# Patient Record
Sex: FEMALE | Race: ASIAN | NOT HISPANIC OR LATINO | URBAN - METROPOLITAN AREA
[De-identification: names, ages, dates, MRNs, and addresses within clinical notes are randomized per-mention and may not be internally consistent; named-entity substitution may affect disease eponyms.]

---

## 2017-12-22 ENCOUNTER — INPATIENT (INPATIENT)
Facility: HOSPITAL | Age: 72
LOS: 4 days | Discharge: HOME | End: 2017-12-27
Attending: INTERNAL MEDICINE | Admitting: INTERNAL MEDICINE
Payer: MEDICARE

## 2017-12-22 VITALS
HEART RATE: 52 BPM | RESPIRATION RATE: 18 BRPM | DIASTOLIC BLOOD PRESSURE: 78 MMHG | TEMPERATURE: 98 F | OXYGEN SATURATION: 98 % | SYSTOLIC BLOOD PRESSURE: 140 MMHG

## 2017-12-22 DIAGNOSIS — E87.79 OTHER FLUID OVERLOAD: ICD-10-CM

## 2017-12-22 DIAGNOSIS — I71.00 DISSECTION OF UNSPECIFIED SITE OF AORTA: ICD-10-CM

## 2017-12-22 DIAGNOSIS — N18.9 CHRONIC KIDNEY DISEASE, UNSPECIFIED: ICD-10-CM

## 2017-12-22 DIAGNOSIS — J81.1 CHRONIC PULMONARY EDEMA: ICD-10-CM

## 2017-12-22 DIAGNOSIS — Z90.710 ACQUIRED ABSENCE OF BOTH CERVIX AND UTERUS: Chronic | ICD-10-CM

## 2017-12-22 DIAGNOSIS — I10 ESSENTIAL (PRIMARY) HYPERTENSION: ICD-10-CM

## 2017-12-22 LAB
ALBUMIN SERPL ELPH-MCNC: 3.7 G/DL — SIGNIFICANT CHANGE UP (ref 3.3–5)
ALP SERPL-CCNC: 181 U/L — HIGH (ref 40–120)
ALT FLD-CCNC: 31 U/L — SIGNIFICANT CHANGE UP (ref 4–33)
APTT BLD: 32.4 SEC — SIGNIFICANT CHANGE UP (ref 27.5–37.4)
AST SERPL-CCNC: 58 U/L — HIGH (ref 4–32)
BASE EXCESS BLDV CALC-SCNC: -1.9 MMOL/L — SIGNIFICANT CHANGE UP
BASOPHILS # BLD AUTO: 0.02 K/UL — SIGNIFICANT CHANGE UP (ref 0–0.2)
BASOPHILS NFR BLD AUTO: 0.4 % — SIGNIFICANT CHANGE UP (ref 0–2)
BILIRUB SERPL-MCNC: 0.5 MG/DL — SIGNIFICANT CHANGE UP (ref 0.2–1.2)
BLOOD GAS VENOUS - CREATININE: 5.44 MG/DL — HIGH (ref 0.5–1.3)
BUN SERPL-MCNC: 61 MG/DL — HIGH (ref 7–23)
CALCIUM SERPL-MCNC: 8.3 MG/DL — LOW (ref 8.4–10.5)
CHLORIDE BLDV-SCNC: 103 MMOL/L — SIGNIFICANT CHANGE UP (ref 96–108)
CHLORIDE SERPL-SCNC: 96 MMOL/L — LOW (ref 98–107)
CK MB BLD-MCNC: 3.01 NG/ML — SIGNIFICANT CHANGE UP (ref 1–4.7)
CK MB BLD-MCNC: SIGNIFICANT CHANGE UP (ref 0–2.5)
CK SERPL-CCNC: 109 U/L — SIGNIFICANT CHANGE UP (ref 25–170)
CK SERPL-CCNC: 129 U/L — SIGNIFICANT CHANGE UP (ref 25–170)
CO2 SERPL-SCNC: 19 MMOL/L — LOW (ref 22–31)
CREAT SERPL-MCNC: 5.18 MG/DL — HIGH (ref 0.5–1.3)
EOSINOPHIL # BLD AUTO: 0.3 K/UL — SIGNIFICANT CHANGE UP (ref 0–0.5)
EOSINOPHIL NFR BLD AUTO: 5.5 % — SIGNIFICANT CHANGE UP (ref 0–6)
GAS PNL BLDV: 129 MMOL/L — LOW (ref 136–146)
GLUCOSE BLDV-MCNC: 120 — HIGH (ref 70–99)
GLUCOSE SERPL-MCNC: 181 MG/DL — HIGH (ref 70–99)
HCO3 BLDV-SCNC: 23 MMOL/L — SIGNIFICANT CHANGE UP (ref 20–27)
HCT VFR BLD CALC: 28.3 % — LOW (ref 34.5–45)
HCT VFR BLDV CALC: 30.1 % — LOW (ref 34.5–45)
HGB BLD-MCNC: 9.8 G/DL — LOW (ref 11.5–15.5)
HGB BLDV-MCNC: 9.7 G/DL — LOW (ref 11.5–15.5)
IMM GRANULOCYTES # BLD AUTO: 0.03 # — SIGNIFICANT CHANGE UP
IMM GRANULOCYTES NFR BLD AUTO: 0.6 % — SIGNIFICANT CHANGE UP (ref 0–1.5)
INR BLD: 0.97 — SIGNIFICANT CHANGE UP (ref 0.88–1.17)
LACTATE BLDV-MCNC: 1.3 MMOL/L — SIGNIFICANT CHANGE UP (ref 0.5–2)
LYMPHOCYTES # BLD AUTO: 1.17 K/UL — SIGNIFICANT CHANGE UP (ref 1–3.3)
LYMPHOCYTES # BLD AUTO: 21.6 % — SIGNIFICANT CHANGE UP (ref 13–44)
MCHC RBC-ENTMCNC: 31.1 PG — SIGNIFICANT CHANGE UP (ref 27–34)
MCHC RBC-ENTMCNC: 34.6 % — SIGNIFICANT CHANGE UP (ref 32–36)
MCV RBC AUTO: 89.8 FL — SIGNIFICANT CHANGE UP (ref 80–100)
MONOCYTES # BLD AUTO: 0.65 K/UL — SIGNIFICANT CHANGE UP (ref 0–0.9)
MONOCYTES NFR BLD AUTO: 12 % — SIGNIFICANT CHANGE UP (ref 2–14)
NEUTROPHILS # BLD AUTO: 3.24 K/UL — SIGNIFICANT CHANGE UP (ref 1.8–7.4)
NEUTROPHILS NFR BLD AUTO: 59.9 % — SIGNIFICANT CHANGE UP (ref 43–77)
NRBC # FLD: 0 — SIGNIFICANT CHANGE UP
NT-PROBNP SERPL-SCNC: 7762 PG/ML — SIGNIFICANT CHANGE UP
NT-PROBNP SERPL-SCNC: 8100 PG/ML — SIGNIFICANT CHANGE UP
PCO2 BLDV: 34 MMHG — LOW (ref 41–51)
PH BLDV: 7.42 PH — SIGNIFICANT CHANGE UP (ref 7.32–7.43)
PLATELET # BLD AUTO: 180 K/UL — SIGNIFICANT CHANGE UP (ref 150–400)
PMV BLD: 10.8 FL — SIGNIFICANT CHANGE UP (ref 7–13)
PO2 BLDV: 46 MMHG — HIGH (ref 35–40)
POTASSIUM BLDV-SCNC: 3.9 MMOL/L — SIGNIFICANT CHANGE UP (ref 3.4–4.5)
POTASSIUM SERPL-MCNC: 4.3 MMOL/L — SIGNIFICANT CHANGE UP (ref 3.5–5.3)
POTASSIUM SERPL-SCNC: 4.3 MMOL/L — SIGNIFICANT CHANGE UP (ref 3.5–5.3)
PROT SERPL-MCNC: 7.5 G/DL — SIGNIFICANT CHANGE UP (ref 6–8.3)
PROTHROM AB SERPL-ACNC: 11.2 SEC — SIGNIFICANT CHANGE UP (ref 9.8–13.1)
RBC # BLD: 3.15 M/UL — LOW (ref 3.8–5.2)
RBC # FLD: 15.2 % — HIGH (ref 10.3–14.5)
SAO2 % BLDV: 81.3 % — SIGNIFICANT CHANGE UP (ref 60–85)
SODIUM SERPL-SCNC: 134 MMOL/L — LOW (ref 135–145)
TROPONIN T SERPL-MCNC: < 0.06 NG/ML — SIGNIFICANT CHANGE UP (ref 0–0.06)
TROPONIN T SERPL-MCNC: < 0.06 NG/ML — SIGNIFICANT CHANGE UP (ref 0–0.06)
WBC # BLD: 5.41 K/UL — SIGNIFICANT CHANGE UP (ref 3.8–10.5)
WBC # FLD AUTO: 5.41 K/UL — SIGNIFICANT CHANGE UP (ref 3.8–10.5)

## 2017-12-22 PROCEDURE — 71020: CPT | Mod: 26

## 2017-12-22 PROCEDURE — 76775 US EXAM ABDO BACK WALL LIM: CPT | Mod: 26

## 2017-12-22 RX ORDER — SODIUM CHLORIDE 9 MG/ML
1000 INJECTION, SOLUTION INTRAVENOUS
Qty: 0 | Refills: 0 | Status: DISCONTINUED | OUTPATIENT
Start: 2017-12-22 | End: 2017-12-27

## 2017-12-22 RX ORDER — DEXTROSE 50 % IN WATER 50 %
25 SYRINGE (ML) INTRAVENOUS ONCE
Qty: 0 | Refills: 0 | Status: DISCONTINUED | OUTPATIENT
Start: 2017-12-22 | End: 2017-12-27

## 2017-12-22 RX ORDER — AMLODIPINE BESYLATE 2.5 MG/1
10 TABLET ORAL DAILY
Qty: 0 | Refills: 0 | Status: DISCONTINUED | OUTPATIENT
Start: 2017-12-22 | End: 2017-12-27

## 2017-12-22 RX ORDER — FUROSEMIDE 40 MG
80 TABLET ORAL
Qty: 0 | Refills: 0 | Status: DISCONTINUED | OUTPATIENT
Start: 2017-12-22 | End: 2017-12-23

## 2017-12-22 RX ORDER — ASPIRIN/CALCIUM CARB/MAGNESIUM 324 MG
81 TABLET ORAL DAILY
Qty: 0 | Refills: 0 | Status: DISCONTINUED | OUTPATIENT
Start: 2017-12-22 | End: 2017-12-27

## 2017-12-22 RX ORDER — FUROSEMIDE 40 MG
40 TABLET ORAL ONCE
Qty: 0 | Refills: 0 | Status: COMPLETED | OUTPATIENT
Start: 2017-12-22 | End: 2017-12-22

## 2017-12-22 RX ORDER — GLUCAGON INJECTION, SOLUTION 0.5 MG/.1ML
1 INJECTION, SOLUTION SUBCUTANEOUS ONCE
Qty: 0 | Refills: 0 | Status: DISCONTINUED | OUTPATIENT
Start: 2017-12-22 | End: 2017-12-27

## 2017-12-22 RX ORDER — SODIUM BICARBONATE 1 MEQ/ML
325 SYRINGE (ML) INTRAVENOUS
Qty: 0 | Refills: 0 | Status: DISCONTINUED | OUTPATIENT
Start: 2017-12-22 | End: 2017-12-27

## 2017-12-22 RX ORDER — INSULIN LISPRO 100/ML
VIAL (ML) SUBCUTANEOUS
Qty: 0 | Refills: 0 | Status: DISCONTINUED | OUTPATIENT
Start: 2017-12-22 | End: 2017-12-27

## 2017-12-22 RX ORDER — DEXTROSE 50 % IN WATER 50 %
1 SYRINGE (ML) INTRAVENOUS ONCE
Qty: 0 | Refills: 0 | Status: DISCONTINUED | OUTPATIENT
Start: 2017-12-22 | End: 2017-12-27

## 2017-12-22 RX ORDER — METOPROLOL TARTRATE 50 MG
100 TABLET ORAL DAILY
Qty: 0 | Refills: 0 | Status: DISCONTINUED | OUTPATIENT
Start: 2017-12-22 | End: 2017-12-24

## 2017-12-22 RX ORDER — DEXTROSE 50 % IN WATER 50 %
12.5 SYRINGE (ML) INTRAVENOUS ONCE
Qty: 0 | Refills: 0 | Status: DISCONTINUED | OUTPATIENT
Start: 2017-12-22 | End: 2017-12-27

## 2017-12-22 RX ORDER — INSULIN LISPRO 100/ML
VIAL (ML) SUBCUTANEOUS AT BEDTIME
Qty: 0 | Refills: 0 | Status: DISCONTINUED | OUTPATIENT
Start: 2017-12-22 | End: 2017-12-27

## 2017-12-22 RX ADMIN — Medication 40 MILLIGRAM(S): at 17:49

## 2017-12-22 NOTE — H&P ADULT - PROBLEM SELECTOR PLAN 1
Atthis point unclear if edema medicated by heart/valvular disease vs worsening CKD, for now will Start Pt on lasix 80 IV BID with zaroxolyn, will resume home meds Atthis point unclear if edema medicated by heart/valvular disease vs worsening CKD, for now will Start Pt on lasix 80 IV BID with zaroxolyn, will resume home meds  Will ECHO in am Will Call renal Dr Andrade in am

## 2017-12-22 NOTE — CONSULT NOTE ADULT - ATTENDING COMMENTS
Pt. was seen and examined. Agree with above. Plan d/w the team.  Pt with known chronic Type B dissection since 2008 and CKD now with worsening renal fx. Cr up to 5.   Images are reviewed. Pt. had a known aortic dissection extending from L subclavian down to the femoral since 2008. There is no progression of the dissection at this time. This is a complex dissection with both true and false lumen being filled. Since it's a chronic dissection. The supply of visceral arteries (ie what lumen they came off) should not change over time. Irrespective of that both renal arteries appear to be filling on duplex. I do not think that chronic dissection is the etiology for this pt's renal failure since it;s not an acute process but rather slow decline in renal fx.   Protect nondominant arm   vein mapping for AVF planning.   to best evaluate the aorta pt would need a contrast study that could be done if pt. starts HD.

## 2017-12-22 NOTE — ED ADULT NURSE NOTE - OBJECTIVE STATEMENT
Pt aox4, and ambualtory. c/o SOB on exertion x2weeks, IV 20g in right ac. Hx of Aortic Aneurysm. VSS, NAD. Awaiting transport to floor.

## 2017-12-22 NOTE — H&P ADULT - ATTENDING COMMENTS
Pt seen and examined  Patient is a 72 year old woman with history of HTN, DM, ESRD, Type B Aortic Dissection, Endometrial CA, S/P hysterectomy with chemo 5y ago admitted with progressive dyspnea on exertion associated with increased lower extremity edema and orthopnea.  Patient was evaluated at urgent care and given lasix 20mg po daily which did not cause improvement.  Patient denies any chest pain, palpitations, cough, syncope, nausea, abdominal pain, fever, chills,  or rash.   Had recent echo and stress at dr emery's office    maria isabel negative  cxr effusions, chf    creat 5.2  5.7 recently at cardio office     vitals stable  nad aao x 3 nc/at neck supple   cv s1s2 rrr + sm apex  lungs dec bsbases  abd soft, nt  ext b/l edema 2+    A/P    72 year old woman with history of HTN, DM, ESRD, Type B Aortic Dissection, Endometrial CA, S/P hysterectomy with chemo 5y ago admitted with progressive dyspnea on exertion associated with increased lower extremity edema and orthopnea.      1. Acute on chronic CHF  + volume overloaded  mitral valve disease on exam  clinically improved somewhat with iv lasix   cont iv bid, can dec dose mehnaz to 40mg bid  add zarox 5mg daily to augment diruesis  will obtain stress and echo results for lv fxn and valve fxn from dr kerr's office     2. ESRD  renal eval appreciated/noted  procrit  monitor renal fxn  outpt planning for new HD    3. Hypertension  acceptable bp reading s  cont current meds     dvt ppx

## 2017-12-22 NOTE — H&P ADULT - HISTORY OF PRESENT ILLNESS
Pt 71yo Female with Hx of HTN (baseline SBP in 140) DM (baseline FS in 160-170) Type B Aortic Dissection (4cm  4y ago) CKD (last SCr according to Pt 4.5 2m ago on HCO3 pill at home) And hx of Endometrial CA S/P hysterectomy with chemo 5y ago, now presenting for evaluation of SOB. Pt reports two weeks of progressive exertional dyspnea of 1/4 of block of normal pace walking with associated dry cough. Pt also reports lower extremity swelling and orthopnea. With above complains Pt referred to urgent care center three days ago and was told she has "water in lungs" she was Rx lasix 20 PO and instructed if symptoms not improve come to hospital. Since starting Lasix he dyspnea did not improved, and i past two days she developed resting symptoms with SOB dry positional cough and worsening orthopnea (now she sleeps on couch sitting) Pt reports no chest pain no back pain, no dizziness, no palpitations, no fever or chills. Pt reports no abdominal pain, or distention, but reports decrease in urinary output.  Pt reports hx of HTN that was managed with Lopressor Norvasc, Pt also was on Valsartan and HCTZ and Clonidine, but it was toped by PMD.  Pt reports having ECHO and STRESS two weeks ago (but result are not available yeat

## 2017-12-22 NOTE — H&P ADULT - RS GEN PE MLT RESP DETAILS PC
breath sounds equal/good air movement/airway patent/respirations non-labored airway patent/respirations non-labored/breath sounds equal/good air movement/rales

## 2017-12-22 NOTE — ED ADULT TRIAGE NOTE - CHIEF COMPLAINT QUOTE
Co right sided chest pain and sob since last week. Went to urgent care on Wednesday and told she has fluid in her lungs. Prescribed Lasix. Pt's voice is very hoarse and co itchy throat. Denies fevers, chills.

## 2017-12-22 NOTE — ED PROVIDER NOTE - NOTES
Will evaluate while in patient given pt's hx of dissection now showing that renal arteries supplied by false lumen. Agree would need medical management of CHF prior to any interventions but will follow while inpatient.

## 2017-12-22 NOTE — ED PROVIDER NOTE - OBJECTIVE STATEMENT
72F PMH Type B aortic dissection, HTN, DM, 72F PMH Type B aortic dissection, HTN, DM, HLD, remote hx endometrial CA 10 years ago now in remission p/w 2 weeks exertional SOB and chest heaviness. States saw her cardiologist 1 month ago with normal echo/stress. No NV or diaphoresis. Went to urgent care last week for symptoms and told she had b/l effusions on CXR and given rx for Lasix 20mg daily. Told if didn't improve with Lasix after a few days to go to ED. Pt states not feeling any improvement so came to ED. +2 pillow orthopnea, b/l LE edema. No recent travel, cough, or fever.

## 2017-12-22 NOTE — H&P ADULT - PMH
Adult Onset Diabetes Mellitus,    Cancer of Endometrium  s/p Hysterectomy , s/p Chemo.  CKD (chronic kidney disease)    Dissection of Aorta  Type B  History of Hysterectomy with O    HLD (hyperlipidemia)    Hypertension

## 2017-12-22 NOTE — ED PROVIDER NOTE - CARE PLAN
Principal Discharge DX:	Other hypervolemia  Secondary Diagnosis:	Renal failure, unspecified chronicity

## 2017-12-22 NOTE — CONSULT NOTE ADULT - SUBJECTIVE AND OBJECTIVE BOX
VASCULAR SURGERY CONSULT    Team pager: [phone]  Consulting attending:  Patient seen and examined: 12-23-17 @ 13:21    HPI:  Patient is a 72y Female    HPI:  Pt 71yo Female with Hx of HTN (baseline SBP in 140) DM (baseline FS in 160-170) Type B Aortic Dissection (4cm  4y ago) CKD (last SCr according to Pt 4.5 2m ago on HCO3 pill at home) And hx of Endometrial CA S/P hysterectomy with chemo 5y ago, now presenting for evaluation of SOB. Pt reports two weeks of progressive exertional dyspnea of 1/4 of block of normal pace walking with associated dry cough. Pt also reports lower extremity swelling and orthopnea. With above complains Pt referred to urgent care center three days ago and was told she has "water in lungs" she was Rx lasix 20 PO and instructed if symptoms not improve come to hospital. Since starting Lasix he dyspnea did not improved, and i past two days she developed resting symptoms with SOB dry positional cough and worsening orthopnea (now she sleeps on couch sitting) Pt reports no chest pain no back pain, no dizziness, no palpitations, no fever or chills. Pt reports no abdominal pain, or distention, but reports decrease in urinary output.  Pt reports hx of HTN that was managed with Lopressor Norvasc, Pt also was on Valsartan and HCTZ and Clonidine, but it was toped by PMD.  Pt reports having ECHO and STRESS two weeks ago (but result are not available andre (22 Dec 2017 20:48)      PAST MEDICAL HISTORY:  CKD (chronic kidney disease)  Hypertension  History of Hysterectomy with O  Dissection of Aorta  Cancer of Endometrium  Adult Onset Diabetes Mellitus,      PAST SURGICAL HISTORY:  H/O total hysterectomy      ALLERGIES:  No Known Allergies      MEDICATIONS  (STANDING):  amLODIPine   Tablet 10 milliGRAM(s) Oral daily  aspirin enteric coated 81 milliGRAM(s) Oral daily  dextrose 5%. 1000 milliLiter(s) (50 mL/Hr) IV Continuous <Continuous>  dextrose 50% Injectable 12.5 Gram(s) IV Push once  dextrose 50% Injectable 25 Gram(s) IV Push once  dextrose 50% Injectable 25 Gram(s) IV Push once  furosemide   Injectable 80 milliGRAM(s) IV Push two times a day  insulin lispro (HumaLOG) corrective regimen sliding scale   SubCutaneous three times a day before meals  insulin lispro (HumaLOG) corrective regimen sliding scale   SubCutaneous at bedtime  metolazone 5 milliGRAM(s) Oral once  metoprolol succinate  milliGRAM(s) Oral daily  sodium bicarbonate 325 milliGRAM(s) Oral two times a day    MEDICATIONS  (PRN):  dextrose Gel 1 Dose(s) Oral once PRN Blood Glucose LESS THAN 70 milliGRAM(s)/deciliter  glucagon  Injectable 1 milliGRAM(s) IntraMuscular once PRN Glucose LESS THAN 70 milligrams/deciliter      VITALS & I/Os:  Vital Signs Last 24 Hrs  T(C): 36.9 (23 Dec 2017 06:13), Max: 36.9 (22 Dec 2017 18:08)  T(F): 98.4 (23 Dec 2017 06:13), Max: 98.4 (22 Dec 2017 18:08)  HR: 62 (23 Dec 2017 06:13) (49 - 62)  BP: 157/90 (23 Dec 2017 06:13) (140/78 - 157/90)  BP(mean): --  RR: 16 (23 Dec 2017 06:13) (16 - 19)  SpO2: 95% (23 Dec 2017 06:13) (95% - 98%)  CAPILLARY BLOOD GLUCOSE      POCT Blood Glucose.: 161 mg/dL (23 Dec 2017 12:55)  POCT Blood Glucose.: 128 mg/dL (23 Dec 2017 09:14)  POCT Blood Glucose.: 130 mg/dL (22 Dec 2017 21:17)  POCT Blood Glucose.: 175 mg/dL (22 Dec 2017 13:58)      I&O's Summary    22 Dec 2017 07:01  -  23 Dec 2017 07:00  --------------------------------------------------------  IN: 200 mL / OUT: 450 mL / NET: -250 mL    23 Dec 2017 07:01  -  23 Dec 2017 13:21  --------------------------------------------------------  IN: 0 mL / OUT: 150 mL / NET: -150 mL          GEN: NAD, alert and oriented x 3  HEENT: WNL  CHEST: Symmetrical chest rise, breath sounds CTAB  HEART: RRR, non-muffled heart sounds  ABD: Soft, non-tender, non-distended  EXT/VASC:                          RUE -                                                   radial  -  palpable [  ]       doppler signal [  ]                                       ulnar  -  palpable [  ]       doppler signal [  ]                                       brachial  -  palpable [  ]       doppler signal [  ]                       LUE  -                                                 radial  -  palpable [  ]       doppler signal [  ]                                       ulnar  -  palpable [  ]       doppler signal [  ]                                       brachial  -  palpable [  ]       doppler signal [  ]                       RLE -                                                   femoral  -  palpable [  ]       doppler signal [  ]                                       popliteal  -  palpable [  ]       doppler signal [  ]                                       DP -  palpable [  ]       doppler signal [  ]                                       PT -  palpable [  ]       doppler signal [  ]                       LLE -                                                    femoral  -  palpable [  ]       doppler signal [  ]                                       popliteal  -  palpable [  ]       doppler signal [  ]                                       DP -  palpable [  ]       doppler signal [  ]                                       PT -  palpable [  ]       doppler signal [  ]    LABS:                        8.6    4.90  )-----------( 161      ( 23 Dec 2017 06:40 )             25.4     12-23    133<L>  |  95<L>  |  63<H>  ----------------------------<  113<H>  3.9   |  22  |  5.13<H>    Ca    8.2<L>      23 Dec 2017 06:40  Phos  5.5     12-23  Mg     2.0     12-23    TPro  6.8  /  Alb  3.4  /  TBili  0.6  /  DBili  x   /  AST  42<H>  /  ALT  26  /  AlkPhos  158<H>  12-23    Lactate: Lactate, Blood: 0.9 mmol/L (12-23 @ 06:40)   12-22 @ 17:50  1.3    PT/INR - ( 22 Dec 2017 15:14 )   PT: 11.2 SEC;   INR: 0.97          PTT - ( 22 Dec 2017 15:14 )  PTT:32.4 SEC    CARDIAC MARKERS ( 23 Dec 2017 06:40 )  x     / < 0.06 ng/mL / 105 u/L / x     / x      CARDIAC MARKERS ( 22 Dec 2017 22:00 )  x     / < 0.06 ng/mL / 109 u/L / x     / x      CARDIAC MARKERS ( 22 Dec 2017 15:14 )  x     / < 0.06 ng/mL / 129 u/L / 3.01 ng/mL / x                IMAGING:      ASSESSMENT & PLAN  72y Female      Discussed with VASCULAR SURGERY CONSULT    Team pager: [phone]  Consulting attending: Mikey      HPI:  Patient is a 72y Female - h/o of chronic Type B aortic dissection, HTN, CKD stage 5 - admitted to medical service with heart failure exacerbation/fluid overload. Patient reporting no abdominal pain, no chest pain on presentation to the [facility] ED. Her renal function has been steadily decreasing over the past year, and to further elucidate potential anatomic causes of worsening kidney disease, the ED obtained an abdominal vascular duplex study. This study revealed a change from the patient's last CTA of the abdomen in 2009: whereas before, the celiac artery and SMA branched from the false lumen and the renal and inferior mesenteric arteries branched from the true lumen, this new duplex demonstrates that the renal arteries now branch from the false lumen as well, with preserved forward flow. The diameter of infra-renal aortic dilation is unchanged from prior - 4 cm. As this progression of her Type B aortic dissection could be resulting in atrophic kidney disease, ED requested vascular surgical evaluation.  Patient reports no extremity pain/weakness/paresthias, no abdominal pain (post-prandial or otherwise), no lightheadedness/visual changes, and no chest pain. In discussions with her nephrologist (Dr. Sterling), he informed her that she will soon require HD, and therefore a placement of a tunneled dialysis catheter, followed by creation of an AVF vs. AVG. She was referred to a vascular surgeon (outside of this health system), to this end.         PAST MEDICAL HISTORY:  CKD (chronic kidney disease)  Hypertension  History of Hysterectomy with O  Dissection of Aorta  Cancer of Endometrium  Adult Onset Diabetes Mellitus,      PAST SURGICAL HISTORY:  H/O total hysterectomy      ALLERGIES:  No Known Allergies      MEDICATIONS  (STANDING):  amLODIPine   Tablet 10 milliGRAM(s) Oral daily  aspirin enteric coated 81 milliGRAM(s) Oral daily  dextrose 5%. 1000 milliLiter(s) (50 mL/Hr) IV Continuous <Continuous>  dextrose 50% Injectable 12.5 Gram(s) IV Push once  dextrose 50% Injectable 25 Gram(s) IV Push once  dextrose 50% Injectable 25 Gram(s) IV Push once  furosemide   Injectable 80 milliGRAM(s) IV Push two times a day  insulin lispro (HumaLOG) corrective regimen sliding scale   SubCutaneous three times a day before meals  insulin lispro (HumaLOG) corrective regimen sliding scale   SubCutaneous at bedtime  metolazone 5 milliGRAM(s) Oral once  metoprolol succinate  milliGRAM(s) Oral daily  sodium bicarbonate 325 milliGRAM(s) Oral two times a day    MEDICATIONS  (PRN):  dextrose Gel 1 Dose(s) Oral once PRN Blood Glucose LESS THAN 70 milliGRAM(s)/deciliter  glucagon  Injectable 1 milliGRAM(s) IntraMuscular once PRN Glucose LESS THAN 70 milligrams/deciliter      VITALS:  T(C): 36.9  HR: 62  BP: 157/90  RR: 16  SpO2: 95%)        GEN: NAD, alert and oriented x 3  HEENT: WNL  CHEST: Symmetrical chest rise, breath sounds CTAB  HEART: RRR, non-muffled heart sounds  ABD: Soft, non-tender, non-distended  EXT/VASC:                          RUE - /85. No edema/erythema or deformity. Warm, cap refill < 2 sec. Motor and sensory function intact.                                                  radial  -  palpable [ x ]       doppler signal [  ]                                       ulnar  -  palpable [  ]       doppler signal [  ]                                       brachial  -  palpable [ x ]       doppler signal [  ]                       LUE  -  /82. Edematous, relative to right, non-pitting. Superficial venous distension. Warm, cap refill < 2 sec. Motor and sensory function intact.                                                  radial  -  palpable [ x ]       doppler signal [  ]                                       ulnar  -  palpable [  ]       doppler signal [  ]                                       brachial  -  palpable [ x ]       doppler signal [  ]                       RLE - Bp 154/78. No edema/erythema or deformity. Warm, cap refill < 2 sec. Motor and sensory function intact. No peripheral tissue loss.                                                femoral  -  palpable [ x ]       doppler signal [  ]                                       popliteal  -  palpable [ x ]       doppler signal [  ]                                       DP -  palpable [ x ]       doppler signal [  ]                                       PT -  palpable [ x ]       doppler signal [  ]                       LLE - /79. No edema/erythema or deformity. Warm, cap refill < 2 sec. Motor and sensory function intact. No peripheral tissue loss.                                                  femoral  -  palpable [ x ]       doppler signal [  ]                                       popliteal  -  palpable [ x ]       doppler signal [  ]                                       DP -  palpable [ x ]       doppler signal [  ]                                       PT -  palpable [ x ]       doppler signal [  ]    LABS:                        8.6    4.90  )-----------( 161      ( 23 Dec 2017 06:40 )             25.4     12-23    133<L>  |  95<L>  |  63<H>  ----------------------------<  113<H>  3.9   |  22  |  5.13<H>    Ca    8.2<L>      23 Dec 2017 06:40  Phos  5.5     12-23  Mg     2.0     12-23    TPro  6.8  /  Alb  3.4  /  TBili  0.6  /  DBili  x   /  AST  42<H>  /  ALT  26  /  AlkPhos  158<H>  12-23    Lactate: Lactate, Blood: 0.9 mmol/L (12-23 @ 06:40)   12-22 @ 17:50  1.3    PT/INR - ( 22 Dec 2017 15:14 )   PT: 11.2 SEC;   INR: 0.97          PTT - ( 22 Dec 2017 15:14 )  PTT:32.4 SEC    CARDIAC MARKERS ( 23 Dec 2017 06:40 )  x     / < 0.06 ng/mL / 105 u/L / x     / x      CARDIAC MARKERS ( 22 Dec 2017 22:00 )  x     / < 0.06 ng/mL / 109 u/L / x     / x      CARDIAC MARKERS ( 22 Dec 2017 15:14 )  x     / < 0.06 ng/mL / 129 u/L / 3.01 ng/mL / x              IMAGING:  < from: US Abdominal Aorta (12.22.17 @ 16:22) >  FINDINGS:    Aortic dissection is again identified in the abdominal aorta. The celiac   axis and superior mesenteric artery appear to arise from the false lumen.   Both renal arteries appear to arise from the false lumen.  The following measurements were obtained:    Proximal Aorta: 3.0 cm.  Mid Aorta: 2.9 cm.  Distal Aorta: 4.0 cm.    Iliac vessels and inferior mesenteric artery are obscured by bowel gas.    IMPRESSION:     Aortic dissection is again identified with both renal arteries appearing   to arise from the false lumen. On prior CT abdomen and pelvis from June 8, 2009 the renal arteries appear to arise from the true lumen.    < end of copied text >      ASSESSMENT & PLAN  72y Female with chronic Type B aortic dissection with new finding of b/l renal arterial supply branching from the false lumen, admitted with heart failure exacerbation. No extremity or immediate visceral endangerment based up examination, imaging and laboratory data. Recommend:  - Obtaining records from 2013 echocardiogram vs. repeated TTE  - Nephrology recommendations/plans for hemodialysis  - Time-of-flight MRI of the chest/abdomen/pelvis to re-image thoracoabdominal aortic aneurysm/dissection.  - Kidney US examination  - BP control, ASA  - Q4 pulse examinations      Discussed with vascular fellow (Dmitri)      Valentin Baxter  R3, General Surgery VASCULAR SURGERY CONSULT    Team pager: [phone]  Consulting attending: Mikey      HPI:  Patient is a 72y Female - h/o of chronic Type B aortic dissection, HTN, CKD stage 5 - admitted to medical service with heart failure exacerbation/fluid overload. Patient reporting no abdominal pain, no chest pain on presentation to the [facility] ED. Her renal function has been steadily decreasing over the past year, and to further elucidate potential anatomic causes of worsening kidney disease, the ED obtained an abdominal vascular duplex study. This study revealed a change from the patient's last CTA of the abdomen in 2009: whereas before, the celiac artery and SMA branched from the false lumen and the renal and inferior mesenteric arteries branched from the true lumen, this new duplex demonstrates that the renal arteries now branch from the false lumen as well, with preserved forward flow. The diameter of infra-renal aortic dilation is unchanged from prior - 4 cm. As this progression of her Type B aortic dissection could be resulting in atrophic kidney disease, ED requested vascular surgical evaluation.  Patient reports no extremity pain/weakness/paresthias, no abdominal pain (post-prandial or otherwise), no lightheadedness/visual changes, and no chest pain. In discussions with her nephrologist (Dr. Sterling), he informed her that she will soon require HD, and therefore a placement of a tunneled dialysis catheter, followed by creation of an AVF vs. AVG. She was referred to a vascular surgeon (outside of this health system), to this end.         PAST MEDICAL HISTORY:  CKD (chronic kidney disease)  Hypertension  History of Hysterectomy with O  Dissection of Aorta  Cancer of Endometrium  Adult Onset Diabetes Mellitus,      PAST SURGICAL HISTORY:  H/O total hysterectomy      ALLERGIES:  No Known Allergies      MEDICATIONS  (STANDING):  amLODIPine   Tablet 10 milliGRAM(s) Oral daily  aspirin enteric coated 81 milliGRAM(s) Oral daily  dextrose 5%. 1000 milliLiter(s) (50 mL/Hr) IV Continuous <Continuous>  dextrose 50% Injectable 12.5 Gram(s) IV Push once  dextrose 50% Injectable 25 Gram(s) IV Push once  dextrose 50% Injectable 25 Gram(s) IV Push once  furosemide   Injectable 80 milliGRAM(s) IV Push two times a day  insulin lispro (HumaLOG) corrective regimen sliding scale   SubCutaneous three times a day before meals  insulin lispro (HumaLOG) corrective regimen sliding scale   SubCutaneous at bedtime  metolazone 5 milliGRAM(s) Oral once  metoprolol succinate  milliGRAM(s) Oral daily  sodium bicarbonate 325 milliGRAM(s) Oral two times a day    MEDICATIONS  (PRN):  dextrose Gel 1 Dose(s) Oral once PRN Blood Glucose LESS THAN 70 milliGRAM(s)/deciliter  glucagon  Injectable 1 milliGRAM(s) IntraMuscular once PRN Glucose LESS THAN 70 milligrams/deciliter    REVIEW OF SYSTEMS:  CONSTITUTIONAL: No weakness, fevers or chills  EYES/ENT: No visual changes;  No vertigo or throat pain   NECK: No pain or stiffness  RESPIRATORY: shortness of breath  CARDIOVASCULAR: No chest pain or palpitations at present  GASTROINTESTINAL: No abdominal or epigastric pain. No nausea, vomiting, or hematemesis; No diarrhea or constipation. No melena or hematochezia.  GENITOURINARY: No dysuria, frequency, foamy urine, urinary urgency, incontinence or hematuria  NEUROLOGICAL: No numbness or weakness  SKIN: No itching, burning, rashes, or lesions   All other review of systems is negative unless indicated above.        VITALS:  T(C): 36.9  HR: 62  BP: 157/90  RR: 16  SpO2: 95%)        GEN: NAD, alert and oriented x 3  HEENT: WNL  CHEST: Symmetrical chest rise, breath sounds CTAB  HEART: RRR, non-muffled heart sounds  ABD: Soft, non-tender, non-distended  EXT/VASC:                          RUE - /85. No edema/erythema or deformity. Warm, cap refill < 2 sec. Motor and sensory function intact.                                                  radial  -  palpable [ x ]       doppler signal [  ]                                       ulnar  -  palpable [  ]       doppler signal [  ]                                       brachial  -  palpable [ x ]       doppler signal [  ]                       LUE  -  /82. Edematous, relative to right, non-pitting. Superficial venous distension. Warm, cap refill < 2 sec. Motor and sensory function intact.                                                  radial  -  palpable [ x ]       doppler signal [  ]                                       ulnar  -  palpable [  ]       doppler signal [  ]                                       brachial  -  palpable [ x ]       doppler signal [  ]                       RLE - Bp 154/78. No edema/erythema or deformity. Warm, cap refill < 2 sec. Motor and sensory function intact. No peripheral tissue loss.                                                femoral  -  palpable [ x ]       doppler signal [  ]                                       popliteal  -  palpable [ x ]       doppler signal [  ]                                       DP -  palpable [ x ]       doppler signal [  ]                                       PT -  palpable [ x ]       doppler signal [  ]                       LLE - /79. No edema/erythema or deformity. Warm, cap refill < 2 sec. Motor and sensory function intact. No peripheral tissue loss.                                                  femoral  -  palpable [ x ]       doppler signal [  ]                                       popliteal  -  palpable [ x ]       doppler signal [  ]                                       DP -  palpable [ x ]       doppler signal [  ]                                       PT -  palpable [ x ]       doppler signal [  ]    Neuro: AOx3  Psych: calm  Skin: no rash      LABS:                        8.6    4.90  )-----------( 161      ( 23 Dec 2017 06:40 )             25.4     12-23    133<L>  |  95<L>  |  63<H>  ----------------------------<  113<H>  3.9   |  22  |  5.13<H>    Ca    8.2<L>      23 Dec 2017 06:40  Phos  5.5     12-23  Mg     2.0     12-23    TPro  6.8  /  Alb  3.4  /  TBili  0.6  /  DBili  x   /  AST  42<H>  /  ALT  26  /  AlkPhos  158<H>  12-23    Lactate: Lactate, Blood: 0.9 mmol/L (12-23 @ 06:40)   12-22 @ 17:50  1.3    PT/INR - ( 22 Dec 2017 15:14 )   PT: 11.2 SEC;   INR: 0.97          PTT - ( 22 Dec 2017 15:14 )  PTT:32.4 SEC    CARDIAC MARKERS ( 23 Dec 2017 06:40 )  x     / < 0.06 ng/mL / 105 u/L / x     / x      CARDIAC MARKERS ( 22 Dec 2017 22:00 )  x     / < 0.06 ng/mL / 109 u/L / x     / x      CARDIAC MARKERS ( 22 Dec 2017 15:14 )  x     / < 0.06 ng/mL / 129 u/L / 3.01 ng/mL / x              IMAGING:  < from: US Abdominal Aorta (12.22.17 @ 16:22) >  FINDINGS:    Aortic dissection is again identified in the abdominal aorta. The celiac   axis and superior mesenteric artery appear to arise from the false lumen.   Both renal arteries appear to arise from the false lumen.  The following measurements were obtained:    Proximal Aorta: 3.0 cm.  Mid Aorta: 2.9 cm.  Distal Aorta: 4.0 cm.    Iliac vessels and inferior mesenteric artery are obscured by bowel gas.    IMPRESSION:     Aortic dissection is again identified with both renal arteries appearing   to arise from the false lumen. On prior CT abdomen and pelvis from June 8, 2009 the renal arteries appear to arise from the true lumen.    < end of copied text >      ASSESSMENT & PLAN  72y Female with chronic Type B aortic dissection with new finding of b/l renal arterial supply branching from the false lumen, admitted with heart failure exacerbation. No extremity or immediate visceral endangerment based up examination, imaging and laboratory data. Recommend:  - Obtaining records from 2013 echocardiogram vs. repeated TTE  - Nephrology recommendations/plans for hemodialysis  - Time-of-flight MRI of the chest/abdomen/pelvis to re-image thoracoabdominal aortic aneurysm/dissection.  - Kidney US examination  - BP control, ASA  - Q4 pulse examinations      Discussed with vascular fellow (Dmitri)      Valentin Baxter  R3, General Surgery

## 2017-12-22 NOTE — ED PROVIDER NOTE - ATTENDING CONTRIBUTION TO CARE
I was physically present for the E/M service provided. I agree with above history, physical, and plan which I have reviewed and edited where appropriate. I was physically present for the key portions of the service provided.    72 yr old female with hx of Type B aortic dissection, HTN, DM, HLD, remote hx endometrial CA 10 years ago now in remission p/w 2 weeks exertional SOB and chest heaviness not improving.  Cardiologist at [facility] performed echo/stress x1 week ago which she states was normal. admits to chronic bilateral leg swelling, using 2 pillow to sleep, prefers to be sitting upright and on a beta blocker.  pt went to urgent care and had cxr done which showed fluid in lungs and was given lasix 20mg po and told if no improvement in 2-3 days to go to ed for eval.  no fever, no chills, no visual changes, no headache, no numbness or tingling, no cough, no cp, no palpitations, no syncope, no abd pain, no n/v/d, no dysuria.    *GEN:   comfortable, in no apparent distress, AOx3, sitting upright  *EYES:   PERRL, extra-occular movements intact  *HEENT:   airway patent, moist mucosal membranes, uvula midline  *CV:   abdirahman rate and rhythm, normal S1/S2, no murmur  *RESP:   left basilar crackles noted, non-labored, speaking in full sentences  *ABD:   soft, non tender, no guarding  *:   no cva tenderness  *MSK:   no musculoskeletal tenderness, 5/5 strength, moving all extremity  *SKIN:   dry, intact, no rash  *NEURO:   AOx3, no focal weakness or loss of sensation,  GCS 15    pt with sx suggestive of acute on chronic CHF will r/o worsening or unstable aortic dissection vs acs vs anemia.  labs, cxr, AAA sono, lasix, consult vascular and admit

## 2017-12-22 NOTE — H&P ADULT - PROBLEM SELECTOR PLAN 3
on ABD SONO noted with Aortic dissection is again identified with both renal arteries appearing   to arise from the false lumen. On prior CT abdomen and pelvis from June 8, 2009 the renal arteries appear to arise from the true lumen.    Awaiting vascular surgery recommendations

## 2017-12-22 NOTE — H&P ADULT - PROBLEM SELECTOR PLAN 2
noted with SCr of 5.2 according to Pt last SCr was 4.5 at this   at this point unclear if medicated by worsening dissection vs CHF will call renal in am and awaiting vascular surgery input  So far Pt make urine will continue with lasix for now as well as BICARB pills, but dose of Bicarb pill need to be clarified

## 2017-12-22 NOTE — ED PROVIDER NOTE - MEDICAL DECISION MAKING DETAILS
72F with exertional chest pain and RANDHAWA with EKG changes compared to prior (from 2009). Plan for cbc, cmp, ekg, cxr, cardiac enzymes, abd US aorta given renal insufficiency cannot give IV contrast for evaluation of dissection unless very high suspicion and current history/exam does not fit dissection (no back or abd pain, symmetric and equal pulses), symptoms with lying flat or exertion.

## 2017-12-22 NOTE — H&P ADULT - NEGATIVE GENERAL SYMPTOMS
no fever/no sweating/no chills/no anorexia/no weight loss/no weight gain no chills/no weight gain/no fever/no sweating/no anorexia

## 2017-12-22 NOTE — ED PROVIDER NOTE - PROGRESS NOTE DETAILS
Cardiac enzymes normal, planned for admission. Pt's cardiologist Dr. Viktor Baez affiliated, page placed through answering service. Second call placed to answering service. Spoke with cardiologist; had abnormal stress (small area of anterior ischemia) but was concerned about worsening renal failure. States does not typically admit at [facility], would like pt admitted to Dr. Juan F Quiñonez. Made aware of US findings, agrees findings may explain renal function but would not change cardiac management at this time. Accepted for admission to Dr. Quiñonez. Tele PA textpage sent.

## 2017-12-22 NOTE — H&P ADULT - ASSESSMENT
Pt 71yo female with Hx as above admitted to TELE for fluid overload, Pt also with Hx of Type B aortic dissection as mentioned before had ABD sono done while in ER and noted with Aortic dissection is again identified with both renal arteries appearing to arise from the false lumen. On prior CT abdomen and pelvis from June 8, 2009 the renal arteries appear to arise from the true lumen. Pt also noted with SCr of 5.2 BUN 68 HCO3 19 AG of 19 likely renal. K 4.3 WHile in ER Pt recived Lasix 40 IV x 4, so far made two trips to bathroom, but output not recorded   on bedside ECHO noted preserved LVF with notable AR and MR IVC 2.1cm BL pleural effusions with BL lateral B lines

## 2017-12-22 NOTE — ED PROVIDER NOTE - PMH
Adult Onset Diabetes Mellitus,    Cancer of Endometrium  s/p Hysterectomy , s/p Chemo.  Dissection of Aorta  Type B  History of Hysterectomy with O    HLD (hyperlipidemia)    Hypertension

## 2017-12-23 LAB
ALBUMIN SERPL ELPH-MCNC: 3.4 G/DL — SIGNIFICANT CHANGE UP (ref 3.3–5)
ALP SERPL-CCNC: 158 U/L — HIGH (ref 40–120)
ALT FLD-CCNC: 26 U/L — SIGNIFICANT CHANGE UP (ref 4–33)
AST SERPL-CCNC: 42 U/L — HIGH (ref 4–32)
BASOPHILS # BLD AUTO: 0.03 K/UL — SIGNIFICANT CHANGE UP (ref 0–0.2)
BASOPHILS NFR BLD AUTO: 0.6 % — SIGNIFICANT CHANGE UP (ref 0–2)
BILIRUB SERPL-MCNC: 0.6 MG/DL — SIGNIFICANT CHANGE UP (ref 0.2–1.2)
BLD GP AB SCN SERPL QL: NEGATIVE — SIGNIFICANT CHANGE UP
BUN SERPL-MCNC: 63 MG/DL — HIGH (ref 7–23)
CALCIUM SERPL-MCNC: 8.2 MG/DL — LOW (ref 8.4–10.5)
CHLORIDE SERPL-SCNC: 95 MMOL/L — LOW (ref 98–107)
CK SERPL-CCNC: 105 U/L — SIGNIFICANT CHANGE UP (ref 25–170)
CO2 SERPL-SCNC: 22 MMOL/L — SIGNIFICANT CHANGE UP (ref 22–31)
CREAT SERPL-MCNC: 5.13 MG/DL — HIGH (ref 0.5–1.3)
EOSINOPHIL # BLD AUTO: 0.37 K/UL — SIGNIFICANT CHANGE UP (ref 0–0.5)
EOSINOPHIL NFR BLD AUTO: 7.6 % — HIGH (ref 0–6)
GLUCOSE SERPL-MCNC: 113 MG/DL — HIGH (ref 70–99)
HBA1C BLD-MCNC: 5.9 % — HIGH (ref 4–5.6)
HCT VFR BLD CALC: 25.4 % — LOW (ref 34.5–45)
HGB BLD-MCNC: 8.6 G/DL — LOW (ref 11.5–15.5)
IMM GRANULOCYTES # BLD AUTO: 0.01 # — SIGNIFICANT CHANGE UP
IMM GRANULOCYTES NFR BLD AUTO: 0.2 % — SIGNIFICANT CHANGE UP (ref 0–1.5)
LACTATE SERPL-SCNC: 0.9 MMOL/L — SIGNIFICANT CHANGE UP (ref 0.5–2)
LYMPHOCYTES # BLD AUTO: 1.31 K/UL — SIGNIFICANT CHANGE UP (ref 1–3.3)
LYMPHOCYTES # BLD AUTO: 26.7 % — SIGNIFICANT CHANGE UP (ref 13–44)
MAGNESIUM SERPL-MCNC: 2 MG/DL — SIGNIFICANT CHANGE UP (ref 1.6–2.6)
MCHC RBC-ENTMCNC: 30 PG — SIGNIFICANT CHANGE UP (ref 27–34)
MCHC RBC-ENTMCNC: 33.9 % — SIGNIFICANT CHANGE UP (ref 32–36)
MCV RBC AUTO: 88.5 FL — SIGNIFICANT CHANGE UP (ref 80–100)
MONOCYTES # BLD AUTO: 0.62 K/UL — SIGNIFICANT CHANGE UP (ref 0–0.9)
MONOCYTES NFR BLD AUTO: 12.7 % — SIGNIFICANT CHANGE UP (ref 2–14)
NEUTROPHILS # BLD AUTO: 2.56 K/UL — SIGNIFICANT CHANGE UP (ref 1.8–7.4)
NEUTROPHILS NFR BLD AUTO: 52.2 % — SIGNIFICANT CHANGE UP (ref 43–77)
NRBC # FLD: 0 — SIGNIFICANT CHANGE UP
PHOSPHATE SERPL-MCNC: 5.5 MG/DL — HIGH (ref 2.5–4.5)
PLATELET # BLD AUTO: 161 K/UL — SIGNIFICANT CHANGE UP (ref 150–400)
PMV BLD: 10.8 FL — SIGNIFICANT CHANGE UP (ref 7–13)
POTASSIUM SERPL-MCNC: 3.9 MMOL/L — SIGNIFICANT CHANGE UP (ref 3.5–5.3)
POTASSIUM SERPL-SCNC: 3.9 MMOL/L — SIGNIFICANT CHANGE UP (ref 3.5–5.3)
PROT SERPL-MCNC: 6.8 G/DL — SIGNIFICANT CHANGE UP (ref 6–8.3)
RBC # BLD: 2.87 M/UL — LOW (ref 3.8–5.2)
RBC # FLD: 14.9 % — HIGH (ref 10.3–14.5)
RH IG SCN BLD-IMP: POSITIVE — SIGNIFICANT CHANGE UP
SODIUM SERPL-SCNC: 133 MMOL/L — LOW (ref 135–145)
TROPONIN T SERPL-MCNC: < 0.06 NG/ML — SIGNIFICANT CHANGE UP (ref 0–0.06)
WBC # BLD: 4.9 K/UL — SIGNIFICANT CHANGE UP (ref 3.8–10.5)
WBC # FLD AUTO: 4.9 K/UL — SIGNIFICANT CHANGE UP (ref 3.8–10.5)

## 2017-12-23 PROCEDURE — 99223 1ST HOSP IP/OBS HIGH 75: CPT

## 2017-12-23 RX ORDER — FUROSEMIDE 40 MG
40 TABLET ORAL
Qty: 0 | Refills: 0 | Status: DISCONTINUED | OUTPATIENT
Start: 2017-12-23 | End: 2017-12-25

## 2017-12-23 RX ORDER — ERYTHROPOIETIN 10000 [IU]/ML
40000 INJECTION, SOLUTION INTRAVENOUS; SUBCUTANEOUS
Qty: 0 | Refills: 0 | Status: DISCONTINUED | OUTPATIENT
Start: 2017-12-23 | End: 2017-12-27

## 2017-12-23 RX ADMIN — Medication 100 MILLIGRAM(S): at 06:18

## 2017-12-23 RX ADMIN — Medication 80 MILLIGRAM(S): at 06:18

## 2017-12-23 RX ADMIN — Medication 325 MILLIGRAM(S): at 17:44

## 2017-12-23 RX ADMIN — Medication 1: at 13:21

## 2017-12-23 RX ADMIN — AMLODIPINE BESYLATE 10 MILLIGRAM(S): 2.5 TABLET ORAL at 06:18

## 2017-12-23 RX ADMIN — Medication 40 MILLIGRAM(S): at 17:43

## 2017-12-23 RX ADMIN — Medication 325 MILLIGRAM(S): at 06:18

## 2017-12-23 RX ADMIN — Medication 81 MILLIGRAM(S): at 17:44

## 2017-12-23 RX ADMIN — ERYTHROPOIETIN 40000 UNIT(S): 10000 INJECTION, SOLUTION INTRAVENOUS; SUBCUTANEOUS at 18:29

## 2017-12-23 NOTE — CONSULT NOTE ADULT - ASSESSMENT
72 year old female with stage 5 CKD now with symtpoms of CHF and ?mild uremia.  Also worsening anemia -this also likely contributing to her symptoms  I dont think she needs dialysis at this time but likely soon.    suggest:  -could likely lower lasix 40mg bid   - check fe sat and stool OB- then can start procrit for anemia- suggest start procrit 40,000 subcut weekly- can dose today  - need to get results of recent echo and ETT from dr Baez  avoid nsaids  weigh daily  will follow

## 2017-12-23 NOTE — CONSULT NOTE ADULT - SUBJECTIVE AND OBJECTIVE BOX
72 year old woman with CKD baseline creat about 5.0 admitted with progressive RANDHAWA over past couple weeks. Also has mild anorexia  Reports seeing her cardiolgist dr Viktor Baez recently who had done stress test and echo- results not know to me.  I had seen her about a month ago and asked her to schedule av fistula but she did not pursue this.  She has HTN, DM and unknown to me a chronic aortic aneurysm.  Distant history of endometrial ca 10 plus years ago    12-23    133<L>  |  95<L>  |  63<H>  ----------------------------<  113<H>  3.9   |  22  |  5.13<H>    Ca    8.2<L>      23 Dec 2017 06:40  Phos  5.5     12-23  Mg     2.0     12-23    TPro  6.8  /  Alb  3.4  /  TBili  0.6  /  DBili  x   /  AST  42<H>  /  ALT  26  /  AlkPhos  158<H>  12-23                      8.6    4.90  )-----------( 161      ( 23 Dec 2017 06:40 )             25.4   Vital Signs Last 24 Hrs  T(C): 36.9 (23 Dec 2017 06:13), Max: 36.9 (22 Dec 2017 18:08)  T(F): 98.4 (23 Dec 2017 06:13), Max: 98.4 (22 Dec 2017 18:08)  HR: 62 (23 Dec 2017 06:13) (49 - 62)  BP: 157/90 (23 Dec 2017 06:13) (140/78 - 157/90)  BP(mean): --  RR: 16 (23 Dec 2017 06:13) (16 - 19)  SpO2: 95% (23 Dec 2017 06:13) (95% - 98%)  MEDICATIONS  (STANDING):  amLODIPine   Tablet 10 milliGRAM(s) Oral daily  aspirin enteric coated 81 milliGRAM(s) Oral daily  dextrose 5%. 1000 milliLiter(s) (50 mL/Hr) IV Continuous <Continuous>  dextrose 50% Injectable 12.5 Gram(s) IV Push once  dextrose 50% Injectable 25 Gram(s) IV Push once  dextrose 50% Injectable 25 Gram(s) IV Push once  furosemide   Injectable 80 milliGRAM(s) IV Push two times a day  insulin lispro (HumaLOG) corrective regimen sliding scale   SubCutaneous three times a day before meals  insulin lispro (HumaLOG) corrective regimen sliding scale   SubCutaneous at bedtime  metoprolol succinate  milliGRAM(s) Oral daily  sodium bicarbonate 325 milliGRAM(s) Oral two times a day    exam: comfortable  lungs - decreased bs both lung bases  CV- s1 s2 harsh systolic murmur  abdomen-soft  Extrem- trace ankle edema

## 2017-12-24 LAB
BUN SERPL-MCNC: 65 MG/DL — HIGH (ref 7–23)
CALCIUM SERPL-MCNC: 8.5 MG/DL — SIGNIFICANT CHANGE UP (ref 8.4–10.5)
CHLORIDE SERPL-SCNC: 93 MMOL/L — LOW (ref 98–107)
CO2 SERPL-SCNC: 22 MMOL/L — SIGNIFICANT CHANGE UP (ref 22–31)
CREAT SERPL-MCNC: 5.06 MG/DL — HIGH (ref 0.5–1.3)
FERRITIN SERPL-MCNC: 179.3 NG/ML — HIGH (ref 15–150)
GLUCOSE SERPL-MCNC: 129 MG/DL — HIGH (ref 70–99)
HCT VFR BLD CALC: 28 % — LOW (ref 34.5–45)
HGB BLD-MCNC: 9.4 G/DL — LOW (ref 11.5–15.5)
IRON SATN MFR SERPL: 245 UG/DL — SIGNIFICANT CHANGE UP (ref 140–530)
IRON SATN MFR SERPL: 49 UG/DL — SIGNIFICANT CHANGE UP (ref 30–160)
MCHC RBC-ENTMCNC: 29.8 PG — SIGNIFICANT CHANGE UP (ref 27–34)
MCHC RBC-ENTMCNC: 33.6 % — SIGNIFICANT CHANGE UP (ref 32–36)
MCV RBC AUTO: 88.9 FL — SIGNIFICANT CHANGE UP (ref 80–100)
NRBC # FLD: 0 — SIGNIFICANT CHANGE UP
PLATELET # BLD AUTO: 180 K/UL — SIGNIFICANT CHANGE UP (ref 150–400)
PMV BLD: 10.7 FL — SIGNIFICANT CHANGE UP (ref 7–13)
POTASSIUM SERPL-MCNC: 3.9 MMOL/L — SIGNIFICANT CHANGE UP (ref 3.5–5.3)
POTASSIUM SERPL-SCNC: 3.9 MMOL/L — SIGNIFICANT CHANGE UP (ref 3.5–5.3)
RBC # BLD: 3.15 M/UL — LOW (ref 3.8–5.2)
RBC # FLD: 15 % — HIGH (ref 10.3–14.5)
SODIUM SERPL-SCNC: 133 MMOL/L — LOW (ref 135–145)
UIBC SERPL-MCNC: 196 UG/DL — SIGNIFICANT CHANGE UP (ref 110–370)
WBC # BLD: 4.82 K/UL — SIGNIFICANT CHANGE UP (ref 3.8–10.5)
WBC # FLD AUTO: 4.82 K/UL — SIGNIFICANT CHANGE UP (ref 3.8–10.5)

## 2017-12-24 PROCEDURE — 93970 EXTREMITY STUDY: CPT | Mod: 26

## 2017-12-24 RX ORDER — METOPROLOL TARTRATE 50 MG
100 TABLET ORAL DAILY
Qty: 0 | Refills: 0 | Status: DISCONTINUED | OUTPATIENT
Start: 2017-12-24 | End: 2017-12-27

## 2017-12-24 RX ADMIN — AMLODIPINE BESYLATE 10 MILLIGRAM(S): 2.5 TABLET ORAL at 05:38

## 2017-12-24 RX ADMIN — Medication 325 MILLIGRAM(S): at 05:38

## 2017-12-24 RX ADMIN — Medication 325 MILLIGRAM(S): at 18:29

## 2017-12-24 RX ADMIN — Medication 40 MILLIGRAM(S): at 06:22

## 2017-12-24 RX ADMIN — Medication 81 MILLIGRAM(S): at 18:29

## 2017-12-24 RX ADMIN — Medication 40 MILLIGRAM(S): at 18:29

## 2017-12-24 RX ADMIN — Medication 1: at 13:11

## 2017-12-24 NOTE — DIETITIAN INITIAL EVALUATION ADULT. - DIET TYPE
consistent carbohydrate (evening snack)/regular/renal replacement pts:no protein restr,no conc K & phos, low sodium/low sodium

## 2017-12-24 NOTE — PROGRESS NOTE ADULT - ATTENDING COMMENTS
A/P    72 year old woman with history of HTN, DM, ESRD, Type B Aortic Dissection, Endometrial CA, S/P hysterectomy with chemo 5y ago admitted with progressive dyspnea on exertion associated with increased lower extremity edema and orthopnea.      1. Acute on chronic CHF  + volume overloaded  clinically improving with diuresis  mitral valve disease on exam  cont iv bid, with zarox 5mg daily   will obtain stress and echo results for lv fxn and valve fxn from dr kerr's office     2. ESRD  renal eval appreciated/noted  procrit  renal fxn stable   outpt planning for new HD    3. Hypertension  acceptable bp reading s  cont current meds     4. med f/u  dvt ppx A/P    72 year old woman with history of HTN, DM, ESRD, Type B Aortic Dissection, Endometrial CA, S/P hysterectomy with chemo 5y ago admitted with progressive dyspnea on exertion associated with increased lower extremity edema and orthopnea.      1. Acute on chronic CHF  + volume overloaded  clinically improving with diuresis  mitral valve disease on exam  cont iv bid, with zarox 5mg daily   will obtain stress and echo results for lv fxn and valve fxn from dr kerr's office     2. ESRD  renal eval appreciated/noted  procrit  renal fxn stable   outpt planning for new HD    3. Hypertension  acceptable bp reading s  cont current meds     4. med f/u  dvt ppx    d/c planning in next 24-48 hours

## 2017-12-24 NOTE — DIETITIAN INITIAL EVALUATION ADULT. - OTHER INFO
Nutrition Consult X Registered Dietitian. Pt 73 yo female appears alert, oriented. Per Pt her appetite not well for ~1.5-2 months. Food preferences discussed with Pt. No report of chewing/swallowing difficulties @ present. No report of nausea/vomiting/diarrhea @ present either. Per Pt her UBW: ~148#; her current body weight: ~143# (~1 week ago); she lost weight: ~5# in ~2 month. Pt agreed to try PO supplement: Nepro when was offered by RDN. Case discussed with Tele PA. Better food choices discussed with Pt. RDN remains available, Pt made aware.

## 2017-12-24 NOTE — DIETITIAN INITIAL EVALUATION ADULT. - PROBLEM SELECTOR PLAN 1
Atthis point unclear if edema medicated by heart/valvular disease vs worsening CKD, for now will Start Pt on lasix 80 IV BID with zaroxolyn, will resume home meds  Will ECHO in am Will Call renal Dr Andrade in am

## 2017-12-24 NOTE — DIETITIAN INITIAL EVALUATION ADULT. - PERTINENT LABORATORY DATA
(12/24) H/H 9.4/28.0 L;       (12/23) HbA1c 5.9 % H, Na 133 L, Cl 95 L, BUN 63 H, creat 5.13 H, phosphorus 5.5 H, Glu 113 H, Albumin 3.4

## 2017-12-24 NOTE — DIETITIAN INITIAL EVALUATION ADULT. - NS AS NUTRI INTERV MEALS SNACK
Other (specify)/Diets modified for specific foods and ingredients/1. Suggest: PO diet rx: Regular, Consistent Carbohydrate (no snacks), Renal Replacement (no prot restr, no conc K, no conc phos, low sodium); PO supplement: Nepro 1 can daily (provides additional ~425 Kcal, ~19 gm Protein);            2. Encourage & assist Pt with meals; Monitor PO diet tolerance;         3. Recommend: Nephro-nathanael 1 cap daily;            4. Monitor labs, weights, hydration status;

## 2017-12-24 NOTE — PROGRESS NOTE ADULT - ASSESSMENT
advanced renal disease with CHF related to volume overload and possibly related to cardiac dis ie valvular issues  seems improved  received procrit for anemia  suspect can be discharged either today or tomorrow-   would consider sending home on lasix 40mg daily.  Not sure she needs daily metolozone.  Will follow in office and will continue to given erthyropeitin agents.  She will need to schedule av acesss with vasc surgery.    She does not yet need dialysis but likely within next couple months.

## 2017-12-24 NOTE — CONSULT NOTE ADULT - SUBJECTIVE AND OBJECTIVE BOX
complaints    patient states that she has progressive sob and swelling legs- [facility] she was given las[facility],but it did not get better-so she came to ER. currently she feels better in terms of her sob.  dry cough present.   denies dizziness or palpitations.  she states that she takes januvia 25 as outpatient.     No Known Allergies    MEDICATIONS  (STANDING):  amLODIPine   Tablet 10 milliGRAM(s) Oral daily  aspirin enteric coated 81 milliGRAM(s) Oral daily  dextrose 5%. 1000 milliLiter(s) (50 mL/Hr) IV Continuous <Continuous>  dextrose 50% Injectable 12.5 Gram(s) IV Push once  dextrose 50% Injectable 25 Gram(s) IV Push once  dextrose 50% Injectable 25 Gram(s) IV Push once  epoetin marla Injectable 77049 Unit(s) SubCutaneous every 7 days  furosemide   Injectable 40 milliGRAM(s) IV Push two times a day  insulin lispro (HumaLOG) corrective regimen sliding scale   SubCutaneous three times a day before meals  insulin lispro (HumaLOG) corrective regimen sliding scale   SubCutaneous at bedtime  metolazone 5 milliGRAM(s) Oral daily  metoprolol succinate  milliGRAM(s) Oral daily  sodium bicarbonate 325 milliGRAM(s) Oral two times a day    MEDICATIONS  (PRN):  dextrose Gel 1 Dose(s) Oral once PRN Blood Glucose LESS THAN 70 milliGRAM(s)/deciliter  glucagon  Injectable 1 milliGRAM(s) IntraMuscular once PRN Glucose LESS THAN 70 milligrams/deciliter      PAST MEDICAL & SURGICAL HISTORY:  CKD (chronic kidney disease)  Hypertension  History of Hysterectomy with O  Dissection of Aorta: Type B  Cancer of Endometrium: s/p Hysterectomy , s/p Chemo.  Adult Onset Diabetes Mellitus,  H/O total hysterectomy: in 2007 for endometrial CA      social history  denies smoking     Vital Signs Last 24 Hrs  T(C): 36.6 (24 Dec 2017 05:23), Max: 36.6 (23 Dec 2017 22:22)  T(F): 97.8 (24 Dec 2017 05:23), Max: 97.8 (23 Dec 2017 22:22)  HR: 54 (24 Dec 2017 05:23) (52 - 54)  BP: 145/76 (24 Dec 2017 05:23) (131/70 - 151/73)  BP(mean): --  RR: 18 (24 Dec 2017 05:23) (18 - 18)  SpO2: 96% (24 Dec 2017 05:23) (96% - 99%)    CAPILLARY BLOOD GLUCOSE      POCT Blood Glucose.: 120 mg/dL (24 Dec 2017 09:44)  POCT Blood Glucose.: 117 mg/dL (23 Dec 2017 21:36)  POCT Blood Glucose.: 109 mg/dL (23 Dec 2017 17:44)  POCT Blood Glucose.: 161 mg/dL (23 Dec 2017 12:55)      physical exam  awake and alert   neck - no jvd  cvs- s1s2 present. no s3s4   rs- bilateral air entry present. decreased in the posterior bases bilaterally.   no wheeze  pa- no g/r/d/t. bs present   ext- 1+edema bilateral                              8.6    4.90  )-----------( 161      ( 23 Dec 2017 06:40 )             25.4     12-23    133<L>  |  95<L>  |  63<H>  ----------------------------<  113<H>  3.9   |  22  |  5.13<H>    Ca    8.2<L>      23 Dec 2017 06:40  Phos  5.5     12-23  Mg     2.0     12-23    TPro  6.8  /  Alb  3.4  /  TBili  0.6  /  DBili  x   /  AST  42<H>  /  ALT  26  /  AlkPhos  158<H>  12-23    PT/INR - ( 22 Dec 2017 15:14 )   PT: 11.2 SEC;   INR: 0.97          PTT - ( 22 Dec 2017 15:14 )  PTT:32.4 SEC  abdomen aortic USG- results reviewed    EKG-reviewed    Radiology  chest xray- reviewed report    assessment and plan  patient with HTN/ckd/dm/aortic dissection admitted with fluid overload stable.   she ruled out for MI  echo.   continue lasix IV with metolozone    DM- fs reasonable. continue low dose sliding scale insulin  her hba1c is 5.9    HTN- continue current meds    anemia- work up as per renal attending    transaminitis- hepatitis panel. can be worked up as outpatient.    continue current care.  appreciate all consults

## 2017-12-25 LAB
BUN SERPL-MCNC: 70 MG/DL — HIGH (ref 7–23)
CALCIUM SERPL-MCNC: 8.2 MG/DL — LOW (ref 8.4–10.5)
CHLORIDE SERPL-SCNC: 93 MMOL/L — LOW (ref 98–107)
CO2 SERPL-SCNC: 23 MMOL/L — SIGNIFICANT CHANGE UP (ref 22–31)
CREAT SERPL-MCNC: 5.31 MG/DL — HIGH (ref 0.5–1.3)
GLUCOSE SERPL-MCNC: 94 MG/DL — SIGNIFICANT CHANGE UP (ref 70–99)
HCT VFR BLD CALC: 25.4 % — LOW (ref 34.5–45)
HGB BLD-MCNC: 8.8 G/DL — LOW (ref 11.5–15.5)
MCHC RBC-ENTMCNC: 30.4 PG — SIGNIFICANT CHANGE UP (ref 27–34)
MCHC RBC-ENTMCNC: 34.6 % — SIGNIFICANT CHANGE UP (ref 32–36)
MCV RBC AUTO: 87.9 FL — SIGNIFICANT CHANGE UP (ref 80–100)
NRBC # FLD: 0 — SIGNIFICANT CHANGE UP
PLATELET # BLD AUTO: 160 K/UL — SIGNIFICANT CHANGE UP (ref 150–400)
PMV BLD: 10.6 FL — SIGNIFICANT CHANGE UP (ref 7–13)
POTASSIUM SERPL-MCNC: 3.6 MMOL/L — SIGNIFICANT CHANGE UP (ref 3.5–5.3)
POTASSIUM SERPL-SCNC: 3.6 MMOL/L — SIGNIFICANT CHANGE UP (ref 3.5–5.3)
RBC # BLD: 2.89 M/UL — LOW (ref 3.8–5.2)
RBC # FLD: 14.7 % — HIGH (ref 10.3–14.5)
SODIUM SERPL-SCNC: 134 MMOL/L — LOW (ref 135–145)
WBC # BLD: 5.6 K/UL — SIGNIFICANT CHANGE UP (ref 3.8–10.5)
WBC # FLD AUTO: 5.6 K/UL — SIGNIFICANT CHANGE UP (ref 3.8–10.5)

## 2017-12-25 RX ORDER — FUROSEMIDE 40 MG
40 TABLET ORAL THREE TIMES A DAY
Qty: 0 | Refills: 0 | Status: DISCONTINUED | OUTPATIENT
Start: 2017-12-25 | End: 2017-12-26

## 2017-12-25 RX ADMIN — Medication 325 MILLIGRAM(S): at 05:25

## 2017-12-25 RX ADMIN — Medication 40 MILLIGRAM(S): at 13:23

## 2017-12-25 RX ADMIN — Medication 325 MILLIGRAM(S): at 18:06

## 2017-12-25 RX ADMIN — Medication 40 MILLIGRAM(S): at 22:00

## 2017-12-25 RX ADMIN — Medication 1: at 12:52

## 2017-12-25 RX ADMIN — Medication 40 MILLIGRAM(S): at 06:00

## 2017-12-25 RX ADMIN — Medication 100 MILLIGRAM(S): at 05:25

## 2017-12-25 RX ADMIN — Medication 81 MILLIGRAM(S): at 18:07

## 2017-12-25 RX ADMIN — AMLODIPINE BESYLATE 10 MILLIGRAM(S): 2.5 TABLET ORAL at 05:25

## 2017-12-25 NOTE — PROGRESS NOTE ADULT - ATTENDING COMMENTS
A/P    72 year old woman with history of HTN, DM, ESRD, Type B Aortic Dissection, Endometrial CA, S/P hysterectomy with chemo 5y ago admitted with progressive dyspnea on exertion associated with increased lower extremity edema and orthopnea.    1. Acute on chronic CHF  clinically improved with diuresis   mitral valve disease on exam  cont lasix, change to 40mg po bid  d/c zarox   will obtain stress and echo results for lv fxn and valve fxn from dr kerr's office     2. ESRD  renal f/u  procrit  renal fxn stable   outpt planning for new HD    3. Hypertension  acceptable bp reading s  cont current meds     4. med f/u  dvt ppx    5. vasc eval noted  ? consider AVF as outpt  pt seems reluctant   will defer to renal for mri/mra imaging with contrast  pt likely medically stable for d/c home mehnaz if no further workup/procedures planned

## 2017-12-25 NOTE — PROGRESS NOTE ADULT - ASSESSMENT
72yF w/ Type B aortic dissection and ESRD requiring HD    - Vein mapping noted, will plan for AVF this admission  - Please document medical and cardiac clearance for OR  - Obtain MRA of chest, abdomen, and pelvis to assess aortic dissection, will require renal optimization for contrast  - Continue LUE precautions  - Please page [phone] w/ any questions    KATEY Henson PGY-2 72yF w/ Type B aortic dissection and ESRD requiring HD    - Vein mapping noted  - Continue LUE precautions  - No objection to d/c and outpt f/u for AVF placement  - Please reconsult PRN  - Please page [phone] w/ any questions    KATEY Henson PGY-2

## 2017-12-25 NOTE — PROGRESS NOTE ADULT - ASSESSMENT
CHF improved but still with RANDHAWA- does not appear that fluid overloaded so wonder if valvular disease contributing to her symptoms  LUE swelling ? reason.  DVT eval neg on admission  potassium starting to decrease on current diuretic regimen    suggest :  can prob change to po lasix and ? need for metolozone  would start KCL 10meq daily while on current diuretic regimen  no immediate dialysis needs.  will continue to follow

## 2017-12-26 LAB
BUN SERPL-MCNC: 72 MG/DL — HIGH (ref 7–23)
CALCIUM SERPL-MCNC: 8.3 MG/DL — LOW (ref 8.4–10.5)
CHLORIDE SERPL-SCNC: 89 MMOL/L — LOW (ref 98–107)
CO2 SERPL-SCNC: 25 MMOL/L — SIGNIFICANT CHANGE UP (ref 22–31)
CREAT SERPL-MCNC: 5.13 MG/DL — HIGH (ref 0.5–1.3)
GLUCOSE SERPL-MCNC: 108 MG/DL — HIGH (ref 70–99)
HCT VFR BLD CALC: 24.9 % — LOW (ref 34.5–45)
HGB BLD-MCNC: 9 G/DL — LOW (ref 11.5–15.5)
MAGNESIUM SERPL-MCNC: 1.9 MG/DL — SIGNIFICANT CHANGE UP (ref 1.6–2.6)
MCHC RBC-ENTMCNC: 31.6 PG — SIGNIFICANT CHANGE UP (ref 27–34)
MCHC RBC-ENTMCNC: 36.1 % — HIGH (ref 32–36)
MCV RBC AUTO: 87.4 FL — SIGNIFICANT CHANGE UP (ref 80–100)
NRBC # FLD: 0 — SIGNIFICANT CHANGE UP
PHOSPHATE SERPL-MCNC: 5.6 MG/DL — HIGH (ref 2.5–4.5)
PLATELET # BLD AUTO: 146 K/UL — LOW (ref 150–400)
PMV BLD: 10.7 FL — SIGNIFICANT CHANGE UP (ref 7–13)
POTASSIUM SERPL-MCNC: 3.4 MMOL/L — LOW (ref 3.5–5.3)
POTASSIUM SERPL-SCNC: 3.4 MMOL/L — LOW (ref 3.5–5.3)
RBC # BLD: 2.85 M/UL — LOW (ref 3.8–5.2)
RBC # FLD: 14.7 % — HIGH (ref 10.3–14.5)
SODIUM SERPL-SCNC: 132 MMOL/L — LOW (ref 135–145)
WBC # BLD: 5.14 K/UL — SIGNIFICANT CHANGE UP (ref 3.8–10.5)
WBC # FLD AUTO: 5.14 K/UL — SIGNIFICANT CHANGE UP (ref 3.8–10.5)

## 2017-12-26 RX ORDER — PANTOPRAZOLE SODIUM 20 MG/1
40 TABLET, DELAYED RELEASE ORAL
Qty: 0 | Refills: 0 | Status: DISCONTINUED | OUTPATIENT
Start: 2017-12-26 | End: 2017-12-27

## 2017-12-26 RX ORDER — CALCIUM CARBONATE 500(1250)
1 TABLET ORAL THREE TIMES A DAY
Qty: 0 | Refills: 0 | Status: DISCONTINUED | OUTPATIENT
Start: 2017-12-26 | End: 2017-12-27

## 2017-12-26 RX ORDER — POTASSIUM CHLORIDE 20 MEQ
40 PACKET (EA) ORAL ONCE
Qty: 0 | Refills: 0 | Status: DISCONTINUED | OUTPATIENT
Start: 2017-12-26 | End: 2017-12-26

## 2017-12-26 RX ORDER — FUROSEMIDE 40 MG
40 TABLET ORAL EVERY 12 HOURS
Qty: 0 | Refills: 0 | Status: DISCONTINUED | OUTPATIENT
Start: 2017-12-26 | End: 2017-12-27

## 2017-12-26 RX ORDER — POTASSIUM CHLORIDE 20 MEQ
10 PACKET (EA) ORAL DAILY
Qty: 0 | Refills: 0 | Status: DISCONTINUED | OUTPATIENT
Start: 2017-12-26 | End: 2017-12-27

## 2017-12-26 RX ORDER — POTASSIUM CHLORIDE 20 MEQ
20 PACKET (EA) ORAL ONCE
Qty: 0 | Refills: 0 | Status: COMPLETED | OUTPATIENT
Start: 2017-12-26 | End: 2017-12-26

## 2017-12-26 RX ADMIN — Medication 40 MILLIGRAM(S): at 05:17

## 2017-12-26 RX ADMIN — Medication 325 MILLIGRAM(S): at 05:18

## 2017-12-26 RX ADMIN — Medication 40 MILLIGRAM(S): at 17:56

## 2017-12-26 RX ADMIN — Medication 10 MILLIEQUIVALENT(S): at 12:08

## 2017-12-26 RX ADMIN — Medication 81 MILLIGRAM(S): at 12:08

## 2017-12-26 RX ADMIN — PANTOPRAZOLE SODIUM 40 MILLIGRAM(S): 20 TABLET, DELAYED RELEASE ORAL at 05:17

## 2017-12-26 RX ADMIN — Medication 1: at 13:07

## 2017-12-26 RX ADMIN — AMLODIPINE BESYLATE 10 MILLIGRAM(S): 2.5 TABLET ORAL at 05:17

## 2017-12-26 RX ADMIN — Medication 20 MILLIEQUIVALENT(S): at 13:07

## 2017-12-26 RX ADMIN — Medication 325 MILLIGRAM(S): at 17:56

## 2017-12-26 NOTE — PROGRESS NOTE ADULT - ASSESSMENT
72 year old woman with history of HTN, DM, ESRD, Type B Aortic Dissection, Endometrial CA, S/P hysterectomy with chemo 5y ago admitted with progressive dyspnea on exertion associated with increased lower extremity edema and orthopnea.    1. Acute on chronic CHF  clinically improved with diuresis   mitral valve disease on exam  cont lasix 40mg po bid       2. ESRD  renal f/u  procrit  renal fxn stable   outpt planning for new HD    3. Hypertension  acceptable bp reading s  cont current meds     4. med f/u  dvt ppx    5. vasc eval noted  ? consider AVF as outpt  pt seems reluctant   will defer to renal for mri/mra imaging with contrast  pt likely medically stable for d/c home mehnaz if no further workup/procedures planned

## 2017-12-27 ENCOUNTER — INPATIENT (INPATIENT)
Facility: HOSPITAL | Age: 72
LOS: 15 days | Discharge: NURSING, REHAB OR LONG-TERM CARE | End: 2018-01-12
Attending: HOSPITALIST | Admitting: HOSPITALIST
Payer: MEDICARE

## 2017-12-27 ENCOUNTER — TRANSCRIPTION ENCOUNTER (OUTPATIENT)
Age: 72
End: 2017-12-27

## 2017-12-27 VITALS
OXYGEN SATURATION: 99 % | SYSTOLIC BLOOD PRESSURE: 138 MMHG | HEART RATE: 57 BPM | DIASTOLIC BLOOD PRESSURE: 92 MMHG | RESPIRATION RATE: 18 BRPM

## 2017-12-27 VITALS
DIASTOLIC BLOOD PRESSURE: 72 MMHG | SYSTOLIC BLOOD PRESSURE: 133 MMHG | OXYGEN SATURATION: 99 % | TEMPERATURE: 98 F | RESPIRATION RATE: 16 BRPM | HEART RATE: 68 BPM

## 2017-12-27 DIAGNOSIS — Z90.710 ACQUIRED ABSENCE OF BOTH CERVIX AND UTERUS: Chronic | ICD-10-CM

## 2017-12-27 LAB
ALBUMIN SERPL ELPH-MCNC: 4.1 G/DL — SIGNIFICANT CHANGE UP (ref 3.3–5)
ALP SERPL-CCNC: 164 U/L — HIGH (ref 40–120)
ALT FLD-CCNC: 27 U/L — SIGNIFICANT CHANGE UP (ref 4–33)
APAP SERPL-MCNC: < 15 UG/ML — LOW (ref 15–25)
APTT BLD: 33.3 SEC — SIGNIFICANT CHANGE UP (ref 27.5–37.4)
AST SERPL-CCNC: 43 U/L — HIGH (ref 4–32)
BARBITURATES MEASUREMENT: NEGATIVE — SIGNIFICANT CHANGE UP
BASE EXCESS BLDV CALC-SCNC: 1.3 MMOL/L — SIGNIFICANT CHANGE UP
BASOPHILS # BLD AUTO: 0.01 K/UL — SIGNIFICANT CHANGE UP (ref 0–0.2)
BASOPHILS NFR BLD AUTO: 0.2 % — SIGNIFICANT CHANGE UP (ref 0–2)
BENZODIAZ SERPL-MCNC: NEGATIVE — SIGNIFICANT CHANGE UP
BILIRUB SERPL-MCNC: 0.8 MG/DL — SIGNIFICANT CHANGE UP (ref 0.2–1.2)
BLOOD GAS VENOUS - CREATININE: 5.74 MG/DL — HIGH (ref 0.5–1.3)
BUN SERPL-MCNC: 73 MG/DL — HIGH (ref 7–23)
BUN SERPL-MCNC: 73 MG/DL — HIGH (ref 7–23)
CALCIUM SERPL-MCNC: 8.5 MG/DL — SIGNIFICANT CHANGE UP (ref 8.4–10.5)
CALCIUM SERPL-MCNC: 8.9 MG/DL — SIGNIFICANT CHANGE UP (ref 8.4–10.5)
CHLORIDE BLDV-SCNC: 88 MMOL/L — LOW (ref 96–108)
CHLORIDE SERPL-SCNC: 84 MMOL/L — LOW (ref 98–107)
CHLORIDE SERPL-SCNC: 89 MMOL/L — LOW (ref 98–107)
CK MB BLD-MCNC: 3.41 NG/ML — SIGNIFICANT CHANGE UP (ref 1–4.7)
CK SERPL-CCNC: 136 U/L — SIGNIFICANT CHANGE UP (ref 25–170)
CO2 SERPL-SCNC: 23 MMOL/L — SIGNIFICANT CHANGE UP (ref 22–31)
CO2 SERPL-SCNC: 25 MMOL/L — SIGNIFICANT CHANGE UP (ref 22–31)
CREAT SERPL-MCNC: 5.49 MG/DL — HIGH (ref 0.5–1.3)
CREAT SERPL-MCNC: 5.66 MG/DL — HIGH (ref 0.5–1.3)
EOSINOPHIL # BLD AUTO: 0.07 K/UL — SIGNIFICANT CHANGE UP (ref 0–0.5)
EOSINOPHIL NFR BLD AUTO: 1.2 % — SIGNIFICANT CHANGE UP (ref 0–6)
ETHANOL BLD-MCNC: < 10 MG/DL — SIGNIFICANT CHANGE UP
GAS PNL BLDV: 125 MMOL/L — LOW (ref 136–146)
GLUCOSE BLDV-MCNC: 229 — HIGH (ref 70–99)
GLUCOSE SERPL-MCNC: 112 MG/DL — HIGH (ref 70–99)
GLUCOSE SERPL-MCNC: 228 MG/DL — HIGH (ref 70–99)
HCO3 BLDV-SCNC: 25 MMOL/L — SIGNIFICANT CHANGE UP (ref 20–27)
HCT VFR BLD CALC: 25.9 % — LOW (ref 34.5–45)
HCT VFR BLD CALC: 30.1 % — LOW (ref 34.5–45)
HCT VFR BLDV CALC: 33.5 % — LOW (ref 34.5–45)
HGB BLD-MCNC: 10.9 G/DL — LOW (ref 11.5–15.5)
HGB BLD-MCNC: 8.9 G/DL — LOW (ref 11.5–15.5)
HGB BLDV-MCNC: 10.9 G/DL — LOW (ref 11.5–15.5)
IMM GRANULOCYTES # BLD AUTO: 0.02 # — SIGNIFICANT CHANGE UP
IMM GRANULOCYTES NFR BLD AUTO: 0.3 % — SIGNIFICANT CHANGE UP (ref 0–1.5)
INR BLD: 1.02 — SIGNIFICANT CHANGE UP (ref 0.88–1.17)
LACTATE BLDV-MCNC: 2.5 MMOL/L — HIGH (ref 0.5–2)
LYMPHOCYTES # BLD AUTO: 0.93 K/UL — LOW (ref 1–3.3)
LYMPHOCYTES # BLD AUTO: 16.2 % — SIGNIFICANT CHANGE UP (ref 13–44)
MAGNESIUM SERPL-MCNC: 1.9 MG/DL — SIGNIFICANT CHANGE UP (ref 1.6–2.6)
MCHC RBC-ENTMCNC: 29.9 PG — SIGNIFICANT CHANGE UP (ref 27–34)
MCHC RBC-ENTMCNC: 31.5 PG — SIGNIFICANT CHANGE UP (ref 27–34)
MCHC RBC-ENTMCNC: 34.4 % — SIGNIFICANT CHANGE UP (ref 32–36)
MCHC RBC-ENTMCNC: 36.2 % — HIGH (ref 32–36)
MCV RBC AUTO: 86.9 FL — SIGNIFICANT CHANGE UP (ref 80–100)
MCV RBC AUTO: 87 FL — SIGNIFICANT CHANGE UP (ref 80–100)
MONOCYTES # BLD AUTO: 0.25 K/UL — SIGNIFICANT CHANGE UP (ref 0–0.9)
MONOCYTES NFR BLD AUTO: 4.4 % — SIGNIFICANT CHANGE UP (ref 2–14)
NEUTROPHILS # BLD AUTO: 4.46 K/UL — SIGNIFICANT CHANGE UP (ref 1.8–7.4)
NEUTROPHILS NFR BLD AUTO: 77.7 % — HIGH (ref 43–77)
NRBC # FLD: 0 — SIGNIFICANT CHANGE UP
NRBC # FLD: 0 — SIGNIFICANT CHANGE UP
PCO2 BLDV: 43 MMHG — SIGNIFICANT CHANGE UP (ref 41–51)
PH BLDV: 7.39 PH — SIGNIFICANT CHANGE UP (ref 7.32–7.43)
PHOSPHATE SERPL-MCNC: 5.7 MG/DL — HIGH (ref 2.5–4.5)
PLATELET # BLD AUTO: 165 K/UL — SIGNIFICANT CHANGE UP (ref 150–400)
PLATELET # BLD AUTO: 213 K/UL — SIGNIFICANT CHANGE UP (ref 150–400)
PMV BLD: 10.5 FL — SIGNIFICANT CHANGE UP (ref 7–13)
PMV BLD: 10.7 FL — SIGNIFICANT CHANGE UP (ref 7–13)
PO2 BLDV: 117 MMHG — HIGH (ref 35–40)
POTASSIUM BLDV-SCNC: 4.2 MMOL/L — SIGNIFICANT CHANGE UP (ref 3.4–4.5)
POTASSIUM SERPL-MCNC: 3.5 MMOL/L — SIGNIFICANT CHANGE UP (ref 3.5–5.3)
POTASSIUM SERPL-MCNC: 4.3 MMOL/L — SIGNIFICANT CHANGE UP (ref 3.5–5.3)
POTASSIUM SERPL-SCNC: 3.5 MMOL/L — SIGNIFICANT CHANGE UP (ref 3.5–5.3)
POTASSIUM SERPL-SCNC: 4.3 MMOL/L — SIGNIFICANT CHANGE UP (ref 3.5–5.3)
PROT SERPL-MCNC: 8.4 G/DL — HIGH (ref 6–8.3)
PROTHROM AB SERPL-ACNC: 11.7 SEC — SIGNIFICANT CHANGE UP (ref 9.8–13.1)
RBC # BLD: 2.98 M/UL — LOW (ref 3.8–5.2)
RBC # BLD: 3.46 M/UL — LOW (ref 3.8–5.2)
RBC # FLD: 14.9 % — HIGH (ref 10.3–14.5)
RBC # FLD: 15.1 % — HIGH (ref 10.3–14.5)
SALICYLATES SERPL-MCNC: < 5 MG/DL — LOW (ref 15–30)
SAO2 % BLDV: 98.9 % — HIGH (ref 60–85)
SODIUM SERPL-SCNC: 129 MMOL/L — LOW (ref 135–145)
SODIUM SERPL-SCNC: 132 MMOL/L — LOW (ref 135–145)
TROPONIN T SERPL-MCNC: < 0.06 NG/ML — SIGNIFICANT CHANGE UP (ref 0–0.06)
TSH SERPL-MCNC: 1.51 UIU/ML — SIGNIFICANT CHANGE UP (ref 0.27–4.2)
WBC # BLD: 4.42 K/UL — SIGNIFICANT CHANGE UP (ref 3.8–10.5)
WBC # BLD: 5.74 K/UL — SIGNIFICANT CHANGE UP (ref 3.8–10.5)
WBC # FLD AUTO: 4.42 K/UL — SIGNIFICANT CHANGE UP (ref 3.8–10.5)
WBC # FLD AUTO: 5.74 K/UL — SIGNIFICANT CHANGE UP (ref 3.8–10.5)

## 2017-12-27 PROCEDURE — 71010: CPT | Mod: 26

## 2017-12-27 PROCEDURE — 71275 CT ANGIOGRAPHY CHEST: CPT | Mod: 26

## 2017-12-27 PROCEDURE — 74177 CT ABD & PELVIS W/CONTRAST: CPT | Mod: 26

## 2017-12-27 PROCEDURE — 70450 CT HEAD/BRAIN W/O DYE: CPT | Mod: 26

## 2017-12-27 RX ORDER — FUROSEMIDE 40 MG
1 TABLET ORAL
Qty: 60 | Refills: 0 | OUTPATIENT
Start: 2017-12-27 | End: 2018-01-25

## 2017-12-27 RX ORDER — ASPIRIN/CALCIUM CARB/MAGNESIUM 324 MG
1 TABLET ORAL
Qty: 0 | Refills: 0 | COMMUNITY
Start: 2017-12-27

## 2017-12-27 RX ORDER — FUROSEMIDE 40 MG
1 TABLET ORAL
Qty: 0 | Refills: 0 | COMMUNITY

## 2017-12-27 RX ORDER — FENTANYL CITRATE 50 UG/ML
0.5 INJECTION INTRAVENOUS
Qty: 2500 | Refills: 0 | Status: DISCONTINUED | OUTPATIENT
Start: 2017-12-27 | End: 2017-12-30

## 2017-12-27 RX ORDER — PANTOPRAZOLE SODIUM 20 MG/1
1 TABLET, DELAYED RELEASE ORAL
Qty: 30 | Refills: 0 | OUTPATIENT
Start: 2017-12-27 | End: 2018-01-25

## 2017-12-27 RX ORDER — SODIUM BICARBONATE 1 MEQ/ML
1 SYRINGE (ML) INTRAVENOUS
Qty: 0 | Refills: 0 | COMMUNITY
Start: 2017-12-27

## 2017-12-27 RX ORDER — SODIUM BICARBONATE 1 MEQ/ML
0 SYRINGE (ML) INTRAVENOUS
Qty: 0 | Refills: 0 | COMMUNITY

## 2017-12-27 RX ORDER — ERYTHROPOIETIN 10000 [IU]/ML
0 INJECTION, SOLUTION INTRAVENOUS; SUBCUTANEOUS
Qty: 0 | Refills: 0 | COMMUNITY
Start: 2017-12-27

## 2017-12-27 RX ORDER — POTASSIUM CHLORIDE 20 MEQ
1 PACKET (EA) ORAL
Qty: 30 | Refills: 0 | OUTPATIENT
Start: 2017-12-27 | End: 2018-01-25

## 2017-12-27 RX ADMIN — Medication 81 MILLIGRAM(S): at 14:43

## 2017-12-27 RX ADMIN — Medication 325 MILLIGRAM(S): at 05:31

## 2017-12-27 RX ADMIN — Medication 100 MILLIGRAM(S): at 14:42

## 2017-12-27 RX ADMIN — Medication 325 MILLIGRAM(S): at 18:46

## 2017-12-27 RX ADMIN — Medication 40 MILLIGRAM(S): at 18:46

## 2017-12-27 RX ADMIN — Medication 40 MILLIGRAM(S): at 05:31

## 2017-12-27 RX ADMIN — PANTOPRAZOLE SODIUM 40 MILLIGRAM(S): 20 TABLET, DELAYED RELEASE ORAL at 05:31

## 2017-12-27 RX ADMIN — AMLODIPINE BESYLATE 10 MILLIGRAM(S): 2.5 TABLET ORAL at 05:31

## 2017-12-27 RX ADMIN — Medication 10 MILLIEQUIVALENT(S): at 14:42

## 2017-12-27 NOTE — DISCHARGE NOTE ADULT - CARE PLAN
Principal Discharge DX:	CHF (congestive heart failure)  Instructions for follow-up, activity and diet:	Follow up with your primary cardiologist for echocardiogram. Please call to make an appointment. Continue Lasix 40 mg twice per day  Secondary Diagnosis:	End stage renal disease  Instructions for follow-up, activity and diet:	Follow up with your nephrologist for further management and with Dr. Jensen for AV fistula. Please call to make an appointment.  Avoid medications that may be harmful to your kidneys such as NSAID pain relievers (Advil, Aleve, Motrin, etc.) Use Tylenol for pain if needed. Avoid contrast if you require any medical testing.  Secondary Diagnosis:	Hypertension  Instructions for follow-up, activity and diet:	Continue metoprolol, amlodipine. Follow up with your primary care physician and cardiologist for further monitoring in 1-2 weeks. Please call to arrange appointment.  Secondary Diagnosis:	Dissection of Aorta  Instructions for follow-up, activity and diet:	Please follow up with your cardiologist for further monitoring.  Secondary Diagnosis:	Diabetes  Instructions for follow-up, activity and diet:	Continue Januvia. Follow up with your primary care physician for further monitoring in 1-2 weeks. Please call to arrange appointment. Principal Discharge DX:	CHF (congestive heart failure)  Goal:	Prevent complications.  Instructions for follow-up, activity and diet:	Follow up with your primary cardiologist for echocardiogram. Please call to make an appointment. Continue Lasix 40 mg twice per day  Secondary Diagnosis:	End stage renal disease  Instructions for follow-up, activity and diet:	Follow up with your nephrologist for further management and with Dr. Jensen for AV fistula. Please call to make an appointment.  Avoid medications that may be harmful to your kidneys such as NSAID pain relievers (Advil, Aleve, Motrin, etc.) Use Tylenol for pain if needed. Avoid contrast if you require any medical testing.  Secondary Diagnosis:	Hypertension  Instructions for follow-up, activity and diet:	Continue metoprolol, amlodipine. Follow up with your primary care physician and cardiologist for further monitoring in 1-2 weeks. Please call to arrange appointment.  Secondary Diagnosis:	Dissection of Aorta  Instructions for follow-up, activity and diet:	Please follow up with your cardiologist for further monitoring.  Secondary Diagnosis:	Diabetes  Instructions for follow-up, activity and diet:	Continue Januvia. Follow up with your primary care physician for further monitoring in 1-2 weeks. Please call to arrange appointment.

## 2017-12-27 NOTE — DISCHARGE NOTE ADULT - HOSPITAL COURSE
HPI:  Pt 73yo Female with Hx of HTN (baseline SBP in 140) DM (baseline FS in 160-170) Type B Aortic Dissection (4cm  4y ago) CKD (last SCr according to Pt 4.5 2m ago on HCO3 pill at home) And hx of Endometrial CA S/P hysterectomy with chemo 5y ago, now presenting for evaluation of SOB. Pt reports two weeks of progressive exertional dyspnea of 1/4 of block of normal pace walking with associated dry cough. Pt also reports lower extremity swelling and orthopnea. With above complains Pt referred to urgent care center three days ago and was told she has "water in lungs" she was Rx lasix 20 PO and instructed if symptoms not improve come to hospital. Since starting Lasix he dyspnea did not improved, and i past two days she developed resting symptoms with SOB dry positional cough and worsening orthopnea (now she sleeps on couch sitting) Pt reports no chest pain no back pain, no dizziness, no palpitations, no fever or chills. Pt reports no abdominal pain, or distention, but reports decrease in urinary output.  Pt reports hx of HTN that was managed with Lopressor Norvasc, Pt also was on Valsartan and HCTZ and Clonidine, but it was toped by PMD.  Pt reports having ECHO and STRESS two weeks ago (but result are not available yet    EKG: Sinus abdirahman @ 54, RBBB  CE x 2 neg           BNP: 8,100-->7,762  12/22 CXR - small bilateral pleural effusions   12/22 Abd Aortic US - aortic dissection is again identified with both renal arteries appearing to arise from the false lumen. On prior CT abd and pelvis from June 8, 2009, the renal arteries appear to arise from the true lumen.  12/24 UE dopplers: No evidence of thrombosis or significant venous wall  thickening noted in the right and left cephalic and basilic veins. Vessel diameters as noted above.    12/23- Renal- lasix 40mg BID, Iron w TIBC, Stool O&P, Procrit, Avoid NSAID, Daily Weight  12/23-  Obtaining records from 2013 echocardiogram vs. repeated TTE - Nephrology recommendations/plans for hemodialysis- Time-of-flight MRI of the chest/abdomen/pelvis to re-image thoracoabdominal aortic aneurysm/dissection.- Kidney US examination- BP control, ASA - Q4 pulse examinations  12/24 Renal - suspect can be discharged either today or tomorrow-   would consider sending home on lasix 40mg daily.  Not sure she needs daily metolozone. Will follow in office and will continue to given erthyropeitin agents.  She will need to schedule av acesss with vasc surgery. She does not yet need dialysis but likely within next couple months.   12/24  Med - patient with HTN/ckd/dm/aortic dissection admitted with fluid overload stable.   she ruled out for MI, echo, continue lasix IV with metolozone  12/24 cardio - Acute on chronic CHF + volume overloaded, clinically improving with diuresis, mitral valve disease on exam, cont iv bid, with zarox 5mg daily, will obtain stress and echo results for lv fxn and valve fxn from dr kerr's office, dc planning   12/25 cardio:  clinically improved with diuresis  mitral valve disease on exam cont lasix, change to 40mg po bid d/c zarox  will obtain stress and echo results for lv fxn and valve fxn from dr kerr's office,  outpt planning for new HD, vasc eval noted ? consider AVF as outpt pt seems reluctant  will defer to renal for mri/mra imaging with contrast, pt likely medically stable for d/c home mehnaz if no further workup/procedures planned.  12/25 [facility];- Vein mapping noted, will plan for AVF this admission - Please document medical and cardiac clearance for OR - Obtain MRA of chest, abdomen, and pelvis to assess aortic dissection, will require renal optimization for contrast - Continue LUE precautions  12/25 Renal; CHF improved but still with RANDHAWA- does not appear that fluid overloaded so wonder if valvular disease contributing to her symptoms LUE swelling ? reason.  DVT eval neg on admission potassium starting to decrease on current diuretic regimen. suggest : can prob change to po lasix and ? need for metolozone would start KCL 10meq daily while on current diuretic regimen no immediate dialysis needs. will continue to follow HPI:  Pt 71yo Female with Hx of HTN (baseline SBP in 140) DM (baseline FS in 160-170) Type B Aortic Dissection (4cm  4y ago) CKD (last SCr according to Pt 4.5 2m ago on HCO3 pill at home) And hx of Endometrial CA S/P hysterectomy with chemo 5y ago, now presenting for evaluation of SOB. Pt reports two weeks of progressive exertional dyspnea of 1/4 of block of normal pace walking with associated dry cough. Pt also reports lower extremity swelling and orthopnea. With above complains Pt referred to urgent care center three days ago and was told she has "water in lungs" she was Rx lasix 20 PO and instructed if symptoms not improve come to hospital. Since starting Lasix he dyspnea did not improved, and i past two days she developed resting symptoms with SOB dry positional cough and worsening orthopnea (now she sleeps on couch sitting) Pt reports no chest pain no back pain, no dizziness, no palpitations, no fever or chills. Pt reports no abdominal pain, or distention, but reports decrease in urinary output.  Pt reports hx of HTN that was managed with Lopressor Norvasc, Pt also was on Valsartan and HCTZ and Clonidine, but it was toped by PMD.  Pt reports having ECHO and STRESS two weeks ago (but result are not available yet    EKG: Sinus abdirahman @ 54, RBBB  CE x 2 neg           BNP: 8,100-->7,762  12/22 CXR - small bilateral pleural effusions   12/22 Abd Aortic US - aortic dissection is again identified with both renal arteries appearing to arise from the false lumen. On prior CT abd and pelvis from June 8, 2009, the renal arteries appear to arise from the true lumen.  12/24 UE dopplers: No evidence of thrombosis or significant venous wall  thickening noted in the right and left cephalic and basilic veins. Vessel diameters as noted above.    12/23- Renal- lasix 40mg BID, Iron w TIBC, Stool O&P, Procrit, Avoid NSAID, Daily Weight  12/23-  Obtaining records from 2013 echocardiogram vs. repeated TTE - Nephrology recommendations/plans for hemodialysis- Time-of-flight MRI of the chest/abdomen/pelvis to re-image thoracoabdominal aortic aneurysm/dissection.- Kidney US examination- BP control, ASA - Q4 pulse examinations  12/24 Renal - suspect can be discharged either today or tomorrow-   would consider sending home on lasix 40mg daily.  Not sure she needs daily metolozone. Will follow in office and will continue to given erthyropeitin agents.  She will need to schedule av acesss with vasc surgery. She does not yet need dialysis but likely within next couple months.   12/24  Med - patient with HTN/ckd/dm/aortic dissection admitted with fluid overload stable.   she ruled out for MI, echo, continue lasix IV with metolozone  12/24 cardio - Acute on chronic CHF + volume overloaded, clinically improving with diuresis, mitral valve disease on exam, cont iv bid, with zarox 5mg daily, will obtain stress and echo results for lv fxn and valve fxn from dr kerr's office, dc planning   12/25 cardio:  clinically improved with diuresis  mitral valve disease on exam cont lasix, change to 40mg po bid d/c zarox  will obtain stress and echo results for lv fxn and valve fxn from dr kerr's office,  outpt planning for new HD, vasc eval noted ? consider AVF as outpt pt seems reluctant  will defer to renal for mri/mra imaging with contrast, pt likely medically stable for d/c home mehnaz if no further workup/procedures planned.  12/25 [facility];- Vein mapping noted, will plan for AVF this admission - Please document medical and cardiac clearance for OR - Obtain MRA of chest, abdomen, and pelvis to assess aortic dissection, will require renal optimization for contrast - Continue LUE precautions  12/25 Renal; CHF improved but still with RANDHAWA- does not appear that fluid overloaded so wonder if valvular disease contributing to her symptoms LUE swelling ? reason.  DVT eval neg on admission potassium starting to decrease on current diuretic regimen. suggest : can prob change to po lasix and ? need for metolozone would start KCL 10meq daily while on current diuretic regimen no immediate dialysis needs. will continue to follow       Acute on chronic CHF  clinically improved with diuresis   mitral valve disease on exam  cont lasix 40mg po bid  echo can be done as outpt        2. ESRD  renal f/u  procrit  renal fxn stable   vascular f.u noted, outpt work up for AVF   outpt planning for new HD    3. Hypertension  Bp acceptable   cont current meds HPI:  Pt 71yo Female with Hx of HTN (baseline SBP in 140) DM (baseline FS in 160-170) Type B Aortic Dissection (4cm  4y ago) CKD (last SCr according to Pt 4.5 2m ago on HCO3 pill at home) And hx of Endometrial CA S/P hysterectomy with chemo 5y ago, now presenting for evaluation of SOB. Pt reports two weeks of progressive exertional dyspnea of 1/4 of block of normal pace walking with associated dry cough. Pt also reports lower extremity swelling and orthopnea. With above complains Pt referred to urgent care center three days ago and was told she has "water in lungs" she was Rx lasix 20 PO and instructed if symptoms not improve come to hospital. Since starting Lasix he dyspnea did not improved, and i past two days she developed resting symptoms with SOB dry positional cough and worsening orthopnea (now she sleeps on couch sitting) Pt reports no chest pain no back pain, no dizziness, no palpitations, no fever or chills. Pt reports no abdominal pain, or distention, but reports decrease in urinary output.  Pt reports hx of HTN that was managed with Lopressor Norvasc, Pt also was on Valsartan and HCTZ and Clonidine, but it was toped by PMD.  Pt reports having ECHO and STRESS two weeks ago (but result are not available yet    EKG: Sinus abdirahman @ 54, RBBB  CE x 2 neg           BNP: 8,100-->7,762  12/22 CXR - small bilateral pleural effusions   12/22 Abd Aortic US - aortic dissection is again identified with both renal arteries appearing to arise from the false lumen. On prior CT abd and pelvis from June 8, 2009, the renal arteries appear to arise from the true lumen.  12/24 UE dopplers: No evidence of thrombosis or significant venous wall  thickening noted in the right and left cephalic and basilic veins. Vessel diameters as noted above.    Acute on chronic CHF  Cardiology following: Clinically improved with diuresis on PO lasix. Mitral valve disease on exam. Will cont lasix 40mg po bid. Echo can be done as outpt     ESRD  Renal Following: Will follow in office and will continue to given erthyropeitin agents.  She will need to schedule av access with vasc surgery. She does not yet need dialysis but likely within next couple months.   Vascular surgery consulted for AVF planning, pt seems reluctant to undergo surgery during this admission. Plan for outpt follow up with vascular for AVF.    Chronic aortic dissection - stable during this admission, will need further imaging prior to placement of AVF.    Discussed case with ARI Esparza pt cleared for d/c home with outpt follow up.

## 2017-12-27 NOTE — CONSULT NOTE ADULT - SUBJECTIVE AND OBJECTIVE BOX
Neurology Consult    Name  VERO ALVAREZ    HPI:  72F with PMH of CHF, DM2, HTN, CKD Stage 4, Hx of Type B aortic dissection  presents as code stroke for AMS. Patient was discharged from [facility] earlier today, treated for CHF exacerbation. As per family at bedside, patient was walking to the door at home and suddently complained of abdominal pain and said she couldn't see the door and went to her bed, took off her clothes and became unresponsive so they brought her to the ED. Here patient remains unresponsive. Sodium level in ED is 125. Very low suspicion of stroke.     NIHSS- 22, MRS-0          MEDICATIONS  (STANDING):    MEDICATIONS  (PRN):      Allergies    No Known Allergies    Intolerances        Objective:   Vital Signs Last 24 Hrs  T(C): 36.7 (27 Dec 2017 18:46), Max: 36.7 (27 Dec 2017 18:46)  T(F): 98 (27 Dec 2017 18:46), Max: 98 (27 Dec 2017 18:46)  HR: 57 (27 Dec 2017 22:41) (57 - 68)  BP: 138/92 (27 Dec 2017 22:41) (131/65 - 147/82)  BP(mean): --  RR: 18 (27 Dec 2017 22:41) (16 - 18)  SpO2: 99% (27 Dec 2017 22:41) (98% - 99%)    General Exam:   General appearance: No acute distress                   Neurological Exam:  Mental Status: not awake or alert, unresponsive to verbal or painful or tactile stimuli, does not follow any commands    Cranial Nerves: pupils are dilated to 4mm b/l and sluggishly reactive b/l,  facial symmetry grossly intact, patient is non-verbal    Motor: does not move extremities in purposeful way but moves them spontaneously   Sensation: grimaces to painful stimuli but does not withdraw    Coordination: deferred      Labs:    12-27    132<L>  |  89<L>  |  73<H>  ----------------------------<  112<H>  3.5   |  25  |  5.49<H>    Ca    8.5      27 Dec 2017 06:30  Phos  5.7     12-27  Mg     1.9     12-27    blood gas sodium on readmission- 125        CBC Full  -  ( 27 Dec 2017 22:46 )  WBC Count : 5.74 K/uL  Hemoglobin : 10.9 g/dL  Hematocrit : 30.1 %  Platelet Count - Automated : 213 K/uL  Mean Cell Volume : 87.0 fL  Mean Cell Hemoglobin : 31.5 pg  Mean Cell Hemoglobin Concentration : 36.2 %  Auto Neutrophil # : 4.46 K/uL  Auto Lymphocyte # : 0.93 K/uL  Auto Monocyte # : 0.25 K/uL  Auto Eosinophil # : 0.07 K/uL  Auto Basophil # : 0.01 K/uL  Auto Neutrophil % : 77.7 %  Auto Lymphocyte % : 16.2 %  Auto Monocyte % : 4.4 %  Auto Eosinophil % : 1.2 %  Auto Basophil % : 0.2 %      Radiology Neurology Consult    Name  VERO ALVAREZ    HPI:  72F with PMH of CHF, DM2, HTN, CKD Stage 4, Hx of Type B aortic dissection  presents as code stroke for AMS. Patient was discharged from [facility] earlier today, treated for CHF exacerbation. As per family at bedside, patient was walking to the door at home and suddently complained of abdominal pain and said she couldn't see the door and went to her bed, took off her clothes and became unresponsive so they brought her to the ED. Here patient remains unresponsive. Sodium level in ED is 125. Very low suspicion of stroke.     NIHSS- 22, MRS-0          MEDICATIONS  (STANDING):    MEDICATIONS  (PRN):      Allergies    No Known Allergies    Intolerances        Objective:   Vital Signs Last 24 Hrs  T(C): 36.7 (27 Dec 2017 18:46), Max: 36.7 (27 Dec 2017 18:46)  T(F): 98 (27 Dec 2017 18:46), Max: 98 (27 Dec 2017 18:46)  HR: 57 (27 Dec 2017 22:41) (57 - 68)  BP: 138/92 (27 Dec 2017 22:41) (131/65 - 147/82)  BP(mean): --  RR: 18 (27 Dec 2017 22:41) (16 - 18)  SpO2: 99% (27 Dec 2017 22:41) (98% - 99%)    General Exam:   General appearance: No acute distress                   Neurological Exam:  Mental Status: not awake or alert, unresponsive to verbal or painful or tactile stimuli, does not follow any commands    Cranial Nerves: pupils are dilated to 4mm b/l and sluggishly reactive b/l,  facial symmetry grossly intact, patient is non-verbal    Motor: does not move extremities in purposeful way but moves them spontaneously   Sensation: grimaces to painful stimuli but does not withdraw    Coordination: deferred      Labs:    12-27    132<L>  |  89<L>  |  73<H>  ----------------------------<  112<H>  3.5   |  25  |  5.49<H>    Ca    8.5      27 Dec 2017 06:30  Phos  5.7     12-27  Mg     1.9     12-27    blood gas sodium on readmission- 125        CBC Full  -  ( 27 Dec 2017 22:46 )  WBC Count : 5.74 K/uL  Hemoglobin : 10.9 g/dL  Hematocrit : 30.1 %  Platelet Count - Automated : 213 K/uL  Mean Cell Volume : 87.0 fL  Mean Cell Hemoglobin : 31.5 pg  Mean Cell Hemoglobin Concentration : 36.2 %  Auto Neutrophil # : 4.46 K/uL  Auto Lymphocyte # : 0.93 K/uL  Auto Monocyte # : 0.25 K/uL  Auto Eosinophil # : 0.07 K/uL  Auto Basophil # : 0.01 K/uL  Auto Neutrophil % : 77.7 %  Auto Lymphocyte % : 16.2 %  Auto Monocyte % : 4.4 %  Auto Eosinophil % : 1.2 %  Auto Basophil % : 0.2 %      Radiology    CTH is negative for any mass effect, hemorrhage or acute infarcts Neurology Consult    Name  VERO ALVAREZ    HPI:  72F with PMH of CHF, DM2, HTN, CKD Stage 4, Hx of Type B aortic dissection  presents as code stroke for AMS. Patient was discharged from [facility] earlier today, treated for CHF exacerbation. As per family at bedside, patient was walking to the door at home and suddently complained of abdominal pain, generalized weakness and said she couldn't see the door.   She became confused and perseverative then went to her bed, took off her clothes and became unresponsive so they brought her to the ED. Here patient remains unresponsive. Sodium level in ED is 125 with a rectal temperature of 91. Very low suspicion of stroke.     NIHSS- 22, MRS-0      MEDICATIONS  (STANDING):    MEDICATIONS  (PRN):      Allergies    No Known Allergies    Intolerances        Objective:   Vital Signs Last 24 Hrs  T(C): 36.7 (27 Dec 2017 18:46), Max: 36.7 (27 Dec 2017 18:46)  T(F): 98 (27 Dec 2017 18:46), Max: 98 (27 Dec 2017 18:46)  HR: 57 (27 Dec 2017 22:41) (57 - 68)  BP: 138/92 (27 Dec 2017 22:41) (131/65 - 147/82)  BP(mean): --  RR: 18 (27 Dec 2017 22:41) (16 - 18)  SpO2: 99% (27 Dec 2017 22:41) (98% - 99%)    General Exam:   General appearance: No acute distress                   Neurological Exam:  Mental Status: not awake or alert, unresponsive to verbal or painful or tactile stimuli, does not follow any commands    Cranial Nerves: pupils are dilated to 4mm b/l and sluggishly reactive b/l,  facial symmetry grossly intact, patient is non-verbal    Motor: does not move extremities in purposeful way but moves them spontaneously   Sensation: grimaces to painful stimuli but does not withdraw    Coordination: deferred      Labs:    12-27    132<L>  |  89<L>  |  73<H>  ----------------------------<  112<H>  3.5   |  25  |  5.49<H>    Ca    8.5      27 Dec 2017 06:30  Phos  5.7     12-27  Mg     1.9     12-27    blood gas sodium on readmission- 125        CBC Full  -  ( 27 Dec 2017 22:46 )  WBC Count : 5.74 K/uL  Hemoglobin : 10.9 g/dL  Hematocrit : 30.1 %  Platelet Count - Automated : 213 K/uL  Mean Cell Volume : 87.0 fL  Mean Cell Hemoglobin : 31.5 pg  Mean Cell Hemoglobin Concentration : 36.2 %  Auto Neutrophil # : 4.46 K/uL  Auto Lymphocyte # : 0.93 K/uL  Auto Monocyte # : 0.25 K/uL  Auto Eosinophil # : 0.07 K/uL  Auto Basophil # : 0.01 K/uL  Auto Neutrophil % : 77.7 %  Auto Lymphocyte % : 16.2 %  Auto Monocyte % : 4.4 %  Auto Eosinophil % : 1.2 %  Auto Basophil % : 0.2 %      Radiology    CTH is negative for any mass effect, hemorrhage or acute infarcts

## 2017-12-27 NOTE — PROGRESS NOTE ADULT - ASSESSMENT
72 year old woman with history of HTN, DM, ESRD, Type B Aortic Dissection, Endometrial CA, S/P hysterectomy with chemo 5y ago admitted with progressive dyspnea on exertion associated with increased lower extremity edema and orthopnea.    1. Acute on chronic CHF  clinically improved with diuresis   mitral valve disease on exam  cont lasix 40mg po bid  echo can be done as outpt        2. ESRD  renal f/u  procrit  renal fxn stable   vascular f.u noted, outpt work up for AVF   outpt planning for new HD    3. Hypertension  Bp acceptable   cont current meds     dc planning today

## 2017-12-27 NOTE — ED PROVIDER NOTE - ATTENDING CONTRIBUTION TO CARE
agree with resident note  72F with hx of CHF, DM2, HTN, CKD Stage 4, Hx of Type B aortic dissection p/w AMS.  Was discharged earlier today for CHF exacerbation.  Arrived home and then became lethargic and fell to floor.  Arrives here unresponsive.      PE: dilated pupils; reactive; no withdrawal to noxious stimuli; at times grabbing at oxygen mask; CTAB/L; s1 s2 no m/r/g abd soft/NT/ND ext: no edema    Course: CT head no acute process; pt has CKD consented  for CTA of chest abd pelvis; on return to room stupurous not protecting airway; intubated on first attempt with no complication; +ETCO2; MICU consulted; at this pont need to r/o brainstem CVA so will get CTA of head and neck; have signed out to overnight team and informed family of results

## 2017-12-27 NOTE — DISCHARGE NOTE ADULT - PLAN OF CARE
Follow up with your primary cardiologist for echocardiogram. Please call to make an appointment. Continue Lasix 40 mg twice per day Follow up with your nephrologist for further management and with Dr. Jensen for AV fistula. Please call to make an appointment.  Avoid medications that may be harmful to your kidneys such as NSAID pain relievers (Advil, Aleve, Motrin, etc.) Use Tylenol for pain if needed. Avoid contrast if you require any medical testing. Continue metoprolol, amlodipine. Follow up with your primary care physician and cardiologist for further monitoring in 1-2 weeks. Please call to arrange appointment. Please follow up with your cardiologist for further monitoring. Continue Januvia. Follow up with your primary care physician for further monitoring in 1-2 weeks. Please call to arrange appointment. Prevent complications.

## 2017-12-27 NOTE — DISCHARGE NOTE ADULT - PROVIDER TOKENS
TOKEN:'[id number]',FREE:[LAST:[Primary Cardiologist],PHONE:[(   )    -],FAX:[(   )    -]],TOKEN:'[id number]'

## 2017-12-27 NOTE — DISCHARGE NOTE ADULT - MEDICATION SUMMARY - MEDICATIONS TO STOP TAKING
I will STOP taking the medications listed below when I get home from the hospital:    pravastatin 40 mg oral tablet  -- 1 tab(s) by mouth once a day

## 2017-12-27 NOTE — DISCHARGE NOTE ADULT - CARE PROVIDERS DIRECT ADDRESSES
[email or web address]_Unknown,[email or web address]

## 2017-12-27 NOTE — CONSULT NOTE ADULT - ASSESSMENT
72F with PMH of CHF, DM2, HTN, CKD Stage 4, Hx of Type B aortic dissection  presents as code stroke for AMS. Patient was discharged from [facility] earlier today, treated for CHF exacerbation. She is unresponsive. Moves extremities spontaneously but not purposefully. Has a venous blood sodium of 125. Very low suspicion of stroke.     Plan:  Correct electrolyte abnormalities as per primary team  r/o infections or metabolic derangements  MRI brain w/o con, MRA H/N when stable  ASA 81 unless contraindicated 72F with PMH of CHF, DM2, HTN, CKD Stage 4, Hx of Type B aortic dissection  presents as code stroke for AMS. Patient was discharged from [facility] earlier today, treated for CHF exacerbation. She is unresponsive. Moves extremities spontaneously but not purposefully. Has a venous blood sodium of 125. CTH negative. Very low suspicion of stroke.     Plan:  Correct electrolyte abnormalities as per primary team  r/o infections or metabolic derangements  MRI brain w/o con, MRA H/N when stable  ASA 81 unless contraindicated

## 2017-12-27 NOTE — ED CLERICAL - NS ED CARE COORDINATION INFORMATION
This patient is enrolled in the Heart Failure STARS readmission reduction initiative and has active care navigation.   - This patient can be followed up by the care navigation team within 24 hours.  To arrange close follow-up or to obtain additional clinical information , please call the contact number above  - For house cards patients, please call cardiology ([phone]) for ALL PATIENTS with a cardiac issue PRIOR to disposition if you are considering admission or observation.    - Consider CDU for management of CHF exacerbations per guidelines.

## 2017-12-27 NOTE — DISCHARGE NOTE ADULT - CARE PROVIDER_API CALL
Diamond Jensen), Surgery  [address] [facility]  [address]  [place] [address]  Phone: [phone]  Fax: [phone]    Primary Cardiologist,   Phone: (   )    -  Fax: (   )    -    Juan F Sterling), Internal Medicine; Nephrology  [address] [place] [id number]  Phone: [phone]  Fax: [phone]

## 2017-12-27 NOTE — PROGRESS NOTE ADULT - SUBJECTIVE AND OBJECTIVE BOX
CARDIOLOGY FOLLOW UP - Dr. Quiñonez    CC  no chest pain or sob       PHYSICAL EXAM:  T(C): 36.3 (12-27-17 @ 05:17), Max: 36.7 (12-26-17 @ 17:55)  HR: 60 (12-27-17 @ 05:17) (57 - 61)  BP: 147/82 (12-27-17 @ 05:17) (134/73 - 147/82)  RR: 18 (12-27-17 @ 05:17) (17 - 18)  SpO2: 98% (12-27-17 @ 05:17) (97% - 98%)  Wt(kg): --  I&O's Summary    26 Dec 2017 07:01  -  27 Dec 2017 07:00  --------------------------------------------------------  IN: 0 mL / OUT: 600 mL / NET: -600 mL        Appearance: Normal	  Cardiovascular: Normal S1 S2,RRR, + sys murmur   Respiratory: Lungs clear to auscultation	  Gastrointestinal:  Soft, Non-tender, + BS	  Extremities: Normal range of motion, No clubbing, cyanosis or edema        MEDICATIONS  (STANDING):  amLODIPine   Tablet 10 milliGRAM(s) Oral daily  aspirin enteric coated 81 milliGRAM(s) Oral daily  dextrose 5%. 1000 milliLiter(s) (50 mL/Hr) IV Continuous <Continuous>  dextrose 50% Injectable 12.5 Gram(s) IV Push once  dextrose 50% Injectable 25 Gram(s) IV Push once  dextrose 50% Injectable 25 Gram(s) IV Push once  epoetin marla Injectable 91288 Unit(s) SubCutaneous every 7 days  furosemide    Tablet 40 milliGRAM(s) Oral every 12 hours  insulin lispro (HumaLOG) corrective regimen sliding scale   SubCutaneous three times a day before meals  insulin lispro (HumaLOG) corrective regimen sliding scale   SubCutaneous at bedtime  metoprolol succinate  milliGRAM(s) Oral daily  pantoprazole    Tablet 40 milliGRAM(s) Oral before breakfast  potassium chloride    Tablet ER 10 milliEquivalent(s) Oral daily  sodium bicarbonate 325 milliGRAM(s) Oral two times a day      TELEMETRY: NSR 	    ECG:  	  RADIOLOGY:   DIAGNOSTIC TESTING:  [ ] Echocardiogram:  [ ]  Catheterization:  [ ] Stress Test:    OTHER: 	    LABS:	 	                                8.9    4.42  )-----------( 165      ( 27 Dec 2017 06:30 )             25.9     12-27    132<L>  |  89<L>  |  73<H>  ----------------------------<  112<H>  3.5   |  25  |  5.49<H>    Ca    8.5      27 Dec 2017 06:30  Phos  5.7     12-27  Mg     1.9     12-27
CARDIOLOGY FOLLOW UP NOTE - DR. STAFFORD    Subjective:  no cp, sob      PHYSICAL EXAM:  T(C): 36.6 (12-24-17 @ 05:23), Max: 36.6 (12-23-17 @ 22:22)  HR: 54 (12-24-17 @ 05:23) (52 - 54)  BP: 145/76 (12-24-17 @ 05:23) (131/70 - 151/73)  RR: 18 (12-24-17 @ 05:23) (18 - 18)  SpO2: 96% (12-24-17 @ 05:23) (96% - 99%)  Wt(kg): --  I&O's Summary    23 Dec 2017 07:01  -  24 Dec 2017 07:00  --------------------------------------------------------  IN: 240 mL / OUT: 1620 mL / NET: -1380 mL    24 Dec 2017 07:01  -  24 Dec 2017 12:37  --------------------------------------------------------  IN: 0 mL / OUT: 200 mL / NET: -200 mL        Appearance: Normal	  Cardiovascular: Normal S1 S2,RRR,blowing sm at apex  Respiratory: dec bs at bases  Gastrointestinal:  Soft, Non-tender, + BS	  Extremities: Normal range of motion, b/l edema    MEDICATIONS  (STANDING):  amLODIPine   Tablet 10 milliGRAM(s) Oral daily  aspirin enteric coated 81 milliGRAM(s) Oral daily  dextrose 5%. 1000 milliLiter(s) (50 mL/Hr) IV Continuous <Continuous>  dextrose 50% Injectable 12.5 Gram(s) IV Push once  dextrose 50% Injectable 25 Gram(s) IV Push once  dextrose 50% Injectable 25 Gram(s) IV Push once  epoetin marla Injectable 69175 Unit(s) SubCutaneous every 7 days  furosemide   Injectable 40 milliGRAM(s) IV Push two times a day  insulin lispro (HumaLOG) corrective regimen sliding scale   SubCutaneous three times a day before meals  insulin lispro (HumaLOG) corrective regimen sliding scale   SubCutaneous at bedtime  metolazone 5 milliGRAM(s) Oral daily  metoprolol succinate  milliGRAM(s) Oral daily  sodium bicarbonate 325 milliGRAM(s) Oral two times a day      TELEMETRY: 	    ECG:  	  RADIOLOGY:   DIAGNOSTIC TESTING:  [ ] Echocardiogram:  [ ] Catheterization:  [ ] Stress Test:    OTHER: 	    LABS:	 	    CARDIAC MARKERS:                                9.4    4.82  )-----------( 180      ( 24 Dec 2017 09:53 )             28.0     12-24    133<L>  |  93<L>  |  65<H>  ----------------------------<  129<H>  3.9   |  22  |  5.06<H>    Ca    8.5      24 Dec 2017 09:53  Phos  5.5     12-23  Mg     2.0     12-23    TPro  6.8  /  Alb  3.4  /  TBili  0.6  /  DBili  x   /  AST  42<H>  /  ALT  26  /  AlkPhos  158<H>  12-23    proBNP:   PT/INR - ( 22 Dec 2017 15:14 )   PT: 11.2 SEC;   INR: 0.97          PTT - ( 22 Dec 2017 15:14 )  PTT:32.4 SEC  Lipid Profile:   HgA1c:
CC: no cp/sob    TELEMETRY:     PHYSICAL EXAM:    T(C): 36.7 (12-26-17 @ 05:09), Max: 36.8 (12-25-17 @ 21:49)  HR: 57 (12-26-17 @ 09:20) (55 - 58)  BP: 137/68 (12-26-17 @ 09:20) (134/80 - 137/68)  RR: 18 (12-26-17 @ 05:09) (17 - 18)  SpO2: 96% (12-26-17 @ 05:09) (96% - 96%)  Wt(kg): --  I&O's Summary    25 Dec 2017 07:01  -  26 Dec 2017 07:00  --------------------------------------------------------  IN: 0 mL / OUT: 1350 mL / NET: -1350 mL        Appearance: Normal	  Cardiovascular: Normal S1 S2,RRR, + systolic murmurs  Respiratory: Lungs clear to auscultation	  Gastrointestinal:  Soft, Non-tender, + BS	  Extremities: Normal range of motion, No clubbing, cyanosis or edema  Vascular: Peripheral pulses palpable 2+ bilaterally     LABS:	 	                          9.0    5.14  )-----------( 146      ( 26 Dec 2017 06:55 )             24.9     12-26    132<L>  |  89<L>  |  72<H>  ----------------------------<  108<H>  3.4<L>   |  25  |  5.13<H>    Ca    8.3<L>      26 Dec 2017 06:55  Phos  5.6     12-26  Mg     1.9     12-26            CARDIAC MARKERS:      MEDICATIONS  (STANDING):  amLODIPine   Tablet 10 milliGRAM(s) Oral daily  aspirin enteric coated 81 milliGRAM(s) Oral daily  dextrose 5%. 1000 milliLiter(s) (50 mL/Hr) IV Continuous <Continuous>  dextrose 50% Injectable 12.5 Gram(s) IV Push once  dextrose 50% Injectable 25 Gram(s) IV Push once  dextrose 50% Injectable 25 Gram(s) IV Push once  epoetin marla Injectable 50109 Unit(s) SubCutaneous every 7 days  furosemide    Tablet 40 milliGRAM(s) Oral every 12 hours  insulin lispro (HumaLOG) corrective regimen sliding scale   SubCutaneous three times a day before meals  insulin lispro (HumaLOG) corrective regimen sliding scale   SubCutaneous at bedtime  metoprolol succinate  milliGRAM(s) Oral daily  pantoprazole    Tablet 40 milliGRAM(s) Oral before breakfast  potassium chloride    Tablet ER 10 milliEquivalent(s) Oral daily  sodium bicarbonate 325 milliGRAM(s) Oral two times a day
SURGERY PROGRESS NOTE:    ===================================  Vascular surgery (C Team) Pager [phone]  ===================================    Subjective:  Pt w/o new complaint this AM. Continue to have LUE swelling.      Objective:    PE:  Gen: NAD  Resp: airway patent, respirations unlabored, no increased WoB  CVS: RRR  Abd: soft, ND, NT, no rebound or guarding  Ext: LUE edema, palp radial pulses b/l  Neuro: AAOx3, no focal deficits    Vital Signs Last 24 Hrs  T(C): 36.8 (25 Dec 2017 05:24), Max: 36.8 (24 Dec 2017 13:03)  T(F): 98.3 (25 Dec 2017 05:24), Max: 98.3 (24 Dec 2017 13:03)  HR: 54 (25 Dec 2017 06:15) (52 - 72)  BP: 128/67 (25 Dec 2017 06:15) (128/67 - 145/71)  BP(mean): --  RR: 16 (25 Dec 2017 06:15) (16 - 18)  SpO2: 98% (25 Dec 2017 06:15) (98% - 100%)    I&O's Detail    24 Dec 2017 07:01  -  25 Dec 2017 07:00  --------------------------------------------------------  IN:    Oral Fluid: 380 mL  Total IN: 380 mL    OUT:    Voided: 1220 mL  Total OUT: 1220 mL    Total NET: -840 mL          Daily     Daily Weight in k.2 (25 Dec 2017 04:02)    MEDICATIONS  (STANDING):  amLODIPine   Tablet 10 milliGRAM(s) Oral daily  aspirin enteric coated 81 milliGRAM(s) Oral daily  dextrose 5%. 1000 milliLiter(s) (50 mL/Hr) IV Continuous <Continuous>  dextrose 50% Injectable 12.5 Gram(s) IV Push once  dextrose 50% Injectable 25 Gram(s) IV Push once  dextrose 50% Injectable 25 Gram(s) IV Push once  epoetin marla Injectable 61368 Unit(s) SubCutaneous every 7 days  furosemide   Injectable 40 milliGRAM(s) IV Push two times a day  insulin lispro (HumaLOG) corrective regimen sliding scale   SubCutaneous three times a day before meals  insulin lispro (HumaLOG) corrective regimen sliding scale   SubCutaneous at bedtime  metolazone 5 milliGRAM(s) Oral daily  metoprolol succinate  milliGRAM(s) Oral daily  sodium bicarbonate 325 milliGRAM(s) Oral two times a day    MEDICATIONS  (PRN):  dextrose Gel 1 Dose(s) Oral once PRN Blood Glucose LESS THAN 70 milliGRAM(s)/deciliter  glucagon  Injectable 1 milliGRAM(s) IntraMuscular once PRN Glucose LESS THAN 70 milligrams/deciliter      LABS:                        8.8    5.60  )-----------( 160      ( 25 Dec 2017 05:10 )             25.4     12-25    134<L>  |  93<L>  |  70<H>  ----------------------------<  94  3.6   |  23  |  5.31<H>    Ca    8.2<L>      25 Dec 2017 05:10            RADIOLOGY & ADDITIONAL STUDIES:
complaints-  patient is awake and alert   states that her sob has improved.  denies chest pain or dizziness      Vital Signs Last 24 Hrs  T(C): 36.7 (26 Dec 2017 05:09), Max: 36.8 (25 Dec 2017 21:49)  T(F): 98.1 (26 Dec 2017 05:09), Max: 98.3 (25 Dec 2017 21:49)  HR: 55 (26 Dec 2017 05:09) (55 - 58)  BP: 134/80 (26 Dec 2017 05:09) (134/80 - 139/79)  BP(mean): --  RR: 18 (26 Dec 2017 05:09) (16 - 18)  SpO2: 96% (26 Dec 2017 05:09) (96% - 99%)      CAPILLARY BLOOD GLUCOSE      POCT Blood Glucose.: 147 mg/dL (25 Dec 2017 20:54)  POCT Blood Glucose.: 117 mg/dL (25 Dec 2017 18:01)      Physical exam  awake and alert  .neck -n0 no  jvd  cvs- s1s2 present. no s3s4  rs-bilateral air entry present. no creps  pa- no g./r/d/t. bs present ext- 1+edema improved    Labs                        9.0    5.14  )-----------( 146      ( 26 Dec 2017 06:55 )             24.9     12-26    132<L>  |  89<L>  |  72<H>  ----------------------------<  108<H>  3.4<L>   |  25  |  5.13<H>    Ca    8.3<L>      26 Dec 2017 06:55  Phos  5.6     12-26  Mg     1.9     12-26    Assessment and plan  patient with HTN/ckd with chf exacerbation clinically improved and stable.  on lasix 40 bid  HTN- continue toprol xl 100 mg and norvasc-current dose   anemia- on epogen  DM- will stop januvia -if patient discharged   continue low dose sliding scale insulin  continue current care   appreciate all consults
feels better, less RANDHAWA  now on Metolozone 5mg    12-23    133<L>  |  95<L>  |  63<H>  ----------------------------<  113<H>  3.9   |  22  |  5.13<H>    Ca    8.2<L>      23 Dec 2017 06:40  Phos  5.5     12-23  Mg     2.0     12-23    TPro  6.8  /  Alb  3.4  /  TBili  0.6  /  DBili  x   /  AST  42<H>  /  ALT  26  /  AlkPhos  158<H>  12-23                          8.6    4.90  )-----------( 161      ( 23 Dec 2017 06:40 )             25.4   ICU Vital Signs Last 24 Hrs  T(C): 36.6 (24 Dec 2017 05:23), Max: 36.6 (23 Dec 2017 22:22)  T(F): 97.8 (24 Dec 2017 05:23), Max: 97.8 (23 Dec 2017 22:22)  HR: 54 (24 Dec 2017 05:23) (52 - 54)  BP: 145/76 (24 Dec 2017 05:23) (131/70 - 151/73)  BP(mean): --  ABP: --  ABP(mean): --  RR: 18 (24 Dec 2017 05:23) (18 - 18)  SpO2: 96% (24 Dec 2017 05:23) (96% - 99%)    Exam:  comfortable  lungs-slight decreased bs left base  CV- systolic murmur the same  Extrem- no edema
has mild RANDHAWA improved since admission  has persistent LUE swelling but no pain  appetite ok                          8.8    5.60  )-----------( 160      ( 25 Dec 2017 05:10 )             25.4   12-25    134<L>  |  93<L>  |  70<H>  ----------------------------<  94  3.6   |  23  |  5.31<H>    Ca    8.2<L>      25 Dec 2017 05:10    ICU Vital Signs Last 24 Hrs  T(C): 36.8 (25 Dec 2017 05:24), Max: 36.8 (24 Dec 2017 13:03)  T(F): 98.3 (25 Dec 2017 05:24), Max: 98.3 (24 Dec 2017 13:03)  HR: 54 (25 Dec 2017 06:15) (52 - 72)  BP: 128/67 (25 Dec 2017 06:15) (128/67 - 145/71)  BP(mean): --  ABP: --  ABP(mean): --  RR: 16 (25 Dec 2017 06:15) (16 - 18)  SpO2: 98% (25 Dec 2017 06:15) (98% - 100%)   exam:  comfortable  lungs -mostly clear with decreased bs lung bases  CV- harsh systolic murmur without change  Extrem-diffuse swelling LUE  minimal edema LEs
CARDIOLOGY FOLLOW UP NOTE - DR. STAFFORD    Subjective:    less sob, no cp     PHYSICAL EXAM:  T(C): 36.8 (12-25-17 @ 05:24), Max: 36.8 (12-24-17 @ 13:03)  HR: 54 (12-25-17 @ 06:15) (52 - 72)  BP: 128/67 (12-25-17 @ 06:15) (128/67 - 145/71)  RR: 16 (12-25-17 @ 06:15) (16 - 18)  SpO2: 98% (12-25-17 @ 06:15) (98% - 100%)  Wt(kg): --  I&O's Summary    24 Dec 2017 07:01  -  25 Dec 2017 07:00  --------------------------------------------------------  IN: 380 mL / OUT: 1220 mL / NET: -840 mL        Appearance: Normal	  Cardiovascular: Normal S1 S2,RRR, No JVD, No murmurs  Respiratory: Lungs clear to auscultation	dec bs bases  Gastrointestinal:  Soft, Non-tender, + BS	  Extremities: Normal range of motion, less edema   lue + edema    MEDICATIONS  (STANDING):  amLODIPine   Tablet 10 milliGRAM(s) Oral daily  aspirin enteric coated 81 milliGRAM(s) Oral daily  dextrose 5%. 1000 milliLiter(s) (50 mL/Hr) IV Continuous <Continuous>  dextrose 50% Injectable 12.5 Gram(s) IV Push once  dextrose 50% Injectable 25 Gram(s) IV Push once  dextrose 50% Injectable 25 Gram(s) IV Push once  epoetin marla Injectable 47334 Unit(s) SubCutaneous every 7 days  furosemide    Tablet 40 milliGRAM(s) Oral three times a day  insulin lispro (HumaLOG) corrective regimen sliding scale   SubCutaneous three times a day before meals  insulin lispro (HumaLOG) corrective regimen sliding scale   SubCutaneous at bedtime  metoprolol succinate  milliGRAM(s) Oral daily  sodium bicarbonate 325 milliGRAM(s) Oral two times a day      TELEMETRY: 	    ECG:  	  RADIOLOGY:   DIAGNOSTIC TESTING:  [ ] Echocardiogram:  [ ] Catheterization:  [ ] Stress Test:    OTHER: 	    LABS:	 	    CARDIAC MARKERS:                                8.8    5.60  )-----------( 160      ( 25 Dec 2017 05:10 )             25.4     12-25    134<L>  |  93<L>  |  70<H>  ----------------------------<  94  3.6   |  23  |  5.31<H>    Ca    8.2<L>      25 Dec 2017 05:10      proBNP:     Lipid Profile:   HgA1c:

## 2017-12-27 NOTE — ED PROVIDER NOTE - CRITICAL CARE PROVIDED
direct patient care (not related to procedure)/interpretation of diagnostic studies/additional history taking/documentation/consult w/ pt's family directly relating to pts condition/consultation with other physicians

## 2017-12-27 NOTE — PROGRESS NOTE ADULT - PROVIDER SPECIALTY LIST ADULT
Cardiology
Internal Medicine
Nephrology
Nephrology
Vascular Surgery
Cardiology

## 2017-12-27 NOTE — PROGRESS NOTE ADULT - ATTENDING COMMENTS
Patient seen and examined, agree with the above assessment and plan by ARI Esparza.  CV stable   DC home today to follow up w Dr Vail

## 2017-12-27 NOTE — DISCHARGE NOTE ADULT - PATIENT PORTAL LINK FT
“You can access the Follow[employer] Patient Portal, offered by [facility], by registering with the following website: [email or web address]”

## 2017-12-27 NOTE — STROKE CODE NOTE - RELATIVE EXCLUSION OTHER CRITERIA
patient recently discharged with CHF. had abdominal pain and collapsed. very low suspicion of stroke patient discharged with CHF earlier today. had abdominal pain and collapsed. sodium level 125 in EDvery low suspicion of stroke

## 2017-12-27 NOTE — ED PROVIDER NOTE - OBJECTIVE STATEMENT
72F with hx of CHF, DM2, HTN, CKD Stage 4, Hx of Type B aortic dissection p/w AMS. Patient was dc'd from [facility] today, treated for CHF exacerbation. Today, patient was walking to the door at home and suddenly said she couldn't see, patient then fell to the ground and was unresponsive. Here patient remains unresponsive. Family at bedside provide hx.

## 2017-12-27 NOTE — ED PROVIDER NOTE - MEDICAL DECISION MAKING DETAILS
72F with AMS. Code Stroke called. Stat Head CT, chest, abd/pelvis. check cbc, cmp, vbg, cardiac enzymes, serum and urine tox, ua, urine culture.

## 2017-12-27 NOTE — DISCHARGE NOTE ADULT - MEDICATION SUMMARY - MEDICATIONS TO TAKE
I will START or STAY ON the medications listed below when I get home from the hospital:    aspirin 81 mg oral delayed release tablet  -- 1 tab(s) by mouth once a day  -- Indication: For Heart health    sodium bicarbonate 325 mg oral tablet  -- 1 tab(s) by mouth 2 times a day  -- Indication: For Renal disease    Januvia 25 mg oral tablet  -- 1 tab(s) by mouth once a day  -- Indication: For Diabetes    Toprol- mg oral tablet, extended release  -- 1 tab(s) by mouth once a day  -- Indication: For High blood pressure    amLODIPine 10 mg oral tablet  -- 1 tab(s) by mouth once a day  -- Indication: For High blood pressure    furosemide 40 mg oral tablet  -- 1 tab(s) by mouth every 12 hours  -- Indication: For CHF    potassium chloride 10 mEq oral tablet, extended release  -- 1 tab(s) by mouth once a day  -- Indication: For supplement     pantoprazole 40 mg oral delayed release tablet  -- 1 tab(s) by mouth once a day (before a meal)  -- Indication: For acid reflux I will START or STAY ON the medications listed below when I get home from the hospital:    aspirin 81 mg oral delayed release tablet  -- 1 tab(s) by mouth once a day  -- Indication: For Heart health    sodium bicarbonate 325 mg oral tablet  -- 1 tab(s) by mouth 2 times a day  -- Indication: For Renal disease    Januvia 25 mg oral tablet  -- 1 tab(s) by mouth once a day  -- Indication: For Diabetes    Toprol- mg oral tablet, extended release  -- 1 tab(s) by mouth once a day  -- Indication: For High blood pressure    amLODIPine 10 mg oral tablet  -- 1 tab(s) by mouth once a day  -- Indication: For High blood pressure    furosemide 40 mg oral tablet  -- 1 tab(s) by mouth every 12 hours  -- Indication: For CHF    epoetin marla  -- SubQ, every 7 days. Follow up with your nephrologist for administration of this medication.   -- Indication: For Anemia    potassium chloride 10 mEq oral tablet, extended release  -- 1 tab(s) by mouth once a day  -- Indication: For supplement     pantoprazole 40 mg oral delayed release tablet  -- 1 tab(s) by mouth once a day (before a meal)  -- Indication: For acid reflux

## 2017-12-27 NOTE — ED ADULT NURSE NOTE - OBJECTIVE STATEMENT
pt arrives as notification to Tr B. pt d/marcial from hospital earlier due to CHF. pt gets home and get nausea and faints. pt becomes unresponsive but responsive to noxious stimuli. pt arrives with non re=breather sat 100%. pt with left arm extremity restriction for future fistula placement. pt IV accessed 20 gauge RAC labs sent. COde stroke called on pt , pt taken straight to CT scan will make primary RN madeline aware of status.

## 2017-12-28 DIAGNOSIS — R41.89 OTHER SYMPTOMS AND SIGNS INVOLVING COGNITIVE FUNCTIONS AND AWARENESS: ICD-10-CM

## 2017-12-28 LAB
ALBUMIN SERPL ELPH-MCNC: 3.5 G/DL — SIGNIFICANT CHANGE UP (ref 3.3–5)
ALP SERPL-CCNC: 137 U/L — HIGH (ref 40–120)
ALT FLD-CCNC: 22 U/L — SIGNIFICANT CHANGE UP (ref 4–33)
AMPHET UR-MCNC: NEGATIVE — SIGNIFICANT CHANGE UP
APPEARANCE UR: CLEAR — SIGNIFICANT CHANGE UP
AST SERPL-CCNC: 32 U/L — SIGNIFICANT CHANGE UP (ref 4–32)
BACTERIA # UR AUTO: SIGNIFICANT CHANGE UP
BARBITURATES UR SCN-MCNC: NEGATIVE — SIGNIFICANT CHANGE UP
BASE EXCESS BLDA CALC-SCNC: 1 MMOL/L — SIGNIFICANT CHANGE UP
BASOPHILS # BLD AUTO: 0 K/UL — SIGNIFICANT CHANGE UP (ref 0–0.2)
BASOPHILS NFR BLD AUTO: 0 % — SIGNIFICANT CHANGE UP (ref 0–2)
BENZODIAZ UR-MCNC: NEGATIVE — SIGNIFICANT CHANGE UP
BILIRUB SERPL-MCNC: 0.7 MG/DL — SIGNIFICANT CHANGE UP (ref 0.2–1.2)
BILIRUB UR-MCNC: NEGATIVE — SIGNIFICANT CHANGE UP
BLOOD UR QL VISUAL: NEGATIVE — SIGNIFICANT CHANGE UP
BUN SERPL-MCNC: 74 MG/DL — HIGH (ref 7–23)
BUN SERPL-MCNC: 77 MG/DL — HIGH (ref 7–23)
CALCIUM SERPL-MCNC: 8.1 MG/DL — LOW (ref 8.4–10.5)
CALCIUM SERPL-MCNC: 8.5 MG/DL — SIGNIFICANT CHANGE UP (ref 8.4–10.5)
CANNABINOIDS UR-MCNC: NEGATIVE — SIGNIFICANT CHANGE UP
CHLORIDE BLDA-SCNC: 87 MMOL/L — LOW (ref 96–108)
CHLORIDE SERPL-SCNC: 83 MMOL/L — LOW (ref 98–107)
CHLORIDE SERPL-SCNC: 84 MMOL/L — LOW (ref 98–107)
CO2 SERPL-SCNC: 20 MMOL/L — LOW (ref 22–31)
CO2 SERPL-SCNC: 22 MMOL/L — SIGNIFICANT CHANGE UP (ref 22–31)
COCAINE METAB.OTHER UR-MCNC: NEGATIVE — SIGNIFICANT CHANGE UP
COLOR SPEC: YELLOW — SIGNIFICANT CHANGE UP
CORTIS SERPL-MCNC: 21.4 UG/DL — HIGH (ref 2.7–18.4)
CREAT ?TM UR-MCNC: 54.19 MG/DL — SIGNIFICANT CHANGE UP
CREAT SERPL-MCNC: 5.65 MG/DL — HIGH (ref 0.5–1.3)
CREAT SERPL-MCNC: 5.88 MG/DL — HIGH (ref 0.5–1.3)
EOSINOPHIL # BLD AUTO: 0 K/UL — SIGNIFICANT CHANGE UP (ref 0–0.5)
EOSINOPHIL NFR BLD AUTO: 0 % — SIGNIFICANT CHANGE UP (ref 0–6)
GLUCOSE BLDA-MCNC: 208 MG/DL — HIGH (ref 70–99)
GLUCOSE BLDC GLUCOMTR-MCNC: 201 MG/DL — HIGH (ref 70–99)
GLUCOSE BLDC GLUCOMTR-MCNC: 95 MG/DL — SIGNIFICANT CHANGE UP (ref 70–99)
GLUCOSE SERPL-MCNC: 184 MG/DL — HIGH (ref 70–99)
GLUCOSE SERPL-MCNC: 209 MG/DL — HIGH (ref 70–99)
GLUCOSE UR-MCNC: SIGNIFICANT CHANGE UP
GRAM STN SPT: SIGNIFICANT CHANGE UP
HCO3 BLDA-SCNC: 26 MMOL/L — SIGNIFICANT CHANGE UP (ref 22–26)
HCT VFR BLD CALC: 26.1 % — LOW (ref 34.5–45)
HCT VFR BLDA CALC: 29 % — LOW (ref 34.5–46.5)
HGB BLD-MCNC: 9.4 G/DL — LOW (ref 11.5–15.5)
HGB BLDA-MCNC: 9.4 G/DL — LOW (ref 11.5–15.5)
IMM GRANULOCYTES # BLD AUTO: 0.01 # — SIGNIFICANT CHANGE UP
IMM GRANULOCYTES NFR BLD AUTO: 0.2 % — SIGNIFICANT CHANGE UP (ref 0–1.5)
KETONES UR-MCNC: NEGATIVE — SIGNIFICANT CHANGE UP
LACTATE BLDA-SCNC: 2 MMOL/L — SIGNIFICANT CHANGE UP (ref 0.5–2)
LEUKOCYTE ESTERASE UR-ACNC: NEGATIVE — SIGNIFICANT CHANGE UP
LYMPHOCYTES # BLD AUTO: 0.58 K/UL — LOW (ref 1–3.3)
LYMPHOCYTES # BLD AUTO: 10.8 % — LOW (ref 13–44)
MAGNESIUM SERPL-MCNC: 1.8 MG/DL — SIGNIFICANT CHANGE UP (ref 1.6–2.6)
MAGNESIUM SERPL-MCNC: 1.9 MG/DL — SIGNIFICANT CHANGE UP (ref 1.6–2.6)
MCHC RBC-ENTMCNC: 31 PG — SIGNIFICANT CHANGE UP (ref 27–34)
MCHC RBC-ENTMCNC: 36 % — SIGNIFICANT CHANGE UP (ref 32–36)
MCV RBC AUTO: 86.1 FL — SIGNIFICANT CHANGE UP (ref 80–100)
METHADONE UR-MCNC: NEGATIVE — SIGNIFICANT CHANGE UP
MONOCYTES # BLD AUTO: 0.27 K/UL — SIGNIFICANT CHANGE UP (ref 0–0.9)
MONOCYTES NFR BLD AUTO: 5 % — SIGNIFICANT CHANGE UP (ref 2–14)
MUCOUS THREADS # UR AUTO: SIGNIFICANT CHANGE UP
NEUTROPHILS # BLD AUTO: 4.51 K/UL — SIGNIFICANT CHANGE UP (ref 1.8–7.4)
NEUTROPHILS NFR BLD AUTO: 84 % — HIGH (ref 43–77)
NITRITE UR-MCNC: NEGATIVE — SIGNIFICANT CHANGE UP
NRBC # FLD: 0 — SIGNIFICANT CHANGE UP
NT-PROBNP SERPL-SCNC: 4713 PG/ML — SIGNIFICANT CHANGE UP
OPIATES UR-MCNC: NEGATIVE — SIGNIFICANT CHANGE UP
OSMOLALITY UR: 316 MOSMO/KG — SIGNIFICANT CHANGE UP (ref 50–1200)
OXYCODONE UR-MCNC: NEGATIVE — SIGNIFICANT CHANGE UP
PCO2 BLDA: 33 MMHG — SIGNIFICANT CHANGE UP (ref 32–48)
PCP UR-MCNC: NEGATIVE — SIGNIFICANT CHANGE UP
PH BLDA: 7.47 PH — HIGH (ref 7.35–7.45)
PH UR: 6 — SIGNIFICANT CHANGE UP (ref 4.6–8)
PHOSPHATE SERPL-MCNC: 4.1 MG/DL — SIGNIFICANT CHANGE UP (ref 2.5–4.5)
PHOSPHATE SERPL-MCNC: 4.9 MG/DL — HIGH (ref 2.5–4.5)
PLATELET # BLD AUTO: 157 K/UL — SIGNIFICANT CHANGE UP (ref 150–400)
PMV BLD: 10.2 FL — SIGNIFICANT CHANGE UP (ref 7–13)
PO2 BLDA: 156 MMHG — HIGH (ref 83–108)
POTASSIUM BLDA-SCNC: 3.7 MMOL/L — SIGNIFICANT CHANGE UP (ref 3.4–4.5)
POTASSIUM SERPL-MCNC: 3.9 MMOL/L — SIGNIFICANT CHANGE UP (ref 3.5–5.3)
POTASSIUM SERPL-MCNC: 3.9 MMOL/L — SIGNIFICANT CHANGE UP (ref 3.5–5.3)
POTASSIUM SERPL-SCNC: 3.9 MMOL/L — SIGNIFICANT CHANGE UP (ref 3.5–5.3)
POTASSIUM SERPL-SCNC: 3.9 MMOL/L — SIGNIFICANT CHANGE UP (ref 3.5–5.3)
PROT SERPL-MCNC: 7 G/DL — SIGNIFICANT CHANGE UP (ref 6–8.3)
PROT UR-MCNC: 300 MG/DL — HIGH
RBC # BLD: 3.03 M/UL — LOW (ref 3.8–5.2)
RBC # FLD: 14.8 % — HIGH (ref 10.3–14.5)
RBC CASTS # UR COMP ASSIST: HIGH (ref 0–?)
SAO2 % BLDA: 99.6 % — HIGH (ref 95–99)
SODIUM BLDA-SCNC: 123 MMOL/L — LOW (ref 136–146)
SODIUM SERPL-SCNC: 124 MMOL/L — LOW (ref 135–145)
SODIUM SERPL-SCNC: 125 MMOL/L — LOW (ref 135–145)
SODIUM UR-SCNC: 52 MMOL/L — SIGNIFICANT CHANGE UP
SP GR SPEC: 1.01 — SIGNIFICANT CHANGE UP (ref 1–1.04)
SPECIMEN SOURCE: SIGNIFICANT CHANGE UP
SQUAMOUS # UR AUTO: SIGNIFICANT CHANGE UP
TSH SERPL-MCNC: 0.92 UIU/ML — SIGNIFICANT CHANGE UP (ref 0.27–4.2)
UROBILINOGEN FLD QL: NORMAL MG/DL — SIGNIFICANT CHANGE UP
UUN UR-MCNC: 236.2 MG/DL — SIGNIFICANT CHANGE UP
WBC # BLD: 5.37 K/UL — SIGNIFICANT CHANGE UP (ref 3.8–10.5)
WBC # FLD AUTO: 5.37 K/UL — SIGNIFICANT CHANGE UP (ref 3.8–10.5)
WBC UR QL: HIGH (ref 0–?)

## 2017-12-28 PROCEDURE — 70496 CT ANGIOGRAPHY HEAD: CPT | Mod: 26

## 2017-12-28 PROCEDURE — 70551 MRI BRAIN STEM W/O DYE: CPT | Mod: 26

## 2017-12-28 PROCEDURE — 71010: CPT | Mod: 26

## 2017-12-28 PROCEDURE — 70498 CT ANGIOGRAPHY NECK: CPT | Mod: 26,52

## 2017-12-28 RX ORDER — SODIUM CHLORIDE 9 MG/ML
1000 INJECTION, SOLUTION INTRAVENOUS
Qty: 0 | Refills: 0 | Status: DISCONTINUED | OUTPATIENT
Start: 2017-12-28 | End: 2017-12-29

## 2017-12-28 RX ORDER — INSULIN LISPRO 100/ML
VIAL (ML) SUBCUTANEOUS
Qty: 0 | Refills: 0 | Status: DISCONTINUED | OUTPATIENT
Start: 2017-12-28 | End: 2017-12-29

## 2017-12-28 RX ORDER — PIPERACILLIN AND TAZOBACTAM 4; .5 G/20ML; G/20ML
3.38 INJECTION, POWDER, LYOPHILIZED, FOR SOLUTION INTRAVENOUS EVERY 12 HOURS
Qty: 0 | Refills: 0 | Status: DISCONTINUED | OUTPATIENT
Start: 2017-12-28 | End: 2017-12-30

## 2017-12-28 RX ORDER — SODIUM BICARBONATE 1 MEQ/ML
325 SYRINGE (ML) INTRAVENOUS
Qty: 0 | Refills: 0 | Status: DISCONTINUED | OUTPATIENT
Start: 2017-12-28 | End: 2018-01-02

## 2017-12-28 RX ORDER — DEXTROSE 50 % IN WATER 50 %
1 SYRINGE (ML) INTRAVENOUS ONCE
Qty: 0 | Refills: 0 | Status: DISCONTINUED | OUTPATIENT
Start: 2017-12-28 | End: 2017-12-29

## 2017-12-28 RX ORDER — GLUCAGON INJECTION, SOLUTION 0.5 MG/.1ML
1 INJECTION, SOLUTION SUBCUTANEOUS ONCE
Qty: 0 | Refills: 0 | Status: DISCONTINUED | OUTPATIENT
Start: 2017-12-28 | End: 2017-12-29

## 2017-12-28 RX ORDER — AZITHROMYCIN 500 MG/1
500 TABLET, FILM COATED ORAL EVERY 24 HOURS
Qty: 0 | Refills: 0 | Status: DISCONTINUED | OUTPATIENT
Start: 2017-12-29 | End: 2017-12-29

## 2017-12-28 RX ORDER — HEPARIN SODIUM 5000 [USP'U]/ML
5000 INJECTION INTRAVENOUS; SUBCUTANEOUS EVERY 12 HOURS
Qty: 0 | Refills: 0 | Status: DISCONTINUED | OUTPATIENT
Start: 2017-12-28 | End: 2017-12-29

## 2017-12-28 RX ORDER — DEXTROSE 50 % IN WATER 50 %
25 SYRINGE (ML) INTRAVENOUS ONCE
Qty: 0 | Refills: 0 | Status: DISCONTINUED | OUTPATIENT
Start: 2017-12-28 | End: 2017-12-29

## 2017-12-28 RX ORDER — ASPIRIN/CALCIUM CARB/MAGNESIUM 324 MG
81 TABLET ORAL DAILY
Qty: 0 | Refills: 0 | Status: DISCONTINUED | OUTPATIENT
Start: 2017-12-28 | End: 2017-12-30

## 2017-12-28 RX ORDER — SUCCINYLCHOLINE CHLORIDE 100 MG/5ML
100 SYRINGE (ML) INTRAVENOUS ONCE
Qty: 0 | Refills: 0 | Status: COMPLETED | OUTPATIENT
Start: 2017-12-28 | End: 2017-12-28

## 2017-12-28 RX ORDER — AZITHROMYCIN 500 MG/1
TABLET, FILM COATED ORAL
Qty: 0 | Refills: 0 | Status: DISCONTINUED | OUTPATIENT
Start: 2017-12-28 | End: 2017-12-29

## 2017-12-28 RX ORDER — AZITHROMYCIN 500 MG/1
500 TABLET, FILM COATED ORAL ONCE
Qty: 0 | Refills: 0 | Status: COMPLETED | OUTPATIENT
Start: 2017-12-28 | End: 2017-12-28

## 2017-12-28 RX ORDER — POTASSIUM CHLORIDE 20 MEQ
10 PACKET (EA) ORAL DAILY
Qty: 0 | Refills: 0 | Status: DISCONTINUED | OUTPATIENT
Start: 2017-12-28 | End: 2017-12-28

## 2017-12-28 RX ORDER — INSULIN LISPRO 100/ML
VIAL (ML) SUBCUTANEOUS AT BEDTIME
Qty: 0 | Refills: 0 | Status: DISCONTINUED | OUTPATIENT
Start: 2017-12-28 | End: 2017-12-29

## 2017-12-28 RX ORDER — DEXTROSE 50 % IN WATER 50 %
12.5 SYRINGE (ML) INTRAVENOUS ONCE
Qty: 0 | Refills: 0 | Status: DISCONTINUED | OUTPATIENT
Start: 2017-12-28 | End: 2017-12-29

## 2017-12-28 RX ORDER — VANCOMYCIN HCL 1 G
1000 VIAL (EA) INTRAVENOUS ONCE
Qty: 0 | Refills: 0 | Status: COMPLETED | OUTPATIENT
Start: 2017-12-28 | End: 2017-12-28

## 2017-12-28 RX ORDER — KETAMINE HYDROCHLORIDE 100 MG/ML
100 INJECTION INTRAMUSCULAR; INTRAVENOUS ONCE
Qty: 0 | Refills: 0 | Status: DISCONTINUED | OUTPATIENT
Start: 2017-12-28 | End: 2017-12-28

## 2017-12-28 RX ADMIN — AZITHROMYCIN 250 MILLIGRAM(S): 500 TABLET, FILM COATED ORAL at 10:49

## 2017-12-28 RX ADMIN — Medication 100 MILLIGRAM(S): at 00:29

## 2017-12-28 RX ADMIN — KETAMINE HYDROCHLORIDE 100 MILLIGRAM(S): 100 INJECTION INTRAMUSCULAR; INTRAVENOUS at 00:29

## 2017-12-28 RX ADMIN — HEPARIN SODIUM 5000 UNIT(S): 5000 INJECTION INTRAVENOUS; SUBCUTANEOUS at 18:11

## 2017-12-28 RX ADMIN — Medication 250 MILLIGRAM(S): at 10:49

## 2017-12-28 RX ADMIN — Medication 325 MILLIGRAM(S): at 18:11

## 2017-12-28 RX ADMIN — FENTANYL CITRATE 3 MICROGRAM(S)/KG/HR: 50 INJECTION INTRAVENOUS at 00:00

## 2017-12-28 RX ADMIN — PIPERACILLIN AND TAZOBACTAM 25 GRAM(S): 4; .5 INJECTION, POWDER, LYOPHILIZED, FOR SOLUTION INTRAVENOUS at 18:10

## 2017-12-28 NOTE — H&P ADULT - ATTENDING COMMENTS
acute change in mental status   r/o CVA vs seizure vs encephiltits  acute respiratory failure requiring intubation  neuro eval

## 2017-12-28 NOTE — H&P ADULT - HISTORY OF PRESENT ILLNESS
72F PMHx of CHF, DM2, HTN, CKD Stage 4, Hx of Type B aortic dissection p/w AMS. Patient was dc'd from [facility] today, treated for CHF exacerbation. Today, patient was walking to the door at home and suddenly said she couldn't see, patient then fell to the ground and was unresponsive. Here patient remains unresponsive. Family at bedside provide hx.    Pt was driven home from [facility] by , wheelchair to get to car from [facility].  noticed difficulty with pt ambulation once arrived home.  (HB) helped her walk to front door,  held her hand during ambulation as pt's looked weaker than her baseline. Reports pt had good ambulation in hospital without assistance. Pt reportedly opened door, walked inside house, then pt began states she was unable to see, and began repeating "where's the door" 10x, despite attempts at redirection by Hb. Pt then went to sleep for 15 min, remained very lethargic, hb held her as she was weak and pt proceded to lay on the floor. When HB tried to get her up, pt said "wait a minute" 20x. HD facetime'd pt's a fam friend who is RN, who saw pt being lethargic and proceded to drive pt to ED. Pt did not appear to be in any pain at the time, denied any falls, bleeding, tremors at home, new medications. 72F PMHx of CHF, DM2, HTN, CKD Stage 4, Hx of Type B aortic dissection p/w AMS, patient was dc'd from [facility] today, treated for CHF exacerbation presented with acute onset lethargy, loss of vision, weakness. EMS called, pt had worsening lethargy in ED, was intubated there, accepted to MICU. Per , pt was driven home from [facility] by him, wheelchair to get to car from [facility].  noticed difficulty with pt ambulation once arrived home.  (HB) helped her walk to front door,  held her hand during ambulation as pt looked weaker than her baseline. Reports pt had good ambulation in hospital without assistance. Pt reportedly opened door, walked inside house, then pt began stating that she was unable to see, and began repeating "where's the door" 10x, despite attempts at redirection by Hb. Pt then went to sleep for 15 min, remained very lethargic, hb held her as she was weak and pt proceeded to lay on the floor. When HB tried to get her up, pt said "wait a minute" 20x. HB facetime'd pt's a fam friend who is RN, who saw pt being lethargic and HB proceeded to drive pt to ED.     Pt did not appear to be in any pain at the time, denied any falls, bleeding, tremors at home, new medications.     In ED  T(C): 36.7 (27 Dec 2017 18:46), Max: 36.7 (27 Dec 2017 18:46)  T(F): 98 (27 Dec 2017 18:46), Max: 98 (27 Dec 2017 18:46)  HR: 51 (28 Dec 2017 03:01) (48 - 68)  BP: 129/66 (28 Dec 2017 03:01) (128/55 - 147/82)  BP(mean): 80 (28 Dec 2017 01:55) (80 - 80)  RR: 14 (28 Dec 2017 03:01) (14 - 22)  SpO2: 100% (28 Dec 2017 03:01) (98% - 100%)  ED course  - pt with decreasing mental status in ED, was intubated for airway protection.   - hgb 10.9, WBC 5.7, platelets 213, coags wnl, Na 129, Cl 84, CE's wnl  - ABG 7.39, 43, 117, 25, 98.9  - UA w/ protein, no evid of infection. uTox (-)   - CT head w/o acute pathology  - CT chest/abdom: Chronic Type B aortic dissection, not significantly changed. Infrarenal abdominal aortic aneurysm measures   5 cm, compared to 4.2 cm on CT of June 8, 2009. 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 No central pulmonary embolism. (+) Cardiomegaly. Bilateral subsegmental atelectasis.

## 2017-12-28 NOTE — PATIENT PROFILE ADULT. - NS PRO AD NO ADVANCE DIRECTIVE
pt transferred to  on 1/6/17. no living will in chart as previously documented by ICU. awaiting family documentation/Yes

## 2017-12-28 NOTE — H&P ADULT - NSHPLABSRESULTS_GEN_ALL_CORE
10.9   5.74  )-----------( 213      ( 27 Dec 2017 22:46 )             30.1           129<L>  |  84<L>  |  73<H>  ----------------------------<  228<H>  4.3   |  23  |  5.66<H>    Ca    8.9      27 Dec 2017 22:46  Phos  5.7       Mg     1.9         TPro  8.4<H>  /  Alb  4.1  /  TBili  0.8  /  DBili  x   /  AST  43<H>  /  ALT  27  /  AlkPhos  164<H>        PT/INR - ( 27 Dec 2017 23:00 )   PT: 11.7 SEC;   INR: 1.02          PTT - ( 27 Dec 2017 23:00 )  PTT:33.3 SEC  CARDIAC MARKERS ( 27 Dec 2017 22:46 )  x     / < 0.06 ng/mL / 136 u/L / 3.41 ng/mL / x              22:46 - VBG - pH: 7.39  | pCO2: 43    | pO2: 117   | Lactate: 2.5        Urinalysis Basic - ( 28 Dec 2017 00:20 )    Color: YELLOW / Appearance: CLEAR / S.012 / pH: 6.0  Gluc: TRACE / Ketone: NEGATIVE  / Bili: NEGATIVE / Urobili: NORMAL mg/dL   Blood: NEGATIVE / Protein: 300 mg/dL / Nitrite: NEGATIVE   Leuk Esterase: NEGATIVE / RBC: 5-10 / WBC 5-10   Sq Epi: OCC / Non Sq Epi: x / Bacteria: FEW    EKG: Sinus tach  CXR:     Imaging:     *** Wells Score  *** GIANNA score: determines risk for ACS with unstable angina or NSTEMI  *** MELD-Na  *** Discriminant Function Score 10.9   5.74  )-----------( 213      ( 27 Dec 2017 22:46 )             30.1           129<L>  |  84<L>  |  73<H>  ----------------------------<  228<H>  4.3   |  23  |  5.66<H>    Ca    8.9      27 Dec 2017 22:46  Phos  5.7       Mg     1.9         TPro  8.4<H>  /  Alb  4.1  /  TBili  0.8  /  DBili  x   /  AST  43<H>  /  ALT  27  /  AlkPhos  164<H>        PT/INR - ( 27 Dec 2017 23:00 )   PT: 11.7 SEC;   INR: 1.02          PTT - ( 27 Dec 2017 23:00 )  PTT:33.3 SEC  CARDIAC MARKERS ( 27 Dec 2017 22:46 )  x     / < 0.06 ng/mL / 136 u/L / 3.41 ng/mL / x              22:46 - VBG - pH: 7.39  | pCO2: 43    | pO2: 117   | Lactate: 2.5        Urinalysis Basic - ( 28 Dec 2017 00:20 )    Color: YELLOW / Appearance: CLEAR / S.012 / pH: 6.0  Gluc: TRACE / Ketone: NEGATIVE  / Bili: NEGATIVE / Urobili: NORMAL mg/dL   Blood: NEGATIVE / Protein: 300 mg/dL / Nitrite: NEGATIVE   Leuk Esterase: NEGATIVE / RBC: 5-10 / WBC 5-10   Sq Epi: OCC / Non Sq Epi: x / Bacteria: FEW    - EKG: Sinus tach, no pathologic ST segment elevations or depressions appreciated   - CT head w/o acute pathology  - CT chest/abdom: Chronic Type B aortic dissection, not significantly changed. Infrarenal abdominal aortic aneurysm measures   5 cm, compared to 4.2 cm on CT of 2009. 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 No central pulmonary embolism. (+) Cardiomegaly. Bilateral subsegmental atelectasis.

## 2017-12-28 NOTE — H&P ADULT - NSHPPHYSICALEXAM_GEN_ALL_CORE
PHYSICAL EXAM:  General: NAD, well-developed  Eyes: pupil size wnl and reactive.   Neck: Supple, No JVD  Chest/Lung: Clear to auscultation bilaterally; No wheezes  Heart: Regular rate and rhythm; No murmurs, rubs, or gallops. Capillary refill WNL  Abdom: Soft, Nontender, Nondistended; Bowel sounds present  Extremities: No lower extremity edema.   Psych: AAOx0  Neurology: (+) w/d's to pain all extems.   Skin: No rashes or lesions

## 2017-12-28 NOTE — H&P ADULT - ASSESSMENT
72F PMHx of CHF, DM2, HTN, CKD Stage 4, Hx of Type B aortic dissection p/w AMS, patient was dc'd from [facility] today, treated for CHF exacerbation presented with acute onset lethargy, loss of vision, weakness, pt with decreasing mental status in ED, was intubated for airway protection, Na at 129, CT h/c/a/p w/o evid head pathology, stable aortic dissection, slightly enlarged infrarenal AA at 5cm, no evid PE, (+) cardiomeg, (+) BL subseg atelectasis.     # Neuro    # Pulm    # CVS    # GI    # Renal    # ID    # Heme    # Endo    # DVT ppx 72F PMHx of CHF, DM2, HTN, CKD Stage 4, Hx of Type B aortic dissection p/w AMS, patient was dc'd from [facility] today, treated for CHF exacerbation presented with acute onset lethargy, loss of vision, weakness, pt with decreasing mental status in ED, was intubated for airway protection, Na at 129, CT h/c/a/p w/o evid head pathology, stable aortic dissection, slightly enlarged infrarenal AA at 5cm, no evid PE, (+) cardiomeg, (+) BL subseg atelectasis.     # Neuro  [] encephalopathy of unknown etiology, ddx includes expanding cerebral aneurysm, non convulsive seizure, atypical stroke presentation  - CT head w/o evid of acute pathology  - neuro saw pt in ED, rec'd MRI,  - f/u CTA head and neck  - plan for MRI if CTA h&n non contributory.   - follow up EEG, though on fentanyl which may obfuscate results.     # Pulm:  - CTA w/o of PE,  BL subsegmental atelectasis, likely 2/2 deconditioning during hospital stay  - first ABG performed after urgent intubation, ABG 7.39, 43, 117, 25, 98.9  - CT chest/abdom: Chronic Type B aortic dissection, not significantly changed. Infrarenal abdominal aortic aneurysm measures   5 cm, compared to 4.2 cm on CT of June 8, 2009. 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 No central pulmonary embolism. (+) Cardiomegaly. Bilateral subsegmental atelectasis.   - remains intubated, plan for sedation vacation after further work up    # CVS  [] CHF unknown EF, was managed for 5 days in [facility] without checking  - check POCUS cardiac today, consider formal TTE pending results  - hold lasix and metoprolol as BP wnl at this time.   - consult cardio team who managing during previous admission.   [] Aortic dissection on CT appears stable, keep BP controlled  [] infrarenal AA, expanding since 2009, currently at 5cm  - unlikely to playing role in current process    # GI  - no active issues  - plan to place NGT today     # Renal  [] CKD stage 4: no elyte abnormalities on presentation, no indication for urgent HD  - consult neph team in AM, was followed by Juan F Sterling during last admission  - pt was to undergo out AVF creation.     # ID  - no current evidence supporting infectious etiology  - no WBC, fever, UA (-)     # Heme  [] anemia 2/2 ESRD, hgb at 9.4 from 10.9.   - no current evid of bleeding.   - trend hgb    # Endo  [] DM2: holding PO DM2 meds, cont w/ SSI for now.    # DVT ppx   - 72F PMHx of CHF, DM2, HTN, CKD Stage 4, Hx of Type B aortic dissection p/w AMS, patient was dc'd from [facility] today, treated for CHF exacerbation presented with acute onset lethargy, loss of vision, weakness, pt with decreasing mental status in ED, was intubated for airway protection, Na at 129, CT h/c/a/p w/o evid head pathology, stable aortic dissection, slightly enlarged infrarenal AA at 5cm, no evid PE, (+) cardiomeg, (+) BL subseg atelectasis.     # Neuro  [] encephalopathy of unknown etiology, ddx includes expanding cerebral aneurysm, non convulsive seizure, atypical stroke presentation, would also consider encephalitis and meningitis.   - CT head w/o evid of acute pathology  - neuro saw pt in ED, rec'd MRI,  - f/u CTA head and neck  - plan for MRI if CTA h&n non contributory.   - follow up EEG, though on fentanyl which may obfuscate results.     # Pulm:  - CTA w/o of PE,  BL subsegmental atelectasis, likely 2/2 deconditioning during hospital stay  - first ABG performed after urgent intubation, ABG 7.39, 43, 117, 25, 98.9  - CT chest/abdom: Chronic Type B aortic dissection, not significantly changed. Infrarenal abdominal aortic aneurysm measures   5 cm, compared to 4.2 cm on CT of June 8, 2009. 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 No central pulmonary embolism. (+) Cardiomegaly. Bilateral subsegmental atelectasis.   - remains intubated, plan for sedation vacation after further work up    # CVS  [] CHF unknown EF, was managed for 5 days in [facility] without checking  - check POCUS cardiac today, consider formal TTE pending results  - hold lasix and metoprolol as BP wnl at this time.   - consult cardio team who managing during previous admission.   [] Aortic dissection on CT appears stable, keep BP controlled  [] infrarenal AA, expanding since 2009, currently at 5cm  - unlikely to playing role in current process    # GI  - no active issues  - plan to place NGT today     # Renal  [] CKD stage 4: no elyte abnormalities on presentation, no indication for urgent HD  - consult neph team in AM, was followed by Juan F Sterling during last admission  - pt was to undergo out AVF creation.     # ID  - found hypothermic in MICU to 91F, follow up blood cx.   - no WBC, UA (-)   - cannot rule out infection at this time, though appear unlikely.    # Heme  [] anemia 2/2 ESRD, hgb at 9.4 from 10.9.   - no current evid of bleeding.   - trend hgb    # Endo  [] DM2: holding PO DM2 meds, cont w/ SSI for now.    # DVT ppx   - start HSQ q12 72F PMHx of CHF, DM2, HTN, CKD Stage 4, Hx of Type B aortic dissection p/w AMS, patient was dc'd from [facility] today, treated for CHF exacerbation presented with acute onset lethargy, loss of vision, weakness, pt with decreasing mental status in ED, was intubated for airway protection, Na at 129, CT h/c/a/p w/o evid head pathology, stable aortic dissection, slightly enlarged infrarenal AA at 5cm, no evid PE, (+) cardiomeg, (+) BL subseg atelectasis.     # Neuro  [] encephalopathy of unknown etiology, ddx includes expanding cerebral aneurysm, non convulsive seizure, atypical stroke presentation, would also consider encephalitis and meningitis.   - CT head w/o evid of acute pathology  - neuro saw pt in ED, rec'd MRI,  - f/u CTA head and neck  - plan for MRI if CTA h&n non contributory.   - follow up EEG, though on fentanyl which may obfuscate results.     # Pulm:  - CTA w/o of PE,  BL subsegmental atelectasis, likely 2/2 deconditioning during hospital stay  - first ABG performed after urgent intubation, ABG 7.39, 43, 117, 25, 98.9  - CT chest/abdom: Chronic Type B aortic dissection, not significantly changed. Infrarenal abdominal aortic aneurysm measures   5 cm, compared to 4.2 cm on CT of June 8, 2009. 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 No central pulmonary embolism. (+) Cardiomegaly. Bilateral subsegmental atelectasis.   - remains intubated, plan for sedation vacation after further work up    # CVS  [] CHF unknown EF, was managed for 5 days in [facility] without checking  - check POCUS cardiac today, consider formal TTE pending results  - hold lasix and metoprolol as BP wnl at this time.   - consult cardio team who managing during previous admission.   [] Aortic dissection on CT appears stable, keep BP controlled  [] infrarenal AA, expanding since 2009, currently at 5cm  - unlikely to playing role in current process    # GI  - no active issues  - plan to place NGT today     # Renal  [] CKD stage 4: no elyte abnormalities on presentation, no indication for urgent HD  - consult neph team in AM, was followed by Juan F Sterling during last admission  - pt was to undergo outpt AVF creation.   [] hypoNatremia  - was ongoing since previous admission and pt has been maintained on Na Bicarb.   - as per discussion with neuro, some concern for SIADH resulting from intracranial pathology.     # ID  - found hypothermic in MICU to 91F, follow up blood cx.   - no WBC, UA (-)   - cannot rule out infection at this time, though appear unlikely.    # Heme  [] anemia 2/2 ESRD, hgb at 9.4 from 10.9.   - no current evid of bleeding.   - trend hgb    # Endo  [] DM2: holding PO DM2 meds, cont w/ SSI for now.    # DVT ppx   - start HSQ q12

## 2017-12-29 LAB
ALBUMIN SERPL ELPH-MCNC: 3.1 G/DL — LOW (ref 3.3–5)
ALP SERPL-CCNC: 126 U/L — HIGH (ref 40–120)
ALT FLD-CCNC: 19 U/L — SIGNIFICANT CHANGE UP (ref 4–33)
AST SERPL-CCNC: 28 U/L — SIGNIFICANT CHANGE UP (ref 4–32)
BACTERIA UR CULT: SIGNIFICANT CHANGE UP
BASOPHILS # BLD AUTO: 0.01 K/UL — SIGNIFICANT CHANGE UP (ref 0–0.2)
BASOPHILS NFR BLD AUTO: 0.1 % — SIGNIFICANT CHANGE UP (ref 0–2)
BILIRUB SERPL-MCNC: 0.8 MG/DL — SIGNIFICANT CHANGE UP (ref 0.2–1.2)
BUN SERPL-MCNC: 83 MG/DL — HIGH (ref 7–23)
CALCIUM SERPL-MCNC: 8.7 MG/DL — SIGNIFICANT CHANGE UP (ref 8.4–10.5)
CHLORIDE SERPL-SCNC: 86 MMOL/L — LOW (ref 98–107)
CO2 SERPL-SCNC: 22 MMOL/L — SIGNIFICANT CHANGE UP (ref 22–31)
CREAT SERPL-MCNC: 6.98 MG/DL — HIGH (ref 0.5–1.3)
EOSINOPHIL # BLD AUTO: 0.09 K/UL — SIGNIFICANT CHANGE UP (ref 0–0.5)
EOSINOPHIL NFR BLD AUTO: 1.2 % — SIGNIFICANT CHANGE UP (ref 0–6)
GLUCOSE BLDC GLUCOMTR-MCNC: 153 MG/DL — HIGH (ref 70–99)
GLUCOSE BLDC GLUCOMTR-MCNC: 187 MG/DL — HIGH (ref 70–99)
GLUCOSE BLDC GLUCOMTR-MCNC: 197 MG/DL — HIGH (ref 70–99)
GLUCOSE SERPL-MCNC: 140 MG/DL — HIGH (ref 70–99)
HCT VFR BLD CALC: 26.5 % — LOW (ref 34.5–45)
HGB BLD-MCNC: 9.3 G/DL — LOW (ref 11.5–15.5)
IMM GRANULOCYTES # BLD AUTO: 0.03 # — SIGNIFICANT CHANGE UP
IMM GRANULOCYTES NFR BLD AUTO: 0.4 % — SIGNIFICANT CHANGE UP (ref 0–1.5)
L PNEUMO AG UR QL: NEGATIVE — SIGNIFICANT CHANGE UP
LYMPHOCYTES # BLD AUTO: 1.19 K/UL — SIGNIFICANT CHANGE UP (ref 1–3.3)
LYMPHOCYTES # BLD AUTO: 15.3 % — SIGNIFICANT CHANGE UP (ref 13–44)
MAGNESIUM SERPL-MCNC: 1.9 MG/DL — SIGNIFICANT CHANGE UP (ref 1.6–2.6)
MCHC RBC-ENTMCNC: 29.9 PG — SIGNIFICANT CHANGE UP (ref 27–34)
MCHC RBC-ENTMCNC: 35.1 % — SIGNIFICANT CHANGE UP (ref 32–36)
MCV RBC AUTO: 85.2 FL — SIGNIFICANT CHANGE UP (ref 80–100)
MONOCYTES # BLD AUTO: 0.87 K/UL — SIGNIFICANT CHANGE UP (ref 0–0.9)
MONOCYTES NFR BLD AUTO: 11.2 % — SIGNIFICANT CHANGE UP (ref 2–14)
NEUTROPHILS # BLD AUTO: 5.58 K/UL — SIGNIFICANT CHANGE UP (ref 1.8–7.4)
NEUTROPHILS NFR BLD AUTO: 71.8 % — SIGNIFICANT CHANGE UP (ref 43–77)
NRBC # FLD: 0 — SIGNIFICANT CHANGE UP
OSMOLALITY SERPL: 295 MOSMO/KG — SIGNIFICANT CHANGE UP (ref 275–295)
PHOSPHATE SERPL-MCNC: 5.5 MG/DL — HIGH (ref 2.5–4.5)
PLATELET # BLD AUTO: 160 K/UL — SIGNIFICANT CHANGE UP (ref 150–400)
PMV BLD: 9.9 FL — SIGNIFICANT CHANGE UP (ref 7–13)
POTASSIUM SERPL-MCNC: 4.1 MMOL/L — SIGNIFICANT CHANGE UP (ref 3.5–5.3)
POTASSIUM SERPL-SCNC: 4.1 MMOL/L — SIGNIFICANT CHANGE UP (ref 3.5–5.3)
PROT SERPL-MCNC: 6.5 G/DL — SIGNIFICANT CHANGE UP (ref 6–8.3)
RBC # BLD: 3.11 M/UL — LOW (ref 3.8–5.2)
RBC # FLD: 15.4 % — HIGH (ref 10.3–14.5)
SODIUM SERPL-SCNC: 131 MMOL/L — LOW (ref 135–145)
SPECIMEN SOURCE: SIGNIFICANT CHANGE UP
VANCOMYCIN FLD-MCNC: 21.9 UG/ML — SIGNIFICANT CHANGE UP
WBC # BLD: 7.77 K/UL — SIGNIFICANT CHANGE UP (ref 3.8–10.5)
WBC # FLD AUTO: 7.77 K/UL — SIGNIFICANT CHANGE UP (ref 3.8–10.5)

## 2017-12-29 PROCEDURE — 99291 CRITICAL CARE FIRST HOUR: CPT | Mod: 25

## 2017-12-29 PROCEDURE — 95819 EEG AWAKE AND ASLEEP: CPT | Mod: 26

## 2017-12-29 RX ORDER — PROPOFOL 10 MG/ML
5 INJECTION, EMULSION INTRAVENOUS
Qty: 1000 | Refills: 0 | Status: DISCONTINUED | OUTPATIENT
Start: 2017-12-29 | End: 2017-12-30

## 2017-12-29 RX ORDER — VALPROIC ACID (AS SODIUM SALT) 250 MG/5ML
1250 SOLUTION, ORAL ORAL ONCE
Qty: 0 | Refills: 0 | Status: COMPLETED | OUTPATIENT
Start: 2017-12-29 | End: 2017-12-29

## 2017-12-29 RX ORDER — DEXTROSE 50 % IN WATER 50 %
25 SYRINGE (ML) INTRAVENOUS ONCE
Qty: 0 | Refills: 0 | Status: DISCONTINUED | OUTPATIENT
Start: 2017-12-29 | End: 2018-01-12

## 2017-12-29 RX ORDER — GLUCAGON INJECTION, SOLUTION 0.5 MG/.1ML
1 INJECTION, SOLUTION SUBCUTANEOUS ONCE
Qty: 0 | Refills: 0 | Status: DISCONTINUED | OUTPATIENT
Start: 2017-12-29 | End: 2018-01-12

## 2017-12-29 RX ORDER — DEXTROSE 50 % IN WATER 50 %
1 SYRINGE (ML) INTRAVENOUS ONCE
Qty: 0 | Refills: 0 | Status: DISCONTINUED | OUTPATIENT
Start: 2017-12-29 | End: 2018-01-12

## 2017-12-29 RX ORDER — INSULIN LISPRO 100/ML
VIAL (ML) SUBCUTANEOUS EVERY 6 HOURS
Qty: 0 | Refills: 0 | Status: DISCONTINUED | OUTPATIENT
Start: 2017-12-29 | End: 2018-01-03

## 2017-12-29 RX ORDER — VALPROIC ACID (AS SODIUM SALT) 250 MG/5ML
500 SOLUTION, ORAL ORAL EVERY 12 HOURS
Qty: 0 | Refills: 0 | Status: DISCONTINUED | OUTPATIENT
Start: 2017-12-29 | End: 2017-12-30

## 2017-12-29 RX ORDER — FENTANYL CITRATE 50 UG/ML
50 INJECTION INTRAVENOUS ONCE
Qty: 0 | Refills: 0 | Status: DISCONTINUED | OUTPATIENT
Start: 2017-12-29 | End: 2017-12-29

## 2017-12-29 RX ORDER — DEXTROSE 50 % IN WATER 50 %
12.5 SYRINGE (ML) INTRAVENOUS ONCE
Qty: 0 | Refills: 0 | Status: DISCONTINUED | OUTPATIENT
Start: 2017-12-29 | End: 2018-01-12

## 2017-12-29 RX ORDER — SODIUM CHLORIDE 9 MG/ML
1000 INJECTION, SOLUTION INTRAVENOUS
Qty: 0 | Refills: 0 | Status: DISCONTINUED | OUTPATIENT
Start: 2017-12-29 | End: 2018-01-12

## 2017-12-29 RX ADMIN — HEPARIN SODIUM 5000 UNIT(S): 5000 INJECTION INTRAVENOUS; SUBCUTANEOUS at 06:01

## 2017-12-29 RX ADMIN — Medication 81 MILLIGRAM(S): at 13:12

## 2017-12-29 RX ADMIN — Medication 1: at 23:43

## 2017-12-29 RX ADMIN — Medication 27.5 MILLIGRAM(S): at 19:01

## 2017-12-29 RX ADMIN — FENTANYL CITRATE 50 MICROGRAM(S): 50 INJECTION INTRAVENOUS at 03:51

## 2017-12-29 RX ADMIN — Medication 325 MILLIGRAM(S): at 06:01

## 2017-12-29 RX ADMIN — Medication 325 MILLIGRAM(S): at 17:50

## 2017-12-29 RX ADMIN — PROPOFOL 1.85 MICROGRAM(S)/KG/MIN: 10 INJECTION, EMULSION INTRAVENOUS at 19:00

## 2017-12-29 RX ADMIN — PIPERACILLIN AND TAZOBACTAM 25 GRAM(S): 4; .5 INJECTION, POWDER, LYOPHILIZED, FOR SOLUTION INTRAVENOUS at 17:50

## 2017-12-29 RX ADMIN — PIPERACILLIN AND TAZOBACTAM 25 GRAM(S): 4; .5 INJECTION, POWDER, LYOPHILIZED, FOR SOLUTION INTRAVENOUS at 06:00

## 2017-12-29 RX ADMIN — PROPOFOL 1.85 MICROGRAM(S)/KG/MIN: 10 INJECTION, EMULSION INTRAVENOUS at 23:44

## 2017-12-29 RX ADMIN — Medication 1: at 13:13

## 2017-12-29 RX ADMIN — PROPOFOL 1.85 MICROGRAM(S)/KG/MIN: 10 INJECTION, EMULSION INTRAVENOUS at 19:50

## 2017-12-29 RX ADMIN — Medication 1: at 19:00

## 2017-12-29 RX ADMIN — HEPARIN SODIUM 5000 UNIT(S): 5000 INJECTION INTRAVENOUS; SUBCUTANEOUS at 17:50

## 2017-12-29 RX ADMIN — Medication 31.25 MILLIGRAM(S): at 15:54

## 2017-12-29 NOTE — CHART NOTE - NSCHARTNOTEFT_GEN_A_CORE
Patient noted to have subclinical status epilepticus on video EEG.    Plan:  Propofol drip  Load with depakote 20 mg/kg  Start depakote 500 BID  Continue video EEG monitoring    Discussed with primary team and neurology attending. Patient noted to have subclinical status epilepticus on video EEG.    Plan:  Propofol drip  Load with depakote 20 mg/kg  Start depakote 500 BID  LP  Continue video EEG monitoring    Discussed with primary team and neurology attending.

## 2017-12-29 NOTE — EEG REPORT - NS EEG TEXT BOX
[facility]   COMPREHENSIVE EPILEPSY CENTER   REPORT OF ROUTINE VIDEO EEG     [facility]: [address], [place] [address], Ph#: [phone]  [facility]: [address], [place] [address], Ph#: [phone]  Office: [address] [facility], [facility] [address], [place] [address] Ph#: [phone]    Patient Name: VERO ALVAREZ  Age and : 72y (1945)  MRN #: [id number]  Location: [place] [address]    Study Date: 17    _____________________________________________________________  TECHNICAL INFORMATION    EEG Placement and Labeling of Electrodes:  The EEG was performed utilizing 20 channels referential EEG connections (coronal over temporal over parasagittal montage) using all standard 10-20 electrode placements with EKG.  Recording was at a sampling rate of 256 samples per second per channel.  Time synchronized digital video recording was done simultaneously with EEG recording.  A low light infrared camera was used for low light recording.  Gary and seizure detection algorithms were utilized.    _____________________________________________________________  HISTORY:  Patient is a 72y old  Female who presents with a chief complaint of Altered mental status. (28 Dec 2017 04:04)    PERTINENT MEDICATION:  None    _____________________________________________________________  STUDY INTERPRETATION    Background:  The background was continuous, spontaneously variable, non-reactive, and predominantly consisted of theta and polymorphic delta activities. No posteriorly dominant rhythm were seen.      Sleep:  No stage II sleep transients were recorded.    Non-epileptiform Abnormalities:  None    Interictal and Ictal Epileptiform Abnormalities:   Near continuous fluctuating 1.5-2.5 Hz frontal predominant generalized periodic discharge with associated rhythmicity (GPD+R), consistent with ictal end of interictal-ictal continuum.    Artifacts:  Intermittent myogenic and movement artifacts were noted.    ECG:  The heart rate on single channel ECG was predominantly between 60-80 BPM.    _____________________________________________________________  EEG CLASSIFICATION/SUMMARY:  Abnormal EEG in unresponsive patient due to  - Near continuous fluctuating 1.5-2.5 Hz frontal predominant generalized periodic discharge with associated rhythmicity (GPD+R), consistent with ictal end of interictal-ictal continuum.  - Moderate to marked generalized slowing    _____________________________________________________________  CLINICAL IMPRESSION:  Abnormal EEG study in an unresponsive patient due to    1. Near continuous fluctuating 1.5-2.5 Hz frontal predominant generalized periodic discharge, consistent with ictal end of interictal-ictal continuum. No clinical correlation. In the right clinical context, this suggests non-convulsive status epilepticus.    2. Moderate to marked diffuse or multifocal cerebral dysfunction.    Neurology service notified of the findings.        Katherine Alexander MD  Attending Physician, [facility] Epilepsy [place]

## 2017-12-29 NOTE — PROGRESS NOTE ADULT - ATTENDING COMMENTS
Etiology of encephalopathy remains unclear. MRI reviewed with neuroradiologist. There is diffuse small vessel disease but no acute stroke, hemorrhage or PRES.  Awaiting LP and EEG monitoring.  Now afebrile without evidence of SIADH. Could there have been an undocumented hypoxic or hypotensive event?

## 2017-12-29 NOTE — PROGRESS NOTE ADULT - ATTENDING COMMENTS
72  year old woman intubated for airway protection presented with hypothermia and encephalopathy within hours of having been discharged from the hospital where she had been treated for chf exacerbation, etiology of her symptoms is unclear    off sedation does not have any purposeful movements  CT and MRI have not been revealing  EEG and LP to be done today  on broad spectrum antibiotics  hypothermia has now resolved    critical care teim 35 minutes

## 2017-12-29 NOTE — PROGRESS NOTE ADULT - ASSESSMENT
72F PMHx of CHF, DM2, HTN, CKD Stage 4, Hx of Type B aortic dissection p/w AMS, patient was dc'd from [facility] today after being treated for CHF exacerbation; presented with acute onset lethargy, loss of vision, weakness, with AMS in ED, s/p intubated for airway protection, still without improvement in mental status, likely 2/2 non convulsive status.    # Neuro  Encephalopathy - still unresponsive, no response to noxious stimuli   - initial concerns for basilar stroke - CTH negative and MRi performed o/n shows no acute pathology  - VEEG initiated today - called by neuro, pt in non convulsive status - will load with depakote and start 500 BID and propofol  - LP to eval for etiology of new onset seizures      # Pulm:  - CTA w/o of PE,  BL subsegmental atelectasis  - on minimal vent settings, tolerating well, saturating well     # CVS  [] CHF unknown EF  - check official TTE   - monitor UOP  [] Aortic dissection on CT appears stable, keep BP controlled  [] infrarenal AA, expanding since 2009, currently at 5cm  - unlikely to playing role in current process    # GI  - c/w tube feeds    # Renal  [] CKD stage 4  - will contrast load recieved for CTA; Cr now worse with decreasing UOP  - no urgent indication for HD at this time  - will consider renal consult, may need HD at some time during this admission  []Hyponatremia   - improving without intervention   c/t monitor     # ID  - hypothermic to 91 on admission, temp now normalized   - on zosyn and vanco by level; vanc trough elevated, recheck with AM labs, redose as needed  - no clear source - will consider d/c abx if remains afebrile with normal WBC     # Heme  [] anemia 2/2 ESRD  - no current evid of bleeding.   - trend hgb    # Endo  [] DM2: FS acceptable for now, c/w SSI    # DVT ppx   - HSQ

## 2017-12-29 NOTE — PROGRESS NOTE ADULT - SUBJECTIVE AND OBJECTIVE BOX
Subjective:Interval History - No events overnight  Patient currently still intubated. Off sedation.    Objective:   Vital Signs Last 24 Hrs  T(C): 37.1 (29 Dec 2017 08:00), Max: 37.6 (28 Dec 2017 16:00)  T(F): 98.8 (29 Dec 2017 08:00), Max: 99.6 (28 Dec 2017 16:00)  HR: 58 (29 Dec 2017 09:00) (50 - 69)  BP: 125/55 (29 Dec 2017 09:00) (111/55 - 150/68)  BP(mean): 72 (29 Dec 2017 09:00) (67 - 92)  RR: 18 (29 Dec 2017 09:00) (14 - 18)  SpO2: 100% (29 Dec 2017 09:00) (100% - 100%)    General Exam:   General appearance: No acute distress                   Neurological Exam:  Mental Status: not awake or alert, unresponsive to verbal or painful or tactile stimuli, does not follow any commands    Cranial Nerves: PERRL, facial symmetry grossly intact, patient is non-verbal. Corneal reflex decreased on left. Occulocephalic reflex present.    Motor: no spontaneous movement in any extremity  Sensation: no response to painful stimuli    Plantar: Up on right, down on left    Other:    12-29    131<L>  |  86<L>  |  83<H>  ----------------------------<  140<H>  4.1   |  22  |  6.98<H>    Ca    8.7      29 Dec 2017 03:10  Phos  5.5     12-29  Mg     1.9     12-29    TPro  6.5  /  Alb  3.1<L>  /  TBili  0.8  /  DBili  x   /  AST  28  /  ALT  19  /  AlkPhos  126<H>  12-29    LIVER FUNCTIONS - ( 29 Dec 2017 03:10 )  Alb: 3.1 g/dL / Pro: 6.5 g/dL / ALK PHOS: 126 u/L / ALT: 19 u/L / AST: 28 u/L / GGT: x                      9.3    7.77  )-----------( 160      ( 29 Dec 2017 03:10 )             26.5     MEDICATIONS  (STANDING):  aspirin enteric coated 81 milliGRAM(s) Oral daily  dextrose 5%. 1000 milliLiter(s) (50 mL/Hr) IV Continuous <Continuous>  dextrose 50% Injectable 12.5 Gram(s) IV Push once  dextrose 50% Injectable 25 Gram(s) IV Push once  dextrose 50% Injectable 25 Gram(s) IV Push once  fentaNYL   Infusion 0.5 MICROgram(s)/kG/Hr (3 mL/Hr) IV Continuous <Continuous>  heparin  Injectable 5000 Unit(s) SubCutaneous every 12 hours  insulin lispro (HumaLOG) corrective regimen sliding scale   SubCutaneous three times a day before meals  insulin lispro (HumaLOG) corrective regimen sliding scale   SubCutaneous at bedtime  piperacillin/tazobactam IVPB. 3.375 Gram(s) IV Intermittent every 12 hours  sodium bicarbonate 325 milliGRAM(s) Oral two times a day    MEDICATIONS  (PRN):  dextrose Gel 1 Dose(s) Oral once PRN Blood Glucose LESS THAN 70 milliGRAM(s)/deciliter  glucagon  Injectable 1 milliGRAM(s) IntraMuscular once PRN Glucose LESS THAN 70 milligrams/deciliter

## 2017-12-29 NOTE — PROGRESS NOTE ADULT - SUBJECTIVE AND OBJECTIVE BOX
CHIEF COMPLAINT: Patient is a 72y old  Female who presents with a chief complaint of Altered mental status, patient became obtunded (28 Dec 2017 04:04)    Interval Events: MRI performed overnight, no acute pathology noted.     REVIEW OF SYSTEMS:  [X] Unable to assess ROS because pt with poor mentation    OBJECTIVE:  ICU Vital Signs Last 24 Hrs  T(C): 37.1 (29 Dec 2017 08:00), Max: 37.6 (28 Dec 2017 16:00)  T(F): 98.8 (29 Dec 2017 08:00), Max: 99.6 (28 Dec 2017 16:00)  HR: 67 (29 Dec 2017 11:29) (50 - 69)  BP: 124/62 (29 Dec 2017 11:00) (111/55 - 150/68)  BP(mean): 77 (29 Dec 2017 11:00) (67 - 92)  ABP: --  ABP(mean): --  RR: 16 (29 Dec 2017 11:00) (14 - 18)  SpO2: 100% (29 Dec 2017 11:29) (100% - 100%)    Mode: AC/ CMV (Assist Control/ Continuous Mandatory Ventilation), RR (machine): 14, TV (machine): 450, FiO2: 40, PEEP: 5, MAP: 9, PIP: 24     @ 07:01  -   @ 07:00  --------------------------------------------------------  IN: 1010 mL / OUT: 540 mL / NET: 470 mL      CAPILLARY BLOOD GLUCOSE  POCT Blood Glucose.: 146 mg/dL (29 Dec 2017 05:54)      PHYSICAL EXAM:  General: NAD, well developed   HEENT: RENZO, sclera clear  Neck: supple, no JVD  Respiratory: CTA b/l, no wheezing  Cardiovascular: S1 S2 RRR  Abdomen: soft, NT ND +BD  Extremities: 1+ b/l LE pitting edema  Skin: no rash, dry, warm   Neurological: no mental status, not response to noxious stimuli  Psychiatry: unable to assess    LINES: [facility]    HOSPITAL MEDICATIONS:  Standing Meds:  aspirin enteric coated 81 milliGRAM(s) Oral daily  dextrose 5%. 1000 milliLiter(s) IV Continuous <Continuous>  dextrose 50% Injectable 12.5 Gram(s) IV Push once  dextrose 50% Injectable 25 Gram(s) IV Push once  dextrose 50% Injectable 25 Gram(s) IV Push once  fentaNYL   Infusion 0.5 MICROgram(s)/kG/Hr IV Continuous <Continuous>  heparin  Injectable 5000 Unit(s) SubCutaneous every 12 hours  insulin lispro (HumaLOG) corrective regimen sliding scale   SubCutaneous three times a day before meals  insulin lispro (HumaLOG) corrective regimen sliding scale   SubCutaneous at bedtime  piperacillin/tazobactam IVPB. 3.375 Gram(s) IV Intermittent every 12 hours  sodium bicarbonate 325 milliGRAM(s) Oral two times a day      PRN Meds:  dextrose Gel 1 Dose(s) Oral once PRN  glucagon  Injectable 1 milliGRAM(s) IntraMuscular once PRN      LABS:                        9.3    7.77  )-----------( 160      ( 29 Dec 2017 03:10 )             26.5     Hgb Trend: 9.3<--, 9.4<--, 10.9<--, 8.9<--, 9.0<--  12-    131<L>  |  86<L>  |  83<H>  ----------------------------<  140<H>  4.1   |  22  |  6.98<H>    Ca    8.7      29 Dec 2017 03:10  Phos  5.5       Mg     1.9         TPro  6.5  /  Alb  3.1<L>  /  TBili  0.8  /  DBili  x   /  AST  28  /  ALT  19  /  AlkPhos  126<H>  -    Creatinine Trend: 6.98<--, 5.88<--, 5.65<--, 5.66<--, 5.49<--, 5.13<--  PT/INR - ( 27 Dec 2017 23:00 )   PT: 11.7 SEC;   INR: 1.02          PTT - ( 27 Dec 2017 23:00 )  PTT:33.3 SEC  Urinalysis Basic - ( 28 Dec 2017 00:20 )    Color: YELLOW / Appearance: CLEAR / S.012 / pH: 6.0  Gluc: TRACE / Ketone: NEGATIVE  / Bili: NEGATIVE / Urobili: NORMAL mg/dL   Blood: NEGATIVE / Protein: 300 mg/dL / Nitrite: NEGATIVE   Leuk Esterase: NEGATIVE / RBC: 5-10 / WBC 5-10   Sq Epi: OCC / Non Sq Epi: x / Bacteria: FEW      Arterial Blood Gas:   @ 05:00  7.47/33/156/26/99.6/1.0  ABG lactate: 2.0    Venous Blood Gas:   @ 22:46  7.39/43/117/25/98.9  VBG Lactate: 2.5      MICROBIOLOGY:     RADIOLOGY:  [ ] Reviewed and interpreted by me    EKG: CHIEF COMPLAINT: Patient is a 72y old  Female who presents with a chief complaint of Altered mental status, patient became obtunded (28 Dec 2017 04:04)    Interval Events: MRI performed overnight, no acute pathology noted.     REVIEW OF SYSTEMS:  [X] Unable to assess ROS because pt with poor mentation    OBJECTIVE:  ICU Vital Signs Last 24 Hrs  T(C): 37.1 (29 Dec 2017 08:00), Max: 37.6 (28 Dec 2017 16:00)  T(F): 98.8 (29 Dec 2017 08:00), Max: 99.6 (28 Dec 2017 16:00)  HR: 67 (29 Dec 2017 11:29) (50 - 69)  BP: 124/62 (29 Dec 2017 11:00) (111/55 - 150/68)  BP(mean): 77 (29 Dec 2017 11:00) (67 - 92)  ABP: --  ABP(mean): --  RR: 16 (29 Dec 2017 11:00) (14 - 18)  SpO2: 100% (29 Dec 2017 11:29) (100% - 100%)    Mode: AC/ CMV (Assist Control/ Continuous Mandatory Ventilation), RR (machine): 14, TV (machine): 450, FiO2: 40, PEEP: 5, MAP: 9, PIP: 24     @ 07:01  -   @ 07:00  --------------------------------------------------------  IN: 1010 mL / OUT: 540 mL / NET: 470 mL      CAPILLARY BLOOD GLUCOSE  POCT Blood Glucose.: 146 mg/dL (29 Dec 2017 05:54)      PHYSICAL EXAM:  General: NAD, well developed   HEENT: RENZO, sclera clear  Neck: supple, no JVD  Respiratory: CTA b/l, no wheezing  Cardiovascular: S1 S2 RRR  Abdomen: soft, NT ND +BD  Extremities: 1+ b/l LE pitting edema  Skin: no rash, dry, warm   Neurological: no mental status, not response to noxious stimuli  Psychiatry: unable to assess    [facility] [place]    HOSPITAL MEDICATIONS:  Standing Meds:  aspirin enteric coated 81 milliGRAM(s) Oral daily  dextrose 5%. 1000 milliLiter(s) IV Continuous <Continuous>  dextrose 50% Injectable 12.5 Gram(s) IV Push once  dextrose 50% Injectable 25 Gram(s) IV Push once  dextrose 50% Injectable 25 Gram(s) IV Push once  fentaNYL   Infusion 0.5 MICROgram(s)/kG/Hr IV Continuous <Continuous>  heparin  Injectable 5000 Unit(s) SubCutaneous every 12 hours  insulin lispro (HumaLOG) corrective regimen sliding scale   SubCutaneous three times a day before meals  insulin lispro (HumaLOG) corrective regimen sliding scale   SubCutaneous at bedtime  piperacillin/tazobactam IVPB. 3.375 Gram(s) IV Intermittent every 12 hours  sodium bicarbonate 325 milliGRAM(s) Oral two times a day      PRN Meds:  dextrose Gel 1 Dose(s) Oral once PRN  glucagon  Injectable 1 milliGRAM(s) IntraMuscular once PRN      LABS:                        9.3    7.77  )-----------( 160      ( 29 Dec 2017 03:10 )             26.5     Hgb Trend: 9.3<--, 9.4<--, 10.9<--, 8.9<--, 9.0<--  12-    131<L>  |  86<L>  |  83<H>  ----------------------------<  140<H>  4.1   |  22  |  6.98<H>    Ca    8.7      29 Dec 2017 03:10  Phos  5.5       Mg     1.9         TPro  6.5  /  Alb  3.1<L>  /  TBili  0.8  /  DBili  x   /  AST  28  /  ALT  19  /  AlkPhos  126<H>  -    Creatinine Trend: 6.98<--, 5.88<--, 5.65<--, 5.66<--, 5.49<--, 5.13<--  PT/INR - ( 27 Dec 2017 23:00 )   PT: 11.7 SEC;   INR: 1.02          PTT - ( 27 Dec 2017 23:00 )  PTT:33.3 SEC  Urinalysis Basic - ( 28 Dec 2017 00:20 )    Color: YELLOW / Appearance: CLEAR / S.012 / pH: 6.0  Gluc: TRACE / Ketone: NEGATIVE  / Bili: NEGATIVE / Urobili: NORMAL mg/dL   Blood: NEGATIVE / Protein: 300 mg/dL / Nitrite: NEGATIVE   Leuk Esterase: NEGATIVE / RBC: 5-10 / WBC 5-10   Sq Epi: OCC / Non Sq Epi: x / Bacteria: FEW      Arterial Blood Gas:   @ 05:00  7.47/33/156/26/99.6/1.0  ABG lactate: 2.0    Venous Blood Gas:   @ 22:46  7.39/43/117/25/98.9  VBG Lactate: 2.5      MICROBIOLOGY:     RADIOLOGY:  [X] Reviewed and interpreted by me    EKG:

## 2017-12-29 NOTE — PROGRESS NOTE ADULT - ASSESSMENT
72F with PMH of CHF, DM2, HTN, CKD Stage 4, Hx of Type B aortic dissection  presents as code stroke for AMS. Patient was discharged from [facility] for CHF exacerbation. Initial examination, patient is unresponsive. Moves extremities spontaneously but not purposefully. Has a venous blood sodium of 125. CTH negative.  Patient is now normothermic and hyponatremia improving. Today on neuro exam, patient not awake or alert with no spontaneous movement and no response to painful stimuli. Decreased corneal reflex on left. Occulocephalic reflex present.   MRI brain showed no acute infarct or hemorrhage.    Recommend:  Video EEG  LP 72F with PMH of CHF, DM2, HTN, CKD Stage 4, Hx of Type B aortic dissection  presents as code stroke for AMS. Patient was discharged from [facility] for CHF exacerbation. Initial examination, patient is unresponsive.  Has a venous blood sodium of 125. CTH negative.  Patient is now normothermic and hyponatremia improving. Today on neuro exam, patient not awake or alert with no spontaneous movement and no response to painful stimuli. Decreased corneal reflex on left. Occulocephalic reflex present.   MRI brain showed no acute infarct or hemorrhage.    Recommend:  Video EEG  LP

## 2017-12-30 LAB
ALBUMIN SERPL ELPH-MCNC: 2.9 G/DL — LOW (ref 3.3–5)
ALP SERPL-CCNC: 124 U/L — HIGH (ref 40–120)
ALT FLD-CCNC: 30 U/L — SIGNIFICANT CHANGE UP (ref 4–33)
AMMONIA BLD-MCNC: 59 UMOL/L — HIGH (ref 11–55)
AST SERPL-CCNC: 55 U/L — HIGH (ref 4–32)
BILIRUB SERPL-MCNC: 0.6 MG/DL — SIGNIFICANT CHANGE UP (ref 0.2–1.2)
BUN SERPL-MCNC: 93 MG/DL — HIGH (ref 7–23)
CALCIUM SERPL-MCNC: 8.2 MG/DL — LOW (ref 8.4–10.5)
CHLORIDE SERPL-SCNC: 85 MMOL/L — LOW (ref 98–107)
CLARITY CSF: CLEAR — SIGNIFICANT CHANGE UP
CO2 SERPL-SCNC: 24 MMOL/L — SIGNIFICANT CHANGE UP (ref 22–31)
COLOR CSF: COLORLESS — SIGNIFICANT CHANGE UP
CREAT SERPL-MCNC: 8.27 MG/DL — HIGH (ref 0.5–1.3)
CULTURE - ACID FAST SMEAR CONCENTRATED: SIGNIFICANT CHANGE UP
GLUCOSE BLDC GLUCOMTR-MCNC: 102 MG/DL — HIGH (ref 70–99)
GLUCOSE BLDC GLUCOMTR-MCNC: 144 MG/DL — HIGH (ref 70–99)
GLUCOSE BLDC GLUCOMTR-MCNC: 144 MG/DL — HIGH (ref 70–99)
GLUCOSE CSF-MCNC: 86 MG/DL — HIGH (ref 40–70)
GLUCOSE SERPL-MCNC: 101 MG/DL — HIGH (ref 70–99)
GRAM STN CSF: SIGNIFICANT CHANGE UP
HCT VFR BLD CALC: 24.9 % — LOW (ref 34.5–45)
HGB BLD-MCNC: 8.9 G/DL — LOW (ref 11.5–15.5)
LYMPHOCYTES # CSF: 29 % — SIGNIFICANT CHANGE UP
MAGNESIUM SERPL-MCNC: 2.1 MG/DL — SIGNIFICANT CHANGE UP (ref 1.6–2.6)
MCHC RBC-ENTMCNC: 31.2 PG — SIGNIFICANT CHANGE UP (ref 27–34)
MCHC RBC-ENTMCNC: 35.7 % — SIGNIFICANT CHANGE UP (ref 32–36)
MCV RBC AUTO: 87.4 FL — SIGNIFICANT CHANGE UP (ref 80–100)
MONOCYTES # CSF: 71 % — SIGNIFICANT CHANGE UP
NRBC # FLD: 0 — SIGNIFICANT CHANGE UP
NRBC NFR CSF: 2 CELL/UL — SIGNIFICANT CHANGE UP (ref 0–5)
O+P SPEC CONC: SIGNIFICANT CHANGE UP
PHOSPHATE SERPL-MCNC: 7.1 MG/DL — HIGH (ref 2.5–4.5)
PLATELET # BLD AUTO: 128 K/UL — LOW (ref 150–400)
PMV BLD: 10.9 FL — SIGNIFICANT CHANGE UP (ref 7–13)
POTASSIUM SERPL-MCNC: 3.7 MMOL/L — SIGNIFICANT CHANGE UP (ref 3.5–5.3)
POTASSIUM SERPL-SCNC: 3.7 MMOL/L — SIGNIFICANT CHANGE UP (ref 3.5–5.3)
PROT CSF-MCNC: 25.1 MG/DL — SIGNIFICANT CHANGE UP (ref 15–45)
PROT SERPL-MCNC: 6.1 G/DL — SIGNIFICANT CHANGE UP (ref 6–8.3)
RBC # BLD: 2.85 M/UL — LOW (ref 3.8–5.2)
RBC # CSF: 277 CELL/UL — HIGH (ref 0–0)
RBC # FLD: 15.3 % — HIGH (ref 10.3–14.5)
SODIUM SERPL-SCNC: 131 MMOL/L — LOW (ref 135–145)
SPECIMEN SOURCE: SIGNIFICANT CHANGE UP
TOTAL CELLS COUNTED, SPINAL FLUID: 7 CELLS — SIGNIFICANT CHANGE UP
TRI STN SPEC: SIGNIFICANT CHANGE UP
WBC # BLD: 7.43 K/UL — SIGNIFICANT CHANGE UP (ref 3.8–10.5)
WBC # FLD AUTO: 7.43 K/UL — SIGNIFICANT CHANGE UP (ref 3.8–10.5)
XANTHOCHROMIA: SIGNIFICANT CHANGE UP

## 2017-12-30 PROCEDURE — 99291 CRITICAL CARE FIRST HOUR: CPT | Mod: 25

## 2017-12-30 PROCEDURE — 62270 DX LMBR SPI PNXR: CPT

## 2017-12-30 PROCEDURE — 95951: CPT | Mod: 26

## 2017-12-30 RX ORDER — HEPARIN SODIUM 5000 [USP'U]/ML
5000 INJECTION INTRAVENOUS; SUBCUTANEOUS EVERY 12 HOURS
Qty: 0 | Refills: 0 | Status: DISCONTINUED | OUTPATIENT
Start: 2017-12-30 | End: 2018-01-03

## 2017-12-30 RX ORDER — ASPIRIN/CALCIUM CARB/MAGNESIUM 324 MG
81 TABLET ORAL DAILY
Qty: 0 | Refills: 0 | Status: DISCONTINUED | OUTPATIENT
Start: 2017-12-30 | End: 2017-12-30

## 2017-12-30 RX ORDER — VALPROIC ACID (AS SODIUM SALT) 250 MG/5ML
500 SOLUTION, ORAL ORAL EVERY 8 HOURS
Qty: 0 | Refills: 0 | Status: DISCONTINUED | OUTPATIENT
Start: 2017-12-31 | End: 2018-01-01

## 2017-12-30 RX ORDER — FENTANYL CITRATE 50 UG/ML
50 INJECTION INTRAVENOUS ONCE
Qty: 0 | Refills: 0 | Status: DISCONTINUED | OUTPATIENT
Start: 2017-12-30 | End: 2017-12-30

## 2017-12-30 RX ORDER — ASPIRIN/CALCIUM CARB/MAGNESIUM 324 MG
81 TABLET ORAL DAILY
Qty: 0 | Refills: 0 | Status: DISCONTINUED | OUTPATIENT
Start: 2017-12-30 | End: 2018-01-03

## 2017-12-30 RX ORDER — CHLORHEXIDINE GLUCONATE 213 G/1000ML
1 SOLUTION TOPICAL DAILY
Qty: 0 | Refills: 0 | Status: DISCONTINUED | OUTPATIENT
Start: 2017-12-30 | End: 2018-01-10

## 2017-12-30 RX ORDER — FUROSEMIDE 40 MG
60 TABLET ORAL ONCE
Qty: 0 | Refills: 0 | Status: COMPLETED | OUTPATIENT
Start: 2017-12-30 | End: 2017-12-30

## 2017-12-30 RX ADMIN — FENTANYL CITRATE 50 MICROGRAM(S): 50 INJECTION INTRAVENOUS at 00:26

## 2017-12-30 RX ADMIN — HEPARIN SODIUM 5000 UNIT(S): 5000 INJECTION INTRAVENOUS; SUBCUTANEOUS at 18:22

## 2017-12-30 RX ADMIN — Medication 325 MILLIGRAM(S): at 08:26

## 2017-12-30 RX ADMIN — PIPERACILLIN AND TAZOBACTAM 25 GRAM(S): 4; .5 INJECTION, POWDER, LYOPHILIZED, FOR SOLUTION INTRAVENOUS at 06:55

## 2017-12-30 RX ADMIN — Medication 27.5 MILLIGRAM(S): at 18:00

## 2017-12-30 RX ADMIN — Medication 81 MILLIGRAM(S): at 11:59

## 2017-12-30 RX ADMIN — Medication 60 MILLIGRAM(S): at 12:09

## 2017-12-30 RX ADMIN — HEPARIN SODIUM 5000 UNIT(S): 5000 INJECTION INTRAVENOUS; SUBCUTANEOUS at 06:55

## 2017-12-30 RX ADMIN — Medication 325 MILLIGRAM(S): at 18:01

## 2017-12-30 RX ADMIN — Medication 27.5 MILLIGRAM(S): at 06:55

## 2017-12-30 NOTE — PROGRESS NOTE ADULT - ATTENDING COMMENTS
Patient intubated for airway protection in set of acute metabolic encephalopathy, found to be in status epilepticus. On Depakote, now off Propofol. Awaiting EEG read. LP negative. Will attempt to wean as tolerated. Patient intubated for airway protection in set of acute metabolic encephalopathy, found to be in status epilepticus. Unclear etiology - imaging negative. LP negative. Loaded with Depakote - check levels daily, and now off Propofol. Awaiting EEG read. Will attempt to wean as tolerated. Continue diuresis. Contact renal for eventual plan for dialysis. Will discontinue antibiotics as patient is no longer hypothermic, cultures negative and no sign of infection.

## 2017-12-30 NOTE — PROGRESS NOTE ADULT - SUBJECTIVE AND OBJECTIVE BOX
S: the patient is visited at the bedside she is intubated, no jerking or rhythmic movements observed. Not on propofol at the time of the visit.    O:  ICU Vital Signs Last 24 Hrs  T(C): 36.7 (30 Dec 2017 12:00), Max: 37.3 (29 Dec 2017 16:00)  T(F): 98 (30 Dec 2017 12:00), Max: 99.1 (29 Dec 2017 16:00)  HR: 57 (30 Dec 2017 12:00) (54 - 92)  BP: 131/67 (30 Dec 2017 12:00) (91/53 - 143/68)  BP(mean): 81 (30 Dec 2017 12:00) (63 - 90)  ABP: --  ABP(mean): --  RR: 14 (30 Dec 2017 12:00) (14 - 21)  SpO2: 100% (30 Dec 2017 12:00) (99% - 100%)    CLINICAL IMPRESSION:  Abnormal EEG study in an unresponsive patient due to    1. Non-convulsive status epilepticus (NCSE) with near continuous fluctuating 1.5-2.5 Hz frontal predominant generalized periodic discharge was aborted with VPA load after 15:16. Overnight, with addition of propofol infusion, frequent 8-10m stretches of remnant of the similar electrographic pattern as previously noted NCSE, but much less organized.    2. Moderate diffuse or multifocal cerebral dysfunction.      Recording after 15:16 is much improved due to resolution of NCSE and emergence of background and sleep transients.

## 2017-12-30 NOTE — PROGRESS NOTE ADULT - ASSESSMENT
72F PMHx of CHF, DM2, HTN, CKD Stage 4, Hx of Type B aortic dissection p/w AMS, patient was dc'd from [facility] today after being treated for CHF exacerbation; presented with acute onset lethargy, loss of vision, weakness, with AMS in ED, s/p intubated for airway protection, still without improvement in mental status, likely 2/2 non convulsive status.    # Neuro  Encephalopathy - still unresponsive, no response to noxious stimuli   - initial concerns for basilar stroke - CTH negative and MRI shows no acute pathology  - VEEG shows non convulsive status - s/p depakote on 12/29; now on standing depakote 500 BID  - LP performed (12/29), shows minimal nucleated cells, further cultures pending     # Pulmonary - intubated for airway protection  - CTA w/o of PE,  BL subsegmental atelectasis  - on minimal vent settings, tolerating well, saturating well     # CV  [] CHF, unknown EF  - check official TTE   - monitor UOP  [] Aortic dissection on CT appears stable, BP well controlled  [] infrarenal AA, expanding since 2009, currently at 5cm  - unlikely to playing role in current process    # GI  - c/w tube feeds    # Renal  [] CKD stage 4  - with contrast load received for CTA, Cr now worsening significantly with decreasing UOP  - no urgent indication for HD at this time  - renal consult, may need HD very soon   []Hyponatremia   - improving without intervention   - c/t monitor     # ID  - hypothermic to 91 on admission, temp now normalized   - on zosyn and vanco by level  - no clear source - will consider d/c abx if remains afebrile with normal WBC     # Heme  [] anemia 2/2 ESRD  - no current evid of bleeding.   - trend hgb    # Endo  [] DM2: FS acceptable for now, c/w SSI    # DVT ppx   - HSQ

## 2017-12-30 NOTE — PROGRESS NOTE ADULT - ATTENDING COMMENTS
Agree with above.  Pt is wakeful appearing but remains in an unresponsive state. Roving conjugate eye movement with intact corneals and reactive pupils. Little spontaneous movement and no posturing to noxious stimuli. Agree with maximize VPA level and will F/U with today's VEEG reading.   Improving non-convulsive status epilepticus.

## 2017-12-30 NOTE — PROGRESS NOTE ADULT - SUBJECTIVE AND OBJECTIVE BOX
CHIEF COMPLAINT:Patient is a 72y old  Female who presents with a chief complaint of Altered mental status, patient became obtunded (28 Dec 2017 04:04)    Interval Events: EEG noted with non convulsive seizure activity yesterday, Depakote loaded and started on propofol; propofol weaned at 6.20AM. Minimal UOP.     REVIEW OF SYSTEMS:  [X] Unable to assess ROS because intubated, sedated     OBJECTIVE:  ICU Vital Signs Last 24 Hrs  T(C): 37 (30 Dec 2017 08:00), Max: 37.3 (29 Dec 2017 16:00)  T(F): 98.6 (30 Dec 2017 08:00), Max: 99.1 (29 Dec 2017 16:00)  HR: 59 (30 Dec 2017 08:00) (54 - 92)  BP: 122/64 (30 Dec 2017 08:00) (91/53 - 143/68)  BP(mean): 78 (30 Dec 2017 08:00) (63 - 85)  ABP: --  ABP(mean): --  RR: 14 (30 Dec 2017 08:00) (14 - 21)  SpO2: 100% (30 Dec 2017 08:00) (99% - 100%)    Mode: AC/ CMV (Assist Control/ Continuous Mandatory Ventilation), RR (machine): 14, TV (machine): 450, FiO2: 40, PEEP: 5, MAP: 10, PIP: 25    12-29 @ 07:01  -  12-30 @ 07:00  --------------------------------------------------------  IN: 957.5 mL / OUT: 135 mL / NET: 822.5 mL    12-30 @ 07:01  -  12-30 @ 08:33  --------------------------------------------------------  IN: 0 mL / OUT: 100 mL / NET: -100 mL      CAPILLARY BLOOD GLUCOSE  POCT Blood Glucose.: 102 mg/dL (30 Dec 2017 06:26)      PHYSICAL EXAM:  General: NAD, well developed   HEENT: RENZO, sclera clear  Neck: supple, no JVD  Respiratory: CTA b/l, no wheezing  Cardiovascular: S1 S2 RRR, +systolic murmur   Abdomen: soft, NT ND +BD  Extremities: 1+ b/l LE pitting edema  Skin: no rash, dry, warm   Neurological: no mental status, not response to noxious stimuli  Psychiatry: unable to assess    LINES: [facility]    HOSPITAL MEDICATIONS:  Standing Meds:  aspirin enteric coated 81 milliGRAM(s) Oral daily  dextrose 5%. 1000 milliLiter(s) IV Continuous <Continuous>  dextrose 50% Injectable 12.5 Gram(s) IV Push once  dextrose 50% Injectable 25 Gram(s) IV Push once  dextrose 50% Injectable 25 Gram(s) IV Push once  heparin  Injectable 5000 Unit(s) SubCutaneous every 12 hours  insulin lispro (HumaLOG) corrective regimen sliding scale   SubCutaneous every 6 hours  piperacillin/tazobactam IVPB. 3.375 Gram(s) IV Intermittent every 12 hours  propofol Infusion 5 MICROgram(s)/kG/Min IV Continuous <Continuous>  sodium bicarbonate 325 milliGRAM(s) Oral two times a day  valproate sodium IVPB 500 milliGRAM(s) IV Intermittent every 12 hours      PRN Meds:  dextrose Gel 1 Dose(s) Oral once PRN  glucagon  Injectable 1 milliGRAM(s) IntraMuscular once PRN      LABS:                        8.9    7.43  )-----------( 128      ( 30 Dec 2017 03:25 )             24.9     Hgb Trend: 8.9<--, 9.3<--, 9.4<--, 10.9<--, 8.9<--  12-30    131<L>  |  85<L>  |  93<H>  ----------------------------<  101<H>  3.7   |  24  |  8.27<H>    Ca    8.2<L>      30 Dec 2017 03:25  Phos  7.1     12-30  Mg     2.1     12-30    TPro  6.1  /  Alb  2.9<L>  /  TBili  0.6  /  DBili  x   /  AST  55<H>  /  ALT  30  /  AlkPhos  124<H>  12-30    Creatinine Trend: 8.27<--, 6.98<--, 5.88<--, 5.65<--, 5.66<--, 5.49<--    MICROBIOLOGY:     RADIOLOGY:  [X] Reviewed and interpreted by me

## 2017-12-30 NOTE — EEG REPORT - NS EEG TEXT BOX
[facility]   COMPREHENSIVE EPILEPSY CENTER   REPORT OF ROUTINE VIDEO EEG     [facility]: [address], [place] [address], Ph#: [phone]  [facility]: [address], [place] [address], Ph#: [phone]  Office: 1 [facility], [facility] [address], [place] [address] Ph#: [phone]    Patient Name: VERO ALVAREZ  Age and : 72y (1945)  MRN #: [id number]  Location: [place] [address]    Study Date: -17    _____________________________________________________________  TECHNICAL INFORMATION    EEG Placement and Labeling of Electrodes:  The EEG was performed utilizing 20 channels referential EEG connections (coronal over temporal over parasagittal montage) using all standard 10-20 electrode placements with EKG.  Recording was at a sampling rate of 256 samples per second per channel.  Time synchronized digital video recording was done simultaneously with EEG recording.  A low light infrared camera was used for low light recording.  Gary and seizure detection algorithms were utilized.    _____________________________________________________________  HISTORY:  Patient is a 72y old  Female who presents with a chief complaint of Altered mental status. (28 Dec 2017 04:04)    PERTINENT MEDICATION:  propofol Infusion 5 MICROgram(s)/kG/Min (1.851 mL/Hr) IV Continuous <Continuous>  valproate sodium IVPB 500 milliGRAM(s) IV Intermittent every 12 hours    _____________________________________________________________  STUDY INTERPRETATION     Background:  The background was continuous, spontaneously variable, and predominantly consisted of theta and polymorphic delta activities. No posteriorly dominant rhythm were seen.      Sleep:  Normal and symmetric vertex waves and sleep spindles were recorded.      Non-epileptiform Abnormalities:  None    Interictal and Ictal Epileptiform Abnormalities:   - Near continuous fluctuating 1.5-2.5 Hz frontal predominant generalized periodic discharge with associated rhythmicity (GPD+R), consistent with non-convulsive status epilepticus. VPA was loaded at 15:07, and near continuous GPD+R started to break down around 15:16. Propofol infusion was also started at 18:00. Overnight, frequent average 8-10m stretches of remnant of the non-convulsive status epilepticus pattern with poorly organized frontal predominant GPD+R.    Artifacts:  Intermittent myogenic and movement artifacts were noted.    ECG:  The heart rate on single channel ECG was predominantly between 60-80 BPM.    _____________________________________________________________  EEG CLASSIFICATION/SUMMARY:  Abnormal EEG in unresponsive patient due to  - Near continuous fluctuating 1.5-2.5 Hz frontal predominant generalized periodic discharge with associated rhythmicity (GPD+R), consistent with non-convulsive status epilepticus. VPA was loaded at 15:07, and near continuous GPD+R started to break down around 15:16. Propofol infusion was also started at 18:00. Overnight, frequent average 8-10m stretches of remnant of the non-convulsive status epilepticus pattern with poorly organized frontal predominant GPD+R.    - Moderate generalized slowing    _____________________________________________________________  CLINICAL IMPRESSION:  Abnormal EEG study in an unresponsive patient due to    1. Non-convulsive status epilepticus (NCSE) with near continuous fluctuating 1.5-2.5 Hz frontal predominant generalized periodic discharge was aborted with VPA load after 15:16. Overnight, with addition of propofol infusion, frequent 8-10m stretches of remnant of the similar electrographic pattern as previously noted NCSE, but much less organized.    2. Moderate diffuse or multifocal cerebral dysfunction.      Recording after 15:16 is much improved due to resolution of NCSE and emergence of background and sleep transients.        Katherine Alexander MD  Attending Physician, [facility] Epilepsy [place] [facility]   COMPREHENSIVE EPILEPSY CENTER   REPORT OF CONTINUOUS VIDEO EEG     [facility]: [address], [place] [address], Ph#: [phone]  [facility]: [address], [place] [address], Ph#: [phone]  Office: 1 [facility], [facility] [address], [place] [address] Ph#: [phone]    Patient Name: VERO ALVAREZ  Age and : 72y (1945)  MRN #: [id number]  Location: [place] [address]    Study Date: -17    _____________________________________________________________  TECHNICAL INFORMATION    EEG Placement and Labeling of Electrodes:  The EEG was performed utilizing 20 channels referential EEG connections (coronal over temporal over parasagittal montage) using all standard 10-20 electrode placements with EKG.  Recording was at a sampling rate of 256 samples per second per channel.  Time synchronized digital video recording was done simultaneously with EEG recording.  A low light infrared camera was used for low light recording.  Gary and seizure detection algorithms were utilized.    _____________________________________________________________  HISTORY:  Patient is a 72y old  Female who presents with a chief complaint of Altered mental status. (28 Dec 2017 04:04)    PERTINENT MEDICATION:  propofol Infusion 5 MICROgram(s)/kG/Min (1.851 mL/Hr) IV Continuous <Continuous>  valproate sodium IVPB 500 milliGRAM(s) IV Intermittent every 12 hours    _____________________________________________________________  STUDY INTERPRETATION     Background:  The background was continuous, spontaneously variable, and predominantly consisted of theta and polymorphic delta activities. No posteriorly dominant rhythm were seen.      Sleep:  Normal and symmetric vertex waves and sleep spindles were recorded.      Non-epileptiform Abnormalities:  None    Interictal and Ictal Epileptiform Abnormalities:   - Near continuous fluctuating 1.5-2.5 Hz frontal predominant generalized periodic discharge with associated rhythmicity (GPD+R), consistent with non-convulsive status epilepticus. VPA was loaded at 15:07, and near continuous GPD+R started to break down around 15:16. Propofol infusion was also started at 18:00. Overnight, frequent average 8-10m stretches of remnant of the non-convulsive status epilepticus pattern with poorly organized frontal predominant GPD+R.    Artifacts:  Intermittent myogenic and movement artifacts were noted.    ECG:  The heart rate on single channel ECG was predominantly between 60-80 BPM.    _____________________________________________________________  EEG CLASSIFICATION/SUMMARY:  Abnormal EEG in unresponsive patient due to  - Near continuous fluctuating 1.5-2.5 Hz frontal predominant generalized periodic discharge with associated rhythmicity (GPD+R), consistent with non-convulsive status epilepticus. VPA was loaded at 15:07, and near continuous GPD+R started to break down around 15:16. Propofol infusion was also started at 18:00. Overnight, frequent average 8-10m stretches of remnant of the non-convulsive status epilepticus pattern with poorly organized frontal predominant GPD+R.  - Moderate generalized slowing    _____________________________________________________________  CLINICAL IMPRESSION:  Abnormal EEG study in an unresponsive patient due to    1. Non-convulsive status epilepticus (NCSE) with near continuous fluctuating 1.5-2.5 Hz frontal predominant generalized periodic discharge was aborted with VPA load after 15:16. Overnight, with addition of propofol infusion, frequent 8-10m stretches of remnant of the similar electrographic pattern as previously noted NCSE, but much less organized.    2. Moderate diffuse or multifocal cerebral dysfunction.      Recording after 15:16 is much improved due to resolution of NCSE and emergence of background and sleep transients.        Katherine Alexander MD  Attending Physician, [facility] Epilepsy [place] [facility]   COMPREHENSIVE EPILEPSY CENTER   REPORT OF CONTINUOUS VIDEO EEG     [facility]: [address], [place] [address], Ph#: [phone]  [facility]: [address], [place] [address], Ph#: [phone]  Office: 1 [facility], [facility] [address], [place] [address] Ph#: [phone]    Patient Name: VERO ALVAREZ  Age and : 72y (1945)  MRN #: [id number]  Location: [place] [address]    Study Date: -17    _____________________________________________________________  TECHNICAL INFORMATION    EEG Placement and Labeling of Electrodes:  The EEG was performed utilizing 20 channels referential EEG connections (coronal over temporal over parasagittal montage) using all standard 10-20 electrode placements with EKG.  Recording was at a sampling rate of 256 samples per second per channel.  Time synchronized digital video recording was done simultaneously with EEG recording.  A low light infrared camera was used for low light recording.  Gary and seizure detection algorithms were utilized.    _____________________________________________________________  HISTORY:  Patient is a 72y old  Female who presents with a chief complaint of Altered mental status. (28 Dec 2017 04:04)    PERTINENT MEDICATION:  propofol Infusion 5 MICROgram(s)/kG/Min (1.851 mL/Hr) IV Continuous <Continuous>  valproate sodium IVPB 500 milliGRAM(s) IV Intermittent every 12 hours    _____________________________________________________________  STUDY INTERPRETATION     Background:  The background was continuous, spontaneously variable, and predominantly consisted of theta and polymorphic delta activities. No posteriorly dominant rhythm were seen.      Sleep:  Normal and symmetric vertex waves and sleep spindles were recorded.      Non-epileptiform Abnormalities:  None    Interictal and Ictal Epileptiform Abnormalities:   - Near continuous fluctuating 1.5-2.5 Hz frontal predominant generalized periodic discharge with associated rhythmicity (GPD+R), consistent with non-convulsive status epilepticus. VPA was loaded at 15:07, and near continuous GPD+R started to break down around 15:16. Propofol infusion was also started at 18:00. Overnight, frequent average 8-10m stretches of remnant of the non-convulsive status epilepticus pattern with poorly organized frontal predominant GPD+R.    Artifacts:  Intermittent myogenic and movement artifacts were noted.    ECG:  The heart rate on single channel ECG was predominantly between 60-80 BPM.    _____________________________________________________________  EEG CLASSIFICATION/SUMMARY:  Abnormal EEG in unresponsive patient due to  - Near continuous fluctuating 1.5-2.5 Hz frontal predominant generalized periodic discharge with associated rhythmicity (GPD+R), consistent with non-convulsive status epilepticus. VPA was loaded at 15:07, and near continuous GPD+R started to break down around 15:16. Propofol infusion was also started at 18:00. Overnight, frequent average 8-10m stretches of remnant of the non-convulsive status epilepticus pattern with poorly organized frontal predominant GPD+R.  - Moderate generalized slowing    _____________________________________________________________  CLINICAL IMPRESSION:  Abnormal EEG study in an unresponsive patient due to    1. Non-convulsive status epilepticus (NCSE) with near continuous fluctuating 1.5-2.5 Hz frontal predominant generalized periodic discharge was aborted with VPA load after 15:16. Overnight, with addition of propofol infusion, frequent 8-10m stretches of remnant of the similar electrographic pattern as previously noted NCSE, but much less organized.    2. Moderate diffuse or multifocal cerebral dysfunction.      Recording after 15:16 is much improved due to resolution of NCSE and emergence of background and sleep transients.        Katherine Alexander MD  Attending Physician, [facility] Epilepsy [place]

## 2017-12-30 NOTE — PROGRESS NOTE ADULT - ASSESSMENT
72F with PMH of CHF, DM2, HTN, CKD Stage 4, Hx of Type B aortic dissection  presents as code stroke for AMS. Patient was discharged from [facility] for CHF exacerbation. Initial examination, patient is unresponsive.  Has a venous blood sodium of 125. CTH negative. Previously the patient was found to be in convulsive status, was started on propofol and Depakote 500 BID, today's EEG shows significant improvement, however there are remnant of similar pattern.  Plan d/w the neurology and epilepsy attendings.  [] Get Depakote level daily, try to maintain at high therapeutic level  [] Depakote titrate up to 500 q8h 72F with PMH of CHF, DM2, HTN, CKD Stage 4, Hx of Type B aortic dissection  presents as code stroke for AMS. Patient was discharged from [facility] for CHF exacerbation. Initial examination, patient is unresponsive.  Has a venous blood sodium of 125. CTH negative. Previously the patient was found to be in convulsive status, was started on propofol and Depakote 500 BID, today's EEG shows significant improvement, however there are remnant of similar pattern.  Plan d/w the neurology and epilepsy attendings.  [] Get Depakote level daily, try to maintain at high therapeutic level, around 100  [] Depakote titrate up to 500 q8h

## 2017-12-31 LAB
ALBUMIN SERPL ELPH-MCNC: 2.8 G/DL — LOW (ref 3.3–5)
ALP SERPL-CCNC: 116 U/L — SIGNIFICANT CHANGE UP (ref 40–120)
ALT FLD-CCNC: 27 U/L — SIGNIFICANT CHANGE UP (ref 4–33)
AST SERPL-CCNC: 43 U/L — HIGH (ref 4–32)
BACTERIA SPT RESP CULT: SIGNIFICANT CHANGE UP
BILIRUB SERPL-MCNC: 0.5 MG/DL — SIGNIFICANT CHANGE UP (ref 0.2–1.2)
BUN SERPL-MCNC: 106 MG/DL — HIGH (ref 7–23)
CALCIUM SERPL-MCNC: 7.8 MG/DL — LOW (ref 8.4–10.5)
CHLORIDE SERPL-SCNC: 85 MMOL/L — LOW (ref 98–107)
CO2 SERPL-SCNC: 24 MMOL/L — SIGNIFICANT CHANGE UP (ref 22–31)
CREAT SERPL-MCNC: 9.38 MG/DL — HIGH (ref 0.5–1.3)
CRYPTOC AG CSF-ACNC: NEGATIVE — SIGNIFICANT CHANGE UP
GLUCOSE BLDC GLUCOMTR-MCNC: 131 MG/DL — HIGH (ref 70–99)
GLUCOSE BLDC GLUCOMTR-MCNC: 141 MG/DL — HIGH (ref 70–99)
GLUCOSE BLDC GLUCOMTR-MCNC: 163 MG/DL — HIGH (ref 70–99)
GLUCOSE BLDC GLUCOMTR-MCNC: 177 MG/DL — HIGH (ref 70–99)
GLUCOSE SERPL-MCNC: 142 MG/DL — HIGH (ref 70–99)
HBV SURFACE AG SER-ACNC: NEGATIVE — SIGNIFICANT CHANGE UP
HCT VFR BLD CALC: 25 % — LOW (ref 34.5–45)
HGB BLD-MCNC: 8.6 G/DL — LOW (ref 11.5–15.5)
MAGNESIUM SERPL-MCNC: 2.4 MG/DL — SIGNIFICANT CHANGE UP (ref 1.6–2.6)
MCHC RBC-ENTMCNC: 29.5 PG — SIGNIFICANT CHANGE UP (ref 27–34)
MCHC RBC-ENTMCNC: 34.4 % — SIGNIFICANT CHANGE UP (ref 32–36)
MCV RBC AUTO: 85.6 FL — SIGNIFICANT CHANGE UP (ref 80–100)
NRBC # FLD: 0 — SIGNIFICANT CHANGE UP
PHOSPHATE SERPL-MCNC: 8.8 MG/DL — HIGH (ref 2.5–4.5)
PLATELET # BLD AUTO: 121 K/UL — LOW (ref 150–400)
PMV BLD: 10.5 FL — SIGNIFICANT CHANGE UP (ref 7–13)
POTASSIUM SERPL-MCNC: 3.9 MMOL/L — SIGNIFICANT CHANGE UP (ref 3.5–5.3)
POTASSIUM SERPL-SCNC: 3.9 MMOL/L — SIGNIFICANT CHANGE UP (ref 3.5–5.3)
PROT SERPL-MCNC: 5.8 G/DL — LOW (ref 6–8.3)
RBC # BLD: 2.92 M/UL — LOW (ref 3.8–5.2)
RBC # FLD: 14.8 % — HIGH (ref 10.3–14.5)
SODIUM SERPL-SCNC: 133 MMOL/L — LOW (ref 135–145)
VALPROATE SERPL-MCNC: 65.1 UG/ML — SIGNIFICANT CHANGE UP (ref 50–100)
WBC # BLD: 4.77 K/UL — SIGNIFICANT CHANGE UP (ref 3.8–10.5)
WBC # FLD AUTO: 4.77 K/UL — SIGNIFICANT CHANGE UP (ref 3.8–10.5)

## 2017-12-31 PROCEDURE — 95951: CPT | Mod: 26

## 2017-12-31 PROCEDURE — 99291 CRITICAL CARE FIRST HOUR: CPT | Mod: 25

## 2017-12-31 PROCEDURE — 76942 ECHO GUIDE FOR BIOPSY: CPT | Mod: 26,59

## 2017-12-31 PROCEDURE — 36556 INSERT NON-TUNNEL CV CATH: CPT

## 2017-12-31 PROCEDURE — 71010: CPT | Mod: 26

## 2017-12-31 RX ORDER — PROPOFOL 10 MG/ML
50 INJECTION, EMULSION INTRAVENOUS ONCE
Qty: 0 | Refills: 0 | Status: COMPLETED | OUTPATIENT
Start: 2017-12-31 | End: 2017-12-31

## 2017-12-31 RX ORDER — DESMOPRESSIN ACETATE 0.1 MG/1
18 TABLET ORAL ONCE
Qty: 0 | Refills: 0 | Status: COMPLETED | OUTPATIENT
Start: 2017-12-31 | End: 2017-12-31

## 2017-12-31 RX ADMIN — Medication 325 MILLIGRAM(S): at 06:07

## 2017-12-31 RX ADMIN — Medication 1: at 06:08

## 2017-12-31 RX ADMIN — Medication 1: at 00:05

## 2017-12-31 RX ADMIN — PROPOFOL 50 MILLIGRAM(S): 10 INJECTION, EMULSION INTRAVENOUS at 13:10

## 2017-12-31 RX ADMIN — Medication 27.5 MILLIGRAM(S): at 06:09

## 2017-12-31 RX ADMIN — Medication 27.5 MILLIGRAM(S): at 15:34

## 2017-12-31 RX ADMIN — HEPARIN SODIUM 5000 UNIT(S): 5000 INJECTION INTRAVENOUS; SUBCUTANEOUS at 06:08

## 2017-12-31 RX ADMIN — Medication 1: at 23:37

## 2017-12-31 RX ADMIN — HEPARIN SODIUM 5000 UNIT(S): 5000 INJECTION INTRAVENOUS; SUBCUTANEOUS at 18:00

## 2017-12-31 RX ADMIN — Medication 27.5 MILLIGRAM(S): at 21:49

## 2017-12-31 RX ADMIN — PROPOFOL 50 MILLIGRAM(S): 10 INJECTION, EMULSION INTRAVENOUS at 13:11

## 2017-12-31 RX ADMIN — DESMOPRESSIN ACETATE 163.5 MICROGRAM(S): 0.1 TABLET ORAL at 13:05

## 2017-12-31 RX ADMIN — CHLORHEXIDINE GLUCONATE 1 APPLICATION(S): 213 SOLUTION TOPICAL at 15:36

## 2017-12-31 RX ADMIN — Medication 325 MILLIGRAM(S): at 18:00

## 2017-12-31 NOTE — DIETITIAN INITIAL EVALUATION ADULT. - NS AS NUTRI INTERV COLLABORAT
1)If/when medically appropriate, resume enteral feedings of Nepro @ suggested goal of 35mL/hr x 24hrs + 1 packet Prosource 1x daily  [provides 1512Kcals &  total of 83g protein, daily]     vs    advancing to PO diet, if extubated & mental status improves.            2)If considering advancing to PO diet, obtain swallow evaluation to help determine most appropriate consistency of foods/liquids             3)Obtain daily & pre/post HD weights                    4)RDN remains available.  Glenis Ramos RDN, CD/N  pager [phone]

## 2017-12-31 NOTE — PROGRESS NOTE ADULT - ASSESSMENT
72F PMHx of CHF, DM2, HTN, CKD Stage 4, Hx of Type B aortic dissection p/w AMS, patient was dc'd from [facility] today after being treated for CHF exacerbation; presented with acute onset lethargy, loss of vision, weakness, with AMS in ED, s/p intubated for airway protection, still without improvement in mental status, likely 2/2 non convulsive status.    # Neuro  Encephalopathy 2/2 seizures, now much improved, awake/alert and following commands   - initial concerns for basilar stroke - CTH negative and MRI shows no acute pathology  - VEEG shows non convulsive status - s/p depakote load on 12/29; now on standing depakote 500 TID; level this AM 65; will f/u neuro regarding target level and increasing doses  - also concern for possible uremic encephalopathy with rising BUN to >100 today   - LP performed (12/29), shows minimal nucleated cells, further cultures pending     # Pulmonary - intubated for airway protection  - CTA w/o of PE,  BL subsegmental atelectasis  - unable to CPAP this morning as pt became apneic; concern for possible uremic encephalopathy decreasing pts resp drive     # CV  [] CHF, unknown EF  - check official TTE   [] Aortic dissection on CT appears stable, BP well controlled  [] infrarenal AA, expanding since 2009, currently at 5cm  - unlikely to playing role in current process    # GI  - c/w tube feeds for now     # Renal  [] CKD stage 4, now with ESRD  - with contrast load received for CTA, Cr now worsening significantly with decreasing UOP; Cr 9.4 today and   - will place shiley with ddAVP and plan for HD today  - renal following   []Hyponatremia   - improving without intervention   - c/t monitor     # ID  - hypothermic to 91 on admission, temp now normalized   - no clear source - abx d/c on 12/30/17    # Heme  [] anemia 2/2 ESRD  - no current evid of bleeding.   - trend hgb    # Endo  [] DM2: FS acceptable for now, c/w SSI    # DVT ppx   - HSQ

## 2017-12-31 NOTE — DIETITIAN INITIAL EVALUATION ADULT. - OTHER INFO
Per nursing, TF on hold as Pt. pending possible extubation.  Also will be having shiley placement for new ESRD on HD.  Planned for HD today.  Per recent prior admission chart review, usual body weight noted (see above), w weight loss x 2 months.  Current documentation indicative of further [significant] weight decline.  Skin intact.  +Loose BMs.  No noted food allergies.

## 2017-12-31 NOTE — PROGRESS NOTE ADULT - ATTENDING COMMENTS
Patient intubated for airway protection in set of acute metabolic encephalopathy, found to be in status epilepticus. EEG read today showed marked improvement with resolution of generalized periodic discharge and rhythmicity. Mental status improved but not at baseline. May be a component of uremic encephalopathy. Plan for dialysis today and will reattempt to wean post dialysis.

## 2017-12-31 NOTE — PROGRESS NOTE ADULT - SUBJECTIVE AND OBJECTIVE BOX
CHIEF COMPLAINT: Patient is a 72y old  Female who presents with a chief complaint of Altered mental status, patient became obtunded (28 Dec 2017 04:04)    Interval Events: Increased depakote to TID dosing yesterday; decreased seizure activity per EEG report.     REVIEW OF SYSTEMS:  [X] Unable to assess ROS because pt sedated, intubated      OBJECTIVE:  ICU Vital Signs Last 24 Hrs  T(C): 37 (31 Dec 2017 08:00), Max: 37 (31 Dec 2017 08:00)  T(F): 98.6 (31 Dec 2017 08:00), Max: 98.6 (31 Dec 2017 08:00)  HR: 59 (31 Dec 2017 10:00) (54 - 73)  BP: 127/60 (31 Dec 2017 10:00) (122/61 - 148/78)  BP(mean): 77 (31 Dec 2017 10:00) (76 - 95)  ABP: --  ABP(mean): --  RR: 14 (31 Dec 2017 10:00) (14 - 15)  SpO2: 100% (31 Dec 2017 10:00) (100% - 100%)    Mode: AC/ CMV (Assist Control/ Continuous Mandatory Ventilation), RR (machine): 14, TV (machine): 450, FiO2: 40, PEEP: 5, MAP: 10, PIP: 26    12-30 @ 07:01 - 12-31 @ 07:00  --------------------------------------------------------  IN: 1050 mL / OUT: 340 mL / NET: 710 mL    12-31 @ 07:01  -  12-31 @ 10:53  --------------------------------------------------------  IN: 0 mL / OUT: 150 mL / NET: -150 mL      CAPILLARY BLOOD GLUCOSE  POCT Blood Glucose.: 177 mg/dL (31 Dec 2017 05:27)      PHYSICAL EXAM:  General: NAD, well developed   HEENT: RENZO, sclera clear  Neck: supple, no JVD  Respiratory: CTA b/l, no wheezing  Cardiovascular: S1 S2 RRR, +systolic murmur   Abdomen: soft, NT ND +BD  Extremities: trace b/l pitting edema   Skin: no rash, dry, warm   Neurological: alert and awake, following simple commands   Psychiatry: unable to assess    LINES: pIV    HOSPITAL MEDICATIONS:  Standing Meds:  aspirin  chewable 81 milliGRAM(s) Oral daily  chlorhexidine 4% Liquid 1 Application(s) Topical daily  desmopressin IVPB 18 MICROGram(s) IV Intermittent once  dextrose 5%. 1000 milliLiter(s) IV Continuous <Continuous>  dextrose 50% Injectable 12.5 Gram(s) IV Push once  dextrose 50% Injectable 25 Gram(s) IV Push once  dextrose 50% Injectable 25 Gram(s) IV Push once  heparin  Injectable 5000 Unit(s) SubCutaneous every 12 hours  insulin lispro (HumaLOG) corrective regimen sliding scale   SubCutaneous every 6 hours  sodium bicarbonate 325 milliGRAM(s) Oral two times a day  valproate sodium IVPB 500 milliGRAM(s) IV Intermittent every 8 hours      PRN Meds:  dextrose Gel 1 Dose(s) Oral once PRN  glucagon  Injectable 1 milliGRAM(s) IntraMuscular once PRN      LABS:                        8.6    4.77  )-----------( 121      ( 31 Dec 2017 02:50 )             25.0     Hgb Trend: 8.6<--, 8.9<--, 9.3<--, 9.4<--, 10.9<--  12-31    133<L>  |  85<L>  |  106<H>  ----------------------------<  142<H>  3.9   |  24  |  9.38<H>    Ca    7.8<L>      31 Dec 2017 02:50  Phos  8.8     12-31  Mg     2.4     12-31    TPro  5.8<L>  /  Alb  2.8<L>  /  TBili  0.5  /  DBili  x   /  AST  43<H>  /  ALT  27  /  AlkPhos  116  12-31    Creatinine Trend: 9.38<--, 8.27<--, 6.98<--, 5.88<--, 5.65<--, 5.66<--    MICROBIOLOGY:     RADIOLOGY:  [X] Reviewed and interpreted by me

## 2017-12-31 NOTE — CONSULT NOTE ADULT - ASSESSMENT
Imp  MIKAELA on advanced CKD with poor urine output and eGFR well below 10ml/min  hyperphosphatemia  AMS  d/w dtr and  - renal issues reviewed - rec initiation of HD - RBA reviewed and they agree  consent obtained  ICU team to place jessica  will begin HD F4 x2h 3k   begin ca acetate 667 mg tid for high phos

## 2017-12-31 NOTE — PROGRESS NOTE ADULT - ATTENDING COMMENTS
Most recent VEEG show no epileptiform activity.  Clear improvement in level of consciousness and no longer in EPC.   Uremia is now an issue and will be receiving HD today.  Continue  TID and follow levels.

## 2017-12-31 NOTE — EEG REPORT - NS EEG TEXT BOX
[facility]   COMPREHENSIVE EPILEPSY CENTER   REPORT OF CONTINUOUS VIDEO EEG     [facility]: [address], [place] [address], Ph#: [phone]  [facility]: [address], [place] [address], Ph#: [phone]  Office: 1 [facility], [facility] [address], [place] [address] Ph#: [phone]    Patient Name: VERO ALVAREZ  Age and : 72y (1945)  MRN #: [id number]  Location: [place] [address]    Study Date: -17    _____________________________________________________________  TECHNICAL INFORMATION    EEG Placement and Labeling of Electrodes:  The EEG was performed utilizing 20 channels referential EEG connections (coronal over temporal over parasagittal montage) using all standard 10-20 electrode placements with EKG.  Recording was at a sampling rate of 256 samples per second per channel.  Time synchronized digital video recording was done simultaneously with EEG recording.  A low light infrared camera was used for low light recording.  Gary and seizure detection algorithms were utilized.    _____________________________________________________________  HISTORY:  Patient is a 72y old  Female who presents with a chief complaint of Altered mental status. (28 Dec 2017 04:04)    PERTINENT MEDICATION:  valproate sodium IVPB 500 milliGRAM(s) IV Intermittent every 8 hours    _____________________________________________________________  STUDY INTERPRETATION     Background:  The background was continuous, spontaneously variable, and predominantly consisted of theta and polymorphic delta activities. No posteriorly dominant rhythm were seen.      Sleep:  Probable K-complexes were recorded.      Non-epileptiform Abnormalities:  None    Interictal Epileptiform Abnormalities:   - Frequent intermediate to long runs of fluctuating 1.5-2.5 Hz frontal predominant generalized periodic discharge with associated rhythmicity (GPD+R). Overnight, this pattern steadily decreased in prevalence, duration, and organization, until completely resolved and replaced by occasional bi-frontal sharp wave discharges.    Ictal Epileptiform Abnormalities:   None    Artifacts:  Intermittent myogenic and movement artifacts were noted.    ECG:  The heart rate on single channel ECG was predominantly between 60-80 BPM.    _____________________________________________________________  EEG CLASSIFICATION/SUMMARY:  Abnormal EEG in unresponsive patient due to    - Frequent intermediate to long runs of fluctuating 1.5-2.5 Hz frontal predominant generalized periodic discharge with associated rhythmicity (GPD+R). Overnight, this pattern steadily decreased in prevalence, duration, and organization, until completely resolved and replaced by occasional bi-frontal sharp wave discharges.    - Moderate generalized slowing    _____________________________________________________________  CLINICAL IMPRESSION:  Abnormal EEG study in an unresponsive patient due to    1. Potential epileptogenic foci in bi-frontal regions.  2. Moderate diffuse or multifocal cerebral dysfunction.  3. No seizures seen.    Marked improvement overnight due to complete resolution of frequent to abundant frontal GPD+R.      Katherine Alexander MD  Attending Physician, [facility] Epilepsy [place]

## 2017-12-31 NOTE — PROCEDURE NOTE - NSPROCDETAILS_GEN_ALL_CORE
lumen(s) aspirated and flushed/ultrasound guidance/sterile technique, catheter placed/sterile dressing applied/guidewire recovered
location identified, draped/prepped, sterile technique used, needle inserted/introduced

## 2017-12-31 NOTE — CHART NOTE - NSCHARTNOTEFT_GEN_A_CORE
NUTRITION SERVICES     Upon Nutritional Assessment by the Registered Dietitian your patient was determined to meet criteria/ has evidence of the following diagnosis/diagnoses:  [ ] Mild Protein Calorie Malnutrition   [ X ] Moderate Protein Calorie Malnutrition   [ ] Severe Protein Calorie Malnutrition   [ ] Unspecified Protein Calorie Malnutrition   [ ] Underweight / BMI <19  [ ] Morbid Obesity / BMI >40    Findings as based on:  •  Comprehensive nutritional assessment and consultation    Please refer to Initial Dietitian Evaluation via documents section of [employer] EMR for further recommendations.    Glenis Ramos RDN, CDN   pager: [phone]

## 2017-12-31 NOTE — CONSULT NOTE ADULT - SUBJECTIVE AND OBJECTIVE BOX
Patient is a 72y Female  being evaluated for   MIKAELA on CKD 5                  HPI:  72F PMHx of CHF, DM2, HTN, CKD Stage 5,  Type B aortic dissection redently dc from [facility] and readmitted the same day with AMS - w/u suggesting noncolvusive seizures . Had CTA on admit and has had worsening renal funntion and oliguria (creat 5-->9)          PAST MEDICAL & SURGICAL HISTORY:  CKD (chronic kidney disease)  Hypertension  History of Hysterectomy with O  Dissection of Aorta: Type B  Cancer of Endometrium: s/p Hysterectomy , s/p Chemo.  Adult Onset Diabetes Mellitus,  H/O total hysterectomy: in 2007 for endometrial CA      Allergies    No Known Allergies    Intolerances        Social History:    FAMILY HISTORY:  No pertinent family history in first degree relatives      PHYSICAL EXAM:  T(F): 98.4 (12-31-17 @ 04:00), Max: 98.4 (12-31-17 @ 04:00)  HR: 59 (12-31-17 @ 07:00)  BP: 137/64 (12-31-17 @ 07:00)  BP(mean): 83 (12-31-17 @ 07:00)  RR: 14 (12-31-17 @ 07:00)  SpO2: 100% (12-31-17 @ 07:00)  Wt(kg): --    Constitutional: intubated in MICU  Head: NC AT  Mouth/Throat : intubated   Eyes: PERRL, no discharge, no icterus  Neck: No JVD, bruit, adenopathy   Respiratory: cta bilat, no wheezes, rales, nl effort  Cardiovascular: regular rate, S1 and S2 no rub or gallop  Abd: : +BS, soft, NT , no rebound or guarding  Musculoskeletal: tr edema no tenderness  Neurological: lethargic, opens eyes briefly      I and O's:    12-30 @ 07:01  -  12-31 @ 07:00  --------------------------------------------------------  IN: 1050 mL / OUT: 340 mL / NET: 710 mL              REVIEW OF SYSTEMS  unable       MEDICATIONS  (STANDING):  aspirin  chewable 81 milliGRAM(s) Oral daily  chlorhexidine 4% Liquid 1 Application(s) Topical daily  dextrose 5%. 1000 milliLiter(s) (50 mL/Hr) IV Continuous <Continuous>  dextrose 50% Injectable 12.5 Gram(s) IV Push once  dextrose 50% Injectable 25 Gram(s) IV Push once  dextrose 50% Injectable 25 Gram(s) IV Push once  heparin  Injectable 5000 Unit(s) SubCutaneous every 12 hours  insulin lispro (HumaLOG) corrective regimen sliding scale   SubCutaneous every 6 hours  sodium bicarbonate 325 milliGRAM(s) Oral two times a day  valproate sodium IVPB 500 milliGRAM(s) IV Intermittent every 8 hours      LABS:    12-30 @ 07:01  -  12-31 @ 07:00  --------------------------------------------------------  IN: 1050 mL / OUT: 340 mL / NET: 710 mL        Sodium, Random Urine: 52 mmol/L (12-28 @ 14:15)    Creatinine, Random Urine: 54.19 mg/dL (12-28 @ 14:15)          CBC Full  -  ( 31 Dec 2017 02:50 )  WBC Count : 4.77 K/uL  Hemoglobin : 8.6 g/dL  Hematocrit : 25.0 %  Platelet Count - Automated : 121 K/uL  Mean Cell Volume : 85.6 fL  Mean Cell Hemoglobin : 29.5 pg  Mean Cell Hemoglobin Concentration : 34.4 %  Auto Neutrophil # : x  Auto Lymphocyte # : x  Auto Monocyte # : x  Auto Eosinophil # : x  Auto Basophil # : x  Auto Neutrophil % : x  Auto Lymphocyte % : x  Auto Monocyte % : x  Auto Eosinophil % : x  Auto Basophil % : x    12-31    133<L>  |  85<L>  |  106<H>  ----------------------------<  142<H>  3.9   |  24  |  9.38<H>    Ca    7.8<L>      31 Dec 2017 02:50  Phos  8.8     12-31  Mg     2.4     12-31    TPro  5.8<L>  /  Alb  2.8<L>  /  TBili  0.5  /  DBili  x   /  AST  43<H>  /  ALT  27  /  AlkPhos  116  12-31      Urine Studies:      Urine chemistry:   Urine Na: Sodium, Random Urine: 52 mmol/L (12-28 @ 14:15)    Urine Creatinine: Creatinine, Random Urine: 54.19 mg/dL (12-28 @ 14:15)    Urine Protein/Cr ratio:  Urine K:   Urine Osm: Osmolality, Random Urine: 316 mosmo/kg (12-28 @ 14:15)    24 Hr urine studies:

## 2018-01-01 LAB
BUN SERPL-MCNC: 77 MG/DL — HIGH (ref 7–23)
CALCIUM SERPL-MCNC: 8.2 MG/DL — LOW (ref 8.4–10.5)
CHLORIDE SERPL-SCNC: 92 MMOL/L — LOW (ref 98–107)
CO2 SERPL-SCNC: 24 MMOL/L — SIGNIFICANT CHANGE UP (ref 22–31)
CREAT SERPL-MCNC: 7.56 MG/DL — HIGH (ref 0.5–1.3)
GLUCOSE BLDC GLUCOMTR-MCNC: 129 MG/DL — HIGH (ref 70–99)
GLUCOSE BLDC GLUCOMTR-MCNC: 135 MG/DL — HIGH (ref 70–99)
GLUCOSE BLDC GLUCOMTR-MCNC: 136 MG/DL — HIGH (ref 70–99)
GLUCOSE SERPL-MCNC: 100 MG/DL — HIGH (ref 70–99)
HBV CORE AB SER-ACNC: REACTIVE — SIGNIFICANT CHANGE UP
HBV SURFACE AB SER-ACNC: REACTIVE — SIGNIFICANT CHANGE UP
HCT VFR BLD CALC: 24.5 % — LOW (ref 34.5–45)
HCV AB S/CO SERPL IA: 0.17 S/CO — SIGNIFICANT CHANGE UP
HCV AB SERPL-IMP: SIGNIFICANT CHANGE UP
HGB BLD-MCNC: 8.3 G/DL — LOW (ref 11.5–15.5)
MAGNESIUM SERPL-MCNC: 2.4 MG/DL — SIGNIFICANT CHANGE UP (ref 1.6–2.6)
MCHC RBC-ENTMCNC: 29.3 PG — SIGNIFICANT CHANGE UP (ref 27–34)
MCHC RBC-ENTMCNC: 33.9 % — SIGNIFICANT CHANGE UP (ref 32–36)
MCV RBC AUTO: 86.6 FL — SIGNIFICANT CHANGE UP (ref 80–100)
NRBC # FLD: 0 — SIGNIFICANT CHANGE UP
PHOSPHATE SERPL-MCNC: 8.2 MG/DL — HIGH (ref 2.5–4.5)
PLATELET # BLD AUTO: 123 K/UL — LOW (ref 150–400)
PMV BLD: SIGNIFICANT CHANGE UP FL (ref 7–13)
POTASSIUM SERPL-MCNC: 4 MMOL/L — SIGNIFICANT CHANGE UP (ref 3.5–5.3)
POTASSIUM SERPL-SCNC: 4 MMOL/L — SIGNIFICANT CHANGE UP (ref 3.5–5.3)
PREALB SERPL-MCNC: 13 MG/DL — LOW (ref 20–40)
RBC # BLD: 2.83 M/UL — LOW (ref 3.8–5.2)
RBC # FLD: SIGNIFICANT CHANGE UP % (ref 10.3–14.5)
SODIUM SERPL-SCNC: 138 MMOL/L — SIGNIFICANT CHANGE UP (ref 135–145)
VALPROATE SERPL-MCNC: 67.9 UG/ML — SIGNIFICANT CHANGE UP (ref 50–100)
WBC # BLD: 3.99 K/UL — SIGNIFICANT CHANGE UP (ref 3.8–10.5)
WBC # FLD AUTO: 3.99 K/UL — SIGNIFICANT CHANGE UP (ref 3.8–10.5)

## 2018-01-01 PROCEDURE — 99233 SBSQ HOSP IP/OBS HIGH 50: CPT | Mod: GC

## 2018-01-01 PROCEDURE — 99291 CRITICAL CARE FIRST HOUR: CPT

## 2018-01-01 PROCEDURE — 95951: CPT | Mod: 26

## 2018-01-01 RX ORDER — FENTANYL CITRATE 50 UG/ML
50 INJECTION INTRAVENOUS ONCE
Qty: 0 | Refills: 0 | Status: DISCONTINUED | OUTPATIENT
Start: 2018-01-01 | End: 2018-01-01

## 2018-01-01 RX ORDER — PROPOFOL 10 MG/ML
5 INJECTION, EMULSION INTRAVENOUS
Qty: 1000 | Refills: 0 | Status: DISCONTINUED | OUTPATIENT
Start: 2018-01-01 | End: 2018-01-02

## 2018-01-01 RX ORDER — VALPROIC ACID (AS SODIUM SALT) 250 MG/5ML
750 SOLUTION, ORAL ORAL EVERY 8 HOURS
Qty: 0 | Refills: 0 | Status: DISCONTINUED | OUTPATIENT
Start: 2018-01-01 | End: 2018-01-02

## 2018-01-01 RX ORDER — VALPROIC ACID (AS SODIUM SALT) 250 MG/5ML
500 SOLUTION, ORAL ORAL ONCE
Qty: 0 | Refills: 0 | Status: COMPLETED | OUTPATIENT
Start: 2018-01-01 | End: 2018-01-01

## 2018-01-01 RX ORDER — PROPOFOL 10 MG/ML
5 INJECTION, EMULSION INTRAVENOUS
Qty: 500 | Refills: 0 | Status: DISCONTINUED | OUTPATIENT
Start: 2018-01-01 | End: 2018-01-01

## 2018-01-01 RX ORDER — CALCIUM ACETATE 667 MG
667 TABLET ORAL
Qty: 0 | Refills: 0 | Status: DISCONTINUED | OUTPATIENT
Start: 2018-01-01 | End: 2018-01-12

## 2018-01-01 RX ADMIN — CHLORHEXIDINE GLUCONATE 1 APPLICATION(S): 213 SOLUTION TOPICAL at 11:25

## 2018-01-01 RX ADMIN — Medication 81 MILLIGRAM(S): at 11:25

## 2018-01-01 RX ADMIN — HEPARIN SODIUM 5000 UNIT(S): 5000 INJECTION INTRAVENOUS; SUBCUTANEOUS at 20:58

## 2018-01-01 RX ADMIN — PROPOFOL 1.85 MICROGRAM(S)/KG/MIN: 10 INJECTION, EMULSION INTRAVENOUS at 08:26

## 2018-01-01 RX ADMIN — Medication 28.75 MILLIGRAM(S): at 21:57

## 2018-01-01 RX ADMIN — Medication 2 MILLIGRAM(S): at 06:15

## 2018-01-01 RX ADMIN — Medication 28.75 MILLIGRAM(S): at 14:14

## 2018-01-01 RX ADMIN — HEPARIN SODIUM 5000 UNIT(S): 5000 INJECTION INTRAVENOUS; SUBCUTANEOUS at 06:01

## 2018-01-01 RX ADMIN — Medication 27.5 MILLIGRAM(S): at 11:26

## 2018-01-01 RX ADMIN — Medication 667 MILLIGRAM(S): at 17:59

## 2018-01-01 RX ADMIN — FENTANYL CITRATE 50 MICROGRAM(S): 50 INJECTION INTRAVENOUS at 02:00

## 2018-01-01 RX ADMIN — Medication 325 MILLIGRAM(S): at 17:59

## 2018-01-01 RX ADMIN — Medication 2 MILLIGRAM(S): at 07:05

## 2018-01-01 RX ADMIN — Medication 27.5 MILLIGRAM(S): at 05:57

## 2018-01-01 RX ADMIN — Medication 325 MILLIGRAM(S): at 05:57

## 2018-01-01 NOTE — PROGRESS NOTE ADULT - ASSESSMENT
72F PMHx of CHF, DM2, HTN, CKD Stage 4, Hx of Type B aortic dissection p/w AMS, patient was dc'd from [facility] today after being treated for CHF exacerbation; presented with acute onset lethargy, loss of vision, weakness, with AMS in ED, s/p intubated for airway protection, still without improvement in mental status 2/2 to status epilepticus wout known secondary cause    # Neuro  Encephalopathy 2/2 seizures, seized X 2 this am, pt back on propofol   - initial concerns for basilar stroke - CTH negative and MRI shows no acute pathology  -LP performed 12/29non conclusive for any pathology so far; yeast and VDRL cx pending  - VEEG shows non convulsive status - s/p depakote load on 12/29; now on standing depakote 500 TID; level this AM 67; will f/u neuro regarding target level and increasing doses  - also concern for possible uremic encephalopathy with rising BUN to >100 today       # Pulmonary - intubated for airway protection  -RR (machine): 14, TV (machine): 450, FiO2: 40, PEEP: 5,  - CTA w/o of PE,  BL subsegmental atelectasis  - unable to CPAP this am given multiple seizures    # CV  -CHF, unknown EF  - check official TTE   -Aortic dissection on CT appears stable, BP well controlled  -infrarenal AA, expanding since 2009, 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   - trend hgb    # Endo  -FS within goal of 120-180  -FS acceptable for now, c/w SSI    #Skin  -R IJ  -2 20G peripheral on R arm     # DVT ppx   - HSQ      Leon Acosta, [id number]

## 2018-01-01 NOTE — PROGRESS NOTE ADULT - ATTENDING COMMENTS
Patient intubated for airway protection in set of acute metabolic encephalopathy, found to be in status epilepticus. Was overall improving in terms of neurologic status but had 2 episodes of seizure like activity, given Ativan. Events are not noted on EEG report. Monitor off sedation and assess mental status. Unclear etiology of seizures - structural abnormalities ruled out on MRI and LP negative for infectious process. SAT/SBT as tolerated. First HD session yesterday and tolerated without issues.

## 2018-01-01 NOTE — PROGRESS NOTE ADULT - SUBJECTIVE AND OBJECTIVE BOX
Patient is a 72y Female  being evaluated for MIKAELA / CKD 5                 tolerated HD yesterday   on vent/ not responsive        PHYSICAL EXAM:  Vitals:  T(F): 98.6 (01-01-18 @ 04:00), Max: 98.7 (12-31-17 @ 12:00)  HR: 69 (01-01-18 @ 06:15)  BP: 136/62 (01-01-18 @ 06:00)  BP(mean): 80 (01-01-18 @ 06:00)  RR: 14 (01-01-18 @ 06:15)  SpO2: 100% (01-01-18 @ 06:15)  Wt(kg): --  Constitutional: on vent   HEENT:  orally intubated   Neck: No JVD, bruit, adenopathy or thyromegaly  Eyes: PERRL, no discharge, no icterus  Respiratory: coarse BS bilaterally   Cardiovascular: regular rate, S1 and S2 no rub or gallop  Abd: : BS+, soft, NT , no rebound or guarding, no bruits  Extremities: no edema or cyanosis, palpable pulses  Neurological: unresponsive on vent   I and O's:    12-30 @ 07:01  -  12-31 @ 07:00  --------------------------------------------------------  IN: 1050 mL / OUT: 340 mL / NET: 710 mL    12-31 @ 07:01  -  01-01 @ 06:42  --------------------------------------------------------  IN: 835 mL / OUT: 1935 mL / NET: -1100 mL            REVIEW OF SYSTEMS:  unable to obtain/ on vent/ sedated     Allergies    No Known Allergies    Intolerances        MEDICATIONS  (STANDING):  aspirin  chewable 81 milliGRAM(s) Oral daily  chlorhexidine 4% Liquid 1 Application(s) Topical daily  dextrose 5%. 1000 milliLiter(s) (50 mL/Hr) IV Continuous <Continuous>  dextrose 50% Injectable 12.5 Gram(s) IV Push once  dextrose 50% Injectable 25 Gram(s) IV Push once  dextrose 50% Injectable 25 Gram(s) IV Push once  heparin  Injectable 5000 Unit(s) SubCutaneous every 12 hours  insulin lispro (HumaLOG) corrective regimen sliding scale   SubCutaneous every 6 hours  sodium bicarbonate 325 milliGRAM(s) Oral two times a day  valproate sodium IVPB 500 milliGRAM(s) IV Intermittent every 8 hours      LABS:  CBC Full  -  ( 01 Jan 2018 02:35 )  WBC Count : 3.99 K/uL  Hemoglobin : 8.3 g/dL  Hematocrit : 24.5 %  Platelet Count - Automated : 123 K/uL  Mean Cell Volume : 86.6 fL  Mean Cell Hemoglobin : 29.3 pg  Mean Cell Hemoglobin Concentration : 33.9 %  Auto Neutrophil # : x  Auto Lymphocyte # : x  Auto Monocyte # : x  Auto Eosinophil # : x  Auto Basophil # : x  Auto Neutrophil % : x  Auto Lymphocyte % : x  Auto Monocyte % : x  Auto Eosinophil % : x  Auto Basophil % : x    01-01    138  |  92<L>  |  77<H>  ----------------------------<  100<H>  4.0   |  24  |  7.56<H>    Ca    8.2<L>      01 Jan 2018 02:35  Phos  8.2     01-01  Mg     2.4     01-01    TPro  5.8<L>  /  Alb  2.8<L>  /  TBili  0.5  /  DBili  x   /  AST  43<H>  /  ALT  27  /  AlkPhos  116  12-31      Urine Studies:      Urine chemistry:   Urine Na: Sodium, Random Urine: 52 mmol/L (12-28 @ 14:15)    Urine Creatinine: Creatinine, Random Urine: 54.19 mg/dL (12-28 @ 14:15)    Urine Protein/Cr ratio:  Urine K:   Urine Osm: Osmolality, Random Urine: 316 mosmo/kg (12-28 @ 14:15)    24 Hr urine studies:       Imp:  72y Female

## 2018-01-01 NOTE — PROGRESS NOTE ADULT - ASSESSMENT
72F PMHx of CHF, DM2, HTN, CKD Stage 4, Hx of Type B aortic dissection p/w AMS who presented with acute onset lethargy, loss of vision, weakness, with AMS in ED, who was found to have subclinical seizures. She is intubated and on propofol for seizures. Recommend a partial loading dose of depakote 500 mg IV x 1 then increase depakote to 750 mg TID. Please check a level tomorrow. If patient continues to seize, consider a loading dose of 2 g of IV Keppra and starting maintenance 1000 mg BID. Continue VEEG monitoring. Will await report. No clear etiology of seizures, MRI negative, LP normal.     Neurology will continue to follow closely, please don't hesitate to contact the service.

## 2018-01-01 NOTE — PROGRESS NOTE ADULT - ASSESSMENT
MIKAELA on advanced CKD with poor urine output and eGFR well below 10ml/min  hyperphosphatemia  AMS/ seizure   - s/p dialysis yesterday- urine output improved overnight  - no indication for urgent HD today/ plan next HD tomorrow  - continue phosphate binder   - management as per MICU

## 2018-01-01 NOTE — PROGRESS NOTE ADULT - SUBJECTIVE AND OBJECTIVE BOX
INTERVAL HPI/OVERNIGHT EVENTS: Pt had 2 seizure events this morning. Pt was flailing legs, then second episode she was flailing legs and arms.     SUBJECTIVE: Patient seen and examined at bedside. Pt on verbal, non responsive to voice, cannot communicate.     ROS: unable to obtain, pt non verbal, non responsive to voice.   OBJECTIVE:    VITAL SIGNS:  ICU Vital Signs Last 24 Hrs  T(C): 37 (01 Jan 2018 04:00), Max: 37.1 (31 Dec 2017 12:00)  T(F): 98.6 (01 Jan 2018 04:00), Max: 98.7 (31 Dec 2017 12:00)  HR: 69 (01 Jan 2018 06:15) (58 - 77)  BP: 136/62 (01 Jan 2018 06:00) (126/59 - 146/71)  BP(mean): 80 (01 Jan 2018 06:00) (74 - 91)  ABP: --  ABP(mean): --  RR: 14 (01 Jan 2018 06:15) (14 - 15)  SpO2: 100% (01 Jan 2018 06:15) (100% - 100%)    Mode: AC/ CMV (Assist Control/ Continuous Mandatory Ventilation), RR (machine): 14, TV (machine): 450, FiO2: 40, PEEP: 5, MAP: 10, PIP: 26    12-31 @ 07:01  -  01-01 @ 07:00  --------------------------------------------------------  IN: 835 mL / OUT: 1935 mL / NET: -1100 mL      CAPILLARY BLOOD GLUCOSE      POCT Blood Glucose.: 136 mg/dL (01 Jan 2018 05:05)      PHYSICAL EXAM:    General: NAD  HEENT: clear conjunctiva, pupils constricted and fixed   Neck: supple  Respiratory: CTA b/l, no wheezing, crackles  Cardiovascular: grade 2 systolic murmur in aortic area  Abdomen: soft, NT/ND; +BS x4  Extremities: WWP, 2+ peripheral pulses b/l; no LE edema  Skin: R IJ shiley clean, dry and intact  Neurological: non responsive to voice. Pt responded to pain, open eyes.     MEDICATIONS:  MEDICATIONS  (STANDING):  aspirin  chewable 81 milliGRAM(s) Oral daily  chlorhexidine 4% Liquid 1 Application(s) Topical daily  dextrose 5%. 1000 milliLiter(s) (50 mL/Hr) IV Continuous <Continuous>  dextrose 50% Injectable 12.5 Gram(s) IV Push once  dextrose 50% Injectable 25 Gram(s) IV Push once  dextrose 50% Injectable 25 Gram(s) IV Push once  heparin  Injectable 5000 Unit(s) SubCutaneous every 12 hours  insulin lispro (HumaLOG) corrective regimen sliding scale   SubCutaneous every 6 hours  LORazepam   Injectable 2 milliGRAM(s) IntraMuscular once  sodium bicarbonate 325 milliGRAM(s) Oral two times a day  valproate sodium IVPB 500 milliGRAM(s) IV Intermittent every 8 hours    MEDICATIONS  (PRN):  dextrose Gel 1 Dose(s) Oral once PRN Blood Glucose LESS THAN 70 milliGRAM(s)/deciliter  glucagon  Injectable 1 milliGRAM(s) IntraMuscular once PRN Glucose LESS THAN 70 milligrams/deciliter      ALLERGIES:  Allergies    No Known Allergies    Intolerances        LABS:                        8.3    3.99  )-----------( 123      ( 01 Jan 2018 02:35 )             24.5     01-01    138  |  92<L>  |  77<H>  ----------------------------<  100<H>  4.0   |  24  |  7.56<H>    Ca    8.2<L>      01 Jan 2018 02:35  Phos  8.2     01-01  Mg     2.4     01-01    TPro  5.8<L>  /  Alb  2.8<L>  /  TBili  0.5  /  DBili  x   /  AST  43<H>  /  ALT  27  /  AlkPhos  116  12-31          RADIOLOGY & ADDITIONAL TESTS: Reviewed. INTERVAL HPI/OVERNIGHT EVENTS: Pt had 2 seizure events this morning. Pt was flailing legs, then second episode she was flailing legs and arms.     SUBJECTIVE: Patient seen and examined at bedside. Pt on verbal, non responsive to voice, cannot communicate.     ROS: unable to obtain, pt non verbal, non responsive to voice.   OBJECTIVE:    VITAL SIGNS:  ICU Vital Signs Last 24 Hrs  T(C): 37 (01 Jan 2018 04:00), Max: 37.1 (31 Dec 2017 12:00)  T(F): 98.6 (01 Jan 2018 04:00), Max: 98.7 (31 Dec 2017 12:00)  HR: 69 (01 Jan 2018 06:15) (58 - 77)  BP: 136/62 (01 Jan 2018 06:00) (126/59 - 146/71)  BP(mean): 80 (01 Jan 2018 06:00) (74 - 91)  ABP: --  ABP(mean): --  RR: 14 (01 Jan 2018 06:15) (14 - 15)  SpO2: 100% (01 Jan 2018 06:15) (100% - 100%)    Mode: AC/ CMV (Assist Control/ Continuous Mandatory Ventilation), RR (machine): 14, TV (machine): 450, FiO2: 40, PEEP: 5, MAP: 10, PIP: 26    12-31 @ 07:01  -  01-01 @ 07:00  --------------------------------------------------------  IN: 835 mL / OUT: 1935 mL / NET: -1100 mL      CAPILLARY BLOOD GLUCOSE      POCT Blood Glucose.: 136 mg/dL (01 Jan 2018 05:05)      PHYSICAL EXAM:    General: NAD  HEENT: clear conjunctiva, pupils constricted and fixed   Neck: supple  Respiratory: CTA b/l, no wheezing, crackles  Cardiovascular: grade 2 systolic murmur in aortic area  Abdomen: soft, NT/ND; +BS x4  Extremities: WWP, 2+ peripheral pulses b/l; no LE edema  Skin: R IJ shiley clean, dry and intact  Neurological: non responsive to voice. Pt responded to pain, open eyes.     MEDICATIONS:  MEDICATIONS  (STANDING):  aspirin  chewable 81 milliGRAM(s) Oral daily  chlorhexidine 4% Liquid 1 Application(s) Topical daily  dextrose 5%. 1000 milliLiter(s) (50 mL/Hr) IV Continuous <Continuous>  dextrose 50% Injectable 12.5 Gram(s) IV Push once  dextrose 50% Injectable 25 Gram(s) IV Push once  dextrose 50% Injectable 25 Gram(s) IV Push once  heparin  Injectable 5000 Unit(s) SubCutaneous every 12 hours  insulin lispro (HumaLOG) corrective regimen sliding scale   SubCutaneous every 6 hours  LORazepam   Injectable 2 milliGRAM(s) IntraMuscular once  sodium bicarbonate 325 milliGRAM(s) Oral two times a day  valproate sodium IVPB 500 milliGRAM(s) IV Intermittent every 8 hours    MEDICATIONS  (PRN):  dextrose Gel 1 Dose(s) Oral once PRN Blood Glucose LESS THAN 70 milliGRAM(s)/deciliter  glucagon  Injectable 1 milliGRAM(s) IntraMuscular once PRN Glucose LESS THAN 70 milligrams/deciliter      ALLERGIES:  Allergies    No Known Allergies    Intolerances        LABS:                        8.3    3.99  )-----------( 123      ( 01 Jan 2018 02:35 )             24.5     01-01    138  |  92<L>  |  77<H>  ----------------------------<  100<H>  4.0   |  24  |  7.56<H>    Ca    8.2<L>      01 Jan 2018 02:35  Phos  8.2     01-01  Mg     2.4     01-01    TPro  5.8<L>  /  Alb  2.8<L>  /  TBili  0.5  /  DBili  x   /  AST  43<H>  /  ALT  27  /  AlkPhos  116  12-31    Valproic Acid: 67.9        RADIOLOGY & ADDITIONAL TESTS: Reviewed.

## 2018-01-01 NOTE — PROGRESS NOTE ADULT - SUBJECTIVE AND OBJECTIVE BOX
Neurology Follow-up Note    Subjective: Ms. Harris had two events around 6 am and 6:30 am with flailing of her limbs, concerning for seizure activity. She was given 4 mg IV ativan and restarted on propofol.     Review of Systems: 10-point ROS unable to be obtained 2/2 intubation    Objective:   T(C): 36.9 (01-01-18 @ 08:00), Max: 37.1 (12-31-17 @ 12:00)  HR: 55 (01-01-18 @ 10:00) (54 - 77)  BP: 146/66 (01-01-18 @ 10:00) (126/59 - 146/71)  RR: 14 (01-01-18 @ 10:00) (14 - 15)  SpO2: 100% (01-01-18 @ 10:00) (100% - 100%)    Neurologic exam: Intubated and sedated. Pupils small and not reactive to light. Moves right arm, leg and left leg to painful stimulation. Grimaces to painful stimulation on right arm. Reflexes brisk throughout with triple flexion in the right leg. Toes mute bilaterally.     General: No apparent distress  HENT: Normocephalic, atraumatic. Normal outer ears, normal oropharynx. Neck supple.  Eyes: No conjunctival injection, no scleral icterus.  Cardiovascular: No lower extremity edema  Respiratory: Intubated  Skin: Normal color    Medications:  aspirin  chewable 81 milliGRAM(s) Oral daily  calcium acetate 667 milliGRAM(s) Oral three times a day with meals  chlorhexidine 4% Liquid 1 Application(s) Topical daily  dextrose 5%. 1000 milliLiter(s) IV Continuous <Continuous>  dextrose 50% Injectable 12.5 Gram(s) IV Push once  dextrose 50% Injectable 25 Gram(s) IV Push once  dextrose 50% Injectable 25 Gram(s) IV Push once  dextrose Gel 1 Dose(s) Oral once PRN  glucagon  Injectable 1 milliGRAM(s) IntraMuscular once PRN  heparin  Injectable 5000 Unit(s) SubCutaneous every 12 hours  insulin lispro (HumaLOG) corrective regimen sliding scale   SubCutaneous every 6 hours  propofol Infusion 5 MICROgram(s)/kG/Min IV Continuous <Continuous>  sodium bicarbonate 325 milliGRAM(s) Oral two times a day  valproate sodium IVPB 750 milliGRAM(s) IV Intermittent every 8 hours  valproate sodium IVPB 500 milliGRAM(s) IV Intermittent once    Depakote level 1/1/18: 67.9    CSF studies on 12/30/17:  WBC 2, protein 25.1, glucose 86    Head CT scan:  < from: CT Head No Cont (12.27.17 @ 23:15) >  IMPRESSION:     No CT evidence of acute intracranial hemorrhage or mass effect.    < end of copied text >    MRI:  < from: MR Head No Cont (12.28.17 @ 23:20) >  IMPRESSION:    No evidence for acute infarct or acute hemorrhage. Moderate chronic white   matter changes are present.    < end of copied text >    EEG:  CLINICAL IMPRESSION:  Abnormal EEG study in an unresponsive patient due to    1. Potential epileptogenic foci in bi-frontal regions.  2. Moderate diffuse or multifocal cerebral dysfunction.  3. No seizures seen.

## 2018-01-01 NOTE — EEG REPORT - NS EEG TEXT BOX
[facility]   COMPREHENSIVE EPILEPSY CENTER   REPORT OF CONTINUOUS VIDEO EEG     NS: [address], [place] [address], Ph#: [phone]  [facility]: [address], [place] [address], Ph#: [phone]  Office: [address] [place] [address], [facility] [address], [place] [address] Ph#: [phone]    Patient Name: VERO ALVAREZ  Age and : 72y (1945)  MRN #: [id number]  Location: [place] [address]    Study Date: 17 - 2018    _____________________________________________________________  HISTORY:  Patient is a 72y old  Female who presents with a chief complaint of Altered mental status. (28 Dec 2017 04:04)    PERTINENT MEDICATION:  propofol Infusion 5 MICROgram(s)/kG/Min (1.851 mL/Hr) IV Continuous <Continuous>  valproate sodium IVPB 750 milliGRAM(s) IV Intermittent every 8 hours  valproate sodium IVPB 500 milliGRAM(s) IV Intermittent once    _____________________________________________________________  STUDY INTERPRETATION     Background:  The background was continuous, spontaneously variable, and predominantly consisted of theta, alpha, and polymorphic delta activities. There was a symmetric and well-formed 6 Hz posteriorly dominant rhythm at 40 uV.      Sleep:  Normal and symmetric sleep spindles and K-complexes were recorded.      Non-epileptiform Abnormalities:  None    Interictal Epileptiform Abnormalities:   None    Ictal Epileptiform Abnormalities:   None    Artifacts:  Intermittent myogenic and movement artifacts were noted.    ECG:  The heart rate on single channel ECG was predominantly between 60-80 BPM.    _____________________________________________________________  EEG CLASSIFICATION/SUMMARY:  Abnormal EEG in awake and asleep states due to mild to moderate generalized slowing.    _____________________________________________________________  CLINICAL IMPRESSION:    Abnormal EEG study in an unresponsive patient due to  1. Mild to moderate diffuse or multifocal cerebral dysfunction.  2. No electrographic pattern or seizures seen.    Further improvement compared to last 24-hr recording due emergence of posterior dominant rhythm and robust sleep transients.       Katherine Alexander MD  Attending Physician, [facility] Epilepsy [facility]   COMPREHENSIVE EPILEPSY CENTER   REPORT OF CONTINUOUS VIDEO EEG     [facility]: [address], [place] [address], Ph#: [phone]  [facility]: [address], [place] [address], Ph#: [phone]  Office: [address] [facility], [facility] [address], [place] [address] Ph#: [phone]    Patient Name: VERO ALVAREZ  Age and : 72y (1945)  MRN #: [id number]  Location: [place] [address]    Study Date: 17 - 2018    _____________________________________________________________  HISTORY:  Patient is a 72y old  Female who presents with a chief complaint of Altered mental status. (28 Dec 2017 04:04)    PERTINENT MEDICATION:  propofol Infusion 5 MICROgram(s)/kG/Min (1.851 mL/Hr) IV Continuous <Continuous>  valproate sodium IVPB 750 milliGRAM(s) IV Intermittent every 8 hours  valproate sodium IVPB 500 milliGRAM(s) IV Intermittent once    _____________________________________________________________  STUDY INTERPRETATION     Background:  The background was continuous, spontaneously variable, and predominantly consisted of theta, alpha, and polymorphic delta activities. There was a symmetric and well-formed 6 Hz posteriorly dominant rhythm at 40 uV.      Sleep:  Normal and symmetric sleep spindles and K-complexes were recorded.      Non-epileptiform Abnormalities:  None    Interictal Epileptiform Abnormalities:   None    Ictal Epileptiform Abnormalities:   Two events captured at 06:07 and 06:12 where pt was coughing and having asynchronous body movements. No abnormal EEG correlate but EMG artifacts.    Artifacts:  Intermittent myogenic and movement artifacts were noted.    ECG:  The heart rate on single channel ECG was predominantly between 60-80 BPM.    _____________________________________________________________  EEG CLASSIFICATION/SUMMARY:    Abnormal EEG in awake and asleep states due to   1. Two events captured at 06:07 and 06:12 with patient coughing and having asynchronous body movements. No abnormal EEG correlate but EMG artifacts.  2. mild to moderate generalized slowing.    _____________________________________________________________  CLINICAL IMPRESSION:    Abnormal EEG study in awake and asleep patient.    1. Two events captured at 06:07 and 06:12 with patient coughing and having asynchronous body movements. No abnormal EEG correlate.  2. Mild to moderate diffuse or multifocal cerebral dysfunction.  3. No electrographic pattern or seizures seen.    Further improvement compared to last 24-hr recording due emergence of posterior dominant rhythm and robust sleep transients.       Katherine Alexander MD  Attending Physician, [facility] Epilepsy [place]

## 2018-01-02 DIAGNOSIS — N18.5 CHRONIC KIDNEY DISEASE, STAGE 5: ICD-10-CM

## 2018-01-02 LAB
ALBUMIN SERPL ELPH-MCNC: 2.9 G/DL — LOW (ref 3.3–5)
ALP SERPL-CCNC: 109 U/L — SIGNIFICANT CHANGE UP (ref 40–120)
ALT FLD-CCNC: 21 U/L — SIGNIFICANT CHANGE UP (ref 4–33)
ANISOCYTOSIS BLD QL: SLIGHT — SIGNIFICANT CHANGE UP
AST SERPL-CCNC: 37 U/L — HIGH (ref 4–32)
BASOPHILS # BLD AUTO: 0.02 K/UL — SIGNIFICANT CHANGE UP (ref 0–0.2)
BASOPHILS NFR BLD AUTO: 0.6 % — SIGNIFICANT CHANGE UP (ref 0–2)
BILIRUB SERPL-MCNC: 0.5 MG/DL — SIGNIFICANT CHANGE UP (ref 0.2–1.2)
BUN SERPL-MCNC: 85 MG/DL — HIGH (ref 7–23)
CALCIUM SERPL-MCNC: 8.1 MG/DL — LOW (ref 8.4–10.5)
CHLORIDE SERPL-SCNC: 93 MMOL/L — LOW (ref 98–107)
CO2 SERPL-SCNC: 23 MMOL/L — SIGNIFICANT CHANGE UP (ref 22–31)
CREAT SERPL-MCNC: 7.92 MG/DL — HIGH (ref 0.5–1.3)
CSF PCR RESULT: SIGNIFICANT CHANGE UP
EOSINOPHIL # BLD AUTO: 0.16 K/UL — SIGNIFICANT CHANGE UP (ref 0–0.5)
EOSINOPHIL NFR BLD AUTO: 4.9 % — SIGNIFICANT CHANGE UP (ref 0–6)
GLUCOSE BLDC GLUCOMTR-MCNC: 158 MG/DL — HIGH (ref 70–99)
GLUCOSE BLDC GLUCOMTR-MCNC: 159 MG/DL — HIGH (ref 70–99)
GLUCOSE BLDC GLUCOMTR-MCNC: 161 MG/DL — HIGH (ref 70–99)
GLUCOSE BLDC GLUCOMTR-MCNC: 211 MG/DL — HIGH (ref 70–99)
GLUCOSE BLDC GLUCOMTR-MCNC: 230 MG/DL — HIGH (ref 70–99)
GLUCOSE SERPL-MCNC: 148 MG/DL — HIGH (ref 70–99)
HCT VFR BLD CALC: 31.4 % — LOW (ref 34.5–45)
HGB BLD-MCNC: 11 G/DL — LOW (ref 11.5–15.5)
HYPOCHROMIA BLD QL: SLIGHT — SIGNIFICANT CHANGE UP
IMM GRANULOCYTES # BLD AUTO: 0.01 # — SIGNIFICANT CHANGE UP
IMM GRANULOCYTES NFR BLD AUTO: 0.3 % — SIGNIFICANT CHANGE UP (ref 0–1.5)
LYMPHOCYTES # BLD AUTO: 0.33 K/UL — LOW (ref 1–3.3)
LYMPHOCYTES # BLD AUTO: 10.2 % — LOW (ref 13–44)
MAGNESIUM SERPL-MCNC: 2.5 MG/DL — SIGNIFICANT CHANGE UP (ref 1.6–2.6)
MANUAL SMEAR VERIFICATION: SIGNIFICANT CHANGE UP
MCHC RBC-ENTMCNC: 30.5 PG — SIGNIFICANT CHANGE UP (ref 27–34)
MCHC RBC-ENTMCNC: 35 % — SIGNIFICANT CHANGE UP (ref 32–36)
MCV RBC AUTO: 87 FL — SIGNIFICANT CHANGE UP (ref 80–100)
MONOCYTES # BLD AUTO: 0.5 K/UL — SIGNIFICANT CHANGE UP (ref 0–0.9)
MONOCYTES NFR BLD AUTO: 15.4 % — HIGH (ref 2–14)
NEUTROPHILS # BLD AUTO: 2.22 K/UL — SIGNIFICANT CHANGE UP (ref 1.8–7.4)
NEUTROPHILS NFR BLD AUTO: 68.6 % — SIGNIFICANT CHANGE UP (ref 43–77)
NRBC # FLD: 0 — SIGNIFICANT CHANGE UP
PHOSPHATE SERPL-MCNC: 8.5 MG/DL — HIGH (ref 2.5–4.5)
PLATELET # BLD AUTO: 87 K/UL — LOW (ref 150–400)
PLATELET COUNT - ESTIMATE: SIGNIFICANT CHANGE UP
PMV BLD: 10.3 FL — SIGNIFICANT CHANGE UP (ref 7–13)
POIKILOCYTOSIS BLD QL AUTO: SLIGHT — SIGNIFICANT CHANGE UP
POTASSIUM SERPL-MCNC: 3.7 MMOL/L — SIGNIFICANT CHANGE UP (ref 3.5–5.3)
POTASSIUM SERPL-SCNC: 3.7 MMOL/L — SIGNIFICANT CHANGE UP (ref 3.5–5.3)
PROT SERPL-MCNC: 6.4 G/DL — SIGNIFICANT CHANGE UP (ref 6–8.3)
RBC # BLD: 3.61 M/UL — LOW (ref 3.8–5.2)
RBC # FLD: 14.7 % — HIGH (ref 10.3–14.5)
SCHISTOCYTES BLD QL AUTO: SLIGHT — SIGNIFICANT CHANGE UP
SODIUM SERPL-SCNC: 138 MMOL/L — SIGNIFICANT CHANGE UP (ref 135–145)
SPECIMEN SOURCE: SIGNIFICANT CHANGE UP
VALPROATE SERPL-MCNC: 131.6 UG/ML — HIGH (ref 50–100)
VDRL CSF-TITR: NEGATIVE — SIGNIFICANT CHANGE UP
WBC # BLD: 3.24 K/UL — LOW (ref 3.8–10.5)
WBC # FLD AUTO: 3.24 K/UL — LOW (ref 3.8–10.5)

## 2018-01-02 PROCEDURE — 99291 CRITICAL CARE FIRST HOUR: CPT | Mod: 25

## 2018-01-02 PROCEDURE — 99233 SBSQ HOSP IP/OBS HIGH 50: CPT | Mod: GC

## 2018-01-02 PROCEDURE — 76937 US GUIDE VASCULAR ACCESS: CPT | Mod: 26

## 2018-01-02 PROCEDURE — 76604 US EXAM CHEST: CPT | Mod: 26,GC

## 2018-01-02 PROCEDURE — 95951: CPT | Mod: 26

## 2018-01-02 PROCEDURE — 93308 TTE F-UP OR LMTD: CPT | Mod: 26,GC

## 2018-01-02 RX ORDER — INFLUENZA VIRUS VACCINE 15; 15; 15; 15 UG/.5ML; UG/.5ML; UG/.5ML; UG/.5ML
0.5 SUSPENSION INTRAMUSCULAR ONCE
Qty: 0 | Refills: 0 | Status: DISCONTINUED | OUTPATIENT
Start: 2018-01-02 | End: 2018-01-12

## 2018-01-02 RX ORDER — VALPROIC ACID (AS SODIUM SALT) 250 MG/5ML
750 SOLUTION, ORAL ORAL EVERY 12 HOURS
Qty: 0 | Refills: 0 | Status: DISCONTINUED | OUTPATIENT
Start: 2018-01-02 | End: 2018-01-04

## 2018-01-02 RX ADMIN — PROPOFOL 1.85 MICROGRAM(S)/KG/MIN: 10 INJECTION, EMULSION INTRAVENOUS at 07:39

## 2018-01-02 RX ADMIN — Medication 667 MILLIGRAM(S): at 07:39

## 2018-01-02 RX ADMIN — Medication 667 MILLIGRAM(S): at 11:32

## 2018-01-02 RX ADMIN — Medication 1: at 00:07

## 2018-01-02 RX ADMIN — Medication 28.75 MILLIGRAM(S): at 20:29

## 2018-01-02 RX ADMIN — Medication 1: at 17:47

## 2018-01-02 RX ADMIN — Medication 81 MILLIGRAM(S): at 11:30

## 2018-01-02 RX ADMIN — HEPARIN SODIUM 5000 UNIT(S): 5000 INJECTION INTRAVENOUS; SUBCUTANEOUS at 07:39

## 2018-01-02 RX ADMIN — HEPARIN SODIUM 5000 UNIT(S): 5000 INJECTION INTRAVENOUS; SUBCUTANEOUS at 20:30

## 2018-01-02 RX ADMIN — Medication 325 MILLIGRAM(S): at 06:13

## 2018-01-02 RX ADMIN — Medication 2: at 06:13

## 2018-01-02 RX ADMIN — Medication 1: at 23:24

## 2018-01-02 RX ADMIN — Medication 2: at 11:29

## 2018-01-02 RX ADMIN — CHLORHEXIDINE GLUCONATE 1 APPLICATION(S): 213 SOLUTION TOPICAL at 11:29

## 2018-01-02 NOTE — CHART NOTE - NSCHARTNOTEFT_GEN_A_CORE
Critically ill pt requiring PIV access for medical management and/or pressor support. Under US guidance identified Rt UE vein, proximal to AC and successfully placed 20g x 1.88 inch angiocath into vessel. .Placement confirmed s/p with ultrasound and catheter determined to be in patent lumen of vein. Pt tolerated well w/o complication.

## 2018-01-02 NOTE — PROGRESS NOTE ADULT - ATTENDING COMMENTS
Agree with above. Seen and examined with residents. Critically ill on vent with new onset seizures. Improved seizure disorder on kepra. For HD today, weaning trials. Critically ill requiring frequent bedside visits.

## 2018-01-02 NOTE — PROGRESS NOTE ADULT - SUBJECTIVE AND OBJECTIVE BOX
[facility] ([place])- PROGRESS NOTE    Patient is a 72y Female with hx of advanced ckd, chf, adm hours after discharge with weakness, changed vision, ams, seizures; s/p IV contrast studies developed oliguric MIKAELA; had first dialysis 2 days ago; mildly sedated and intubated in MICU; NCS documented on EEG    Allergies:  No Known Allergies    Hospital Medications:   MEDICATIONS  (STANDING):  aspirin  chewable 81 milliGRAM(s) Oral daily  calcium acetate 667 milliGRAM(s) Oral three times a day with meals  chlorhexidine 4% Liquid 1 Application(s) Topical daily  dextrose 5%. 1000 milliLiter(s) (50 mL/Hr) IV Continuous <Continuous>  dextrose 50% Injectable 12.5 Gram(s) IV Push once  dextrose 50% Injectable 25 Gram(s) IV Push once  dextrose 50% Injectable 25 Gram(s) IV Push once  heparin  Injectable 5000 Unit(s) SubCutaneous every 12 hours  insulin lispro (HumaLOG) corrective regimen sliding scale   SubCutaneous every 6 hours  propofol Infusion 5 MICROgram(s)/kG/Min (1.851 mL/Hr) IV Continuous <Continuous>  sodium bicarbonate 325 milliGRAM(s) Oral two times a day  valproate sodium IVPB 750 milliGRAM(s) IV Intermittent every 8 hours    REVIEW OF SYSTEMS:  pt intubated; per dtr is more awake today following simple commands such as squeezing hand; nad per dtr at bedside    VITALS:  T(F): 97.7 (18 @ 04:00), Max: 98.8 (18 @ 20:00)  HR: 66 (18 @ 06:25)  BP: 136/56 (18 @ 06:00)  RR: 14 (18 @ 06:00)  SpO2: 100% (18 @ 06:25)  Wt(kg): --     @ :  -   @ 07:00  --------------------------------------------------------  IN: 835 mL / OUT: 1935 mL / NET: -1100 mL     @ 07:01  -   @ 07:00  --------------------------------------------------------  IN: 488.8 mL / OUT: 1320 mL / NET: -831.2 mL        PHYSICAL EXAM:  Constitutional: NAD; intubated lying in bed  HEENT: anicteric sclera  Respiratory: CTA without wheezes listening anteriorly  Cardiovascular: S1, S2, RRR; murmer  Gastrointestinal: BS+, soft, NT/ND  Extremities:  No peripheral edema  Neurological: Awake; squeezes hand on command; moving all extremities  Skin: scattered echymosis  Vascular Access: [facility]    LABS:      138  |  93<L>  |  85<H>  ----------------------------<  148<H>  3.7   |  23  |  7.92<H>    Ca    8.1<L>      2018 01:25  Phos  8.5       Mg     2.5         TPro  6.4  /  Alb  2.9<L>  /  TBili  0.5  /  DBili      /  AST  37<H>  /  ALT  21  /  AlkPhos  109      Creatinine Trend: 7.92 <--, 7.56 <--, 9.38 <--, 8.27 <--, 6.98 <--, 5.88 <--, 5.65 <--, 5.66 <--, 5.49 <--                        11.0   3.24  )-----------( 87       ( 2018 01:25 )             31.4     Urine Studies:  Urinalysis Basic - ( 28 Dec 2017 00:20 )    Color: YELLOW / Appearance: CLEAR / S.012 / pH: 6.0  Gluc: TRACE / Ketone: NEGATIVE  / Bili: NEGATIVE / Urobili: NORMAL mg/dL   Blood: NEGATIVE / Protein: 300 mg/dL / Nitrite: NEGATIVE   Leuk Esterase: NEGATIVE / RBC: 5-10 / WBC 5-10   Sq Epi: OCC / Non Sq Epi:  / Bacteria: FEW      Sodium, Random Urine: 52 mmol/L ( @ 14:15)  Osmolality, Random Urine: 316 mosmo/kg ( @ 14:15)  Creatinine, Random Urine: 54.19 mg/dL ( @ 14:15)    RADIOLOGY & ADDITIONAL STUDIES:

## 2018-01-02 NOTE — PROGRESS NOTE ADULT - SUBJECTIVE AND OBJECTIVE BOX
: Jasper Adams     INDICATION: Respiratory failure     PROCEDURE:  [x ] LIMITED ECHO  [x ] LIMITED CHEST  [ ] LIMITED RETROPERITONEAL  [ ] LIMITED ABDOMINAL  [ ] LIMITED DVT  [ ] NEEDLE GUIDANCE VASCULAR  [ ] NEEDLE GUIDANCE THORACENTESIS  [ ] NEEDLE GUIDANCE PARACENTESIS  [ ] NEEDLE GUIDANCE PERICARDIOCENTESIS  [ ] OTHER    FINDINGS: A line predominant anteriorly. B/L small consolidations posteriorly in bases with R small effusions  Normal LV/ RV Fxn, normal LV & LV/RV size : Jasper Adams     INDICATION: Respiratory failure     PROCEDURE:  [x ] LIMITED ECHO  [x ] LIMITED CHEST  [ ] LIMITED RETROPERITONEAL  [ ] LIMITED ABDOMINAL  [ ] LIMITED DVT  [ ] NEEDLE GUIDANCE VASCULAR  [ ] NEEDLE GUIDANCE THORACENTESIS  [ ] NEEDLE GUIDANCE PARACENTESIS  [ ] NEEDLE GUIDANCE PERICARDIOCENTESIS  [ ] OTHER    FINDINGS: A line predominant anteriorly. B/L small consolidations posteriorly in bases with R small effusions  Normal LV/ RV Fxn, normal LV & LV/RV size    At bedside for procedure. Agree with above.

## 2018-01-02 NOTE — PROGRESS NOTE ADULT - SUBJECTIVE AND OBJECTIVE BOX
Subjective:Interval History - No events overnight    Objective:   Vital Signs Last 24 Hrs  T(C): 36.9 (02 Jan 2018 08:00), Max: 37.1 (01 Jan 2018 20:00)  T(F): 98.5 (02 Jan 2018 08:00), Max: 98.8 (01 Jan 2018 20:00)  HR: 68 (02 Jan 2018 10:26) (53 - 73)  BP: 140/68 (02 Jan 2018 10:00) (122/59 - 150/78)  BP(mean): 86 (02 Jan 2018 10:00) (71 - 97)  RR: 16 (02 Jan 2018 10:00) (13 - 18)  SpO2: 100% (02 Jan 2018 10:26) (100% - 100%)    General Exam:   General appearance: No acute distress                   Neurological Exam:  Mental Status: intubated, no verbal ouput, follows simple commands    Cranial Nerves: CN I - not tested.  PERRL, EOMI, VFF, no facial droop    Motor:   Tone: normal.                  Strength: moves all extremities throughout                    Sensation: intact to pain throughout    Deep Tendon Reflexes: 1+ bilateral biceps, triceps, brachioradialis, knee and ankle  Toes flexor bilaterally        Other:    01-02    138  |  93<L>  |  85<H>  ----------------------------<  148<H>  3.7   |  23  |  7.92<H>    Ca    8.1<L>      02 Jan 2018 01:25  Phos  8.5     01-02  Mg     2.5     01-02    TPro  6.4  /  Alb  2.9<L>  /  TBili  0.5  /  DBili  x   /  AST  37<H>  /  ALT  21  /  AlkPhos  109  01-02    LIVER FUNCTIONS - ( 02 Jan 2018 01:25 )  Alb: 2.9 g/dL / Pro: 6.4 g/dL / ALK PHOS: 109 u/L / ALT: 21 u/L / AST: 37 u/L / GGT: x                                 11.0   3.24  )-----------( 87       ( 02 Jan 2018 01:25 )             31.4           MEDICATIONS  (STANDING):  aspirin  chewable 81 milliGRAM(s) Oral daily  calcium acetate 667 milliGRAM(s) Oral three times a day with meals  chlorhexidine 4% Liquid 1 Application(s) Topical daily  dextrose 5%. 1000 milliLiter(s) (50 mL/Hr) IV Continuous <Continuous>  dextrose 50% Injectable 12.5 Gram(s) IV Push once  dextrose 50% Injectable 25 Gram(s) IV Push once  dextrose 50% Injectable 25 Gram(s) IV Push once  heparin  Injectable 5000 Unit(s) SubCutaneous every 12 hours  insulin lispro (HumaLOG) corrective regimen sliding scale   SubCutaneous every 6 hours  propofol Infusion 5 MICROgram(s)/kG/Min (1.851 mL/Hr) IV Continuous <Continuous>  sodium bicarbonate 325 milliGRAM(s) Oral two times a day  valproate sodium IVPB 750 milliGRAM(s) IV Intermittent every 8 hours    MEDICATIONS  (PRN):  dextrose Gel 1 Dose(s) Oral once PRN Blood Glucose LESS THAN 70 milliGRAM(s)/deciliter  glucagon  Injectable 1 milliGRAM(s) IntraMuscular once PRN Glucose LESS THAN 70 milligrams/deciliter

## 2018-01-02 NOTE — PROGRESS NOTE ADULT - SUBJECTIVE AND OBJECTIVE BOX
INTERVAL HPI/OVERNIGHT EVENTS: Pt with no ON events.     SUBJECTIVE: Patient seen and examined at bedside.     CONSTITUTIONAL: No weakness, fevers or chills  EYES/ENT: No visual changes;  No vertigo or throat pain   NECK: No pain or stiffness  RESPIRATORY: No cough, wheezing, hemoptysis; No shortness of breath  CARDIOVASCULAR: No chest pain or palpitations  GASTROINTESTINAL: No abdominal or epigastric pain. No nausea, vomiting, or hematemesis; No diarrhea or constipation. No melena or hematochezia.  GENITOURINARY: No dysuria, frequency or hematuria  NEUROLOGICAL: No numbness or weakness  SKIN: No itching, rashes    OBJECTIVE:    VITAL SIGNS:  ICU Vital Signs Last 24 Hrs  T(C): 36.5 (02 Jan 2018 04:00), Max: 37.1 (01 Jan 2018 20:00)  T(F): 97.7 (02 Jan 2018 04:00), Max: 98.8 (01 Jan 2018 20:00)  HR: 66 (02 Jan 2018 06:25) (53 - 66)  BP: 136/56 (02 Jan 2018 06:00) (122/59 - 150/78)  BP(mean): 75 (02 Jan 2018 06:00) (71 - 96)  ABP: --  ABP(mean): --  RR: 14 (02 Jan 2018 06:00) (13 - 16)  SpO2: 100% (02 Jan 2018 06:25) (100% - 100%)    Mode: AC/ CMV (Assist Control/ Continuous Mandatory Ventilation), RR (machine): 14, TV (machine): 450, FiO2: 40, PEEP: 5, MAP: 9, PIP: 22    01-01 @ 07:01  -  01-02 @ 07:00  --------------------------------------------------------  IN: 488.8 mL / OUT: 1320 mL / NET: -831.2 mL      CAPILLARY BLOOD GLUCOSE      POCT Blood Glucose.: 230 mg/dL (02 Jan 2018 05:59)      PHYSICAL EXAM:    General: NAD  HEENT: NC/AT; PERRL, clear conjunctiva  Neck: supple  Respiratory: CTA b/l  Cardiovascular: +S1/S2; RRR  Abdomen: soft, NT/ND; +BS x4  Extremities: WWP, 2+ peripheral pulses b/l; no LE edema  Skin: normal color and turgor; no rash  Neurological:    MEDICATIONS:  MEDICATIONS  (STANDING):  aspirin  chewable 81 milliGRAM(s) Oral daily  calcium acetate 667 milliGRAM(s) Oral three times a day with meals  chlorhexidine 4% Liquid 1 Application(s) Topical daily  dextrose 5%. 1000 milliLiter(s) (50 mL/Hr) IV Continuous <Continuous>  dextrose 50% Injectable 12.5 Gram(s) IV Push once  dextrose 50% Injectable 25 Gram(s) IV Push once  dextrose 50% Injectable 25 Gram(s) IV Push once  heparin  Injectable 5000 Unit(s) SubCutaneous every 12 hours  insulin lispro (HumaLOG) corrective regimen sliding scale   SubCutaneous every 6 hours  propofol Infusion 5 MICROgram(s)/kG/Min (1.851 mL/Hr) IV Continuous <Continuous>  sodium bicarbonate 325 milliGRAM(s) Oral two times a day  valproate sodium IVPB 750 milliGRAM(s) IV Intermittent every 8 hours    MEDICATIONS  (PRN):  dextrose Gel 1 Dose(s) Oral once PRN Blood Glucose LESS THAN 70 milliGRAM(s)/deciliter  glucagon  Injectable 1 milliGRAM(s) IntraMuscular once PRN Glucose LESS THAN 70 milligrams/deciliter      ALLERGIES:  Allergies    No Known Allergies    Intolerances        LABS:                        11.0   3.24  )-----------( 87       ( 02 Jan 2018 01:25 )             31.4     01-02    138  |  93<L>  |  85<H>  ----------------------------<  148<H>  3.7   |  23  |  7.92<H>    Ca    8.1<L>      02 Jan 2018 01:25  Phos  8.5     01-02  Mg     2.5     01-02    TPro  6.4  /  Alb  2.9<L>  /  TBili  0.5  /  DBili  x   /  AST  37<H>  /  ALT  21  /  AlkPhos  109  01-02          RADIOLOGY & ADDITIONAL TESTS: Reviewed. INTERVAL HPI/OVERNIGHT EVENTS: Pt with no ON events.     SUBJECTIVE: Patient seen and examined at bedside. Partially sedated    CONSTITUTIONAL: Could not obtain 2/2 to partial sedation   OBJECTIVE:    VITAL SIGNS:  ICU Vital Signs Last 24 Hrs  T(C): 36.5 (02 Jan 2018 04:00), Max: 37.1 (01 Jan 2018 20:00)  T(F): 97.7 (02 Jan 2018 04:00), Max: 98.8 (01 Jan 2018 20:00)  HR: 66 (02 Jan 2018 06:25) (53 - 66)  BP: 136/56 (02 Jan 2018 06:00) (122/59 - 150/78)  BP(mean): 75 (02 Jan 2018 06:00) (71 - 96)  ABP: --  ABP(mean): --  RR: 14 (02 Jan 2018 06:00) (13 - 16)  SpO2: 100% (02 Jan 2018 06:25) (100% - 100%)    Mode: AC/ CMV (Assist Control/ Continuous Mandatory Ventilation), RR (machine): 14, TV (machine): 450, FiO2: 40, PEEP: 5, MAP: 9, PIP: 22    01-01 @ 07:01  -  01-02 @ 07:00  --------------------------------------------------------  IN: 488.8 mL / OUT: 1320 mL / NET: -831.2 mL      CAPILLARY BLOOD GLUCOSE      POCT Blood Glucose.: 230 mg/dL (02 Jan 2018 05:59)      PHYSICAL EXAM:    General: NAD  HEENT: EOMI, clear conjunctiva  \Respiratory: CTA b/l, no w/c  Cardiovascular: grade 2 systolic murmur in aortic area  Abdomen: soft, NT/ND; +BS x4  Extremities; no LE edema  Skin: normal color and turgor; no rash  Neurological: arousable, follows simple commands    MEDICATIONS:  MEDICATIONS  (STANDING):  aspirin  chewable 81 milliGRAM(s) Oral daily  calcium acetate 667 milliGRAM(s) Oral three times a day with meals  chlorhexidine 4% Liquid 1 Application(s) Topical daily  dextrose 5%. 1000 milliLiter(s) (50 mL/Hr) IV Continuous <Continuous>  dextrose 50% Injectable 12.5 Gram(s) IV Push once  dextrose 50% Injectable 25 Gram(s) IV Push once  dextrose 50% Injectable 25 Gram(s) IV Push once  heparin  Injectable 5000 Unit(s) SubCutaneous every 12 hours  insulin lispro (HumaLOG) corrective regimen sliding scale   SubCutaneous every 6 hours  propofol Infusion 5 MICROgram(s)/kG/Min (1.851 mL/Hr) IV Continuous <Continuous>  sodium bicarbonate 325 milliGRAM(s) Oral two times a day  valproate sodium IVPB 750 milliGRAM(s) IV Intermittent every 8 hours    MEDICATIONS  (PRN):  dextrose Gel 1 Dose(s) Oral once PRN Blood Glucose LESS THAN 70 milliGRAM(s)/deciliter  glucagon  Injectable 1 milliGRAM(s) IntraMuscular once PRN Glucose LESS THAN 70 milligrams/deciliter      ALLERGIES:  Allergies    No Known Allergies    Intolerances        LABS:                        11.0   3.24  )-----------( 87       ( 02 Jan 2018 01:25 )             31.4     01-02    138  |  93<L>  |  85<H>  ----------------------------<  148<H>  3.7   |  23  |  7.92<H>    Ca    8.1<L>      02 Jan 2018 01:25  Phos  8.5     01-02  Mg     2.5     01-02    TPro  6.4  /  Alb  2.9<L>  /  TBili  0.5  /  DBili  x   /  AST  37<H>  /  ALT  21  /  AlkPhos  109  01-02          RADIOLOGY & ADDITIONAL TESTS: Reviewed.

## 2018-01-02 NOTE — PROGRESS NOTE ADULT - ASSESSMENT
difficult to claim that uremia was the underlying cause of her readmission symptoms but certainly uremia could be a metabolic cofactor in AMS/Seizures so as disc with dtr and hs believe dialysis should continue at this time; good sign of renal improvement from MIKAELA given large inc in UO

## 2018-01-02 NOTE — PROGRESS NOTE ADULT - ATTENDING COMMENTS
Ms. Harris did not have any seizure like events overnight. In fact, the two witnessed events yesterday were not shown to be seizures but were rather abnormal movements associated with coughing. She did receive the depakote load and the increase in the medication to 750 mg TID, which has caused her level today to be 131.6. On examination, she is awake and able to follow commands. She moves all four extremities to command but does not resist.     Recommendations:  - Please reduce depakote to 750 mg BID PO if possible  - Recheck depakote level tomorrow  - D/c VEEG monitoring

## 2018-01-02 NOTE — EEG REPORT - NS EEG TEXT BOX
[facility]   COMPREHENSIVE EPILEPSY CENTER   REPORT OF CONTINUOUS VIDEO EEG     NS: [address], [place] [address], Ph#: [phone]  [facility]: [address], [place] [address], Ph#: [phone]  Office: [address] [place] [address], [place] [address], [place] [address] Ph#: [phone]    Patient Name: VERO ALVAREZ  Age and : 72y (1945)  MRN #: [id number]  Location: [place] [address]    Study Date: 18 - 2018    _____________________________________________________________  HISTORY:  Patient is a 72y old  Female who presents with a chief complaint of Altered mental status.     PERTINENT MEDICATION:  propofol Infusion 5 MICROgram(s)/kG/Min (1.851 mL/Hr) IV Continuous <Continuous>  valproate sodium IVPB 750 milliGRAM(s) IV Intermittent every 8 hours  valproate sodium IVPB 500 milliGRAM(s) IV Intermittent once  _____________________________________________________________  STUDY INTERPRETATION     Background:  The background was continuous, spontaneously variable, and predominantly consisted of theta, alpha, and polymorphic delta activities, slighlty more prominent over the right paracentral region. There was a symmetric and well-formed 7.5-8 Hz posteriorly dominant rhythm at 40 uV.      Sleep:  Normal and symmetric sleep spindles and K-complexes were recorded.      Interictal Epileptiform Abnormalities:   None    Ictal Epileptiform Abnormalities:   No seizures.    Artifacts:  Intermittent myogenic and movement artifacts were noted.    ECG:  The heart rate on single channel ECG was predominantly between 60-80 BPM.    _____________________________________________________________  EEG CLASSIFICATION/SUMMARY:    Abnormal EEG in awake and asleep states due to   moderate generalized slowing, slightly more prominent over the right paracentral region.    _____________________________________________________________  CLINICAL IMPRESSION:    Abnormal EEG study in awake and asleep patient.  Moderate diffuse or multifocal cerebral dysfunction.  No seizures seen.    Wilman Mckee MD  Attending Physician, [facility] Epilepsy [place] [facility]   COMPREHENSIVE EPILEPSY CENTER   REPORT OF CONTINUOUS VIDEO EEG     NS: [address], [place] [address], Ph#: [phone]  [facility]: [address], [place] [address], Ph#: [phone]  Office: [address] [place] [address], [place] [address], [place] [address] Ph#: [phone]    Patient Name: VERO ALVAREZ  Age and : 72y (1945)  MRN #: [id number]  Location: [place] [address]    Study Date: 18 - 2018    _____________________________________________________________  HISTORY:  Patient is a 72y old  Female who presents with a chief complaint of Altered mental status.     PERTINENT MEDICATION:  propofol Infusion 5 MICROgram(s)/kG/Min (1.851 mL/Hr) IV Continuous <Continuous>  valproate sodium IVPB 750 milliGRAM(s) IV Intermittent every 8 hours  valproate sodium IVPB 500 milliGRAM(s) IV Intermittent once  _____________________________________________________________  STUDY INTERPRETATION     Background:  The background was continuous, spontaneously variable, and predominantly consisted of theta, alpha, and polymorphic delta activities, slighlty more prominent over the right paracentral region. There was a symmetric and well-formed 7.5-8 Hz posteriorly dominant rhythm at 40 uV.      Sleep:  Normal and symmetric sleep spindles and K-complexes were recorded.      Interictal Epileptiform Abnormalities:   None    Ictal Epileptiform Abnormalities:   No seizures.    Artifacts:  Intermittent myogenic and movement artifacts were noted.    ECG:  The heart rate on single channel ECG was predominantly between 60-80 BPM.    _____________________________________________________________  EEG CLASSIFICATION/SUMMARY:    Abnormal EEG in awake and asleep states due to   moderate generalized slowing, slightly more prominent over the right paracentral region.    _____________________________________________________________  CLINICAL IMPRESSION:    Abnormal EEG study in awake and asleep patient.  Moderate diffuse or multifocal cerebral dysfunction, slightly more prominent over the right	.  No seizures seen.    Wilman Mckee MD  Attending Physician, [facility] Epilepsy [place]

## 2018-01-02 NOTE — PROGRESS NOTE ADULT - ASSESSMENT
71 yo female with AMS for status epi;lepticus. No seizures for 48hrs. Super therapeutic on depakote    -decrease depakote to 750mg bid  -repeat level in AM  -ok to stop propofol

## 2018-01-02 NOTE — PROGRESS NOTE ADULT - ASSESSMENT
72F PMHx of CHF, DM2, HTN, CKD Stage 4, Hx of Type B aortic dissection p/w AMS, patient was dc'd from [facility] today after being treated for CHF exacerbation; presented with acute onset lethargy, loss of vision, weakness, with AMS in ED, s/p intubated for airway protection, still without improvement in mental status 2/2 to status epilepticus wout known secondary cause    # Neuro  Encephalopathy 2/2 seizures, seized X 2 this am, pt back on propofol   - initial concerns for basilar stroke - CTH negative and MRI shows no acute pathology  -LP performed 12/29non conclusive for any pathology so far; yeast and VDRL cx pending  - VEEG shows non convulsive status - s/p depakote load on 12/29; now on standing depakote 500 TID; level this AM 67; will f/u neuro regarding target level and increasing doses  - also concern for possible uremic encephalopathy with rising BUN to >100 today       # Pulmonary - intubated for airway protection  -RR (machine): 14, TV (machine): 450, FiO2: 40, PEEP: 5,  - CTA w/o of PE,  BL subsegmental atelectasis      # CV  -CHF, unknown EF  - check official TTE   -Aortic dissection on CT appears stable, BP well controlled  -infrarenal AA, expanding since 2009, 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   - trend hgb    # Endo  -FS within goal of 120-180  -FS acceptable for now, c/w SSI    #Skin  -R IJ  -2 20G peripheral on R arm     # DVT ppx   - HSQ      Leon Acosta, PGY3 [id number] 72F PMHx of CHF, DM2, HTN, CKD Stage 4, Hx of Type B aortic dissection p/w AMS, patient was dc'd from [facility] today after being treated for CHF exacerbation; presented with acute onset lethargy, loss of vision, weakness, with AMS in ED, s/p intubated for airway protection, still without improvement in mental status 2/2 to status epilepticus wout known secondary cause    # Neuro  Encephalopathy 2/2 seizures, per EEG report, no seizure activity yesterday, will touch base with neuro   - initial concerns for basilar stroke - CTH negative and MRI shows no acute pathology  -LP performed 12/29non conclusive for any pathology so far; yeast and VDRL cx pending  - pt currently on depakote 750 mg TID  -on prop gtt at rate of 5  -pt also possible had a component of uremia, being dialyzed       # Pulmonary - intubated for airway protection  -RR (machine): 14, TV (machine): 450, FiO2: 40, PEEP: 5,  - CTA w/o of PE,  BL subsegmental atelectasis  -pt might be candidate to extubate later today      # CV  -CHF, unknown EF  - check official TTE   -Aortic dissection on CT appears stable, BP well controlled  -infrarenal AA, expanding since 2009, currently at 5cm  - unlikely to playing role in current process    # GI  - neproth tube feeds for now   -no current issues    # Renal  -CKD stage 4, now with ESRD  -from contrast, pt transitioned from CK4 to ESRD  -1 L of fluids removed Sunday in first HD session  -on sodium bicarb, phoslo  -hyponatremia improved with no intervention  -will touch base w nephrology if further dialysis is warranted     # ID  - hypothermic to 91 on admission, temp now normalized   - no clear source - abx d/c on 12/30/17    # Heme  -anemia likely 2/2 to ESRD  - no current evid of bleeding.   - trend hgb    # Endo  -FS mostly within goal of 120-180  -FS acceptable for now, c/w SSI    #Skin  -R IJ  -2 20G peripheral on R arm     # DVT ppx   - HSQ      Leon Acosta, PGY3 [id number]

## 2018-01-03 LAB
ALBUMIN SERPL ELPH-MCNC: 2.9 G/DL — LOW (ref 3.3–5)
ALP SERPL-CCNC: 99 U/L — SIGNIFICANT CHANGE UP (ref 40–120)
ALT FLD-CCNC: 22 U/L — SIGNIFICANT CHANGE UP (ref 4–33)
AST SERPL-CCNC: 54 U/L — HIGH (ref 4–32)
BASOPHILS # BLD AUTO: 0 K/UL — SIGNIFICANT CHANGE UP (ref 0–0.2)
BASOPHILS # BLD AUTO: 0.01 K/UL — SIGNIFICANT CHANGE UP (ref 0–0.2)
BASOPHILS NFR BLD AUTO: 0 % — SIGNIFICANT CHANGE UP (ref 0–2)
BASOPHILS NFR BLD AUTO: 0.1 % — SIGNIFICANT CHANGE UP (ref 0–2)
BILIRUB SERPL-MCNC: 0.5 MG/DL — SIGNIFICANT CHANGE UP (ref 0.2–1.2)
BLD GP AB SCN SERPL QL: NEGATIVE — SIGNIFICANT CHANGE UP
BUN SERPL-MCNC: 54 MG/DL — HIGH (ref 7–23)
BUN SERPL-MCNC: 54 MG/DL — HIGH (ref 7–23)
CALCIUM SERPL-MCNC: 8.2 MG/DL — LOW (ref 8.4–10.5)
CALCIUM SERPL-MCNC: 8.2 MG/DL — LOW (ref 8.4–10.5)
CHLORIDE SERPL-SCNC: 97 MMOL/L — LOW (ref 98–107)
CHLORIDE SERPL-SCNC: 97 MMOL/L — LOW (ref 98–107)
CO2 SERPL-SCNC: 26 MMOL/L — SIGNIFICANT CHANGE UP (ref 22–31)
CO2 SERPL-SCNC: 26 MMOL/L — SIGNIFICANT CHANGE UP (ref 22–31)
CREAT SERPL-MCNC: 4.95 MG/DL — HIGH (ref 0.5–1.3)
CREAT SERPL-MCNC: 4.95 MG/DL — HIGH (ref 0.5–1.3)
EOSINOPHIL # BLD AUTO: 0 K/UL — SIGNIFICANT CHANGE UP (ref 0–0.5)
EOSINOPHIL # BLD AUTO: 0 K/UL — SIGNIFICANT CHANGE UP (ref 0–0.5)
EOSINOPHIL NFR BLD AUTO: 0 % — SIGNIFICANT CHANGE UP (ref 0–6)
EOSINOPHIL NFR BLD AUTO: 0 % — SIGNIFICANT CHANGE UP (ref 0–6)
GLUCOSE BLDC GLUCOMTR-MCNC: 178 MG/DL — HIGH (ref 70–99)
GLUCOSE BLDC GLUCOMTR-MCNC: 183 MG/DL — HIGH (ref 70–99)
GLUCOSE BLDC GLUCOMTR-MCNC: 242 MG/DL — HIGH (ref 70–99)
GLUCOSE SERPL-MCNC: 128 MG/DL — HIGH (ref 70–99)
GLUCOSE SERPL-MCNC: 128 MG/DL — HIGH (ref 70–99)
HCT VFR BLD CALC: 17.7 % — CRITICAL LOW (ref 34.5–45)
HCT VFR BLD CALC: 19.8 % — CRITICAL LOW (ref 34.5–45)
HCT VFR BLD CALC: 20.2 % — CRITICAL LOW (ref 34.5–45)
HCT VFR BLD CALC: 22.5 % — LOW (ref 34.5–45)
HGB BLD-MCNC: 5.7 G/DL — CRITICAL LOW (ref 11.5–15.5)
HGB BLD-MCNC: 6.5 G/DL — CRITICAL LOW (ref 11.5–15.5)
HGB BLD-MCNC: 6.9 G/DL — CRITICAL LOW (ref 11.5–15.5)
HGB BLD-MCNC: 7.8 G/DL — LOW (ref 11.5–15.5)
IMM GRANULOCYTES # BLD AUTO: 0.05 # — SIGNIFICANT CHANGE UP
IMM GRANULOCYTES # BLD AUTO: 0.06 # — SIGNIFICANT CHANGE UP
IMM GRANULOCYTES NFR BLD AUTO: 0.4 % — SIGNIFICANT CHANGE UP (ref 0–1.5)
IMM GRANULOCYTES NFR BLD AUTO: 0.4 % — SIGNIFICANT CHANGE UP (ref 0–1.5)
LDH SERPL L TO P-CCNC: 297 U/L — HIGH (ref 135–225)
LYMPHOCYTES # BLD AUTO: 0.23 K/UL — LOW (ref 1–3.3)
LYMPHOCYTES # BLD AUTO: 0.36 K/UL — LOW (ref 1–3.3)
LYMPHOCYTES # BLD AUTO: 1.7 % — LOW (ref 13–44)
LYMPHOCYTES # BLD AUTO: 3.1 % — LOW (ref 13–44)
MAGNESIUM SERPL-MCNC: 2.3 MG/DL — SIGNIFICANT CHANGE UP (ref 1.6–2.6)
MCHC RBC-ENTMCNC: 29.2 PG — SIGNIFICANT CHANGE UP (ref 27–34)
MCHC RBC-ENTMCNC: 29.3 PG — SIGNIFICANT CHANGE UP (ref 27–34)
MCHC RBC-ENTMCNC: 30.1 PG — SIGNIFICANT CHANGE UP (ref 27–34)
MCHC RBC-ENTMCNC: 30.8 PG — SIGNIFICANT CHANGE UP (ref 27–34)
MCHC RBC-ENTMCNC: 32.2 % — SIGNIFICANT CHANGE UP (ref 32–36)
MCHC RBC-ENTMCNC: 32.8 % — SIGNIFICANT CHANGE UP (ref 32–36)
MCHC RBC-ENTMCNC: 34.2 % — SIGNIFICANT CHANGE UP (ref 32–36)
MCHC RBC-ENTMCNC: 34.7 % — SIGNIFICANT CHANGE UP (ref 32–36)
MCV RBC AUTO: 86.9 FL — SIGNIFICANT CHANGE UP (ref 80–100)
MCV RBC AUTO: 89.2 FL — SIGNIFICANT CHANGE UP (ref 80–100)
MCV RBC AUTO: 90.2 FL — SIGNIFICANT CHANGE UP (ref 80–100)
MCV RBC AUTO: 90.8 FL — SIGNIFICANT CHANGE UP (ref 80–100)
MISCELLANEOUS - CHEM: SIGNIFICANT CHANGE UP
MONOCYTES # BLD AUTO: 0.95 K/UL — HIGH (ref 0–0.9)
MONOCYTES # BLD AUTO: 0.96 K/UL — HIGH (ref 0–0.9)
MONOCYTES NFR BLD AUTO: 7 % — SIGNIFICANT CHANGE UP (ref 2–14)
MONOCYTES NFR BLD AUTO: 8.3 % — SIGNIFICANT CHANGE UP (ref 2–14)
NEUTROPHILS # BLD AUTO: 10.19 K/UL — HIGH (ref 1.8–7.4)
NEUTROPHILS # BLD AUTO: 12.26 K/UL — HIGH (ref 1.8–7.4)
NEUTROPHILS NFR BLD AUTO: 88.2 % — HIGH (ref 43–77)
NEUTROPHILS NFR BLD AUTO: 90.8 % — HIGH (ref 43–77)
NRBC # FLD: 0 — SIGNIFICANT CHANGE UP
PHOSPHATE SERPL-MCNC: 6.9 MG/DL — HIGH (ref 2.5–4.5)
PLATELET # BLD AUTO: 111 K/UL — LOW (ref 150–400)
PLATELET # BLD AUTO: 111 K/UL — LOW (ref 150–400)
PLATELET # BLD AUTO: 113 K/UL — LOW (ref 150–400)
PLATELET # BLD AUTO: 144 K/UL — LOW (ref 150–400)
PMV BLD: 10.8 FL — SIGNIFICANT CHANGE UP (ref 7–13)
PMV BLD: 11 FL — SIGNIFICANT CHANGE UP (ref 7–13)
PMV BLD: 9.8 FL — SIGNIFICANT CHANGE UP (ref 7–13)
PMV BLD: 9.9 FL — SIGNIFICANT CHANGE UP (ref 7–13)
POTASSIUM SERPL-MCNC: 3.6 MMOL/L — SIGNIFICANT CHANGE UP (ref 3.5–5.3)
POTASSIUM SERPL-MCNC: 3.6 MMOL/L — SIGNIFICANT CHANGE UP (ref 3.5–5.3)
POTASSIUM SERPL-SCNC: 3.6 MMOL/L — SIGNIFICANT CHANGE UP (ref 3.5–5.3)
POTASSIUM SERPL-SCNC: 3.6 MMOL/L — SIGNIFICANT CHANGE UP (ref 3.5–5.3)
PROT SERPL-MCNC: 6.3 G/DL — SIGNIFICANT CHANGE UP (ref 6–8.3)
RBC # BLD: 1.95 M/UL — LOW (ref 3.8–5.2)
RBC # BLD: 2.22 M/UL — LOW (ref 3.8–5.2)
RBC # BLD: 2.24 M/UL — LOW (ref 3.8–5.2)
RBC # BLD: 2.59 M/UL — LOW (ref 3.8–5.2)
RBC # FLD: 14.3 % — SIGNIFICANT CHANGE UP (ref 10.3–14.5)
RBC # FLD: 14.8 % — HIGH (ref 10.3–14.5)
RBC # FLD: 14.8 % — HIGH (ref 10.3–14.5)
RBC # FLD: 14.9 % — HIGH (ref 10.3–14.5)
RETICS #: 0 10X6/UL — LOW (ref 0.02–0.07)
RETICS/RBC NFR: 2.2 % — SIGNIFICANT CHANGE UP (ref 0.5–2.5)
RH IG SCN BLD-IMP: POSITIVE — SIGNIFICANT CHANGE UP
SODIUM SERPL-SCNC: 140 MMOL/L — SIGNIFICANT CHANGE UP (ref 135–145)
SODIUM SERPL-SCNC: 140 MMOL/L — SIGNIFICANT CHANGE UP (ref 135–145)
VALPROATE SERPL-MCNC: 64.5 UG/ML — SIGNIFICANT CHANGE UP (ref 50–100)
WBC # BLD: 11.56 K/UL — HIGH (ref 3.8–10.5)
WBC # BLD: 11.76 K/UL — HIGH (ref 3.8–10.5)
WBC # BLD: 12 K/UL — HIGH (ref 3.8–10.5)
WBC # BLD: 13.51 K/UL — HIGH (ref 3.8–10.5)
WBC # FLD AUTO: 11.56 K/UL — HIGH (ref 3.8–10.5)
WBC # FLD AUTO: 11.76 K/UL — HIGH (ref 3.8–10.5)
WBC # FLD AUTO: 12 K/UL — HIGH (ref 3.8–10.5)
WBC # FLD AUTO: 13.51 K/UL — HIGH (ref 3.8–10.5)

## 2018-01-03 PROCEDURE — 36556 INSERT NON-TUNNEL CV CATH: CPT | Mod: GC

## 2018-01-03 PROCEDURE — 74174 CTA ABD&PLVS W/CONTRAST: CPT | Mod: 26

## 2018-01-03 PROCEDURE — 74176 CT ABD & PELVIS W/O CONTRAST: CPT | Mod: 26,59

## 2018-01-03 PROCEDURE — 71275 CT ANGIOGRAPHY CHEST: CPT | Mod: 26

## 2018-01-03 PROCEDURE — 99232 SBSQ HOSP IP/OBS MODERATE 35: CPT | Mod: GC

## 2018-01-03 PROCEDURE — 99291 CRITICAL CARE FIRST HOUR: CPT | Mod: 25

## 2018-01-03 RX ORDER — ERYTHROPOIETIN 10000 [IU]/ML
10000 INJECTION, SOLUTION INTRAVENOUS; SUBCUTANEOUS ONCE
Qty: 0 | Refills: 0 | Status: COMPLETED | OUTPATIENT
Start: 2018-01-04 | End: 2018-01-04

## 2018-01-03 RX ORDER — DESMOPRESSIN ACETATE 0.1 MG/1
24 TABLET ORAL ONCE
Qty: 0 | Refills: 0 | Status: DISCONTINUED | OUTPATIENT
Start: 2018-01-03 | End: 2018-01-03

## 2018-01-03 RX ORDER — SIMETHICONE 80 MG/1
80 TABLET, CHEWABLE ORAL ONCE
Qty: 0 | Refills: 0 | Status: COMPLETED | OUTPATIENT
Start: 2018-01-03 | End: 2018-01-03

## 2018-01-03 RX ORDER — INSULIN LISPRO 100/ML
VIAL (ML) SUBCUTANEOUS
Qty: 0 | Refills: 0 | Status: DISCONTINUED | OUTPATIENT
Start: 2018-01-03 | End: 2018-01-12

## 2018-01-03 RX ORDER — ACETAMINOPHEN 500 MG
650 TABLET ORAL ONCE
Qty: 0 | Refills: 0 | Status: COMPLETED | OUTPATIENT
Start: 2018-01-03 | End: 2018-01-03

## 2018-01-03 RX ORDER — INSULIN LISPRO 100/ML
VIAL (ML) SUBCUTANEOUS AT BEDTIME
Qty: 0 | Refills: 0 | Status: DISCONTINUED | OUTPATIENT
Start: 2018-01-03 | End: 2018-01-12

## 2018-01-03 RX ORDER — DESMOPRESSIN ACETATE 0.1 MG/1
18.3 TABLET ORAL ONCE
Qty: 0 | Refills: 0 | Status: COMPLETED | OUTPATIENT
Start: 2018-01-03 | End: 2018-01-03

## 2018-01-03 RX ADMIN — Medication 2: at 11:39

## 2018-01-03 RX ADMIN — Medication 28.75 MILLIGRAM(S): at 17:34

## 2018-01-03 RX ADMIN — Medication 1: at 05:17

## 2018-01-03 RX ADMIN — Medication 1: at 17:34

## 2018-01-03 RX ADMIN — DESMOPRESSIN ACETATE 218.32 MICROGRAM(S): 0.1 TABLET ORAL at 07:37

## 2018-01-03 RX ADMIN — CHLORHEXIDINE GLUCONATE 1 APPLICATION(S): 213 SOLUTION TOPICAL at 11:40

## 2018-01-03 RX ADMIN — Medication 650 MILLIGRAM(S): at 01:15

## 2018-01-03 RX ADMIN — SIMETHICONE 80 MILLIGRAM(S): 80 TABLET, CHEWABLE ORAL at 01:15

## 2018-01-03 RX ADMIN — Medication 650 MILLIGRAM(S): at 02:00

## 2018-01-03 RX ADMIN — Medication 28.75 MILLIGRAM(S): at 07:56

## 2018-01-03 NOTE — PROGRESS NOTE ADULT - SUBJECTIVE AND OBJECTIVE BOX
[facility] ([place])- PROGRESS NOTE    Patient is a 72y Female with MIKAELA/ckd; adm with unexplained NCSD on valproate; s/p HD yest; drop in Hg,transfused, found to have evidence of retroperitoneal hem on ct; given DDAVP; extubated since yest; seen in MICU with family present    Allergies:  No Known Allergies    Hospital Medications:   MEDICATIONS  (STANDING):  calcium acetate 667 milliGRAM(s) Oral three times a day with meals  chlorhexidine 4% Liquid 1 Application(s) Topical daily  dextrose 5%. 1000 milliLiter(s) (50 mL/Hr) IV Continuous <Continuous>  dextrose 50% Injectable 12.5 Gram(s) IV Push once  dextrose 50% Injectable 25 Gram(s) IV Push once  dextrose 50% Injectable 25 Gram(s) IV Push once  influenza   Vaccine 0.5 milliLiter(s) IntraMuscular once  insulin lispro (HumaLOG) corrective regimen sliding scale   SubCutaneous every 6 hours  valproate sodium IVPB 750 milliGRAM(s) IV Intermittent every 12 hours    REVIEW OF SYSTEMS:  CONSTITUTIONAL: denies localized weakness or tiredness; would like coffee  RESPIRATORY:  No shortness of breath  CARDIOVASCULAR: No chest pain or palpitations.  GASTROINTESTINAL: mild lower abd pain; no n/v  NEUROLOGICAL: No numbness or weakness  SKIN: No itching       VITALS:  T(F): 96.5 (18 @ 07:39), Max: 98.6 (18 @ 12:00)  HR: 76 (18 @ 07:39)  BP: 141/71 (18 @ 07:39)  RR: 28 (18 @ 07:39)  SpO2: 97% (18 @ 07:39)  Wt(kg): --     07:  -   @ 07:00  --------------------------------------------------------  IN: 488.8 mL / OUT: 1320 mL / NET: -831.2 mL     07:01  -   @ 07:00  --------------------------------------------------------  IN: 110 mL / OUT: 805 mL / NET: -695 mL        PHYSICAL EXAM:  Constitutional: NAD; pleasant  Respiratory: CTAB, no wheezes, rales or rhonchi anteriorly  Cardiovascular: S1, S2, RRR  Gastrointestinal: BS+, soft, mild tenderness midlower abd  Extremities:  No peripheral edema  Neurological: A/O x 3, no focal deficits; clear speech  Psychiatric: Normal mood, normal affect  : mistyr.   Vascular Access: rt ij cath    LABS:      140  |  97<L>  |  54<H>  ----------------------------<  128<H>  3.6   |  26  |  4.95<H>    Ca    8.2<L>      2018 03:30  Phos  6.9       Mg     2.3         TPro  6.3  /  Alb  2.9<L>  /  TBili  0.5  /  DBili      /  AST  54<H>  /  ALT  22  /  AlkPhos  99      Creatinine Trend: 4.95 <--, 7.92 <--, 7.56 <--, 9.38 <--, 8.27 <--, 6.98 <--, 5.88 <--, 5.65 <--, 5.66 <--                        6.5    11.56 )-----------( 113      ( 2018 04:50 )             19.8     Urine Studies:  Urinalysis Basic - ( 28 Dec 2017 00:20 )    Color: YELLOW / Appearance: CLEAR / S.012 / pH: 6.0  Gluc: TRACE / Ketone: NEGATIVE  / Bili: NEGATIVE / Urobili: NORMAL mg/dL   Blood: NEGATIVE / Protein: 300 mg/dL / Nitrite: NEGATIVE   Leuk Esterase: NEGATIVE / RBC: 5-10 / WBC 5-10   Sq Epi: OCC / Non Sq Epi:  / Bacteria: FEW      Sodium, Random Urine: 52 mmol/L ( @ 14:15)  Osmolality, Random Urine: 316 mosmo/kg ( @ 14:15)  Creatinine, Random Urine: 54.19 mg/dL (- @ 14:15)    RADIOLOGY & ADDITIONAL STUDIES:

## 2018-01-03 NOTE — PROGRESS NOTE ADULT - ASSESSMENT
extubated, alert, apparent spont retroperitoneal hem, seizures appear controlled; variable but moderate urine output; electrolytes/bun/cr controlled

## 2018-01-03 NOTE — PROGRESS NOTE ADULT - ASSESSMENT
73 y/o woman with p/w AMS evaluated by neurology for status epilepticus. She remains seizure-free and is now extubated, mentating well. Tolerating depakote well.     - C/w depakote 750 mg BID  - No need for further levels unless patient has recurrent seizure-like activity 73 y/o woman with p/w AMS evaluated by neurology for status epilepticus. She remains seizure-free x 72 hours and is now extubated, mentating well. Tolerating depakote well.     - C/w depakote 750 mg BID  - No need for further levels unless patient has recurrent seizure-like activity

## 2018-01-03 NOTE — CONSULT NOTE ADULT - ASSESSMENT
72yF w/ spontaneous RP hematoma    - Recommend IR consult for angio and possible embolization  - c/w PRBC resuscitation and serial CBC  - Would check coags  - Likely bleed from uremic platelet dysfunction, DDAVP already administered  - No acute surgical intervention at this time  - Pt discussed w/ Dr. Nazario  - Please page [phone] w/ any questions    KATEY Henson PGY-2

## 2018-01-03 NOTE — PROGRESS NOTE ADULT - SUBJECTIVE AND OBJECTIVE BOX
CHIEF COMPLAINT: Patient is a 72y old  Female who presents with a chief complaint of Altered mental status, patient became obtunded (28 Dec 2017 04:04)    Interval Events: O/n, noted with drop on Hb to 6.5, CT non con performed showed large L RP bleed; s/p ddAVP this morning, currently receiving one unit prbc. Seen by surgery, no acute intervention at this time; remains HD stable     REVIEW OF SYSTEMS:  Constitutional: [ ] negative [ ] fevers [ ] chills [ ] weight loss [ ] weight gain  HEENT: [ ] negative [ ] dry eyes [ ] eye irritation [ ] postnasal drip [ ] nasal congestion  CV: [ ] negative  [ ] chest pain [ ] orthopnea [ ] palpitations [ ] murmur  Resp: [ ] negative [ ] cough [ ] shortness of breath [ ] dyspnea [ ] wheezing [ ] sputum [ ] hemoptysis  GI: [ ] negative [ ] nausea [ ] vomiting [ ] diarrhea [ ] constipation [ ] abd pain [ ] dysphagia   : [ ] negative [ ] dysuria [ ] nocturia [ ] hematuria [ ] increased urinary frequency  Musculoskeletal: [ ] negative [ ] back pain [ ] myalgias [ ] arthralgias [ ] fracture  Skin: [ ] negative [ ] rash [ ] itch  Neurological: [ ] negative [ ] headache [ ] dizziness [ ] syncope [ ] weakness [ ] numbness  Psychiatric: [ ] negative [ ] anxiety [ ] depression  Endocrine: [ ] negative [ ] diabetes [ ] thyroid problem  Hematologic/Lymphatic: [ ] negative [ ] anemia [ ] bleeding problem  Allergic/Immunologic: [ ] negative [ ] itchy eyes [ ] nasal discharge [ ] hives [ ] angioedema  [ ] All other systems negative  [ ] Unable to assess ROS because ________    OBJECTIVE:  ICU Vital Signs Last 24 Hrs  T(C): 35.8 (03 Jan 2018 08:01), Max: 37 (02 Jan 2018 12:00)  T(F): 96.5 (03 Jan 2018 08:01), Max: 98.6 (02 Jan 2018 12:00)  HR: 80 (03 Jan 2018 08:29) (68 - 95)  BP: 135/72 (03 Jan 2018 08:01) (107/63 - 154/80)  BP(mean): 85 (03 Jan 2018 08:01) (73 - 99)  ABP: --  ABP(mean): --  RR: 25 (03 Jan 2018 08:29) (14 - 33)  SpO2: 96% (03 Jan 2018 08:29) (95% - 100%)    Mode: standby,PT EXTUBATED to 40% f/t    01-02 @ 07:01 - 01-03 @ 07:00  --------------------------------------------------------  IN: 110 mL / OUT: 805 mL / NET: -695 mL    01-03 @ 07:01 - 01-03 @ 08:56  --------------------------------------------------------  IN: 350 mL / OUT: 5 mL / NET: 345 mL      CAPILLARY BLOOD GLUCOSE      POCT Blood Glucose.: 183 mg/dL (03 Jan 2018 05:13)      PHYSICAL EXAM:  General:   HEENT:   Lymph Nodes:  Neck:   Respiratory:   Cardiovascular:   Abdomen:   Extremities:   Skin:   Neurological:  Psychiatry:    LINES:    HOSPITAL MEDICATIONS:  Standing Meds:  calcium acetate 667 milliGRAM(s) Oral three times a day with meals  chlorhexidine 4% Liquid 1 Application(s) Topical daily  dextrose 5%. 1000 milliLiter(s) IV Continuous <Continuous>  dextrose 50% Injectable 12.5 Gram(s) IV Push once  dextrose 50% Injectable 25 Gram(s) IV Push once  dextrose 50% Injectable 25 Gram(s) IV Push once  influenza   Vaccine 0.5 milliLiter(s) IntraMuscular once  insulin lispro (HumaLOG) corrective regimen sliding scale   SubCutaneous every 6 hours  valproate sodium IVPB 750 milliGRAM(s) IV Intermittent every 12 hours      PRN Meds:  dextrose Gel 1 Dose(s) Oral once PRN  glucagon  Injectable 1 milliGRAM(s) IntraMuscular once PRN      LABS:                        6.5    11.56 )-----------( 113      ( 03 Jan 2018 04:50 )             19.8     Hgb Trend: 6.5<--, 6.9<--, 11.0<--, 8.3<--, 8.6<--  01-03    140  |  97<L>  |  54<H>  ----------------------------<  128<H>  3.6   |  26  |  4.95<H>    Ca    8.2<L>      03 Jan 2018 03:30  Phos  6.9     01-03  Mg     2.3     01-03    TPro  6.3  /  Alb  2.9<L>  /  TBili  0.5  /  DBili  x   /  AST  54<H>  /  ALT  22  /  AlkPhos  99  01-03    Creatinine Trend: 4.95<--, 7.92<--, 7.56<--, 9.38<--, 8.27<--, 6.98<--            MICROBIOLOGY:     RADIOLOGY:  [ ] Reviewed and interpreted by me    EKG: CHIEF COMPLAINT: Patient is a 72y old  Female who presents with a chief complaint of Altered mental status, patient became obtunded (28 Dec 2017 04:04)    Interval Events: O/n, noted with drop on Hb to 6.5, CT non con performed showed large L RP bleed; s/p ddAVP this morning, currently receiving one unit prbc. Seen by surgery, no acute intervention at this time; remains HD stable     REVIEW OF SYSTEMS:  Constitutional: [ ] negative [ ] fevers [ ] chills [ ] weight loss [ ] weight gain +fatigue  HEENT: [ ] negative [ ] dry eyes [ ] eye irritation [ ] postnasal drip [ ] nasal congestion  CV: [X] negative  [ ] chest pain [ ] orthopnea [ ] palpitations [ ] murmur  Resp: [X] negative [ ] cough [ ] shortness of breath [ ] dyspnea [ ] wheezing [ ] sputum [ ] hemoptysis  GI: [ ] negative [ ] nausea [ ] vomiting [ ] diarrhea [ ] constipation [X] abd pain [ ] dysphagia   : [X] negative [ ] dysuria [ ] nocturia [ ] hematuria [ ] increased urinary frequency  Musculoskeletal: [X] negative [ ] back pain [ ] myalgias [ ] arthralgias [ ] fracture  Skin: [ ] negative [ ] rash [ ] itch  Neurological: [X] negative [ ] headache [ ] dizziness [ ] syncope [ ] weakness [ ] numbness  Psychiatric: [X] negative [ ] anxiety [ ] depression  Endocrine: [ ] negative [ ] diabetes [ ] thyroid problem  Hematologic/Lymphatic: [ ] negative [ ] anemia [ ] bleeding problem  Allergic/Immunologic: [ ] negative [ ] itchy eyes [ ] nasal discharge [ ] hives [ ] angioedema  [X] All other systems negative  [ ] Unable to assess ROS because ________    OBJECTIVE:  ICU Vital Signs Last 24 Hrs  T(C): 35.8 (03 Jan 2018 08:01), Max: 37 (02 Jan 2018 12:00)  T(F): 96.5 (03 Jan 2018 08:01), Max: 98.6 (02 Jan 2018 12:00)  HR: 80 (03 Jan 2018 08:29) (68 - 95)  BP: 135/72 (03 Jan 2018 08:01) (107/63 - 154/80)  BP(mean): 85 (03 Jan 2018 08:01) (73 - 99)  ABP: --  ABP(mean): --  RR: 25 (03 Jan 2018 08:29) (14 - 33)  SpO2: 96% (03 Jan 2018 08:29) (95% - 100%)    Mode: standby,PT EXTUBATED to 40% f/t    01-02 @ 07:01 - 01-03 @ 07:00  --------------------------------------------------------  IN: 110 mL / OUT: 805 mL / NET: -695 mL    01-03 @ 07:01 - 01-03 @ 08:56  --------------------------------------------------------  IN: 350 mL / OUT: 5 mL / NET: 345 mL      CAPILLARY BLOOD GLUCOSE  POCT Blood Glucose.: 183 mg/dL (03 Jan 2018 05:13)      PHYSICAL EXAM:  General: NAD, awake/alert  HEENT: EOMI, clear conjunctiva  Respiratory: CTA b/l, no w/c  Cardiovascular: S1 S2, RRR,+2 systolic murmur in aortic area  Abdomen: soft, +tenderness in left upper/lower quadrants   Extremities; trace LE edema   Skin: normal color and turgor; no rash  Neurological: arousable, follows simple commands    LINES: [facility]     HOSPITAL MEDICATIONS:  Standing Meds:  calcium acetate 667 milliGRAM(s) Oral three times a day with meals  chlorhexidine 4% Liquid 1 Application(s) Topical daily  dextrose 5%. 1000 milliLiter(s) IV Continuous <Continuous>  dextrose 50% Injectable 12.5 Gram(s) IV Push once  dextrose 50% Injectable 25 Gram(s) IV Push once  dextrose 50% Injectable 25 Gram(s) IV Push once  influenza   Vaccine 0.5 milliLiter(s) IntraMuscular once  insulin lispro (HumaLOG) corrective regimen sliding scale   SubCutaneous every 6 hours  valproate sodium IVPB 750 milliGRAM(s) IV Intermittent every 12 hours      PRN Meds:  dextrose Gel 1 Dose(s) Oral once PRN  glucagon  Injectable 1 milliGRAM(s) IntraMuscular once PRN      LABS:                        6.5    11.56 )-----------( 113      ( 03 Jan 2018 04:50 )             19.8     Hgb Trend: 6.5<--, 6.9<--, 11.0<--, 8.3<--, 8.6<--  01-03    140  |  97<L>  |  54<H>  ----------------------------<  128<H>  3.6   |  26  |  4.95<H>    Ca    8.2<L>      03 Jan 2018 03:30  Phos  6.9     01-03  Mg     2.3     01-03    TPro  6.3  /  Alb  2.9<L>  /  TBili  0.5  /  DBili  x   /  AST  54<H>  /  ALT  22  /  AlkPhos  99  01-03    Creatinine Trend: 4.95<--, 7.92<--, 7.56<--, 9.38<--, 8.27<--, 6.98<--      MICROBIOLOGY:     RADIOLOGY:  [X] Reviewed and interpreted by me

## 2018-01-03 NOTE — PROGRESS NOTE ADULT - SUBJECTIVE AND OBJECTIVE BOX
Neurology Progress Note    Interval History: pt extubated yesterday. No seizures overnight. She is currently being managed for a retroperitoneal bleed. She denies any nausea or dizziness.     Vital Signs Last 24 Hrs  T(C): 35.8 (03 Jan 2018 08:01), Max: 37 (02 Jan 2018 12:00)  T(F): 96.5 (03 Jan 2018 08:01), Max: 98.6 (02 Jan 2018 12:00)  HR: 88 (03 Jan 2018 09:00) (68 - 95)  BP: 125/64 (03 Jan 2018 09:00) (107/63 - 154/80)  BP(mean): 78 (03 Jan 2018 09:00) (73 - 99)  RR: 27 (03 Jan 2018 09:00) (16 - 33)  SpO2: 97% (03 Jan 2018 09:00) (95% - 100%)    General Exam:   General appearance: No acute distress                   Neurological Exam:  Mental Status: AAOx3, speech fluent    Cranial Nerves: EOMI, no facial droop, no dysarthria    Motor:   Tone: normal.                  Strength: moves all extremities symmetrically       Other:                          6.5    11.56 )-----------( 113      ( 03 Jan 2018 04:50 )             19.8   01-03    140  |  97<L>  |  54<H>  ----------------------------<  128<H>  3.6   |  26  |  4.95<H>    Ca    8.2<L>      03 Jan 2018 03:30  Phos  6.9     01-03  Mg     2.3     01-03    TPro  6.3  /  Alb  2.9<L>  /  TBili  0.5  /  DBili  x   /  AST  54<H>  /  ALT  22  /  AlkPhos  99  01-03    MEDICATIONS  (STANDING):  calcium acetate 667 milliGRAM(s) Oral three times a day with meals  chlorhexidine 4% Liquid 1 Application(s) Topical daily  dextrose 5%. 1000 milliLiter(s) (50 mL/Hr) IV Continuous <Continuous>  dextrose 50% Injectable 12.5 Gram(s) IV Push once  dextrose 50% Injectable 25 Gram(s) IV Push once  dextrose 50% Injectable 25 Gram(s) IV Push once  influenza   Vaccine 0.5 milliLiter(s) IntraMuscular once  insulin lispro (HumaLOG) corrective regimen sliding scale   SubCutaneous every 6 hours  valproate sodium IVPB 750 milliGRAM(s) IV Intermittent every 12 hours    MEDICATIONS  (PRN):  dextrose Gel 1 Dose(s) Oral once PRN Blood Glucose LESS THAN 70 milliGRAM(s)/deciliter  glucagon  Injectable 1 milliGRAM(s) IntraMuscular once PRN Glucose LESS THAN 70 milligrams/deciliter Neurology Progress Note    Interval History: pt extubated yesterday. No seizures overnight. She is currently being managed for a retroperitoneal bleed. She denies any nausea or dizziness.     Vital Signs Last 24 Hrs  T(C): 35.8 (03 Jan 2018 08:01), Max: 37 (02 Jan 2018 12:00)  T(F): 96.5 (03 Jan 2018 08:01), Max: 98.6 (02 Jan 2018 12:00)  HR: 88 (03 Jan 2018 09:00) (68 - 95)  BP: 125/64 (03 Jan 2018 09:00) (107/63 - 154/80)  BP(mean): 78 (03 Jan 2018 09:00) (73 - 99)  RR: 27 (03 Jan 2018 09:00) (16 - 33)  SpO2: 97% (03 Jan 2018 09:00) (95% - 100%)    General Exam:   General appearance: No acute distress                   Neurological Exam:  Mental Status: AAOx3, speech fluent    Cranial Nerves: EOMI, no facial droop, no dysarthria    Motor:   Tone: normal.                  Strength: moves all extremities symmetrically       Other:                          6.5    11.56 )-----------( 113      ( 03 Jan 2018 04:50 )             19.8   01-03    140  |  97<L>  |  54<H>  ----------------------------<  128<H>  3.6   |  26  |  4.95<H>    Ca    8.2<L>      03 Jan 2018 03:30  Phos  6.9     01-03  Mg     2.3     01-03    TPro  6.3  /  Alb  2.9<L>  /  TBili  0.5  /  DBili  x   /  AST  54<H>  /  ALT  22  /  AlkPhos  99  01-03    EEG CLASSIFICATION/SUMMARY:    Abnormal EEG in awake and asleep states due to   moderate generalized slowing, slightly more prominent over the right paracentral region.    _____________________________________________________________  CLINICAL IMPRESSION:    Abnormal EEG study in awake and asleep patient.  Moderate diffuse or multifocal cerebral dysfunction, slightly more prominent over the right	.  No seizures seen.    MEDICATIONS  (STANDING):  calcium acetate 667 milliGRAM(s) Oral three times a day with meals  chlorhexidine 4% Liquid 1 Application(s) Topical daily  dextrose 5%. 1000 milliLiter(s) (50 mL/Hr) IV Continuous <Continuous>  dextrose 50% Injectable 12.5 Gram(s) IV Push once  dextrose 50% Injectable 25 Gram(s) IV Push once  dextrose 50% Injectable 25 Gram(s) IV Push once  influenza   Vaccine 0.5 milliLiter(s) IntraMuscular once  insulin lispro (HumaLOG) corrective regimen sliding scale   SubCutaneous every 6 hours  valproate sodium IVPB 750 milliGRAM(s) IV Intermittent every 12 hours    MEDICATIONS  (PRN):  dextrose Gel 1 Dose(s) Oral once PRN Blood Glucose LESS THAN 70 milliGRAM(s)/deciliter  glucagon  Injectable 1 milliGRAM(s) IntraMuscular once PRN Glucose LESS THAN 70 milligrams/deciliter

## 2018-01-03 NOTE — CHART NOTE - NSCHARTNOTEFT_GEN_A_CORE
: Homero Young & Jasper Evans    INDICATION: Respiratory failure    PROCEDURE:  [ x] LIMITED ECHO  [ x] LIMITED CHEST  [ x] LIMITED RETROPERITONEAL  [ ] LIMITED ABDOMINAL  [ ] LIMITED DVT  [ ] NEEDLE GUIDANCE VASCULAR  [ ] NEEDLE GUIDANCE THORACENTESIS  [ ] NEEDLE GUIDANCE PARACENTESIS  [ ] NEEDLE GUIDANCE PERICARDIOCENTESIS  [ ] OTHER    FINDINGS: A lines predominantly anteriorly with lung sliding. No consolidations or effusions noted posteriorly.  LV Fxn mildly reduced  Moderate L RP bleed noted displacing L kidney

## 2018-01-03 NOTE — PROGRESS NOTE ADULT - ATTENDING COMMENTS
Agree with above note. Neurology will follow peripherally. Please call if patient has further seizure activity.

## 2018-01-03 NOTE — PROGRESS NOTE ADULT - ATTENDING COMMENTS
Agree with above. Seen and examined with resident. Acute spontaneous retroperitoneal bleed. Surgery and IR to follow. Follow up serial H/H. Mental status improved. Follow up Valproic acid levels.

## 2018-01-03 NOTE — CONSULT NOTE ADULT - SUBJECTIVE AND OBJECTIVE BOX
Full consult note to follow.    Recommend IR evaluation for diagnostic and therapeutic angiogram General Surgery Consult  Consulting surgical team: [facility] Team, (Acute Care Surgery) Pager [phone]  Consulting attending: Dr. Nazario    HPI:  72F PMHx of CHF, DM2, HTN, CKD Stage 4, Hx of Type B aortic dissection p/w AMS, patient was dc'd from [facility] today, treated for CHF exacerbation presented with acute onset lethargy, loss of vision, weakness. EMS called, pt had worsening lethargy in ED, was intubated there, accepted to MICU. Per , pt was driven home from \[facility]\"" by him, wheelchair to get to car from \[facility]\"".  noticed difficulty with pt ambulation once arrived home.  (HB) helped her walk to front door,  held her hand during ambulation as pt looked weaker than her baseline. Reports pt had good ambulation in hospital without assistance. Pt reportedly opened door, walked inside house, then pt began stating that she was unable to see, and began repeating "where's the door" 10x, despite attempts at redirection by Hb. Pt then went to sleep for 15 min, remained very lethargic, hb held her as she was weak and pt proceeded to lay on the floor. When HB tried to get her up, pt said "wait a minute" 20x. HB facetime'd pt's a fam friend who is RN, who saw pt being lethargic and HB proceeded to drive pt to ED.     Pt did not appear to be in any pain at the time, denied any falls, bleeding, tremors at home, new medications.     In ED  T(C): 36.7 (27 Dec 2017 18:46), Max: 36.7 (27 Dec 2017 18:46)  T(F): 98 (27 Dec 2017 18:46), Max: 98 (27 Dec 2017 18:46)  HR: 51 (28 Dec 2017 03:01) (48 - 68)  BP: 129/66 (28 Dec 2017 03:01) (128/55 - 147/82)  BP(mean): 80 (28 Dec 2017 01:55) (80 - 80)  RR: 14 (28 Dec 2017 03:01) (14 - 22)  SpO2: 100% (28 Dec 2017 03:01) (98% - 100%)  ED course  - pt with decreasing mental status in ED, was intubated for airway protection.   - hgb 10.9, WBC 5.7, platelets 213, coags wnl, Na 129, Cl 84, CE's wnl  - ABG 7.39, 43, 117, 25, 98.9  - UA w/ protein, no evid of infection. uTox (-)   - CT head w/o acute pathology  - CT chest/abdom: Chronic Type B aortic dissection, not significantly changed. Infrarenal abdominal aortic aneurysm measures   5 cm, compared to 4.2 cm on CT of June 8, 2009. 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 No central pulmonary embolism. (+) Cardiomegaly. Bilateral subsegmental atelectasis. (28 Dec 2017 03:33)    Pt was c/o acute onset left sided abdominal pain that began approximately 1AM on 1/3. She was noted to have an acute hematocrit drop on CBC from 25 to 20 (the CBC from 1AM on 1/2 was likely lab error given her H/H trend this admission). She denies any trauma recently. Her only invasive procedures were placement of an R IJ Shiley and an LP on 12/30. Her seizure occurred in bed. She denies any prior h/o spontaneous bleeds or coagulopathy.      PAST MEDICAL HISTORY:  CKD (chronic kidney disease)  Hypertension  Type B Dissection of Aorta  Cancer of Endometrium  Adult Onset Diabetes Mellitus,      PAST SURGICAL HISTORY:  s/p CLINT-BSO w/ omentectomy for stage 4 endometrial CA in 2007      MEDICATIONS:  calcium acetate 667 milliGRAM(s) Oral three times a day with meals  chlorhexidine 4% Liquid 1 Application(s) Topical daily  dextrose 5%. 1000 milliLiter(s) IV Continuous <Continuous>  dextrose 50% Injectable 12.5 Gram(s) IV Push once  dextrose 50% Injectable 25 Gram(s) IV Push once  dextrose 50% Injectable 25 Gram(s) IV Push once  dextrose Gel 1 Dose(s) Oral once PRN  glucagon  Injectable 1 milliGRAM(s) IntraMuscular once PRN  influenza   Vaccine 0.5 milliLiter(s) IntraMuscular once  insulin lispro (HumaLOG) corrective regimen sliding scale   SubCutaneous every 6 hours  valproate sodium IVPB 750 milliGRAM(s) IV Intermittent every 12 hours      ALLERGIES:  No Known Allergies      VITALS & I/Os:  Vital Signs Last 24 Hrs  T(C): 35.8 (03 Jan 2018 08:01), Max: 37 (02 Jan 2018 12:00)  T(F): 96.5 (03 Jan 2018 08:01), Max: 98.6 (02 Jan 2018 12:00)  HR: 88 (03 Jan 2018 09:00) (68 - 95)  BP: 125/64 (03 Jan 2018 09:00) (107/63 - 154/80)  BP(mean): 78 (03 Jan 2018 09:00) (73 - 99)  RR: 27 (03 Jan 2018 09:00) (16 - 33)  SpO2: 97% (03 Jan 2018 09:00) (95% - 100%)  Mode: standby,PT EXTUBATED to 40% f/t    I&O's Summary    02 Jan 2018 07:01  -  03 Jan 2018 07:00  --------------------------------------------------------  IN: 110 mL / OUT: 805 mL / NET: -695 mL    03 Jan 2018 07:01  -  03 Jan 2018 09:30  --------------------------------------------------------  IN: 400 mL / OUT: 10 mL / NET: 390 mL        PHYSICAL EXAM:  Gen: WD, WN, NAD  HEENT: PERRLA, EOMI, Oropharynx clear  Neck: Supple, no JVD/Bruit, No thyromegaly  Lungs: CTA B/L  Heart: RRR, S1 S2, No m/r/g  Abd: Soft, ND, TTP L hemiabdomen, No HSM, No rebound or guarding, No Grey-Vasquez sign  Ext: WWP, No clubbing, cyanosis, or edema  Neuro: AAOx3, CN II-XII grossly intact, No focal deficits    LABS:                        6.5    11.56 )-----------( 113      ( 03 Jan 2018 04:50 )             19.8     01-03    140  |  97<L>  |  54<H>  ----------------------------<  128<H>  3.6   |  26  |  4.95<H>    Ca    8.2<L>      03 Jan 2018 03:30  Phos  6.9     01-03  Mg     2.3     01-03    TPro  6.3  /  Alb  2.9<L>  /  TBili  0.5  /  DBili  x   /  AST  54<H>  /  ALT  22  /  AlkPhos  99  01-03    Lactate:          IMAGING:  < from: CT Abdomen and Pelvis No Cont (01.03.18 @ 06:20) >  COMMENTS:    LOWER LUNGS AND PLEURA: Partially visualized small to moderate left   pleural effusion with significant compressive atelectasis in the left   lower lobe. Small right pleural effusion with associated right basilar   atelectasis. Coronary vascular calcification. Trace pericardial effusion.    LIVER:within normal limits.  BILE DUCTS: normal caliber.  GALLBLADDER: Hyperdense material within the gallbladder compatible with   vicarious excretion of recent contrast respiration.           PANCREAS: within normal limits.  SPLEEN: within normal limits.  ADRENALS: within normal limits.    KIDNEYS, URETERS AND BLADDER: Mild bilateral renal atrophy.   Hypoattenuation of the renal parenchyma compatible with residual contrast   and suggestive of acute tubular necrosis secondary to contrast   nephropathy. Correlate with creatinine levels. Subcentimeter exophytic   left lower pole hypodense lesion too small to characterize. No intrarenal   calculi. The ureters are unremarkable. The bladder is collapsed with a   Oconnell catheter.    PELVIS: no pelvic masses.No pelvic lymphadenopathy.    BOWEL: The unopacified bowel is of normal course and caliber without   evidence of obstruction or bowel wall thickening.     PERITONEUM: Hemoperitoneum in the right perihepatic space and left   hemiabdomen. No free air. No drainable fluid collections.    RETROPERITONEUM: Large left retroperitoneal hematoma involving the left   psoas muscle left retroperitoneal space and left extraperitoneal space.   This does not appear to be involving the abdominal aorta.    VESSELS: atherosclerotic changes. Noncontrast findings compatible with   known aortic dissection. Stable aneurysmal dilatation of the distal   abdominal aorta measuring up to 4.7 x 4.1 cm aneurysmal dilatation of the   left common iliac artery measuring up to 2.5 cm.  ABDOMINAL WALL: Mild anasarca in the anterior upper left thigh. Mild   anasarca the left lateral abdominal wall.  BONES: Degenerative changes.     IMPRESSION:     Interval development of a large left retroperitoneal hematoma with   additional hemoperitoneum seen particularly in the right perihepatic   space. Given recent history of aortic dissection, CT angiography is   recommended to exclude vascular rupture as the cause for retroperitoneal   bleed.    < end of copied text >

## 2018-01-03 NOTE — PROGRESS NOTE ADULT - ASSESSMENT
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      # Neuro  Encephalopathy 2/2 seizures, possible component of uremia   - resolving, MS much improved today, following commands and talking   - LP performed 12/29 are unrevealing as to source of seizures; HSV PCR negative   - c/w depakote 750 mg TID  - c/w HD for uremia     # Pulmonary - extubated on 1/2/17  - saturating well on RA, no resp distress; c/t monitor     # CV  []CHF, unknown EF  - check official TTE   []Aortic dissection on CT appears stable, BP well controlled  - concern for aneurysm possible leading to current RP bleed, will consider CT angio after pt resuscitated     # GI  - c/w PO diet   - abdominal pain 2/2 RP bleed, tylenol as needed, avoid NSAIDs     # Renal  [] ESRD - CKD4, now with ESRD 2/2 contrast load on admission   - c/w HD as needed  - c/w phoslo   [] Hyponatremia - improved with no intervention      # ID  - hypothermic to 91 on admission, temp now normalized   - no clear source - abx d/c on 12/30/17    # Heme  [] Acute blood loss anemia, with Hb drop from 11->6.5, CT with L RP bleed  - s/p ddAVP for uremic platelets dysfunction, plan for 2u PRBC  - check CBC between units  - will consider CTA given hx of infrarenal aneurysms     # Endo  -FS acceptable for now, c/w SSI    #Skin  -R IJ shiley   -2 20G peripheral on R arm     # DVT ppx   - SCDs given RP bleed

## 2018-01-04 LAB
ALBUMIN SERPL ELPH-MCNC: 2.8 G/DL — LOW (ref 3.3–5)
ALP SERPL-CCNC: 86 U/L — SIGNIFICANT CHANGE UP (ref 40–120)
ALT FLD-CCNC: 22 U/L — SIGNIFICANT CHANGE UP (ref 4–33)
APTT BLD: 27.8 SEC — SIGNIFICANT CHANGE UP (ref 27.5–37.4)
AST SERPL-CCNC: 64 U/L — HIGH (ref 4–32)
BACTERIA CSF CULT: SIGNIFICANT CHANGE UP
BASOPHILS # BLD AUTO: 0.01 K/UL — SIGNIFICANT CHANGE UP (ref 0–0.2)
BASOPHILS NFR BLD AUTO: 0.1 % — SIGNIFICANT CHANGE UP (ref 0–2)
BILIRUB SERPL-MCNC: 0.6 MG/DL — SIGNIFICANT CHANGE UP (ref 0.2–1.2)
BUN SERPL-MCNC: 75 MG/DL — HIGH (ref 7–23)
CALCIUM SERPL-MCNC: 7.9 MG/DL — LOW (ref 8.4–10.5)
CHLORIDE SERPL-SCNC: 92 MMOL/L — LOW (ref 98–107)
CO2 SERPL-SCNC: 25 MMOL/L — SIGNIFICANT CHANGE UP (ref 22–31)
CREAT SERPL-MCNC: 5.84 MG/DL — HIGH (ref 0.5–1.3)
EOSINOPHIL # BLD AUTO: 0 K/UL — SIGNIFICANT CHANGE UP (ref 0–0.5)
EOSINOPHIL NFR BLD AUTO: 0 % — SIGNIFICANT CHANGE UP (ref 0–6)
GLUCOSE BLDC GLUCOMTR-MCNC: 100 MG/DL — HIGH (ref 70–99)
GLUCOSE BLDC GLUCOMTR-MCNC: 130 MG/DL — HIGH (ref 70–99)
GLUCOSE BLDC GLUCOMTR-MCNC: 137 MG/DL — HIGH (ref 70–99)
GLUCOSE BLDC GLUCOMTR-MCNC: 203 MG/DL — HIGH (ref 70–99)
GLUCOSE SERPL-MCNC: 191 MG/DL — HIGH (ref 70–99)
HCT VFR BLD CALC: 19 % — CRITICAL LOW (ref 34.5–45)
HCT VFR BLD CALC: 19.9 % — CRITICAL LOW (ref 34.5–45)
HCT VFR BLD CALC: 23.2 % — LOW (ref 34.5–45)
HCT VFR BLD CALC: 25 % — LOW (ref 34.5–45)
HGB BLD-MCNC: 6.9 G/DL — CRITICAL LOW (ref 11.5–15.5)
HGB BLD-MCNC: 7.2 G/DL — LOW (ref 11.5–15.5)
HGB BLD-MCNC: 8.1 G/DL — LOW (ref 11.5–15.5)
HGB BLD-MCNC: 8.8 G/DL — LOW (ref 11.5–15.5)
IMM GRANULOCYTES # BLD AUTO: 0.08 # — SIGNIFICANT CHANGE UP
IMM GRANULOCYTES NFR BLD AUTO: 0.6 % — SIGNIFICANT CHANGE UP (ref 0–1.5)
INR BLD: 1.08 — SIGNIFICANT CHANGE UP (ref 0.88–1.17)
LYMPHOCYTES # BLD AUTO: 0.55 K/UL — LOW (ref 1–3.3)
LYMPHOCYTES # BLD AUTO: 4.4 % — LOW (ref 13–44)
MAGNESIUM SERPL-MCNC: 2.4 MG/DL — SIGNIFICANT CHANGE UP (ref 1.6–2.6)
MCHC RBC-ENTMCNC: 29.9 PG — SIGNIFICANT CHANGE UP (ref 27–34)
MCHC RBC-ENTMCNC: 29.9 PG — SIGNIFICANT CHANGE UP (ref 27–34)
MCHC RBC-ENTMCNC: 31.2 PG — SIGNIFICANT CHANGE UP (ref 27–34)
MCHC RBC-ENTMCNC: 31.5 PG — SIGNIFICANT CHANGE UP (ref 27–34)
MCHC RBC-ENTMCNC: 34.9 % — SIGNIFICANT CHANGE UP (ref 32–36)
MCHC RBC-ENTMCNC: 35.2 % — SIGNIFICANT CHANGE UP (ref 32–36)
MCHC RBC-ENTMCNC: 36.2 % — HIGH (ref 32–36)
MCHC RBC-ENTMCNC: 36.3 % — HIGH (ref 32–36)
MCV RBC AUTO: 85 FL — SIGNIFICANT CHANGE UP (ref 80–100)
MCV RBC AUTO: 85.6 FL — SIGNIFICANT CHANGE UP (ref 80–100)
MCV RBC AUTO: 86.1 FL — SIGNIFICANT CHANGE UP (ref 80–100)
MCV RBC AUTO: 86.8 FL — SIGNIFICANT CHANGE UP (ref 80–100)
MONOCYTES # BLD AUTO: 1.76 K/UL — HIGH (ref 0–0.9)
MONOCYTES NFR BLD AUTO: 14 % — SIGNIFICANT CHANGE UP (ref 2–14)
NEUTROPHILS # BLD AUTO: 10.16 K/UL — HIGH (ref 1.8–7.4)
NEUTROPHILS NFR BLD AUTO: 80.9 % — HIGH (ref 43–77)
NRBC # FLD: 0 — SIGNIFICANT CHANGE UP
PHOSPHATE SERPL-MCNC: 9.8 MG/DL — HIGH (ref 2.5–4.5)
PLATELET # BLD AUTO: 116 K/UL — LOW (ref 150–400)
PLATELET # BLD AUTO: 130 K/UL — LOW (ref 150–400)
PLATELET # BLD AUTO: 131 K/UL — LOW (ref 150–400)
PLATELET # BLD AUTO: 137 K/UL — LOW (ref 150–400)
PMV BLD: 10.2 FL — SIGNIFICANT CHANGE UP (ref 7–13)
PMV BLD: 10.2 FL — SIGNIFICANT CHANGE UP (ref 7–13)
PMV BLD: 10.3 FL — SIGNIFICANT CHANGE UP (ref 7–13)
PMV BLD: 10.9 FL — SIGNIFICANT CHANGE UP (ref 7–13)
POTASSIUM SERPL-MCNC: 4.2 MMOL/L — SIGNIFICANT CHANGE UP (ref 3.5–5.3)
POTASSIUM SERPL-SCNC: 4.2 MMOL/L — SIGNIFICANT CHANGE UP (ref 3.5–5.3)
PROT SERPL-MCNC: 5.6 G/DL — LOW (ref 6–8.3)
PROTHROM AB SERPL-ACNC: 12 SEC — SIGNIFICANT CHANGE UP (ref 9.8–13.1)
RBC # BLD: 2.19 M/UL — LOW (ref 3.8–5.2)
RBC # BLD: 2.31 M/UL — LOW (ref 3.8–5.2)
RBC # BLD: 2.71 M/UL — LOW (ref 3.8–5.2)
RBC # BLD: 2.94 M/UL — LOW (ref 3.8–5.2)
RBC # FLD: 14.6 % — HIGH (ref 10.3–14.5)
RBC # FLD: 14.7 % — HIGH (ref 10.3–14.5)
RBC # FLD: 14.8 % — HIGH (ref 10.3–14.5)
RBC # FLD: 14.9 % — HIGH (ref 10.3–14.5)
SODIUM SERPL-SCNC: 136 MMOL/L — SIGNIFICANT CHANGE UP (ref 135–145)
WBC # BLD: 12.56 K/UL — HIGH (ref 3.8–10.5)
WBC # BLD: 13.37 K/UL — HIGH (ref 3.8–10.5)
WBC # BLD: 14.66 K/UL — HIGH (ref 3.8–10.5)
WBC # BLD: 14.89 K/UL — HIGH (ref 3.8–10.5)
WBC # FLD AUTO: 12.56 K/UL — HIGH (ref 3.8–10.5)
WBC # FLD AUTO: 13.37 K/UL — HIGH (ref 3.8–10.5)
WBC # FLD AUTO: 14.66 K/UL — HIGH (ref 3.8–10.5)
WBC # FLD AUTO: 14.89 K/UL — HIGH (ref 3.8–10.5)

## 2018-01-04 PROCEDURE — 99291 CRITICAL CARE FIRST HOUR: CPT

## 2018-01-04 RX ORDER — ACETAMINOPHEN 500 MG
650 TABLET ORAL ONCE
Qty: 0 | Refills: 0 | Status: COMPLETED | OUTPATIENT
Start: 2018-01-04 | End: 2018-01-04

## 2018-01-04 RX ORDER — DIVALPROEX SODIUM 500 MG/1
750 TABLET, DELAYED RELEASE ORAL
Qty: 0 | Refills: 0 | Status: DISCONTINUED | OUTPATIENT
Start: 2018-01-04 | End: 2018-01-12

## 2018-01-04 RX ORDER — SIMETHICONE 80 MG/1
80 TABLET, CHEWABLE ORAL
Qty: 0 | Refills: 0 | Status: DISCONTINUED | OUTPATIENT
Start: 2018-01-04 | End: 2018-01-12

## 2018-01-04 RX ORDER — OXYCODONE AND ACETAMINOPHEN 5; 325 MG/1; MG/1
1 TABLET ORAL EVERY 6 HOURS
Qty: 0 | Refills: 0 | Status: DISCONTINUED | OUTPATIENT
Start: 2018-01-04 | End: 2018-01-06

## 2018-01-04 RX ADMIN — ERYTHROPOIETIN 10000 UNIT(S): 10000 INJECTION, SOLUTION INTRAVENOUS; SUBCUTANEOUS at 08:09

## 2018-01-04 RX ADMIN — CHLORHEXIDINE GLUCONATE 1 APPLICATION(S): 213 SOLUTION TOPICAL at 11:50

## 2018-01-04 RX ADMIN — OXYCODONE AND ACETAMINOPHEN 1 TABLET(S): 5; 325 TABLET ORAL at 08:29

## 2018-01-04 RX ADMIN — SIMETHICONE 80 MILLIGRAM(S): 80 TABLET, CHEWABLE ORAL at 06:25

## 2018-01-04 RX ADMIN — Medication 650 MILLIGRAM(S): at 02:09

## 2018-01-04 RX ADMIN — OXYCODONE AND ACETAMINOPHEN 1 TABLET(S): 5; 325 TABLET ORAL at 09:15

## 2018-01-04 RX ADMIN — Medication 667 MILLIGRAM(S): at 11:54

## 2018-01-04 RX ADMIN — Medication 650 MILLIGRAM(S): at 02:45

## 2018-01-04 RX ADMIN — Medication 667 MILLIGRAM(S): at 17:53

## 2018-01-04 RX ADMIN — Medication 28.75 MILLIGRAM(S): at 06:26

## 2018-01-04 RX ADMIN — DIVALPROEX SODIUM 750 MILLIGRAM(S): 500 TABLET, DELAYED RELEASE ORAL at 18:53

## 2018-01-04 RX ADMIN — Medication 667 MILLIGRAM(S): at 09:37

## 2018-01-04 RX ADMIN — Medication 2: at 17:51

## 2018-01-04 NOTE — CHART NOTE - NSCHARTNOTEFT_GEN_A_CORE
: Homero Young & Jasper Evans    INDICATION: Respiratory failure    PROCEDURE:  [x ] LIMITED ECHO  [x ] LIMITED CHEST  [x ] LIMITED RETROPERITONEAL  [ ] LIMITED ABDOMINAL  [ ] LIMITED DVT  [ ] NEEDLE GUIDANCE VASCULAR  [ ] NEEDLE GUIDANCE THORACENTESIS  [ ] NEEDLE GUIDANCE PARACENTESIS  [ ] NEEDLE GUIDANCE PERICARDIOCENTESIS  [ ] OTHER    FINDINGS: A lines anteriorly with lung sliding, bibasilar consolidations with small effusions posteriorly  Normal LV fxn  L RP bleed

## 2018-01-04 NOTE — PROGRESS NOTE ADULT - ASSESSMENT
72F PMHx of CHF, DM2, HTN, CKD Stage 4, Hx of Type B aortic dissection p/w AMS, patient was dc'd from [facility] today after being treated for CHF exacerbation; presented with acute onset lethargy, loss of vision, weakness, with AMS in ED, s/p intubated for airway protection, found to be in non convulsive status epilepticus, now resolved on depakote, unclear etiology; now with L RP bleed.      # Neuro  Encephalopathy 2/2 seizures, possible component of uremia   - pt back to baseline. Following commands  -MRI/CT head showed no mass lesions, hemorrhage or acute changes  - LP performed 12/29 are unrevealing as to source of seizures; HSV PCR negative   - c/w depakote 750 mg TID  -no seizures >72 hours   - c/w HD for uremia     # Pulmonary - extubated on 1/2/17  - saturating well on RA, no resp distress; c/t monitor     # CV  []CHF, unknown EF  - check official TTE   []Aortic dissection on CT appears stable, BP well controlled  - CT angio mentiones unchanged dissection, however there is not comment on infrarenal AAA. Will touch base with radiology to assess AA.     # GI  - c/w PO diet   - abdominal pain 2/2 RP bleed. Pt still having abdominal pain, w no control with tylenol. Will change to percocet.     # Renal  [] ESRD - CKD4, now with ESRD 2/2 contrast load on admission   - HD today   - c/w phoslo   [] Hyponatremia - improved with no intervention      # ID  - hypothermic to 91 on admission, temp now normalized   - no clear source - abx d/c on 12/30/17    # Heme  [] Acute blood loss anemia, with Hb drop from 11->6.5, CT with L RP bleed  - s/p ddAVP for uremic platelets dysfunction, s/p 3 units of PRBCs; today pt Hbg trended down again from 7.8 to 6.9; 1 unit currently going in    # Endo  -FS mostly within goal 140-180  -FS acceptable for now, c/w SSI    #Skin  -R IJ shiley   -1 triple lumen on RLE    # DVT ppx   - SCDs given RP bleed       Leon Acosta, PGY3  [id number]

## 2018-01-04 NOTE — PROGRESS NOTE ADULT - ASSESSMENT
alert, pleasant, asking appropriate ?s as to illness; Mentation appears back to nl; retroperitoneal bleed requiring mult transfusions; mild edema; tolerating HD today thus far

## 2018-01-04 NOTE — PROGRESS NOTE ADULT - SUBJECTIVE AND OBJECTIVE BOX
INTERVAL HPI/OVERNIGHT EVENTS: Pt 7.8 to 6.9. Receiving 1 unit of PRBCs. SUBJECTIVE: Patient seen and examined at bedside. Pt complaining of L abdominal pain were R bleed is. Constant 9/10 pain. No fevers, chills, CP, h/a. No seixures.         CONSTITUTIONAL: No weakness, fevers or chills  EYES/ENT: No visual changes;  No vertigo or throat pain   NECK: No pain or stiffness  RESPIRATORY: No cough, wheezing, hemoptysis; No shortness of breath  CARDIOVASCULAR: No chest pain or palpitations  GASTROINTESTINAL: per HPI. No n/v/d.   GENITOURINARY: No dysuria, frequency or hematuria  NEUROLOGICAL: No numbness or weakness. No seizures.   SKIN: No itching, rashes    OBJECTIVE:    VITAL SIGNS:  ICU Vital Signs Last 24 Hrs  T(C): 36.2 (04 Jan 2018 00:00), Max: 36.2 (04 Jan 2018 00:00)  T(F): 97.1 (04 Jan 2018 00:00), Max: 97.1 (04 Jan 2018 00:00)  HR: 93 (04 Jan 2018 06:00) (76 - 98)  BP: 178/116 (04 Jan 2018 06:00) (108/95 - 178/116)  BP(mean): 128 (04 Jan 2018 06:00) (77 - 128)  ABP: --  ABP(mean): --  RR: 25 (04 Jan 2018 06:00) (20 - 33)  SpO2: 95% (04 Jan 2018 06:00) (94% - 100%)        01-03 @ 07:01  -  01-04 @ 07:00  --------------------------------------------------------  IN: 1320 mL / OUT: 90 mL / NET: 1230 mL      CAPILLARY BLOOD GLUCOSE      POCT Blood Glucose.: 218 mg/dL (03 Jan 2018 22:57)      PHYSICAL EXAM:    General: NAD  HEENT: NC/AT; PERRL, clear conjunctiva  Neck: supple  Respiratory: CTA b/l  Cardiovascular: +S1/S2; RRR  Abdomen: soft, NT/ND; +BS x4  Extremities: WWP, 2+ peripheral pulses b/l; no LE edema  Skin: normal color and turgor; no rash  Neurological:    MEDICATIONS:  MEDICATIONS  (STANDING):  calcium acetate 667 milliGRAM(s) Oral three times a day with meals  chlorhexidine 4% Liquid 1 Application(s) Topical daily  dextrose 5%. 1000 milliLiter(s) (50 mL/Hr) IV Continuous <Continuous>  dextrose 50% Injectable 12.5 Gram(s) IV Push once  dextrose 50% Injectable 25 Gram(s) IV Push once  dextrose 50% Injectable 25 Gram(s) IV Push once  epoetin marla Injectable 84462 Unit(s) IV Push once  influenza   Vaccine 0.5 milliLiter(s) IntraMuscular once  insulin lispro (HumaLOG) corrective regimen sliding scale   SubCutaneous three times a day before meals  insulin lispro (HumaLOG) corrective regimen sliding scale   SubCutaneous at bedtime  valproate sodium IVPB 750 milliGRAM(s) IV Intermittent every 12 hours    MEDICATIONS  (PRN):  dextrose Gel 1 Dose(s) Oral once PRN Blood Glucose LESS THAN 70 milliGRAM(s)/deciliter  glucagon  Injectable 1 milliGRAM(s) IntraMuscular once PRN Glucose LESS THAN 70 milligrams/deciliter  simethicone 80 milliGRAM(s) Chew four times a day PRN Gas      ALLERGIES:  Allergies    No Known Allergies    Intolerances        LABS:                        6.9    12.56 )-----------( 131      ( 04 Jan 2018 03:00 )             19.0     01-04    136  |  92<L>  |  75<H>  ----------------------------<  191<H>  4.2   |  25  |  5.84<H>    Ca    7.9<L>      04 Jan 2018 03:00  Phos  9.8     01-04  Mg     2.4     01-04    TPro  5.6<L>  /  Alb  2.8<L>  /  TBili  0.6  /  DBili  x   /  AST  64<H>  /  ALT  22  /  AlkPhos  86  01-04    PT/INR - ( 04 Jan 2018 03:00 )   PT: 12.0 SEC;   INR: 1.08          PTT - ( 04 Jan 2018 03:00 )  PTT:27.8 SEC      RADIOLOGY & ADDITIONAL TESTS: Reviewed.    1/3/17  CT A/P angio     Complex type B aortic dissection as described above, without significant   change from December 27, 2017.    Interval development of Left psoas muscle and anterior wall hematomas   measuring 4.9 x 5.9 and 6.5 x 4.6 cm respectively. No active   extravasation noted.    Air within the soft tissues of the right proximal upper extremity. X-ray   dated December 27, 2017 demonstrates a lap pad in this location. The lap   pad has subsequently been removed. Correlate with recent intervention.    Evidence of renal insufficiency

## 2018-01-04 NOTE — PROGRESS NOTE ADULT - ASSESSMENT
Assessment/Plan: 72y Female with a spontaneous, large RP hematoma.    - No acute signs of bleeding  - Trend H/H and transfuse as needed  - Continue to hold anticoagulation  - No acute surgical intervention  - Please page if there are changes in vitals or exam    Pager [phone]

## 2018-01-04 NOTE — PHYSICAL THERAPY INITIAL EVALUATION ADULT - ADDITIONAL COMMENTS
Pt reports that she lives in a private house with  with ~2STE. Prior to hospital admission pt was completely independent and used no assistive device with ambulation. (Information needs to be verified by family member). Pt denies any recent falls.    Pt left comfortable in chair, NAD, all lines intact, all precautions maintained, with call bell in reach, and RN aware of PT evaluation. Pt reports that she lives in a private house with  with ~2STE; bedroom and bathroom on first floor. Prior to hospital admission pt was completely independent and used no assistive device with ambulation. (Information needs to be verified by family member). Pt denies any recent falls.    Pt left comfortable in chair, NAD, all lines intact, all precautions maintained, with call bell in reach, and RN aware of PT evaluation.

## 2018-01-04 NOTE — PHYSICAL THERAPY INITIAL EVALUATION ADULT - GENERAL OBSERVATIONS, REHAB EVAL
Pt encountered in semisupine position, no distress, AxOx3/4, with +IV, +tele, and family member sleeping on couch

## 2018-01-04 NOTE — PHYSICAL THERAPY INITIAL EVALUATION ADULT - PERTINENT HX OF CURRENT PROBLEM, REHAB EVAL
72F PMHx of CHF, DM2, HTN, CKD Stage 4, Hx of Type B aortic dissection p/w AMS, patient was dc'd from [facility] today, treated for CHF exacerbation presented with acute onset lethargy, loss of vision, weakness, pt with decreasing mental status in ED, was intubated for airway protection, Na at 129, CT h/c/a/p w/o evid head pathology, stable aortic dissection, slightly enlarged infrarenal AA at 5cm, no evidence  PE, (+) cardiomeg, (+) Bilateral subsegmental atelectasis.

## 2018-01-04 NOTE — PHYSICAL THERAPY INITIAL EVALUATION ADULT - PATIENT PROFILE REVIEW, REHAB EVAL
ACTIVITY: OOB to chair; spoke with RN Jenni Sosa prior to PT evaluation--> Pt OK for OOB activity/yes

## 2018-01-04 NOTE — PROGRESS NOTE ADULT - SUBJECTIVE AND OBJECTIVE BOX
[facility] - PROGRESS NOTE    Patient is a 72y Female with advanced CKD previously not on dialysis adm with NCSE tx/controlled with valproate; hx of type B aortic dissection and new retroperitoneal bleed on 4th unit PRBs since yest am; c/o lower abd pain; oligoanuric since IV contast study yest, currently on HD in MICU    Allergies:  No Known Allergies    Hospital Medications:   MEDICATIONS  (STANDING):  calcium acetate 667 milliGRAM(s) Oral three times a day with meals  chlorhexidine 4% Liquid 1 Application(s) Topical daily  dextrose 5%. 1000 milliLiter(s) (50 mL/Hr) IV Continuous <Continuous>  dextrose 50% Injectable 12.5 Gram(s) IV Push once  dextrose 50% Injectable 25 Gram(s) IV Push once  dextrose 50% Injectable 25 Gram(s) IV Push once  epoetin marla Injectable 96073 Unit(s) IV Push once  influenza   Vaccine 0.5 milliLiter(s) IntraMuscular once  insulin lispro (HumaLOG) corrective regimen sliding scale   SubCutaneous three times a day before meals  insulin lispro (HumaLOG) corrective regimen sliding scale   SubCutaneous at bedtime  valproate sodium IVPB 750 milliGRAM(s) IV Intermittent every 12 hours    REVIEW OF SYSTEMS:  CONSTITUTIONAL: mild gen'd weakness/puffyness  RESPIRATORY: OCC cough, MILD shortness of breath  CARDIOVASCULAR: No chest pain or palpitations.  GASTROINTESTINAL: ongoing lower abd pain; no n/v  NEUROLOGICAL: weakness/decreased "feeling" in legs  SKIN: No itching       VITALS:  T(F): 97.6 (01-04-18 @ 07:30), Max: 97.6 (01-04-18 @ 07:30)  HR: 82 (01-04-18 @ 07:30)  BP: 156/83 (01-04-18 @ 07:30)  RR: 28 (01-04-18 @ 07:30)  SpO2: 95% (01-04-18 @ 07:30)  Wt(kg): --    01-02 @ 07:01  -  01-03 @ 07:00  --------------------------------------------------------  IN: 110 mL / OUT: 805 mL / NET: -695 mL    01-03 @ 07:01  -  01-04 @ 07:00  --------------------------------------------------------  IN: 1320 mL / OUT: 90 mL / NET: 1230 mL        PHYSICAL EXAM:  Constitutional: NAD  Respiratory: CTAB, no wheezes ant  Cardiovascular: S1, S2, RRR  Gastrointestinal: BS+, soft, mod lower abd tenderness  Extremities: mild bilat thigh dependent edema  Neurological: A/O x 3, no focal deficits  Psychiatric: Normal mood, normal affect  :  fede.   Vascular Access:  rt IJ catheter    LABS:  01-04    136  |  92<L>  |  75<H>  ----------------------------<  191<H>  4.2   |  25  |  5.84<H>    Ca    7.9<L>      04 Jan 2018 03:00  Phos  9.8     01-04  Mg     2.4     01-04    TPro  5.6<L>  /  Alb  2.8<L>  /  TBili  0.6  /  DBili      /  AST  64<H>  /  ALT  22  /  AlkPhos  86  01-04    Creatinine Trend: 5.84 <--, 4.95 <--, 7.92 <--, 7.56 <--, 9.38 <--, 8.27 <--, 6.98 <--, 5.88 <--                        6.9    12.56 )-----------( 131      ( 04 Jan 2018 03:00 )             19.0     Urine Studies:    Sodium, Random Urine: 52 mmol/L (12-28 @ 14:15)  Osmolality, Random Urine: 316 mosmo/kg (12-28 @ 14:15)  Creatinine, Random Urine: 54.19 mg/dL (12-28 @ 14:15)    RADIOLOGY & ADDITIONAL STUDIES:

## 2018-01-04 NOTE — PROGRESS NOTE ADULT - SUBJECTIVE AND OBJECTIVE BOX
GENERAL SURGERY  Pager: [phone]      INTERVAL EVENTS/SUBJECTIVE:   Was seen this AM and was complaining of left sided abdominal pain.  Underwent HD today and received 1U pRBCs.  ______________________________________________  OBJECTIVE:   T(C): 36.4 (01-04-18 @ 16:00), Max: 36.5 (01-04-18 @ 12:00)  HR: 95 (01-04-18 @ 17:10) (82 - 101)  BP: 152/90 (01-04-18 @ 17:10) (104/66 - 178/116)  RR: 24 (01-04-18 @ 17:10) (20 - 33)  SpO2: 94% (01-04-18 @ 17:10) (93% - 100%)  Wt(kg): --  CAPILLARY BLOOD GLUCOSE      POCT Blood Glucose.: 137 mg/dL (04 Jan 2018 11:47)  POCT Blood Glucose.: 130 mg/dL (04 Jan 2018 08:32)  POCT Blood Glucose.: 218 mg/dL (03 Jan 2018 22:57)  POCT Blood Glucose.: 178 mg/dL (03 Jan 2018 17:31)    I&O's Detail    03 Jan 2018 07:01  -  04 Jan 2018 07:00  --------------------------------------------------------  IN:    IV PiggyBack: 100 mL    Packed Red Blood Cells: 900 mL    Platelets - Single Donor: 320 mL  Total IN: 1320 mL    OUT:    Indwelling Catheter - Urethral: 90 mL  Total OUT: 90 mL    Total NET: 1230 mL      04 Jan 2018 07:01  -  04 Jan 2018 17:17  --------------------------------------------------------  IN:    Oral Fluid: 340 mL    Other: 400 mL  Total IN: 740 mL    OUT:    Other: 1900 mL  Total OUT: 1900 mL    Total NET: -1160 mL          Physical exam:  Gen: NAD  Abdomen: Left sided tenderness, soft, nondistended  ______________________________________________  LABS:  CBC Full  -  ( 04 Jan 2018 15:00 )  WBC Count : 14.66 K/uL  Hemoglobin : 8.1 g/dL  Hematocrit : 23.2 %  Platelet Count - Automated : 137 K/uL  Mean Cell Volume : 85.6 fL  Mean Cell Hemoglobin : 29.9 pg  Mean Cell Hemoglobin Concentration : 34.9 %  Auto Neutrophil # : x  Auto Lymphocyte # : x  Auto Monocyte # : x  Auto Eosinophil # : x  Auto Basophil # : x  Auto Neutrophil % : x  Auto Lymphocyte % : x  Auto Monocyte % : x  Auto Eosinophil % : x  Auto Basophil % : x    01-04    136  |  92<L>  |  75<H>  ----------------------------<  191<H>  4.2   |  25  |  5.84<H>    Ca    7.9<L>      04 Jan 2018 03:00  Phos  9.8     01-04  Mg     2.4     01-04    TPro  5.6<L>  /  Alb  2.8<L>  /  TBili  0.6  /  DBili  x   /  AST  64<H>  /  ALT  22  /  AlkPhos  86  01-04    LIVER FUNCTIONS - ( 04 Jan 2018 03:00 )  Alb: 2.8 g/dL / Pro: 5.6 g/dL / ALK PHOS: 86 u/L / ALT: 22 u/L / AST: 64 u/L / GGT: x           PT/INR - ( 04 Jan 2018 03:00 )   PT: 12.0 SEC;   INR: 1.08          PTT - ( 04 Jan 2018 03:00 )  PTT:27.8 SEC

## 2018-01-05 LAB
ALBUMIN SERPL ELPH-MCNC: 2.9 G/DL — LOW (ref 3.3–5)
ALP SERPL-CCNC: 89 U/L — SIGNIFICANT CHANGE UP (ref 40–120)
ALT FLD-CCNC: 23 U/L — SIGNIFICANT CHANGE UP (ref 4–33)
AST SERPL-CCNC: 63 U/L — HIGH (ref 4–32)
BILIRUB SERPL-MCNC: 0.9 MG/DL — SIGNIFICANT CHANGE UP (ref 0.2–1.2)
BUN SERPL-MCNC: 55 MG/DL — HIGH (ref 7–23)
CALCIUM SERPL-MCNC: 8 MG/DL — LOW (ref 8.4–10.5)
CHLORIDE SERPL-SCNC: 98 MMOL/L — SIGNIFICANT CHANGE UP (ref 98–107)
CO2 SERPL-SCNC: 26 MMOL/L — SIGNIFICANT CHANGE UP (ref 22–31)
CREAT SERPL-MCNC: 4.28 MG/DL — HIGH (ref 0.5–1.3)
GLUCOSE BLDC GLUCOMTR-MCNC: 114 MG/DL — HIGH (ref 70–99)
GLUCOSE BLDC GLUCOMTR-MCNC: 115 MG/DL — HIGH (ref 70–99)
GLUCOSE BLDC GLUCOMTR-MCNC: 118 MG/DL — HIGH (ref 70–99)
GLUCOSE BLDC GLUCOMTR-MCNC: 134 MG/DL — HIGH (ref 70–99)
GLUCOSE SERPL-MCNC: 124 MG/DL — HIGH (ref 70–99)
HCT VFR BLD CALC: 22 % — LOW (ref 34.5–45)
HCT VFR BLD CALC: 22.5 % — LOW (ref 34.5–45)
HGB BLD-MCNC: 7.8 G/DL — LOW (ref 11.5–15.5)
HGB BLD-MCNC: 8 G/DL — LOW (ref 11.5–15.5)
MAGNESIUM SERPL-MCNC: 2.4 MG/DL — SIGNIFICANT CHANGE UP (ref 1.6–2.6)
MCHC RBC-ENTMCNC: 30.8 PG — SIGNIFICANT CHANGE UP (ref 27–34)
MCHC RBC-ENTMCNC: 30.9 PG — SIGNIFICANT CHANGE UP (ref 27–34)
MCHC RBC-ENTMCNC: 35.5 % — SIGNIFICANT CHANGE UP (ref 32–36)
MCHC RBC-ENTMCNC: 35.6 % — SIGNIFICANT CHANGE UP (ref 32–36)
MCV RBC AUTO: 86.9 FL — SIGNIFICANT CHANGE UP (ref 80–100)
MCV RBC AUTO: 87 FL — SIGNIFICANT CHANGE UP (ref 80–100)
NRBC # FLD: 0 — SIGNIFICANT CHANGE UP
NRBC # FLD: 0.02 — SIGNIFICANT CHANGE UP
PHOSPHATE SERPL-MCNC: 6.3 MG/DL — HIGH (ref 2.5–4.5)
PLATELET # BLD AUTO: 100 K/UL — LOW (ref 150–400)
PLATELET # BLD AUTO: 109 K/UL — LOW (ref 150–400)
PMV BLD: 10.2 FL — SIGNIFICANT CHANGE UP (ref 7–13)
PMV BLD: 10.5 FL — SIGNIFICANT CHANGE UP (ref 7–13)
POTASSIUM SERPL-MCNC: 4.6 MMOL/L — SIGNIFICANT CHANGE UP (ref 3.5–5.3)
POTASSIUM SERPL-SCNC: 4.6 MMOL/L — SIGNIFICANT CHANGE UP (ref 3.5–5.3)
PROT SERPL-MCNC: 5.8 G/DL — LOW (ref 6–8.3)
RBC # BLD: 2.53 M/UL — LOW (ref 3.8–5.2)
RBC # BLD: 2.59 M/UL — LOW (ref 3.8–5.2)
RBC # FLD: 14.6 % — HIGH (ref 10.3–14.5)
RBC # FLD: 14.8 % — HIGH (ref 10.3–14.5)
SODIUM SERPL-SCNC: 140 MMOL/L — SIGNIFICANT CHANGE UP (ref 135–145)
WBC # BLD: 10.06 K/UL — SIGNIFICANT CHANGE UP (ref 3.8–10.5)
WBC # BLD: 11.02 K/UL — HIGH (ref 3.8–10.5)
WBC # FLD AUTO: 10.06 K/UL — SIGNIFICANT CHANGE UP (ref 3.8–10.5)
WBC # FLD AUTO: 11.02 K/UL — HIGH (ref 3.8–10.5)

## 2018-01-05 PROCEDURE — 99291 CRITICAL CARE FIRST HOUR: CPT

## 2018-01-05 RX ORDER — ERYTHROPOIETIN 10000 [IU]/ML
10000 INJECTION, SOLUTION INTRAVENOUS; SUBCUTANEOUS ONCE
Qty: 0 | Refills: 0 | Status: COMPLETED | OUTPATIENT
Start: 2018-01-06 | End: 2018-01-06

## 2018-01-05 RX ORDER — METOPROLOL TARTRATE 50 MG
50 TABLET ORAL
Qty: 0 | Refills: 0 | Status: DISCONTINUED | OUTPATIENT
Start: 2018-01-05 | End: 2018-01-12

## 2018-01-05 RX ORDER — AMLODIPINE BESYLATE 2.5 MG/1
10 TABLET ORAL DAILY
Qty: 0 | Refills: 0 | Status: DISCONTINUED | OUTPATIENT
Start: 2018-01-05 | End: 2018-01-05

## 2018-01-05 RX ADMIN — DIVALPROEX SODIUM 750 MILLIGRAM(S): 500 TABLET, DELAYED RELEASE ORAL at 17:48

## 2018-01-05 RX ADMIN — Medication 667 MILLIGRAM(S): at 17:47

## 2018-01-05 RX ADMIN — Medication 667 MILLIGRAM(S): at 14:28

## 2018-01-05 RX ADMIN — DIVALPROEX SODIUM 750 MILLIGRAM(S): 500 TABLET, DELAYED RELEASE ORAL at 06:41

## 2018-01-05 RX ADMIN — AMLODIPINE BESYLATE 10 MILLIGRAM(S): 2.5 TABLET ORAL at 12:20

## 2018-01-05 RX ADMIN — Medication 50 MILLIGRAM(S): at 17:48

## 2018-01-05 RX ADMIN — CHLORHEXIDINE GLUCONATE 1 APPLICATION(S): 213 SOLUTION TOPICAL at 17:51

## 2018-01-05 NOTE — PROGRESS NOTE ADULT - ASSESSMENT
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 Following commands  -MRI/CT head showed no mass lesions, hemorrhage or acute changes  - LP performed 12/29 are unrevealing as to source of seizures; HSV PCR negative   - c/w depakote 750 mg TID  -no seizures >72 hours   - c/w HD for uremia     # Pulmonary - extubated on 1/2/17  - saturating well on RA, no resp distress; c/t monitor     # CV  []CHF, unknown EF  - check official TTE   []Aortic dissection on CT appears stable, BP well controlled  - CT angio mentiones unchanged dissection. Touch base with radiology regarding 5 cm infrarenal aortic aneurysm, they commented is unchanged since imaging on 12/28. Will touch base with IR, as pt actively bleeding, and intervention needs to be done.     # GI  - c/w PO diet   - abdominal pain 2/2 RP bleed. Pain control with percocet.     # Renal  [] ESRD - CKD4, now with ESRD 2/2 contrast load on admission   - yesterday, 1.5 L removed  - c/w phoslo   [] Hyponatremia - improved with no intervention      # ID  - hypothermic to 91 on admission, temp now normalized   - no clear source - abx d/c on 12/30/17    # Heme  [] Acute blood loss anemia, pt still bleeding. Hbg trended ON from 8.1 to 7.1 and only responded to 7.8 with 1 unit of PRBC  - total of 5 units of PRBC, 2 units of plts  -will call IR in am for intervention to control bleeding     # Endo  -FS mostly within goal <180  -FS acceptable for now, c/w SSI    #Skin  -R IJ shiley   -1 triple lumen on RLE, 1 20 G on RUE     # DVT ppx   - SCDs given RP bleed       Leon Acosta, PGY3  [id number] 72F PMHx of CHF, DM2, HTN, CKD Stage 4, Hx of Type B aortic dissection p/w AMS, patient was dc'd from [facility] today after being treated for CHF exacerbation; presented with acute onset lethargy, loss of vision, weakness, with AMS in ED, s/p intubated for airway protection, 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 Following commands  -MRI/CT head showed no mass lesions, hemorrhage or acute changes  - LP performed 12/29 are unrevealing as to source of seizures; HSV PCR negative   - c/w depakote 750 mg TID  -no seizures >72 hours   - c/w HD for uremia     # Pulmonary - extubated on 1/2/17  - saturating well on RA, no resp distress; c/t monitor     # CV  []CHF, unknown EF  - check official TTE   []Aortic dissection on CT appears stable, BP well controlled  - CT angio mentiones unchanged dissection. Touch base with radiology regarding 5 cm infrarenal aortic aneurysm, they commented is unchanged since imaging on 12/28. Will touch base with IR, as pt actively bleeding, and intervention needs to be done.   -will hold off HTN meds as of now given active bleed     # GI  - c/w PO diet   - abdominal pain 2/2 RP bleed. Pain control with percocet.     # Renal  [] ESRD - CKD4, now with ESRD 2/2 contrast load on admission   - yesterday, 1.5 L removed  - c/w phoslo   [] Hyponatremia - improved with no intervention      # ID  - hypothermic to 91 on admission, temp now normalized   - no clear source - abx d/c on 12/30/17    # Heme  [] Acute blood loss anemia, pt still bleeding. Hbg trended ON from 8.1 to 7.1 and only responded to 7.8 with 1 unit of PRBC  - total of 5 units of PRBC, 2 units of plts  -will call IR in am for intervention to control bleeding     # Endo  -FS mostly within goal <180  -FS acceptable for now, c/w SSI    #Skin  -R IJ shiley   -1 triple lumen on RLE, 1 20 G on RUE     # DVT ppx   - SCDs given RP bleed       Leon Acosta, PGY3  [id number] 72F PMHx of CHF, DM2, HTN, CKD Stage 4, Hx of Type B aortic dissection p/w AMS, patient was dc'd from [facility] today after being treated for CHF exacerbation; presented with acute onset lethargy, loss of vision, weakness, with AMS in ED, s/p intubated for airway protection, 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 Following commands  -MRI/CT head showed no mass lesions, hemorrhage or acute changes  - LP performed 12/29 are unrevealing as to source of seizures; HSV PCR negative   - c/w depakote 750 mg TID  -no seizures >72 hours   - c/w HD for uremia     # Pulmonary - extubated on 1/2/17  - saturating well on RA, no resp distress; c/t monitor     # CV  []CHF, unknown EF  - check official TTE   []Aortic dissection on CT appears stable, BP well controlled  - CT angio mentiones unchanged dissection. Touch base with radiology regarding 5 cm infrarenal aortic aneurysm, they commented is unchanged since imaging on 12/28.   -given pt not bleeding rapidly, and oozing, no IR intervention as of now.   -will control bp with amlodipine, given pt HD stable but hypertensive. Can worsen bleed.     # GI  - c/w PO diet   - abdominal pain 2/2 RP bleed. Pain control with percocet.     # Renal  [] ESRD - CKD4, now with ESRD 2/2 contrast load on admission   - yesterday, 1.5 L removed  - c/w phoslo   [] Hyponatremia - improved with no intervention      # ID  - hypothermic to 91 on admission, temp now normalized   - no clear source - abx d/c on 12/30/17    # Heme  [] Acute blood loss anemia, pt still bleeding. Hbg trended ON from 8.1 to 7.1 and only responded to 7.8 with 1 unit of PRBC  - total of 5 units of PRBC, 2 units of plts  -pt oozing slowly, no IR intervention as of now    # Endo  -FS mostly within goal <180  -FS acceptable for now, c/w SSI    #Skin  -R IJ shiley   -1 triple lumen on RLE, 1 20 G on RUE     # DVT ppx   - SCDs given RP bleed       Leon Acosta, PGY3  [id number]

## 2018-01-05 NOTE — PROGRESS NOTE ADULT - SUBJECTIVE AND OBJECTIVE BOX
comfortable- denies dyspnea and chest pain  dialyzed yesterday    MEDICATIONS  (STANDING):  calcium acetate 667 milliGRAM(s) Oral three times a day with meals  chlorhexidine 4% Liquid 1 Application(s) Topical daily  dextrose 5%. 1000 milliLiter(s) (50 mL/Hr) IV Continuous <Continuous>  dextrose 50% Injectable 12.5 Gram(s) IV Push once  dextrose 50% Injectable 25 Gram(s) IV Push once  dextrose 50% Injectable 25 Gram(s) IV Push once  diVALproex  milliGRAM(s) Oral two times a day  influenza   Vaccine 0.5 milliLiter(s) IntraMuscular once  insulin lispro (HumaLOG) corrective regimen sliding scale   SubCutaneous three times a day before meals  insulin lispro (HumaLOG) corrective regimen sliding scale   SubCutaneous at bedtime    01-04    136  |  92<L>  |  75<H>  ----------------------------<  191<H>  4.2   |  25  |  5.84<H>    Ca    7.9<L>      04 Jan 2018 03:00  Phos  9.8     01-04  Mg     2.4     01-04    TPro  5.6<L>  /  Alb  2.8<L>  /  TBili  0.6  /  DBili  x   /  AST  64<H>  /  ALT  22  /  AlkPhos  86  01-04                        7.2    14.89 )-----------( 116      ( 04 Jan 2018 21:00 )             19.9   ICU Vital Signs Last 24 Hrs  T(C): 37.1 (05 Jan 2018 04:00), Max: 37.2 (04 Jan 2018 20:00)  T(F): 98.7 (05 Jan 2018 04:00), Max: 98.9 (04 Jan 2018 20:00)  HR: 81 (05 Jan 2018 04:00) (81 - 101)  BP: 133/71 (05 Jan 2018 04:00) (104/66 - 159/93)  BP(mean): 85 (05 Jan 2018 04:00) (75 - 129)  ABP: --  ABP(mean): --  RR: 31 (05 Jan 2018 04:00) (22 - 32)  SpO2: 95% (05 Jan 2018 04:00) (91% - 99%)    exam: appears alert and in no distress  lungs - decreased bs bilat, no  rales  CV- s1 s2 harsh systolic murmur  abd-soft  extrem-no edema

## 2018-01-05 NOTE — PROGRESS NOTE ADULT - ATTENDING COMMENTS
Agree with above. Seen and examined with residents. Close watch of H/H. Otherwise mental status stable with no seizures.

## 2018-01-05 NOTE — PROGRESS NOTE ADULT - SUBJECTIVE AND OBJECTIVE BOX
INTERVAL HPI/OVERNIGHT EVENTS: Hbg trended ON from 8.1 to 7.1. Received 1 unit of PRBC. Hbg went up to 7.8.     SUBJECTIVE: Patient seen and examined at bedside. Denies any dizziness, lightheadedness, SOB, CP. Pt endorses L abdominal pain, which is better with percocet.     CONSTITUTIONAL: No weakness, fevers or chills  EYES/ENT: No visual changes;  No vertigo or throat pain   NECK: No pain or stiffness  RESPIRATORY: No cough, wheezing, hemoptysis; No shortness of breath  CARDIOVASCULAR: No chest pain or palpitations  GASTROINTESTINAL: abdominal pain. No n/v/d/   GENITOURINARY: No dysuria, frequency or hematuria  NEUROLOGICAL: No numbness or weakness  SKIN: No itching, rashes    OBJECTIVE:    VITAL SIGNS:  ICU Vital Signs Last 24 Hrs  T(C): 37.1 (05 Jan 2018 04:00), Max: 37.2 (04 Jan 2018 20:00)  T(F): 98.7 (05 Jan 2018 04:00), Max: 98.9 (04 Jan 2018 20:00)  HR: 87 (05 Jan 2018 07:00) (81 - 101)  BP: 153/81 (05 Jan 2018 07:00) (104/66 - 160/78)  BP(mean): 99 (05 Jan 2018 07:00) (75 - 129)  ABP: --  ABP(mean): --  RR: 28 (05 Jan 2018 07:00) (22 - 32)  SpO2: 98% (05 Jan 2018 07:00) (91% - 99%)        01-04 @ 07:01  -  01-05 @ 07:00  --------------------------------------------------------  IN: 1040 mL / OUT: 1900 mL / NET: -860 mL      CAPILLARY BLOOD GLUCOSE      POCT Blood Glucose.: 114 mg/dL (05 Jan 2018 06:38)      PHYSICAL EXAM:    General: NAD  HEENT: PERRLA, EOMI, moist oropharynx  Neck: supple  Respiratory: CTA b/l, no w,c  Cardiovascular: +S1/S2; RRR  Abdomen: soft, NT/ND; +BS x4  Extremities: no LE edema  Skin: normal color and turgor; no rash  Neurological: A & O X 3, non focal     MEDICATIONS:  MEDICATIONS  (STANDING):  calcium acetate 667 milliGRAM(s) Oral three times a day with meals  chlorhexidine 4% Liquid 1 Application(s) Topical daily  dextrose 5%. 1000 milliLiter(s) (50 mL/Hr) IV Continuous <Continuous>  dextrose 50% Injectable 12.5 Gram(s) IV Push once  dextrose 50% Injectable 25 Gram(s) IV Push once  dextrose 50% Injectable 25 Gram(s) IV Push once  diVALproex  milliGRAM(s) Oral two times a day  influenza   Vaccine 0.5 milliLiter(s) IntraMuscular once  insulin lispro (HumaLOG) corrective regimen sliding scale   SubCutaneous three times a day before meals  insulin lispro (HumaLOG) corrective regimen sliding scale   SubCutaneous at bedtime    MEDICATIONS  (PRN):  dextrose Gel 1 Dose(s) Oral once PRN Blood Glucose LESS THAN 70 milliGRAM(s)/deciliter  glucagon  Injectable 1 milliGRAM(s) IntraMuscular once PRN Glucose LESS THAN 70 milligrams/deciliter  oxyCODONE    5 mG/acetaminophen 325 mG 1 Tablet(s) Oral every 6 hours PRN moderate-severe pain  simethicone 80 milliGRAM(s) Chew four times a day PRN Gas      ALLERGIES:  Allergies    No Known Allergies    Intolerances        LABS:                        7.8    11.02 )-----------( 100      ( 05 Jan 2018 06:13 )             22.0     01-05    140  |  98  |  55<H>  ----------------------------<  124<H>  4.6   |  26  |  4.28<H>    Ca    8.0<L>      05 Jan 2018 06:13  Phos  6.3     01-05  Mg     2.4     01-05    TPro  5.8<L>  /  Alb  2.9<L>  /  TBili  0.9  /  DBili  x   /  AST  63<H>  /  ALT  23  /  AlkPhos  89  01-05    PT/INR - ( 04 Jan 2018 03:00 )   PT: 12.0 SEC;   INR: 1.08          PTT - ( 04 Jan 2018 03:00 )  PTT:27.8 SEC      RADIOLOGY & ADDITIONAL TESTS: Reviewed.

## 2018-01-05 NOTE — PROGRESS NOTE ADULT - ASSESSMENT
pt with ESRD now on HD and will likely need to continue 3 time per week HD.  Will need shiley changed to permcath before discharge.  Next HD for saturday and will continue to dose epogen with each HD  Will need social service to start planning for outpatient HD- pt can go to [facility] but also option of [facility] satellite dialysis in [place] which is closer to her home- explained to patient that if she does not go to [facility] dialysis she will need new nephrologist to manage her HD as I dont go to other dialysis units.   Still off bp meds and suspect will need to restart some of her meds soon.    WIll continue to follow

## 2018-01-06 LAB
BUN SERPL-MCNC: 67 MG/DL — HIGH (ref 7–23)
CALCIUM SERPL-MCNC: 7.8 MG/DL — LOW (ref 8.4–10.5)
CHLORIDE SERPL-SCNC: 94 MMOL/L — LOW (ref 98–107)
CO2 SERPL-SCNC: 25 MMOL/L — SIGNIFICANT CHANGE UP (ref 22–31)
CREAT SERPL-MCNC: 4.87 MG/DL — HIGH (ref 0.5–1.3)
GLUCOSE BLDC GLUCOMTR-MCNC: 122 MG/DL — HIGH (ref 70–99)
GLUCOSE BLDC GLUCOMTR-MCNC: 158 MG/DL — HIGH (ref 70–99)
GLUCOSE BLDC GLUCOMTR-MCNC: 87 MG/DL — SIGNIFICANT CHANGE UP (ref 70–99)
GLUCOSE BLDC GLUCOMTR-MCNC: 89 MG/DL — SIGNIFICANT CHANGE UP (ref 70–99)
GLUCOSE BLDC GLUCOMTR-MCNC: 94 MG/DL — SIGNIFICANT CHANGE UP (ref 70–99)
GLUCOSE SERPL-MCNC: 86 MG/DL — SIGNIFICANT CHANGE UP (ref 70–99)
HCT VFR BLD CALC: 21.5 % — LOW (ref 34.5–45)
HCT VFR BLD CALC: 22.6 % — LOW (ref 34.5–45)
HCT VFR BLD CALC: 25.4 % — LOW (ref 34.5–45)
HGB BLD-MCNC: 7.4 G/DL — LOW (ref 11.5–15.5)
HGB BLD-MCNC: 7.6 G/DL — LOW (ref 11.5–15.5)
HGB BLD-MCNC: 8.8 G/DL — LOW (ref 11.5–15.5)
MAGNESIUM SERPL-MCNC: 2.4 MG/DL — SIGNIFICANT CHANGE UP (ref 1.6–2.6)
MCHC RBC-ENTMCNC: 29.5 PG — SIGNIFICANT CHANGE UP (ref 27–34)
MCHC RBC-ENTMCNC: 30.1 PG — SIGNIFICANT CHANGE UP (ref 27–34)
MCHC RBC-ENTMCNC: 30.6 PG — SIGNIFICANT CHANGE UP (ref 27–34)
MCHC RBC-ENTMCNC: 33.6 % — SIGNIFICANT CHANGE UP (ref 32–36)
MCHC RBC-ENTMCNC: 34.4 % — SIGNIFICANT CHANGE UP (ref 32–36)
MCHC RBC-ENTMCNC: 34.6 % — SIGNIFICANT CHANGE UP (ref 32–36)
MCV RBC AUTO: 87.4 FL — SIGNIFICANT CHANGE UP (ref 80–100)
MCV RBC AUTO: 87.6 FL — SIGNIFICANT CHANGE UP (ref 80–100)
MCV RBC AUTO: 88.2 FL — SIGNIFICANT CHANGE UP (ref 80–100)
NRBC # FLD: 0.03 — SIGNIFICANT CHANGE UP
NRBC # FLD: 0.03 — SIGNIFICANT CHANGE UP
NRBC # FLD: 0.04 — SIGNIFICANT CHANGE UP
PHOSPHATE SERPL-MCNC: 5.6 MG/DL — HIGH (ref 2.5–4.5)
PLATELET # BLD AUTO: 101 K/UL — LOW (ref 150–400)
PLATELET # BLD AUTO: 104 K/UL — LOW (ref 150–400)
PLATELET # BLD AUTO: 108 K/UL — LOW (ref 150–400)
PMV BLD: 9.4 FL — SIGNIFICANT CHANGE UP (ref 7–13)
PMV BLD: 9.8 FL — SIGNIFICANT CHANGE UP (ref 7–13)
PMV BLD: 9.9 FL — SIGNIFICANT CHANGE UP (ref 7–13)
POTASSIUM SERPL-MCNC: 4.1 MMOL/L — SIGNIFICANT CHANGE UP (ref 3.5–5.3)
POTASSIUM SERPL-SCNC: 4.1 MMOL/L — SIGNIFICANT CHANGE UP (ref 3.5–5.3)
RBC # BLD: 2.46 M/UL — LOW (ref 3.8–5.2)
RBC # BLD: 2.58 M/UL — LOW (ref 3.8–5.2)
RBC # BLD: 2.88 M/UL — LOW (ref 3.8–5.2)
RBC # FLD: 14.6 % — HIGH (ref 10.3–14.5)
RBC # FLD: 14.8 % — HIGH (ref 10.3–14.5)
RBC # FLD: 14.9 % — HIGH (ref 10.3–14.5)
SODIUM SERPL-SCNC: 135 MMOL/L — SIGNIFICANT CHANGE UP (ref 135–145)
WBC # BLD: 8.19 K/UL — SIGNIFICANT CHANGE UP (ref 3.8–10.5)
WBC # BLD: 8.74 K/UL — SIGNIFICANT CHANGE UP (ref 3.8–10.5)
WBC # BLD: 9.42 K/UL — SIGNIFICANT CHANGE UP (ref 3.8–10.5)
WBC # FLD AUTO: 8.19 K/UL — SIGNIFICANT CHANGE UP (ref 3.8–10.5)
WBC # FLD AUTO: 8.74 K/UL — SIGNIFICANT CHANGE UP (ref 3.8–10.5)
WBC # FLD AUTO: 9.42 K/UL — SIGNIFICANT CHANGE UP (ref 3.8–10.5)

## 2018-01-06 PROCEDURE — 99291 CRITICAL CARE FIRST HOUR: CPT

## 2018-01-06 RX ORDER — THIAMINE MONONITRATE (VIT B1) 100 MG
100 TABLET ORAL DAILY
Qty: 0 | Refills: 0 | Status: DISCONTINUED | OUTPATIENT
Start: 2018-01-06 | End: 2018-01-06

## 2018-01-06 RX ORDER — THIAMINE MONONITRATE (VIT B1) 100 MG
200 TABLET ORAL THREE TIMES A DAY
Qty: 0 | Refills: 0 | Status: DISCONTINUED | OUTPATIENT
Start: 2018-01-06 | End: 2018-01-06

## 2018-01-06 RX ADMIN — DIVALPROEX SODIUM 750 MILLIGRAM(S): 500 TABLET, DELAYED RELEASE ORAL at 17:25

## 2018-01-06 RX ADMIN — Medication 667 MILLIGRAM(S): at 08:43

## 2018-01-06 RX ADMIN — Medication 667 MILLIGRAM(S): at 11:28

## 2018-01-06 RX ADMIN — DIVALPROEX SODIUM 750 MILLIGRAM(S): 500 TABLET, DELAYED RELEASE ORAL at 06:19

## 2018-01-06 RX ADMIN — ERYTHROPOIETIN 10000 UNIT(S): 10000 INJECTION, SOLUTION INTRAVENOUS; SUBCUTANEOUS at 13:59

## 2018-01-06 RX ADMIN — Medication 50 MILLIGRAM(S): at 06:19

## 2018-01-06 RX ADMIN — Medication 667 MILLIGRAM(S): at 17:25

## 2018-01-06 RX ADMIN — Medication 50 MILLIGRAM(S): at 17:25

## 2018-01-06 RX ADMIN — CHLORHEXIDINE GLUCONATE 1 APPLICATION(S): 213 SOLUTION TOPICAL at 11:30

## 2018-01-06 NOTE — CHART NOTE - NSCHARTNOTEFT_GEN_A_CORE
MICU Transfer Note    Transfer from: MICU  Transfer to:  ( x ) Medicine    (  ) Telemetry    (  ) RCU    (  ) Palliative    (  ) Stroke Unit    (  ) _______________  Accepting Physican:      MICU COURSE:  72F PMHx of CHF, DM2, HTN, CKD Stage 4, Hx of Type B aortic dissection p/w AMS - acute onset lethargy, loss of vision, weakness. Intubated in the ER for airway protection. - CT head w/o acute pathology, CT chest/abdom: Chronic Type B aortic dissection, not significantly changed. Infrarenal abdominal aortic aneurysm measuring 5 cm, compared to 4.2 cm on CT of June 8, 2009. MRI Brain showed diffuse small vessel disease but no acute stroke, hemorrhage or PRES. VEEG showed the patient was in non-convulsive status epilepticus treated with valproic acid and propofol. Pt was treated with broad spectrum antibiotics in case of CNS infection, LP was normal and antibiotics were discontinued.  Valproic acid was titrated up to effect with resolution of seizure activity on EEG. Following resolution of seizures pt was successfully extubated.    Baseline CKD stage 4 progressed to end stage with multiple contrast scans and intermittent HD was initiated via a R IJ Shiley.    Course was complicated by retroperitoneal bleeding, pt developed L sided abdominal pain and CT showed Left psoas muscle and anterior wall hematomas - there was no change in her type B aortic dissection or aortic aneurysm. This was managed conservatively with CBC monitoring and blood transfusions, 5 units of blood and 2 units of platelets in total.      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    # Neuro  Encephalopathy 2/2 seizures, possible component of uremia   - pt back to baseline. Following commands  -MRI/CT head showed no mass lesions, hemorrhage or acute changes  - LP performed 12/29 are unrevealing as to source of seizures; HSV PCR negative   - c/w depakote 750 mg TID  -no seizures >72 hours   - c/w HD for uremia; HD today     # Pulmonary - extubated on 1/2/17  - saturating well on RA, no resp distress; c/t monitor     # CV  []CHF, unknown EF  - check official TTE   []Aortic dissection on CT appears stable, BP well controlled  - CT angio mentions unchanged dissection. Touch base with radiology regarding 5 cm infrarenal aortic aneurysm, they commented is unchanged since imaging on 12/28.   -given pt not bleeding rapidly, and oozing, no IR intervention as of now.   -will control bp with amlodipine, given pt HD stable but hypertensive. Can worsen bleed.     # GI  - c/w PO diet   - currently has no abdominal pain     # Renal  [] ESRD - CKD4, now with ESRD 2/2 contrast load on admission   - HD today   - c/w phoslo   [] Hyponatremia - improved with no intervention      # ID  - hypothermic to 91 on admission, temp now normalized   - no clear source - abx d/c on 12/30/17    # Heme  [] Acute blood loss anemia, pt still bleeding. Hbg trended down from 8.1 to 7.4, but back to 7.6, bleeding slowed down   - total of 5 units of PRBC, 2 units of plts  -pt oozing slowly, no IR intervention as of now    # Endo  -FS mostly within goal <180  -FS acceptable for now, c/w SSI    #Skin  -R IJ shiley   -1 triple lumen on RLE, 1 20 G on RUE   -will take out triple lumen     # DVT ppx   - SCDs given RP bleed        For Follow-Up:          Vital Signs Last 24 Hrs  T(C): 36.6 (06 Jan 2018 12:45), Max: 37.1 (05 Jan 2018 20:00)  T(F): 97.8 (06 Jan 2018 12:45), Max: 98.8 (05 Jan 2018 20:00)  HR: 61 (06 Jan 2018 14:00) (55 - 96)  BP: 158/75 (06 Jan 2018 14:00) (85/48 - 160/68)  BP(mean): 94 (06 Jan 2018 14:00) (57 - 104)  RR: 20 (06 Jan 2018 14:00) (20 - 32)  SpO2: 95% (06 Jan 2018 08:00) (74% - 98%)  I&O's Summary    05 Jan 2018 07:01  -  06 Jan 2018 07:00  --------------------------------------------------------  IN: 360 mL / OUT: 0 mL / NET: 360 mL    06 Jan 2018 07:01  -  06 Jan 2018 15:29  --------------------------------------------------------  IN: 0 mL / OUT: 220 mL / NET: -220 mL          MEDICATIONS  (STANDING):  calcium acetate 667 milliGRAM(s) Oral three times a day with meals  chlorhexidine 4% Liquid 1 Application(s) Topical daily  dextrose 5%. 1000 milliLiter(s) (50 mL/Hr) IV Continuous <Continuous>  dextrose 50% Injectable 12.5 Gram(s) IV Push once  dextrose 50% Injectable 25 Gram(s) IV Push once  dextrose 50% Injectable 25 Gram(s) IV Push once  diVALproex  milliGRAM(s) Oral two times a day  influenza   Vaccine 0.5 milliLiter(s) IntraMuscular once  insulin lispro (HumaLOG) corrective regimen sliding scale   SubCutaneous three times a day before meals  insulin lispro (HumaLOG) corrective regimen sliding scale   SubCutaneous at bedtime  metoprolol     tartrate 50 milliGRAM(s) Oral two times a day    MEDICATIONS  (PRN):  dextrose Gel 1 Dose(s) Oral once PRN Blood Glucose LESS THAN 70 milliGRAM(s)/deciliter  glucagon  Injectable 1 milliGRAM(s) IntraMuscular once PRN Glucose LESS THAN 70 milligrams/deciliter  simethicone 80 milliGRAM(s) Chew four times a day PRN Gas        LABS                                            7.6                   Neurophils% (auto):   x      (01-06 @ 05:30):    8.74 )-----------(108          Lymphocytes% (auto):  x                                             22.6                   Eosinphils% (auto):   x        Manual%: Neutrophils x    ; Lymphocytes x    ; Eosinophils x    ; Bands%: x    ; Blasts x                                    135    |  94     |  67                  Calcium: 7.8   / iCa: x      (01-06 @ 05:30)    ----------------------------<  86        Magnesium: 2.4                              4.1     |  25     |  4.87             Phosphorous: 5.6 MICU Transfer Note    Transfer from: MICU  Transfer to:  ( x ) Medicine    (  ) Telemetry    (  ) RCU    (  ) Palliative    (  ) Stroke Unit    (  ) _______________  Accepting Physican:      MICU COURSE:  72F PMHx of CHF, DM2, HTN, CKD Stage 4, Hx of Type B aortic dissection p/w AMS - acute onset lethargy, loss of vision, weakness. Intubated in the ER for airway protection. - CT head w/o acute pathology, CT chest/abdom: Chronic Type B aortic dissection, not significantly changed. Infrarenal abdominal aortic aneurysm measuring 5 cm, compared to 4.2 cm on CT of June 8, 2009. MRI Brain showed diffuse small vessel disease but no acute stroke, hemorrhage or PRES. VEEG showed the patient was in non-convulsive status epilepticus treated with valproic acid and propofol. Pt was treated with broad spectrum antibiotics in case of CNS infection, LP was normal and antibiotics were discontinued.  Valproic acid was titrated up to effect with resolution of seizure activity on EEG. Following resolution of seizures pt was successfully extubated.    Baseline CKD stage 4 progressed to end stage with multiple contrast scans and intermittent HD was initiated via a R IJ Shiley.    Course was complicated by retroperitoneal bleeding, pt developed L sided abdominal pain and CT showed Left psoas muscle and anterior wall hematomas - there was no change in her type B aortic dissection or aortic aneurysm. This was managed conservatively with CBC monitoring and blood transfusions, 5 units of blood and 2 units of platelets in total.      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    # Neuro  Encephalopathy 2/2 seizures, possible component of uremia   - pt back to baseline. Following commands  -MRI/CT head showed no mass lesions, hemorrhage or acute changes  - LP performed 12/29 are unrevealing as to source of seizures; HSV PCR negative   - c/w depakote 750 mg TID  -no seizures >72 hours   - c/w HD for uremia; HD today     # Pulmonary - extubated on 1/2/17  - saturating well on RA, no resp distress; c/t monitor     # CV  []CHF, unknown EF  - check official TTE   []Aortic dissection on CT appears stable, BP well controlled  - CT angio mentions unchanged dissection. Touch base with radiology regarding 5 cm infrarenal aortic aneurysm, they commented is unchanged since imaging on 12/28.   -given pt not bleeding rapidly, and oozing, no IR intervention as of now.   -will control bp with amlodipine, given pt HD stable but hypertensive. Can worsen bleed.     # GI  - c/w PO diet   - currently has no abdominal pain     # Renal  [] ESRD - CKD4, now with ESRD 2/2 contrast load on admission   - HD today   - c/w phoslo   [] Hyponatremia - improved with no intervention      # ID  - hypothermic to 91 on admission, temp now normalized   - no clear source - abx d/c on 12/30/17    # Heme  [] Acute blood loss anemia, pt still bleeding. Hbg trended down from 8.1 to 7.4, but back to 7.6, bleeding slowed down   - total of 5 units of PRBC, 2 units of plts  -pt oozing slowly, no IR intervention as of now    # Endo  -FS mostly within goal <180  -FS acceptable for now, c/w SSI    #Skin  -R IJ shiley   -1 triple lumen on RLE, 1 20 G on RUE   -will take out triple lumen     # DVT ppx   - SCDs given RP bleed        For Follow-Up:          Vital Signs Last 24 Hrs  T(C): 36.6 (06 Jan 2018 12:45), Max: 37.1 (05 Jan 2018 20:00)  T(F): 97.8 (06 Jan 2018 12:45), Max: 98.8 (05 Jan 2018 20:00)  HR: 61 (06 Jan 2018 14:00) (55 - 96)  BP: 158/75 (06 Jan 2018 14:00) (85/48 - 160/68)  BP(mean): 94 (06 Jan 2018 14:00) (57 - 104)  RR: 20 (06 Jan 2018 14:00) (20 - 32)  SpO2: 95% (06 Jan 2018 08:00) (74% - 98%)  I&O's Summary    05 Jan 2018 07:01  -  06 Jan 2018 07:00  --------------------------------------------------------  IN: 360 mL / OUT: 0 mL / NET: 360 mL    06 Jan 2018 07:01  -  06 Jan 2018 15:29  --------------------------------------------------------  IN: 0 mL / OUT: 220 mL / NET: -220 mL          MEDICATIONS  (STANDING):  calcium acetate 667 milliGRAM(s) Oral three times a day with meals  chlorhexidine 4% Liquid 1 Application(s) Topical daily  dextrose 5%. 1000 milliLiter(s) (50 mL/Hr) IV Continuous <Continuous>  dextrose 50% Injectable 12.5 Gram(s) IV Push once  dextrose 50% Injectable 25 Gram(s) IV Push once  dextrose 50% Injectable 25 Gram(s) IV Push once  diVALproex  milliGRAM(s) Oral two times a day  influenza   Vaccine 0.5 milliLiter(s) IntraMuscular once  insulin lispro (HumaLOG) corrective regimen sliding scale   SubCutaneous three times a day before meals  insulin lispro (HumaLOG) corrective regimen sliding scale   SubCutaneous at bedtime  metoprolol     tartrate 50 milliGRAM(s) Oral two times a day    MEDICATIONS  (PRN):  dextrose Gel 1 Dose(s) Oral once PRN Blood Glucose LESS THAN 70 milliGRAM(s)/deciliter  glucagon  Injectable 1 milliGRAM(s) IntraMuscular once PRN Glucose LESS THAN 70 milligrams/deciliter  simethicone 80 milliGRAM(s) Chew four times a day PRN Gas        LABS                                            7.6                   Neurophils% (auto):   x      (01-06 @ 05:30):    8.74 )-----------(108          Lymphocytes% (auto):  x                                             22.6                   Eosinphils% (auto):   x        Manual%: Neutrophils x    ; Lymphocytes x    ; Eosinophils x    ; Bands%: x    ; Blasts x                                    135    |  94     |  67                  Calcium: 7.8   / iCa: x      (01-06 @ 05:30)    ----------------------------<  86        Magnesium: 2.4                              4.1     |  25     |  4.87             Phosphorous: 5.6          Zachary Barbour [facility] [place]  [facility] Medicine Intern  page [phone] spectra [id number]

## 2018-01-06 NOTE — PROGRESS NOTE ADULT - ATTENDING COMMENTS
Agree with above  Monitor RP bleed.  Renal failure managed per nephrology Agree with above  Monitor RP bleed.  Renal failure managed per nephrology  30 minutes if critical care time in managing hematology, nephrology issues

## 2018-01-06 NOTE — PROGRESS NOTE ADULT - ASSESSMENT
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 Following commands  -MRI/CT head showed no mass lesions, hemorrhage or acute changes  - LP performed 12/29 are unrevealing as to source of seizures; HSV PCR negative   - c/w depakote 750 mg TID  -no seizures >72 hours   - c/w HD for uremia; HD today     # Pulmonary - extubated on 1/2/17  - saturating well on RA, no resp distress; c/t monitor     # CV  []CHF, unknown EF  - check official TTE   []Aortic dissection on CT appears stable, BP well controlled  - CT angio mentiones unchanged dissection. Touch base with radiology regarding 5 cm infrarenal aortic aneurysm, they commented is unchanged since imaging on 12/28.   -given pt not bleeding rapidly, and oozing, no IR intervention as of now.   -will control bp with amlodipine, given pt HD stable but hypertensive. Can worsen bleed.     # GI  - c/w PO diet   - currently has no abdominal pain     # Renal  [] ESRD - CKD4, now with ESRD 2/2 contrast load on admission   - HD today   - c/w phoslo   [] Hyponatremia - improved with no intervention      # ID  - hypothermic to 91 on admission, temp now normalized   - no clear source - abx d/c on 12/30/17    # Heme  [] Acute blood loss anemia, pt still bleeding. Hbg trended down from 8.1 to 7.4, but back to 7.6, bleeding slowed down   - total of 5 units of PRBC, 2 units of plts  -pt oozing slowly, no IR intervention as of now    # Endo  -FS mostly within goal <180  -FS acceptable for now, c/w SSI    #Skin  -R IJ shiley   -1 triple lumen on RLE, 1 20 G on RUE   -will take out triple lumen     # DVT ppx   - SCDs given RP bleed       Leon Acosta, PGY3  [id number] 72F PMHx of CHF, DM2, HTN, CKD Stage 4, Hx of Type B aortic dissection p/w AMS, patient was dc'd from [facility] today after being treated for CHF exacerbation; presented with acute onset lethargy, loss of vision, weakness, with AMS in ED, s/p intubated for airway protection, 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 Following commands  -MRI/CT head showed no mass lesions, hemorrhage or acute changes  - LP performed 12/29 are unrevealing as to source of seizures; HSV PCR negative   - c/w depakote 750 mg TID  -no seizures >72 hours   - c/w HD for uremia; HD today     # Pulmonary - extubated on 1/2/17  - saturating well on RA, no resp distress; c/t monitor     # CV  []CHF, unknown EF  - check official TTE   []Aortic dissection on CT appears stable, BP well controlled  - CT angio mentions unchanged dissection. Touch base with radiology regarding 5 cm infrarenal aortic aneurysm, they commented is unchanged since imaging on 12/28.   -given pt not bleeding rapidly, and oozing, no IR intervention as of now.   -will control bp with amlodipine, given pt HD stable but hypertensive. Can worsen bleed.     # GI  - c/w PO diet   - currently has no abdominal pain     # Renal  [] ESRD - CKD4, now with ESRD 2/2 contrast load on admission   - HD today   - c/w phoslo   [] Hyponatremia - improved with no intervention      # ID  - hypothermic to 91 on admission, temp now normalized   - no clear source - abx d/c on 12/30/17    # Heme  [] Acute blood loss anemia, pt still bleeding. Hbg trended down from 8.1 to 7.4, but back to 7.6, bleeding slowed down   - total of 5 units of PRBC, 2 units of plts  -pt oozing slowly, no IR intervention as of now    # Endo  -FS mostly within goal <180  -FS acceptable for now, c/w SSI    #Skin  -R IJ shiley   -1 triple lumen on RLE, 1 20 G on RUE   -will take out triple lumen     # DVT ppx   - SCDs given RP bleed       Leon Acosta, PGY3  [id number]

## 2018-01-06 NOTE — PROGRESS NOTE ADULT - SUBJECTIVE AND OBJECTIVE BOX
INTERVAL HPI/OVERNIGHT EVENTS: Pt Hbg went down initially to 7.4 from 8, but then went up to 7.6. No units given.     SUBJECTIVE: Patient seen and examined at bedside. Pt comfortable, had no complaitns. No abdominal pain, fevers, chills, SOB, light headedness.     CONSTITUTIONAL: No weakness, fevers or chills  EYES/ENT: No visual changes;  No vertigo or throat pain   NECK: No pain or stiffness  RESPIRATORY: No cough, wheezing, hemoptysis; No shortness of breath  CARDIOVASCULAR: No chest pain or palpitations  GASTROINTESTINAL: No abdominal or epigastric pain, no n/v/d.   GENITOURINARY: No dysuria, frequency or hematuria  NEUROLOGICAL: No numbness or weakness  SKIN: No itching, rashes    OBJECTIVE:    VITAL SIGNS:  ICU Vital Signs Last 24 Hrs  T(C): 37.1 (06 Jan 2018 04:00), Max: 37.1 (05 Jan 2018 08:00)  T(F): 98.8 (06 Jan 2018 04:00), Max: 98.8 (05 Jan 2018 20:00)  HR: 63 (06 Jan 2018 06:00) (57 - 96)  BP: 141/66 (06 Jan 2018 06:00) (85/48 - 148/77)  BP(mean): 86 (06 Jan 2018 06:00) (57 - 104)  ABP: --  ABP(mean): --  RR: 23 (06 Jan 2018 06:00) (20 - 32)  SpO2: 97% (06 Jan 2018 05:00) (74% - 98%)        01-05 @ 07:01  -  01-06 @ 07:00  --------------------------------------------------------  IN: 360 mL / OUT: 0 mL / NET: 360 mL      CAPILLARY BLOOD GLUCOSE      POCT Blood Glucose.: 87 mg/dL (06 Jan 2018 05:35)      PHYSICAL EXAM:    General: NAD  HEENT: EOMI, PERRLA, clear conjunctiva  Neck: supple  Respiratory: CTA b/l no w,c  Cardiovascular: +S1/S2; RRR  Abdomen: soft, NT/ND; +BS x4  Extremities: 2+ peripheral pulses b/l; no LE edema  Skin: normal color and turgor; no rash  Neurological: no focal     MEDICATIONS:  MEDICATIONS  (STANDING):  calcium acetate 667 milliGRAM(s) Oral three times a day with meals  chlorhexidine 4% Liquid 1 Application(s) Topical daily  dextrose 5%. 1000 milliLiter(s) (50 mL/Hr) IV Continuous <Continuous>  dextrose 50% Injectable 12.5 Gram(s) IV Push once  dextrose 50% Injectable 25 Gram(s) IV Push once  dextrose 50% Injectable 25 Gram(s) IV Push once  diVALproex  milliGRAM(s) Oral two times a day  epoetin marla Injectable 91943 Unit(s) IV Push once  influenza   Vaccine 0.5 milliLiter(s) IntraMuscular once  insulin lispro (HumaLOG) corrective regimen sliding scale   SubCutaneous three times a day before meals  insulin lispro (HumaLOG) corrective regimen sliding scale   SubCutaneous at bedtime  metoprolol     tartrate 50 milliGRAM(s) Oral two times a day    MEDICATIONS  (PRN):  dextrose Gel 1 Dose(s) Oral once PRN Blood Glucose LESS THAN 70 milliGRAM(s)/deciliter  glucagon  Injectable 1 milliGRAM(s) IntraMuscular once PRN Glucose LESS THAN 70 milligrams/deciliter  oxyCODONE    5 mG/acetaminophen 325 mG 1 Tablet(s) Oral every 6 hours PRN moderate-severe pain  simethicone 80 milliGRAM(s) Chew four times a day PRN Gas      ALLERGIES:  Allergies    No Known Allergies    Intolerances        LABS:                        7.6    8.74  )-----------( 108      ( 06 Jan 2018 05:30 )             22.6     01-06    135  |  94<L>  |  67<H>  ----------------------------<  86  4.1   |  25  |  4.87<H>    Ca    7.8<L>      06 Jan 2018 05:30  Phos  5.6     01-06  Mg     2.4     01-06    TPro  5.8<L>  /  Alb  2.9<L>  /  TBili  0.9  /  DBili  x   /  AST  63<H>  /  ALT  23  /  AlkPhos  89  01-05          RADIOLOGY & ADDITIONAL TESTS: Reviewed. INTERVAL HPI/OVERNIGHT EVENTS: Pt Hbg went down initially to 7.4 from 8, but then went up to 7.6. No units given.     SUBJECTIVE: Patient seen and examined at bedside. Pt comfortable, had no complaitns. No abdominal pain, fevers, chills, SOB, light headedness.     CONSTITUTIONAL: No weakness, fevers or chills  EYES/ENT: No visual changes;  No vertigo or throat pain   NECK: No pain or stiffness  RESPIRATORY: No cough, wheezing, hemoptysis; No shortness of breath  CARDIOVASCULAR: No chest pain or palpitations  GASTROINTESTINAL: No abdominal or epigastric pain, no n/v/d.   GENITOURINARY: No dysuria, frequency or hematuria  NEUROLOGICAL: No numbness or weakness  SKIN: No itching, rashes    OBJECTIVE:    VITAL SIGNS:  ICU Vital Signs Last 24 Hrs  T(C): 37.1 (06 Jan 2018 04:00), Max: 37.1 (05 Jan 2018 08:00)  T(F): 98.8 (06 Jan 2018 04:00), Max: 98.8 (05 Jan 2018 20:00)  HR: 63 (06 Jan 2018 06:00) (57 - 96)  BP: 141/66 (06 Jan 2018 06:00) (85/48 - 148/77)  BP(mean): 86 (06 Jan 2018 06:00) (57 - 104)  ABP: --  ABP(mean): --  RR: 23 (06 Jan 2018 06:00) (20 - 32)  SpO2: 97% (06 Jan 2018 05:00) (74% - 98%)        01-05 @ 07:01  -  01-06 @ 07:00  --------------------------------------------------------  IN: 360 mL / OUT: 0 mL / NET: 360 mL      CAPILLARY BLOOD GLUCOSE      POCT Blood Glucose.: 87 mg/dL (06 Jan 2018 05:35)      PHYSICAL EXAM:    General: NAD, obese  HEENT: EOMI, PERRLA, clear conjunctiva  Neck: supple  Respiratory: CTA b/l no w,c  Cardiovascular: +S1/S2; RRR  Abdomen: soft, NT/ND; +BS x4  Extremities: 2+ peripheral pulses b/l; no LE edema  Skin: normal color and turgor; no rash  Neurological: no focal     MEDICATIONS:  MEDICATIONS  (STANDING):  calcium acetate 667 milliGRAM(s) Oral three times a day with meals  chlorhexidine 4% Liquid 1 Application(s) Topical daily  dextrose 5%. 1000 milliLiter(s) (50 mL/Hr) IV Continuous <Continuous>  dextrose 50% Injectable 12.5 Gram(s) IV Push once  dextrose 50% Injectable 25 Gram(s) IV Push once  dextrose 50% Injectable 25 Gram(s) IV Push once  diVALproex  milliGRAM(s) Oral two times a day  epoetin marla Injectable 76139 Unit(s) IV Push once  influenza   Vaccine 0.5 milliLiter(s) IntraMuscular once  insulin lispro (HumaLOG) corrective regimen sliding scale   SubCutaneous three times a day before meals  insulin lispro (HumaLOG) corrective regimen sliding scale   SubCutaneous at bedtime  metoprolol     tartrate 50 milliGRAM(s) Oral two times a day    MEDICATIONS  (PRN):  dextrose Gel 1 Dose(s) Oral once PRN Blood Glucose LESS THAN 70 milliGRAM(s)/deciliter  glucagon  Injectable 1 milliGRAM(s) IntraMuscular once PRN Glucose LESS THAN 70 milligrams/deciliter  oxyCODONE    5 mG/acetaminophen 325 mG 1 Tablet(s) Oral every 6 hours PRN moderate-severe pain  simethicone 80 milliGRAM(s) Chew four times a day PRN Gas      ALLERGIES:  Allergies    No Known Allergies    Intolerances        LABS:                        7.6    8.74  )-----------( 108      ( 06 Jan 2018 05:30 )             22.6     01-06    135  |  94<L>  |  67<H>  ----------------------------<  86  4.1   |  25  |  4.87<H>    Ca    7.8<L>      06 Jan 2018 05:30  Phos  5.6     01-06  Mg     2.4     01-06    TPro  5.8<L>  /  Alb  2.9<L>  /  TBili  0.9  /  DBili  x   /  AST  63<H>  /  ALT  23  /  AlkPhos  89  01-05          RADIOLOGY & ADDITIONAL TESTS: Reviewed.

## 2018-01-07 DIAGNOSIS — I71.01 DISSECTION OF THORACIC AORTA: ICD-10-CM

## 2018-01-07 DIAGNOSIS — E11.9 TYPE 2 DIABETES MELLITUS WITHOUT COMPLICATIONS: ICD-10-CM

## 2018-01-07 DIAGNOSIS — N18.6 END STAGE RENAL DISEASE: ICD-10-CM

## 2018-01-07 DIAGNOSIS — R58 HEMORRHAGE, NOT ELSEWHERE CLASSIFIED: ICD-10-CM

## 2018-01-07 DIAGNOSIS — G93.41 METABOLIC ENCEPHALOPATHY: ICD-10-CM

## 2018-01-07 DIAGNOSIS — D69.6 THROMBOCYTOPENIA, UNSPECIFIED: ICD-10-CM

## 2018-01-07 DIAGNOSIS — I10 ESSENTIAL (PRIMARY) HYPERTENSION: ICD-10-CM

## 2018-01-07 DIAGNOSIS — I71.9 AORTIC ANEURYSM OF UNSPECIFIED SITE, WITHOUT RUPTURE: ICD-10-CM

## 2018-01-07 DIAGNOSIS — G40.901 EPILEPSY, UNSPECIFIED, NOT INTRACTABLE, WITH STATUS EPILEPTICUS: ICD-10-CM

## 2018-01-07 DIAGNOSIS — Z29.9 ENCOUNTER FOR PROPHYLACTIC MEASURES, UNSPECIFIED: ICD-10-CM

## 2018-01-07 LAB
BLD GP AB SCN SERPL QL: NEGATIVE — SIGNIFICANT CHANGE UP
BUN SERPL-MCNC: 36 MG/DL — HIGH (ref 7–23)
CALCIUM SERPL-MCNC: 7.8 MG/DL — LOW (ref 8.4–10.5)
CHLORIDE SERPL-SCNC: 99 MMOL/L — SIGNIFICANT CHANGE UP (ref 98–107)
CO2 SERPL-SCNC: 26 MMOL/L — SIGNIFICANT CHANGE UP (ref 22–31)
CREAT SERPL-MCNC: 3.17 MG/DL — HIGH (ref 0.5–1.3)
GLUCOSE BLDC GLUCOMTR-MCNC: 103 MG/DL — HIGH (ref 70–99)
GLUCOSE BLDC GLUCOMTR-MCNC: 103 MG/DL — HIGH (ref 70–99)
GLUCOSE BLDC GLUCOMTR-MCNC: 105 MG/DL — HIGH (ref 70–99)
GLUCOSE BLDC GLUCOMTR-MCNC: 122 MG/DL — HIGH (ref 70–99)
GLUCOSE SERPL-MCNC: 94 MG/DL — SIGNIFICANT CHANGE UP (ref 70–99)
HCT VFR BLD CALC: 25.9 % — LOW (ref 34.5–45)
HCT VFR BLD CALC: 27.8 % — LOW (ref 34.5–45)
HGB BLD-MCNC: 8.4 G/DL — LOW (ref 11.5–15.5)
HGB BLD-MCNC: 9.3 G/DL — LOW (ref 11.5–15.5)
MCHC RBC-ENTMCNC: 29.3 PG — SIGNIFICANT CHANGE UP (ref 27–34)
MCHC RBC-ENTMCNC: 30.2 PG — SIGNIFICANT CHANGE UP (ref 27–34)
MCHC RBC-ENTMCNC: 32.4 % — SIGNIFICANT CHANGE UP (ref 32–36)
MCHC RBC-ENTMCNC: 33.5 % — SIGNIFICANT CHANGE UP (ref 32–36)
MCV RBC AUTO: 90.2 FL — SIGNIFICANT CHANGE UP (ref 80–100)
MCV RBC AUTO: 90.3 FL — SIGNIFICANT CHANGE UP (ref 80–100)
NRBC # FLD: 0.03 — SIGNIFICANT CHANGE UP
NRBC # FLD: 0.04 — SIGNIFICANT CHANGE UP
PLATELET # BLD AUTO: 113 K/UL — LOW (ref 150–400)
PLATELET # BLD AUTO: 127 K/UL — LOW (ref 150–400)
PMV BLD: 10.2 FL — SIGNIFICANT CHANGE UP (ref 7–13)
PMV BLD: 9.9 FL — SIGNIFICANT CHANGE UP (ref 7–13)
POTASSIUM SERPL-MCNC: 3.6 MMOL/L — SIGNIFICANT CHANGE UP (ref 3.5–5.3)
POTASSIUM SERPL-SCNC: 3.6 MMOL/L — SIGNIFICANT CHANGE UP (ref 3.5–5.3)
RBC # BLD: 2.87 M/UL — LOW (ref 3.8–5.2)
RBC # BLD: 3.08 M/UL — LOW (ref 3.8–5.2)
RBC # FLD: 14.2 % — SIGNIFICANT CHANGE UP (ref 10.3–14.5)
RBC # FLD: 14.5 % — SIGNIFICANT CHANGE UP (ref 10.3–14.5)
RH IG SCN BLD-IMP: POSITIVE — SIGNIFICANT CHANGE UP
SODIUM SERPL-SCNC: 138 MMOL/L — SIGNIFICANT CHANGE UP (ref 135–145)
WBC # BLD: 8.22 K/UL — SIGNIFICANT CHANGE UP (ref 3.8–10.5)
WBC # BLD: 8.72 K/UL — SIGNIFICANT CHANGE UP (ref 3.8–10.5)
WBC # FLD AUTO: 8.22 K/UL — SIGNIFICANT CHANGE UP (ref 3.8–10.5)
WBC # FLD AUTO: 8.72 K/UL — SIGNIFICANT CHANGE UP (ref 3.8–10.5)

## 2018-01-07 PROCEDURE — 99233 SBSQ HOSP IP/OBS HIGH 50: CPT

## 2018-01-07 PROCEDURE — 93306 TTE W/DOPPLER COMPLETE: CPT | Mod: 26

## 2018-01-07 RX ORDER — AMLODIPINE BESYLATE 2.5 MG/1
10 TABLET ORAL DAILY
Qty: 0 | Refills: 0 | Status: DISCONTINUED | OUTPATIENT
Start: 2018-01-07 | End: 2018-01-12

## 2018-01-07 RX ADMIN — Medication 50 MILLIGRAM(S): at 05:44

## 2018-01-07 RX ADMIN — DIVALPROEX SODIUM 750 MILLIGRAM(S): 500 TABLET, DELAYED RELEASE ORAL at 05:44

## 2018-01-07 RX ADMIN — Medication 667 MILLIGRAM(S): at 09:10

## 2018-01-07 RX ADMIN — Medication 50 MILLIGRAM(S): at 17:34

## 2018-01-07 RX ADMIN — Medication 667 MILLIGRAM(S): at 12:33

## 2018-01-07 RX ADMIN — CHLORHEXIDINE GLUCONATE 1 APPLICATION(S): 213 SOLUTION TOPICAL at 12:34

## 2018-01-07 RX ADMIN — DIVALPROEX SODIUM 750 MILLIGRAM(S): 500 TABLET, DELAYED RELEASE ORAL at 17:34

## 2018-01-07 RX ADMIN — Medication 667 MILLIGRAM(S): at 17:34

## 2018-01-07 NOTE — PROGRESS NOTE ADULT - PROBLEM SELECTOR PLAN 3
-renal following  -dialysis tomorrow  -possible AVF placement tomorrow -Pt will need perm cath and eventual AVF  -possible perm cath tomorrow, will f/u w/ IR  -HD per renal

## 2018-01-07 NOTE — PROGRESS NOTE ADULT - PROBLEM SELECTOR PLAN 5
-type b aortic dissection (chronic)   -thrombus located in proximal lumen  -stable, will continue to monitor -type b aortic dissection (chronic)   -partial thrombus formation within the proximal portion of the false lumen.  -stable, will continue to monitor  -BP control

## 2018-01-07 NOTE — PROGRESS NOTE ADULT - PROBLEM SELECTOR PLAN 7
-hold home antihypertensives in the setting of GI bleed this admission BPs elevated and not optimal today and higher concern in setting of known aortic dissection and aneurysm   -c/w metoprolol 50mg BID  -would restart start patients home med amlodipine 10mg at this time  -HD per renal will help as well

## 2018-01-07 NOTE — PROGRESS NOTE ADULT - PROBLEM SELECTOR PLAN 6
-infrarenal, 4.7 cm x 4.1 cm diameter  -renal arteries arise from true lumen  -stable, will continue to monitor -infrarenal, 4.7 cm x 4.1 cm diameter. Per chart review noted to be unchanged since 12/28 per radiology  - will continue to monitor closely   -BP control

## 2018-01-07 NOTE — PROGRESS NOTE ADULT - PROBLEM SELECTOR PLAN 4
-s/p 2 units of plts on 1/3/17 in setting of RP bleed. Now improved  -stable, will continue to monitor -s/p 2 units of plts on 1/3/17 in setting of RP bleed. Levels were wnl on admission and prior. Meds likely playing a role as this is also a known SE of depacon. Levels are improving.  -stable, will continue to monitor

## 2018-01-07 NOTE — PROGRESS NOTE ADULT - ASSESSMENT
71 yo F h/o CHF, DM2, HTN, CKD Stage 4, Type B aortic dissection presented on 12/28/17 with AMS, lethargy, loss of vision, and weakness. Pt initially intubated s/p MICU course c/b subclinical, non-convulsive status epilepticus (currently on valproic acid 750 BID, no seizure activity since 1/2/08). MICU course further c/b by RP bleed with Hb drop from 11 to 5.9 (s/p 5 units of pRBCs + 2 units of plts on 1/3-1/4). CTA C/A/P shows no acute change in her type B aortic dissection or aortic aneurysm, however demonstrates left psoas and ant wall hematomas. Hb now stable in the 8s and pt hemodyn stable. 71 yo F h/o CHF, DM2, HTN, CKD Stage 4, Type B aortic dissection presented on 12/28/17 with AMS, lethargy, loss of vision, and weakness. Pt initially intubated s/p MICU course c/b metabolic encephalopathy and  subclinical, non-convulsive status epilepticus (currently on valproic acid 750 BID, no seizure activity since 1/2/08).  Metabolic encephalopathy now much improved. MICU course further c/b by RP bleed with Hb drop from 11 to 5.9 (s/p 5 units of pRBCs + 2 units of plts on 1/3-1/4). Pt seen by surgery who do not recommend any acute intervention. CTA C/A/P shows no acute change in her type B aortic dissection or aortic aneurysm, however demonstrates left psoas and ant wall hematomas. Hb now stable in the 8s and pt hemodyn stable. 71 yo F h/o ?CHF (EF in 2008 TTE 73%), DM2, HTN, CKD Stage 4, Type B aortic dissection presented on 12/28/17 with acute metabolic encephalopathy was admitted to the MICU as pt required intubation for airway protection and was found to have  subclinical, non-convulsive status epilepticus, being controlled w/ antiepileptic, w/ MICU course further c/b by RP bleed with Hb drop from 11 to 5.9 (s/p 5 units of pRBCs + 2 units of plts on 1/3-1/4) and progression of her CKD likely in setting of contrast now requiring HD. Patient now transferred to medicine floors for further management.

## 2018-01-07 NOTE — CHART NOTE - NSCHARTNOTEFT_GEN_A_CORE
73 yo F h/o CHF, DM2, HTN, CKD Stage 4, Type B aortic dissection presented on 12/28/17 with AMS - lethargy, loss of vision, and weakness. Pt was intubated in the ER for airway protection and admitted to the MICU. CT head showed no acute pathology and CT chest/abdomen showed chronic Type B aortic dissection, not significantly changed, and an infrarenal abdominal aortic aneurysm measuring 5 cm, compared to 4.2 cm on CT of June 8, 2009. MRI Brain showed diffuse small vessel disease but no acute pathology. Video EEG showed the patient was in subclinical, non-convulsive status epilepticus, which was treated with depakote and propofol per Neurology recs while pt was intubated. Etiology for seizures remain unclear. Pt was treated with broad-spectrum antibiotics with Vancomycin, Zosyn, and Ceftriaxone, which were discontinued following an LP on 12/30/18 with negative CSF studies. Pt is currently on PO depakote 750 mg bid and has been seizure-free since 1/1/18 with last EEG on 1/2/18 showing no seizure activity.     Pt was extubated on 1/2/18 and has had no respiratory issues since then. However, hospital course has been complicated by MIKAELA on advanced CKD 2/2 multiple imaging with contrast. Pt is now on intermittent HD via a R IJ Shiley, with plan for Permacath placement by IR on Monday and eventual AVF. Per Nephrology, pt will likely need chronic HD and is being arranged for outpatient HD.    MICU course was further complicated by an RP bleed. Pt started complaining of L sided abdominal pain overnight on 1/2-1/3/18 and CT A/P on 1/3/18 showed development of a large left retroperitoneal hematoma with additional hemoperitoneum seen particularly in the right perihepatic space. A follow-up CTA C/A/P was performed on 1/3/18 that showed left psoas muscle and anterior wall hematomas measuring 4.9 x 5.9 and 6.5 x 4.6 cm, respectively. There was no change in her type B aortic dissection or aortic aneurysm. Surgery was consulted for the RP bleed, and according to surgery, no acute surgical intervention indicated at this time. The RP bleed is currently being managed conservatively with CBC monitoring and blood transfusions. Pt Hgb dropped from 11 on 1/2/18 to 5.9 on 1/3/18 and was transfused 5 units of pRBCs and 2 units of platelets in total from 1/3 - 1/4/18. Pt has since remained HD stable.    Follow-up plan:  [] Monitor CBC q12hrs and transfuse for goal Hgb > 7  [] F/u Surgery recs regarding RP bleed if still actively following  [] Repeat CT imaging prior to discharge to assess if there is interval improvement in size of RP bleed  [] Continue to hold ASA and DVT ppx  [] Plan for Permacath on Monday with IR  [] F/u Nephrology recs  [] C/w depakote as current dose; per Neurology, no need to check levels unless pt has recurrent seizures  [] Pt passed S&S on 1/2/18; continue with soft diet and advance as tolerated  [] PT recommends rehab    [place] FRY  [id number], pager [phone]

## 2018-01-07 NOTE — PROGRESS NOTE ADULT - PROBLEM SELECTOR PLAN 2
-no VEEG evidence of seizure like activity since 1/2. Etiology Unclear  -c/w depakote 750mg BID, no need for further depakote levels unless repeat seizures. Neuro following, will call them if repeat seizures. Pt was being monitored in the MICCU w/ VEEG. No further evidence of seizure like activity since 1/1. Etiology Unclear  -c/w depakote 750mg BID, no need for further depakote levels unless repeat seizures per neuro. Neuro following, will call them if repeat seizures.

## 2018-01-07 NOTE — PROGRESS NOTE ADULT - SUBJECTIVE AND OBJECTIVE BOX
Patient is a 72y old  Female who presents with a chief complaint of Altered mental status, patient became obtunded (28 Dec 2017 04:04)      SUBJECTIVE / OVERNIGHT EVENTS:    Patient seen and examined at bedside. No acute events overnight.    Per chart note from Dr. Mckee on 1/7/18: "71 yo F h/o CHF, DM2, HTN, CKD Stage 4, Type B aortic dissection presented on 12/28/17 with AMS - lethargy, loss of vision, and weakness. Pt was intubated in the ER for airway protection and admitted to the MICU. CT head showed no acute pathology and CT chest/abdomen showed chronic Type B aortic dissection, not significantly changed, and an infrarenal abdominal aortic aneurysm measuring 5 cm, compared to 4.2 cm on CT of June 8, 2009. MRI Brain showed diffuse small vessel disease but no acute pathology. Video EEG showed the patient was in subclinical, non-convulsive status epilepticus, which was treated with depakote and propofol per Neurology recs while pt was intubated. Etiology for seizures remain unclear. Pt was treated with broad-spectrum antibiotics with Vancomycin, Zosyn, and Ceftriaxone, which were discontinued following an LP on 12/30/18 with negative CSF studies. Pt is currently on PO depakote 750 mg bid and has been seizure-free since 1/1/18 with last EEG on 1/2/18 showing no seizure activity.     Pt was extubated on 1/2/18 and has had no respiratory issues since then. However, hospital course has been complicated by MIKAELA on advanced CKD 2/2 multiple imaging with contrast. Pt is now on intermittent HD via a R IJ [facility], with plan for Permacath placement by IR on Monday and eventual AVF. Per Nephrology, pt will likely need chronic HD and is being arranged for outpatient HD.    MICU course was further complicated by an RP bleed. Pt started complaining of L sided abdominal pain overnight on 1/2-1/3/18 and CT A/P on 1/3/18 showed development of a large left retroperitoneal hematoma with additional hemoperitoneum seen particularly in the right perihepatic space. A follow-up CTA C/A/P was performed on 1/3/18 that showed left psoas muscle and anterior wall hematomas measuring 4.9 x 5.9 and 6.5 x 4.6 cm, respectively. There was no change in her type B aortic dissection or aortic aneurysm. Surgery was consulted for the RP bleed, and according to surgery, no acute surgical intervention indicated at this time. The RP bleed is currently being managed conservatively with CBC monitoring and blood transfusions. Pt Hgb dropped from 11 on 1/2/18 to 5.9 on 1/3/18 and was transfused 5 units of pRBCs and 2 units of platelets in total from 1/3 - 1/4/18. Pt has since remained HD stable."    Review of systems: No chest pain, no shortness of breath, no abdominal pain, no nausea, no vomiting, no diarrhea, no fevers, and no chills overnight.     MEDICATIONS  (STANDING):  calcium acetate 667 milliGRAM(s) Oral three times a day with meals  chlorhexidine 4% Liquid 1 Application(s) Topical daily  dextrose 5%. 1000 milliLiter(s) (50 mL/Hr) IV Continuous <Continuous>  dextrose 50% Injectable 12.5 Gram(s) IV Push once  dextrose 50% Injectable 25 Gram(s) IV Push once  dextrose 50% Injectable 25 Gram(s) IV Push once  diVALproex  milliGRAM(s) Oral two times a day  influenza   Vaccine 0.5 milliLiter(s) IntraMuscular once  insulin lispro (HumaLOG) corrective regimen sliding scale   SubCutaneous three times a day before meals  insulin lispro (HumaLOG) corrective regimen sliding scale   SubCutaneous at bedtime  metoprolol     tartrate 50 milliGRAM(s) Oral two times a day    MEDICATIONS  (PRN):  dextrose Gel 1 Dose(s) Oral once PRN Blood Glucose LESS THAN 70 milliGRAM(s)/deciliter  glucagon  Injectable 1 milliGRAM(s) IntraMuscular once PRN Glucose LESS THAN 70 milligrams/deciliter  simethicone 80 milliGRAM(s) Chew four times a day PRN Gas      T(F): 98.2 (01-07 @ 05:06), Max: 99 (01-06 @ 15:55)  HR: 64 (01-07 @ 05:06) (55 - 68)  BP: 162/75 (01-07 @ 05:06) (134/66 - 167/82)  RR: 18 (01-07 @ 05:06) (18 - 27)  SpO2: 96% (01-07 @ 05:06) (96% - 97%)  CAPILLARY BLOOD GLUCOSE      POCT Blood Glucose.: 103 mg/dL (07 Jan 2018 08:29)  POCT Blood Glucose.: 158 mg/dL (06 Jan 2018 22:44)  POCT Blood Glucose.: 94 mg/dL (06 Jan 2018 17:11)  POCT Blood Glucose.: 122 mg/dL (06 Jan 2018 11:26)    I&O's Summary    06 Jan 2018 07:01  -  07 Jan 2018 07:00  --------------------------------------------------------  IN: 600 mL / OUT: 2070 mL / NET: -1470 mL        PHYSICAL EXAM:  GEN: Age appropriate female, resting comfortably in bed, no acute distress, non toxic appearing, speaking in complete sentences.   HEENT: Conjunctiva and sclera normal  PULM: Lungs CTAB, no wheezes, rales, rhonchi  CV: RRR, S1S2, no MRG  Abdominal: Soft, nontender to palpation, non-distended, normoactive bowel sounds  Extremities: No edema or cyanosis  NEURO: AAOx3  Skin: No rashes, lesions      LABS:  Labs personally reviewed.                        8.4    8.22  )-----------( 113      ( 07 Jan 2018 06:15 )             25.9     Hgb Trend: 8.4<--, 8.8<--, 7.6<--, 7.4<--, 8.0<--  01-07    138  |  99  |  36<H>  ----------------------------<  94  3.6   |  26  |  3.17<H>    Ca    7.8<L>      07 Jan 2018 06:15  Phos  5.6     01-06  Mg     2.4     01-06      Creatinine Trend: 3.17<--, 4.87<--, 4.28<--, 5.84<--, 4.95<--, 7.92<--          [facility]  PGY-1 Pager: [phone]  [phone]  Night Float: Patient is a 72y old  Female who presents with a chief complaint of Altered mental status, patient became obtunded (28 Dec 2017 04:04)      SUBJECTIVE / OVERNIGHT EVENTS:    Patient seen and examined at bedside. No acute events overnight.    Per chart note from Dr. Mckee on 1/7/18: "71 yo F h/o CHF, DM2, HTN, CKD Stage 4, Type B aortic dissection presented on 12/28/17 with AMS - lethargy, loss of vision, and weakness. Pt was intubated in the ER for airway protection and admitted to the MICU. CT head showed no acute pathology and CT chest/abdomen showed chronic Type B aortic dissection, not significantly changed, and an infrarenal abdominal aortic aneurysm measuring 5 cm, compared to 4.2 cm on CT of June 8, 2009. MRI Brain showed diffuse small vessel disease but no acute pathology. Video EEG showed the patient was in subclinical, non-convulsive status epilepticus, which was treated with depakote and propofol per Neurology recs while pt was intubated. Etiology for seizures remain unclear. Pt was treated with broad-spectrum antibiotics with Vancomycin, Zosyn, and Ceftriaxone, which were discontinued following an LP on 12/30/18 with negative CSF studies. Pt is currently on PO depakote 750 mg bid and has been seizure-free since 1/1/18 with last EEG on 1/2/18 showing no seizure activity.     Pt was extubated on 1/2/18 and has had no respiratory issues since then. However, hospital course has been complicated by MIKAELA on advanced CKD 2/2 multiple imaging with contrast. Pt is now on intermittent HD via a R IJ [facility], with plan for Permacath placement by IR on Monday and eventual AVF. Per Nephrology, pt will likely need chronic HD and is being arranged for outpatient HD.    MICU course was further complicated by an RP bleed. Pt started complaining of L sided abdominal pain overnight on 1/2-1/3/18 and CT A/P on 1/3/18 showed development of a large left retroperitoneal hematoma with additional hemoperitoneum seen particularly in the right perihepatic space. A follow-up CTA C/A/P was performed on 1/3/18 that showed left psoas muscle and anterior wall hematomas measuring 4.9 x 5.9 and 6.5 x 4.6 cm, respectively. There was no change in her type B aortic dissection or aortic aneurysm. Surgery was consulted for the RP bleed, and according to surgery, no acute surgical intervention indicated at this time. The RP bleed is currently being managed conservatively with CBC monitoring and blood transfusions. Pt Hgb dropped from 11 on 1/2/18 to 5.9 on 1/3/18 and was transfused 5 units of pRBCs and 2 units of platelets in total from 1/3 - 1/4/18. Pt has since remained HD stable."    Review of systems: No chest pain, no shortness of breath, no abdominal pain, no nausea, no vomiting, no diarrhea, no fevers, and no chills overnight.     MEDICATIONS  (STANDING):  calcium acetate 667 milliGRAM(s) Oral three times a day with meals  chlorhexidine 4% Liquid 1 Application(s) Topical daily  dextrose 5%. 1000 milliLiter(s) (50 mL/Hr) IV Continuous <Continuous>  dextrose 50% Injectable 12.5 Gram(s) IV Push once  dextrose 50% Injectable 25 Gram(s) IV Push once  dextrose 50% Injectable 25 Gram(s) IV Push once  diVALproex  milliGRAM(s) Oral two times a day  influenza   Vaccine 0.5 milliLiter(s) IntraMuscular once  insulin lispro (HumaLOG) corrective regimen sliding scale   SubCutaneous three times a day before meals  insulin lispro (HumaLOG) corrective regimen sliding scale   SubCutaneous at bedtime  metoprolol     tartrate 50 milliGRAM(s) Oral two times a day    MEDICATIONS  (PRN):  dextrose Gel 1 Dose(s) Oral once PRN Blood Glucose LESS THAN 70 milliGRAM(s)/deciliter  glucagon  Injectable 1 milliGRAM(s) IntraMuscular once PRN Glucose LESS THAN 70 milligrams/deciliter  simethicone 80 milliGRAM(s) Chew four times a day PRN Gas      T(F): 98.2 (01-07 @ 05:06), Max: 99 (01-06 @ 15:55)  HR: 64 (01-07 @ 05:06) (55 - 68)  BP: 162/75 (01-07 @ 05:06) (134/66 - 167/82)  RR: 18 (01-07 @ 05:06) (18 - 27)  SpO2: 96% (01-07 @ 05:06) (96% - 97%)  CAPILLARY BLOOD GLUCOSE      POCT Blood Glucose.: 103 mg/dL (07 Jan 2018 08:29)  POCT Blood Glucose.: 158 mg/dL (06 Jan 2018 22:44)  POCT Blood Glucose.: 94 mg/dL (06 Jan 2018 17:11)  POCT Blood Glucose.: 122 mg/dL (06 Jan 2018 11:26)    I&O's Summary    06 Jan 2018 07:01  -  07 Jan 2018 07:00  --------------------------------------------------------  IN: 600 mL / OUT: 2070 mL / NET: -1470 mL        PHYSICAL EXAM:  GEN: Age appropriate female, resting comfortably in bed, no acute distress, non toxic appearing, speaking in complete sentences.   HEENT: Conjunctiva and sclera normal  PULM: Lungs CTAB, no wheezes, rales, rhonchi  CV: RRR, S1S2, no MRG  Abdominal: Soft, nontender to palpation, non-distended, normoactive bowel sounds  Extremities: No edema or cyanosis  VAsc: + R internal jugular cath  NEURO: AAOx3  Skin: No rashes, lesions      LABS:  Labs personally reviewed.                        8.4    8.22  )-----------( 113      ( 07 Jan 2018 06:15 )             25.9     Hgb Trend: 8.4<--, 8.8<--, 7.6<--, 7.4<--, 8.0<--  01-07    138  |  99  |  36<H>  ----------------------------<  94  3.6   |  26  |  3.17<H>    Ca    7.8<L>      07 Jan 2018 06:15  Phos  5.6     01-06  Mg     2.4     01-06      Creatinine Trend: 3.17<--, 4.87<--, 4.28<--, 5.84<--, 4.95<--, 7.92<--          [facility]  PGY-1 Pager: Perla-[phone]  [facility]-[phone]  Night Float:

## 2018-01-07 NOTE — PROGRESS NOTE ADULT - PROBLEM SELECTOR PLAN 1
-c/o of abd pain in MICU, CT abd w contrast showed RP bleeding a/w left psoas muscle + ant abd wall hematoma. a/w drop in Hb s/p 5 units pRBCs and 2 units plts last transfusion on 1/4  -seen by surgery, no acute intervention recommended  -will continue to monitor with low threshold for repeat imaging and call to surgery  -maintain active type and screen, transfuse if Hb below 7, plts below 50 if bleeding  -hold AC CT abd w contrast showed RP bleeding associated w/ left psoas muscle + ant abd wall hematoma w/ consequential acute blood loss anemia s/p 5 units pRBCs and 2 units plts last transfusion. H/H has been stable and continues to improve  -Pt was seen by surgery, no acute intervention recommended   -will continue to monitor with low threshold for repeat imaging and surgery reevaluation   -maintain active type and screen, transfuse if Hb below 7, plts below 50 if bleeding  -monitor H and H closely. If remains stable in am will decrease frequency of CBC checks to daily

## 2018-01-08 LAB
BASOPHILS # BLD AUTO: 0.02 K/UL — SIGNIFICANT CHANGE UP (ref 0–0.2)
BASOPHILS NFR BLD AUTO: 0.3 % — SIGNIFICANT CHANGE UP (ref 0–2)
BASOPHILS NFR SPEC: 0 % — SIGNIFICANT CHANGE UP (ref 0–2)
BUN SERPL-MCNC: 51 MG/DL — HIGH (ref 7–23)
BUN SERPL-MCNC: 60 MG/DL — HIGH (ref 7–23)
CALCIUM SERPL-MCNC: 7.9 MG/DL — LOW (ref 8.4–10.5)
CALCIUM SERPL-MCNC: 8 MG/DL — LOW (ref 8.4–10.5)
CHLORIDE SERPL-SCNC: 97 MMOL/L — LOW (ref 98–107)
CHLORIDE SERPL-SCNC: 97 MMOL/L — LOW (ref 98–107)
CO2 SERPL-SCNC: 24 MMOL/L — SIGNIFICANT CHANGE UP (ref 22–31)
CO2 SERPL-SCNC: 27 MMOL/L — SIGNIFICANT CHANGE UP (ref 22–31)
CREAT SERPL-MCNC: 3.86 MG/DL — HIGH (ref 0.5–1.3)
CREAT SERPL-MCNC: 4.17 MG/DL — HIGH (ref 0.5–1.3)
EOSINOPHIL # BLD AUTO: 0.37 K/UL — SIGNIFICANT CHANGE UP (ref 0–0.5)
EOSINOPHIL NFR BLD AUTO: 4.7 % — SIGNIFICANT CHANGE UP (ref 0–6)
EOSINOPHIL NFR FLD: 2.9 % — SIGNIFICANT CHANGE UP (ref 0–6)
GIANT PLATELETS BLD QL SMEAR: PRESENT — SIGNIFICANT CHANGE UP
GLUCOSE BLDC GLUCOMTR-MCNC: 104 MG/DL — HIGH (ref 70–99)
GLUCOSE BLDC GLUCOMTR-MCNC: 138 MG/DL — HIGH (ref 70–99)
GLUCOSE SERPL-MCNC: 178 MG/DL — HIGH (ref 70–99)
GLUCOSE SERPL-MCNC: 93 MG/DL — SIGNIFICANT CHANGE UP (ref 70–99)
HCT VFR BLD CALC: 25.9 % — LOW (ref 34.5–45)
HCT VFR BLD CALC: 26.5 % — LOW (ref 34.5–45)
HGB BLD-MCNC: 8.6 G/DL — LOW (ref 11.5–15.5)
HGB BLD-MCNC: 9.1 G/DL — LOW (ref 11.5–15.5)
IMM GRANULOCYTES # BLD AUTO: 0.18 # — SIGNIFICANT CHANGE UP
IMM GRANULOCYTES NFR BLD AUTO: 2.3 % — HIGH (ref 0–1.5)
LYMPHOCYTES # BLD AUTO: 0.72 K/UL — LOW (ref 1–3.3)
LYMPHOCYTES # BLD AUTO: 9.2 % — LOW (ref 13–44)
LYMPHOCYTES NFR SPEC AUTO: 1.9 % — LOW (ref 13–44)
MAGNESIUM SERPL-MCNC: 2 MG/DL — SIGNIFICANT CHANGE UP (ref 1.6–2.6)
MAGNESIUM SERPL-MCNC: 2.1 MG/DL — SIGNIFICANT CHANGE UP (ref 1.6–2.6)
MCHC RBC-ENTMCNC: 30.1 PG — SIGNIFICANT CHANGE UP (ref 27–34)
MCHC RBC-ENTMCNC: 31.2 PG — SIGNIFICANT CHANGE UP (ref 27–34)
MCHC RBC-ENTMCNC: 33.2 % — SIGNIFICANT CHANGE UP (ref 32–36)
MCHC RBC-ENTMCNC: 34.3 % — SIGNIFICANT CHANGE UP (ref 32–36)
MCV RBC AUTO: 90.6 FL — SIGNIFICANT CHANGE UP (ref 80–100)
MCV RBC AUTO: 90.8 FL — SIGNIFICANT CHANGE UP (ref 80–100)
MONOCYTES # BLD AUTO: 1.31 K/UL — HIGH (ref 0–0.9)
MONOCYTES NFR BLD AUTO: 16.7 % — HIGH (ref 2–14)
MONOCYTES NFR BLD: 9.5 % — HIGH (ref 2–9)
MYELOCYTES NFR BLD: 0.9 % — HIGH (ref 0–0)
NEUTROPHIL AB SER-ACNC: 80 % — HIGH (ref 43–77)
NEUTROPHILS # BLD AUTO: 5.25 K/UL — SIGNIFICANT CHANGE UP (ref 1.8–7.4)
NEUTROPHILS NFR BLD AUTO: 66.8 % — SIGNIFICANT CHANGE UP (ref 43–77)
NEUTS BAND # BLD: 1.9 % — SIGNIFICANT CHANGE UP (ref 0–6)
NRBC # FLD: 0 — SIGNIFICANT CHANGE UP
NRBC # FLD: 0.02 — SIGNIFICANT CHANGE UP
PHOSPHATE SERPL-MCNC: 3.1 MG/DL — SIGNIFICANT CHANGE UP (ref 2.5–4.5)
PHOSPHATE SERPL-MCNC: 3.3 MG/DL — SIGNIFICANT CHANGE UP (ref 2.5–4.5)
PLATELET # BLD AUTO: 117 K/UL — LOW (ref 150–400)
PLATELET # BLD AUTO: 128 K/UL — LOW (ref 150–400)
PLATELET COUNT - ESTIMATE: SIGNIFICANT CHANGE UP
PMV BLD: 10.1 FL — SIGNIFICANT CHANGE UP (ref 7–13)
PMV BLD: 9.7 FL — SIGNIFICANT CHANGE UP (ref 7–13)
POTASSIUM SERPL-MCNC: 3.8 MMOL/L — SIGNIFICANT CHANGE UP (ref 3.5–5.3)
POTASSIUM SERPL-MCNC: 4.1 MMOL/L — SIGNIFICANT CHANGE UP (ref 3.5–5.3)
POTASSIUM SERPL-SCNC: 3.8 MMOL/L — SIGNIFICANT CHANGE UP (ref 3.5–5.3)
POTASSIUM SERPL-SCNC: 4.1 MMOL/L — SIGNIFICANT CHANGE UP (ref 3.5–5.3)
RBC # BLD: 2.86 M/UL — LOW (ref 3.8–5.2)
RBC # BLD: 2.92 M/UL — LOW (ref 3.8–5.2)
RBC # FLD: 14.2 % — SIGNIFICANT CHANGE UP (ref 10.3–14.5)
RBC # FLD: 14.8 % — HIGH (ref 10.3–14.5)
REVIEW TO FOLLOW: YES — SIGNIFICANT CHANGE UP
SCHISTOCYTES BLD QL AUTO: SLIGHT — SIGNIFICANT CHANGE UP
SODIUM SERPL-SCNC: 136 MMOL/L — SIGNIFICANT CHANGE UP (ref 135–145)
SODIUM SERPL-SCNC: 139 MMOL/L — SIGNIFICANT CHANGE UP (ref 135–145)
VARIANT LYMPHS # BLD: 2.9 % — SIGNIFICANT CHANGE UP
WBC # BLD: 7.85 K/UL — SIGNIFICANT CHANGE UP (ref 3.8–10.5)
WBC # BLD: 9.98 K/UL — SIGNIFICANT CHANGE UP (ref 3.8–10.5)
WBC # FLD AUTO: 7.85 K/UL — SIGNIFICANT CHANGE UP (ref 3.8–10.5)
WBC # FLD AUTO: 9.98 K/UL — SIGNIFICANT CHANGE UP (ref 3.8–10.5)

## 2018-01-08 PROCEDURE — 99222 1ST HOSP IP/OBS MODERATE 55: CPT

## 2018-01-08 PROCEDURE — 99233 SBSQ HOSP IP/OBS HIGH 50: CPT | Mod: GC

## 2018-01-08 RX ORDER — ACETAMINOPHEN 500 MG
650 TABLET ORAL EVERY 6 HOURS
Qty: 0 | Refills: 0 | Status: DISCONTINUED | OUTPATIENT
Start: 2018-01-08 | End: 2018-01-12

## 2018-01-08 RX ADMIN — Medication 667 MILLIGRAM(S): at 08:55

## 2018-01-08 RX ADMIN — Medication 667 MILLIGRAM(S): at 12:30

## 2018-01-08 RX ADMIN — DIVALPROEX SODIUM 750 MILLIGRAM(S): 500 TABLET, DELAYED RELEASE ORAL at 17:29

## 2018-01-08 RX ADMIN — Medication 50 MILLIGRAM(S): at 06:04

## 2018-01-08 RX ADMIN — Medication 667 MILLIGRAM(S): at 17:29

## 2018-01-08 RX ADMIN — DIVALPROEX SODIUM 750 MILLIGRAM(S): 500 TABLET, DELAYED RELEASE ORAL at 06:04

## 2018-01-08 RX ADMIN — Medication 50 MILLIGRAM(S): at 17:29

## 2018-01-08 RX ADMIN — AMLODIPINE BESYLATE 10 MILLIGRAM(S): 2.5 TABLET ORAL at 06:06

## 2018-01-08 RX ADMIN — CHLORHEXIDINE GLUCONATE 1 APPLICATION(S): 213 SOLUTION TOPICAL at 14:35

## 2018-01-08 NOTE — PROVIDER CONTACT NOTE (OTHER) - SITUATION
elevated BP
large bruise noted to right upper arm
pt complains of left abdominal 10/10 pain; refuses pain medication

## 2018-01-08 NOTE — PROGRESS NOTE ADULT - ASSESSMENT
73 yo F h/o ?CHF (EF in 2008 TTE 73%), DM2, HTN, CKD Stage 4, Type B aortic dissection presented on 12/28/17 with acute metabolic encephalopathy was admitted to the MICU as pt required intubation for airway protection and was found to have  subclinical, non-convulsive status epilepticus, being controlled w/ antiepileptic, w/ MICU course further c/b by RP bleed with Hb drop from 11 to 5.9 (s/p 5 units of pRBCs + 2 units of plts on 1/3-1/4) and progression of her CKD likely in setting of contrast now requiring HD. Patient now transferred to medicine floors for further management. 71 yo F h/o ?CHF (EF in 2008 TTE 73%), DM2, HTN, CKD Stage 4, Type B aortic dissection presented on 12/28/17 with acute metabolic encephalopathy was admitted to the MICU as pt required intubation for airway protection and was found to have  subclinical, non-convulsive status epilepticus, being controlled w/ antiepileptic, w/ MICU course further c/b by RP bleed with Hb drop from 11 to 5.9 (s/p 5 units of pRBCs + 2 units of plts on 1/3-1/4) and progression of her CKD likely in setting of KWESI now requiring HD. Patient now transferred to medicine floors for further management.

## 2018-01-08 NOTE — CONSULT NOTE ADULT - SUBJECTIVE AND OBJECTIVE BOX
VASCULAR SURGERY CONSULT  Attending; Dr. Jensen  Contact: [phone]    HPI: This is a selina right-handed 72F known to Dr. Jensen from her previous hospitalization in 12/2017 at which time we were consulted re: chronic Type B aortic dissection (followed by Dr. Baez of cardiology since 2008) and for possible permanent HD access placement. At that time, recommendations were for continued outpatient surveillance and BP control for dissection, and outpatient follow up with Dr. Man of [facility] for eventual AV fistula creation.    She was readmitted on 12/29/17 with encephalopathy believed 2/2 subclinical nonconvulsive status epilepticus requiring intubation for airway protection and MICU stay. Her hospital course was further complicated by RP bleed and initiation of HD via [facility]. She had a repeat CTA done on 1/3/18 that again demonstrates her chronic Type B dissection and comments on partial thrombosis at the proximal aspect of the false lumen. We are consulted for further recommendation. Since her discharge, it appears precautions for her left upper extremity have not been observed in preparation for eventual AVF.     ROS: negative except as noted above.    PMH  ESRD  Hypertension  Dissection of Aorta, Type B  Cancer of Endometrium  Adult Onset Diabetes Mellitus  CHF    PSH  H/O total hysterectomy    Allergies  No Known Allergies    Physical Exam  T(C): 36.9 (01-08-18 @ 06:01), Max: 37.2 (01-07-18 @ 15:24)  HR: 57 (01-08-18 @ 06:01) (56 - 64)  BP: 155/88 (01-08-18 @ 06:01) (149/77 - 190/90)  RR: 18 (01-08-18 @ 06:01) (17 - 18)  SpO2: 100% (01-08-18 @ 06:01) (97% - 100%)  Tmax: T(C): , Max: 37.2 (01-07-18 @ 15:24)    Gen: NAD, sitting comfortably in chair, RIJ Shiley in place  HEENT: normocephalic, atraumatic,  CV: S1, S2, RRR  Pulm: no increased WOB  Abd: Soft, ND, NT  Ext: warm, no edema, palp dp and radial pulses bilaterally    Labs:                        8.6    7.85  )-----------( 117      ( 08 Jan 2018 06:30 )             25.9     01-08    136  |  97<L>  |  51<H>  ----------------------------<  93  4.1   |  24  |  3.86<H>    Ca    8.0<L>      08 Jan 2018 06:30  Phos  3.3     01-08  Mg     2.0     01-08    Imaging    < from: CT Angio Abdomen and Pelvis w/ IV Cont (01.03.18 @ 16:50) >  FINDINGS:    Tubes/Lines: Right femoral vein catheter identified. Additional right   internal jugular line terminates in the SVC.    Mediastinum and Heart: There is no pericardial effusion. No   lymphadenopathy. Thyroid gland is unremarkable.    Vascular: On the precontrast images, there is no intramural hematoma,   although a displaced intima is seen from known dissection. Following IV   contrast injection, the previously known type B dissection is once again   visualized, getting just after the takeoff of the left subclavian artery.   There is partial thrombus formation within the proximal portion of the   false lumen. Once again seen is a large false lumen and small true lumen   along the inferior curvature of the aortic arch. Fenestrations once again   noted. Maximal aortic dimension within the chest including both lumens is   4.3 cm within the descending aorta, unchanged since previous exam.    Inferiorly the dissection courses into the upper abdomen where the celiac   axis and SMA arise from the false lumen, the renal arteries arise from   the true lumen, and the RAJI arises from the true lumen.    Within the abdomen, the dissection courses inferiorly to the level of the   bilateral common iliac arteries. The left common iliac artery is   comprised of both the true and false lumens with the dissection ending at   the level of the bifurcation into the left internal and external   branches. Small focus of thrombus noted within the proximal left common   iliac artery, unchanged since previous examination. The true lumen feeds  the right common iliac artery. The bilateral internal and external iliac   arteries are patent.     Lungs, Pleura, and Airways: Moderate left pleural effusion and trace   right effusion with associated minimal bibasilar atelectasis.    Visualized Abdomen: Small amount of perihepatic and perisplenic ascites   noted. The liver demonstrates multiple hypodensities that are too small   to characterize. The pancreas and adrenal glands are unremarkable. The   kidneys demonstrate cortical phase of enhancement on the noncontrast   study suggestive of renal insufficiency. No stones or hydronephrosis    There is no retroperitoneal lymphadenopathy.    No evidence of bowel obstruction. Oconnell catheter noted within the urinary   bladder.    Left psoas muscle and anterior wall hematomas are noted measuring 4.9 x   5.9 and 6.5 x 4.6 cm respectively. Additional ascites noted in the pelvis.    Bones and soft tissues: Degenerative changes noted throughout the spine.   Air within the soft tissues of the right proximal upper extremity. X-ray   dated December 27, 2017 demonstrates a lap pad in this location. The lap   pad has subsequently been removed. Correlate with recent intervention.    IMPRESSION:    Complex type B aortic dissection as described above, without significant   change from December 27, 2017.    Interval development of Left psoas muscle and anterior wall hematomas   measuring 4.9 x 5.9 and 6.5 x 4.6 cm respectively. No active   extravasation noted.    Air within the soft tissues of the right proximal upper extremity. X-ray   dated December 27, 2017 demonstrates a lap pad in this location. The lap   pad has subsequently been removed. Correlate with recent intervention.    Evidence of renal insufficiency    < end of copied text >    < from: [facility] Duplex Ext Veins Upper Comp, Bilat. (12.24.17 @ 08:47) >  Right Findings: Right Cephalic Vein        Diameter (cm)  Proximal upper arm                            0.28  Mid upper arm                                     0.27  Distal upper arm                                  0.30  Antecubital                             0.38  Proximal forearm                                 0.18  Mid forearm                                         0.23  Distal forearm                                     0.20  Right Basilic Vein  [place]                           0.37  Mid upper arm                                     0.47  Distal upper arm                                  0.42  Antecubital                                           0.45  Proximal forearm  0.22  Mid forearm                                          0.27  Distal forearm                                      0.22  Right brachial artery: Normal velocities with triphasic  waveforms.  Right radial artery: Normal velocities with triphasic  waveforms.  Right ulnar artery: Normal velocities with triphasic  waveforms.  Left Findings: Left Cephalic Vein      Diameter (cm)  Proximal upper arm                            0.51  Mid upper arm                                     0.48  Distal upperarm                                  0.46  Antecubital  0.44  Proximal forearm                               0.30  Mid forearm                                        0.27  Distal forearm                                      0.20  Left Basilic Vein  [place]                                           0.47  Mid upper arm                                     0.45  Distal upper arm                                  0.62  Antecubital                                           0.30  Proximal forearm                          0.23  Mid forearm                                          0.26  Distal forearm                                      0.25  Left brachial artery: Normal velocities with triphasic  waveforms.  Left radial artery: Normal velocities with triphasic  waveforms.  Left ulnar artery: Normal velocities with triphasic  waveforms.  ------------------------------------------------------------------------  Summary/Impressions:  No evidence of thrombosis or significant venous wall  thickening noted in the right and left cephalic and  basilic veins. Vessel diameters as noted above.    < end of copied text > VASCULAR SURGERY CONSULT  Attending; Dr. Jensen  Contact: [phone]    HPI: This is a selina right-handed 72F known to Dr. Jensen from her previous hospitalization in 12/2017 at which time we were consulted re: chronic Type B aortic dissection (followed by Dr. Baez of cardiology since 2008) and for possible permanent HD access placement. At that time, recommendations were for continued outpatient surveillance and BP control for dissection, and outpatient follow up with Dr. Man of [facility] for eventual AV fistula creation.    She was readmitted on 12/29/17 with encephalopathy believed 2/2 subclinical nonconvulsive status epilepticus requiring intubation for airway protection and MICU stay. Her hospital course was further complicated by RP bleed and initiation of HD via [facility]. She had a repeat CTA done on 1/3/18 that again demonstrates her chronic Type B dissection and comments on partial thrombosis at the proximal aspect of the false lumen. We are consulted for further recommendation. Since her discharge, it appears precautions for her left upper extremity have not been observed in preparation for eventual AVF.     ROS: negative except as noted above.    PMH  ESRD  Hypertension  Dissection of Aorta, Type B  Cancer of Endometrium  Adult Onset Diabetes Mellitus  CHF      PSH  H/O total hysterectomy    Allergies  No Known Allergies    REVIEW OF SYSTEMS:  CONSTITUTIONAL: No weakness, fevers or chills  EYES/ENT: No visual changes;  No vertigo or throat pain   NECK: No pain or stiffness  RESPIRATORY: no shortness of breath  CARDIOVASCULAR: No chest pain or palpitations at present  GASTROINTESTINAL: No abdominal or epigastric pain. No nausea, vomiting, or hematemesis; No diarrhea or constipation. No melena or hematochezia.  GENITOURINARY: No dysuria, frequency, foamy urine, urinary urgency, incontinence or hematuria  NEUROLOGICAL: No numbness or weakness  SKIN: No itching, burning, rashes, or lesions   All other review of systems is negative unless indicated above.      Physical Exam  T(C): 36.9 (01-08-18 @ 06:01), Max: 37.2 (01-07-18 @ 15:24)  HR: 57 (01-08-18 @ 06:01) (56 - 64)  BP: 155/88 (01-08-18 @ 06:01) (149/77 - 190/90)  RR: 18 (01-08-18 @ 06:01) (17 - 18)  SpO2: 100% (01-08-18 @ 06:01) (97% - 100%)  Tmax: T(C): , Max: 37.2 (01-07-18 @ 15:24)    Gen: NAD, sitting comfortably in chair, RIJ Shiley in place  HEENT: normocephalic, atraumatic,  CV: S1, S2, RRR  Pulm: no increased WOB  Abd: Soft, ND, NT  Ext: warm, no edema, palp dp and radial pulses bilaterally    Labs:                        8.6    7.85  )-----------( 117      ( 08 Jan 2018 06:30 )             25.9     01-08    136  |  97<L>  |  51<H>  ----------------------------<  93  4.1   |  24  |  3.86<H>    Ca    8.0<L>      08 Jan 2018 06:30  Phos  3.3     01-08  Mg     2.0     01-08    Imaging    < from: CT Angio Abdomen and Pelvis w/ IV Cont (01.03.18 @ 16:50) >  FINDINGS:    Tubes/Lines: Right femoral vein catheter identified. Additional right   internal jugular line terminates in the SVC.    Mediastinum and Heart: There is no pericardial effusion. No   lymphadenopathy. Thyroid gland is unremarkable.    Vascular: On the precontrast images, there is no intramural hematoma,   although a displaced intima is seen from known dissection. Following IV   contrast injection, the previously known type B dissection is once again   visualized, getting just after the takeoff of the left subclavian artery.   There is partial thrombus formation within the proximal portion of the   false lumen. Once again seen is a large false lumen and small true lumen   along the inferior curvature of the aortic arch. Fenestrations once again   noted. Maximal aortic dimension within the chest including both lumens is   4.3 cm within the descending aorta, unchanged since previous exam.    Inferiorly the dissection courses into the upper abdomen where the celiac   axis and SMA arise from the false lumen, the renal arteries arise from   the true lumen, and the RAJI arises from the true lumen.    Within the abdomen, the dissection courses inferiorly to the level of the   bilateral common iliac arteries. The left common iliac artery is   comprised of both the true and false lumens with the dissection ending at   the level of the bifurcation into the left internal and external   branches. Small focus of thrombus noted within the proximal left common   iliac artery, unchanged since previous examination. The true lumen feeds  the right common iliac artery. The bilateral internal and external iliac   arteries are patent.     Lungs, Pleura, and Airways: Moderate left pleural effusion and trace   right effusion with associated minimal bibasilar atelectasis.    Visualized Abdomen: Small amount of perihepatic and perisplenic ascites   noted. The liver demonstrates multiple hypodensities that are too small   to characterize. The pancreas and adrenal glands are unremarkable. The   kidneys demonstrate cortical phase of enhancement on the noncontrast   study suggestive of renal insufficiency. No stones or hydronephrosis    There is no retroperitoneal lymphadenopathy.    No evidence of bowel obstruction. Oconnell catheter noted within the urinary   bladder.    Left psoas muscle and anterior wall hematomas are noted measuring 4.9 x   5.9 and 6.5 x 4.6 cm respectively. Additional ascites noted in the pelvis.    Bones and soft tissues: Degenerative changes noted throughout the spine.   Air within the soft tissues of the right proximal upper extremity. X-ray   dated December 27, 2017 demonstrates a lap pad in this location. The lap   pad has subsequently been removed. Correlate with recent intervention.    IMPRESSION:    Complex type B aortic dissection as described above, without significant   change from December 27, 2017.    Interval development of Left psoas muscle and anterior wall hematomas   measuring 4.9 x 5.9 and 6.5 x 4.6 cm respectively. No active   extravasation noted.    Air within the soft tissues of the right proximal upper extremity. X-ray   dated December 27, 2017 demonstrates a lap pad in this location. The lap   pad has subsequently been removed. Correlate with recent intervention.    Evidence of renal insufficiency    < end of copied text >    < from: VA Duplex Ext Veins Upper Comp, Bilat. (12.24.17 @ 08:47) >  Right Findings: Right Cephalic Vein        Diameter (cm)  Proximal upper arm                            0.28  Mid upper arm                                     0.27  Distal upper arm                                  0.30  Antecubital                             0.38  Proximal forearm                                 0.18  Mid forearm                                         0.23  Distal forearm                                     0.20  Right Basilic Vein  [place]                           0.37  Mid upper arm                                     0.47  Distal upper arm                                  0.42  Antecubital                                           0.45  Proximal forearm  0.22  Mid forearm                                          0.27  Distal forearm                                      0.22  Right brachial artery: Normal velocities with triphasic  waveforms.  Right radial artery: Normal velocities with triphasic  waveforms.  Right ulnar artery: Normal velocities with triphasic  waveforms.  Left Findings: Left Cephalic Vein      Diameter (cm)  Proximal upper arm                            0.51  Mid upper arm                                     0.48  Distal upperarm                                  0.46  Antecubital  0.44  Proximal forearm                               0.30  Mid forearm                                        0.27  Distal forearm                                      0.20  Left Basilic Vein  [place]                                           0.47  Mid upper arm                                     0.45  Distal upper arm                                  0.62  Antecubital                                           0.30  Proximal forearm                          0.23  Mid forearm                                          0.26  Distal forearm                                      0.25  Left brachial artery: Normal velocities with triphasic  waveforms.  Left radial artery: Normal velocities with triphasic  waveforms.  Left ulnar artery: Normal velocities with triphasic  waveforms.  ------------------------------------------------------------------------  Summary/Impressions:  No evidence of thrombosis or significant venous wall  thickening noted in the right and left cephalic and  basilic veins. Vessel diameters as noted above.    < end of copied text >

## 2018-01-08 NOTE — PROVIDER CONTACT NOTE (OTHER) - REASON
large bruise noted to right upper arm
pt complains of left abdominal 10/10 pain; refuses pain medication
BP elevated 190/90

## 2018-01-08 NOTE — PROGRESS NOTE ADULT - PROBLEM SELECTOR PLAN 1
CT abd w contrast showed RP bleeding associated w/ left psoas muscle + ant abd wall hematoma w/ consequential acute blood loss anemia s/p 5 units pRBCs and 2 units plts last transfusion. H/H has been stable and continues to improve  -Pt was seen by surgery, no acute intervention recommended   -will continue to monitor with low threshold for repeat imaging and surgery reevaluation   -maintain active type and screen, transfuse if Hb below 7, plts below 50 if bleeding  -monitor H and H closely. If remains stable in am will decrease frequency of CBC checks to daily

## 2018-01-08 NOTE — CHART NOTE - NSCHARTNOTEFT_GEN_A_CORE
Called by nurse to R arm bruise noticed on patient. Patient accompanied by family member, who stated it was there before but not as big. Patient stated she bruises easily. R arm bruise appreciated from elbow to axila. Non tender, soft, no palpable clots. Marked out the edges with a marker to monitor fo size. Hgb 9.3, Patient not on sub-q heparin. Bruise likely from MICU stay, will CTM for now.

## 2018-01-08 NOTE — PROGRESS NOTE ADULT - PROBLEM SELECTOR PLAN 7
BPs elevated and not optimal today and higher concern in setting of known aortic dissection and aneurysm   -c/w metoprolol 50mg BID  -would restart start patients home med amlodipine 10mg at this time  -HD per renal will help as well

## 2018-01-08 NOTE — PROGRESS NOTE ADULT - PROBLEM SELECTOR PLAN 6
-infrarenal, 4.7 cm x 4.1 cm diameter. Per chart review noted to be unchanged since 12/28 per radiology  - will continue to monitor closely   -BP control

## 2018-01-08 NOTE — PROGRESS NOTE ADULT - PROBLEM SELECTOR PLAN 4
-s/p 2 units of plts on 1/3/17 in setting of RP bleed. Levels were wnl on admission and prior. Meds likely playing a role as this is also a known SE of depacon. Levels are improving.  -stable, will continue to monitor

## 2018-01-08 NOTE — PROGRESS NOTE ADULT - ATTENDING COMMENTS
Patient seen and examined. No current complaints. Exam does not reveal any ecchymosis or palpable hematomas. Hgb/Hct appears stable now despite significant acute blood loss anemia in MICU from RP bleed. Plan to discuss AVF placement with vascular surgery prior to discharge along with exchange of Shiley for permacath by IR. Once performed, patient can be discharged safely to [facility] with outpatient HD. C/w current AED regiment.     Further details of plan per resident note above

## 2018-01-08 NOTE — PROVIDER CONTACT NOTE (OTHER) - ASSESSMENT
patient is stable. HR 64. sitting in chair with family at bedside.
10/10 stabbing abdominal pain
no acute distress.  large bruise noted to right upper arm

## 2018-01-08 NOTE — PROVIDER CONTACT NOTE (OTHER) - RECOMMENDATIONS
Give Metropolol as schedule for 6pm meds.  and recheck. Notify Team 3
MD to come assess, CT scan
outline ecchymosis

## 2018-01-08 NOTE — CONSULT NOTE ADULT - ASSESSMENT
72F w/chronic Type B aortic dissection and ESRD on HD requiring permanent dialysis access.    -no acute vascular surgery intervention  -re: dissection, recommend continued BP control with goal SBP<140; the dissection appears stable from previous CTA on 12/27 (stable proximal thrombosis of false lumen on imaging, although it was not specifically commented on at that time); there is no role for anticoagulation in this setting, as thrombosis of false lumen is generally expected to prevent increase in size of dissection; additionally, in this case, the dissection and thrombosis are stable, with continued perfusion of renal and visceral vasculature  -please protect the L arm in preparation for planned AV fistula creation; vein mapping already documented in system, pink band placed on left wrist  -patient is to decide if she would prefer to have Dr. Jensen create fistula, or if she will follow up with Dr. Man as an outpatient as previously planned  -recommend IR consult for exchange of Shiley to permacath  -please call with any questions  -d/w Dr. Mikey Collins, R3  [phone]

## 2018-01-08 NOTE — PROGRESS NOTE ADULT - PROBLEM SELECTOR PLAN 2
Pt was being monitored in the MICCU w/ VEEG. No further evidence of seizure like activity since 1/1. Etiology Unclear  -c/w depakote 750mg BID, no need for further depakote levels unless repeat seizures per neuro. Neuro following, will call them if repeat seizures. Pt was being monitored in the MICCU w/ VEEG for AMS 2/2 to seizure. No further evidence of seizure like activity since 1/1. Etiology Unclear  -c/w depakote 750mg BID, no need for further depakote levels unless repeat seizures per neuro. Neuro following, will call them if repeat seizures.

## 2018-01-08 NOTE — PROVIDER CONTACT NOTE (OTHER) - ACTION/TREATMENT ORDERED:
Dr. Ingrid Saunders aware. as per MD given schedule Antihypertensive and recheck BP later.
MD moore came to assess and isidro ecchymosis. continue to monitor
Md came to assess patient. pain medication offered; however refuses pain medication due to fear of side effects.  pt comfortable with heat packs at this time. MD notified. vitals to retaken at 12am

## 2018-01-08 NOTE — PROGRESS NOTE ADULT - PROBLEM SELECTOR PLAN 3
-Pt will need perm cath  (IR called) and eventual AVF (vasc surg consulted)  -will need permacath (IR called)   -HD per renal -Pt will need perm cath  (IR called) and eventual AVF (vasc surg consulted)  -will need permacath (IR called)   -HD per renal TTS

## 2018-01-08 NOTE — CONSULT NOTE ADULT - ATTENDING COMMENTS
Pt. was seen and examined. Agree with above. Plan d/w the team.  Pt. denies any symptoms now. Admitted with seizure and retroperitoneal hematoma  No signs of bleeding now. Hg is stable  Imaging reviewed: chronic type B dissection. No acute changes. Aneurysmal changes in infrarenal aorta is stable. AAA is 5cm. No indication for repair at this time. Will follow with repeat imaging in 6m.  thrombus in false lumen is a common finding in dissections and is not an indication for anticoagulation.   Pt. is now on HD via temporary cathter. Will need AVF for long term HD.   Risks and benefits of the procedure were discussed with pt. and she agrees to proceed. All questions were answered.  Will d/w her nephrologist and he agrees as well will plan for sx on this admission, once pt. is medically optimized.   please protect the L arm
The family reports a several hour decline in alertness upon arrival home from the hospital yesterday.  She is now intubated and not on sedation. She is unresponsive with no posturing on noxious stimuli. PERRL, decreased abduction OS on dolls eyes, depressed left corneal reflex with a right plantar extensor response.  There are some spontaneous movements, left > right without tonal changes. CTA head appears normal but exam is highly suspicious for posterior circulation compromise. Awaiting official CTA report and would proceed with LP and MRI head. Etiology for hyponatremia and hypothermia unclear. EEG would be helpful as well. May want to treat with ASA and acyclovir until W/U is complete.

## 2018-01-08 NOTE — PROVIDER CONTACT NOTE (OTHER) - BACKGROUND
patient is new dialysis [id number] sitting in chair.
dx acute encephalopathy with weakness
dx acute encephalopathy with weakness  ICU transfer

## 2018-01-08 NOTE — CHART NOTE - NSCHARTNOTEFT_GEN_A_CORE
Background:         Patient is a 72 yoF with MHx of Type B aortic dissection who initially presented on 12/28/17 with acute metabolic encephalopathy and was admitted to the MICU as pt required intubation for airway protection.  She was found to have subclinical, non-convulsive status epilepticus, being controlled w/ antiepileptic, and developed a serious complication of a left retroperitoneal bleed invovling the abdominal wall and left psoas muscle, with a significant drop in hemoglobin from 11 to 5.9.  She required 5 U PRBCs and 2U of platelets.  This occurred between 1/3 to 1/4.  At the time, no surgical intervention was done; however, vascular surgery was involved.    Subjective:       Examiner was called to bedside around 2000.  Patient was complaining of 10/10 throbbing LLQ/LUQ pain which began around 1800 when she was ambulating to the bathroom, and has not remitted.  Pain is dull, throbbing in nature with occasional sharp twinges.  The pain was not aggravated by dinner, which she did completely eat.  There was no vomiting or diarrhea associated with it.  Patient has been having regular bowel movements which are soft and non-bloody.  She is not having dizziness or lightheadedness.  Patient states that this pain is similar to the pain she had when she had the retroperitoneal bleed.    Objective:     General:  In obvious pain but NAD     Abdominal Exam:  Significant pain to minor palpation of the LLQ/LUQ.  No rebound tenderness.  Guarding present.  Bowel sounds normoactive.       Vascular:  Pulses palpable bilateral lower extremities     Labs:        CBC @ 2000 displays hemoglobin 9.1 which is stable from 8.6.  Platelets are 128 which is stable from 117.     Vitals:  /87 and HR 58    A:       Patient is a 72yoF with recent Hx of retropertioneal bleed which required 5U of PRBCs, who now complains of similar abdominal pain.  The concern for this patient is rebleed of her retroperitoneal bleed.  At this point, her hemodynamics are stable.  Significantly, she does not have hypotension which could signal the onset of a bleed.  It is unlikely that this patient would develop tachycardia, which would be expected in a significant bleed, as she is on metoprolol for heart failure.  Notably, a CBC two hours after the onset of the pain is stable.  There is low concern for an extension of her retroperitoneal pain as there are no hemodynamic nor recent laboratory markers to suggest a bleed. The pain is probably from irritation of her abdominal wall and psoas muscle as the blood products in the known hematoma degrade.  However, due the patient's prior bleed severity, the patient does require close monitoring.    Plan:     Recheck vitals at midnight; vitals were last checked at 2200     Have morning labs drawn at 0430 instead of 0600     Patient offered Tylenol for pain; refused as she feels most pain medications make her legs numb     Patient strongly advised that if at any point she feels dizzy, has heart palpitations, or an increase in pain, she needs to contact the nurse pronto     If any change in clinical status will do urgent CT abdomen.  Unfortunately, patient cannot receive contrast due to ESRD.      Plan discussed with MAR.

## 2018-01-08 NOTE — PROGRESS NOTE ADULT - SUBJECTIVE AND OBJECTIVE BOX
Patient is a 72y old  Female who presents with a chief complaint of Altered mental status, patient became obtunded (28 Dec 2017 04:04)      SUBJECTIVE / OVERNIGHT EVENTS:    Patient seen and examined at bedside. No acute events overnight.    Review of systems: No chest pain, no shortness of breath, no abdominal pain, no nausea, no vomiting, no diarrhea, no fevers, and no chills overnight.     MEDICATIONS  (STANDING):  amLODIPine   Tablet 10 milliGRAM(s) Oral daily  calcium acetate 667 milliGRAM(s) Oral three times a day with meals  chlorhexidine 4% Liquid 1 Application(s) Topical daily  dextrose 5%. 1000 milliLiter(s) (50 mL/Hr) IV Continuous <Continuous>  dextrose 50% Injectable 12.5 Gram(s) IV Push once  dextrose 50% Injectable 25 Gram(s) IV Push once  dextrose 50% Injectable 25 Gram(s) IV Push once  diVALproex  milliGRAM(s) Oral two times a day  influenza   Vaccine 0.5 milliLiter(s) IntraMuscular once  insulin lispro (HumaLOG) corrective regimen sliding scale   SubCutaneous three times a day before meals  insulin lispro (HumaLOG) corrective regimen sliding scale   SubCutaneous at bedtime  metoprolol     tartrate 50 milliGRAM(s) Oral two times a day    MEDICATIONS  (PRN):  dextrose Gel 1 Dose(s) Oral once PRN Blood Glucose LESS THAN 70 milliGRAM(s)/deciliter  glucagon  Injectable 1 milliGRAM(s) IntraMuscular once PRN Glucose LESS THAN 70 milligrams/deciliter  simethicone 80 milliGRAM(s) Chew four times a day PRN Gas      T(F): 98.4 (01-08 @ 06:01), Max: 98.9 (01-07 @ 15:24)  HR: 57 (01-08 @ 06:01) (56 - 64)  BP: 155/88 (01-08 @ 06:01) (149/77 - 190/90)  RR: 18 (01-08 @ 06:01) (17 - 18)  SpO2: 100% (01-08 @ 06:01) (97% - 100%)  CAPILLARY BLOOD GLUCOSE      POCT Blood Glucose.: 138 mg/dL (08 Jan 2018 12:06)  POCT Blood Glucose.: 104 mg/dL (08 Jan 2018 08:29)  POCT Blood Glucose.: 105 mg/dL (07 Jan 2018 21:40)  POCT Blood Glucose.: 103 mg/dL (07 Jan 2018 17:08)    I&O's Summary    07 Jan 2018 07:01  -  08 Jan 2018 07:00  --------------------------------------------------------  IN: 0 mL / OUT: 450 mL / NET: -450 mL        PHYSICAL EXAM:  GEN: Age appropriate, resting comfortably in bed, no acute distress, non toxic appearing, speaking in complete sentences.   HEENT: Conjunctiva and sclera normal  PULM: Lungs CTAB, no wheezes, rales, rhonchi  CV: RRR, S1S2, no MRG  Abdominal: Soft, nontender to palpation, non-distended, normoactive bowel sounds  Extremities: No edema or cyanosis  NEURO: AAOx3  Skin: No rashes, lesions      LABS:  Labs personally reviewed.                        8.6    7.85  )-----------( 117      ( 08 Jan 2018 06:30 )             25.9     Hgb Trend: 8.6<--, 9.3<--, 8.4<--, 8.8<--, 7.6<--  01-08    136  |  97<L>  |  51<H>  ----------------------------<  93  4.1   |  24  |  3.86<H>    Ca    8.0<L>      08 Jan 2018 06:30  Phos  3.3     01-08  Mg     2.0     01-08      Creatinine Trend: 3.86<--, 3.17<--, 4.87<--, 4.28<--, 5.84<--, 4.95<--        RADIOLOGY & ADDITIONAL TESTS:  Imaging Personally Reviewed.    Consultants:    Vega Mcgee  PGY-1 Pager: [phone]  [phone]  Night Float:

## 2018-01-09 LAB
ANISOCYTOSIS BLD QL: SLIGHT — SIGNIFICANT CHANGE UP
APTT BLD: 28.2 SEC — SIGNIFICANT CHANGE UP (ref 27.5–37.4)
APTT BLD: 29.6 SEC — SIGNIFICANT CHANGE UP (ref 27.5–37.4)
BASO STIPL BLD QL SMEAR: PRESENT — SIGNIFICANT CHANGE UP
BASOPHILS # BLD AUTO: 0.01 K/UL — SIGNIFICANT CHANGE UP (ref 0–0.2)
BASOPHILS # BLD AUTO: 0.02 K/UL — SIGNIFICANT CHANGE UP (ref 0–0.2)
BASOPHILS NFR BLD AUTO: 0.1 % — SIGNIFICANT CHANGE UP (ref 0–2)
BASOPHILS NFR BLD AUTO: 0.2 % — SIGNIFICANT CHANGE UP (ref 0–2)
BASOPHILS NFR SPEC: 0.9 % — SIGNIFICANT CHANGE UP (ref 0–2)
BLD GP AB SCN SERPL QL: NEGATIVE — SIGNIFICANT CHANGE UP
BUN SERPL-MCNC: 28 MG/DL — HIGH (ref 7–23)
CALCIUM SERPL-MCNC: 8 MG/DL — LOW (ref 8.4–10.5)
CHLORIDE SERPL-SCNC: 100 MMOL/L — SIGNIFICANT CHANGE UP (ref 98–107)
CO2 SERPL-SCNC: 27 MMOL/L — SIGNIFICANT CHANGE UP (ref 22–31)
CREAT SERPL-MCNC: 2.31 MG/DL — HIGH (ref 0.5–1.3)
EOSINOPHIL # BLD AUTO: 0.07 K/UL — SIGNIFICANT CHANGE UP (ref 0–0.5)
EOSINOPHIL # BLD AUTO: 0.09 K/UL — SIGNIFICANT CHANGE UP (ref 0–0.5)
EOSINOPHIL NFR BLD AUTO: 0.6 % — SIGNIFICANT CHANGE UP (ref 0–6)
EOSINOPHIL NFR BLD AUTO: 0.7 % — SIGNIFICANT CHANGE UP (ref 0–6)
EOSINOPHIL NFR FLD: 0 % — SIGNIFICANT CHANGE UP (ref 0–6)
GIANT PLATELETS BLD QL SMEAR: PRESENT — SIGNIFICANT CHANGE UP
GLUCOSE SERPL-MCNC: 115 MG/DL — HIGH (ref 70–99)
HCT VFR BLD CALC: 25.1 % — LOW (ref 34.5–45)
HCT VFR BLD CALC: 26.2 % — LOW (ref 34.5–45)
HGB BLD-MCNC: 8.5 G/DL — LOW (ref 11.5–15.5)
HGB BLD-MCNC: 8.8 G/DL — LOW (ref 11.5–15.5)
IMM GRANULOCYTES # BLD AUTO: 0.18 # — SIGNIFICANT CHANGE UP
IMM GRANULOCYTES # BLD AUTO: 0.34 # — SIGNIFICANT CHANGE UP
IMM GRANULOCYTES NFR BLD AUTO: 1.6 % — HIGH (ref 0–1.5)
IMM GRANULOCYTES NFR BLD AUTO: 2.8 % — HIGH (ref 0–1.5)
INR BLD: 1.01 — SIGNIFICANT CHANGE UP (ref 0.88–1.17)
INR BLD: 1.08 — SIGNIFICANT CHANGE UP (ref 0.88–1.17)
LYMPHOCYTES # BLD AUTO: 0.59 K/UL — LOW (ref 1–3.3)
LYMPHOCYTES # BLD AUTO: 0.74 K/UL — LOW (ref 1–3.3)
LYMPHOCYTES # BLD AUTO: 5.2 % — LOW (ref 13–44)
LYMPHOCYTES # BLD AUTO: 6 % — LOW (ref 13–44)
LYMPHOCYTES NFR SPEC AUTO: 0.9 % — LOW (ref 13–44)
MACROCYTES BLD QL: SLIGHT — SIGNIFICANT CHANGE UP
MAGNESIUM SERPL-MCNC: 1.9 MG/DL — SIGNIFICANT CHANGE UP (ref 1.6–2.6)
MCHC RBC-ENTMCNC: 30.2 PG — SIGNIFICANT CHANGE UP (ref 27–34)
MCHC RBC-ENTMCNC: 30.3 PG — SIGNIFICANT CHANGE UP (ref 27–34)
MCHC RBC-ENTMCNC: 33.6 % — SIGNIFICANT CHANGE UP (ref 32–36)
MCHC RBC-ENTMCNC: 33.9 % — SIGNIFICANT CHANGE UP (ref 32–36)
MCV RBC AUTO: 89.3 FL — SIGNIFICANT CHANGE UP (ref 80–100)
MCV RBC AUTO: 90.3 FL — SIGNIFICANT CHANGE UP (ref 80–100)
MONOCYTES # BLD AUTO: 2.25 K/UL — HIGH (ref 0–0.9)
MONOCYTES # BLD AUTO: 2.83 K/UL — HIGH (ref 0–0.9)
MONOCYTES NFR BLD AUTO: 19.8 % — HIGH (ref 2–14)
MONOCYTES NFR BLD AUTO: 22.9 % — HIGH (ref 2–14)
MONOCYTES NFR BLD: 11.8 % — HIGH (ref 2–9)
NEUTROPHIL AB SER-ACNC: 80.9 % — HIGH (ref 43–77)
NEUTROPHILS # BLD AUTO: 8.29 K/UL — HIGH (ref 1.8–7.4)
NEUTROPHILS # BLD AUTO: 8.33 K/UL — HIGH (ref 1.8–7.4)
NEUTROPHILS NFR BLD AUTO: 67.4 % — SIGNIFICANT CHANGE UP (ref 43–77)
NEUTROPHILS NFR BLD AUTO: 72.7 % — SIGNIFICANT CHANGE UP (ref 43–77)
NEUTS BAND # BLD: 1.8 % — SIGNIFICANT CHANGE UP (ref 0–6)
NRBC # FLD: 0 — SIGNIFICANT CHANGE UP
NRBC # FLD: 0.03 — SIGNIFICANT CHANGE UP
PHOSPHATE SERPL-MCNC: 2.2 MG/DL — LOW (ref 2.5–4.5)
PLATELET # BLD AUTO: 118 K/UL — LOW (ref 150–400)
PLATELET # BLD AUTO: 125 K/UL — LOW (ref 150–400)
PLATELET COUNT - ESTIMATE: SIGNIFICANT CHANGE UP
PMV BLD: 10 FL — SIGNIFICANT CHANGE UP (ref 7–13)
PMV BLD: 9.9 FL — SIGNIFICANT CHANGE UP (ref 7–13)
POIKILOCYTOSIS BLD QL AUTO: SLIGHT — SIGNIFICANT CHANGE UP
POTASSIUM SERPL-MCNC: 3.7 MMOL/L — SIGNIFICANT CHANGE UP (ref 3.5–5.3)
POTASSIUM SERPL-SCNC: 3.7 MMOL/L — SIGNIFICANT CHANGE UP (ref 3.5–5.3)
PROTHROM AB SERPL-ACNC: 11.6 SEC — SIGNIFICANT CHANGE UP (ref 9.8–13.1)
PROTHROM AB SERPL-ACNC: 12 SEC — SIGNIFICANT CHANGE UP (ref 9.8–13.1)
RBC # BLD: 2.81 M/UL — LOW (ref 3.8–5.2)
RBC # BLD: 2.9 M/UL — LOW (ref 3.8–5.2)
RBC # FLD: 14.5 % — SIGNIFICANT CHANGE UP (ref 10.3–14.5)
RBC # FLD: 15.1 % — HIGH (ref 10.3–14.5)
RH IG SCN BLD-IMP: POSITIVE — SIGNIFICANT CHANGE UP
SODIUM SERPL-SCNC: 140 MMOL/L — SIGNIFICANT CHANGE UP (ref 135–145)
VARIANT LYMPHS # BLD: 3.7 % — SIGNIFICANT CHANGE UP
WBC # BLD: 11.39 K/UL — HIGH (ref 3.8–10.5)
WBC # BLD: 12.35 K/UL — HIGH (ref 3.8–10.5)
WBC # FLD AUTO: 11.39 K/UL — HIGH (ref 3.8–10.5)
WBC # FLD AUTO: 12.35 K/UL — HIGH (ref 3.8–10.5)

## 2018-01-09 PROCEDURE — 99233 SBSQ HOSP IP/OBS HIGH 50: CPT | Mod: GC

## 2018-01-09 PROCEDURE — 77001 FLUOROGUIDE FOR VEIN DEVICE: CPT | Mod: 26,GC

## 2018-01-09 PROCEDURE — 36558 INSERT TUNNELED CV CATH: CPT

## 2018-01-09 PROCEDURE — 76937 US GUIDE VASCULAR ACCESS: CPT | Mod: 26

## 2018-01-09 RX ORDER — IRON SUCROSE 20 MG/ML
100 INJECTION, SOLUTION INTRAVENOUS ONCE
Qty: 0 | Refills: 0 | Status: COMPLETED | OUTPATIENT
Start: 2018-01-09 | End: 2018-01-09

## 2018-01-09 RX ORDER — ERYTHROPOIETIN 10000 [IU]/ML
10000 INJECTION, SOLUTION INTRAVENOUS; SUBCUTANEOUS ONCE
Qty: 0 | Refills: 0 | Status: COMPLETED | OUTPATIENT
Start: 2018-01-09 | End: 2018-01-09

## 2018-01-09 RX ORDER — ACETAMINOPHEN 500 MG
1000 TABLET ORAL ONCE
Qty: 0 | Refills: 0 | Status: DISCONTINUED | OUTPATIENT
Start: 2018-01-09 | End: 2018-01-12

## 2018-01-09 RX ADMIN — IRON SUCROSE 210 MILLIGRAM(S): 20 INJECTION, SOLUTION INTRAVENOUS at 08:43

## 2018-01-09 RX ADMIN — ERYTHROPOIETIN 10000 UNIT(S): 10000 INJECTION, SOLUTION INTRAVENOUS; SUBCUTANEOUS at 07:59

## 2018-01-09 RX ADMIN — Medication 50 MILLIGRAM(S): at 17:54

## 2018-01-09 RX ADMIN — CHLORHEXIDINE GLUCONATE 1 APPLICATION(S): 213 SOLUTION TOPICAL at 11:36

## 2018-01-09 RX ADMIN — Medication 1: at 17:54

## 2018-01-09 RX ADMIN — DIVALPROEX SODIUM 750 MILLIGRAM(S): 500 TABLET, DELAYED RELEASE ORAL at 05:39

## 2018-01-09 RX ADMIN — Medication 667 MILLIGRAM(S): at 17:54

## 2018-01-09 RX ADMIN — DIVALPROEX SODIUM 750 MILLIGRAM(S): 500 TABLET, DELAYED RELEASE ORAL at 17:54

## 2018-01-09 NOTE — PROGRESS NOTE ADULT - ATTENDING COMMENTS
Patient seen and examined. Currently, no complaints although LUQ/LLQ pain reported overnight. Exam is unchanged. Hgb/Hct stable from previous this morning. Planning for IR permacath today with outpatient vascular follow up for AVF placement. Dispo planning for [facility] with outpatient HD underway.     Further details of plan per resident note above

## 2018-01-09 NOTE — PROGRESS NOTE ADULT - SUBJECTIVE AND OBJECTIVE BOX
SURGERY PROGRESS NOTE:    ===================================  Vascular surgery (C Team) Pager [phone]  ===================================    Subjective:  Pt c/o L sided abdominal pain last night.       Objective:    PE:  Gen: NAD  Resp: airway patent, respirations unlabored, no increased WoB  CVS: RRR  Ext: Palp distal pulses  Neuro: AAOx3, no focal deficits    Vital Signs Last 24 Hrs  T(C): 36.7 (09 Jan 2018 05:25), Max: 36.9 (09 Jan 2018 00:00)  T(F): 98.1 (09 Jan 2018 05:25), Max: 98.4 (09 Jan 2018 00:00)  HR: 57 (09 Jan 2018 05:25) (57 - 64)  BP: 158/75 (09 Jan 2018 05:25) (137/84 - 159/87)  BP(mean): --  RR: 18 (09 Jan 2018 05:25) (16 - 18)  SpO2: 100% (09 Jan 2018 05:25) (97% - 100%)    I&O's Detail    07 Jan 2018 07:01  -  08 Jan 2018 07:00  --------------------------------------------------------  IN:  Total IN: 0 mL    OUT:    Voided: 450 mL  Total OUT: 450 mL    Total NET: -450 mL      08 Jan 2018 07:01  -  09 Jan 2018 06:34  --------------------------------------------------------  IN:  Total IN: 0 mL    OUT:    Voided: 700 mL  Total OUT: 700 mL    Total NET: -700 mL          Daily     Daily     MEDICATIONS  (STANDING):  amLODIPine   Tablet 10 milliGRAM(s) Oral daily  calcium acetate 667 milliGRAM(s) Oral three times a day with meals  chlorhexidine 4% Liquid 1 Application(s) Topical daily  dextrose 5%. 1000 milliLiter(s) (50 mL/Hr) IV Continuous <Continuous>  dextrose 50% Injectable 12.5 Gram(s) IV Push once  dextrose 50% Injectable 25 Gram(s) IV Push once  dextrose 50% Injectable 25 Gram(s) IV Push once  diVALproex  milliGRAM(s) Oral two times a day  epoetin marla Injectable 23488 Unit(s) IV Push once  influenza   Vaccine 0.5 milliLiter(s) IntraMuscular once  insulin lispro (HumaLOG) corrective regimen sliding scale   SubCutaneous three times a day before meals  insulin lispro (HumaLOG) corrective regimen sliding scale   SubCutaneous at bedtime  iron sucrose IVPB 100 milliGRAM(s) IV Intermittent once  metoprolol     tartrate 50 milliGRAM(s) Oral two times a day    MEDICATIONS  (PRN):  acetaminophen   Tablet. 650 milliGRAM(s) Oral every 6 hours PRN Moderate Pain (4 - 6)  dextrose Gel 1 Dose(s) Oral once PRN Blood Glucose LESS THAN 70 milliGRAM(s)/deciliter  glucagon  Injectable 1 milliGRAM(s) IntraMuscular once PRN Glucose LESS THAN 70 milligrams/deciliter  simethicone 80 milliGRAM(s) Chew four times a day PRN Gas      LABS:                        8.5    11.39 )-----------( 118      ( 09 Jan 2018 04:30 )             25.1     01-08    139  |  97<L>  |  60<H>  ----------------------------<  178<H>  3.8   |  27  |  4.17<H>    Ca    7.9<L>      08 Jan 2018 20:08  Phos  3.1     01-08  Mg     2.1     01-08      PT/INR - ( 09 Jan 2018 04:30 )   PT: 11.6 SEC;   INR: 1.01          PTT - ( 09 Jan 2018 04:30 )  PTT:28.2 SEC      RADIOLOGY & ADDITIONAL STUDIES:

## 2018-01-09 NOTE — PROGRESS NOTE ADULT - ASSESSMENT
73 yo F h/o ?CHF (EF in 2008 TTE 73%), DM2, HTN, CKD Stage 4, Type B aortic dissection presented on 12/28/17 with acute metabolic encephalopathy was admitted to the MICU as pt required intubation for airway protection and was found to have  subclinical, non-convulsive status epilepticus, being controlled w/ antiepileptic, w/ MICU course further c/b by RP bleed with Hb drop from 11 to 5.9 (s/p 5 units of pRBCs + 2 units of plts on 1/3-1/4) and progression of her CKD likely in setting of KWESI now requiring HD. Patient now transferred to medicine floors for further management. Pt on HD TTS. pt to get permacath placed on 1/9/18.

## 2018-01-09 NOTE — PROGRESS NOTE ADULT - PROBLEM SELECTOR PLAN 5
-type b aortic dissection (chronic)   -f/u vasc surg recs re partial thrombus formation within false lumen.  -stable, will continue to monitor. BP control goal 120-140 systolic per vasc

## 2018-01-09 NOTE — PROGRESS NOTE ADULT - ASSESSMENT
72yF w/ ESRD and chronic Type B aortic dissection    - Vein mapping done  - Pt still deciding on when and w/ which surgeon (Dr. Jensen vs. Dr. Man) to have fistula placement  - Permacath w/ IR t/d  - No acute surgical intervention at this time  - Please page [phone] w/ any questions    KATEY Henson PGY-2

## 2018-01-09 NOTE — PROGRESS NOTE ADULT - SUBJECTIVE AND OBJECTIVE BOX
still has intermittent left sided abd pain  still with occasional dyspnea  appetite fair to poor    MEDICATIONS  (STANDING):  amLODIPine   Tablet 10 milliGRAM(s) Oral daily  calcium acetate 667 milliGRAM(s) Oral three times a day with meals  chlorhexidine 4% Liquid 1 Application(s) Topical daily  dextrose 5%. 1000 milliLiter(s) (50 mL/Hr) IV Continuous <Continuous>  dextrose 50% Injectable 12.5 Gram(s) IV Push once  dextrose 50% Injectable 25 Gram(s) IV Push once  dextrose 50% Injectable 25 Gram(s) IV Push once  diVALproex  milliGRAM(s) Oral two times a day  influenza   Vaccine 0.5 milliLiter(s) IntraMuscular once  insulin lispro (HumaLOG) corrective regimen sliding scale   SubCutaneous three times a day before meals  insulin lispro (HumaLOG) corrective regimen sliding scale   SubCutaneous at bedtime  metoprolol     tartrate 50 milliGRAM(s) Oral two times a day                          8.5    11.39 )-----------( 118      ( 09 Jan 2018 04:30 )             25.1   01-08    139  |  97<L>  |  60<H>  ----------------------------<  178<H>  3.8   |  27  |  4.17<H>    Ca    7.9<L>      08 Jan 2018 20:08  Phos  3.1     01-08  Mg     2.1     01-08    ICU Vital Signs Last 24 Hrs  T(C): 36.7 (09 Jan 2018 05:25), Max: 36.9 (08 Jan 2018 06:01)  T(F): 98.1 (09 Jan 2018 05:25), Max: 98.4 (08 Jan 2018 06:01)  HR: 57 (09 Jan 2018 05:25) (57 - 64)  BP: 158/75 (09 Jan 2018 05:25) (137/84 - 159/87)  BP(mean): --  ABP: --  ABP(mean): --  RR: 18 (09 Jan 2018 05:25) (16 - 18)  SpO2: 100% (09 Jan 2018 05:25) (97% - 100%)    exam: appears fairly comfortable  lungs-clear, CV s1 s2 systolic murmur  abd-minimal left sided abd tenderness  Extrem- no edema

## 2018-01-09 NOTE — PROGRESS NOTE ADULT - PROBLEM SELECTOR PLAN 3
-Pt will need perm cath  (IR called) and eventual AVF (vasc surg consulted)  -will need permacath (IR called)   -HD per renal TTS

## 2018-01-09 NOTE — PROGRESS NOTE ADULT - SUBJECTIVE AND OBJECTIVE BOX
Patient is a 72y old  Female who presents with a chief complaint of Altered mental status, patient became obtunded (28 Dec 2017 04:04)      SUBJECTIVE / OVERNIGHT EVENTS:    Patient seen and examined at bedside in dialysis suite. Pt seen by night team for LUE and LLE pain. Per chart note:  "Examiner was called to bedside around 2000.  Patient was complaining of 10/10 throbbing LLQ/LUQ pain which began around 1800 when she was ambulating to the bathroom, and has not remitted.  Pain is dull, throbbing in nature with occasional sharp twinges.  The pain was not aggravated by dinner, which she did completely eat.  There was no vomiting or diarrhea associated with it.  Patient has been having regular bowel movements which are soft and non-bloody.  She is not having dizziness or lightheadedness.  Patient states that this pain is similar to the pain she had when she had the retroperitoneal bleed." Night intern and MAR aware of situation, pain relieved by hot packs.     During interview this AM, pt stated pain had resolved after starting dialysis. Discussed with pt that she would be going for permacath today. Pt believed she would be going on Thursday however I reiterated that she would be going today for the permacath.      Review of systems: No chest pain, no shortness of breath, no abdominal pain, no nausea, no vomiting, no diarrhea, no fevers, and no chills overnight.     MEDICATIONS  (STANDING):  amLODIPine   Tablet 10 milliGRAM(s) Oral daily  calcium acetate 667 milliGRAM(s) Oral three times a day with meals  chlorhexidine 4% Liquid 1 Application(s) Topical daily  dextrose 5%. 1000 milliLiter(s) (50 mL/Hr) IV Continuous <Continuous>  dextrose 50% Injectable 12.5 Gram(s) IV Push once  dextrose 50% Injectable 25 Gram(s) IV Push once  dextrose 50% Injectable 25 Gram(s) IV Push once  diVALproex  milliGRAM(s) Oral two times a day  influenza   Vaccine 0.5 milliLiter(s) IntraMuscular once  insulin lispro (HumaLOG) corrective regimen sliding scale   SubCutaneous three times a day before meals  insulin lispro (HumaLOG) corrective regimen sliding scale   SubCutaneous at bedtime  metoprolol     tartrate 50 milliGRAM(s) Oral two times a day    MEDICATIONS  (PRN):  acetaminophen   Tablet. 650 milliGRAM(s) Oral every 6 hours PRN Moderate Pain (4 - 6)  dextrose Gel 1 Dose(s) Oral once PRN Blood Glucose LESS THAN 70 milliGRAM(s)/deciliter  glucagon  Injectable 1 milliGRAM(s) IntraMuscular once PRN Glucose LESS THAN 70 milligrams/deciliter  simethicone 80 milliGRAM(s) Chew four times a day PRN Gas      T(F): 98 (01-09 @ 06:55), Max: 98.4 (01-09 @ 00:00)  HR: 60 (01-09 @ 06:55) (57 - 64)  BP: 175/92 (01-09 @ 06:55) (137/84 - 175/92)  RR: 18 (01-09 @ 06:55) (16 - 18)  SpO2: 100% (01-09 @ 05:25) (97% - 100%)  CAPILLARY BLOOD GLUCOSE      POCT Blood Glucose.: 117 mg/dL (09 Jan 2018 08:50)  POCT Blood Glucose.: 159 mg/dL (09 Jan 2018 05:38)  POCT Blood Glucose.: 179 mg/dL (08 Jan 2018 21:47)  POCT Blood Glucose.: 137 mg/dL (08 Jan 2018 17:09)  POCT Blood Glucose.: 138 mg/dL (08 Jan 2018 12:06)    I&O's Summary    08 Jan 2018 07:01  -  09 Jan 2018 07:00  --------------------------------------------------------  IN: 0 mL / OUT: 700 mL / NET: -700 mL        PHYSICAL EXAM:  GEN: Age appropriate female, resting comfortably in bed, no acute distress, non toxic appearing, speaking in complete sentences.   HEENT: Conjunctiva and sclera normal  PULM: Lungs CTAB, no wheezes, rales, rhonchi  CV: RRR, S1S2, no MRG  Abdominal: Soft, nontender to palpation, non-distended, normoactive bowel sounds  Extremities: No edema or cyanosis  VAsc: + R internal jugular cath hooked up to dialysis machine  NEURO: AAOx3  Skin: multiple ecchymosis       LABS:  Labs personally reviewed.                        8.5 11.39 )-----------( 118      ( 09 Jan 2018 04:30 )             25.1     Hgb Trend: 8.5<--, 9.1<--, 8.6<--, 9.3<--, 8.4<--  01-08    139  |  97<L>  |  60<H>  ----------------------------<  178<H>  3.8   |  27  |  4.17<H>    Ca    7.9<L>      08 Jan 2018 20:08  Phos  3.1     01-08  Mg     2.1     01-08      Creatinine Trend: 4.17<--, 3.86<--, 3.17<--, 4.87<--, 4.28<--, 5.84<--  PT/INR - ( 09 Jan 2018 04:30 )   PT: 11.6 SEC;   INR: 1.01          PTT - ( 09 Jan 2018 04:30 )  PTT:28.2 SEC          Consultants: case discussed with IR team. pt to go for permacat today.     Vega Mcgee  PGY-1 Pager: [phone]  [phone]  Night Float:

## 2018-01-09 NOTE — PROGRESS NOTE ADULT - PROBLEM SELECTOR PLAN 1
CT abd w contrast showed RP bleeding associated w/ left psoas muscle + ant abd wall hematoma w/ consequential acute blood loss anemia s/p 5 units pRBCs and 2 units plts last transfusion. H/H has been stable and continues to improve  -will continue to monitor with low threshold for repeat imaging and surgery reevaluation   -maintain active type and screen, transfuse if Hb below 7, plts below 50 if bleeding  -cbc daily

## 2018-01-09 NOTE — PROGRESS NOTE ADULT - ASSESSMENT
pt still having some problems with breathing and abd pain   has been toerating HD fairly well.  Does not appear volume overloaded  will dialyze this am via [facility]iley and if stable will go later today for permcath placement with IR  still does not appear ready for discharge.  Will need to follow cbc, biochem profile.  Will continue to give epogen IV with HD and will give dose of venofer today as well  pt in process of deciding where she will go for outpatient HD.  My hope is that she will use [facility] so that I can continue to follow her as outpatient

## 2018-01-09 NOTE — CHART NOTE - NSCHARTNOTEFT_GEN_A_CORE
Patient Age: 72y    Patient Gender: Female    Procedure (including site / side if known): permacath    Diagnosis / Indication: ESRD requiring dialysis    Interventional Radiology Attending Physician: Dr. Richmond    Ordering Attending Physician: Dr. Sterling    Pertinent Medical History:    PAST MEDICAL & SURGICAL HISTORY:  CKD (chronic kidney disease)  Hypertension  History of Hysterectomy with O  Dissection of Aorta: Type B  Cancer of Endometrium: s/p Hysterectomy , s/p Chemo.  Adult Onset Diabetes Mellitus,  H/O total hysterectomy: in 2007 for endometrial CA        Pertinent Labs:                            8.5    11.39 )-----------( 118      ( 09 Jan 2018 04:30 )             25.1       01-08    139  |  97<L>  |  60<H>  ----------------------------<  178<H>  3.8   |  27  |  4.17<H>    Ca    7.9<L>      08 Jan 2018 20:08  Phos  3.1     01-08  Mg     2.1     01-08        PT/INR - ( 09 Jan 2018 04:30 )   PT: 11.6 SEC;   INR: 1.01          PTT - ( 09 Jan 2018 04:30 )  PTT:28.2 SEC    Patient and Family aware:   [ X ]Y   [  ]N

## 2018-01-09 NOTE — PROGRESS NOTE ADULT - PROBLEM SELECTOR PLAN 2
Pt was being monitored in the MICCU w/ VEEG for AMS 2/2 to seizure. No further evidence of seizure like activity since 1/1. Etiology Unclear  -c/w depakote 750mg BID, no need for further depakote levels unless repeat seizures per neuro. Neuro following, will call them if repeat seizures.

## 2018-01-10 LAB
BASOPHILS # BLD AUTO: 0.08 K/UL — SIGNIFICANT CHANGE UP (ref 0–0.2)
BASOPHILS NFR BLD AUTO: 0.8 % — SIGNIFICANT CHANGE UP (ref 0–2)
BUN SERPL-MCNC: 42 MG/DL — HIGH (ref 7–23)
CALCIUM SERPL-MCNC: 8.1 MG/DL — LOW (ref 8.4–10.5)
CHLORIDE SERPL-SCNC: 97 MMOL/L — LOW (ref 98–107)
CO2 SERPL-SCNC: 27 MMOL/L — SIGNIFICANT CHANGE UP (ref 22–31)
CREAT SERPL-MCNC: 3.1 MG/DL — HIGH (ref 0.5–1.3)
EOSINOPHIL # BLD AUTO: 0.25 K/UL — SIGNIFICANT CHANGE UP (ref 0–0.5)
EOSINOPHIL NFR BLD AUTO: 2.5 % — SIGNIFICANT CHANGE UP (ref 0–6)
GLUCOSE SERPL-MCNC: 92 MG/DL — SIGNIFICANT CHANGE UP (ref 70–99)
HCT VFR BLD CALC: 29.7 % — LOW (ref 34.5–45)
HGB BLD-MCNC: 9.6 G/DL — LOW (ref 11.5–15.5)
IMM GRANULOCYTES # BLD AUTO: 0.51 # — SIGNIFICANT CHANGE UP
IMM GRANULOCYTES NFR BLD AUTO: 5.2 % — HIGH (ref 0–1.5)
LYMPHOCYTES # BLD AUTO: 0.89 K/UL — LOW (ref 1–3.3)
LYMPHOCYTES # BLD AUTO: 9 % — LOW (ref 13–44)
MAGNESIUM SERPL-MCNC: 2 MG/DL — SIGNIFICANT CHANGE UP (ref 1.6–2.6)
MCHC RBC-ENTMCNC: 30.1 PG — SIGNIFICANT CHANGE UP (ref 27–34)
MCHC RBC-ENTMCNC: 32.3 % — SIGNIFICANT CHANGE UP (ref 32–36)
MCV RBC AUTO: 93.1 FL — SIGNIFICANT CHANGE UP (ref 80–100)
MONOCYTES # BLD AUTO: 2.5 K/UL — HIGH (ref 0–0.9)
MONOCYTES NFR BLD AUTO: 25.4 % — HIGH (ref 2–14)
NEUTROPHILS # BLD AUTO: 5.62 K/UL — SIGNIFICANT CHANGE UP (ref 1.8–7.4)
NEUTROPHILS NFR BLD AUTO: 57.1 % — SIGNIFICANT CHANGE UP (ref 43–77)
NRBC # FLD: 0 — SIGNIFICANT CHANGE UP
PHOSPHATE SERPL-MCNC: 3.2 MG/DL — SIGNIFICANT CHANGE UP (ref 2.5–4.5)
PLATELET # BLD AUTO: 141 K/UL — LOW (ref 150–400)
PMV BLD: 10.5 FL — SIGNIFICANT CHANGE UP (ref 7–13)
POTASSIUM SERPL-MCNC: 3.7 MMOL/L — SIGNIFICANT CHANGE UP (ref 3.5–5.3)
POTASSIUM SERPL-SCNC: 3.7 MMOL/L — SIGNIFICANT CHANGE UP (ref 3.5–5.3)
RBC # BLD: 3.19 M/UL — LOW (ref 3.8–5.2)
RBC # FLD: 16 % — HIGH (ref 10.3–14.5)
SODIUM SERPL-SCNC: 138 MMOL/L — SIGNIFICANT CHANGE UP (ref 135–145)
WBC # BLD: 9.85 K/UL — SIGNIFICANT CHANGE UP (ref 3.8–10.5)
WBC # FLD AUTO: 9.85 K/UL — SIGNIFICANT CHANGE UP (ref 3.8–10.5)

## 2018-01-10 PROCEDURE — 99233 SBSQ HOSP IP/OBS HIGH 50: CPT | Mod: GC

## 2018-01-10 RX ADMIN — DIVALPROEX SODIUM 750 MILLIGRAM(S): 500 TABLET, DELAYED RELEASE ORAL at 18:16

## 2018-01-10 RX ADMIN — Medication 50 MILLIGRAM(S): at 18:16

## 2018-01-10 RX ADMIN — Medication 50 MILLIGRAM(S): at 05:53

## 2018-01-10 RX ADMIN — Medication 667 MILLIGRAM(S): at 08:35

## 2018-01-10 RX ADMIN — Medication 667 MILLIGRAM(S): at 18:16

## 2018-01-10 RX ADMIN — DIVALPROEX SODIUM 750 MILLIGRAM(S): 500 TABLET, DELAYED RELEASE ORAL at 05:52

## 2018-01-10 RX ADMIN — AMLODIPINE BESYLATE 10 MILLIGRAM(S): 2.5 TABLET ORAL at 05:52

## 2018-01-10 RX ADMIN — Medication 667 MILLIGRAM(S): at 13:05

## 2018-01-10 NOTE — PROGRESS NOTE ADULT - SUBJECTIVE AND OBJECTIVE BOX
SURGERY PROGRESS NOTE:    ===================================  Vascular surgery (C Team) Pager [phone]  ===================================    Subjective:  Pt w/o new c/o this AM. Has not decided on a plan for AVF yet. Leaning towards inpatient setting. Wants to talk with .      Objective:    PE:  Gen: NAD  Resp: airway patent, respirations unlabored, no increased WoB  CVS: RRR  Ext: LUE precautions in place, palp L radial pulse  Neuro: AAOx3, no focal deficits    Vital Signs Last 24 Hrs  T(C): 36.9 (10 Humberto 2018 04:56), Max: 36.9 (10 Humberto 2018 04:56)  T(F): 98.4 (10 Humberto 2018 04:56), Max: 98.4 (10 Humberto 2018 04:56)  HR: 74 (10 Humberto 2018 04:56) (61 - 74)  BP: 138/73 (10 Humberto 2018 04:56) (129/59 - 160/90)  BP(mean): --  RR: 18 (10 Humberto 2018 04:56) (18 - 18)  SpO2: 98% (10 Humberto 2018 04:56) (98% - 100%)    I&O's Detail    2018 07:01  -  10 Humberto 2018 07:00  --------------------------------------------------------  IN:    Other: 700 mL  Total IN: 700 mL    OUT:    Other: 1700 mL  Total OUT: 1700 mL    Total NET: -1000 mL          Daily     Daily Weight in k (2018 10:30)    MEDICATIONS  (STANDING):  amLODIPine   Tablet 10 milliGRAM(s) Oral daily  calcium acetate 667 milliGRAM(s) Oral three times a day with meals  chlorhexidine 4% Liquid 1 Application(s) Topical daily  dextrose 5%. 1000 milliLiter(s) (50 mL/Hr) IV Continuous <Continuous>  dextrose 50% Injectable 12.5 Gram(s) IV Push once  dextrose 50% Injectable 25 Gram(s) IV Push once  dextrose 50% Injectable 25 Gram(s) IV Push once  diVALproex  milliGRAM(s) Oral two times a day  influenza   Vaccine 0.5 milliLiter(s) IntraMuscular once  insulin lispro (HumaLOG) corrective regimen sliding scale   SubCutaneous three times a day before meals  insulin lispro (HumaLOG) corrective regimen sliding scale   SubCutaneous at bedtime  metoprolol     tartrate 50 milliGRAM(s) Oral two times a day    MEDICATIONS  (PRN):  acetaminophen   Tablet. 650 milliGRAM(s) Oral every 6 hours PRN Moderate Pain (4 - 6)  acetaminophen  IVPB. 1000 milliGRAM(s) IV Intermittent once PRN Moderate Pain (4 - 6)  dextrose Gel 1 Dose(s) Oral once PRN Blood Glucose LESS THAN 70 milliGRAM(s)/deciliter  glucagon  Injectable 1 milliGRAM(s) IntraMuscular once PRN Glucose LESS THAN 70 milligrams/deciliter  simethicone 80 milliGRAM(s) Chew four times a day PRN Gas      LABS:                        9.6    9.85  )-----------( 141      ( 10 Humberto 2018 04:50 )             29.7     01-10    138  |  97<L>  |  42<H>  ----------------------------<  92  3.7   |  27  |  3.10<H>    Ca    8.1<L>      10 Humberto 2018 04:50  Phos  3.2     01-10  Mg     2.0     01-10      PT/INR - ( 2018 12:05 )   PT: 12.0 SEC;   INR: 1.08          PTT - ( 2018 12:05 )  PTT:29.6 SEC      RADIOLOGY & ADDITIONAL STUDIES:

## 2018-01-10 NOTE — PROGRESS NOTE ADULT - ATTENDING COMMENTS
Patient seen and examined. No current complaints. Permacath placed yesterday by IR. Patient medically stable for discharge to [facility] with HD. Further plan as outlined above by excellent resident, Dr. Smith.     Further details of plan per resident note above Patient seen and examined. No current complaints. Permacath placed yesterday by IR. Patient medically stable for discharge to [facility] with HD. Further plan as outlined above by excellent resident, Dr. Smith.     Further details of plan per resident note above    DISCHARGE TIME- 36 MINUTES SPENT PREPARING DISCHARGE, INCLUDING PAPERWORK, MEDICATION RECONCILIATION, AND COUNSELLING OF PATIENT.

## 2018-01-10 NOTE — PROGRESS NOTE ADULT - ASSESSMENT
72yF w/ ESRD and chronic Type B aortic dissection    - Pt on schedule for AVF creation w/ Dr. Jensen tomorrow.  - Will discuss plan w/ Pt again this afternoon and offer fistula after she speaks with her   - Please page [phone] w/ any questions    KATEY Henson PGY-2

## 2018-01-10 NOTE — PROGRESS NOTE ADULT - SUBJECTIVE AND OBJECTIVE BOX
Patient is a 72y old  Female who presents with a chief complaint of Altered mental status, patient became obtunded (28 Dec 2017 04:04)      SUBJECTIVE / OVERNIGHT EVENTS:    Patient seen and examined at bedside. No acute events overnight.    Review of systems: No chest pain, no shortness of breath, no abdominal pain, no nausea, no vomiting, no diarrhea, no fevers, and no chills overnight.     MEDICATIONS  (STANDING):  amLODIPine   Tablet 10 milliGRAM(s) Oral daily  calcium acetate 667 milliGRAM(s) Oral three times a day with meals  chlorhexidine 4% Liquid 1 Application(s) Topical daily  dextrose 5%. 1000 milliLiter(s) (50 mL/Hr) IV Continuous <Continuous>  dextrose 50% Injectable 12.5 Gram(s) IV Push once  dextrose 50% Injectable 25 Gram(s) IV Push once  dextrose 50% Injectable 25 Gram(s) IV Push once  diVALproex  milliGRAM(s) Oral two times a day  influenza   Vaccine 0.5 milliLiter(s) IntraMuscular once  insulin lispro (HumaLOG) corrective regimen sliding scale   SubCutaneous three times a day before meals  insulin lispro (HumaLOG) corrective regimen sliding scale   SubCutaneous at bedtime  metoprolol     tartrate 50 milliGRAM(s) Oral two times a day    MEDICATIONS  (PRN):  acetaminophen   Tablet. 650 milliGRAM(s) Oral every 6 hours PRN Moderate Pain (4 - 6)  acetaminophen  IVPB. 1000 milliGRAM(s) IV Intermittent once PRN Moderate Pain (4 - 6)  dextrose Gel 1 Dose(s) Oral once PRN Blood Glucose LESS THAN 70 milliGRAM(s)/deciliter  glucagon  Injectable 1 milliGRAM(s) IntraMuscular once PRN Glucose LESS THAN 70 milligrams/deciliter  simethicone 80 milliGRAM(s) Chew four times a day PRN Gas      T(F): 98.4 (01-10 @ 04:56), Max: 98.4 (01-10 @ 04:56)  HR: 74 (01-10 @ 04:56) (61 - 74)  BP: 138/73 (01-10 @ 04:56) (129/59 - 160/90)  RR: 18 (01-10 @ 04:56) (18 - 18)  SpO2: 98% (01-10 @ 04:56) (98% - 100%)  CAPILLARY BLOOD GLUCOSE      POCT Blood Glucose.: 124 mg/dL (09 Jan 2018 21:18)  POCT Blood Glucose.: 157 mg/dL (09 Jan 2018 17:48)  POCT Blood Glucose.: 119 mg/dL (09 Jan 2018 12:12)  POCT Blood Glucose.: 117 mg/dL (09 Jan 2018 08:50)    I&O's Summary    09 Jan 2018 07:01  -  10 Humberto 2018 07:00  --------------------------------------------------------  IN: 700 mL / OUT: 1700 mL / NET: -1000 mL        PHYSICAL EXAM:  GEN: Age appropriate, resting comfortably in bed, no acute distress, non toxic appearing, speaking in complete sentences.   HEENT: Conjunctiva and sclera normal  Neck: right sided permacath in place. no swelling, erythema, or hematoma palpated  PULM: Lungs CTAB, no wheezes, rales, rhonchi  CV: RRR, S1S2, no MRG  Abdominal: Soft, nontender to palpation, non-distended, normoactive bowel sounds  Extremities: No edema or cyanosis  NEURO: AAOx3  Skin: abdominal ecchymosis      LABS:  Labs personally reviewed.                        9.6    9.85  )-----------( 141      ( 10 Humberto 2018 04:50 )             29.7     Hgb Trend: 9.6<--, 8.8<--, 8.5<--, 9.1<--, 8.6<--  01-10    138  |  97<L>  |  42<H>  ----------------------------<  92  3.7   |  27  |  3.10<H>    Ca    8.1<L>      10 Humberto 2018 04:50  Phos  3.2     01-10  Mg     2.0     01-10      Creatinine Trend: 3.10<--, 2.31<--, 4.17<--, 3.86<--, 3.17<--, 4.87<--  PT/INR - ( 09 Jan 2018 12:05 )   PT: 12.0 SEC;   INR: 1.08          PTT - ( 09 Jan 2018 12:05 )  PTT:29.6 SEC      RADIOLOGY & ADDITIONAL TESTS:  Imaging Personally Reviewed.    Consultants:    Vega Mcgee  PGY-1 Pager: [phone]  [phone]  Night Float:

## 2018-01-10 NOTE — CHART NOTE - NSCHARTNOTEFT_GEN_A_CORE
Preop Dx: ESRD  Surgeon: Dr. Jensen  Procedure: Creation of left arteriovenous fistula, possible right, possible graft     Vital Signs Last 24 Hrs  T(C): 36.9 (10 Humberto 2018 04:56), Max: 36.9 (10 Humberto 2018 04:56)  T(F): 98.4 (10 Humberto 2018 04:56), Max: 98.4 (10 Humberto 2018 04:56)  HR: 74 (10 Humberto 2018 04:56) (61 - 74)  BP: 138/73 (10 Humberto 2018 04:56) (129/59 - 147/71)  BP(mean): --  RR: 18 (10 Humberto 2018 04:56) (18 - 18)  SpO2: 98% (10 Humberto 2018 04:56) (98% - 100%)                        9.6    9.85  )-----------( 141      ( 10 Humberto 2018 04:50 )             29.7     01-10    138  |  97<L>  |  42<H>  ----------------------------<  92  3.7   |  27  |  3.10<H>    Ca    8.1<L>      10 Humberto 2018 04:50  Phos  3.2     01-10  Mg     2.0     01-10      PT/INR - ( 09 Jan 2018 12:05 )   PT: 12.0 SEC;   INR: 1.08          PTT - ( 09 Jan 2018 12:05 )  PTT:29.6 SEC  Daily     Daily     EKG: < from: 12 Lead ECG (12.28.17 @ 01:31) >    Ventricular Rate 50 BPM    Atrial Rate 50 BPM    P-R Interval 204 ms    QRS Duration 156 ms    Q-T Interval 662 ms    QTC Calculation(Bezet) 603 ms    P Axis 10 degrees    R Axis 88 degrees    T Axis 73 degrees    Diagnosis Line Sinus bradycardia  Non-specific intra-ventricular conduction block    < end of copied text >    CXR: < from: Xray Chest 1 View AP- PORTABLE-Urgent (12.31.17 @ 14:48) >    IMPRESSION:  Interval right IJ Shiley catheter insertion with tip in SVC region. No   pneumothorax post placement.    Endotracheal tube and enteric tube are unchanged.    Persistent left basilar and retrocardiac opacification with obscured   hemidiaphragm and CP angle probably reflecting combination of pleural   effusion and underlying airspace consolidation including possible   pneumonia in the proper clinical context.    Hazy indistinct right CP angle could be dueto overlying soft tissues   versus a small right pleural effusion. Clear remaining visualized lungs.    Stable cardiac and mediastinal silhouettes.    Generalized osteopenia again noted.    < end of copied text >      Type and Screen: Active T+S from 1/9    Plan:  - OR 1/11/2018 for LUE AVF creation with Dr. Jensen  - Do not send patient to dialysis in AM. Will dialyze after surgery.  - NPO after midnight except meds  - IVF while NPO  - Consent signed and in front of chart  - Please page [phone] w/ any questions    KATEY Henson PGY-2  - Please document medical clearance for OR

## 2018-01-11 ENCOUNTER — TRANSCRIPTION ENCOUNTER (OUTPATIENT)
Age: 73
End: 2018-01-11

## 2018-01-11 DIAGNOSIS — D50.9 IRON DEFICIENCY ANEMIA, UNSPECIFIED: ICD-10-CM

## 2018-01-11 LAB
APTT BLD: 28.9 SEC — SIGNIFICANT CHANGE UP (ref 27.5–37.4)
BUN SERPL-MCNC: 57 MG/DL — HIGH (ref 7–23)
BUN SERPL-MCNC: 57 MG/DL — HIGH (ref 7–23)
CALCIUM SERPL-MCNC: 7.7 MG/DL — LOW (ref 8.4–10.5)
CALCIUM SERPL-MCNC: 7.7 MG/DL — LOW (ref 8.4–10.5)
CHLORIDE SERPL-SCNC: 97 MMOL/L — LOW (ref 98–107)
CHLORIDE SERPL-SCNC: 97 MMOL/L — LOW (ref 98–107)
CO2 SERPL-SCNC: 23 MMOL/L — SIGNIFICANT CHANGE UP (ref 22–31)
CO2 SERPL-SCNC: 23 MMOL/L — SIGNIFICANT CHANGE UP (ref 22–31)
CREAT SERPL-MCNC: 3.93 MG/DL — HIGH (ref 0.5–1.3)
CREAT SERPL-MCNC: 3.93 MG/DL — HIGH (ref 0.5–1.3)
GLUCOSE SERPL-MCNC: 103 MG/DL — HIGH (ref 70–99)
GLUCOSE SERPL-MCNC: 103 MG/DL — HIGH (ref 70–99)
HCT VFR BLD CALC: 25.7 % — LOW (ref 34.5–45)
HGB BLD-MCNC: 8.3 G/DL — LOW (ref 11.5–15.5)
INR BLD: 1.04 — SIGNIFICANT CHANGE UP (ref 0.88–1.17)
MAGNESIUM SERPL-MCNC: 2 MG/DL — SIGNIFICANT CHANGE UP (ref 1.6–2.6)
MCHC RBC-ENTMCNC: 29.6 PG — SIGNIFICANT CHANGE UP (ref 27–34)
MCHC RBC-ENTMCNC: 32.3 % — SIGNIFICANT CHANGE UP (ref 32–36)
MCV RBC AUTO: 91.8 FL — SIGNIFICANT CHANGE UP (ref 80–100)
NRBC # FLD: 0 — SIGNIFICANT CHANGE UP
PHOSPHATE SERPL-MCNC: 3.2 MG/DL — SIGNIFICANT CHANGE UP (ref 2.5–4.5)
PLATELET # BLD AUTO: 159 K/UL — SIGNIFICANT CHANGE UP (ref 150–400)
PMV BLD: 9.9 FL — SIGNIFICANT CHANGE UP (ref 7–13)
POTASSIUM SERPL-MCNC: 4 MMOL/L — SIGNIFICANT CHANGE UP (ref 3.5–5.3)
POTASSIUM SERPL-MCNC: 4 MMOL/L — SIGNIFICANT CHANGE UP (ref 3.5–5.3)
POTASSIUM SERPL-SCNC: 4 MMOL/L — SIGNIFICANT CHANGE UP (ref 3.5–5.3)
POTASSIUM SERPL-SCNC: 4 MMOL/L — SIGNIFICANT CHANGE UP (ref 3.5–5.3)
PROTHROM AB SERPL-ACNC: 12 SEC — SIGNIFICANT CHANGE UP (ref 9.8–13.1)
RBC # BLD: 2.8 M/UL — LOW (ref 3.8–5.2)
RBC # FLD: 16.2 % — HIGH (ref 10.3–14.5)
SODIUM SERPL-SCNC: 136 MMOL/L — SIGNIFICANT CHANGE UP (ref 135–145)
SODIUM SERPL-SCNC: 136 MMOL/L — SIGNIFICANT CHANGE UP (ref 135–145)
WBC # BLD: 10.64 K/UL — HIGH (ref 3.8–10.5)
WBC # FLD AUTO: 10.64 K/UL — HIGH (ref 3.8–10.5)

## 2018-01-11 PROCEDURE — 99233 SBSQ HOSP IP/OBS HIGH 50: CPT | Mod: GC

## 2018-01-11 PROCEDURE — 36821 AV FUSION DIRECT ANY SITE: CPT | Mod: LT

## 2018-01-11 RX ORDER — METOCLOPRAMIDE HCL 10 MG
10 TABLET ORAL ONCE
Qty: 0 | Refills: 0 | Status: DISCONTINUED | OUTPATIENT
Start: 2018-01-11 | End: 2018-01-11

## 2018-01-11 RX ORDER — ALBUTEROL 90 UG/1
2.5 AEROSOL, METERED ORAL ONCE
Qty: 0 | Refills: 0 | Status: DISCONTINUED | OUTPATIENT
Start: 2018-01-11 | End: 2018-01-11

## 2018-01-11 RX ORDER — IPRATROPIUM/ALBUTEROL SULFATE 18-103MCG
3 AEROSOL WITH ADAPTER (GRAM) INHALATION ONCE
Qty: 0 | Refills: 0 | Status: COMPLETED | OUTPATIENT
Start: 2018-01-11 | End: 2018-01-11

## 2018-01-11 RX ORDER — HYDROCORTISONE 20 MG
100 TABLET ORAL ONCE
Qty: 0 | Refills: 0 | Status: COMPLETED | OUTPATIENT
Start: 2018-01-11 | End: 2018-01-11

## 2018-01-11 RX ORDER — HYDROMORPHONE HYDROCHLORIDE 2 MG/ML
0.25 INJECTION INTRAMUSCULAR; INTRAVENOUS; SUBCUTANEOUS
Qty: 0 | Refills: 0 | Status: DISCONTINUED | OUTPATIENT
Start: 2018-01-11 | End: 2018-01-11

## 2018-01-11 RX ORDER — ONDANSETRON 8 MG/1
4 TABLET, FILM COATED ORAL ONCE
Qty: 0 | Refills: 0 | Status: DISCONTINUED | OUTPATIENT
Start: 2018-01-11 | End: 2018-01-11

## 2018-01-11 RX ORDER — IRON SUCROSE 20 MG/ML
100 INJECTION, SOLUTION INTRAVENOUS ONCE
Qty: 0 | Refills: 0 | Status: COMPLETED | OUTPATIENT
Start: 2018-01-11 | End: 2018-01-11

## 2018-01-11 RX ORDER — ERYTHROPOIETIN 10000 [IU]/ML
10000 INJECTION, SOLUTION INTRAVENOUS; SUBCUTANEOUS ONCE
Qty: 0 | Refills: 0 | Status: COMPLETED | OUTPATIENT
Start: 2018-01-11 | End: 2018-01-11

## 2018-01-11 RX ADMIN — DIVALPROEX SODIUM 750 MILLIGRAM(S): 500 TABLET, DELAYED RELEASE ORAL at 19:31

## 2018-01-11 RX ADMIN — ERYTHROPOIETIN 10000 UNIT(S): 10000 INJECTION, SOLUTION INTRAVENOUS; SUBCUTANEOUS at 21:07

## 2018-01-11 RX ADMIN — IRON SUCROSE 210 MILLIGRAM(S): 20 INJECTION, SOLUTION INTRAVENOUS at 21:08

## 2018-01-11 RX ADMIN — AMLODIPINE BESYLATE 10 MILLIGRAM(S): 2.5 TABLET ORAL at 06:31

## 2018-01-11 RX ADMIN — Medication 100 MILLIGRAM(S): at 10:55

## 2018-01-11 RX ADMIN — DIVALPROEX SODIUM 750 MILLIGRAM(S): 500 TABLET, DELAYED RELEASE ORAL at 06:31

## 2018-01-11 RX ADMIN — Medication 3 MILLILITER(S): at 10:39

## 2018-01-11 RX ADMIN — Medication 50 MILLIGRAM(S): at 06:32

## 2018-01-11 NOTE — PROGRESS NOTE ADULT - SUBJECTIVE AND OBJECTIVE BOX
Patient is a 72y old  Female who presents with a chief complaint of Altered mental status, patient became obtunded (28 Dec 2017 04:04)      SUBJECTIVE / OVERNIGHT EVENTS:    Patient seen and examined at bedside. No acute events overnight.    Review of systems: No chest pain, no shortness of breath, no abdominal pain, no nausea, no vomiting, no diarrhea, no fevers, and no chills overnight.     MEDICATIONS  (STANDING):  amLODIPine   Tablet 10 milliGRAM(s) Oral daily  calcium acetate 667 milliGRAM(s) Oral three times a day with meals  dextrose 5%. 1000 milliLiter(s) (50 mL/Hr) IV Continuous <Continuous>  dextrose 50% Injectable 12.5 Gram(s) IV Push once  dextrose 50% Injectable 25 Gram(s) IV Push once  dextrose 50% Injectable 25 Gram(s) IV Push once  diVALproex  milliGRAM(s) Oral two times a day  epoetin marla Injectable 68150 Unit(s) IV Push once  influenza   Vaccine 0.5 milliLiter(s) IntraMuscular once  insulin lispro (HumaLOG) corrective regimen sliding scale   SubCutaneous three times a day before meals  insulin lispro (HumaLOG) corrective regimen sliding scale   SubCutaneous at bedtime  iron sucrose IVPB 100 milliGRAM(s) IV Intermittent once  metoprolol     tartrate 50 milliGRAM(s) Oral two times a day    MEDICATIONS  (PRN):  acetaminophen   Tablet. 650 milliGRAM(s) Oral every 6 hours PRN Moderate Pain (4 - 6)  acetaminophen  IVPB. 1000 milliGRAM(s) IV Intermittent once PRN Moderate Pain (4 - 6)  dextrose Gel 1 Dose(s) Oral once PRN Blood Glucose LESS THAN 70 milliGRAM(s)/deciliter  glucagon  Injectable 1 milliGRAM(s) IntraMuscular once PRN Glucose LESS THAN 70 milligrams/deciliter  simethicone 80 milliGRAM(s) Chew four times a day PRN Gas      T(F): 98.4 (01-11 @ 05:56), Max: 98.4 (01-11 @ 05:56)  HR: 62 (01-11 @ 05:56) (58 - 65)  BP: 163/88 (01-11 @ 05:56) (135/81 - 163/88)  RR: 18 (01-11 @ 05:56) (18 - 20)  SpO2: 98% (01-11 @ 05:56) (98% - 100%)  CAPILLARY BLOOD GLUCOSE      POCT Blood Glucose.: 110 mg/dL (11 Jan 2018 08:21)  POCT Blood Glucose.: 127 mg/dL (10 Humberto 2018 21:54)  POCT Blood Glucose.: 123 mg/dL (10 Humberto 2018 17:17)  POCT Blood Glucose.: 135 mg/dL (10 Humberto 2018 12:02)    I&O's Summary      PHYSICAL EXAM:  GEN: Age appropriate female, resting comfortably in bed, no acute distress, non toxic appearing, speaking in complete sentences.   HEENT: Conjunctiva and sclera normal.  NECK: right sided permacath  PULM: Lungs CTAB, no wheezes, rales, rhonchi  CV: RRR, S1S2, no MRG  Abdominal: Soft, nontender to palpation, non-distended, normoactive bowel sounds  Extremities: No edema or cyanosis  NEURO: AAOx3  Skin: multiple ecchymosis    LABS:  Labs personally reviewed.                        8.3    10.64 )-----------( 159      ( 11 Jan 2018 06:30 )             25.7     Hgb Trend: 8.3<--, 9.6<--, 8.8<--, 8.5<--, 9.1<--  01-11    136  |  97<L>  |  57<H>  ----------------------------<  103<H>  4.0   |  23  |  3.93<H>    Ca    7.7<L>      11 Jan 2018 06:30  Phos  3.2     01-11  Mg     2.0     01-11      Creatinine Trend: 3.93<--, 3.10<--, 2.31<--, 4.17<--, 3.86<--, 3.17<--  PT/INR - ( 09 Jan 2018 12:05 )   PT: 12.0 SEC;   INR: 1.08          PTT - ( 09 Jan 2018 12:05 )  PTT:29.6 SEC      RADIOLOGY & ADDITIONAL TESTS:  Imaging Personally Reviewed.    Consultants:    Vega Mcgee  PGY-1 Pager: [phone]  [phone]  Night Float: Patient is a 72y old  Female who presents with a chief complaint of Altered mental status, patient became obtunded (28 Dec 2017 04:04)      SUBJECTIVE / OVERNIGHT EVENTS:    Patient seen and examined at bedside. No acute events overnight. Pt continues to complain of hoarseness.     Review of systems: No chest pain, no shortness of breath, no abdominal pain, no nausea, no vomiting, no diarrhea, no fevers, and no chills overnight.     MEDICATIONS  (STANDING):  amLODIPine   Tablet 10 milliGRAM(s) Oral daily  calcium acetate 667 milliGRAM(s) Oral three times a day with meals  dextrose 5%. 1000 milliLiter(s) (50 mL/Hr) IV Continuous <Continuous>  dextrose 50% Injectable 12.5 Gram(s) IV Push once  dextrose 50% Injectable 25 Gram(s) IV Push once  dextrose 50% Injectable 25 Gram(s) IV Push once  diVALproex  milliGRAM(s) Oral two times a day  epoetin marla Injectable 58391 Unit(s) IV Push once  influenza   Vaccine 0.5 milliLiter(s) IntraMuscular once  insulin lispro (HumaLOG) corrective regimen sliding scale   SubCutaneous three times a day before meals  insulin lispro (HumaLOG) corrective regimen sliding scale   SubCutaneous at bedtime  iron sucrose IVPB 100 milliGRAM(s) IV Intermittent once  metoprolol     tartrate 50 milliGRAM(s) Oral two times a day    MEDICATIONS  (PRN):  acetaminophen   Tablet. 650 milliGRAM(s) Oral every 6 hours PRN Moderate Pain (4 - 6)  acetaminophen  IVPB. 1000 milliGRAM(s) IV Intermittent once PRN Moderate Pain (4 - 6)  dextrose Gel 1 Dose(s) Oral once PRN Blood Glucose LESS THAN 70 milliGRAM(s)/deciliter  glucagon  Injectable 1 milliGRAM(s) IntraMuscular once PRN Glucose LESS THAN 70 milligrams/deciliter  simethicone 80 milliGRAM(s) Chew four times a day PRN Gas      T(F): 98.4 (01-11 @ 05:56), Max: 98.4 (01-11 @ 05:56)  HR: 62 (01-11 @ 05:56) (58 - 65)  BP: 163/88 (01-11 @ 05:56) (135/81 - 163/88)  RR: 18 (01-11 @ 05:56) (18 - 20)  SpO2: 98% (01-11 @ 05:56) (98% - 100%)  CAPILLARY BLOOD GLUCOSE      POCT Blood Glucose.: 110 mg/dL (11 Jan 2018 08:21)  POCT Blood Glucose.: 127 mg/dL (10 Humberto 2018 21:54)  POCT Blood Glucose.: 123 mg/dL (10 Humberto 2018 17:17)  POCT Blood Glucose.: 135 mg/dL (10 Humberto 2018 12:02)    I&O's Summary      PHYSICAL EXAM:  GEN: Age appropriate female, resting comfortably in bed, no acute distress, non toxic appearing, speaking in complete sentences.   HEENT: Conjunctiva and sclera normal.  NECK: right sided permacath  PULM: Lungs CTAB, no wheezes, rales, rhonchi  CV: RRR, S1S2, no MRG  Abdominal: Soft, nontender to palpation, non-distended, normoactive bowel sounds  Extremities: No edema or cyanosis  NEURO: AAOx3  Skin: multiple ecchymosis    LABS:  Labs personally reviewed.                        8.3    10.64 )-----------( 159      ( 11 Jan 2018 06:30 )             25.7     Hgb Trend: 8.3<--, 9.6<--, 8.8<--, 8.5<--, 9.1<--  01-11    136  |  97<L>  |  57<H>  ----------------------------<  103<H>  4.0   |  23  |  3.93<H>    Ca    7.7<L>      11 Jan 2018 06:30  Phos  3.2     01-11  Mg     2.0     01-11      Creatinine Trend: 3.93<--, 3.10<--, 2.31<--, 4.17<--, 3.86<--, 3.17<--  PT/INR - ( 09 Jan 2018 12:05 )   PT: 12.0 SEC;   INR: 1.08          PTT - ( 09 Jan 2018 12:05 )  PTT:29.6 SEC      RADIOLOGY & ADDITIONAL TESTS:  Imaging Personally Reviewed.    Consultants:    Vega Mcgee  PGY-1 Pager: [phone]  [phone]  Night Float: Patient is a 72y old  Female who presents with a chief complaint of Altered mental status, patient became obtunded (28 Dec 2017 04:04)    SUBJECTIVE / OVERNIGHT EVENTS:    Patient seen and examined at bedside. No acute events overnight. Pt continues to complain of hoarseness.     Review of systems: No chest pain, no shortness of breath, no abdominal pain, no nausea, no vomiting, no diarrhea, no fevers, and no chills overnight.     MEDICATIONS  (STANDING):  amLODIPine   Tablet 10 milliGRAM(s) Oral daily  calcium acetate 667 milliGRAM(s) Oral three times a day with meals  dextrose 5%. 1000 milliLiter(s) (50 mL/Hr) IV Continuous <Continuous>  dextrose 50% Injectable 12.5 Gram(s) IV Push once  dextrose 50% Injectable 25 Gram(s) IV Push once  dextrose 50% Injectable 25 Gram(s) IV Push once  diVALproex  milliGRAM(s) Oral two times a day  epoetin marla Injectable 59666 Unit(s) IV Push once  influenza   Vaccine 0.5 milliLiter(s) IntraMuscular once  insulin lispro (HumaLOG) corrective regimen sliding scale   SubCutaneous three times a day before meals  insulin lispro (HumaLOG) corrective regimen sliding scale   SubCutaneous at bedtime  iron sucrose IVPB 100 milliGRAM(s) IV Intermittent once  metoprolol     tartrate 50 milliGRAM(s) Oral two times a day    MEDICATIONS  (PRN):  acetaminophen   Tablet. 650 milliGRAM(s) Oral every 6 hours PRN Moderate Pain (4 - 6)  acetaminophen  IVPB. 1000 milliGRAM(s) IV Intermittent once PRN Moderate Pain (4 - 6)  dextrose Gel 1 Dose(s) Oral once PRN Blood Glucose LESS THAN 70 milliGRAM(s)/deciliter  glucagon  Injectable 1 milliGRAM(s) IntraMuscular once PRN Glucose LESS THAN 70 milligrams/deciliter  simethicone 80 milliGRAM(s) Chew four times a day PRN Gas      T(F): 98.4 (01-11 @ 05:56), Max: 98.4 (01-11 @ 05:56)  HR: 62 (01-11 @ 05:56) (58 - 65)  BP: 163/88 (01-11 @ 05:56) (135/81 - 163/88)  RR: 18 (01-11 @ 05:56) (18 - 20)  SpO2: 98% (01-11 @ 05:56) (98% - 100%)  CAPILLARY BLOOD GLUCOSE      POCT Blood Glucose.: 110 mg/dL (11 Jan 2018 08:21)  POCT Blood Glucose.: 127 mg/dL (10 Humberto 2018 21:54)  POCT Blood Glucose.: 123 mg/dL (10 Humberto 2018 17:17)  POCT Blood Glucose.: 135 mg/dL (10 Humberto 2018 12:02)    I&O's Summary      PHYSICAL EXAM:  GEN: Age appropriate female, resting comfortably in bed, no acute distress, non toxic appearing, speaking in complete sentences.   HEENT: Conjunctiva and sclera normal.  NECK: right sided permacath  PULM: Lungs CTAB, no wheezes, rales, rhonchi  CV: RRR, S1S2, no MRG  Abdominal: Soft, nontender to palpation, non-distended, normoactive bowel sounds  Extremities: No edema or cyanosis  NEURO: AAOx3  Skin: multiple ecchymosis    LABS:  Labs personally reviewed.                        8.3    10.64 )-----------( 159      ( 11 Jan 2018 06:30 )             25.7     Hgb Trend: 8.3<--, 9.6<--, 8.8<--, 8.5<--, 9.1<--  01-11    136  |  97<L>  |  57<H>  ----------------------------<  103<H>  4.0   |  23  |  3.93<H>    Ca    7.7<L>      11 Jan 2018 06:30  Phos  3.2     01-11  Mg     2.0     01-11      Creatinine Trend: 3.93<--, 3.10<--, 2.31<--, 4.17<--, 3.86<--, 3.17<--  PT/INR - ( 09 Jan 2018 12:05 )   PT: 12.0 SEC;   INR: 1.08          PTT - ( 09 Jan 2018 12:05 )  PTT:29.6 SEC      RADIOLOGY & ADDITIONAL TESTS:  Imaging Personally Reviewed.    Consultants:    Vega Mcgee  PGY-1 Pager: [phone]  [phone]  Night Float:

## 2018-01-11 NOTE — PROGRESS NOTE ADULT - PROBLEM SELECTOR PLAN 7
BPs elevated and not optimal today and higher concern in setting of known aortic dissection and aneurysm   -c/w metoprolol 50mg BID  -would restart start patients home med amlodipine 10mg at this time  -HD per renal will help as well -infrarenal, 4.7 cm x 4.1 cm diameter. Per chart review noted to be unchanged since 12/28 per radiology  - will continue to monitor closely   -BP control

## 2018-01-11 NOTE — DISCHARGE NOTE ADULT - CARE PROVIDERS DIRECT ADDRESSES
,[email or web address]

## 2018-01-11 NOTE — PROGRESS NOTE ADULT - SUBJECTIVE AND OBJECTIVE BOX
feels improved overall.  Breathing better.    Had permcath and HD on tuesday  Going for left arm avfistula today    MEDICATIONS  (STANDING):  amLODIPine   Tablet 10 milliGRAM(s) Oral daily  calcium acetate 667 milliGRAM(s) Oral three times a day with meals  dextrose 5%. 1000 milliLiter(s) (50 mL/Hr) IV Continuous <Continuous>  dextrose 50% Injectable 12.5 Gram(s) IV Push once  dextrose 50% Injectable 25 Gram(s) IV Push once  dextrose 50% Injectable 25 Gram(s) IV Push once  diVALproex  milliGRAM(s) Oral two times a day  influenza   Vaccine 0.5 milliLiter(s) IntraMuscular once  insulin lispro (HumaLOG) corrective regimen sliding scale   SubCutaneous three times a day before meals  insulin lispro (HumaLOG) corrective regimen sliding scale   SubCutaneous at bedtime  metoprolol     tartrate 50 milliGRAM(s) Oral two times a day    01-10    138  |  97<L>  |  42<H>  ----------------------------<  92  3.7   |  27  |  3.10<H>    Ca    8.1<L>      10 Humberto 2018 04:50  Phos  3.2     01-10  Mg     2.0     01-10                          9.6    9.85  )-----------( 141      ( 10 Humberto 2018 04:50 )             29.7   ICU Vital Signs Last 24 Hrs  T(C): 36.8 (10 Humberto 2018 21:50), Max: 36.8 (10 Humberto 2018 21:50)  T(F): 98.3 (10 Humberto 2018 21:50), Max: 98.3 (10 Humberto 2018 21:50)  HR: 58 (10 Humberto 2018 21:50) (58 - 65)  BP: 147/80 (10 Humberto 2018 21:50) (135/81 - 150/78)  BP(mean): --  ABP: --  ABP(mean): --  RR: 19 (10 Humberto 2018 21:50) (18 - 20)  SpO2: 100% (10 Humberto 2018 21:50) (100% - 100%)    Exam-comfortable  lungs - decreased bs left base  Cv- s1 s2 systolic murmur  Extrem- mild LE edema

## 2018-01-11 NOTE — PROGRESS NOTE ADULT - ASSESSMENT
overall clinically stable.  For fistual surg early today and will be diazlyed later today after surg  will be going to rehab and then transferring to [facility] dialysis after rehab  no contraindication for planned surgery from renal viewpoint

## 2018-01-11 NOTE — BRIEF OPERATIVE NOTE - COMMENTS
supraclavicular nerve block by anesthesia prior to start of case    palpable thrill and 2+ radial pulse at end of case

## 2018-01-11 NOTE — DISCHARGE NOTE ADULT - HOSPITAL COURSE
71 yo F h/o ?CHF (EF in 2008 TTE 73%), DM2, HTN, CKD Stage 4, Type B aortic dissection presented on 12/28/17 with acute metabolic encephalopathy. The pt was admitted to the MICU as pt required intubation for airway protection and was found to have  subclinical, non-convulsive status epilepticus. Pt seen by neuro team. Pts was well controlled w/ antiepileptics during MICU stay however MICU course further c/b pt complaining of abdominal pain and found to have an RP bleed a/w Hb drop from 11 to 5.9 (s/p 5 units of pRBCs + 2 units of plts on 1/3-1/4). The patient was seen by surgery team which did not recommend any acute intervention. The MICU course was further complicated by MIKAELA on CKD likely 2/2 to contrast exposure 2/2 multiple contrast studies. The patient required new dialysis in MICU via shiley cath. Renal team followed the patient and determined she was ESRD requiring dialysis. The patient improved clinically and was transferred to medicine floors for further management. the patient continued on HD TTS. The pt had permacath placed on 1/9/18 with no complications. The patients hemoglobin remained stable on floors with no further evidence of bleeding. Pt was seen by vascular team with regards to her thoracic aortic dissection w thrombus who recommended against any intervention or AC. The pt had AVF placed on 1/11/18 by Dr. Jensen without incident. The pt was dc to [facility] with HD services. HD via permacath pending AVF maturation 73 yo F h/o ?CHF (EF in 2008 TTE 73%), DM2, HTN, CKD Stage 4, Type B aortic dissection presented on 12/28/17 with acute metabolic encephalopathy. The pt was admitted to the MICU as pt required intubation for airway protection and was found to have  subclinical, non-convulsive status epilepticus. Pt seen by neuro team. Pts was well controlled w/ antiepileptics during MICU stay however MICU course further c/b pt complaining of abdominal pain and found to have an RP bleed a/w Hb drop from 11 to 5.9 (s/p 5 units of pRBCs + 2 units of plts on 1/3-1/4). The patient was seen by surgery team which did not recommend any acute intervention. The MICU course was further complicated by MIKAELA on CKD likely 2/2 to contrast exposure 2/2 multiple contrast studies. The patient required new dialysis in MICU via shiley cath. Renal team followed the patient and determined she was ESRD requiring dialysis. The patient improved clinically and was transferred to medicine floors for further management. the patient continued on HD TTS. The pt had permacath placed on 1/9/18 with no complications. The patients hemoglobin remained stable on floors with no further evidence of bleeding. Pt was seen by vascular team with regards to her thoracic aortic dissection w thrombus who recommended against any intervention or AC. The pt had AVF placed on 1/11/18 by Dr. Jensen without incident. The pt was dc to [facility] with HD services. HD via permacath pending AVF maturation. 71 yo F h/o ?CHF (EF in 2008 TTE 73%), DM2, HTN, CKD Stage 4, Type B aortic dissection presented on 12/28/17 with acute metabolic encephalopathy. The pt was admitted to the MICU as pt required intubation for airway protection and was found to have  subclinical, non-convulsive status epilepticus. Pt seen by neuro team. Pts was well controlled w/ antiepileptics during MICU stay however MICU course further c/b pt complaining of abdominal pain and found to have an RP bleed a/w Hb drop from 11 to 5.9 (s/p 5 units of pRBCs + 2 units of plts on 1/3-1/4). The patient was seen by surgery team which did not recommend any acute intervention. The MICU course was further complicated by MIKAELA on CKD likely 2/2 to contrast exposure 2/2 multiple contrast studies. The patient required new dialysis in MICU via shiley cath. Renal team followed the patient and determined she was ESRD requiring dialysis. The patient improved clinically and was transferred to medicine floors for further management. the patient continued on HD TTS. The pt had permacath placed on 1/9/18 with no complications. The patients hemoglobin remained stable on floors with no further evidence of bleeding. Pt was seen by vascular team with regards to her thoracic aortic dissection w thrombus who recommended against any intervention or AC. The pt had AVF placed on 1/11/18 by Dr. Jensen without incident. The pt was dc to [facility] with HD services. HD via permacath pending AVF maturation. Pt to be discharged to [place] for [facility] and HD. 73 yo F h/o ?CHF (EF in 2008 TTE 73%), DM2, HTN, CKD Stage 4, Type B aortic dissection presented on 12/28/17 with acute metabolic encephalopathy 2/2 to non-convulsive status epilepticus. The pt was admitted to the MICU as pt required intubation for airway protection and was found to have subclinical, non-convulsive status epilepticus. Pt seen by neuro team. Pts was well controlled w/ antiepileptics during MICU stay however MICU course further c/b pt complaining of abdominal pain and found to have an RP bleed a/w Hb drop from 11 to 5.9 (s/p 5 units of pRBCs + 2 units of plts on 1/3-1/4). The patient was seen by surgery team which did not recommend any acute intervention. The MICU course was further complicated by MIKAELA on CKD likely 2/2 to contrast exposure (KWESI) 2/2 multiple contrast studies. The patient required new dialysis in MICU via shiley cath. Renal team followed the patient and determined she was ESRD requiring dialysis. The patient improved clinically and was transferred to medicine floors for further management. the patient continued on HD TTS. The pt had permacath placed on 1/9/18 with no complications. The patients hemoglobin remained stable on floors with no further evidence of bleeding. Pt was seen by vascular team with regards to her thoracic aortic dissection w thrombus who recommended against any intervention or AC. The pt had AVF placed on 1/11/18 by Dr. Jensen without incident. The pt was dc to [facility] with HD services. HD via permacath pending AVF maturation. Pt to be discharged to [place] for [facility] and HD.     DISCHARGE TIME- 36 MINUTES SPENT PREPARING DISCHARGE, INCLUDING PAPERWORK, MEDICATION RECONCILIATION, AND COUNSELLING OF PATIENT.

## 2018-01-11 NOTE — DISCHARGE NOTE ADULT - PLAN OF CARE
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 You were seen by the house neurology team who recommended Depakote. Please take all of your medications as prescribed for the duration of their course. Please follow up with the neurology clinic at [facility] (address below) within one week of discharge. to follow up with Dr. Jensen and Dr. Meneses within one week of discharge You were found to have an retroperitoneal bleed this admission. You had blood transfused, however no surgery was performed. Please follow up with Dr. Jensen and Dr. Meneses after discharge about this conditon. to follow up with Dr. Meneses within one week of discharge You have ESRD requiring dialysis. You were seen by Dr. Meneses while in the hospital. You had a permacath placed which will allow you to receive dialysis until your fistula is ready. Please follow up with Dr. Meneses within one week of discharge.

## 2018-01-11 NOTE — DISCHARGE NOTE ADULT - MEDICATION SUMMARY - MEDICATIONS TO TAKE
less than/equal to 3 secs I will START or STAY ON the medications listed below when I get home from the hospital:    acetaminophen 325 mg oral tablet  -- 2 tab(s) by mouth every 6 hours, As needed, Moderate Pain (4 - 6)  -- Indication: For Pain    acetaminophen 10 mg/mL intravenous solution  -- 100 milliliter(s) intravenous once, As needed, Moderate Pain (4 - 6)  -- Indication: For Pain if cant take oral acetaminophen    divalproex sodium 250 mg oral delayed release tablet  -- 3 tab(s) by mouth 2 times a day  -- Indication: For Seizures    metoprolol tartrate 50 mg oral tablet  -- 1 tab(s) by mouth 2 times a day  -- Indication: For Hypertension    amLODIPine 10 mg oral tablet  -- 1 tab(s) by mouth once a day  -- Indication: For Hypertension    epoetin marla  -- SubQ, every 7 days. Follow up with your nephrologist for administration of this medication.   -- Indication: For ESRD (end stage renal disease) on dialysis    iron sucrose 20 mg/mL intravenous solution  -- 5 milliliter(s) intravenous once  -- Indication: For ESRD (end stage renal disease) on dialysis    simethicone 80 mg oral tablet, chewable  -- 1 tab(s) by mouth 4 times a day, As needed, Gas  -- Indication: For Constipation    calcium acetate 667 mg oral tablet  --  by mouth   -- Indication: For ESRD (end stage renal disease) on dialysis

## 2018-01-11 NOTE — PROGRESS NOTE ADULT - PROBLEM SELECTOR PLAN 2
-Pt was being monitored in the MICU w/ VEEG for AMS 2/2 to seizure. No further evidence of seizure like activity since 1/1. Etiology Unclear  -c/w depakote 750mg BID, no need for further depakote levels unless repeat seizures per neuro. Neuro following, will call them if repeat seizures.

## 2018-01-11 NOTE — DISCHARGE NOTE ADULT - PROVIDER TOKENS
TOKEN:'[id number]',TOKEN:'[id number]' TOKEN:'[id number]',TOKEN:'[id number]',FREE:[LAST:[Neurology Clinic],PHONE:[phone]],FAX:[(   )    -],ADDRESS:[[address] [place] [address]]]

## 2018-01-11 NOTE — PROGRESS NOTE ADULT - ASSESSMENT
73 yo F h/o ?CHF (EF in 2008 TTE 73%), DM2, HTN, CKD Stage 4, Type B aortic dissection presented on 12/28/17 with acute metabolic encephalopathy was admitted to the MICU as pt required intubation for airway protection and was found to have  subclinical, non-convulsive status epilepticus, being controlled w/ antiepileptic, w/ MICU course further c/b by RP bleed with Hb drop from 11 to 5.9 (s/p 5 units of pRBCs + 2 units of plts on 1/3-1/4) and progression of her CKD likely in setting of KWESI now requiring HD. Patient now transferred to medicine floors for further management. Pt on HD TTS. pt had permacath placed on 1/9/18. No complications. Pt had AVF placed on 1/11/18. Will be dc to [facility] with HD services. HD via permacath pending AVF maturation.

## 2018-01-11 NOTE — DISCHARGE NOTE ADULT - CARE PROVIDER_API CALL
Juan F Sterling), Internal Medicine; Nephrology  [address] [place] [address]  Phone: [phone]  Fax: [phone]    Diamond Jensen), Surgery  [address] [place] [address] [address], [place] [address]  Phone: [phone]  Fax: [phone] Juan F Sterling), Internal Medicine; Nephrology  [address] [place] [address]  Phone: [phone]  Fax: [phone]    Diamond Jensen), Surgery  [address] [place] [address] [address]  [place] [address]  Phone: [phone]  Fax: [phone]    Neurology Clinic,   [address] [place] [address]  Phone: [phone]  Fax: (       -

## 2018-01-11 NOTE — PROGRESS NOTE ADULT - PROBLEM SELECTOR PLAN 5
-type b aortic dissection (chronic)   -vasc surg recs no intervention or AC re partial thrombus formation within false lumen.  -stable, will continue to monitor. BP control goal 120-140 systolic per vasc -iron sucrose IVPB on 1/11 during HD per nephrologist -total serum iron 49  -iron sucrose IVPB on 1/11 during HD per nephrologist

## 2018-01-11 NOTE — PROGRESS NOTE ADULT - ATTENDING COMMENTS
Patient seen and examined. Currently, patient experiencing some hoarseness of voice and mild difficulty breathing. I suspect this is secondary to extubation as her SpO2 is normal and her lungs are clear. Plan for hydrocortisone and duonebs x1 prior to OR today for AVF placement. Patient can subsequently be discharged once cleared by vascular surgery. SW aware of dispo plan.     Further details of plan per resident note above    DISCHARGE TIME- 36 MINUTES SPENT PREPARING DISCHARGE, INCLUDING PAPERWORK, MEDICATION RECONCILIATION, AND COUNSELLING OF PATIENT.

## 2018-01-11 NOTE — CHART NOTE - NSCHARTNOTEFT_GEN_A_CORE
S: Patient underwent Arteriovenous anastomosis of upper extremity and tolerated procedure without issue and was sent to PACU and then floor.  Patient denies chest pain, shortness of breath, nausea, vomiting, lightheadedness, or dizziness.  Pain is well controlled.  C/o of numbness on L hand.    O:  T(C): 36.6 (01-11-18 @ 18:55), Max: 37 (01-11-18 @ 18:10)  HR: 63 (01-11-18 @ 18:55) (58 - 67)  BP: 129/70 (01-11-18 @ 18:55) (98/65 - 142/83)  RR: 18 (01-11-18 @ 18:55) (14 - 21)  SpO2: 97% (01-11-18 @ 18:55) (96% - 100%)  Wt(kg): --                        8.3    10.64 )-----------( 159      ( 11 Jan 2018 06:30 )             25.7        01-11    136  |  97<L>  |  57<H>  ----------------------------<  103<H>  4.0   |  23  |  3.93<H>    Ca    7.7<L>      11 Jan 2018 06:30  Phos  3.2     01-11  Mg     2.0     01-11      Gen: NAD  Abd: Soft, TTP on LLQ, nondistended.  No palpable masses.   Ext: numbness and decreased sensation on LUE, UE strength 5/5 bilaterally, radial pulses palpable bilaterally, ecchymoses on arms bilaterally.    A: 72y Female s/p Arteriovenous anastomosis of upper extremity    P:  - Pain control  - DVT PPX  - Out of bed and encourage early ambulation

## 2018-01-11 NOTE — PROGRESS NOTE ADULT - PROBLEM SELECTOR PLAN 9
-DVT with SCDs  -Dispo: [facility] with HD services A1c noted to be 5.9 12/23/17  -c/w ISS  -monitor FS

## 2018-01-11 NOTE — PROGRESS NOTE ADULT - ASSESSMENT
72yF w/ ESRD and chronic Type B aortic dissection    - OR t/d for LUE AVF creation  - NPO until after procedure  - Preop labs  - Please page [phone] w/ any questions    KATEY Henson PGY-2

## 2018-01-11 NOTE — PROGRESS NOTE ADULT - PROBLEM SELECTOR PLAN 1
CT abd w contrast showed RP bleeding associated w/ left psoas muscle + ant abd wall hematoma w/ consequential acute blood loss anemia s/p 5 units pRBCs and 2 units plts last transfusion. H/H has been stable and continues to improve  -will continue to monitor with low threshold for repeat imaging and surgery reevaluation.  -maintain active t+s, transfuse if Hb below 7, plts below 50 if bleeding, cbc daily

## 2018-01-11 NOTE — PROGRESS NOTE ADULT - SUBJECTIVE AND OBJECTIVE BOX
SURGERY PROGRESS NOTE:    ===================================  Vascular surgery (C Team) Pager [phone]  ===================================    Subjective:  Pt w/o new c/o this AM.      Objective:    PE:  Gen: NAD  Resp: airway patent, respirations unlabored, no increased WoB  CVS: RRR  Ext: LUE precautions in place, palp radial pulse  Neuro: AAOx3, no focal deficits    Vital Signs Last 24 Hrs  T(C): 36.9 (11 Jan 2018 05:56), Max: 36.9 (11 Jan 2018 05:56)  T(F): 98.4 (11 Jan 2018 05:56), Max: 98.4 (11 Jan 2018 05:56)  HR: 62 (11 Jan 2018 05:56) (58 - 65)  BP: 163/88 (11 Jan 2018 05:56) (135/81 - 163/88)  BP(mean): --  RR: 18 (11 Jan 2018 05:56) (18 - 20)  SpO2: 98% (11 Jan 2018 05:56) (98% - 100%)    I&O's Detail    09 Jan 2018 07:01  -  10 Humberto 2018 07:00  --------------------------------------------------------  IN:    Other: 700 mL  Total IN: 700 mL    OUT:    Other: 1700 mL  Total OUT: 1700 mL    Total NET: -1000 mL          Daily     Daily     MEDICATIONS  (STANDING):  amLODIPine   Tablet 10 milliGRAM(s) Oral daily  calcium acetate 667 milliGRAM(s) Oral three times a day with meals  dextrose 5%. 1000 milliLiter(s) (50 mL/Hr) IV Continuous <Continuous>  dextrose 50% Injectable 12.5 Gram(s) IV Push once  dextrose 50% Injectable 25 Gram(s) IV Push once  dextrose 50% Injectable 25 Gram(s) IV Push once  diVALproex  milliGRAM(s) Oral two times a day  epoetin marla Injectable 28728 Unit(s) IV Push once  influenza   Vaccine 0.5 milliLiter(s) IntraMuscular once  insulin lispro (HumaLOG) corrective regimen sliding scale   SubCutaneous three times a day before meals  insulin lispro (HumaLOG) corrective regimen sliding scale   SubCutaneous at bedtime  iron sucrose IVPB 100 milliGRAM(s) IV Intermittent once  metoprolol     tartrate 50 milliGRAM(s) Oral two times a day    MEDICATIONS  (PRN):  acetaminophen   Tablet. 650 milliGRAM(s) Oral every 6 hours PRN Moderate Pain (4 - 6)  acetaminophen  IVPB. 1000 milliGRAM(s) IV Intermittent once PRN Moderate Pain (4 - 6)  dextrose Gel 1 Dose(s) Oral once PRN Blood Glucose LESS THAN 70 milliGRAM(s)/deciliter  glucagon  Injectable 1 milliGRAM(s) IntraMuscular once PRN Glucose LESS THAN 70 milligrams/deciliter  simethicone 80 milliGRAM(s) Chew four times a day PRN Gas      LABS:                        9.6    9.85  )-----------( 141      ( 10 Humberto 2018 04:50 )             29.7     01-10    138  |  97<L>  |  42<H>  ----------------------------<  92  3.7   |  27  |  3.10<H>    Ca    8.1<L>      10 Humberto 2018 04:50  Phos  3.2     01-10  Mg     2.0     01-10      PT/INR - ( 09 Jan 2018 12:05 )   PT: 12.0 SEC;   INR: 1.08          PTT - ( 09 Jan 2018 12:05 )  PTT:29.6 SEC      RADIOLOGY & ADDITIONAL STUDIES:

## 2018-01-11 NOTE — PROGRESS NOTE ADULT - PROBLEM SELECTOR PLAN 8
A1c noted to be 5.9 12/23/17  -c/w ISS  -monitor FS BPs elevated and not optimal today and higher concern in setting of known aortic dissection and aneurysm   -c/w metoprolol 50mg BID and amlodipine 10mg daily  -HD per renal will help as well

## 2018-01-11 NOTE — PROGRESS NOTE ADULT - PROBLEM SELECTOR PLAN 6
-infrarenal, 4.7 cm x 4.1 cm diameter. Per chart review noted to be unchanged since 12/28 per radiology  - will continue to monitor closely   -BP control -type b aortic dissection (chronic)   -vasc surg recs no intervention or AC re partial thrombus formation within false lumen.  -stable, will continue to monitor. BP control goal 120-140 systolic per vasc

## 2018-01-11 NOTE — DISCHARGE NOTE ADULT - MEDICATION SUMMARY - MEDICATIONS TO STOP TAKING
I will STOP taking the medications listed below when I get home from the hospital:    Januvia 25 mg oral tablet  -- 1 tab(s) by mouth once a day    potassium chloride 10 mEq oral tablet, extended release  -- 1 tab(s) by mouth once a day    sodium bicarbonate 325 mg oral tablet  -- 1 tab(s) by mouth 2 times a day    aspirin 81 mg oral delayed release tablet  -- 1 tab(s) by mouth once a day    furosemide 40 mg oral tablet  -- 1 tab(s) by mouth every 12 hours

## 2018-01-11 NOTE — DISCHARGE NOTE ADULT - SECONDARY DIAGNOSIS.
Retroperitoneal bleed ESRD (end stage renal disease) on dialysis Dissection of aorta, thoracic Aortic aneurysm

## 2018-01-11 NOTE — BRIEF OPERATIVE NOTE - PROCEDURE
<<-----Click on this checkbox to enter Procedure Arteriovenous anastomosis of upper extremity  01/11/2018  Left arm  Active  [id number]

## 2018-01-12 VITALS
HEART RATE: 79 BPM | OXYGEN SATURATION: 100 % | DIASTOLIC BLOOD PRESSURE: 99 MMHG | HEIGHT: 62 IN | TEMPERATURE: 98 F | RESPIRATION RATE: 19 BRPM | SYSTOLIC BLOOD PRESSURE: 153 MMHG

## 2018-01-12 LAB
ANISOCYTOSIS BLD QL: SLIGHT — SIGNIFICANT CHANGE UP
BASO STIPL BLD QL SMEAR: PRESENT — SIGNIFICANT CHANGE UP
BASOPHILS # BLD AUTO: 0.02 K/UL — SIGNIFICANT CHANGE UP (ref 0–0.2)
BASOPHILS NFR BLD AUTO: 0.2 % — SIGNIFICANT CHANGE UP (ref 0–2)
BASOPHILS NFR SPEC: 0 % — SIGNIFICANT CHANGE UP (ref 0–2)
BUN SERPL-MCNC: 31 MG/DL — HIGH (ref 7–23)
CALCIUM SERPL-MCNC: 7.6 MG/DL — LOW (ref 8.4–10.5)
CHLORIDE SERPL-SCNC: 100 MMOL/L — SIGNIFICANT CHANGE UP (ref 98–107)
CO2 SERPL-SCNC: 27 MMOL/L — SIGNIFICANT CHANGE UP (ref 22–31)
CREAT SERPL-MCNC: 2.22 MG/DL — HIGH (ref 0.5–1.3)
EOSINOPHIL # BLD AUTO: 0.06 K/UL — SIGNIFICANT CHANGE UP (ref 0–0.5)
EOSINOPHIL NFR BLD AUTO: 0.6 % — SIGNIFICANT CHANGE UP (ref 0–6)
EOSINOPHIL NFR FLD: 0 % — SIGNIFICANT CHANGE UP (ref 0–6)
GIANT PLATELETS BLD QL SMEAR: PRESENT — SIGNIFICANT CHANGE UP
GLUCOSE SERPL-MCNC: 102 MG/DL — HIGH (ref 70–99)
HCT VFR BLD CALC: 26 % — LOW (ref 34.5–45)
HGB BLD-MCNC: 8.3 G/DL — LOW (ref 11.5–15.5)
IMM GRANULOCYTES # BLD AUTO: 0.31 # — SIGNIFICANT CHANGE UP
IMM GRANULOCYTES NFR BLD AUTO: 3.2 % — HIGH (ref 0–1.5)
LYMPHOCYTES # BLD AUTO: 0.73 K/UL — LOW (ref 1–3.3)
LYMPHOCYTES # BLD AUTO: 7.5 % — LOW (ref 13–44)
LYMPHOCYTES NFR SPEC AUTO: 5.3 % — LOW (ref 13–44)
MACROCYTES BLD QL: SLIGHT — SIGNIFICANT CHANGE UP
MAGNESIUM SERPL-MCNC: 1.9 MG/DL — SIGNIFICANT CHANGE UP (ref 1.6–2.6)
MCHC RBC-ENTMCNC: 30.1 PG — SIGNIFICANT CHANGE UP (ref 27–34)
MCHC RBC-ENTMCNC: 31.9 % — LOW (ref 32–36)
MCV RBC AUTO: 94.2 FL — SIGNIFICANT CHANGE UP (ref 80–100)
MONOCYTES # BLD AUTO: 1.97 K/UL — HIGH (ref 0–0.9)
MONOCYTES NFR BLD AUTO: 20.3 % — HIGH (ref 2–14)
MONOCYTES NFR BLD: 13.1 % — HIGH (ref 2–9)
MYELOCYTES NFR BLD: 0.9 % — HIGH (ref 0–0)
NEUTROPHIL AB SER-ACNC: 80.7 % — HIGH (ref 43–77)
NEUTROPHILS # BLD AUTO: 6.61 K/UL — SIGNIFICANT CHANGE UP (ref 1.8–7.4)
NEUTROPHILS NFR BLD AUTO: 68.2 % — SIGNIFICANT CHANGE UP (ref 43–77)
NRBC # FLD: 0 — SIGNIFICANT CHANGE UP
OVALOCYTES BLD QL SMEAR: SLIGHT — SIGNIFICANT CHANGE UP
PHOSPHATE SERPL-MCNC: 3.8 MG/DL — SIGNIFICANT CHANGE UP (ref 2.5–4.5)
PLATELET # BLD AUTO: 161 K/UL — SIGNIFICANT CHANGE UP (ref 150–400)
PLATELET COUNT - ESTIMATE: NORMAL — SIGNIFICANT CHANGE UP
PMV BLD: 9.7 FL — SIGNIFICANT CHANGE UP (ref 7–13)
POLYCHROMASIA BLD QL SMEAR: SLIGHT — SIGNIFICANT CHANGE UP
POTASSIUM SERPL-MCNC: 4.5 MMOL/L — SIGNIFICANT CHANGE UP (ref 3.5–5.3)
POTASSIUM SERPL-SCNC: 4.5 MMOL/L — SIGNIFICANT CHANGE UP (ref 3.5–5.3)
RBC # BLD: 2.76 M/UL — LOW (ref 3.8–5.2)
RBC # FLD: 16.8 % — HIGH (ref 10.3–14.5)
REVIEW TO FOLLOW: YES — SIGNIFICANT CHANGE UP
SCHISTOCYTES BLD QL AUTO: SLIGHT — SIGNIFICANT CHANGE UP
SODIUM SERPL-SCNC: 138 MMOL/L — SIGNIFICANT CHANGE UP (ref 135–145)
WBC # BLD: 9.7 K/UL — SIGNIFICANT CHANGE UP (ref 3.8–10.5)
WBC # FLD AUTO: 9.7 K/UL — SIGNIFICANT CHANGE UP (ref 3.8–10.5)

## 2018-01-12 PROCEDURE — 93010 ELECTROCARDIOGRAM REPORT: CPT

## 2018-01-12 PROCEDURE — 99239 HOSP IP/OBS DSCHRG MGMT >30: CPT

## 2018-01-12 RX ORDER — AMLODIPINE BESYLATE 2.5 MG/1
1 TABLET ORAL
Qty: 0 | Refills: 0 | COMMUNITY
Start: 2018-01-12

## 2018-01-12 RX ORDER — SITAGLIPTIN 50 MG/1
1 TABLET, FILM COATED ORAL
Qty: 0 | Refills: 0 | COMMUNITY

## 2018-01-12 RX ORDER — INSULIN LISPRO 100/ML
0 VIAL (ML) SUBCUTANEOUS
Qty: 0 | Refills: 0 | COMMUNITY
Start: 2018-01-12

## 2018-01-12 RX ORDER — DIVALPROEX SODIUM 500 MG/1
3 TABLET, DELAYED RELEASE ORAL
Qty: 0 | Refills: 0 | COMMUNITY
Start: 2018-01-12

## 2018-01-12 RX ORDER — ERYTHROPOIETIN 10000 [IU]/ML
10000 INJECTION, SOLUTION INTRAVENOUS; SUBCUTANEOUS ONCE
Qty: 0 | Refills: 0 | Status: DISCONTINUED | OUTPATIENT
Start: 2018-01-13 | End: 2018-01-12

## 2018-01-12 RX ORDER — SIMETHICONE 80 MG/1
1 TABLET, CHEWABLE ORAL
Qty: 0 | Refills: 0 | COMMUNITY
Start: 2018-01-12

## 2018-01-12 RX ORDER — METOPROLOL TARTRATE 50 MG
1 TABLET ORAL
Qty: 0 | Refills: 0 | COMMUNITY
Start: 2018-01-12

## 2018-01-12 RX ORDER — IRON SUCROSE 20 MG/ML
5 INJECTION, SOLUTION INTRAVENOUS
Qty: 0 | Refills: 0 | COMMUNITY
Start: 2018-01-12

## 2018-01-12 RX ORDER — ACETAMINOPHEN 500 MG
100 TABLET ORAL
Qty: 0 | Refills: 0 | COMMUNITY
Start: 2018-01-12

## 2018-01-12 RX ORDER — AMLODIPINE BESYLATE 2.5 MG/1
1 TABLET ORAL
Qty: 0 | Refills: 0 | COMMUNITY

## 2018-01-12 RX ORDER — IRON SUCROSE 20 MG/ML
100 INJECTION, SOLUTION INTRAVENOUS ONCE
Qty: 0 | Refills: 0 | Status: DISCONTINUED | OUTPATIENT
Start: 2018-01-13 | End: 2018-01-12

## 2018-01-12 RX ORDER — ACETAMINOPHEN 500 MG
2 TABLET ORAL
Qty: 0 | Refills: 0 | COMMUNITY
Start: 2018-01-12

## 2018-01-12 RX ORDER — METOPROLOL TARTRATE 50 MG
1 TABLET ORAL
Qty: 0 | Refills: 0 | COMMUNITY

## 2018-01-12 RX ORDER — CALCIUM ACETATE 667 MG
0 TABLET ORAL
Qty: 0 | Refills: 0 | COMMUNITY
Start: 2018-01-12

## 2018-01-12 RX ADMIN — DIVALPROEX SODIUM 750 MILLIGRAM(S): 500 TABLET, DELAYED RELEASE ORAL at 17:26

## 2018-01-12 RX ADMIN — Medication 667 MILLIGRAM(S): at 08:59

## 2018-01-12 RX ADMIN — AMLODIPINE BESYLATE 10 MILLIGRAM(S): 2.5 TABLET ORAL at 06:06

## 2018-01-12 RX ADMIN — DIVALPROEX SODIUM 750 MILLIGRAM(S): 500 TABLET, DELAYED RELEASE ORAL at 06:05

## 2018-01-12 RX ADMIN — Medication 667 MILLIGRAM(S): at 17:26

## 2018-01-12 RX ADMIN — Medication 667 MILLIGRAM(S): at 12:01

## 2018-01-12 RX ADMIN — Medication 50 MILLIGRAM(S): at 06:05

## 2018-01-12 NOTE — PROGRESS NOTE ADULT - ATTENDING COMMENTS
Medically stable for discharge to [place] with outpatient HD.     Further details of plan per resident note above    DISCHARGE TIME- 36 MINUTES SPENT PREPARING DISCHARGE, INCLUDING PAPERWORK, MEDICATION RECONCILIATION, AND COUNSELLING OF PATIENT.

## 2018-01-12 NOTE — PROGRESS NOTE ADULT - ASSESSMENT
72yF w/ ESRD and chronic Type B aortic dissection s/p LUE brachiocephalic AVF creation    - C/w HD via Permacath  - Pt my f/u w/ Dr. Jensen 1-2 weeks after d/c from hospital  - Will sign off, please reconsult PRN  - Please page [phone] w/ any questions    KATEY Henson PGY-2

## 2018-01-12 NOTE — PROGRESS NOTE ADULT - SUBJECTIVE AND OBJECTIVE BOX
SURGERY PROGRESS NOTE:    ===================================  Vascular surgery (C Team) Pager [phone]  ===================================    Subjective:  Pt doing well this AM. Still some residual numbness from block. No other concerns.      Objective:    PE:  Gen: NAD  Resp: airway patent, respirations unlabored, no increased WoB  CVS: RRR  Ext: LUE brachiocephalic AVF w/ palp thrill. incision c/d/i  Neuro: AAOx3, no focal deficits    Vital Signs Last 24 Hrs  T(C): 36.3 (2018 05:37), Max: 37 (2018 18:10)  T(F): 97.4 (2018 05:37), Max: 98.6 (2018 18:10)  HR: 61 (2018 05:37) (58 - 67)  BP: 140/73 (2018 05:37) (98/65 - 153/83)  BP(mean): --  RR: 18 (2018 05:37) (14 - 21)  SpO2: 100% (2018 05:37) (96% - 100%)    I&O's Detail    2018 07:01  -  2018 06:45  --------------------------------------------------------  IN:    Other: 500 mL  Total IN: 500 mL    OUT:    Other: 1200 mL    Voided: 100 mL  Total OUT: 1300 mL    Total NET: -800 mL          Daily     Daily Weight in k.1 (2018 05:37)    MEDICATIONS  (STANDING):  amLODIPine   Tablet 10 milliGRAM(s) Oral daily  calcium acetate 667 milliGRAM(s) Oral three times a day with meals  dextrose 5%. 1000 milliLiter(s) (50 mL/Hr) IV Continuous <Continuous>  dextrose 50% Injectable 12.5 Gram(s) IV Push once  dextrose 50% Injectable 25 Gram(s) IV Push once  dextrose 50% Injectable 25 Gram(s) IV Push once  diVALproex  milliGRAM(s) Oral two times a day  influenza   Vaccine 0.5 milliLiter(s) IntraMuscular once  insulin lispro (HumaLOG) corrective regimen sliding scale   SubCutaneous three times a day before meals  insulin lispro (HumaLOG) corrective regimen sliding scale   SubCutaneous at bedtime  metoprolol     tartrate 50 milliGRAM(s) Oral two times a day    MEDICATIONS  (PRN):  acetaminophen   Tablet. 650 milliGRAM(s) Oral every 6 hours PRN Moderate Pain (4 - 6)  acetaminophen  IVPB. 1000 milliGRAM(s) IV Intermittent once PRN Moderate Pain (4 - 6)  dextrose Gel 1 Dose(s) Oral once PRN Blood Glucose LESS THAN 70 milliGRAM(s)/deciliter  glucagon  Injectable 1 milliGRAM(s) IntraMuscular once PRN Glucose LESS THAN 70 milligrams/deciliter  simethicone 80 milliGRAM(s) Chew four times a day PRN Gas      LABS:                        8.3    9.70  )-----------( 161      ( 2018 05:30 )             26.0     01-11    136  |  97<L>  |  57<H>  ----------------------------<  103<H>  4.0   |  23  |  3.93<H>    Ca    7.7<L>      2018 06:30  Phos  3.2     01-11  Mg     2.0     01-11      PT/INR - ( 2018 10:10 )   PT: 12.0 SEC;   INR: 1.04          PTT - ( 2018 10:10 )  PTT:28.9 SEC      RADIOLOGY & ADDITIONAL STUDIES:

## 2018-01-12 NOTE — PROGRESS NOTE ADULT - PROBLEM SELECTOR PLAN 8
BPs elevated and not optimal today and higher concern in setting of known aortic dissection and aneurysm   -c/w metoprolol 50mg BID and amlodipine 10mg daily  -HD per renal will help as well

## 2018-01-12 NOTE — PROGRESS NOTE ADULT - SUBJECTIVE AND OBJECTIVE BOX
had left arm fistula  placed yesterday and had HD last night which was uneventful.  feels ok now, some numbness of left hand  still with mild left sided abd pain but not worse  overall looks ok  MEDICATIONS  (STANDING):  amLODIPine   Tablet 10 milliGRAM(s) Oral daily  calcium acetate 667 milliGRAM(s) Oral three times a day with meals  dextrose 5%. 1000 milliLiter(s) (50 mL/Hr) IV Continuous <Continuous>  dextrose 50% Injectable 12.5 Gram(s) IV Push once  dextrose 50% Injectable 25 Gram(s) IV Push once  dextrose 50% Injectable 25 Gram(s) IV Push once  diVALproex  milliGRAM(s) Oral two times a day  influenza   Vaccine 0.5 milliLiter(s) IntraMuscular once  insulin lispro (HumaLOG) corrective regimen sliding scale   SubCutaneous three times a day before meals  insulin lispro (HumaLOG) corrective regimen sliding scale   SubCutaneous at bedtime  metoprolol     tartrate 50 milliGRAM(s) Oral two times a day     01-11    136  |  97<L>  |  57<H>  ----------------------------<  103<H>  4.0   |  23  |  3.93<H>    Ca    7.7<L>      11 Jan 2018 06:30  Phos  3.2     01-11  Mg     2.0     01-11                          8.3    10.64 )-----------( 159      ( 11 Jan 2018 06:30 )             25.7   ICU Vital Signs Last 24 Hrs  T(C): 36.3 (12 Jan 2018 05:37), Max: 37 (11 Jan 2018 18:10)  T(F): 97.4 (12 Jan 2018 05:37), Max: 98.6 (11 Jan 2018 18:10)  HR: 61 (12 Jan 2018 05:37) (58 - 67)  BP: 140/73 (12 Jan 2018 05:37) (98/65 - 153/83)  BP(mean): --  ABP: --  ABP(mean): --  RR: 18 (12 Jan 2018 05:37) (14 - 21)  SpO2: 100% (12 Jan 2018 05:37) (96% - 100%)     exam: comfortable  lungs mainly clear, slightly reduced bs left base  cv- s1 s2  abd-mild left sided abdomen tenderness  extrem-left upper arm has bruit

## 2018-01-12 NOTE — PROGRESS NOTE ADULT - NSHPATTENDINGPLANDISCUSS_GEN_ALL_CORE
HS3
House officer and ICU team
ICU team, ICU nurse
MICU team
House officer and ICU team
Family, MICU team
Family, house officers and ICU team
Patient's daughter, nurse, ICU team
daughter, MICU Team
Resident Karla
HS3
[facility]
HS[facility]

## 2018-01-12 NOTE — PROGRESS NOTE ADULT - ASSESSMENT
overall doing ok however hemoglobin dropped to 8.3 and reportedly did not have alot of surgical blood losses.  Would make sure to recheck hgb level this am before deciding about discharge  If not discharged, then her next dialysis is for saturday 1/13.  Will continue epogen and venofer with HD.

## 2018-01-12 NOTE — PROGRESS NOTE ADULT - ASSESSMENT
73 yo F h/o ?CHF (EF in 2008 TTE 73%), DM2, HTN, CKD Stage 4, Type B aortic dissection presented on 12/28/17 with acute metabolic encephalopathy was admitted to the MICU as pt required intubation for airway protection and was found to have  subclinical, non-convulsive status epilepticus, being controlled w/ antiepileptic, w/ MICU course further c/b by RP bleed with Hb drop from 11 to 5.9 (s/p 5 units of pRBCs + 2 units of plts on 1/3-1/4) and progression of her CKD likely in setting of KWESI now requiring HD. Patient now transferred to medicine floors for further management. Pt on HD TTS. pt had permacath placed on 1/9/18. No complications. Pt had AVF placed on 1/11/18. Will be dc to [facility] with HD services. HD via permacath pending AVF maturation.

## 2018-01-12 NOTE — PROGRESS NOTE ADULT - PROBLEM SELECTOR PLAN 4
-s/p 2 units of plts on 1/3/17 in setting of RP bleed. Levels were wnl on admission and prior. Meds likely playing a role as this is also a known SE of depacon. Levels are improving.  -stable, will continue to monitor -s/p 2 units of plts on 1/3/17 in setting of RP bleed. Levels were wnl on admission and prior. Meds likely playing a role as this is also a known SE of depacon. Levels are improving. stable, will continue to monitor

## 2018-01-12 NOTE — PROGRESS NOTE ADULT - PROVIDER SPECIALTY LIST ADULT
Anesthesia
Internal Medicine
MICU
Nephrology
Neurology
Surgery
Vascular Surgery
MICU
Neurology
Vascular Surgery
Nephrology
Internal Medicine

## 2018-01-12 NOTE — PROGRESS NOTE ADULT - PROBLEM SELECTOR PLAN 2
-Pt was being monitored in the MICU w/ VEEG for AMS 2/2 to seizure. No further evidence of seizure like activity since 1/1. Etiology Unclear  -c/w depakote 750mg BID, no need for further depakote levels unless repeat seizures per neuro. Neuro following, will call them if repeat seizures. -Pt was being monitored in the MICU w/ VEEG for AMS 2/2 to seizure. No further evidence of seizure like activity since 1/1. Etiology Unclear  -c/w depakote 750mg BID, . Neuro following, will call them if repeat seizures.

## 2018-01-12 NOTE — PROGRESS NOTE ADULT - PROBLEM SELECTOR PLAN 6
-type b aortic dissection (chronic)   -vasc surg recs no intervention or AC re partial thrombus formation within false lumen.  -stable, will continue to monitor. BP control goal 120-140 systolic per vasc

## 2018-01-12 NOTE — PROGRESS NOTE ADULT - SUBJECTIVE AND OBJECTIVE BOX
Patient is a 72y old  Female who presents with a chief complaint of loss of vision, weakness (11 Jan 2018 14:42)      SUBJECTIVE / OVERNIGHT EVENTS:    Patient seen and examined at bedside. No acute events overnight.    Review of systems: No chest pain, no shortness of breath, no abdominal pain, no nausea, no vomiting, no diarrhea, no fevers, and no chills overnight.     MEDICATIONS  (STANDING):  amLODIPine   Tablet 10 milliGRAM(s) Oral daily  calcium acetate 667 milliGRAM(s) Oral three times a day with meals  dextrose 5%. 1000 milliLiter(s) (50 mL/Hr) IV Continuous <Continuous>  dextrose 50% Injectable 12.5 Gram(s) IV Push once  dextrose 50% Injectable 25 Gram(s) IV Push once  dextrose 50% Injectable 25 Gram(s) IV Push once  diVALproex  milliGRAM(s) Oral two times a day  influenza   Vaccine 0.5 milliLiter(s) IntraMuscular once  insulin lispro (HumaLOG) corrective regimen sliding scale   SubCutaneous three times a day before meals  insulin lispro (HumaLOG) corrective regimen sliding scale   SubCutaneous at bedtime  metoprolol     tartrate 50 milliGRAM(s) Oral two times a day    MEDICATIONS  (PRN):  acetaminophen   Tablet. 650 milliGRAM(s) Oral every 6 hours PRN Moderate Pain (4 - 6)  acetaminophen  IVPB. 1000 milliGRAM(s) IV Intermittent once PRN Moderate Pain (4 - 6)  dextrose Gel 1 Dose(s) Oral once PRN Blood Glucose LESS THAN 70 milliGRAM(s)/deciliter  glucagon  Injectable 1 milliGRAM(s) IntraMuscular once PRN Glucose LESS THAN 70 milligrams/deciliter  simethicone 80 milliGRAM(s) Chew four times a day PRN Gas      T(F): 97.4 (01-12 @ 05:37), Max: 98.6 (01-11 @ 18:10)  HR: 61 (01-12 @ 05:37) (58 - 67)  BP: 140/73 (01-12 @ 05:37) (98/65 - 153/83)  RR: 18 (01-12 @ 05:37) (14 - 21)  SpO2: 100% (01-12 @ 05:37) (96% - 100%)  CAPILLARY BLOOD GLUCOSE      POCT Blood Glucose.: 104 mg/dL (12 Jan 2018 08:19)  POCT Blood Glucose.: 111 mg/dL (11 Jan 2018 23:56)  POCT Blood Glucose.: 157 mg/dL (11 Jan 2018 21:13)  POCT Blood Glucose.: 134 mg/dL (11 Jan 2018 19:52)  POCT Blood Glucose.: 144 mg/dL (11 Jan 2018 18:00)    I&O's Summary    11 Jan 2018 07:01  -  12 Jan 2018 07:00  --------------------------------------------------------  IN: 500 mL / OUT: 1300 mL / NET: -800 mL        PHYSICAL EXAM:  GEN: Age appropriate, resting comfortably in bed, no acute distress, non toxic appearing, speaking in complete sentences.   HEENT: Conjunctiva and sclera normal  PULM: Lungs CTAB, no wheezes, rales, rhonchi  CV: RRR, S1S2, no MRG  Abdominal: Soft, nontender to palpation, non-distended, normoactive bowel sounds  Extremities: No edema or cyanosis  NEURO: AAOx3  Skin: No rashes, lesions. AVF left arm +thrill. Left arm in sling. left flank ecchymosis stable.       LABS:  Labs personally reviewed.                        8.3    9.70  )-----------( 161      ( 12 Jan 2018 05:30 )             26.0     Hgb Trend: 8.3<--, 8.3<--, 9.6<--, 8.8<--, 8.5<--  01-12    138  |  100  |  31<H>  ----------------------------<  102<H>  4.5   |  27  |  2.22<H>    Ca    7.6<L>      12 Jan 2018 05:30  Phos  3.8     01-12  Mg     1.9     01-12      Creatinine Trend: 2.22<--, 3.93<--, 3.10<--, 2.31<--, 4.17<--, 3.86<--  PT/INR - ( 11 Jan 2018 10:10 )   PT: 12.0 SEC;   INR: 1.04          PTT - ( 11 Jan 2018 10:10 )  PTT:28.9 SEC        Vega Mcgee  PGY-1 Pager: [phone]  [phone]  Night Float:

## 2018-01-30 LAB — FUNGUS SPEC QL CULT: SIGNIFICANT CHANGE UP

## 2018-02-09 LAB — ACID FAST STN FLD: SIGNIFICANT CHANGE UP

## 2018-02-14 ENCOUNTER — APPOINTMENT (OUTPATIENT)
Dept: VASCULAR SURGERY | Facility: CLINIC | Age: 73
End: 2018-02-14

## 2018-02-21 ENCOUNTER — APPOINTMENT (OUTPATIENT)
Dept: VASCULAR SURGERY | Facility: CLINIC | Age: 73
End: 2018-02-21
Payer: COMMERCIAL

## 2018-02-21 VITALS
TEMPERATURE: 97.8 F | BODY MASS INDEX: 22.08 KG/M2 | DIASTOLIC BLOOD PRESSURE: 76 MMHG | SYSTOLIC BLOOD PRESSURE: 131 MMHG | WEIGHT: 120 LBS | HEART RATE: 61 BPM | HEIGHT: 62 IN

## 2018-02-21 PROCEDURE — 93990 DOPPLER FLOW TESTING: CPT

## 2018-02-21 PROCEDURE — 99024 POSTOP FOLLOW-UP VISIT: CPT

## 2018-03-14 ENCOUNTER — APPOINTMENT (OUTPATIENT)
Dept: VASCULAR SURGERY | Facility: CLINIC | Age: 73
End: 2018-03-14
Payer: MEDICARE

## 2018-03-14 VITALS
SYSTOLIC BLOOD PRESSURE: 135 MMHG | BODY MASS INDEX: 22.08 KG/M2 | HEART RATE: 63 BPM | TEMPERATURE: 98 F | DIASTOLIC BLOOD PRESSURE: 76 MMHG | WEIGHT: 120 LBS | HEIGHT: 62 IN

## 2018-03-14 PROCEDURE — 93971 EXTREMITY STUDY: CPT

## 2018-03-14 PROCEDURE — 99024 POSTOP FOLLOW-UP VISIT: CPT

## 2018-03-20 ENCOUNTER — APPOINTMENT (OUTPATIENT)
Dept: VASCULAR SURGERY | Facility: HOSPITAL | Age: 73
End: 2018-03-20

## 2018-03-20 ENCOUNTER — OUTPATIENT (OUTPATIENT)
Dept: OUTPATIENT SERVICES | Facility: HOSPITAL | Age: 73
LOS: 1 days | Discharge: HOME | End: 2018-03-20
Payer: MEDICARE

## 2018-03-20 DIAGNOSIS — N19 UNSPECIFIED KIDNEY FAILURE: ICD-10-CM

## 2018-03-20 DIAGNOSIS — Z90.710 ACQUIRED ABSENCE OF BOTH CERVIX AND UTERUS: Chronic | ICD-10-CM

## 2018-03-20 PROCEDURE — 76937 US GUIDE VASCULAR ACCESS: CPT | Mod: 26

## 2018-03-20 PROCEDURE — 36901 INTRO CATH DIALYSIS CIRCUIT: CPT | Mod: 78,LT

## 2018-03-20 PROCEDURE — 99152 MOD SED SAME PHYS/QHP 5/>YRS: CPT

## 2018-03-20 RX ORDER — SODIUM CHLORIDE 9 MG/ML
3 INJECTION INTRAMUSCULAR; INTRAVENOUS; SUBCUTANEOUS EVERY 8 HOURS
Qty: 0 | Refills: 0 | Status: DISCONTINUED | OUTPATIENT
Start: 2018-03-20 | End: 2018-04-04

## 2018-03-20 RX ORDER — SITAGLIPTIN 50 MG/1
1 TABLET, FILM COATED ORAL
Qty: 0 | Refills: 0 | COMMUNITY

## 2018-03-20 NOTE — H&P CARDIOLOGY - HISTORY OF PRESENT ILLNESS
72 year old female with HTN, hyperlipidemia, DM-II, ESRD on HD (M,W,F) - last session 3/19/18 who complains of left arm swelling who presents for AVFistulogram.     As per Dr Norwood allscripts note 3/14/18  status post L brachiocephalic AVF and she is here for a post-op visit.   Surgery Date: 1/11/18 Pt. c/o persistent L arm swelling  on exam incision is well healed  good thrill in AVF  no signs of infection   Duplex: diameter 8mm   A/P: will plan for fistulogram to evaluate central venous stenosis as a course of persistent swelling  risks and benefits of this procedure were discussed and pt. agrees to proceed

## 2018-03-22 ENCOUNTER — RX RENEWAL (OUTPATIENT)
Age: 73
End: 2018-03-22

## 2018-04-30 ENCOUNTER — OTHER (OUTPATIENT)
Age: 73
End: 2018-04-30

## 2018-05-09 DIAGNOSIS — E83.39 OTHER DISORDERS OF PHOSPHORUS METABOLISM: ICD-10-CM

## 2018-06-01 ENCOUNTER — RX RENEWAL (OUTPATIENT)
Age: 73
End: 2018-06-01

## 2018-07-06 ENCOUNTER — RX RENEWAL (OUTPATIENT)
Age: 73
End: 2018-07-06

## 2018-07-30 ENCOUNTER — RX RENEWAL (OUTPATIENT)
Age: 73
End: 2018-07-30

## 2018-08-01 ENCOUNTER — OTHER (OUTPATIENT)
Age: 73
End: 2018-08-01

## 2018-08-01 RX ORDER — METOPROLOL SUCCINATE 25 MG/1
25 TABLET, EXTENDED RELEASE ORAL DAILY
Qty: 30 | Refills: 0 | Status: DISCONTINUED | COMMUNITY
Start: 2018-04-30 | End: 2018-08-01

## 2018-09-17 ENCOUNTER — RX RENEWAL (OUTPATIENT)
Age: 73
End: 2018-09-17

## 2018-11-23 ENCOUNTER — RX RENEWAL (OUTPATIENT)
Age: 73
End: 2018-11-23

## 2019-01-02 ENCOUNTER — RX RENEWAL (OUTPATIENT)
Age: 74
End: 2019-01-02

## 2019-01-04 ENCOUNTER — RX RENEWAL (OUTPATIENT)
Age: 74
End: 2019-01-04

## 2019-01-23 ENCOUNTER — CHART COPY (OUTPATIENT)
Age: 74
End: 2019-01-23

## 2019-01-23 DIAGNOSIS — I10 ESSENTIAL (PRIMARY) HYPERTENSION: ICD-10-CM

## 2019-07-19 ENCOUNTER — RX RENEWAL (OUTPATIENT)
Age: 74
End: 2019-07-19

## 2019-10-28 ENCOUNTER — RX RENEWAL (OUTPATIENT)
Age: 74
End: 2019-10-28

## 2019-11-06 ENCOUNTER — RX RENEWAL (OUTPATIENT)
Age: 74
End: 2019-11-06

## 2019-11-19 ENCOUNTER — RX RENEWAL (OUTPATIENT)
Age: 74
End: 2019-11-19

## 2020-03-02 PROBLEM — N18.6 END STAGE RENAL DISEASE: Chronic | Status: ACTIVE | Noted: 2018-03-20

## 2020-04-02 ENCOUNTER — APPOINTMENT (OUTPATIENT)
Dept: NEPHROLOGY | Facility: CLINIC | Age: 75
End: 2020-04-02

## 2020-04-02 ENCOUNTER — APPOINTMENT (OUTPATIENT)
Dept: TRANSPLANT | Facility: CLINIC | Age: 75
End: 2020-04-02

## 2020-07-21 ENCOUNTER — APPOINTMENT (OUTPATIENT)
Dept: NEPHROLOGY | Facility: CLINIC | Age: 75
End: 2020-07-21
Payer: COMMERCIAL

## 2020-07-21 ENCOUNTER — APPOINTMENT (OUTPATIENT)
Dept: TRANSPLANT | Facility: CLINIC | Age: 75
End: 2020-07-21

## 2020-07-21 VITALS
HEIGHT: 62 IN | HEART RATE: 85 BPM | TEMPERATURE: 97 F | WEIGHT: 140 LBS | DIASTOLIC BLOOD PRESSURE: 76 MMHG | OXYGEN SATURATION: 100 % | SYSTOLIC BLOOD PRESSURE: 150 MMHG | BODY MASS INDEX: 25.76 KG/M2

## 2020-07-21 VITALS
SYSTOLIC BLOOD PRESSURE: 177 MMHG | HEIGHT: 60 IN | DIASTOLIC BLOOD PRESSURE: 71 MMHG | WEIGHT: 140 LBS | TEMPERATURE: 98 F | OXYGEN SATURATION: 99 % | BODY MASS INDEX: 27.48 KG/M2 | HEART RATE: 62 BPM

## 2020-07-21 VITALS
HEART RATE: 85 BPM | RESPIRATION RATE: 14 BRPM | DIASTOLIC BLOOD PRESSURE: 76 MMHG | WEIGHT: 140 LBS | SYSTOLIC BLOOD PRESSURE: 150 MMHG | BODY MASS INDEX: 25.61 KG/M2

## 2020-07-21 DIAGNOSIS — Z01.818 ENCOUNTER FOR OTHER PREPROCEDURAL EXAMINATION: ICD-10-CM

## 2020-07-21 DIAGNOSIS — E11.9 TYPE 2 DIABETES MELLITUS W/OUT COMPLICATIONS: ICD-10-CM

## 2020-07-21 DIAGNOSIS — I10 ESSENTIAL (PRIMARY) HYPERTENSION: ICD-10-CM

## 2020-07-21 PROCEDURE — 99205 OFFICE O/P NEW HI 60 MIN: CPT

## 2020-07-21 NOTE — ASSESSMENT
[FreeTextEntry1] : .Ms. ALVAREZ 75 year She is evaluated for kidney transplantation.\par Pre transplant/ESRD: Patient will benefit from renal allotransplantation she is an acceptable risk candidate, diabetes, remote h/o endometrial cancer.\par Medical risks: Cardiovascular, cancer screening.\par Diabetes Mellitus: Discussed implications. Continue follow up with primary physicians\par Hypertension: Discussed implications. Continue follow up with primary physicians.\par Cardiac risk:  will get further evaluation; echo, stress test; Reviewed cardiovascular risk reduction strategies\par Cancer screening: PAP/Mammo.  Colon ehsan screening. Has known h/o neoplastic disease\par ID: Serology for acute and chronic viral infections. Screening for latent TB. \par Imaging: Renal/abdominal /chest /Iliac imaging \par Consults: Nutrition, social work, cardiology, Transplant surgery.\par Reviewed factors affecting survival and morbidity while on wait list and reviewed yanet-operative and long-term risk factors affecting outcome in kidney transplantation.\par Details of transplant surgery, immunosuppression and its complications and benefits of live donor transplantation as well as variability in wait times across regions and multiple listing were discussed. KDPI >85% and PHS high risk criteria donors were discussed. Discussed factors affecting morbidity and mortality while on hemodialysis.\par Patient has potential live donor ( being explored) at present. \par Will proceed with completing/ updating work up and listing for transplant/ live donor transplant once work up is reviewed and found to be ok.\par

## 2020-07-21 NOTE — HISTORY OF PRESENT ILLNESS
[FreeTextEntry1] : 75 years old lady, born in [facility], living in  for 46 years\par Patient has known CKD (2016), ESRD (Dec 2017) on follow up with  is here for pre kidney transplant evaluation. She was previously followed by Dr. Sterling.\par He has known DM (age 52) Not on insulin ; HTN (age 37). No H/o Hyperlipidemia/ Gout\par H/o endometrial carcinoma in  had total hysterectomy followed by chemo for 8 weeks (Dr Hyatt- surgeon at [facility]; Dr. Michelle Lombardo- oncologist). Was told to be cancer free.Had follow up until 2017.\par No known h/o kidney stone \par No hematuria. 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 Christie ever she was told to be negative at dialysis unit on monthly tests.\par Reports no major allergies. Does not take any blood thinners. No known h/o tuberculosis or hepatitis.\par Most recent hospitalization/for:  she was in coma for 3 days related to kidney failure, improved after dialysis.\par Past surgeries:\par Left arm AVF (Dr. Jensen; Dr. Rae)\par Hysterectomy ( Dr. Hyatt)\par No history of kidney/ bladder surgery.\par Occassional  smoker , last smoked in 30's.\par Fam: Parents are . Father - at age 68 CVA. Mother- at age 92 , following knee surgery had paralysis and was on dialysis. Siblings- eight siblings, 6 have asthma.\par Children: 2 - Son 47, daughter 43 years works in [employer]- both have HTN\par Has family history of kidney disease- mother was on dialysis for 2 years at age 92.  has been on dialysis since 2018.\par Independent for ADL\par Lives with her , Ravinder, 77 years old. He is a retired .  is on dialysis as well;  had CABG. They are  for 48 years.\par Able to walk ten blocks, can climb stairs with some difficulty.\par ROS: Has h/o shortness of breath on exertion. No h/o Sleep apnea. No h/o Thyroid disease. H/o sciatic area on left. No acute pain now.\par Functional/employment status:Worked as a banker, retired in 2013. \par Dialysis history: At [facility]\par Kidney Biopsy: None\par Potential Live donors: Being explored.\par \par Prior Studies:\par Cardiology:Dr. Baez, not seen in more than 3 years\par Cancer Screen:Has not had PAP, Mammo. Had cologuard 3 months ago.\par Consultants:José Luis Elizondo\par Primary MD:Dr. Hanson\par \par Prior Transplants/Evaluations:None\par \par \par

## 2020-07-21 NOTE — PHYSICAL EXAM
[Sclera] : the sclera and conjunctiva were normal [Extraocular Movements] : extraocular movements were intact [Outer Ear] : the ears and nose were normal in appearance [Hearing Threshold Finger Rub Not [place]] : hearing was normal [Auscultation Breath Sounds / Voice Sounds] : lungs were clear to auscultation bilaterally [Heart Sounds Gallop] : no gallops [Heart Sounds Pericardial Friction Rub] : no pericardial rub [FreeTextEntry1] : systolic murmur+ [Full Pulse] : the pedal pulses are present [Bowel Sounds] : normal bowel sounds [Abdomen Tenderness] : non-tender [Abdomen Soft] : soft [Abdomen Mass (___ Cm)] : no abdominal mass palpated [Cervical Lymph Nodes Enlarged Anterior Bilaterally] : anterior cervical [Cervical Lymph Nodes Enlarged Posterior Bilaterally] : posterior cervical [Supraclavicular Lymph Nodes Enlarged Bilaterally] : supraclavicular [Involuntary Movements] : no involuntary movements were seen [___ (cm) Fistula] : [unfilled] (cm) fistula [] : no rash [No Focal Deficits] : no focal deficits [Oriented To Time, Place, And Person] : oriented to person, place, and time [Impaired Insight] : insight and judgment were intact [Affect] : the affect was normal

## 2021-04-21 NOTE — PROCEDURE NOTE - AMOUNT OF FLUID OBTAINED
Additional Notes: Patient consent was obtained to proceed with the visit and recommended plan of care after discussion of all risks and benefits, including the risks of COVID-19 exposure.
Detail Level: Simple
12

## 2021-05-10 DIAGNOSIS — J30.9 ALLERGIC RHINITIS, UNSPECIFIED: ICD-10-CM

## 2021-05-10 RX ORDER — FLUTICASONE PROPIONATE 50 UG/1
50 SPRAY, METERED NASAL
Qty: 1 | Refills: 0 | Status: ACTIVE | COMMUNITY
Start: 2021-05-10 | End: 1900-01-01

## 2021-05-13 NOTE — CONSULT NOTE ADULT - I WAS PHYSICALLY PRESENT FOR THE KEY PORTIONS OF THE EVALUATION AND MANAGEMENT (E/M) SERVICE PROVIDED.  I AGREE WITH THE ABOVE HISTORY, PHYSICAL, AND PLAN WHICH I HAVE REVIEWED AND EDITED WHERE APPROPRIATE
Patient did not return for further treatment and no additional progress was noted.  Please refer to the progress note and goal flowsheet completed on   for discharge information.  
Statement Selected
Statement Selected

## 2021-08-27 NOTE — ED ADULT NURSE NOTE - NS ED NURSE PATIENT LEFT UNIT TIME
Surgery Scheduled    DOS: 9/10/2021  Provider:    Location: [place]  PAC: 8/31/2021 Video Visit  COVID test: To be done locally   Post Op: 9/24 with Gloria Astorga RN  Patient Called: Called patient to schedule surgery with , patient turned call into a conference call so that patients mother could help with scheduling surgery procedure and additional appointments.     Nuvasive: #[id number]    Extra Imaging: MRI Enrique WO & W 9/8 and CT is scheduled for morning of surgery.    Additional Notes: Please fax covid test orders to [facility]. Patient will have covid test on 9/7/2021.     Patient's mother would like to request that referral to be placed for them to stay at [facility]. Please call patients mother with questions.       03:15

## 2021-10-06 PROBLEM — I10 ESSENTIAL HYPERTENSION: Status: ACTIVE | Noted: 2020-07-21

## 2022-03-02 RX ORDER — ENALAPRIL MALEATE 5 MG/1
5 TABLET ORAL DAILY
Qty: 30 | Refills: 3 | Status: DISCONTINUED | COMMUNITY
Start: 2019-01-23 | End: 2022-03-02

## 2022-03-02 RX ORDER — AMLODIPINE BESYLATE 10 MG/1
10 TABLET ORAL DAILY
Qty: 90 | Refills: 3 | Status: DISCONTINUED | COMMUNITY
Start: 2019-10-23 | End: 2022-03-02

## 2022-03-02 RX ORDER — CLONIDINE HYDROCHLORIDE 0.1 MG/1
0.1 TABLET ORAL DAILY
Qty: 90 | Refills: 3 | Status: DISCONTINUED | COMMUNITY
Start: 2019-11-06 | End: 2022-03-02

## 2022-06-10 DIAGNOSIS — Z00.00 ENCOUNTER FOR GENERAL ADULT MEDICAL EXAMINATION W/OUT ABNORMAL FINDINGS: ICD-10-CM

## 2022-08-02 ENCOUNTER — RX RENEWAL (OUTPATIENT)
Age: 77
End: 2022-08-02

## 2022-11-07 RX ORDER — ENALAPRIL MALEATE 5 MG/1
5 TABLET ORAL DAILY
Refills: 0 | Status: DISCONTINUED | COMMUNITY
Start: 2022-03-02 | End: 2022-11-07

## 2023-01-13 RX ORDER — HYDRALAZINE HYDROCHLORIDE 50 MG/1
50 TABLET ORAL DAILY
Qty: 180 | Refills: 2 | Status: ACTIVE | COMMUNITY
Start: 2021-06-21 | End: 1900-01-01

## 2023-01-13 RX ORDER — METOPROLOL SUCCINATE 25 MG/1
25 TABLET, EXTENDED RELEASE ORAL DAILY
Qty: 60 | Refills: 0 | Status: ACTIVE | COMMUNITY
Start: 2018-08-01 | End: 1900-01-01

## 2023-05-18 ENCOUNTER — INPATIENT (INPATIENT)
Facility: HOSPITAL | Age: 78
LOS: 7 days | Discharge: NURSING, REHAB OR LONG-TERM CARE | End: 2023-05-26
Attending: HOSPITALIST | Admitting: HOSPITALIST
Payer: MEDICARE

## 2023-05-18 VITALS
HEART RATE: 88 BPM | SYSTOLIC BLOOD PRESSURE: 148 MMHG | RESPIRATION RATE: 18 BRPM | TEMPERATURE: 98 F | DIASTOLIC BLOOD PRESSURE: 58 MMHG | OXYGEN SATURATION: 100 %

## 2023-05-18 DIAGNOSIS — Z01.818 ENCOUNTER FOR OTHER PREPROCEDURAL EXAMINATION: ICD-10-CM

## 2023-05-18 DIAGNOSIS — S72.009A FRACTURE OF UNSPECIFIED PART OF NECK OF UNSPECIFIED FEMUR, INITIAL ENCOUNTER FOR CLOSED FRACTURE: ICD-10-CM

## 2023-05-18 DIAGNOSIS — S42.302A UNSPECIFIED FRACTURE OF SHAFT OF HUMERUS, LEFT ARM, INITIAL ENCOUNTER FOR CLOSED FRACTURE: ICD-10-CM

## 2023-05-18 DIAGNOSIS — S72.142A DISPLACED INTERTROCHANTERIC FRACTURE OF LEFT FEMUR, INITIAL ENCOUNTER FOR CLOSED FRACTURE: ICD-10-CM

## 2023-05-18 DIAGNOSIS — I10 ESSENTIAL (PRIMARY) HYPERTENSION: ICD-10-CM

## 2023-05-18 DIAGNOSIS — Z86.79 PERSONAL HISTORY OF OTHER DISEASES OF THE CIRCULATORY SYSTEM: ICD-10-CM

## 2023-05-18 DIAGNOSIS — D69.6 THROMBOCYTOPENIA, UNSPECIFIED: ICD-10-CM

## 2023-05-18 DIAGNOSIS — Z29.9 ENCOUNTER FOR PROPHYLACTIC MEASURES, UNSPECIFIED: ICD-10-CM

## 2023-05-18 DIAGNOSIS — E11.9 TYPE 2 DIABETES MELLITUS WITHOUT COMPLICATIONS: ICD-10-CM

## 2023-05-18 DIAGNOSIS — Z90.710 ACQUIRED ABSENCE OF BOTH CERVIX AND UTERUS: Chronic | ICD-10-CM

## 2023-05-18 DIAGNOSIS — N18.9 CHRONIC KIDNEY DISEASE, UNSPECIFIED: ICD-10-CM

## 2023-05-18 DIAGNOSIS — N18.6 END STAGE RENAL DISEASE: ICD-10-CM

## 2023-05-18 LAB
ALBUMIN SERPL ELPH-MCNC: 3.6 G/DL — SIGNIFICANT CHANGE UP (ref 3.3–5)
ALP SERPL-CCNC: 118 U/L — SIGNIFICANT CHANGE UP (ref 40–120)
ALT FLD-CCNC: 25 U/L — SIGNIFICANT CHANGE UP (ref 4–33)
ANION GAP SERPL CALC-SCNC: 14 MMOL/L — SIGNIFICANT CHANGE UP (ref 7–14)
ANISOCYTOSIS BLD QL: SIGNIFICANT CHANGE UP
APTT BLD: 33.4 SEC — SIGNIFICANT CHANGE UP (ref 27–36.3)
AST SERPL-CCNC: 51 U/L — HIGH (ref 4–32)
BASOPHILS # BLD AUTO: 0 K/UL — SIGNIFICANT CHANGE UP (ref 0–0.2)
BASOPHILS NFR BLD AUTO: 0 % — SIGNIFICANT CHANGE UP (ref 0–2)
BILIRUB SERPL-MCNC: 1.2 MG/DL — SIGNIFICANT CHANGE UP (ref 0.2–1.2)
BLD GP AB SCN SERPL QL: NEGATIVE — SIGNIFICANT CHANGE UP
BUN SERPL-MCNC: 29 MG/DL — HIGH (ref 7–23)
CALCIUM SERPL-MCNC: 9.4 MG/DL — SIGNIFICANT CHANGE UP (ref 8.4–10.5)
CHLORIDE SERPL-SCNC: 94 MMOL/L — LOW (ref 98–107)
CK SERPL-CCNC: 159 U/L — SIGNIFICANT CHANGE UP (ref 25–170)
CO2 SERPL-SCNC: 27 MMOL/L — SIGNIFICANT CHANGE UP (ref 22–31)
CREAT SERPL-MCNC: 5.11 MG/DL — HIGH (ref 0.5–1.3)
EGFR: 8 ML/MIN/1.73M2 — LOW
EOSINOPHIL # BLD AUTO: 0.04 K/UL — SIGNIFICANT CHANGE UP (ref 0–0.5)
EOSINOPHIL NFR BLD AUTO: 0.8 % — SIGNIFICANT CHANGE UP (ref 0–6)
GIANT PLATELETS BLD QL SMEAR: PRESENT — SIGNIFICANT CHANGE UP
GLUCOSE SERPL-MCNC: 223 MG/DL — HIGH (ref 70–99)
HCT VFR BLD CALC: 29.2 % — LOW (ref 34.5–45)
HGB BLD-MCNC: 9.4 G/DL — LOW (ref 11.5–15.5)
HYPOCHROMIA BLD QL: SLIGHT — SIGNIFICANT CHANGE UP
IANC: 3.98 K/UL — SIGNIFICANT CHANGE UP (ref 1.8–7.4)
INR BLD: 1.14 RATIO — SIGNIFICANT CHANGE UP (ref 0.88–1.16)
LYMPHOCYTES # BLD AUTO: 0.26 K/UL — LOW (ref 1–3.3)
LYMPHOCYTES # BLD AUTO: 5.2 % — LOW (ref 13–44)
MACROCYTES BLD QL: SIGNIFICANT CHANGE UP
MCHC RBC-ENTMCNC: 31.2 PG — SIGNIFICANT CHANGE UP (ref 27–34)
MCHC RBC-ENTMCNC: 32.2 GM/DL — SIGNIFICANT CHANGE UP (ref 32–36)
MCV RBC AUTO: 97 FL — SIGNIFICANT CHANGE UP (ref 80–100)
MONOCYTES # BLD AUTO: 0.26 K/UL — SIGNIFICANT CHANGE UP (ref 0–0.9)
MONOCYTES NFR BLD AUTO: 5.2 % — SIGNIFICANT CHANGE UP (ref 2–14)
NEUTROPHILS # BLD AUTO: 4.48 K/UL — SIGNIFICANT CHANGE UP (ref 1.8–7.4)
NEUTROPHILS NFR BLD AUTO: 88.8 % — HIGH (ref 43–77)
OVALOCYTES BLD QL SMEAR: SLIGHT — SIGNIFICANT CHANGE UP
PLAT MORPH BLD: NORMAL — SIGNIFICANT CHANGE UP
PLATELET # BLD AUTO: 74 K/UL — LOW (ref 150–400)
PLATELET COUNT - ESTIMATE: ABNORMAL
POLYCHROMASIA BLD QL SMEAR: SLIGHT — SIGNIFICANT CHANGE UP
POTASSIUM SERPL-MCNC: 4.7 MMOL/L — SIGNIFICANT CHANGE UP (ref 3.5–5.3)
POTASSIUM SERPL-SCNC: 4.7 MMOL/L — SIGNIFICANT CHANGE UP (ref 3.5–5.3)
PROT SERPL-MCNC: 6.3 G/DL — SIGNIFICANT CHANGE UP (ref 6–8.3)
PROTHROM AB SERPL-ACNC: 13.2 SEC — SIGNIFICANT CHANGE UP (ref 10.5–13.4)
RBC # BLD: 3.01 M/UL — LOW (ref 3.8–5.2)
RBC # FLD: 14.2 % — SIGNIFICANT CHANGE UP (ref 10.3–14.5)
RBC BLD AUTO: ABNORMAL
RH IG SCN BLD-IMP: POSITIVE — SIGNIFICANT CHANGE UP
SODIUM SERPL-SCNC: 135 MMOL/L — SIGNIFICANT CHANGE UP (ref 135–145)
WBC # BLD: 5.05 K/UL — SIGNIFICANT CHANGE UP (ref 3.8–10.5)
WBC # FLD AUTO: 5.05 K/UL — SIGNIFICANT CHANGE UP (ref 3.8–10.5)

## 2023-05-18 PROCEDURE — 99285 EMERGENCY DEPT VISIT HI MDM: CPT | Mod: FS

## 2023-05-18 PROCEDURE — 93010 ELECTROCARDIOGRAM REPORT: CPT

## 2023-05-18 PROCEDURE — 99222 1ST HOSP IP/OBS MODERATE 55: CPT | Mod: GC

## 2023-05-18 PROCEDURE — 71045 X-RAY EXAM CHEST 1 VIEW: CPT | Mod: 26

## 2023-05-18 PROCEDURE — 99223 1ST HOSP IP/OBS HIGH 75: CPT

## 2023-05-18 PROCEDURE — 73060 X-RAY EXAM OF HUMERUS: CPT | Mod: 26,LT

## 2023-05-18 PROCEDURE — 73552 X-RAY EXAM OF FEMUR 2/>: CPT | Mod: 26,LT

## 2023-05-18 PROCEDURE — 73501 X-RAY EXAM HIP UNI 1 VIEW: CPT | Mod: 26,LT

## 2023-05-18 PROCEDURE — 73030 X-RAY EXAM OF SHOULDER: CPT | Mod: 26,LT

## 2023-05-18 RX ORDER — POVIDONE-IODINE 5 %
1 AEROSOL (ML) TOPICAL ONCE
Refills: 0 | Status: COMPLETED | OUTPATIENT
Start: 2023-05-18 | End: 2023-05-19

## 2023-05-18 RX ORDER — OXYCODONE HYDROCHLORIDE 5 MG/1
5 TABLET ORAL EVERY 4 HOURS
Refills: 0 | Status: DISCONTINUED | OUTPATIENT
Start: 2023-05-18 | End: 2023-05-20

## 2023-05-18 RX ORDER — SODIUM BICARBONATE 1 MEQ/ML
0 SYRINGE (ML) INTRAVENOUS
Qty: 0 | Refills: 0 | DISCHARGE

## 2023-05-18 RX ORDER — SITAGLIPTIN 50 MG/1
1 TABLET, FILM COATED ORAL
Qty: 0 | Refills: 0 | DISCHARGE

## 2023-05-18 RX ORDER — MORPHINE SULFATE 50 MG/1
2 CAPSULE, EXTENDED RELEASE ORAL ONCE
Refills: 0 | Status: DISCONTINUED | OUTPATIENT
Start: 2023-05-18 | End: 2023-05-18

## 2023-05-18 RX ORDER — METOPROLOL TARTRATE 50 MG
1 TABLET ORAL
Qty: 0 | Refills: 0 | DISCHARGE

## 2023-05-18 RX ORDER — CALCIUM ACETATE 667 MG
667 TABLET ORAL
Refills: 0 | Status: DISCONTINUED | OUTPATIENT
Start: 2023-05-18 | End: 2023-05-26

## 2023-05-18 RX ORDER — DOCUSATE SODIUM 100 MG
2 CAPSULE ORAL
Qty: 0 | Refills: 0 | DISCHARGE

## 2023-05-18 RX ORDER — METOPROLOL TARTRATE 50 MG
12.5 TABLET ORAL DAILY
Refills: 0 | Status: DISCONTINUED | OUTPATIENT
Start: 2023-05-18 | End: 2023-05-26

## 2023-05-18 RX ORDER — ACETAMINOPHEN 500 MG
1000 TABLET ORAL ONCE
Refills: 0 | Status: COMPLETED | OUTPATIENT
Start: 2023-05-18 | End: 2023-05-18

## 2023-05-18 RX ORDER — MORPHINE SULFATE 50 MG/1
4 CAPSULE, EXTENDED RELEASE ORAL ONCE
Refills: 0 | Status: DISCONTINUED | OUTPATIENT
Start: 2023-05-18 | End: 2023-05-18

## 2023-05-18 RX ORDER — ACETAMINOPHEN 500 MG
650 TABLET ORAL EVERY 6 HOURS
Refills: 0 | Status: DISCONTINUED | OUTPATIENT
Start: 2023-05-18 | End: 2023-05-20

## 2023-05-18 RX ORDER — CHLORHEXIDINE GLUCONATE 213 G/1000ML
1 SOLUTION TOPICAL ONCE
Refills: 0 | Status: COMPLETED | OUTPATIENT
Start: 2023-05-18 | End: 2023-05-19

## 2023-05-18 RX ORDER — HYDRALAZINE HCL 50 MG
50 TABLET ORAL
Refills: 0 | Status: DISCONTINUED | OUTPATIENT
Start: 2023-05-18 | End: 2023-05-26

## 2023-05-18 RX ORDER — SODIUM CHLORIDE 9 MG/ML
500 INJECTION INTRAMUSCULAR; INTRAVENOUS; SUBCUTANEOUS ONCE
Refills: 0 | Status: COMPLETED | OUTPATIENT
Start: 2023-05-18 | End: 2023-05-18

## 2023-05-18 RX ADMIN — SODIUM CHLORIDE 500 MILLILITER(S): 9 INJECTION INTRAMUSCULAR; INTRAVENOUS; SUBCUTANEOUS at 17:23

## 2023-05-18 RX ADMIN — MORPHINE SULFATE 4 MILLIGRAM(S): 50 CAPSULE, EXTENDED RELEASE ORAL at 19:07

## 2023-05-18 RX ADMIN — MORPHINE SULFATE 4 MILLIGRAM(S): 50 CAPSULE, EXTENDED RELEASE ORAL at 20:04

## 2023-05-18 RX ADMIN — Medication 400 MILLIGRAM(S): at 17:23

## 2023-05-18 RX ADMIN — Medication 50 MILLIGRAM(S): at 23:55

## 2023-05-18 RX ADMIN — Medication 1000 MILLIGRAM(S): at 20:05

## 2023-05-18 NOTE — H&P ADULT - PROBLEM SELECTOR PLAN 9
Pt reports hx of chronic type B aortic dissection over 10 years. Pt asymptomatic.   - c/w BP and HR control

## 2023-05-18 NOTE — ED PROVIDER NOTE - ATTENDING CONTRIBUTION TO CARE
77F h/o ESRD on HD (MW), HTN, endometrial Ca s/p CLINT (2007) presenting s/p fall. Pt states she underwent HD and typically gets cramps which she experience in her RLE today which caused her to fall onto L side. Denies head trauma or LOC. Was unable to get up but crawled to phone and called daughter who helped her up. Downtime ~30mins. Now reporting pain to L hip and L shoulder with limited ROM and unable to bear weight. Denies neck/back pain. No headache, dizziness, sob, difficulty breathing, cp. Denies other prodromal symptoms prior to fall.   Gen: nontoxic appearing, nad  CV: rrr, no appreciable murmur  Pulm: CTA b/l  Abd: soft, ntnd  MSK: limited ROM of L shoulder and L hip due to pain without tenderness to touch or appreciable swelling; LLE foreshortened and externally rotated  Skin: sensation grossly intact in all extremities, pulses 2+  MDM: 77F h/o ESRD on HD (MW), HTN, endometrial Ca s/p CLINT (2007) presenting s/p fall. Given cramping after HD, pt reports dry weight normally 39kg though was 33kg yesterday, possibly in setting of dehydration - will give gentle fluid with 500cc bolus and reassess. Check electrolytes. Also concern for acute fracutres of L shoulder/humerus and L hip/pelvis - will get XRays to assess. Analgesia with IV tylenol

## 2023-05-18 NOTE — ED PROVIDER NOTE - CLINICAL SUMMARY MEDICAL DECISION MAKING FREE TEXT BOX
76 y/o F with pmhx of ESRD on HD (MoWeFri), HTN, endometrial ca in remission s/p total hysterectomy in 2007 presents to the ED s/p fall at 1000 this AM c/o L shoulder and L hip pain.  Vitals signs stable in ED. Pt in mild acute distress 2/2 to pain with movement of L shoulder and L hip. Limited ROM of L shoulder and L hip 2/2 pain.   Concern for acute fx or dislocation vs muscle strain  Will obtain labs including CBC, CMP, CK to check for electrolyte abnormalities as cause for cramping and given length of time pt was on the floor. XR L shoulder and L humerus and L hip and L femur to r/o acute fx or dislocation. Give pain control and fluids for hydration.   Will f/u results and reassess.

## 2023-05-18 NOTE — ED ADULT NURSE REASSESSMENT NOTE - NS ED NURSE REASSESS COMMENT FT1
break RN. A&Ox4. c/o left arm pain. NAD. pt denies SOB, chest pain, dizziness, weakness, urinary symptoms, HA, n/v/d, fevers, chills. respirations are even and un labored. medications given as ordered. safety precautions maintained.

## 2023-05-18 NOTE — H&P ADULT - NSICDXPASTMEDICALHX_GEN_ALL_CORE_FT
PAST MEDICAL HISTORY:  Adult Onset Diabetes Mellitus,     Cancer of Endometrium s/p Hysterectomy , s/p Chemo.    CKD (chronic kidney disease)     Dissection of Aorta Type B    ESRD (end stage renal disease) on dialysis M,W,F   [facility] [address]    History of Hysterectomy with O     Hypertension

## 2023-05-18 NOTE — CONSULT NOTE PEDS - ASSESSMENT
ASSESSMENT & PLAN  77yFemale w/ L IT fx.  -NWB LLE, bedrest  -OR on 5/19 with Dr. Pratt  -f/u preop: CBC, BMP, coags, T&S x2, CXR, EKG  - 2U on hold for OR  -NPO past midnight, IVF  -hold chemical DVT ppx for OR; SCDs OK  -please document medical clearance ASAP for OR  -pain control  -ice/cold compress    For all questions related to patient care, please reach out to the on-call team via the pager.     Elle Ulloa, PGY 1  Orthopaedic Surgery  [facility] [phone]  [facility] [phone]  [facility] [phone]   ASSESSMENT & PLAN  77yFemale w/ L IT fx.  -NWB LLE, bedrest  -OR on 5/19 with Dr. Pratt  -f/u preop: CBC, BMP, coags, T&S x2, CXR, EKG  - 2U on hold for OR  - would recommend HD after OR tomorrow if able  -NPO past midnight, IVF  -hold chemical DVT ppx for OR; SCDs OK  -please document medical clearance ASAP for OR  - Please document nephro clearance ASAP for OR  -pain control  -ice/cold compress    For all questions related to patient care, please reach out to the on-call team via the pager.     Elle Ulloa, PGY 1  Orthopaedic Surgery  [facility] [phone]  [facility] [phone]  [facility] [phone]   ASSESSMENT & PLAN  77yFemale w/ L basicervical femoral neck fracture, L proximal humerus fracture  -NWB LLE, bedrest  -OR on 5/19 with Dr. Billingsley  -f/u preop: CBC, BMP, coags, T&S x2, CXR, EKG  -2U on hold for OR  -would recommend HD after OR tomorrow if able  -NPO past midnight, IVF  -hold chemical DVT ppx for OR; SCDs OK  -please document medical clearance ASAP for OR  -please document nephro clearance ASAP for OR  -pain control  -ice/cold compress    For all questions related to patient care, please reach out to the on-call team via the pager.     Elle Ulloa, PGY 1  Orthopaedic Surgery  [facility] [phone]  [facility] [phone]  [facility] [phone]

## 2023-05-18 NOTE — H&P ADULT - PROBLEM SELECTOR PLAN 4
Last HD 5/17. Pt appears euvolemic, electrolytes stable. No urgent need for HD.  - f/u AM labs  - f/u renal in AM for HD tomorrow

## 2023-05-18 NOTE — H&P ADULT - PROBLEM SELECTOR PLAN 1
Pt mechanical s/p fall, found to have left IT fx. Plan for surgical intervention with ortho 5/19.   - f/u ortho rec  - pain control with oxycodone prn

## 2023-05-18 NOTE — H&P ADULT - PROBLEM SELECTOR PLAN 2
EKG showing NSR at 62 bpm w/ known RBBB without acute ischemic changes, prolonged QTc 536.   - pt with no active cardiac conditions, including unstable coronary syndrome, decompensated CHF, significant arrhythmias.   - RCRI score of 1, which equates to a 6.0% 30-day risk of death, MI, or cardiac arrest  - Pt has good functional capacity with METs >=4 (Pt able to walk >1 block and 1 flight of stairs without any CP or SOB).  - Pt with systolic murmur on exam, will get TTE prior to OR.   - f/u repeat EKG for prolonged QTc EKG showing NSR at 62 bpm w/ known RBBB without acute ischemic changes, prolonged QTc 536.   - pt with no active cardiac conditions, including unstable coronary syndrome, decompensated CHF, significant arrhythmias.   - RCRI score of 1, which equates to a 6.0% 30-day risk of death, MI, or cardiac arrest  - Pt has good functional capacity with METs >=4 (Pt able to walk >1 block and 1 flight of stairs without any CP or SOB).  - Pt with systolic murmur on exam however chronic per pt since childhood. Pt reports recent echo 1.5 year ago with her cardiologist, Dr. Grimes which she states did not show anything concerning. TTE from 2018 w/ mild to mod mitral regurg, nml aortic valve.   - f/u repeat EKG for prolonged QTc EKG showing NSR at 62 bpm w/ known RBBB without acute ischemic changes, prolonged QTc 536.   - pt with no active cardiac conditions, including unstable coronary syndrome, decompensated CHF, significant arrhythmias.   - RCRI score of 1, which equates to a 6.0% 30-day risk of death, MI, or cardiac arrest  - Pt has good functional capacity with METs >=4 (Pt able to walk >1 block and 1 flight of stairs without any CP or SOB).  - Pt with systolic murmur on exam however chronic per pt since childhood. Pt reports recent echo 1.5 year ago with her cardiologist, Dr. Grimes which she states did not show anything concerning. TTE from 2018 w/ mild to mod mitral regurg, nml aortic valve.   - No need for further cardiac testing however f/u repeat EKG for prolonged QTc EKG showing NSR at 62 bpm w/ known RBBB without acute ischemic changes, prolonged QTc 536.   - pt with no active cardiac conditions, including unstable coronary syndrome, decompensated CHF, significant arrhythmias, or significant valvular disease.  - RCRI score of 1, which equates to a 6.0% 30-day risk of death, MI, or cardiac arrest  - Pt has good functional capacity with METs >=4 (Pt able to walk >1 block and 1 flight of stairs without any CP or SOB).  - Pt with systolic murmur on exam however chronic per pt since childhood. Pt reports recent echo 1.5 year ago with her cardiologist, Dr. Grimes which she states did not show anything concerning. TTE from 2018 w/ mild to mod mitral regurg, nml aortic valve. Pt asymptomatic   - No need for further cardiac testing however f/u repeat EKG for prolonged QTc

## 2023-05-18 NOTE — ED ADULT TRIAGE NOTE - CHIEF COMPLAINT QUOTE
patient stated she fell reaching for door landed on left side, patient unable to move left arm and left hip. Pt remained on the floor. Patient denies chest pain sob n/v, denies LOC, hitting head Patient has Fistula on left side. PMH HTN dialysis MWF

## 2023-05-18 NOTE — H&P ADULT - ASSESSMENT
Pt is a 76 y/o F with pmhx of ESRD on HD (MoWeFri), HTN, endometrial ca in remission s/p total hysterectomy in 2007 presents to the ED s/p fall at 1000 this AM c/o L shoulder and L hip pain.    Pt is a 76 y/o F with pmhx of ESRD on HD (MoWeFri), HTN, endometrial ca in remission s/p total hysterectomy in 2007 presents to the ED s/p fall at 1000 this AM c/o L shoulder and L hip pain. Pt found to have left humeral and IT fx. Pt admitted for surgical intervention of left IT fx.

## 2023-05-18 NOTE — ED PROVIDER NOTE - PHYSICAL EXAMINATION
General: Well appearing in no acute distress, alert and cooperative  Head: Normocephalic, atraumatic  Eyes: PERRLA, no conjunctival injection, no scleral icterus, EOMI  ENMT: Atraumatic external nose and ears, moist mucous membranes, oropharynx clear with no loose dentition.   Neck: Soft and supple, full ROM without pain, no midline tenderness, step-offs or deformities.  Cardiac: Regular rate and regular rhythm, peripheral pulses 2+ and symmetric in all extremities, no LE edema. LUE fistula in place with palpable thrill.   Resp: Unlabored respiratory effort, lungs CTAB, speaking in full sentences, no wheezes  Abd: Soft, non-tender, non-distended, no guarding or rebound  MSK: Spine midline and non-tender with no step-offs or deformities. L shoulder with tenderness to palpation on dorsal aspect, limited passive and active ROM 2/2 pain, no obvious swelling or deformities. Tenderness to palpation over L anterior upper thigh/hip, limited passive and active ROM, no obvious deformities.   Skin: Warm and dry, no rashes/abrasions/lacerations  Neuro: AO x 3, limited extremity movement 2/2 pain. No focal neuro deficits.

## 2023-05-18 NOTE — H&P ADULT - NSHPLABSRESULTS_GEN_ALL_CORE
.  LABS:                         9.4    5.05  )-----------( 74       ( 18 May 2023 16:30 )             29.2     05-18    135  |  94<L>  |  29<H>  ----------------------------<  223<H>  4.7   |  27  |  5.11<H>    Ca    9.4      18 May 2023 16:30    TPro  6.3  /  Alb  3.6  /  TBili  1.2  /  DBili  x   /  AST  51<H>  /  ALT  25  /  AlkPhos  118  05-18    PT/INR - ( 18 May 2023 16:30 )   PT: 13.2 sec;   INR: 1.14 ratio         PTT - ( 18 May 2023 16:30 )  PTT:33.4 sec    CARDIAC MARKERS ( 18 May 2023 16:30 )  x     / x     / 159 U/L / x     / x                RADIOLOGY, EKG & ADDITIONAL TESTS: Reviewed.

## 2023-05-18 NOTE — ED ADULT NURSE NOTE - NSFALLRISKINTERV_ED_ALL_ED

## 2023-05-18 NOTE — ED PROVIDER NOTE - PROGRESS NOTE DETAILS
AIDA Shore: XR L shoulder and L humerus showing: Acute transverse comminuted fracture through the left humerus surgical neck, with anterior and medial displacement of the distal fracture   fragment/humerus diaphysis.   XR L hip and L femur showing: Acute intertrochanteric fracture of the left femur with mild anterior   displacement of the distal fracture fragment.  Called ortho who will eval patient in the ED. AIDA Shore: Pt seen by ortho in the ED. Consent obtained with plan for surgery tomorrow. Recommending admission to medicine given pt's hx complex medical hx of ESRD and aortic dissection. Paged hospitalist, awaiting callback.

## 2023-05-18 NOTE — ED PROVIDER NOTE - NS ED ATTENDING STATEMENT MOD
Attending with I have seen and examined this patient and fully participated in the care of this patient as the teaching attending.  The service was shared with the NANO.  I reviewed and verified the documentation and independently performed the documented:

## 2023-05-18 NOTE — H&P ADULT - HISTORY OF PRESENT ILLNESS
Pt is a 76 y/o F with pmhx of ESRD on HD (MoWeFri), HTN, endometrial ca in remission s/p total hysterectomy in 2007 presents to the ED s/p fall at 1000 this AM c/o L shoulder and L hip pain.     In ED, vitals T 98.1, HR 88, /58, RR 18, O2 sat 100% RA  Labs significant for: Hb 9.4, plt 74, BUN/Cr 29/5.11, glucose 223  EKG personally reviewed  CXR: IMPRESSION:    Clear lungs.    Acute comminuted transverse fracture of the left humerus surgical neck is   better evaluated on shoulder x-ray performed same day.    Imaging:   XR hip/femur/pelvis: IMPRESSION:    Acute intertrochanteric fracture of the left femur with mild anterior   displacement of the distal fracture fragment.    XR shoulder: IMPRESSION:  Acute transverse comminuted fracture through the left humerus surgical   neck, with anterior and medial displacement of the distal fracture   fragment/humerus diaphysis.    ED management: tylenol, IV morphine 4 mg x1, 500 cc NS bolus x1 Pt is a 78 y/o F with pmhx of ESRD on HD (MoWeFri), HTN, DM (diet controlled), chronic type B aortic dissection, heart murmur, endometrial ca in remission s/p total hysterectomy in 2007 presents to the ED s/p fall at 1000 this AM c/o L shoulder and L hip pain. Pt states she was sitting in a chair when she heard someone knock on her door. She states when she stood up she developed a cramp in her left left that went all the up to her left groin causing her to lose balance and fall on her left side. Shes states she was unable to get up and was on the ground for 40 minutes before help came. She denies any symptoms of chest pain, palpitations, light headedness, dizziness, SOB, RANDHAWA, abd pain, n/v/d. She states she was last dialyzed 5/17.     In ED, vitals T 98.1, HR 88, /58, RR 18, O2 sat 100% RA  Labs significant for: Hb 9.4, plt 74, BUN/Cr 29/5.11, glucose 223  EKG personally reviewed: NSR w/ RBBB, seen on prior EKG from 2018. No acute ischemic changes. QTc 536  CXR: IMPRESSION:    Clear lungs.    Acute comminuted transverse fracture of the left humerus surgical neck is   better evaluated on shoulder x-ray performed same day.    Imaging:   XR hip/femur/pelvis: IMPRESSION:    Acute intertrochanteric fracture of the left femur with mild anterior   displacement of the distal fracture fragment.    XR shoulder: IMPRESSION:  Acute transverse comminuted fracture through the left humerus surgical   neck, with anterior and medial displacement of the distal fracture   fragment/humerus diaphysis.    ED management: tylenol, IV morphine 4 mg x1, 500 cc NS bolus x1

## 2023-05-18 NOTE — ED PROVIDER NOTE - NSICDXPASTMEDICALHX_GEN_ALL_CORE_FT
PAST MEDICAL HISTORY:  Adult Onset Diabetes Mellitus,     Cancer of Endometrium s/p Hysterectomy , s/p Chemo.    CKD (chronic kidney disease)     Dissection of Aorta Type B    ESRD (end stage renal disease) on dialysis M,W,F   [facility] [address]    History of Hysterectomy with O     Hypertension

## 2023-05-18 NOTE — H&P ADULT - TIME BILLING
I have spent a total of greater than 80 minutes time spent to prepare to see the patient, obtaining and reviewing history, physical examination, explaining the diagnosis, prognosis and treatment plan with the patient/family/caregiver. I also have spent the time ordering studies and testing, interpreting results, medicine reconciliation, subspecialty consultation and documentation as above.

## 2023-05-18 NOTE — CONSULT NOTE PEDS - SUBJECTIVE AND OBJECTIVE BOX
HPI  77yFemale w/ PMH of HTN, CKD (MWF), aortic dissection, hx of endometrial ca (s/p chemo), DM  c/o L hip and L shoulder pain s/p mechanical fall from bed. Unable to bear weight in the LLE since the fall and having difficulty moving LUE. Denies headstrike or LOC. Denies numbness/tingling in the LLE. Denies any other trauma/injuries at this time. At baseline, community ambulator w/o assistive devices.    ROS  Negative unless otherwise specified in HPI.    PAST MEDICAL & SURGICAL Hx  PAST MEDICAL & SURGICAL HISTORY:  Adult Onset Diabetes Mellitus,      Cancer of Endometrium  s/p Hysterectomy , s/p Chemo.      Dissection of Aorta  Type B      History of Hysterectomy with O      Hypertension      CKD (chronic kidney disease)      ESRD (end stage renal disease) on dialysis  M,W,F   [facility]      H/O total hysterectomy  in 2007 for endometrial CA    MEDICATIONS  Home Medications:  Colace 100 mg oral capsule: 2 cap(s) orally once a day (20 Mar 2018 11:28)  Januvia 25 mg oral tablet: 1 tab(s) orally once a day (20 Mar 2018 14:31)  Lopressor 100 mg oral tablet: 1 tab(s) orally once a day (20 Mar 2018 11:28)  sodium bicarbonate: orally once a day (20 Mar 2018 11:28)    ALLERGIES  No Known Allergies      FAMILY Hx  FAMILY HISTORY:  No pertinent family history in first degree relatives    SOCIAL Hx  Social History:      VITALS  Vital Signs Last 24 Hrs  T(C): 36.3 (18 May 2023 15:33), Max: 36.7 (18 May 2023 14:23)  T(F): 97.3 (18 May 2023 15:33), Max: 98.1 (18 May 2023 14:23)  HR: 64 (18 May 2023 18:21) (64 - 88)  BP: 122/54 (18 May 2023 18:21) (122/54 - 148/58)  BP(mean): --  RR: 16 (18 May 2023 18:21) (16 - 18)  SpO2: 100% (18 May 2023 18:21) (100% - 100%)    Parameters below as of 18 May 2023 15:33  Patient On (Oxygen Delivery Method): room air    PHYSICAL EXAM  Gen: Lying in bed, NAD  Resp: No increased WOB  LLE:  Skin intact, shortened and externally rotated, -edema and -ecchymosis over L hip  +TTP over L hip, no TTP along remainder of extremity; compartments soft  Limited ROM at hip 2/2 pain  +Log roll test  +Pain with axial loading  Motor: TA/EHL/GS/FHL intact  Sensory: DP/SP/Tib/Varsha/Saph SILT  +DP pulse, WWP    LUE:  Skin intact, +edema and -ecchymosis over shoulder  +TTP over shoulder, no TTP along remainder of extremity; compartments soft  Limited ROM at shoulder 2/2 pain  Motor: Ax/Musc/Med/Rad/AIN/PIN/U intact  Sensory: Ax/Musc/Med/Rad/U SILT  +Rad pulse, WWP      LABS                        9.4    5.05  )-----------( 74       ( 18 May 2023 16:30 )             29.2     05-18    135  |  94<L>  |  29<H>  ----------------------------<  223<H>  4.7   |  27  |  5.11<H>    Ca    9.4      18 May 2023 16:30    TPro  6.3  /  Alb  3.6  /  TBili  1.2  /  DBili  x   /  AST  51<H>  /  ALT  25  /  AlkPhos  118  05-18    PT/INR - ( 18 May 2023 16:30 )   PT: 13.2 sec;   INR: 1.14 ratio         PTT - ( 18 May 2023 16:30 )  PTT:33.4 sec    IMAGING  XRs: L IT fx (personal read)     HPI  77yFemale w/ PMH of HTN, CKD (MWF), aortic dissection, hx of endometrial ca (s/p chemo), DM  c/o L hip and L shoulder pain s/p mechanical fall from bed. Unable to bear weight in the LLE since the fall and having difficulty moving LUE. Denies headstrike or LOC. Denies numbness/tingling in the LLE. Denies any other trauma/injuries at this time. At baseline, community ambulator w/o assistive devices.    ROS  Negative unless otherwise specified in HPI.    PAST MEDICAL & SURGICAL Hx  PAST MEDICAL & SURGICAL HISTORY:  Adult Onset Diabetes Mellitus,      Cancer of Endometrium  s/p Hysterectomy , s/p Chemo.      Dissection of Aorta  Type B      History of Hysterectomy with O      Hypertension      CKD (chronic kidney disease)      ESRD (end stage renal disease) on dialysis  M,W,F   [facility]      H/O total hysterectomy  in 2007 for endometrial CA    MEDICATIONS  Home Medications:  Colace 100 mg oral capsule: 2 cap(s) orally once a day (20 Mar 2018 11:28)  Januvia 25 mg oral tablet: 1 tab(s) orally once a day (20 Mar 2018 14:31)  Lopressor 100 mg oral tablet: 1 tab(s) orally once a day (20 Mar 2018 11:28)  sodium bicarbonate: orally once a day (20 Mar 2018 11:28)    ALLERGIES  No Known Allergies      FAMILY Hx  FAMILY HISTORY:  No pertinent family history in first degree relatives    SOCIAL Hx  Social History:      VITALS  Vital Signs Last 24 Hrs  T(C): 36.3 (18 May 2023 15:33), Max: 36.7 (18 May 2023 14:23)  T(F): 97.3 (18 May 2023 15:33), Max: 98.1 (18 May 2023 14:23)  HR: 64 (18 May 2023 18:21) (64 - 88)  BP: 122/54 (18 May 2023 18:21) (122/54 - 148/58)  BP(mean): --  RR: 16 (18 May 2023 18:21) (16 - 18)  SpO2: 100% (18 May 2023 18:21) (100% - 100%)    Parameters below as of 18 May 2023 15:33  Patient On (Oxygen Delivery Method): room air    PHYSICAL EXAM  Gen: Lying in bed, NAD  Resp: No increased WOB  LLE:  Skin intact, shortened and externally rotated, -edema and -ecchymosis over L hip  +TTP over L hip, no TTP along remainder of extremity; compartments soft  Limited ROM at hip 2/2 pain  +Log roll test  +Pain with axial loading  Motor: TA/EHL/GS/FHL intact  Sensory: DP/SP/Tib/Varsha/Saph SILT  +DP pulse, WWP    LUE:  Skin intact, +edema and -ecchymosis over shoulder  +TTP over shoulder, no TTP along remainder of extremity; compartments soft  Limited ROM at shoulder 2/2 pain  Motor: Ax/Musc/Med/Rad/AIN/PIN/U intact  Sensory: Ax/Musc/Med/Rad/U SILT  +Rad pulse, WWP      LABS                        9.4    5.05  )-----------( 74       ( 18 May 2023 16:30 )             29.2     05-18    135  |  94<L>  |  29<H>  ----------------------------<  223<H>  4.7   |  27  |  5.11<H>    Ca    9.4      18 May 2023 16:30    TPro  6.3  /  Alb  3.6  /  TBili  1.2  /  DBili  x   /  AST  51<H>  /  ALT  25  /  AlkPhos  118  05-18    PT/INR - ( 18 May 2023 16:30 )   PT: 13.2 sec;   INR: 1.14 ratio         PTT - ( 18 May 2023 16:30 )  PTT:33.4 sec    IMAGING  XR pelvis, L hip, L femur, L hip traction view: L basicervical femoral neck fracture  XR L shoulder, L humerus: L proximal humerus fracture w/o glenohumeral dislocation, inferior subluxation of humeral head likely related to hemarthrosis

## 2023-05-18 NOTE — H&P ADULT - NSHPPHYSICALEXAM_GEN_ALL_CORE
VITAL SIGNS:  T(C): 36.3 (05-18-23 @ 15:33), Max: 36.7 (05-18-23 @ 14:23)  T(F): 97.3 (05-18-23 @ 15:33), Max: 98.1 (05-18-23 @ 14:23)  HR: 64 (05-18-23 @ 18:21) (64 - 88)  BP: 122/54 (05-18-23 @ 18:21) (122/54 - 148/58)  BP(mean): --  RR: 16 (05-18-23 @ 18:21) (16 - 18)  SpO2: 100% (05-18-23 @ 18:21) (100% - 100%)  Wt(kg): --    PHYSICAL EXAM:    Constitutional: resting comfortably in bed; NAD  Head: NC/AT  Eyes: PERRL, EOMI, anicteric sclera  ENT: no nasal discharge; uvula midline, no oropharyngeal erythema or exudates; MMM  Neck: supple  Respiratory: CTA B/L; no W/R/R, no retractions  Cardiac: +S1/S2; RRR; no M/R/G  Gastrointestinal: abdomen soft, NT/ND; no rebound or guarding; +BSx4  Back: spine midline, no bony tenderness  Extremities: WWP, no clubbing or cyanosis; no peripheral edema  Musculoskeletal: NROM x4; no joint swelling, tenderness or erythema  Vascular: distal pulses intact  Dermatologic: skin warm, dry and intact; no rashes  Lymphatic: no submandibular or cervical LAD  Neurologic: AAOx3; moves all 4 extremities  Psychiatric: affect and characteristics of appearance, verbalizations, behaviors are appropriate VITAL SIGNS:  T(C): 36.3 (05-18-23 @ 15:33), Max: 36.7 (05-18-23 @ 14:23)  T(F): 97.3 (05-18-23 @ 15:33), Max: 98.1 (05-18-23 @ 14:23)  HR: 64 (05-18-23 @ 18:21) (64 - 88)  BP: 122/54 (05-18-23 @ 18:21) (122/54 - 148/58)  BP(mean): --  RR: 16 (05-18-23 @ 18:21) (16 - 18)  SpO2: 100% (05-18-23 @ 18:21) (100% - 100%)  Wt(kg): --    PHYSICAL EXAM:    Constitutional: resting comfortably in bed; NAD  Head: NC/AT  Eyes: PERRL, EOMI, anicteric sclera  ENT: no nasal discharge; uvula midline, no oropharyngeal erythema or exudates; MMM  Neck: supple  Respiratory: CTA B/L; no W/R/R, no retractions  Cardiac: +S1/S2; RRR; 2/6 systolic murmur  Gastrointestinal: abdomen soft, NT/ND; no rebound or guarding; +BSx4  Back: spine midline, no bony tenderness  Extremities: WWP, no clubbing or cyanosis; no peripheral edema;  Musculoskeletal:  LLE externally rotated, limited ROM 2/2 pain. LUE in sling, limited ROM 2/2 pain.  Vascular: distal pulses intact  Dermatologic: skin warm, dry and intact; no rashes  Lymphatic: no submandibular or cervical LAD  Neurologic: AAOx3; moves all 4 extremities  Psychiatric: affect and characteristics of appearance, verbalizations, behaviors are appropriate

## 2023-05-18 NOTE — ED PROVIDER NOTE - OBJECTIVE STATEMENT
78 y/o F with pmhx of ESRD on HD (MoWeFri), HTN, endometrial ca in remission s/p total hysterectomy in 2007 presents to the ED s/p fall at 1000 this AM c/o L shoulder and L hip pain. Pt states she stood up from bed to answer the door when she felt a cramping pain in her L leg and fell onto her L side. Pt reports she was unable to stand and was on the floor for approximately 30-40 minutes before her family arrived and helped her get onto the couch. Pt states she is unable to ambulate and bear weight on L leg 2/2 pain. Denies head trauma or LOC, pain on the right side, extremity numbness/tingling, back pain, neck pain. Denies fever, chills, chest pain, shortness of breath, abdominal pain, N/V/D, dysuria, urinary frequency/urgency, lightheadedness, dizziness, or headaches currently or before the fall. Pt last got dialyzed yesterday with no complications. Reports that occasionally when too much fluid is taken out after HD, she experiences similar cramps to the one she had before fall. Pt notes she lives alone and normally has no issues with ambulation. Denies anticoagulant use.

## 2023-05-19 ENCOUNTER — TRANSCRIPTION ENCOUNTER (OUTPATIENT)
Age: 78
End: 2023-05-19

## 2023-05-19 DIAGNOSIS — N18.6 END STAGE RENAL DISEASE: ICD-10-CM

## 2023-05-19 LAB
ANION GAP SERPL CALC-SCNC: 10 MMOL/L — SIGNIFICANT CHANGE UP (ref 7–14)
ANION GAP SERPL CALC-SCNC: 11 MMOL/L — SIGNIFICANT CHANGE UP (ref 7–14)
APTT BLD: 34.8 SEC — SIGNIFICANT CHANGE UP (ref 27–36.3)
BUN SERPL-MCNC: 15 MG/DL — SIGNIFICANT CHANGE UP (ref 7–23)
BUN SERPL-MCNC: 34 MG/DL — HIGH (ref 7–23)
CALCIUM SERPL-MCNC: 8.2 MG/DL — LOW (ref 8.4–10.5)
CALCIUM SERPL-MCNC: 8.6 MG/DL — SIGNIFICANT CHANGE UP (ref 8.4–10.5)
CHLORIDE SERPL-SCNC: 100 MMOL/L — SIGNIFICANT CHANGE UP (ref 98–107)
CHLORIDE SERPL-SCNC: 95 MMOL/L — LOW (ref 98–107)
CO2 SERPL-SCNC: 27 MMOL/L — SIGNIFICANT CHANGE UP (ref 22–31)
CO2 SERPL-SCNC: 28 MMOL/L — SIGNIFICANT CHANGE UP (ref 22–31)
CREAT SERPL-MCNC: 2.8 MG/DL — HIGH (ref 0.5–1.3)
CREAT SERPL-MCNC: 5.52 MG/DL — HIGH (ref 0.5–1.3)
DIALYSIS INSTRUMENT RESULT - HEPATITIS B SURFACE ANTIGEN: NEGATIVE — SIGNIFICANT CHANGE UP
EGFR: 17 ML/MIN/1.73M2 — LOW
EGFR: 8 ML/MIN/1.73M2 — LOW
GLUCOSE BLDC GLUCOMTR-MCNC: 106 MG/DL — HIGH (ref 70–99)
GLUCOSE BLDC GLUCOMTR-MCNC: 125 MG/DL — HIGH (ref 70–99)
GLUCOSE BLDC GLUCOMTR-MCNC: 143 MG/DL — HIGH (ref 70–99)
GLUCOSE BLDC GLUCOMTR-MCNC: 153 MG/DL — HIGH (ref 70–99)
GLUCOSE SERPL-MCNC: 120 MG/DL — HIGH (ref 70–99)
GLUCOSE SERPL-MCNC: 184 MG/DL — HIGH (ref 70–99)
HCT VFR BLD CALC: 24 % — LOW (ref 34.5–45)
HCT VFR BLD CALC: 24.1 % — LOW (ref 34.5–45)
HGB BLD-MCNC: 7.9 G/DL — LOW (ref 11.5–15.5)
HGB BLD-MCNC: 8.2 G/DL — LOW (ref 11.5–15.5)
INR BLD: 1.25 RATIO — HIGH (ref 0.88–1.16)
MCHC RBC-ENTMCNC: 32.5 PG — SIGNIFICANT CHANGE UP (ref 27–34)
MCHC RBC-ENTMCNC: 32.8 GM/DL — SIGNIFICANT CHANGE UP (ref 32–36)
MCHC RBC-ENTMCNC: 32.8 PG — SIGNIFICANT CHANGE UP (ref 27–34)
MCHC RBC-ENTMCNC: 34.2 GM/DL — SIGNIFICANT CHANGE UP (ref 32–36)
MCV RBC AUTO: 96 FL — SIGNIFICANT CHANGE UP (ref 80–100)
MCV RBC AUTO: 99.2 FL — SIGNIFICANT CHANGE UP (ref 80–100)
NRBC # BLD: 0 /100 WBCS — SIGNIFICANT CHANGE UP (ref 0–0)
NRBC # BLD: 0 /100 WBCS — SIGNIFICANT CHANGE UP (ref 0–0)
NRBC # FLD: 0 K/UL — SIGNIFICANT CHANGE UP (ref 0–0)
NRBC # FLD: 0 K/UL — SIGNIFICANT CHANGE UP (ref 0–0)
PLATELET # BLD AUTO: 59 K/UL — LOW (ref 150–400)
PLATELET # BLD AUTO: 91 K/UL — LOW (ref 150–400)
POTASSIUM BLDV-SCNC: 5.9 MMOL/L — HIGH (ref 3.5–5.1)
POTASSIUM SERPL-MCNC: 4.1 MMOL/L — SIGNIFICANT CHANGE UP (ref 3.5–5.3)
POTASSIUM SERPL-MCNC: 5.1 MMOL/L — SIGNIFICANT CHANGE UP (ref 3.5–5.3)
POTASSIUM SERPL-SCNC: 4.1 MMOL/L — SIGNIFICANT CHANGE UP (ref 3.5–5.3)
POTASSIUM SERPL-SCNC: 5.1 MMOL/L — SIGNIFICANT CHANGE UP (ref 3.5–5.3)
PROTHROM AB SERPL-ACNC: 14.5 SEC — HIGH (ref 10.5–13.4)
RBC # BLD: 2.43 M/UL — LOW (ref 3.8–5.2)
RBC # BLD: 2.5 M/UL — LOW (ref 3.8–5.2)
RBC # FLD: 14.2 % — SIGNIFICANT CHANGE UP (ref 10.3–14.5)
RBC # FLD: 15.1 % — HIGH (ref 10.3–14.5)
SODIUM SERPL-SCNC: 132 MMOL/L — LOW (ref 135–145)
SODIUM SERPL-SCNC: 139 MMOL/L — SIGNIFICANT CHANGE UP (ref 135–145)
WBC # BLD: 5.07 K/UL — SIGNIFICANT CHANGE UP (ref 3.8–10.5)
WBC # BLD: 5.8 K/UL — SIGNIFICANT CHANGE UP (ref 3.8–10.5)
WBC # FLD AUTO: 5.07 K/UL — SIGNIFICANT CHANGE UP (ref 3.8–10.5)
WBC # FLD AUTO: 5.8 K/UL — SIGNIFICANT CHANGE UP (ref 3.8–10.5)

## 2023-05-19 PROCEDURE — 99232 SBSQ HOSP IP/OBS MODERATE 35: CPT

## 2023-05-19 PROCEDURE — 99222 1ST HOSP IP/OBS MODERATE 55: CPT | Mod: GC

## 2023-05-19 PROCEDURE — 93306 TTE W/DOPPLER COMPLETE: CPT | Mod: 26

## 2023-05-19 RX ORDER — LIDOCAINE AND PRILOCAINE CREAM 25; 25 MG/G; MG/G
1 CREAM TOPICAL DAILY
Refills: 0 | Status: DISCONTINUED | OUTPATIENT
Start: 2023-05-19 | End: 2023-05-26

## 2023-05-19 RX ORDER — CHLORHEXIDINE GLUCONATE 213 G/1000ML
1 SOLUTION TOPICAL DAILY
Refills: 0 | Status: DISCONTINUED | OUTPATIENT
Start: 2023-05-19 | End: 2023-05-26

## 2023-05-19 RX ADMIN — OXYCODONE HYDROCHLORIDE 5 MILLIGRAM(S): 5 TABLET ORAL at 22:00

## 2023-05-19 RX ADMIN — CHLORHEXIDINE GLUCONATE 1 APPLICATION(S): 213 SOLUTION TOPICAL at 09:31

## 2023-05-19 RX ADMIN — Medication 1 APPLICATION(S): at 09:31

## 2023-05-19 RX ADMIN — CHLORHEXIDINE GLUCONATE 1 APPLICATION(S): 213 SOLUTION TOPICAL at 23:34

## 2023-05-19 RX ADMIN — OXYCODONE HYDROCHLORIDE 5 MILLIGRAM(S): 5 TABLET ORAL at 22:30

## 2023-05-19 NOTE — PROGRESS NOTE ADULT - SUBJECTIVE AND OBJECTIVE BOX
patient reporting that in order for her to walk to [place], which is [address], she needs to rest 3 times to catch her breath. rests for 1-2 minutes each time. needs to rest because is is SOB, not due to pain or other limiting factor. this has been ongoing for several months  patient with 1+ pitting edema up to midshin in stretcher. explained to orthopedic team that at the very least patient needs echo before proceeding for urgent surgery, has known pathology of MR and aortic dissection with cardiac risk factors of ESRD, htn, DM2 and last saw a cardiologist a year ago  they agreed, waiting for echo before proceeding with case

## 2023-05-19 NOTE — PROGRESS NOTE ADULT - SUBJECTIVE AND OBJECTIVE BOX
[facility] Division of Hospital Medicine  Flaca Irvin MD  Pager [phone]    Patient is a 77y old  Female who presents with a chief complaint of IT fx       SUBJECTIVE / OVERNIGHT EVENTS: pt seen in ECHO; plan for surgical repair today      MEDICATIONS  (STANDING):  calcium acetate 667 milliGRAM(s) Oral four times a day with meals  hydrALAZINE 50 milliGRAM(s) Oral <User Schedule>  metoprolol succinate ER 12.5 milliGRAM(s) Oral daily    MEDICATIONS  (PRN):  acetaminophen     Tablet .. 650 milliGRAM(s) Oral every 6 hours PRN Temp greater or equal to 38C (100.4F), Mild Pain (1 - 3)  oxyCODONE    IR 5 milliGRAM(s) Oral every 4 hours PRN Severe Pain (7 - 10)      CAPILLARY BLOOD GLUCOSE  POCT Blood Glucose.: 153 mg/dL (19 May 2023 09:17)          PHYSICAL EXAM:  Vital Signs Last 24 Hrs  T(F): 98.1 (19 May 2023 09:58), Max: 98.6 (19 May 2023 01:53)  HR: 65 (19 May 2023 09:58) (63 - 88)  BP: 128/53 (19 May 2023 09:58) (102/50 - 148/58)  RR: 16 (19 May 2023 09:58) (16 - 18)  SpO2: 100% (19 May 2023 09:58) (98% - 100%)    Parameters below as of 19 May 2023 09:58  Patient On (Oxygen Delivery Method): room air        CONSTITUTIONAL: NAD, appears comfortable  EYES: PERRLA; conjunctiva and sclera clear  ENMT: Moist oral mucosa; normal dentition; post pharynx clear  RESPIRATORY: Normal respiratory effort; lungs are clear to auscultation bilaterally  CARDIOVASCULAR: Regular rate and rhythm; No lower extremity edema;   ABDOMEN: Nontender to palpation, normoactive bowel sounds  MUSCULOSKELETAL:  no clubbing or cyanosis of digits; no joint swelling or tenderness to palpation  PSYCH: A+O to person, place, and time; affect appropriate  NEUROLOGY: CN 2-12 are intact and symmetric; no gross sensory deficits   SKIN: No rashes; no palpable lesions    LABS:                        7.9    5.80  )-----------( 59       ( 19 May 2023 00:30 )             24.1     05-19    132<L>  |  95<L>  |  34<H>  ----------------------------<  184<H>  5.1   |  27  |  5.52<H>    Ca    8.6      19 May 2023 00:30  Phos  3.4     05-19  Mg     2.30     05-19    TPro  6.3  /  Alb  3.6  /  TBili  1.2  /  DBili  x   /  AST  51<H>  /  ALT  25  /  AlkPhos  118  05-18    PT/INR - ( 19 May 2023 00:30 )   PT: 14.5 sec;   INR: 1.25 ratio         PTT - ( 19 May 2023 00:30 )  PTT:34.8 sec  CARDIAC MARKERS ( 18 May 2023 16:30 )  x     / x     / 159 U/L / x     / x

## 2023-05-19 NOTE — PRE-OP CHECKLIST - 2.
ESRD/Left AV fistula/hemodialysis M,W,F ESRD/Left AV fistula/hemodialysis M,W,F/LEFT ARM PRECAUTIONS

## 2023-05-19 NOTE — PATIENT PROFILE ADULT - FALL HARM RISK - HARM RISK INTERVENTIONS

## 2023-05-19 NOTE — PROGRESS NOTE ADULT - NSPROGADDITIONALINFOA_GEN_ALL_CORE
- Long discussion with patient regarding nature of condition specifically left hip fracture, potential course / sequelae, options reviewed and patient requesting surgical intervention open reduction internal fixation / trochanteric fixation nail.  Risks, benefits, indications, alternatives reviewed in detail, risks including but not specifically limited to bleeding, infection, neurovascular / tendon injury, residual pain weakness stiffness swelling instability, hardware loosening / failure, fracture propagation, malunion, nonunion, wound drainage sequelae, thrombosis, cardiopulmonary sequelae, mortality, amongst others.  Patient also aware potential necessity ancillary surgery, as well as risks associated with blood / platelet transfusions, amongst others.  All questions answered, patient understands and wishes to proceed.  Consent SOC / awaiting cardiology assessment (per anesthesia) for scheduling.

## 2023-05-19 NOTE — PROGRESS NOTE ADULT - ASSESSMENT
77yFemale w/ L basicervical femoral neck fracture, L proximal humerus fracture    - recommending 1U preop if able to do so for optimization (H/H 7.9/24.1)  -NWB LLE, bedrest  -OR Today-5/19 with Dr. Billingsley  -f/u preop: CBC, BMP, coags, T&S x2, CXR, EKG  -2U on hold for OR  -would recommend HD after OR today if able  -NPO past midnight  -hold chemical DVT ppx for OR; SCDs OK  -please document nephro clearance ASAP for OR  -pain control  -ice/cold compress    For all questions related to patient care, please reach out to the on-call team via the pager.     Elle Ulloa, PGY 1  Orthopaedic Surgery  [facility] [phone]  [facility] [phone]  [facility] [phone]

## 2023-05-19 NOTE — PROGRESS NOTE ADULT - ASSESSMENT
Pt is a 78 y/o F with pmhx of ESRD on HD (MoWeFri), HTN, endometrial ca in remission s/p total hysterectomy in 2007 presents to the ED s/p fall at 1000 this AM c/o L shoulder and L hip pain. Pt found to have left humeral and IT fx. Pt admitted for surgical intervention of left IT fx.

## 2023-05-19 NOTE — PRE-OP CHECKLIST - 5.
pending echo pending echo / received patient from [address] [place] [address] accompanied by transporters/ See PACU flow sheets for ASU pre-op v/s pending echo / received patient from [address] [place] [address] accompanied by transporters/ See PACU flow sheets for ASU pre-op v/s/K+ sent as per Dr. Livingston

## 2023-05-19 NOTE — PROGRESS NOTE ADULT - SUBJECTIVE AND OBJECTIVE BOX
Overnight events: None    SUBJECTIVE: Pt seen and examined at bedside. Patient is doing well, no acute complaints this AM. Pain is controlled with medication      OBJECTIVE:  Vital Signs Last 24 Hrs  T(C): 36.7 (18 May 2023 23:31), Max: 36.7 (18 May 2023 14:23)  T(F): 98.1 (18 May 2023 23:31), Max: 98.1 (18 May 2023 14:23)  HR: 63 (18 May 2023 23:31) (63 - 88)  BP: 131/60 (18 May 2023 23:31) (122/54 - 148/58)  BP(mean): --  RR: 18 (18 May 2023 23:31) (16 - 18)  SpO2: 98% (18 May 2023 23:31) (98% - 100%)    Parameters below as of 18 May 2023 23:31  Patient On (Oxygen Delivery Method): room air      Physical Examination:  GEN: NAD, resting quietly  PULM: symmetric chest rise bilaterally, no increased WOB  ABD: nondistended  EXTR:   LLE:  Skin intact, shortened and externally rotated, -edema and -ecchymosis over L hip  +TTP over L hip, no TTP along remainder of extremity; compartments soft  Limited ROM at hip 2/2 pain  Motor: TA/EHL/GS/FHL intact  Sensory: DP/SP/Tib/Varsha/Saph SILT  +DP pulse, WWP    LUE:  Skin intact, +edema and -ecchymosis over shoulder  +TTP over shoulder, no TTP along remainder of extremity; compartments soft  Limited ROM at shoulder 2/2 pain  Motor: Ax/Musc/Med/Rad/AIN/PIN/U intact  Sensory: Ax/Musc/Med/Rad/U SILT  +Rad pulse, WWP  Fistula present with +thrill      LABS:                        7.9    5.80  )-----------( 59       ( 19 May 2023 00:30 )             24.1       05-19    132<L>  |  95<L>  |  34<H>  ----------------------------<  184<H>  5.1   |  27  |  5.52<H>    Ca    8.6      19 May 2023 00:30  Phos  3.4     05-19  Mg     2.30     05-19    TPro  6.3  /  Alb  3.6  /  TBili  1.2  /  DBili  x   /  AST  51<H>  /  ALT  25  /  AlkPhos  118  05-18         Overnight events: None    SUBJECTIVE: Pt seen and examined at bedside. Patient is doing well, no acute complaints this AM. Pain is controlled with medication      OBJECTIVE:  Vital Signs Last 24 Hrs  T(C): 36.7 (18 May 2023 23:31), Max: 36.7 (18 May 2023 14:23)  T(F): 98.1 (18 May 2023 23:31), Max: 98.1 (18 May 2023 14:23)  HR: 63 (18 May 2023 23:31) (63 - 88)  BP: 131/60 (18 May 2023 23:31) (122/54 - 148/58)  BP(mean): --  RR: 18 (18 May 2023 23:31) (16 - 18)  SpO2: 98% (18 May 2023 23:31) (98% - 100%)    Parameters below as of 18 May 2023 23:31  Patient On (Oxygen Delivery Method): room air      Physical Examination:  GEN: NAD, resting quietly  PULM: symmetric chest rise bilaterally, no increased WOB  ABD: nondistended  EXTR:   LLE:  Skin intact, shortened and externally rotated, -edema and -ecchymosis over L hip  +TTP over L hip, no TTP along remainder of extremity; compartments soft  Limited ROM at hip 2/2 pain  Motor: TA/EHL/GS/FHL intact  Sensory: DP/SP/Tib/Varsha/Saph SILT  +DP pulse, WWP    LUE:  Skin intact, +edema and -ecchymosis over shoulder  +TTP over shoulder, no TTP along remainder of extremity; compartments soft  Limited ROM at shoulder 2/2 pain  Motor: Ax/Musc/Med/Rad/AIN/PIN/U intact  Sensory: Ax/Musc/Med/Rad/U SILT  +Rad pulse, WWP  Fistula present with +thrill      LABS:                        7.9    5.80  )-----------( 59       ( 19 May 2023 00:30 )             24.1       05-19    132<L>  |  95<L>  |  34<H>  ----------------------------<  184<H>  5.1   |  27  |  5.52<H>    Ca    8.6      19 May 2023 00:30  Phos  3.4     05-19  Mg     2.30     05-19    TPro  6.3  /  Alb  3.6  /  TBili  1.2  /  DBili  x   /  AST  51<H>  /  ALT  25  /  AlkPhos  118  05-18    ACC: [id number] EXAM: XR FEMUR 2 VIEWS LT ORDERED BY: PAOLA HURTADO  ACC: [id number] EXAM: XR HIP WITH PELV 1V LT ORDERED BY: PAOLA HURTADO  PROCEDURE DATE: 05/18/2023  INTERPRETATION: CLINICAL INFORMATION: Left hip pain status post fall onto left side    EXAM: AP views of the pelvis with AP and crosstable lateral views of the left hip (5 images total), 2 views of the left femur (6 images total)    COMPARISON: CT abdomen pelvis 1/3/2018    IMPRESSION:    Acute, impacted, mildly displaced basicervical/intertrochanteric fracture of the left femoral neck. Left femoral head appears seated within the acetabulum.    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    --- End of Report ---  MORGAN EDEN MD; Resident Radiologist  This document has been electronically signed.  ZITA HELTON M.D., ATTENDING RADIOLOGIST  This document has been electronically signed. May 18 2023 9:27PM

## 2023-05-19 NOTE — PROGRESS NOTE ADULT - PROBLEM SELECTOR PLAN 1
Pt mechanical s/p fall, found to have left IT fx. Plan for surgical intervention with ortho 5/19.   plan for OR today  - pain control with oxycodone prn

## 2023-05-19 NOTE — CONSULT NOTE ADULT - PROBLEM SELECTOR RECOMMENDATION 9
Pt. with ESRD on HD TIW (MWF) admitted to [facility] with fall. Pt awaiting surgical intervention for fracture. Last outpatient HD was on Wednesday, 5/17/23 via LUE AVF. Pt. clinically stable at the time of evaluation. Labs reviewed. HD consent obtained from pt, placed in chart. Will arrange for HD today after OR today (5/19/23). Hemoglobin below target range (7.9). Pt receiving blood transfusion during evaluation. . Monitor BP and labs.

## 2023-05-19 NOTE — CONSULT NOTE ADULT - ATTENDING COMMENTS
ESRD s/p fall   going to OR . No CHF on exam  or electrolyte abnormalities.   will arrange for Hd today   Further detailed plan of management and recommendation as outlined above.

## 2023-05-19 NOTE — CONSULT NOTE ADULT - SUBJECTIVE AND OBJECTIVE BOX
Cardiovascular Disease Initial Evaluation  Date of service: 05-19-23 @ 12:03    CHIEF COMPLAINT: Fall     HISTORY OF PRESENT ILLNESS:  76 y/o F with pmhx of ESRD on HD (MWF), HTN, DM (diet controlled), chronic type B aortic dissection, heart murmur, endometrial ca in remission s/p total hysterectomy in 2007 presents to the ED s/p fall. Pt states she was sitting in a chair when she heard someone knock on her door. She states when she stood up she developed a cramp in her left left that went all the up to her left groin causing her to lose balance and fall on her left side. Shes states she was unable to get up and was on the ground for 40 minutes before help came. She denies any symptoms of chest pain, palpitations, light headedness, dizziness, SOB, RANDHAWA, abd pain, n/v/d. Pt suffered a L femoral neck fracture and L proximal humerus fracture. Pt to undergo surgical intervention. Cardiology consulted for pre-op risk stratification. Ms. Harris denies chest pain or SOB.       Allergies    No Known Allergies    Intolerances    	    MEDICATIONS:  hydrALAZINE 50 milliGRAM(s) Oral <User Schedule>  metoprolol succinate ER 12.5 milliGRAM(s) Oral daily        acetaminophen     Tablet .. 650 milliGRAM(s) Oral every 6 hours PRN  oxyCODONE    IR 5 milliGRAM(s) Oral every 4 hours PRN        calcium acetate 667 milliGRAM(s) Oral four times a day with meals      PAST MEDICAL & SURGICAL HISTORY:  Adult Onset Diabetes Mellitus,      Cancer of Endometrium  s/p Hysterectomy , s/p Chemo.      Dissection of Aorta  Type B      History of Hysterectomy with O      Hypertension      CKD (chronic kidney disease)      ESRD (end stage renal disease) on dialysis  M,W,F   [facility]      H/O total hysterectomy  in 2007 for endometrial CA          FAMILY HISTORY:  No pertinent family history in first degree relatives        SOCIAL HISTORY:    The patient is a nonsmoker       REVIEW OF SYSTEMS:  See HPI, otherwise complete 14 point review of systems negative    [x ] All others negative	  [ ] Unable to obtain    PHYSICAL EXAM:  T(C): 36.7 (05-19-23 @ 09:58), Max: 37 (05-19-23 @ 01:53)  HR: 65 (05-19-23 @ 09:58) (63 - 88)  BP: 128/53 (05-19-23 @ 09:58) (102/50 - 148/58)  RR: 16 (05-19-23 @ 09:58) (16 - 18)  SpO2: 100% (05-19-23 @ 09:58) (98% - 100%)  Wt(kg): --  I&O's Summary      Appearance: No Acute Distress; resting comfortably  HEENT:  Normal oral mucosa, PERRL, EOMI	  Cardiovascular: Normal S1 S2, No JVD, No murmurs/rubs/gallops  Respiratory: Normal respiratory effort; Lungs clear to auscultation bilaterally  Gastrointestinal:  Soft, Non-tender, + BS	  Skin: No rashes, No ecchymoses, No cyanosis	  Neurologic: Non-focal; no weakness  Extremities: No clubbing, cyanosis or edema, Sling in place on L arm  Vascular: Peripheral pulses palpable 2+ bilaterally  Psychiatry: A & O x 3, Mood & affect appropriate    Laboratory Data:	 	    CBC Full  -  ( 19 May 2023 00:30 )  WBC Count : 5.80 K/uL  Hemoglobin : 7.9 g/dL  Hematocrit : 24.1 %  Platelet Count - Automated : 59 K/uL  Mean Cell Volume : 99.2 fL  Mean Cell Hemoglobin : 32.5 pg  Mean Cell Hemoglobin Concentration : 32.8 gm/dL  Auto Neutrophil # : x  Auto Lymphocyte # : x  Auto Monocyte # : x  Auto Eosinophil # : x  Auto Basophil # : x  Auto Neutrophil % : x  Auto Lymphocyte % : x  Auto Monocyte % : x  Auto Eosinophil % : x  Auto Basophil % : x    05-19    132<L>  |  95<L>  |  34<H>  ----------------------------<  184<H>  5.1   |  27  |  5.52<H>  05-18    135  |  94<L>  |  29<H>  ----------------------------<  223<H>  4.7   |  27  |  5.11<H>    Ca    8.6      19 May 2023 00:30  Ca    9.4      18 May 2023 16:30  Phos  3.4     05-19  Mg     2.30     05-19    TPro  6.3  /  Alb  3.6  /  TBili  1.2  /  DBili  x   /  AST  51<H>  /  ALT  25  /  AlkPhos  118  05-18      proBNP:   Lipid Profile:   HgA1c:   TSH:       CARDIAC MARKERS:            Interpretation of Telemetry: N/a    ECG:  Sinus rhythm with RBBB  RADIOLOGY:  OTHER: 	    PREVIOUS DIAGNOSTIC TESTING:    [x ] Echocardiogram: 2018  1. Normal mitral valve. Mild-moderate mitral regurgitation.  2. Normal trileaflet aortic valve.  3. Normal left ventricular systolic function. No segmental  wall motion abnormalities.  4. Normal right ventricular size and function.  5. Left pleural effusion.  [ ] Catheterization:  [ ] Stress Test:  	    Assessment:  76 y/o F with pmhx of ESRD on HD (MWF), HTN, DM (diet controlled), chronic type B aortic dissection, heart murmur, endometrial ca in remission s/p total hysterectomy in 2007 presents to the ED s/p fall.    Plan of Care:      #Pre-op risk stratification  - Pt to undergo orthopedic surgery s/p fall   - Ms. Harris displays no signs of pre-op coronary ischemia or volume overload  - EKG shows sinus rhythm with RBBB. Unchanged from study in 2018  - TTE in 2018 shows normal LV systolic function with mild to moderate MR  - Repeat Echo pending, pt currently in Echo suite awaiting study  - From a cardiac standpoint, pt is optimized to proceed with surgery barring no hemodynamically significant valvular disease on pending echocardiogram    #Type B aortic dissection  - Chronic (Diagnosed >10 years ago)  - Pt hemodynamically stable  - Continue to monitor    #HTN  - BP Acceptable on current regimen    #ESRD  - HD as per renal      72 minutes spent on total encounter; more than 50% of the visit was spent counseling and/or coordinating care by the attending physician.   	  Adrian Burris DO [facility]  Cardiovascular Diseases  [phone]    
[facility] DIVISION OF KIDNEY DISEASES AND HYPERTENSION -- [phone]  -- INITIAL CONSULT NOTE  --------------------------------------------------------------------------------  HPI: 77ear-old female with PMH of ESRD on HD TIW (MWF), admitted to [facility] with a fall. Pt was found to have left femoral neck and left humerus fracture. Pt awaiting surgical intervention. Nephrology consulted for ESRD/HD management.     Pt. seen and examined today, family member at bedside. Pt. receives her OP maintenance HD TIW at [facility]. Pt.'s outpatient nephrologist is Dr. Simpson. Last OP HD was done Wednesday, 5/17/23 via LUE AVF.    Pt complaining of left hip and left shoulder pain. Pt receiving blood transfusion during evaluation. reports feeling better. No fever, CP, SOB, HA, or dizziness during evaluation.     PAST HISTORY  --------------------------------------------------------------------------------  PAST MEDICAL & SURGICAL HISTORY:  Adult Onset Diabetes Mellitus,  Cancer of Endometrium  s/p Hysterectomy , s/p Chemo.  Dissection of Aorta  Type B  History of Hysterectomy with O  Hypertensio  ESRD (end stage renal disease) on dialysis  M,W,F   [facility]  H/O total hysterectomy  in 2007 for endometrial CA    FAMILY HISTORY:  No pertinent family history in first degree relatives    PAST SOCIAL HISTORY:    ALLERGIES & MEDICATIONS  --------------------------------------------------------------------------------  Allergies    No Known Allergies    Intolerances    Standing Inpatient Medications  calcium acetate 667 milliGRAM(s) Oral four times a day with meals  hydrALAZINE 50 milliGRAM(s) Oral <User Schedule>  metoprolol succinate ER 12.5 milliGRAM(s) Oral daily    PRN Inpatient Medications  acetaminophen     Tablet .. 650 milliGRAM(s) Oral every 6 hours PRN  oxyCODONE    IR 5 milliGRAM(s) Oral every 4 hours PRN    REVIEW OF SYSTEMS  --------------------------------------------------------------------------------  Gen: No fevers/chills  Head/Eyes/Ears: No HA  Respiratory: No dyspnea, cough  CV: No chest pain  GI: No abdominal pain, diarrhea  : see HPI  MSK: Trace edema, see HPI  Skin: No rashes  Heme: Anemia    VITALS/PHYSICAL EXAM  --------------------------------------------------------------------------------  T(C): 36.7 (05-19-23 @ 09:58), Max: 37 (05-19-23 @ 01:53)  HR: 65 (05-19-23 @ 09:58) (63 - 88)  BP: 128/53 (05-19-23 @ 09:58) (102/50 - 148/58)  RR: 16 (05-19-23 @ 09:58) (16 - 18)  SpO2: 100% (05-19-23 @ 09:58) (98% - 100%)  Wt(kg): --  Height (cm): 154.9 (05-19-23 @ 01:53)  Weight (kg): 58 (05-19-23 @ 01:53)  BMI (kg/m2): 24.2 (05-19-23 @ 01:53)  BSA (m2): 1.56 (05-19-23 @ 01:53)    Physical Exam:  	Gen: elderly female, resting  	HEENT: Anicteric  	Pulm: CTA B/L  	CV: S1S2+  	Abd: Soft, +BS    	Ext: trace LE edema B/L  	Neuro: Awake  	Skin: Warm and dry  	Dialysis access: LUE AVF, thrill felt and bruit heard    LABS/STUDIES  --------------------------------------------------------------------------------              7.9    5.80  >-----------<  59       [05-19-23 @ 00:30]              24.1     132  |  95  |  34  ----------------------------<  184      [05-19-23 @ 00:30]  5.1   |  27  |  5.52        Ca     8.6     [05-19-23 @ 00:30]      Mg     2.30     [05-19-23 @ 00:30]      Phos  3.4     [05-19-23 @ 00:30]    Creatinine Trend:  SCr 5.52 [05-19 @ 00:30]  SCr 5.11 [05-18 @ 16:30]

## 2023-05-20 LAB
ANION GAP SERPL CALC-SCNC: 13 MMOL/L — SIGNIFICANT CHANGE UP (ref 7–14)
BUN SERPL-MCNC: 20 MG/DL — SIGNIFICANT CHANGE UP (ref 7–23)
CALCIUM SERPL-MCNC: 8.3 MG/DL — LOW (ref 8.4–10.5)
CHLORIDE SERPL-SCNC: 100 MMOL/L — SIGNIFICANT CHANGE UP (ref 98–107)
CO2 SERPL-SCNC: 23 MMOL/L — SIGNIFICANT CHANGE UP (ref 22–31)
CREAT SERPL-MCNC: 3.22 MG/DL — HIGH (ref 0.5–1.3)
EGFR: 14 ML/MIN/1.73M2 — LOW
GLUCOSE BLDC GLUCOMTR-MCNC: 125 MG/DL — HIGH (ref 70–99)
GLUCOSE BLDC GLUCOMTR-MCNC: 148 MG/DL — HIGH (ref 70–99)
GLUCOSE BLDC GLUCOMTR-MCNC: 217 MG/DL — HIGH (ref 70–99)
GLUCOSE SERPL-MCNC: 157 MG/DL — HIGH (ref 70–99)
HBV CORE AB SER-ACNC: REACTIVE
HBV SURFACE AB SER-ACNC: 44.7 MIU/ML — SIGNIFICANT CHANGE UP
HCT VFR BLD CALC: 24.5 % — LOW (ref 34.5–45)
HGB BLD-MCNC: 8.2 G/DL — LOW (ref 11.5–15.5)
MAGNESIUM SERPL-MCNC: 2 MG/DL — SIGNIFICANT CHANGE UP (ref 1.6–2.6)
MCHC RBC-ENTMCNC: 31.1 PG — SIGNIFICANT CHANGE UP (ref 27–34)
MCHC RBC-ENTMCNC: 33.5 GM/DL — SIGNIFICANT CHANGE UP (ref 32–36)
MCV RBC AUTO: 92.8 FL — SIGNIFICANT CHANGE UP (ref 80–100)
MRSA PCR RESULT.: SIGNIFICANT CHANGE UP
NRBC # BLD: 0 /100 WBCS — SIGNIFICANT CHANGE UP (ref 0–0)
NRBC # FLD: 0 K/UL — SIGNIFICANT CHANGE UP (ref 0–0)
PHOSPHATE SERPL-MCNC: 4.3 MG/DL — SIGNIFICANT CHANGE UP (ref 2.5–4.5)
PLATELET # BLD AUTO: 86 K/UL — LOW (ref 150–400)
POTASSIUM SERPL-MCNC: 4.6 MMOL/L — SIGNIFICANT CHANGE UP (ref 3.5–5.3)
POTASSIUM SERPL-SCNC: 4.6 MMOL/L — SIGNIFICANT CHANGE UP (ref 3.5–5.3)
RBC # BLD: 2.64 M/UL — LOW (ref 3.8–5.2)
RBC # FLD: 17.1 % — HIGH (ref 10.3–14.5)
S AUREUS DNA NOSE QL NAA+PROBE: DETECTED
SODIUM SERPL-SCNC: 136 MMOL/L — SIGNIFICANT CHANGE UP (ref 135–145)
WBC # BLD: 5.73 K/UL — SIGNIFICANT CHANGE UP (ref 3.8–10.5)
WBC # FLD AUTO: 5.73 K/UL — SIGNIFICANT CHANGE UP (ref 3.8–10.5)

## 2023-05-20 PROCEDURE — 99233 SBSQ HOSP IP/OBS HIGH 50: CPT

## 2023-05-20 PROCEDURE — 93010 ELECTROCARDIOGRAM REPORT: CPT

## 2023-05-20 PROCEDURE — 27245 TREAT THIGH FRACTURE: CPT | Mod: LT

## 2023-05-20 DEVICE — STRYKER TROCHANTERIC NAIL 10MM X 170MM 125 DEGREE: Type: IMPLANTABLE DEVICE | Site: LEFT | Status: FUNCTIONAL

## 2023-05-20 DEVICE — K-WIRE STRYKER 3.2M X 450MM: Type: IMPLANTABLE DEVICE | Site: LEFT | Status: FUNCTIONAL

## 2023-05-20 DEVICE — SCREW LOKG 5X35MM STRL: Type: IMPLANTABLE DEVICE | Site: LEFT | Status: FUNCTIONAL

## 2023-05-20 DEVICE — SCREW 3 LAG GAMMA  10.5 X 85 MM: Type: IMPLANTABLE DEVICE | Site: LEFT | Status: FUNCTIONAL

## 2023-05-20 RX ORDER — CEFAZOLIN SODIUM 1 G
2000 VIAL (EA) INJECTION EVERY 8 HOURS
Refills: 0 | Status: DISCONTINUED | OUTPATIENT
Start: 2023-05-20 | End: 2023-05-20

## 2023-05-20 RX ORDER — SODIUM CHLORIDE 9 MG/ML
250 INJECTION INTRAMUSCULAR; INTRAVENOUS; SUBCUTANEOUS ONCE
Refills: 0 | Status: COMPLETED | OUTPATIENT
Start: 2023-05-20 | End: 2023-05-20

## 2023-05-20 RX ORDER — ALBUMIN HUMAN 25 %
250 VIAL (ML) INTRAVENOUS ONCE
Refills: 0 | Status: COMPLETED | OUTPATIENT
Start: 2023-05-20 | End: 2023-05-20

## 2023-05-20 RX ORDER — ENOXAPARIN SODIUM 100 MG/ML
40 INJECTION SUBCUTANEOUS EVERY 24 HOURS
Refills: 0 | Status: DISCONTINUED | OUTPATIENT
Start: 2023-05-20 | End: 2023-05-20

## 2023-05-20 RX ORDER — SODIUM CHLORIDE 9 MG/ML
1000 INJECTION INTRAMUSCULAR; INTRAVENOUS; SUBCUTANEOUS
Refills: 0 | Status: DISCONTINUED | OUTPATIENT
Start: 2023-05-20 | End: 2023-05-20

## 2023-05-20 RX ORDER — FENTANYL CITRATE 50 UG/ML
25 INJECTION INTRAVENOUS
Refills: 0 | Status: DISCONTINUED | OUTPATIENT
Start: 2023-05-20 | End: 2023-05-20

## 2023-05-20 RX ORDER — FENTANYL CITRATE 50 UG/ML
50 INJECTION INTRAVENOUS
Refills: 0 | Status: DISCONTINUED | OUTPATIENT
Start: 2023-05-20 | End: 2023-05-20

## 2023-05-20 RX ORDER — HEPARIN SODIUM 5000 [USP'U]/ML
5000 INJECTION INTRAVENOUS; SUBCUTANEOUS EVERY 12 HOURS
Refills: 0 | Status: DISCONTINUED | OUTPATIENT
Start: 2023-05-20 | End: 2023-05-21

## 2023-05-20 RX ORDER — OXYCODONE HYDROCHLORIDE 5 MG/1
5 TABLET ORAL EVERY 4 HOURS
Refills: 0 | Status: DISCONTINUED | OUTPATIENT
Start: 2023-05-20 | End: 2023-05-25

## 2023-05-20 RX ORDER — OXYCODONE HYDROCHLORIDE 5 MG/1
10 TABLET ORAL EVERY 4 HOURS
Refills: 0 | Status: DISCONTINUED | OUTPATIENT
Start: 2023-05-20 | End: 2023-05-25

## 2023-05-20 RX ORDER — OXYCODONE HYDROCHLORIDE 5 MG/1
5 TABLET ORAL ONCE
Refills: 0 | Status: DISCONTINUED | OUTPATIENT
Start: 2023-05-20 | End: 2023-05-20

## 2023-05-20 RX ORDER — MUPIROCIN 20 MG/G
1 OINTMENT TOPICAL
Refills: 0 | Status: COMPLETED | OUTPATIENT
Start: 2023-05-20 | End: 2023-05-25

## 2023-05-20 RX ORDER — ACETAMINOPHEN 500 MG
975 TABLET ORAL EVERY 8 HOURS
Refills: 0 | Status: DISCONTINUED | OUTPATIENT
Start: 2023-05-20 | End: 2023-05-26

## 2023-05-20 RX ORDER — SODIUM CHLORIDE 9 MG/ML
250 INJECTION, SOLUTION INTRAVENOUS ONCE
Refills: 0 | Status: DISCONTINUED | OUTPATIENT
Start: 2023-05-20 | End: 2023-05-20

## 2023-05-20 RX ADMIN — Medication 125 MILLILITER(S): at 05:10

## 2023-05-20 RX ADMIN — SODIUM CHLORIDE 500 MILLILITER(S): 9 INJECTION INTRAMUSCULAR; INTRAVENOUS; SUBCUTANEOUS at 04:45

## 2023-05-20 RX ADMIN — Medication 667 MILLIGRAM(S): at 08:38

## 2023-05-20 RX ADMIN — Medication 975 MILLIGRAM(S): at 21:40

## 2023-05-20 RX ADMIN — Medication 667 MILLIGRAM(S): at 17:34

## 2023-05-20 RX ADMIN — Medication 975 MILLIGRAM(S): at 13:13

## 2023-05-20 RX ADMIN — MUPIROCIN 1 APPLICATION(S): 20 OINTMENT TOPICAL at 17:33

## 2023-05-20 RX ADMIN — OXYCODONE HYDROCHLORIDE 5 MILLIGRAM(S): 5 TABLET ORAL at 08:37

## 2023-05-20 RX ADMIN — CHLORHEXIDINE GLUCONATE 1 APPLICATION(S): 213 SOLUTION TOPICAL at 13:14

## 2023-05-20 RX ADMIN — OXYCODONE HYDROCHLORIDE 5 MILLIGRAM(S): 5 TABLET ORAL at 13:18

## 2023-05-20 RX ADMIN — HEPARIN SODIUM 5000 UNIT(S): 5000 INJECTION INTRAVENOUS; SUBCUTANEOUS at 17:33

## 2023-05-20 RX ADMIN — OXYCODONE HYDROCHLORIDE 5 MILLIGRAM(S): 5 TABLET ORAL at 09:37

## 2023-05-20 RX ADMIN — Medication 100 MILLIGRAM(S): at 13:13

## 2023-05-20 RX ADMIN — Medication 667 MILLIGRAM(S): at 13:14

## 2023-05-20 RX ADMIN — Medication 975 MILLIGRAM(S): at 08:37

## 2023-05-20 RX ADMIN — OXYCODONE HYDROCHLORIDE 5 MILLIGRAM(S): 5 TABLET ORAL at 14:18

## 2023-05-20 RX ADMIN — Medication 667 MILLIGRAM(S): at 21:40

## 2023-05-20 RX ADMIN — Medication 1 TABLET(S): at 13:18

## 2023-05-20 NOTE — PROGRESS NOTE ADULT - SUBJECTIVE AND OBJECTIVE BOX
Overnight events: None    SUBJECTIVE: Pt seen and examined at bedside. Patient is post-op from a left femoral IMN, recovering well. No acute complaints this AM. Pain is controlled with medication. Patient has not yet attempted ambulation.      OBJECTIVE:  Vital Signs Last 24 Hrs  T(C): 36.3 (20 May 2023 05:00), Max: 37.3 (19 May 2023 14:46)  T(F): 97.4 (20 May 2023 05:00), Max: 99.1 (19 May 2023 14:46)  HR: 66 (20 May 2023 06:00) (62 - 74)  BP: 116/53 (20 May 2023 06:00) (82/39 - 147/56)  BP(mean): 68 (20 May 2023 06:00) (49 - 76)  RR: 20 (20 May 2023 06:00) (13 - 23)  SpO2: 100% (20 May 2023 06:00) (97% - 100%)    Parameters below as of 20 May 2023 05:30  Patient On (Oxygen Delivery Method): nasal cannula          05-19-23 @ 07:01  -  05-20-23 @ 06:42  --------------------------------------------------------  IN: 900 mL / OUT: 1200 mL / NET: -300 mL        Physical Examination:  GEN: NAD, resting quietly  PULM: symmetric chest rise bilaterally, no increased WOB  ABD: nondistended  EXTR:   LLE:   Dressing has mild SS streaking, intact  no palpable hematomas or collections  +TA/GS/EHL, +NVI, SILT  +DP/PT    LUE:   Skin intact, +edema  +TTP over shoulder, no TTP along remainder of extremity; compartments soft  Limited ROM at shoulder 2/2 pain  Motor: AIN/PIN/U intact  Sensory: Med/Rad/U SILT  +Rad pulse, WWP      LABS:                        8.2    5.73  )-----------( 86       ( 20 May 2023 03:20 )             24.5       05-20    136  |  100  |  20  ----------------------------<  157<H>  4.6   |  23  |  3.22<H>    Ca    8.3<L>      20 May 2023 03:20  Phos  4.3     05-20  Mg     2.00     05-20    TPro  6.3  /  Alb  3.6  /  TBili  1.2  /  DBili  x   /  AST  51<H>  /  ALT  25  /  AlkPhos  118  05-18

## 2023-05-20 NOTE — CHART NOTE - NSCHARTNOTEFT_GEN_A_CORE
Patient with left basicervical/ intertrochanteric femur fracture that was to undergo CMN on 05/19.  This was delayed due to K+ = 4.9.  Patient was dialyzed and now has been cleared for surgery.  Plan to proceed with left CMN as previously scheduled.
Spoke to patient's Cardiologist Dr. Hdez who states given that TTE shows no significant vascular disease, patient is optimized from a cardiac standpoint to proceed with OR. Please notify Orthopedics with any further questions.
Patient's hgb noted 7.9. Ortho recommended to keep Hgb >8 for OR.  Patient seen at bedside and denies any SOB.   Lungs: CTA  General: no distress.   Discussed with Dr. Rawls and ordered 1/2 unit PRBC to keep hgb >8  Will monitor

## 2023-05-20 NOTE — PROGRESS NOTE ADULT - PROBLEM SELECTOR PLAN 1
Pt mechanical s/p fall, found to have left IT fx. Plan for surgical intervention with ortho 5/19.   s/p IMN  - pain control with oxycodone prn  doing well  PT john

## 2023-05-20 NOTE — PHYSICAL THERAPY INITIAL EVALUATION ADULT - ADDITIONAL COMMENTS
Pt reports she lives in a ranch style home alone, ambulates with a straight cane, 3 steps to negotiate and completes adls independently.

## 2023-05-20 NOTE — PROGRESS NOTE ADULT - ASSESSMENT
Assessment: 77yFemale w/ L basicervical femoral neck fracture, L proximal humerus fracture, few hours post-op from L femoral IMN and recovering well.    Plan:  - Pain Control  - DVT ppx: lovenox  -ice/cold compress  - elevation of LUE  - WBAT LLE, NWB in sling LUE  - PT/OT  - 2 post op ancef doses  - All other care per primary team    For all questions related to patient care, please reach out to the on-call team via the pager.     Elle Ulloa, PGY 1  Orthopaedic Surgery  [facility] [phone]  [facility] [phone]  [facility] [phone]   Assessment: 77yFemale w/ L basicervical femoral neck fracture, L proximal humerus fracture, few hours post-op from L femoral IMN and recovering well.    Plan:  - Low threshold for additional units of PRBCs- any signs of tachycardia or hypotension, please keep Hgb >8  - Pain Control  - DVT ppx: lovenox  -ice/cold compress  - elevation of LUE  - WBAT LLE, NWB in sling LUE  - PT/OT  - 2 post op ancef doses  - All other care per primary team    For all questions related to patient care, please reach out to the on-call team via the pager.     Elle Ulloa, PGY 1  Orthopaedic Surgery  [facility] [phone]  [facility] [phone]  [facility] [phone]   Assessment: 77yFemale w/ L basicervical femoral neck fracture, L proximal humerus fracture, few hours post-op from L femoral IMN and recovering well.    Plan:  - Low threshold for additional units of PRBCs- any signs of tachycardia or hypotension, please keep Hgb >8  - Pain Control  - DVT ppx: as per primary team  -ice/cold compress  - elevation of LUE  - WBAT LLE, NWB in sling LUE  - PT/OT  - 2 post op ancef doses  - All other care per primary team    For all questions related to patient care, please reach out to the on-call team via the pager.     Elle Ulloa, PGY 1  Orthopaedic Surgery  [facility] [phone]  [facility] [phone]  [facility] [phone]

## 2023-05-20 NOTE — PHYSICAL THERAPY INITIAL EVALUATION ADULT - PERTINENT HX OF CURRENT PROBLEM, REHAB EVAL
Pt is a 76 y/o F with pmhx of ESRD on HD (MoWeFri), HTN, DM (diet controlled), chronic type B aortic dissection, heart murmur, endometrial ca in remission s/p total hysterectomy in 2007 presents to the ED s/p fall at 1000 this AM c/o L shoulder and L hip pain.

## 2023-05-20 NOTE — PROGRESS NOTE ADULT - SUBJECTIVE AND OBJECTIVE BOX
Cardiovascular Disease Progress Note  Date of service: 05-20-23 @ 12:21    Overnight events: No acute events overnight.  Pt denies chest pain or SOB. Pain is well controlled.   Otherwise review of systems negative    Objective Findings:  T(C): 36.8 (05-20-23 @ 11:58), Max: 37.3 (05-19-23 @ 14:46)  HR: 66 (05-20-23 @ 11:58) (62 - 74)  BP: 101/60 (05-20-23 @ 11:58) (82/39 - 147/56)  RR: 18 (05-20-23 @ 11:58) (13 - 23)  SpO2: 100% (05-20-23 @ 11:58) (97% - 100%)  Wt(kg): --  Daily     Daily       Physical Exam:  Gen: NAD; Patient resting comfortably  HEENT: EOMI, Normocephalic/ atraumatic  CV: RRR, normal S1 + S2, no m/r/g  Lungs:  Normal respiratory effort; clear to auscultation bilaterally  Abd: soft, non-tender; bowel sounds present  Ext: No edema; warm and well perfused, LUE sling in place.     Telemetry: N/a    Laboratory Data:                        8.2    5.73  )-----------( 86       ( 20 May 2023 03:20 )             24.5     05-20    136  |  100  |  20  ----------------------------<  157<H>  4.6   |  23  |  3.22<H>    Ca    8.3<L>      20 May 2023 03:20  Phos  4.3     05-20  Mg     2.00     05-20    TPro  6.3  /  Alb  3.6  /  TBili  1.2  /  DBili  x   /  AST  51<H>  /  ALT  25  /  AlkPhos  118  05-18    PT/INR - ( 19 May 2023 00:30 )   PT: 14.5 sec;   INR: 1.25 ratio         PTT - ( 19 May 2023 00:30 )  PTT:34.8 sec  CARDIAC MARKERS ( 18 May 2023 16:30 )  x     / x     / 159 U/L / x     / x              Inpatient Medications:  MEDICATIONS  (STANDING):  acetaminophen     Tablet .. 975 milliGRAM(s) Oral every 8 hours  calcium acetate 667 milliGRAM(s) Oral four times a day with meals  ceFAZolin   IVPB 2000 milliGRAM(s) IV Intermittent every 8 hours  chlorhexidine 2% Cloths 1 Application(s) Topical daily  heparin   Injectable 5000 Unit(s) SubCutaneous every 12 hours  hydrALAZINE 50 milliGRAM(s) Oral <User Schedule>  lidocaine/prilocaine Cream 1 Application(s) Topical daily  metoprolol succinate ER 12.5 milliGRAM(s) Oral daily  multivitamin 1 Tablet(s) Oral daily  mupirocin 2% Ointment 1 Application(s) Topical two times a day      Assessment:  78 y/o F with pmhx of ESRD on HD (MWF), HTN, DM (diet controlled), chronic type B aortic dissection, heart murmur, endometrial ca in remission s/p total hysterectomy in 2007 presents to the ED s/p fall.    Plan of Care:      #Post op cardiac evaluation.   - Pt s/p L femoral IMN 5/19/2023. Tolerated procedure well.   - Ms. Harris displays no signs of post-op coronary ischemia or volume overload  - EKG shows sinus rhythm with RBBB. Unchanged from study in 2018  - TTE  5/19/2023 shows normal LV systolic function with mild AR, mild pulmonary HTN.   - Continue current management.     #Type B aortic dissection  - Chronic (Diagnosed >10 years ago)  - Pt hemodynamically stable  - Continue to monitor    #HTN  - BP Acceptable on current regimen    #ESRD  - HD as per renal    #ACP (advance care planning)-  Advanced care planning was discussed with the patient.  Risks, benefits and alternatives of medical treatment and procedures were discussed in detail and all questions were answered. 30 additional minutes spent addressing advance care plans.          Over 25 minutes spent on total encounter; more than 50% of the visit was spent counseling and/or coordinating care by the attending physician.      Adrian Burris,  [facility]  Cardiovascular Disease  [phone]

## 2023-05-20 NOTE — PHYSICAL THERAPY INITIAL EVALUATION ADULT - NSPTDISCHREC_GEN_A_CORE
Inpatient rehabilitative services to address functional limitations associated with Intramedullary maeve of L femur

## 2023-05-20 NOTE — PROGRESS NOTE ADULT - SUBJECTIVE AND OBJECTIVE BOX
[facility] Division of Hospital Medicine  Flaca Irvin MD  Pager [phone]    Patient is a 77y old  Female who presents with a chief complaint of IT fx      SUBJECTIVE / OVERNIGHT EVENTS: s/p IMN for L IT fx; doing well    MEDICATIONS  (STANDING):  acetaminophen     Tablet .. 975 milliGRAM(s) Oral every 8 hours  calcium acetate 667 milliGRAM(s) Oral four times a day with meals  ceFAZolin   IVPB 2000 milliGRAM(s) IV Intermittent every 8 hours  chlorhexidine 2% Cloths 1 Application(s) Topical daily  heparin   Injectable 5000 Unit(s) SubCutaneous every 12 hours  hydrALAZINE 50 milliGRAM(s) Oral <User Schedule>  lidocaine/prilocaine Cream 1 Application(s) Topical daily  metoprolol succinate ER 12.5 milliGRAM(s) Oral daily  multivitamin 1 Tablet(s) Oral daily  mupirocin 2% Ointment 1 Application(s) Topical two times a day    MEDICATIONS  (PRN):  aluminum hydroxide/magnesium hydroxide/simethicone Suspension 30 milliLiter(s) Oral four times a day PRN Indigestion  oxyCODONE    IR 5 milliGRAM(s) Oral every 4 hours PRN Moderate Pain (4 - 6)  oxyCODONE    IR 10 milliGRAM(s) Oral every 4 hours PRN Severe Pain (7 - 10)      CAPILLARY BLOOD GLUCOSE  POCT Blood Glucose.: 148 mg/dL (20 May 2023 03:02)  POCT Blood Glucose.: 125 mg/dL (20 May 2023 00:36)  POCT Blood Glucose.: 125 mg/dL (19 May 2023 22:03)  POCT Blood Glucose.: 106 mg/dL (19 May 2023 19:40)  POCT Blood Glucose.: 143 mg/dL (19 May 2023 14:20)    PHYSICAL EXAM:  Vital Signs Last 24 Hrs  T(F): 98.2 (20 May 2023 11:58), Max: 99.1 (19 May 2023 14:46)  HR: 66 (20 May 2023 11:58) (62 - 74)  BP: 101/60 (20 May 2023 11:58) (82/39 - 147/56)  RR: 18 (20 May 2023 11:58) (13 - 23)  SpO2: 100% (20 May 2023 11:58) (97% - 100%)    Parameters below as of 20 May 2023 11:58  Patient On (Oxygen Delivery Method): room air        CONSTITUTIONAL: NAD, appears comfortable  EYES: PERRLA; conjunctiva and sclera clear  ENMT: Moist oral mucosa; normal dentition  RESPIRATORY: Normal respiratory effort; lungs are clear to auscultation bilaterally  CARDIOVASCULAR: Regular rate and rhythm; No lower extremity edema;  ABDOMEN: Nontender to palpation, normoactive bowel sounds  MUSCULOSKELETAL: no clubbing or cyanosis of digits; no joint swelling or tenderness to palpation  PSYCH: A+O to person, place, and time; affect appropriate  NEUROLOGY: CN 2-12 are intact and symmetric; no gross sensory deficits   SKIN: L lat thigh dressings intact with bloody strike thru, thigh soft    LABS:                        8.2    5.73  )-----------( 86       ( 20 May 2023 03:20 )             24.5     05-20    136  |  100  |  20  ----------------------------<  157<H>  4.6   |  23  |  3.22<H>    Ca    8.3<L>      20 May 2023 03:20  Phos  4.3     05-20  Mg     2.00     05-20

## 2023-05-21 LAB
ANION GAP SERPL CALC-SCNC: 14 MMOL/L — SIGNIFICANT CHANGE UP (ref 7–14)
BLD GP AB SCN SERPL QL: NEGATIVE — SIGNIFICANT CHANGE UP
BUN SERPL-MCNC: 49 MG/DL — HIGH (ref 7–23)
CALCIUM SERPL-MCNC: 8 MG/DL — LOW (ref 8.4–10.5)
CHLORIDE SERPL-SCNC: 96 MMOL/L — LOW (ref 98–107)
CO2 SERPL-SCNC: 25 MMOL/L — SIGNIFICANT CHANGE UP (ref 22–31)
CREAT SERPL-MCNC: 5.08 MG/DL — HIGH (ref 0.5–1.3)
EGFR: 8 ML/MIN/1.73M2 — LOW
GLUCOSE BLDC GLUCOMTR-MCNC: 171 MG/DL — HIGH (ref 70–99)
GLUCOSE BLDC GLUCOMTR-MCNC: 171 MG/DL — HIGH (ref 70–99)
GLUCOSE BLDC GLUCOMTR-MCNC: 174 MG/DL — HIGH (ref 70–99)
GLUCOSE BLDC GLUCOMTR-MCNC: 279 MG/DL — HIGH (ref 70–99)
GLUCOSE SERPL-MCNC: 147 MG/DL — HIGH (ref 70–99)
HCT VFR BLD CALC: 18.8 % — CRITICAL LOW (ref 34.5–45)
HCT VFR BLD CALC: 19.7 % — CRITICAL LOW (ref 34.5–45)
HCT VFR BLD CALC: 21.8 % — LOW (ref 34.5–45)
HGB BLD-MCNC: 6.3 G/DL — CRITICAL LOW (ref 11.5–15.5)
HGB BLD-MCNC: 6.4 G/DL — CRITICAL LOW (ref 11.5–15.5)
HGB BLD-MCNC: 7.3 G/DL — LOW (ref 11.5–15.5)
MAGNESIUM SERPL-MCNC: 2.2 MG/DL — SIGNIFICANT CHANGE UP (ref 1.6–2.6)
MCHC RBC-ENTMCNC: 31.2 PG — SIGNIFICANT CHANGE UP (ref 27–34)
MCHC RBC-ENTMCNC: 31.3 PG — SIGNIFICANT CHANGE UP (ref 27–34)
MCHC RBC-ENTMCNC: 31.6 PG — SIGNIFICANT CHANGE UP (ref 27–34)
MCHC RBC-ENTMCNC: 32.5 GM/DL — SIGNIFICANT CHANGE UP (ref 32–36)
MCHC RBC-ENTMCNC: 33.5 GM/DL — SIGNIFICANT CHANGE UP (ref 32–36)
MCHC RBC-ENTMCNC: 33.5 GM/DL — SIGNIFICANT CHANGE UP (ref 32–36)
MCV RBC AUTO: 93.5 FL — SIGNIFICANT CHANGE UP (ref 80–100)
MCV RBC AUTO: 94.4 FL — SIGNIFICANT CHANGE UP (ref 80–100)
MCV RBC AUTO: 96.1 FL — SIGNIFICANT CHANGE UP (ref 80–100)
NRBC # BLD: 0 /100 WBCS — SIGNIFICANT CHANGE UP (ref 0–0)
NRBC # FLD: 0 K/UL — SIGNIFICANT CHANGE UP (ref 0–0)
PHOSPHATE SERPL-MCNC: 4.9 MG/DL — HIGH (ref 2.5–4.5)
PLATELET # BLD AUTO: 96 K/UL — LOW (ref 150–400)
PLATELET # BLD AUTO: 96 K/UL — LOW (ref 150–400)
PLATELET # BLD AUTO: 99 K/UL — LOW (ref 150–400)
POTASSIUM SERPL-MCNC: 5.2 MMOL/L — SIGNIFICANT CHANGE UP (ref 3.5–5.3)
POTASSIUM SERPL-SCNC: 5.2 MMOL/L — SIGNIFICANT CHANGE UP (ref 3.5–5.3)
RBC # BLD: 2.01 M/UL — LOW (ref 3.8–5.2)
RBC # BLD: 2.05 M/UL — LOW (ref 3.8–5.2)
RBC # BLD: 2.31 M/UL — LOW (ref 3.8–5.2)
RBC # FLD: 16.8 % — HIGH (ref 10.3–14.5)
RBC # FLD: 17.4 % — HIGH (ref 10.3–14.5)
RBC # FLD: 17.5 % — HIGH (ref 10.3–14.5)
RH IG SCN BLD-IMP: POSITIVE — SIGNIFICANT CHANGE UP
SODIUM SERPL-SCNC: 135 MMOL/L — SIGNIFICANT CHANGE UP (ref 135–145)
WBC # BLD: 6.22 K/UL — SIGNIFICANT CHANGE UP (ref 3.8–10.5)
WBC # BLD: 6.3 K/UL — SIGNIFICANT CHANGE UP (ref 3.8–10.5)
WBC # BLD: 6.46 K/UL — SIGNIFICANT CHANGE UP (ref 3.8–10.5)
WBC # FLD AUTO: 6.22 K/UL — SIGNIFICANT CHANGE UP (ref 3.8–10.5)
WBC # FLD AUTO: 6.3 K/UL — SIGNIFICANT CHANGE UP (ref 3.8–10.5)
WBC # FLD AUTO: 6.46 K/UL — SIGNIFICANT CHANGE UP (ref 3.8–10.5)

## 2023-05-21 PROCEDURE — 99232 SBSQ HOSP IP/OBS MODERATE 35: CPT

## 2023-05-21 RX ORDER — CYCLOBENZAPRINE HYDROCHLORIDE 10 MG/1
5 TABLET, FILM COATED ORAL ONCE
Refills: 0 | Status: COMPLETED | OUTPATIENT
Start: 2023-05-21 | End: 2023-05-21

## 2023-05-21 RX ORDER — LIDOCAINE 4 G/100G
1 CREAM TOPICAL DAILY
Refills: 0 | Status: DISCONTINUED | OUTPATIENT
Start: 2023-05-21 | End: 2023-05-26

## 2023-05-21 RX ADMIN — Medication 667 MILLIGRAM(S): at 22:45

## 2023-05-21 RX ADMIN — Medication 1 TABLET(S): at 11:40

## 2023-05-21 RX ADMIN — Medication 667 MILLIGRAM(S): at 11:40

## 2023-05-21 RX ADMIN — LIDOCAINE 1 PATCH: 4 CREAM TOPICAL at 17:11

## 2023-05-21 RX ADMIN — Medication 975 MILLIGRAM(S): at 06:07

## 2023-05-21 RX ADMIN — CHLORHEXIDINE GLUCONATE 1 APPLICATION(S): 213 SOLUTION TOPICAL at 11:41

## 2023-05-21 RX ADMIN — MUPIROCIN 1 APPLICATION(S): 20 OINTMENT TOPICAL at 17:08

## 2023-05-21 RX ADMIN — Medication 667 MILLIGRAM(S): at 17:08

## 2023-05-21 RX ADMIN — Medication 975 MILLIGRAM(S): at 13:14

## 2023-05-21 RX ADMIN — Medication 975 MILLIGRAM(S): at 22:45

## 2023-05-21 RX ADMIN — LIDOCAINE 1 PATCH: 4 CREAM TOPICAL at 12:30

## 2023-05-21 RX ADMIN — Medication 667 MILLIGRAM(S): at 08:40

## 2023-05-21 RX ADMIN — MUPIROCIN 1 APPLICATION(S): 20 OINTMENT TOPICAL at 06:09

## 2023-05-21 NOTE — PROGRESS NOTE ADULT - SUBJECTIVE AND OBJECTIVE BOX
Cardiovascular Disease Progress Note  Date of service: 05-21-23 @ 10:59    Overnight events: Pt is in no distress. Sitting in chair at bedside. Pt with drop in Hgb this morning. Denies hematuria, hematochezia.   Otherwise review of systems negative    Objective Findings:  T(C): 36.4 (05-21-23 @ 09:47), Max: 36.9 (05-20-23 @ 22:05)  HR: 73 (05-21-23 @ 09:47) (66 - 76)  BP: 105/50 (05-21-23 @ 09:47) (95/45 - 105/50)  RR: 18 (05-21-23 @ 09:47) (17 - 18)  SpO2: 99% (05-21-23 @ 09:47) (96% - 100%)  Wt(kg): --  Daily     Daily       Physical Exam:  Gen: NAD; Patient resting comfortably  HEENT: EOMI, Normocephalic/ atraumatic  CV: RRR, normal S1 + S2, no m/r/g  Lungs:  Normal respiratory effort; clear to auscultation bilaterally  Abd: soft, non-tender; bowel sounds present  Ext: No edema; warm and well perfused    Telemetry: N/a    Laboratory Data:                        6.4    6.46  )-----------( 96       ( 21 May 2023 07:23 )             19.7     05-21    135  |  96<L>  |  49<H>  ----------------------------<  147<H>  5.2   |  25  |  5.08<H>    Ca    8.0<L>      21 May 2023 04:40  Phos  4.9     05-21  Mg     2.20     05-21                Inpatient Medications:  MEDICATIONS  (STANDING):  acetaminophen     Tablet .. 975 milliGRAM(s) Oral every 8 hours  calcium acetate 667 milliGRAM(s) Oral four times a day with meals  chlorhexidine 2% Cloths 1 Application(s) Topical daily  hydrALAZINE 50 milliGRAM(s) Oral <User Schedule>  lidocaine/prilocaine Cream 1 Application(s) Topical daily  metoprolol succinate ER 12.5 milliGRAM(s) Oral daily  multivitamin 1 Tablet(s) Oral daily  mupirocin 2% Ointment 1 Application(s) Topical two times a day      Assessment:  76 y/o F with pmhx of ESRD on HD (MWF), HTN, DM (diet controlled), chronic type B aortic dissection, heart murmur, endometrial ca in remission s/p total hysterectomy in 2007 presents to the ED s/p fall.    Plan of Care:      #Post op cardiac evaluation.   - Pt s/p L femoral IMN 5/19/2023. Tolerated procedure well.   - Ms. Harris displays no signs of post-op coronary ischemia or volume overload  - EKG shows sinus rhythm with RBBB. Unchanged from study in 2018  - TTE  5/19/2023 shows normal LV systolic function with mild AR, mild pulmonary HTN.   - Continue current management.     #Acute anemia  - 2/2 surgery  - Transfuse to maintain Hgb >7  - Management as per primary team.     #Type B aortic dissection  - Chronic (Diagnosed >10 years ago)  - Pt hemodynamically stable  - Continue to monitor    #HTN  - BP Acceptable on current regimen    #ESRD  - HD as per renal        Over 25 minutes spent on total encounter; more than 50% of the visit was spent counseling and/or coordinating care by the attending physician.      Adrian Burris DO [facility]  Cardiovascular Disease  [phone]

## 2023-05-21 NOTE — PROGRESS NOTE ADULT - ASSESSMENT
Assessment: 77yFemale w/ L basicervical femoral neck fracture, L proximal humerus fracture, s/p L femoral IMN on 5/20 and recovering well.    Plan:  - Low threshold for additional units of PRBCs- any signs of tachycardia or hypotension, please keep Hgb >8  - Pain Control  - DVT ppx: as per primary team  - ice/cold compress  - elevation of LUE  - WBAT LLE, NWB in sling LUE  - PT/OT  - All other care per primary team    For all questions related to patient care, please reach out to the on-call team via the pager.     Elle Ulloa, PGY 1  Orthopaedic Surgery  [facility] [id number]  [facility] [id number]  [facility] [phone]

## 2023-05-21 NOTE — OCCUPATIONAL THERAPY INITIAL EVALUATION ADULT - PERTINENT HX OF CURRENT PROBLEM, REHAB EVAL
Pt is a 78 y/o Female admitted s/p fall with resulting L basicervical femoral neck fracture and L proximal humerus fracture. Pt is now s/p L femoral IMN on 5/20

## 2023-05-21 NOTE — PROGRESS NOTE ADULT - SUBJECTIVE AND OBJECTIVE BOX
[facility] Division of Hospital Medicine  Flaca Irvin MD  Pager [phone]    Patient is a 77y old  Female who presents with a chief complaint of IT fx       SUBJECTIVE / OVERNIGHT EVENTS: sitting in chair; no complaints; d/w pt hg drop and need for blood transfusion, pt agreeable      MEDICATIONS  (STANDING):  acetaminophen     Tablet .. 975 milliGRAM(s) Oral every 8 hours  calcium acetate 667 milliGRAM(s) Oral four times a day with meals  chlorhexidine 2% Cloths 1 Application(s) Topical daily  hydrALAZINE 50 milliGRAM(s) Oral <User Schedule>  lidocaine   4% Patch 1 Patch Transdermal daily  lidocaine/prilocaine Cream 1 Application(s) Topical daily  metoprolol succinate ER 12.5 milliGRAM(s) Oral daily  multivitamin 1 Tablet(s) Oral daily  mupirocin 2% Ointment 1 Application(s) Topical two times a day    MEDICATIONS  (PRN):  aluminum hydroxide/magnesium hydroxide/simethicone Suspension 30 milliLiter(s) Oral four times a day PRN Indigestion  oxyCODONE    IR 10 milliGRAM(s) Oral every 4 hours PRN Severe Pain (7 - 10)  oxyCODONE    IR 5 milliGRAM(s) Oral every 4 hours PRN Moderate Pain (4 - 6)      CAPILLARY BLOOD GLUCOSE  POCT Blood Glucose.: 279 mg/dL (21 May 2023 12:12)  POCT Blood Glucose.: 171 mg/dL (21 May 2023 08:07)  POCT Blood Glucose.: 217 mg/dL (20 May 2023 22:30)      PHYSICAL EXAM:  Vital Signs Last 24 Hrs  T(F): 97.5 (21 May 2023 09:47), Max: 98.5 (20 May 2023 22:05)  HR: 73 (21 May 2023 09:47) (67 - 76)  BP: 105/50 (21 May 2023 09:47) (95/45 - 105/50)  RR: 18 (21 May 2023 09:47) (17 - 18)  SpO2: 99% (21 May 2023 09:47) (96% - 99%)    Parameters below as of 21 May 2023 09:47  Patient On (Oxygen Delivery Method): room air        CONSTITUTIONAL: NAD, appears comfortable  EYES: PERRLA; conjunctiva and sclera clear  ENMT: Moist oral mucosa; normal dentition  RESPIRATORY: Normal respiratory effort; grossly b/l AE  CARDIOVASCULAR: Regular rate and rhythm; No lower extremity edema  ABDOMEN: Nontender to palpation, normoactive bowel sounds  MUSCULOSKELETAL: no clubbing or cyanosis of digits; no joint swelling or tenderness to palpation  PSYCH: A+O to person, place, and time; affect appropriate  NEUROLOGY: CN 2-12 are intact and symmetric; no gross sensory deficits   SKIN: L lat thigh 2 dressings in place; upper dressing c/d/i; lower dressing with blood strike thru, thigh soft    LABS:                        6.4    6.46  )-----------( 96       ( 21 May 2023 07:23 )             19.7     05-21    135  |  96<L>  |  49<H>  ----------------------------<  147<H>  5.2   |  25  |  5.08<H>    Ca    8.0<L>      21 May 2023 04:40  Phos  4.9     05-21  Mg     2.20     05-21

## 2023-05-21 NOTE — OCCUPATIONAL THERAPY INITIAL EVALUATION ADULT - NS ASR FOLLOW COMMAND OT EVAL
Detail Level: Detailed
Detail Level: Zone
Detail Level: Simple
100% of the time/able to follow multistep instructions

## 2023-05-21 NOTE — PROGRESS NOTE ADULT - SUBJECTIVE AND OBJECTIVE BOX
POD # 2 s/p fem IMN    Overnight events: None    SUBJECTIVE: Pt seen and examined at bedside. Patient is doing well, no acute complaints this AM. Pain is controlled with medication. Ambulating with PT.      OBJECTIVE:  Vital Signs Last 24 Hrs  T(C): 36.9 (20 May 2023 22:05), Max: 36.9 (20 May 2023 22:05)  T(F): 98.5 (20 May 2023 22:05), Max: 98.5 (20 May 2023 22:05)  HR: 70 (20 May 2023 23:20) (66 - 76)  BP: 102/47 (20 May 2023 23:20) (95/45 - 124/52)  BP(mean): 68 (20 May 2023 06:00) (68 - 68)  RR: 18 (20 May 2023 11:58) (18 - 20)  SpO2: 96% (20 May 2023 22:05) (96% - 100%)    Parameters below as of 20 May 2023 22:05  Patient On (Oxygen Delivery Method): room air          05-19-23 @ 07:01  -  05-20-23 @ 07:00  --------------------------------------------------------  IN: 1000 mL / OUT: 1200 mL / NET: -200 mL        Physical Examination:  GEN: NAD, resting quietly  PULM: symmetric chest rise bilaterally, no increased WOB  ABD: nondistended  EXTR:   GEN: NAD, resting quietly  PULM: symmetric chest rise bilaterally, no increased WOB  ABD: nondistended  EXTR:   LLE:   Dressing has mild SS streaking, intact  no palpable hematomas or collections  +TA/GS/EHL, +NVI, SILT  +DP/PT    LUE:   Skin intact, +edema  +TTP over shoulder, no TTP along remainder of extremity; compartments soft  Limited ROM at shoulder 2/2 pain  Motor: AIN/PIN/U intact  Sensory: Med/Rad/U SILT  +Rad pulse, WWP      LABS:                        8.2    5.73  )-----------( 86       ( 20 May 2023 03:20 )             24.5       05-20    136  |  100  |  20  ----------------------------<  157<H>  4.6   |  23  |  3.22<H>    Ca    8.3<L>      20 May 2023 03:20  Phos  4.3     05-20  Mg     2.00     05-20

## 2023-05-22 DIAGNOSIS — D62 ACUTE POSTHEMORRHAGIC ANEMIA: ICD-10-CM

## 2023-05-22 LAB
ANION GAP SERPL CALC-SCNC: 12 MMOL/L — SIGNIFICANT CHANGE UP (ref 7–14)
BUN SERPL-MCNC: 70 MG/DL — HIGH (ref 7–23)
CALCIUM SERPL-MCNC: 7.8 MG/DL — LOW (ref 8.4–10.5)
CHLORIDE SERPL-SCNC: 95 MMOL/L — LOW (ref 98–107)
CO2 SERPL-SCNC: 23 MMOL/L — SIGNIFICANT CHANGE UP (ref 22–31)
CREAT SERPL-MCNC: 6.48 MG/DL — HIGH (ref 0.5–1.3)
EGFR: 6 ML/MIN/1.73M2 — LOW
GLUCOSE BLDC GLUCOMTR-MCNC: 112 MG/DL — HIGH (ref 70–99)
GLUCOSE BLDC GLUCOMTR-MCNC: 141 MG/DL — HIGH (ref 70–99)
GLUCOSE SERPL-MCNC: 134 MG/DL — HIGH (ref 70–99)
HCT VFR BLD CALC: 21 % — CRITICAL LOW (ref 34.5–45)
HCT VFR BLD CALC: 21.3 % — LOW (ref 34.5–45)
HCT VFR BLD CALC: 27.7 % — LOW (ref 34.5–45)
HGB BLD-MCNC: 6.9 G/DL — CRITICAL LOW (ref 11.5–15.5)
HGB BLD-MCNC: 7.2 G/DL — LOW (ref 11.5–15.5)
HGB BLD-MCNC: 9.2 G/DL — LOW (ref 11.5–15.5)
MAGNESIUM SERPL-MCNC: 2.2 MG/DL — SIGNIFICANT CHANGE UP (ref 1.6–2.6)
MCHC RBC-ENTMCNC: 30.7 PG — SIGNIFICANT CHANGE UP (ref 27–34)
MCHC RBC-ENTMCNC: 30.7 PG — SIGNIFICANT CHANGE UP (ref 27–34)
MCHC RBC-ENTMCNC: 30.8 PG — SIGNIFICANT CHANGE UP (ref 27–34)
MCHC RBC-ENTMCNC: 32.9 GM/DL — SIGNIFICANT CHANGE UP (ref 32–36)
MCHC RBC-ENTMCNC: 33.2 GM/DL — SIGNIFICANT CHANGE UP (ref 32–36)
MCHC RBC-ENTMCNC: 33.8 GM/DL — SIGNIFICANT CHANGE UP (ref 32–36)
MCV RBC AUTO: 91 FL — SIGNIFICANT CHANGE UP (ref 80–100)
MCV RBC AUTO: 92.3 FL — SIGNIFICANT CHANGE UP (ref 80–100)
MCV RBC AUTO: 93.3 FL — SIGNIFICANT CHANGE UP (ref 80–100)
NRBC # BLD: 0 /100 WBCS — SIGNIFICANT CHANGE UP (ref 0–0)
NRBC # BLD: 0 /100 WBCS — SIGNIFICANT CHANGE UP (ref 0–0)
NRBC # FLD: 0 K/UL — SIGNIFICANT CHANGE UP (ref 0–0)
NRBC # FLD: 0 K/UL — SIGNIFICANT CHANGE UP (ref 0–0)
PHOSPHATE SERPL-MCNC: 4.6 MG/DL — HIGH (ref 2.5–4.5)
PLATELET # BLD AUTO: 94 K/UL — LOW (ref 150–400)
PLATELET # BLD AUTO: 96 K/UL — LOW (ref 150–400)
PLATELET # BLD AUTO: 98 K/UL — LOW (ref 150–400)
POTASSIUM SERPL-MCNC: 5.5 MMOL/L — HIGH (ref 3.5–5.3)
POTASSIUM SERPL-SCNC: 5.5 MMOL/L — HIGH (ref 3.5–5.3)
RBC # BLD: 2.25 M/UL — LOW (ref 3.8–5.2)
RBC # BLD: 2.34 M/UL — LOW (ref 3.8–5.2)
RBC # BLD: 3 M/UL — LOW (ref 3.8–5.2)
RBC # FLD: 16.4 % — HIGH (ref 10.3–14.5)
RBC # FLD: 16.7 % — HIGH (ref 10.3–14.5)
RBC # FLD: 16.9 % — HIGH (ref 10.3–14.5)
SODIUM SERPL-SCNC: 130 MMOL/L — LOW (ref 135–145)
WBC # BLD: 4.32 K/UL — SIGNIFICANT CHANGE UP (ref 3.8–10.5)
WBC # BLD: 4.76 K/UL — SIGNIFICANT CHANGE UP (ref 3.8–10.5)
WBC # BLD: 5.55 K/UL — SIGNIFICANT CHANGE UP (ref 3.8–10.5)
WBC # FLD AUTO: 4.32 K/UL — SIGNIFICANT CHANGE UP (ref 3.8–10.5)
WBC # FLD AUTO: 4.76 K/UL — SIGNIFICANT CHANGE UP (ref 3.8–10.5)
WBC # FLD AUTO: 5.55 K/UL — SIGNIFICANT CHANGE UP (ref 3.8–10.5)

## 2023-05-22 PROCEDURE — 90935 HEMODIALYSIS ONE EVALUATION: CPT | Mod: GC

## 2023-05-22 PROCEDURE — 99233 SBSQ HOSP IP/OBS HIGH 50: CPT

## 2023-05-22 PROCEDURE — 93010 ELECTROCARDIOGRAM REPORT: CPT

## 2023-05-22 RX ORDER — ERYTHROPOIETIN 10000 [IU]/ML
4000 INJECTION, SOLUTION INTRAVENOUS; SUBCUTANEOUS
Refills: 0 | Status: DISCONTINUED | OUTPATIENT
Start: 2023-05-22 | End: 2023-05-26

## 2023-05-22 RX ADMIN — MUPIROCIN 1 APPLICATION(S): 20 OINTMENT TOPICAL at 06:15

## 2023-05-22 RX ADMIN — LIDOCAINE 1 PATCH: 4 CREAM TOPICAL at 12:29

## 2023-05-22 RX ADMIN — Medication 1 TABLET(S): at 12:29

## 2023-05-22 RX ADMIN — Medication 975 MILLIGRAM(S): at 21:17

## 2023-05-22 RX ADMIN — LIDOCAINE 1 PATCH: 4 CREAM TOPICAL at 00:50

## 2023-05-22 RX ADMIN — CHLORHEXIDINE GLUCONATE 1 APPLICATION(S): 213 SOLUTION TOPICAL at 13:34

## 2023-05-22 RX ADMIN — Medication 975 MILLIGRAM(S): at 06:14

## 2023-05-22 RX ADMIN — CYCLOBENZAPRINE HYDROCHLORIDE 5 MILLIGRAM(S): 10 TABLET, FILM COATED ORAL at 06:14

## 2023-05-22 RX ADMIN — OXYCODONE HYDROCHLORIDE 10 MILLIGRAM(S): 5 TABLET ORAL at 12:15

## 2023-05-22 RX ADMIN — Medication 50 MILLIGRAM(S): at 17:24

## 2023-05-22 RX ADMIN — MUPIROCIN 1 APPLICATION(S): 20 OINTMENT TOPICAL at 17:24

## 2023-05-22 RX ADMIN — OXYCODONE HYDROCHLORIDE 10 MILLIGRAM(S): 5 TABLET ORAL at 11:35

## 2023-05-22 RX ADMIN — Medication 667 MILLIGRAM(S): at 21:17

## 2023-05-22 RX ADMIN — Medication 667 MILLIGRAM(S): at 17:24

## 2023-05-22 RX ADMIN — Medication 667 MILLIGRAM(S): at 12:30

## 2023-05-22 RX ADMIN — Medication 975 MILLIGRAM(S): at 21:47

## 2023-05-22 RX ADMIN — LIDOCAINE 1 PATCH: 4 CREAM TOPICAL at 18:52

## 2023-05-22 NOTE — PROGRESS NOTE ADULT - SUBJECTIVE AND OBJECTIVE BOX
Cardiovascular Disease Progress Note  Date of Service: 23 @ 09:51    Overnight events: No acute events overnight.    Patient is undergoing HD in no distress.       Objective Findings:  T(C): 37.1 (23 @ 07:10), Max: 37.2 (23 @ 01:20)  HR: 70 (23 @ 07:10) (70 - 75)  BP: 117/60 (23 @ 07:10) (104/51 - 119/51)  RR: 18 (23 @ 07:10) (18 - 18)  SpO2: 99% (23 @ 07:10) (97% - 99%)  Wt(kg): --  Daily     Daily Weight in k.6 (22 May 2023 07:10)      Physical Exam:  Gen: NAD; Patient resting comfortably  HEENT: EOMI, Normocephalic/ atraumatic  CV: RRR, normal S1 + S2, no m/r/g  Lungs:  Normal respiratory effort;   Abd: soft, non-tender; bowel sounds present  Ext: No edema; warm and well perfused    Telemetry: n/a    Laboratory Data:                        7.2    4.76  )-----------( 94       ( 22 May 2023 07:10 )             21.3     05-22    130<L>  |  95<L>  |  70<H>  ----------------------------<  134<H>  5.5<H>   |  23  |  6.48<H>    Ca    7.8<L>      22 May 2023 07:10  Phos  4.6       Mg     2.20                     Inpatient Medications:  MEDICATIONS  (STANDING):  acetaminophen     Tablet .. 975 milliGRAM(s) Oral every 8 hours  calcium acetate 667 milliGRAM(s) Oral four times a day with meals  chlorhexidine 2% Cloths 1 Application(s) Topical daily  hydrALAZINE 50 milliGRAM(s) Oral <User Schedule>  lidocaine   4% Patch 1 Patch Transdermal daily  lidocaine/prilocaine Cream 1 Application(s) Topical daily  metoprolol succinate ER 12.5 milliGRAM(s) Oral daily  multivitamin 1 Tablet(s) Oral daily  mupirocin 2% Ointment 1 Application(s) Topical two times a day      Assessment: 76 y/o F with pmhx of ESRD on HD (MWF), HTN, DM (diet controlled), chronic type B aortic dissection, heart murmur, endometrial ca in remission s/p total hysterectomy in  presents to the ED s/p fall.    Plan of Care:      #Post op cardiac evaluation.   - Pt s/p L femoral IMN 2023. Tolerated procedure well.   - Ms. Harris displays no signs of post-op coronary ischemia or volume overload  Fluid removal this morning via HD.  - Continue current cardiac management.     #Post operative anemia  - Transfuse to maintain Hgb >7  - Management as per primary team.     #Type B aortic dissection  - Chronic (Diagnosed >10 years ago)  - Pt hemodynamically stable  - Continue to monitor    #HTN  - BP Acceptable on current regimen    #ESRD  - HD this morning as per renal.        Over 25 minutes spent on total encounter; more than 50% of the visit was spent counseling and/or coordinating care by the attending physician.      Gabo Burris MD [facility]  Cardiovascular Disease  [phone]

## 2023-05-22 NOTE — PROGRESS NOTE ADULT - ASSESSMENT
Pt is a 78 y/o F with pmhx of ESRD on HD (M/W/F), HTN, endometrial ca in remission s/p total hysterectomy in 2007 presents to the ED s/p fall with c/o L shoulder and L hip pain. Found to have L basicervical femoral neck fracture, L proximal humerus fracture, s/p L femoral IMN on 5/20 with course c/b postoperative acute blood loss anemia s/p multiple PRBC transfusions.

## 2023-05-22 NOTE — PROGRESS NOTE ADULT - SUBJECTIVE AND OBJECTIVE BOX
[facility] DIVISION OF KIDNEY DISEASES AND HYPERTENSION   FOLLOW UP NOTE    --------------------------------------------------------------------------------  Chief Complaint: ESRD on HD    24 hour events/subjective: Pt. was seen and examined today during HD. Pt complaining of mild surgical site pain. Denies fever, chills, SOB, CP.    PAST HISTORY  --------------------------------------------------------------------------------  No significant changes to PMH, PSH, FHx, SHx, unless otherwise noted    ALLERGIES & MEDICATIONS  --------------------------------------------------------------------------------  Allergies    No Known Allergies    Intolerances      Standing Inpatient Medications  acetaminophen     Tablet .. 975 milliGRAM(s) Oral every 8 hours  calcium acetate 667 milliGRAM(s) Oral four times a day with meals  chlorhexidine 2% Cloths 1 Application(s) Topical daily  hydrALAZINE 50 milliGRAM(s) Oral <User Schedule>  lidocaine   4% Patch 1 Patch Transdermal daily  lidocaine/prilocaine Cream 1 Application(s) Topical daily  metoprolol succinate ER 12.5 milliGRAM(s) Oral daily  multivitamin 1 Tablet(s) Oral daily  mupirocin 2% Ointment 1 Application(s) Topical two times a day    PRN Inpatient Medications  aluminum hydroxide/magnesium hydroxide/simethicone Suspension 30 milliLiter(s) Oral four times a day PRN  oxyCODONE    IR 5 milliGRAM(s) Oral every 4 hours PRN  oxyCODONE    IR 10 milliGRAM(s) Oral every 4 hours PRN      REVIEW OF SYSTEMS  --------------------------------------------------------------------------------  Gen: No fevers/chills weak+  Head/Eyes/Ears: No HA   Respiratory: No dyspnea, cough  CV: No chest pain  GI: No abdominal pain, diarrhea  : on HD  MSK: No edema, L hip and L shoulder pain  Skin: No rashes  Heme: Anemia    VITALS/PHYSICAL EXAM  --------------------------------------------------------------------------------  T(C): 37.1 (05-22-23 @ 07:10), Max: 37.2 (05-22-23 @ 01:20)  HR: 70 (05-22-23 @ 07:10) (70 - 75)  BP: 117/60 (05-22-23 @ 07:10) (104/51 - 119/51)  RR: 18 (05-22-23 @ 07:10) (18 - 18)  SpO2: 99% (05-22-23 @ 07:10) (97% - 99%)  Wt(kg): --      Physical Exam:  	Gen: elderly female, resting  	HEENT: Anicteric  	Pulm: CTA B/L  	CV: S1S2+  	Abd: Soft, +BS    	Ext: No LE edema B/L, dressing over left hip seen  	Neuro: Awake  	Skin: Warm and dry  	Dialysis access: LUE AVF, connected to HD machine    LABS/STUDIES  --------------------------------------------------------------------------------              7.2    4.76  >-----------<  94       [05-22-23 @ 07:10]              21.3     130  |  95  |  70  ----------------------------<  134      [05-22-23 @ 07:10]  5.5   |  23  |  6.48        Ca     7.8     [05-22-23 @ 07:10]      Mg     2.20     [05-22-23 @ 07:10]      Phos  4.6     [05-22-23 @ 07:10]    Creatinine Trend:  SCr 6.48 [05-22 @ 07:10]  SCr 5.08 [05-21 @ 04:40]  SCr 3.22 [05-20 @ 03:20]  SCr 2.80 [05-19 @ 22:08]  SCr 5.52 [05-19 @ 00:30]    HBsAb 44.7      [05-19-23 @ 17:32]  HBcAb Reactive      [05-19-23 @ 17:32]

## 2023-05-22 NOTE — PROGRESS NOTE ADULT - SUBJECTIVE AND OBJECTIVE BOX
[facility] Division of Hospital Medicine  Kelsey Edmondson DO  Pager (DERRELL, 0O-5P): [phone]      Patient is a 77y old  Female who presents with a chief complaint of IT fx (22 May 2023 10:12)      SUBJECTIVE / OVERNIGHT EVENTS: Received 1 U PRBC overnight and another 1 U PRBC during HD this AM. Denies chest pain, SOB, dizziness. Last BM was prior to hospitalization. Reports passing gas.    MEDICATIONS  (STANDING):  acetaminophen     Tablet .. 975 milliGRAM(s) Oral every 8 hours  calcium acetate 667 milliGRAM(s) Oral four times a day with meals  chlorhexidine 2% Cloths 1 Application(s) Topical daily  epoetin marla-epbx (RETACRIT) Injectable 4000 Unit(s) IV Push <User Schedule>  hydrALAZINE 50 milliGRAM(s) Oral <User Schedule>  lidocaine   4% Patch 1 Patch Transdermal daily  lidocaine/prilocaine Cream 1 Application(s) Topical daily  metoprolol succinate ER 12.5 milliGRAM(s) Oral daily  multivitamin 1 Tablet(s) Oral daily  mupirocin 2% Ointment 1 Application(s) Topical two times a day    MEDICATIONS  (PRN):  aluminum hydroxide/magnesium hydroxide/simethicone Suspension 30 milliLiter(s) Oral four times a day PRN Indigestion  oxyCODONE    IR 10 milliGRAM(s) Oral every 4 hours PRN Severe Pain (7 - 10)  oxyCODONE    IR 5 milliGRAM(s) Oral every 4 hours PRN Moderate Pain (4 - 6)      CAPILLARY BLOOD GLUCOSE      POCT Blood Glucose.: 112 mg/dL (22 May 2023 10:01)  POCT Blood Glucose.: 141 mg/dL (22 May 2023 06:21)  POCT Blood Glucose.: 171 mg/dL (21 May 2023 22:10)  POCT Blood Glucose.: 174 mg/dL (21 May 2023 17:21)  POCT Blood Glucose.: 279 mg/dL (21 May 2023 12:12)    I&O's Summary    22 May 2023 07:01  -  22 May 2023 11:57  --------------------------------------------------------  IN: 700 mL / OUT: 1400 mL / NET: -700 mL        PHYSICAL EXAM:  Vital Signs Last 24 Hrs  T(C): 36.5 (22 May 2023 10:16), Max: 37.2 (22 May 2023 01:20)  T(F): 97.7 (22 May 2023 10:16), Max: 99 (22 May 2023 01:20)  HR: 66 (22 May 2023 10:16) (66 - 75)  BP: 132/66 (22 May 2023 10:16) (104/51 - 132/66)  BP(mean): --  RR: 18 (22 May 2023 10:16) (18 - 18)  SpO2: 99% (22 May 2023 10:16) (97% - 99%)    Parameters below as of 22 May 2023 10:16  Patient On (Oxygen Delivery Method): room air        CONSTITUTIONAL: NAD, on room air  HEENT: sclera clear, MMM, eyes tracking  RESPIRATORY: Normal respiratory effort; lungs are clear to auscultation bilaterally  CARDIOVASCULAR: S1/S2, RRR, + systolic murmur  ABDOMEN: soft, Nontender, nondistended, normoactive bowel sounds, no rebound/guarding  PSYCH: calm, cooperative  MSK: left thigh swelling with TTP, dressing at left hip with dried blood, LUE swelling with + AVF with thrill and bruit  NEUROLOGY: awake, alert, no gross deficits  SKIN: No rashes; no palpable lesions    LABS:                        7.2    4.76  )-----------( 94       ( 22 May 2023 07:10 )             21.3     05-22    130<L>  |  95<L>  |  70<H>  ----------------------------<  134<H>  5.5<H>   |  23  |  6.48<H>    Ca    7.8<L>      22 May 2023 07:10  Phos  4.6     05-22  Mg     2.20     05-22                  RADIOLOGY & ADDITIONAL TESTS:  Results Reviewed:   Imaging Personally Reviewed:  Electrocardiogram Personally Reviewed:    COORDINATION OF CARE:  Care Discussed with Consultants/Other Providers [Y/N]:  Prior or Outpatient Records Reviewed [Y/N]:

## 2023-05-22 NOTE — PROVIDER CONTACT NOTE (OTHER) - BACKGROUND
Pt admitted for fracture to left shoulder and left hip site
Pt admitted for fall and fracture to left humerus and left femur

## 2023-05-22 NOTE — PROGRESS NOTE ADULT - PROBLEM SELECTOR PLAN 1
Pt with ESRD on HD TIW MWF admitted with fall. Pt seen during HD treatment today. Tolerating HD treatment. Will arrange for maintenance HD on Wednesday. Hemoglobin below target range. Consider blood transfusion. serum phosphorus in target range. Monitor labs and BP. Pt with ESRD on HD TIW MWF admitted with fall. Pt seen during HD treatment today. Tolerating HD treatment. Will arrange for maintenance HD on Wednesday. Hemoglobin below target range. serum phosphorus in target range. Monitor labs and BP.

## 2023-05-22 NOTE — PROGRESS NOTE ADULT - PROBLEM SELECTOR PLAN 1
Baseline H/H 9s: multifactorial in setting of postop and renal failure  HSQ stopped, on venodyne boots  s/p 3 1/2 U PRBC and 1 U plts  Likely with dysfunctional platelets in setting of renal failure  Transfuse for Hgb <7  Repeat CBC in PM

## 2023-05-22 NOTE — PROGRESS NOTE ADULT - SUBJECTIVE AND OBJECTIVE BOX
Pt seen/examined. Doing well. Pain controlled. No acute overnight complaints or events. s/p 1U 5/21    T(C): 36.8 (05-22-23 @ 01:50), Max: 37.2 (05-22-23 @ 01:20)  HR: 71 (05-22-23 @ 01:50) (67 - 75)  BP: 115/50 (05-22-23 @ 01:50) (102/49 - 119/51)  RR: 18 (05-22-23 @ 01:50) (17 - 18)  SpO2: 97% (05-22-23 @ 01:50) (97% - 99%)  Wt(kg): --  - Gen: NAD    Physical Examination:  GEN: NAD, resting quietly  PULM: symmetric chest rise bilaterally, no increased WOB  ABD: nondistended  EXTR:   GEN: NAD, resting quietly  PULM: symmetric chest rise bilaterally, no increased WOB  ABD: nondistended  EXTR:   LLE:   Dressing has mild SS, intact  no palpable hematomas or collections  +TA/GS/EHL, +NVI, SILT  +DP/PT    LUE:   Skin intac  +TTP over shoulder, no TTP along remainder of extremity; compartments soft, in sling  Limited ROM at shoulder 2/2 pain  Motor: AIN/PIN/U intact  Sensory: Med/Rad/U SILT  +Rad pulse, WWP      Assessment: 77yFemale w/ L basicervical femoral neck fracture, L proximal humerus fracture, s/p L femoral IMN on 5/20 and recovering well.    Plan:  - Low threshold for additional units of PRBCs- any signs of tachycardia or hypotension, please keep Hgb >8  - Pain Control  - DVT ppx: as per primary team  - ice/cold compress  - elevation of LUE  - WBAT LLE, NWB in sling LUE  - PT/OT

## 2023-05-23 LAB
A1C WITH ESTIMATED AVERAGE GLUCOSE RESULT: 5 % — SIGNIFICANT CHANGE UP (ref 4–5.6)
ANION GAP SERPL CALC-SCNC: 11 MMOL/L — SIGNIFICANT CHANGE UP (ref 7–14)
BUN SERPL-MCNC: 48 MG/DL — HIGH (ref 7–23)
CALCIUM SERPL-MCNC: 7.9 MG/DL — LOW (ref 8.4–10.5)
CHLORIDE SERPL-SCNC: 99 MMOL/L — SIGNIFICANT CHANGE UP (ref 98–107)
CO2 SERPL-SCNC: 25 MMOL/L — SIGNIFICANT CHANGE UP (ref 22–31)
CREAT SERPL-MCNC: 4.63 MG/DL — HIGH (ref 0.5–1.3)
EGFR: 9 ML/MIN/1.73M2 — LOW
ESTIMATED AVERAGE GLUCOSE: 97 — SIGNIFICANT CHANGE UP
GLUCOSE SERPL-MCNC: 134 MG/DL — HIGH (ref 70–99)
HCT VFR BLD CALC: 26.2 % — LOW (ref 34.5–45)
HGB BLD-MCNC: 8.6 G/DL — LOW (ref 11.5–15.5)
MAGNESIUM SERPL-MCNC: 2.1 MG/DL — SIGNIFICANT CHANGE UP (ref 1.6–2.6)
MCHC RBC-ENTMCNC: 30.3 PG — SIGNIFICANT CHANGE UP (ref 27–34)
MCHC RBC-ENTMCNC: 32.8 GM/DL — SIGNIFICANT CHANGE UP (ref 32–36)
MCV RBC AUTO: 92.3 FL — SIGNIFICANT CHANGE UP (ref 80–100)
NRBC # BLD: 0 /100 WBCS — SIGNIFICANT CHANGE UP (ref 0–0)
NRBC # FLD: 0 K/UL — SIGNIFICANT CHANGE UP (ref 0–0)
PHOSPHATE SERPL-MCNC: 3.7 MG/DL — SIGNIFICANT CHANGE UP (ref 2.5–4.5)
PLATELET # BLD AUTO: 110 K/UL — LOW (ref 150–400)
POTASSIUM SERPL-MCNC: 4.8 MMOL/L — SIGNIFICANT CHANGE UP (ref 3.5–5.3)
POTASSIUM SERPL-SCNC: 4.8 MMOL/L — SIGNIFICANT CHANGE UP (ref 3.5–5.3)
RBC # BLD: 2.84 M/UL — LOW (ref 3.8–5.2)
RBC # FLD: 16.1 % — HIGH (ref 10.3–14.5)
SARS-COV-2 RNA SPEC QL NAA+PROBE: SIGNIFICANT CHANGE UP
SODIUM SERPL-SCNC: 135 MMOL/L — SIGNIFICANT CHANGE UP (ref 135–145)
WBC # BLD: 4.44 K/UL — SIGNIFICANT CHANGE UP (ref 3.8–10.5)
WBC # FLD AUTO: 4.44 K/UL — SIGNIFICANT CHANGE UP (ref 3.8–10.5)

## 2023-05-23 PROCEDURE — 99232 SBSQ HOSP IP/OBS MODERATE 35: CPT

## 2023-05-23 PROCEDURE — 99232 SBSQ HOSP IP/OBS MODERATE 35: CPT | Mod: GC

## 2023-05-23 RX ORDER — POLYETHYLENE GLYCOL 3350 17 G/17G
17 POWDER, FOR SOLUTION ORAL DAILY
Refills: 0 | Status: DISCONTINUED | OUTPATIENT
Start: 2023-05-23 | End: 2023-05-26

## 2023-05-23 RX ORDER — HEPARIN SODIUM 5000 [USP'U]/ML
5000 INJECTION INTRAVENOUS; SUBCUTANEOUS EVERY 8 HOURS
Refills: 0 | Status: DISCONTINUED | OUTPATIENT
Start: 2023-05-23 | End: 2023-05-26

## 2023-05-23 RX ORDER — SENNA PLUS 8.6 MG/1
2 TABLET ORAL AT BEDTIME
Refills: 0 | Status: DISCONTINUED | OUTPATIENT
Start: 2023-05-23 | End: 2023-05-26

## 2023-05-23 RX ADMIN — LIDOCAINE 1 PATCH: 4 CREAM TOPICAL at 19:00

## 2023-05-23 RX ADMIN — Medication 667 MILLIGRAM(S): at 17:31

## 2023-05-23 RX ADMIN — Medication 975 MILLIGRAM(S): at 15:41

## 2023-05-23 RX ADMIN — Medication 12.5 MILLIGRAM(S): at 05:08

## 2023-05-23 RX ADMIN — MUPIROCIN 1 APPLICATION(S): 20 OINTMENT TOPICAL at 17:31

## 2023-05-23 RX ADMIN — POLYETHYLENE GLYCOL 3350 17 GRAM(S): 17 POWDER, FOR SOLUTION ORAL at 13:43

## 2023-05-23 RX ADMIN — HEPARIN SODIUM 5000 UNIT(S): 5000 INJECTION INTRAVENOUS; SUBCUTANEOUS at 14:21

## 2023-05-23 RX ADMIN — LIDOCAINE 1 PATCH: 4 CREAM TOPICAL at 12:35

## 2023-05-23 RX ADMIN — CHLORHEXIDINE GLUCONATE 1 APPLICATION(S): 213 SOLUTION TOPICAL at 12:35

## 2023-05-23 RX ADMIN — Medication 975 MILLIGRAM(S): at 14:21

## 2023-05-23 RX ADMIN — SENNA PLUS 2 TABLET(S): 8.6 TABLET ORAL at 21:10

## 2023-05-23 RX ADMIN — Medication 975 MILLIGRAM(S): at 05:08

## 2023-05-23 RX ADMIN — LIDOCAINE 1 PATCH: 4 CREAM TOPICAL at 00:29

## 2023-05-23 RX ADMIN — Medication 975 MILLIGRAM(S): at 21:10

## 2023-05-23 RX ADMIN — Medication 667 MILLIGRAM(S): at 21:10

## 2023-05-23 RX ADMIN — OXYCODONE HYDROCHLORIDE 5 MILLIGRAM(S): 5 TABLET ORAL at 16:45

## 2023-05-23 RX ADMIN — Medication 1 TABLET(S): at 12:29

## 2023-05-23 RX ADMIN — MUPIROCIN 1 APPLICATION(S): 20 OINTMENT TOPICAL at 05:10

## 2023-05-23 RX ADMIN — Medication 50 MILLIGRAM(S): at 17:31

## 2023-05-23 RX ADMIN — Medication 667 MILLIGRAM(S): at 12:29

## 2023-05-23 NOTE — PROGRESS NOTE ADULT - SUBJECTIVE AND OBJECTIVE BOX
[facility] Division of Hospital Medicine  Kelsey Edmondson DO  Pager (DERRELL, 6I-5P): [phone]      Patient is a 77y old  Female who presents with a chief complaint of IT fx (23 May 2023 09:30)      SUBJECTIVE / OVERNIGHT EVENTS: No events overnight. No chest pain, SOB. Left hip pain better controlled with oxycodone. Still reports discomfort of left arm. Had BM last night    MEDICATIONS  (STANDING):  acetaminophen     Tablet .. 975 milliGRAM(s) Oral every 8 hours  calcium acetate 667 milliGRAM(s) Oral four times a day with meals  chlorhexidine 2% Cloths 1 Application(s) Topical daily  epoetin marla-epbx (RETACRIT) Injectable 4000 Unit(s) IV Push <User Schedule>  heparin   Injectable 5000 Unit(s) SubCutaneous every 8 hours  hydrALAZINE 50 milliGRAM(s) Oral <User Schedule>  lidocaine   4% Patch 1 Patch Transdermal daily  lidocaine/prilocaine Cream 1 Application(s) Topical daily  metoprolol succinate ER 12.5 milliGRAM(s) Oral daily  multivitamin 1 Tablet(s) Oral daily  mupirocin 2% Ointment 1 Application(s) Topical two times a day    MEDICATIONS  (PRN):  aluminum hydroxide/magnesium hydroxide/simethicone Suspension 30 milliLiter(s) Oral four times a day PRN Indigestion  oxyCODONE    IR 10 milliGRAM(s) Oral every 4 hours PRN Severe Pain (7 - 10)  oxyCODONE    IR 5 milliGRAM(s) Oral every 4 hours PRN Moderate Pain (4 - 6)      CAPILLARY BLOOD GLUCOSE      POCT Blood Glucose.: 139 mg/dL (23 May 2023 08:31)  POCT Blood Glucose.: 199 mg/dL (22 May 2023 22:24)  POCT Blood Glucose.: 142 mg/dL (22 May 2023 18:24)  POCT Blood Glucose.: 178 mg/dL (22 May 2023 12:04)    I&O's Summary    22 May 2023 07:01  -  23 May 2023 07:00  --------------------------------------------------------  IN: 700 mL / OUT: 1400 mL / NET: -700 mL        PHYSICAL EXAM:  Vital Signs Last 24 Hrs  T(C): 36.7 (23 May 2023 05:08), Max: 37.4 (22 May 2023 21:23)  T(F): 98.1 (23 May 2023 05:08), Max: 99.3 (22 May 2023 21:23)  HR: 64 (23 May 2023 05:08) (64 - 79)  BP: 131/61 (23 May 2023 05:08) (122/52 - 131/61)  BP(mean): --  RR: 18 (23 May 2023 05:08) (18 - 18)  SpO2: 100% (23 May 2023 05:08) (100% - 100%)    Parameters below as of 23 May 2023 05:08  Patient On (Oxygen Delivery Method): room air      CONSTITUTIONAL: NAD, on room air  HEENT: sclera clear, MMM, eyes tracking  RESPIRATORY: Normal respiratory effort; lungs are clear to auscultation bilaterally  CARDIOVASCULAR: S1/S2, RRR, + systolic murmur  ABDOMEN: soft, Nontender, nondistended, normoactive bowel sounds, no rebound/guarding  PSYCH: calm, cooperative  MSK: left thigh swelling, dressing at left hip with dried blood, LUE swelling with + AVF with thrill and bruit, left arm in sling  NEUROLOGY: awake, alert, no gross deficits  SKIN: No rashes; no palpable lesions    LABS:                        8.6    4.44  )-----------( 110      ( 23 May 2023 07:00 )             26.2     05-23    135  |  99  |  48<H>  ----------------------------<  134<H>  4.8   |  25  |  4.63<H>    Ca    7.9<L>      23 May 2023 07:00  Phos  3.7     05-23  Mg     2.10     05-23                  RADIOLOGY & ADDITIONAL TESTS:  Results Reviewed:   Imaging Personally Reviewed:  Electrocardiogram Personally Reviewed:    COORDINATION OF CARE:  Care Discussed with Consultants/Other Providers [Y/N]:  Prior or Outpatient Records Reviewed [Y/N]:

## 2023-05-23 NOTE — PROGRESS NOTE ADULT - ASSESSMENT
Assessment: 77yFemale w/ L basicervical femoral neck fracture, L proximal humerus fracture, s/p L femoral IMN on 5/20 and recovering well.    Plan:  - Low threshold for additional units of PRBCs- any signs of tachycardia or hypotension, please keep Hgb >8  - Pain Control  - DVT ppx: as per primary team  - ice/cold compress  - elevation of LUE  - WBAT LLE, NWB in sling LUE  - PT/OT    For all questions related to patient care, please reach out to the on-call team via the pager.     Elle Ulloa, PGY 1  Orthopaedic Surgery  [facility] [phone]  [facility] [phone]  [facility] [phone]

## 2023-05-23 NOTE — PROGRESS NOTE ADULT - PROBLEM SELECTOR PLAN 1
Baseline H/H 9s: multifactorial in setting of postop and renal failure s/p 3 1/2 U PRBC and 1 U plts  H/H and platelets now improved   Likely with dysfunctional platelets in setting of renal failure  Transfuse for Hgb <7  Resume HSQ for DVT ppx

## 2023-05-23 NOTE — PROGRESS NOTE ADULT - SUBJECTIVE AND OBJECTIVE BOX
[facility] DIVISION OF KIDNEY DISEASES AND HYPERTENSION -- HEMODIALYSIS NOTE   Nehrology Office [phone]  available on [employer] teams--> Bhanu Torres   --------------------------------------------------------------------------------  Chief Complaint: ESRD/Ongoing hemodialysis requirement  c/o left shoulder pain . R hip is better   24 hour events/subjective:      ALLERGIES & MEDICATIONS  --------------------------------------------------------------------------------  Allergies    No Known Allergies    Intolerances      Standing Inpatient Medications  acetaminophen     Tablet .. 975 milliGRAM(s) Oral every 8 hours  calcium acetate 667 milliGRAM(s) Oral four times a day with meals  chlorhexidine 2% Cloths 1 Application(s) Topical daily  epoetin marla-epbx (RETACRIT) Injectable 4000 Unit(s) IV Push <User Schedule>  heparin   Injectable 5000 Unit(s) SubCutaneous every 8 hours  hydrALAZINE 50 milliGRAM(s) Oral <User Schedule>  lidocaine   4% Patch 1 Patch Transdermal daily  lidocaine/prilocaine Cream 1 Application(s) Topical daily  metoprolol succinate ER 12.5 milliGRAM(s) Oral daily  multivitamin 1 Tablet(s) Oral daily  mupirocin 2% Ointment 1 Application(s) Topical two times a day  senna 2 Tablet(s) Oral at bedtime    PRN Inpatient Medications  aluminum hydroxide/magnesium hydroxide/simethicone Suspension 30 milliLiter(s) Oral four times a day PRN  oxyCODONE    IR 10 milliGRAM(s) Oral every 4 hours PRN  oxyCODONE    IR 5 milliGRAM(s) Oral every 4 hours PRN  polyethylene glycol 3350 17 Gram(s) Oral daily PRN      REVIEW OF SYSTEMS  --------------------------------------------------------------------------------  Gen: No  fevers/chills  Skin: No rashes  Respiratory: no SOB  CV: No chest pain,   GI: No abdominal pain  :   oliguria   MSK: + joint pain/  -swelling;   All other systems were reviewed and are negative, except as noted.    VITALS/PHYSICAL EXAM  --------------------------------------------------------------------------------  T(C): 36.4 (05-23-23 @ 12:20), Max: 37.4 (05-22-23 @ 21:23)  HR: 62 (05-23-23 @ 12:20) (62 - 79)  BP: 137/67 (05-23-23 @ 12:20) (122/52 - 137/67)  RR: 17 (05-23-23 @ 12:20) (17 - 18)  SpO2: 99% (05-23-23 @ 12:20) (99% - 100%)  Wt(kg): --        05-22-23 @ 07:01  -  05-23-23 @ 07:00  --------------------------------------------------------  IN: 700 mL / OUT: 1400 mL / NET: -700 mL      Physical Exam:  	Gen NAD  	HEENT: no JVD  	Pulm: CTABL  	CV: S1S2,  	Abd: Soft,   	Ext:  - edema B/L LE   	Neuro: Awake and alert  	Skin: Warm and dry          Vascular:  L BC AVF + bruit. L shoulder sling     LABS/STUDIES  --------------------------------------------------------------------------------              8.6    4.44  >-----------<  110      [05-23-23 @ 07:00]              26.2     135  |  99  |  48  ----------------------------<  134      [05-23-23 @ 07:00]  4.8   |  25  |  4.63        Ca     7.9     [05-23-23 @ 07:00]      Mg     2.10     [05-23-23 @ 07:00]      Phos  3.7     [05-23-23 @ 07:00]              HBsAb 44.7      [05-19-23 @ 17:32]  HBcAb Reactive      [05-19-23 @ 17:32]

## 2023-05-23 NOTE — PROGRESS NOTE ADULT - PROBLEM SELECTOR PLAN 1
Pt with ESRD on HD TIW MWF admitted with fall.    Will arrange for maintenance HD on Wednesday.   Hemoglobin below target range. s/p blood. started RICA 5/22/2023  continue on Phos binder   hypocalcemia . increase Ca bath   serum phosphorus in target range.   HTn acceptable   Monitor labs and BP.

## 2023-05-23 NOTE — PROGRESS NOTE ADULT - SUBJECTIVE AND OBJECTIVE BOX
POD # 3 s/p L hip IMN     Overnight events: None    SUBJECTIVE: Pt seen and examined at bedside. Patient is doing well, no acute complaints this AM. Pain is controlled with medication. Pt states she is ambulating a little with physical therapy, pt encouraged to walk with PT.      OBJECTIVE:  Vital Signs Last 24 Hrs  T(C): 36.7 (23 May 2023 05:08), Max: 37.4 (22 May 2023 21:23)  T(F): 98.1 (23 May 2023 05:08), Max: 99.3 (22 May 2023 21:23)  HR: 64 (23 May 2023 05:08) (64 - 79)  BP: 131/61 (23 May 2023 05:08) (117/60 - 132/66)  BP(mean): --  RR: 18 (23 May 2023 05:08) (18 - 18)  SpO2: 100% (23 May 2023 05:08) (99% - 100%)    Parameters below as of 23 May 2023 05:08  Patient On (Oxygen Delivery Method): room air          05-22-23 @ 07:01  -  05-23-23 @ 06:32  --------------------------------------------------------  IN: 700 mL / OUT: 1400 mL / NET: -700 mL        Physical Examination:  GEN: NAD, resting quietly  PULM: symmetric chest rise bilaterally, no increased WOB  ABD: nondistended  EXTR:   LUE:   Skin intac  +TTP over shoulder, no TTP along remainder of extremity; compartments soft, in sling  Limited ROM at shoulder 2/2 pain  Motor: AIN/PIN/U intact  Sensory: Med/Rad/U SILT  +Rad pulse, WWP    LLE:   Dressing has mild SS strikethrough, intact  no palpable hematomas or collections  +TA/GS/EHL, +NVI, SILT  +DP/PT      LABS:                        9.2    4.32  )-----------( 98       ( 22 May 2023 17:49 )             27.7       05-22    130<L>  |  95<L>  |  70<H>  ----------------------------<  134<H>  5.5<H>   |  23  |  6.48<H>    Ca    7.8<L>      22 May 2023 07:10  Phos  4.6     05-22  Mg     2.20     05-22

## 2023-05-23 NOTE — PROGRESS NOTE ADULT - SUBJECTIVE AND OBJECTIVE BOX
Cardiovascular Disease Progress Note  Date of Service: 23 @ 09:30    Overnight events: No acute events overnight.    Patient denies chest pain or SOB.   Otherwise review of systems negative    Objective Findings:  T(C): 36.7 (23 @ 05:08), Max: 37.4 (23 @ 21:23)  HR: 64 (23 @ 05:08) (64 - 79)  BP: 131/61 (23 @ 05:08) (122/52 - 132/66)  RR: 18 (23 @ 05:08) (18 - 18)  SpO2: 100% (23 @ 05:08) (99% - 100%)  Wt(kg): --  Daily     Daily Weight in k.6 (22 May 2023 10:16)      Physical Exam:  Gen: NAD; Patient resting comfortably  HEENT: EOMI, Normocephalic/ atraumatic  CV: RRR, normal S1 + S2, no m/r/g  Lungs:  Normal respiratory effort; clear to auscultation bilaterally  Abd: soft, non-tender; bowel sounds present  Ext: No edema; warm and well perfused    Telemetry: n/a    Laboratory Data:                        8.6    4.44  )-----------( 110      ( 23 May 2023 07:00 )             26.2         135  |  99  |  48<H>  ----------------------------<  134<H>  4.8   |  25  |  4.63<H>    Ca    7.9<L>      23 May 2023 07:00  Phos  3.7       Mg     2.10                     Inpatient Medications:  MEDICATIONS  (STANDING):  acetaminophen     Tablet .. 975 milliGRAM(s) Oral every 8 hours  calcium acetate 667 milliGRAM(s) Oral four times a day with meals  chlorhexidine 2% Cloths 1 Application(s) Topical daily  epoetin marla-epbx (RETACRIT) Injectable 4000 Unit(s) IV Push <User Schedule>  hydrALAZINE 50 milliGRAM(s) Oral <User Schedule>  lidocaine   4% Patch 1 Patch Transdermal daily  lidocaine/prilocaine Cream 1 Application(s) Topical daily  metoprolol succinate ER 12.5 milliGRAM(s) Oral daily  multivitamin 1 Tablet(s) Oral daily  mupirocin 2% Ointment 1 Application(s) Topical two times a day      Assessment: 78 y/o F with pmhx of ESRD on HD (MWF), HTN, DM (diet controlled), chronic type B aortic dissection, heart murmur, endometrial ca in remission s/p total hysterectomy in  presents to the ED s/p fall.    Plan of Care:      #Post op cardiac evaluation.   - Pt s/p L femoral IMN 2023.    - Ms. Harris displays no signs of post-op coronary ischemia or volume overload  Volume optimization with HD.   - Continue current cardiac management.       #Type B aortic dissection  - Chronic (Diagnosed >10 years ago)  - Pt hemodynamically stable  - Continue to monitor    #HTN  - BP acceptable on current regimen    #ESRD  - HD as per renal.    #ACP (advance care planning)-  Advanced care planning was discussed with the patient.    Cardiac findings were discussed in detail and all questions were answered.      Update given to outpatient cardiologist, Dr. Cheo Grimes.       Over 25 minutes spent on total encounter; more than 50% of the visit was spent counseling and/or coordinating care by the attending physician.      Gabo Burris MD [facility]  Cardiovascular Disease  [phone]

## 2023-05-23 NOTE — PROGRESS NOTE ADULT - ASSESSMENT
Pt is a 76 y/o F with pmhx of ESRD on HD (M/W/F), HTN, endometrial ca in remission s/p total hysterectomy in 2007 presents to the ED s/p fall with c/o L shoulder and L hip pain. Found to have L basicervical femoral neck fracture, L proximal humerus fracture, s/p L femoral IMN on 5/20 with course c/b postoperative acute blood loss anemia s/p multiple PRBC transfusions.

## 2023-05-24 LAB
ANION GAP SERPL CALC-SCNC: 14 MMOL/L — SIGNIFICANT CHANGE UP (ref 7–14)
BLD GP AB SCN SERPL QL: NEGATIVE — SIGNIFICANT CHANGE UP
BUN SERPL-MCNC: 71 MG/DL — HIGH (ref 7–23)
CALCIUM SERPL-MCNC: 8.2 MG/DL — LOW (ref 8.4–10.5)
CHLORIDE SERPL-SCNC: 96 MMOL/L — LOW (ref 98–107)
CO2 SERPL-SCNC: 23 MMOL/L — SIGNIFICANT CHANGE UP (ref 22–31)
CREAT SERPL-MCNC: 6.19 MG/DL — HIGH (ref 0.5–1.3)
EGFR: 7 ML/MIN/1.73M2 — LOW
GLUCOSE SERPL-MCNC: 134 MG/DL — HIGH (ref 70–99)
HAV IGM SER-ACNC: SIGNIFICANT CHANGE UP
HBV CORE AB SER-ACNC: REACTIVE
HBV CORE IGM SER-ACNC: SIGNIFICANT CHANGE UP
HBV SURFACE AB SER-ACNC: 44 MIU/ML — SIGNIFICANT CHANGE UP
HBV SURFACE AG SER-ACNC: SIGNIFICANT CHANGE UP
HCT VFR BLD CALC: 25.8 % — LOW (ref 34.5–45)
HCV AB S/CO SERPL IA: 0.25 S/CO — SIGNIFICANT CHANGE UP (ref 0–0.99)
HCV AB SERPL-IMP: SIGNIFICANT CHANGE UP
HGB BLD-MCNC: 8.7 G/DL — LOW (ref 11.5–15.5)
MAGNESIUM SERPL-MCNC: 2.2 MG/DL — SIGNIFICANT CHANGE UP (ref 1.6–2.6)
MCHC RBC-ENTMCNC: 31.6 PG — SIGNIFICANT CHANGE UP (ref 27–34)
MCHC RBC-ENTMCNC: 33.7 GM/DL — SIGNIFICANT CHANGE UP (ref 32–36)
MCV RBC AUTO: 93.8 FL — SIGNIFICANT CHANGE UP (ref 80–100)
NRBC # BLD: 0 /100 WBCS — SIGNIFICANT CHANGE UP (ref 0–0)
NRBC # FLD: 0 K/UL — SIGNIFICANT CHANGE UP (ref 0–0)
PHOSPHATE SERPL-MCNC: 4.4 MG/DL — SIGNIFICANT CHANGE UP (ref 2.5–4.5)
PLATELET # BLD AUTO: 123 K/UL — LOW (ref 150–400)
POTASSIUM SERPL-MCNC: 4.9 MMOL/L — SIGNIFICANT CHANGE UP (ref 3.5–5.3)
POTASSIUM SERPL-SCNC: 4.9 MMOL/L — SIGNIFICANT CHANGE UP (ref 3.5–5.3)
RBC # BLD: 2.75 M/UL — LOW (ref 3.8–5.2)
RBC # FLD: 15.8 % — HIGH (ref 10.3–14.5)
RH IG SCN BLD-IMP: POSITIVE — SIGNIFICANT CHANGE UP
SODIUM SERPL-SCNC: 133 MMOL/L — LOW (ref 135–145)
WBC # BLD: 4.83 K/UL — SIGNIFICANT CHANGE UP (ref 3.8–10.5)
WBC # FLD AUTO: 4.83 K/UL — SIGNIFICANT CHANGE UP (ref 3.8–10.5)

## 2023-05-24 PROCEDURE — 90935 HEMODIALYSIS ONE EVALUATION: CPT | Mod: GC

## 2023-05-24 PROCEDURE — 99232 SBSQ HOSP IP/OBS MODERATE 35: CPT

## 2023-05-24 RX ADMIN — Medication 667 MILLIGRAM(S): at 13:00

## 2023-05-24 RX ADMIN — HEPARIN SODIUM 5000 UNIT(S): 5000 INJECTION INTRAVENOUS; SUBCUTANEOUS at 21:24

## 2023-05-24 RX ADMIN — CHLORHEXIDINE GLUCONATE 1 APPLICATION(S): 213 SOLUTION TOPICAL at 12:58

## 2023-05-24 RX ADMIN — Medication 975 MILLIGRAM(S): at 21:24

## 2023-05-24 RX ADMIN — Medication 975 MILLIGRAM(S): at 22:24

## 2023-05-24 RX ADMIN — Medication 12.5 MILLIGRAM(S): at 05:11

## 2023-05-24 RX ADMIN — LIDOCAINE 1 PATCH: 4 CREAM TOPICAL at 20:20

## 2023-05-24 RX ADMIN — Medication 667 MILLIGRAM(S): at 21:24

## 2023-05-24 RX ADMIN — Medication 667 MILLIGRAM(S): at 18:10

## 2023-05-24 RX ADMIN — Medication 975 MILLIGRAM(S): at 05:12

## 2023-05-24 RX ADMIN — Medication 1 TABLET(S): at 12:58

## 2023-05-24 RX ADMIN — MUPIROCIN 1 APPLICATION(S): 20 OINTMENT TOPICAL at 05:12

## 2023-05-24 RX ADMIN — LIDOCAINE 1 PATCH: 4 CREAM TOPICAL at 12:59

## 2023-05-24 RX ADMIN — Medication 50 MILLIGRAM(S): at 18:10

## 2023-05-24 RX ADMIN — Medication 975 MILLIGRAM(S): at 15:49

## 2023-05-24 RX ADMIN — MUPIROCIN 1 APPLICATION(S): 20 OINTMENT TOPICAL at 18:11

## 2023-05-24 RX ADMIN — SENNA PLUS 2 TABLET(S): 8.6 TABLET ORAL at 21:25

## 2023-05-24 RX ADMIN — ERYTHROPOIETIN 4000 UNIT(S): 10000 INJECTION, SOLUTION INTRAVENOUS; SUBCUTANEOUS at 07:38

## 2023-05-24 RX ADMIN — HEPARIN SODIUM 5000 UNIT(S): 5000 INJECTION INTRAVENOUS; SUBCUTANEOUS at 15:49

## 2023-05-24 NOTE — PROGRESS NOTE ADULT - SUBJECTIVE AND OBJECTIVE BOX
[facility] Division of Hospital Medicine  Kelsey Edmondson DO  Pager (DERRELL, 8A-5P): [phone]      Patient is a 77y old  Female who presents with a chief complaint of IT fx (24 May 2023 09:06)      SUBJECTIVE / OVERNIGHT EVENTS: No acute events overnight. No new complaints, denies bleeding. Reports left arm pain    MEDICATIONS  (STANDING):  acetaminophen     Tablet .. 975 milliGRAM(s) Oral every 8 hours  calcium acetate 667 milliGRAM(s) Oral four times a day with meals  chlorhexidine 2% Cloths 1 Application(s) Topical daily  epoetin marla-epbx (RETACRIT) Injectable 4000 Unit(s) IV Push <User Schedule>  heparin   Injectable 5000 Unit(s) SubCutaneous every 8 hours  hydrALAZINE 50 milliGRAM(s) Oral <User Schedule>  lidocaine   4% Patch 1 Patch Transdermal daily  lidocaine/prilocaine Cream 1 Application(s) Topical daily  metoprolol succinate ER 12.5 milliGRAM(s) Oral daily  multivitamin 1 Tablet(s) Oral daily  mupirocin 2% Ointment 1 Application(s) Topical two times a day  senna 2 Tablet(s) Oral at bedtime    MEDICATIONS  (PRN):  aluminum hydroxide/magnesium hydroxide/simethicone Suspension 30 milliLiter(s) Oral four times a day PRN Indigestion  oxyCODONE    IR 10 milliGRAM(s) Oral every 4 hours PRN Severe Pain (7 - 10)  oxyCODONE    IR 5 milliGRAM(s) Oral every 4 hours PRN Moderate Pain (4 - 6)  polyethylene glycol 3350 17 Gram(s) Oral daily PRN Constipation      CAPILLARY BLOOD GLUCOSE      POCT Blood Glucose.: 135 mg/dL (23 May 2023 17:02)  POCT Blood Glucose.: 216 mg/dL (23 May 2023 11:59)    I&O's Summary    24 May 2023 07:01  -  24 May 2023 11:29  --------------------------------------------------------  IN: 400 mL / OUT: 1400 mL / NET: -1000 mL        PHYSICAL EXAM:  Vital Signs Last 24 Hrs  T(C): 36.5 (24 May 2023 10:15), Max: 37.1 (23 May 2023 21:10)  T(F): 97.7 (24 May 2023 10:15), Max: 98.8 (23 May 2023 21:10)  HR: 76 (24 May 2023 10:15) (62 - 76)  BP: 167/84 (24 May 2023 10:15) (127/57 - 167/84)  BP(mean): --  RR: 17 (24 May 2023 10:15) (17 - 18)  SpO2: 100% (24 May 2023 05:25) (99% - 100%)    Parameters below as of 24 May 2023 10:15  Patient On (Oxygen Delivery Method): room air        CONSTITUTIONAL: NAD, on room air, receiving HD  HEENT: sclera clear, MMM, eyes tracking  RESPIRATORY: Normal respiratory effort; lungs are clear to auscultation bilaterally  CARDIOVASCULAR: S1/S2, RRR, + systolic murmur  ABDOMEN: soft, Nontender, nondistended, normoactive bowel sounds, no rebound/guarding  PSYCH: calm, cooperative  MSK: left thigh swelling, dressing at left hip with dried blood, LUE swelling with + AVF with thrill and bruit receiving HD, left arm in sling  NEUROLOGY: awake, alert, no gross deficits  SKIN: No rashes; no palpable lesions    LABS:                        8.7    4.83  )-----------( 123      ( 24 May 2023 06:55 )             25.8     05-24    133<L>  |  96<L>  |  71<H>  ----------------------------<  134<H>  4.9   |  23  |  6.19<H>    Ca    8.2<L>      24 May 2023 06:55  Phos  4.4     05-24  Mg     2.20     05-24                  RADIOLOGY & ADDITIONAL TESTS:  Results Reviewed:   Imaging Personally Reviewed:  Electrocardiogram Personally Reviewed:    COORDINATION OF CARE:  Care Discussed with Consultants/Other Providers [Y/N]:  Prior or Outpatient Records Reviewed [Y/N]:

## 2023-05-24 NOTE — PROGRESS NOTE ADULT - SUBJECTIVE AND OBJECTIVE BOX
Cardiovascular Disease Progress Note  Date of Service: 23 @ 09:06    Overnight events: No acute events overnight.    Patient is undergoing HD in no distress.   Otherwise review of systems negative    Objective Findings:  T(C): 36.6 (23 @ 06:50), Max: 37.1 (23 @ 21:10)  HR: 65 (23 @ 06:50) (62 - 70)  BP: 140/66 (23 @ 06:50) (127/57 - 140/66)  RR: 17 (23 @ 06:50) (17 - 18)  SpO2: 100% (23 @ 05:25) (99% - 100%)  Wt(kg): --  Daily     Daily Weight in k (24 May 2023 06:50)      Physical Exam:  Gen: NAD; Patient resting comfortably  HEENT: EOMI, Normocephalic/ atraumatic  CV: RRR, normal S1 + S2, no m/r/g  Lungs:  Normal respiratory effort; clear to auscultation bilaterally  Abd: soft, non-tender; bowel sounds present  Ext: No edema; warm and well perfused    Telemetry: n/a    Laboratory Data:                        8.7    4.83  )-----------( 123      ( 24 May 2023 06:55 )             25.8     05-    133<L>  |  96<L>  |  71<H>  ----------------------------<  134<H>  4.9   |  23  |  6.19<H>    Ca    8.2<L>      24 May 2023 06:55  Phos  4.4       Mg     2.20                     Inpatient Medications:  MEDICATIONS  (STANDING):  acetaminophen     Tablet .. 975 milliGRAM(s) Oral every 8 hours  calcium acetate 667 milliGRAM(s) Oral four times a day with meals  chlorhexidine 2% Cloths 1 Application(s) Topical daily  epoetin marla-epbx (RETACRIT) Injectable 4000 Unit(s) IV Push <User Schedule>  heparin   Injectable 5000 Unit(s) SubCutaneous every 8 hours  hydrALAZINE 50 milliGRAM(s) Oral <User Schedule>  lidocaine   4% Patch 1 Patch Transdermal daily  lidocaine/prilocaine Cream 1 Application(s) Topical daily  metoprolol succinate ER 12.5 milliGRAM(s) Oral daily  multivitamin 1 Tablet(s) Oral daily  mupirocin 2% Ointment 1 Application(s) Topical two times a day  senna 2 Tablet(s) Oral at bedtime      Assessment: 77 year old woman with ESRD on HD (MWF), HTN, DM (diet controlled), chronic type B aortic dissection, heart murmur, endometrial ca in remission s/p total hysterectomy in  presents to the ED s/p fall.    Plan of Care:      #Post op cardiac evaluation-  - Pt s/p L femoral IMN 2023.    - Ms. Harris displays no signs of post-op coronary ischemia or volume overload  Volume optimization with HD.   - Continue current cardiac management.       #Type B aortic dissection  - Chronic (Diagnosed >10 years ago)  - Pt hemodynamically stable  - Continue to monitor    #HTN  - BP acceptable on current regimen    #ESRD  - HD as per renal.         Update given to outpatient cardiologist, Dr. Cheo Grimes.         Over 25 minutes spent on total encounter; more than 50% of the visit was spent counseling and/or coordinating care by the attending physician.      Gabo Burris MD [facility]  Cardiovascular Disease  [phone]

## 2023-05-24 NOTE — PROGRESS NOTE ADULT - SUBJECTIVE AND OBJECTIVE BOX
[facility] DIVISION OF KIDNEY DISEASES AND HYPERTENSION -- HEMODIALYSIS NOTE   Nehrology Office [phone]  available on [employer] teams--> Bhanu Torres   --------------------------------------------------------------------------------  Chief Complaint: ESRD/Ongoing hemodialysis requirement  c/o shoulder pain   pt was evaluated during HD session this am. BP and Blood flow during dialysis are acceptable   24 hour events/subjective:      ALLERGIES & MEDICATIONS  --------------------------------------------------------------------------------  Allergies    No Known Allergies    Intolerances      Standing Inpatient Medications  acetaminophen     Tablet .. 975 milliGRAM(s) Oral every 8 hours  calcium acetate 667 milliGRAM(s) Oral four times a day with meals  chlorhexidine 2% Cloths 1 Application(s) Topical daily  epoetin marla-epbx (RETACRIT) Injectable 4000 Unit(s) IV Push <User Schedule>  heparin   Injectable 5000 Unit(s) SubCutaneous every 8 hours  hydrALAZINE 50 milliGRAM(s) Oral <User Schedule>  lidocaine   4% Patch 1 Patch Transdermal daily  lidocaine/prilocaine Cream 1 Application(s) Topical daily  metoprolol succinate ER 12.5 milliGRAM(s) Oral daily  multivitamin 1 Tablet(s) Oral daily  mupirocin 2% Ointment 1 Application(s) Topical two times a day  senna 2 Tablet(s) Oral at bedtime    PRN Inpatient Medications  aluminum hydroxide/magnesium hydroxide/simethicone Suspension 30 milliLiter(s) Oral four times a day PRN  oxyCODONE    IR 10 milliGRAM(s) Oral every 4 hours PRN  oxyCODONE    IR 5 milliGRAM(s) Oral every 4 hours PRN  polyethylene glycol 3350 17 Gram(s) Oral daily PRN        VITALS/PHYSICAL EXAM  --------------------------------------------------------------------------------  T(C): 36.5 (05-24-23 @ 10:15), Max: 37.1 (05-23-23 @ 21:10)  HR: 76 (05-24-23 @ 10:15) (65 - 76)  BP: 167/84 (05-24-23 @ 10:15) (127/57 - 167/84)  RR: 17 (05-24-23 @ 10:15) (17 - 18)  SpO2: 100% (05-24-23 @ 05:25) (100% - 100%)  Wt(kg): --        05-24-23 @ 07:01  -  05-24-23 @ 12:52  --------------------------------------------------------  IN: 400 mL / OUT: 1400 mL / NET: -1000 mL      Physical Exam:  	Gen NAD  	HEENT: no JVD  	Pulm: CTABL  	CV: S1S2,  	Abd: Soft,   	Ext:   - edema B/L LE   	Neuro: Awake and alert  	Skin: Warm and dry          Vascular:  BC  AVF cannulated     LABS/STUDIES  --------------------------------------------------------------------------------              8.7    4.83  >-----------<  123      [05-24-23 @ 06:55]              25.8     133  |  96  |  71  ----------------------------<  134      [05-24-23 @ 06:55]  4.9   |  23  |  6.19        Ca     8.2     [05-24-23 @ 06:55]      Mg     2.20     [05-24-23 @ 06:55]      Phos  4.4     [05-24-23 @ 06:55]              HBsAb 44.7      [05-19-23 @ 17:32]  HBcAb Reactive      [05-19-23 @ 17:32]

## 2023-05-24 NOTE — PROGRESS NOTE ADULT - ASSESSMENT
77yFemale w/ L basicervical femoral neck fracture, L proximal humerus fracture, s/p L femoral IMN on 5/20 and recovering well.    Plan:  - Low threshold for additional units of PRBCs - please keep Hgb >8  - Pain Control PRN  - DVT ppx: as per primary team  - ice/cold compress  - elevation of LUE  - LUE sling   - WBAT LLE  - PT/OT  - Dispo: acute rehab

## 2023-05-24 NOTE — PROGRESS NOTE ADULT - PROBLEM SELECTOR PLAN 1
Pt with ESRD on HD TIW MWF admitted with fall.   pt was evaluated during HD session this am. BP and Blood flow during dialysis are acceptable .  next HD on Friday   Hemoglobin below target range. s/p blood. started RICA 5/22/2023  continue on Phos binder   hypocalcemia . increase Ca bath   serum phosphorus in target range.   HTn acceptable   Monitor labs and BP.  pain management

## 2023-05-24 NOTE — PROGRESS NOTE ADULT - SUBJECTIVE AND OBJECTIVE BOX
ORTHOPAEDIC PROGRESS NOTE    SUBJECTIVE:  Pt seen and examined at bedside this am.  Doing well.  No acute events overnight.  Pt states LLE pain is well controlled but still endorsing moderate pain in LUE mostly when ambulating    OBJECTIVE:  Vital Signs Last 24 Hrs  T(C): 36.5 (24 May 2023 10:15), Max: 37.1 (23 May 2023 21:10)  T(F): 97.7 (24 May 2023 10:15), Max: 98.8 (23 May 2023 21:10)  HR: 76 (24 May 2023 10:15) (65 - 76)  BP: 167/84 (24 May 2023 10:15) (127/57 - 167/84)  BP(mean): --  RR: 17 (24 May 2023 10:15) (17 - 18)  SpO2: 100% (24 May 2023 05:25) (100% - 100%)    Parameters below as of 24 May 2023 10:15  Patient On (Oxygen Delivery Method): room air        Physical Exam:  General: NAD; resting comfrotably in bed  Resp: non labored  LUE:   Skin intact  Mild swelling of LUE   +TTP over shoulder, no TTP along remainder of extremity; compartments soft, in sling  Limited ROM at shoulder 2/2 pain  Motor: AIN/PIN/U intact  Sensory: Med/Rad/U SILT  +Rad pulse, WWP    LLE:   Dressing has mild SS strikethrough  Nopalpable hematomas or collections  +TA/GS/EHL, +NVI, SILT  +DP/PT    LABS                        8.7    4.83  )-----------( 123      ( 24 May 2023 06:55 )             25.8       05-24    133<L>  |  96<L>  |  71<H>  ----------------------------<  134<H>  4.9   |  23  |  6.19<H>    Ca    8.2<L>      24 May 2023 06:55  Phos  4.4     05-24  Mg     2.20     05-24            I&O's Summary    24 May 2023 07:01  -  24 May 2023 12:30  --------------------------------------------------------  IN: 400 mL / OUT: 1400 mL / NET: -1000 mL        acetaminophen     Tablet .. 975 milliGRAM(s) Oral every 8 hours  aluminum hydroxide/magnesium hydroxide/simethicone Suspension 30 milliLiter(s) Oral four times a day PRN  calcium acetate 667 milliGRAM(s) Oral four times a day with meals  chlorhexidine 2% Cloths 1 Application(s) Topical daily  epoetin marla-epbx (RETACRIT) Injectable 4000 Unit(s) IV Push <User Schedule>  heparin   Injectable 5000 Unit(s) SubCutaneous every 8 hours  hydrALAZINE 50 milliGRAM(s) Oral <User Schedule>  lidocaine   4% Patch 1 Patch Transdermal daily  lidocaine/prilocaine Cream 1 Application(s) Topical daily  metoprolol succinate ER 12.5 milliGRAM(s) Oral daily  multivitamin 1 Tablet(s) Oral daily  mupirocin 2% Ointment 1 Application(s) Topical two times a day  oxyCODONE    IR 10 milliGRAM(s) Oral every 4 hours PRN  oxyCODONE    IR 5 milliGRAM(s) Oral every 4 hours PRN  polyethylene glycol 3350 17 Gram(s) Oral daily PRN  senna 2 Tablet(s) Oral at bedtime

## 2023-05-24 NOTE — PROGRESS NOTE ADULT - PROBLEM SELECTOR PLAN 1
Baseline H/H 9s: multifactorial in setting of postop and renal failure s/p 3 1/2 U PRBC and 1 U plts  H/H and platelets now improved/stable  Likely with dysfunctional platelets in setting of renal failure  Transfuse for Hgb <7  c/w HSQ for DVT ppx

## 2023-05-25 ENCOUNTER — TRANSCRIPTION ENCOUNTER (OUTPATIENT)
Age: 78
End: 2023-05-25

## 2023-05-25 LAB
ANION GAP SERPL CALC-SCNC: 12 MMOL/L — SIGNIFICANT CHANGE UP (ref 7–14)
BUN SERPL-MCNC: 41 MG/DL — HIGH (ref 7–23)
CALCIUM SERPL-MCNC: 8.6 MG/DL — SIGNIFICANT CHANGE UP (ref 8.4–10.5)
CHLORIDE SERPL-SCNC: 98 MMOL/L — SIGNIFICANT CHANGE UP (ref 98–107)
CO2 SERPL-SCNC: 24 MMOL/L — SIGNIFICANT CHANGE UP (ref 22–31)
CREAT SERPL-MCNC: 4.34 MG/DL — HIGH (ref 0.5–1.3)
EGFR: 10 ML/MIN/1.73M2 — LOW
GLUCOSE SERPL-MCNC: 146 MG/DL — HIGH (ref 70–99)
HCT VFR BLD CALC: 25.6 % — LOW (ref 34.5–45)
HGB BLD-MCNC: 8.6 G/DL — LOW (ref 11.5–15.5)
MAGNESIUM SERPL-MCNC: 2.2 MG/DL — SIGNIFICANT CHANGE UP (ref 1.6–2.6)
MCHC RBC-ENTMCNC: 31.3 PG — SIGNIFICANT CHANGE UP (ref 27–34)
MCHC RBC-ENTMCNC: 33.6 GM/DL — SIGNIFICANT CHANGE UP (ref 32–36)
MCV RBC AUTO: 93.1 FL — SIGNIFICANT CHANGE UP (ref 80–100)
NRBC # BLD: 0 /100 WBCS — SIGNIFICANT CHANGE UP (ref 0–0)
NRBC # FLD: 0 K/UL — SIGNIFICANT CHANGE UP (ref 0–0)
PHOSPHATE SERPL-MCNC: 3.1 MG/DL — SIGNIFICANT CHANGE UP (ref 2.5–4.5)
PLATELET # BLD AUTO: 133 K/UL — LOW (ref 150–400)
POTASSIUM SERPL-MCNC: 4.1 MMOL/L — SIGNIFICANT CHANGE UP (ref 3.5–5.3)
POTASSIUM SERPL-SCNC: 4.1 MMOL/L — SIGNIFICANT CHANGE UP (ref 3.5–5.3)
RBC # BLD: 2.75 M/UL — LOW (ref 3.8–5.2)
RBC # FLD: 15.7 % — HIGH (ref 10.3–14.5)
SODIUM SERPL-SCNC: 134 MMOL/L — LOW (ref 135–145)
WBC # BLD: 4.37 K/UL — SIGNIFICANT CHANGE UP (ref 3.8–10.5)
WBC # FLD AUTO: 4.37 K/UL — SIGNIFICANT CHANGE UP (ref 3.8–10.5)

## 2023-05-25 PROCEDURE — 99232 SBSQ HOSP IP/OBS MODERATE 35: CPT | Mod: GC

## 2023-05-25 PROCEDURE — 99232 SBSQ HOSP IP/OBS MODERATE 35: CPT

## 2023-05-25 RX ORDER — OXYCODONE HYDROCHLORIDE 5 MG/1
10 TABLET ORAL EVERY 4 HOURS
Refills: 0 | Status: DISCONTINUED | OUTPATIENT
Start: 2023-05-25 | End: 2023-05-26

## 2023-05-25 RX ORDER — OXYCODONE HYDROCHLORIDE 5 MG/1
5 TABLET ORAL EVERY 4 HOURS
Refills: 0 | Status: DISCONTINUED | OUTPATIENT
Start: 2023-05-25 | End: 2023-05-26

## 2023-05-25 RX ADMIN — Medication 667 MILLIGRAM(S): at 18:09

## 2023-05-25 RX ADMIN — Medication 50 MILLIGRAM(S): at 18:10

## 2023-05-25 RX ADMIN — HEPARIN SODIUM 5000 UNIT(S): 5000 INJECTION INTRAVENOUS; SUBCUTANEOUS at 21:28

## 2023-05-25 RX ADMIN — Medication 667 MILLIGRAM(S): at 09:35

## 2023-05-25 RX ADMIN — LIDOCAINE 1 PATCH: 4 CREAM TOPICAL at 00:00

## 2023-05-25 RX ADMIN — CHLORHEXIDINE GLUCONATE 1 APPLICATION(S): 213 SOLUTION TOPICAL at 12:02

## 2023-05-25 RX ADMIN — POLYETHYLENE GLYCOL 3350 17 GRAM(S): 17 POWDER, FOR SOLUTION ORAL at 21:28

## 2023-05-25 RX ADMIN — LIDOCAINE 1 PATCH: 4 CREAM TOPICAL at 12:06

## 2023-05-25 RX ADMIN — HEPARIN SODIUM 5000 UNIT(S): 5000 INJECTION INTRAVENOUS; SUBCUTANEOUS at 12:02

## 2023-05-25 RX ADMIN — Medication 667 MILLIGRAM(S): at 12:02

## 2023-05-25 RX ADMIN — Medication 667 MILLIGRAM(S): at 21:28

## 2023-05-25 RX ADMIN — HEPARIN SODIUM 5000 UNIT(S): 5000 INJECTION INTRAVENOUS; SUBCUTANEOUS at 05:09

## 2023-05-25 RX ADMIN — Medication 12.5 MILLIGRAM(S): at 05:08

## 2023-05-25 RX ADMIN — Medication 975 MILLIGRAM(S): at 06:18

## 2023-05-25 RX ADMIN — Medication 1 TABLET(S): at 12:02

## 2023-05-25 RX ADMIN — SENNA PLUS 2 TABLET(S): 8.6 TABLET ORAL at 21:28

## 2023-05-25 RX ADMIN — LIDOCAINE 1 PATCH: 4 CREAM TOPICAL at 20:36

## 2023-05-25 RX ADMIN — Medication 975 MILLIGRAM(S): at 12:02

## 2023-05-25 RX ADMIN — MUPIROCIN 1 APPLICATION(S): 20 OINTMENT TOPICAL at 05:10

## 2023-05-25 RX ADMIN — Medication 975 MILLIGRAM(S): at 05:08

## 2023-05-25 NOTE — DISCHARGE NOTE NURSING/CASE MANAGEMENT/SOCIAL WORK - NSDCPEFALRISK_GEN_ALL_CORE
For information on Fall & Injury Prevention, visit: [email or web address] OR  [email or web address] OR  https://www.cdc.gov/steadi/patient.html

## 2023-05-25 NOTE — DIETITIAN INITIAL EVALUATION ADULT - NS FNS DIET ORDER
Diet, Regular (05-20-23 @ 02:49)  Diet, DASH/TLC:   Sodium & Cholesterol Restricted  For patients receiving Renal Replacement - No Protein Restr, No Conc K, No Conc Phos, Low Sodium (RENAL) (05-18-23 @ 22:28)

## 2023-05-25 NOTE — PROGRESS NOTE ADULT - SUBJECTIVE AND OBJECTIVE BOX
Pt seen/examined. Doing well. Pain controlled. No acute overnight complaints or events. Had dialysis session yesterday    T(C): 36.6 (05-24-23 @ 21:24), Max: 37.1 (05-24-23 @ 05:25)  HR: 78 (05-24-23 @ 21:24) (65 - 78)  BP: 129/65 (05-24-23 @ 21:24) (129/65 - 167/84)  RR: 18 (05-24-23 @ 21:24) (17 - 18)  SpO2: 100% (05-24-23 @ 21:24) (99% - 100%)  Wt(kg): --  - Gen: NAD    Physical Exam:  General: NAD; resting comfrotably in bed  Resp: non labored  LUE:   Skin intact  Mild swelling of LUE   +TTP over shoulder, no TTP along remainder of extremity; compartments soft, in sling  Limited ROM at shoulder 2/2 pain  Motor: AIN/PIN/U intact  Sensory: Med/Rad/U SILT  +Rad pulse, WWP    LLE:   Dressing clean/dry/intact  no palpable hematomas or collections  +TA/GS/EHL, +NVI, SILT  +DP/PT    Assessment: 77yFemale w/ L basicervical femoral neck fracture, L proximal humerus fracture, s/p L femoral IMN on 5/20 and recovering well.    Plan:  - Low threshold for additional units of PRBCs- any signs of tachycardia or hypotension, please keep Hgb >8  - Pain Control  - DVT ppx: as per primary team  - ice/cold compress  - elevation of LUE  - WBAT LLE, NWB in sling LUE  - PT/OT

## 2023-05-25 NOTE — PROGRESS NOTE ADULT - SUBJECTIVE AND OBJECTIVE BOX
Cardiovascular Disease Progress Note  Date of Service: 23 @ 08:52    Overnight events: No acute events overnight.  Patient denies chest pain or SOB.     Otherwise review of systems negative    Objective Findings:  T(C): 36.9 (23 @ 05:08), Max: 37 (23 @ 15:21)  HR: 84 (23 @ 05:08) (65 - 84)  BP: 133/84 (23 @ 05:08) (129/65 - 167/84)  RR: 18 (23 @ 05:08) (17 - 18)  SpO2: 100% (23 @ 05:08) (99% - 100%)  Wt(kg): --  Daily     Daily Weight in k (24 May 2023 10:15)      Physical Exam:  Gen: NAD; Patient resting comfortably  HEENT: EOMI, Normocephalic/ atraumatic  CV: RRR, normal S1 + S2, no m/r/g  Lungs:  Normal respiratory effort; clear to auscultation bilaterally  Abd: soft, non-tender; bowel sounds present  Ext: No edema; warm and well perfused    Telemetry: n/a    Laboratory Data:                        8.6    4.37  )-----------( 133      ( 25 May 2023 05:59 )             25.    134<L>  |  98  |  41<H>  ----------------------------<  146<H>  4.1   |  24  |  4.34<H>    Ca    8.6      25 May 2023 05:59  Phos  3.1       Mg     2.20                     Inpatient Medications:  MEDICATIONS  (STANDING):  acetaminophen     Tablet .. 975 milliGRAM(s) Oral every 8 hours  calcium acetate 667 milliGRAM(s) Oral four times a day with meals  chlorhexidine 2% Cloths 1 Application(s) Topical daily  epoetin marla-epbx (RETACRIT) Injectable 4000 Unit(s) IV Push <User Schedule>  heparin   Injectable 5000 Unit(s) SubCutaneous every 8 hours  hydrALAZINE 50 milliGRAM(s) Oral <User Schedule>  lidocaine   4% Patch 1 Patch Transdermal daily  lidocaine/prilocaine Cream 1 Application(s) Topical daily  metoprolol succinate ER 12.5 milliGRAM(s) Oral daily  multivitamin 1 Tablet(s) Oral daily  senna 2 Tablet(s) Oral at bedtime      Assessment: 77 year old woman with ESRD on HD (MWF), HTN, DM (diet controlled), chronic type B aortic dissection, heart murmur, endometrial ca in remission s/p total hysterectomy in  presents to the ED s/p fall.    Plan of Care:      #Post op cardiac evaluation-  - Pt s/p L femoral IMN 2023.    - Ms. Harris displays no signs of post-op coronary ischemia or volume overload  Volume optimization with HD.   - Continue current cardiac management.       #Type B aortic dissection  - Chronic (Diagnosed >10 years ago)  - Pt hemodynamically stable  - Continue to monitor    #HTN  - BP acceptable on current regimen    #ESRD  - HD as per renal.    Update given to outpatient cardiologist, Dr. Cheo Grimes.         Over 25 minutes spent on total encounter; more than 50% of the visit was spent counseling and/or coordinating care by the attending physician.      Gabo Burris MD [facility]  Cardiovascular Disease  [phone]

## 2023-05-25 NOTE — PROGRESS NOTE ADULT - SUBJECTIVE AND OBJECTIVE BOX
[facility] DIVISION OF KIDNEY DISEASES AND HYPERTENSION -- HEMODIALYSIS NOTE   Nehrology Office [phone]  available on [employer] teams--> Bhanu Torres   --------------------------------------------------------------------------------  Chief Complaint: ESRD/Ongoing hemodialysis requirement  pain is better controlled   24 hour events/subjective:      ALLERGIES & MEDICATIONS  --------------------------------------------------------------------------------  Allergies    No Known Allergies    Intolerances      Standing Inpatient Medications  acetaminophen     Tablet .. 975 milliGRAM(s) Oral every 8 hours  calcium acetate 667 milliGRAM(s) Oral four times a day with meals  chlorhexidine 2% Cloths 1 Application(s) Topical daily  epoetin marla-epbx (RETACRIT) Injectable 4000 Unit(s) IV Push <User Schedule>  heparin   Injectable 5000 Unit(s) SubCutaneous every 8 hours  hydrALAZINE 50 milliGRAM(s) Oral <User Schedule>  lidocaine   4% Patch 1 Patch Transdermal daily  lidocaine/prilocaine Cream 1 Application(s) Topical daily  metoprolol succinate ER 12.5 milliGRAM(s) Oral daily  multivitamin 1 Tablet(s) Oral daily  senna 2 Tablet(s) Oral at bedtime    PRN Inpatient Medications  aluminum hydroxide/magnesium hydroxide/simethicone Suspension 30 milliLiter(s) Oral four times a day PRN  oxyCODONE    IR 10 milliGRAM(s) Oral every 4 hours PRN  oxyCODONE    IR 5 milliGRAM(s) Oral every 4 hours PRN  polyethylene glycol 3350 17 Gram(s) Oral daily PRN        VITALS/PHYSICAL EXAM  --------------------------------------------------------------------------------  T(C): 37.1 (05-25-23 @ 11:55), Max: 37.1 (05-25-23 @ 11:55)  HR: 67 (05-25-23 @ 11:55) (67 - 84)  BP: 143/57 (05-25-23 @ 11:55) (129/65 - 143/57)  RR: 18 (05-25-23 @ 11:55) (18 - 18)  SpO2: 100% (05-25-23 @ 11:55) (100% - 100%)  Wt(kg): --        05-24-23 @ 07:01  -  05-25-23 @ 07:00  --------------------------------------------------------  IN: 400 mL / OUT: 1400 mL / NET: -1000 mL      Physical Exam:  	Gen NAD  	HEENT: no JVD  	Pulm: CTABL  	CV: S1S2,  	Abd: Soft,   	Ext:   - edema B/L LE   	Neuro: Awake and alert  	Skin: Warm and dry          Vascular:  L BC AVF + bruit    LABS/STUDIES  --------------------------------------------------------------------------------              8.6    4.37  >-----------<  133      [05-25-23 @ 05:59]              25.6     134  |  98  |  41  ----------------------------<  146      [05-25-23 @ 05:59]  4.1   |  24  |  4.34        Ca     8.6     [05-25-23 @ 05:59]      Mg     2.20     [05-25-23 @ 05:59]      Phos  3.1     [05-25-23 @ 05:59]              HBsAb 44.0      [05-24-23 @ 07:00]  HBsAg Nonreact      [05-24-23 @ 07:00]  HBcAb Reactive      [05-24-23 @ 07:00]  HCV 0.25, Nonreact      [05-24-23 @ 07:00]

## 2023-05-25 NOTE — PROGRESS NOTE ADULT - SUBJECTIVE AND OBJECTIVE BOX
[facility] Division of Hospital Medicine  Kelsey Edmondson DO  Pager (DONF, 8A-5P): [phone]      Patient is a 77y old  Female who presents with a chief complaint of IT fx (25 May 2023 08:52)      SUBJECTIVE / OVERNIGHT EVENTS: No acute events overnight. No new complaints.    MEDICATIONS  (STANDING):  acetaminophen     Tablet .. 975 milliGRAM(s) Oral every 8 hours  calcium acetate 667 milliGRAM(s) Oral four times a day with meals  chlorhexidine 2% Cloths 1 Application(s) Topical daily  epoetin marla-epbx (RETACRIT) Injectable 4000 Unit(s) IV Push <User Schedule>  heparin   Injectable 5000 Unit(s) SubCutaneous every 8 hours  hydrALAZINE 50 milliGRAM(s) Oral <User Schedule>  lidocaine   4% Patch 1 Patch Transdermal daily  lidocaine/prilocaine Cream 1 Application(s) Topical daily  metoprolol succinate ER 12.5 milliGRAM(s) Oral daily  multivitamin 1 Tablet(s) Oral daily  senna 2 Tablet(s) Oral at bedtime    MEDICATIONS  (PRN):  aluminum hydroxide/magnesium hydroxide/simethicone Suspension 30 milliLiter(s) Oral four times a day PRN Indigestion  oxyCODONE    IR 10 milliGRAM(s) Oral every 4 hours PRN Severe Pain (7 - 10)  oxyCODONE    IR 5 milliGRAM(s) Oral every 4 hours PRN Moderate Pain (4 - 6)  polyethylene glycol 3350 17 Gram(s) Oral daily PRN Constipation      CAPILLARY BLOOD GLUCOSE        I&O's Summary    24 May 2023 07:01  -  25 May 2023 07:00  --------------------------------------------------------  IN: 400 mL / OUT: 1400 mL / NET: -1000 mL        PHYSICAL EXAM:  Vital Signs Last 24 Hrs  T(C): 36.9 (25 May 2023 05:08), Max: 37 (24 May 2023 15:21)  T(F): 98.4 (25 May 2023 05:08), Max: 98.6 (24 May 2023 15:21)  HR: 84 (25 May 2023 05:08) (65 - 84)  BP: 133/84 (25 May 2023 05:08) (129/65 - 140/58)  BP(mean): --  RR: 18 (25 May 2023 05:08) (18 - 18)  SpO2: 100% (25 May 2023 05:08) (99% - 100%)    Parameters below as of 25 May 2023 05:08  Patient On (Oxygen Delivery Method): room air      CONSTITUTIONAL: NAD, on room air  HEENT: sclera clear, MMM, eyes tracking  RESPIRATORY: Normal respiratory effort; lungs are clear to auscultation bilaterally  CARDIOVASCULAR: S1/S2, RRR, + systolic murmur  ABDOMEN: soft, Nontender, nondistended, normoactive bowel sounds, no rebound/guarding  PSYCH: calm, cooperative  MSK: left thigh swelling with bruising, dressing at left hip with dried blood, LUE swelling with + AVF with thrill and bruit, left arm in sling  NEUROLOGY: awake, alert, no gross deficits  SKIN: No rashes; no palpable lesions    LABS:                        8.6    4.37  )-----------( 133      ( 25 May 2023 05:59 )             25.6     05-25    134<L>  |  98  |  41<H>  ----------------------------<  146<H>  4.1   |  24  |  4.34<H>    Ca    8.6      25 May 2023 05:59  Phos  3.1     05-25  Mg     2.20     05-25                  RADIOLOGY & ADDITIONAL TESTS:  Results Reviewed:   Imaging Personally Reviewed:  Electrocardiogram Personally Reviewed:    COORDINATION OF CARE:  Care Discussed with Consultants/Other Providers [Y/N]:  Prior or Outpatient Records Reviewed [Y/N]:

## 2023-05-25 NOTE — DISCHARGE NOTE NURSING/CASE MANAGEMENT/SOCIAL WORK - PATIENT PORTAL LINK FT
You can access the FollowMyHealth Patient Portal offered by [facility] by registering at the following website: [email or web address]. By joining [facility]’s FollowMyHealth portal, you will also be able to view your health information using other applications (apps) compatible with our system.

## 2023-05-25 NOTE — DIETITIAN INITIAL EVALUATION ADULT - NS FNS WEIGHT CHANGE REASON
Pt reported ~3lbs weight loss over the past 3 wks. As per HIE, pt's previous weight 4/28-130lbs. Most current weight 5/19/2023-127.14lbs./unintentional

## 2023-05-25 NOTE — DIETITIAN INITIAL EVALUATION ADULT - NSICDXPASTMEDICALHX_GEN_ALL_CORE_FT
PAST MEDICAL HISTORY:  Adult Onset Diabetes Mellitus,     Cancer of Endometrium s/p Hysterectomy , s/p Chemo.    CKD (chronic kidney disease)     Dissection of Aorta Type B    ESRD (end stage renal disease) on dialysis M,W,F   [facility] [address]    History of Hysterectomy with O     Hypertension

## 2023-05-25 NOTE — DIETITIAN INITIAL EVALUATION ADULT - OTHER INFO
PEr chart reviewm Pt is a 76 y/o F with pmhx of ESRD on HD (M/W/F), HTN, endometrial ca in remission s/p total hysterectomy in 2007 presents to the ED s/p fall with c/o L shoulder and L hip pain. Found to have L basicervical femoral neck fracture, L proximal humerus fracture, s/p L femoral IMN on 5/20 with course c/b postoperative acute blood loss anemia s/p multiple PRBC transfusions.    Patient seen at bedside. Report her appetite has decreased in hospital due to physically inactive. Pt reported <50% meal completion at breakfast this morning. As per RN flow sheet varies from 0-50%. Denies chewing and swallowing difficulties. Denies any GI distress (nausea/vomiting/diarrhea/constipation.) Last BM 5/24 as per RN flow sheet. Bowel regimen in use. Pt noted on bowel regimen. Pt dx with ESRD on HD, currently on Renal Replacement diet with No Conc K/phos, Low sodium. Calcium acetate ordered.     Discussed the importance of following low sodium, low phos/K diet while on HD. Reviewed low phos and potassium content of foods with patient, nutrition handout provided to the patient. RD to remain available for further nutritional interventions as indicated.

## 2023-05-25 NOTE — DISCHARGE NOTE NURSING/CASE MANAGEMENT/SOCIAL WORK - NSDCCRNAME_GEN_ALL_CORE_FT
[facility] [phone] [place] [address]   Transportation via [facility]

## 2023-05-25 NOTE — DIETITIAN INITIAL EVALUATION ADULT - ORAL INTAKE PTA/DIET HISTORY
Patient reports has a good appetite PTA, able to consume 2-3 meals daily. Pt denies any diet followed specifically, however states she "watches her salt intake". Pt endorses ~3lbs weight loss over the past 3 weeks.

## 2023-05-25 NOTE — PROGRESS NOTE ADULT - PROBLEM SELECTOR PLAN 1
Baseline H/H 9s: multifactorial in setting of postop and renal failure s/p 3 1/2 U PRBC and 1 U plts  H/H and platelets now improved/stable  Transfuse for Hgb <7  c/w HSQ for DVT ppx

## 2023-05-25 NOTE — PROGRESS NOTE ADULT - PROBLEM SELECTOR PLAN 1
Pt with ESRD on HD TIW MWF admitted with fall.     next HD on Friday   Hemoglobin below target range. s/p blood. started RICA 5/22/2023  continue on Phos binder   hypocalcemia . increase Ca bath   serum phosphorus in target range.   HTn acceptable   Monitor labs and BP.  pain management

## 2023-05-25 NOTE — DIETITIAN INITIAL EVALUATION ADULT - NS FNS REASON FOR WEIGHT CHANG
Pt noted with 2+ edema to left arm and leg, and 3+ edema to left hand as per RN flow sheet, which is masking her weight loss./altered GI function (specify)

## 2023-05-25 NOTE — DIETITIAN INITIAL EVALUATION ADULT - PERTINENT MEDS FT
MEDICATIONS  (STANDING):  acetaminophen     Tablet .. 975 milliGRAM(s) Oral every 8 hours  calcium acetate 667 milliGRAM(s) Oral four times a day with meals  chlorhexidine 2% Cloths 1 Application(s) Topical daily  epoetin marla-epbx (RETACRIT) Injectable 4000 Unit(s) IV Push <User Schedule>  heparin   Injectable 5000 Unit(s) SubCutaneous every 8 hours  hydrALAZINE 50 milliGRAM(s) Oral <User Schedule>  lidocaine   4% Patch 1 Patch Transdermal daily  lidocaine/prilocaine Cream 1 Application(s) Topical daily  metoprolol succinate ER 12.5 milliGRAM(s) Oral daily  multivitamin 1 Tablet(s) Oral daily  senna 2 Tablet(s) Oral at bedtime    MEDICATIONS  (PRN):  aluminum hydroxide/magnesium hydroxide/simethicone Suspension 30 milliLiter(s) Oral four times a day PRN Indigestion  oxyCODONE    IR 10 milliGRAM(s) Oral every 4 hours PRN Severe Pain (7 - 10)  oxyCODONE    IR 5 milliGRAM(s) Oral every 4 hours PRN Moderate Pain (4 - 6)  polyethylene glycol 3350 17 Gram(s) Oral daily PRN Constipation

## 2023-05-25 NOTE — DIETITIAN INITIAL EVALUATION ADULT - PERTINENT LABORATORY DATA
05-25    134<L>  |  98  |  41<H>  ----------------------------<  146<H>  4.1   |  24  |  4.34<H>    Ca    8.6      25 May 2023 05:59  Phos  3.1     05-25  Mg     2.20     05-25    A1C with Estimated Average Glucose Result: 5.0 % (05-23-23 @ 07:00)

## 2023-05-25 NOTE — DIETITIAN INITIAL EVALUATION ADULT - PROBLEM SELECTOR PLAN 2
EKG showing NSR at 62 bpm w/ known RBBB without acute ischemic changes, prolonged QTc 536.   - pt with no active cardiac conditions, including unstable coronary syndrome, decompensated CHF, significant arrhythmias, or significant valvular disease.  - RCRI score of 1, which equates to a 6.0% 30-day risk of death, MI, or cardiac arrest  - Pt has good functional capacity with METs >=4 (Pt able to walk >1 block and 1 flight of stairs without any CP or SOB).  - Pt with systolic murmur on exam however chronic per pt since childhood. Pt reports recent echo 1.5 year ago with her cardiologist, Dr. Grimes which she states did not show anything concerning. TTE from 2018 w/ mild to mod mitral regurg, nml aortic valve. Pt asymptomatic   - No need for further cardiac testing however f/u repeat EKG for prolonged QTc

## 2023-05-25 NOTE — DIETITIAN INITIAL EVALUATION ADULT - PROBLEM SELECTOR PLAN 8
Return visit for this 28 year old female with multiple sclerosis.    Type of Multiple Sclerosis:  Relapsing-remitting  Date of Diagnosis:  April 2022  Dates of Exacerbations:  April 2022  Additional neurological diagnoses:  Common migraine headaches  Brain MRI Dates and Results:  MRI April 2022: Relatively large left-sided capsular/corona radiata demyelinating lesion with contrast enhancement accounting for right body sensory and motor symptoms.  MRI brain 11/25/2022 showed a marked decrease in size of the the left capsular/corona radiata Other white matter lesions stable.  Normal cervical spine MRI 11/25/2022.  Current Immunotherapy/Date of Initiation:  Mayzent/June 2022.  Previous Immunotherapy/Reason for Discontinuation:  None.    Interval History:  28-year-old woman with relapsing-remitting multiple sclerosis, diagnosis in April of 2022, taking Mayzent as disease modifying therapy.        She is seen today as a work-in because of 2 major concerns, 1 being her memory, the other being pain in her right thigh.  The last couple of months she feels that her memory is not as it is good as it used to be.  She thinks she is more forgetful.  She can be driving and forget where she is going.  Continues to work at her job full-time, not having any problems at work.  As far as the leg is concerned she had some dysesthetic discomfort in the right lower extremity following her initial MS attack a year ago but that seemed to settle down.  The discomfort is mainly in the anterior thigh, sometimes in the hip. No back pain.  Denies any numbness in her right lower extremity.  I put her on gabapentin increasing the dose to 300 mg t.i.d. but she does not think it has made much of a difference.  She has used some cyclobenzaprine but that is to make her tired.  She has some residual numbness in the palm of her right hand from her initial MS flare up but otherwise no numbness.  Her vision is fine.         She is using amitriptyline for  migraine prevention and she feels that 10 mg q.h.s. has helped her headaches quite a bit.  They tend to be right periorbital in location.  If she gets a breakthrough migraine which is usually only 2 or 3 times a month she can take the Relpax and that works well within an hour or so to resolve the headache.       She says she is sleeping well.  Emotionally she feels she is doing fine, denies feeling depressed.    Past Medical History:   Diagnosis Date   • Abdominal pain, RUQ (right upper quadrant) 8/14/2017   • Abnormal Pap smear of cervix    • Allergic rhinoconjunctivitis 10/27/2016    10/27/2016 SPT + tree   • Analgesic overuse headache 3/22/2019   • Anxiety    • Autonomic dysfunction 12/23/2015   • Bilateral bunions    • Body aches 1/30/2019   • Chlamydia infection 8/6/2015   • Chronic headaches    • Dizziness    • Fibromyalgia    • GERD (gastroesophageal reflux disease) 10/5/2017    EGD 10/05/2017. Irregular Z line. Small sliding hiatal hernia. Normal mucosa in the esophagus, stomach and duodenum. Esophagus dilated 18 mm.   • Hyperalgesia 1/30/2019   • Irritable bowel syndrome 9/3/2015    11/20/2015: Improved    • Pelvic pain in female 7/24/2015 7/2015--Chlamydia--treated 7/2015--normal u/s. 8/2015--diagnostic LSC by Iraj that was negative Physical therapy 5/2016--helped some 8/2016 and 8/2017--negative CT scans GI evaluation with scope 2017--GERD and small hiatal hernia. Pelvic u/s ordered 8/2018--negative Urology referral placed 7/2018--negative evaluation and negative cytso Repeat physical therapy--   • Underweight 11/20/2015    ...  11/20/2015: Recommend Falls breakfast essentials or Ensure nightly    • Vasovagal syncope 11/20/2015    ...  11/20/2015: Twelve-lead EKG normal. Laboratory workup ordered. Referral to Dr. Aliyah Pan.         Current Outpatient Medications   Medication Sig Dispense Refill   • Baclofen 5 MG tablet Take 2 tablets by mouth 2 times daily as needed (muscle spasm). 90  tablet 1   • amitriptyline (ELAVIL) 10 MG tablet Take 2 tablets by mouth nightly. 60 tablet 5   • eletriptan (RELPAX) 40 MG tablet Admin Instructions: Take one tablet by mouth at onset of a migraine with ibuprofen or Excedrin. Repeat dose in 2 hours if needed without ibuprofen or Excedrin. Max 2 doses/day, 2 days/week 9 tablet 3   • armodafinil 50 MG tablet 0.5 tab to 1 tab daily. 30 tablet 5   • cyclobenzaprine (FLEXERIL) 10 MG tablet Take 1 tablet by mouth 2 times daily as needed (muscle tightness). 30 tablet 1   • Cyanocobalamin (B-12) 2000 MCG Tab Take 2,000 mcg by mouth daily.     • Siponimod Fumarate (Mayzent) 2 MG Tab Take 2 mg by mouth daily.     • aspirin-acetaminophen-caffeine (EXCEDRIN MIGRAINE) 250-250-65 MG per tablet Take 1 tablet by mouth every 8 hours as needed for Pain (headache).     • ibuprofen (MOTRIN) 400 MG tablet Take 1 tablet by mouth every 6 hours as needed for Pain.     • ondansetron (ZOFRAN) 4 MG tablet Take 4 mg by mouth every 8 hours as needed for Nausea.      • hydrOXYzine (ATARAX) 25 MG tablet Take 25 mg by mouth as needed for Anxiety.      • medroxyPROGESTERone Acetate (DEPO-PROVERA IM) Inject into the muscle every 3 months.     • Ergocalciferol (VITAMIN D2) 2000 units Tab Take 50 mcg by mouth daily.        No current facility-administered medications for this visit.       Examination:    Visit Vitals  /71 (BP Location: LUE - Left upper extremity, Patient Position: Sitting, Cuff Size: Regular)   Pulse 85   Ht 5' 1\" (1.549 m)   Wt 48.1 kg (106 lb)   LMP  (LMP Unknown) Comment: per pt couple of years ago   BMI 20.03 kg/m²       Alert and oriented x3.  She is in good spirits today.  He gait and station normal including tandem walk.  EVELYN.  EOMs are full without nystagmus.  Speech is clear.  Normal and symmetrical facial strength no drift or tremor of the outstretched hands.  Excellent finger dexterity.  Normal strength in her upper lower extremities.  Deep tendon reflexes are  Stable.   - c/w metoprolol ER 12.5 mg qd and hydralazine 50 mg QD. Will hold losartan preop.

## 2023-05-26 ENCOUNTER — TRANSCRIPTION ENCOUNTER (OUTPATIENT)
Age: 78
End: 2023-05-26

## 2023-05-26 VITALS
HEART RATE: 64 BPM | RESPIRATION RATE: 18 BRPM | TEMPERATURE: 99 F | SYSTOLIC BLOOD PRESSURE: 132 MMHG | OXYGEN SATURATION: 100 % | DIASTOLIC BLOOD PRESSURE: 57 MMHG

## 2023-05-26 LAB
ANION GAP SERPL CALC-SCNC: 13 MMOL/L — SIGNIFICANT CHANGE UP (ref 7–14)
BUN SERPL-MCNC: 61 MG/DL — HIGH (ref 7–23)
CALCIUM SERPL-MCNC: 8.2 MG/DL — LOW (ref 8.4–10.5)
CHLORIDE SERPL-SCNC: 96 MMOL/L — LOW (ref 98–107)
CO2 SERPL-SCNC: 23 MMOL/L — SIGNIFICANT CHANGE UP (ref 22–31)
CREAT SERPL-MCNC: 5.69 MG/DL — HIGH (ref 0.5–1.3)
EGFR: 7 ML/MIN/1.73M2 — LOW
GLUCOSE SERPL-MCNC: 149 MG/DL — HIGH (ref 70–99)
HCT VFR BLD CALC: 24.7 % — LOW (ref 34.5–45)
HGB BLD-MCNC: 8.2 G/DL — LOW (ref 11.5–15.5)
MAGNESIUM SERPL-MCNC: 2.3 MG/DL — SIGNIFICANT CHANGE UP (ref 1.6–2.6)
MCHC RBC-ENTMCNC: 30.5 PG — SIGNIFICANT CHANGE UP (ref 27–34)
MCHC RBC-ENTMCNC: 33.2 GM/DL — SIGNIFICANT CHANGE UP (ref 32–36)
MCV RBC AUTO: 91.8 FL — SIGNIFICANT CHANGE UP (ref 80–100)
NRBC # BLD: 0 /100 WBCS — SIGNIFICANT CHANGE UP (ref 0–0)
NRBC # FLD: 0 K/UL — SIGNIFICANT CHANGE UP (ref 0–0)
PHOSPHATE SERPL-MCNC: 3.6 MG/DL — SIGNIFICANT CHANGE UP (ref 2.5–4.5)
PLATELET # BLD AUTO: 151 K/UL — SIGNIFICANT CHANGE UP (ref 150–400)
POTASSIUM SERPL-MCNC: 4.6 MMOL/L — SIGNIFICANT CHANGE UP (ref 3.5–5.3)
POTASSIUM SERPL-SCNC: 4.6 MMOL/L — SIGNIFICANT CHANGE UP (ref 3.5–5.3)
RBC # BLD: 2.69 M/UL — LOW (ref 3.8–5.2)
RBC # FLD: 15.7 % — HIGH (ref 10.3–14.5)
SARS-COV-2 RNA SPEC QL NAA+PROBE: SIGNIFICANT CHANGE UP
SODIUM SERPL-SCNC: 132 MMOL/L — LOW (ref 135–145)
WBC # BLD: 4.62 K/UL — SIGNIFICANT CHANGE UP (ref 3.8–10.5)
WBC # FLD AUTO: 4.62 K/UL — SIGNIFICANT CHANGE UP (ref 3.8–10.5)

## 2023-05-26 PROCEDURE — 99239 HOSP IP/OBS DSCHRG MGMT >30: CPT

## 2023-05-26 PROCEDURE — 90935 HEMODIALYSIS ONE EVALUATION: CPT | Mod: GC

## 2023-05-26 RX ORDER — OXYCODONE HYDROCHLORIDE 5 MG/1
1 TABLET ORAL
Qty: 0 | Refills: 0 | DISCHARGE
Start: 2023-05-26

## 2023-05-26 RX ORDER — POLYETHYLENE GLYCOL 3350 17 G/17G
17 POWDER, FOR SOLUTION ORAL
Qty: 0 | Refills: 0 | DISCHARGE
Start: 2023-05-26

## 2023-05-26 RX ORDER — LOSARTAN POTASSIUM 100 MG/1
1 TABLET, FILM COATED ORAL
Refills: 0 | DISCHARGE

## 2023-05-26 RX ORDER — SENNA PLUS 8.6 MG/1
2 TABLET ORAL
Qty: 0 | Refills: 0 | DISCHARGE
Start: 2023-05-26

## 2023-05-26 RX ORDER — ACETAMINOPHEN 500 MG
3 TABLET ORAL
Qty: 0 | Refills: 0 | DISCHARGE
Start: 2023-05-26

## 2023-05-26 RX ORDER — LIDOCAINE 4 G/100G
1 CREAM TOPICAL
Qty: 0 | Refills: 0 | DISCHARGE
Start: 2023-05-26

## 2023-05-26 RX ORDER — POLYETHYLENE GLYCOL 3350 17 G/17G
17 POWDER, FOR SOLUTION ORAL
Refills: 0 | Status: DISCONTINUED | OUTPATIENT
Start: 2023-05-26 | End: 2023-05-26

## 2023-05-26 RX ORDER — ASPIRIN/CALCIUM CARB/MAGNESIUM 324 MG
1 TABLET ORAL
Qty: 60 | Refills: 0
Start: 2023-05-26 | End: 2023-06-24

## 2023-05-26 RX ADMIN — LIDOCAINE 1 PATCH: 4 CREAM TOPICAL at 00:00

## 2023-05-26 RX ADMIN — Medication 667 MILLIGRAM(S): at 12:11

## 2023-05-26 RX ADMIN — Medication 975 MILLIGRAM(S): at 12:11

## 2023-05-26 RX ADMIN — ERYTHROPOIETIN 4000 UNIT(S): 10000 INJECTION, SOLUTION INTRAVENOUS; SUBCUTANEOUS at 07:34

## 2023-05-26 RX ADMIN — LIDOCAINE 1 PATCH: 4 CREAM TOPICAL at 12:12

## 2023-05-26 RX ADMIN — CHLORHEXIDINE GLUCONATE 1 APPLICATION(S): 213 SOLUTION TOPICAL at 12:12

## 2023-05-26 RX ADMIN — HEPARIN SODIUM 5000 UNIT(S): 5000 INJECTION INTRAVENOUS; SUBCUTANEOUS at 12:11

## 2023-05-26 RX ADMIN — HEPARIN SODIUM 5000 UNIT(S): 5000 INJECTION INTRAVENOUS; SUBCUTANEOUS at 04:52

## 2023-05-26 RX ADMIN — Medication 12.5 MILLIGRAM(S): at 04:52

## 2023-05-26 RX ADMIN — Medication 1 TABLET(S): at 12:11

## 2023-05-26 RX ADMIN — Medication 10 MILLIGRAM(S): at 04:52

## 2023-05-26 NOTE — PROGRESS NOTE ADULT - PROBLEM SELECTOR PROBLEM 4
ESRD on dialysis
Anemia secondary to renal failure
Anemia secondary to renal failure
ESRD on dialysis

## 2023-05-26 NOTE — PROGRESS NOTE ADULT - PROVIDER SPECIALTY LIST ADULT
Cardiology
Cardiology
Orthopedics
Anesthesia
Cardiology
Nephrology
Nephrology
Orthopedics
Cardiology
Hospitalist
Orthopedics
Nephrology
Orthopedics
Nephrology
Nephrology
Hospitalist

## 2023-05-26 NOTE — DISCHARGE NOTE PROVIDER - NSDCCPCAREPLAN_GEN_ALL_CORE_FT
PRINCIPAL DISCHARGE DIAGNOSIS  Diagnosis: Fracture of proximal end of femur  Assessment and Plan of Treatment: L hip fx S/P ORIF on 5/20   - hgb 6.4 5/21, sp 1 unit prbc, sp 1 unit prbc ovn 5/21-5/22, sp 1 unit prbc 5/22 during HD  ***YOu will need to take   mg 2 x day for  x 30d with Protonix **  - WBAT LLE   continue pain medications as ordered: TYlenol for moderat and Ocy for sever pain  -Bowel regimen      SECONDARY DISCHARGE DIAGNOSES  Diagnosis: DM (diabetes mellitus)  Assessment and Plan of Treatment: Consistent carb diet    Diagnosis: ESRD on dialysis  Assessment and Plan of Treatment: continue HD as per routine schedule, last session was 5/26/2023     PRINCIPAL DISCHARGE DIAGNOSIS  Diagnosis: Fracture of proximal end of femur  Assessment and Plan of Treatment: L hip fx S/P ORIF on 5/20 and required PRBC and platelets transfusions and now CBC stable  Take  mg 2 x day for  x 30d to prevent blood clots with Protonix   continue pain medications as ordered: TYlenol for moderate and Oxycodone for severe pain along with bowel regimen for constipation        SECONDARY DISCHARGE DIAGNOSES  Diagnosis: ESRD on dialysis  Assessment and Plan of Treatment: continue HD as per routine schedule, last session was 5/26/2023.    Diagnosis: DM (diabetes mellitus)  Assessment and Plan of Treatment: Consistent carb diet, continue home meds    Diagnosis: Left humeral fracture  Assessment and Plan of Treatment: Orthopedic surgery did not recommend surgery for this fracture but recommended that you maintain your arm in sling and followup with them in their office.    Diagnosis: HTN (hypertension)  Assessment and Plan of Treatment: your BP was controlled off the losartan so we discontinued this medication. Please continue your metoprolol and hydralazine

## 2023-05-26 NOTE — PROGRESS NOTE ADULT - PROBLEM SELECTOR PLAN 8
Pt reports hx of chronic type B aortic dissection over 10 years. Pt asymptomatic.   - c/w BP and HR control
Stable.   - c/w metoprolol ER 12.5 mg qd and hydralazine 50 mg QD. Will hold losartan preop.
Pt reports hx of chronic type B aortic dissection over 10 years. Pt asymptomatic.   - c/w BP and HR control

## 2023-05-26 NOTE — PROGRESS NOTE ADULT - PROBLEM SELECTOR PROBLEM 1
Intertrochanteric fracture of left hip
ESRD on hemodialysis
Postoperative anemia due to acute blood loss
Intertrochanteric fracture of left hip
Postoperative anemia due to acute blood loss
Intertrochanteric fracture of left hip
Postoperative anemia due to acute blood loss

## 2023-05-26 NOTE — PROGRESS NOTE ADULT - SUBJECTIVE AND OBJECTIVE BOX
Cardiovascular Disease Progress Note  Date of Service: 23 @ 09:31    Overnight events: No acute events overnight.    Patient denies chest pain or SOB.   Otherwise review of systems negative    Objective Findings:  T(C): 37.1 (23 @ 06:15), Max: 37.2 (23 @ 21:28)  HR: 66 (23 @ 06:15) (66 - 74)  BP: 114/54 (23 @ 06:15) (114/54 - 143/57)  RR: 18 (23 @ 06:15) (18 - 18)  SpO2: 100% (23 @ 04:52) (100% - 100%)  Wt(kg): --  Daily     Daily Weight in k.8 (26 May 2023 06:15)      Physical Exam:  Gen: NAD; Patient resting comfortably  HEENT: EOMI, Normocephalic/ atraumatic  CV: RRR, normal S1 + S2, no m/r/g  Lungs:  Normal respiratory effort; clear to auscultation bilaterally  Abd: soft, non-tender; bowel sounds present  Ext: No edema; warm and well perfused    Telemetry: n/a    Laboratory Data:                        8.2    4.62  )-----------( 151      ( 26 May 2023 06:30 )             24.7         132<L>  |  96<L>  |  61<H>  ----------------------------<  149<H>  4.6   |  23  |  5.69<H>    Ca    8.2<L>      26 May 2023 06:30  Phos  3.6       Mg     2.30                     Inpatient Medications:  MEDICATIONS  (STANDING):  acetaminophen     Tablet .. 975 milliGRAM(s) Oral every 8 hours  calcium acetate 667 milliGRAM(s) Oral four times a day with meals  chlorhexidine 2% Cloths 1 Application(s) Topical daily  epoetin marla-epbx (RETACRIT) Injectable 4000 Unit(s) IV Push <User Schedule>  heparin   Injectable 5000 Unit(s) SubCutaneous every 8 hours  hydrALAZINE 50 milliGRAM(s) Oral <User Schedule>  lidocaine   4% Patch 1 Patch Transdermal daily  lidocaine/prilocaine Cream 1 Application(s) Topical daily  metoprolol succinate ER 12.5 milliGRAM(s) Oral daily  Nephro-nathanael 1 Tablet(s) Oral daily  polyethylene glycol 3350 17 Gram(s) Oral two times a day  senna 2 Tablet(s) Oral at bedtime      Assessment: 77 year old woman with ESRD on HD (MWF), HTN, DM (diet controlled), chronic type B aortic dissection, heart murmur, endometrial ca in remission s/p total hysterectomy in  presents to the ED s/p fall.    Plan of Care:      #Post op cardiac evaluation-  - Pt s/p L femoral IMN 2023.    - Ms. Harris displays no signs of post-op coronary ischemia or volume overload  Volume optimization with HD.   - Continue current cardiac management.       #Type B aortic dissection  - Chronic (Diagnosed >10 years ago)  - Pt hemodynamically stable  - Continue to monitor    #HTN  - BP acceptable on current regimen    #ESRD  - HD as per renal.    #ACP (advance care planning)-  Advanced care planning was discussed with the patient.    Cardiac findings were discussed in detail and all questions were answered.    Update given to outpatient cardiologist, Dr. Cheo Grimes.           Over 25 minutes spent on total encounter; more than 50% of the visit was spent counseling and/or coordinating care by the attending physician.      Gabo Burris MD [facility]  Cardiovascular Disease  [phone]

## 2023-05-26 NOTE — PROGRESS NOTE ADULT - PROBLEM SELECTOR PROBLEM 3
ESRD on dialysis
Left humeral fracture
ESRD on dialysis
Left humeral fracture

## 2023-05-26 NOTE — DISCHARGE NOTE PROVIDER - CARE PROVIDER_API CALL
Elmer Billingsley  Orthopaedic Surgery  [address], [address]  [place] [address]  Phone: [phone]  Fax: [phone]  Follow Up Time:

## 2023-05-26 NOTE — DISCHARGE NOTE PROVIDER - NSDCMRMEDTOKEN_GEN_ALL_CORE_FT
acetaminophen 325 mg oral tablet: 3 tab(s) orally every 8 hours  aspirin 325 mg oral tablet: 1 tab(s) orally 2 times a day  hydrALAZINE 50 mg oral tablet: 1 orally once a day  lidocaine 4% topical film: Apply topically to affected area once a day  metoprolol succinate 25 mg oral tablet, extended release: 0.5 orally once a day  multivitamin: 1 tab(s) orally once a day  oxyCODONE 5 mg oral tablet: 1 tab(s) orally every 4 hours As needed Moderate Pain (4 - 6)  PhosLo 667 mg oral capsule: 1 orally 3 times a day  polyethylene glycol 3350 oral powder for reconstitution: 17 gram(s) orally 2 times a day  senna leaf extract oral tablet: 2 tab(s) orally once a day (at bedtime)

## 2023-05-26 NOTE — PROGRESS NOTE ADULT - SUBJECTIVE AND OBJECTIVE BOX
POD #6 s/p L hip IMN    Overnight events: None    SUBJECTIVE: Pt seen and examined at bedside. Patient is doing well, pt is complaining of constipation this AM. Pain is controlled with medication. Patient states she has been ambulating with PT, however, scared to fall again and feel hesitant with ambulation. Encouraged patient to continue to work with PT.       OBJECTIVE:  Vital Signs Last 24 Hrs  T(C): 37.1 (26 May 2023 06:15), Max: 37.2 (25 May 2023 21:28)  T(F): 98.7 (26 May 2023 06:15), Max: 99 (25 May 2023 21:28)  HR: 66 (26 May 2023 06:15) (66 - 74)  BP: 114/54 (26 May 2023 06:15) (114/54 - 143/57)  BP(mean): --  RR: 18 (26 May 2023 06:15) (18 - 18)  SpO2: 100% (26 May 2023 04:52) (100% - 100%)    Parameters below as of 26 May 2023 06:15  Patient On (Oxygen Delivery Method): room air          05-24-23 @ 07:01  -  05-25-23 @ 07:00  --------------------------------------------------------  IN: 400 mL / OUT: 1400 mL / NET: -1000 mL        Physical Examination:  GEN: NAD, resting quietly  PULM: symmetric chest rise bilaterally, no increased WOB  ABD: nondistended  EXTR:   LUE:  Skin intact  Mild swelling of LUE   +TTP over shoulder, no TTP along remainder of extremity; compartments soft, in sling  Limited ROM at shoulder 2/2 pain  Motor: AIN/PIN/U intact  Sensory: Med/Rad/U SILT  +Rad pulse, WWP    LLE:   Dressing clean/dry/intact  no palpable hematomas or collections  +TA/GS/EHL, +NVI, SILT  +DP/PT      LABS:                        8.6    4.37  )-----------( 133      ( 25 May 2023 05:59 )             25.6       05-25    134<L>  |  98  |  41<H>  ----------------------------<  146<H>  4.1   |  24  |  4.34<H>    Ca    8.6      25 May 2023 05:59  Phos  3.1     05-25  Mg     2.20     05-25

## 2023-05-26 NOTE — PROGRESS NOTE ADULT - SUBJECTIVE AND OBJECTIVE BOX
[facility] Division of Hospital Medicine  Kelsey Edmondson DO  Pager (DERRELL, 8A-5P): [phone]      Patient is a 77y old  Female who presents with a chief complaint of IT fx (26 May 2023 09:30)      SUBJECTIVE / OVERNIGHT EVENTS: No acute events overnight. Still has not had an adequate BM. Pain is controlled.    MEDICATIONS  (STANDING):  acetaminophen     Tablet .. 975 milliGRAM(s) Oral every 8 hours  calcium acetate 667 milliGRAM(s) Oral four times a day with meals  chlorhexidine 2% Cloths 1 Application(s) Topical daily  epoetin marla-epbx (RETACRIT) Injectable 4000 Unit(s) IV Push <User Schedule>  heparin   Injectable 5000 Unit(s) SubCutaneous every 8 hours  hydrALAZINE 50 milliGRAM(s) Oral <User Schedule>  lidocaine   4% Patch 1 Patch Transdermal daily  lidocaine/prilocaine Cream 1 Application(s) Topical daily  metoprolol succinate ER 12.5 milliGRAM(s) Oral daily  Nephro-nathanael 1 Tablet(s) Oral daily  polyethylene glycol 3350 17 Gram(s) Oral two times a day  senna 2 Tablet(s) Oral at bedtime    MEDICATIONS  (PRN):  aluminum hydroxide/magnesium hydroxide/simethicone Suspension 30 milliLiter(s) Oral four times a day PRN Indigestion  oxyCODONE    IR 10 milliGRAM(s) Oral every 4 hours PRN Severe Pain (7 - 10)  oxyCODONE    IR 5 milliGRAM(s) Oral every 4 hours PRN Moderate Pain (4 - 6)      CAPILLARY BLOOD GLUCOSE        I&O's Summary    26 May 2023 07:01  -  26 May 2023 11:40  --------------------------------------------------------  IN: 400 mL / OUT: 1900 mL / NET: -1500 mL        PHYSICAL EXAM:  Vital Signs Last 24 Hrs  T(C): 36.9 (26 May 2023 09:32), Max: 37.2 (25 May 2023 21:28)  T(F): 98.5 (26 May 2023 09:32), Max: 99 (25 May 2023 21:28)  HR: 67 (26 May 2023 09:32) (66 - 74)  BP: 116/63 (26 May 2023 09:32) (114/54 - 143/57)  BP(mean): --  RR: 16 (26 May 2023 09:32) (16 - 18)  SpO2: 100% (26 May 2023 04:52) (100% - 100%)    Parameters below as of 26 May 2023 09:32  Patient On (Oxygen Delivery Method): room air      CONSTITUTIONAL: NAD, on room air  HEENT: sclera clear, MMM, eyes tracking  RESPIRATORY: Normal respiratory effort; lungs are clear to auscultation bilaterally  CARDIOVASCULAR: S1/S2, RRR, + systolic murmur  ABDOMEN: soft, Nontender, nondistended, normoactive bowel sounds, no rebound/guarding  PSYCH: calm, cooperative  MSK: left thigh swelling with bruising, dressing at left hip with dried blood, LUE swelling with + AVF with thrill and bruit, left arm in sling  NEUROLOGY: awake, alert, no gross deficits  SKIN: bruising along left hip  LABS:                        8.2    4.62  )-----------( 151      ( 26 May 2023 06:30 )             24.7     05-26    132<L>  |  96<L>  |  61<H>  ----------------------------<  149<H>  4.6   |  23  |  5.69<H>    Ca    8.2<L>      26 May 2023 06:30  Phos  3.6     05-26  Mg     2.30     05-26                  RADIOLOGY & ADDITIONAL TESTS:  Results Reviewed:   Imaging Personally Reviewed:  Electrocardiogram Personally Reviewed:    COORDINATION OF CARE:  Care Discussed with Consultants/Other Providers [Y/N]:  Prior or Outpatient Records Reviewed [Y/N]:

## 2023-05-26 NOTE — PROGRESS NOTE ADULT - PROBLEM SELECTOR PROBLEM 7
HTN (hypertension)
Thrombocytopenia

## 2023-05-26 NOTE — PROGRESS NOTE ADULT - SUBJECTIVE AND OBJECTIVE BOX
[facility] DIVISION OF KIDNEY DISEASES AND HYPERTENSION   FOLLOW UP NOTE    --------------------------------------------------------------------------------  Chief Complaint: ESRD on HD    24 hour events/subjective: Pt. was seen and examined today during HD. Denies fever, chills, SOB, CP.    PAST HISTORY  --------------------------------------------------------------------------------  No significant changes to PMH, PSH, FHx, SHx, unless otherwise noted    ALLERGIES & MEDICATIONS  --------------------------------------------------------------------------------  Allergies    No Known Allergies    Intolerances      Standing Inpatient Medications  acetaminophen     Tablet .. 975 milliGRAM(s) Oral every 8 hours  calcium acetate 667 milliGRAM(s) Oral four times a day with meals  chlorhexidine 2% Cloths 1 Application(s) Topical daily  epoetin marla-epbx (RETACRIT) Injectable 4000 Unit(s) IV Push <User Schedule>  heparin   Injectable 5000 Unit(s) SubCutaneous every 8 hours  hydrALAZINE 50 milliGRAM(s) Oral <User Schedule>  lidocaine   4% Patch 1 Patch Transdermal daily  lidocaine/prilocaine Cream 1 Application(s) Topical daily  metoprolol succinate ER 12.5 milliGRAM(s) Oral daily  Nephro-nathanael 1 Tablet(s) Oral daily  polyethylene glycol 3350 17 Gram(s) Oral two times a day  senna 2 Tablet(s) Oral at bedtime    PRN Inpatient Medications  aluminum hydroxide/magnesium hydroxide/simethicone Suspension 30 milliLiter(s) Oral four times a day PRN  oxyCODONE    IR 10 milliGRAM(s) Oral every 4 hours PRN  oxyCODONE    IR 5 milliGRAM(s) Oral every 4 hours PRN      REVIEW OF SYSTEMS  --------------------------------------------------------------------------------  Gen: No fevers/chills weak+  Head/Eyes/Ears: No HA   Respiratory: No dyspnea, cough  CV: No chest pain  GI: No abdominal pain, diarrhea  : on HD  MSK: No edema, L hip and L shoulder pain  Skin: No rashes  Heme: Anemia    VITALS/PHYSICAL EXAM  --------------------------------------------------------------------------------  T(C): 37.1 (05-26-23 @ 12:04), Max: 37.2 (05-25-23 @ 21:28)  HR: 64 (05-26-23 @ 12:04) (64 - 74)  BP: 132/57 (05-26-23 @ 12:04) (114/54 - 142/73)  RR: 18 (05-26-23 @ 12:04) (16 - 18)  SpO2: 100% (05-26-23 @ 12:04) (100% - 100%)  Wt(kg): --      05-26-23 @ 07:01  -  05-26-23 @ 13:00  --------------------------------------------------------  IN: 400 mL / OUT: 1900 mL / NET: -1500 mL      Physical Exam:  	Gen: elderly female, resting  	HEENT: Anicteric  	Pulm: CTA B/L  	CV: S1S2+  	Abd: Soft, +BS    	Ext: No LE edema B/L, dressing over left hip seen  	Neuro: Awake  	Skin: Warm and dry  	Dialysis access: LUE AVF, connected to HD machine    LABS/STUDIES  --------------------------------------------------------------------------------              8.2    4.62  >-----------<  151      [05-26-23 @ 06:30]              24.7     132  |  96  |  61  ----------------------------<  149      [05-26-23 @ 06:30]  4.6   |  23  |  5.69        Ca     8.2     [05-26-23 @ 06:30]      Mg     2.30     [05-26-23 @ 06:30]      Phos  3.6     [05-26-23 @ 06:30]    Creatinine Trend:  SCr 5.69 [05-26 @ 06:30]  SCr 4.34 [05-25 @ 05:59]  SCr 6.19 [05-24 @ 06:55]  SCr 4.63 [05-23 @ 07:00]  SCr 6.48 [05-22 @ 07:10]    HBsAb 44.0      [05-24-23 @ 07:00]  HBsAg Nonreact      [05-24-23 @ 07:00]  HBcAb Reactive      [05-24-23 @ 07:00]  HCV 0.25, Nonreact      [05-24-23 @ 07:00]

## 2023-05-26 NOTE — PROGRESS NOTE ADULT - ASSESSMENT
Assessment: 77yFemale w/ L basicervical femoral neck fracture, L proximal humerus fracture, s/p L femoral IMN on 5/20 and recovering well on the floor    Plan:  - Please adjust pt's bowel regimen due to constipation  - Low threshold for additional units of PRBCs- any signs of tachycardia or hypotension, please keep Hgb >8  - Pain Control  - DVT ppx: as per primary team  - ice/cold compress  - elevation of LUE  - WBAT LLE, NWB in sling LUE  - PT/OT    For all questions related to patient care, please reach out to the on-call team via the pager.     Elle Ulloa, PGY 1  Orthopaedic Surgery  [facility] [id number]  [facility] [id number]  [facility] [id number]

## 2023-05-26 NOTE — PROGRESS NOTE ADULT - PROBLEM SELECTOR PROBLEM 8
H/O aortic dissection
H/O aortic dissection
HTN (hypertension)
H/O aortic dissection

## 2023-05-26 NOTE — PROGRESS NOTE ADULT - PROBLEM SELECTOR PLAN 7
Stable.   - c/w metoprolol ER 12.5 mg qd and hydralazine 50 mg QD.   BP acceptable off losartan
Stable.   - c/w metoprolol ER 12.5 mg qd and hydralazine 50 mg QD.   continue to hold losartan given low-nml BP
Stable.   - c/w metoprolol ER 12.5 mg qd and hydralazine 50 mg QD. Will hold losartan preop.
Stable.   - c/w metoprolol ER 12.5 mg qd and hydralazine 50 mg QD.   BP acceptable off losartan
Plt 74  - continue to trend for now
Stable.   - c/w metoprolol ER 12.5 mg qd and hydralazine 50 mg QD.   BP acceptable off losartan
Stable.   - c/w metoprolol ER 12.5 mg qd and hydralazine 50 mg QD. Will hold losartan preop.
Stable.   - c/w metoprolol ER 12.5 mg qd and hydralazine 50 mg QD.   continue to hold losartan given low-nml BP

## 2023-05-26 NOTE — PROGRESS NOTE ADULT - PROBLEM SELECTOR PLAN 5
Pt not on any medications, diet controlled.  A1C: 5.0
Hb 9.4, near baseline. No signs or symptoms of bleeding.  - monitor Hb
Pt not on any medications, diet controlled.  - continue to monitor glucose
Pt not on any medications, diet controlled.  A1C: 5.0
Pt not on any medications, diet controlled.  - continue to monitor glucose
Pt not on any medications, diet controlled.  - continue to monitor glucose  Check A1C

## 2023-05-26 NOTE — PROGRESS NOTE ADULT - PROBLEM SELECTOR PLAN 1
Pt with ESRD on HD TIW MWF admitted with fall. Pt seen during HD treatment today. Tolerating HD treatment. Will arrange for maintenance HD on Wednesday. Hemoglobin below target range. on RICA with HD. serum phosphorus in target range. Monitor labs and BP.

## 2023-05-26 NOTE — PROGRESS NOTE ADULT - REASON FOR ADMISSION
IT fx

## 2023-05-26 NOTE — PROGRESS NOTE ADULT - PROBLEM SELECTOR PROBLEM 5
Anemia secondary to renal failure
DM (diabetes mellitus)

## 2023-05-26 NOTE — PROGRESS NOTE ADULT - PROBLEM SELECTOR PROBLEM 6
Thrombocytopenia
Thrombocytopenia
DM (diabetes mellitus)
Thrombocytopenia

## 2023-05-26 NOTE — PROGRESS NOTE ADULT - ATTENDING COMMENTS
ESRD on HD    Tolerating it well. Cont HD TIW and prn as tolerated.
ESRD s/p fall   s/p OR   pt was evaluated during HD session this am. BP and Blood flow during dialysis are acceptable  anemia getting blood. start RICA  continue on Phos binder   Further detailed plan of management and recommendation as outlined above.

## 2023-05-26 NOTE — PROGRESS NOTE ADULT - PROBLEM SELECTOR PLAN 2
L basicervical femoral neck fracture s/p L femoral IMN on 5/20  Pain control with oxycodone prn, bowel regimen: increased miralax to BID, c/w senna, suppository PRN  PT eval: MARYANN  cold compress  Monitor leg for bleeding/worsening edema
EKG showing NSR at 62 bpm w/ known RBBB without acute ischemic changes, prolonged QTc 536.   - pt with no active cardiac conditions, including unstable coronary syndrome, decompensated CHF, significant arrhythmias, or significant valvular disease.      - Pt with systolic murmur on exam however chronic per pt since childhood. Pt reports recent echo 1.5 year ago with her cardiologist, Dr. Grimes which she states did not show anything concerning. TTE from 2018 w/ mild to mod mitral regurg, nml aortic valve. Pt asymptomatic     Anesthesia req ECHO as pt reports need to rest while walking due to SOB, no acute findings  pt medically optimized for planned procedure
L basicervical femoral neck fracture s/p L femoral IMN on 5/20  Pain control with oxycodone prn, bowel regimen  PT eval: MARYANN  cold compress  Monitor leg for bleeding/worsening edema
L basicervical femoral neck fracture s/p L femoral IMN on 5/20  Pain control with oxycodone prn, bowel regimen  PT eval: MARYANN  cold compress  Monitor leg for bleeding/worsening edema
L basicervical femoral neck fracture s/p L femoral IMN on 5/20  Pain control with oxycodone prn  PT eval: MARYANN  cold compress  Monitor leg for bleeding/worsening edema
per ortho, no acute surgical intervention  - maintain in sling   - f/u ortho recs  - PT/OT
per ortho, no acute surgical intervention  - maintain in sling   - f/u ortho recs  - PT/OT
L basicervical femoral neck fracture s/p L femoral IMN on 5/20  Pain control with oxycodone prn, bowel regimen  PT eval: MARYANN  cold compress  Monitor leg for bleeding/worsening edema

## 2023-05-26 NOTE — PROGRESS NOTE ADULT - PROBLEM SELECTOR PROBLEM 2
Intertrochanteric fracture of left hip
Left humeral fracture
Left humeral fracture
Preop examination

## 2023-05-26 NOTE — PROGRESS NOTE ADULT - PROBLEM SELECTOR PLAN 3
L proximal humerus fracture  per ortho, no acute surgical intervention  - maintain in sling and followup with ortho outpatient  - PT/OT: rehab
per ortho, no acute surgical intervention  - maintain in sling   - f/u ortho recs  - PT/OT
Last HD 5/17. Pt appears euvolemic, electrolytes stable. No urgent need for HD.  - f/u AM labs  HD done pre-OR due to hyperkalemia, then late OR  MWF HD routine
L proximal humerus fracture  per ortho, no acute surgical intervention  - maintain in sling   - PT/OT: rehab
L proximal humerus fracture  per ortho, no acute surgical intervention  - maintain in sling and followup with ortho outpatient  - PT/OT: rehab
Last HD 5/17. Pt appears euvolemic, electrolytes stable. No urgent need for HD.  - f/u AM labs  HD done pre-OR due to hyperkalemia, then late OR  MWF HD routine
L proximal humerus fracture  per ortho, no acute surgical intervention  - maintain in sling   - PT/OT: rehab
L proximal humerus fracture  per ortho, no acute surgical intervention  - maintain in sling   - PT/OT: rehab

## 2023-05-26 NOTE — PROGRESS NOTE ADULT - PROBLEM SELECTOR PLAN 4
C/w HD with Procrit per nephrology recs, HD schedule M/W/F  Hepatitis panel reviewed: noted past/resolved infection with Hep B
C/w HD with Procrit per nephrology recs, HD schedule M/W/F
Last HD 5/17. Pt appears euvolemic, electrolytes stable. No urgent need for HD.  - f/u AM labs  HD post OR today  MWF HD routine
C/w HD with Procrit per nephrology recs, HD schedule M/W/F  Hepatitis panel reviewed: noted past/resolved infection with Hep B
Hb 9.4, near baseline. No signs or symptoms of bleeding.  - monitor Hb
Hb 9.4, near baseline. No signs or symptoms of bleeding.  - monitor Hb    drop in hg, post op anemia; plan to transfuse PRBC and stop SQ heparin for now, +VD boots
C/w HD with Procrit per nephrology recs, HD schedule M/W/F
C/w HD with Procrit per nephrology recs, HD schedule M/W/F

## 2023-05-26 NOTE — DISCHARGE NOTE PROVIDER - DETAILS OF MALNUTRITION DIAGNOSIS/DIAGNOSES
This patient has been assessed with a concern for Malnutrition and was treated during this hospitalization for the following Nutrition diagnosis/diagnoses:     -  05/25/2023: Moderate protein-calorie malnutrition

## 2023-05-26 NOTE — PROGRESS NOTE ADULT - NUTRITIONAL ASSESSMENT
This patient has been assessed with a concern for Malnutrition and has been determined to have a diagnosis/diagnoses of Moderate protein-calorie malnutrition.    This patient is being managed with:   Diet Renal Restrictions-  For patients receiving Renal Replacement - No Protein Restr No Conc K No Conc Phos Low Sodium  Supplement Feeding Modality:  Oral  Nepro Cans or Servings Per Day:  1       Frequency:  Daily  Entered: May 25 2023  5:20PM

## 2023-05-26 NOTE — PROGRESS NOTE ADULT - PROBLEM SELECTOR PROBLEM 9
H/O aortic dissection
Prophylactic measure

## 2023-05-26 NOTE — DISCHARGE NOTE PROVIDER - HOSPITAL COURSE
78 y/o F with pmhx of ESRD on HD (MoWeFri), HTN, endometrial ca in remission s/p total hysterectomy in 2007 presents to the ED s/p fall at 1000 this AM c/o L shoulder and L hip pain. Pt found to have left humeral and IT fx.   Intertrochanteric fracture of left hip  - Pt mechanical s/p fall, found to have left IT fx  - S/P ORIF Lt hip 5/20   - Pain control with oxycodone prn  - S/P 1 U PLT and multiple PRBC   - Transfuse hgb<7   - PT - rehab     Preop examination.   - EKG showing NSR at 62 bpm w/ known RBBB without acute ischemic changes, prolonged QTc 536. Rpt EKG 5/22 with QTc 483.   - Pt with no active cardiac conditions, including unstable coronary syndrome, decompensated CHF, significant arrhythmias.   - RCRI score of 1, which equates to a 6.0% 30-day risk of death, MI, or cardiac arrest  - Pt has good functional capacity with METs >=4 (Pt able to walk >1 block and 1 flight of stairs without any CP or SOB).  - TTE 5/19/23: EF 55-60%, no sig changes compared to 1/7/2018     Left humeral fracture  - Per ortho, no acute surgical intervention  - Maintain in sling     ESRD on dialysis  - HD per renal     DM   - Pt not on any medications, diet controlled.    Thrombocytopenia  - S/p 1U PLT 5/19 preop    HTN   - C/w metoprolol ER 12.5 mg qd and hydralazine 50 mg QD. Will hold losartan preop.    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 Pt asymptomatic 76 y/o F with pmhx of ESRD on HD (M/W/F), HTN, endometrial ca in remission s/p total hysterectomy in 2007 presents to the ED s/p fall with c/o L shoulder and L hip pain. Found to have L basicervical femoral neck fracture, L proximal humerus fracture, s/p L femoral IMN on 5/20 with course c/b postoperative acute blood loss anemia s/p multiple PRBC transfusions. No surgical intervention required for left humeral fracture per ortho.    Patient stable for DC to [facility]. 76 y/o F with pmhx of ESRD on HD (M/W/F), HTN, endometrial ca in remission s/p total hysterectomy in 2007 presents to the ED s/p fall with c/o L shoulder and L hip pain. Found to have L basicervical femoral neck fracture, L proximal humerus fracture, s/p L femoral IMN on 5/20 with course c/b postoperative acute blood loss anemia s/p multiple PRBC transfusions and now with stable Hemoglobin. No surgical intervention required for left humeral fracture per ortho.    Patient stable for DC to [facility].

## 2023-05-26 NOTE — PROGRESS NOTE ADULT - PROBLEM SELECTOR PLAN 9
DVT prophylaxis: SCDs  Diet: regular renal diet  PT recs: MARYANN CALDWELL planning to MARYANN. Patient prefers [facility]
DVT prophylaxis: SCDs  Diet: regular renal diet  PT recs: MARYANN  DC planning once H/H stable  Discussed plan with patient and sisters at bedside
DVT prophylaxis: SCDs  Diet: regular renal diet  PT recs: MARYANN  DC planning to MARYANN. Patient prefers [facility]  Stable for DC.
DVT prophylaxis: SCDs  Diet: regular renal diet  PT recs: MARYANN  DC planning to MARYANN. Patient prefers [facility]  Stable for DC.  Discussed with patient and sisters at bedside
DVT prophylaxis: SCDs  Diet: regular renal diet
DVT prophylaxis: SCDs  Diet: regular renal diet
Pt reports hx of chronic type B aortic dissection over 10 years. Pt asymptomatic.   - c/w BP and HR control
DVT prophylaxis: SCDs  Diet: regular renal diet  PT recs: MARYANN  DC planning once H/H stable  Discussed plan with patient and sisters at bedside

## 2023-06-07 ENCOUNTER — APPOINTMENT (OUTPATIENT)
Dept: ORTHOPEDIC SURGERY | Facility: CLINIC | Age: 78
End: 2023-06-07
Payer: MEDICARE

## 2023-06-07 ENCOUNTER — APPOINTMENT (OUTPATIENT)
Dept: ORTHOPEDIC SURGERY | Facility: CLINIC | Age: 78
End: 2023-06-07

## 2023-06-07 VITALS
WEIGHT: 130 LBS | HEART RATE: 85 BPM | OXYGEN SATURATION: 99 % | HEIGHT: 62 IN | SYSTOLIC BLOOD PRESSURE: 148 MMHG | DIASTOLIC BLOOD PRESSURE: 80 MMHG | BODY MASS INDEX: 23.92 KG/M2

## 2023-06-07 DIAGNOSIS — S42.202A UNSPECIFIED FRACTURE OF UPPER END OF LEFT HUMERUS, INITIAL ENCOUNTER FOR CLOSED FRACTURE: ICD-10-CM

## 2023-06-07 PROCEDURE — 99024 POSTOP FOLLOW-UP VISIT: CPT

## 2023-06-07 PROCEDURE — 73030 X-RAY EXAM OF SHOULDER: CPT | Mod: 26,LT

## 2023-06-07 PROCEDURE — 73502 X-RAY EXAM HIP UNI 2-3 VIEWS: CPT | Mod: 26,LT

## 2023-06-07 NOTE — HISTORY OF PRESENT ILLNESS
[de-identified] : DOS: 05/20/2023\par Procedure: Left Hip IM Nail, Non-op Left Proximal Humerus Fracture [de-identified] : Patient is a 77-year-old female who presents to the office today for her first postop visit after a left hip IM nail for an intertrochanteric fracture.  The patient also has a proximal humerus fracture that is being treated nonoperatively in a sling.  Overall the patient is doing well and her pain is well controlled.  She notes that she has been working with physical therapy and doing very well.  She presents today in a wheelchair with her nursing aide from [facility].  She denies any fevers, chills, shortness of breath, chest pain, or calf pain.  No complaints of neurological deficits/paresthesias of the left upper or left lower extremities. [de-identified] : Left Upper Extremity Exam\par \par Skin: Intact with residual ecchymosis noted to the proximal humerus and axilla.  There are no signs of active infection.  There are no gross deformities.\par ROM: Range of motion not fully examined due to the nature of her proximal humerus fracture.  Patient is able to demonstrate Codman's and pendulum exercises with no difficulty.\par Exam: Left upper extremity in a sling.  Sling is appropriately fitting.\par Neuro: AIN/PIN/median/radial/ulnar/axillary nerves intact to motor and sensory.  No sensory deficits are noted on examination\par Vascular: 2+ distal pulses, capillary refill is brisk\par \par Left Lower Extremity Examination\par \par Skin: Intact, with surgical incisions along the lateral and superolateral aspects of the hip healing well.  No signs of infection.  No erythema.  No drainage.  Staples were DC'd and Steri-Strips were placed.  Ecchymosis is noted on the distal third of the femur, particularly along the lateral aspect.\par ROM: The patient is able to flex to 90 degrees and extend to 180 degrees.\par Neuro: Sensation is grossly intact to the entire left lower extremity with no deficits\par Vascular: 2+ distal pulses, capillary refill is brisk, calves are soft and nontender with a negative Homans' sign [de-identified] : 3 x-ray views of the left shoulder were obtained.\par -Proximal humerus fracture with impaction\par -No dislocation is noted after axillary view attempted\par \par 3 x-ray views of the pelvis and left hip were obtained.\par \par -Intertrochanteric/basicervical fracture well aligned with orthopedic hardware in alignment [de-identified] : Patient is postop day 18 status post left hip IM nail and nonoperative management of a left proximal humerus fracture [de-identified] : Overall the patient is doing well.  She should continue to advance her ambulation as tolerated.  She should remain weightbearing as tolerated to the left lower extremity.  She should remain nonweightbearing of the left upper extremity in a sling.  She may take her arm out of the sling to work on Codman's and pendulum exercises.  The patient may utilize Tylenol for pain.  She should follow-up with Dr. Vallejo in 2 weeks at [address] [place] [address] [address] in [place].

## 2023-06-21 ENCOUNTER — APPOINTMENT (OUTPATIENT)
Dept: ORTHOPEDIC SURGERY | Facility: CLINIC | Age: 78
End: 2023-06-21

## 2023-06-26 ENCOUNTER — APPOINTMENT (OUTPATIENT)
Dept: ORTHOPEDIC SURGERY | Facility: CLINIC | Age: 78
End: 2023-06-26

## 2023-07-05 ENCOUNTER — APPOINTMENT (OUTPATIENT)
Dept: ORTHOPEDIC SURGERY | Facility: CLINIC | Age: 78
End: 2023-07-05

## 2023-07-10 NOTE — ED ADULT TRIAGE NOTE - BP NONINVASIVE SYSTOLIC (MM HG)
Pt was seen for a brief meeting. Pt reported feeling very exhausted, and was very emotional, at the brink of tears. 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 Pt admitted having difficulty processing that, but said she was so tired that she didn't feel like she could talk about it now. Agreed to meet tomorrow hopefully in morning before she was feeling exhausted later in the day. Will follow up. 140

## 2023-07-17 ENCOUNTER — INPATIENT (INPATIENT)
Facility: HOSPITAL | Age: 78
LOS: 29 days | Discharge: NURSING, REHAB OR LONG-TERM CARE | End: 2023-08-16
Attending: ORTHOPAEDIC SURGERY | Admitting: STUDENT IN AN ORGANIZED HEALTH CARE EDUCATION/TRAINING PROGRAM
Payer: MEDICARE

## 2023-07-17 ENCOUNTER — NON-APPOINTMENT (OUTPATIENT)
Age: 78
End: 2023-07-17

## 2023-07-17 VITALS
HEIGHT: 61 IN | RESPIRATION RATE: 18 BRPM | DIASTOLIC BLOOD PRESSURE: 76 MMHG | TEMPERATURE: 98 F | SYSTOLIC BLOOD PRESSURE: 162 MMHG | OXYGEN SATURATION: 97 % | HEART RATE: 72 BPM

## 2023-07-17 DIAGNOSIS — Z90.710 ACQUIRED ABSENCE OF BOTH CERVIX AND UTERUS: Chronic | ICD-10-CM

## 2023-07-17 LAB
ALBUMIN SERPL ELPH-MCNC: 3.2 G/DL — LOW (ref 3.3–5)
ALP SERPL-CCNC: 192 U/L — HIGH (ref 40–120)
ALT FLD-CCNC: 22 U/L — SIGNIFICANT CHANGE UP (ref 4–33)
ANION GAP SERPL CALC-SCNC: 11 MMOL/L — SIGNIFICANT CHANGE UP (ref 7–14)
APTT BLD: 31.1 SEC — SIGNIFICANT CHANGE UP (ref 27–36.3)
AST SERPL-CCNC: 36 U/L — HIGH (ref 4–32)
BASOPHILS # BLD AUTO: 0.03 K/UL — SIGNIFICANT CHANGE UP (ref 0–0.2)
BASOPHILS NFR BLD AUTO: 0.4 % — SIGNIFICANT CHANGE UP (ref 0–2)
BILIRUB SERPL-MCNC: 0.8 MG/DL — SIGNIFICANT CHANGE UP (ref 0.2–1.2)
BLD GP AB SCN SERPL QL: NEGATIVE — SIGNIFICANT CHANGE UP
BUN SERPL-MCNC: 15 MG/DL — SIGNIFICANT CHANGE UP (ref 7–23)
CALCIUM SERPL-MCNC: 9.1 MG/DL — SIGNIFICANT CHANGE UP (ref 8.4–10.5)
CHLORIDE SERPL-SCNC: 99 MMOL/L — SIGNIFICANT CHANGE UP (ref 98–107)
CO2 SERPL-SCNC: 28 MMOL/L — SIGNIFICANT CHANGE UP (ref 22–31)
CREAT SERPL-MCNC: 3.41 MG/DL — HIGH (ref 0.5–1.3)
EGFR: 13 ML/MIN/1.73M2 — LOW
EOSINOPHIL # BLD AUTO: 0.5 K/UL — SIGNIFICANT CHANGE UP (ref 0–0.5)
EOSINOPHIL NFR BLD AUTO: 7.1 % — HIGH (ref 0–6)
GLUCOSE SERPL-MCNC: 131 MG/DL — HIGH (ref 70–99)
HCT VFR BLD CALC: 33.4 % — LOW (ref 34.5–45)
HGB BLD-MCNC: 10.7 G/DL — LOW (ref 11.5–15.5)
IANC: 4.34 K/UL — SIGNIFICANT CHANGE UP (ref 1.8–7.4)
IMM GRANULOCYTES NFR BLD AUTO: 0.3 % — SIGNIFICANT CHANGE UP (ref 0–0.9)
INR BLD: 1.13 RATIO — SIGNIFICANT CHANGE UP (ref 0.88–1.16)
LYMPHOCYTES # BLD AUTO: 1.46 K/UL — SIGNIFICANT CHANGE UP (ref 1–3.3)
LYMPHOCYTES # BLD AUTO: 20.8 % — SIGNIFICANT CHANGE UP (ref 13–44)
MCHC RBC-ENTMCNC: 31.4 PG — SIGNIFICANT CHANGE UP (ref 27–34)
MCHC RBC-ENTMCNC: 32 GM/DL — SIGNIFICANT CHANGE UP (ref 32–36)
MCV RBC AUTO: 97.9 FL — SIGNIFICANT CHANGE UP (ref 80–100)
MONOCYTES # BLD AUTO: 0.67 K/UL — SIGNIFICANT CHANGE UP (ref 0–0.9)
MONOCYTES NFR BLD AUTO: 9.5 % — SIGNIFICANT CHANGE UP (ref 2–14)
NEUTROPHILS # BLD AUTO: 4.34 K/UL — SIGNIFICANT CHANGE UP (ref 1.8–7.4)
NEUTROPHILS NFR BLD AUTO: 61.9 % — SIGNIFICANT CHANGE UP (ref 43–77)
NRBC # BLD: 0 /100 WBCS — SIGNIFICANT CHANGE UP (ref 0–0)
NRBC # FLD: 0 K/UL — SIGNIFICANT CHANGE UP (ref 0–0)
PLATELET # BLD AUTO: 159 K/UL — SIGNIFICANT CHANGE UP (ref 150–400)
POTASSIUM SERPL-MCNC: 4.4 MMOL/L — SIGNIFICANT CHANGE UP (ref 3.5–5.3)
POTASSIUM SERPL-SCNC: 4.4 MMOL/L — SIGNIFICANT CHANGE UP (ref 3.5–5.3)
PROT SERPL-MCNC: 6.3 G/DL — SIGNIFICANT CHANGE UP (ref 6–8.3)
PROTHROM AB SERPL-ACNC: 13.1 SEC — SIGNIFICANT CHANGE UP (ref 10.5–13.4)
RBC # BLD: 3.41 M/UL — LOW (ref 3.8–5.2)
RBC # FLD: 14.8 % — HIGH (ref 10.3–14.5)
RH IG SCN BLD-IMP: POSITIVE — SIGNIFICANT CHANGE UP
SODIUM SERPL-SCNC: 138 MMOL/L — SIGNIFICANT CHANGE UP (ref 135–145)
WBC # BLD: 7.02 K/UL — SIGNIFICANT CHANGE UP (ref 3.8–10.5)
WBC # FLD AUTO: 7.02 K/UL — SIGNIFICANT CHANGE UP (ref 3.8–10.5)

## 2023-07-17 PROCEDURE — 73562 X-RAY EXAM OF KNEE 3: CPT | Mod: 26,LT

## 2023-07-17 PROCEDURE — 99285 EMERGENCY DEPT VISIT HI MDM: CPT | Mod: FS

## 2023-07-17 PROCEDURE — 73502 X-RAY EXAM HIP UNI 2-3 VIEWS: CPT | Mod: 26,LT

## 2023-07-17 PROCEDURE — 73552 X-RAY EXAM OF FEMUR 2/>: CPT | Mod: 26,LT

## 2023-07-17 PROCEDURE — 93971 EXTREMITY STUDY: CPT | Mod: 26,LT

## 2023-07-17 PROCEDURE — 73700 CT LOWER EXTREMITY W/O DYE: CPT | Mod: 26,LT,MA

## 2023-07-17 NOTE — ED PROVIDER NOTE - NSICDXPASTMEDICALHX_GEN_ALL_CORE_FT
PAST MEDICAL HISTORY:  Adult Onset Diabetes Mellitus,     Cancer of Endometrium s/p Hysterectomy , s/p Chemo.    CKD (chronic kidney disease)     Dissection of Aorta Type B    ESRD (end stage renal disease) on dialysis M,W,F   [facility] Dialysis [facility] [address]    History of Hysterectomy with O     Hypertension

## 2023-07-17 NOTE — ED ADULT NURSE NOTE - CAS EDN DISCHARGE ASSESSMENT
Alert and oriented to person, place and time/Patient baseline mental status/Symptoms improved
Statement Selected

## 2023-07-17 NOTE — ED PROVIDER NOTE - OBJECTIVE STATEMENT
Patient is a 77-year-old female past medical history of end-stage renal disease on HD (Monday/Wednesday/Friday), diabetes, chronic type B aortic dissection, status post left humerus fracture managed nonoperatively and left hip fracture S/P intramedullary nailing presenting to ED for orthopedic admission for revision hip ORIF secondary to displacement/broken screw resulting in chronic hip pain and difficulty ambulating.  patient denies recent fall or injuries. but states the she has been experiencing persist hip pain and difficulty ambulating. she is also reports swelling of the left leg.

## 2023-07-17 NOTE — ED PROVIDER NOTE - CLINICAL SUMMARY MEDICAL DECISION MAKING FREE TEXT BOX
patient is a 77-year-old female past medical history of end-stage renal disease on HD (Monday/Wednesday/Friday), diabetes, chronic type B aortic dissection, status post left humerus fracture managed nonoperatively and left hip fracture S/P intramedullary nailing presenting to ED for orthopedic admission for revision hip ORIF secondary to displacement/broken screw resulting in chronic hip pain and difficulty ambulating. on presentation, presenting well appearing, vital stable. plan for surgical labs, pelvis/hip xray, doppler of left leg

## 2023-07-17 NOTE — ED ADULT NURSE NOTE - OBJECTIVE STATEMENT
Pt arrives to the ED aaox4, ambulatory at baseline, breathing even and unlabored in bed. Pt states two months ago she suffered a fall and broke her left femur. Pt states they placed screws in her leg and her surgery was successful, since the surgery the pt has been able to ambulate like baseline. Pt states a few days ago she began to be unable to walk and started to experience pain in her leg. Pt states she went to PCP for x-rays and he told her that the screws in her leg became loose and misplaced. Pt came to ED for further eval as per PCP recommendation. Pt states she is on dialysis MW and received dialysis today. Left arm precautions bracelet placed on left wrist. #20G IV placed in right AC, labs drawn and sent. Pt denies chest pain, SOB, dizziness, headache, blurry vision, chills. Pt came from [facility] and received pain medications for her leg over there. Bed in lowest position, pt on way with transport to [facility].

## 2023-07-17 NOTE — ED ADULT NURSE NOTE - CHIEF COMPLAINT QUOTE
c/o left sided hip pain arrives from [facility], after completing dialysis today states sent in by orthopedic doctor for left hi[p hardware revision. left forearm AVF.

## 2023-07-17 NOTE — ED ADULT TRIAGE NOTE - CHIEF COMPLAINT QUOTE
c/o left sided hip pain arrives from [facility], after completing dialysis today states sent in by orthopedic doctor for left hi[p hardware revision. left forearm AVF.

## 2023-07-17 NOTE — ED PROVIDER NOTE - NS ED ATTENDING STATEMENT MOD
This was a shared visit with the NANO. I reviewed and verified the documentation and independently performed the documented:

## 2023-07-17 NOTE — ED PROVIDER NOTE - ATTENDING APP SHARED VISIT CONTRIBUTION OF CARE
Attending note:   After face to face evaluation of this patient, I concur with above noted hx, pe, and care plan for this patient.  Evans: 77-year-old female 2-month status post left hip fracture.  Patient presents to ED from rehab facility.  Patient has been having few weeks of worsening left hip pain but acutely worse over the last few days.  Patient states she has been having difficulty ambulating and also now notes swelling in the leg.  On exam patient's vitals are stable.  There is significant increase in size of left calf compared to right and there is some pitting edema.  Patient is able to range knee ankle and distally and has good pulses and sensation distally.  There is no significant erythema and patient was able to flex hip to 45 degrees.  Patient was told to come to ED for revision of left hip surgery because due to displacement of a screw.  Orthopedics is aware of patient and is now at bedside.  DVT study also ordered of the left leg and we will also check basic labs.  Repeat x-rays were ordered by orthopedics as requested by orthopedics and also CT.  Patient has currently refused pain medication.

## 2023-07-17 NOTE — ED ADULT NURSE NOTE - NSICDXPASTMEDICALHX_GEN_ALL_CORE_FT
PAST MEDICAL HISTORY:  Adult Onset Diabetes Mellitus,     Cancer of Endometrium s/p Hysterectomy , s/p Chemo.    CKD (chronic kidney disease)     Dissection of Aorta Type B    ESRD (end stage renal disease) on dialysis M,W,F   [facility] Dialysis [facility] [address]    History of Hysterectomy with O     Hypertension

## 2023-07-18 ENCOUNTER — TRANSCRIPTION ENCOUNTER (OUTPATIENT)
Age: 78
End: 2023-07-18

## 2023-07-18 DIAGNOSIS — T81.9XXA UNSPECIFIED COMPLICATION OF PROCEDURE, INITIAL ENCOUNTER: ICD-10-CM

## 2023-07-18 DIAGNOSIS — R19.7 DIARRHEA, UNSPECIFIED: ICD-10-CM

## 2023-07-18 DIAGNOSIS — Z86.79 PERSONAL HISTORY OF OTHER DISEASES OF THE CIRCULATORY SYSTEM: ICD-10-CM

## 2023-07-18 DIAGNOSIS — S72.002A FRACTURE OF UNSPECIFIED PART OF NECK OF LEFT FEMUR, INITIAL ENCOUNTER FOR CLOSED FRACTURE: ICD-10-CM

## 2023-07-18 DIAGNOSIS — Z01.818 ENCOUNTER FOR OTHER PREPROCEDURAL EXAMINATION: ICD-10-CM

## 2023-07-18 DIAGNOSIS — E11.9 TYPE 2 DIABETES MELLITUS WITHOUT COMPLICATIONS: ICD-10-CM

## 2023-07-18 DIAGNOSIS — I10 ESSENTIAL (PRIMARY) HYPERTENSION: ICD-10-CM

## 2023-07-18 DIAGNOSIS — N18.6 END STAGE RENAL DISEASE: ICD-10-CM

## 2023-07-18 LAB
APPEARANCE UR: ABNORMAL
BACTERIA # UR AUTO: ABNORMAL /HPF
BILIRUB UR-MCNC: ABNORMAL
CAST: 2 /LPF — SIGNIFICANT CHANGE UP (ref 0–4)
COLOR SPEC: SIGNIFICANT CHANGE UP
CRP SERPL-MCNC: 32.7 MG/L — HIGH
DIALYSIS INSTRUMENT RESULT - HEPATITIS B SURFACE ANTIGEN: NEGATIVE — SIGNIFICANT CHANGE UP
DIFF PNL FLD: ABNORMAL
ERYTHROCYTE [SEDIMENTATION RATE] IN BLOOD: 33 MM/HR — HIGH (ref 4–25)
GLUCOSE UR QL: NEGATIVE MG/DL — SIGNIFICANT CHANGE UP
KETONES UR-MCNC: ABNORMAL MG/DL
LEUKOCYTE ESTERASE UR-ACNC: ABNORMAL
NITRITE UR-MCNC: NEGATIVE — SIGNIFICANT CHANGE UP
PH UR: 6 — SIGNIFICANT CHANGE UP (ref 5–8)
PROT UR-MCNC: 100 MG/DL
RBC CASTS # UR COMP ASSIST: 8 /HPF — HIGH (ref 0–4)
REVIEW: SIGNIFICANT CHANGE UP
SP GR SPEC: 1.02 — SIGNIFICANT CHANGE UP (ref 1–1.03)
SQUAMOUS # UR AUTO: 19 /HPF — HIGH (ref 0–5)
UROBILINOGEN FLD QL: 1 MG/DL — SIGNIFICANT CHANGE UP (ref 0.2–1)
WBC UR QL: 159 /HPF — HIGH (ref 0–5)

## 2023-07-18 PROCEDURE — 99223 1ST HOSP IP/OBS HIGH 75: CPT

## 2023-07-18 PROCEDURE — 71045 X-RAY EXAM CHEST 1 VIEW: CPT | Mod: 26

## 2023-07-18 PROCEDURE — 93010 ELECTROCARDIOGRAM REPORT: CPT

## 2023-07-18 RX ORDER — LANOLIN ALCOHOL/MO/W.PET/CERES
3 CREAM (GRAM) TOPICAL AT BEDTIME
Refills: 0 | Status: DISCONTINUED | OUTPATIENT
Start: 2023-07-18 | End: 2023-08-16

## 2023-07-18 RX ORDER — OXYCODONE HYDROCHLORIDE 5 MG/1
2.5 TABLET ORAL EVERY 4 HOURS
Refills: 0 | Status: DISCONTINUED | OUTPATIENT
Start: 2023-07-18 | End: 2023-07-19

## 2023-07-18 RX ORDER — HEPARIN SODIUM 5000 [USP'U]/ML
500 INJECTION INTRAVENOUS; SUBCUTANEOUS
Refills: 0 | Status: DISCONTINUED | OUTPATIENT
Start: 2023-07-18 | End: 2023-07-18

## 2023-07-18 RX ORDER — POLYETHYLENE GLYCOL 3350 17 G/17G
17 POWDER, FOR SOLUTION ORAL DAILY
Refills: 0 | Status: DISCONTINUED | OUTPATIENT
Start: 2023-07-18 | End: 2023-07-19

## 2023-07-18 RX ORDER — SODIUM CHLORIDE 9 MG/ML
1000 INJECTION, SOLUTION INTRAVENOUS
Refills: 0 | Status: DISCONTINUED | OUTPATIENT
Start: 2023-07-18 | End: 2023-07-19

## 2023-07-18 RX ORDER — SENNA PLUS 8.6 MG/1
2 TABLET ORAL AT BEDTIME
Refills: 0 | Status: DISCONTINUED | OUTPATIENT
Start: 2023-07-18 | End: 2023-07-19

## 2023-07-18 RX ORDER — OXYCODONE HYDROCHLORIDE 5 MG/1
5 TABLET ORAL EVERY 4 HOURS
Refills: 0 | Status: DISCONTINUED | OUTPATIENT
Start: 2023-07-18 | End: 2023-07-19

## 2023-07-18 RX ORDER — PANTOPRAZOLE SODIUM 20 MG/1
40 TABLET, DELAYED RELEASE ORAL
Refills: 0 | Status: DISCONTINUED | OUTPATIENT
Start: 2023-07-18 | End: 2023-08-16

## 2023-07-18 RX ORDER — ACETAMINOPHEN 500 MG
975 TABLET ORAL EVERY 8 HOURS
Refills: 0 | Status: DISCONTINUED | OUTPATIENT
Start: 2023-07-18 | End: 2023-07-20

## 2023-07-18 RX ORDER — SODIUM CHLORIDE 9 MG/ML
100 INJECTION INTRAMUSCULAR; INTRAVENOUS; SUBCUTANEOUS
Refills: 0 | Status: DISCONTINUED | OUTPATIENT
Start: 2023-07-18 | End: 2023-07-19

## 2023-07-18 RX ADMIN — Medication 975 MILLIGRAM(S): at 07:05

## 2023-07-18 RX ADMIN — Medication 975 MILLIGRAM(S): at 08:05

## 2023-07-18 RX ADMIN — Medication 975 MILLIGRAM(S): at 22:15

## 2023-07-18 RX ADMIN — Medication 975 MILLIGRAM(S): at 23:15

## 2023-07-18 NOTE — H&P ADULT - NSHPPHYSICALEXAM_GEN_ALL_CORE
General: NAD layingi n bed  Resp: non labored   LLE:  - no swelling or ecchymoses over L hip  - +TTP over L hip  - L hip feels hard to touch relative to softer contralateral side  - Motor: IP/gastroc/Sol/TA/EHL/FHL intact  - SILT throughout LLE  - DP +

## 2023-07-18 NOTE — CONSULT NOTE ADULT - PROBLEM SELECTOR RECOMMENDATION 9
Pt. with ESRD on HD TIW (MWF) via LUE AVF. Previously went to [facility] and now goes to [facility] for HD. Pt last HD treatment was on 7/17 and tolerated it well. Occasionally has some bleeding from LUE AVF site. Dry weight is 54.5kg and treatments usually last 210 min. Pt. is given EPO on dialysis days. Pt. has been on HD for 5 years and her ESRD was due to uncontrolled DM2. Pt. clinically stable. Labs reviewed. Plan for maintenance HD post-op on 7/19/23. HD consent obtained and placed in the chart. Monitor labs.     If you have any questions, please feel free to contact me.  John Torres  Nephrology Fellow  [phone] / [employer] Teams (Preferred)  (After 4pm or on weekends, please call the on-call Fellow)
-/p L IMN for IT fx in May 2023 c/b 2 weeks of worsening atraumatic  post op L hip pain   -Xray of left femur to have interval migration of femoral neck screw into acetabular region.  -Tentatively planned for Left hip revision.  -management as per ortho  -pain control with Tylenol/ Oxy IR prn   -encouraged incentive spirometry

## 2023-07-18 NOTE — CONSULT NOTE ADULT - SUBJECTIVE AND OBJECTIVE BOX
[facility] DIVISION OF KIDNEY DISEASES AND HYPERTENSION -- [phone]  -- INITIAL CONSULT NOTE  --------------------------------------------------------------------------------  HPI: 78F w/ PMH of ESRD on HD TIW (MWF) at [facility] (previously at [facility]), HTN, DM2, endometrial cancer (underwent hysterectomy and chemotherapy, type B aortic dissection, and L hip ORIF, admitted for concerns of loosened hardware. Nephrology consulted for management of ESRD/HD.     Pt last HD treatment was on 7/17 and tolerated it well. Occasionally has some bleeding from LUE AVF site. Dry weight is 54.5kg and treatments usually last 210 min. Pt. is given EPO on dialysis days. Pt. has been on HD for 5 years and her ESRD was due to uncontrolled DM2.    Pt. was seen and evaluated at the bedside. She feels well. Endorses diarrhea and pain in the left hip. Denies headaches, fevers/chills, chest pain, SOB, abd pain, or lower extremity swelling.    PAST HISTORY  --------------------------------------------------------------------------------  PAST MEDICAL & SURGICAL HISTORY:  Adult Onset Diabetes Mellitus,  Cancer of Endometrium  s/p Hysterectomy , s/p Chemo.  Dissection of Aorta  Type B  History of Hysterectomy with O  Hypertension  CKD (chronic kidney disease)  ESRD (end stage renal disease) on dialysis  M,W,F   [facility]  H/O total hysterectomy  in 2007 for endometrial CA    FAMILY HISTORY:  No pertinent family history in first degree relatives    PAST SOCIAL HISTORY:    ALLERGIES & MEDICATIONS  --------------------------------------------------------------------------------  Allergies  No Known Allergies    Intolerances    Standing Inpatient Medications  acetaminophen     Tablet .. 975 milliGRAM(s) Oral every 8 hours  senna 2 Tablet(s) Oral at bedtime    PRN Inpatient Medications  melatonin 3 milliGRAM(s) Oral at bedtime PRN  oxyCODONE    IR 5 milliGRAM(s) Oral every 4 hours PRN  oxyCODONE    IR 2.5 milliGRAM(s) Oral every 4 hours PRN    REVIEW OF SYSTEMS  --------------------------------------------------------------------------------  Gen: No fevers/chills  Skin: No rashes  Head/Eyes/Ears: No HA  Respiratory: No dyspnea, cough  CV: No chest pain  GI: No abdominal pain, +diarrhea  MSK: No edema    All other systems were reviewed and are negative, except as noted.    VITALS/PHYSICAL EXAM  --------------------------------------------------------------------------------  T(C): 36.6 (07-18-23 @ 14:03), Max: 36.9 (07-18-23 @ 04:13)  HR: 66 (07-18-23 @ 14:03) (64 - 76)  BP: 143/62 (07-18-23 @ 14:03) (134/56 - 165/88)  RR: 18 (07-18-23 @ 14:03) (16 - 18)  SpO2: 99% (07-18-23 @ 14:03) (97% - 100%)  Wt(kg): --  Height (cm): 154.9 (07-18-23 @ 04:13)  Weight (kg): 56.9 (07-18-23 @ 04:13)  BMI (kg/m2): 23.7 (07-18-23 @ 04:13)  BSA (m2): 1.55 (07-18-23 @ 04:13)    Physical Exam:  Gen: NAD  HEENT: MMM  Pulm: minimal crackles in bases b/l  CV: S1S2  Abd: Soft, +BS   Ext: trace LE edema B/L  Neuro: Awake  Skin: Warm and dry  Vascular access: LUE AVF, aneurysmal, skin intact, thrill felt    LABS/STUDIES  --------------------------------------------------------------------------------              10.7   7.02  >-----------<  159      [07-17-23 @ 21:04]              33.4     138  |  99  |  15  ----------------------------<  131      [07-17-23 @ 21:04]  4.4   |  28  |  3.41        Ca     9.1     [07-17-23 @ 21:04]    TPro  6.3  /  Alb  3.2  /  TBili  0.8  /  DBili  x   /  AST  36  /  ALT  22  /  AlkPhos  192  [07-17-23 @ 21:04]    Creatinine Trend:  SCr 3.41 [07-17 @ 21:04]    HBsAb 44.0      [05-24-23 @ 07:00]  HBsAg Nonreact      [05-24-23 @ 07:00]  HBcAb Reactive      [05-24-23 @ 07:00]  HCV 0.25, Nonreact      [05-24-23 @ 07:00] [facility] DIVISION OF KIDNEY DISEASES AND HYPERTENSION -- [phone]  -- INITIAL CONSULT NOTE  --------------------------------------------------------------------------------  HPI: 78F w/ PMH of ESRD on HD TIW (MWF) at [facility] (previously at [facility]), HTN, DM2, endometrial cancer (underwent hysterectomy and chemotherapy, type B aortic dissection, and L hip ORIF, admitted for concerns of loosened hardware. Nephrology consulted for management of ESRD/HD.     Pt last HD treatment was on 7/17 and tolerated it well. Occasionally has some bleeding from LUE AVF site. Dry weight is 54.5kg and treatments usually last 210 min. Pt. is given EPO on dialysis days. Pt. has been on HD for 5 years and her ESRD was due to uncontrolled DM2.    Pt. was seen and evaluated at the bedside. She feels well. Endorses diarrhea and pain in the left hip. Denies headaches, fevers/chills, chest pain, SOB, abd pain, or lower extremity swelling.    PAST HISTORY  --------------------------------------------------------------------------------  PAST MEDICAL & SURGICAL HISTORY:  Adult Onset Diabetes Mellitus,  Cancer of Endometrium  s/p Hysterectomy , s/p Chemo.  Dissection of Aorta  Type B  History of Hysterectomy with O  Hypertension  CKD (chronic kidney disease)  ESRD (end stage renal disease) on dialysis  M,W,F   [facility]  H/O total hysterectomy  in 2007 for endometrial CA    FAMILY HISTORY:  No pertinent family history in first degree relatives    PAST SOCIAL HISTORY: No smoking, drugs or alcohol use    ALLERGIES & MEDICATIONS  --------------------------------------------------------------------------------  Allergies  No Known Allergies    Intolerances    Standing Inpatient Medications  acetaminophen     Tablet .. 975 milliGRAM(s) Oral every 8 hours  senna 2 Tablet(s) Oral at bedtime    PRN Inpatient Medications  melatonin 3 milliGRAM(s) Oral at bedtime PRN  oxyCODONE    IR 5 milliGRAM(s) Oral every 4 hours PRN  oxyCODONE    IR 2.5 milliGRAM(s) Oral every 4 hours PRN    REVIEW OF SYSTEMS  --------------------------------------------------------------------------------  Gen: No fevers/chills  Skin: No rashes  Head/Eyes/Ears: No HA  Respiratory: No dyspnea, cough  CV: No chest pain  GI: No abdominal pain, +diarrhea  MSK: No edema    All other systems were reviewed and are negative, except as noted.    VITALS/PHYSICAL EXAM  --------------------------------------------------------------------------------  T(C): 36.6 (07-18-23 @ 14:03), Max: 36.9 (07-18-23 @ 04:13)  HR: 66 (07-18-23 @ 14:03) (64 - 76)  BP: 143/62 (07-18-23 @ 14:03) (134/56 - 165/88)  RR: 18 (07-18-23 @ 14:03) (16 - 18)  SpO2: 99% (07-18-23 @ 14:03) (97% - 100%)  Wt(kg): --  Height (cm): 154.9 (07-18-23 @ 04:13)  Weight (kg): 56.9 (07-18-23 @ 04:13)  BMI (kg/m2): 23.7 (07-18-23 @ 04:13)  BSA (m2): 1.55 (07-18-23 @ 04:13)    Physical Exam:  Gen: NAD  HEENT: MMM  Pulm: minimal crackles in bases b/l  CV: S1S2  Abd: Soft, +BS   Ext: trace LE edema B/L  Neuro: Awake  Skin: Warm and dry  Vascular access: LUE AVF, aneurysmal, skin intact, thrill felt    LABS/STUDIES  --------------------------------------------------------------------------------              10.7   7.02  >-----------<  159      [07-17-23 @ 21:04]              33.4     138  |  99  |  15  ----------------------------<  131      [07-17-23 @ 21:04]  4.4   |  28  |  3.41        Ca     9.1     [07-17-23 @ 21:04]    TPro  6.3  /  Alb  3.2  /  TBili  0.8  /  DBili  x   /  AST  36  /  ALT  22  /  AlkPhos  192  [07-17-23 @ 21:04]    Creatinine Trend:  SCr 3.41 [07-17 @ 21:04]    HBsAb 44.0      [05-24-23 @ 07:00]  HBsAg Nonreact      [05-24-23 @ 07:00]  HBcAb Reactive      [05-24-23 @ 07:00]  HCV 0.25, Nonreact      [05-24-23 @ 07:00]

## 2023-07-18 NOTE — H&P ADULT - ASSESSMENT
Pt is a 78 y/o female s/p L hip IMN May 2023 now presenting for evaluation of concern for hardware migration     PLAN:  - NWB LLE  - Will FU CT scan L hip  - Ortho to discuss possible operative planning  - Diet: reg  - Pain control PRN   - Will need PT/OT eval  - Will need med clearance

## 2023-07-18 NOTE — PROGRESS NOTE ADULT - SUBJECTIVE AND OBJECTIVE BOX
Please review Orthopedic Surgery Progress Note     S: Patient seen and examined today.  Pain is well controlled. Denies f/c, chest pain, shortness of breath, dizziness. Reports las dialysis was Monday.    MEDICATIONS  (STANDING):  acetaminophen     Tablet .. 975 milliGRAM(s) Oral every 8 hours  senna 2 Tablet(s) Oral at bedtime    MEDICATIONS  (PRN):  melatonin 3 milliGRAM(s) Oral at bedtime PRN Insomnia  oxyCODONE    IR 2.5 milliGRAM(s) Oral every 4 hours PRN Moderate Pain (4 - 6)  oxyCODONE    IR 5 milliGRAM(s) Oral every 4 hours PRN Severe Pain (7 - 10)      Vital Signs Last 24 Hrs  T(C): 36.9 (18 Jul 2023 04:13), Max: 36.9 (18 Jul 2023 04:13)  T(F): 98.5 (18 Jul 2023 04:13), Max: 98.5 (18 Jul 2023 04:13)  HR: 64 (18 Jul 2023 04:13) (64 - 76)  BP: 165/67 (18 Jul 2023 04:13) (154/79 - 165/88)  BP(mean): --  RR: 18 (18 Jul 2023 04:13) (16 - 18)  SpO2: 100% (18 Jul 2023 04:13) (97% - 100%)    Parameters below as of 18 Jul 2023 02:30  Patient On (Oxygen Delivery Method): room air          Physical Exam:  Gen: NAD  L Lower Extremity:  Previous incision scar without concerns  TTP around greater troch  SILT S/S/DP/SP/T  +EHL/FHL/TA/GSC  Compartments soft/non-tender  Toes warm, Cap refill brisk/warm and perfused, +DP/PT pulse         LABS:                        10.7   7.02  )-----------( 159      ( 17 Jul 2023 21:04 )             33.4     07-17    138  |  99  |  15  ----------------------------<  131<H>  4.4   |  28  |  3.41<H>    Ca    9.1      17 Jul 2023 21:04    TPro  6.3  /  Alb  3.2<L>  /  TBili  0.8  /  DBili  x   /  AST  36<H>  /  ALT  22  /  AlkPhos  192<H>  07-17

## 2023-07-18 NOTE — H&P ADULT - NSHPLABSRESULTS_GEN_ALL_CORE
LABS  CBC 07-17-23 @ 21:04                        10.7   7.02  )-----------( 159                   33.4       Hgb trend: 10.7 <--   WBC trend: 7.02 <--         CMP 07-17-23 @ 21:04    138  |  99  |  15  ----------------------------<  131<H>  4.4   |  28  |  3.41<H>    Ca    9.1      07-17-23 @ 21:04    TPro  6.3  /  Alb  3.2<L>  /  TBili  0.8  /  DBili  x   /  AST  36<H>  /  ALT  22  /  AlkPhos  192<H>  07-17      Serum Cr trend: 3.41 <--       Urinalysis Basic - ( 17 Jul 2023 21:04 )    Color: x / Appearance: x / SG: x / pH: x  Gluc: 131 mg/dL / Ketone: x  / Bili: x / Urobili: x   Blood: x / Protein: x / Nitrite: x   Leuk Esterase: x / RBC: x / WBC x   Sq Epi: x / Non Sq Epi: x / Bacteria: x      PT/INR - ( 17 Jul 2023 21:04 )   PT: 13.1 sec;   INR: 1.13 ratio         PTT - ( 17 Jul 2023 21:04 ):31.1 sec        < from: CT Hip No Cont, Left (07.17.23 @ 22:17) >      IMPRESSION:  1.   Postoperative changes status post left IMHS. Fracture line is still  visible.  2.   The lag screw is projecting through the superomedial cortical   surface of  the femoral head into the adjacent acetabulum.        ******PRELIMINARY REPORT******      ******PRELIMINARY REPORT******         ARIEL KITCHEN M.D.;[facility] RADIOLOGIST  This document is a PRELIMINARY interpretation and is pending final     < end of copied text >

## 2023-07-18 NOTE — H&P ADULT - ATTENDING COMMENTS
Called from rehab after 6 week postop image demonstrated lag screw cutout. Plan was for patient to follow-up in my office on 7/19 but had progressive pain over the past 2 weeks. 2 week postop with appropriate lag screw placement. Discussed with physician at rehab to transfer to [facility].  Planned for CT scan and likely conversion to FESTUS.  Patient not transferred until overnight- plan to speak with patient in am regarding next steps in her care.

## 2023-07-18 NOTE — H&P ADULT - NSICDXPASTMEDICALHX_GEN_ALL_CORE_FT
PAST MEDICAL HISTORY:  Adult Onset Diabetes Mellitus,     Cancer of Endometrium s/p Hysterectomy , s/p Chemo.    CKD (chronic kidney disease)     Dissection of Aorta Type B    ESRD (end stage renal disease) on dialysis M,W,F   [facility] D[facility] [address]    History of Hysterectomy with O     Hypertension

## 2023-07-18 NOTE — H&P ADULT - HISTORY OF PRESENT ILLNESS
Pt is a 76 y/o F with pmhx of ESRD on HD (MoWeFri), HTN, DM (diet controlled), chronic type B aortic dissection, heart murmur, endometrial ca in remission s/p total hysterectomy in 2007 s/p L IMN for IT fx in May 2023 presents to the ED w 2 weeks of atraumtic  L hip pain.  Pt has been at [facility] since discharge and states about two weeks ago she started feeling pain in L hip.  She has not been ambulating much since discharge stating only ambulating when working with PT and OT.  Over last two weeks she has had increasing L hip pain and was seen by orthopedist at rehab 7/17 where XRs done and was told she should be sent to ED for evaluation of hardware. Pt otherwise denies any numbness/tingling in the LLE

## 2023-07-18 NOTE — PROGRESS NOTE ADULT - ASSESSMENT
A/P: 77F w L IMN loosening of hardware    - OR planning  - Diet: NPO at midnight  - Pain control  - DVT ppx: SCDs  - PT/OT  - OOB  - Diet: Reg  - Monitor I&Os        For all questions related to patient care, please reach out via the on-call pager.*     Valentine Eddy, PGY2  Orthopedic Surgery  [facility]: [phone]  [facility]: [id number]  [facility]: [phone]

## 2023-07-18 NOTE — CONSULT NOTE ADULT - SUBJECTIVE AND OBJECTIVE BOX
Cardiovascular Disease Initial Evaluation  Date of Service: 07-18-23 @ 10:37    CHIEF COMPLAINT: Hip pain    HISTORY OF PRESENT ILLNESS:    This is a 77 year old woman with ESRD on HD, HTN, and chronic type B aortic dissection who presented to [facility] on 7/7/2023 with 2 weeks of atraumtic  L hip pain.  Pt has been at [facility] since discharge in May 2023 and states about two weeks ago she started feeling pain in L hip.  She has not been ambulating much since discharge stating only ambulating when working with PT and OT.  Over last two weeks she has had increasing L hip pain and was seen by orthopedist at rehab 7/17 where XRs done and was told she should be sent to ED for evaluation of hardware. Pt otherwise denies any numbness/tingling in the LLE.  No chest pain or SOB.     Allergies  No Known Allergies      MEDICATIONS:        acetaminophen     Tablet .. 975 milliGRAM(s) Oral every 8 hours  melatonin 3 milliGRAM(s) Oral at bedtime PRN  oxyCODONE    IR 2.5 milliGRAM(s) Oral every 4 hours PRN  oxyCODONE    IR 5 milliGRAM(s) Oral every 4 hours PRN    senna 2 Tablet(s) Oral at bedtime          PAST MEDICAL & SURGICAL HISTORY:  Adult Onset Diabetes Mellitus,      Cancer of Endometrium  s/p Hysterectomy , s/p Chemo.      Dissection of Aorta  Type B      History of Hysterectomy with O      Hypertension      CKD (chronic kidney disease)      ESRD (end stage renal disease) on dialysis  M,W,F   [facility]      H/O total hysterectomy  in 2007 for endometrial CA          FAMILY HISTORY:  HTN        SOCIAL HISTORY:    The patient is a nonsmoker       REVIEW OF SYSTEMS:  See HPI, otherwise complete 14 point review of systems negative      PHYSICAL EXAM:  T(C): 36.7 (07-18-23 @ 09:15), Max: 36.9 (07-18-23 @ 04:13)  HR: 66 (07-18-23 @ 09:15) (64 - 76)  BP: 134/56 (07-18-23 @ 09:15) (134/56 - 165/88)  RR: 16 (07-18-23 @ 09:15) (16 - 18)  SpO2: 97% (07-18-23 @ 09:15) (97% - 100%)  Wt(kg): --  I&O's Summary      Appearance: No Acute Distress; resting comfortably  HEENT:  Normal oral mucosa, PERRL, EOMI	  Cardiovascular: Normal S1 S2, No JVD, No murmurs/rubs/gallops  Respiratory: Normal respiratory effort; Lungs clear to auscultation bilaterally  Gastrointestinal:  Soft, Non-tender, + BS	  Skin: No rashes, No ecchymoses, No cyanosis	  Neurologic: Non-focal; no weakness  Extremities: No clubbing, cyanosis or edema  Vascular: Peripheral pulses palpable 2+ bilaterally  Psychiatry: A & O x 3, Mood & affect appropriate    Laboratory Data:	 	    CBC Full  -  ( 17 Jul 2023 21:04 )  WBC Count : 7.02 K/uL  Hemoglobin : 10.7 g/dL  Hematocrit : 33.4 %  Platelet Count - Automated : 159 K/uL  Mean Cell Volume : 97.9 fL  Mean Cell Hemoglobin : 31.4 pg  Mean Cell Hemoglobin Concentration : 32.0 gm/dL  Auto Neutrophil # : 4.34 K/uL  Auto Lymphocyte # : 1.46 K/uL  Auto Monocyte # : 0.67 K/uL  Auto Eosinophil # : 0.50 K/uL  Auto Basophil # : 0.03 K/uL  Auto Neutrophil % : 61.9 %  Auto Lymphocyte % : 20.8 %  Auto Monocyte % : 9.5 %  Auto Eosinophil % : 7.1 %  Auto Basophil % : 0.4 %    07-17    138  |  99  |  15  ----------------------------<  131<H>  4.4   |  28  |  3.41<H>    Ca    9.1      17 Jul 2023 21:04    TPro  6.3  /  Alb  3.2<L>  /  TBili  0.8  /  DBili  x   /  AST  36<H>  /  ALT  22  /  AlkPhos  192<H>  07-17      Interpretation of Telemetry: n/a	    ECG:  	Sinus; iRBBB	      Assessment: 77 year old woman with ESRD on HD (MWF), HTN, DM (diet controlled), chronic type B aortic dissection, heart murmur, endometrial ca in remission s/p total hysterectomy in 2007 presents with L hip pain     Plan of Care:      #Pre-operative risk evaluation-  - Ms. Harris displays no signs of  coronary ischemia.  Her admission EKG is sinus with a pre-existing RBBB.   Volume optimization with HD.   No cardiac objection to orthopedic intervention.       #Type B aortic dissection  - Chronic (Diagnosed >10 years ago)  - Pt hemodynamically stable  Optimal BP control.     #HTN  - BP acceptable on current regimen    #ESRD  - HD as per renal.      72 minutes spent on total encounter; more than 50% of the visit was spent counseling and/or coordinating care by the attending physician.   	  Gabo Burris MD [facility]  Cardiovascular Diseases  [phone]

## 2023-07-18 NOTE — CONSULT NOTE ADULT - PROBLEM SELECTOR RECOMMENDATION 4
stable  no signs of volume overload   c/w HD M, W, F via LUE fistula  recommend decrease phoslo to 667mg qdaily due to diarrhea

## 2023-07-18 NOTE — CONSULT NOTE ADULT - PROBLEM SELECTOR RECOMMENDATION 2
Cardiovascular Risk Stratification - RCRI =  6.6%  1. History of ischemic heart disease: no  2. History of congestive heart failure: no  3. History of cerebrovascular disease (stroke or transient ischemic attack): no  4. History of diabetes requiring preoperative insulin use: yes  5. Chronic kidney disease (creatinine > 2 mg/dL): yes  6. Undergoing suprainguinal vascular, intraperitoneal, or intrathoracic surgery: no  (0 predictors = 0.4%, 1 predictor = 0.9%, 2 predictors = 6.6%, =3 predictors = >11%)    Functional Status:  >4 METS (Description of maximal physical acitivity)/Unable to Assess.   Patient currently does not show any clinical evidence of decompensated heart failure, cardiac arrythmias, or active ischemia.     Overall this patient is as 6.6 % risk (for cardiac death, nonfatal myocardial infarction, and nonfatal cardiac arrest perioperatively for this procedure (thrombectomy and IVC filter). According to ACC/AHA 2014 Perioperative guidelines, no further cardiac testing is recommended. May proceed with planned procedure after shared-decision making between the patient or surrogate decision maker and procedure performing physician with regarding the risk, benefits, and alternatives to the procedure.     Recommend patient get HD prior OR.

## 2023-07-18 NOTE — CONSULT NOTE ADULT - ASSESSMENT
77 y.o. Female w/ Hx HTN, DM (diet controlled), chronic type B aortic dissection, ESRD on HD ( M, W, F ) via LUE fistula, endometrial ca in remission s/p total hysterectomy in 2007 s/p L IMN for IT fx in May 2023 p/w 2 weeks of worsening atraumatic  L hip pain since admission to [place] rehab found on Xray of left femur to have interval migration of femoral neck screw into acetabular region so now readmitted for Left hip revision.

## 2023-07-18 NOTE — CONSULT NOTE ADULT - SUBJECTIVE AND OBJECTIVE BOX
Patient is a 78y old  Female who presents with a chief complaint of s/p L hip orif concern for loosened hardwar (18 Jul 2023 06:54)      HPI:  77 y.o. Female w/ Hx HTN, DM (diet controlled), chronic type B aortic dissection, ESRD on HD ( M, W, F ), endometrial ca in remission s/p total hysterectomy in 2007 s/p L IMN for IT fx in May 2023 p/w 2 weeks of worsening atraumatic  L hip pain since admission to [facility]. She has not been ambulating much since discharge stating only ambulating when working with PT and OT.  She was seen by ortho at rehab on 7/17 and Xray of left femur showed interval migration of femoral neck screw into acetabular region so now readmitted for Left hip revision. Pt otherwise denies cp, SOB, abdominal pain, N/V/D.           Pain Symptoms if applicable:             	                         none	   mild         moderate         severe  Pain:	                            0	    1-3	     4-6	         7-10  Location:	  Modifying factors:	  Associated symptoms:	    Function: [ ] Independent  [ ] Assistance  [ ] Total care  [ ] Non-ambulatory    Allergies    No Known Allergies    Intolerances        HOME MEDICATIONS: [ x] Reviewed  aspirin 325 mg oral tablet: 1 tab(s) orally 2 times a day  multivitamin: 1 tab(s) orally once a day  polyethylene glycol 3350 oral powder for reconstitution: 17 gram(s) orally 2 times a day  senna leaf extract oral tablet: 2 tab(s) orally once a day (at bedtime)  lidocaine 4% topical film: Apply topically to affected area once a day  acetaminophen 325 mg oral tablet: 3 tab(s) orally every 8 hours  oxyCODONE 5 mg oral tablet: 1 tab(s) orally every 4 hours As needed Moderate Pain (4 - 6)  hydrALAZINE 50 mg oral tablet: 1 orally once a day  metoprolol succinate 25 mg oral tablet, extended release: 0.5 orally once a day  PhosLo 667 mg oral capsule: 1 orally 3 times a day      MEDICATIONS  (STANDING):  acetaminophen     Tablet .. 975 milliGRAM(s) Oral every 8 hours  senna 2 Tablet(s) Oral at bedtime    MEDICATIONS  (PRN):  melatonin 3 milliGRAM(s) Oral at bedtime PRN Insomnia  oxyCODONE    IR 5 milliGRAM(s) Oral every 4 hours PRN Severe Pain (7 - 10)  oxyCODONE    IR 2.5 milliGRAM(s) Oral every 4 hours PRN Moderate Pain (4 - 6)      PAST MEDICAL & SURGICAL HISTORY:  Adult Onset Diabetes Mellitus,      Cancer of Endometrium  s/p Hysterectomy , s/p Chemo.      Dissection of Aorta  Type B      History of Hysterectomy with O      Hypertension      CKD (chronic kidney disease)      ESRD (end stage renal disease) on dialysis  M,W,F   [facility]      H/O total hysterectomy  in 2007 for endometrial CA      [x ] Reviewed     SOCIAL HISTORY:  Residence: [ ] [facility]  [ ] SNF  [ ] Community  [ ] Substance abuse:   [ ] Tobacco:   [ ] Alcohol use:     FAMILY HISTORY:  No pertinent family history in first degree relatives    [ ] No pertinent family history in first degree relatives     REVIEW OF SYSTEMS:    CONSTITUTIONAL: No fever, weight loss, or fatigue  EYES: No eye pain, visual disturbances, or discharge  ENMT:  No difficulty hearing, tinnitus, vertigo; No sinus or throat pain  NECK: No pain or stiffness  BREASTS: No pain, masses, or nipple discharge  RESPIRATORY: No cough, wheezing, chills or hemoptysis; No shortness of breath  CARDIOVASCULAR: No chest pain, palpitations, dizziness, or leg swelling  GASTROINTESTINAL: No abdominal or epigastric pain. No nausea, vomiting, or hematemesis; No diarrhea or constipation. No melena or hematochezia.  GENITOURINARY: No dysuria, frequency, hematuria, or incontinence  NEUROLOGICAL: No headaches, memory loss, loss of strength, numbness, or tremors  SKIN: No itching, burning, rashes, or lesions   LYMPH NODES: No enlarged glands  ENDOCRINE: No heat or cold intolerance; No hair loss  MUSCULOSKELETAL: left hip pain  PSYCHIATRIC: No depression, anxiety, mood swings, or difficulty sleeping  HEME/LYMPH: No easy bruising, or bleeding gums  ALLERGY AND IMMUNOLOGIC: No hives or eczema    [  x] All other ROS negative  [  ] Unable to obtain due to poor mental status    Vital Signs Last 24 Hrs  T(C): 36.7 (18 Jul 2023 09:15), Max: 36.9 (18 Jul 2023 04:13)  T(F): 98.1 (18 Jul 2023 09:15), Max: 98.5 (18 Jul 2023 04:13)  HR: 66 (18 Jul 2023 09:15) (64 - 76)  BP: 134/56 (18 Jul 2023 09:15) (134/56 - 165/88)  BP(mean): --  RR: 16 (18 Jul 2023 09:15) (16 - 18)  SpO2: 97% (18 Jul 2023 09:15) (97% - 100%)    Parameters below as of 18 Jul 2023 09:15  Patient On (Oxygen Delivery Method): room air        PHYSICAL EXAM:    GENERAL: NAD, well-groomed, well-developed  HEAD:  Atraumatic, Normocephalic  EYES: EOMI, PERRLA, conjunctiva and sclera clear  ENMT: Moist mucous membranes  NECK: Supple, No JVD  RESPIRATORY: Clear to auscultation bilaterally; No rales, rhonchi, wheezing, or rubs  CARDIOVASCULAR: Regular rate and rhythm; No murmurs, rubs, or gallops  GASTROINTESTINAL: Soft, Nontender, Nondistended; Bowel sounds present  GENITOURINARY: Not examined  EXTREMITIES:  2+ Peripheral Pulses, No clubbing, cyanosis, or edema  NERVOUS SYSTEM:  Alert & Oriented X3; Moving all 4 extremities; No gross sensory deficits  HEME/LYMPH: No lymphadenopathy noted  SKIN: No rashes or lesions; Incisions C/D/I    LABS:                        10.7   7.02  )-----------( 159      ( 17 Jul 2023 21:04 )             33.4     07-17    138  |  99  |  15  ----------------------------<  131<H>  4.4   |  28  |  3.41<H>    Ca    9.1      17 Jul 2023 21:04    TPro  6.3  /  Alb  3.2<L>  /  TBili  0.8  /  DBili  x   /  AST  36<H>  /  ALT  22  /  AlkPhos  192<H>  07-17    PT/INR - ( 17 Jul 2023 21:04 )   PT: 13.1 sec;   INR: 1.13 ratio         PTT - ( 17 Jul 2023 21:04 )  PTT:31.1 sec  Urinalysis Basic - ( 17 Jul 2023 21:04 )    Color: x / Appearance: x / SG: x / pH: x  Gluc: 131 mg/dL / Ketone: x  / Bili: x / Urobili: x   Blood: x / Protein: x / Nitrite: x   Leuk Esterase: x / RBC: x / WBC x   Sq Epi: x / Non Sq Epi: x / Bacteria: x      CAPILLARY BLOOD GLUCOSE          RADIOLOGY & ADDITIONAL STUDIES:    EKG: SR @ 68 bpm poor baseline.   Personally Reviewed:  [x ] YES     < from: Transthoracic Echocardiogram (05.19.23 @ 12:06) >  CONCLUSIONS:  1. Calcified trileaflet aortic valve with normal opening.  Mild aortic regurgitation.  2. Visually, the left atrium appears dilated.  Mild  concentric left ventricular hypertrophy.Endocardium not  well visualized; grossly normal left ventricular systolic  function.Visually, LVEF is 55-60%.  Insufficient data to determine diastolic function.  Increased E/e'  is consistent with elevated left  ventricular filling pressure.  3. Normal right atrium.Normal right ventricular size and  function.  4. Normal tricuspid valve. Mild tricuspid  regurgitation.Estimated pulmonary artery systolic pressure  equals 48 mm Hg, assuming right atrial pressure equals 15  mm Hg, consistent with mild pulmonary hypertension.  5. No pericardial effusion seen.  *** Compared with echocardiogram of 1/7/2018, no  significant changes noted.    < end of copied text >      Imaging: < from: Xray Chest 1 View- PORTABLE-Urgent (Xray Chest 1 View- PORTABLE-Urgent .) (07.18.23 @ 07:48) >  IMPRESSION:    Small left pleural effusion.    < end of copied text >    < from: Xray Knee 3 Views, Left (07.17.23 @ 23:07) >  IMPRESSION: Status post left intramedullary nailing of the left femur   with interval migration of the femoral neck screw into the acetabular   region, new since fluoroscopic placement evaluation 5/20/2023.    Overall findings are grossly unchanged from earlier same day's study.    < end of copied text >    Personally Reviewed:  [x ] YES               Consultant(s) notes reviewed:    Care Discussed with Consultant(s)/Other Providers: Ortho     Patient is a 78y old  Female who presents with a chief complaint of s/p L hip orif concern for loosened hardwar (18 Jul 2023 06:54)      HPI:  77 y.o. Female w/ Hx HTN, DM (diet controlled), chronic type B aortic dissection, ESRD on HD ( M, W, F ) via LUE fistula, endometrial ca in remission s/p total hysterectomy in 2007 s/p L IMN for IT fx in May 2023 p/w 2 weeks of worsening atraumatic  L hip pain since admission to [facility]. She has not been ambulating much since discharge stating only ambulating when working with PT and OT.  She was seen by ortho at rehab on 7/17 and Xray of left femur showed interval migration of femoral neck screw into acetabular region so now readmitted for Left hip revision. Pt otherwise denies cp, SOB, abdominal pain, N/V. Patient does c/o watery diarrhea 3x/day and suspects its the phoslo since she says she only suppose to take it once a day but was prescribed 3x/day at rehab.           Pain Symptoms if applicable:             	                         none	   mild         moderate         severe  Pain:	                            0	    1-3	     4-6	         7-10  Location:	  Modifying factors:	  Associated symptoms:	    Function: [ ] Independent  [ ] Assistance  [ ] Total care  [ ] Non-ambulatory    Allergies    No Known Allergies    Intolerances        HOME MEDICATIONS: [ x] Reviewed  aspirin 325 mg oral tablet: 1 tab(s) orally 2 times a day  multivitamin: 1 tab(s) orally once a day  polyethylene glycol 3350 oral powder for reconstitution: 17 gram(s) orally 2 times a day  senna leaf extract oral tablet: 2 tab(s) orally once a day (at bedtime)  lidocaine 4% topical film: Apply topically to affected area once a day  acetaminophen 325 mg oral tablet: 3 tab(s) orally every 8 hours  oxyCODONE 5 mg oral tablet: 1 tab(s) orally every 4 hours As needed Moderate Pain (4 - 6)  hydrALAZINE 50 mg oral tablet: 1 orally once a day  metoprolol succinate 25 mg oral tablet, extended release: 0.5 orally once a day  PhosLo 667 mg oral capsule: 1 orally 3 times a day      MEDICATIONS  (STANDING):  acetaminophen     Tablet .. 975 milliGRAM(s) Oral every 8 hours  senna 2 Tablet(s) Oral at bedtime    MEDICATIONS  (PRN):  melatonin 3 milliGRAM(s) Oral at bedtime PRN Insomnia  oxyCODONE    IR 5 milliGRAM(s) Oral every 4 hours PRN Severe Pain (7 - 10)  oxyCODONE    IR 2.5 milliGRAM(s) Oral every 4 hours PRN Moderate Pain (4 - 6)      PAST MEDICAL & SURGICAL HISTORY:  Adult Onset Diabetes Mellitus,      Cancer of Endometrium  s/p Hysterectomy , s/p Chemo.      Dissection of Aorta  Type B      History of Hysterectomy with O      Hypertension      CKD (chronic kidney disease)      ESRD (end stage renal disease) on dialysis  M,W,F   [facility]      H/O total hysterectomy  in 2007 for endometrial CA      [x ] Reviewed     SOCIAL HISTORY:  Residence: [ ] [facility]  [ ] SNF  [ x] Community  [x ] Substance abuse: none  [x ] Tobacco: none  [x ] Alcohol use: none    FAMILY HISTORY:  No pertinent family history in first degree relatives        REVIEW OF SYSTEMS:    CONSTITUTIONAL: No fever, weight loss, or fatigue  EYES: No eye pain, visual disturbances, or discharge  ENMT:  No difficulty hearing, tinnitus, vertigo; No sinus or throat pain  NECK: No pain or stiffness  BREASTS: No pain, masses, or nipple discharge  RESPIRATORY: No cough, wheezing, chills or hemoptysis; No shortness of breath  CARDIOVASCULAR: No chest pain, palpitations, dizziness, or leg swelling  GASTROINTESTINAL: + watery diarrhea x few weeks. No abdominal or epigastric pain. No nausea, vomiting, or hematemesis;No melena or hematochezia.  GENITOURINARY: No dysuria, frequency, hematuria, or incontinence  NEUROLOGICAL: No headaches, memory loss, loss of strength, numbness, or tremors  SKIN: No itching, burning, rashes, or lesions   LYMPH NODES: No enlarged glands  ENDOCRINE: No heat or cold intolerance; No hair loss  MUSCULOSKELETAL: left hip pain  PSYCHIATRIC: No depression, anxiety, mood swings, or difficulty sleeping  HEME/LYMPH: No easy bruising, or bleeding gums  ALLERGY AND IMMUNOLOGIC: No hives or eczema    [  x] All other ROS negative  [  ] Unable to obtain due to poor mental status    Vital Signs Last 24 Hrs  T(C): 36.7 (18 Jul 2023 09:15), Max: 36.9 (18 Jul 2023 04:13)  T(F): 98.1 (18 Jul 2023 09:15), Max: 98.5 (18 Jul 2023 04:13)  HR: 66 (18 Jul 2023 09:15) (64 - 76)  BP: 134/56 (18 Jul 2023 09:15) (134/56 - 165/88)  BP(mean): --  RR: 16 (18 Jul 2023 09:15) (16 - 18)  SpO2: 97% (18 Jul 2023 09:15) (97% - 100%)    Parameters below as of 18 Jul 2023 09:15  Patient On (Oxygen Delivery Method): room air        PHYSICAL EXAM:    GENERAL: NAD, well-groomed, well-developed  HEAD:  Atraumatic, Normocephalic  EYES: EOMI, PERRLA, conjunctiva and sclera clear  ENMT: Moist mucous membranes  NECK: Supple, No JVD  RESPIRATORY: Clear to auscultation bilaterally; No rales, rhonchi, wheezing, or rubs  CARDIOVASCULAR: Regular rate and rhythm; No murmurs, rubs, or gallops  GASTROINTESTINAL: Soft, Nontender, Nondistended; Bowel sounds present  GENITOURINARY: Not examined  EXTREMITIES: LUE fistula. 2+ Peripheral Pulses, No clubbing, cyanosis, or edema  NERVOUS SYSTEM:  Alert & Oriented X3; Moving all 4 extremities; No gross sensory deficits  HEME/LYMPH: No lymphadenopathy noted  SKIN: No rashes or lesions; Incisions C/D/I    LABS:                        10.7   7.02  )-----------( 159      ( 17 Jul 2023 21:04 )             33.4     07-17    138  |  99  |  15  ----------------------------<  131<H>  4.4   |  28  |  3.41<H>    Ca    9.1      17 Jul 2023 21:04    TPro  6.3  /  Alb  3.2<L>  /  TBili  0.8  /  DBili  x   /  AST  36<H>  /  ALT  22  /  AlkPhos  192<H>  07-17    PT/INR - ( 17 Jul 2023 21:04 )   PT: 13.1 sec;   INR: 1.13 ratio         PTT - ( 17 Jul 2023 21:04 )  PTT:31.1 sec  Urinalysis Basic - ( 17 Jul 2023 21:04 )    Color: x / Appearance: x / SG: x / pH: x  Gluc: 131 mg/dL / Ketone: x  / Bili: x / Urobili: x   Blood: x / Protein: x / Nitrite: x   Leuk Esterase: x / RBC: x / WBC x   Sq Epi: x / Non Sq Epi: x / Bacteria: x      CAPILLARY BLOOD GLUCOSE          RADIOLOGY & ADDITIONAL STUDIES:    EKG: SR @ 68 bpm poor baseline.   Personally Reviewed:  [x ] YES     < from: Transthoracic Echocardiogram (05.19.23 @ 12:06) >  CONCLUSIONS:  1. Calcified trileaflet aortic valve with normal opening.  Mild aortic regurgitation.  2. Visually, the left atrium appears dilated.  Mild  concentric left ventricular hypertrophy.Endocardium not  well visualized; grossly normal left ventricular systolic  function.Visually, LVEF is 55-60%.  Insufficient data to determine diastolic function.  Increased E/e'  is consistent with elevated left  ventricular filling pressure.  3. Normal right atrium.Normal right ventricular size and  function.  4. Normal tricuspid valve. Mild tricuspid  regurgitation.Estimated pulmonary artery systolic pressure  equals 48 mm Hg, assuming right atrial pressure equals 15  mm Hg, consistent with mild pulmonary hypertension.  5. No pericardial effusion seen.  *** Compared with echocardiogram of 1/7/2018, no  significant changes noted.    < end of copied text >      Imaging: < from: Xray Chest 1 View- PORTABLE-Urgent (Xray Chest 1 View- PORTABLE-Urgent .) (07.18.23 @ 07:48) >  IMPRESSION:    Small left pleural effusion.    < end of copied text >    < from: Xray Knee 3 Views, Left (07.17.23 @ 23:07) >  IMPRESSION: Status post left intramedullary nailing of the left femur   with interval migration of the femoral neck screw into the acetabular   region, new since fluoroscopic placement evaluation 5/20/2023.    Overall findings are grossly unchanged from earlier same day's study.    < end of copied text >    Personally Reviewed:  [x ] YES               Consultant(s) notes reviewed:    Care Discussed with Consultant(s)/Other Providers: Ortho     Patient is a 78y old  Female who presents with a chief complaint of s/p L hip orif concern for loosened hardwar (18 Jul 2023 06:54)      HPI:  77 y.o. Female w/ Hx HTN, DM (diet controlled), chronic type B aortic dissection, ESRD on HD ( M, W, F ) via LUE fistula, endometrial ca in remission s/p total hysterectomy in 2007 s/p L IMN for IT fx in May 2023 p/w 2 weeks of worsening atraumatic  L hip pain since admission to [facility]. She has not been ambulating much since discharge stating only ambulating when working with PT and OT.  She was seen by ortho at rehab on 7/17 and Xray of left femur showed interval migration of femoral neck screw into acetabular region so now readmitted for Left hip revision. Pt otherwise denies cp, SOB, abdominal pain, N/V. Patient does c/o watery diarrhea 3x/day and suspects its the phoslo since she says she only suppose to take it once a day but was prescribed 3x/day at rehab.           Pain Symptoms if applicable:             	                         none	   mild         moderate         severe  Pain:	                            0	    1-3	     4-6	         7-10  Location:	  Modifying factors:	  Associated symptoms:	    Function: [ ] Independent  [ ] Assistance  [ ] Total care  [ ] Non-ambulatory    Allergies    No Known Allergies    Intolerances        HOME MEDICATIONS: [ x] Reviewed  aspirin 325 mg oral tablet: 1 tab(s) orally 2 times a day  multivitamin: 1 tab(s) orally once a day  polyethylene glycol 3350 oral powder for reconstitution: 17 gram(s) orally 2 times a day  senna leaf extract oral tablet: 2 tab(s) orally once a day (at bedtime)  lidocaine 4% topical film: Apply topically to affected area once a day  acetaminophen 325 mg oral tablet: 3 tab(s) orally every 8 hours  oxyCODONE 5 mg oral tablet: 1 tab(s) orally every 4 hours As needed Moderate Pain (4 - 6)  hydrALAZINE 50 mg oral tablet: 1 orally once a day  metoprolol succinate 25 mg oral tablet, extended release: 0.5 orally once a day  PhosLo 667 mg oral capsule: 1 orally 3 times a day      MEDICATIONS  (STANDING):  acetaminophen     Tablet .. 975 milliGRAM(s) Oral every 8 hours  senna 2 Tablet(s) Oral at bedtime    MEDICATIONS  (PRN):  melatonin 3 milliGRAM(s) Oral at bedtime PRN Insomnia  oxyCODONE    IR 5 milliGRAM(s) Oral every 4 hours PRN Severe Pain (7 - 10)  oxyCODONE    IR 2.5 milliGRAM(s) Oral every 4 hours PRN Moderate Pain (4 - 6)      PAST MEDICAL & SURGICAL HISTORY:  Adult Onset Diabetes Mellitus,      Cancer of Endometrium  s/p Hysterectomy , s/p Chemo.      Dissection of Aorta  Type B      History of Hysterectomy with O      Hypertension      CKD (chronic kidney disease)      ESRD (end stage renal disease) on dialysis  M,W,F   [facility]      H/O total hysterectomy  in 2007 for endometrial CA      [x ] Reviewed     SOCIAL HISTORY:  Residence: [ ] [facility]  [ ] SNF  [ x] Community  [x ] Substance abuse: none  [x ] Tobacco: none  [x ] Alcohol use: none    FAMILY HISTORY:  No pertinent family history in first degree relatives        REVIEW OF SYSTEMS:    CONSTITUTIONAL: No fever, weight loss, or fatigue  EYES: No eye pain, visual disturbances, or discharge  ENMT:  No difficulty hearing, tinnitus, vertigo; No sinus or throat pain  NECK: No pain or stiffness  BREASTS: No pain, masses, or nipple discharge  RESPIRATORY: No cough, wheezing, chills or hemoptysis; No shortness of breath  CARDIOVASCULAR: No chest pain, palpitations, dizziness, or leg swelling  GASTROINTESTINAL: + watery diarrhea x few weeks. No abdominal or epigastric pain. No nausea, vomiting, or hematemesis;No melena or hematochezia.  GENITOURINARY: No dysuria, frequency, hematuria, or incontinence  NEUROLOGICAL: No headaches, memory loss, loss of strength, numbness, or tremors  SKIN: No itching, burning, rashes, or lesions   LYMPH NODES: No enlarged glands  ENDOCRINE: No heat or cold intolerance; No hair loss  MUSCULOSKELETAL: left hip pain  PSYCHIATRIC: No depression, anxiety, mood swings, or difficulty sleeping  HEME/LYMPH: No easy bruising, or bleeding gums  ALLERGY AND IMMUNOLOGIC: No hives or eczema    [  x] All other ROS negative  [  ] Unable to obtain due to poor mental status    Vital Signs Last 24 Hrs  T(C): 36.7 (18 Jul 2023 09:15), Max: 36.9 (18 Jul 2023 04:13)  T(F): 98.1 (18 Jul 2023 09:15), Max: 98.5 (18 Jul 2023 04:13)  HR: 66 (18 Jul 2023 09:15) (64 - 76)  BP: 134/56 (18 Jul 2023 09:15) (134/56 - 165/88)  BP(mean): --  RR: 16 (18 Jul 2023 09:15) (16 - 18)  SpO2: 97% (18 Jul 2023 09:15) (97% - 100%)    Parameters below as of 18 Jul 2023 09:15  Patient On (Oxygen Delivery Method): room air        PHYSICAL EXAM:    GENERAL: NAD, well-groomed, well-developed  HEAD:  Atraumatic, Normocephalic  EYES: EOMI, PERRLA, conjunctiva and sclera clear  ENMT: Moist mucous membranes  NECK: Supple, No JVD  RESPIRATORY: Clear to auscultation bilaterally; No rales, rhonchi, wheezing, or rubs  CARDIOVASCULAR: Regular rate and rhythm; No murmurs, rubs, or gallops  GASTROINTESTINAL: Soft, Nontender, Nondistended; Bowel sounds present  GENITOURINARY: Not examined  EXTREMITIES: LUE fistula. 2+ Peripheral Pulses, No clubbing, cyanosis, or edema  NERVOUS SYSTEM:  Alert & Oriented X3; Moving all 4 extremities; No gross sensory deficits  HEME/LYMPH: No lymphadenopathy noted  SKIN: No rashes or lesions; Incisions C/D/I    LABS:                        10.7   7.02  )-----------( 159      ( 17 Jul 2023 21:04 )             33.4     07-17    138  |  99  |  15  ----------------------------<  131<H>  4.4   |  28  |  3.41<H>    Ca    9.1      17 Jul 2023 21:04    TPro  6.3  /  Alb  3.2<L>  /  TBili  0.8  /  DBili  x   /  AST  36<H>  /  ALT  22  /  AlkPhos  192<H>  07-17    PT/INR - ( 17 Jul 2023 21:04 )   PT: 13.1 sec;   INR: 1.13 ratio         PTT - ( 17 Jul 2023 21:04 )  PTT:31.1 sec  Urinalysis Basic - ( 17 Jul 2023 21:04 )    Color: x / Appearance: x / SG: x / pH: x  Gluc: 131 mg/dL / Ketone: x  / Bili: x / Urobili: x   Blood: x / Protein: x / Nitrite: x   Leuk Esterase: x / RBC: x / WBC x   Sq Epi: x / Non Sq Epi: x / Bacteria: x      CAPILLARY BLOOD GLUCOSE          RADIOLOGY & ADDITIONAL STUDIES:    EKG: SR @ 68 bpm poor baseline. old RBBB  Personally Reviewed:  [x ] YES     < from: Transthoracic Echocardiogram (05.19.23 @ 12:06) >  CONCLUSIONS:  1. Calcified trileaflet aortic valve with normal opening.  Mild aortic regurgitation.  2. Visually, the left atrium appears dilated.  Mild  concentric left ventricular hypertrophy.Endocardium not  well visualized; grossly normal left ventricular systolic  function.Visually, LVEF is 55-60%.  Insufficient data to determine diastolic function.  Increased E/e'  is consistent with elevated left  ventricular filling pressure.  3. Normal right atrium.Normal right ventricular size and  function.  4. Normal tricuspid valve. Mild tricuspid  regurgitation.Estimated pulmonary artery systolic pressure  equals 48 mm Hg, assuming right atrial pressure equals 15  mm Hg, consistent with mild pulmonary hypertension.  5. No pericardial effusion seen.  *** Compared with echocardiogram of 1/7/2018, no  significant changes noted.    < end of copied text >      Imaging: < from: Xray Chest 1 View- PORTABLE-Urgent (Xray Chest 1 View- PORTABLE-Urgent .) (07.18.23 @ 07:48) >  IMPRESSION:    Small left pleural effusion.    < end of copied text >    < from: Xray Knee 3 Views, Left (07.17.23 @ 23:07) >  IMPRESSION: Status post left intramedullary nailing of the left femur   with interval migration of the femoral neck screw into the acetabular   region, new since fluoroscopic placement evaluation 5/20/2023.    Overall findings are grossly unchanged from earlier same day's study.    < end of copied text >    Personally Reviewed:  [x ] YES               Consultant(s) notes reviewed:    Care Discussed with Consultant(s)/Other Providers: Ortho

## 2023-07-18 NOTE — PATIENT PROFILE ADULT - FALL HARM RISK - HARM RISK INTERVENTIONS

## 2023-07-18 NOTE — PATIENT PROFILE ADULT - NSPROPTRIGHTREPPHONE_GEN_A_NUR
[phone] ([facility]), [phone] ([place]) [phone]

## 2023-07-18 NOTE — CONSULT NOTE ADULT - PROBLEM SELECTOR RECOMMENDATION 3
reports watery diarrhea shortly after every meals  d/c stool softeners  decrease phoslo from tid to qdaily  check stool for C diff, GI PCR.

## 2023-07-19 ENCOUNTER — RESULT REVIEW (OUTPATIENT)
Age: 78
End: 2023-07-19

## 2023-07-19 ENCOUNTER — APPOINTMENT (OUTPATIENT)
Dept: ORTHOPEDIC SURGERY | Facility: CLINIC | Age: 78
End: 2023-07-19

## 2023-07-19 ENCOUNTER — TRANSCRIPTION ENCOUNTER (OUTPATIENT)
Age: 78
End: 2023-07-19

## 2023-07-19 LAB
ALBUMIN SERPL ELPH-MCNC: 2.2 G/DL — LOW (ref 3.3–5)
ALP SERPL-CCNC: 141 U/L — HIGH (ref 40–120)
ALT FLD-CCNC: 18 U/L — SIGNIFICANT CHANGE UP (ref 4–33)
ANION GAP SERPL CALC-SCNC: 11 MMOL/L — SIGNIFICANT CHANGE UP (ref 7–14)
ANION GAP SERPL CALC-SCNC: 16 MMOL/L — HIGH (ref 7–14)
ANION GAP SERPL CALC-SCNC: 19 MMOL/L — HIGH (ref 7–14)
APTT BLD: 31.3 SEC — SIGNIFICANT CHANGE UP (ref 27–36.3)
APTT BLD: 34.3 SEC — SIGNIFICANT CHANGE UP (ref 27–36.3)
AST SERPL-CCNC: 37 U/L — HIGH (ref 4–32)
BILIRUB SERPL-MCNC: 0.8 MG/DL — SIGNIFICANT CHANGE UP (ref 0.2–1.2)
BLD GP AB SCN SERPL QL: NEGATIVE — SIGNIFICANT CHANGE UP
BUN SERPL-MCNC: 32 MG/DL — HIGH (ref 7–23)
BUN SERPL-MCNC: 35 MG/DL — HIGH (ref 7–23)
BUN SERPL-MCNC: 39 MG/DL — HIGH (ref 7–23)
CALCIUM SERPL-MCNC: 7.6 MG/DL — LOW (ref 8.4–10.5)
CALCIUM SERPL-MCNC: 7.9 MG/DL — LOW (ref 8.4–10.5)
CALCIUM SERPL-MCNC: 8.6 MG/DL — SIGNIFICANT CHANGE UP (ref 8.4–10.5)
CHLORIDE SERPL-SCNC: 100 MMOL/L — SIGNIFICANT CHANGE UP (ref 98–107)
CHLORIDE SERPL-SCNC: 102 MMOL/L — SIGNIFICANT CHANGE UP (ref 98–107)
CHLORIDE SERPL-SCNC: 99 MMOL/L — SIGNIFICANT CHANGE UP (ref 98–107)
CO2 SERPL-SCNC: 17 MMOL/L — LOW (ref 22–31)
CO2 SERPL-SCNC: 19 MMOL/L — LOW (ref 22–31)
CO2 SERPL-SCNC: 25 MMOL/L — SIGNIFICANT CHANGE UP (ref 22–31)
CREAT SERPL-MCNC: 5.2 MG/DL — HIGH (ref 0.5–1.3)
CREAT SERPL-MCNC: 5.54 MG/DL — HIGH (ref 0.5–1.3)
CREAT SERPL-MCNC: 5.72 MG/DL — HIGH (ref 0.5–1.3)
EGFR: 7 ML/MIN/1.73M2 — LOW
EGFR: 7 ML/MIN/1.73M2 — LOW
EGFR: 8 ML/MIN/1.73M2 — LOW
GAS PNL BLDA: SIGNIFICANT CHANGE UP
GI PCR PANEL: SIGNIFICANT CHANGE UP
GLUCOSE BLDC GLUCOMTR-MCNC: 130 MG/DL — HIGH (ref 70–99)
GLUCOSE SERPL-MCNC: 104 MG/DL — HIGH (ref 70–99)
GLUCOSE SERPL-MCNC: 162 MG/DL — HIGH (ref 70–99)
GLUCOSE SERPL-MCNC: 197 MG/DL — HIGH (ref 70–99)
GRAM STN FLD: SIGNIFICANT CHANGE UP
HCT VFR BLD CALC: 26.1 % — LOW (ref 34.5–45)
HCT VFR BLD CALC: 28.6 % — LOW (ref 34.5–45)
HCT VFR BLD CALC: 30.6 % — LOW (ref 34.5–45)
HGB BLD-MCNC: 8.5 G/DL — LOW (ref 11.5–15.5)
HGB BLD-MCNC: 9.3 G/DL — LOW (ref 11.5–15.5)
HGB BLD-MCNC: 9.8 G/DL — LOW (ref 11.5–15.5)
INR BLD: 1.19 RATIO — HIGH (ref 0.88–1.16)
INR BLD: 1.43 RATIO — HIGH (ref 0.88–1.16)
LACTATE SERPL-SCNC: 6.9 MMOL/L — CRITICAL HIGH (ref 0.5–2)
MAGNESIUM SERPL-MCNC: 2 MG/DL — SIGNIFICANT CHANGE UP (ref 1.6–2.6)
MCHC RBC-ENTMCNC: 29.8 PG — SIGNIFICANT CHANGE UP (ref 27–34)
MCHC RBC-ENTMCNC: 31.7 PG — SIGNIFICANT CHANGE UP (ref 27–34)
MCHC RBC-ENTMCNC: 31.7 PG — SIGNIFICANT CHANGE UP (ref 27–34)
MCHC RBC-ENTMCNC: 32 GM/DL — SIGNIFICANT CHANGE UP (ref 32–36)
MCHC RBC-ENTMCNC: 32.5 GM/DL — SIGNIFICANT CHANGE UP (ref 32–36)
MCHC RBC-ENTMCNC: 32.6 GM/DL — SIGNIFICANT CHANGE UP (ref 32–36)
MCV RBC AUTO: 91.7 FL — SIGNIFICANT CHANGE UP (ref 80–100)
MCV RBC AUTO: 97.4 FL — SIGNIFICANT CHANGE UP (ref 80–100)
MCV RBC AUTO: 99 FL — SIGNIFICANT CHANGE UP (ref 80–100)
MRSA PCR RESULT.: SIGNIFICANT CHANGE UP
NRBC # BLD: 0 /100 WBCS — SIGNIFICANT CHANGE UP (ref 0–0)
NRBC # FLD: 0 K/UL — SIGNIFICANT CHANGE UP (ref 0–0)
PHOSPHATE SERPL-MCNC: 6.6 MG/DL — HIGH (ref 2.5–4.5)
PLATELET # BLD AUTO: 134 K/UL — LOW (ref 150–400)
PLATELET # BLD AUTO: 143 K/UL — LOW (ref 150–400)
PLATELET # BLD AUTO: 175 K/UL — SIGNIFICANT CHANGE UP (ref 150–400)
POTASSIUM SERPL-MCNC: 4 MMOL/L — SIGNIFICANT CHANGE UP (ref 3.5–5.3)
POTASSIUM SERPL-MCNC: 4.4 MMOL/L — SIGNIFICANT CHANGE UP (ref 3.5–5.3)
POTASSIUM SERPL-MCNC: 5.5 MMOL/L — HIGH (ref 3.5–5.3)
POTASSIUM SERPL-SCNC: 4 MMOL/L — SIGNIFICANT CHANGE UP (ref 3.5–5.3)
POTASSIUM SERPL-SCNC: 4.4 MMOL/L — SIGNIFICANT CHANGE UP (ref 3.5–5.3)
POTASSIUM SERPL-SCNC: 5.5 MMOL/L — HIGH (ref 3.5–5.3)
PROT SERPL-MCNC: 5.1 G/DL — LOW (ref 6–8.3)
PROTHROM AB SERPL-ACNC: 13.8 SEC — HIGH (ref 10.5–13.4)
PROTHROM AB SERPL-ACNC: 16.6 SEC — HIGH (ref 10.5–13.4)
RBC # BLD: 2.68 M/UL — LOW (ref 3.8–5.2)
RBC # BLD: 3.09 M/UL — LOW (ref 3.8–5.2)
RBC # BLD: 3.12 M/UL — LOW (ref 3.8–5.2)
RBC # FLD: 14.4 % — SIGNIFICANT CHANGE UP (ref 10.3–14.5)
RBC # FLD: 14.6 % — HIGH (ref 10.3–14.5)
RBC # FLD: 17.5 % — HIGH (ref 10.3–14.5)
RH IG SCN BLD-IMP: POSITIVE — SIGNIFICANT CHANGE UP
S AUREUS DNA NOSE QL NAA+PROBE: SIGNIFICANT CHANGE UP
SODIUM SERPL-SCNC: 135 MMOL/L — SIGNIFICANT CHANGE UP (ref 135–145)
SODIUM SERPL-SCNC: 136 MMOL/L — SIGNIFICANT CHANGE UP (ref 135–145)
SODIUM SERPL-SCNC: 137 MMOL/L — SIGNIFICANT CHANGE UP (ref 135–145)
SPECIMEN SOURCE: SIGNIFICANT CHANGE UP
WBC # BLD: 5.05 K/UL — SIGNIFICANT CHANGE UP (ref 3.8–10.5)
WBC # BLD: 7.47 K/UL — SIGNIFICANT CHANGE UP (ref 3.8–10.5)
WBC # BLD: 7.78 K/UL — SIGNIFICANT CHANGE UP (ref 3.8–10.5)
WBC # FLD AUTO: 5.05 K/UL — SIGNIFICANT CHANGE UP (ref 3.8–10.5)
WBC # FLD AUTO: 7.47 K/UL — SIGNIFICANT CHANGE UP (ref 3.8–10.5)
WBC # FLD AUTO: 7.78 K/UL — SIGNIFICANT CHANGE UP (ref 3.8–10.5)

## 2023-07-19 PROCEDURE — 99223 1ST HOSP IP/OBS HIGH 75: CPT | Mod: 25

## 2023-07-19 PROCEDURE — 88304 TISSUE EXAM BY PATHOLOGIST: CPT | Mod: 26

## 2023-07-19 PROCEDURE — 71045 X-RAY EXAM CHEST 1 VIEW: CPT | Mod: 26

## 2023-07-19 PROCEDURE — 72170 X-RAY EXAM OF PELVIS: CPT | Mod: 26

## 2023-07-19 PROCEDURE — 27132 TOTAL HIP ARTHROPLASTY: CPT | Mod: 78,LT

## 2023-07-19 PROCEDURE — 99222 1ST HOSP IP/OBS MODERATE 55: CPT | Mod: FS,GC

## 2023-07-19 PROCEDURE — 36556 INSERT NON-TUNNEL CV CATH: CPT | Mod: GC

## 2023-07-19 PROCEDURE — 27495 REINFORCE THIGH: CPT | Mod: 78,LT

## 2023-07-19 PROCEDURE — 73552 X-RAY EXAM OF FEMUR 2/>: CPT | Mod: 26,LT

## 2023-07-19 PROCEDURE — 88311 DECALCIFY TISSUE: CPT | Mod: 26

## 2023-07-19 DEVICE — K-WIRE STRYKER SPI .03MM X 285MM
Type: IMPLANTABLE DEVICE | Status: NON-FUNCTIONAL
Removed: 2023-07-19

## 2023-07-19 DEVICE — COMPNT UNIVERSAL HEAD BIPOLAR 44X28MM
Type: IMPLANTABLE DEVICE | Status: NON-FUNCTIONAL
Removed: 2023-07-19

## 2023-07-19 DEVICE — IMPLANTABLE DEVICE
Type: IMPLANTABLE DEVICE | Status: NON-FUNCTIONAL
Removed: 2023-07-19

## 2023-07-19 DEVICE — HEAD FEMORAL SHORT NECK 28MM
Type: IMPLANTABLE DEVICE | Status: NON-FUNCTIONAL
Removed: 2023-07-19

## 2023-07-19 DEVICE — HIP STEM MODULAR 21MM PLUS 0MM CONE
Type: IMPLANTABLE DEVICE | Status: NON-FUNCTIONAL
Removed: 2023-07-19

## 2023-07-19 DEVICE — CABLE/SLV SET BEAD MED CABLE 2MM
Type: IMPLANTABLE DEVICE | Status: NON-FUNCTIONAL
Removed: 2023-07-19

## 2023-07-19 RX ORDER — OXYCODONE HYDROCHLORIDE 5 MG/1
2.5 TABLET ORAL
Refills: 0 | Status: DISCONTINUED | OUTPATIENT
Start: 2023-07-19 | End: 2023-07-20

## 2023-07-19 RX ORDER — ACETAMINOPHEN 500 MG
1000 TABLET ORAL EVERY 6 HOURS
Refills: 0 | Status: COMPLETED | OUTPATIENT
Start: 2023-07-19 | End: 2023-07-20

## 2023-07-19 RX ORDER — PHENYLEPHRINE HYDROCHLORIDE 10 MG/ML
0.5 INJECTION INTRAVENOUS
Qty: 40 | Refills: 0 | Status: DISCONTINUED | OUTPATIENT
Start: 2023-07-19 | End: 2023-07-19

## 2023-07-19 RX ORDER — OXYCODONE HYDROCHLORIDE 5 MG/1
5 TABLET ORAL ONCE
Refills: 0 | Status: DISCONTINUED | OUTPATIENT
Start: 2023-07-19 | End: 2023-07-19

## 2023-07-19 RX ORDER — ALBUMIN HUMAN 25 %
250 VIAL (ML) INTRAVENOUS ONCE
Refills: 0 | Status: COMPLETED | OUTPATIENT
Start: 2023-07-19 | End: 2023-07-19

## 2023-07-19 RX ORDER — FENTANYL CITRATE 50 UG/ML
50 INJECTION INTRAVENOUS
Refills: 0 | Status: DISCONTINUED | OUTPATIENT
Start: 2023-07-19 | End: 2023-07-19

## 2023-07-19 RX ORDER — SODIUM CHLORIDE 9 MG/ML
1000 INJECTION, SOLUTION INTRAVENOUS
Refills: 0 | Status: DISCONTINUED | OUTPATIENT
Start: 2023-07-19 | End: 2023-07-19

## 2023-07-19 RX ORDER — SODIUM CHLORIDE 9 MG/ML
1000 INJECTION, SOLUTION INTRAVENOUS ONCE
Refills: 0 | Status: COMPLETED | OUTPATIENT
Start: 2023-07-19 | End: 2023-07-19

## 2023-07-19 RX ORDER — PIPERACILLIN AND TAZOBACTAM 4; .5 G/20ML; G/20ML
3.38 INJECTION, POWDER, LYOPHILIZED, FOR SOLUTION INTRAVENOUS ONCE
Refills: 0 | Status: COMPLETED | OUTPATIENT
Start: 2023-07-20 | End: 2023-07-20

## 2023-07-19 RX ORDER — ALBUMIN HUMAN 25 %
50 VIAL (ML) INTRAVENOUS ONCE
Refills: 0 | Status: COMPLETED | OUTPATIENT
Start: 2023-07-19 | End: 2023-07-19

## 2023-07-19 RX ORDER — CHLORHEXIDINE GLUCONATE 213 G/1000ML
1 SOLUTION TOPICAL
Refills: 0 | Status: COMPLETED | OUTPATIENT
Start: 2023-07-19 | End: 2023-07-22

## 2023-07-19 RX ORDER — ACETAMINOPHEN 500 MG
650 TABLET ORAL EVERY 6 HOURS
Refills: 0 | Status: DISCONTINUED | OUTPATIENT
Start: 2023-07-19 | End: 2023-07-19

## 2023-07-19 RX ORDER — OXYCODONE HYDROCHLORIDE 5 MG/1
5 TABLET ORAL
Refills: 0 | Status: DISCONTINUED | OUTPATIENT
Start: 2023-07-19 | End: 2023-07-20

## 2023-07-19 RX ORDER — HYDROMORPHONE HYDROCHLORIDE 2 MG/ML
0.5 INJECTION INTRAMUSCULAR; INTRAVENOUS; SUBCUTANEOUS
Refills: 0 | Status: DISCONTINUED | OUTPATIENT
Start: 2023-07-19 | End: 2023-07-19

## 2023-07-19 RX ORDER — NOREPINEPHRINE BITARTRATE/D5W 8 MG/250ML
0.3 PLASTIC BAG, INJECTION (ML) INTRAVENOUS
Qty: 16 | Refills: 0 | Status: DISCONTINUED | OUTPATIENT
Start: 2023-07-19 | End: 2023-07-21

## 2023-07-19 RX ORDER — CEFAZOLIN SODIUM 1 G
2000 VIAL (EA) INJECTION EVERY 8 HOURS
Refills: 0 | Status: DISCONTINUED | OUTPATIENT
Start: 2023-07-19 | End: 2023-07-20

## 2023-07-19 RX ORDER — FOLIC ACID 0.8 MG
1 TABLET ORAL DAILY
Refills: 0 | Status: DISCONTINUED | OUTPATIENT
Start: 2023-07-19 | End: 2023-08-16

## 2023-07-19 RX ORDER — SODIUM CHLORIDE 9 MG/ML
1000 INJECTION, SOLUTION INTRAVENOUS
Refills: 0 | Status: DISCONTINUED | OUTPATIENT
Start: 2023-07-19 | End: 2023-07-21

## 2023-07-19 RX ORDER — POVIDONE-IODINE 5 %
1 AEROSOL (ML) TOPICAL ONCE
Refills: 0 | Status: COMPLETED | OUTPATIENT
Start: 2023-07-19 | End: 2023-07-19

## 2023-07-19 RX ORDER — ASCORBIC ACID 60 MG
500 TABLET,CHEWABLE ORAL
Refills: 0 | Status: DISCONTINUED | OUTPATIENT
Start: 2023-07-19 | End: 2023-08-16

## 2023-07-19 RX ORDER — ONDANSETRON 8 MG/1
4 TABLET, FILM COATED ORAL ONCE
Refills: 0 | Status: DISCONTINUED | OUTPATIENT
Start: 2023-07-19 | End: 2023-07-19

## 2023-07-19 RX ORDER — ONDANSETRON 8 MG/1
4 TABLET, FILM COATED ORAL EVERY 6 HOURS
Refills: 0 | Status: DISCONTINUED | OUTPATIENT
Start: 2023-07-19 | End: 2023-08-16

## 2023-07-19 RX ORDER — NOREPINEPHRINE BITARTRATE/D5W 8 MG/250ML
0.05 PLASTIC BAG, INJECTION (ML) INTRAVENOUS
Qty: 8 | Refills: 0 | Status: DISCONTINUED | OUTPATIENT
Start: 2023-07-19 | End: 2023-07-19

## 2023-07-19 RX ORDER — PIPERACILLIN AND TAZOBACTAM 4; .5 G/20ML; G/20ML
3.38 INJECTION, POWDER, LYOPHILIZED, FOR SOLUTION INTRAVENOUS ONCE
Refills: 0 | Status: COMPLETED | OUTPATIENT
Start: 2023-07-19 | End: 2023-07-19

## 2023-07-19 RX ORDER — PIPERACILLIN AND TAZOBACTAM 4; .5 G/20ML; G/20ML
3.38 INJECTION, POWDER, LYOPHILIZED, FOR SOLUTION INTRAVENOUS EVERY 12 HOURS
Refills: 0 | Status: DISCONTINUED | OUTPATIENT
Start: 2023-07-20 | End: 2023-07-23

## 2023-07-19 RX ADMIN — Medication 1 TABLET(S): at 21:57

## 2023-07-19 RX ADMIN — Medication 5.33 MICROGRAM(S)/KG/MIN: at 15:54

## 2023-07-19 RX ADMIN — PHENYLEPHRINE HYDROCHLORIDE 10.7 MICROGRAM(S)/KG/MIN: 10 INJECTION INTRAVENOUS at 15:11

## 2023-07-19 RX ADMIN — Medication 50 MILLILITER(S): at 15:54

## 2023-07-19 RX ADMIN — Medication 1 APPLICATION(S): at 08:10

## 2023-07-19 RX ADMIN — OXYCODONE HYDROCHLORIDE 5 MILLIGRAM(S): 5 TABLET ORAL at 23:27

## 2023-07-19 RX ADMIN — SODIUM CHLORIDE 125 MILLILITER(S): 9 INJECTION, SOLUTION INTRAVENOUS at 23:37

## 2023-07-19 RX ADMIN — SODIUM CHLORIDE 500 MILLILITER(S): 9 INJECTION, SOLUTION INTRAVENOUS at 23:37

## 2023-07-19 RX ADMIN — OXYCODONE HYDROCHLORIDE 5 MILLIGRAM(S): 5 TABLET ORAL at 21:57

## 2023-07-19 RX ADMIN — Medication 250 MILLILITER(S): at 17:30

## 2023-07-19 RX ADMIN — SODIUM CHLORIDE 75 MILLILITER(S): 9 INJECTION, SOLUTION INTRAVENOUS at 01:26

## 2023-07-19 RX ADMIN — Medication 100 MILLIGRAM(S): at 21:57

## 2023-07-19 RX ADMIN — Medication 400 MILLIGRAM(S): at 23:36

## 2023-07-19 RX ADMIN — CHLORHEXIDINE GLUCONATE 1 APPLICATION(S): 213 SOLUTION TOPICAL at 05:27

## 2023-07-19 RX ADMIN — PANTOPRAZOLE SODIUM 40 MILLIGRAM(S): 20 TABLET, DELAYED RELEASE ORAL at 06:04

## 2023-07-19 NOTE — CONSULT NOTE ADULT - SUBJECTIVE AND OBJECTIVE BOX
HISTORY OF PRESENT ILLNESS:  VERO ALVAREZ is a 78y Female with pmhx of ESRD on HD (MoWeFri), HTN, DM (diet controlled), chronic type B aortic dissection, heart murmur, endometrial ca in remission s/p total hysterectomy in 2007 s/p L IMN for IT fx in May 2023 presented to the ED w 2 weeks of atraumtic  L hip pain.  Pt has been at [facility] since discharge and states about two weeks ago she started feeling pain in L hip. Over the last two weeks she has had increasing L hip pain and was seen by orthopedist at rehab 7/17 where XRs done and was told she should be sent to ED for evaluation of hardware. Now s/p  left hip removal of gamma nail and   placement of restoration modular peter arthroplasty. Patient hypotensive in PACU requiring pressors requirements and volume resuscitation. SICU consulted for Pressor support and hemodynamic management.   --------------------------------------------------------------------------------------  PAST MEDICAL HISTORY:   Adult Onset Diabetes Mellitus,    Cancer of Endometrium    Dissection of Aorta    History of Hysterectomy with O    Hypertension    CKD (chronic kidney disease)    ESRD (end stage renal disease) on dialysis        PAST SURGICAL HISTORY:   H/O total hysterectomy    FAMILY HISTORY:   No pertinent family history in first degree relatives    HOME MEDICATIONS:    ALLERGIES:   No Known Allergies      --------------------------------------------------------------------------------------  PHYSICAL EXAM:    VITAL SIGNS:  ICU Vital Signs Last 24 Hrs  T(C): 36.7 (19 Jul 2023 08:40), Max: 37.3 (18 Jul 2023 21:18)  T(F): 98 (19 Jul 2023 08:40), Max: 99.1 (18 Jul 2023 21:18)  HR: 67 (19 Jul 2023 08:40) (67 - 81)  BP: 168/73 (19 Jul 2023 08:40) (151/67 - 169/83)  BP(mean): --  ABP: --  ABP(mean): --  RR: 16 (19 Jul 2023 08:40) (16 - 18)  SpO2: 97% (19 Jul 2023 08:40) (95% - 98%)    O2 Parameters below as of 19 Jul 2023 08:40  Patient On (Oxygen Delivery Method): room air    I/Os:  I&O's Summary    18 Jul 2023 07:01  -  19 Jul 2023 07:00  --------------------------------------------------------  IN: 240 mL / OUT: 100 mL / NET: 140 mL        NEURO:   Exam: A&Ox3  - Normal neurological exam     RESPIRATORY  Exam: Unlabored breathing  - NC 2L saturating >92%      CARDIOVASCULAR  Exam: Normal S1, S2  Cardiac Rhythm: Regular    GI/NUTRITION  Exam: soft, non tender, non distended. Non peritoneal  Diet: Regular.     HIP:  - LEFT hip with Prevena VAC in place, holding good suction, no hematoma or erythema around prevena.     GENITOURINARY/RENAL  Exam: Normal.   [ ] Oconnell catheter, indication: urine output monitoring in critically ill patient    Weight:  Weight (kg): 56.9 (07-19 @ 07:25)    HEMATOLOGIC  [ ] VTE Prophylaxis:    Transfusion: [ ] PRBC	[ ] Platelets	[ ] FFP	[ ] Cryoprecipitate      INFECTIOUS DISEASES:  Recent Cultures:  RECENT CULTURES:        ENDOCRINE  Glucose:  CAPILLARY BLOOD GLUCOSE      POCT Blood Glucose.: 130 mg/dL (19 Jul 2023 14:58)        PATIENT CARE ACCESS DEVICES:  [X ] Peripheral IV  [ ] Central Venous Line	[ ] R	[ ] L	[ ] IJ	[ ] Fem	[ ] SC	Placed:   [ ] Arterial Line		[ ] R	[ ] L	[ ] Fem	[ ] Rad	[ ] Ax	Placed:   [ ] PICC:					[ ] Mediport  [ ] Urinary Catheter, Date Placed:   [x] Necessity of urinary, arterial, and venous catheters discussed    --------------------------------------------------------------------------------------  MEDICATIONS:   Neurologic Medications  acetaminophen     Tablet .. 975 milliGRAM(s) Oral every 8 hours  acetaminophen     Tablet .. 650 milliGRAM(s) Oral every 6 hours PRN Temp greater or equal to 38C (100.4F), Mild Pain (1 - 3)  fentaNYL    Injectable 50 MICROGram(s) IV Push every 5 minutes PRN Moderate Pain (4 - 6)  HYDROmorphone  Injectable 0.5 milliGRAM(s) IV Push every 10 minutes PRN Severe Pain (7 - 10)  melatonin 3 milliGRAM(s) Oral at bedtime PRN Insomnia  ondansetron Injectable 4 milliGRAM(s) IV Push once PRN Nausea and/or Vomiting  ondansetron Injectable 4 milliGRAM(s) IV Push every 6 hours PRN Nausea and/or Vomiting  oxyCODONE    IR 5 milliGRAM(s) Oral every 4 hours PRN Severe Pain (7 - 10)  oxyCODONE    IR 5 milliGRAM(s) Oral once PRN Mild Pain (1 - 3)  oxyCODONE    IR 2.5 milliGRAM(s) Oral every 3 hours PRN Moderate Pain (4 - 6)  oxyCODONE    IR 5 milliGRAM(s) Oral every 3 hours PRN Severe Pain (7 - 10)  oxyCODONE    IR 2.5 milliGRAM(s) Oral every 4 hours PRN Moderate Pain (4 - 6)    Respiratory Medications    Cardiovascular Medications  norepinephrine Infusion 0.05 MICROgram(s)/kG/Min IV Continuous <Continuous>    Gastrointestinal Medications  albumin human  5% IVPB 250 milliLiter(s) IV Intermittent once  aluminum hydroxide/magnesium hydroxide/simethicone Suspension 30 milliLiter(s) Oral four times a day PRN Indigestion  ascorbic acid 500 milliGRAM(s) Oral two times a day  calcium carbonate 1250 mG  + Vitamin D (OsCal 500 + D) 1 Tablet(s) Oral three times a day  folic acid 1 milliGRAM(s) Oral daily  lactated ringers. 1000 milliLiter(s) IV Continuous <Continuous>  lactated ringers. 1000 milliLiter(s) IV Continuous <Continuous>  multivitamin 1 Tablet(s) Oral daily  pantoprazole    Tablet 40 milliGRAM(s) Oral before breakfast  sodium chloride 0.9% Bolus. 100 milliLiter(s) IV Bolus every 5 minutes PRN SBP LESS THAN or EQUAL to 90 mmHg    Genitourinary Medications    Hematologic/Oncologic Medications    Antimicrobial/Immunologic Medications  ceFAZolin   IVPB 2000 milliGRAM(s) IV Intermittent every 8 hours    Endocrine/Metabolic Medications    Topical/Other Medications  chlorhexidine 2% Cloths 1 Application(s) Topical <User Schedule>      --------------------------------------------------------------------------------------  LABS:                         8.5    7.47  )-----------( 175      ( 19 Jul 2023 15:00 )             26.1   07-19    137  |  102  |  35<H>  ----------------------------<  162<H>  4.4   |  19<L>  |  5.54<H>    Ca    7.6<L>      19 Jul 2023 15:00    TPro  5.1<L>  /  Alb  2.2<L>  /  TBili  0.8  /  DBili  x   /  AST  37<H>  /  ALT  18  /  AlkPhos  141<H>  07-19  ABG - ( 19 Jul 2023 15:27 )  pH, Arterial: 7.35  pH, Blood: x     /  pCO2: 40    /  pO2: 99    / HCO3: 22    / Base Excess: -3.3  /  SaO2: 99.7            Urinalysis Basic - ( 19 Jul 2023 15:00 )    Color: x / Appearance: x / SG: x / pH: x  Gluc: 162 mg/dL / Ketone: x  / Bili: x / Urobili: x   Blood: x / Protein: x / Nitrite: x   Leuk Esterase: x / RBC: x / WBC x   Sq Epi: x / Non Sq Epi: x / Bacteria: x    PT/INR - ( 19 Jul 2023 01:30 )   PT: 13.8 sec;   INR: 1.19 ratio         PTT - ( 19 Jul 2023 01:30 )  PTT:31.3 sec  --------------------------------------------------------------------------------------    IMAGING :

## 2023-07-19 NOTE — PROGRESS NOTE ADULT - ASSESSMENT
A/P: Patient is a 78y y/o Female s/p L hip removal of hardware and left hip hemiarthroplasty, POD #0   - SICU care for pressor support   - Nephro following for HD   - Pain control  - Antibiotics - Ancef postop  - DVT ppx  - Xaerlto in AM  - Incentive spirometry  - Venodynes  - F/U AM Labs  - PT/OT/WBAT  - Notify Orthopedics with any questions

## 2023-07-19 NOTE — PROGRESS NOTE ADULT - SUBJECTIVE AND OBJECTIVE BOX
Cardiovascular Disease Progress Note  Date of Service: 07-19-23 @ 07:56    Overnight events: No acute events overnight.  Patient is laying flat and comfortably in no distress.          Objective Findings:  T(C): 36.9 (07-19-23 @ 05:13), Max: 37.3 (07-18-23 @ 21:18)  HR: 81 (07-19-23 @ 05:13) (66 - 81)  BP: 169/83 (07-19-23 @ 05:13) (134/56 - 169/83)  RR: 18 (07-19-23 @ 05:13) (16 - 18)  SpO2: 98% (07-19-23 @ 05:13) (96% - 99%)  Wt(kg): --  Daily Height in cm: 154.9 (19 Jul 2023 07:25)    Daily       Physical Exam:  Gen: NAD; Patient resting comfortably  HEENT: EOMI, Normocephalic/ atraumatic  CV: RRR, normal S1 + S2   Lungs:  Normal respiratory effort; clear to auscultation bilaterally  Abd: soft, non-tender; bowel sounds present  Ext: No edema; warm and well perfused    Telemetry: n/a    Laboratory Data:                        9.8    5.05  )-----------( 143      ( 19 Jul 2023 01:30 )             30.6     07-19    136  |  100  |  32<H>  ----------------------------<  104<H>  4.0   |  25  |  5.20<H>    Ca    8.6      19 Jul 2023 01:30    TPro  6.3  /  Alb  3.2<L>  /  TBili  0.8  /  DBili  x   /  AST  36<H>  /  ALT  22  /  AlkPhos  192<H>  07-17    PT/INR - ( 19 Jul 2023 01:30 )   PT: 13.8 sec;   INR: 1.19 ratio         PTT - ( 19 Jul 2023 01:30 )  PTT:31.3 sec          Inpatient Medications:  MEDICATIONS  (STANDING):  acetaminophen     Tablet .. 975 milliGRAM(s) Oral every 8 hours  chlorhexidine 2% Cloths 1 Application(s) Topical <User Schedule>  lactated ringers. 1000 milliLiter(s) (75 mL/Hr) IV Continuous <Continuous>  pantoprazole    Tablet 40 milliGRAM(s) Oral before breakfast  polyethylene glycol 3350 17 Gram(s) Oral daily  povidone iodine 5% Nasal Swab 1 Application(s) Both Nostrils once  senna 2 Tablet(s) Oral at bedtime      Assessment:  77 year old woman with ESRD on HD (MWF), HTN, DM (diet controlled), chronic type B aortic dissection, heart murmur, endometrial ca in remission s/p total hysterectomy in 2007 presents with L hip pain     Plan of Care:      #Pre-operative risk evaluation-  - Ms. Harris displays no signs of  coronary ischemia.  Her admission EKG is sinus with a pre-existing RBBB.   Recent echo with no acute findings.   Volume optimization with HD.   No cardiac objection to orthopedic intervention.       #Type B aortic dissection  - Chronic (Diagnosed >10 years ago)  - Pt hemodynamically stable  Optimal BP control.     #HTN  - BP acceptable on current regimen    #ESRD  - HD as per renal.    #ACP (advance care planning)-  Advanced care planning was discussed with the patient.   Cardiac findings were discussed in detail and all questions were answered.       Over 25 minutes spent on total encounter; more than 50% of the visit was spent counseling and/or coordinating care by the attending physician.      Gabo Burris MD [facility]  Cardiovascular Disease  [phone]

## 2023-07-19 NOTE — CONSULT NOTE ADULT - ASSESSMENT
Pt is a 78 y/o female pmhx of ESRD on HD (MoWeFri), HTN, DM (diet controlled), chronic type B aortic dissection, heart murmur, endometrial ca in remission s/p total hysterectomy in 2007 s/p L IMN for IT fx in May 2023,  now presenting for evaluation of concern for hardware migration. Now s/p  left hip removal of gamma nail and placement of restoration modular peter arthroplasty, EBL 200cc adn got 1L of LR. . Patient hypotensive in PACU requiring pressors requirements and volume resuscitation. In PACU patient got 1 PRBC, 50cc of 25% albumin.  SICU consulted for pressor support and hemodynamic management.     PLAN:    NEURO:   - A&Ox3  - Pain control: Tylenol ATC    RESPIRATORY  - Saturation >92%  - Wean NC as tolerated     CARDIOVASCULAR  - MAP goal >65  - Wean Norepinephrine.   - 250 cc of albumin 5%    - Resuscitation with PRBC    GI/NUTRITION  - Diet: Reg    HIP:  - Right hip with Prevena VAC in place, holding good suction, no hematoma or erythema around prevena.     GENITOURINARY/RENAL  Exam: Normal.   [ ] Oconnell catheter, indication: urine output monitoring in critically ill patient    Weight:  Weight (kg): 56.9 (07-19 @ 07:25)    HEMATOLOGIC  [ ] VTE Prophylaxis:    Transfusion: [ ] PRBC	[ ] Platelets	[ ] FFP	[ ] Cryoprecipitate      INFECTIOUS DISEASES:  Recent Cultures:  RECENT CULTURES:        ENDOCRINE  Glucose:  CAPILLARY BLOOD GLUCOSE

## 2023-07-19 NOTE — PROGRESS NOTE ADULT - ASSESSMENT
A/P: 77F w L IMN loosening of hardware    - OR planning  - Diet: NPO for OR  - Pain control  - DVT ppx: SCDs  - PT/OT  - OOB  - Diet: Reg  - Monitor I&Os  - OR today with Dr. Patel for SUZANNA, FESTUS

## 2023-07-19 NOTE — PROGRESS NOTE ADULT - PROBLEM SELECTOR PLAN 1
Pt with ESRD on HD TIW MWF admitted with fall. Pt s/p Lt hemiarthroplasty today 7/19/2023, however surgery complicated by intraoperative hypotension requiring vasopressor support and 2U PRBC transfusion. Patient transferred to SICU for hemodynamic stabilization. Pt. examined at bedside, does not appear volume overloaded and saturating 98-99% on RA. Pt. was scheduled for maintenance HD today, however in the setting of hemodynamic instability will hold HD treatment today. No urgent indication for HD and will reassess tomorrow. Post operative labs reviewed. Hemoglobin below target range, on RICA with HD and received 2U PRBC transfusion today. Potassium 4.4 and in target range. Monitor labs and BP. Dose medications for ESRD.    Final recommendations pending attending signature.    If you have any questions, please feel free to contact me  Kyle Ngo  Nephrology Fellow  [employer]/Page [phone]  (After 5pm or on weekends please page the on-call fellow)

## 2023-07-19 NOTE — PROCEDURE NOTE - I WAS PRESENT AND SUPERVISED THE ENTIRE PROCEDURE.
Subjective   Sandra Gonzalez is a 76 y.o. female.     Chief Complaint   Patient presents with   • Hand Pain     Right hand pain. Patient states that if she hits her hand she gets a electric shock feeling that goes up her arm. Patient states that there is also a knot between her index and middle finger that she notice 2 weeks ago.          Current Outpatient Medications:   •  amLODIPine (NORVASC) 5 MG tablet, Take 1 tablet by mouth Daily., Disp: 90 tablet, Rfl: 3  •  Biotin 10 MG tablet, Take 1 tablet by mouth Daily., Disp: , Rfl:   •  cetirizine (zyrTEC) 10 MG tablet, Take 10 mg by mouth Daily., Disp: , Rfl:   •  escitalopram (Lexapro) 5 MG tablet, Take 1 tablet by mouth Every Night., Disp: 90 tablet, Rfl: 1  •  hydrOXYzine (ATARAX) 25 MG tablet, Take 1 tablet by mouth Daily As Needed for Itching., Disp: 40 tablet, Rfl: 0    Past Medical History:   Diagnosis Date   • Achilles tendinitis    • Allergic    • Bladder Cancer     2016/ 2019 Bladder Tumor Sx----------Dr. Saldana    • DDD, lumbar    • DEXA     2013 = (0.8/ -0.1)/ 2020= (-1.2/ -1.7)   • HL (hearing loss)     Wear hearing aids   • Hyperlipidemia    • Hypertension    • Lower back pain    • MAMMO     NEG = 2019/ 2020/ 2021   • Mitral valve prolapse    • Parathyroid adenoma     s/p Sx   • Pulmonary nodule    • Renal Cell Cancer    • Rheumatic fever    • Scoliosis    • Vitamin D deficiency        Past Surgical History:   Procedure Laterality Date   • ADENOIDECTOMY     • APPENDECTOMY     • BLADDER TUMOR EXCISION  05/2019    x 2   • CHOLECYSTECTOMY     • COLONOSCOPY      2018= NEG, rech 2028?         GSI   • NEPHRECTOMY  09/2009    left nephrectomy   • PARATHYROID GLAND SURGERY  12/2020   • TONSILLECTOMY     • TOTAL ABDOMINAL HYSTERECTOMY WITH SALPINGO OOPHORECTOMY         Family History   Problem Relation Age of Onset   • Cancer Mother         She had gallbladder cancer, liver   • Cancer Father 77        Brain tumor - cancer   • No Known Problems Sister    • No Known  Problems Brother        Social History     Socioeconomic History   • Marital status:    Tobacco Use   • Smoking status: Never Smoker   • Smokeless tobacco: Never Used   Vaping Use   • Vaping Use: Never used   Substance and Sexual Activity   • Alcohol use: No   • Drug use: No   • Sexual activity: Yes     Partners: Male     Birth control/protection: None     Comment: I am 76 years old not worried about getting pregnant.       75 y/o C female here w/ Rt hand pain      Patient states that if she hits her hand she gets a electric shock feeling that goes up her arm.  Patient states that there is also a knot between her index and middle finger that she noticed 2 weeks ago.  Denies pain from the knot       The following portions of the patient's history were reviewed and updated as appropriate: allergies, current medications, past family history, past medical history, past social history, past surgical history and problem list.    Review of Systems   Constitutional: Negative for activity change, appetite change, unexpected weight gain and unexpected weight loss.   Musculoskeletal: Positive for arthralgias.   Skin: Positive for skin lesions.       Vitals:    07/05/22 1133   BP: 130/76   Pulse: 83   Resp: 14   Temp: 97.7 °F (36.5 °C)   SpO2: 100%       Objective   Physical Exam  Vitals and nursing note reviewed.   Constitutional:       General: She is not in acute distress.     Appearance: She is normal weight. She is not ill-appearing or toxic-appearing.   HENT:      Head: Normocephalic and atraumatic.   Pulmonary:      Effort: Pulmonary effort is normal.   Musculoskeletal:        Hands:    Skin:     Findings: No bruising, erythema or rash.   Neurological:      Mental Status: She is alert and oriented to person, place, and time.      Cranial Nerves: No cranial nerve deficit.   Psychiatric:         Mood and Affect: Mood normal.         Behavior: Behavior normal.         Thought Content: Thought content normal.          Judgment: Judgment normal.           Assessment & Plan   Diagnoses and all orders for this visit:    1. Pain of right hand (Primary)    disc'd poss etiologies of palpable mass  Pt to monitor area and if worsens, will call hand Dr for appt           Statement Selected

## 2023-07-19 NOTE — PROGRESS NOTE ADULT - SUBJECTIVE AND OBJECTIVE BOX
Orthopedics Post-Op Check:  Patient was seen and examined at bedside. Denies CP/SOB/Dizziness, N/V/D, HA. Pain is controlled.     Vital Signs Last 24 Hrs  T(C): 36 (19 Jul 2023 17:33), Max: 37.3 (18 Jul 2023 21:18)  T(F): 96.8 (19 Jul 2023 17:33), Max: 99.1 (18 Jul 2023 21:18)  HR: 83 (19 Jul 2023 19:00) (67 - 84)  BP: 118/59 (19 Jul 2023 19:00) (74/46 - 208/75)  BP(mean): 72 (19 Jul 2023 19:00) (50 - 104)  RR: 26 (19 Jul 2023 19:00) (16 - 30)  SpO2: 99% (19 Jul 2023 19:00) (95% - 100%)    Parameters below as of 19 Jul 2023 15:00  Patient On (Oxygen Delivery Method): room air    Labs:                        8.5    7.47  )-----------( 175      ( 19 Jul 2023 15:00 )             26.1     07-19    137  |  102  |  35<H>  ----------------------------<  162<H>  4.4   |  19<L>  |  5.54<H>    Ca    7.6<L>      19 Jul 2023 15:00    TPro  5.1<L>  /  Alb  2.2<L>  /  TBili  0.8  /  DBili  x   /  AST  37<H>  /  ALT  18  /  AlkPhos  141<H>  07-19    Physical Exam:  Gen: NAD  L LE:   Dressing C/D/I. Prevena vac in place  Motor intact + EHL/FHL/TA/GS. Sensation is grossly intact.   Compartments are soft, extremities are warm, DP 2+

## 2023-07-19 NOTE — CHART NOTE - NSCHARTNOTEFT_GEN_A_CORE
Pt is  s/p Lt hemiarthroplasty and noted to be hypotensive on arrival to PACU, on Neosynephrine gtt from OR. Arterial line  BPs 60/40s- Bulmaro increased to 0.15mcg/kg/min with minimal response. Received Levophed 16mcg bolus IVP x 2 with improvement- pressors changed to Levophed 8/250 gtt. Additional PIV access obtained by RN's at bedside. Labs drawn:    ABG - ( 19 Jul 2023 15:27 )  pH, Arterial: 7.35  pH, Blood: x     /  pCO2: 40    /  pO2: 99    / HCO3: 22    / Base Excess: -3.3  /  SaO2: 99.7                          8.5    7.47  )-----------( 175      ( 19 Jul 2023 15:00 )             26.1   07-19    137  |  102  |  35<H>  ----------------------------<  162<H>  4.4   |  19<L>  |  5.54<H>    Ca    7.6<L>      19 Jul 2023 15:00    TPro  5.1<L>  /  Alb  2.2<L>  /  TBili  0.8  /  DBili  x   /  AST  37<H>  /  ALT  18  /  AlkPhos  141<H>  07-19    78 F ESRD on HD (M/W/Fri) , chronic Type B AAA 3.4cm, endometrial CA presenting  from [facility] for evaluation of Lt hip hardware, now s/p Lt hip hemiarthroplasty POD # 0. Hypotensive with acute blood loss anemia ,  requiring pressor support intraoperatively and in  PACU.  - Pt receiving 1 u prbc, albumin 250cc 5% x 1 and albumin 25% 50cc x 1  -c/w Levophed gtt with attempt to wean as tolerated, maintain Rt radial [place]   -ortho, anesthesia, and SICU at bedside.  -Dispo pt to SICU for furthered evaluation and management, Pt is  s/p Lt hemiarthroplasty and noted to be hypotensive on arrival to PACU, on Neosynephrine gtt from OR. Arterial line  BPs 60/40s- Bulmaro increased to 0.15mcg/kg/min with minimal response. Received Levophed 16mcg bolus IVP x 2 with improvement- pressors changed to Levophed 8/250 gtt. Additional PIV access obtained by RN's at bedside. Labs drawn:    ABG - ( 19 Jul 2023 15:27 )  pH, Arterial: 7.35  pH, Blood: x     /  pCO2: 40    /  pO2: 99    / HCO3: 22    / Base Excess: -3.3  /  SaO2: 99.7                          8.5    7.47  )-----------( 175      ( 19 Jul 2023 15:00 )             26.1   07-19    137  |  102  |  35<H>  ----------------------------<  162<H>  4.4   |  19<L>  |  5.54<H>    Ca    7.6<L>      19 Jul 2023 15:00        TPro  5.1<L>  /  Alb  2.2<L>  /  TBili  0.8  /  DBili  x   /  AST  37<H>  /  ALT  18  /  AlkPhos  141<H>  07-19    78 F ESRD on HD (M/W/Fri) , chronic Type B AAA 3.4cm, endometrial CA presenting  from [facility] for evaluation of Lt hip hardware, now s/p Lt hip hemiarthroplasty POD # 0. Hypotensive with acute blood loss anemia ,  requiring pressor support intraoperatively and in  PACU.  - Pt receiving 1 u prbc, albumin 250cc 5% x 1 and albumin 25% 50cc x 1  -c/w Levophed gtt with attempt to wean as tolerated, maintain Rt radial [place]   -ortho, anesthesia, and SICU at bedside.  -Dispo pt to SICU for furthered evaluation and management, Pt is  s/p Lt hemiarthroplasty and noted to be hypotensive on arrival to PACU, on Neosynephrine gtt from OR. Arterial line  BPs 60/40s- Bulmaro increased to 0.15mcg/kg/min with minimal response. Received Levophed 16mcg bolus IVP x 2 with improvement- pressors changed to Levophed 8/250 gtt. Additional PIV access obtained by RN's at bedside.     On exam pt is awake and mentating appropriately.  LLE with dressing intact- no strikethrough, no hematoma noted at surgival site on lateral thigh. Pt able to move LE on command and spontaneously. Sensation intact distally.     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 Hypotensive with acute blood loss anemia ,  requiring pressor support intraoperatively and in  PACU.  - Pt receiving 1 u prbc, albumin 250cc 5% x 1 and albumin 25% 50cc x 1  -c/w Levophed gtt with attempt to wean as tolerated, maintain Rt radial [place]   -ortho, anesthesia, and SICU at bedside.  -Dispo pt to SICU for furthered evaluation and management,

## 2023-07-19 NOTE — PROGRESS NOTE ADULT - SUBJECTIVE AND OBJECTIVE BOX
[facility] Division of Kidney Diseases & Hypertension  FOLLOW UP NOTE  [phone]--------------------------------------------------------------------------------    HPI: 78F w/ PMH of ESRD on HD TIW (MWF) at [facility] (previously at [facility]), HTN, DM2, endometrial cancer (underwent hysterectomy and chemotherapy, type B aortic dissection, and L hip ORIF, admitted for concerns of loosened hardware. Nephrology consulted for management of ESRD/HD.     Pt last HD treatment was on 7/17 and tolerated it well. Occasionally has some bleeding from LUE AVF site. Dry weight is 54.5kg and treatments usually last 210 min. Pt. is given EPO on dialysis days. Pt. has been on HD for 5 years and her ESRD was due to uncontrolled DM2.    24 hour events/subjective: Pt. was seen and evaluated at the bedside. She report doing ok, with minimal left hip pain. Otherwise no complaints. She s/p Lt hemiarthroplasty 7/19/2023, however surgery complicated by intraoperative hypotension requiring vasopressor support and 2U PRBC transfusion. Patient transferred to SICU for hemodynamic stabilization. Denies headaches, fevers/chills, chest pain, SOB, abd pain, or lower extremity swelling.      PAST HISTORY  --------------------------------------------------------------------------------  No significant changes to PMH, PSH, FHx, SHx, unless otherwise noted    ALLERGIES & MEDICATIONS  --------------------------------------------------------------------------------  Allergies  No Known Allergies  Intolerances    Standing Inpatient Medications  acetaminophen     Tablet .. 975 milliGRAM(s) Oral every 8 hours  acetaminophen   IVPB .. 1000 milliGRAM(s) IV Intermittent every 6 hours  ascorbic acid 500 milliGRAM(s) Oral two times a day  calcium carbonate 1250 mG  + Vitamin D (OsCal 500 + D) 1 Tablet(s) Oral three times a day  ceFAZolin   IVPB 2000 milliGRAM(s) IV Intermittent every 8 hours  chlorhexidine 2% Cloths 1 Application(s) Topical <User Schedule>  folic acid 1 milliGRAM(s) Oral daily  lactated ringers. 1000 milliLiter(s) IV Continuous <Continuous>  multivitamin 1 Tablet(s) Oral daily  norepinephrine Infusion 0.3 MICROgram(s)/kG/Min IV Continuous <Continuous>  pantoprazole    Tablet 40 milliGRAM(s) Oral before breakfast    PRN Inpatient Medications  aluminum hydroxide/magnesium hydroxide/simethicone Suspension 30 milliLiter(s) Oral four times a day PRN  melatonin 3 milliGRAM(s) Oral at bedtime PRN  ondansetron Injectable 4 milliGRAM(s) IV Push every 6 hours PRN  oxyCODONE    IR 5 milliGRAM(s) Oral every 3 hours PRN  oxyCODONE    IR 2.5 milliGRAM(s) Oral every 3 hours PRN      REVIEW OF SYSTEMS  --------------------------------------------------------------------------------  Gen: No fevers/chills  Head/Eyes/Ears: No HA  Respiratory: No dyspnea, cough  CV: No chest pain  GI: No abdominal pain    All other systems were reviewed and are negative, except as noted.    VITALS/PHYSICAL EXAM  --------------------------------------------------------------------------------  T(C): 36 (07-19-23 @ 17:33), Max: 37.3 (07-18-23 @ 21:18)  HR: 83 (07-19-23 @ 19:00) (67 - 84)  BP: 118/59 (07-19-23 @ 19:00) (74/46 - 208/75)  RR: 26 (07-19-23 @ 19:00) (16 - 30)  SpO2: 99% (07-19-23 @ 19:00) (95% - 100%)  Wt(kg): --  Height (cm): 154.9 (07-19-23 @ 07:25)  Weight (kg): 56.9 (07-19-23 @ 07:25)  BMI (kg/m2): 23.7 (07-19-23 @ 07:25)  BSA (m2): 1.55 (07-19-23 @ 07:25)    07-18-23 @ 07:01  -  07-19-23 @ 07:00  --------------------------------------------------------  IN: 240 mL / OUT: 100 mL / NET: 140 mL    07-19-23 @ 07:01  -  07-19-23 @ 19:21  --------------------------------------------------------  IN: 600 mL / OUT: 0 mL / NET: 600 mL    Physical Exam:  Gen: NAD  HEENT: MMM, +RIJ  Pulm: minimal bibasilar crackles   CV: S1S2  Abd: Soft, +BS   Ext: trace LE edema B/L  Neuro: Awake  Skin: Warm and dry  Vascular access: LUE AVF, aneurysmal, skin intact, thrill felt    LABS/STUDIES  --------------------------------------------------------------------------------              8.5    7.47  >-----------<  175      [07-19-23 @ 15:00]              26.1     137  |  102  |  35  ----------------------------<  162      [07-19-23 @ 15:00]  4.4   |  19  |  5.54        Ca     7.6     [07-19-23 @ 15:00]    TPro  5.1  /  Alb  2.2  /  TBili  0.8  /  DBili  x   /  AST  37  /  ALT  18  /  AlkPhos  141  [07-19-23 @ 15:00]    PT/INR: PT 13.8 , INR 1.19       [07-19-23 @ 01:30]  PTT: 31.3       [07-19-23 @ 01:30]      Creatinine Trend:  SCr 5.54 [07-19 @ 15:00]  SCr 5.20 [07-19 @ 01:30]  SCr 3.41 [07-17 @ 21:04]

## 2023-07-19 NOTE — PROVIDER CONTACT NOTE (OTHER) - ACTION/TREATMENT ORDERED:
AIDA Mortensen aware & at bedside. MD vAilez made aware. safety maintained. AIDA Mortensen aware & at bedside. MD Avilez made aware. Emergency titration of phenylephrine. safety maintained.

## 2023-07-19 NOTE — PROGRESS NOTE ADULT - SUBJECTIVE AND OBJECTIVE BOX
Orthopaedic Surgery Progress Note    Subjective:   Patient seen and examined. No acute events overnight. States pain is controlled. Denies fever/chills/chest pain/shortness of breath/numbness/tingling.    Objective:  T(C): 36.9 (07-19-23 @ 05:13), Max: 37.3 (07-18-23 @ 21:18)  HR: 81 (07-19-23 @ 05:13) (66 - 81)  BP: 169/83 (07-19-23 @ 05:13) (134/56 - 169/83)  RR: 18 (07-19-23 @ 05:13) (16 - 18)  SpO2: 98% (07-19-23 @ 05:13) (96% - 99%)  Wt(kg): --    07-18 @ 07:01  -  07-19 @ 06:42  --------------------------------------------------------  IN: 240 mL / OUT: 100 mL / NET: 140 mL        Physical Exam:  Gen: NAD  L Lower Extremity:  Previous incision scar without concerns  TTP around greater troch  SILT S/S/DP/SP/T  +EHL/FHL/TA/GSC  Compartments soft/non-tender  Toes warm, Cap refill brisk/warm and perfused, +DP/PT pulse                           9.8    5.05  )-----------( 143      ( 19 Jul 2023 01:30 )             30.6     07-19    136  |  100  |  32<H>  ----------------------------<  104<H>  4.0   |  25  |  5.20<H>    Ca    8.6      19 Jul 2023 01:30    TPro  6.3  /  Alb  3.2<L>  /  TBili  0.8  /  DBili  x   /  AST  36<H>  /  ALT  22  /  AlkPhos  192<H>  07-17    PT/INR - ( 19 Jul 2023 01:30 )   PT: 13.8 sec;   INR: 1.19 ratio         PTT - ( 19 Jul 2023 01:30 )  PTT:31.3 sec  Urinalysis Basic - ( 19 Jul 2023 01:30 )    Color: x / Appearance: x / SG: x / pH: x  Gluc: 104 mg/dL / Ketone: x  / Bili: x / Urobili: x   Blood: x / Protein: x / Nitrite: x   Leuk Esterase: x / RBC: x / WBC x   Sq Epi: x / Non Sq Epi: x / Bacteria: x

## 2023-07-20 DIAGNOSIS — D64.9 ANEMIA, UNSPECIFIED: ICD-10-CM

## 2023-07-20 LAB
ANION GAP SERPL CALC-SCNC: 13 MMOL/L — SIGNIFICANT CHANGE UP (ref 7–14)
ANION GAP SERPL CALC-SCNC: 14 MMOL/L — SIGNIFICANT CHANGE UP (ref 7–14)
ANION GAP SERPL CALC-SCNC: 16 MMOL/L — HIGH (ref 7–14)
ANION GAP SERPL CALC-SCNC: 17 MMOL/L — HIGH (ref 7–14)
APTT BLD: 36.7 SEC — HIGH (ref 27–36.3)
BLOOD GAS ARTERIAL - LYTES,HGB,ICA,LACT RESULT: SIGNIFICANT CHANGE UP
BLOOD GAS ARTERIAL COMPREHENSIVE RESULT: SIGNIFICANT CHANGE UP
BUN SERPL-MCNC: 41 MG/DL — HIGH (ref 7–23)
BUN SERPL-MCNC: 43 MG/DL — HIGH (ref 7–23)
BUN SERPL-MCNC: 45 MG/DL — HIGH (ref 7–23)
BUN SERPL-MCNC: 45 MG/DL — HIGH (ref 7–23)
CALCIUM SERPL-MCNC: 7.5 MG/DL — LOW (ref 8.4–10.5)
CALCIUM SERPL-MCNC: 7.5 MG/DL — LOW (ref 8.4–10.5)
CALCIUM SERPL-MCNC: 7.9 MG/DL — LOW (ref 8.4–10.5)
CALCIUM SERPL-MCNC: 7.9 MG/DL — LOW (ref 8.4–10.5)
CHLORIDE SERPL-SCNC: 100 MMOL/L — SIGNIFICANT CHANGE UP (ref 98–107)
CHLORIDE SERPL-SCNC: 98 MMOL/L — SIGNIFICANT CHANGE UP (ref 98–107)
CHLORIDE SERPL-SCNC: 98 MMOL/L — SIGNIFICANT CHANGE UP (ref 98–107)
CHLORIDE SERPL-SCNC: 99 MMOL/L — SIGNIFICANT CHANGE UP (ref 98–107)
CO2 SERPL-SCNC: 16 MMOL/L — LOW (ref 22–31)
CO2 SERPL-SCNC: 20 MMOL/L — LOW (ref 22–31)
CO2 SERPL-SCNC: 20 MMOL/L — LOW (ref 22–31)
CO2 SERPL-SCNC: 21 MMOL/L — LOW (ref 22–31)
CREAT SERPL-MCNC: 5.53 MG/DL — HIGH (ref 0.5–1.3)
CREAT SERPL-MCNC: 5.73 MG/DL — HIGH (ref 0.5–1.3)
CREAT SERPL-MCNC: 5.9 MG/DL — HIGH (ref 0.5–1.3)
CREAT SERPL-MCNC: 5.96 MG/DL — HIGH (ref 0.5–1.3)
EGFR: 7 ML/MIN/1.73M2 — LOW
EGFR: 8 ML/MIN/1.73M2 — LOW
GLUCOSE BLDC GLUCOMTR-MCNC: 171 MG/DL — HIGH (ref 70–99)
GLUCOSE SERPL-MCNC: 164 MG/DL — HIGH (ref 70–99)
GLUCOSE SERPL-MCNC: 167 MG/DL — HIGH (ref 70–99)
GLUCOSE SERPL-MCNC: 180 MG/DL — HIGH (ref 70–99)
GLUCOSE SERPL-MCNC: 213 MG/DL — HIGH (ref 70–99)
HCT VFR BLD CALC: 23.7 % — LOW (ref 34.5–45)
HCT VFR BLD CALC: 24.1 % — LOW (ref 34.5–45)
HCT VFR BLD CALC: 25.4 % — LOW (ref 34.5–45)
HCT VFR BLD CALC: 25.7 % — LOW (ref 34.5–45)
HGB BLD-MCNC: 8.1 G/DL — LOW (ref 11.5–15.5)
HGB BLD-MCNC: 8.3 G/DL — LOW (ref 11.5–15.5)
HGB BLD-MCNC: 8.3 G/DL — LOW (ref 11.5–15.5)
HGB BLD-MCNC: 8.8 G/DL — LOW (ref 11.5–15.5)
INR BLD: 1.51 RATIO — HIGH (ref 0.88–1.16)
LACTATE SERPL-SCNC: 2.3 MMOL/L — HIGH (ref 0.5–2)
LACTATE SERPL-SCNC: 2.6 MMOL/L — HIGH (ref 0.5–2)
LACTATE SERPL-SCNC: 4.3 MMOL/L — CRITICAL HIGH (ref 0.5–2)
MAGNESIUM SERPL-MCNC: 1.9 MG/DL — SIGNIFICANT CHANGE UP (ref 1.6–2.6)
MCHC RBC-ENTMCNC: 30 PG — SIGNIFICANT CHANGE UP (ref 27–34)
MCHC RBC-ENTMCNC: 30.2 PG — SIGNIFICANT CHANGE UP (ref 27–34)
MCHC RBC-ENTMCNC: 30.2 PG — SIGNIFICANT CHANGE UP (ref 27–34)
MCHC RBC-ENTMCNC: 30.4 PG — SIGNIFICANT CHANGE UP (ref 27–34)
MCHC RBC-ENTMCNC: 32.7 GM/DL — SIGNIFICANT CHANGE UP (ref 32–36)
MCHC RBC-ENTMCNC: 34.2 GM/DL — SIGNIFICANT CHANGE UP (ref 32–36)
MCHC RBC-ENTMCNC: 34.2 GM/DL — SIGNIFICANT CHANGE UP (ref 32–36)
MCHC RBC-ENTMCNC: 34.4 GM/DL — SIGNIFICANT CHANGE UP (ref 32–36)
MCV RBC AUTO: 87 FL — SIGNIFICANT CHANGE UP (ref 80–100)
MCV RBC AUTO: 88.3 FL — SIGNIFICANT CHANGE UP (ref 80–100)
MCV RBC AUTO: 88.4 FL — SIGNIFICANT CHANGE UP (ref 80–100)
MCV RBC AUTO: 93 FL — SIGNIFICANT CHANGE UP (ref 80–100)
NRBC # BLD: 0 /100 WBCS — SIGNIFICANT CHANGE UP (ref 0–0)
NRBC # FLD: 0 K/UL — SIGNIFICANT CHANGE UP (ref 0–0)
PHOSPHATE SERPL-MCNC: 6.9 MG/DL — HIGH (ref 2.5–4.5)
PLATELET # BLD AUTO: 112 K/UL — LOW (ref 150–400)
PLATELET # BLD AUTO: 115 K/UL — LOW (ref 150–400)
PLATELET # BLD AUTO: 116 K/UL — LOW (ref 150–400)
PLATELET # BLD AUTO: 126 K/UL — LOW (ref 150–400)
POTASSIUM SERPL-MCNC: 5.3 MMOL/L — SIGNIFICANT CHANGE UP (ref 3.5–5.3)
POTASSIUM SERPL-MCNC: 5.4 MMOL/L — HIGH (ref 3.5–5.3)
POTASSIUM SERPL-MCNC: 5.5 MMOL/L — HIGH (ref 3.5–5.3)
POTASSIUM SERPL-MCNC: 5.7 MMOL/L — HIGH (ref 3.5–5.3)
POTASSIUM SERPL-SCNC: 5.3 MMOL/L — SIGNIFICANT CHANGE UP (ref 3.5–5.3)
POTASSIUM SERPL-SCNC: 5.4 MMOL/L — HIGH (ref 3.5–5.3)
POTASSIUM SERPL-SCNC: 5.5 MMOL/L — HIGH (ref 3.5–5.3)
POTASSIUM SERPL-SCNC: 5.7 MMOL/L — HIGH (ref 3.5–5.3)
PROTHROM AB SERPL-ACNC: 17.6 SEC — HIGH (ref 10.5–13.4)
RBC # BLD: 2.68 M/UL — LOW (ref 3.8–5.2)
RBC # BLD: 2.73 M/UL — LOW (ref 3.8–5.2)
RBC # BLD: 2.77 M/UL — LOW (ref 3.8–5.2)
RBC # BLD: 2.91 M/UL — LOW (ref 3.8–5.2)
RBC # FLD: 17.9 % — HIGH (ref 10.3–14.5)
RBC # FLD: 18.4 % — HIGH (ref 10.3–14.5)
RBC # FLD: 18.5 % — HIGH (ref 10.3–14.5)
RBC # FLD: 18.8 % — HIGH (ref 10.3–14.5)
SODIUM SERPL-SCNC: 132 MMOL/L — LOW (ref 135–145)
SODIUM SERPL-SCNC: 133 MMOL/L — LOW (ref 135–145)
SODIUM SERPL-SCNC: 133 MMOL/L — LOW (ref 135–145)
SODIUM SERPL-SCNC: 134 MMOL/L — LOW (ref 135–145)
WBC # BLD: 10.57 K/UL — HIGH (ref 3.8–10.5)
WBC # BLD: 7.93 K/UL — SIGNIFICANT CHANGE UP (ref 3.8–10.5)
WBC # BLD: 8.19 K/UL — SIGNIFICANT CHANGE UP (ref 3.8–10.5)
WBC # BLD: 9.41 K/UL — SIGNIFICANT CHANGE UP (ref 3.8–10.5)
WBC # FLD AUTO: 10.57 K/UL — HIGH (ref 3.8–10.5)
WBC # FLD AUTO: 7.93 K/UL — SIGNIFICANT CHANGE UP (ref 3.8–10.5)
WBC # FLD AUTO: 8.19 K/UL — SIGNIFICANT CHANGE UP (ref 3.8–10.5)
WBC # FLD AUTO: 9.41 K/UL — SIGNIFICANT CHANGE UP (ref 3.8–10.5)

## 2023-07-20 PROCEDURE — 74174 CTA ABD&PLVS W/CONTRAST: CPT | Mod: 26

## 2023-07-20 PROCEDURE — 99291 CRITICAL CARE FIRST HOUR: CPT

## 2023-07-20 PROCEDURE — 99233 SBSQ HOSP IP/OBS HIGH 50: CPT | Mod: FS,GC

## 2023-07-20 RX ORDER — HYDROMORPHONE HYDROCHLORIDE 2 MG/ML
1 INJECTION INTRAMUSCULAR; INTRAVENOUS; SUBCUTANEOUS EVERY 4 HOURS
Refills: 0 | Status: DISCONTINUED | OUTPATIENT
Start: 2023-07-20 | End: 2023-07-22

## 2023-07-20 RX ORDER — HYDROMORPHONE HYDROCHLORIDE 2 MG/ML
0.5 INJECTION INTRAMUSCULAR; INTRAVENOUS; SUBCUTANEOUS EVERY 4 HOURS
Refills: 0 | Status: DISCONTINUED | OUTPATIENT
Start: 2023-07-20 | End: 2023-07-22

## 2023-07-20 RX ORDER — INSULIN HUMAN 100 [IU]/ML
5 INJECTION, SOLUTION SUBCUTANEOUS ONCE
Refills: 0 | Status: COMPLETED | OUTPATIENT
Start: 2023-07-20 | End: 2023-07-20

## 2023-07-20 RX ORDER — DEXTROSE 50 % IN WATER 50 %
50 SYRINGE (ML) INTRAVENOUS ONCE
Refills: 0 | Status: COMPLETED | OUTPATIENT
Start: 2023-07-20 | End: 2023-07-20

## 2023-07-20 RX ADMIN — Medication 400 MILLIGRAM(S): at 11:46

## 2023-07-20 RX ADMIN — PIPERACILLIN AND TAZOBACTAM 200 GRAM(S): 4; .5 INJECTION, POWDER, LYOPHILIZED, FOR SOLUTION INTRAVENOUS at 00:15

## 2023-07-20 RX ADMIN — PIPERACILLIN AND TAZOBACTAM 25 GRAM(S): 4; .5 INJECTION, POWDER, LYOPHILIZED, FOR SOLUTION INTRAVENOUS at 17:13

## 2023-07-20 RX ADMIN — INSULIN HUMAN 5 UNIT(S): 100 INJECTION, SOLUTION SUBCUTANEOUS at 11:57

## 2023-07-20 RX ADMIN — Medication 400 MILLIGRAM(S): at 05:16

## 2023-07-20 RX ADMIN — Medication 1 MILLIGRAM(S): at 11:47

## 2023-07-20 RX ADMIN — PANTOPRAZOLE SODIUM 40 MILLIGRAM(S): 20 TABLET, DELAYED RELEASE ORAL at 06:42

## 2023-07-20 RX ADMIN — Medication 1 TABLET(S): at 16:04

## 2023-07-20 RX ADMIN — Medication 500 MILLIGRAM(S): at 17:14

## 2023-07-20 RX ADMIN — Medication 500 MILLIGRAM(S): at 05:16

## 2023-07-20 RX ADMIN — Medication 1 TABLET(S): at 22:09

## 2023-07-20 RX ADMIN — Medication 1 TABLET(S): at 11:46

## 2023-07-20 RX ADMIN — Medication 400 MILLIGRAM(S): at 17:13

## 2023-07-20 RX ADMIN — Medication 1 TABLET(S): at 05:16

## 2023-07-20 RX ADMIN — PIPERACILLIN AND TAZOBACTAM 25 GRAM(S): 4; .5 INJECTION, POWDER, LYOPHILIZED, FOR SOLUTION INTRAVENOUS at 06:01

## 2023-07-20 RX ADMIN — Medication 50 MILLILITER(S): at 11:47

## 2023-07-20 RX ADMIN — CHLORHEXIDINE GLUCONATE 1 APPLICATION(S): 213 SOLUTION TOPICAL at 05:17

## 2023-07-20 NOTE — PROGRESS NOTE ADULT - SUBJECTIVE AND OBJECTIVE BOX
[facility] Division of Kidney Diseases & Hypertension  FOLLOW UP NOTE  [phone]--------------------------------------------------------------------------------  HPI: 78F w/ PMH of ESRD on HD TIW (MWF) at [facility] (previously at [facility]), HTN, DM2, endometrial cancer (underwent hysterectomy and chemotherapy, type B aortic dissection, and L hip ORIF, admitted for concerns of loosened hardware. Nephrology consulted for management of ESRD/HD.     24 hour events/subjective: Pt. seen and evaluated at bedside in the SICU this AM. Overall feels well and has no complaints. Pain is well managed. She denies fevers/chills, headaches, chest pain, SOB, abd pain, or leg swelling. She underwent L hemiarthroplasty on 7/19/2023 complicated by intraoperative hypotension requiring vasopressor support and 2U PRBC transfusion. Patient was transferred to SICU for hemodynamic stabilization. Given additional 1U PRBC this AM.    PAST HISTORY  --------------------------------------------------------------------------------  No significant changes to PMH, PSH, FHx, SHx, unless otherwise noted    ALLERGIES & MEDICATIONS  --------------------------------------------------------------------------------  Allergies  No Known Allergies    Intolerances    Standing Inpatient Medications  acetaminophen   IVPB .. 1000 milliGRAM(s) IV Intermittent every 6 hours  ascorbic acid 500 milliGRAM(s) Oral two times a day  calcium carbonate 1250 mG  + Vitamin D (OsCal 500 + D) 1 Tablet(s) Oral three times a day  chlorhexidine 2% Cloths 1 Application(s) Topical <User Schedule>  folic acid 1 milliGRAM(s) Oral daily  lactated ringers. 1000 milliLiter(s) IV Continuous <Continuous>  multivitamin 1 Tablet(s) Oral daily  norepinephrine Infusion 0.3 MICROgram(s)/kG/Min IV Continuous <Continuous>  pantoprazole    Tablet 40 milliGRAM(s) Oral before breakfast  piperacillin/tazobactam IVPB.. 3.375 Gram(s) IV Intermittent every 12 hours    PRN Inpatient Medications  aluminum hydroxide/magnesium hydroxide/simethicone Suspension 30 milliLiter(s) Oral four times a day PRN  HYDROmorphone  Injectable 1 milliGRAM(s) IV Push every 4 hours PRN  HYDROmorphone  Injectable 0.5 milliGRAM(s) IV Push every 4 hours PRN  melatonin 3 milliGRAM(s) Oral at bedtime PRN  ondansetron Injectable 4 milliGRAM(s) IV Push every 6 hours PRN    REVIEW OF SYSTEMS  --------------------------------------------------------------------------------  Gen: No fevers/chills  Head/Eyes/Ears: No HA  Respiratory: No dyspnea, cough  CV: No chest pain  GI: No abdominal pain  MSK: no leg pain or swelling    All other systems were reviewed and are negative, except as noted.    VITALS/PHYSICAL EXAM  --------------------------------------------------------------------------------  T(C): 35.8 (07-20-23 @ 04:00), Max: 36.7 (07-19-23 @ 08:40)  HR: 65 (07-20-23 @ 07:52) (62 - 84)  BP: 117/51 (07-20-23 @ 04:00) (74/46 - 208/75)  RR: 20 (07-20-23 @ 07:52) (15 - 30)  SpO2: 98% (07-20-23 @ 07:52) (96% - 100%)  Wt(kg): --  Height (cm): 154.9 (07-19-23 @ 07:25)  Weight (kg): 56.9 (07-19-23 @ 07:25)  BMI (kg/m2): 23.7 (07-19-23 @ 07:25)  BSA (m2): 1.55 (07-19-23 @ 07:25)    07-19-23 @ 07:01  -  07-20-23 @ 07:00  --------------------------------------------------------  IN: 3058.3 mL / OUT: 0 mL / NET: 3058.3 mL    Physical Exam:  Gen: NAD  HEENT: MMM, +RIJ  Pulm: minimal bibasilar crackles   CV: S1S2  Abd: Soft, +BS   Ext: trace LE edema B/L, dressing on LLE  Neuro: Awake  Skin: Warm and dry  Vascular access: LUE AVF, aneurysmal, skin intact, thrill felt    LABS/STUDIES  --------------------------------------------------------------------------------              8.3    7.93  >-----------<  126      [07-20-23 @ 02:40]              25.4     133  |  100  |  41  ----------------------------<  213      [07-20-23 @ 02:40]  5.4   |  16  |  5.53        Ca     7.9     [07-20-23 @ 02:40]      Mg     1.90     [07-20-23 @ 02:40]      Phos  6.9     [07-20-23 @ 02:40]    TPro  5.1  /  Alb  2.2  /  TBili  0.8  /  DBili  x   /  AST  37  /  ALT  18  /  AlkPhos  141  [07-19-23 @ 15:00]    Creatinine Trend:  SCr 5.53 [07-20 @ 02:40]  SCr 5.72 [07-19 @ 21:20]  SCr 5.54 [07-19 @ 15:00]  SCr 5.20 [07-19 @ 01:30]  SCr 3.41 [07-17 @ 21:04]

## 2023-07-20 NOTE — PROGRESS NOTE ADULT - SUBJECTIVE AND OBJECTIVE BOX
Orthopedic Surgery Progress Note     S: Patient seen and examined today. Early this AM pt complaining of epigastric pain. She reports that after being NPO and re-introducing food and PO medications her stomach started to hurt. Received 1U pRBCs yesterday. Pressor requirements increasing. CT scan performed which showed blood in operative bed. Pain is well controlled. Denies f/c, chest pain, shortness of breath, dizziness.    MEDICATIONS  (STANDING):  acetaminophen   IVPB .. 1000 milliGRAM(s) IV Intermittent every 6 hours  ascorbic acid 500 milliGRAM(s) Oral two times a day  calcium carbonate 1250 mG  + Vitamin D (OsCal 500 + D) 1 Tablet(s) Oral three times a day  chlorhexidine 2% Cloths 1 Application(s) Topical <User Schedule>  folic acid 1 milliGRAM(s) Oral daily  lactated ringers. 1000 milliLiter(s) (125 mL/Hr) IV Continuous <Continuous>  multivitamin 1 Tablet(s) Oral daily  norepinephrine Infusion 0.3 MICROgram(s)/kG/Min (16 mL/Hr) IV Continuous <Continuous>  pantoprazole    Tablet 40 milliGRAM(s) Oral before breakfast  piperacillin/tazobactam IVPB.. 3.375 Gram(s) IV Intermittent every 12 hours    MEDICATIONS  (PRN):  aluminum hydroxide/magnesium hydroxide/simethicone Suspension 30 milliLiter(s) Oral four times a day PRN Indigestion  melatonin 3 milliGRAM(s) Oral at bedtime PRN Insomnia  ondansetron Injectable 4 milliGRAM(s) IV Push every 6 hours PRN Nausea and/or Vomiting      Vital Signs Last 24 Hrs  T(C): 35.8 (20 Jul 2023 04:00), Max: 36.9 (19 Jul 2023 07:25)  T(F): 96.5 (20 Jul 2023 04:00), Max: 98.5 (19 Jul 2023 07:25)  HR: 63 (20 Jul 2023 06:00) (62 - 84)  BP: 117/51 (20 Jul 2023 04:00) (74/46 - 208/75)  BP(mean): 74 (20 Jul 2023 04:00) (50 - 104)  RR: 21 (20 Jul 2023 06:00) (15 - 30)  SpO2: 97% (20 Jul 2023 06:00) (95% - 100%)    Parameters below as of 20 Jul 2023 04:00  Patient On (Oxygen Delivery Method): room air        07-18-23 @ 07:01  -  07-19-23 @ 07:00  --------------------------------------------------------  IN: 240 mL / OUT: 100 mL / NET: 140 mL    07-19-23 @ 07:01  -  07-20-23 @ 06:43  --------------------------------------------------------  IN: 2705.3 mL / OUT: 0 mL / NET: 2705.3 mL        Physical Exam:  Gen: NAD  L Lower Extremity:  Prevena vac in place with good suction  No skin changes, no palpable fluid collection  SILT S/S/DP/SP/T  +EHL/FHL/TA/GSC  Compartments soft/non-tender  Toes warm, Cap refill brisk/warm and perfused, +DP/PT pulse         LABS:                        8.3    7.93  )-----------( 126      ( 20 Jul 2023 02:40 )             25.4     07-20    133<L>  |  100  |  41<H>  ----------------------------<  213<H>  5.4<H>   |  16<L>  |  5.53<H>    Ca    7.9<L>      20 Jul 2023 02:40  Phos  6.9     07-20  Mg     1.90     07-20    TPro  5.1<L>  /  Alb  2.2<L>  /  TBili  0.8  /  DBili  x   /  AST  37<H>  /  ALT  18  /  AlkPhos  141<H>  07-19

## 2023-07-20 NOTE — CONSULT NOTE ADULT - SUBJECTIVE AND OBJECTIVE BOX
Vascular & Interventional Radiology    HPI:   77 year old woman with ESRD on HD (MWF), HTN, DM (diet controlled), chronic type B aortic dissection, heart murmur, endometrial ca in remission s/p total hysterectomy in 2007, s/p L IMN for IT fx in May 2023, now presenting for evaluation of concern for hardware migration. Pt had hardware fixed by orthopedics w/ overnight drop in Hgb and left thigh hematoma concerning for active bleed. Of note, pt now clinically stable. IR consulted for potential embolization    Allergies: No Known Allergies    Medications (Abx/Cardiac/Anticoagulation/Blood Products)  norepinephrine Infusion: 5.33 mL/Hr IV Continuous (07-19 @ 15:50)  phenylephrine    Infusion: 10.7 mL/Hr IV Continuous (07-19 @ 15:02)    Data:    T(C): 35.3  HR: 75  BP: 144/55  RR: 20  SpO2: 94%    -WBC 8.19 / HgB 8.8 / Hct 25.7 / Plt 115  -Na 132 / Cl 99 / BUN 43 / Glucose 180  -K 5.7 / CO2 20 / Cr 5.73  -ALT -- / Alk Phos -- / T.Bili --  -INR1.51    Imaging:   CT CAP 7/20: Postsurgical changes about the left hip related to arthroplasty. Associated ill-defined and partially obscured hematoma without definite evidence of active hemorrhage. A hyperdense focus could reflect a component of the hematoma, though evaluation is limited without precontrast imaging and due to streak artifact.  * No definite findings of mesenteric ischemia.    Assessment:   78y Female s/p left hip hardware fixation w/ acute drop in Hgb requiring pRBC and left thigh hematoma concerning for active bleed. IR consulted for embolization. CT CAP shows no definite evidence of active hemorrhage.    RECOMMENDATIONS:    - d/w w/ IR attendings Dr. Pena and Dr. Richmond  - after thorough review of image, no definite evidence of active bleed. Additionally given tortuosity of r. femoral artery and chronic aortic dissection, technically difficult study for possible angiogram/embolization  - informed AIDA Regalado   - IR signing off  - please reconsult as needed    Davina Ingram  PGY-3, Interventional Radiology    -Available on [employer] TEAMS for all non-urgent questions  -Emergent issues: [facility]-p.[phone]; [facility]-p.[phone] ([phone])  -Non-emergent consults: Please place a [place] order "Consult-Interventional Radiology" with an appropriate callback number  -Scheduling questions: [facility]: [phone]; [facility]: [phone]  -Clinic/Outpatient booking: [facility]: [phone]; [facility]: [phone]

## 2023-07-20 NOTE — PROGRESS NOTE ADULT - SUBJECTIVE AND OBJECTIVE BOX
Cardiovascular Disease Progress Note  Date of Service: 23 @ 10:20    Overnight events: No acute events overnight.    Patient denies chest pain or SOB.   Otherwise review of systems negative    Objective Findings:  T(C): 35.6 (23 @ 08:00), Max: 36.5 (23 @ 17:00)  HR: 58 (23 @ 08:48) (56 - 84)  BP: 117/51 (23 @ 04:00) (74/46 - 208/75)  RR: 21 (23 @ 08:48) (15 - 30)  SpO2: 98% (23 @ 08:48) (96% - 100%)  Wt(kg): --  Daily     Daily Weight in k.6 (2023 04:00)      Physical Exam:  Gen: NAD; Patient resting comfortably  HEENT: EOMI, Normocephalic/ atraumatic  CV: RRR, normal S1 + S2, no m/r/g  Lungs:  Normal respiratory effort; clear to auscultation bilaterally  Abd: soft, non-tender; bowel sounds present  Ext: No edema; warm and well perfused    Telemetry: Sinus; no ectopy    Laboratory Data:                        8.3    7.93  )-----------( 126      ( 2023 02:40 )             25.4     07-20    133<L>  |  100  |  41<H>  ----------------------------<  213<H>  5.4<H>   |  16<L>  |  5.53<H>    Ca    7.9<L>      2023 02:40  Phos  6.9     07-20  Mg     1.90     07-20    TPro  5.1<L>  /  Alb  2.2<L>  /  TBili  0.8  /  DBili  x   /  AST  37<H>  /  ALT  18  /  AlkPhos  141<H>  07-    PT/INR - ( 2023 02:40 )   PT: 17.6 sec;   INR: 1.51 ratio         PTT - ( 2023 02:40 )  PTT:36.7 sec          Inpatient Medications:  MEDICATIONS  (STANDING):  acetaminophen   IVPB .. 1000 milliGRAM(s) IV Intermittent every 6 hours  ascorbic acid 500 milliGRAM(s) Oral two times a day  calcium carbonate 1250 mG  + Vitamin D (OsCal 500 + D) 1 Tablet(s) Oral three times a day  chlorhexidine 2% Cloths 1 Application(s) Topical <User Schedule>  folic acid 1 milliGRAM(s) Oral daily  lactated ringers. 1000 milliLiter(s) (125 mL/Hr) IV Continuous <Continuous>  multivitamin 1 Tablet(s) Oral daily  norepinephrine Infusion 0.3 MICROgram(s)/kG/Min (16 mL/Hr) IV Continuous <Continuous>  pantoprazole    Tablet 40 milliGRAM(s) Oral before breakfast  piperacillin/tazobactam IVPB.. 3.375 Gram(s) IV Intermittent every 12 hours      Assessment: 77 year old woman with ESRD on HD (MWF), HTN, DM (diet controlled), chronic type B aortic dissection, heart murmur, endometrial ca in remission s/p total hysterectomy in  presents with L hip pain     Plan of Care:      #Post-operative evaluation-  - Ms. Harris tolerated orthopedic intervention well.  Post operative course complicated by distributive shock requiring low doses of Levophed.  She displays no signs of  coronary ischemia or decompensated CHF.   Her admission EKG is sinus with a pre-existing RBBB.   Recent echo with no acute findings.   Continue current cardiac management.       #Type B aortic dissection  - Chronic (Diagnosed >10 years ago)  - F/u CT abdomen.        #ESRD  - HD as per renal.      Rest of care as per SICU.     Over 45 minutes spent on total encounter; more than 50% of the visit was spent counseling and/or coordinating care by the attending physician.      Gabo Burris MD [facility]  Cardiovascular Disease  [phone]

## 2023-07-20 NOTE — PROGRESS NOTE ADULT - ASSESSMENT
A/P:  77F POD#1 s/p L hip SUZANNA, hemiarthroplasty.    - Reviewed CTA with attending, blood in operative field normal post operative change  - Spica super ACE bandage  - Prevena  - Pain control  - WBAT RLE  - DVT ppx: SCDs  - PT/OT  - OOB  - Diet: Reg  - Monitor I&Os      For all questions related to patient care, please reach out via the on-call pager.*     Valentine Eddy, PGY2  Orthopedic Surgery  [facility]: [phone]  [facility]: [id number]  [facility]: [id number]

## 2023-07-20 NOTE — PROGRESS NOTE ADULT - PROBLEM SELECTOR PLAN 2
Pt. with anemia in the setting of ESRD and blood loss from surgical procedure. Pt. received 1U PRBC this AM. Hgb 8.3 from early AM (7/20/23), pending repeat. Will give RICA with HD treatments. Monitor hemoglobin levels.     Final recommendations pending attending signature.  If you have any questions, please feel free to contact me.  John Torres  Nephrology Fellow  [id number] / [phone] / [employer] Teams (Preferred)  (After 4pm or on weekends, please call the on-call Fellow)

## 2023-07-20 NOTE — CHART NOTE - NSCHARTNOTEFT_GEN_A_CORE
Patient endorsing abdominal pain, primarily located in the epigastric region. Patient describes the pain as sharp and ripping in nature. On examination, the abdomen is soft, non distended. Pt is TTP in epigastrium.     At this time, patient remains on Levo at 0.1. She is currently maintaining MAP goal >65. Remaining VSS. Recent labs are notable for a Lactate of 6.9. Patient received 1L LR and IVF were increased to 125.     CTA of the Abdomen/ Pelvis ordered to rule out mesenteric ischemia. Discussed with attending Dr. Major. Orthopedic team made aware.

## 2023-07-20 NOTE — PROGRESS NOTE ADULT - SUBJECTIVE AND OBJECTIVE BOX
POST ANESTHESIA EVALUATION    78y Female POSTOP DAY 1 S/P left femur removal of hardware     Vital Signs Last 24 Hrs  T(C): 35.6 (20 Jul 2023 08:00), Max: 36.5 (19 Jul 2023 17:00)  T(F): 96 (20 Jul 2023 08:00), Max: 97.7 (19 Jul 2023 17:00)  HR: 64 (20 Jul 2023 10:33) (56 - 84)  BP: 117/51 (20 Jul 2023 04:00) (74/46 - 208/75)  BP(mean): 74 (20 Jul 2023 04:00) (50 - 104)  RR: 21 (20 Jul 2023 10:33) (15 - 30)  SpO2: 97% (20 Jul 2023 10:33) (95% - 100%)    Parameters below as of 20 Jul 2023 08:00  Patient On (Oxygen Delivery Method): room air      I&O's Summary    19 Jul 2023 07:01  -  20 Jul 2023 07:00  --------------------------------------------------------  IN: 3055 mL / OUT: 0 mL / NET: 3055 mL    20 Jul 2023 07:01  -  20 Jul 2023 11:48  --------------------------------------------------------  IN: 385.6 mL / OUT: 0 mL / NET: 385.6 mL        AIRWAY PATENCY:  natural airway    MENTAL STATUS: awake    HYDRATION STATUS : on vasopressors    NAUSEA/ VOMITTING:  [ ] NONE  [ ] CONTROLLED [ ] OTHER     PAIN: [x ] CONTROLLED WITH CURRENT REGIMEN  [ ] OTHER    [x ] NO APPARENT ANESTHESIA COMPLICATIONS      Comments:

## 2023-07-20 NOTE — PROGRESS NOTE ADULT - ASSESSMENT
Pt is a 76 y/o female pmhx of ESRD on HD (MoWeFri), HTN, DM (diet controlled), chronic type B aortic dissection, heart murmur, endometrial ca in remission s/p total hysterectomy in 2007 s/p L IMN for IT fx in May 2023,  now presenting for evaluation of concern for hardware migration. Now s/p  left hip removal of gamma nail and placement of restoration modular peter arthroplasty, EBL 200cc adn got 1L of LR. . Patient hypotensive in PACU requiring pressors requirements and volume resuscitation. In PACU patient got 1 PRBC, 50cc of 25% albumin.  SICU consulted for pressor support and hemodynamic management.     PLAN:  NEURO:   - A&Ox3  - Pain control: Tylenol ATC    RESPIRATORY  - Saturation >92%  - Wean NC as tolerated     CARDIOVASCULAR  - MAP goal >65  - Wean Norepinephrine as tolerated   - 2U pRBC given on 7/19  - Lactate 6.9, increasing   - Given 1 L bolus LR     GI/NUTRITION  - Diet: Regular  - Monitor for abdominal pain/ diarrhea     GENITOURINARY/RENAL  - IVF @ 125  - HD M/W/F (Did not receive HD on 7/19 d/t BP)   - Monitor strict I/Os   - Monitor and replete electrolytes     HEMATOLOGIC  - Holding DVT ppx   - Okay to resume home Xarelto today per Ortho   - H/H stable s/p 2U pRBC       INFECTIOUS DISEASES:  - Abx: Zosyn  - Monitor for fever   - Trend WBC  - Blood cultures sent     ENDOCRINE  - Hx of T2DM   - ISS     Disposition: SICU      Pt is a 76 y/o female pmhx of ESRD on HD (MoWeFri), HTN, DM (diet controlled), chronic type B aortic dissection, heart murmur, endometrial ca in remission s/p total hysterectomy in 2007 s/p L IMN for IT fx in May 2023,  now presenting for evaluation of concern for hardware migration. Now s/p  left hip removal of gamma nail and placement of restoration modular peter arthroplasty, EBL 200cc adn got 1L of LR. . Patient hypotensive in PACU requiring pressors requirements and volume resuscitation. In PACU patient got 1 PRBC, 50cc of 25% albumin.  SICU consulted for pressor support and hemodynamic management.     PLAN:  NEURO:   - A&Ox3  - Pain control: Tylenol ATC    RESPIRATORY  - Saturation >92%  - Wean NC as tolerated     CARDIOVASCULAR  - MAP goal >65  - Wean Norepinephrine as tolerated   - 2U pRBC on 7/19 + 1U pRBC 7/20 AM  - Lactate 4.3, downtrending  - IVF LR @ 125    GI/NUTRITION  - Diet: NPO for potential IR  - Monitor for abdominal pain/ diarrhea     GENITOURINARY/RENAL  - IVF @ 125  - HD M/W/F -- no HD today (active bleeding)  - Monitor strict I/Os   - Monitor and replete electrolytes   - Hyperkalemia 5.7, trend K  - Q6h CBC, BMP, lactate    HEMATOLOGIC  - Holding DVT ppx   - Monitor H/H  - S/p 1u pRBC 7/20 AM  - Q6h CBC, BMP, lactate    INFECTIOUS DISEASES:  - Abx: Zosyn  - Monitor for fever   - Trend WBC  - Blood cultures sent     ENDOCRINE  - Hx of T2DM   - ISS     MISC  - Place PIV x2    Disposition: SICU

## 2023-07-20 NOTE — PROGRESS NOTE ADULT - PROBLEM SELECTOR PLAN 1
Pt with ESRD on HD TIW MWF admitted with fall. Pt s/p Lt hemiarthroplasty today 7/19/2023, however surgery complicated by intraoperative hypotension requiring vasopressor support. Patient transferred to SICU for hemodynamic stabilization. CT Angio abd/pelv reviewed. HD treatment held on 7/19/23 due to above. Plan for maintenance HD today (7/20/23) as serum potassium elevated to 5.4, pt. received total of 3U PRBC in last 24 hours, and is currently on LR IVF. Recommend discontinuing IVF or lower rate. Monitor labs.

## 2023-07-20 NOTE — PROGRESS NOTE ADULT - SUBJECTIVE AND OBJECTIVE BOX
SICU Daily Progress Note  =====================================================  Interval/Overnight Events:  -Patient hypotensive on pressors   -Lactate increased to 6.9   -Increased IVF to 125  -S/P 1L LR bolus   -Started Zosyn  -Blood cultures sent     Allergies: No Known Allergies      MEDICATIONS:   --------------------------------------------------------------------------------------  Neurologic Medications  acetaminophen     Tablet .. 975 milliGRAM(s) Oral every 8 hours  acetaminophen   IVPB .. 1000 milliGRAM(s) IV Intermittent every 6 hours  melatonin 3 milliGRAM(s) Oral at bedtime PRN Insomnia  ondansetron Injectable 4 milliGRAM(s) IV Push every 6 hours PRN Nausea and/or Vomiting  oxyCODONE    IR 5 milliGRAM(s) Oral every 3 hours PRN Severe Pain (7 - 10)  oxyCODONE    IR 2.5 milliGRAM(s) Oral every 3 hours PRN Moderate Pain (4 - 6)    Respiratory Medications    Cardiovascular Medications  norepinephrine Infusion 0.3 MICROgram(s)/kG/Min IV Continuous <Continuous>    Gastrointestinal Medications  aluminum hydroxide/magnesium hydroxide/simethicone Suspension 30 milliLiter(s) Oral four times a day PRN Indigestion  ascorbic acid 500 milliGRAM(s) Oral two times a day  calcium carbonate 1250 mG  + Vitamin D (OsCal 500 + D) 1 Tablet(s) Oral three times a day  folic acid 1 milliGRAM(s) Oral daily  lactated ringers. 1000 milliLiter(s) IV Continuous <Continuous>  multivitamin 1 Tablet(s) Oral daily  pantoprazole    Tablet 40 milliGRAM(s) Oral before breakfast    Genitourinary Medications    Hematologic/Oncologic Medications    Antimicrobial/Immunologic Medications  ceFAZolin   IVPB 2000 milliGRAM(s) IV Intermittent every 8 hours  piperacillin/tazobactam IVPB. 3.375 Gram(s) IV Intermittent once    Endocrine/Metabolic Medications    Topical/Other Medications  chlorhexidine 2% Cloths 1 Application(s) Topical <User Schedule>    --------------------------------------------------------------------------------------    VITAL SIGNS, INS/OUTS (last 24 hours):  --------------------------------------------------------------------------------------  Vital Signs Last 24 Hrs  T(C): 36.1 (19 Jul 2023 20:00), Max: 36.9 (19 Jul 2023 05:13)  T(F): 96.9 (19 Jul 2023 20:00), Max: 98.5 (19 Jul 2023 07:25)  HR: 75 (19 Jul 2023 23:00) (62 - 84)  BP: 133/53 (19 Jul 2023 23:00) (74/46 - 208/75)  BP(mean): 70 (19 Jul 2023 23:00) (50 - 104)  RR: 27 (19 Jul 2023 23:00) (15 - 30)  SpO2: 99% (19 Jul 2023 23:00) (95% - 100%)    Parameters below as of 19 Jul 2023 20:00  Patient On (Oxygen Delivery Method): room air  --------------------------------------------------------------------------------------    EXAM  NEUROLOGY  Exam: Normal, NAD, alert, oriented x3, no focal deficits.    HEENT  Exam: Normocephalic, atraumatic, EOMI.     RESPIRATORY  Exam: Lungs clear to auscultation, Normal expansion/effort.     CARDIOVASCULAR  Exam: S1, S2.  Regular rate and rhythm.     GI/NUTRITION  Exam: Abdomen soft, Non-tender, Non-distended.     EXTREMITIES   Exam: Extremities warm, pink, well-perfused. LEFT hip with Prevena VAC in place, holding good suction, no hematoma or erythema around prevena.     MUSCULOSKELETAL  Exam: All extremities moving spontaneously without limitations.    SKIN  Exam: Good skin turgor, no skin breakdown.       Tubes/Lines/Drains  [x] Peripheral IV  [x] Central Venous Line     	[] R	[] L	[] IJ	[] Fem	[] SC	  [x] Arterial Line		[] R	[] L	[] Fem	[] Rad	[] Ax	  [] PICC		[] Midline		[] Mediport  [] Urinary Catheter		  [x] Necessity of urinary, arterial, and venous catheters discussed    LABS  --------------------------------------------------------------------------------------                        9.3    7.78  )-----------( 134      ( 19 Jul 2023 21:20 )             28.6     07-19    135  |  99  |  39<H>  ----------------------------<  197<H>  5.5<H>   |  17<L>  |  5.72<H>    Ca    7.9<L>      19 Jul 2023 21:20  Phos  6.6     07-19  Mg     2.00     07-19    TPro  5.1<L>  /  Alb  2.2<L>  /  TBili  0.8  /  DBili  x   /  AST  37<H>  /  ALT  18  /  AlkPhos  141<H>  07-19    PT/INR - ( 19 Jul 2023 21:20 )   PT: 16.6 sec;   INR: 1.43 ratio         PTT - ( 19 Jul 2023 21:20 )  PTT:34.3 sec  CAPILLARY BLOOD GLUCOSE      POCT Blood Glucose.: 130 mg/dL (19 Jul 2023 14:58)  --------------------------------------------------------------------------------------

## 2023-07-21 LAB
ANION GAP SERPL CALC-SCNC: 11 MMOL/L — SIGNIFICANT CHANGE UP (ref 7–14)
ANION GAP SERPL CALC-SCNC: 14 MMOL/L — SIGNIFICANT CHANGE UP (ref 7–14)
ANION GAP SERPL CALC-SCNC: 15 MMOL/L — HIGH (ref 7–14)
BUN SERPL-MCNC: 19 MG/DL — SIGNIFICANT CHANGE UP (ref 7–23)
BUN SERPL-MCNC: 46 MG/DL — HIGH (ref 7–23)
BUN SERPL-MCNC: 48 MG/DL — HIGH (ref 7–23)
CALCIUM SERPL-MCNC: 7.5 MG/DL — LOW (ref 8.4–10.5)
CALCIUM SERPL-MCNC: 7.9 MG/DL — LOW (ref 8.4–10.5)
CALCIUM SERPL-MCNC: 7.9 MG/DL — LOW (ref 8.4–10.5)
CHLORIDE SERPL-SCNC: 100 MMOL/L — SIGNIFICANT CHANGE UP (ref 98–107)
CHLORIDE SERPL-SCNC: 98 MMOL/L — SIGNIFICANT CHANGE UP (ref 98–107)
CHLORIDE SERPL-SCNC: 98 MMOL/L — SIGNIFICANT CHANGE UP (ref 98–107)
CO2 SERPL-SCNC: 18 MMOL/L — LOW (ref 22–31)
CO2 SERPL-SCNC: 20 MMOL/L — LOW (ref 22–31)
CO2 SERPL-SCNC: 26 MMOL/L — SIGNIFICANT CHANGE UP (ref 22–31)
CREAT SERPL-MCNC: 2.84 MG/DL — HIGH (ref 0.5–1.3)
CREAT SERPL-MCNC: 6.15 MG/DL — HIGH (ref 0.5–1.3)
CREAT SERPL-MCNC: 6.23 MG/DL — HIGH (ref 0.5–1.3)
EGFR: 17 ML/MIN/1.73M2 — LOW
EGFR: 6 ML/MIN/1.73M2 — LOW
EGFR: 7 ML/MIN/1.73M2 — LOW
GLUCOSE BLDC GLUCOMTR-MCNC: 158 MG/DL — HIGH (ref 70–99)
GLUCOSE BLDC GLUCOMTR-MCNC: 183 MG/DL — HIGH (ref 70–99)
GLUCOSE BLDC GLUCOMTR-MCNC: 96 MG/DL — SIGNIFICANT CHANGE UP (ref 70–99)
GLUCOSE SERPL-MCNC: 125 MG/DL — HIGH (ref 70–99)
GLUCOSE SERPL-MCNC: 127 MG/DL — HIGH (ref 70–99)
GLUCOSE SERPL-MCNC: 96 MG/DL — SIGNIFICANT CHANGE UP (ref 70–99)
HCT VFR BLD CALC: 21.1 % — LOW (ref 34.5–45)
HCT VFR BLD CALC: 22.1 % — LOW (ref 34.5–45)
HCT VFR BLD CALC: 22.3 % — LOW (ref 34.5–45)
HGB BLD-MCNC: 7.2 G/DL — LOW (ref 11.5–15.5)
HGB BLD-MCNC: 7.4 G/DL — LOW (ref 11.5–15.5)
HGB BLD-MCNC: 7.5 G/DL — LOW (ref 11.5–15.5)
LACTATE SERPL-SCNC: 1.3 MMOL/L — SIGNIFICANT CHANGE UP (ref 0.5–2)
LACTATE SERPL-SCNC: 1.6 MMOL/L — SIGNIFICANT CHANGE UP (ref 0.5–2)
LACTATE SERPL-SCNC: 1.6 MMOL/L — SIGNIFICANT CHANGE UP (ref 0.5–2)
MAGNESIUM SERPL-MCNC: 1.8 MG/DL — SIGNIFICANT CHANGE UP (ref 1.6–2.6)
MAGNESIUM SERPL-MCNC: 1.9 MG/DL — SIGNIFICANT CHANGE UP (ref 1.6–2.6)
MCHC RBC-ENTMCNC: 29.8 PG — SIGNIFICANT CHANGE UP (ref 27–34)
MCHC RBC-ENTMCNC: 30.1 PG — SIGNIFICANT CHANGE UP (ref 27–34)
MCHC RBC-ENTMCNC: 30.3 PG — SIGNIFICANT CHANGE UP (ref 27–34)
MCHC RBC-ENTMCNC: 33.5 GM/DL — SIGNIFICANT CHANGE UP (ref 32–36)
MCHC RBC-ENTMCNC: 33.6 GM/DL — SIGNIFICANT CHANGE UP (ref 32–36)
MCHC RBC-ENTMCNC: 34.1 GM/DL — SIGNIFICANT CHANGE UP (ref 32–36)
MCV RBC AUTO: 88.7 FL — SIGNIFICANT CHANGE UP (ref 80–100)
MCV RBC AUTO: 89.1 FL — SIGNIFICANT CHANGE UP (ref 80–100)
MCV RBC AUTO: 89.6 FL — SIGNIFICANT CHANGE UP (ref 80–100)
NRBC # BLD: 0 /100 WBCS — SIGNIFICANT CHANGE UP (ref 0–0)
NRBC # FLD: 0 K/UL — SIGNIFICANT CHANGE UP (ref 0–0)
PHOSPHATE SERPL-MCNC: 3 MG/DL — SIGNIFICANT CHANGE UP (ref 2.5–4.5)
PHOSPHATE SERPL-MCNC: 5.9 MG/DL — HIGH (ref 2.5–4.5)
PLATELET # BLD AUTO: 107 K/UL — LOW (ref 150–400)
PLATELET # BLD AUTO: 108 K/UL — LOW (ref 150–400)
PLATELET # BLD AUTO: 83 K/UL — LOW (ref 150–400)
POTASSIUM SERPL-MCNC: 3.9 MMOL/L — SIGNIFICANT CHANGE UP (ref 3.5–5.3)
POTASSIUM SERPL-MCNC: 5.1 MMOL/L — SIGNIFICANT CHANGE UP (ref 3.5–5.3)
POTASSIUM SERPL-MCNC: 5.3 MMOL/L — SIGNIFICANT CHANGE UP (ref 3.5–5.3)
POTASSIUM SERPL-SCNC: 3.9 MMOL/L — SIGNIFICANT CHANGE UP (ref 3.5–5.3)
POTASSIUM SERPL-SCNC: 5.1 MMOL/L — SIGNIFICANT CHANGE UP (ref 3.5–5.3)
POTASSIUM SERPL-SCNC: 5.3 MMOL/L — SIGNIFICANT CHANGE UP (ref 3.5–5.3)
RBC # BLD: 2.38 M/UL — LOW (ref 3.8–5.2)
RBC # BLD: 2.48 M/UL — LOW (ref 3.8–5.2)
RBC # BLD: 2.49 M/UL — LOW (ref 3.8–5.2)
RBC # FLD: 17.6 % — HIGH (ref 10.3–14.5)
RBC # FLD: 18.2 % — HIGH (ref 10.3–14.5)
RBC # FLD: 18.2 % — HIGH (ref 10.3–14.5)
SODIUM SERPL-SCNC: 131 MMOL/L — LOW (ref 135–145)
SODIUM SERPL-SCNC: 132 MMOL/L — LOW (ref 135–145)
SODIUM SERPL-SCNC: 137 MMOL/L — SIGNIFICANT CHANGE UP (ref 135–145)
WBC # BLD: 7.69 K/UL — SIGNIFICANT CHANGE UP (ref 3.8–10.5)
WBC # BLD: 8.22 K/UL — SIGNIFICANT CHANGE UP (ref 3.8–10.5)
WBC # BLD: 9.51 K/UL — SIGNIFICANT CHANGE UP (ref 3.8–10.5)
WBC # FLD AUTO: 7.69 K/UL — SIGNIFICANT CHANGE UP (ref 3.8–10.5)
WBC # FLD AUTO: 8.22 K/UL — SIGNIFICANT CHANGE UP (ref 3.8–10.5)
WBC # FLD AUTO: 9.51 K/UL — SIGNIFICANT CHANGE UP (ref 3.8–10.5)

## 2023-07-21 PROCEDURE — 99233 SBSQ HOSP IP/OBS HIGH 50: CPT

## 2023-07-21 PROCEDURE — 99233 SBSQ HOSP IP/OBS HIGH 50: CPT | Mod: FS,GC

## 2023-07-21 RX ORDER — DEXTROSE 50 % IN WATER 50 %
25 SYRINGE (ML) INTRAVENOUS ONCE
Refills: 0 | Status: DISCONTINUED | OUTPATIENT
Start: 2023-07-21 | End: 2023-07-24

## 2023-07-21 RX ORDER — INSULIN LISPRO 100/ML
VIAL (ML) SUBCUTANEOUS
Refills: 0 | Status: DISCONTINUED | OUTPATIENT
Start: 2023-07-21 | End: 2023-08-03

## 2023-07-21 RX ORDER — DEXTROSE 50 % IN WATER 50 %
12.5 SYRINGE (ML) INTRAVENOUS ONCE
Refills: 0 | Status: DISCONTINUED | OUTPATIENT
Start: 2023-07-21 | End: 2023-07-24

## 2023-07-21 RX ORDER — SODIUM CHLORIDE 9 MG/ML
1000 INJECTION, SOLUTION INTRAVENOUS
Refills: 0 | Status: DISCONTINUED | OUTPATIENT
Start: 2023-07-21 | End: 2023-07-24

## 2023-07-21 RX ORDER — DEXTROSE 50 % IN WATER 50 %
15 SYRINGE (ML) INTRAVENOUS ONCE
Refills: 0 | Status: DISCONTINUED | OUTPATIENT
Start: 2023-07-21 | End: 2023-07-24

## 2023-07-21 RX ORDER — GLUCAGON INJECTION, SOLUTION 0.5 MG/.1ML
1 INJECTION, SOLUTION SUBCUTANEOUS ONCE
Refills: 0 | Status: DISCONTINUED | OUTPATIENT
Start: 2023-07-21 | End: 2023-07-24

## 2023-07-21 RX ORDER — ACETAMINOPHEN 500 MG
1000 TABLET ORAL EVERY 6 HOURS
Refills: 0 | Status: COMPLETED | OUTPATIENT
Start: 2023-07-21 | End: 2023-07-22

## 2023-07-21 RX ORDER — HEPARIN SODIUM 5000 [USP'U]/ML
5000 INJECTION INTRAVENOUS; SUBCUTANEOUS EVERY 8 HOURS
Refills: 0 | Status: DISCONTINUED | OUTPATIENT
Start: 2023-07-21 | End: 2023-07-28

## 2023-07-21 RX ADMIN — Medication 1 TABLET(S): at 11:10

## 2023-07-21 RX ADMIN — PANTOPRAZOLE SODIUM 40 MILLIGRAM(S): 20 TABLET, DELAYED RELEASE ORAL at 06:32

## 2023-07-21 RX ADMIN — Medication 500 MILLIGRAM(S): at 18:22

## 2023-07-21 RX ADMIN — PIPERACILLIN AND TAZOBACTAM 25 GRAM(S): 4; .5 INJECTION, POWDER, LYOPHILIZED, FOR SOLUTION INTRAVENOUS at 18:30

## 2023-07-21 RX ADMIN — Medication 1 TABLET(S): at 05:41

## 2023-07-21 RX ADMIN — PIPERACILLIN AND TAZOBACTAM 25 GRAM(S): 4; .5 INJECTION, POWDER, LYOPHILIZED, FOR SOLUTION INTRAVENOUS at 05:40

## 2023-07-21 RX ADMIN — Medication 1 TABLET(S): at 15:29

## 2023-07-21 RX ADMIN — HEPARIN SODIUM 5000 UNIT(S): 5000 INJECTION INTRAVENOUS; SUBCUTANEOUS at 21:59

## 2023-07-21 RX ADMIN — Medication 1 MILLIGRAM(S): at 11:10

## 2023-07-21 RX ADMIN — Medication 400 MILLIGRAM(S): at 13:00

## 2023-07-21 RX ADMIN — Medication 400 MILLIGRAM(S): at 18:22

## 2023-07-21 RX ADMIN — HEPARIN SODIUM 5000 UNIT(S): 5000 INJECTION INTRAVENOUS; SUBCUTANEOUS at 14:53

## 2023-07-21 RX ADMIN — CHLORHEXIDINE GLUCONATE 1 APPLICATION(S): 213 SOLUTION TOPICAL at 05:40

## 2023-07-21 RX ADMIN — Medication 1 TABLET(S): at 21:59

## 2023-07-21 RX ADMIN — Medication 400 MILLIGRAM(S): at 06:52

## 2023-07-21 RX ADMIN — Medication 1: at 21:59

## 2023-07-21 RX ADMIN — Medication 500 MILLIGRAM(S): at 05:41

## 2023-07-21 NOTE — PHYSICAL THERAPY INITIAL EVALUATION ADULT - PERTINENT HX OF CURRENT PROBLEM, REHAB EVAL
Pt is a 76 y/o F with PMH of ESRD on HD (MWFri), HTN, DM (diet controlled), chronic type B aortic dissection, heart murmur, endometrial ca in remission s/p total hysterectomy in 2007 s/p Left IMN for IT fx in May 2023 presents to the ED w 2 weeks of atraumatic  Left hip pain.  Pt has been at [facility] since discharge and states about two weeks ago she started feeling pain in Left hip.  She has not been ambulating much since discharge stating only ambulating when working with PT and OT.  Over last two weeks she has had increasing Left hip pain and was seen by orthopedist at rehab 7/17 where X-Rays done and was told she should be sent to ED for evaluation of hardware.

## 2023-07-21 NOTE — PHYSICAL THERAPY INITIAL EVALUATION ADULT - PATIENT PROFILE REVIEW, REHAB EVAL
ACTIVITY: OOB to Chair; spoke with RN Emelia prior to PT evaluation--> Pt OK for PT consult/ROM activity; HOLD functional mobility 2/2 bleed; Vitals taken prior; /56mmHg, Heart rate 62bpm, SpO2 97% on room air/yes

## 2023-07-21 NOTE — OCCUPATIONAL THERAPY INITIAL EVALUATION ADULT - GENERAL OBSERVATIONS, REHAB EVAL
Patient received semisupine in bed in NAD; agreeable to participate in OT evaluation. BP: 145/56mmHg, Heart rate 62bpm,

## 2023-07-21 NOTE — PROGRESS NOTE ADULT - ASSESSMENT
Pt is a 78 y/o female pmhx of ESRD on HD (MoWeFri), HTN, DM (diet controlled), chronic type B aortic dissection, heart murmur, endometrial ca in remission s/p total hysterectomy in 2007 s/p L IMN for IT fx in May 2023,  now presenting for evaluation of concern for hardware migration. Now s/p  left hip removal of gamma nail and placement of restoration modular peter arthroplasty, EBL 200cc adn got 1L of LR. . Patient hypotensive in PACU requiring pressors requirements and volume resuscitation. In PACU patient got 1 PRBC, 50cc of 25% albumin.  SICU consulted for pressor support and hemodynamic management. On POD0, patient with rising lactate and abdominal pain. CTA completed which showed hematoma at the operative site, with possible extravasation.     PLAN:  NEURO:   - A&Ox3  - Pain control: Tylenol ATC and oxycodone PRn    RESPIRATORY  - Saturation >92%  - Wean NC as tolerated     CARDIOVASCULAR  - MAP goal >65  - Wean Norepinephrine as tolerated   - Lactate downtrending   - IVF LR @ 125    GI/NUTRITION  - Diet: NPO   - Monitor for abdominal pain/ diarrhea   - Protonix  - Folic acid and multivitamin    GENITOURINARY/RENAL  - IVF @ 125  - HD M/W/F -- (last HD on Friday 7/14)   - Monitor strict I/Os   - Monitor and replete electrolytes   - Hyperkalemia   - Q6h CBC, BMP, lactate    HEMATOLOGIC  - Holding DVT ppx   - Monitor H/H    INFECTIOUS DISEASES:  - Abx: Zosyn  - Monitor for fever   - Trend WBC  - Blood cultures sent     ENDOCRINE  - Hx of T2DM   - ISS     Disposition: SICU  Pt is a 76 y/o female pmhx of ESRD on HD (MoWeFri), HTN, DM (diet controlled), chronic type B aortic dissection, heart murmur, endometrial ca in remission s/p total hysterectomy in 2007 s/p L IMN for IT fx in May 2023,  now presenting for evaluation of concern for hardware migration. Now s/p  left hip removal of gamma nail and placement of restoration modular peter arthroplasty, EBL 200cc adn got 1L of LR. . Patient hypotensive in PACU requiring pressors requirements and volume resuscitation. In PACU patient got 1 PRBC, 50cc of 25% albumin.  SICU consulted for pressor support and hemodynamic management. On POD0, patient with rising lactate and abdominal pain. CTA completed which showed hematoma at the operative site, with possible extravasation.     PLAN:  NEURO:   - A&Ox3  - Pain control: Tylenol ATC and oxycodone PRn    RESPIRATORY  - Saturation >92%  - Wean NC as tolerated     CARDIOVASCULAR  - MAP goal >65  - Wean Norepinephrine as tolerated -- currently off  - Lactate downtrending 1.6  - IVF: LR @ 50    GI/NUTRITION  - Diet: Regular diet (consistent carbs)  - Monitor for abdominal pain/ diarrhea   - Protonix  - Folic acid and multivitamin    GENITOURINARY/RENAL  - IVF: LR @ 50  - HD M/W/F -- planned for toa 7/21  - Monitor strict I/Os   - Monitor and replete electrolytes   - Hyperkalemia (5.5)  - Q6h CBC, BMP, lactate    HEMATOLOGIC  - DVT ppx: SC heparin q8h  - Monitor H/H  - CBC after HD 7/21    INFECTIOUS DISEASES:  - Abx: Zosyn  - Monitor for fever   - Trend WBC  - Blood cultures sent   - Intraop bone swab cx NGTD    ENDOCRINE  - Hx of T2DM   - ISS     Disposition: SICU (potentially list for floor if post-HD CBC stable)

## 2023-07-21 NOTE — PROGRESS NOTE ADULT - SUBJECTIVE AND OBJECTIVE BOX
Cardiovascular Disease Progress Note  Date of Service: 07-21-23 @ 07:45    Overnight events: No acute events overnight.    Patient is in no distress.   Otherwise review of systems negative    Objective Findings:  T(C): 35.4 (07-21-23 @ 04:00), Max: 35.9 (07-20-23 @ 20:00)  HR: 65 (07-21-23 @ 07:00) (56 - 75)  BP: 126/54 (07-21-23 @ 07:00) (108/50 - 155/59)  RR: 19 (07-21-23 @ 07:00) (18 - 27)  SpO2: 98% (07-21-23 @ 07:00) (92% - 99%)  Wt(kg): --  Daily     Daily       Physical Exam:  Gen: NAD; Patient resting comfortably  HEENT: EOMI, Normocephalic/ atraumatic  CV: RRR, normal S1 + S2, no m/r/g  Lungs:  Normal respiratory effort; clear to auscultation bilaterally  Abd: soft, non-tender; bowel sounds present  Ext: No edema; warm and well perfused    Telemetry: Sinus    Laboratory Data:                        7.4    9.51  )-----------( 107      ( 21 Jul 2023 02:00 )             22.1     07-20    133<L>  |  98  |  45<H>  ----------------------------<  164<H>  5.5<H>   |  21<L>  |  5.96<H>    Ca    7.5<L>      20 Jul 2023 19:40  Phos  5.9     07-21  Mg     1.90     07-21    TPro  5.1<L>  /  Alb  2.2<L>  /  TBili  0.8  /  DBili  x   /  AST  37<H>  /  ALT  18  /  AlkPhos  141<H>  07-19    PT/INR - ( 20 Jul 2023 02:40 )   PT: 17.6 sec;   INR: 1.51 ratio         PTT - ( 20 Jul 2023 02:40 )  PTT:36.7 sec          Inpatient Medications:  MEDICATIONS  (STANDING):  acetaminophen   IVPB .. 1000 milliGRAM(s) IV Intermittent every 6 hours  ascorbic acid 500 milliGRAM(s) Oral two times a day  calcium carbonate 1250 mG  + Vitamin D (OsCal 500 + D) 1 Tablet(s) Oral three times a day  chlorhexidine 2% Cloths 1 Application(s) Topical <User Schedule>  folic acid 1 milliGRAM(s) Oral daily  lactated ringers. 1000 milliLiter(s) (50 mL/Hr) IV Continuous <Continuous>  multivitamin 1 Tablet(s) Oral daily  norepinephrine Infusion 0.3 MICROgram(s)/kG/Min (16 mL/Hr) IV Continuous <Continuous>  pantoprazole    Tablet 40 milliGRAM(s) Oral before breakfast  piperacillin/tazobactam IVPB.. 3.375 Gram(s) IV Intermittent every 12 hours      Assessment: 77 year old woman with ESRD on HD (MWF), HTN, DM (diet controlled), chronic type B aortic dissection, heart murmur, endometrial ca in remission s/p total hysterectomy in 2007 presents with L hip pain     Plan of Care:      #Post-operative evaluation-  - Ms. Harris tolerated orthopedic intervention well.  Post operative course complicated by distributive/hemorrhagic shock requiring low doses of Levophed.  She displays no signs of  coronary ischemia or decompensated CHF.   Her admission EKG is sinus with a pre-existing RBBB.   Recent echo with no acute findings.   Continue current cardiac management.       #Type B aortic dissection  - Chronic (Diagnosed >10 years ago)  - F/u CT abdomen.        #ESRD  - HD as per renal.      Rest of care as per SICU.         Over 35 minutes spent on total encounter; more than 50% of the visit was spent counseling and/or coordinating care by the attending physician.      Gabo Burris MD [facility]  Cardiovascular Disease  [phone]

## 2023-07-21 NOTE — PROGRESS NOTE ADULT - SUBJECTIVE AND OBJECTIVE BOX
[facility] Division of Kidney Diseases & Hypertension  FOLLOW UP NOTE  [phone]--------------------------------------------------------------------------------    HPI: 78F w/ PMH of ESRD on HD TIW (MWF) at [facility] (previously at [facility]), HTN, DM2, endometrial cancer (underwent hysterectomy and chemotherapy, type B aortic dissection, and L hip ORIF, admitted for concerns of loosened hardware. Nephrology consulted for management of ESRD/HD.     24 hour events/subjective: Pt. seen and evaluated at bedside in the SICU this AM. Overall feels well just complaining of mild headache and swelling in her arms. Pain is well managed. She denies fevers/chills, headaches, chest pain, SOB, abd pain, or leg swelling. Plan for maintenance HD today. Postponed yesterday due to anemia.    PAST HISTORY  --------------------------------------------------------------------------------  No significant changes to PMH, PSH, FHx, SHx, unless otherwise noted    ALLERGIES & MEDICATIONS  --------------------------------------------------------------------------------  Allergies  No Known Allergies    Intolerances    Standing Inpatient Medications  acetaminophen   IVPB .. 1000 milliGRAM(s) IV Intermittent every 6 hours  ascorbic acid 500 milliGRAM(s) Oral two times a day  calcium carbonate 1250 mG  + Vitamin D (OsCal 500 + D) 1 Tablet(s) Oral three times a day  chlorhexidine 2% Cloths 1 Application(s) Topical <User Schedule>  folic acid 1 milliGRAM(s) Oral daily  lactated ringers. 1000 milliLiter(s) IV Continuous <Continuous>  multivitamin 1 Tablet(s) Oral daily  norepinephrine Infusion 0.3 MICROgram(s)/kG/Min IV Continuous <Continuous>  pantoprazole    Tablet 40 milliGRAM(s) Oral before breakfast  piperacillin/tazobactam IVPB.. 3.375 Gram(s) IV Intermittent every 12 hours    PRN Inpatient Medications  aluminum hydroxide/magnesium hydroxide/simethicone Suspension 30 milliLiter(s) Oral four times a day PRN  HYDROmorphone  Injectable 0.5 milliGRAM(s) IV Push every 4 hours PRN  HYDROmorphone  Injectable 1 milliGRAM(s) IV Push every 4 hours PRN  melatonin 3 milliGRAM(s) Oral at bedtime PRN  ondansetron Injectable 4 milliGRAM(s) IV Push every 6 hours PRN    REVIEW OF SYSTEMS  --------------------------------------------------------------------------------  Gen: No fevers/chills  Head/Eyes/Ears: mild headache  Respiratory: No dyspnea, cough  CV: No chest pain  GI: No abdominal pain  MSK: no leg pain or swelling, swelling in arms    All other systems were reviewed and are negative, except as noted.    VITALS/PHYSICAL EXAM  --------------------------------------------------------------------------------  T(C): 35.4 (07-21-23 @ 04:00), Max: 35.9 (07-20-23 @ 20:00)  HR: 65 (07-21-23 @ 07:00) (56 - 75)  BP: 126/54 (07-21-23 @ 07:00) (108/50 - 155/59)  RR: 19 (07-21-23 @ 07:00) (18 - 27)  SpO2: 98% (07-21-23 @ 07:00) (92% - 98%)  Wt(kg): --    07-20-23 @ 07:01  -  07-21-23 @ 07:00  --------------------------------------------------------  IN: 2195.6 mL / OUT: 50 mL / NET: 2145.6 mL    Physical Exam:  Gen: NAD  HEENT: MMM, +RIJ  Pulm: minimal bibasilar crackles   CV: S1S2  Abd: Soft, +BS   Ext: trace LE edema B/L, dressing on LLE  Neuro: Awake  Skin: Warm and dry  Vascular access: LUE AVF, aneurysmal, skin intact, thrill felt    LABS/STUDIES  --------------------------------------------------------------------------------              7.4    9.51  >-----------<  107      [07-21-23 @ 02:00]              22.1     131  |  98  |  46  ----------------------------<  127      [07-21-23 @ 02:00]  5.1   |  18  |  6.15        Ca     7.5     [07-21-23 @ 02:00]      Mg     1.90     [07-21-23 @ 02:00]      Phos  5.9     [07-21-23 @ 02:00]    TPro  5.1  /  Alb  2.2  /  TBili  0.8  /  DBili  x   /  AST  37  /  ALT  18  /  AlkPhos  141  [07-19-23 @ 15:00]    Creatinine Trend:  SCr 6.15 [07-21 @ 02:00]  SCr 5.96 [07-20 @ 19:40]  SCr 5.90 [07-20 @ 15:15]  SCr 5.73 [07-20 @ 10:10]  SCr 5.53 [07-20 @ 02:40]

## 2023-07-21 NOTE — PROGRESS NOTE ADULT - SUBJECTIVE AND OBJECTIVE BOX
Orthopedic Surgery Progress Note     S: Patient seen and examined today. Unable to go to dialysis yesterday 2/2 potential bleeding. Pain is well controlled. Has not worked w therapy. Denies f/c, chest pain, shortness of breath, dizziness. Says she feels cold but better under exam light.    MEDICATIONS  (STANDING):  acetaminophen   IVPB .. 1000 milliGRAM(s) IV Intermittent every 6 hours  ascorbic acid 500 milliGRAM(s) Oral two times a day  calcium carbonate 1250 mG  + Vitamin D (OsCal 500 + D) 1 Tablet(s) Oral three times a day  chlorhexidine 2% Cloths 1 Application(s) Topical <User Schedule>  folic acid 1 milliGRAM(s) Oral daily  lactated ringers. 1000 milliLiter(s) (50 mL/Hr) IV Continuous <Continuous>  multivitamin 1 Tablet(s) Oral daily  norepinephrine Infusion 0.3 MICROgram(s)/kG/Min (16 mL/Hr) IV Continuous <Continuous>  pantoprazole    Tablet 40 milliGRAM(s) Oral before breakfast  piperacillin/tazobactam IVPB.. 3.375 Gram(s) IV Intermittent every 12 hours    MEDICATIONS  (PRN):  aluminum hydroxide/magnesium hydroxide/simethicone Suspension 30 milliLiter(s) Oral four times a day PRN Indigestion  HYDROmorphone  Injectable 0.5 milliGRAM(s) IV Push every 4 hours PRN Moderate Pain (4 - 6)  HYDROmorphone  Injectable 1 milliGRAM(s) IV Push every 4 hours PRN Severe Pain (7 - 10)  melatonin 3 milliGRAM(s) Oral at bedtime PRN Insomnia  ondansetron Injectable 4 milliGRAM(s) IV Push every 6 hours PRN Nausea and/or Vomiting      Vital Signs Last 24 Hrs  T(C): 35.6 (21 Jul 2023 00:00), Max: 35.9 (20 Jul 2023 20:00)  T(F): 96 (21 Jul 2023 00:00), Max: 96.6 (20 Jul 2023 20:00)  HR: 66 (21 Jul 2023 06:00) (56 - 75)  BP: 121/55 (21 Jul 2023 06:00) (108/50 - 155/59)  BP(mean): 70 (21 Jul 2023 06:00) (63 - 95)  RR: 20 (21 Jul 2023 06:00) (18 - 27)  SpO2: 97% (21 Jul 2023 06:00) (92% - 99%)    Parameters below as of 21 Jul 2023 06:00  Patient On (Oxygen Delivery Method): room air        07-19-23 @ 07:01  -  07-20-23 @ 07:00  --------------------------------------------------------  IN: 3055 mL / OUT: 0 mL / NET: 3055 mL    07-20-23 @ 07:01  -  07-21-23 @ 06:36  --------------------------------------------------------  IN: 2195.6 mL / OUT: 50 mL / NET: 2145.6 mL        Physical Exam:  Gen: NAD  L Lower Extremity:  Spica ACE bandage  Prevena vac w good suction  SILT S/S/DP/SP/T  +EHL/FHL/TA/GSC  Compartments soft/non-tender  Toes warm, Cap refill brisk/warm and perfused, +DP/PT pulse         LABS:                        7.4    9.51  )-----------( 107      ( 21 Jul 2023 02:00 )             22.1     07-20    133<L>  |  98  |  45<H>  ----------------------------<  164<H>  5.5<H>   |  21<L>  |  5.96<H>    Ca    7.5<L>      20 Jul 2023 19:40  Phos  5.9     07-21  Mg     1.90     07-21    TPro  5.1<L>  /  Alb  2.2<L>  /  TBili  0.8  /  DBili  x   /  AST  37<H>  /  ALT  18  /  AlkPhos  141<H>  07-19

## 2023-07-21 NOTE — DIETITIAN INITIAL EVALUATION ADULT - PERTINENT LABORATORY DATA
07-21    132<L>  |  98  |  48<H>  ----------------------------<  125<H>  5.3   |  20<L>  |  6.23<H>    Ca    7.9<L>      21 Jul 2023 08:35  Phos  5.9     07-21  Mg     1.90     07-21    TPro  5.1<L>  /  Alb  2.2<L>  /  TBili  0.8  /  DBili  x   /  AST  37<H>  /  ALT  18  /  AlkPhos  141<H>  07-19  POCT Blood Glucose.: 171 mg/dL (07-20-23 @ 11:55)  A1C with Estimated Average Glucose Result: 5.0 % (05-23-23 @ 07:00)

## 2023-07-21 NOTE — PHYSICAL THERAPY INITIAL EVALUATION ADULT - ACTIVE RANGE OF MOTION EXAMINATION, REHAB EVAL
left shoulder flexion ~20 degrees left elbow 0-80 degrees (2/2 fistula), left wrist/hand WFL/Right UE Active ROM was WFL (within functional limits)/bilateral  lower extremity Active ROM was WFL (within functional limits)

## 2023-07-21 NOTE — PHYSICAL THERAPY INITIAL EVALUATION ADULT - ASR WT BEARING STATUS EVAL
as per Ortho note and PT consult/Left LE as per Ortho note and PT consult/Left UE/Right UE/Left LE/Right LE

## 2023-07-21 NOTE — PHYSICAL THERAPY INITIAL EVALUATION ADULT - PASSIVE RANGE OF MOTION EXAMINATION, REHAB EVAL
left shoulder flexion ~50 degrees left elbow 0-80 degrees (2/2 fistula), left wrist/hand WFL/Right UE Passive ROM was WFL (within functional limits)/bilateral lower extremity Passive ROM was WFL (within functional limits)

## 2023-07-21 NOTE — OCCUPATIONAL THERAPY INITIAL EVALUATION ADULT - ADDITIONAL COMMENTS
Patient has been admitted to [facility] since May 2023 s/p left hip fracture. Patient reports she was able to ambulate short distances using a cane and needed assistance with ADLs.

## 2023-07-21 NOTE — PROGRESS NOTE ADULT - PROBLEM SELECTOR PLAN 2
Pt. with anemia in the setting of ESRD and blood loss from surgical procedure. Hgb 7.4 from early AM (7/21/23). Will give RICA with HD treatments. Monitor hemoglobin levels.     Final recommendations pending attending signature.  If you have any questions, please feel free to contact me.  John Torres  Nephrology Fellow  [id number] / [phone] / [employer] Teams (Preferred)  (After 4pm or on weekends, please call the on-call Fellow)

## 2023-07-21 NOTE — OCCUPATIONAL THERAPY INITIAL EVALUATION ADULT - RANGE OF MOTION EXAMINATION, UPPER EXTREMITY
Left UE active assisted ROM of shoulder grossly 0-30 degrees, left elbow 0-40 degrees, wrist/hand grossly WFL/Right UE Active ROM was WFL (within functional limits)

## 2023-07-21 NOTE — PROGRESS NOTE ADULT - SUBJECTIVE AND OBJECTIVE BOX
SICU Daily Progress Note  =====================================================  Interval/Overnight Events:  -Midline placed     Allergies: No Known Allergies      MEDICATIONS:   --------------------------------------------------------------------------------------  Neurologic Medications  HYDROmorphone  Injectable 0.5 milliGRAM(s) IV Push every 4 hours PRN Moderate Pain (4 - 6)  HYDROmorphone  Injectable 1 milliGRAM(s) IV Push every 4 hours PRN Severe Pain (7 - 10)  melatonin 3 milliGRAM(s) Oral at bedtime PRN Insomnia  ondansetron Injectable 4 milliGRAM(s) IV Push every 6 hours PRN Nausea and/or Vomiting    Respiratory Medications    Cardiovascular Medications  norepinephrine Infusion 0.3 MICROgram(s)/kG/Min IV Continuous <Continuous>    Gastrointestinal Medications  aluminum hydroxide/magnesium hydroxide/simethicone Suspension 30 milliLiter(s) Oral four times a day PRN Indigestion  ascorbic acid 500 milliGRAM(s) Oral two times a day  calcium carbonate 1250 mG  + Vitamin D (OsCal 500 + D) 1 Tablet(s) Oral three times a day  folic acid 1 milliGRAM(s) Oral daily  lactated ringers. 1000 milliLiter(s) IV Continuous <Continuous>  multivitamin 1 Tablet(s) Oral daily  pantoprazole    Tablet 40 milliGRAM(s) Oral before breakfast    Genitourinary Medications    Hematologic/Oncologic Medications    Antimicrobial/Immunologic Medications  piperacillin/tazobactam IVPB.. 3.375 Gram(s) IV Intermittent every 12 hours    Endocrine/Metabolic Medications    Topical/Other Medications  chlorhexidine 2% Cloths 1 Application(s) Topical <User Schedule>    --------------------------------------------------------------------------------------    VITAL SIGNS, INS/OUTS (last 24 hours):  --------------------------------------------------------------------------------------  Vital Signs Last 24 Hrs  T(C): 35.9 (20 Jul 2023 20:00), Max: 35.9 (20 Jul 2023 20:00)  T(F): 96.6 (20 Jul 2023 20:00), Max: 96.6 (20 Jul 2023 20:00)  HR: 66 (20 Jul 2023 23:30) (56 - 84)  BP: 118/53 (20 Jul 2023 23:30) (114/51 - 155/59)  BP(mean): 67 (20 Jul 2023 23:30) (65 - 95)  RR: 22 (20 Jul 2023 23:30) (18 - 27)  SpO2: 95% (20 Jul 2023 23:30) (92% - 99%)    Parameters below as of 20 Jul 2023 20:00  Patient On (Oxygen Delivery Method): room air  --------------------------------------------------------------------------------------    EXAM  NEUROLOGY  Exam: Normal, NAD, alert, oriented x3, no focal deficits.    HEENT  Exam: Normocephalic, atraumatic, EOMI.     RESPIRATORY  Exam: Lungs clear to auscultation, Normal expansion/effort.     CARDIOVASCULAR  Exam: S1, S2.  Regular rate and rhythm.     GI/NUTRITION  Exam: Abdomen soft, Non-tender, Non-distended.     VASCULAR  Exam: Extremities warm, pink, well-perfused.    MUSCULOSKELETAL  Exam: LEFT hip with Prevena VAC in place, holding good suction. ACE wrap around left hip.     SKIN  Exam: Good skin turgor, no skin breakdown.       Tubes/Lines/Drains  [x] Peripheral IV  [X] Central Venous Line     	[] R	[] L	[] IJ	[] Fem	[] SC	  [X] Arterial Line		[] R	[] L	[] Fem	[] Rad	[] Ax	  [] PICC		[X] Midline		[] Mediport  [] Urinary Catheter		  [x] Necessity of urinary, arterial, and venous catheters discussed    LABS  --------------------------------------------------------------------------------------                        8.1    10.57 )-----------( 112      ( 20 Jul 2023 19:40 )             23.7     07-20    133<L>  |  98  |  45<H>  ----------------------------<  164<H>  5.5<H>   |  21<L>  |  5.96<H>    Ca    7.5<L>      20 Jul 2023 19:40  Phos  6.9     07-20  Mg     1.90     07-20    TPro  5.1<L>  /  Alb  2.2<L>  /  TBili  0.8  /  DBili  x   /  AST  37<H>  /  ALT  18  /  AlkPhos  141<H>  07-19    PT/INR - ( 20 Jul 2023 02:40 )   PT: 17.6 sec;   INR: 1.51 ratio         PTT - ( 20 Jul 2023 02:40 )  PTT:36.7 sec  CAPILLARY BLOOD GLUCOSE      POCT Blood Glucose.: 171 mg/dL (20 Jul 2023 11:55)  --------------------------------------------------------------------------------------         SICU Daily Progress Note  =====================================================  Interval/Overnight Events:  -Midline placed  -H/H 7.4/22.1, decreasing  -Lactate normalized  -Off pressors, decreased IVF to 50     Allergies: No Known Allergies      MEDICATIONS:   --------------------------------------------------------------------------------------  Neurologic Medications  HYDROmorphone  Injectable 0.5 milliGRAM(s) IV Push every 4 hours PRN Moderate Pain (4 - 6)  HYDROmorphone  Injectable 1 milliGRAM(s) IV Push every 4 hours PRN Severe Pain (7 - 10)  melatonin 3 milliGRAM(s) Oral at bedtime PRN Insomnia  ondansetron Injectable 4 milliGRAM(s) IV Push every 6 hours PRN Nausea and/or Vomiting    Respiratory Medications    Cardiovascular Medications  norepinephrine Infusion 0.3 MICROgram(s)/kG/Min IV Continuous <Continuous>    Gastrointestinal Medications  aluminum hydroxide/magnesium hydroxide/simethicone Suspension 30 milliLiter(s) Oral four times a day PRN Indigestion  ascorbic acid 500 milliGRAM(s) Oral two times a day  calcium carbonate 1250 mG  + Vitamin D (OsCal 500 + D) 1 Tablet(s) Oral three times a day  folic acid 1 milliGRAM(s) Oral daily  lactated ringers. 1000 milliLiter(s) IV Continuous <Continuous>  multivitamin 1 Tablet(s) Oral daily  pantoprazole    Tablet 40 milliGRAM(s) Oral before breakfast    Genitourinary Medications    Hematologic/Oncologic Medications    Antimicrobial/Immunologic Medications  piperacillin/tazobactam IVPB.. 3.375 Gram(s) IV Intermittent every 12 hours    Endocrine/Metabolic Medications    Topical/Other Medications  chlorhexidine 2% Cloths 1 Application(s) Topical <User Schedule>    --------------------------------------------------------------------------------------    VITAL SIGNS, INS/OUTS (last 24 hours):  --------------------------------------------------------------------------------------  Vital Signs Last 24 Hrs  T(C): 35.9 (20 Jul 2023 20:00), Max: 35.9 (20 Jul 2023 20:00)  T(F): 96.6 (20 Jul 2023 20:00), Max: 96.6 (20 Jul 2023 20:00)  HR: 66 (20 Jul 2023 23:30) (56 - 84)  BP: 118/53 (20 Jul 2023 23:30) (114/51 - 155/59)  BP(mean): 67 (20 Jul 2023 23:30) (65 - 95)  RR: 22 (20 Jul 2023 23:30) (18 - 27)  SpO2: 95% (20 Jul 2023 23:30) (92% - 99%)    Parameters below as of 20 Jul 2023 20:00  Patient On (Oxygen Delivery Method): room air  --------------------------------------------------------------------------------------    EXAM  NEUROLOGY  Exam: Normal, NAD, alert, oriented x3, no focal deficits.    HEENT  Exam: Normocephalic, atraumatic, EOMI.     RESPIRATORY  Exam: Lungs clear to auscultation, Normal expansion/effort.     CARDIOVASCULAR  Exam: S1, S2.  Regular rate and rhythm.     GI/NUTRITION  Exam: Abdomen soft, Non-tender, Non-distended.     VASCULAR  Exam: Extremities warm, pink, well-perfused.    MUSCULOSKELETAL  Exam: LEFT hip with Prevena VAC in place, holding good suction. ACE wrap around left hip.     SKIN  Exam: Good skin turgor, no skin breakdown.       Tubes/Lines/Drains  [x] Peripheral IV  [X] Central Venous Line     	[] R	[] L	[] IJ	[] Fem	[] SC	  [X] Arterial Line		[] R	[] L	[] Fem	[] Rad	[] Ax	  [] PICC		[X] Midline		[] Mediport  [] Urinary Catheter		  [x] Necessity of urinary, arterial, and venous catheters discussed    LABS  --------------------------------------------------------------------------------------                        8.1    10.57 )-----------( 112      ( 20 Jul 2023 19:40 )             23.7     07-20    133<L>  |  98  |  45<H>  ----------------------------<  164<H>  5.5<H>   |  21<L>  |  5.96<H>    Ca    7.5<L>      20 Jul 2023 19:40  Phos  6.9     07-20  Mg     1.90     07-20    TPro  5.1<L>  /  Alb  2.2<L>  /  TBili  0.8  /  DBili  x   /  AST  37<H>  /  ALT  18  /  AlkPhos  141<H>  07-19    PT/INR - ( 20 Jul 2023 02:40 )   PT: 17.6 sec;   INR: 1.51 ratio         PTT - ( 20 Jul 2023 02:40 )  PTT:36.7 sec  CAPILLARY BLOOD GLUCOSE      POCT Blood Glucose.: 171 mg/dL (20 Jul 2023 11:55)  --------------------------------------------------------------------------------------

## 2023-07-21 NOTE — DIETITIAN INITIAL EVALUATION ADULT - PERTINENT MEDS FT
MEDICATIONS  (STANDING):  acetaminophen   IVPB .. 1000 milliGRAM(s) IV Intermittent every 6 hours  ascorbic acid 500 milliGRAM(s) Oral two times a day  calcium carbonate 1250 mG  + Vitamin D (OsCal 500 + D) 1 Tablet(s) Oral three times a day  chlorhexidine 2% Cloths 1 Application(s) Topical <User Schedule>  folic acid 1 milliGRAM(s) Oral daily  heparin   Injectable 5000 Unit(s) SubCutaneous every 8 hours  lactated ringers. 1000 milliLiter(s) (50 mL/Hr) IV Continuous <Continuous>  multivitamin 1 Tablet(s) Oral daily  norepinephrine Infusion 0.3 MICROgram(s)/kG/Min (16 mL/Hr) IV Continuous <Continuous>  pantoprazole    Tablet 40 milliGRAM(s) Oral before breakfast  piperacillin/tazobactam IVPB.. 3.375 Gram(s) IV Intermittent every 12 hours    MEDICATIONS  (PRN):  aluminum hydroxide/magnesium hydroxide/simethicone Suspension 30 milliLiter(s) Oral four times a day PRN Indigestion  HYDROmorphone  Injectable 0.5 milliGRAM(s) IV Push every 4 hours PRN Moderate Pain (4 - 6)  HYDROmorphone  Injectable 1 milliGRAM(s) IV Push every 4 hours PRN Severe Pain (7 - 10)  melatonin 3 milliGRAM(s) Oral at bedtime PRN Insomnia  ondansetron Injectable 4 milliGRAM(s) IV Push every 6 hours PRN Nausea and/or Vomiting

## 2023-07-21 NOTE — OCCUPATIONAL THERAPY INITIAL EVALUATION ADULT - PERTINENT HX OF CURRENT PROBLEM, REHAB EVAL
77 year old female with history of ESRD on HD, HTN, DM, chronic type B aortic dissection, heart murmur, endometrial ca in remission s/p total hysterectomy in 2007 s/p Left hip IMN for IT fx in May 2023 presents to the ED with 2 weeks of atraumatic left hip pain. Pt has been at [facility] since discharge and states about two weeks ago she started feeling pain in left hip. Patient now s/p removal of hardware, left hip hemiarthroplasty on 7/19/23.

## 2023-07-21 NOTE — PHYSICAL THERAPY INITIAL EVALUATION ADULT - STANDING BALANCE: DYNAMIC, REHAB EVAL
Post-Care Instructions: I reviewed with the patient in detail post-care instructions. Patient is to keep the treatment areas dry overnight, and then apply bacitracin twice daily until healed. Patient may apply hydrogen peroxide soaks to remove any crusting. Detail Level: Detailed Render Number Of Lesions Treated: no Consent was obtained and risks were reviewed including but not limited to scarring, infection, bleeding, scabbing, incomplete removal, and allergy to anesthesia. Extraction Method: 11 blade and comedo extractor Acne Type: Comedonal Lesions Prep Text (Optional): Prior to removal the treatment areas were prepped in the usual fashion. To be assessed

## 2023-07-21 NOTE — PHYSICAL THERAPY INITIAL EVALUATION ADULT - ADDITIONAL COMMENTS
Pt reports that she lives alone in a private house with ~2-3 steps to enter; (+)handrail; bedroom/bathroom is on the first floor. Pt came from [facility] where she states that she was ambulating ~10 steps using a cane. Prior to hospital admission and rehab pt was completely independent and used a single axis cane with community ambulation. Pt's last fall was in May.    Pt left comfortable in bed, NAD, all lines intact, all precautions maintained, with call bell in reach, and RN aware of PT evaluation.

## 2023-07-21 NOTE — DIETITIAN INITIAL EVALUATION ADULT - OTHER INFO
79 y/o female with hx ESRD/HD, HTN, endomedrial ca and DM with L IMN fir IT fx 5/23 now s/p L hip gamma nail removal and placement of restoration modular peter arthroplasty. Pt NPO receiving IVF. She reported that she had been eating well PTA with stable wt. She denies food allergies, nausea/vomiting/diarrhea/constipation, or issues with chewing/swallowing. Education on renal restrictions had been given during her 5/23 admission - reinforced with pt who was appreciative. Pt now advanced to Consistent Carbohydrate diet. Recommend adding No Conc Phos with Nepro oral supplement 1x daily (425 kcals, 19.1g protein) as pt with elevated Phos lab value. Pt also with hypocalcemia - being repleted with calcium carbonate + Vit D. RDN services to remain available as needed.

## 2023-07-21 NOTE — DIETITIAN INITIAL EVALUATION ADULT - NSPROEDALEARNPREF_GEN_A_NUR
verbal instruction
Pupils equal, round and reactive to light, Extra-ocular movement intact, eyes are clear b/l

## 2023-07-21 NOTE — PROGRESS NOTE ADULT - PROBLEM SELECTOR PLAN 1
Pt with ESRD on HD TIW MWF admitted with fall. Pt s/p Lt hemiarthroplasty today 7/19/2023, however surgery complicated by intraoperative hypotension requiring vasopressor support. Patient transferred to SICU for hemodynamic stabilization. CT Angio abd/pelv reviewed. HD treatment held on 7/19/23 due to above. Held on 7/20 due to blood loss anemia. Plan for maintenance HD today (7/21/23). Monitor labs.

## 2023-07-21 NOTE — PHYSICAL THERAPY INITIAL EVALUATION ADULT - GENERAL OBSERVATIONS, REHAB EVAL
Pt encountered in semisupine position, no distress, AxOx4, with +IV, +tele, +pulse oximeter, Left thigh wrapped in ace bandage dry/intact, and abduction pillow. Pt agreeable to participate in PT evaluation.

## 2023-07-21 NOTE — PROGRESS NOTE ADULT - ASSESSMENT
A/P:  77F POD#2 s/p L hip SUZANNA, hemiarthroplasty.    - Spica super ACE bandage  - Prevena  - Pain control  - WBAT RLE  - DVT ppx: SCDs, ok for chemical dvt ppx from ortho standpoint  - PT/OT  - OOB  - Diet: Reg  - Monitor I&Os    For all questions related to patient care, please reach out via the on-call pager. Do NOT rely on Teams to contact orthopedic residents.*     Valentine Eddy, PGY-2  Orthopedic Surgery  [facility]: [id number]  [facility]: [id number]  [facility] [place] [phone]

## 2023-07-21 NOTE — OCCUPATIONAL THERAPY INITIAL EVALUATION ADULT - NSOTDISCHREC_GEN_A_CORE
TBD pending formal assessment of functional abilities when appropriate. However, anticipating return to inpatient rehab. OT to continue to follow.

## 2023-07-21 NOTE — DIETITIAN INITIAL EVALUATION ADULT - NSICDXPASTMEDICALHX_GEN_ALL_CORE_FT
PAST MEDICAL HISTORY:  Adult Onset Diabetes Mellitus,     Cancer of Endometrium s/p Hysterectomy , s/p Chemo.    CKD (chronic kidney disease)     Dissection of Aorta Type B    ESRD (end stage renal disease) on dialysis M,W,F   [facility] [address]    History of Hysterectomy with O     Hypertension

## 2023-07-21 NOTE — PHYSICAL THERAPY INITIAL EVALUATION ADULT - DIAGNOSIS, PT EVAL
Pt s/p Left Hip Hemiarthroplasty Pt s/p Left Hip Hemiarthroplasty; pt presents with decreased strength.

## 2023-07-22 LAB
ALBUMIN SERPL ELPH-MCNC: 2.1 G/DL — LOW (ref 3.3–5)
ALP SERPL-CCNC: 113 U/L — SIGNIFICANT CHANGE UP (ref 40–120)
ALT FLD-CCNC: <5 U/L — SIGNIFICANT CHANGE UP (ref 4–33)
ANION GAP SERPL CALC-SCNC: 13 MMOL/L — SIGNIFICANT CHANGE UP (ref 7–14)
AST SERPL-CCNC: 52 U/L — HIGH (ref 4–32)
BILIRUB SERPL-MCNC: 0.8 MG/DL — SIGNIFICANT CHANGE UP (ref 0.2–1.2)
BLD GP AB SCN SERPL QL: NEGATIVE — SIGNIFICANT CHANGE UP
BUN SERPL-MCNC: 24 MG/DL — HIGH (ref 7–23)
CALCIUM SERPL-MCNC: 7.5 MG/DL — LOW (ref 8.4–10.5)
CHLORIDE SERPL-SCNC: 100 MMOL/L — SIGNIFICANT CHANGE UP (ref 98–107)
CO2 SERPL-SCNC: 24 MMOL/L — SIGNIFICANT CHANGE UP (ref 22–31)
CREAT SERPL-MCNC: 3.79 MG/DL — HIGH (ref 0.5–1.3)
EGFR: 12 ML/MIN/1.73M2 — LOW
GLUCOSE BLDC GLUCOMTR-MCNC: 127 MG/DL — HIGH (ref 70–99)
GLUCOSE BLDC GLUCOMTR-MCNC: 159 MG/DL — HIGH (ref 70–99)
GLUCOSE BLDC GLUCOMTR-MCNC: 168 MG/DL — HIGH (ref 70–99)
GLUCOSE BLDC GLUCOMTR-MCNC: 173 MG/DL — HIGH (ref 70–99)
GLUCOSE SERPL-MCNC: 89 MG/DL — SIGNIFICANT CHANGE UP (ref 70–99)
HCT VFR BLD CALC: 21.6 % — LOW (ref 34.5–45)
HGB BLD-MCNC: 7.3 G/DL — LOW (ref 11.5–15.5)
MCHC RBC-ENTMCNC: 30.3 PG — SIGNIFICANT CHANGE UP (ref 27–34)
MCHC RBC-ENTMCNC: 33.8 GM/DL — SIGNIFICANT CHANGE UP (ref 32–36)
MCV RBC AUTO: 89.6 FL — SIGNIFICANT CHANGE UP (ref 80–100)
NRBC # BLD: 0 /100 WBCS — SIGNIFICANT CHANGE UP (ref 0–0)
NRBC # FLD: 0 K/UL — SIGNIFICANT CHANGE UP (ref 0–0)
PLATELET # BLD AUTO: 98 K/UL — LOW (ref 150–400)
POTASSIUM SERPL-MCNC: 4.2 MMOL/L — SIGNIFICANT CHANGE UP (ref 3.5–5.3)
POTASSIUM SERPL-SCNC: 4.2 MMOL/L — SIGNIFICANT CHANGE UP (ref 3.5–5.3)
PROT SERPL-MCNC: 4.9 G/DL — LOW (ref 6–8.3)
RBC # BLD: 2.41 M/UL — LOW (ref 3.8–5.2)
RBC # FLD: 17.5 % — HIGH (ref 10.3–14.5)
RH IG SCN BLD-IMP: POSITIVE — SIGNIFICANT CHANGE UP
SODIUM SERPL-SCNC: 137 MMOL/L — SIGNIFICANT CHANGE UP (ref 135–145)
WBC # BLD: 6.78 K/UL — SIGNIFICANT CHANGE UP (ref 3.8–10.5)
WBC # FLD AUTO: 6.78 K/UL — SIGNIFICANT CHANGE UP (ref 3.8–10.5)

## 2023-07-22 PROCEDURE — 99233 SBSQ HOSP IP/OBS HIGH 50: CPT | Mod: FS,GC

## 2023-07-22 PROCEDURE — 99232 SBSQ HOSP IP/OBS MODERATE 35: CPT | Mod: 24,GC

## 2023-07-22 RX ORDER — SENNA PLUS 8.6 MG/1
2 TABLET ORAL AT BEDTIME
Refills: 0 | Status: DISCONTINUED | OUTPATIENT
Start: 2023-07-22 | End: 2023-08-16

## 2023-07-22 RX ORDER — OXYCODONE HYDROCHLORIDE 5 MG/1
2.5 TABLET ORAL EVERY 4 HOURS
Refills: 0 | Status: DISCONTINUED | OUTPATIENT
Start: 2023-07-22 | End: 2023-07-22

## 2023-07-22 RX ORDER — OXYCODONE HYDROCHLORIDE 5 MG/1
5 TABLET ORAL EVERY 4 HOURS
Refills: 0 | Status: DISCONTINUED | OUTPATIENT
Start: 2023-07-22 | End: 2023-07-28

## 2023-07-22 RX ORDER — ACETAMINOPHEN 500 MG
1000 TABLET ORAL ONCE
Refills: 0 | Status: COMPLETED | OUTPATIENT
Start: 2023-07-22 | End: 2023-07-22

## 2023-07-22 RX ORDER — POLYETHYLENE GLYCOL 3350 17 G/17G
17 POWDER, FOR SOLUTION ORAL DAILY
Refills: 0 | Status: DISCONTINUED | OUTPATIENT
Start: 2023-07-22 | End: 2023-08-16

## 2023-07-22 RX ORDER — ACETAMINOPHEN 500 MG
650 TABLET ORAL EVERY 6 HOURS
Refills: 0 | Status: DISCONTINUED | OUTPATIENT
Start: 2023-07-22 | End: 2023-08-16

## 2023-07-22 RX ADMIN — Medication 500 MILLIGRAM(S): at 18:12

## 2023-07-22 RX ADMIN — Medication 1000 MILLIGRAM(S): at 01:00

## 2023-07-22 RX ADMIN — Medication 1 TABLET(S): at 22:01

## 2023-07-22 RX ADMIN — Medication 650 MILLIGRAM(S): at 11:37

## 2023-07-22 RX ADMIN — Medication 1: at 17:08

## 2023-07-22 RX ADMIN — Medication 400 MILLIGRAM(S): at 00:30

## 2023-07-22 RX ADMIN — Medication 1: at 22:01

## 2023-07-22 RX ADMIN — CHLORHEXIDINE GLUCONATE 1 APPLICATION(S): 213 SOLUTION TOPICAL at 06:38

## 2023-07-22 RX ADMIN — Medication 1 TABLET(S): at 06:39

## 2023-07-22 RX ADMIN — POLYETHYLENE GLYCOL 3350 17 GRAM(S): 17 POWDER, FOR SOLUTION ORAL at 18:13

## 2023-07-22 RX ADMIN — PIPERACILLIN AND TAZOBACTAM 25 GRAM(S): 4; .5 INJECTION, POWDER, LYOPHILIZED, FOR SOLUTION INTRAVENOUS at 06:39

## 2023-07-22 RX ADMIN — Medication 1 MILLIGRAM(S): at 11:37

## 2023-07-22 RX ADMIN — Medication 400 MILLIGRAM(S): at 06:30

## 2023-07-22 RX ADMIN — Medication 1 TABLET(S): at 13:37

## 2023-07-22 RX ADMIN — Medication 1: at 11:46

## 2023-07-22 RX ADMIN — Medication 3 MILLIGRAM(S): at 02:15

## 2023-07-22 RX ADMIN — Medication 1000 MILLIGRAM(S): at 07:00

## 2023-07-22 RX ADMIN — HEPARIN SODIUM 5000 UNIT(S): 5000 INJECTION INTRAVENOUS; SUBCUTANEOUS at 22:01

## 2023-07-22 RX ADMIN — Medication 650 MILLIGRAM(S): at 18:12

## 2023-07-22 RX ADMIN — Medication 500 MILLIGRAM(S): at 06:39

## 2023-07-22 RX ADMIN — Medication 650 MILLIGRAM(S): at 23:59

## 2023-07-22 RX ADMIN — PIPERACILLIN AND TAZOBACTAM 25 GRAM(S): 4; .5 INJECTION, POWDER, LYOPHILIZED, FOR SOLUTION INTRAVENOUS at 18:16

## 2023-07-22 RX ADMIN — Medication 1 TABLET(S): at 11:37

## 2023-07-22 RX ADMIN — PANTOPRAZOLE SODIUM 40 MILLIGRAM(S): 20 TABLET, DELAYED RELEASE ORAL at 06:38

## 2023-07-22 RX ADMIN — HEPARIN SODIUM 5000 UNIT(S): 5000 INJECTION INTRAVENOUS; SUBCUTANEOUS at 13:38

## 2023-07-22 NOTE — PROGRESS NOTE ADULT - SUBJECTIVE AND OBJECTIVE BOX
Orthopedic Surgery Progress Note     S: Patient seen and examined today. Doing well. Sitting in chair, using her phone.       Vital Signs Last 24 Hrs  T(C): 35.8 (22 Jul 2023 08:00), Max: 36.8 (21 Jul 2023 20:00)  T(F): 96.5 (22 Jul 2023 08:00), Max: 98.3 (21 Jul 2023 20:00)  HR: 72 (22 Jul 2023 09:50) (65 - 79)  BP: 159/71 (22 Jul 2023 08:00) (106/46 - 159/71)  BP(mean): 88 (22 Jul 2023 08:00) (59 - 88)  RR: 22 (22 Jul 2023 09:50) (14 - 25)  SpO2: 98% (22 Jul 2023 09:50) (96% - 100%)    Parameters below as of 22 Jul 2023 09:50  Patient On (Oxygen Delivery Method): room air            Physical Exam:  Gen: NAD  L Lower Extremity:  Spica ACE bandage  Prevena vac w good suction  SILT S/S/DP/SP/T  +EHL/FHL/TA/GSC  Compartments soft   +DP pulse         LABS:                                   7.3    6.78  )-----------( 98       ( 22 Jul 2023 02:00 )             21.6

## 2023-07-22 NOTE — PROGRESS NOTE ADULT - ASSESSMENT
A/P:  77F POD#2 s/p L hip SUZANNA, hemiarthroplasty.    - Spica super ACE bandage  - Prevena  - Pain control  - WBAT RLE  - DVT ppx: SCDs, ok for chemical dvt ppx from ortho standpoint  - PT/OT  - OOB  - Diet: Reg  - Monitor I&Os  - Dispo: TBD      Orthopedic Surgery  [facility]: [id number]  [facility]: [id number]  [facility]: [id number]   A/P:  77F s/p L hip SUZANNA, hemiarthroplasty on 7/19. Hemodynamically stable    - Spica super ACE bandage  - Prevena  - Pain control  - WBAT RLE  - DVT ppx: SCDs, ok for chemical dvt ppx from ortho standpoint  - PT/OT  - OOB  - Diet: Reg  - Monitor I&Os  - Dispo: TBD      Orthopedic Surgery  [facility]: [id number]  [facility]: [id number]  [facility] [place] [phone]

## 2023-07-22 NOTE — PROGRESS NOTE ADULT - REASON FOR ADMISSION
[address] [address]  Dept: [phone]  Dept Fax: [phone]        10/23/20    Patient: Dot Howard  YOB: 1942    Dear Janna Adhikari MD,    I had the pleasure of seeing one of your patients, Fam Rodriguez today in the office today. Below are the relevant portions of my assessment and plan of care. IMPRESSION:  1. Elevated PSA    2. BPH without obstruction/lower urinary tract symptoms        PLAN:  He has a rising PSA. ExoDx was elevated at 77. Ill move forward with biopsy. Return in about 3 weeks (around 11/13/2020) for prostate biopsy. .    Prescriptions Ordered:  No orders of the defined types were placed in this encounter. Orders Placed:  No orders of the defined types were placed in this encounter. Thank you for allowing me to participate in the care of this patient. I will keep you updated on this patient's follow up and I look forward to serving you and your patients again in the future.         Renetta Dubois MD s/p L hip orif concern for loosened hardwar

## 2023-07-22 NOTE — PROGRESS NOTE ADULT - ASSESSMENT
Pt is a 76 y/o female pmhx of ESRD on HD (MoWeFri), HTN, DM (diet controlled), chronic type B aortic dissection, heart murmur, endometrial ca in remission s/p total hysterectomy in 2007 s/p L IMN for IT fx in May 2023,  now presenting for evaluation of concern for hardware migration. Now s/p  left hip removal of gamma nail and placement of restoration modular peter arthroplasty, EBL 200cc adn got 1L of LR. . Patient hypotensive in PACU requiring pressors requirements and volume resuscitation. In PACU patient got 1 PRBC, 50cc of 25% albumin.  SICU consulted for pressor support and hemodynamic management. On POD0, patient with rising lactate and abdominal pain. CTA completed which showed hematoma at the operative site, with possible extravasation.     PLAN:  NEURO:   - A&Ox3  - Pain control: Tylenol ATC and oxycodone PRN     RESPIRATORY  - Saturation >92%  - Satting well on RA     CARDIOVASCULAR  - MAP goal >65  - Cleared lactate     GI/NUTRITION  - Diet: Regular diet (consistent carbs)  - Monitor for abdominal pain/ diarrhea   - Protonix  - Folic acid and multivitamin    GENITOURINARY/RENAL  - HD M/W/F   - Monitor strict I/Os   - Monitor and replete electrolytes     HEMATOLOGIC  - DVT ppx: SQH   - Monitor H/H  - H/H stable     INFECTIOUS DISEASES:  - Abx: Zosyn  - Monitor for fever   - Trend WBC  - Blood cultures sent   - Intraop bone swab cx NGTD    ENDOCRINE  - Hx of T2DM   - ISS     Disposition: Listed for floors    Pt is a 78 y/o female pmhx of ESRD on HD (MoWeFri), HTN, DM (diet controlled), chronic type B aortic dissection, heart murmur, endometrial ca in remission s/p total hysterectomy in 2007 s/p L IMN for IT fx in May 2023,  now presenting for evaluation of concern for hardware migration. Now s/p  left hip removal of gamma nail and placement of restoration modular peter arthroplasty, EBL 200cc adn got 1L of LR. . Patient hypotensive in PACU requiring pressors requirements and volume resuscitation. In PACU patient got 1 PRBC, 50cc of 25% albumin.  SICU consulted for pressor support and hemodynamic management. On POD0, patient with rising lactate and abdominal pain. CTA completed which showed hematoma at the operative site, with possible extravasation.     PLAN:  NEURO:   - A&Ox3  - Pain control: Tylenol ATC and oxycodone PRN     RESPIRATORY  - Saturation >92%  - Satting well on RA     CARDIOVASCULAR  - MAP goal >65  - Cleared lactate     GI/NUTRITION  - Diet: Regular diet (consistent carbs)  - Monitor for abdominal pain/ diarrhea   - Protonix  - Folic acid and multivitamin    GENITOURINARY/RENAL  - HD M/W/F   - Monitor strict I/Os   - Monitor and replete electrolytes     HEMATOLOGIC  - DVT ppx: SQH   - Monitor H/H  - H/H stable     INFECTIOUS DISEASES:  - Abx: Zosyn  - Monitor for fever   - Trend WBC  - Blood cultures (7/19) sent NGTD  - Intraop bone swab cx (7/19) NGTD    ENDOCRINE  - Hx of T2DM   - ISS     Disposition: Listed for floors

## 2023-07-22 NOTE — PROGRESS NOTE ADULT - SUBJECTIVE AND OBJECTIVE BOX
[facility] Division of Kidney Diseases & Hypertension  FOLLOW UP NOTE  [phone]--------------------------------------------------------------------------------    HPI: 78F w/ PMH of ESRD on HD TIW (MWF) at [facility] (previously at [facility]), HTN, DM2, endometrial cancer (underwent hysterectomy and chemotherapy, type B aortic dissection, and L hip ORIF, admitted for concerns of loosened hardware. Nephrology consulted for management of ESRD/HD.     24 hour events/subjective: Pt. seen and evaluated at bedside in the SICU this AM. Overall feels well just complaining of persistent chronic swelling in her LUE. Pain is well managed. She denies fevers/chills, headaches, chest pain, SOB, abd pain, or leg swelling. Pt. became hypotensive with HD treatment yesterday; only removed 1L.     PAST HISTORY  --------------------------------------------------------------------------------  No significant changes to PMH, PSH, FHx, SHx, unless otherwise noted    ALLERGIES & MEDICATIONS  --------------------------------------------------------------------------------  Allergies  No Known Allergies    Intolerances    Standing Inpatient Medications  acetaminophen     Tablet .. 650 milliGRAM(s) Oral every 6 hours  ascorbic acid 500 milliGRAM(s) Oral two times a day  calcium carbonate 1250 mG  + Vitamin D (OsCal 500 + D) 1 Tablet(s) Oral three times a day  dextrose 5%. 1000 milliLiter(s) IV Continuous <Continuous>  dextrose 5%. 1000 milliLiter(s) IV Continuous <Continuous>  dextrose 50% Injectable 25 Gram(s) IV Push once  dextrose 50% Injectable 12.5 Gram(s) IV Push once  dextrose 50% Injectable 25 Gram(s) IV Push once  folic acid 1 milliGRAM(s) Oral daily  glucagon  Injectable 1 milliGRAM(s) IntraMuscular once  heparin   Injectable 5000 Unit(s) SubCutaneous every 8 hours  insulin lispro (ADMELOG) corrective regimen sliding scale   SubCutaneous Before meals and at bedtime  multivitamin 1 Tablet(s) Oral daily  pantoprazole    Tablet 40 milliGRAM(s) Oral before breakfast  piperacillin/tazobactam IVPB.. 3.375 Gram(s) IV Intermittent every 12 hours  polyethylene glycol 3350 17 Gram(s) Oral daily  senna 2 Tablet(s) Oral at bedtime    PRN Inpatient Medications  aluminum hydroxide/magnesium hydroxide/simethicone Suspension 30 milliLiter(s) Oral four times a day PRN  dextrose Oral Gel 15 Gram(s) Oral once PRN  melatonin 3 milliGRAM(s) Oral at bedtime PRN  ondansetron Injectable 4 milliGRAM(s) IV Push every 6 hours PRN  oxyCODONE    IR 2.5 milliGRAM(s) Oral every 4 hours PRN  oxyCODONE    IR 5 milliGRAM(s) Oral every 4 hours PRN    REVIEW OF SYSTEMS  --------------------------------------------------------------------------------  Gen: No fevers/chills  Head/Eyes/Ears: no headache  Respiratory: No dyspnea, cough  CV: No chest pain  GI: No abdominal pain  MSK: no leg pain or swelling, swelling in arms, improved on R    All other systems were reviewed and are negative, except as noted.    VITALS/PHYSICAL EXAM  --------------------------------------------------------------------------------  T(C): 35.8 (07-22-23 @ 08:00), Max: 36.8 (07-21-23 @ 20:00)  HR: 72 (07-22-23 @ 09:50) (65 - 79)  BP: 159/71 (07-22-23 @ 08:00) (106/46 - 159/71)  RR: 22 (07-22-23 @ 09:50) (14 - 25)  SpO2: 98% (07-22-23 @ 09:50) (96% - 100%)  Wt(kg): --    07-21-23 @ 07:01  -  07-22-23 @ 07:00  --------------------------------------------------------  IN: 1490 mL / OUT: 1400 mL / NET: 90 mL    07-22-23 @ 07:01  -  07-22-23 @ 10:29  --------------------------------------------------------  IN: 100 mL / OUT: 0 mL / NET: 100 mL    Physical Exam:  Gen: NAD  HEENT: MMM  Pulm: minimal bibasilar crackles   CV: S1S2  Abd: Soft, +BS   Ext: LE edema B/L, dressing on LLE, chronic LUE edema, RUE edema improved  Neuro: Awake  Skin: Warm and dry  Vascular access: LUE AVF, aneurysmal, skin intact, thrill felt    LABS/STUDIES  --------------------------------------------------------------------------------              7.3    6.78  >-----------<  98       [07-22-23 @ 02:00]              21.6     137  |  100  |  24  ----------------------------<  89      [07-22-23 @ 02:00]  4.2   |  24  |  3.79        Ca     7.5     [07-22-23 @ 02:00]      Mg     1.80     [07-21-23 @ 15:36]      Phos  3.0     [07-21-23 @ 15:36]    TPro  4.9  /  Alb  2.1  /  TBili  0.8  /  DBili  x   /  AST  52  /  ALT  <5  /  AlkPhos  113  [07-22-23 @ 02:00]    Creatinine Trend:  SCr 3.79 [07-22 @ 02:00]  SCr 2.84 [07-21 @ 15:36]  SCr 6.23 [07-21 @ 08:35]  SCr 6.15 [07-21 @ 02:00]  SCr 5.96 [07-20 @ 19:40]

## 2023-07-22 NOTE — PROGRESS NOTE ADULT - SUBJECTIVE AND OBJECTIVE BOX
SICU Daily Progress Note  =====================================================  Interval/Overnight Events:  -No acute overnight events   -Listed for floor     Allergies: No Known Allergies      MEDICATIONS:   --------------------------------------------------------------------------------------  Neurologic Medications  acetaminophen   IVPB .. 1000 milliGRAM(s) IV Intermittent every 6 hours  HYDROmorphone  Injectable 0.5 milliGRAM(s) IV Push every 4 hours PRN Moderate Pain (4 - 6)  HYDROmorphone  Injectable 1 milliGRAM(s) IV Push every 4 hours PRN Severe Pain (7 - 10)  melatonin 3 milliGRAM(s) Oral at bedtime PRN Insomnia  ondansetron Injectable 4 milliGRAM(s) IV Push every 6 hours PRN Nausea and/or Vomiting    Respiratory Medications    Cardiovascular Medications    Gastrointestinal Medications  aluminum hydroxide/magnesium hydroxide/simethicone Suspension 30 milliLiter(s) Oral four times a day PRN Indigestion  ascorbic acid 500 milliGRAM(s) Oral two times a day  calcium carbonate 1250 mG  + Vitamin D (OsCal 500 + D) 1 Tablet(s) Oral three times a day  dextrose 5%. 1000 milliLiter(s) IV Continuous <Continuous>  dextrose 5%. 1000 milliLiter(s) IV Continuous <Continuous>  folic acid 1 milliGRAM(s) Oral daily  multivitamin 1 Tablet(s) Oral daily  pantoprazole    Tablet 40 milliGRAM(s) Oral before breakfast    Genitourinary Medications    Hematologic/Oncologic Medications  heparin   Injectable 5000 Unit(s) SubCutaneous every 8 hours    Antimicrobial/Immunologic Medications  piperacillin/tazobactam IVPB.. 3.375 Gram(s) IV Intermittent every 12 hours    Endocrine/Metabolic Medications  dextrose 50% Injectable 25 Gram(s) IV Push once  dextrose 50% Injectable 12.5 Gram(s) IV Push once  dextrose 50% Injectable 25 Gram(s) IV Push once  dextrose Oral Gel 15 Gram(s) Oral once PRN Blood Glucose LESS THAN 70 milliGRAM(s)/deciliter  glucagon  Injectable 1 milliGRAM(s) IntraMuscular once  insulin lispro (ADMELOG) corrective regimen sliding scale   SubCutaneous Before meals and at bedtime    Topical/Other Medications  chlorhexidine 2% Cloths 1 Application(s) Topical <User Schedule>    --------------------------------------------------------------------------------------    VITAL SIGNS, INS/OUTS (last 24 hours):  --------------------------------------------------------------------------------------  ((Insert SICU Vitals/Is+Os here))***  --------------------------------------------------------------------------------------    EXAM  NEUROLOGY  Exam: Normal, NAD, alert, oriented x3, no focal deficits.    HEENT  Exam: Normocephalic, atraumatic, EOMI.     RESPIRATORY  Exam: Lungs clear to auscultation, Normal expansion/effort.     CARDIOVASCULAR  Exam: S1, S2.  Regular rate and rhythm.     GI/NUTRITION  Exam: Abdomen soft, Non-tender, Non-distended.     VASCULAR  Exam: Extremities warm, pink, well-perfused.    MUSCULOSKELETAL  Exam: Left leg with prevena and ACE wrap in place. All extremities moving spontaneously without limitations.    SKIN  Exam: Good skin turgor, no skin breakdown.       Tubes/Lines/Drains   [x] Peripheral IV  [] Central Venous Line     	[] R	[] L	[] IJ	[] Fem	[] SC	  [] Arterial Line		[] R	[] L	[] Fem	[] Rad	[] Ax	  [] PICC		[X] Midline		[] Mediport  [] Urinary Catheter		  [x] Necessity of urinary, arterial, and venous catheters discussed    LABS  --------------------------------------------------------------------------------------                        7.5    7.69  )-----------( 83       ( 21 Jul 2023 15:36 )             22.3     07-21    137  |  100  |  19  ----------------------------<  96  3.9   |  26  |  2.84<H>    Ca    7.9<L>      21 Jul 2023 15:36  Phos  3.0     07-21  Mg     1.80     07-21      PT/INR - ( 20 Jul 2023 02:40 )   PT: 17.6 sec;   INR: 1.51 ratio         PTT - ( 20 Jul 2023 02:40 )  PTT:36.7 sec  CAPILLARY BLOOD GLUCOSE      POCT Blood Glucose.: 183 mg/dL (21 Jul 2023 21:52)  POCT Blood Glucose.: 158 mg/dL (21 Jul 2023 17:10)  POCT Blood Glucose.: 96 mg/dL (21 Jul 2023 15:00)  --------------------------------------------------------------------------------------

## 2023-07-22 NOTE — PROGRESS NOTE ADULT - PROBLEM SELECTOR PLAN 1
Pt with ESRD on HD TIW MWF admitted with fall. Pt s/p Lt hemiarthroplasty on 7/19/2023, however surgery complicated by intraoperative hypotension requiring vasopressor support. Patient transferred to SICU for hemodynamic stabilization. CT Angio abd/pelv reviewed. HD treatment held on 7/19/23 due to above. Held on 7/20 due to blood loss anemia. Underwent maintenance HD on 7/21 but treatment time reduced and only 1L removed due to hypotension. Will assess dialysis needs daily. Monitor labs.

## 2023-07-22 NOTE — PROGRESS NOTE ADULT - SUBJECTIVE AND OBJECTIVE BOX
Cardiovascular Disease Progress Note  Date of service: 23 @ 11:49    Overnight events: No acute events overnight.  Pt is in no distress. Denies chest pain or SOB.   Otherwise review of systems negative    Objective Findings:  T(C): 35.8 (23 @ 08:00), Max: 36.8 (23 @ 20:00)  HR: 72 (23 @ 09:50) (67 - 79)  BP: 159/71 (23 @ 08:00) (106/46 - 159/71)  RR: 22 (23 @ 09:50) (14 - 25)  SpO2: 98% (23 @ 09:50) (96% - 100%)  Wt(kg): --  Daily     Daily Weight in k.8 (2023 06:00)      Physical Exam:  Gen: NAD; Patient resting comfortably  HEENT: EOMI, Normocephalic/ atraumatic  CV: RRR, normal S1 + S2, no m/r/g  Lungs:  Normal respiratory effort; clear to auscultation bilaterally  Abd: soft, non-tender; bowel sounds present  Ext: No edema; warm and well perfused    Telemetry: Sinus     Laboratory Data:                        7.3    6.78  )-----------( 98       ( 2023 02:00 )             21.6         137  |  100  |  24<H>  ----------------------------<  89  4.2   |  24  |  3.79<H>    Ca    7.5<L>      2023 02:00  Phos  3.0       Mg     1.80         TPro  4.9<L>  /  Alb  2.1<L>  /  TBili  0.8  /  DBili  x   /  AST  52<H>  /  ALT  <5  /  AlkPhos  113                Inpatient Medications:  MEDICATIONS  (STANDING):  acetaminophen     Tablet .. 650 milliGRAM(s) Oral every 6 hours  ascorbic acid 500 milliGRAM(s) Oral two times a day  calcium carbonate 1250 mG  + Vitamin D (OsCal 500 + D) 1 Tablet(s) Oral three times a day  dextrose 5%. 1000 milliLiter(s) (50 mL/Hr) IV Continuous <Continuous>  dextrose 5%. 1000 milliLiter(s) (100 mL/Hr) IV Continuous <Continuous>  dextrose 50% Injectable 25 Gram(s) IV Push once  dextrose 50% Injectable 12.5 Gram(s) IV Push once  dextrose 50% Injectable 25 Gram(s) IV Push once  folic acid 1 milliGRAM(s) Oral daily  glucagon  Injectable 1 milliGRAM(s) IntraMuscular once  heparin   Injectable 5000 Unit(s) SubCutaneous every 8 hours  insulin lispro (ADMELOG) corrective regimen sliding scale   SubCutaneous Before meals and at bedtime  multivitamin 1 Tablet(s) Oral daily  pantoprazole    Tablet 40 milliGRAM(s) Oral before breakfast  piperacillin/tazobactam IVPB.. 3.375 Gram(s) IV Intermittent every 12 hours  polyethylene glycol 3350 17 Gram(s) Oral daily  senna 2 Tablet(s) Oral at bedtime      Assessment:  77 year old woman with ESRD on HD (MWF), HTN, DM (diet controlled), chronic type B aortic dissection, heart murmur, endometrial ca in remission s/p total hysterectomy in  presents with L hip pain     Plan of Care:      #Post-operative evaluation-  - Ms. Harris tolerated orthopedic intervention well.  Post operative course complicated by distributive/hemorrhagic shock requiring low doses of Levophed.  She displays no signs of  coronary ischemia or decompensated CHF.   Her admission EKG is sinus with a pre-existing RBBB.   Recent echo with no acute findings.   Continue current cardiac management.       #Type B aortic dissection  - Chronic (Diagnosed >10 years ago)  - CT A/P  shows partially obscured hematoma without definite   evidence of active hemorrhage. No definite findings of mesenteric ischemia.  New hypodense lesion in the spleen measuring 1.9 cm  Chronic thoracic and abdominal aortic dissection extending into the   left common iliac artery, similar to the previous exam in 2018.  - Continue current management.        #ESRD  - HD as per renal.      Rest of care as per SICU.           Over 25 minutes spent on total encounter; more than 50% of the visit was spent counseling and/or coordinating care by the attending physician.      Adrian Burris DO [facility]  Cardiovascular Disease  [phone]

## 2023-07-22 NOTE — PROGRESS NOTE ADULT - PROBLEM SELECTOR PLAN 2
HPI:    76F: H/O DM, endometrial Ca s/p TVH (9/15), CKD, HLD, pulmonary nodule, Osteoporosis, psoriasis      CT Abd/ Pelvis (3/16): 2 mm R ML and 2 mm R LL nodule  CT Chest/ Abd/ Pelvis (7/16): Couple of tiny nodular densities at the right lung base are stable, probably areas of scarring. Mild fatty liver. Status post cholecystectomy by history. Few tiny bilateral renal cysts. Status post hysterectomy by history. Small umbilical hernia containing fat with abdominal wall defect measuring 1 cm. Abdominal aorta is normal in size. Postop changes. No CT evidence for metastatic disease.   DEXA (10/17): osteoporosis      GYNE ONCOLOGY: Dr Burnett/ Dr Daniela Bianchi  GI: Dr Hood  GEN SURGERY: Dr Vasques  OPTHO: Dr Salgado  UroGyne: Dr SOHAIL Buckner  GYNE: Dr Santiago  DERM: Dr Chance     DERM: psoriasis: given clobetasol scalp treatment not helped  Itchy dry scalp: now using baby shampoo and lemon juice, OTC Rx  Will follow up DERM again: diet change/ feels anxious    ENDO: DM: fasting sugars <120: working overnight shift  HgA1c due for repeat  DM: metformin 1000 mg BID; glipizide 5 mg up to QID with meals: variable schedule/ did not want other Rx  Exercise: [working 11PM to 7AM]: need to fit in more activity around work schedule  Diet: dietary change for psoriasis: discussed more fiber and vegetables lately; resultant weight loss  OPTHO: reminded due     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   Colonoscopy (10/11): hyperplastic polyp.  Colonoscopy (8/17): negative pathology  PAP (11/18): negative   DEXA (10/17): osteoporosis   Immunizations: pneumovax 23 received/ prevnar 13 received..   Mammogram (11/18): Negative       Patient's medications, allergies, past medical, surgical, social and family histories were reviewed and updated as noted in the  Pt. with anemia in the setting of ESRD and blood loss from surgical procedure. Hgb 7.3 from early AM (7/22/23). Will give RICA with HD treatments. Monitor hemoglobin levels.     If you have any questions, please feel free to contact me.  John Torres  Nephrology Fellow  [id number] / [phone] / [employer] Teams (Preferred)  (After 4pm or on weekends, please call the on-call Fellow) chart.    ROS:  Review of Systems    Objective:  Visit Vitals  /71   Pulse 99   Wt 61.2 kg (135 lb)   BMI 26.37 kg/m²     Physical Exam   Constitutional: She is oriented to person, place, and time.   Eyes: Pupils are equal, round, and reactive to light. EOM are normal.   Neck: No JVD present. No tracheal deviation present. No thyromegaly present.   Cardiovascular: Normal rate and regular rhythm.   No murmur heard.  Pulmonary/Chest: Effort normal and breath sounds normal. No respiratory distress.   Abdominal: Soft. Bowel sounds are normal. She exhibits no distension. There is no tenderness. There is no guarding.   Neurological: She is alert and oriented to person, place, and time.   Skin:   Flaky scaly skin R ankle/ scalp   Psychiatric: She has a normal mood and affect. Her behavior is normal.   Nursing note and vitals reviewed.      Health Maintenance Summary     Topic Due On Due Status Completed On    MAMMOGRAM - BREAST CANCER SCREENING Nov 3, 2020 Not Due Nov 3, 2018    Colorectal Cancer Screening - Colonoscopy Aug 2, 1994 Overdue     Osteoporosis Screening  Completed Oct 20, 2017    Diabetes Eye Exam Aug 2, 1962 Overdue     Glycohemoglobin A1C  (Diabetes Sugar)  Mar 21, 2019 Overdue Dec 21, 2018    GFR  (Kidney Function Test)  Sep 21, 2019 Not Due Sep 21, 2018    Immunization - TDAP Pregnancy  Hidden     Diabetes Foot Exam  Aug 2, 1962 Overdue     Medicare Wellness Visit Aug 2, 2009 Overdue     IMMUNIZATION - DTaP/Tdap/Td Aug 2, 1963 Overdue     Immunization-Influenza  Completed Sep 20, 2018    Depression Screening Dec 21, 2019 Not Due Dec 21, 2018    Pneumococcal Vaccine 65+ Low/Medium Risk  Completed May 18, 2015    Immunization - Shingles Aug 2, 1994 Overdue     Immunization - MMR  Hidden     IMMUNIZATION - MENINGITIS SEROGROUP B  Hidden           Assessment/Plan:  Problem List Items Addressed This Visit        Circulatory    Benign essential hypertension       Endocrine    Diabetes mellitus type 2 in  obese (CMS/HCC)    Hyperlipidemia      Other Visit Diagnoses     Plantar fasciitis of left foot    -  Primary    Relevant Orders    SERVICE TO PODIATRY           1) DERM: psoriasis: given clobetasol scalp treatment not helped  Itchy dry scalp: now using baby shampoo and lemon juice, OTC Rx  Will follow up DERM again: diet change/ feels anxious  2) ENDO: DM: fasting sugars <120: working overnight shift; HgA1c due for repeat; DM: metformin 1000 mg BID; glipizide 5 mg up to QID with meals: variable schedule/ did not want other Rx; wanted VITD 50k IU given weekly x 3 months  3) Diet: dietary change for psoriasis: discussed more fiber and vegetables lately; resultant weight loss     HgA1c pending  Follow up 4 months         Rafael Rodriguez MD

## 2023-07-23 LAB
ANION GAP SERPL CALC-SCNC: 15 MMOL/L — HIGH (ref 7–14)
BUN SERPL-MCNC: 32 MG/DL — HIGH (ref 7–23)
CALCIUM SERPL-MCNC: 7.5 MG/DL — LOW (ref 8.4–10.5)
CHLORIDE SERPL-SCNC: 97 MMOL/L — LOW (ref 98–107)
CO2 SERPL-SCNC: 21 MMOL/L — LOW (ref 22–31)
CREAT SERPL-MCNC: 4.51 MG/DL — HIGH (ref 0.5–1.3)
EGFR: 10 ML/MIN/1.73M2 — LOW
GLUCOSE BLDC GLUCOMTR-MCNC: 124 MG/DL — HIGH (ref 70–99)
GLUCOSE BLDC GLUCOMTR-MCNC: 139 MG/DL — HIGH (ref 70–99)
GLUCOSE BLDC GLUCOMTR-MCNC: 149 MG/DL — HIGH (ref 70–99)
GLUCOSE BLDC GLUCOMTR-MCNC: 152 MG/DL — HIGH (ref 70–99)
GLUCOSE SERPL-MCNC: 111 MG/DL — HIGH (ref 70–99)
HCT VFR BLD CALC: 21 % — CRITICAL LOW (ref 34.5–45)
HCT VFR BLD CALC: 24.9 % — LOW (ref 34.5–45)
HGB BLD-MCNC: 6.8 G/DL — CRITICAL LOW (ref 11.5–15.5)
HGB BLD-MCNC: 8.2 G/DL — LOW (ref 11.5–15.5)
MAGNESIUM SERPL-MCNC: 1.9 MG/DL — SIGNIFICANT CHANGE UP (ref 1.6–2.6)
MCHC RBC-ENTMCNC: 28.6 PG — SIGNIFICANT CHANGE UP (ref 27–34)
MCHC RBC-ENTMCNC: 30.4 PG — SIGNIFICANT CHANGE UP (ref 27–34)
MCHC RBC-ENTMCNC: 32.4 GM/DL — SIGNIFICANT CHANGE UP (ref 32–36)
MCHC RBC-ENTMCNC: 32.9 GM/DL — SIGNIFICANT CHANGE UP (ref 32–36)
MCV RBC AUTO: 86.8 FL — SIGNIFICANT CHANGE UP (ref 80–100)
MCV RBC AUTO: 93.8 FL — SIGNIFICANT CHANGE UP (ref 80–100)
NRBC # BLD: 0 /100 WBCS — SIGNIFICANT CHANGE UP (ref 0–0)
NRBC # BLD: 0 /100 WBCS — SIGNIFICANT CHANGE UP (ref 0–0)
NRBC # FLD: 0 K/UL — SIGNIFICANT CHANGE UP (ref 0–0)
NRBC # FLD: 0 K/UL — SIGNIFICANT CHANGE UP (ref 0–0)
PHOSPHATE SERPL-MCNC: 4 MG/DL — SIGNIFICANT CHANGE UP (ref 2.5–4.5)
PLATELET # BLD AUTO: 93 K/UL — LOW (ref 150–400)
PLATELET # BLD AUTO: 99 K/UL — LOW (ref 150–400)
POTASSIUM SERPL-MCNC: 4.2 MMOL/L — SIGNIFICANT CHANGE UP (ref 3.5–5.3)
POTASSIUM SERPL-SCNC: 4.2 MMOL/L — SIGNIFICANT CHANGE UP (ref 3.5–5.3)
RBC # BLD: 2.24 M/UL — LOW (ref 3.8–5.2)
RBC # BLD: 2.87 M/UL — LOW (ref 3.8–5.2)
RBC # FLD: 17.2 % — HIGH (ref 10.3–14.5)
RBC # FLD: 21.2 % — HIGH (ref 10.3–14.5)
SODIUM SERPL-SCNC: 133 MMOL/L — LOW (ref 135–145)
WBC # BLD: 5.78 K/UL — SIGNIFICANT CHANGE UP (ref 3.8–10.5)
WBC # BLD: 5.96 K/UL — SIGNIFICANT CHANGE UP (ref 3.8–10.5)
WBC # FLD AUTO: 5.78 K/UL — SIGNIFICANT CHANGE UP (ref 3.8–10.5)
WBC # FLD AUTO: 5.96 K/UL — SIGNIFICANT CHANGE UP (ref 3.8–10.5)

## 2023-07-23 PROCEDURE — 99233 SBSQ HOSP IP/OBS HIGH 50: CPT | Mod: FS

## 2023-07-23 PROCEDURE — 99232 SBSQ HOSP IP/OBS MODERATE 35: CPT | Mod: 24,GC

## 2023-07-23 RX ADMIN — HEPARIN SODIUM 5000 UNIT(S): 5000 INJECTION INTRAVENOUS; SUBCUTANEOUS at 21:07

## 2023-07-23 RX ADMIN — Medication 500 MILLIGRAM(S): at 08:32

## 2023-07-23 RX ADMIN — Medication 1 MILLIGRAM(S): at 11:24

## 2023-07-23 RX ADMIN — Medication 650 MILLIGRAM(S): at 14:16

## 2023-07-23 RX ADMIN — Medication 1 TABLET(S): at 21:07

## 2023-07-23 RX ADMIN — Medication 500 MILLIGRAM(S): at 17:48

## 2023-07-23 RX ADMIN — PANTOPRAZOLE SODIUM 40 MILLIGRAM(S): 20 TABLET, DELAYED RELEASE ORAL at 08:32

## 2023-07-23 RX ADMIN — Medication 1 TABLET(S): at 14:16

## 2023-07-23 RX ADMIN — PIPERACILLIN AND TAZOBACTAM 25 GRAM(S): 4; .5 INJECTION, POWDER, LYOPHILIZED, FOR SOLUTION INTRAVENOUS at 05:47

## 2023-07-23 RX ADMIN — Medication 1 TABLET(S): at 08:33

## 2023-07-23 RX ADMIN — Medication 650 MILLIGRAM(S): at 08:32

## 2023-07-23 RX ADMIN — HEPARIN SODIUM 5000 UNIT(S): 5000 INJECTION INTRAVENOUS; SUBCUTANEOUS at 05:47

## 2023-07-23 RX ADMIN — Medication 650 MILLIGRAM(S): at 09:44

## 2023-07-23 RX ADMIN — Medication 650 MILLIGRAM(S): at 19:43

## 2023-07-23 RX ADMIN — Medication 1 TABLET(S): at 11:24

## 2023-07-23 RX ADMIN — Medication 1: at 21:08

## 2023-07-23 RX ADMIN — HEPARIN SODIUM 5000 UNIT(S): 5000 INJECTION INTRAVENOUS; SUBCUTANEOUS at 14:16

## 2023-07-23 RX ADMIN — Medication 650 MILLIGRAM(S): at 16:06

## 2023-07-23 NOTE — PROGRESS NOTE ADULT - PROBLEM SELECTOR PLAN 1
Pt with ESRD on HD TIW MWF admitted with fall. Pt s/p Lt hemiarthroplasty on 7/19/2023, however surgery complicated by intraoperative hypotension requiring vasopressor support. Patient transferred to SICU for hemodynamic stabilization. CT Angio abd/pelv reviewed. HD treatment held on 7/19/23 due to above. Held on 7/20 due to blood loss anemia. Underwent maintenance HD on 7/21 but treatment time reduced and only 1L removed due to hypotension. No RRT needs today. Plan for maintenance HD on 7/24. Monitor labs.

## 2023-07-23 NOTE — PROGRESS NOTE ADULT - SUBJECTIVE AND OBJECTIVE BOX
24 HOUR EVENTS:  - No overnight events.     SUBJECTIVE/ROS:  Patient seen at bedside this AM.     [ ] Due to altered mental status/intubation, subjective information were not able to be obtained from the patient. History was obtained, to the extent possible, from review of the chart and collateral sources of information.    OBJECTIVE:    VITALS:  Vital Signs Last 24 Hrs  T(C): 35.8 (22 Jul 2023 20:00), Max: 36.1 (22 Jul 2023 02:00)  T(F): 96.5 (22 Jul 2023 20:00), Max: 97 (22 Jul 2023 02:00)  HR: 74 (22 Jul 2023 20:00) (66 - 79)  BP: 125/49 (22 Jul 2023 20:00) (122/61 - 159/71)  BP(mean): 68 (22 Jul 2023 20:00) (68 - 99)  RR: 16 (22 Jul 2023 20:00) (14 - 25)  SpO2: 96% (22 Jul 2023 20:00) (92% - 100%)    Parameters below as of 22 Jul 2023 20:00  Patient On (Oxygen Delivery Method): room air        I&O's Summary    21 Jul 2023 07:01  -  22 Jul 2023 07:00  --------------------------------------------------------  IN: 1490 mL / OUT: 1400 mL / NET: 90 mL    22 Jul 2023 07:01  -  23 Jul 2023 00:39  --------------------------------------------------------  IN: 640 mL / OUT: 0 mL / NET: 640 mL        EXAM  NEUROLOGY  Exam: Normal, NAD, alert, oriented x3, no focal deficits.    HEENT  Exam: Normocephalic, atraumatic, EOMI.     RESPIRATORY  Exam: Lungs clear to auscultation, Normal expansion/effort.     CARDIOVASCULAR  Exam: S1, S2.  Regular rate and rhythm.     GI/NUTRITION  Exam: Abdomen soft, Non-tender, Non-distended.     VASCULAR  Exam: Extremities warm, pink, well-perfused.    MUSCULOSKELETAL  Exam: Left leg with prevena and ACE wrap in place. All extremities moving spontaneously without limitations.    SKIN  Exam: Good skin turgor, no skin breakdown.       Tubes/Lines/Drains   [x] Peripheral IV  [] Central Venous Line     	[] R	[] L	[] IJ	[] Fem	[] SC	  [] Arterial Line		[] R	[] L	[] Fem	[] Rad	[] Ax	  [] PICC		[X] Midline		[] Mediport  [] Urinary Catheter		  [x] Necessity of urinary, arterial, and venous catheters discussed    LABS      LABS:                        7.3    6.78  )-----------( 98       ( 22 Jul 2023 02:00 )             21.6   07-22    137  |  100  |  24<H>  ----------------------------<  89  4.2   |  24  |  3.79<H>    Ca    7.5<L>      22 Jul 2023 02:00  Phos  3.0     07-21  Mg     1.80     07-21    TPro  4.9<L>  /  Alb  2.1<L>  /  TBili  0.8  /  DBili  x   /  AST  52<H>  /  ALT  <5  /  AlkPhos  113  07-22    CAPILLARY BLOOD GLUCOSE      POCT Blood Glucose.: 159 mg/dL (22 Jul 2023 21:59)  POCT Blood Glucose.: 168 mg/dL (22 Jul 2023 17:00)  POCT Blood Glucose.: 173 mg/dL (22 Jul 2023 11:42)  POCT Blood Glucose.: 127 mg/dL (22 Jul 2023 07:52)      MEDICATIONS:   MEDICATIONS  (STANDING):  acetaminophen     Tablet .. 650 milliGRAM(s) Oral every 6 hours  ascorbic acid 500 milliGRAM(s) Oral two times a day  calcium carbonate 1250 mG  + Vitamin D (OsCal 500 + D) 1 Tablet(s) Oral three times a day  dextrose 5%. 1000 milliLiter(s) (50 mL/Hr) IV Continuous <Continuous>  dextrose 5%. 1000 milliLiter(s) (100 mL/Hr) IV Continuous <Continuous>  dextrose 50% Injectable 25 Gram(s) IV Push once  dextrose 50% Injectable 12.5 Gram(s) IV Push once  dextrose 50% Injectable 25 Gram(s) IV Push once  folic acid 1 milliGRAM(s) Oral daily  glucagon  Injectable 1 milliGRAM(s) IntraMuscular once  heparin   Injectable 5000 Unit(s) SubCutaneous every 8 hours  insulin lispro (ADMELOG) corrective regimen sliding scale   SubCutaneous Before meals and at bedtime  multivitamin 1 Tablet(s) Oral daily  pantoprazole    Tablet 40 milliGRAM(s) Oral before breakfast  piperacillin/tazobactam IVPB.. 3.375 Gram(s) IV Intermittent every 12 hours  polyethylene glycol 3350 17 Gram(s) Oral daily  senna 2 Tablet(s) Oral at bedtime    MEDICATIONS  (PRN):  aluminum hydroxide/magnesium hydroxide/simethicone Suspension 30 milliLiter(s) Oral four times a day PRN Indigestion  dextrose Oral Gel 15 Gram(s) Oral once PRN Blood Glucose LESS THAN 70 milliGRAM(s)/deciliter  melatonin 3 milliGRAM(s) Oral at bedtime PRN Insomnia  ondansetron Injectable 4 milliGRAM(s) IV Push every 6 hours PRN Nausea and/or Vomiting  oxyCODONE    IR 2.5 milliGRAM(s) Oral every 4 hours PRN Moderate Pain (4 - 6)  oxyCODONE    IR 5 milliGRAM(s) Oral every 4 hours PRN Severe Pain (7 - 10)      IMAGING: 24 HOUR EVENTS:  - 1 U PRBC given, H/H trending down    SUBJECTIVE/ROS:  Patient seen at bedside this AM.     [ ] Due to altered mental status/intubation, subjective information were not able to be obtained from the patient. History was obtained, to the extent possible, from review of the chart and collateral sources of information.    OBJECTIVE:    VITALS:  Vital Signs Last 24 Hrs  T(C): 35.8 (22 Jul 2023 20:00), Max: 36.1 (22 Jul 2023 02:00)  T(F): 96.5 (22 Jul 2023 20:00), Max: 97 (22 Jul 2023 02:00)  HR: 74 (22 Jul 2023 20:00) (66 - 79)  BP: 125/49 (22 Jul 2023 20:00) (122/61 - 159/71)  BP(mean): 68 (22 Jul 2023 20:00) (68 - 99)  RR: 16 (22 Jul 2023 20:00) (14 - 25)  SpO2: 96% (22 Jul 2023 20:00) (92% - 100%)    Parameters below as of 22 Jul 2023 20:00  Patient On (Oxygen Delivery Method): room air        I&O's Summary    21 Jul 2023 07:01  -  22 Jul 2023 07:00  --------------------------------------------------------  IN: 1490 mL / OUT: 1400 mL / NET: 90 mL    22 Jul 2023 07:01  -  23 Jul 2023 00:39  --------------------------------------------------------  IN: 640 mL / OUT: 0 mL / NET: 640 mL        EXAM  NEUROLOGY  Exam: Normal, NAD, alert, oriented x3, no focal deficits.    HEENT  Exam: Normocephalic, atraumatic, EOMI.     RESPIRATORY  Exam: Lungs clear to auscultation, Normal expansion/effort.     CARDIOVASCULAR  Exam: S1, S2.  Regular rate and rhythm.     GI/NUTRITION  Exam: Abdomen soft, Non-tender, Non-distended.     VASCULAR  Exam: Extremities warm, pink, well-perfused.    MUSCULOSKELETAL  Exam: Left leg with prevena and ACE wrap in place. All extremities moving spontaneously without limitations.    SKIN  Exam: Good skin turgor, no skin breakdown.       Tubes/Lines/Drains   [x] Peripheral IV  [] Central Venous Line     	[] R	[] L	[] IJ	[] Fem	[] SC	  [] Arterial Line		[] R	[] L	[] Fem	[] Rad	[] Ax	  [] PICC		[X] Midline		[] Mediport  [] Urinary Catheter		  [x] Necessity of urinary, arterial, and venous catheters discussed    LABS      LABS:                        7.3    6.78  )-----------( 98       ( 22 Jul 2023 02:00 )             21.6   07-22    137  |  100  |  24<H>  ----------------------------<  89  4.2   |  24  |  3.79<H>    Ca    7.5<L>      22 Jul 2023 02:00  Phos  3.0     07-21  Mg     1.80     07-21    TPro  4.9<L>  /  Alb  2.1<L>  /  TBili  0.8  /  DBili  x   /  AST  52<H>  /  ALT  <5  /  AlkPhos  113  07-22    CAPILLARY BLOOD GLUCOSE      POCT Blood Glucose.: 159 mg/dL (22 Jul 2023 21:59)  POCT Blood Glucose.: 168 mg/dL (22 Jul 2023 17:00)  POCT Blood Glucose.: 173 mg/dL (22 Jul 2023 11:42)  POCT Blood Glucose.: 127 mg/dL (22 Jul 2023 07:52)      MEDICATIONS:   MEDICATIONS  (STANDING):  acetaminophen     Tablet .. 650 milliGRAM(s) Oral every 6 hours  ascorbic acid 500 milliGRAM(s) Oral two times a day  calcium carbonate 1250 mG  + Vitamin D (OsCal 500 + D) 1 Tablet(s) Oral three times a day  dextrose 5%. 1000 milliLiter(s) (50 mL/Hr) IV Continuous <Continuous>  dextrose 5%. 1000 milliLiter(s) (100 mL/Hr) IV Continuous <Continuous>  dextrose 50% Injectable 25 Gram(s) IV Push once  dextrose 50% Injectable 12.5 Gram(s) IV Push once  dextrose 50% Injectable 25 Gram(s) IV Push once  folic acid 1 milliGRAM(s) Oral daily  glucagon  Injectable 1 milliGRAM(s) IntraMuscular once  heparin   Injectable 5000 Unit(s) SubCutaneous every 8 hours  insulin lispro (ADMELOG) corrective regimen sliding scale   SubCutaneous Before meals and at bedtime  multivitamin 1 Tablet(s) Oral daily  pantoprazole    Tablet 40 milliGRAM(s) Oral before breakfast  piperacillin/tazobactam IVPB.. 3.375 Gram(s) IV Intermittent every 12 hours  polyethylene glycol 3350 17 Gram(s) Oral daily  senna 2 Tablet(s) Oral at bedtime    MEDICATIONS  (PRN):  aluminum hydroxide/magnesium hydroxide/simethicone Suspension 30 milliLiter(s) Oral four times a day PRN Indigestion  dextrose Oral Gel 15 Gram(s) Oral once PRN Blood Glucose LESS THAN 70 milliGRAM(s)/deciliter  melatonin 3 milliGRAM(s) Oral at bedtime PRN Insomnia  ondansetron Injectable 4 milliGRAM(s) IV Push every 6 hours PRN Nausea and/or Vomiting  oxyCODONE    IR 2.5 milliGRAM(s) Oral every 4 hours PRN Moderate Pain (4 - 6)  oxyCODONE    IR 5 milliGRAM(s) Oral every 4 hours PRN Severe Pain (7 - 10)      IMAGING:

## 2023-07-23 NOTE — PROGRESS NOTE ADULT - SUBJECTIVE AND OBJECTIVE BOX
ORTHOPEDIC PROGRESS NOTE    POD # 4    Overnight events: None    SUBJECTIVE: Pt seen and examined at bedside. Patient is doing well, no acute complaints this AM. Pain is controlled with medication      OBJECTIVE:  Vital Signs Last 24 Hrs  T(C): 36.2 (23 Jul 2023 08:23), Max: 36.9 (23 Jul 2023 04:00)  T(F): 97.2 (23 Jul 2023 08:23), Max: 98.4 (23 Jul 2023 04:00)  HR: 80 (23 Jul 2023 08:23) (63 - 80)  BP: 158/71 (23 Jul 2023 08:23) (117/56 - 158/71)  BP(mean): 89 (23 Jul 2023 08:23) (68 - 99)  RR: 20 (23 Jul 2023 08:23) (16 - 23)  SpO2: 96% (23 Jul 2023 08:23) (92% - 100%)    Parameters below as of 23 Jul 2023 08:23  Patient On (Oxygen Delivery Method): room air          07-22-23 @ 07:01  -  07-23-23 @ 07:00  --------------------------------------------------------  IN: 1040 mL / OUT: 0 mL / NET: 1040 mL        Physical Examination:  GEN: NAD, resting quietly  PULM: symmetric chest rise bilaterally, no increased WOB  ABD: nondistended  EXTR:   LLE:  Spica ACE bandage  Prevena vac w good suction  SILT S/S/DP/SP/T  +EHL/FHL/TA/GSC  Compartments soft   +DP pulse         LABS:                        6.8    5.78  )-----------( 99       ( 23 Jul 2023 00:30 )             21.0       07-23    133<L>  |  97<L>  |  32<H>  ----------------------------<  111<H>  4.2   |  21<L>  |  4.51<H>    Ca    7.5<L>      23 Jul 2023 00:30  Phos  4.0     07-23  Mg     1.90     07-23    TPro  4.9<L>  /  Alb  2.1<L>  /  TBili  0.8  /  DBili  x   /  AST  52<H>  /  ALT  <5  /  AlkPhos  113  07-22

## 2023-07-23 NOTE — PROGRESS NOTE ADULT - ASSESSMENT
Pt is a 76 y/o female pmhx of ESRD on HD (MoWeFri), HTN, DM (diet controlled), chronic type B aortic dissection, heart murmur, endometrial ca in remission s/p total hysterectomy in 2007 s/p L IMN for IT fx in May 2023,  now presenting for evaluation of concern for hardware migration. Now s/p  left hip removal of gamma nail and placement of restoration modular peter arthroplasty, EBL 200cc adn got 1L of LR. . Patient hypotensive in PACU requiring pressors requirements and volume resuscitation. In PACU patient got 1 PRBC, 50cc of 25% albumin.  SICU consulted for pressor support and hemodynamic management. On POD0, patient with rising lactate and abdominal pain. CTA completed which showed hematoma at the operative site, with possible extravasation.     PLAN:  NEURO:   - A&Ox3  - Pain control: Tylenol ATC and oxycodone PRN     RESPIRATORY  - Saturation >92%  - Satting well on RA     CARDIOVASCULAR  - MAP goal >65  - Cleared lactate     GI/NUTRITION  - Diet: Regular diet (consistent carbs)  - Monitor for abdominal pain/ diarrhea   - Protonix  - Folic acid and multivitamin    GENITOURINARY/RENAL  - HD M/W/F   - Monitor strict I/Os   - Monitor and replete electrolytes     HEMATOLOGIC  - DVT ppx: SQH   - Monitor H/H  - H/H stable     INFECTIOUS DISEASES:  - Abx: Zosyn  - Monitor for fever   - Trend WBC  - Blood cultures (7/19) sent NGTD  - Intraop bone swab cx (7/19) NGTD    ENDOCRINE  - Hx of T2DM   - ISS     Disposition: Listed for floors  Pt is a 78 y/o female pmhx of ESRD on HD (MoWeFri), HTN, DM (diet controlled), chronic type B aortic dissection, heart murmur, endometrial ca in remission s/p total hysterectomy in 2007 s/p L IMN for IT fx in May 2023,  now presenting for evaluation of concern for hardware migration. Now s/p  left hip removal of gamma nail and placement of restoration modular peter arthroplasty, EBL 200cc adn got 1L of LR. . Patient hypotensive in PACU requiring pressors requirements and volume resuscitation. In PACU patient got 1 PRBC, 50cc of 25% albumin.  SICU consulted for pressor support and hemodynamic management. On POD0, patient with rising lactate and abdominal pain. CTA completed which showed hematoma at the operative site, with possible extravasation.     PLAN:  NEURO:   - A&Ox3  - Pain control: Tylenol ATC and oxycodone PRN     RESPIRATORY  - Saturation >92%  - Satting well on RA     CARDIOVASCULAR  - MAP goal >65  - Cleared lactate     GI/NUTRITION  - Diet: Regular diet (consistent carbs)  - Monitor for abdominal pain/ diarrhea   - Protonix  - Folic acid and multivitamin    GENITOURINARY/RENAL  - HD M/W/F   - Monitor strict I/Os   - Monitor and replete electrolytes     HEMATOLOGIC  - DVT ppx: SQH   - Monitor H/H  - H/H stable     INFECTIOUS DISEASES:  - Abx: Zosyn- Will discontinue today as cx remain with NGTD   - Monitor for fever   - Trend WBC  - Blood cultures (7/19) sent NGTD  - Intraop bone swab cx (7/19) NGTD    ENDOCRINE  - Hx of T2DM   - ISS     Disposition: Listed for floors  Pt is a 78 y/o female pmhx of ESRD on HD (MoWeFri), HTN, DM (diet controlled), chronic type B aortic dissection, heart murmur, endometrial ca in remission s/p total hysterectomy in 2007 s/p L IMN for IT fx in May 2023,  now presenting for evaluation of concern for hardware migration. Now s/p  left hip removal of gamma nail and placement of restoration modular peter arthroplasty, EBL 200cc adn got 1L of LR. . Patient hypotensive in PACU requiring pressors requirements and volume resuscitation. In PACU patient got 1 PRBC, 50cc of 25% albumin.  SICU consulted for pressor support and hemodynamic management. On POD0, patient with rising lactate and abdominal pain. CTA completed which showed hematoma at the operative site, with possible extravasation.     PLAN:  NEURO:   - A&Ox3  - Pain control: Tylenol ATC and oxycodone PRN     RESPIRATORY  - Saturation >92%  - Satting well on RA     CARDIOVASCULAR  - MAP goal >65  - Cleared lactate     GI/NUTRITION  - Diet: Regular diet (consistent carbs)  - Monitor for abdominal pain/ diarrhea   - Protonix  - Folic acid and multivitamin    GENITOURINARY/RENAL  - HD M/W/F   - Monitor strict I/Os   - Monitor and replete electrolytes     HEMATOLOGIC  - DVT ppx: SQH   - Monitor H/H  - H/H with appropriate response after 1 U PRBC    INFECTIOUS DISEASES:  - Abx: Zosyn- Will discontinue today as cx remain with NGTD   - Monitor for fever   - Trend WBC  - Blood cultures (7/19) sent NGTD  - Intraop bone swab cx (7/19) NGTD    ENDOCRINE  - Hx of T2DM   - ISS     Disposition: Listed for floors

## 2023-07-23 NOTE — PROGRESS NOTE ADULT - ASSESSMENT
A/P:  77F s/p L hip SUZANNA, hemiarthroplasty on 7/19. Hemodynamically stable    - Spica super ACE bandage  - Prevena  - Pain control  - WBAT RLE  - DVT ppx: SQH  - PT/OT  - OOB  - Diet: Reg  - Monitor I&Os  - Dispo: TBD      Orthopedic Surgery  [facility]: [id number]  [facility]: [id number]  [facility]: [id number]

## 2023-07-23 NOTE — PROGRESS NOTE ADULT - SUBJECTIVE AND OBJECTIVE BOX
Cardiovascular Disease Progress Note  Date of service: 07-23-23 @ 15:50    Overnight events: Pt is in no distress. Drop in Hgb yesterday. PRBC given. H and h improved appropriately. Pt denies chest pain or SOB.   Otherwise review of systems negative    Objective Findings:  T(C): 36.3 (07-23-23 @ 11:45), Max: 36.9 (07-23-23 @ 04:00)  HR: 62 (07-23-23 @ 11:45) (62 - 80)  BP: 151/64 (07-23-23 @ 11:45) (117/56 - 158/71)  RR: 18 (07-23-23 @ 11:45) (16 - 23)  SpO2: 100% (07-23-23 @ 11:45) (96% - 100%)  Wt(kg): --  Daily     Daily       Physical Exam:  Gen: NAD; Patient resting comfortably  HEENT: EOMI, Normocephalic/ atraumatic  CV: RRR, normal S1 + S2, no m/r/g  Lungs:  Normal respiratory effort; clear to auscultation bilaterally  Abd: soft, non-tender; bowel sounds present  Ext: No edema; warm and well perfused    Telemetry: Sinus     Laboratory Data:                        8.2    5.96  )-----------( 93       ( 23 Jul 2023 09:20 )             24.9     07-23    133<L>  |  97<L>  |  32<H>  ----------------------------<  111<H>  4.2   |  21<L>  |  4.51<H>    Ca    7.5<L>      23 Jul 2023 00:30  Phos  4.0     07-23  Mg     1.90     07-23    TPro  4.9<L>  /  Alb  2.1<L>  /  TBili  0.8  /  DBili  x   /  AST  52<H>  /  ALT  <5  /  AlkPhos  113  07-22              Inpatient Medications:  MEDICATIONS  (STANDING):  acetaminophen     Tablet .. 650 milliGRAM(s) Oral every 6 hours  ascorbic acid 500 milliGRAM(s) Oral two times a day  calcium carbonate 1250 mG  + Vitamin D (OsCal 500 + D) 1 Tablet(s) Oral three times a day  dextrose 5%. 1000 milliLiter(s) (100 mL/Hr) IV Continuous <Continuous>  dextrose 5%. 1000 milliLiter(s) (50 mL/Hr) IV Continuous <Continuous>  dextrose 50% Injectable 25 Gram(s) IV Push once  dextrose 50% Injectable 12.5 Gram(s) IV Push once  dextrose 50% Injectable 25 Gram(s) IV Push once  folic acid 1 milliGRAM(s) Oral daily  glucagon  Injectable 1 milliGRAM(s) IntraMuscular once  heparin   Injectable 5000 Unit(s) SubCutaneous every 8 hours  insulin lispro (ADMELOG) corrective regimen sliding scale   SubCutaneous Before meals and at bedtime  multivitamin 1 Tablet(s) Oral daily  pantoprazole    Tablet 40 milliGRAM(s) Oral before breakfast  polyethylene glycol 3350 17 Gram(s) Oral daily  senna 2 Tablet(s) Oral at bedtime      Assessment:  77 year old woman with ESRD on HD (MWF), HTN, DM (diet controlled), chronic type B aortic dissection, heart murmur, endometrial ca in remission s/p total hysterectomy in 2007 presents with L hip pain     Plan of Care:      #Post-operative evaluation-  - Ms. Harris tolerated orthopedic intervention well.  Post operative course complicated by distributive/hemorrhagic shock requiring low doses of Levophed.  Drop in hgb 7/22, s/p1 U PRBC with appropriate response  She displays no signs of  coronary ischemia or decompensated CHF.   Her admission EKG is sinus with a pre-existing RBBB.   Recent echo with no acute findings.   Continue current cardiac management.       #Type B aortic dissection  - Chronic (Diagnosed >10 years ago)  - CT A/P 7/20 shows partially obscured hematoma without definite   evidence of active hemorrhage. No definite findings of mesenteric ischemia.  New hypodense lesion in the spleen measuring 1.9 cm  Chronic thoracic and abdominal aortic dissection extending into the   left common iliac artery, similar to the previous exam in 2018.  - Continue current management.        #ESRD  - HD as per renal.      Rest of care as per SICU.       Plan of Care:          Over 25 minutes spent on total encounter; more than 50% of the visit was spent counseling and/or coordinating care by the attending physician.      Adrian Burris DO [facility]  Cardiovascular Disease  [phone]

## 2023-07-23 NOTE — PROGRESS NOTE ADULT - SUBJECTIVE AND OBJECTIVE BOX
Health system Division of Kidney Diseases & Hypertension  FOLLOW UP NOTE  [phone]--------------------------------------------------------------------------------    HPI: 78F w/ PMH of ESRD on HD TIW (MWF) at [facility] (previously at [facility]), HTN, DM2, endometrial cancer (underwent hysterectomy and chemotherapy, type B aortic dissection, and L hip ORIF, admitted for concerns of loosened hardware. Nephrology consulted for management of ESRD/HD.     24 hour events/subjective: Pt. seen and evaluated at bedside in the SICU this AM. Feels well. Pain is well managed. She denies fevers/chills, headaches, chest pain, SOB, abd pain, or leg swelling.     PAST HISTORY  --------------------------------------------------------------------------------  No significant changes to PMH, PSH, FHx, SHx, unless otherwise noted    ALLERGIES & MEDICATIONS  --------------------------------------------------------------------------------  Allergies  No Known Allergies    Intolerances    Standing Inpatient Medications  acetaminophen     Tablet .. 650 milliGRAM(s) Oral every 6 hours  ascorbic acid 500 milliGRAM(s) Oral two times a day  calcium carbonate 1250 mG  + Vitamin D (OsCal 500 + D) 1 Tablet(s) Oral three times a day  dextrose 5%. 1000 milliLiter(s) IV Continuous <Continuous>  dextrose 5%. 1000 milliLiter(s) IV Continuous <Continuous>  dextrose 50% Injectable 25 Gram(s) IV Push once  dextrose 50% Injectable 12.5 Gram(s) IV Push once  dextrose 50% Injectable 25 Gram(s) IV Push once  folic acid 1 milliGRAM(s) Oral daily  glucagon  Injectable 1 milliGRAM(s) IntraMuscular once  heparin   Injectable 5000 Unit(s) SubCutaneous every 8 hours  insulin lispro (ADMELOG) corrective regimen sliding scale   SubCutaneous Before meals and at bedtime  multivitamin 1 Tablet(s) Oral daily  pantoprazole    Tablet 40 milliGRAM(s) Oral before breakfast  piperacillin/tazobactam IVPB.. 3.375 Gram(s) IV Intermittent every 12 hours  polyethylene glycol 3350 17 Gram(s) Oral daily  senna 2 Tablet(s) Oral at bedtime    PRN Inpatient Medications  aluminum hydroxide/magnesium hydroxide/simethicone Suspension 30 milliLiter(s) Oral four times a day PRN  dextrose Oral Gel 15 Gram(s) Oral once PRN  melatonin 3 milliGRAM(s) Oral at bedtime PRN  ondansetron Injectable 4 milliGRAM(s) IV Push every 6 hours PRN  oxyCODONE    IR 2.5 milliGRAM(s) Oral every 4 hours PRN  oxyCODONE    IR 5 milliGRAM(s) Oral every 4 hours PRN    REVIEW OF SYSTEMS  --------------------------------------------------------------------------------  Gen: No fevers/chills  Head/Eyes/Ears: no headache  Respiratory: No dyspnea, cough  CV: No chest pain  GI: No abdominal pain  MSK: No leg pain or swelling, swelling in arms    All other systems were reviewed and are negative, except as noted.    VITALS/PHYSICAL EXAM  --------------------------------------------------------------------------------  T(C): 36.2 (07-23-23 @ 08:23), Max: 36.9 (07-23-23 @ 04:00)  HR: 80 (07-23-23 @ 08:23) (63 - 80)  BP: 158/71 (07-23-23 @ 08:23) (117/56 - 158/71)  RR: 20 (07-23-23 @ 08:23) (16 - 23)  SpO2: 96% (07-23-23 @ 08:23) (92% - 100%)  Wt(kg): --    07-22-23 @ 07:01  -  07-23-23 @ 07:00  --------------------------------------------------------  IN: 1040 mL / OUT: 0 mL / NET: 1040 mL    Physical Exam:  Gen: NAD  HEENT: MMM  Pulm: minimal bibasilar crackles   CV: S1S2  Abd: Soft, +BS   Ext: trace LE edema B/L, dressing on LLE, chronic LUE edema, RUE edema improved  Neuro: Awake  Skin: Warm and dry  Vascular access: LUE AVF, aneurysmal, skin intact, thrill felt    LABS/STUDIES  --------------------------------------------------------------------------------              6.8    5.78  >-----------<  99       [07-23-23 @ 00:30]              21.0     133  |  97  |  32  ----------------------------<  111      [07-23-23 @ 00:30]  4.2   |  21  |  4.51        Ca     7.5     [07-23-23 @ 00:30]      Mg     1.90     [07-23-23 @ 00:30]      Phos  4.0     [07-23-23 @ 00:30]    TPro  4.9  /  Alb  2.1  /  TBili  0.8  /  DBili  x   /  AST  52  /  ALT  <5  /  AlkPhos  113  [07-22-23 @ 02:00]    Creatinine Trend:  SCr 4.51 [07-23 @ 00:30]  SCr 3.79 [07-22 @ 02:00]  SCr 2.84 [07-21 @ 15:36]  SCr 6.23 [07-21 @ 08:35]  SCr 6.15 [07-21 @ 02:00]

## 2023-07-23 NOTE — PROGRESS NOTE ADULT - PROBLEM SELECTOR PLAN 2
Pt. with anemia in the setting of ESRD and blood loss from surgical procedure. Hgb 6.8 from early AM (7/23/23). Would consider transfusing more blood. Will give RICA with HD treatments. Monitor hemoglobin levels.     If you have any questions, please feel free to contact fellow.  John Torres  Nephrology Fellow  [id number] / [phone] / [employer] Teams (Preferred)  (After 4pm or on weekends, please call the on-call Fellow)

## 2023-07-23 NOTE — PROGRESS NOTE ADULT - PROBLEM SELECTOR PLAN 2
Pt. with anemia in the setting of ESRD and blood loss from surgical procedure. Hgb 6.8 from early AM (7/23/23). Would consider transfusing more blood. Will give RICA with HD treatments. Monitor hemoglobin levels.     If you have any questions, please feel free to contact fellow.  John Torres  Nephrology Fellow  [id number] / [phone] / [employer] Teams (Preferred)  (After 4pm or on weekends, please call the on-call Fellow)

## 2023-07-23 NOTE — PROGRESS NOTE ADULT - SUBJECTIVE AND OBJECTIVE BOX
[facility] DIVISION OF KIDNEY DISEASES AND HYPERTENSION -- FOLLOW UP NOTE  --------------------------------------------------------------------------------  Chief Complaint: ESRD    24 hour events/subjective:  Pt is upset about her L UE however no difference from yesterday. Breathing is okay.       PAST HISTORY  --------------------------------------------------------------------------------  No significant changes to PMH, PSH, FHx, SHx, unless otherwise noted    ALLERGIES & MEDICATIONS  --------------------------------------------------------------------------------  Allergies    No Known Allergies    Intolerances      Standing Inpatient Medications  acetaminophen     Tablet .. 650 milliGRAM(s) Oral every 6 hours  ascorbic acid 500 milliGRAM(s) Oral two times a day  calcium carbonate 1250 mG  + Vitamin D (OsCal 500 + D) 1 Tablet(s) Oral three times a day  dextrose 5%. 1000 milliLiter(s) IV Continuous <Continuous>  dextrose 5%. 1000 milliLiter(s) IV Continuous <Continuous>  dextrose 50% Injectable 25 Gram(s) IV Push once  dextrose 50% Injectable 12.5 Gram(s) IV Push once  dextrose 50% Injectable 25 Gram(s) IV Push once  folic acid 1 milliGRAM(s) Oral daily  glucagon  Injectable 1 milliGRAM(s) IntraMuscular once  heparin   Injectable 5000 Unit(s) SubCutaneous every 8 hours  insulin lispro (ADMELOG) corrective regimen sliding scale   SubCutaneous Before meals and at bedtime  multivitamin 1 Tablet(s) Oral daily  pantoprazole    Tablet 40 milliGRAM(s) Oral before breakfast  piperacillin/tazobactam IVPB.. 3.375 Gram(s) IV Intermittent every 12 hours  polyethylene glycol 3350 17 Gram(s) Oral daily  senna 2 Tablet(s) Oral at bedtime    PRN Inpatient Medications  aluminum hydroxide/magnesium hydroxide/simethicone Suspension 30 milliLiter(s) Oral four times a day PRN  dextrose Oral Gel 15 Gram(s) Oral once PRN  melatonin 3 milliGRAM(s) Oral at bedtime PRN  ondansetron Injectable 4 milliGRAM(s) IV Push every 6 hours PRN  oxyCODONE    IR 2.5 milliGRAM(s) Oral every 4 hours PRN  oxyCODONE    IR 5 milliGRAM(s) Oral every 4 hours PRN      REVIEW OF SYSTEMS  --------------------------------------------------------------------------------  Gen: No weight changes, fatigue  Skin: No rashes  Head/Eyes/Ears/Mouth: No headache; Normal hearing  Respiratory: No dyspnea, cough, wheezing  CV: No chest pain, PND, orthopnea  GI: No abdominal pain, diarrhea, constipation, nausea, vomiting  MSK: No joint pain/swelling; no back pain  Neuro: No dizziness/lightheadedness, weakness  Heme: No easy bruising or bleeding      All other systems were reviewed and are negative, except as noted.    VITALS/PHYSICAL EXAM  --------------------------------------------------------------------------------  T(C): 36.2 (07-23-23 @ 08:23), Max: 36.9 (07-23-23 @ 04:00)  HR: 80 (07-23-23 @ 08:23) (63 - 80)  BP: 158/71 (07-23-23 @ 08:23) (117/56 - 158/71)  RR: 20 (07-23-23 @ 08:23) (16 - 23)  SpO2: 96% (07-23-23 @ 08:23) (92% - 100%)  Wt(kg): --        07-22-23 @ 07:01  -  07-23-23 @ 07:00  --------------------------------------------------------  IN: 1040 mL / OUT: 0 mL / NET: 1040 mL      Physical Exam:  Gen: NAD  HEENT: MMM  Pulm: minimal bibasilar crackles   CV: S1S2  Abd: Soft, +BS   Ext: LE edema B/L, dressing on LLE, chronic LUE edema, RUE edema improved  Neuro: Awake  Skin: Warm and dry  Vascular access: LUE AVF, aneurysmal, skin intact, thrill felt      LABS/STUDIES  --------------------------------------------------------------------------------              6.8    5.78  >-----------<  99       [07-23-23 @ 00:30]              21.0     133  |  97  |  32  ----------------------------<  111      [07-23-23 @ 00:30]  4.2   |  21  |  4.51        Ca     7.5     [07-23-23 @ 00:30]      Mg     1.90     [07-23-23 @ 00:30]      Phos  4.0     [07-23-23 @ 00:30]    TPro  4.9  /  Alb  2.1  /  TBili  0.8  /  DBili  x   /  AST  52  /  ALT  <5  /  AlkPhos  113  [07-22-23 @ 02:00]          Creatinine Trend:  SCr 4.51 [07-23 @ 00:30]  SCr 3.79 [07-22 @ 02:00]  SCr 2.84 [07-21 @ 15:36]  SCr 6.23 [07-21 @ 08:35]  SCr 6.15 [07-21 @ 02:00]    Urinalysis - [07-23-23 @ 00:30]      Color  / Appearance  / SG  / pH       Gluc 111 / Ketone   / Bili  / Urobili        Blood  / Protein  / Leuk Est  / Nitrite       RBC  / WBC  / Hyaline  / Gran  / Sq Epi  / Non Sq Epi  / Bacteria

## 2023-07-23 NOTE — PROGRESS NOTE ADULT - PROBLEM SELECTOR PLAN 1
Pt with ESRD on HD TIW MWF admitted with fall. Pt s/p Lt hemiarthroplasty on 7/19/2023, however surgery complicated by intraoperative hypotension requiring vasopressor support. Patient transferred to SICU for hemodynamic stabilization. CT Angio abd/pelv reviewed. HD treatment held on 7/19/23 due to above. Held on 7/20 due to blood loss anemia. Underwent maintenance HD on 7/21 but treatment time reduced and only 1L removed due to hypotension. No RRT needs today. Will assess dialysis needs daily. Monitor labs.

## 2023-07-24 LAB
ANION GAP SERPL CALC-SCNC: 11 MMOL/L — SIGNIFICANT CHANGE UP (ref 7–14)
BUN SERPL-MCNC: 40 MG/DL — HIGH (ref 7–23)
CALCIUM SERPL-MCNC: 8 MG/DL — LOW (ref 8.4–10.5)
CHLORIDE SERPL-SCNC: 98 MMOL/L — SIGNIFICANT CHANGE UP (ref 98–107)
CO2 SERPL-SCNC: 23 MMOL/L — SIGNIFICANT CHANGE UP (ref 22–31)
CREAT SERPL-MCNC: 5.62 MG/DL — HIGH (ref 0.5–1.3)
CULTURE RESULTS: SIGNIFICANT CHANGE UP
EGFR: 7 ML/MIN/1.73M2 — LOW
GLUCOSE BLDC GLUCOMTR-MCNC: 106 MG/DL — HIGH (ref 70–99)
GLUCOSE BLDC GLUCOMTR-MCNC: 116 MG/DL — HIGH (ref 70–99)
GLUCOSE BLDC GLUCOMTR-MCNC: 120 MG/DL — HIGH (ref 70–99)
GLUCOSE BLDC GLUCOMTR-MCNC: 121 MG/DL — HIGH (ref 70–99)
GLUCOSE SERPL-MCNC: 93 MG/DL — SIGNIFICANT CHANGE UP (ref 70–99)
HCT VFR BLD CALC: 25.4 % — LOW (ref 34.5–45)
HGB BLD-MCNC: 8.2 G/DL — LOW (ref 11.5–15.5)
MAGNESIUM SERPL-MCNC: 2 MG/DL — SIGNIFICANT CHANGE UP (ref 1.6–2.6)
MCHC RBC-ENTMCNC: 28.4 PG — SIGNIFICANT CHANGE UP (ref 27–34)
MCHC RBC-ENTMCNC: 32.3 GM/DL — SIGNIFICANT CHANGE UP (ref 32–36)
MCV RBC AUTO: 87.9 FL — SIGNIFICANT CHANGE UP (ref 80–100)
NRBC # BLD: 0 /100 WBCS — SIGNIFICANT CHANGE UP (ref 0–0)
NRBC # FLD: 0 K/UL — SIGNIFICANT CHANGE UP (ref 0–0)
PHOSPHATE SERPL-MCNC: 4.5 MG/DL — SIGNIFICANT CHANGE UP (ref 2.5–4.5)
PLATELET # BLD AUTO: 107 K/UL — LOW (ref 150–400)
POTASSIUM SERPL-MCNC: 4.7 MMOL/L — SIGNIFICANT CHANGE UP (ref 3.5–5.3)
POTASSIUM SERPL-SCNC: 4.7 MMOL/L — SIGNIFICANT CHANGE UP (ref 3.5–5.3)
RBC # BLD: 2.89 M/UL — LOW (ref 3.8–5.2)
RBC # FLD: 21.7 % — HIGH (ref 10.3–14.5)
SODIUM SERPL-SCNC: 132 MMOL/L — LOW (ref 135–145)
SPECIMEN SOURCE: SIGNIFICANT CHANGE UP
WBC # BLD: 5.68 K/UL — SIGNIFICANT CHANGE UP (ref 3.8–10.5)
WBC # FLD AUTO: 5.68 K/UL — SIGNIFICANT CHANGE UP (ref 3.8–10.5)

## 2023-07-24 PROCEDURE — 99233 SBSQ HOSP IP/OBS HIGH 50: CPT | Mod: FS,GC

## 2023-07-24 PROCEDURE — 99233 SBSQ HOSP IP/OBS HIGH 50: CPT

## 2023-07-24 RX ADMIN — Medication 500 MILLIGRAM(S): at 16:16

## 2023-07-24 RX ADMIN — HEPARIN SODIUM 5000 UNIT(S): 5000 INJECTION INTRAVENOUS; SUBCUTANEOUS at 22:11

## 2023-07-24 RX ADMIN — Medication 650 MILLIGRAM(S): at 14:42

## 2023-07-24 RX ADMIN — Medication 650 MILLIGRAM(S): at 19:26

## 2023-07-24 RX ADMIN — HEPARIN SODIUM 5000 UNIT(S): 5000 INJECTION INTRAVENOUS; SUBCUTANEOUS at 05:59

## 2023-07-24 RX ADMIN — SENNA PLUS 2 TABLET(S): 8.6 TABLET ORAL at 22:11

## 2023-07-24 RX ADMIN — Medication 500 MILLIGRAM(S): at 05:59

## 2023-07-24 RX ADMIN — Medication 1 TABLET(S): at 12:08

## 2023-07-24 RX ADMIN — Medication 650 MILLIGRAM(S): at 01:01

## 2023-07-24 RX ADMIN — Medication 1 TABLET(S): at 22:11

## 2023-07-24 RX ADMIN — PANTOPRAZOLE SODIUM 40 MILLIGRAM(S): 20 TABLET, DELAYED RELEASE ORAL at 06:01

## 2023-07-24 RX ADMIN — Medication 650 MILLIGRAM(S): at 08:07

## 2023-07-24 RX ADMIN — Medication 1 TABLET(S): at 05:57

## 2023-07-24 RX ADMIN — HEPARIN SODIUM 5000 UNIT(S): 5000 INJECTION INTRAVENOUS; SUBCUTANEOUS at 14:40

## 2023-07-24 RX ADMIN — Medication 1 TABLET(S): at 14:41

## 2023-07-24 RX ADMIN — Medication 1 MILLIGRAM(S): at 12:08

## 2023-07-24 NOTE — PROGRESS NOTE ADULT - PROBLEM SELECTOR PLAN 2
Pt. with anemia in the setting of ESRD and blood loss from surgical procedure. Hgb 8.2 from early AM (7/24/23). Will give RICA with HD treatments. Monitor hemoglobin levels.     If you have any questions, please feel free to contact fellow.  John Torres  Nephrology Fellow  [id number] / [phone] / [employer] Teams (Preferred)  (After 4pm or on weekends, please call the on-call Fellow)

## 2023-07-24 NOTE — PROGRESS NOTE ADULT - SUBJECTIVE AND OBJECTIVE BOX
Cardiovascular Disease Progress Note  Date of Service: 23 @ 09:07    Overnight events: No acute events overnight.    Patient denies chest pain or SOB.   Otherwise review of systems negative    Objective Findings:  T(C): 36.4 (23 @ 08:00), Max: 36.6 (23 @ 20:00)  HR: 75 (23 @ 08:00) (62 - 75)  BP: 158/73 (23 @ 04:00) (145/58 - 159/67)  RR: 16 (23 @ 08:00) (16 - 24)  SpO2: 96% (23 @ 08:00) (96% - 100%)  Wt(kg): --  Daily     Daily Weight in k.2 (2023 04:00)      Physical Exam:  Gen: NAD; Patient resting comfortably  HEENT: EOMI, Normocephalic/ atraumatic  CV: RRR, normal S1 + S2, no m/r/g  Lungs:  Normal respiratory effort; clear to auscultation bilaterally  Abd: soft, non-tender; bowel sounds present  Ext: No edema; warm and well perfused    Telemetry: Sinus    Laboratory Data:                        8.2    5.68  )-----------( 107      ( 2023 01:20 )             25.4         132<L>  |  98  |  40<H>  ----------------------------<  93  4.7   |  23  |  5.62<H>    Ca    8.0<L>      2023 01:20  Phos  4.5       Mg     2.00     -                Inpatient Medications:  MEDICATIONS  (STANDING):  acetaminophen     Tablet .. 650 milliGRAM(s) Oral every 6 hours  ascorbic acid 500 milliGRAM(s) Oral two times a day  calcium carbonate 1250 mG  + Vitamin D (OsCal 500 + D) 1 Tablet(s) Oral three times a day  folic acid 1 milliGRAM(s) Oral daily  heparin   Injectable 5000 Unit(s) SubCutaneous every 8 hours  insulin lispro (ADMELOG) corrective regimen sliding scale   SubCutaneous Before meals and at bedtime  multivitamin 1 Tablet(s) Oral daily  pantoprazole    Tablet 40 milliGRAM(s) Oral before breakfast  polyethylene glycol 3350 17 Gram(s) Oral daily  senna 2 Tablet(s) Oral at bedtime      Assessment: 77 year old woman with ESRD on HD (MWF), HTN, DM (diet controlled), chronic type B aortic dissection, heart murmur, endometrial ca in remission s/p total hysterectomy in  presents with L hip pain     Plan of Care:      #Post-operative evaluation-  - Ms. Harris tolerated orthopedic intervention well.  Post operative course complicated by distributive/hemorrhagic shock requiring low doses of Levophed.  Drop in hgb , s/p1 U PRBC with appropriate response  She displays no signs of  coronary ischemia or decompensated CHF.   Her admission EKG is sinus with a pre-existing RBBB.   Recent echo with no acute findings.   Continue current cardiac management.       #Type B aortic dissection  - Chronic (Diagnosed >10 years ago)  - Continue current management.        #ESRD  - HD as per renal.      Rest of care as per SICU.         Over 35 minutes spent on total encounter; more than 50% of the visit was spent counseling and/or coordinating care by the attending physician.      Gabo Burris MD [facility]  Cardiovascular Disease  [phone]

## 2023-07-24 NOTE — PROGRESS NOTE ADULT - SUBJECTIVE AND OBJECTIVE BOX
ORTHOPAEDIC PROGRESS NOTE    SUBJECTIVE:  Pt seen and examined at bedside this am.  Doing well.  No acute events overnight.  Pt states pain is well controlled though feels the ACE wrap is uncomfortable.  She was OOB to chair with PT yesterday but required assistive device to hold on to     OBJECTIVE:  Vital Signs Last 24 Hrs  T(C): 36.3 (24 Jul 2023 04:00), Max: 36.6 (23 Jul 2023 20:00)  T(F): 97.4 (24 Jul 2023 04:00), Max: 97.9 (23 Jul 2023 20:00)  HR: 69 (24 Jul 2023 04:00) (62 - 80)  BP: 158/73 (24 Jul 2023 04:00) (145/58 - 159/67)  BP(mean): 90 (24 Jul 2023 04:00) (80 - 101)  RR: 22 (24 Jul 2023 04:00) (18 - 24)  SpO2: 96% (24 Jul 2023 04:00) (96% - 100%)    Parameters below as of 24 Jul 2023 04:00  Patient On (Oxygen Delivery Method): room air        Physical Exam:  General: NAD; resting comfrotably in bed  Resp: non labored  LLE:  - Prevena in place w no output   - ACE wrap firmly in place spica style  - Dressing otherwise c/d/i   - Comparments soft  - SILT S/S/DP/SP/T  Motor: EHL/FHL/TA/Gastroc/Sol intact    +DP pulse     LABS                        8.2    5.68  )-----------( 107      ( 24 Jul 2023 01:20 )             25.4       07-24    132<L>  |  98  |  40<H>  ----------------------------<  93  4.7   |  23  |  5.62<H>    Ca    8.0<L>      24 Jul 2023 01:20  Phos  4.5     07-24  Mg     2.00     07-24            I&O's Summary    22 Jul 2023 07:01  -  23 Jul 2023 07:00  --------------------------------------------------------  IN: 1040 mL / OUT: 0 mL / NET: 1040 mL        acetaminophen     Tablet .. 650 milliGRAM(s) Oral every 6 hours  aluminum hydroxide/magnesium hydroxide/simethicone Suspension 30 milliLiter(s) Oral four times a day PRN  ascorbic acid 500 milliGRAM(s) Oral two times a day  calcium carbonate 1250 mG  + Vitamin D (OsCal 500 + D) 1 Tablet(s) Oral three times a day  dextrose 5%. 1000 milliLiter(s) IV Continuous <Continuous>  dextrose 5%. 1000 milliLiter(s) IV Continuous <Continuous>  dextrose 50% Injectable 25 Gram(s) IV Push once  dextrose 50% Injectable 12.5 Gram(s) IV Push once  dextrose 50% Injectable 25 Gram(s) IV Push once  dextrose Oral Gel 15 Gram(s) Oral once PRN  folic acid 1 milliGRAM(s) Oral daily  glucagon  Injectable 1 milliGRAM(s) IntraMuscular once  heparin   Injectable 5000 Unit(s) SubCutaneous every 8 hours  insulin lispro (ADMELOG) corrective regimen sliding scale   SubCutaneous Before meals and at bedtime  melatonin 3 milliGRAM(s) Oral at bedtime PRN  multivitamin 1 Tablet(s) Oral daily  ondansetron Injectable 4 milliGRAM(s) IV Push every 6 hours PRN  oxyCODONE    IR 2.5 milliGRAM(s) Oral every 4 hours PRN  oxyCODONE    IR 5 milliGRAM(s) Oral every 4 hours PRN  pantoprazole    Tablet 40 milliGRAM(s) Oral before breakfast  polyethylene glycol 3350 17 Gram(s) Oral daily  senna 2 Tablet(s) Oral at bedtime

## 2023-07-24 NOTE — PROGRESS NOTE ADULT - ASSESSMENT
PLAN:  NEURO:   - A&Ox3  - Pain control: Tylenol ATC and oxycodone PRN     RESPIRATORY  - Saturation >92%  - Satting well on RA     CARDIOVASCULAR  - MAP goal >65  - Cleared lactate     GI/NUTRITION  - Diet: Regular diet (consistent carbs)  - Monitor for abdominal pain/ diarrhea   - Protonix  - Folic acid and multivitamin    GENITOURINARY/RENAL  - HD M/W/F   - Monitor strict I/Os   - Monitor and replete electrolytes     HEMATOLOGIC  - DVT ppx: SQH   - Monitor H/H  - H/H with appropriate response after 1 U PRBC    INFECTIOUS DISEASES:  - Abx: Zosyn- Will discontinue today as cx remain with NGTD   - Monitor for fever   - Trend WBC  - Blood cultures (7/19) sent NGTD  - Intraop bone swab cx (7/19) NGTD    ENDOCRINE  - Hx of T2DM   - ISS     Disposition: Listed for floors    Pt is a 76 y/o female pmhx of ESRD on HD (MoWeFri), HTN, DM (diet controlled), chronic type B aortic dissection, heart murmur, endometrial ca in remission s/p total hysterectomy in 2007 s/p L IMN for IT fx in May 2023,  now presenting for evaluation of concern for hardware migration. Now s/p  left hip removal of gamma nail and placement of restoration modular peter arthroplasty, EBL 200cc adn got 1L of LR. . Patient hypotensive in PACU requiring pressors requirements and volume resuscitation. In PACU patient got 1 PRBC, 50cc of 25% albumin.  SICU consulted for pressor support and hemodynamic management. On POD0, patient with rising lactate and abdominal pain. CTA completed which showed hematoma at the operative site, with possible extravasation.       PLAN:  NEURO:   - A&Ox3  - Pain control: Tylenol ATC and oxycodone PRN     RESPIRATORY  - Saturation >92%  - Satting well on RA     CARDIOVASCULAR  - MAP goal >65  - Cleared lactate     GI/NUTRITION  - Diet: Regular diet (consistent carbs)  - Monitor for abdominal pain/ diarrhea   - Protonix  - Folic acid and multivitamin    GENITOURINARY/RENAL  - HD M/W/F   - Monitor strict I/Os   - Monitor and replete electrolytes     HEMATOLOGIC  - DVT ppx: SQH   - Monitor H/H  - H/H with appropriate response after 1 U PRBC    INFECTIOUS DISEASES:  - Abx: Zosyn- Will discontinue today as cx remain with NGTD   - Monitor for fever   - Trend WBC  - Blood cultures (7/19) sent NGTD  - Intraop bone swab cx (7/19) NGTD    ENDOCRINE  - Hx of T2DM   - ISS     Disposition: Listed for floors    Pt is a 78 y/o female pmhx of ESRD on HD (MoWeFri), HTN, DM (diet controlled), chronic type B aortic dissection, heart murmur, endometrial ca in remission s/p total hysterectomy in 2007 s/p L IMN for IT fx in May 2023,  now presenting for evaluation of concern for hardware migration. Now s/p  left hip removal of gamma nail and placement of restoration modular peter arthroplasty, EBL 200cc adn got 1L of LR. . Patient hypotensive in PACU requiring pressors requirements and volume resuscitation. In PACU patient got 1 PRBC, 50cc of 25% albumin.  SICU consulted for pressor support and hemodynamic management. On POD0, patient with rising lactate and abdominal pain. CTA completed which showed hematoma at the operative site, with possible extravasation.       PLAN:  NEURO:   - A&Ox3  - Pain control: Tylenol ATC and oxycodone PRN     RESPIRATORY  - Saturation >92%  - Satting well on RA     CARDIOVASCULAR  - MAP goal >65  - Cleared lactate     GI/NUTRITION  - Diet: Regular diet (consistent carbs)  - Monitor for abdominal pain/ diarrhea   - Protonix  - Folic acid and multivitamin    GENITOURINARY/RENAL  - HD M/W/F   - Monitor strict I/Os   - Monitor and replete electrolytes     HEMATOLOGIC  - DVT ppx: SQH   - Monitor H/H (stable)    INFECTIOUS DISEASES:  - Abx: Zosyn (7/19- ) discontinued on 7/23 as cx NGTD  - Monitor for fever   - Trend WBC  - Blood cultures (7/19) sent NGTD  - Intraop bone swab cx (7/19) NGTD    ENDOCRINE  - Hx of T2DM   - ISS     Disposition: Listed for floors

## 2023-07-24 NOTE — CHART NOTE - NSCHARTNOTEFT_GEN_A_CORE
Patient being transferred from SICU o [address] [place], 908.   Patient stable, H/H stable, HD today in the morning  Signed out to Sheila Abdalla PGY2  SICU

## 2023-07-24 NOTE — PROGRESS NOTE ADULT - ASSESSMENT
77F s/p L hip SUZANNA, hemiarthroplasty on 7/19. Hemodynamically stable    PLAN  - Spica super ACE bandage in place   - Prevena vac in place holding suction   - Pain control  - WBAT RLE  - DVT ppx: SQH  - PT/OT as able   - Diet: Reg  - Monitor I&Os  - Pending floor bed from SICU

## 2023-07-24 NOTE — PROGRESS NOTE ADULT - SUBJECTIVE AND OBJECTIVE BOX
SICU Daily Progress Note  =====================================================  Interval/Overnight Events:       -Discontinued Zosyn       ALLERGIES:  No Known Allergies      --------------------------------------------------------------------------------------    MEDICATIONS:    Neurologic Medications  acetaminophen     Tablet .. 650 milliGRAM(s) Oral every 6 hours  melatonin 3 milliGRAM(s) Oral at bedtime PRN Insomnia  ondansetron Injectable 4 milliGRAM(s) IV Push every 6 hours PRN Nausea and/or Vomiting  oxyCODONE    IR 2.5 milliGRAM(s) Oral every 4 hours PRN Moderate Pain (4 - 6)  oxyCODONE    IR 5 milliGRAM(s) Oral every 4 hours PRN Severe Pain (7 - 10)    Respiratory Medications    Cardiovascular Medications    Gastrointestinal Medications  aluminum hydroxide/magnesium hydroxide/simethicone Suspension 30 milliLiter(s) Oral four times a day PRN Indigestion  ascorbic acid 500 milliGRAM(s) Oral two times a day  calcium carbonate 1250 mG  + Vitamin D (OsCal 500 + D) 1 Tablet(s) Oral three times a day  dextrose 5%. 1000 milliLiter(s) IV Continuous <Continuous>  dextrose 5%. 1000 milliLiter(s) IV Continuous <Continuous>  folic acid 1 milliGRAM(s) Oral daily  multivitamin 1 Tablet(s) Oral daily  pantoprazole    Tablet 40 milliGRAM(s) Oral before breakfast  polyethylene glycol 3350 17 Gram(s) Oral daily  senna 2 Tablet(s) Oral at bedtime    Genitourinary Medications    Hematologic/Oncologic Medications  heparin   Injectable 5000 Unit(s) SubCutaneous every 8 hours    Antimicrobial/Immunologic Medications    Endocrine/Metabolic Medications  dextrose 50% Injectable 25 Gram(s) IV Push once  dextrose 50% Injectable 12.5 Gram(s) IV Push once  dextrose 50% Injectable 25 Gram(s) IV Push once  dextrose Oral Gel 15 Gram(s) Oral once PRN Blood Glucose LESS THAN 70 milliGRAM(s)/deciliter  glucagon  Injectable 1 milliGRAM(s) IntraMuscular once  insulin lispro (ADMELOG) corrective regimen sliding scale   SubCutaneous Before meals and at bedtime    Topical/Other Medications    --------------------------------------------------------------------------------------    VITAL SIGNS:  ICU Vital Signs Last 24 Hrs  T(C): 36.6 (23 Jul 2023 20:00), Max: 36.9 (23 Jul 2023 04:00)  T(F): 97.9 (23 Jul 2023 20:00), Max: 98.4 (23 Jul 2023 04:00)  HR: 70 (23 Jul 2023 20:00) (62 - 80)  BP: 147/84 (23 Jul 2023 20:00) (142/68 - 159/67)  BP(mean): 101 (23 Jul 2023 20:00) (85 - 101)  ABP: --  ABP(mean): --  RR: 24 (23 Jul 2023 20:00) (18 - 24)  SpO2: 96% (23 Jul 2023 20:00) (96% - 100%)    O2 Parameters below as of 23 Jul 2023 20:00  Patient On (Oxygen Delivery Method): room air    --------------------------------------------------------------------------------------    INS AND OUTS:  I&O's Detail    22 Jul 2023 07:01  -  23 Jul 2023 07:00  --------------------------------------------------------  IN:    IV PiggyBack: 100 mL    Oral Fluid: 640 mL    PRBCs (Packed Red Blood Cells): 300 mL  Total IN: 1040 mL    OUT:    Drain (mL): 0 mL    Voided (mL): 0 mL  Total OUT: 0 mL    Total NET: 1040 mL    --------------------------------------------------------------------------------------      EXAM  NEUROLOGY  Exam: Normal, NAD, alert, oriented x3, no focal deficits.    HEENT  Exam: Normocephalic, atraumatic, EOMI.     RESPIRATORY  Exam: Lungs clear to auscultation, Normal expansion/effort.     CARDIOVASCULAR  Exam: S1, S2.  Regular rate and rhythm.     GI/NUTRITION  Exam: Abdomen soft, Non-tender, Non-distended.     VASCULAR  Exam: Extremities warm, pink, well-perfused.    MUSCULOSKELETAL  Exam: Left leg with prevena and ACE wrap in place. All extremities moving spontaneously without limitations.    SKIN  Exam: Good skin turgor, no skin breakdown.       METABOLIC / FLUIDS / ELECTROLYTES  ascorbic acid 500 milliGRAM(s) Oral two times a day  calcium carbonate 1250 mG  + Vitamin D (OsCal 500 + D) 1 Tablet(s) Oral three times a day  dextrose 5%. 1000 milliLiter(s) IV Continuous <Continuous>  dextrose 5%. 1000 milliLiter(s) IV Continuous <Continuous>  folic acid 1 milliGRAM(s) Oral daily  multivitamin 1 Tablet(s) Oral daily      HEMATOLOGIC  [x] VTE Prophylaxis: heparin   Injectable 5000 Unit(s) SubCutaneous every 8 hours    Transfusions:	[] PRBC	[] Platelets		[] FFP	[] Cryoprecipitate    INFECTIOUS DISEASE  Antimicrobials/Immunologic Medications:    Day #      of     ***    TUBES / LINES / DRAINS  ***  [x] Peripheral IV  [] Central Venous Line     	[] R	[] L	[] IJ	[] Fem	[] SC	Date Placed:   [] Arterial Line		[] R	[] L	[] Fem	[] Rad	[] Ax	Date Placed:   [] PICC		[] Midline		[] Mediport  [] Urinary Catheter		Date Placed:   [x] Necessity of urinary, arterial, and venous catheters discussed    --------------------------------------------------------------------------------------    LABS                          8.2    5.96  )-----------( 93 ( 23 Jul 2023 09:20 )             24.9   07-23    133<L>  |  97<L>  |  32<H>  ----------------------------<  111<H>  4.2   |  21<L>  |  4.51<H>    Ca    7.5<L>      23 Jul 2023 00:30  Phos  4.0     07-23  Mg     1.90     07-23    TPro  4.9<L>  /  Alb  2.1<L>  /  TBili  0.8  /  DBili  x   /  AST  52<H>  /  ALT  <5  /  AlkPhos  113  07-22    --------------------------------------------------------------------------------------    OTHER LABORATORY:     IMAGING STUDIES:   CXR:

## 2023-07-24 NOTE — PROGRESS NOTE ADULT - SUBJECTIVE AND OBJECTIVE BOX
[facility] Division of Kidney Diseases & Hypertension  FOLLOW UP NOTE  [phone]--------------------------------------------------------------------------------    HPI: 78F w/ PMH of ESRD on HD TIW (MWF) at [facility] (previously at [facility], [place]), HTN, DM2, endometrial cancer (underwent hysterectomy and chemotherapy, type B aortic dissection, and L hip ORIF, admitted for concerns of loosened hardware. Nephrology consulted for management of ESRD/HD.     24 hour events/subjective: Pt. seen and evaluated at bedside in the SICU this AM. Feels well. Pain is well managed. She denies fevers/chills, headaches, chest pain, SOB, abd pain, or leg swelling.      PAST HISTORY  --------------------------------------------------------------------------------  No significant changes to PMH, PSH, FHx, SHx, unless otherwise noted    ALLERGIES & MEDICATIONS  --------------------------------------------------------------------------------  Allergies  No Known Allergies    Intolerances    Standing Inpatient Medications  acetaminophen     Tablet .. 650 milliGRAM(s) Oral every 6 hours  ascorbic acid 500 milliGRAM(s) Oral two times a day  calcium carbonate 1250 mG  + Vitamin D (OsCal 500 + D) 1 Tablet(s) Oral three times a day  folic acid 1 milliGRAM(s) Oral daily  heparin   Injectable 5000 Unit(s) SubCutaneous every 8 hours  insulin lispro (ADMELOG) corrective regimen sliding scale   SubCutaneous Before meals and at bedtime  multivitamin 1 Tablet(s) Oral daily  pantoprazole    Tablet 40 milliGRAM(s) Oral before breakfast  polyethylene glycol 3350 17 Gram(s) Oral daily  senna 2 Tablet(s) Oral at bedtime    PRN Inpatient Medications  aluminum hydroxide/magnesium hydroxide/simethicone Suspension 30 milliLiter(s) Oral four times a day PRN  melatonin 3 milliGRAM(s) Oral at bedtime PRN  ondansetron Injectable 4 milliGRAM(s) IV Push every 6 hours PRN  oxyCODONE    IR 2.5 milliGRAM(s) Oral every 4 hours PRN  oxyCODONE    IR 5 milliGRAM(s) Oral every 4 hours PRN    REVIEW OF SYSTEMS  --------------------------------------------------------------------------------  Gen: No fevers/chills  Head/Eyes/Ears: no headache  Respiratory: No dyspnea, cough  CV: No chest pain  GI: No abdominal pain  MSK: No leg pain or swelling, swelling in arms    All other systems were reviewed and are negative, except as noted.    VITALS/PHYSICAL EXAM  --------------------------------------------------------------------------------  T(C): 36.4 (07-24-23 @ 08:00), Max: 36.6 (07-23-23 @ 20:00)  HR: 75 (07-24-23 @ 08:00) (62 - 75)  BP: 158/73 (07-24-23 @ 04:00) (145/58 - 159/67)  RR: 16 (07-24-23 @ 08:00) (16 - 24)  SpO2: 96% (07-24-23 @ 08:00) (96% - 100%)  Wt(kg): --    Physical Exam:  Gen: NAD  HEENT: MMM  Pulm: minimal bibasilar crackles   CV: S1S2  Abd: Soft, +BS   Ext: LE edema B/L, dressing on LLE, chronic LUE edema, RUE edema improved  Neuro: Awake  Skin: Warm and dry  Vascular access: LUE AVF, aneurysmal, skin intact, thrill felt    LABS/STUDIES  --------------------------------------------------------------------------------              8.2    5.68  >-----------<  107      [07-24-23 @ 01:20]              25.4     132  |  98  |  40  ----------------------------<  93      [07-24-23 @ 01:20]  4.7   |  23  |  5.62        Ca     8.0     [07-24-23 @ 01:20]      Mg     2.00     [07-24-23 @ 01:20]      Phos  4.5     [07-24-23 @ 01:20]    Creatinine Trend:  SCr 5.62 [07-24 @ 01:20]  SCr 4.51 [07-23 @ 00:30]  SCr 3.79 [07-22 @ 02:00]  SCr 2.84 [07-21 @ 15:36]  SCr 6.23 [07-21 @ 08:35]

## 2023-07-24 NOTE — PROGRESS NOTE ADULT - PROBLEM SELECTOR PLAN 1
Pt with ESRD on HD TIW MWF admitted with fall. Pt s/p Lt hemiarthroplasty on 7/19/2023, however surgery complicated by intraoperative hypotension requiring vasopressor support. Patient transferred to SICU for hemodynamic stabilization. CT Angio abd/pelv reviewed. HD treatment held on 7/19/23 due to above. Held on 7/20 due to blood loss anemia. Underwent maintenance HD on 7/21 but treatment time reduced and only 1L removed due to hypotension. Plan for maintenance HD today. Monitor labs.

## 2023-07-25 DIAGNOSIS — S70.01XA CONTUSION OF RIGHT HIP, INITIAL ENCOUNTER: ICD-10-CM

## 2023-07-25 LAB
CULTURE RESULTS: SIGNIFICANT CHANGE UP
CULTURE RESULTS: SIGNIFICANT CHANGE UP
GLUCOSE BLDC GLUCOMTR-MCNC: 120 MG/DL — HIGH (ref 70–99)
GLUCOSE BLDC GLUCOMTR-MCNC: 189 MG/DL — HIGH (ref 70–99)
GLUCOSE BLDC GLUCOMTR-MCNC: 193 MG/DL — HIGH (ref 70–99)
GLUCOSE BLDC GLUCOMTR-MCNC: 219 MG/DL — HIGH (ref 70–99)
SPECIMEN SOURCE: SIGNIFICANT CHANGE UP
SPECIMEN SOURCE: SIGNIFICANT CHANGE UP

## 2023-07-25 PROCEDURE — 99233 SBSQ HOSP IP/OBS HIGH 50: CPT

## 2023-07-25 RX ADMIN — Medication 650 MILLIGRAM(S): at 18:47

## 2023-07-25 RX ADMIN — Medication 1 TABLET(S): at 21:28

## 2023-07-25 RX ADMIN — Medication 1: at 11:39

## 2023-07-25 RX ADMIN — Medication 1 TABLET(S): at 11:39

## 2023-07-25 RX ADMIN — HEPARIN SODIUM 5000 UNIT(S): 5000 INJECTION INTRAVENOUS; SUBCUTANEOUS at 06:26

## 2023-07-25 RX ADMIN — Medication 2: at 16:47

## 2023-07-25 RX ADMIN — Medication 650 MILLIGRAM(S): at 17:47

## 2023-07-25 RX ADMIN — Medication 650 MILLIGRAM(S): at 06:27

## 2023-07-25 RX ADMIN — SENNA PLUS 2 TABLET(S): 8.6 TABLET ORAL at 21:29

## 2023-07-25 RX ADMIN — Medication 650 MILLIGRAM(S): at 12:40

## 2023-07-25 RX ADMIN — HEPARIN SODIUM 5000 UNIT(S): 5000 INJECTION INTRAVENOUS; SUBCUTANEOUS at 21:28

## 2023-07-25 RX ADMIN — Medication 1: at 22:00

## 2023-07-25 RX ADMIN — OXYCODONE HYDROCHLORIDE 5 MILLIGRAM(S): 5 TABLET ORAL at 21:40

## 2023-07-25 RX ADMIN — OXYCODONE HYDROCHLORIDE 5 MILLIGRAM(S): 5 TABLET ORAL at 16:10

## 2023-07-25 RX ADMIN — OXYCODONE HYDROCHLORIDE 5 MILLIGRAM(S): 5 TABLET ORAL at 15:10

## 2023-07-25 RX ADMIN — Medication 1 TABLET(S): at 15:09

## 2023-07-25 RX ADMIN — Medication 500 MILLIGRAM(S): at 17:47

## 2023-07-25 RX ADMIN — PANTOPRAZOLE SODIUM 40 MILLIGRAM(S): 20 TABLET, DELAYED RELEASE ORAL at 06:28

## 2023-07-25 RX ADMIN — OXYCODONE HYDROCHLORIDE 5 MILLIGRAM(S): 5 TABLET ORAL at 22:40

## 2023-07-25 RX ADMIN — Medication 650 MILLIGRAM(S): at 11:40

## 2023-07-25 RX ADMIN — Medication 1 MILLIGRAM(S): at 11:39

## 2023-07-25 RX ADMIN — Medication 1 TABLET(S): at 06:26

## 2023-07-25 RX ADMIN — Medication 500 MILLIGRAM(S): at 06:27

## 2023-07-25 RX ADMIN — HEPARIN SODIUM 5000 UNIT(S): 5000 INJECTION INTRAVENOUS; SUBCUTANEOUS at 15:08

## 2023-07-25 NOTE — PROGRESS NOTE ADULT - SUBJECTIVE AND OBJECTIVE BOX
Patient is a 78y old  Female who presents with a chief complaint of s/p L hip orif concern for loosened hardwar (25 Jul 2023 08:53)      SUBJECTIVE / OVERNIGHT EVENTS:  Patient has no new complaints. Denies cp, SOB, abdominal pain, N/V/D     MEDICATIONS  (STANDING):  acetaminophen     Tablet .. 650 milliGRAM(s) Oral every 6 hours  ascorbic acid 500 milliGRAM(s) Oral two times a day  calcium carbonate 1250 mG  + Vitamin D (OsCal 500 + D) 1 Tablet(s) Oral three times a day  folic acid 1 milliGRAM(s) Oral daily  heparin   Injectable 5000 Unit(s) SubCutaneous every 8 hours  insulin lispro (ADMELOG) corrective regimen sliding scale   SubCutaneous Before meals and at bedtime  multivitamin 1 Tablet(s) Oral daily  pantoprazole    Tablet 40 milliGRAM(s) Oral before breakfast  polyethylene glycol 3350 17 Gram(s) Oral daily  senna 2 Tablet(s) Oral at bedtime    MEDICATIONS  (PRN):  aluminum hydroxide/magnesium hydroxide/simethicone Suspension 30 milliLiter(s) Oral four times a day PRN Indigestion  melatonin 3 milliGRAM(s) Oral at bedtime PRN Insomnia  ondansetron Injectable 4 milliGRAM(s) IV Push every 6 hours PRN Nausea and/or Vomiting  oxyCODONE    IR 2.5 milliGRAM(s) Oral every 4 hours PRN Moderate Pain (4 - 6)  oxyCODONE    IR 5 milliGRAM(s) Oral every 4 hours PRN Severe Pain (7 - 10)      Vital Signs Last 24 Hrs  T(C): 36.7 (25 Jul 2023 09:52), Max: 36.8 (24 Jul 2023 19:22)  T(F): 98 (25 Jul 2023 09:52), Max: 98.3 (24 Jul 2023 19:22)  HR: 78 (25 Jul 2023 09:52) (72 - 87)  BP: 147/61 (25 Jul 2023 09:52) (133/63 - 166/74)  BP(mean): 75 (24 Jul 2023 16:00) (75 - 75)  RR: 18 (25 Jul 2023 09:52) (17 - 19)  SpO2: 100% (25 Jul 2023 09:52) (98% - 100%)    Parameters below as of 25 Jul 2023 09:52  Patient On (Oxygen Delivery Method): room air      CAPILLARY BLOOD GLUCOSE      POCT Blood Glucose.: 189 mg/dL (25 Jul 2023 11:26)  POCT Blood Glucose.: 120 mg/dL (25 Jul 2023 07:26)  POCT Blood Glucose.: 116 mg/dL (24 Jul 2023 21:43)  POCT Blood Glucose.: 121 mg/dL (24 Jul 2023 16:16)    I&O's Summary    24 Jul 2023 07:01  -  25 Jul 2023 07:00  --------------------------------------------------------  IN: 700 mL / OUT: 2400 mL / NET: -1700 mL        PHYSICAL EXAM:  GENERAL: NAD, well-developed  HEAD:  Atraumatic, Normocephalic  EYES: EOMI, PERRLA, conjunctiva and sclera clear  NECK: Supple, No JVD  CHEST/LUNG: Clear to auscultation bilaterally; No wheeze  HEART: Regular rate and rhythm; No murmurs, rubs, or gallops  ABDOMEN: Soft, Nontender, Nondistended; Bowel sounds present  EXTREMITIES:  RUE fistula. 2+ Peripheral Pulses, No clubbing, cyanosis, or edema  PSYCH: AAOx3  NEUROLOGY: non-focal  SKIN: No rashes or lesions    LABS:                        8.2    5.68  )-----------( 107      ( 24 Jul 2023 01:20 )             25.4     07-24    132<L>  |  98  |  40<H>  ----------------------------<  93  4.7   |  23  |  5.62<H>    Ca    8.0<L>      24 Jul 2023 01:20  Phos  4.5     07-24  Mg     2.00     07-24            Urinalysis Basic - ( 24 Jul 2023 01:20 )    Color: x / Appearance: x / SG: x / pH: x  Gluc: 93 mg/dL / Ketone: x  / Bili: x / Urobili: x   Blood: x / Protein: x / Nitrite: x   Leuk Esterase: x / RBC: x / WBC x   Sq Epi: x / Non Sq Epi: x / Bacteria: x        RADIOLOGY & ADDITIONAL TESTS:    Imaging Personally Reviewed:    Consultant(s) Notes Reviewed:      Care Discussed with Consultants/Other Providers:

## 2023-07-25 NOTE — PROGRESS NOTE ADULT - ASSESSMENT
77F s/p L hip SUZANNA, hemiarthroplasty on 7/19. Hemodynamically stable    PLAN  - Prevena vac in place holding suction   - Pain control  - WBAT RLE  - DVT ppx: SQH  - PT/OT as able   - Diet: Reg  - Monitor I&Os  - Dispo: PT recc MARYANN

## 2023-07-25 NOTE — PROGRESS NOTE ADULT - SUBJECTIVE AND OBJECTIVE BOX
Orthopedic Progress Note     S:  No acute events overnight, pain is well controlled.  Patient denies any chest pain, SOB, N/V, fevers/chills. Still complaining of some tenderness where the ACE was applied, though it was removed yesterday evening.     T(C): 36.5 (07-25-23 @ 06:00), Max: 36.8 (07-24-23 @ 19:22)  HR: 75 (07-25-23 @ 06:00) (72 - 87)  BP: 151/68 (07-25-23 @ 06:00) (133/63 - 166/74)  RR: 17 (07-25-23 @ 06:00) (16 - 25)  SpO2: 100% (07-25-23 @ 06:00) (96% - 100%)  Wt(kg): --I&O's Summary    24 Jul 2023 07:01  -  25 Jul 2023 07:00  --------------------------------------------------------  IN: 700 mL / OUT: 2400 mL / NET: -1700 mL        O:  Physical exam:  Gen: Alert and Oriented x3, No Acute Distress  LLE:  - Prevena in place w no output   - ACE wrap firmly in place spica style  - Dressing otherwise c/d/i   - Comparments soft  - SILT S/S/DP/SP/T  Motor: EHL/FHL/TA/Gastroc/Sol intact    +DP pulse            Labs:                        8.2    5.68  )-----------( 107      ( 24 Jul 2023 01:20 )             25.4    07-24    132<L>  |  98  |  40<H>  ----------------------------<  93  4.7   |  23  |  5.62<H>    Ca    8.0<L>      24 Jul 2023 01:20  Phos  4.5     07-24  Mg     2.00     07-24        Imaging:    A/P  Patient S/P----.  VSS. NAD.  PT/OT-WBAT  IS  DVT PPx  Pain Control  Dispo planning

## 2023-07-25 NOTE — PROGRESS NOTE ADULT - PROBLEM SELECTOR PLAN 2
CT angio A/P on 7/20 -ill-defined and partially obscured hematoma without definite evidence of active hemorrhage. A hyperdense focus could reflect a component of the hematoma, though evaluation is limited without precontrast imaging and due to streak artifact.  s/p 1 Unit PRBCs  -H/H stable  -monitoring CBC  -management as per ortho

## 2023-07-25 NOTE — PROGRESS NOTE ADULT - PROBLEM SELECTOR PLAN 1
-s/p L IMN for IT fx in May 2023 c/b 2 weeks of worsening atraumatic  post op L hip pain   -Xray of left femur to have interval migration of femoral neck screw into acetabular region.  -s/p left hip removal of gamma nail and placement of restoration modular peter arthroplasty on 7/19  -Prevena vac in place holding suction  PT rec rehab

## 2023-07-25 NOTE — PROGRESS NOTE ADULT - SUBJECTIVE AND OBJECTIVE BOX
Cardiovascular Disease Progress Note  Date of Service: 23 @ 08:53    Overnight events: No acute events overnight.    Patient denies chest pain or SOB.   Otherwise review of systems negative    Objective Findings:  T(C): 36.5 (23 @ 06:00), Max: 36.8 (23 @ 19:22)  HR: 75 (23 @ 06:00) (72 - 87)  BP: 151/68 (23 @ 06:00) (133/63 - 166/74)  RR: 17 (23 @ 06:00) (17 - 25)  SpO2: 100% (23 @ 06:00) (98% - 100%)  Wt(kg): --  Daily     Daily Weight in k.5 (2023 01:40)      Physical Exam:  Gen: NAD; Patient resting comfortably  HEENT: EOMI, Normocephalic/ atraumatic  CV: RRR, normal S1 + S2, no m/r/g  Lungs:  Normal respiratory effort; clear to auscultation bilaterally  Abd: soft, non-tender; bowel sounds present  Ext: No edema; warm and well perfused    Telemetry: n/a    Laboratory Data:                        8.2    5.68  )-----------( 107      ( 2023 01:20 )             25.4     -    132<L>  |  98  |  40<H>  ----------------------------<  93  4.7   |  23  |  5.62<H>    Ca    8.0<L>      2023 01:20  Phos  4.5     -  Mg     2.00     -                Inpatient Medications:  MEDICATIONS  (STANDING):  acetaminophen     Tablet .. 650 milliGRAM(s) Oral every 6 hours  ascorbic acid 500 milliGRAM(s) Oral two times a day  calcium carbonate 1250 mG  + Vitamin D (OsCal 500 + D) 1 Tablet(s) Oral three times a day  folic acid 1 milliGRAM(s) Oral daily  heparin   Injectable 5000 Unit(s) SubCutaneous every 8 hours  insulin lispro (ADMELOG) corrective regimen sliding scale   SubCutaneous Before meals and at bedtime  multivitamin 1 Tablet(s) Oral daily  pantoprazole    Tablet 40 milliGRAM(s) Oral before breakfast  polyethylene glycol 3350 17 Gram(s) Oral daily  senna 2 Tablet(s) Oral at bedtime      Assessment: 77 year old woman with ESRD on HD (MWF), HTN, DM (diet controlled), chronic type B aortic dissection, heart murmur, endometrial ca in remission s/p total hysterectomy in  presents with L hip pain     Plan of Care:      #Post-operative evaluation-  - Ms. Harris tolerated orthopedic intervention well.  Post operative course complicated by distributive/hemorrhagic shock, briefly requiring low doses of Levophed.  She displays no signs of  coronary ischemia or decompensated CHF.   Her admission EKG is sinus with a pre-existing RBBB.   Recent echo with no acute findings.   Continue current cardiac management.       #Type B aortic dissection  - Chronic (Diagnosed >10 years ago)  - Continue current management.        #ESRD  - HD as per renal.        Over 25 minutes spent on total encounter; more than 50% of the visit was spent counseling and/or coordinating care by the attending physician.      Gabo Burris MD [facility]  Cardiovascular Disease  [phone]

## 2023-07-25 NOTE — PROGRESS NOTE ADULT - ASSESSMENT
77 y.o. Female w/ Hx HTN, DM (diet controlled), chronic type B aortic dissection, ESRD on HD ( M, W, F ) via LUE fistula, endometrial ca in remission s/p total hysterectomy in 2007 s/p L IMN for IT fx in May 2023 p/w 2 weeks of worsening atraumatic  [facility] of left femur to have interval migration of femoral neck screw into acetabular region so readmitted for Left hip revision. Patient s/p left hip removal of gamma nail and placement of restoration modular peter arthroplasty c/b post op hypotension requiring pressors and SICU stay. Patient had hemorrhagic shock from hematoma at the operative site, with possible extravasation found on CT imaging requiring 1 Unit PRBCs.

## 2023-07-26 LAB
GLUCOSE BLDC GLUCOMTR-MCNC: 127 MG/DL — HIGH (ref 70–99)
GLUCOSE BLDC GLUCOMTR-MCNC: 141 MG/DL — HIGH (ref 70–99)
GLUCOSE BLDC GLUCOMTR-MCNC: 159 MG/DL — HIGH (ref 70–99)
GLUCOSE BLDC GLUCOMTR-MCNC: 160 MG/DL — HIGH (ref 70–99)
GLUCOSE BLDC GLUCOMTR-MCNC: 200 MG/DL — HIGH (ref 70–99)
GLUCOSE BLDC GLUCOMTR-MCNC: 202 MG/DL — HIGH (ref 70–99)

## 2023-07-26 PROCEDURE — 99232 SBSQ HOSP IP/OBS MODERATE 35: CPT

## 2023-07-26 PROCEDURE — 99232 SBSQ HOSP IP/OBS MODERATE 35: CPT | Mod: GC

## 2023-07-26 RX ADMIN — Medication 650 MILLIGRAM(S): at 19:32

## 2023-07-26 RX ADMIN — Medication 1 TABLET(S): at 12:27

## 2023-07-26 RX ADMIN — Medication 650 MILLIGRAM(S): at 01:30

## 2023-07-26 RX ADMIN — Medication 2: at 21:36

## 2023-07-26 RX ADMIN — Medication 1 TABLET(S): at 21:37

## 2023-07-26 RX ADMIN — Medication 1: at 17:00

## 2023-07-26 RX ADMIN — Medication 650 MILLIGRAM(S): at 19:06

## 2023-07-26 RX ADMIN — HEPARIN SODIUM 5000 UNIT(S): 5000 INJECTION INTRAVENOUS; SUBCUTANEOUS at 05:55

## 2023-07-26 RX ADMIN — Medication 1 MILLIGRAM(S): at 12:27

## 2023-07-26 RX ADMIN — Medication 650 MILLIGRAM(S): at 12:26

## 2023-07-26 RX ADMIN — Medication 1 TABLET(S): at 05:46

## 2023-07-26 RX ADMIN — Medication 650 MILLIGRAM(S): at 00:38

## 2023-07-26 RX ADMIN — Medication 1: at 11:45

## 2023-07-26 RX ADMIN — Medication 650 MILLIGRAM(S): at 05:47

## 2023-07-26 RX ADMIN — HEPARIN SODIUM 5000 UNIT(S): 5000 INJECTION INTRAVENOUS; SUBCUTANEOUS at 21:38

## 2023-07-26 RX ADMIN — Medication 1 TABLET(S): at 13:58

## 2023-07-26 RX ADMIN — HEPARIN SODIUM 5000 UNIT(S): 5000 INJECTION INTRAVENOUS; SUBCUTANEOUS at 13:58

## 2023-07-26 RX ADMIN — PANTOPRAZOLE SODIUM 40 MILLIGRAM(S): 20 TABLET, DELAYED RELEASE ORAL at 05:49

## 2023-07-26 RX ADMIN — Medication 500 MILLIGRAM(S): at 05:47

## 2023-07-26 RX ADMIN — Medication 500 MILLIGRAM(S): at 19:06

## 2023-07-26 RX ADMIN — Medication 650 MILLIGRAM(S): at 06:20

## 2023-07-26 RX ADMIN — Medication 650 MILLIGRAM(S): at 13:00

## 2023-07-26 RX ADMIN — Medication 3 MILLIGRAM(S): at 21:38

## 2023-07-26 NOTE — PROGRESS NOTE ADULT - PROBLEM SELECTOR PLAN 2
Pt. with anemia in the setting of ESRD and blood loss from surgical procedure. Hgb 8.2 from early AM (7/24/23), no further labs ordered. Will give RICA with HD treatments. Monitor hemoglobin levels.     If you have any questions, please feel free to contact fellow.  John Torres  Nephrology Fellow  [id number] / [phone] / [employer] Teams (Preferred)  (After 4pm or on weekends, please call the on-call Fellow)

## 2023-07-26 NOTE — PROGRESS NOTE ADULT - ASSESSMENT
77 y.o. Female w/ Hx HTN, DM (diet controlled), chronic type B aortic dissection, ESRD on HD ( M, W, F ) via LUE fistula, endometrial ca in remission s/p total hysterectomy in 2007 s/p L IMN for IT fx in May 2023 p/w 2 weeks of worsening atraumatic  [facility]ray of left femur to have interval migration of femoral neck screw into acetabular region so readmitted for Left hip revision. Patient s/p left hip removal of gamma nail and placement of restoration modular peter arthroplasty c/b post op hypotension requiring pressors and SICU stay. Patient had hemorrhagic shock from hematoma at the operative site, with possible extravasation found on CT imaging requiring 1 Unit PRBCs.

## 2023-07-26 NOTE — PROGRESS NOTE ADULT - SUBJECTIVE AND OBJECTIVE BOX
Patient is a 78y old  Female who presents with a chief complaint of s/p L hip orif concern for loosened hardwar (26 Jul 2023 12:21)      SUBJECTIVE / OVERNIGHT EVENTS:    MEDICATIONS  (STANDING):  acetaminophen     Tablet .. 650 milliGRAM(s) Oral every 6 hours  ascorbic acid 500 milliGRAM(s) Oral two times a day  calcium carbonate 1250 mG  + Vitamin D (OsCal 500 + D) 1 Tablet(s) Oral three times a day  folic acid 1 milliGRAM(s) Oral daily  heparin   Injectable 5000 Unit(s) SubCutaneous every 8 hours  insulin lispro (ADMELOG) corrective regimen sliding scale   SubCutaneous Before meals and at bedtime  multivitamin 1 Tablet(s) Oral daily  pantoprazole    Tablet 40 milliGRAM(s) Oral before breakfast  polyethylene glycol 3350 17 Gram(s) Oral daily  senna 2 Tablet(s) Oral at bedtime    MEDICATIONS  (PRN):  aluminum hydroxide/magnesium hydroxide/simethicone Suspension 30 milliLiter(s) Oral four times a day PRN Indigestion  melatonin 3 milliGRAM(s) Oral at bedtime PRN Insomnia  ondansetron Injectable 4 milliGRAM(s) IV Push every 6 hours PRN Nausea and/or Vomiting  oxyCODONE    IR 2.5 milliGRAM(s) Oral every 4 hours PRN Moderate Pain (4 - 6)  oxyCODONE    IR 5 milliGRAM(s) Oral every 4 hours PRN Severe Pain (7 - 10)      Vital Signs Last 24 Hrs  T(C): 36.6 (26 Jul 2023 10:43), Max: 37.2 (26 Jul 2023 01:57)  T(F): 97.8 (26 Jul 2023 10:43), Max: 99 (26 Jul 2023 01:57)  HR: 97 (26 Jul 2023 10:43) (77 - 97)  BP: 164/61 (26 Jul 2023 10:43) (121/53 - 164/61)  BP(mean): --  RR: 17 (26 Jul 2023 10:43) (16 - 18)  SpO2: 100% (26 Jul 2023 10:43) (97% - 100%)    Parameters below as of 26 Jul 2023 10:43  Patient On (Oxygen Delivery Method): room air      CAPILLARY BLOOD GLUCOSE      POCT Blood Glucose.: 160 mg/dL (26 Jul 2023 11:14)  POCT Blood Glucose.: 141 mg/dL (26 Jul 2023 09:31)  POCT Blood Glucose.: 127 mg/dL (26 Jul 2023 08:13)  POCT Blood Glucose.: 159 mg/dL (26 Jul 2023 05:31)  POCT Blood Glucose.: 193 mg/dL (25 Jul 2023 21:51)  POCT Blood Glucose.: 219 mg/dL (25 Jul 2023 16:21)    I&O's Summary    26 Jul 2023 07:01  -  26 Jul 2023 13:30  --------------------------------------------------------  IN: 640 mL / OUT: 1078 mL / NET: -438 mL        PHYSICAL EXAM:  GENERAL: NAD, well-developed  HEAD:  Atraumatic, Normocephalic  EYES: EOMI, PERRLA, conjunctiva and sclera clear  NECK: Supple, No JVD  CHEST/LUNG: Clear to auscultation bilaterally; No wheeze  HEART: Regular rate and rhythm; No murmurs, rubs, or gallops  ABDOMEN: Soft, Nontender, Nondistended; Bowel sounds present  EXTREMITIES:  2+ Peripheral Pulses, No clubbing, cyanosis, or edema  PSYCH: AAOx3  NEUROLOGY: non-focal  SKIN: No rashes or lesions    LABS:                    RADIOLOGY & ADDITIONAL TESTS:    Imaging Personally Reviewed:    Consultant(s) Notes Reviewed:      Care Discussed with Consultants/Other Providers:   Patient is a 78y old  Female who presents with a chief complaint of s/p L hip orif concern for loosened hardwar (26 Jul 2023 12:21)      SUBJECTIVE / OVERNIGHT EVENTS:  Patient still c/o left thigh tightness. Denies cp, SOB, abdominal pain, N/V/D     MEDICATIONS  (STANDING):  acetaminophen     Tablet .. 650 milliGRAM(s) Oral every 6 hours  ascorbic acid 500 milliGRAM(s) Oral two times a day  calcium carbonate 1250 mG  + Vitamin D (OsCal 500 + D) 1 Tablet(s) Oral three times a day  folic acid 1 milliGRAM(s) Oral daily  heparin   Injectable 5000 Unit(s) SubCutaneous every 8 hours  insulin lispro (ADMELOG) corrective regimen sliding scale   SubCutaneous Before meals and at bedtime  multivitamin 1 Tablet(s) Oral daily  pantoprazole    Tablet 40 milliGRAM(s) Oral before breakfast  polyethylene glycol 3350 17 Gram(s) Oral daily  senna 2 Tablet(s) Oral at bedtime    MEDICATIONS  (PRN):  aluminum hydroxide/magnesium hydroxide/simethicone Suspension 30 milliLiter(s) Oral four times a day PRN Indigestion  melatonin 3 milliGRAM(s) Oral at bedtime PRN Insomnia  ondansetron Injectable 4 milliGRAM(s) IV Push every 6 hours PRN Nausea and/or Vomiting  oxyCODONE    IR 2.5 milliGRAM(s) Oral every 4 hours PRN Moderate Pain (4 - 6)  oxyCODONE    IR 5 milliGRAM(s) Oral every 4 hours PRN Severe Pain (7 - 10)      Vital Signs Last 24 Hrs  T(C): 36.6 (26 Jul 2023 10:43), Max: 37.2 (26 Jul 2023 01:57)  T(F): 97.8 (26 Jul 2023 10:43), Max: 99 (26 Jul 2023 01:57)  HR: 97 (26 Jul 2023 10:43) (77 - 97)  BP: 164/61 (26 Jul 2023 10:43) (121/53 - 164/61)  BP(mean): --  RR: 17 (26 Jul 2023 10:43) (16 - 18)  SpO2: 100% (26 Jul 2023 10:43) (97% - 100%)    Parameters below as of 26 Jul 2023 10:43  Patient On (Oxygen Delivery Method): room air      CAPILLARY BLOOD GLUCOSE      POCT Blood Glucose.: 160 mg/dL (26 Jul 2023 11:14)  POCT Blood Glucose.: 141 mg/dL (26 Jul 2023 09:31)  POCT Blood Glucose.: 127 mg/dL (26 Jul 2023 08:13)  POCT Blood Glucose.: 159 mg/dL (26 Jul 2023 05:31)  POCT Blood Glucose.: 193 mg/dL (25 Jul 2023 21:51)  POCT Blood Glucose.: 219 mg/dL (25 Jul 2023 16:21)    I&O's Summary    26 Jul 2023 07:01  -  26 Jul 2023 13:30  --------------------------------------------------------  IN: 640 mL / OUT: 1078 mL / NET: -438 mL        PHYSICAL EXAM:  GENERAL: NAD, well-developed  HEAD:  Atraumatic, Normocephalic  EYES: EOMI, PERRLA, conjunctiva and sclera clear  NECK: Supple, No JVD  CHEST/LUNG: Clear to auscultation bilaterally; No wheeze  HEART: Regular rate and rhythm; No murmurs, rubs, or gallops  ABDOMEN: Soft, Nontender, Nondistended; Bowel sounds present  EXTREMITIES:  mild Left thigh swelling and tightness. + bandage. 2+ Peripheral Pulses, No clubbing, or cyanosis  PSYCH: AAOx3  NEUROLOGY: non-focal  SKIN: No rashes or lesions    LABS:                    RADIOLOGY & ADDITIONAL TESTS:    Imaging Personally Reviewed:    Consultant(s) Notes Reviewed:      Care Discussed with Consultants/Other Providers:

## 2023-07-26 NOTE — PROGRESS NOTE ADULT - SUBJECTIVE AND OBJECTIVE BOX
ORTHO PROGRESS NOTE     Pt seen and examined at bedside, denies SOB, CP, Dizziness. N/V/D /HA.  No significant overnight events. Pain well controlled. Patient ambulated  with PT , OOB-> chair    Vital Signs Last 24 Hrs  T(C): 36.9 (26 Jul 2023 05:40), Max: 37.2 (26 Jul 2023 01:57)  T(F): 98.5 (26 Jul 2023 05:40), Max: 99 (26 Jul 2023 01:57)  HR: 87 (26 Jul 2023 05:40) (77 - 87)  BP: 140/59 (26 Jul 2023 05:40) (121/53 - 157/52)  BP(mean): --  RR: 16 (26 Jul 2023 05:40) (16 - 18)  SpO2: 97% (26 Jul 2023 05:40) (97% - 100%)    Parameters below as of 26 Jul 2023 05:40  Patient On (Oxygen Delivery Method): room air        Gen: No apparent distress    left hip :  Prevena dressing C/D I    LE: motor intact EHL/FHL/TA/GS .  Sensation is grossly intact to light touch in the distal extremities.  Compartments are soft bilaterally.  Extremities are warm .  DP 2+ BLE             A/P  s/p left hip SUZANNA-> left hip hemiarthroplasty  POD #6    - Pain control/ Analgesia  - DVT ppx SQH, foot pumps  - PT/OT - wbat/oob    - Incentive Spirometer  - HD today   - notify Ortho for questions

## 2023-07-26 NOTE — PROGRESS NOTE ADULT - PROBLEM SELECTOR PLAN 1
Pt with ESRD on HD TIW MWF admitted with fall. Pt s/p Lt hemiarthroplasty on 7/19/2023, however surgery complicated by intraoperative hypotension requiring vasopressor support. Patient transferred to SICU for hemodynamic stabilization. CT Angio abd/pelv reviewed. HD treatment held on 7/19/23 due to above. Held on 7/20 due to blood loss anemia. Transferred back to medical floors. Plan for maintenance HD today. Recheck labs.

## 2023-07-26 NOTE — PROGRESS NOTE ADULT - SUBJECTIVE AND OBJECTIVE BOX
Cardiovascular Disease Progress Note  Date of Service: 07-26-23 @ 07:53    Overnight events: No acute events overnight.    Patient is undergoing HD in no distress.   Otherwise review of systems negative    Objective Findings:  T(C): 37.2 (07-26-23 @ 07:14), Max: 37.2 (07-26-23 @ 01:57)  HR: 91 (07-26-23 @ 07:14) (77 - 91)  BP: 152/71 (07-26-23 @ 07:14) (121/53 - 157/52)  RR: 18 (07-26-23 @ 07:14) (16 - 18)  SpO2: 97% (07-26-23 @ 05:40) (97% - 100%)  Wt(kg): --  Daily     Daily       Physical Exam:  Gen: NAD; Patient resting comfortably  HEENT: EOMI, Normocephalic/ atraumatic  CV: RRR, normal S1 + S2, no m/r/g  Lungs:  Normal respiratory effort; clear to auscultation bilaterally  Abd: soft, non-tender; bowel sounds present  Ext: No edema; warm and well perfused    Telemetry: n/a    Laboratory Data:    Reviewed    Inpatient Medications:  MEDICATIONS  (STANDING):  acetaminophen     Tablet .. 650 milliGRAM(s) Oral every 6 hours  ascorbic acid 500 milliGRAM(s) Oral two times a day  calcium carbonate 1250 mG  + Vitamin D (OsCal 500 + D) 1 Tablet(s) Oral three times a day  folic acid 1 milliGRAM(s) Oral daily  heparin   Injectable 5000 Unit(s) SubCutaneous every 8 hours  insulin lispro (ADMELOG) corrective regimen sliding scale   SubCutaneous Before meals and at bedtime  multivitamin 1 Tablet(s) Oral daily  pantoprazole    Tablet 40 milliGRAM(s) Oral before breakfast  polyethylene glycol 3350 17 Gram(s) Oral daily  senna 2 Tablet(s) Oral at bedtime      Assessment: 77 year old woman with ESRD on HD (MWF), HTN, DM (diet controlled), chronic type B aortic dissection, heart murmur, endometrial ca in remission s/p total hysterectomy in 2007 presents with L hip pain     Plan of Care:      #Post-operative evaluation-  - Ms. Harris tolerated orthopedic intervention well.  Post operative course complicated by distributive/hemorrhagic shock, briefly requiring low doses of Levophed.  She displays no signs of  coronary ischemia or decompensated CHF.   Her admission EKG is sinus with a pre-existing RBBB.   Recent echo with no acute findings.   Continue current cardiac management.       #Type B aortic dissection  - Chronic (Diagnosed >10 years ago)  - Continue current management.        #ESRD  - HD this morning, as per renal.      Over 25 minutes spent on total encounter; more than 50% of the visit was spent counseling and/or coordinating care by the attending physician.      Gabo Burris MD [facility]  Cardiovascular Disease  [phone]

## 2023-07-26 NOTE — PROGRESS NOTE ADULT - PROBLEM SELECTOR PLAN 3
stable  no signs of volume overload   c/w HD M, W, F via LUE fistula stable  no signs of volume overload   c/w HD M, W, F via LUE fistula  -S/P HD today.

## 2023-07-26 NOTE — PROGRESS NOTE ADULT - SUBJECTIVE AND OBJECTIVE BOX
[facility] Division of Kidney Diseases & Hypertension  FOLLOW UP NOTE  [phone]--------------------------------------------------------------------------------    HPI: 78F w/ PMH of ESRD on HD TIW (MWF) at [facility] (previously at [facility]), HTN, DM2, endometrial cancer (underwent hysterectomy and chemotherapy, type B aortic dissection, and L hip ORIF, admitted for revision of L hip hardware. Nephrology consulted for management of ESRD/HD.     24 hour events/subjective: Pt. seen and evaluated at bedside in dialysis. Feels well. Some pain overnight in L hip and difficulty sleeping. She denies fevers/chills, headaches, chest pain, SOB, abd pain, or leg swelling. Had hypotensive episode with HD today.    PAST HISTORY  --------------------------------------------------------------------------------  No significant changes to PMH, PSH, FHx, SHx, unless otherwise noted    ALLERGIES & MEDICATIONS  --------------------------------------------------------------------------------  Allergies  No Known Allergies    Intolerances    Standing Inpatient Medications  acetaminophen     Tablet .. 650 milliGRAM(s) Oral every 6 hours  ascorbic acid 500 milliGRAM(s) Oral two times a day  calcium carbonate 1250 mG  + Vitamin D (OsCal 500 + D) 1 Tablet(s) Oral three times a day  folic acid 1 milliGRAM(s) Oral daily  heparin   Injectable 5000 Unit(s) SubCutaneous every 8 hours  insulin lispro (ADMELOG) corrective regimen sliding scale   SubCutaneous Before meals and at bedtime  multivitamin 1 Tablet(s) Oral daily  pantoprazole    Tablet 40 milliGRAM(s) Oral before breakfast  polyethylene glycol 3350 17 Gram(s) Oral daily  senna 2 Tablet(s) Oral at bedtime    PRN Inpatient Medications  aluminum hydroxide/magnesium hydroxide/simethicone Suspension 30 milliLiter(s) Oral four times a day PRN  melatonin 3 milliGRAM(s) Oral at bedtime PRN  ondansetron Injectable 4 milliGRAM(s) IV Push every 6 hours PRN  oxyCODONE    IR 2.5 milliGRAM(s) Oral every 4 hours PRN  oxyCODONE    IR 5 milliGRAM(s) Oral every 4 hours PRN    REVIEW OF SYSTEMS  --------------------------------------------------------------------------------  Gen: No fevers/chills  Head/Eyes/Ears: no headache  Respiratory: No dyspnea, cough  CV: No chest pain  GI: No abdominal pain  MSK: mild L leg pain, no swelling in legs, swelling in L arm    All other systems were reviewed and are negative, except as noted.    VITALS/PHYSICAL EXAM  --------------------------------------------------------------------------------  T(C): 36.6 (07-26-23 @ 10:43), Max: 37.2 (07-26-23 @ 01:57)  HR: 97 (07-26-23 @ 10:43) (77 - 97)  BP: 164/61 (07-26-23 @ 10:43) (121/53 - 164/61)  RR: 17 (07-26-23 @ 10:43) (16 - 18)  SpO2: 100% (07-26-23 @ 10:43) (97% - 100%)  Wt(kg): --    07-26-23 @ 07:01  -  07-26-23 @ 12:22  --------------------------------------------------------  IN: 640 mL / OUT: 1078 mL / NET: -438 mL    Physical Exam:  Gen: NAD  HEENT: MMM  Pulm: minimal bibasilar crackles   CV: S1S2  Abd: Soft, +BS   Ext: LE edema B/L, dressing on LLE, chronic LUE edema, RUE edema improved  Neuro: Awake  Skin: Warm and dry  Vascular access: LUE AVF, aneurysmal, skin intact, thrill felt    LABS/STUDIES  -------------------------------------------------------------------------------  Creatinine Trend:  SCr 5.62 [07-24 @ 01:20]  SCr 4.51 [07-23 @ 00:30]  SCr 3.79 [07-22 @ 02:00]  SCr 2.84 [07-21 @ 15:36]  SCr 6.23 [07-21 @ 08:35]    Urinalysis - [07-24-23 @ 01:20]      Color  / Appearance  / SG  / pH       Gluc 93 / Ketone   / Bili  / Urobili        Blood  / Protein  / Leuk Est  / Nitrite       RBC  / WBC  / Hyaline  / Gran  / Sq Epi  / Non Sq Epi  / Bacteria

## 2023-07-27 ENCOUNTER — TRANSCRIPTION ENCOUNTER (OUTPATIENT)
Age: 78
End: 2023-07-27

## 2023-07-27 LAB
ANION GAP SERPL CALC-SCNC: 14 MMOL/L — SIGNIFICANT CHANGE UP (ref 7–14)
BLD GP AB SCN SERPL QL: NEGATIVE — SIGNIFICANT CHANGE UP
BUN SERPL-MCNC: 26 MG/DL — HIGH (ref 7–23)
CALCIUM SERPL-MCNC: 8.1 MG/DL — LOW (ref 8.4–10.5)
CHLORIDE SERPL-SCNC: 97 MMOL/L — LOW (ref 98–107)
CO2 SERPL-SCNC: 26 MMOL/L — SIGNIFICANT CHANGE UP (ref 22–31)
CREAT SERPL-MCNC: 3.79 MG/DL — HIGH (ref 0.5–1.3)
EGFR: 12 ML/MIN/1.73M2 — LOW
GLUCOSE BLDC GLUCOMTR-MCNC: 160 MG/DL — HIGH (ref 70–99)
GLUCOSE BLDC GLUCOMTR-MCNC: 192 MG/DL — HIGH (ref 70–99)
GLUCOSE BLDC GLUCOMTR-MCNC: 193 MG/DL — HIGH (ref 70–99)
GLUCOSE SERPL-MCNC: 172 MG/DL — HIGH (ref 70–99)
HCT VFR BLD CALC: 16.5 % — CRITICAL LOW (ref 34.5–45)
HCT VFR BLD CALC: 16.8 % — CRITICAL LOW (ref 34.5–45)
HCT VFR BLD CALC: 18.2 % — CRITICAL LOW (ref 34.5–45)
HCT VFR BLD CALC: 18.3 % — CRITICAL LOW (ref 34.5–45)
HGB BLD-MCNC: 5.2 G/DL — CRITICAL LOW (ref 11.5–15.5)
HGB BLD-MCNC: 5.3 G/DL — CRITICAL LOW (ref 11.5–15.5)
HGB BLD-MCNC: 5.8 G/DL — CRITICAL LOW (ref 11.5–15.5)
HGB BLD-MCNC: 5.9 G/DL — CRITICAL LOW (ref 11.5–15.5)
MCHC RBC-ENTMCNC: 28.7 PG — SIGNIFICANT CHANGE UP (ref 27–34)
MCHC RBC-ENTMCNC: 28.9 PG — SIGNIFICANT CHANGE UP (ref 27–34)
MCHC RBC-ENTMCNC: 29.1 PG — SIGNIFICANT CHANGE UP (ref 27–34)
MCHC RBC-ENTMCNC: 29.6 PG — SIGNIFICANT CHANGE UP (ref 27–34)
MCHC RBC-ENTMCNC: 31.5 GM/DL — LOW (ref 32–36)
MCHC RBC-ENTMCNC: 31.5 GM/DL — LOW (ref 32–36)
MCHC RBC-ENTMCNC: 31.9 GM/DL — LOW (ref 32–36)
MCHC RBC-ENTMCNC: 32.2 GM/DL — SIGNIFICANT CHANGE UP (ref 32–36)
MCV RBC AUTO: 90.1 FL — SIGNIFICANT CHANGE UP (ref 80–100)
MCV RBC AUTO: 90.1 FL — SIGNIFICANT CHANGE UP (ref 80–100)
MCV RBC AUTO: 91.7 FL — SIGNIFICANT CHANGE UP (ref 80–100)
MCV RBC AUTO: 93.9 FL — SIGNIFICANT CHANGE UP (ref 80–100)
NRBC # BLD: 0 /100 WBCS — SIGNIFICANT CHANGE UP (ref 0–0)
NRBC # FLD: 0 K/UL — SIGNIFICANT CHANGE UP (ref 0–0)
NRBC # FLD: 0 K/UL — SIGNIFICANT CHANGE UP (ref 0–0)
NRBC # FLD: 0.02 K/UL — HIGH (ref 0–0)
NRBC # FLD: 0.02 K/UL — HIGH (ref 0–0)
PLATELET # BLD AUTO: 149 K/UL — LOW (ref 150–400)
PLATELET # BLD AUTO: 158 K/UL — SIGNIFICANT CHANGE UP (ref 150–400)
PLATELET # BLD AUTO: 160 K/UL — SIGNIFICANT CHANGE UP (ref 150–400)
PLATELET # BLD AUTO: 163 K/UL — SIGNIFICANT CHANGE UP (ref 150–400)
POTASSIUM SERPL-MCNC: 4.2 MMOL/L — SIGNIFICANT CHANGE UP (ref 3.5–5.3)
POTASSIUM SERPL-SCNC: 4.2 MMOL/L — SIGNIFICANT CHANGE UP (ref 3.5–5.3)
RBC # BLD: 1.79 M/UL — LOW (ref 3.8–5.2)
RBC # BLD: 1.8 M/UL — LOW (ref 3.8–5.2)
RBC # BLD: 2.02 M/UL — LOW (ref 3.8–5.2)
RBC # BLD: 2.03 M/UL — LOW (ref 3.8–5.2)
RBC # FLD: 21.4 % — HIGH (ref 10.3–14.5)
RBC # FLD: 21.9 % — HIGH (ref 10.3–14.5)
RBC # FLD: 22.6 % — HIGH (ref 10.3–14.5)
RBC # FLD: 22.9 % — HIGH (ref 10.3–14.5)
RH IG SCN BLD-IMP: POSITIVE — SIGNIFICANT CHANGE UP
SODIUM SERPL-SCNC: 137 MMOL/L — SIGNIFICANT CHANGE UP (ref 135–145)
WBC # BLD: 10.49 K/UL — SIGNIFICANT CHANGE UP (ref 3.8–10.5)
WBC # BLD: 11.19 K/UL — HIGH (ref 3.8–10.5)
WBC # BLD: 11.93 K/UL — HIGH (ref 3.8–10.5)
WBC # BLD: 12.07 K/UL — HIGH (ref 3.8–10.5)
WBC # FLD AUTO: 10.49 K/UL — SIGNIFICANT CHANGE UP (ref 3.8–10.5)
WBC # FLD AUTO: 11.19 K/UL — HIGH (ref 3.8–10.5)
WBC # FLD AUTO: 11.93 K/UL — HIGH (ref 3.8–10.5)
WBC # FLD AUTO: 12.07 K/UL — HIGH (ref 3.8–10.5)

## 2023-07-27 PROCEDURE — 99222 1ST HOSP IP/OBS MODERATE 55: CPT

## 2023-07-27 RX ORDER — FUROSEMIDE 40 MG
20 TABLET ORAL ONCE
Refills: 0 | Status: COMPLETED | OUTPATIENT
Start: 2023-07-27 | End: 2023-07-27

## 2023-07-27 RX ADMIN — PANTOPRAZOLE SODIUM 40 MILLIGRAM(S): 20 TABLET, DELAYED RELEASE ORAL at 05:43

## 2023-07-27 RX ADMIN — Medication 1 TABLET(S): at 05:43

## 2023-07-27 RX ADMIN — Medication 500 MILLIGRAM(S): at 05:43

## 2023-07-27 RX ADMIN — POLYETHYLENE GLYCOL 3350 17 GRAM(S): 17 POWDER, FOR SOLUTION ORAL at 12:26

## 2023-07-27 RX ADMIN — Medication 1 TABLET(S): at 12:25

## 2023-07-27 RX ADMIN — Medication 1: at 07:24

## 2023-07-27 RX ADMIN — HEPARIN SODIUM 5000 UNIT(S): 5000 INJECTION INTRAVENOUS; SUBCUTANEOUS at 22:18

## 2023-07-27 RX ADMIN — Medication 650 MILLIGRAM(S): at 17:10

## 2023-07-27 RX ADMIN — Medication 650 MILLIGRAM(S): at 18:13

## 2023-07-27 RX ADMIN — SENNA PLUS 2 TABLET(S): 8.6 TABLET ORAL at 22:17

## 2023-07-27 RX ADMIN — Medication 650 MILLIGRAM(S): at 12:25

## 2023-07-27 RX ADMIN — Medication 1 MILLIGRAM(S): at 12:25

## 2023-07-27 RX ADMIN — HEPARIN SODIUM 5000 UNIT(S): 5000 INJECTION INTRAVENOUS; SUBCUTANEOUS at 05:43

## 2023-07-27 RX ADMIN — Medication 1 TABLET(S): at 22:16

## 2023-07-27 RX ADMIN — Medication 1 TABLET(S): at 13:12

## 2023-07-27 RX ADMIN — HEPARIN SODIUM 5000 UNIT(S): 5000 INJECTION INTRAVENOUS; SUBCUTANEOUS at 13:12

## 2023-07-27 RX ADMIN — Medication 1: at 11:38

## 2023-07-27 RX ADMIN — Medication 500 MILLIGRAM(S): at 17:10

## 2023-07-27 RX ADMIN — Medication 20 MILLIGRAM(S): at 17:00

## 2023-07-27 RX ADMIN — Medication 1: at 16:49

## 2023-07-27 RX ADMIN — Medication 650 MILLIGRAM(S): at 06:40

## 2023-07-27 RX ADMIN — Medication 650 MILLIGRAM(S): at 05:43

## 2023-07-27 NOTE — DISCHARGE NOTE NURSING/CASE MANAGEMENT/SOCIAL WORK - NSPROEXTENSIONSOFSELF_GEN_A_NUR
Patient vital signs are at baseline: Yes  Patient able to ambulate as they were prior to admission or with assist devices provided by therapies during their stay:  Yes  Patient MUST void prior to discharge:  Yes  Patient able to tolerate oral intake:  Yes  Pain has adequate pain control using Oral analgesics:  Yes     VSS. Pt denies pain. Ambulated to bathroom with immobilizer on. Unable to void. Bladder scanned for 625. Pt wanting to try one more time to go. Was able to void 600 ml. CMS intact. Tolerating regular diet. No N/V. CPAP for sleep at night. Cont ox 97-98% on RA. Will report to NOC nurse.        dentures/eyeglasses

## 2023-07-27 NOTE — CHART NOTE - NSCHARTNOTEFT_GEN_A_CORE
-- Case was discussed with Dr. Munoz. Plan for non tunneled CVC placement tomorrow, 7/28/23, pending am labs given low h&h today. Please place IR procedure order under Dr. Munoz.   -- hold Px anticoagulation  -- Write IR pre procedure note  -- AM labs and coags

## 2023-07-27 NOTE — CONSULT NOTE ADULT - ASSESSMENT
77 year old female PMH HTN, DM, chronic B type aortic dissection, ESRD on HD with LUE AVF by Dr. Jensen in 2018 now s/p left hip arthroplasty. Vascular surgery consulted for LUE AVF wound. Concerning for ulceration.      Plan:  - Plan for revision of LUE AVF next week after patient optimized with medicine, will follow up once OR time found  - Do not use LUE AVF for further dialysis, consult IR for shiley catheter for HD  - Care per primary team  - Vascular surgery to follow    discussed with senior resident on behalf of Dr. Meg NUÑEZ Team Surgery  [id number] 77 year old female PMH HTN, DM, chronic B type aortic dissection, ESRD on HD with LUE AVF by Dr. Jensen in 2018 now s/p left hip arthroplasty. Vascular surgery consulted for LUE AVF wound. Concerning for ulceration.      Plan:  - Plan for revision of LUE AVF after patient optimized with medicine, will follow up once OR time found  - Do not use LUE AVF for further dialysis, consult IR for tunneled catheter for HD  - Care per primary team  - Vascular surgery to follow    discussed with senior resident on behalf of Dr. Meg NUÑEZ Team Surgery  [id number]

## 2023-07-27 NOTE — DISCHARGE NOTE NURSING/CASE MANAGEMENT/SOCIAL WORK - NSDCPNINST_GEN_ALL_CORE
Maintain hip incision and dressing clean dry and intact. Call MD with any signs of infection ie fever, redness, drainage, or with signs of bleeding, unrelieved increased pain, or persistent nausea. Continue to drink plenty of fluids. Avoid strenuous activity and constipation which may be a side effect from taking certain medications and narcotics.  Maintain POSTERIOR Total hip precautions, safety and fall prevention measures reinforced.  Continue Diabetes management, diet and glucose monitoring, know your A1C blood level and follow up with PMD for continuity of care. Remember hand hygiene, skin inspection for prevention of infection.

## 2023-07-27 NOTE — PROGRESS NOTE ADULT - SUBJECTIVE AND OBJECTIVE BOX
Cardiovascular Disease Progress Note  Date of Service: 23 @ 07:31    Overnight events: No acute events overnight.    Patient denies chest pain or SOB.   Otherwise review of systems negative    Objective Findings:  T(C): 36.3 (23 @ 05:37), Max: 37.1 (23 @ 17:44)  HR: 87 (23 @ 05:37) (85 - 98)  BP: 154/59 (23 @ 05:37) (140/56 - 164/61)  RR: 18 (23 @ 05:37) (17 - 18)  SpO2: 100% (23 @ 05:37) (99% - 100%)  Wt(kg): --  Daily     Daily Weight in k.8 (2023 02:03)      Physical Exam:  Gen: NAD; Patient resting comfortably  HEENT: EOMI, Normocephalic/ atraumatic  CV: RRR, normal S1 + S2, no m/r/g  Lungs:  Normal respiratory effort; clear to auscultation bilaterally  Abd: soft, non-tender; bowel sounds present  Ext: No edema; warm and well perfused    Telemetry: n/a    Laboratory Data:    No recent labs    Inpatient Medications:  MEDICATIONS  (STANDING):  acetaminophen     Tablet .. 650 milliGRAM(s) Oral every 6 hours  ascorbic acid 500 milliGRAM(s) Oral two times a day  calcium carbonate 1250 mG  + Vitamin D (OsCal 500 + D) 1 Tablet(s) Oral three times a day  folic acid 1 milliGRAM(s) Oral daily  heparin   Injectable 5000 Unit(s) SubCutaneous every 8 hours  insulin lispro (ADMELOG) corrective regimen sliding scale   SubCutaneous Before meals and at bedtime  multivitamin 1 Tablet(s) Oral daily  pantoprazole    Tablet 40 milliGRAM(s) Oral before breakfast  polyethylene glycol 3350 17 Gram(s) Oral daily  senna 2 Tablet(s) Oral at bedtime      Assessment: 77 year old woman with ESRD on HD (MWF), HTN, DM (diet controlled), chronic type B aortic dissection, heart murmur, endometrial ca in remission s/p total hysterectomy in  presents with L hip pain     Plan of Care:      #Post-operative evaluation-  - Ms. Harris tolerated orthopedic intervention well.  Post operative course complicated by distributive/hemorrhagic shock, briefly requiring low doses of Levophed.  She displays no signs of  coronary ischemia or decompensated CHF.   Her admission EKG is sinus with a pre-existing RBBB.   Recent echo with no acute findings.   Continue current cardiac management.       #Type B aortic dissection  - Chronic (Diagnosed >10 years ago)  - Continue current management.        #ESRD  - HD as per renal.    #ACP (advance care planning)-  Advanced care planning was discussed with the patient.  Advance care planning forms were discussed. Risks, benefits and alternatives of medical treatment and procedures were discussed in detail and all questions were answered. 30 additional minutes spent addressing advance care plans.       Over 25 minutes spent on total encounter; more than 50% of the visit was spent counseling and/or coordinating care by the attending physician.      Gabo Burris MD [facility]  Cardiovascular Disease  [phone]

## 2023-07-27 NOTE — CONSULT NOTE ADULT - TIME BILLING
end stage renal disease on hemodialysis  aneurysmal arteriovenous fistula with ulceration
Reviewed lab data, radiology results, consultants' recommendations, documentation in [place], discussed with family, ACP, interdisciplinary staff and/or intervention were performed.

## 2023-07-27 NOTE — PROGRESS NOTE ADULT - SUBJECTIVE AND OBJECTIVE BOX
ORTHO PROGRESS NOTE     Pt seen and examined at bedside, denies SOB, CP, Dizziness. N/V/D /HA.  No significant overnight events. Pain well controlled. Patient ambulated  with PT     Vital Signs Last 24 Hrs  T(C): 36.3 (27 Jul 2023 05:37), Max: 37.2 (26 Jul 2023 07:14)  T(F): 97.4 (27 Jul 2023 05:37), Max: 99 (26 Jul 2023 07:14)  HR: 87 (27 Jul 2023 05:37) (85 - 98)  BP: 154/59 (27 Jul 2023 05:37) (140/56 - 164/61)  BP(mean): --  RR: 18 (27 Jul 2023 05:37) (17 - 18)  SpO2: 100% (27 Jul 2023 05:37) (99% - 100%)    Parameters below as of 27 Jul 2023 05:37  Patient On (Oxygen Delivery Method): room air        Gen: No apparent distress    left  LE:  Prevena dressing C/D I     BLE: motor intact EHL/FHL/TA/GS .  Sensation is grossly intact to light touch in the distal extremities.  Compartments are soft bilaterally.  Extremities are warm .  DP 2+ BLE          A/P  s/p , left hip SUZANNA-> left hip hemiarthroplasty POD #8    - Pain control/ Analgesia  - DVT ppx  SQH, foot pumps  - PT/OT - wbat/oob    - Incentive Spirometer  - Rehab planning   - notify Ortho for questions

## 2023-07-27 NOTE — CONSULT NOTE ADULT - SUBJECTIVE AND OBJECTIVE BOX
Vascular Surgery Consult Note  Attending: Meg  Service: C Team  [id number]      HPI: 77 y.o. Female w/ Hx HTN, DM (diet controlled), chronic type B aortic dissection, ESRD on HD ( M, W, F ) via LUE fistula, endometrial ca in remission s/p total hysterectomy in 2007 s/p L IMN for IT fx in May 2023 p/w 2 weeks of worsening atraumatic  L hip pain since admission to [place] [facility] found on Xray of left femur to have interval migration of femoral neck screw into acetabular region so readmitted for Left hip revision. Patient s/p left hip removal of gamma nail and placement of restoration modular peter arthroplasty c/b post op hypotension requiring pressors and SICU stay. Patient had hemorrhagic shock from hematoma at the operative site, with possible extravasation found on CT imaging requiring 1 Unit PRBCs.     Patient recovering on floor after ortho procedure, vascular surgery consulted for wound over patient LUE AVF. Patient endorses wound have been present for 3 weeks or more since she got dialysis at the same area multiple times. The wound has not healed in that time. She also endorses LUE swelling for a month since she had a shoulder injury. Patient has been able to receive dialysis without issue at with the fistula while the wound has been present.       PAST MEDICAL HISTORY:  PAST MEDICAL & SURGICAL HISTORY:  Adult Onset Diabetes Mellitus,      Cancer of Endometrium  s/p Hysterectomy , s/p Chemo.      Dissection of Aorta  Type B      History of Hysterectomy with O      Hypertension      CKD (chronic kidney disease)      ESRD (end stage renal disease) on dialysis  M,W,F   [facility] [address]      H/O total hysterectomy  in 2007 for endometrial CA          ALLERGIES:  Allergies    No Known Allergies    Intolerances        SOCIAL HISTORY:    FAMILY HISTORY:  FAMILY HISTORY:  No pertinent family history in first degree relatives        PHYSICAL EXAM:  General: NAD, resting comfortably  HEENT: NC/AT, EOMI, normal hearing, no oral lesions, no LAD, neck supple  Pulmonary: normal resp effort, CTA-B  Cardiovascular: NSR, no murmurs  Abdominal: soft, ND/NT, no organomegaly, no guarding or rebound tenderness  Extremities: WWP, normal strength, LUE with 3+ edema and AVF with palpable thrill and small 1.5cm wound/ulceration no erythema or sign of infection  Pulses: palpable radial pulse bilaterally  Neuro: A/O x 3, CNs II-XII grossly intact, normal sensation, no focal deficits      VITAL SIGNS:  Vital Signs Last 24 Hrs  T(C): 37.1 (27 Jul 2023 13:16), Max: 37.1 (26 Jul 2023 17:44)  T(F): 98.8 (27 Jul 2023 13:16), Max: 98.8 (27 Jul 2023 13:16)  HR: 91 (27 Jul 2023 13:16) (85 - 98)  BP: 117/59 (27 Jul 2023 13:16) (117/59 - 160/68)  BP(mean): --  RR: 16 (27 Jul 2023 13:16) (16 - 18)  SpO2: 95% (27 Jul 2023 13:16) (95% - 100%)    Parameters below as of 27 Jul 2023 13:16  Patient On (Oxygen Delivery Method): room air        I&O's Summary    26 Jul 2023 07:01  -  27 Jul 2023 07:00  --------------------------------------------------------  IN: 1120 mL / OUT: 1078 mL / NET: 42 mL        LABS:                        5.2    11.19 )-----------( 163      ( 27 Jul 2023 12:53 )             16.5     07-27    137  |  97<L>  |  26<H>  ----------------------------<  172<H>  4.2   |  26  |  3.79<H>    Ca    8.1<L>      27 Jul 2023 11:10        Urinalysis Basic - ( 27 Jul 2023 11:10 )    Color: x / Appearance: x / SG: x / pH: x  Gluc: 172 mg/dL / Ketone: x  / Bili: x / Urobili: x   Blood: x / Protein: x / Nitrite: x   Leuk Esterase: x / RBC: x / WBC x   Sq Epi: x / Non Sq Epi: x / Bacteria: x      CAPILLARY BLOOD GLUCOSE      POCT Blood Glucose.: 192 mg/dL (27 Jul 2023 11:21)  POCT Blood Glucose.: 160 mg/dL (27 Jul 2023 07:14)  POCT Blood Glucose.: 202 mg/dL (26 Jul 2023 21:21)  POCT Blood Glucose.: 200 mg/dL (26 Jul 2023 16:50)        CULTURES:      RADIOLOGY & ADDITIONAL STUDIES:    < from: CT Angio Abdomen and Pelvis w/ IV Cont (07.20.23 @ 04:01) >  ACC: [id number] EXAM:  CT ANGIO ABD PELV (W)AW IC   ORDERED BY: CLIFTON SEAMAN     *** ADDENDUM # 1 ***    Partially imaged left upper extremity arteriovenous fistula.    --- End of Report ---    *** END OF ADDENDUM # 1 ***      PROCEDURE DATE:  07/20/2023          INTERPRETATION:  CLINICAL INFORMATION: Elevated lactate, abdominal pain.   Chronic aortic dissection.    COMPARISON: CT chest, abdomen, and pelvis 1/3/2018.    CONTRAST/COMPLICATIONS:  IV Contrast: Omnipaque 350  90 cc administered   10 cc discarded  Oral Contrast: NONE  Complications: None reported at time of study completion    PROCEDURE:  CT of the Abdomen and Pelvis was performed.  Precontrast, Arterial and Delayed phases were performed.  Sagittal and coronal reformats were performed.    FINDINGS:  LOWER CHEST: Bibasilar atelectasis. Small bilateral pleural effusions.   Cardiomegaly. Coronary artery calcifications. Incompletely imaged chronic   aortic dissection within the descending thoracic aorta, similar to prior   and extending into the abdominal aorta, further described below.    LIVER: Right hepatic cyst at the dome.  BILE DUCTS: Normal caliber.  GALLBLADDER: Within normal limits.  SPLEEN: Hypodense lesion measuring 1.9 cm within the spleen, possibly a   cyst, though streak artifact from the patient's upper extremities limits   evaluation. The lesion is new since 1/3/2018.  PANCREAS: Hypoattenuating pancreatic head lesions measuring 1.0 cm   (series 304 image 37) and 0.5 cm (series 304 image 41), not seen on the   previous exam. No main pancreatic duct dilation.  ADRENALS: Within normal limits.  KIDNEYS/URETERS: Overlying streak artifact from the patient's upper   stomach is limits evaluation. Probable heterogeneous renal enhancement.   Bilateral renal atrophy. No hydronephrosis.    Streak artifact from left hip prosthesis limits evaluation of pelvic   structures.    BLADDER: Underdistended.  REPRODUCTIVE ORGANS: Hysterectomy.    BOWEL: Evaluation of the bowel is limited by underdistention and a   paucity of intra-abdominal fat. No overt bowel inflammatory changes. No   evidence for pneumatosis. No bowel obstruction. Appendix is not   visualized.  PERITONEUM: No ascites.  VESSELS: Atherosclerotic changes. Redemonstrated chronic dissection of   thedescending thoracic aorta extending into the abdominal aorta and into   the left common iliac artery to the level of its bifurcation into the   internal and external iliac arteries. Several fenestrations are again   noted. Findings appear similar to the previous exam of 1/3/2018. The   celiac artery is patent. There is a mild to moderate stenosis of the   common hepatic artery with poststenotic dilation to 1.1 cm, similar to   prior. Superior mesenteric artery is patent. 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 The inferior mesenteric artery is   diminutive and patent at least proximally. The portal veins are   diminutive, similar to the prior exam of 2018. The superior mesenteric   vein is patent. Large mesenteric collateral vessels in the left abdomen   and pelvis, likely a dilated IMV, which drains into the left common iliac   vein. No portal or mesenteric venous gas.  RETROPERITONEUM/LYMPH NODES: No lymphadenopathy.  ABDOMINAL WALL: Generalized subcutaneous edema. Post surgical changes   about the left hip related to hip arthroplasty including soft tissue gas   and edema. Ill-defined hematoma, which is also partially obscured by   streak artifact from the orthopedic hardware. Focus of hyperdensity along   the lateral hip within the anterolateral thigh musculature, which does   not definitely change in morphology or density between the arterial and   venous phases, which could be related to hematoma (series 301 image 102).  BONES: Left hip arthroplasty.Degenerative changes. Unchanged compression   fracture deformity at T12. Unchanged grade 1 anterolisthesis of L4 over   L5.    IMPRESSION:  *  Postsurgical changes about the left hip related to arthroplasty.   Associated ill-defined and partially obscured hematoma without definite   evidence of active hemorrhage. A hyperdense focus could reflect a   component of the hematoma, though evaluation is limited without   precontrast imaging and due to streak artifact.  *  No definite findings of mesenteric ischemia.  *  New hypodense lesion in the spleen measuring 1.9 cm, possibly a cyst,   though incompletely evaluated by this exam. Hypoattenuating pancreatic   lesions measuring up to 1.0 cm. These findings can be further evaluated   with contrast enhanced abdominal MRI/MRCP.  *  Chronic thoracic and abdominal aortic dissection extending into the   left common iliac artery, similar to the previous exam in 2018.  *  Heterogeneous renal hypoenhancement bilaterally. Bilateral renal   atrophy.    --- End of Report ---    ***Please see the addendum at the top of this report. It may contain   additional important information or changes.****    < end of copied text >

## 2023-07-27 NOTE — CONSULT NOTE ADULT - ATTENDING COMMENTS
77 year old woman with end stage renal disease on hemodialysis   s/p left brachiocephalic arteriovenous fistula   avf now aneurysmal with two discrete areas of ulceration which have healed  avf has palpable thrill and I have no concerns for imminent bleeding or rupture, fistula does not appear tense and ulcers are not bleeding  recommend interventional radiology consult for tunneled catheter placement  will need eventual avf revision, timing to be determined    covering for Dr. Jensen
I agree with the detailed interval history, physical, and plan, which I have reviewed and edited where appropriate'; also agree with notes/assessment with my team on service.  I have personally examined the patient.  I was physically present for the key portions of the evaluation and management (E/M) service provided.  I reviewed all the pertinent data.  The patient is a critical care patient with life threatening hemodynamic and metabolic instability in SICU.  The SICU team has a constant risk benefit analyzes discussion and coordinating care with the primary team and all consultants.   The patient is in SICU with the chief complaint and diagnosis mentioned in the note.   The plan will be specified in the note.  78 y/o female s/p  left hip removal of gamma nail and placement of restoration modular peter arthroplasty. Patient hypotensive in PACU requiring vasopressors.  SICU consulted for pressor support and hemodynamic management.   NEURO:   Exam: A&Ox3  RESPIRATORY  Exam: clear  CARDIOVASCULAR  Exam:  Regular  GI/NUTRITION  Exam: soft, non tender,  PLAN:  NEURO:   -Tylenol ATC  RESPIRATORY  - Saturation >92%  CARDIOVASCULAR  - Wean Norepinephrine.   GI/NUTRITION  - Diet: Reg  HIP:  - Right hip with Prevena VAC   GENITOURINARY/RENAL  [place]   ENDOCRINE  FU Glucose    SICU
ESRD on HD    Pt will be in OR today and will have HD afterwards.  Monitor labs, Is/Os and daily weights

## 2023-07-27 NOTE — DISCHARGE NOTE NURSING/CASE MANAGEMENT/SOCIAL WORK - NSDCPECAREGIVERED_GEN_ALL_CORE
Medline and carenotes for surgical procedure SUZANNA, Post Conrad-arthroplasty, Incision care, pain management, Diabetes, Oxy IR, as well as DC Medications and side effects literature for patient reference.

## 2023-07-28 LAB
ANION GAP SERPL CALC-SCNC: 11 MMOL/L — SIGNIFICANT CHANGE UP (ref 7–14)
APTT BLD: 38.3 SEC — HIGH (ref 24.5–35.6)
BUN SERPL-MCNC: 39 MG/DL — HIGH (ref 7–23)
CALCIUM SERPL-MCNC: 7.9 MG/DL — LOW (ref 8.4–10.5)
CHLORIDE SERPL-SCNC: 97 MMOL/L — LOW (ref 98–107)
CO2 SERPL-SCNC: 25 MMOL/L — SIGNIFICANT CHANGE UP (ref 22–31)
CREAT SERPL-MCNC: 4.99 MG/DL — HIGH (ref 0.5–1.3)
EGFR: 8 ML/MIN/1.73M2 — LOW
GLUCOSE BLDC GLUCOMTR-MCNC: 116 MG/DL — HIGH (ref 70–99)
GLUCOSE BLDC GLUCOMTR-MCNC: 163 MG/DL — HIGH (ref 70–99)
GLUCOSE BLDC GLUCOMTR-MCNC: 177 MG/DL — HIGH (ref 70–99)
GLUCOSE BLDC GLUCOMTR-MCNC: 182 MG/DL — HIGH (ref 70–99)
GLUCOSE BLDC GLUCOMTR-MCNC: 188 MG/DL — HIGH (ref 70–99)
GLUCOSE SERPL-MCNC: 176 MG/DL — HIGH (ref 70–99)
HCT VFR BLD CALC: 17.8 % — CRITICAL LOW (ref 34.5–45)
HCT VFR BLD CALC: 20.1 % — CRITICAL LOW (ref 34.5–45)
HGB BLD-MCNC: 6 G/DL — CRITICAL LOW (ref 11.5–15.5)
HGB BLD-MCNC: 6.9 G/DL — CRITICAL LOW (ref 11.5–15.5)
INR BLD: 1.01 RATIO — SIGNIFICANT CHANGE UP (ref 0.85–1.18)
MCHC RBC-ENTMCNC: 30.5 PG — SIGNIFICANT CHANGE UP (ref 27–34)
MCHC RBC-ENTMCNC: 30.9 PG — SIGNIFICANT CHANGE UP (ref 27–34)
MCHC RBC-ENTMCNC: 33.7 GM/DL — SIGNIFICANT CHANGE UP (ref 32–36)
MCHC RBC-ENTMCNC: 34.3 GM/DL — SIGNIFICANT CHANGE UP (ref 32–36)
MCV RBC AUTO: 88.9 FL — SIGNIFICANT CHANGE UP (ref 80–100)
MCV RBC AUTO: 91.8 FL — SIGNIFICANT CHANGE UP (ref 80–100)
NRBC # BLD: 0 /100 WBCS — SIGNIFICANT CHANGE UP (ref 0–0)
NRBC # BLD: 0 /100 WBCS — SIGNIFICANT CHANGE UP (ref 0–0)
NRBC # FLD: 0.02 K/UL — HIGH (ref 0–0)
NRBC # FLD: 0.03 K/UL — HIGH (ref 0–0)
PLATELET # BLD AUTO: 171 K/UL — SIGNIFICANT CHANGE UP (ref 150–400)
PLATELET # BLD AUTO: 186 K/UL — SIGNIFICANT CHANGE UP (ref 150–400)
POTASSIUM SERPL-MCNC: 4.7 MMOL/L — SIGNIFICANT CHANGE UP (ref 3.5–5.3)
POTASSIUM SERPL-SCNC: 4.7 MMOL/L — SIGNIFICANT CHANGE UP (ref 3.5–5.3)
PROTHROM AB SERPL-ACNC: 11.4 SEC — SIGNIFICANT CHANGE UP (ref 9.5–13)
RBC # BLD: 1.94 M/UL — LOW (ref 3.8–5.2)
RBC # BLD: 2.26 M/UL — LOW (ref 3.8–5.2)
RBC # FLD: 20 % — HIGH (ref 10.3–14.5)
RBC # FLD: 22.5 % — HIGH (ref 10.3–14.5)
SODIUM SERPL-SCNC: 133 MMOL/L — LOW (ref 135–145)
WBC # BLD: 13.9 K/UL — HIGH (ref 3.8–10.5)
WBC # BLD: 13.97 K/UL — HIGH (ref 3.8–10.5)
WBC # FLD AUTO: 13.9 K/UL — HIGH (ref 3.8–10.5)
WBC # FLD AUTO: 13.97 K/UL — HIGH (ref 3.8–10.5)

## 2023-07-28 PROCEDURE — 99233 SBSQ HOSP IP/OBS HIGH 50: CPT | Mod: FS,GC

## 2023-07-28 PROCEDURE — 36556 INSERT NON-TUNNEL CV CATH: CPT

## 2023-07-28 PROCEDURE — 77001 FLUOROGUIDE FOR VEIN DEVICE: CPT | Mod: 26,GC

## 2023-07-28 PROCEDURE — 99233 SBSQ HOSP IP/OBS HIGH 50: CPT

## 2023-07-28 RX ORDER — HYDROMORPHONE HYDROCHLORIDE 2 MG/ML
0.5 INJECTION INTRAMUSCULAR; INTRAVENOUS; SUBCUTANEOUS ONCE
Refills: 0 | Status: DISCONTINUED | OUTPATIENT
Start: 2023-07-28 | End: 2023-08-16

## 2023-07-28 RX ORDER — FUROSEMIDE 40 MG
20 TABLET ORAL ONCE
Refills: 0 | Status: COMPLETED | OUTPATIENT
Start: 2023-07-28 | End: 2023-07-28

## 2023-07-28 RX ADMIN — POLYETHYLENE GLYCOL 3350 17 GRAM(S): 17 POWDER, FOR SOLUTION ORAL at 13:59

## 2023-07-28 RX ADMIN — OXYCODONE HYDROCHLORIDE 5 MILLIGRAM(S): 5 TABLET ORAL at 15:00

## 2023-07-28 RX ADMIN — PANTOPRAZOLE SODIUM 40 MILLIGRAM(S): 20 TABLET, DELAYED RELEASE ORAL at 06:24

## 2023-07-28 RX ADMIN — Medication 1 TABLET(S): at 06:08

## 2023-07-28 RX ADMIN — Medication 1: at 16:54

## 2023-07-28 RX ADMIN — Medication 650 MILLIGRAM(S): at 06:07

## 2023-07-28 RX ADMIN — Medication 650 MILLIGRAM(S): at 19:19

## 2023-07-28 RX ADMIN — OXYCODONE HYDROCHLORIDE 5 MILLIGRAM(S): 5 TABLET ORAL at 14:04

## 2023-07-28 RX ADMIN — Medication 650 MILLIGRAM(S): at 11:55

## 2023-07-28 RX ADMIN — Medication 500 MILLIGRAM(S): at 06:07

## 2023-07-28 RX ADMIN — Medication 650 MILLIGRAM(S): at 00:39

## 2023-07-28 RX ADMIN — Medication 650 MILLIGRAM(S): at 18:54

## 2023-07-28 RX ADMIN — Medication 1: at 11:50

## 2023-07-28 RX ADMIN — OXYCODONE HYDROCHLORIDE 5 MILLIGRAM(S): 5 TABLET ORAL at 22:14

## 2023-07-28 RX ADMIN — Medication 1: at 07:31

## 2023-07-28 RX ADMIN — Medication 650 MILLIGRAM(S): at 13:59

## 2023-07-28 RX ADMIN — Medication 1: at 01:34

## 2023-07-28 RX ADMIN — Medication 650 MILLIGRAM(S): at 06:51

## 2023-07-28 RX ADMIN — Medication 20 MILLIGRAM(S): at 13:13

## 2023-07-28 RX ADMIN — OXYCODONE HYDROCHLORIDE 5 MILLIGRAM(S): 5 TABLET ORAL at 23:20

## 2023-07-28 RX ADMIN — HEPARIN SODIUM 5000 UNIT(S): 5000 INJECTION INTRAVENOUS; SUBCUTANEOUS at 06:12

## 2023-07-28 RX ADMIN — Medication 1 TABLET(S): at 13:59

## 2023-07-28 NOTE — PROGRESS NOTE ADULT - SUBJECTIVE AND OBJECTIVE BOX
Patient is a 78y old  Female who presents with a chief complaint of s/p L hip orif concern for loosened hardwar (28 Jul 2023 08:05)      SUBJECTIVE / OVERNIGHT EVENTS:    MEDICATIONS  (STANDING):  acetaminophen     Tablet .. 650 milliGRAM(s) Oral every 6 hours  ascorbic acid 500 milliGRAM(s) Oral two times a day  calcium carbonate 1250 mG  + Vitamin D (OsCal 500 + D) 1 Tablet(s) Oral three times a day  folic acid 1 milliGRAM(s) Oral daily  furosemide   Injectable 20 milliGRAM(s) IV Push once  insulin lispro (ADMELOG) corrective regimen sliding scale   SubCutaneous Before meals and at bedtime  multivitamin 1 Tablet(s) Oral daily  pantoprazole    Tablet 40 milliGRAM(s) Oral before breakfast  polyethylene glycol 3350 17 Gram(s) Oral daily  senna 2 Tablet(s) Oral at bedtime    MEDICATIONS  (PRN):  aluminum hydroxide/magnesium hydroxide/simethicone Suspension 30 milliLiter(s) Oral four times a day PRN Indigestion  melatonin 3 milliGRAM(s) Oral at bedtime PRN Insomnia  ondansetron Injectable 4 milliGRAM(s) IV Push every 6 hours PRN Nausea and/or Vomiting  oxyCODONE    IR 2.5 milliGRAM(s) Oral every 4 hours PRN Moderate Pain (4 - 6)  oxyCODONE    IR 5 milliGRAM(s) Oral every 4 hours PRN Severe Pain (7 - 10)      Vital Signs Last 24 Hrs  T(C): 37.1 (28 Jul 2023 09:08), Max: 37.1 (27 Jul 2023 13:16)  T(F): 98.8 (28 Jul 2023 09:08), Max: 98.8 (27 Jul 2023 13:16)  HR: 81 (28 Jul 2023 09:08) (78 - 91)  BP: 114/66 (28 Jul 2023 09:08) (114/66 - 149/79)  BP(mean): --  RR: 17 (28 Jul 2023 09:08) (16 - 17)  SpO2: 97% (28 Jul 2023 09:08) (95% - 100%)    Parameters below as of 28 Jul 2023 09:08  Patient On (Oxygen Delivery Method): room air      CAPILLARY BLOOD GLUCOSE      POCT Blood Glucose.: 177 mg/dL (28 Jul 2023 07:17)  POCT Blood Glucose.: 163 mg/dL (28 Jul 2023 01:27)  POCT Blood Glucose.: 193 mg/dL (27 Jul 2023 16:41)  POCT Blood Glucose.: 192 mg/dL (27 Jul 2023 11:21)    I&O's Summary      PHYSICAL EXAM:  GENERAL: NAD, well-developed  HEAD:  Atraumatic, Normocephalic  EYES: EOMI, PERRLA, conjunctiva and sclera clear  NECK: Supple, No JVD  CHEST/LUNG: Clear to auscultation bilaterally; No wheeze  HEART: Regular rate and rhythm; No murmurs, rubs, or gallops  ABDOMEN: Soft, Nontender, Nondistended; Bowel sounds present  EXTREMITIES:  2+ Peripheral Pulses, No clubbing, cyanosis, or edema  PSYCH: AAOx3  NEUROLOGY: non-focal  SKIN: No rashes or lesions    LABS:                        6.0    13.97 )-----------( 171      ( 28 Jul 2023 08:17 )             17.8     07-27    137  |  97<L>  |  26<H>  ----------------------------<  172<H>  4.2   |  26  |  3.79<H>    Ca    8.1<L>      27 Jul 2023 11:10      PT/INR - ( 28 Jul 2023 05:49 )   PT: 11.4 sec;   INR: 1.01 ratio         PTT - ( 28 Jul 2023 05:49 )  PTT:38.3 sec      Urinalysis Basic - ( 27 Jul 2023 11:10 )    Color: x / Appearance: x / SG: x / pH: x  Gluc: 172 mg/dL / Ketone: x  / Bili: x / Urobili: x   Blood: x / Protein: x / Nitrite: x   Leuk Esterase: x / RBC: x / WBC x   Sq Epi: x / Non Sq Epi: x / Bacteria: x        RADIOLOGY & ADDITIONAL TESTS:    Imaging Personally Reviewed:    Consultant(s) Notes Reviewed:      Care Discussed with Consultants/Other Providers:   Patient is a 78y old  Female who presents with a chief complaint of s/p L hip orif concern for loosened hardwar (28 Jul 2023 08:05)      SUBJECTIVE / OVERNIGHT EVENTS:  Patient has no new complaints. Denies cp, SOB, abdominal pain, N/V/D     MEDICATIONS  (STANDING):  acetaminophen     Tablet .. 650 milliGRAM(s) Oral every 6 hours  ascorbic acid 500 milliGRAM(s) Oral two times a day  calcium carbonate 1250 mG  + Vitamin D (OsCal 500 + D) 1 Tablet(s) Oral three times a day  folic acid 1 milliGRAM(s) Oral daily  furosemide   Injectable 20 milliGRAM(s) IV Push once  insulin lispro (ADMELOG) corrective regimen sliding scale   SubCutaneous Before meals and at bedtime  multivitamin 1 Tablet(s) Oral daily  pantoprazole    Tablet 40 milliGRAM(s) Oral before breakfast  polyethylene glycol 3350 17 Gram(s) Oral daily  senna 2 Tablet(s) Oral at bedtime    MEDICATIONS  (PRN):  aluminum hydroxide/magnesium hydroxide/simethicone Suspension 30 milliLiter(s) Oral four times a day PRN Indigestion  melatonin 3 milliGRAM(s) Oral at bedtime PRN Insomnia  ondansetron Injectable 4 milliGRAM(s) IV Push every 6 hours PRN Nausea and/or Vomiting  oxyCODONE    IR 2.5 milliGRAM(s) Oral every 4 hours PRN Moderate Pain (4 - 6)  oxyCODONE    IR 5 milliGRAM(s) Oral every 4 hours PRN Severe Pain (7 - 10)      Vital Signs Last 24 Hrs  T(C): 37.1 (28 Jul 2023 09:08), Max: 37.1 (27 Jul 2023 13:16)  T(F): 98.8 (28 Jul 2023 09:08), Max: 98.8 (27 Jul 2023 13:16)  HR: 81 (28 Jul 2023 09:08) (78 - 91)  BP: 114/66 (28 Jul 2023 09:08) (114/66 - 149/79)  BP(mean): --  RR: 17 (28 Jul 2023 09:08) (16 - 17)  SpO2: 97% (28 Jul 2023 09:08) (95% - 100%)    Parameters below as of 28 Jul 2023 09:08  Patient On (Oxygen Delivery Method): room air      CAPILLARY BLOOD GLUCOSE      POCT Blood Glucose.: 177 mg/dL (28 Jul 2023 07:17)  POCT Blood Glucose.: 163 mg/dL (28 Jul 2023 01:27)  POCT Blood Glucose.: 193 mg/dL (27 Jul 2023 16:41)  POCT Blood Glucose.: 192 mg/dL (27 Jul 2023 11:21)    I&O's Summary      PHYSICAL EXAM:  GENERAL: NAD, well-developed  HEAD:  Atraumatic, Normocephalic  EYES: EOMI, PERRLA, conjunctiva and sclera clear  NECK: Supple, No JVD  CHEST/LUNG: Clear to auscultation bilaterally; No wheeze  HEART: Regular rate and rhythm; No murmurs, rubs, or gallops  ABDOMEN: Soft, Nontender, Nondistended; Bowel sounds present  EXTREMITIES:  LUE fistula w/ chronic LUE edema  2+ Peripheral Pulses, No clubbing, or cyanosis  PSYCH: AAOx3  NEUROLOGY: non-focal  SKIN: No rashes or lesions    LABS:                        6.0    13.97 )-----------( 171      ( 28 Jul 2023 08:17 )             17.8     07-27    137  |  97<L>  |  26<H>  ----------------------------<  172<H>  4.2   |  26  |  3.79<H>    Ca    8.1<L>      27 Jul 2023 11:10      PT/INR - ( 28 Jul 2023 05:49 )   PT: 11.4 sec;   INR: 1.01 ratio         PTT - ( 28 Jul 2023 05:49 )  PTT:38.3 sec      Urinalysis Basic - ( 27 Jul 2023 11:10 )    Color: x / Appearance: x / SG: x / pH: x  Gluc: 172 mg/dL / Ketone: x  / Bili: x / Urobili: x   Blood: x / Protein: x / Nitrite: x   Leuk Esterase: x / RBC: x / WBC x   Sq Epi: x / Non Sq Epi: x / Bacteria: x        RADIOLOGY & ADDITIONAL TESTS:    Imaging Personally Reviewed:    Consultant(s) Notes Reviewed:      Care Discussed with Consultants/Other Providers:

## 2023-07-28 NOTE — PROGRESS NOTE ADULT - SUBJECTIVE AND OBJECTIVE BOX
VASCULAR SURGERY DAILY PROGRESS NOTE:     SUBJECTIVE/ROS:   Patient seen and evaluated on AM rounds.       OBJECTIVE:  Vital Signs Last 24 Hrs  T(C): 36.8 (2023 13:15), Max: 37.1 (2023 09:08)  T(F): 98.2 (2023 13:15), Max: 98.8 (2023 09:08)  HR: 78 (2023 13:15) (78 - 90)  BP: 109/61 (2023 13:15) (109/61 - 149/79)  RR: 17 (2023 13:15) (16 - 17)  SpO2: 97% (2023 13:15) (97% - 100%)    Parameters below as of 2023 13:15  Patient On (Oxygen Delivery Method): room air      I&O's Detail    Daily     Daily Weight in k.9 (2023 01:53)  MEDICATIONS  (STANDING):  acetaminophen     Tablet .. 650 milliGRAM(s) Oral every 6 hours  ascorbic acid 500 milliGRAM(s) Oral two times a day  calcium carbonate 1250 mG  + Vitamin D (OsCal 500 + D) 1 Tablet(s) Oral three times a day  folic acid 1 milliGRAM(s) Oral daily  HYDROmorphone  Injectable 0.5 milliGRAM(s) IV Push once  insulin lispro (ADMELOG) corrective regimen sliding scale   SubCutaneous Before meals and at bedtime  multivitamin 1 Tablet(s) Oral daily  pantoprazole    Tablet 40 milliGRAM(s) Oral before breakfast  polyethylene glycol 3350 17 Gram(s) Oral daily  senna 2 Tablet(s) Oral at bedtime    MEDICATIONS  (PRN):  aluminum hydroxide/magnesium hydroxide/simethicone Suspension 30 milliLiter(s) Oral four times a day PRN Indigestion  melatonin 3 milliGRAM(s) Oral at bedtime PRN Insomnia  ondansetron Injectable 4 milliGRAM(s) IV Push every 6 hours PRN Nausea and/or Vomiting  oxyCODONE    IR 2.5 milliGRAM(s) Oral every 4 hours PRN Moderate Pain (4 - 6)  oxyCODONE    IR 5 milliGRAM(s) Oral every 4 hours PRN Severe Pain (7 - 10)      Labs:                        6.9    13.90 )-----------( 186      ( 2023 14:00 )             20.1     07-28    133<L>  |  97<L>  |  39<H>  ----------------------------<  176<H>  4.7   |  25  |  4.99<H>    Ca    7.9<L>      2023 14:00      PT/INR - ( 2023 05:49 )   PT: 11.4 sec;   INR: 1.01 ratio         PTT - ( 2023 05:49 )  PTT:38.3 sec  Urinalysis Basic - ( 2023 14:00 )    Color: x / Appearance: x / SG: x / pH: x  Gluc: 176 mg/dL / Ketone: x  / Bili: x / Urobili: x   Blood: x / Protein: x / Nitrite: x   Leuk Esterase: x / RBC: x / WBC x   Sq Epi: x / Non Sq Epi: x / Bacteria: x          Physical Exam:  General: well developed, well nourished, NAD  Cardiovascular: appears well perfused   Respiratory: respirations non labored  Gastrointestinal: soft, nontender, nondistended  Extremities: FROM, warm  - LUE edematous, AVF w/ palpable thrill, aneurysmal, w/ 2 areas of ulceration, no erythema or sign of infection  Pulses: palpable radial pulse bilaterally  Neurological: A+Ox3

## 2023-07-28 NOTE — PROGRESS NOTE ADULT - SUBJECTIVE AND OBJECTIVE BOX
ORTHO PROGRESS NOTE     Pt seen and examined at bedside, denies SOB, CP, Dizziness. N/V/D /HA.  No significant overnight events. Pain well controlled. Patient ambulated  with PT . 1U prbc given yesterday, Pt to [facility] today, will receive 2nd unit with subsequent HD    Vital Signs Last 24 Hrs  T(C): 36.8 (28 Jul 2023 01:53), Max: 37.1 (27 Jul 2023 13:16)  T(F): 98.2 (28 Jul 2023 01:53), Max: 98.8 (27 Jul 2023 13:16)  HR: 87 (28 Jul 2023 01:53) (78 - 91)  BP: 149/79 (28 Jul 2023 01:53) (117/55 - 149/79)  BP(mean): --  RR: 16 (28 Jul 2023 01:53) (16 - 17)  SpO2: 100% (28 Jul 2023 01:53) (95% - 100%)    Parameters below as of 28 Jul 2023 01:53  Patient On (Oxygen Delivery Method): room air        Gen: No apparent distress    left LE:  Dressing C/D I  Prevena in place   BLE: motor intact EHL/FHL/TA/GS .  Sensation is grossly intact to light touch in the distal extremities.  Compartments are soft bilaterally.  Extremities are warm .  DP 2+ BLE     Labs:  CBC Full  -  ( 27 Jul 2023 20:30 )  WBC Count : 12.07 K/uL  RBC Count : 2.03 M/uL  Hemoglobin : 5.9 g/dL  Hematocrit : 18.3 %  Platelet Count - Automated : 158 K/uL  Mean Cell Volume : 90.1 fL  Mean Cell Hemoglobin : 29.1 pg  Mean Cell Hemoglobin Concentration : 32.2 gm/dL  Auto Neutrophil # : x  Auto Lymphocyte # : x  Auto Monocyte # : x  Auto Eosinophil # : x  Auto Basophil # : x  Auto Neutrophil % : x  Auto Lymphocyte % : x  Auto Monocyte % : x  Auto Eosinophil % : x  Auto Basophil % : x        07-27    137  |  97<L>  |  26<H>  ----------------------------<  172<H>  4.2   |  26  |  3.79<H>    Ca    8.1<L>      27 Jul 2023 11:10           A/P  s/p left hip SUZANNA-> left hip hemiarthroplasty  POD #9    - Pain control/ Analgesia  - DVT ppx SQH (held for IR procedure), foot pumps  - PT/OT - wbat/oob    - Incentive Spirometer   - notify Ortho for questions

## 2023-07-28 NOTE — PROGRESS NOTE ADULT - PROBLEM SELECTOR PLAN 1
Pt with ESRD on HD TIW MWF admitted with fall. Pt s/p Lt hemiarthroplasty on 7/19/2023, however surgery complicated by intraoperative hypotension requiring vasopressor support. Patient transferred to SICU for hemodynamic stabilization. CT Angio abd/pelv reviewed. HD treatment held on 7/19/23 due to above. Held on 7/20 due to blood loss anemia. Transferred back to medical floors. Plan for maintenance HD today after placement of temporary dialysis catheter. Recheck labs.

## 2023-07-28 NOTE — PROGRESS NOTE ADULT - ASSESSMENT
77 y.o. Female w/ Hx HTN, DM (diet controlled), chronic type B aortic dissection, ESRD on HD ( M, W, F ) via LUE fistula, endometrial ca in remission s/p total hysterectomy in 2007 s/p L IMN for IT fx in May 2023 p/w 2 weeks of worsening atraumatic  [facility] left [facility] to have interval migration of femoral neck screw into acetabular region so readmitted for Left hip revision. Patient s/p left hip removal of gamma nail and placement of restoration modular peter arthroplasty c/b post op hypotension requiring pressors and SICU stay. Patient had hemorrhagic shock from hematoma at the operative site, with possible extravasation found on CT imaging requiring 1 Unit PRBCs.    77 y.o. Female w/ Hx HTN, DM (diet controlled), chronic type B aortic dissection, ESRD on HD ( M, W, F ) via LUE fistula, endometrial ca in remission s/p total hysterectomy in 2007 s/p L IMN for IT fx in May 2023 p/w 2 weeks of worsening atraumatic  [facility] to have interval migration of femoral neck screw into acetabular region so readmitted for Left hip revision. Patient s/p left hip removal of gamma nail and placement of restoration modular peter arthroplasty c/b post op hypotension requiring pressors and SICU stay. Patient had hemorrhagic shock from hematoma at the operative site, with possible extravasation found on CT imaging requiring 6 Units of PRBCs so far.

## 2023-07-28 NOTE — PROGRESS NOTE ADULT - PROBLEM SELECTOR PLAN 2
CT angio A/P on 7/20 -ill-defined and partially obscured hematoma without definite evidence of active hemorrhage. A hyperdense focus could reflect a component of the hematoma, though evaluation is limited without precontrast imaging and due to streak artifact.  -s/p 5 Unit PRBCs on 7/19, 7/20, 7/23, and 7/27  -H/H still unchanged despite 1 unit PRBCs on 7/27 so will get another Unit PRBCs on 7/28  -monitoring CBC  -management as per ortho CT angio A/P on 7/20 -ill-defined and partially obscured hematoma without definite evidence of active hemorrhage. A hyperdense focus could reflect a component of the hematoma, though evaluation is limited without precontrast imaging and due to streak artifact.  -s/p 6 Unit PRBCs on 7/19, 7/20, 7/23, 7/27, and 7/28  -H/H still unchanged despite 1 unit PRBCs on 7/27 and another Unit PRBCs on 7/28  -Will give a 7th unit PRBCs today  -if Hb still 6 or less after 7th Unit PRBCs, recommend repeating CT thigh and CT A/P to r/o RP bleed or worsened thigh hematoma  -Spoke to ortho and aware

## 2023-07-28 NOTE — PROGRESS NOTE ADULT - SUBJECTIVE AND OBJECTIVE BOX
Cardiovascular Disease Progress Note  Date of Service: 23 @ 08:05    Overnight events: Drop in Hb noted overnight.    Patient denies chest pain or SOB.   Otherwise review of systems negative    Objective Findings:  T(C): 36.8 (23 @ 01:53), Max: 37.1 (23 @ 13:16)  HR: 87 (23 @ 01:53) (78 - 91)  BP: 149/79 (23 @ 01:53) (117/55 - 149/79)  RR: 16 (23 @ 01:53) (16 - 17)  SpO2: 100% (23 @ 01:53) (95% - 100%)  Wt(kg): --  Daily     Daily Weight in k.9 (2023 01:53)      Physical Exam:  Gen: NAD; Patient resting comfortably  HEENT: EOMI, Normocephalic/ atraumatic  CV: RRR, normal S1 + S2, no m/r/g  Lungs:  Normal respiratory effort; clear to auscultation bilaterally  Abd: soft, non-tender; bowel sounds present  Ext: No edema; warm and well perfused    Telemetry: n/a    Laboratory Data:                        5.9    12.07 )-----------( 158      ( 2023 20:30 )             18.3     07-    137  |  97<L>  |  26<H>  ----------------------------<  172<H>  4.2   |  26  |  3.79<H>    Ca    8.1<L>      2023 11:10      PT/INR - ( 2023 05:49 )   PT: 11.4 sec;   INR: 1.01 ratio         PTT - ( 2023 05:49 )  PTT:38.3 sec          Inpatient Medications:  MEDICATIONS  (STANDING):  acetaminophen     Tablet .. 650 milliGRAM(s) Oral every 6 hours  ascorbic acid 500 milliGRAM(s) Oral two times a day  calcium carbonate 1250 mG  + Vitamin D (OsCal 500 + D) 1 Tablet(s) Oral three times a day  folic acid 1 milliGRAM(s) Oral daily  insulin lispro (ADMELOG) corrective regimen sliding scale   SubCutaneous Before meals and at bedtime  multivitamin 1 Tablet(s) Oral daily  pantoprazole    Tablet 40 milliGRAM(s) Oral before breakfast  polyethylene glycol 3350 17 Gram(s) Oral daily  senna 2 Tablet(s) Oral at bedtime      Assessment:  77 year old woman with ESRD on HD (MWF), HTN, DM (diet controlled), chronic type B aortic dissection, heart murmur, endometrial ca in remission s/p total hysterectomy in  presents with L hip pain     Plan of Care:      #Post-operative evaluation-  - Ms. Harris tolerated orthopedic intervention well.  Post operative course complicated by distributive/hemorrhagic shock, briefly requiring low doses of Levophed.  Now with recurrent anemia.   She displays no signs of  coronary ischemia or decompensated CHF.   Management of anemia as per the primary team.   Continue current cardiac management.       #Type B aortic dissection  - Chronic (Diagnosed >10 years ago)  - Continue current management.        #ESRD  - HD as per renal.          Over 25 minutes spent on total encounter; more than 50% of the visit was spent counseling and/or coordinating care by the attending physician.      Gabo Burris MD [facility]  Cardiovascular Disease  [phone]

## 2023-07-28 NOTE — CHART NOTE - NSCHARTNOTEFT_GEN_A_CORE
Reason for Follow-Up Assessment: ICU Transfer     7/28 Hospitalist Attending - 77 y.o. Female w/ Hx HTN, DM (diet controlled), chronic type B aortic dissection, ESRD on HD ( M, W, F ) via LUE fistula, endometrial ca in remission s/p total hysterectomy in 2007 s/p L IMN for IT fx in May 2023 p/w 2 weeks of worsening atraumatic  [facility]ray of left femur to have interval migration of femoral neck screw into acetabular region so readmitted for Left hip revision. Patient s/p left hip removal of gamma nail and placement of restoration modular peter arthroplasty c/b post op hypotension requiring pressors and SICU stay. Patient had hemorrhagic shock from hematoma at the operative site, with possible extravasation found on CT imaging requiring 6 Units of PRBCs so far.      Patient reported to RD that her stomach feels upset since last night, c/o gassiness and constipation.  Patient receiving MiraLax and Senna.  Patient was amenable to Nepro shake 1x day. No questions or concerns surrounding diet, previously educated on therapeutic restrictions.  Pt. reported usual dry weight 54.9kgs.  Question accuracy of weight 7/28 - 71.9kgs; previous weights ~54-56kgs with outliers 7/28 as above and 7/27 - 68.8kg. Patient did have variable edema and is getting HD, therefore, weight variances can likely be attributed to fluid status / edema and fluid shifts. Patient denies chewing / swallowing problems.      Source: [ X ] Patient [ ] Family [ ] RN [ X ] Chart      Diet, Regular:   Consistent Carbohydrate {Evening Snack} (CSTCHOSN)  For patients receiving Renal Replacement - No Protein Restr, No Conc K, No Conc Phos, Low Sodium (RENAL) (07-21-23 @ 10:51)    PO intake:  [ ] Poor < 50%  [ X ] Fair 50-75% [ ] Good  % [ ] Inconsistent PO intake    Enteral /Parenteral Nutrition:       [ X ] n/a    Weight Hx: 7/28 - 71.9kg  & 7/27 - 68.8kg (? discrepancies)      7/25 - 60.5kg      7/24 - 55.2kg     7/22 - 54.8kg      7/21 - 56kg      7/20 - 54.6kg      7/19 - 53.6kg          Edema: 1+ edema to lt hand and lt leg.     Pressure Injuries: No pressure ulcers/DTI noted in flowsheets.     _______________ Pertinent Medications_______________  MEDICATIONS  (STANDING):  acetaminophen     Tablet .. 650 milliGRAM(s) Oral every 6 hours  ascorbic acid 500 milliGRAM(s) Oral two times a day  calcium carbonate 1250 mG  + Vitamin D (OsCal 500 + D) 1 Tablet(s) Oral three times a day  folic acid 1 milliGRAM(s) Oral daily  insulin lispro (ADMELOG) corrective regimen sliding scale   SubCutaneous Before meals and at bedtime  multivitamin 1 Tablet(s) Oral daily  pantoprazole    Tablet 40 milliGRAM(s) Oral before breakfast  polyethylene glycol 3350 17 Gram(s) Oral daily  senna 2 Tablet(s) Oral at bedtime    MEDICATIONS  (PRN):  aluminum hydroxide/magnesium hydroxide/simethicone Suspension 30 milliLiter(s) Oral four times a day PRN Indigestion  melatonin 3 milliGRAM(s) Oral at bedtime PRN Insomnia  ondansetron Injectable 4 milliGRAM(s) IV Push every 6 hours PRN Nausea and/or Vomiting  oxyCODONE    IR 2.5 milliGRAM(s) Oral every 4 hours PRN Moderate Pain (4 - 6)  oxyCODONE    IR 5 milliGRAM(s) Oral every 4 hours PRN Severe Pain (7 - 10)      __________________ Pertinent Labs__________________   07-27 Na137 mmol/L Glu 172 mg/dL<H> K+ 4.2 mmol/L Cr  3.79 mg/dL<H> BUN 26 mg/dL<H> 07-24 Phos 4.5 mg/dL 07-22 Alb 2.1 g/dL<L>    POCT Blood Glucose.: 188 mg/dL (07-28-23 @ 11:32)  POCT Blood Glucose.: 177 mg/dL (07-28-23 @ 07:17)  POCT Blood Glucose.: 163 mg/dL (07-28-23 @ 01:27)  POCT Blood Glucose.: 193 mg/dL (07-27-23 @ 16:41)  POCT Blood Glucose.: 192 mg/dL (07-27-23 @ 11:21)  POCT Blood Glucose.: 160 mg/dL (07-27-23 @ 07:14)  POCT Blood Glucose.: 202 mg/dL (07-26-23 @ 21:21)  POCT Blood Glucose.: 200 mg/dL (07-26-23 @ 16:50)  POCT Blood Glucose.: 160 mg/dL (07-26-23 @ 11:14)  POCT Blood Glucose.: 141 mg/dL (07-26-23 @ 09:31)  POCT Blood Glucose.: 127 mg/dL (07-26-23 @ 08:13)  POCT Blood Glucose.: 159 mg/dL (07-26-23 @ 05:31)  POCT Blood Glucose.: 193 mg/dL (07-25-23 @ 21:51)  POCT Blood Glucose.: 219 mg/dL (07-25-23 @ 16:21)      Estimated Needs:   [ X] no change since previous assessment  [ ] recalculated:     Previous Nutrition Diagnosis: Increased Nutrient Needs     Nutrition Diagnosis is [X ] ongoing  [ ] resolved [ ] not applicable     Monitoring and Evaluation:     [ x ] Tolerance to diet prescription / adequacy of meal intake  [ x ] Weight trends   [ x ] Pertinent labs    Recommendations:  1) Diet / Supplement Changes: Continue diet as ordered, suggest Nepro 1x daily (425 kcals, 19.1g protein).   2) Obtain and record current weights to best monitor for acute changes in nutritional status.  3) Please consistently document % meal intake in nursing flowsheets.      Martine Perez, MS, RDN, CDN  Pager [phone]  Also available on MS Teams

## 2023-07-28 NOTE — PROGRESS NOTE ADULT - PROBLEM SELECTOR PLAN 2
Pt. with anemia in the setting of ESRD and blood loss from surgical procedure. Hgb 6 from early AM (7/28/23), no further labs ordered. Will give RICA with HD treatments. Monitor hemoglobin levels.     If you have any questions, please feel free to contact fellow.  John Torres  Nephrology Fellow  [id number] / [phone] / [employer] Teams (Preferred)  (After 4pm or on weekends, please call the on-call Fellow)

## 2023-07-28 NOTE — PROGRESS NOTE ADULT - SUBJECTIVE AND OBJECTIVE BOX
[facility] Division of Kidney Diseases & Hypertension  FOLLOW UP NOTE  [phone]--------------------------------------------------------------------------------    HPI: 78F w/ PMH of ESRD on HD TIW (MWF) at [facility] (previously at [facility]), HTN, DM2, endometrial cancer (underwent hysterectomy and chemotherapy, type B aortic dissection, and L hip ORIF, admitted for revision of L hip hardware. Nephrology consulted for management of ESRD/HD.     24 hour events/subjective: Pt. seen and evaluated at bedside. Overall feels well but LUE swelling continues to bother her. She denies fevers/chills, headaches, chest pain, SOB, abd pain, or leg swelling. 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     PAST HISTORY  --------------------------------------------------------------------------------  No significant changes to PMH, PSH, FHx, SHx, unless otherwise noted    ALLERGIES & MEDICATIONS  --------------------------------------------------------------------------------  Allergies  No Known Allergies    Intolerances    Standing Inpatient Medications  acetaminophen     Tablet .. 650 milliGRAM(s) Oral every 6 hours  ascorbic acid 500 milliGRAM(s) Oral two times a day  calcium carbonate 1250 mG  + Vitamin D (OsCal 500 + D) 1 Tablet(s) Oral three times a day  folic acid 1 milliGRAM(s) Oral daily  furosemide   Injectable 20 milliGRAM(s) IV Push once  insulin lispro (ADMELOG) corrective regimen sliding scale   SubCutaneous Before meals and at bedtime  multivitamin 1 Tablet(s) Oral daily  pantoprazole    Tablet 40 milliGRAM(s) Oral before breakfast  polyethylene glycol 3350 17 Gram(s) Oral daily  senna 2 Tablet(s) Oral at bedtime    PRN Inpatient Medications  aluminum hydroxide/magnesium hydroxide/simethicone Suspension 30 milliLiter(s) Oral four times a day PRN  melatonin 3 milliGRAM(s) Oral at bedtime PRN  ondansetron Injectable 4 milliGRAM(s) IV Push every 6 hours PRN  oxyCODONE    IR 2.5 milliGRAM(s) Oral every 4 hours PRN  oxyCODONE    IR 5 milliGRAM(s) Oral every 4 hours PRN    REVIEW OF SYSTEMS  --------------------------------------------------------------------------------  Gen: No fevers/chills  Head/Eyes/Ears: no headache  Respiratory: No dyspnea, cough  CV: No chest pain  GI: No abdominal pain  MSK: mild L leg pain and chronic swelling, no swelling in LLE, chronic swelling in L arm    All other systems were reviewed and are negative, except as noted.    VITALS/PHYSICAL EXAM  --------------------------------------------------------------------------------  T(C): 37.1 (07-28-23 @ 09:08), Max: 37.1 (07-27-23 @ 13:16)  HR: 81 (07-28-23 @ 09:08) (78 - 91)  BP: 114/66 (07-28-23 @ 09:08) (114/66 - 149/79)  RR: 17 (07-28-23 @ 09:08) (16 - 17)  SpO2: 97% (07-28-23 @ 09:08) (95% - 100%)  Wt(kg): --    Physical Exam:  Gen: NAD  HEENT: MMM  Pulm: minimal bibasilar crackles   CV: S1S2  Abd: Soft, +BS   Ext: LE edema B/L L>R, chronic LUE edema  Neuro: Awake  Skin: Warm and dry, ecchymotic RUE  Vascular access: LUE AVF, aneurysmal, skin intact, thrill felt    LABS/STUDIES  --------------------------------------------------------------------------------              6.0    13.97 >-----------<  171      [07-28-23 @ 08:17]              17.8     137  |  97  |  26  ----------------------------<  172      [07-27-23 @ 11:10]  4.2   |  26  |  3.79        Ca     8.1     [07-27-23 @ 11:10]    PT/INR: PT 11.4 , INR 1.01       [07-28-23 @ 05:49]  PTT: 38.3       [07-28-23 @ 05:49]    Creatinine Trend:  SCr 3.79 [07-27 @ 11:10]  SCr 5.62 [07-24 @ 01:20]  SCr 4.51 [07-23 @ 00:30]  SCr 3.79 [07-22 @ 02:00]  SCr 2.84 [07-21 @ 15:36]    Urinalysis - [07-27-23 @ 11:10]      Color  / Appearance  / SG  / pH       Gluc 172 / Ketone   / Bili  / Urobili        Blood  / Protein  / Leuk Est  / Nitrite       RBC  / WBC  / Hyaline  / Gran  / Sq Epi  / Non Sq Epi  / Bacteria

## 2023-07-28 NOTE — PROGRESS NOTE ADULT - PROBLEM SELECTOR PLAN 3
stable  no signs of volume overload   c/w HD M, W, F via LUE fistula  Patient fistula is not functional so IR to place shiley on 7/28  Will need a fistula revision next week

## 2023-07-28 NOTE — PROGRESS NOTE ADULT - ASSESSMENT
Patient is a 77 year old F w/ a PMHx HTN, DM, chronic B type aortic dissection, ESRD on HD with LUE AVF by Dr. Jensen in 2018 now s/p left hip arthroplasty. Vascular surgery consulted for LUE AVF wound.       Recommendations:   - Patient w/ LUE BC AVF, now w/ 2 areas of healed ulceration      - Will need eventual AVF revision, timing TBD   - Please consult IR for tunneled catheter placement   - DO NOT use LUE AVF for dialysis. Ulcerated areas covered with gauze, do not expose   - Remainder care per primary       C Team Surgery   [id number]

## 2023-07-29 LAB
ANION GAP SERPL CALC-SCNC: 14 MMOL/L — SIGNIFICANT CHANGE UP (ref 7–14)
BUN SERPL-MCNC: 21 MG/DL — SIGNIFICANT CHANGE UP (ref 7–23)
CALCIUM SERPL-MCNC: 8.1 MG/DL — LOW (ref 8.4–10.5)
CHLORIDE SERPL-SCNC: 99 MMOL/L — SIGNIFICANT CHANGE UP (ref 98–107)
CO2 SERPL-SCNC: 26 MMOL/L — SIGNIFICANT CHANGE UP (ref 22–31)
CREAT SERPL-MCNC: 3 MG/DL — HIGH (ref 0.5–1.3)
EGFR: 16 ML/MIN/1.73M2 — LOW
GLUCOSE BLDC GLUCOMTR-MCNC: 138 MG/DL — HIGH (ref 70–99)
GLUCOSE BLDC GLUCOMTR-MCNC: 154 MG/DL — HIGH (ref 70–99)
GLUCOSE BLDC GLUCOMTR-MCNC: 167 MG/DL — HIGH (ref 70–99)
GLUCOSE BLDC GLUCOMTR-MCNC: 217 MG/DL — HIGH (ref 70–99)
GLUCOSE SERPL-MCNC: 131 MG/DL — HIGH (ref 70–99)
HCT VFR BLD CALC: 25.2 % — LOW (ref 34.5–45)
HGB BLD-MCNC: 8.5 G/DL — LOW (ref 11.5–15.5)
MCHC RBC-ENTMCNC: 29.4 PG — SIGNIFICANT CHANGE UP (ref 27–34)
MCHC RBC-ENTMCNC: 33.7 GM/DL — SIGNIFICANT CHANGE UP (ref 32–36)
MCV RBC AUTO: 87.2 FL — SIGNIFICANT CHANGE UP (ref 80–100)
NRBC # BLD: 0 /100 WBCS — SIGNIFICANT CHANGE UP (ref 0–0)
NRBC # FLD: 0.04 K/UL — HIGH (ref 0–0)
PLATELET # BLD AUTO: 137 K/UL — LOW (ref 150–400)
POTASSIUM SERPL-MCNC: 4.2 MMOL/L — SIGNIFICANT CHANGE UP (ref 3.5–5.3)
POTASSIUM SERPL-SCNC: 4.2 MMOL/L — SIGNIFICANT CHANGE UP (ref 3.5–5.3)
RBC # BLD: 2.89 M/UL — LOW (ref 3.8–5.2)
RBC # FLD: 20.4 % — HIGH (ref 10.3–14.5)
SODIUM SERPL-SCNC: 139 MMOL/L — SIGNIFICANT CHANGE UP (ref 135–145)
WBC # BLD: 12.91 K/UL — HIGH (ref 3.8–10.5)
WBC # FLD AUTO: 12.91 K/UL — HIGH (ref 3.8–10.5)

## 2023-07-29 PROCEDURE — 99233 SBSQ HOSP IP/OBS HIGH 50: CPT

## 2023-07-29 RX ADMIN — Medication 3 MILLIGRAM(S): at 21:48

## 2023-07-29 RX ADMIN — Medication 1 TABLET(S): at 21:47

## 2023-07-29 RX ADMIN — Medication 1: at 17:02

## 2023-07-29 RX ADMIN — Medication 650 MILLIGRAM(S): at 06:06

## 2023-07-29 RX ADMIN — Medication 1 TABLET(S): at 13:08

## 2023-07-29 RX ADMIN — Medication 650 MILLIGRAM(S): at 06:47

## 2023-07-29 RX ADMIN — Medication 500 MILLIGRAM(S): at 06:07

## 2023-07-29 RX ADMIN — PANTOPRAZOLE SODIUM 40 MILLIGRAM(S): 20 TABLET, DELAYED RELEASE ORAL at 06:47

## 2023-07-29 RX ADMIN — Medication 1: at 22:01

## 2023-07-29 RX ADMIN — Medication 2: at 11:34

## 2023-07-29 RX ADMIN — Medication 1 TABLET(S): at 06:07

## 2023-07-29 RX ADMIN — POLYETHYLENE GLYCOL 3350 17 GRAM(S): 17 POWDER, FOR SOLUTION ORAL at 13:08

## 2023-07-29 RX ADMIN — SENNA PLUS 2 TABLET(S): 8.6 TABLET ORAL at 21:48

## 2023-07-29 RX ADMIN — Medication 650 MILLIGRAM(S): at 21:47

## 2023-07-29 RX ADMIN — Medication 650 MILLIGRAM(S): at 22:31

## 2023-07-29 RX ADMIN — Medication 1 MILLIGRAM(S): at 13:07

## 2023-07-29 NOTE — PROGRESS NOTE ADULT - SUBJECTIVE AND OBJECTIVE BOX
Dr. Vidhya Gruber  Pager [phone]    PROGRESS NOTE:     Patient is a 78y old  Female who presents with a chief complaint of s/p L hip orif concern for loosened hardwar (29 Jul 2023 09:25)      SUBJECTIVE / OVERNIGHT EVENTS: pt denies chest pain or sob   ADDITIONAL REVIEW OF SYSTEMS: afebrile  , shiley placed yesterday and dialyzed last night, on HD MWF    MEDICATIONS  (STANDING):  acetaminophen     Tablet .. 650 milliGRAM(s) Oral every 6 hours  ascorbic acid 500 milliGRAM(s) Oral two times a day  calcium carbonate 1250 mG  + Vitamin D (OsCal 500 + D) 1 Tablet(s) Oral three times a day  folic acid 1 milliGRAM(s) Oral daily  HYDROmorphone  Injectable 0.5 milliGRAM(s) IV Push once  insulin lispro (ADMELOG) corrective regimen sliding scale   SubCutaneous Before meals and at bedtime  multivitamin 1 Tablet(s) Oral daily  pantoprazole    Tablet 40 milliGRAM(s) Oral before breakfast  polyethylene glycol 3350 17 Gram(s) Oral daily  senna 2 Tablet(s) Oral at bedtime    MEDICATIONS  (PRN):  aluminum hydroxide/magnesium hydroxide/simethicone Suspension 30 milliLiter(s) Oral four times a day PRN Indigestion  melatonin 3 milliGRAM(s) Oral at bedtime PRN Insomnia  ondansetron Injectable 4 milliGRAM(s) IV Push every 6 hours PRN Nausea and/or Vomiting  oxyCODONE    IR 5 milliGRAM(s) Oral every 4 hours PRN Severe Pain (7 - 10)  oxyCODONE    IR 2.5 milliGRAM(s) Oral every 4 hours PRN Moderate Pain (4 - 6)      CAPILLARY BLOOD GLUCOSE      POCT Blood Glucose.: 217 mg/dL (29 Jul 2023 11:24)  POCT Blood Glucose.: 138 mg/dL (29 Jul 2023 07:04)  POCT Blood Glucose.: 116 mg/dL (28 Jul 2023 22:16)  POCT Blood Glucose.: 182 mg/dL (28 Jul 2023 16:36)    I&O's Summary    28 Jul 2023 07:01  -  29 Jul 2023 07:00  --------------------------------------------------------  IN: 1000 mL / OUT: 3400 mL / NET: -2400 mL        PHYSICAL EXAM:  Vital Signs Last 24 Hrs  T(C): 36.7 (29 Jul 2023 09:15), Max: 37 (28 Jul 2023 18:00)  T(F): 98.1 (29 Jul 2023 09:15), Max: 98.6 (28 Jul 2023 18:00)  HR: 74 (29 Jul 2023 09:15) (74 - 89)  BP: 139/71 (29 Jul 2023 09:15) (104/58 - 178/62)  BP(mean): 88 (28 Jul 2023 17:50) (88 - 88)  RR: 17 (29 Jul 2023 09:15) (16 - 23)  SpO2: 97% (29 Jul 2023 09:15) (95% - 100%)    Parameters below as of 29 Jul 2023 09:15  Patient On (Oxygen Delivery Method): room air      CONSTITUTIONAL: NAD, well-developed  Neck: RIJ shiley  RESPIRATORY: Normal respiratory effort; lungs are clear to auscultation bilaterally  CARDIOVASCULAR: Regular rate and rhythm, normal S1 and S2, no murmur/rub/gallop; No lower extremity edema; Peripheral pulses are 2+ bilaterally  ABDOMEN: Nontender to palpation, normoactive bowel sounds, no rebound/guarding; No hepatosplenomegaly  MUSCULOSKELETAL: LUE AVF with bruit, chronic LUE edema, no clubbing or cyanosis of digits; no joint swelling or tenderness to palpation  PSYCH: A+O to person, place, and time; affect appropriate    LABS:                        8.5    12.91 )-----------( 137      ( 29 Jul 2023 05:46 )             25.2     07-29    139  |  99  |  21  ----------------------------<  131<H>  4.2   |  26  |  3.00<H>    Ca    8.1<L>      29 Jul 2023 05:46      PT/INR - ( 28 Jul 2023 05:49 )   PT: 11.4 sec;   INR: 1.01 ratio         PTT - ( 28 Jul 2023 05:49 )  PTT:38.3 sec      Urinalysis Basic - ( 29 Jul 2023 05:46 )    Color: x / Appearance: x / SG: x / pH: x  Gluc: 131 mg/dL / Ketone: x  / Bili: x / Urobili: x   Blood: x / Protein: x / Nitrite: x   Leuk Esterase: x / RBC: x / WBC x   Sq Epi: x / Non Sq Epi: x / Bacteria: x          RADIOLOGY & ADDITIONAL TESTS:  Results Reviewed:   Imaging Personally Reviewed:  Electrocardiogram Personally Reviewed:    COORDINATION OF CARE:  Care Discussed with Consultants/Other Providers [Y/N]: vascular surgery resident Dr. Rooney, convert [facility]  Prior or Outpatient Records Reviewed [Y/N]:

## 2023-07-29 NOTE — PROGRESS NOTE ADULT - ASSESSMENT
Patient is a 77 year old F w/ a PMHx HTN, DM, chronic B type aortic dissection, ESRD on HD with LUE AVF by Dr. Jensen in 2018 now s/p left hip arthroplasty. Vascular surgery consulted for LUE AVF wound.       Recommendations:   - Patient w/ LUE BC AVF, now w/ 2 areas of healed ulceration HD cannulation sites      - Will need eventual AVF revision, timing TBD   - Please consult IR for tunneled catheter placement   - DO NOT use LUE AVF for dialysis. Ulcerated areas covered with gauze, do not expose   - Remainder care per primary       C Team Surgery   [id number]

## 2023-07-29 NOTE — PROGRESS NOTE ADULT - PROBLEM SELECTOR PLAN 3
stable  no signs of volume overload   c/w HD M, W, F via LUE fistula  Patient fistula is not functional so IR placed RIJ shiley on 7/28 and pt was dialyzed thereafter  next HD on Monday  d/w vascular surgery, pt likely will need new AVF creation vs. revision of existing LUE AVF next week, however, will not be able to use it soon so will need to convert RIJ shiley to a tunneled catheter (Permacath)  ordered IR consult for Permacath

## 2023-07-29 NOTE — PROGRESS NOTE ADULT - ASSESSMENT
77 y.o. Female w/ Hx HTN, DM (diet controlled), chronic type B aortic dissection, ESRD on HD ( M, W, F ) via LUE fistula, endometrial ca in remission s/p total hysterectomy in 2007 s/p L IMN for IT fx in May 2023 p/w 2 weeks of worsening atraumatic  [facility]ray of left femur to have interval migration of femoral neck screw into acetabular region so readmitted for Left hip revision. Patient s/p left hip removal of gamma nail and placement of restoration modular peter arthroplasty c/b post op hypotension requiring pressors and SICU stay. Patient had hemorrhagic shock from hematoma at the operative site, with possible extravasation found on CT imaging requiring 6 Units of PRBCs so far.

## 2023-07-29 NOTE — CONSULT NOTE ADULT - ASSESSMENT
Interventional Radiology    Evaluate for Procedure: Temp to Perm    HPI: 77 y.o. Female w/ Hx HTN, DM (diet controlled), chronic type B aortic dissection, ESRD on HD ( M, W, F ) via LUE fistula, endometrial ca in remission s/p total hysterectomy in 2007 s/p L IMN for IT fx in May 2023 p/w 2 weeks of worsening atraumatic  [facility]ray of left femur to have interval migration of femoral neck screw into acetabular region so readmitted for Left hip revision. Patient s/p left hip removal of gamma nail and placement of restoration modular peter arthroplasty c/b post op hypotension requiring pressors and SICU stay. Patient had hemorrhagic shock from hematoma at the operative site.     LUE fistula with wounds pending revision. S/p nontunneled catheter placement 7/28. IR requested for conversion to tunneled HD catheter.      Allergies: No Known Allergies    Medications (Abx/Cardiac/Anticoagulation/Blood Products)    furosemide   Injectable: 20 milliGRAM(s) IV Push (07-28 @ 13:13)  heparin   Injectable: 5000 Unit(s) SubCutaneous (07-28 @ 06:12)    Data:    T(C): 37.5  HR: 89  BP: 117/52  RR: 17  SpO2: 96%    -WBC 12.91 / HgB 8.5 / Hct 25.2 / Plt 137  -Na 139 / Cl 99 / BUN 21 / Glucose 131  -K 4.2 / CO2 26 / Cr 3.00  -INR 1.01 / PTT 38.3      Radiology: Relevant imaging reviewed.    Assessment/Plan: 77F ESRD p/w hip pain s/p L hip revision c/b hemorrhagic shock. LUE fistula wound pending revision s/p nontunneled HD catheter. IR consulted for tunneled catheter.     -- IR will plan to perform nontunneled to tunneled HD catheter conversion on 8/1.  -- NPO at midnight on 7/31  -- AM labs (CBC, BMP, Coags)  -- hold a.m. anticoagulation on 8/1  -- please complete IR pre-procedure note  -- please place IR procedure request order under Dr. Tamez    --  Paul Phan, PGY-3  Vascular and Interventional Radiology   Available on [employer] Teams    - Non-emergent consults: Place IR consult order in [place]  - Emergent issues (pager): [facility] [phone]; [facility] [phone]; [phone]  - Scheduling questions: [facility] [phone]; [facility] [phone]  - Clinic/outpatient booking: [facility] [phone]; [facility] [phone]

## 2023-07-29 NOTE — PROGRESS NOTE ADULT - ASSESSMENT
Pt is a 77 y/o female s/p left hip SUZANNA-> left hip hemiarthroplasty      PLAN  - Pain control/ Analgesia  - DVT ppx SQH w SCDs  - PT/OT   - WBAT and OOB to chair   - Incentive Spirometer   - notify Ortho for questions

## 2023-07-29 NOTE — CONSULT NOTE ADULT - CONSULT REASON
Temp to Perm
Wound/ulceration over LUE AVF
medical comanagement
Hemodynamic monitoring.
Left thigh hematoma s/p hip hardware adjustment, potential emobilization
Pre-operative risk evaluationar
Management of ESRD/HD

## 2023-07-29 NOTE — CONSULT NOTE ADULT - CONSULT REQUESTED DATE/TIME
20-Jul-2023 14:11
18-Jul-2023 14:32
19-Jul-2023 16:34
29-Jul-2023 20:46
18-Jul-2023
18-Jul-2023 10:36
27-Jul-2023 16:32

## 2023-07-29 NOTE — PROGRESS NOTE ADULT - PROBLEM SELECTOR PLAN 2
CT angio A/P on 7/20 -ill-defined and partially obscured hematoma without definite evidence of active hemorrhage. A hyperdense focus could reflect a component of the hematoma, though evaluation is limited without precontrast imaging and due to streak artifact.  -s/p 6 Unit PRBCs on 7/19, 7/20, 7/23, 7/27, and 7/28  -transfused 8 units so far  - Hgb 8.5 today, trend CBC, if still dropping then recommend repeating CT thigh and CT A/P to r/o RP bleed or worsened thigh hematoma  -Spoke to ortho and aware

## 2023-07-29 NOTE — PROGRESS NOTE ADULT - SUBJECTIVE AND OBJECTIVE BOX
VASCULAR SURGERY DAILY PROGRESS NOTE:     SUBJECTIVE/ROS:   Patient seen and evaluated on AM rounds.       OBJECTIVE:  Vital Signs Last 24 Hrs  T(C): 36.6 (29 Jul 2023 06:00), Max: 37.1 (28 Jul 2023 09:08)  T(F): 97.9 (29 Jul 2023 06:00), Max: 98.8 (28 Jul 2023 09:08)  HR: 79 (29 Jul 2023 06:00) (74 - 89)  BP: 145/79 (29 Jul 2023 06:00) (104/58 - 178/62)  BP(mean): 88 (28 Jul 2023 17:50) (88 - 88)  RR: 18 (29 Jul 2023 06:00) (16 - 23)  SpO2: 99% (29 Jul 2023 06:00) (95% - 100%)    Parameters below as of 29 Jul 2023 06:00  Patient On (Oxygen Delivery Method): room air      I&O's Detail    28 Jul 2023 07:01  -  29 Jul 2023 06:01  --------------------------------------------------------  IN:    Other (mL): 400 mL    PRBCs (Packed Red Blood Cells): 600 mL  Total IN: 1000 mL    OUT:    Other (mL): 3400 mL  Total OUT: 3400 mL    Total NET: -2400 mL        Daily     Daily   MEDICATIONS  (STANDING):  acetaminophen     Tablet .. 650 milliGRAM(s) Oral every 6 hours  ascorbic acid 500 milliGRAM(s) Oral two times a day  calcium carbonate 1250 mG  + Vitamin D (OsCal 500 + D) 1 Tablet(s) Oral three times a day  folic acid 1 milliGRAM(s) Oral daily  HYDROmorphone  Injectable 0.5 milliGRAM(s) IV Push once  insulin lispro (ADMELOG) corrective regimen sliding scale   SubCutaneous Before meals and at bedtime  multivitamin 1 Tablet(s) Oral daily  pantoprazole    Tablet 40 milliGRAM(s) Oral before breakfast  polyethylene glycol 3350 17 Gram(s) Oral daily  senna 2 Tablet(s) Oral at bedtime    MEDICATIONS  (PRN):  aluminum hydroxide/magnesium hydroxide/simethicone Suspension 30 milliLiter(s) Oral four times a day PRN Indigestion  melatonin 3 milliGRAM(s) Oral at bedtime PRN Insomnia  ondansetron Injectable 4 milliGRAM(s) IV Push every 6 hours PRN Nausea and/or Vomiting  oxyCODONE    IR 5 milliGRAM(s) Oral every 4 hours PRN Severe Pain (7 - 10)  oxyCODONE    IR 2.5 milliGRAM(s) Oral every 4 hours PRN Moderate Pain (4 - 6)      Labs:                        6.9    13.90 )-----------( 186      ( 28 Jul 2023 14:00 )             20.1     07-28    133<L>  |  97<L>  |  39<H>  ----------------------------<  176<H>  4.7   |  25  |  4.99<H>    Ca    7.9<L>      28 Jul 2023 14:00      PT/INR - ( 28 Jul 2023 05:49 )   PT: 11.4 sec;   INR: 1.01 ratio         PTT - ( 28 Jul 2023 05:49 )  PTT:38.3 sec  Urinalysis Basic - ( 28 Jul 2023 14:00 )    Color: x / Appearance: x / SG: x / pH: x  Gluc: 176 mg/dL / Ketone: x  / Bili: x / Urobili: x   Blood: x / Protein: x / Nitrite: x   Leuk Esterase: x / RBC: x / WBC x   Sq Epi: x / Non Sq Epi: x / Bacteria: x        Physical Exam:  General: well developed, well nourished, NAD  Cardiovascular: appears well perfused   Respiratory: respirations non labored  Gastrointestinal: soft, nontender, nondistended  Extremities: FROM, warm  - LUE edematous, AVF w/ palpable thrill, aneurysmal, w/ 2 areas of ulceration, no erythema or sign of infection  Pulses: palpable radial pulse bilaterally  Neurological: A+Ox3

## 2023-07-29 NOTE — PROGRESS NOTE ADULT - SUBJECTIVE AND OBJECTIVE BOX
Cardiovascular Disease Progress Note  Date of Service: 07-29-23 @ 09:26    Overnight events: No acute events overnight.    Patient denies chest pain or SOB.   Otherwise review of systems negative    Objective Findings:  T(C): 36.7 (07-29-23 @ 09:15), Max: 37 (07-28-23 @ 18:00)  HR: 74 (07-29-23 @ 09:15) (74 - 89)  BP: 139/71 (07-29-23 @ 09:15) (104/58 - 178/62)  RR: 17 (07-29-23 @ 09:15) (16 - 23)  SpO2: 97% (07-29-23 @ 09:15) (95% - 100%)  Wt(kg): --  Daily     Daily       Physical Exam:  Gen: NAD; Patient resting comfortably  HEENT: EOMI, Normocephalic/ atraumatic  CV: RRR, normal S1 + S2, no m/r/g  Lungs:  Normal respiratory effort; clear to auscultation bilaterally  Abd: soft, non-tender; bowel sounds present  Ext: No edema; warm and well perfused    Telemetry: n/a    Laboratory Data:                        8.5    12.91 )-----------( 137      ( 29 Jul 2023 05:46 )             25.2     07-29    139  |  99  |  21  ----------------------------<  131<H>  4.2   |  26  |  3.00<H>    Ca    8.1<L>      29 Jul 2023 05:46      PT/INR - ( 28 Jul 2023 05:49 )   PT: 11.4 sec;   INR: 1.01 ratio         PTT - ( 28 Jul 2023 05:49 )  PTT:38.3 sec          Inpatient Medications:  MEDICATIONS  (STANDING):  acetaminophen     Tablet .. 650 milliGRAM(s) Oral every 6 hours  ascorbic acid 500 milliGRAM(s) Oral two times a day  calcium carbonate 1250 mG  + Vitamin D (OsCal 500 + D) 1 Tablet(s) Oral three times a day  folic acid 1 milliGRAM(s) Oral daily  HYDROmorphone  Injectable 0.5 milliGRAM(s) IV Push once  insulin lispro (ADMELOG) corrective regimen sliding scale   SubCutaneous Before meals and at bedtime  multivitamin 1 Tablet(s) Oral daily  pantoprazole    Tablet 40 milliGRAM(s) Oral before breakfast  polyethylene glycol 3350 17 Gram(s) Oral daily  senna 2 Tablet(s) Oral at bedtime      Assessment: 77 year old woman with ESRD on HD (MWF), HTN, DM (diet controlled), chronic type B aortic dissection, heart murmur, endometrial ca in remission s/p total hysterectomy in 2007 presents with L hip pain     Plan of Care:      #Post-operative evaluation-  - Ms. Harris tolerated orthopedic intervention well.  Post operative course complicated by distributive/hemorrhagic shock, briefly requiring low doses of Levophed.  She displays no signs of  coronary ischemia or decompensated CHF.   Management of anemia as per the primary team.   Continue current cardiac management.       #Type B aortic dissection  - Chronic (Diagnosed >10 years ago)  - Continue current management.        #ESRD  - HD as per renal.        Over 25 minutes spent on total encounter; more than 50% of the visit was spent counseling and/or coordinating care by the attending physician.      Gabo Burris MD [facility]  Cardiovascular Disease  [phone]

## 2023-07-29 NOTE — CONSULT NOTE ADULT - CONSULT REQUESTED BY NAME
Orthopedic Surgery
Dr Vallejo
On behalf of Cheo Grimes.
Yoni
Jackie Ash
Primary team
Dr. Donnell Vallejo

## 2023-07-29 NOTE — PROGRESS NOTE ADULT - SUBJECTIVE AND OBJECTIVE BOX
ORTHOPAEDIC PROGRESS NOTE    SUBJECTIVE:  Pt seen and examined at bedside this am.  Doing well.  No acute events overnight.  Received HD w 2 units last night  Pt states pain is well controlled    OBJECTIVE:  Vital Signs Last 24 Hrs  T(C): 36.6 (29 Jul 2023 06:00), Max: 37.1 (28 Jul 2023 09:08)  T(F): 97.9 (29 Jul 2023 06:00), Max: 98.8 (28 Jul 2023 09:08)  HR: 79 (29 Jul 2023 06:00) (74 - 89)  BP: 145/79 (29 Jul 2023 06:00) (104/58 - 178/62)  BP(mean): 88 (28 Jul 2023 17:50) (88 - 88)  RR: 18 (29 Jul 2023 06:00) (16 - 23)  SpO2: 99% (29 Jul 2023 06:00) (95% - 100%)    Parameters below as of 29 Jul 2023 06:00  Patient On (Oxygen Delivery Method): room air        Physical Exam:  General: NAD; resting comfrotably in bed  Resp: non labored  LLE:  - dressing c/d/i   - Motor: IP/quad/TA/gastroc/sol/EHL/FHL intact (hip flexion limited by pain)   - SILT  - DP 2 +     LABS                        6.9    13.90 )-----------( 186      ( 28 Jul 2023 14:00 )             20.1       07-28    133<L>  |  97<L>  |  39<H>  ----------------------------<  176<H>  4.7   |  25  |  4.99<H>    Ca    7.9<L>      28 Jul 2023 14:00        PT/INR - ( 28 Jul 2023 05:49 )   PT: 11.4 sec;   INR: 1.01 ratio         PTT - ( 28 Jul 2023 05:49 )  PTT:38.3 sec    I&O's Summary    28 Jul 2023 07:01  -  29 Jul 2023 06:41  --------------------------------------------------------  IN: 1000 mL / OUT: 3400 mL / NET: -2400 mL        acetaminophen     Tablet .. 650 milliGRAM(s) Oral every 6 hours  aluminum hydroxide/magnesium hydroxide/simethicone Suspension 30 milliLiter(s) Oral four times a day PRN  ascorbic acid 500 milliGRAM(s) Oral two times a day  calcium carbonate 1250 mG  + Vitamin D (OsCal 500 + D) 1 Tablet(s) Oral three times a day  folic acid 1 milliGRAM(s) Oral daily  HYDROmorphone  Injectable 0.5 milliGRAM(s) IV Push once  insulin lispro (ADMELOG) corrective regimen sliding scale   SubCutaneous Before meals and at bedtime  melatonin 3 milliGRAM(s) Oral at bedtime PRN  multivitamin 1 Tablet(s) Oral daily  ondansetron Injectable 4 milliGRAM(s) IV Push every 6 hours PRN  oxyCODONE    IR 5 milliGRAM(s) Oral every 4 hours PRN  oxyCODONE    IR 2.5 milliGRAM(s) Oral every 4 hours PRN  pantoprazole    Tablet 40 milliGRAM(s) Oral before breakfast  polyethylene glycol 3350 17 Gram(s) Oral daily  senna 2 Tablet(s) Oral at bedtime

## 2023-07-30 LAB
ANION GAP SERPL CALC-SCNC: 15 MMOL/L — HIGH (ref 7–14)
BUN SERPL-MCNC: 35 MG/DL — HIGH (ref 7–23)
CALCIUM SERPL-MCNC: 8.3 MG/DL — LOW (ref 8.4–10.5)
CHLORIDE SERPL-SCNC: 99 MMOL/L — SIGNIFICANT CHANGE UP (ref 98–107)
CO2 SERPL-SCNC: 23 MMOL/L — SIGNIFICANT CHANGE UP (ref 22–31)
CREAT SERPL-MCNC: 4.24 MG/DL — HIGH (ref 0.5–1.3)
EGFR: 10 ML/MIN/1.73M2 — LOW
GLUCOSE BLDC GLUCOMTR-MCNC: 149 MG/DL — HIGH (ref 70–99)
GLUCOSE BLDC GLUCOMTR-MCNC: 160 MG/DL — HIGH (ref 70–99)
GLUCOSE BLDC GLUCOMTR-MCNC: 193 MG/DL — HIGH (ref 70–99)
GLUCOSE BLDC GLUCOMTR-MCNC: 212 MG/DL — HIGH (ref 70–99)
GLUCOSE SERPL-MCNC: 169 MG/DL — HIGH (ref 70–99)
HCT VFR BLD CALC: 25 % — LOW (ref 34.5–45)
HGB BLD-MCNC: 8.4 G/DL — LOW (ref 11.5–15.5)
MCHC RBC-ENTMCNC: 30.3 PG — SIGNIFICANT CHANGE UP (ref 27–34)
MCHC RBC-ENTMCNC: 33.6 GM/DL — SIGNIFICANT CHANGE UP (ref 32–36)
MCV RBC AUTO: 90.3 FL — SIGNIFICANT CHANGE UP (ref 80–100)
NRBC # BLD: 0 /100 WBCS — SIGNIFICANT CHANGE UP (ref 0–0)
NRBC # FLD: 0 K/UL — SIGNIFICANT CHANGE UP (ref 0–0)
PLATELET # BLD AUTO: 163 K/UL — SIGNIFICANT CHANGE UP (ref 150–400)
POTASSIUM SERPL-MCNC: 4.4 MMOL/L — SIGNIFICANT CHANGE UP (ref 3.5–5.3)
POTASSIUM SERPL-SCNC: 4.4 MMOL/L — SIGNIFICANT CHANGE UP (ref 3.5–5.3)
RBC # BLD: 2.77 M/UL — LOW (ref 3.8–5.2)
RBC # FLD: 22.4 % — HIGH (ref 10.3–14.5)
SODIUM SERPL-SCNC: 137 MMOL/L — SIGNIFICANT CHANGE UP (ref 135–145)
WBC # BLD: 10.18 K/UL — SIGNIFICANT CHANGE UP (ref 3.8–10.5)
WBC # FLD AUTO: 10.18 K/UL — SIGNIFICANT CHANGE UP (ref 3.8–10.5)

## 2023-07-30 PROCEDURE — 99233 SBSQ HOSP IP/OBS HIGH 50: CPT

## 2023-07-30 RX ADMIN — PANTOPRAZOLE SODIUM 40 MILLIGRAM(S): 20 TABLET, DELAYED RELEASE ORAL at 05:03

## 2023-07-30 RX ADMIN — Medication 650 MILLIGRAM(S): at 11:25

## 2023-07-30 RX ADMIN — Medication 2: at 12:05

## 2023-07-30 RX ADMIN — Medication 1 TABLET(S): at 05:02

## 2023-07-30 RX ADMIN — Medication 1 MILLIGRAM(S): at 12:06

## 2023-07-30 RX ADMIN — Medication 650 MILLIGRAM(S): at 10:50

## 2023-07-30 RX ADMIN — Medication 1: at 21:10

## 2023-07-30 RX ADMIN — Medication 650 MILLIGRAM(S): at 04:59

## 2023-07-30 RX ADMIN — Medication 1: at 07:26

## 2023-07-30 RX ADMIN — SENNA PLUS 2 TABLET(S): 8.6 TABLET ORAL at 21:09

## 2023-07-30 RX ADMIN — Medication 500 MILLIGRAM(S): at 05:03

## 2023-07-30 RX ADMIN — Medication 1 TABLET(S): at 14:51

## 2023-07-30 RX ADMIN — Medication 650 MILLIGRAM(S): at 05:54

## 2023-07-30 RX ADMIN — Medication 1 TABLET(S): at 12:08

## 2023-07-30 RX ADMIN — Medication 500 MILLIGRAM(S): at 17:16

## 2023-07-30 RX ADMIN — Medication 1 TABLET(S): at 21:09

## 2023-07-30 NOTE — PROGRESS NOTE ADULT - ASSESSMENT
Pt is a 77 y/o female s/p left hip SUZANNA-> left hip hemiarthroplasty      PLAN  - Pain control/ Analgesia  - DVT ppx SQH w SCDs  - PT/OT   - WBAT and OOB to chair   - Incentive Spirometer         For all questions related to patient care, please reach out to the on-call team via the pager.     Elle Ulloa, PGY 2  Orthopaedic Surgery  [facility] [id number]  [facility] [id number]  [facility] [phone]

## 2023-07-30 NOTE — PROGRESS NOTE ADULT - PROBLEM SELECTOR PLAN 2
CT angio A/P on 7/20 -ill-defined and partially obscured hematoma without definite evidence of active hemorrhage. A hyperdense focus could reflect a component of the hematoma, though evaluation is limited without precontrast imaging and due to streak artifact.  -s/p 6 Unit PRBCs on 7/19, 7/20, 7/23, 7/27, and 7/28  -transfused 8 units so far  - Hgb stable 8.4, trend CBC, if still dropping then recommend repeating CT thigh and CT A/P to r/o RP bleed or worsened thigh hematoma  -Spoke to ortho and aware

## 2023-07-30 NOTE — PROGRESS NOTE ADULT - SUBJECTIVE AND OBJECTIVE BOX
Cardiovascular Disease Progress Note  Date of Service: 07-30-23 @ 09:29    Overnight events: No acute events overnight.    Patient denies chest pain or SOB.   Otherwise review of systems negative    Objective Findings:  T(C): 36.6 (07-30-23 @ 09:18), Max: 37.5 (07-29-23 @ 18:29)  HR: 79 (07-30-23 @ 09:18) (70 - 89)  BP: 152/70 (07-30-23 @ 09:18) (117/52 - 152/70)  RR: 16 (07-30-23 @ 09:18) (16 - 17)  SpO2: 99% (07-30-23 @ 09:18) (95% - 99%)  Wt(kg): --  Daily     Daily       Physical Exam:  Gen: NAD; Patient resting comfortably  HEENT: EOMI, Normocephalic/ atraumatic  CV: RRR, normal S1 + S2, no m/r/g  Lungs:  Normal respiratory effort; clear to auscultation bilaterally  Abd: soft, non-tender; bowel sounds present  Ext: No edema; warm and well perfused    Telemetry: n/a    Laboratory Data:                        8.4    10.18 )-----------( 163      ( 30 Jul 2023 05:25 )             25.0     07-30    137  |  99  |  35<H>  ----------------------------<  169<H>  4.4   |  23  |  4.24<H>    Ca    8.3<L>      30 Jul 2023 05:25                Inpatient Medications:  MEDICATIONS  (STANDING):  acetaminophen     Tablet .. 650 milliGRAM(s) Oral every 6 hours  ascorbic acid 500 milliGRAM(s) Oral two times a day  calcium carbonate 1250 mG  + Vitamin D (OsCal 500 + D) 1 Tablet(s) Oral three times a day  folic acid 1 milliGRAM(s) Oral daily  HYDROmorphone  Injectable 0.5 milliGRAM(s) IV Push once  insulin lispro (ADMELOG) corrective regimen sliding scale   SubCutaneous Before meals and at bedtime  multivitamin 1 Tablet(s) Oral daily  pantoprazole    Tablet 40 milliGRAM(s) Oral before breakfast  polyethylene glycol 3350 17 Gram(s) Oral daily  senna 2 Tablet(s) Oral at bedtime      Assessment: 77 year old woman with ESRD on HD (MWF), HTN, DM (diet controlled), chronic type B aortic dissection, heart murmur, endometrial ca in remission s/p total hysterectomy in 2007 presents with L hip pain     Plan of Care:      #Post-operative evaluation-  - Ms. Harris tolerated orthopedic intervention well.  Post operative course complicated by distributive/hemorrhagic shock, briefly requiring low doses of Levophed.  She displays no signs of  coronary ischemia or decompensated CHF.   Management of anemia as per the primary team.   Continue current cardiac management.       #Type B aortic dissection  - Chronic (Diagnosed >10 years ago)  - Continue current management.        #ESRD  - HD as per renal.    #ACP (advance care planning)-  Advanced care planning was discussed with the patient.  Advance care planning forms were discussed. Risks, benefits and alternatives of medical treatment and procedures were discussed in detail and all questions were answered. 30 additional minutes spent addressing advance care plans.    Over 55 minutes spent on total encounter; more than 50% of the visit was spent counseling and/or coordinating care by the attending physician.      Gabo Burris MD [facility]  Cardiovascular Disease  [phone]

## 2023-07-30 NOTE — PROGRESS NOTE ADULT - SUBJECTIVE AND OBJECTIVE BOX
Dr. Vidhya Gruber  Pager [phone]    PROGRESS NOTE:     Patient is a 78y old  Female who presents with a chief complaint of s/p L hip orif concern for loosened hardwar (30 Jul 2023 09:28)      SUBJECTIVE / OVERNIGHT EVENTS: denies chest pain or sob,   ADDITIONAL REVIEW OF SYSTEMS: afebrile, c/o discomfort at [facility] site    MEDICATIONS  (STANDING):  acetaminophen     Tablet .. 650 milliGRAM(s) Oral every 6 hours  ascorbic acid 500 milliGRAM(s) Oral two times a day  calcium carbonate 1250 mG  + Vitamin D (OsCal 500 + D) 1 Tablet(s) Oral three times a day  folic acid 1 milliGRAM(s) Oral daily  HYDROmorphone  Injectable 0.5 milliGRAM(s) IV Push once  insulin lispro (ADMELOG) corrective regimen sliding scale   SubCutaneous Before meals and at bedtime  multivitamin 1 Tablet(s) Oral daily  pantoprazole    Tablet 40 milliGRAM(s) Oral before breakfast  polyethylene glycol 3350 17 Gram(s) Oral daily  senna 2 Tablet(s) Oral at bedtime    MEDICATIONS  (PRN):  aluminum hydroxide/magnesium hydroxide/simethicone Suspension 30 milliLiter(s) Oral four times a day PRN Indigestion  melatonin 3 milliGRAM(s) Oral at bedtime PRN Insomnia  ondansetron Injectable 4 milliGRAM(s) IV Push every 6 hours PRN Nausea and/or Vomiting      CAPILLARY BLOOD GLUCOSE      POCT Blood Glucose.: 212 mg/dL (30 Jul 2023 11:20)  POCT Blood Glucose.: 160 mg/dL (30 Jul 2023 07:15)  POCT Blood Glucose.: 167 mg/dL (29 Jul 2023 22:00)  POCT Blood Glucose.: 154 mg/dL (29 Jul 2023 16:48)    I&O's Summary      PHYSICAL EXAM:  Vital Signs Last 24 Hrs  T(C): 36.6 (30 Jul 2023 09:18), Max: 37.5 (29 Jul 2023 18:29)  T(F): 97.9 (30 Jul 2023 09:18), Max: 99.5 (29 Jul 2023 18:29)  HR: 79 (30 Jul 2023 09:18) (70 - 89)  BP: 152/70 (30 Jul 2023 09:18) (117/52 - 152/70)  BP(mean): --  RR: 16 (30 Jul 2023 09:18) (16 - 17)  SpO2: 99% (30 Jul 2023 09:18) (95% - 99%)    Parameters below as of 30 Jul 2023 09:18  Patient On (Oxygen Delivery Method): room air      CONSTITUTIONAL: NAD, well-developed  Neck: RIJ shiley  RESPIRATORY: Normal respiratory effort; lungs are clear to auscultation bilaterally  CARDIOVASCULAR: Regular rate and rhythm, normal S1 and S2, no murmur/rub/gallop; No lower extremity edema; Peripheral pulses are 2+ bilaterally  ABDOMEN: Nontender to palpation, normoactive bowel sounds, no rebound/guarding; No hepatosplenomegaly  MUSCULOSKELETAL: LUE AVF with bruit, chronic LUE edema, no clubbing or cyanosis of digits; no joint swelling or tenderness to palpation  PSYCH: A+O to person, place, and time; affect appropriate  LABS:                        8.4    10.18 )-----------( 163      ( 30 Jul 2023 05:25 )             25.0     07-30    137  |  99  |  35<H>  ----------------------------<  169<H>  4.4   |  23  |  4.24<H>    Ca    8.3<L>      30 Jul 2023 05:25            Urinalysis Basic - ( 30 Jul 2023 05:25 )    Color: x / Appearance: x / SG: x / pH: x  Gluc: 169 mg/dL / Ketone: x  / Bili: x / Urobili: x   Blood: x / Protein: x / Nitrite: x   Leuk Esterase: x / RBC: x / WBC x   Sq Epi: x / Non Sq Epi: x / Bacteria: x          RADIOLOGY & ADDITIONAL TESTS:  Results Reviewed:   Imaging Personally Reviewed:  Electrocardiogram Personally Reviewed:    COORDINATION OF CARE:  Care Discussed with Consultants/Other Providers [Y/N]:  Prior or Outpatient Records Reviewed [Y/N]:

## 2023-07-30 NOTE — PROGRESS NOTE ADULT - ASSESSMENT
77 y.o. Female w/ Hx HTN, DM (diet controlled), chronic type B aortic dissection, ESRD on HD ( M, W, F ) via LUE fistula, endometrial ca in remission s/p total hysterectomy in 2007 s/p L IMN for IT fx in May 2023 p/w 2 weeks of worsening atraumatic  [facility] to have interval migration of femoral neck screw into acetabular region so readmitted for Left hip revision. Patient s/p left hip removal of gamma nail and placement of restoration modular peter arthroplasty c/b post op hypotension requiring pressors and SICU stay. Patient had hemorrhagic shock from hematoma at the operative site, with possible extravasation found on CT imaging requiring 6 Units of PRBCs so far.

## 2023-07-30 NOTE — PROGRESS NOTE ADULT - PROBLEM SELECTOR PLAN 3
stable  no signs of volume overload   c/w HD M, W, F via LUE fistula  Patient fistula is not functional so IR placed RIJ shiley on 7/28 and pt was dialyzed thereafter  next HD on Monday  d/w vascular surgery, pt likely will need new AVF creation vs. revision of existing LUE AVF next week, however, will not be able to access it soon so will need to convert RIJ shiley to a tunneled catheter (Permacath), IR consulted, plan for Permacath on Tuesday 8/2  -- NPO at midnight on 7/31  -- AM labs (CBC, BMP, Coags)  -- hold a.m. anticoagulation on 8/1

## 2023-07-30 NOTE — PROGRESS NOTE ADULT - SUBJECTIVE AND OBJECTIVE BOX
ORTHOPEDIC PROGRESS NOTE    POD #L hip SUZANNA, peter    Overnight events: None    SUBJECTIVE: Pt seen and examined at bedside. Patient is doing well, no acute complaints this AM. Pain is controlled with medication      OBJECTIVE:  Vital Signs Last 24 Hrs  T(C): 36.7 (30 Jul 2023 02:32), Max: 37.5 (29 Jul 2023 18:29)  T(F): 98 (30 Jul 2023 02:32), Max: 99.5 (29 Jul 2023 18:29)  HR: 75 (30 Jul 2023 02:32) (70 - 89)  BP: 117/56 (30 Jul 2023 02:32) (117/52 - 145/79)  BP(mean): --  RR: 17 (30 Jul 2023 02:32) (16 - 18)  SpO2: 98% (30 Jul 2023 02:32) (95% - 99%)    Parameters below as of 30 Jul 2023 02:32  Patient On (Oxygen Delivery Method): room air          07-28-23 @ 07:01  -  07-29-23 @ 07:00  --------------------------------------------------------  IN: 1000 mL / OUT: 3400 mL / NET: -2400 mL        Physical Examination:  GEN: NAD, resting quietly  PULM: symmetric chest rise bilaterally, no increased WOB  ABD: nondistended  EXTR:   LLE:  - dressing c/d/i   - Motor: IP/quad/TA/gastroc/sol/EHL/FHL intact (hip flexion limited by pain)   - SILT  - DP 2 +       LABS:                        8.5    12.91 )-----------( 137      ( 29 Jul 2023 05:46 )             25.2       07-29    139  |  99  |  21  ----------------------------<  131<H>  4.2   |  26  |  3.00<H>    Ca    8.1<L>      29 Jul 2023 05:46

## 2023-07-31 DIAGNOSIS — Z01.818 ENCOUNTER FOR OTHER PREPROCEDURAL EXAMINATION: ICD-10-CM

## 2023-07-31 DIAGNOSIS — T82.590A OTHER MECHANICAL COMPLICATION OF SURGICALLY CREATED ARTERIOVENOUS FISTULA, INITIAL ENCOUNTER: ICD-10-CM

## 2023-07-31 DIAGNOSIS — D50.0 IRON DEFICIENCY ANEMIA SECONDARY TO BLOOD LOSS (CHRONIC): ICD-10-CM

## 2023-07-31 LAB
ANION GAP SERPL CALC-SCNC: 15 MMOL/L — HIGH (ref 7–14)
BUN SERPL-MCNC: 44 MG/DL — HIGH (ref 7–23)
CALCIUM SERPL-MCNC: 8.4 MG/DL — SIGNIFICANT CHANGE UP (ref 8.4–10.5)
CHLORIDE SERPL-SCNC: 99 MMOL/L — SIGNIFICANT CHANGE UP (ref 98–107)
CO2 SERPL-SCNC: 20 MMOL/L — LOW (ref 22–31)
CREAT SERPL-MCNC: 5.14 MG/DL — HIGH (ref 0.5–1.3)
EGFR: 8 ML/MIN/1.73M2 — LOW
GLUCOSE BLDC GLUCOMTR-MCNC: 117 MG/DL — HIGH (ref 70–99)
GLUCOSE BLDC GLUCOMTR-MCNC: 131 MG/DL — HIGH (ref 70–99)
GLUCOSE BLDC GLUCOMTR-MCNC: 167 MG/DL — HIGH (ref 70–99)
GLUCOSE BLDC GLUCOMTR-MCNC: 173 MG/DL — HIGH (ref 70–99)
GLUCOSE BLDC GLUCOMTR-MCNC: 178 MG/DL — HIGH (ref 70–99)
GLUCOSE BLDC GLUCOMTR-MCNC: 194 MG/DL — HIGH (ref 70–99)
GLUCOSE SERPL-MCNC: 139 MG/DL — HIGH (ref 70–99)
HCT VFR BLD CALC: 25.9 % — LOW (ref 34.5–45)
HGB BLD-MCNC: 8.4 G/DL — LOW (ref 11.5–15.5)
MCHC RBC-ENTMCNC: 29.9 PG — SIGNIFICANT CHANGE UP (ref 27–34)
MCHC RBC-ENTMCNC: 32.4 GM/DL — SIGNIFICANT CHANGE UP (ref 32–36)
MCV RBC AUTO: 92.2 FL — SIGNIFICANT CHANGE UP (ref 80–100)
NRBC # BLD: 0 /100 WBCS — SIGNIFICANT CHANGE UP (ref 0–0)
NRBC # FLD: 0 K/UL — SIGNIFICANT CHANGE UP (ref 0–0)
PLATELET # BLD AUTO: 167 K/UL — SIGNIFICANT CHANGE UP (ref 150–400)
POTASSIUM SERPL-MCNC: 5.1 MMOL/L — SIGNIFICANT CHANGE UP (ref 3.5–5.3)
POTASSIUM SERPL-SCNC: 5.1 MMOL/L — SIGNIFICANT CHANGE UP (ref 3.5–5.3)
RBC # BLD: 2.81 M/UL — LOW (ref 3.8–5.2)
RBC # FLD: 23.4 % — HIGH (ref 10.3–14.5)
SODIUM SERPL-SCNC: 134 MMOL/L — LOW (ref 135–145)
WBC # BLD: 9.15 K/UL — SIGNIFICANT CHANGE UP (ref 3.8–10.5)
WBC # FLD AUTO: 9.15 K/UL — SIGNIFICANT CHANGE UP (ref 3.8–10.5)

## 2023-07-31 PROCEDURE — 99232 SBSQ HOSP IP/OBS MODERATE 35: CPT

## 2023-07-31 PROCEDURE — 99232 SBSQ HOSP IP/OBS MODERATE 35: CPT | Mod: 57

## 2023-07-31 RX ORDER — CHLORHEXIDINE GLUCONATE 213 G/1000ML
1 SOLUTION TOPICAL DAILY
Refills: 0 | Status: DISCONTINUED | OUTPATIENT
Start: 2023-07-31 | End: 2023-08-16

## 2023-07-31 RX ADMIN — Medication 1: at 07:40

## 2023-07-31 RX ADMIN — CHLORHEXIDINE GLUCONATE 1 APPLICATION(S): 213 SOLUTION TOPICAL at 14:32

## 2023-07-31 RX ADMIN — Medication 650 MILLIGRAM(S): at 12:03

## 2023-07-31 RX ADMIN — Medication 1: at 11:39

## 2023-07-31 RX ADMIN — Medication 1: at 16:45

## 2023-07-31 RX ADMIN — Medication 650 MILLIGRAM(S): at 06:48

## 2023-07-31 RX ADMIN — PANTOPRAZOLE SODIUM 40 MILLIGRAM(S): 20 TABLET, DELAYED RELEASE ORAL at 05:55

## 2023-07-31 RX ADMIN — Medication 500 MILLIGRAM(S): at 05:54

## 2023-07-31 RX ADMIN — Medication 1 TABLET(S): at 12:03

## 2023-07-31 RX ADMIN — Medication 1 TABLET(S): at 21:33

## 2023-07-31 RX ADMIN — Medication 650 MILLIGRAM(S): at 05:55

## 2023-07-31 RX ADMIN — Medication 1 MILLIGRAM(S): at 12:03

## 2023-07-31 RX ADMIN — Medication 650 MILLIGRAM(S): at 13:45

## 2023-07-31 RX ADMIN — POLYETHYLENE GLYCOL 3350 17 GRAM(S): 17 POWDER, FOR SOLUTION ORAL at 12:03

## 2023-07-31 RX ADMIN — SENNA PLUS 2 TABLET(S): 8.6 TABLET ORAL at 21:34

## 2023-07-31 RX ADMIN — Medication 1 TABLET(S): at 05:55

## 2023-07-31 NOTE — PROGRESS NOTE ADULT - ASSESSMENT
Pt is a 79 y/o female s/p left hip SUZANNA-> left hip hemiarthroplasty      PLAN  - Pain control/ Analgesia  - DVT ppx SQH w SCDs  - PT/OT   - WBAT and OOB to chair   - Incentive Spirometer  - S/p DEREK Ramírez 7/28  - FU vascular surgery plan for possible fistula revision

## 2023-07-31 NOTE — PROGRESS NOTE ADULT - ASSESSMENT
77 year old F w/ a PMHx HTN, DM, chronic B type aortic dissection, ESRD on HD with LUE AVF by Dr. Jensen in 2018 now s/p left hip arthroplasty. Vascular surgery consulted for LUE AVF wound.       Recommendations:   - Patient w/ LUE BC AVF, now w/ 2 areas of healed ulceration HD cannulation sites      - Will need eventual AVF revision, timing TBD   - Please consult IR for permacath placement, placement scheduled for tomorrow  - DO NOT use LUE AVF for dialysis. Ulcerated areas covered with gauze, do not expose   - Remainder care per primary   - above discussed with vasc surg team and fellow      C Team Surgery   [id number] 77 year old F w/ a PMHx HTN, DM, chronic B type aortic dissection, ESRD on HD with LUE AVF by Dr. Jensen in 2018 now s/p left hip arthroplasty. Vascular surgery consulted for LUE AVF wound.       Recommendations:   - Patient w/ LUE BC AVF, now w/ 2 areas of healed ulceration HD cannulation sites   - Plan for OR for fistulogram tomorrow, please pre-op for tomorrow and HD today  - Please hold off on permacath placement, if needed we can place it   - DO NOT use LUE AVF for dialysis. Ulcerated areas covered with gauze, do not expose   - Remainder care per primary   - above discussed with vasc surg team and fellow      C Team Surgery   [id number]

## 2023-07-31 NOTE — PROGRESS NOTE ADULT - SUBJECTIVE AND OBJECTIVE BOX
Surgery Progress Note  Patient is a 78y old  Female who presents with a chief complaint of s/p L hip orif concern for loosened hardwar (31 Jul 2023 06:57)    SUBJECTIVE: Patient seen and examined at bedside with surgical team, patient without complaints. resting comfortably.    Physical Exam:  General: well developed, well nourished, NAD  Cardiovascular: appears well perfused   Respiratory: respirations non labored  Gastrointestinal: soft, nontender, nondistended  Extremities: FROM, warm  - LUE edematous, AVF w/ palpable thrill, aneurysmal, w/ 2 areas of ulceration, no erythema or sign of infection  Pulses: palpable radial pulse bilaterally  Neurological: A+Ox3     Vital Signs Last 24 Hrs  T(C): 36.7 (31 Jul 2023 06:05), Max: 36.7 (31 Jul 2023 06:05)  T(F): 98 (31 Jul 2023 06:05), Max: 98 (31 Jul 2023 06:05)  HR: 77 (31 Jul 2023 06:05) (75 - 81)  BP: 119/50 (31 Jul 2023 06:05) (119/50 - 152/70)  BP(mean): --  RR: 16 (31 Jul 2023 06:05) (16 - 18)  SpO2: 98% (31 Jul 2023 06:05) (98% - 100%)    Parameters below as of 31 Jul 2023 06:05  Patient On (Oxygen Delivery Method): room air    I&O's Detail  MEDICATIONS  (STANDING):  acetaminophen     Tablet .. 650 milliGRAM(s) Oral every 6 hours  ascorbic acid 500 milliGRAM(s) Oral two times a day  calcium carbonate 1250 mG  + Vitamin D (OsCal 500 + D) 1 Tablet(s) Oral three times a day  folic acid 1 milliGRAM(s) Oral daily  HYDROmorphone  Injectable 0.5 milliGRAM(s) IV Push once  insulin lispro (ADMELOG) corrective regimen sliding scale   SubCutaneous Before meals and at bedtime  multivitamin 1 Tablet(s) Oral daily  pantoprazole    Tablet 40 milliGRAM(s) Oral before breakfast  polyethylene glycol 3350 17 Gram(s) Oral daily  senna 2 Tablet(s) Oral at bedtime    MEDICATIONS  (PRN):  aluminum hydroxide/magnesium hydroxide/simethicone Suspension 30 milliLiter(s) Oral four times a day PRN Indigestion  melatonin 3 milliGRAM(s) Oral at bedtime PRN Insomnia  ondansetron Injectable 4 milliGRAM(s) IV Push every 6 hours PRN Nausea and/or Vomiting      LABS:                        8.4    9.15  )-----------( 167      ( 31 Jul 2023 05:35 )             25.9     07-31    134<L>  |  99  |  44<H>  ----------------------------<  139<H>  5.1   |  20<L>  |  5.14<H>    Ca    8.4      31 Jul 2023 05:35          Urinalysis Basic - ( 31 Jul 2023 05:35 )    Color: x / Appearance: x / SG: x / pH: x  Gluc: 139 mg/dL / Ketone: x  / Bili: x / Urobili: x   Blood: x / Protein: x / Nitrite: x   Leuk Esterase: x / RBC: x / WBC x   Sq Epi: x / Non Sq Epi: x / Bacteria: x

## 2023-07-31 NOTE — PROGRESS NOTE ADULT - PROBLEM SELECTOR PLAN 2
Pt. with anemia in the setting of ESRD and blood loss from surgical procedure. Hgb 8.4 today. Will give RICA with HD treatments. Monitor hemoglobin levels.     If you have any questions, please feel free to contact me  Kyle Ngo  Nephrology Fellow  [email or web address] [phone]  (After 5pm or on weekends please page the on-call fellow)

## 2023-07-31 NOTE — PROGRESS NOTE ADULT - SUBJECTIVE AND OBJECTIVE BOX
[facility] Division of Kidney Diseases & Hypertension  FOLLOW UP NOTE  [phone]--------------------------------------------------------------------------------  Chief Complaint:Unspecified complication of procedure, initial encounter    HPI: 78F w/ PMH of ESRD on HD TIW (MWF) at [facility] (previously at [facility]), HTN, DM2, endometrial cancer (underwent hysterectomy and chemotherapy, type B aortic dissection, and L hip ORIF, admitted for revision of L hip hardware. Nephrology consulted for management of ESRD/HD. 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  S/p nontunneled catheter placement 7/28, plan or fistulogram by vascular on 8/1.     24 hour events/subjective: Pt. seen and evaluated at bedside. Overall feels well but LUE swelling persists. She denies fevers/chills, headaches, chest pain, SOB, abd pain, or leg swelling.     PAST HISTORY  --------------------------------------------------------------------------------  No significant changes to PMH, PSH, FHx, SHx, unless otherwise noted    ALLERGIES & MEDICATIONS  --------------------------------------------------------------------------------  Allergies    No Known Allergies    Intolerances    Standing Inpatient Medications  acetaminophen     Tablet .. 650 milliGRAM(s) Oral every 6 hours  ascorbic acid 500 milliGRAM(s) Oral two times a day  calcium carbonate 1250 mG  + Vitamin D (OsCal 500 + D) 1 Tablet(s) Oral three times a day  folic acid 1 milliGRAM(s) Oral daily  HYDROmorphone  Injectable 0.5 milliGRAM(s) IV Push once  insulin lispro (ADMELOG) corrective regimen sliding scale   SubCutaneous Before meals and at bedtime  multivitamin 1 Tablet(s) Oral daily  pantoprazole    Tablet 40 milliGRAM(s) Oral before breakfast  polyethylene glycol 3350 17 Gram(s) Oral daily  senna 2 Tablet(s) Oral at bedtime    PRN Inpatient Medications  aluminum hydroxide/magnesium hydroxide/simethicone Suspension 30 milliLiter(s) Oral four times a day PRN  melatonin 3 milliGRAM(s) Oral at bedtime PRN  ondansetron Injectable 4 milliGRAM(s) IV Push every 6 hours PRN      REVIEW OF SYSTEMS  --------------------------------------------------------------------------------  Gen: No fevers/chills  Head/Eyes/Ears: no headache  Respiratory: No dyspnea, cough  CV: No cp  GI: No abdominal pain  MSK: LE edema     All other systems were reviewed and are negative, except as noted.    VITALS/PHYSICAL EXAM  --------------------------------------------------------------------------------  T(C): 37.1 (07-31-23 @ 09:24), Max: 37.1 (07-31-23 @ 09:24)  HR: 82 (07-31-23 @ 09:24) (75 - 82)  BP: 138/55 (07-31-23 @ 09:24) (119/50 - 139/84)  RR: 17 (07-31-23 @ 09:24) (16 - 18)  SpO2: 97% (07-31-23 @ 09:24) (97% - 100%)  Wt(kg): --    Physical Exam:  Gen: NAD  HEENT: MMM, +RIJ Shiley   Pulm: minimal bibasilar crackles   CV: S1S2  Abd: Soft, +BS   Ext: LE edema B/L L>R, chronic LUE edema  Neuro: Awake  Skin: Warm and dry, ecchymotic RUE  Vascular access: LUE AVF covered in clean and dry dressing      LABS/STUDIES  --------------------------------------------------------------------------------              8.4    9.15  >-----------<  167      [07-31-23 @ 05:35]              25.9     134  |  99  |  44  ----------------------------<  139      [07-31-23 @ 05:35]  5.1   |  20  |  5.14        Ca     8.4     [07-31-23 @ 05:35]    Creatinine Trend:  SCr 5.14 [07-31 @ 05:35]  SCr 4.24 [07-30 @ 05:25]  SCr 3.00 [07-29 @ 05:46]  SCr 4.99 [07-28 @ 14:00]  SCr 3.79 [07-27 @ 11:10]

## 2023-07-31 NOTE — PROGRESS NOTE ADULT - PROBLEM SELECTOR PLAN 1
Pt with ESRD on HD TIW MWF admitted with fall. Pt s/p Lt hemiarthroplasty on 7/19/2023, however surgery complicated by intraoperative hypotension requiring vasopressor support. Patient transferred to SICU for hemodynamic stabilization. CT Angio abd/pelv reviewed. HD treatment held on 7/19/23 due to above. Held on 7/20 due to blood loss anemia. Transferred back to medical floors. 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  S/p nontunneled catheter placement 7/28, planned for fistulogram by vascular on 8/1. Plan for maintenance HD today.  Monitor Labs

## 2023-07-31 NOTE — PROGRESS NOTE ADULT - PROBLEM SELECTOR PLAN 1
- s/p Lt hip nail removal, Lt THR on 7/19  - Pain control with tylenol PRN  - Bowel regiment - Senna/Miralax  - Surgical site management as per ortho  - PT/OT eval for safe dispo  - VTE ppx - Hep SC

## 2023-07-31 NOTE — PROGRESS NOTE ADULT - SUBJECTIVE AND OBJECTIVE BOX
[facility] Division of Hospital Medicine  Donnell Mullen MD  Pager (M-F, 8A-5P): [phone]  Other Times:  [phone]    Patient is a 78y old  Female who presents with a chief complaint of s/p L hip orif concern for loosened hardwar (31 Jul 2023 10:48)    SUBJECTIVE / OVERNIGHT EVENTS:  Still with concern that she has weak LUE with significant swelling. No new complaints otherwise. No F/C, N/V, CP, SOB, Cough, lightheadedness, dizziness, abdominal pain, diarrhea, dysuria.    MEDICATIONS  (STANDING):  acetaminophen     Tablet .. 650 milliGRAM(s) Oral every 6 hours  ascorbic acid 500 milliGRAM(s) Oral two times a day  calcium carbonate 1250 mG  + Vitamin D (OsCal 500 + D) 1 Tablet(s) Oral three times a day  folic acid 1 milliGRAM(s) Oral daily  HYDROmorphone  Injectable 0.5 milliGRAM(s) IV Push once  insulin lispro (ADMELOG) corrective regimen sliding scale   SubCutaneous Before meals and at bedtime  multivitamin 1 Tablet(s) Oral daily  pantoprazole    Tablet 40 milliGRAM(s) Oral before breakfast  polyethylene glycol 3350 17 Gram(s) Oral daily  senna 2 Tablet(s) Oral at bedtime    MEDICATIONS  (PRN):  aluminum hydroxide/magnesium hydroxide/simethicone Suspension 30 milliLiter(s) Oral four times a day PRN Indigestion  melatonin 3 milliGRAM(s) Oral at bedtime PRN Insomnia  ondansetron Injectable 4 milliGRAM(s) IV Push every 6 hours PRN Nausea and/or Vomiting      Vital Signs Last 24 Hrs  T(C): 37.1 (31 Jul 2023 09:24), Max: 37.1 (31 Jul 2023 09:24)  T(F): 98.8 (31 Jul 2023 09:24), Max: 98.8 (31 Jul 2023 09:24)  HR: 82 (31 Jul 2023 09:24) (75 - 82)  BP: 138/55 (31 Jul 2023 09:24) (119/50 - 139/84)  BP(mean): --  RR: 17 (31 Jul 2023 09:24) (16 - 18)  SpO2: 97% (31 Jul 2023 09:24) (97% - 100%)    Parameters below as of 31 Jul 2023 09:24  Patient On (Oxygen Delivery Method): room air      CAPILLARY BLOOD GLUCOSE      POCT Blood Glucose.: 178 mg/dL (31 Jul 2023 11:34)  POCT Blood Glucose.: 194 mg/dL (31 Jul 2023 07:11)  POCT Blood Glucose.: 193 mg/dL (30 Jul 2023 21:07)  POCT Blood Glucose.: 149 mg/dL (30 Jul 2023 16:55)    I&O's Summary      PHYSICAL EXAM:  CONSTITUTIONAL: NAD  EYES: PERRLA; conjunctiva and sclera clear  ENMT: Moist oral mucosa, no pharyngeal injection or exudates; normal dentition  NECK: Supple, no palpable masses; no thyromegaly  RESPIRATORY: Normal respiratory effort; lungs are clear to auscultation bilaterally  CARDIOVASCULAR: Regular rate and rhythm, normal S1 and S2, no murmur/rub/gallop; No lower extremity edema; Peripheral pulses are 2+ bilaterally; LUE 3+ edema, AVF present, R IJ shiley in place  ABDOMEN: Nontender to palpation, normoactive bowel sounds, no rebound/guarding; No hepatosplenomegaly  MUSCULOSKELETAL:  did not assess gait; no clubbing or cyanosis of digits; no joint swelling or tenderness to palpation  PSYCH: A+O to person, place, and time; affect appropriate  NEUROLOGY: CN 2-12 are intact and symmetric; no gross sensory deficits   SKIN: No rashes; no palpable lesions, surgical dressing C/D/I    LABS:                        8.4    9.15  )-----------( 167      ( 31 Jul 2023 05:35 )             25.9     07-31    134<L>  |  99  |  44<H>  ----------------------------<  139<H>  5.1   |  20<L>  |  5.14<H>    Ca    8.4      31 Jul 2023 05:35            Urinalysis Basic - ( 31 Jul 2023 05:35 )    Color: x / Appearance: x / SG: x / pH: x  Gluc: 139 mg/dL / Ketone: x  / Bili: x / Urobili: x   Blood: x / Protein: x / Nitrite: x   Leuk Esterase: x / RBC: x / WBC x   Sq Epi: x / Non Sq Epi: x / Bacteria: x        RADIOLOGY & ADDITIONAL TESTS:    Imaging Personally Reviewed:    Care Discussed with Consultants/Other Providers:    Care Discussed with Orthopedic PA about:

## 2023-07-31 NOTE — PROGRESS NOTE ADULT - PROBLEM SELECTOR PLAN 3
Rt IJ jessica placed  - VSx planning for fistulogram on 8/1  - may also place permacath  - cardiology to provide cardiac risk optimization for the procedure

## 2023-07-31 NOTE — PROGRESS NOTE ADULT - ASSESSMENT
77F HTN, DM2, Type B Aortic Dissect, ESRD - HD MWF, Endometrial CA s/p CLINT, Lt femur fx s/p IMN 5/2023 p/w hardware defect s/p Lt hip nail removal, Lt THR on 7/19 c/b hemorrhagic shock from post-op hematoma c/b AVF malfx s/p Shiley placement 7/28.

## 2023-07-31 NOTE — PROGRESS NOTE ADULT - SUBJECTIVE AND OBJECTIVE BOX
Cardiovascular Disease Progress Note  Date of Service: 23 @ 08:15    Overnight events: No acute events overnight.    Patient denies chest pain or SOB.   Otherwise review of systems negative    Objective Findings:  T(C): 36.7 (23 @ 06:05), Max: 36.7 (23 @ 06:05)  HR: 77 (23 @ 06:05) (75 - 81)  BP: 119/50 (23 @ 06:05) (119/50 - 152/70)  RR: 16 (23 @ 06:05) (16 - 18)  SpO2: 98% (23 @ 06:05) (98% - 100%)  Wt(kg): --  Daily     Daily Weight in k.6 (2023 01:56)      Physical Exam:  Gen: NAD; Patient resting comfortably  HEENT: EOMI, Normocephalic/ atraumatic  CV: RRR, normal S1 + S2, no m/r/g  Lungs:  Normal respiratory effort; clear to auscultation bilaterally  Abd: soft, non-tender; bowel sounds present  Ext: No edema; warm and well perfused    Telemetry: n/a    Laboratory Data:                        8.4    9.15  )-----------( 167      ( 2023 05:35 )             25.9         134<L>  |  99  |  44<H>  ----------------------------<  139<H>  5.1   |  20<L>  |  5.14<H>    Ca    8.4      2023 05:35                Inpatient Medications:  MEDICATIONS  (STANDING):  acetaminophen     Tablet .. 650 milliGRAM(s) Oral every 6 hours  ascorbic acid 500 milliGRAM(s) Oral two times a day  calcium carbonate 1250 mG  + Vitamin D (OsCal 500 + D) 1 Tablet(s) Oral three times a day  folic acid 1 milliGRAM(s) Oral daily  HYDROmorphone  Injectable 0.5 milliGRAM(s) IV Push once  insulin lispro (ADMELOG) corrective regimen sliding scale   SubCutaneous Before meals and at bedtime  multivitamin 1 Tablet(s) Oral daily  pantoprazole    Tablet 40 milliGRAM(s) Oral before breakfast  polyethylene glycol 3350 17 Gram(s) Oral daily  senna 2 Tablet(s) Oral at bedtime      Assessment: 77 year old woman with ESRD on HD (MWF), HTN, DM (diet controlled), chronic type B aortic dissection, heart murmur, endometrial ca in remission s/p total hysterectomy in  presents with L hip pain     Plan of Care:      #Post-operative evaluation-  - Ms. Harris tolerated orthopedic intervention well.  Post operative course was complicated by distributive/hemorrhagic shock, briefly requiring low doses of Levophed.  She displays no signs of  coronary ischemia or decompensated CHF.   Management of anemia as per the primary team.   Continue current cardiac management.       #Type B aortic dissection  - Chronic (Diagnosed >10 years ago)  - Continue current management.        #ESRD  - HD as per renal.        Over 25 minutes spent on total encounter; more than 50% of the visit was spent counseling and/or coordinating care by the attending physician.      Gabo Burris MD [facility]  Cardiovascular Disease  [phone] Cardiovascular Disease Progress Note  Date of Service: 23 @ 08:15    Overnight events: No acute events overnight.    Patient denies chest pain or SOB.   Otherwise review of systems negative    Objective Findings:  T(C): 36.7 (23 @ 06:05), Max: 36.7 (23 @ 06:05)  HR: 77 (23 @ 06:05) (75 - 81)  BP: 119/50 (23 @ 06:05) (119/50 - 152/70)  RR: 16 (23 @ 06:05) (16 - 18)  SpO2: 98% (23 @ 06:05) (98% - 100%)  Wt(kg): --  Daily     Daily Weight in k.6 (2023 01:56)      Physical Exam:  Gen: NAD; Patient resting comfortably  HEENT: EOMI, Normocephalic/ atraumatic  CV: RRR, normal S1 + S2, no m/r/g  Lungs:  Normal respiratory effort; clear to auscultation bilaterally  Abd: soft, non-tender; bowel sounds present  Ext: No edema; warm and well perfused    Telemetry: n/a    Laboratory Data:                        8.4    9.15  )-----------( 167      ( 2023 05:35 )             25.9         134<L>  |  99  |  44<H>  ----------------------------<  139<H>  5.1   |  20<L>  |  5.14<H>    Ca    8.4      2023 05:35                Inpatient Medications:  MEDICATIONS  (STANDING):  acetaminophen     Tablet .. 650 milliGRAM(s) Oral every 6 hours  ascorbic acid 500 milliGRAM(s) Oral two times a day  calcium carbonate 1250 mG  + Vitamin D (OsCal 500 + D) 1 Tablet(s) Oral three times a day  folic acid 1 milliGRAM(s) Oral daily  HYDROmorphone  Injectable 0.5 milliGRAM(s) IV Push once  insulin lispro (ADMELOG) corrective regimen sliding scale   SubCutaneous Before meals and at bedtime  multivitamin 1 Tablet(s) Oral daily  pantoprazole    Tablet 40 milliGRAM(s) Oral before breakfast  polyethylene glycol 3350 17 Gram(s) Oral daily  senna 2 Tablet(s) Oral at bedtime      Assessment: 77 year old woman with ESRD on HD (MWF), HTN, DM (diet controlled), chronic type B aortic dissection, heart murmur, endometrial ca in remission s/p total hysterectomy in  presents with L hip pain     Plan of Care:      #Post-operative evaluation-  - Ms. Harris tolerated orthopedic intervention well.  Post operative course was complicated by distributive/hemorrhagic shock, briefly requiring low doses of Levophed.  She displays no signs of  coronary ischemia or decompensated CHF.   The patient is optimized from a cardiac standpoint to proceed with AV fistula intervention.   Continue current cardiac management.       #Type B aortic dissection  - Chronic (Diagnosed >10 years ago)  - Continue current management.        #ESRD  - HD as per renal.        Over 25 minutes spent on total encounter; more than 50% of the visit was spent counseling and/or coordinating care by the attending physician.      Gabo Burris MD [facility]  Cardiovascular Disease  [phone]

## 2023-07-31 NOTE — PROGRESS NOTE ADULT - SUBJECTIVE AND OBJECTIVE BOX
Orthopaedic Surgery Progress Note    Subjective:   Patient seen and examined. No acute events overnight. States pain is controlled. Denies fever/chills/chest pain/shortness of breath/numbness/tingling.    Objective:  T(C): 36.7 (07-31-23 @ 06:05), Max: 36.7 (07-31-23 @ 06:05)  HR: 77 (07-31-23 @ 06:05) (75 - 81)  BP: 119/50 (07-31-23 @ 06:05) (119/50 - 152/70)  RR: 16 (07-31-23 @ 06:05) (16 - 18)  SpO2: 98% (07-31-23 @ 06:05) (98% - 100%)  Wt(kg): --      Physical Examination:  GEN: NAD, resting quietly  PULM: symmetric chest rise bilaterally, no increased WOB  ABD: nondistended  EXTR:   LLE:  - dressing c/d/i   - Motor: IP/quad/TA/gastroc/sol/EHL/FHL intact (hip flexion limited by pain)   - SILT  - DP 2 +                           8.4    10.18 )-----------( 163      ( 30 Jul 2023 05:25 )             25.0     07-30    137  |  99  |  35<H>  ----------------------------<  169<H>  4.4   |  23  |  4.24<H>    Ca    8.3<L>      30 Jul 2023 05:25        Urinalysis Basic - ( 30 Jul 2023 05:25 )    Color: x / Appearance: x / SG: x / pH: x  Gluc: 169 mg/dL / Ketone: x  / Bili: x / Urobili: x   Blood: x / Protein: x / Nitrite: x   Leuk Esterase: x / RBC: x / WBC x   Sq Epi: x / Non Sq Epi: x / Bacteria: x

## 2023-08-01 LAB
ANION GAP SERPL CALC-SCNC: 11 MMOL/L — SIGNIFICANT CHANGE UP (ref 7–14)
APTT BLD: 34.1 SEC — SIGNIFICANT CHANGE UP (ref 24.5–35.6)
BLD GP AB SCN SERPL QL: NEGATIVE — SIGNIFICANT CHANGE UP
BUN SERPL-MCNC: 23 MG/DL — SIGNIFICANT CHANGE UP (ref 7–23)
CALCIUM SERPL-MCNC: 8 MG/DL — LOW (ref 8.4–10.5)
CHLORIDE SERPL-SCNC: 99 MMOL/L — SIGNIFICANT CHANGE UP (ref 98–107)
CO2 SERPL-SCNC: 25 MMOL/L — SIGNIFICANT CHANGE UP (ref 22–31)
CREAT SERPL-MCNC: 3.25 MG/DL — HIGH (ref 0.5–1.3)
EGFR: 14 ML/MIN/1.73M2 — LOW
GLUCOSE BLDC GLUCOMTR-MCNC: 139 MG/DL — HIGH (ref 70–99)
GLUCOSE BLDC GLUCOMTR-MCNC: 154 MG/DL — HIGH (ref 70–99)
GLUCOSE BLDC GLUCOMTR-MCNC: 204 MG/DL — HIGH (ref 70–99)
GLUCOSE BLDC GLUCOMTR-MCNC: 223 MG/DL — HIGH (ref 70–99)
GLUCOSE SERPL-MCNC: 145 MG/DL — HIGH (ref 70–99)
HCT VFR BLD CALC: 24.3 % — LOW (ref 34.5–45)
HGB BLD-MCNC: 7.9 G/DL — LOW (ref 11.5–15.5)
INR BLD: 0.94 RATIO — SIGNIFICANT CHANGE UP (ref 0.85–1.18)
MCHC RBC-ENTMCNC: 29.7 PG — SIGNIFICANT CHANGE UP (ref 27–34)
MCHC RBC-ENTMCNC: 32.5 GM/DL — SIGNIFICANT CHANGE UP (ref 32–36)
MCV RBC AUTO: 91.4 FL — SIGNIFICANT CHANGE UP (ref 80–100)
NRBC # BLD: 0 /100 WBCS — SIGNIFICANT CHANGE UP (ref 0–0)
NRBC # FLD: 0 K/UL — SIGNIFICANT CHANGE UP (ref 0–0)
PLATELET # BLD AUTO: 158 K/UL — SIGNIFICANT CHANGE UP (ref 150–400)
POTASSIUM SERPL-MCNC: 3.8 MMOL/L — SIGNIFICANT CHANGE UP (ref 3.5–5.3)
POTASSIUM SERPL-SCNC: 3.8 MMOL/L — SIGNIFICANT CHANGE UP (ref 3.5–5.3)
PROTHROM AB SERPL-ACNC: 10.6 SEC — SIGNIFICANT CHANGE UP (ref 9.5–13)
RBC # BLD: 2.66 M/UL — LOW (ref 3.8–5.2)
RBC # FLD: 23.6 % — HIGH (ref 10.3–14.5)
RH IG SCN BLD-IMP: POSITIVE — SIGNIFICANT CHANGE UP
SODIUM SERPL-SCNC: 135 MMOL/L — SIGNIFICANT CHANGE UP (ref 135–145)
WBC # BLD: 6.92 K/UL — SIGNIFICANT CHANGE UP (ref 3.8–10.5)
WBC # FLD AUTO: 6.92 K/UL — SIGNIFICANT CHANGE UP (ref 3.8–10.5)

## 2023-08-01 PROCEDURE — 99152 MOD SED SAME PHYS/QHP 5/>YRS: CPT

## 2023-08-01 PROCEDURE — 36901 INTRO CATH DIALYSIS CIRCUIT: CPT | Mod: LT

## 2023-08-01 PROCEDURE — 36907 BALO ANGIOP CTR DIALYSIS SEG: CPT | Mod: LT

## 2023-08-01 PROCEDURE — 99232 SBSQ HOSP IP/OBS MODERATE 35: CPT

## 2023-08-01 PROCEDURE — 76937 US GUIDE VASCULAR ACCESS: CPT | Mod: 26

## 2023-08-01 RX ORDER — METOPROLOL TARTRATE 50 MG
12.5 TABLET ORAL DAILY
Refills: 0 | Status: DISCONTINUED | OUTPATIENT
Start: 2023-08-01 | End: 2023-08-16

## 2023-08-01 RX ADMIN — SENNA PLUS 2 TABLET(S): 8.6 TABLET ORAL at 21:14

## 2023-08-01 RX ADMIN — Medication 1 TABLET(S): at 21:15

## 2023-08-01 RX ADMIN — CHLORHEXIDINE GLUCONATE 1 APPLICATION(S): 213 SOLUTION TOPICAL at 18:19

## 2023-08-01 RX ADMIN — Medication 650 MILLIGRAM(S): at 16:04

## 2023-08-01 RX ADMIN — Medication 1 MILLIGRAM(S): at 12:10

## 2023-08-01 RX ADMIN — Medication 500 MILLIGRAM(S): at 17:25

## 2023-08-01 RX ADMIN — Medication 2: at 21:36

## 2023-08-01 RX ADMIN — Medication 650 MILLIGRAM(S): at 12:09

## 2023-08-01 RX ADMIN — Medication 12.5 MILLIGRAM(S): at 15:07

## 2023-08-01 RX ADMIN — Medication 2: at 17:24

## 2023-08-01 RX ADMIN — Medication 1 TABLET(S): at 12:10

## 2023-08-01 RX ADMIN — Medication 650 MILLIGRAM(S): at 18:19

## 2023-08-01 RX ADMIN — Medication 650 MILLIGRAM(S): at 01:30

## 2023-08-01 RX ADMIN — Medication 650 MILLIGRAM(S): at 18:23

## 2023-08-01 RX ADMIN — Medication 1 TABLET(S): at 15:08

## 2023-08-01 NOTE — PROGRESS NOTE ADULT - PROBLEM SELECTOR PLAN 3
Rt IJ jessica placed  - S/P VSx balloon angioplasty of AVF 8/1  - will await follow documentation for further management.

## 2023-08-01 NOTE — CHART NOTE - NSCHARTNOTEFT_GEN_A_CORE
The patient is s/p LUE AV fistulogram, has LUE ACE bandage post procedure, noted to have pale, cold hand with slightly decreased capillary refill. NO cyanosis. The patient denies numbness or pain of the hand.   The patient did have swelling/lymphedema of LUE prior to procedure.  Vascular team was notified, recommended to remove ACE dressing to check R radial pulse.     The dressing was removed, R radial pulse is palpable +2. Capillary refill  improved and patient's hand felt warm.   Dr. Jensen was made aware over the phone. The dressing was reapplied, slightly looser at the wrist area. The patient is s/p LUE AV fistulogram, has LUE ACE bandage post procedure, noted to have pale, cold hand with slightly decreased capillary refill. NO cyanosis. The patient denies numbness or pain of the hand.   The patient did have swelling/lymphedema of LUE prior to procedure.  Vascular team was notified, recommended to remove ACE dressing to check R radial pulse.     The dressing was removed, R radial pulse is palpable +2. Capillary refill  improved and patient's hand felt warm.   Dr. Jensen was made aware over the phone. The dressing was reapplied, slightly looser at the wrist area.  Vascular team is at the patient's bedside. The patient is s/p LUE AV fistulogram, has LUE ACE bandage post procedure, noted to have pale, cold hand with slightly decreased capillary refill. NO cyanosis. The patient denies numbness or pain of the hand.   The patient did have swelling/lymphedema of LUE prior to procedure.  Vascular team was notified, recommended to remove ACE dressing to check R radial pulse.     The dressing was removed, R radial pulse is palpable +2. Capillary refill  improved and patient's hand felt warm.   Dr. Jensen was made aware over the phone. The dressing was reapplied, slightly looser at the wrist area. Will keep the arm elevated.   Vascular team is at the patient's bedside.

## 2023-08-01 NOTE — BRIEF OPERATIVE NOTE - NSICDXBRIEFPREOP_GEN_ALL_CORE_FT
PRE-OP DIAGNOSIS:  ESRD on dialysis 02-Aug-2023 08:20:39  Devan Yañez  
PRE-OP DIAGNOSIS:  Painful orthopaedic hardware 19-Jul-2023 14:33:37 painful gamma nail left hip Dedrick Ordaz

## 2023-08-01 NOTE — PROGRESS NOTE ADULT - SUBJECTIVE AND OBJECTIVE BOX
Cardiovascular Disease Progress Note  Date of Service: 23 @ 07:33    Overnight events: No acute events overnight.    Patient denies chest pain or SOB.   Otherwise review of systems negative    Objective Findings:  T(C): 36.6 (23 @ 05:58), Max: 37.1 (23 @ 09:24)  HR: 82 (23 @ 05:58) (72 - 88)  BP: 148/87 (23 @ 05:58) (136/63 - 157/70)  RR: 18 (23 @ 05:58) (17 - 18)  SpO2: 100% (23 @ 05:58) (97% - 100%)  Wt(kg): --  Daily     Daily Weight in k.6 (01 Aug 2023 01:49)      Physical Exam:  Gen: NAD; Patient resting comfortably  HEENT: EOMI, Normocephalic/ atraumatic  CV: RRR, normal S1 + S2, no m/r/g  Lungs:  Normal respiratory effort; clear to auscultation bilaterally  Abd: soft, non-tender; bowel sounds present  Ext: No edema; warm and well perfused    Telemetry: n/a    Laboratory Data:                        7.9    6.92  )-----------( 158      ( 01 Aug 2023 03:23 )             24.3     08    135  |  99  |  23  ----------------------------<  145<H>  3.8   |  25  |  3.25<H>    Ca    8.0<L>      01 Aug 2023 03:23      PT/INR - ( 01 Aug 2023 03:23 )   PT: 10.6 sec;   INR: 0.94 ratio         PTT - ( 01 Aug 2023 03:23 )  PTT:34.1 sec          Inpatient Medications:  MEDICATIONS  (STANDING):  acetaminophen     Tablet .. 650 milliGRAM(s) Oral every 6 hours  ascorbic acid 500 milliGRAM(s) Oral two times a day  calcium carbonate 1250 mG  + Vitamin D (OsCal 500 + D) 1 Tablet(s) Oral three times a day  chlorhexidine 2% Cloths 1 Application(s) Topical daily  folic acid 1 milliGRAM(s) Oral daily  HYDROmorphone  Injectable 0.5 milliGRAM(s) IV Push once  insulin lispro (ADMELOG) corrective regimen sliding scale   SubCutaneous Before meals and at bedtime  multivitamin 1 Tablet(s) Oral daily  pantoprazole    Tablet 40 milliGRAM(s) Oral before breakfast  polyethylene glycol 3350 17 Gram(s) Oral daily  senna 2 Tablet(s) Oral at bedtime      Assessment: 77 year old woman with ESRD on HD (MWF), HTN, DM (diet controlled), chronic type B aortic dissection, heart murmur, endometrial ca in remission s/p total hysterectomy in  presents with L hip pain     Plan of Care:      #Post-operative evaluation-  - Ms. Harris tolerated orthopedic intervention well.  Post operative course was complicated by distributive/hemorrhagic shock, briefly requiring low doses of Levophed.  She displays no signs of  coronary ischemia or decompensated CHF.   The patient is optimized from a cardiac standpoint to proceed with AV fistula intervention.   Continue current cardiac management.       #Type B aortic dissection  - Chronic (Diagnosed >10 years ago)  - Continue current management.        #ESRD  - HD as per renal.      #ACP (advance care planning)-  Advanced care planning was discussed with the patient.    Cardiac findings were discussed in detail and all questions were answered.          Over 25 minutes spent on total encounter; more than 50% of the visit was spent counseling and/or coordinating care by the attending physician.      Gabo Burris MD [facility]  Cardiovascular Disease  [phone]

## 2023-08-01 NOTE — PROGRESS NOTE ADULT - SUBJECTIVE AND OBJECTIVE BOX
Orthopaedic Surgery Progress Note    Subjective:   Patient seen and examined. No acute events overnight. States pain is controlled. Denies fever/chills/chest pain/shortness of breath/numbness/tingling.    Objective:  T(C): 36.6 (08-01-23 @ 05:58), Max: 37.1 (07-31-23 @ 09:24)  HR: 82 (08-01-23 @ 05:58) (72 - 88)  BP: 148/87 (08-01-23 @ 05:58) (136/63 - 157/70)  RR: 18 (08-01-23 @ 05:58) (17 - 18)  SpO2: 100% (08-01-23 @ 05:58) (97% - 100%)  Wt(kg): --    07-31 @ 07:01  -  08-01 @ 06:27  --------------------------------------------------------  IN: 400 mL / OUT: 2900 mL / NET: -2500 mL      Physical Examination:  GEN: NAD, resting quietly  PULM: symmetric chest rise bilaterally, no increased WOB  ABD: nondistended  EXTR:   LLE:  - dressing c/d/i   - Motor: IP/quad/TA/gastroc/sol/EHL/FHL intact (hip flexion limited by pain)   - SILT  - DP 2 +                           7.9    6.92  )-----------( 158      ( 01 Aug 2023 03:23 )             24.3     08-01    135  |  99  |  23  ----------------------------<  145<H>  3.8   |  25  |  3.25<H>    Ca    8.0<L>      01 Aug 2023 03:23      PT/INR - ( 01 Aug 2023 03:23 )   PT: 10.6 sec;   INR: 0.94 ratio         PTT - ( 01 Aug 2023 03:23 )  PTT:34.1 sec  Urinalysis Basic - ( 01 Aug 2023 03:23 )    Color: x / Appearance: x / SG: x / pH: x  Gluc: 145 mg/dL / Ketone: x  / Bili: x / Urobili: x   Blood: x / Protein: x / Nitrite: x   Leuk Esterase: x / RBC: x / WBC x   Sq Epi: x / Non Sq Epi: x / Bacteria: x

## 2023-08-01 NOTE — PROGRESS NOTE ADULT - ASSESSMENT
Pt is a 79 y/o female s/p left hip SUZANNA-> left hip hemiarthroplasty      PLAN  - Pain control/ Analgesia  - DVT ppx SQH w SCDs  - PT/OT   - WBAT and OOB to chair   - Incentive Spirometer  - S/p DEREK Ramírez 7/28  - FU vascular surgery plan for possible fistula revision--fistulogram today

## 2023-08-01 NOTE — CONSULT NOTE ADULT - CONSULT REQUESTED BY NAME
August 1, 2023     Patient: Patricia Ahn   YOB: 1978   Date of Visit: 8/1/2023       To Whom it May Concern:    Kelli Das was seen in my clinic on 8/1/2023. Please excuse from work yesterday and today due to illness. May stay home tomorrow (8/1) if not feeling better. If you have any questions or concerns, please don't hesitate to call.          Sincerely,          CLAUDIA Hinojosa        CC: No Recipients
On behalf of Cheo Grimes
Flaca Irvin

## 2023-08-01 NOTE — CHART NOTE - NSCHARTNOTEFT_GEN_A_CORE
Post Operative Note  Patient: VERO ALVAREZ 78y (1945) Female   MRN: [id number]  Location: [address]  Visit: 07-18-23 Inpatient  Date: 08-01-23 @ 14:35    Procedure: S/P HAME BC AVF fistulogram    Interval Events: Postoperatively, vascular surgery called concern for cold L arm and no pulses in cath lab recovery suite. Patient found to have palpable L radial pulse without motor or sensory deficits. ACE bandage wrapped loosely.     Subjective: Patient seen and examined post operatively. Reports pain as controlled. Denies nausea, vomiting, fever, chills, chest pain, SOB, cough.      Objective:  Vitals: T(F): 97.9 (08-01-23 @ 05:58), Max: 98.3 (08-01-23 @ 01:49)  HR: 82 (08-01-23 @ 05:58)  BP: 148/87 (08-01-23 @ 05:58) (136/63 - 157/70)  RR: 18 (08-01-23 @ 05:58)  SpO2: 100% (08-01-23 @ 05:58)  Vent Settings:     In:   07-31-23 @ 07:01  -  08-01-23 @ 07:00  --------------------------------------------------------  IN: 400 mL      IV Fluids: ascorbic acid 500 milliGRAM(s) Oral two times a day  calcium carbonate 1250 mG  + Vitamin D (OsCal 500 + D) 1 Tablet(s) Oral three times a day  folic acid 1 milliGRAM(s) Oral daily  multivitamin 1 Tablet(s) Oral daily      Out:   07-31-23 @ 07:01  -  08-01-23 @ 07:00  --------------------------------------------------------  OUT: 2900 mL      EBL:     Voided Urine:   07-31-23 @ 07:01  -  08-01-23 @ 07:00  --------------------------------------------------------  OUT: 2900 mL      Physical Examination:  General: NAD, resting comfortably in bed  HEENT: Normocephalic atraumatic  Respiratory: Nonlabored respirations, normal CW expansion.  Cardio: S1S2, regular rate and rhythm.  Abdomen: soft, nontender, nondistended  Vascular: LUE AVF with palpable thrill, L radial palpable pulse, warm      Assessment: 77 year old F w/ a PMHx HTN, DM, chronic B type aortic dissection, ESRD on HD with LUE AVF by Dr. Jensen in 2018 presents s/p LUE AVF fistulogram on 8/1/23      Plan:  - Care per primary team  - AVF revision planning this week, needs medical and cardiac optimization     Date/Time: 08-01-23 @ 14:35

## 2023-08-01 NOTE — BRIEF OPERATIVE NOTE - NSICDXBRIEFPROCEDURE_GEN_ALL_CORE_FT
PROCEDURES:  Removal of deep metallic hardware of hip 19-Jul-2023 14:33:03 gamma nail removal and peter arthroplasty Dedrick Ordaz  Hemiarthroplasty, hip 19-Jul-2023 14:33:26  Dedrick Ordaz  
PROCEDURES:  Fistulogram, with PTA 02-Aug-2023 08:20:28  Devan Yañez

## 2023-08-01 NOTE — PROGRESS NOTE ADULT - SUBJECTIVE AND OBJECTIVE BOX
[facility] Division of Hospital Medicine  Donnell Mullen MD  Pager (M-F, 8A-5P): [phone]  Other Times:  [phone]    Patient is a 78y old  Female who presents with a chief complaint of s/p L hip orif concern for loosened hardwar (01 Aug 2023 07:33)    SUBJECTIVE / OVERNIGHT EVENTS:  Patient returned from the vascular procedure.  No new complaints.  No F/C, N/V, CP, SOB, Cough, lightheadedness, dizziness, abdominal pain, diarrhea, dysuria.    MEDICATIONS  (STANDING):  acetaminophen     Tablet .. 650 milliGRAM(s) Oral every 6 hours  ascorbic acid 500 milliGRAM(s) Oral two times a day  calcium carbonate 1250 mG  + Vitamin D (OsCal 500 + D) 1 Tablet(s) Oral three times a day  chlorhexidine 2% Cloths 1 Application(s) Topical daily  folic acid 1 milliGRAM(s) Oral daily  HYDROmorphone  Injectable 0.5 milliGRAM(s) IV Push once  insulin lispro (ADMELOG) corrective regimen sliding scale   SubCutaneous Before meals and at bedtime  multivitamin 1 Tablet(s) Oral daily  pantoprazole    Tablet 40 milliGRAM(s) Oral before breakfast  polyethylene glycol 3350 17 Gram(s) Oral daily  senna 2 Tablet(s) Oral at bedtime    MEDICATIONS  (PRN):  aluminum hydroxide/magnesium hydroxide/simethicone Suspension 30 milliLiter(s) Oral four times a day PRN Indigestion  melatonin 3 milliGRAM(s) Oral at bedtime PRN Insomnia  ondansetron Injectable 4 milliGRAM(s) IV Push every 6 hours PRN Nausea and/or Vomiting      Vital Signs Last 24 Hrs  T(C): 36.6 (01 Aug 2023 05:58), Max: 37 (31 Jul 2023 13:10)  T(F): 97.9 (01 Aug 2023 05:58), Max: 98.6 (31 Jul 2023 13:10)  HR: 82 (01 Aug 2023 05:58) (72 - 88)  BP: 148/87 (01 Aug 2023 05:58) (136/63 - 157/70)  BP(mean): --  RR: 18 (01 Aug 2023 05:58) (17 - 18)  SpO2: 100% (01 Aug 2023 05:58) (97% - 100%)    Parameters below as of 01 Aug 2023 05:58  Patient On (Oxygen Delivery Method): room air      CAPILLARY BLOOD GLUCOSE      POCT Blood Glucose.: 139 mg/dL (01 Aug 2023 11:27)  POCT Blood Glucose.: 154 mg/dL (01 Aug 2023 06:34)  POCT Blood Glucose.: 131 mg/dL (31 Jul 2023 21:24)  POCT Blood Glucose.: 117 mg/dL (31 Jul 2023 20:05)  POCT Blood Glucose.: 167 mg/dL (31 Jul 2023 16:33)  POCT Blood Glucose.: 173 mg/dL (31 Jul 2023 15:11)    I&O's Summary    31 Jul 2023 07:01  -  01 Aug 2023 07:00  --------------------------------------------------------  IN: 400 mL / OUT: 2900 mL / NET: -2500 mL        PHYSICAL EXAM:  CONSTITUTIONAL: NAD  EYES: PERRLA; conjunctiva and sclera clear  ENMT: Moist oral mucosa, no pharyngeal injection or exudates; normal dentition  NECK: Supple, no palpable masses; no thyromegaly  RESPIRATORY: Normal respiratory effort; lungs are clear to auscultation bilaterally  CARDIOVASCULAR: Regular rate and rhythm, normal S1 and S2, no murmur/rub/gallop; No lower extremity edema; Peripheral pulses are 2+ bilaterally; LUE 3+ edema, AVF present, R IJ shiley in place; LUE in ACE bandage  ABDOMEN: Nontender to palpation, normoactive bowel sounds, no rebound/guarding; No hepatosplenomegaly  MUSCULOSKELETAL:  did not assess gait; no clubbing or cyanosis of digits; no joint swelling or tenderness to palpation  PSYCH: A+O to person, place, and time; affect appropriate  NEUROLOGY: CN 2-12 are intact and symmetric; no gross sensory deficits   SKIN: No rashes; no palpable lesions, surgical dressing C/D/I    LABS:                        7.9    6.92  )-----------( 158      ( 01 Aug 2023 03:23 )             24.3     08-01    135  |  99  |  23  ----------------------------<  145<H>  3.8   |  25  |  3.25<H>    Ca    8.0<L>      01 Aug 2023 03:23      PT/INR - ( 01 Aug 2023 03:23 )   PT: 10.6 sec;   INR: 0.94 ratio         PTT - ( 01 Aug 2023 03:23 )  PTT:34.1 sec      Urinalysis Basic - ( 01 Aug 2023 03:23 )    Color: x / Appearance: x / SG: x / pH: x  Gluc: 145 mg/dL / Ketone: x  / Bili: x / Urobili: x   Blood: x / Protein: x / Nitrite: x   Leuk Esterase: x / RBC: x / WBC x   Sq Epi: x / Non Sq Epi: x / Bacteria: x        RADIOLOGY & ADDITIONAL TESTS:    Imaging Personally Reviewed:    Care Discussed with Consultants/Other Providers:    Care Discussed with Orthopedic PA about:

## 2023-08-01 NOTE — BRIEF OPERATIVE NOTE - NSICDXBRIEFPOSTOP_GEN_ALL_CORE_FT
POST-OP DIAGNOSIS:  ESRD on dialysis 02-Aug-2023 08:20:43  Devan Yañez  
POST-OP DIAGNOSIS:  Painful orthopaedic hardware 19-Jul-2023 14:33:50 painful gamma nail left hip Dedrick Ordaz

## 2023-08-02 ENCOUNTER — TRANSCRIPTION ENCOUNTER (OUTPATIENT)
Age: 78
End: 2023-08-02

## 2023-08-02 LAB
GLUCOSE BLDC GLUCOMTR-MCNC: 159 MG/DL — HIGH (ref 70–99)
GLUCOSE BLDC GLUCOMTR-MCNC: 162 MG/DL — HIGH (ref 70–99)
GLUCOSE BLDC GLUCOMTR-MCNC: 215 MG/DL — HIGH (ref 70–99)
GLUCOSE BLDC GLUCOMTR-MCNC: 219 MG/DL — HIGH (ref 70–99)
HCT VFR BLD CALC: 24.8 % — LOW (ref 34.5–45)
HGB BLD-MCNC: 8 G/DL — LOW (ref 11.5–15.5)
MCHC RBC-ENTMCNC: 30.3 PG — SIGNIFICANT CHANGE UP (ref 27–34)
MCHC RBC-ENTMCNC: 32.3 GM/DL — SIGNIFICANT CHANGE UP (ref 32–36)
MCV RBC AUTO: 93.9 FL — SIGNIFICANT CHANGE UP (ref 80–100)
NRBC # BLD: 0 /100 WBCS — SIGNIFICANT CHANGE UP (ref 0–0)
NRBC # FLD: 0 K/UL — SIGNIFICANT CHANGE UP (ref 0–0)
PLATELET # BLD AUTO: 158 K/UL — SIGNIFICANT CHANGE UP (ref 150–400)
RBC # BLD: 2.64 M/UL — LOW (ref 3.8–5.2)
RBC # FLD: 23.9 % — HIGH (ref 10.3–14.5)
WBC # BLD: 6.02 K/UL — SIGNIFICANT CHANGE UP (ref 3.8–10.5)
WBC # FLD AUTO: 6.02 K/UL — SIGNIFICANT CHANGE UP (ref 3.8–10.5)

## 2023-08-02 PROCEDURE — 93985 DUP-SCAN HEMO COMPL BI STD: CPT | Mod: 26

## 2023-08-02 PROCEDURE — 99232 SBSQ HOSP IP/OBS MODERATE 35: CPT

## 2023-08-02 RX ADMIN — Medication 1 TABLET(S): at 05:33

## 2023-08-02 RX ADMIN — PANTOPRAZOLE SODIUM 40 MILLIGRAM(S): 20 TABLET, DELAYED RELEASE ORAL at 05:33

## 2023-08-02 RX ADMIN — SENNA PLUS 2 TABLET(S): 8.6 TABLET ORAL at 21:32

## 2023-08-02 RX ADMIN — Medication 1 TABLET(S): at 15:27

## 2023-08-02 RX ADMIN — Medication 1: at 07:25

## 2023-08-02 RX ADMIN — Medication 500 MILLIGRAM(S): at 05:33

## 2023-08-02 RX ADMIN — Medication 12.5 MILLIGRAM(S): at 05:39

## 2023-08-02 RX ADMIN — Medication 2: at 11:42

## 2023-08-02 RX ADMIN — Medication 2: at 22:58

## 2023-08-02 RX ADMIN — CHLORHEXIDINE GLUCONATE 1 APPLICATION(S): 213 SOLUTION TOPICAL at 15:27

## 2023-08-02 RX ADMIN — Medication 650 MILLIGRAM(S): at 05:36

## 2023-08-02 RX ADMIN — Medication 1 MILLIGRAM(S): at 15:27

## 2023-08-02 RX ADMIN — Medication 650 MILLIGRAM(S): at 06:36

## 2023-08-02 RX ADMIN — Medication 1: at 17:20

## 2023-08-02 RX ADMIN — Medication 500 MILLIGRAM(S): at 17:20

## 2023-08-02 RX ADMIN — Medication 1 TABLET(S): at 21:32

## 2023-08-02 NOTE — PROGRESS NOTE ADULT - SUBJECTIVE AND OBJECTIVE BOX
Cardiovascular Disease Progress Note  Date of Service: 08-02-23 @ 07:07    Overnight events: No acute events overnight.    Patient denies chest pain or SOB.   Otherwise review of systems negative    Objective Findings:  T(C): 36.6 (08-02-23 @ 05:30), Max: 36.8 (08-01-23 @ 17:30)  HR: 68 (08-02-23 @ 05:30) (65 - 81)  BP: 153/68 (08-02-23 @ 05:30) (126/67 - 153/68)  RR: 18 (08-02-23 @ 05:30) (16 - 18)  SpO2: 100% (08-02-23 @ 05:30) (99% - 100%)  Wt(kg): --  Daily     Daily       Physical Exam:  Gen: NAD; Patient resting comfortably  HEENT: EOMI, Normocephalic/ atraumatic  CV: RRR, normal S1 + S2, no m/r/g  Lungs:  Normal respiratory effort; clear to auscultation bilaterally  Abd: soft, non-tender; bowel sounds present  Ext: No edema; warm and well perfused    Telemetry: n/a    Laboratory Data:                        8.0    6.02  )-----------( 158      ( 02 Aug 2023 05:59 )             24.8     08-01    135  |  99  |  23  ----------------------------<  145<H>  3.8   |  25  |  3.25<H>    Ca    8.0<L>      01 Aug 2023 03:23      PT/INR - ( 01 Aug 2023 03:23 )   PT: 10.6 sec;   INR: 0.94 ratio         PTT - ( 01 Aug 2023 03:23 )  PTT:34.1 sec          Inpatient Medications:  MEDICATIONS  (STANDING):  acetaminophen     Tablet .. 650 milliGRAM(s) Oral every 6 hours  ascorbic acid 500 milliGRAM(s) Oral two times a day  calcium carbonate 1250 mG  + Vitamin D (OsCal 500 + D) 1 Tablet(s) Oral three times a day  chlorhexidine 2% Cloths 1 Application(s) Topical daily  folic acid 1 milliGRAM(s) Oral daily  HYDROmorphone  Injectable 0.5 milliGRAM(s) IV Push once  insulin lispro (ADMELOG) corrective regimen sliding scale   SubCutaneous Before meals and at bedtime  metoprolol succinate ER 12.5 milliGRAM(s) Oral daily  multivitamin 1 Tablet(s) Oral daily  pantoprazole    Tablet 40 milliGRAM(s) Oral before breakfast  polyethylene glycol 3350 17 Gram(s) Oral daily  senna 2 Tablet(s) Oral at bedtime      Assessment: 77 year old woman with ESRD on HD (MWF), HTN, DM (diet controlled), chronic type B aortic dissection, heart murmur, endometrial ca in remission s/p total hysterectomy in 2007 presents with L hip pain     Plan of Care:      #Post-operative evaluation-  - Ms. Harris tolerated orthopedic and AV fistula interventions well.  She displays no signs of  coronary ischemia or decompensated CHF.   Continue current cardiac management.       #Type B aortic dissection  - Chronic (Diagnosed >10 years ago)  - Continue current management.        #ESRD  - HD as per renal.      Over 25 minutes spent on total encounter; more than 50% of the visit was spent counseling and/or coordinating care by the attending physician.      Gabo Burris MD [facility]  Cardiovascular Disease  [phone] Cardiovascular Disease Progress Note  Date of Service: 08-02-23 @ 07:07    Overnight events: No acute events overnight.    Patient denies chest pain or SOB.   Otherwise review of systems negative    Objective Findings:  T(C): 36.6 (08-02-23 @ 05:30), Max: 36.8 (08-01-23 @ 17:30)  HR: 68 (08-02-23 @ 05:30) (65 - 81)  BP: 153/68 (08-02-23 @ 05:30) (126/67 - 153/68)  RR: 18 (08-02-23 @ 05:30) (16 - 18)  SpO2: 100% (08-02-23 @ 05:30) (99% - 100%)  Wt(kg): --  Daily     Daily       Physical Exam:  Gen: NAD; Patient resting comfortably  HEENT: EOMI, Normocephalic/ atraumatic  CV: RRR, normal S1 + S2, no m/r/g  Lungs:  Normal respiratory effort; clear to auscultation bilaterally  Abd: soft, non-tender; bowel sounds present  Ext: No edema; warm and well perfused    Telemetry: n/a    Laboratory Data:                        8.0    6.02  )-----------( 158      ( 02 Aug 2023 05:59 )             24.8     08-01    135  |  99  |  23  ----------------------------<  145<H>  3.8   |  25  |  3.25<H>    Ca    8.0<L>      01 Aug 2023 03:23      PT/INR - ( 01 Aug 2023 03:23 )   PT: 10.6 sec;   INR: 0.94 ratio         PTT - ( 01 Aug 2023 03:23 )  PTT:34.1 sec          Inpatient Medications:  MEDICATIONS  (STANDING):  acetaminophen     Tablet .. 650 milliGRAM(s) Oral every 6 hours  ascorbic acid 500 milliGRAM(s) Oral two times a day  calcium carbonate 1250 mG  + Vitamin D (OsCal 500 + D) 1 Tablet(s) Oral three times a day  chlorhexidine 2% Cloths 1 Application(s) Topical daily  folic acid 1 milliGRAM(s) Oral daily  HYDROmorphone  Injectable 0.5 milliGRAM(s) IV Push once  insulin lispro (ADMELOG) corrective regimen sliding scale   SubCutaneous Before meals and at bedtime  metoprolol succinate ER 12.5 milliGRAM(s) Oral daily  multivitamin 1 Tablet(s) Oral daily  pantoprazole    Tablet 40 milliGRAM(s) Oral before breakfast  polyethylene glycol 3350 17 Gram(s) Oral daily  senna 2 Tablet(s) Oral at bedtime      Assessment: 77 year old woman with ESRD on HD (MWF), HTN, DM (diet controlled), chronic type B aortic dissection, heart murmur, endometrial ca in remission s/p total hysterectomy in 2007 presents with L hip pain     Plan of Care:      #Pre-operative evaluation-  - Ms. Harris tolerated orthopedic intervention well.  She displays no signs of  coronary ischemia or decompensated CHF.   She is optimized from a cardiac standpoint to proceed with AV fistula intervention.   Continue current cardiac management.       #Type B aortic dissection  - Chronic (Diagnosed >10 years ago)  - Continue current management.        #ESRD  - HD as per renal.      Over 25 minutes spent on total encounter; more than 50% of the visit was spent counseling and/or coordinating care by the attending physician.      Gabo Burris MD [facility]  Cardiovascular Disease  [phone]

## 2023-08-02 NOTE — PROGRESS NOTE ADULT - SUBJECTIVE AND OBJECTIVE BOX
[facility] Division of Kidney Diseases & Hypertension  FOLLOW UP NOTE  [phone]--------------------------------------------------------------------------------  Chief Complaint:Unspecified complication of procedure, initial encounter  HPI: 78F w/ PMH of ESRD on HD TIW (MWF) at [facility] (previously at [facility]), HTN, DM2, endometrial cancer (underwent hysterectomy and chemotherapy, type B aortic dissection, and L hip ORIF, admitted for revision of L hip hardware. Nephrology consulted for management of ESRD/HD. 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  S/p nontunneled catheter placement 7/28, plan or fistulogram by vascular on 8/1.     24 hour events/subjective: Pt. seen and evaluated at bedside. Overall feels well, endorses persistent L shoulder swelling. She denies fevers/chills, headaches, chest pain, SOB, abd pain, or leg swelling.     Pt. s/p LUE BC AVF fistulogram 8/1 and Balloon angioplasty performed. With plans for AVF revision this week.       PAST HISTORY  --------------------------------------------------------------------------------  No significant changes to PMH, PSH, FHx, SHx, unless otherwise noted    ALLERGIES & MEDICATIONS  --------------------------------------------------------------------------------  Allergies    No Known Allergies    Intolerances      Standing Inpatient Medications  acetaminophen     Tablet .. 650 milliGRAM(s) Oral every 6 hours  ascorbic acid 500 milliGRAM(s) Oral two times a day  calcium carbonate 1250 mG  + Vitamin D (OsCal 500 + D) 1 Tablet(s) Oral three times a day  chlorhexidine 2% Cloths 1 Application(s) Topical daily  folic acid 1 milliGRAM(s) Oral daily  HYDROmorphone  Injectable 0.5 milliGRAM(s) IV Push once  insulin lispro (ADMELOG) corrective regimen sliding scale   SubCutaneous Before meals and at bedtime  metoprolol succinate ER 12.5 milliGRAM(s) Oral daily  multivitamin 1 Tablet(s) Oral daily  pantoprazole    Tablet 40 milliGRAM(s) Oral before breakfast  polyethylene glycol 3350 17 Gram(s) Oral daily  senna 2 Tablet(s) Oral at bedtime    PRN Inpatient Medications  aluminum hydroxide/magnesium hydroxide/simethicone Suspension 30 milliLiter(s) Oral four times a day PRN  melatonin 3 milliGRAM(s) Oral at bedtime PRN  ondansetron Injectable 4 milliGRAM(s) IV Push every 6 hours PRN      REVIEW OF SYSTEMS  --------------------------------------------------------------------------------  Gen: No fevers/chills  Head/Eyes/Ears: no headache  Respiratory: No dyspnea, cough  CV: No cp  GI: No abdominal pain  MSK: LE edema, L shoulder swelling     All other systems were reviewed and are negative, except as noted.    VITALS/PHYSICAL EXAM  --------------------------------------------------------------------------------  T(C): 36.6 (08-02-23 @ 05:30), Max: 36.8 (08-01-23 @ 17:30)  HR: 68 (08-02-23 @ 05:30) (65 - 81)  BP: 153/68 (08-02-23 @ 05:30) (126/67 - 153/68)  RR: 18 (08-02-23 @ 05:30) (16 - 18)  SpO2: 100% (08-02-23 @ 05:30) (99% - 100%)  Wt(kg): --    Physical Exam:  Gen: NAD  HEENT: MMM, +RIJ Shiley   Pulm: minimal bibasilar crackles   CV: S1S2  Abd: Soft, +BS   Ext: LE edema B/L L>R, chronic LUE edema  Neuro: Awake  Skin: Warm and dry, ecchymotic RUE  Vascular access: LUE AVF covered in clean and dry dressing    LABS/STUDIES  --------------------------------------------------------------------------------              8.0    6.02  >-----------<  158      [08-02-23 @ 05:59]              24.8     135  |  99  |  23  ----------------------------<  145      [08-01-23 @ 03:23]  3.8   |  25  |  3.25        Ca     8.0     [08-01-23 @ 03:23]    PT/INR: PT 10.6 , INR 0.94       [08-01-23 @ 03:23]  PTT: 34.1       [08-01-23 @ 03:23]    Creatinine Trend:  SCr 3.25 [08-01 @ 03:23]  SCr 5.14 [07-31 @ 05:35]  SCr 4.24 [07-30 @ 05:25]  SCr 3.00 [07-29 @ 05:46]  SCr 4.99 [07-28 @ 14:00] [facility] Division of Kidney Diseases & Hypertension  FOLLOW UP NOTE    [phone]--------------------------------------------------------------------------------    Chief Complaint: Unspecified complication of procedure, initial encounter    HPI: 78F w/ PMH of ESRD on HD TIW (MWF) at [facility] (previously at [facility]), HTN, DM2, endometrial cancer (underwent hysterectomy and chemotherapy, type B aortic dissection, and L hip ORIF, admitted for revision of L hip hardware. Nephrology consulted for management of ESRD/HD. 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  S/p nontunneled catheter placement 7/28, plan or fistulogram by vascular on 8/1.     24 hour events/subjective: Pt. seen and evaluated at bedside. Overall feels well, endorses persistent L shoulder swelling. She denies fevers/chills, headaches, chest pain, SOB, abd pain, or leg swelling.     Pt. s/p LUE BC AVF fistulogram 8/1 and Balloon angioplasty performed. With plans for AVF revision this week.     PAST HISTORY  --------------------------------------------------------------------------------  No significant changes to PMH, PSH, FHx, SHx, unless otherwise noted    ALLERGIES & MEDICATIONS  --------------------------------------------------------------------------------  Allergies    No Known Allergies    Intolerances      Standing Inpatient Medications  acetaminophen     Tablet .. 650 milliGRAM(s) Oral every 6 hours  ascorbic acid 500 milliGRAM(s) Oral two times a day  calcium carbonate 1250 mG  + Vitamin D (OsCal 500 + D) 1 Tablet(s) Oral three times a day  chlorhexidine 2% Cloths 1 Application(s) Topical daily  folic acid 1 milliGRAM(s) Oral daily  HYDROmorphone  Injectable 0.5 milliGRAM(s) IV Push once  insulin lispro (ADMELOG) corrective regimen sliding scale   SubCutaneous Before meals and at bedtime  metoprolol succinate ER 12.5 milliGRAM(s) Oral daily  multivitamin 1 Tablet(s) Oral daily  pantoprazole    Tablet 40 milliGRAM(s) Oral before breakfast  polyethylene glycol 3350 17 Gram(s) Oral daily  senna 2 Tablet(s) Oral at bedtime    PRN Inpatient Medications  aluminum hydroxide/magnesium hydroxide/simethicone Suspension 30 milliLiter(s) Oral four times a day PRN  melatonin 3 milliGRAM(s) Oral at bedtime PRN  ondansetron Injectable 4 milliGRAM(s) IV Push every 6 hours PRN      REVIEW OF SYSTEMS  --------------------------------------------------------------------------------  Gen: No fevers/chills  Head/Eyes/Ears: no headache  Respiratory: No dyspnea, cough  CV: No cp  GI: No abdominal pain  MSK: LE edema, L shoulder swelling     All other systems were reviewed and are negative, except as noted.    VITALS/PHYSICAL EXAM  --------------------------------------------------------------------------------  T(C): 36.6 (08-02-23 @ 05:30), Max: 36.8 (08-01-23 @ 17:30)  HR: 68 (08-02-23 @ 05:30) (65 - 81)  BP: 153/68 (08-02-23 @ 05:30) (126/67 - 153/68)  RR: 18 (08-02-23 @ 05:30) (16 - 18)  SpO2: 100% (08-02-23 @ 05:30) (99% - 100%)  Wt(kg): --    Physical Exam:  Gen: NAD  HEENT: MMM, +RIJ Shiley   Pulm: minimal bibasilar crackles   CV: S1S2  Abd: Soft, +BS   Ext: LE edema B/L L>R, chronic LUE edema  Neuro: Awake  Skin: Warm and dry, ecchymotic RUE  Vascular access: LUE AVF covered in clean and dry dressing    LABS/STUDIES  --------------------------------------------------------------------------------              8.0    6.02  >-----------<  158      [08-02-23 @ 05:59]              24.8     135  |  99  |  23  ----------------------------<  145      [08-01-23 @ 03:23]  3.8   |  25  |  3.25        Ca     8.0     [08-01-23 @ 03:23]    PT/INR: PT 10.6 , INR 0.94       [08-01-23 @ 03:23]  PTT: 34.1       [08-01-23 @ 03:23]    Creatinine Trend:  SCr 3.25 [08-01 @ 03:23]  SCr 5.14 [07-31 @ 05:35]  SCr 4.24 [07-30 @ 05:25]  SCr 3.00 [07-29 @ 05:46]  SCr 4.99 [07-28 @ 14:00]

## 2023-08-02 NOTE — PROGRESS NOTE ADULT - SUBJECTIVE AND OBJECTIVE BOX
[facility] Division of Hospital Medicine  Donnell Mullen MD  Pager (M-F, 8A-5P): [phone]  Other Times:  [phone]    Patient is a 78y old  Female who presents with a chief complaint of s/p L hip orif concern for loosened hardwar (02 Aug 2023 08:46)    SUBJECTIVE / OVERNIGHT EVENTS:  No new complaints.  Tolerating HD session. No F/C, N/V, CP, SOB, Cough, lightheadedness, dizziness, abdominal pain, diarrhea, dysuria.    MEDICATIONS  (STANDING):  acetaminophen     Tablet .. 650 milliGRAM(s) Oral every 6 hours  ascorbic acid 500 milliGRAM(s) Oral two times a day  calcium carbonate 1250 mG  + Vitamin D (OsCal 500 + D) 1 Tablet(s) Oral three times a day  chlorhexidine 2% Cloths 1 Application(s) Topical daily  folic acid 1 milliGRAM(s) Oral daily  HYDROmorphone  Injectable 0.5 milliGRAM(s) IV Push once  insulin lispro (ADMELOG) corrective regimen sliding scale   SubCutaneous Before meals and at bedtime  metoprolol succinate ER 12.5 milliGRAM(s) Oral daily  multivitamin 1 Tablet(s) Oral daily  pantoprazole    Tablet 40 milliGRAM(s) Oral before breakfast  polyethylene glycol 3350 17 Gram(s) Oral daily  senna 2 Tablet(s) Oral at bedtime    MEDICATIONS  (PRN):  aluminum hydroxide/magnesium hydroxide/simethicone Suspension 30 milliLiter(s) Oral four times a day PRN Indigestion  melatonin 3 milliGRAM(s) Oral at bedtime PRN Insomnia  ondansetron Injectable 4 milliGRAM(s) IV Push every 6 hours PRN Nausea and/or Vomiting      Vital Signs Last 24 Hrs  T(C): 36.5 (02 Aug 2023 11:52), Max: 36.8 (01 Aug 2023 17:30)  T(F): 97.7 (02 Aug 2023 11:52), Max: 98.2 (01 Aug 2023 17:30)  HR: 78 (02 Aug 2023 11:52) (61 - 78)  BP: 137/77 (02 Aug 2023 11:52) (130/79 - 153/68)  BP(mean): --  RR: 18 (02 Aug 2023 11:52) (16 - 18)  SpO2: 100% (02 Aug 2023 09:35) (100% - 100%)    Parameters below as of 02 Aug 2023 09:35  Patient On (Oxygen Delivery Method): room air      CAPILLARY BLOOD GLUCOSE      POCT Blood Glucose.: 215 mg/dL (02 Aug 2023 11:19)  POCT Blood Glucose.: 162 mg/dL (02 Aug 2023 07:11)  POCT Blood Glucose.: 223 mg/dL (01 Aug 2023 21:27)  POCT Blood Glucose.: 204 mg/dL (01 Aug 2023 16:35)    I&O's Summary      PHYSICAL EXAM:  CONSTITUTIONAL: NAD  EYES: PERRLA; conjunctiva and sclera clear  ENMT: Moist oral mucosa, no pharyngeal injection or exudates; normal dentition  NECK: Supple, no palpable masses; no thyromegaly  RESPIRATORY: Normal respiratory effort; lungs are clear to auscultation bilaterally  CARDIOVASCULAR: Regular rate and rhythm, normal S1 and S2, no murmur/rub/gallop; No lower extremity edema; Peripheral pulses are 2+ bilaterally; LUE 3+ edema, AVF present, R IJ shiley in place; LUE in ACE bandage  ABDOMEN: Nontender to palpation, normoactive bowel sounds, no rebound/guarding; No hepatosplenomegaly  MUSCULOSKELETAL:  did not assess gait; no clubbing or cyanosis of digits; no joint swelling or tenderness to palpation  PSYCH: A+O to person, place, and time; affect appropriate  NEUROLOGY: CN 2-12 are intact and symmetric; no gross sensory deficits   SKIN: No rashes; no palpable lesions, surgical dressing C/D/I    LABS:                        8.0    6.02  )-----------( 158      ( 02 Aug 2023 05:59 )             24.8     08-01    135  |  99  |  23  ----------------------------<  145<H>  3.8   |  25  |  3.25<H>    Ca    8.0<L>      01 Aug 2023 03:23      PT/INR - ( 01 Aug 2023 03:23 )   PT: 10.6 sec;   INR: 0.94 ratio         PTT - ( 01 Aug 2023 03:23 )  PTT:34.1 sec      Urinalysis Basic - ( 01 Aug 2023 03:23 )    Color: x / Appearance: x / SG: x / pH: x  Gluc: 145 mg/dL / Ketone: x  / Bili: x / Urobili: x   Blood: x / Protein: x / Nitrite: x   Leuk Esterase: x / RBC: x / WBC x   Sq Epi: x / Non Sq Epi: x / Bacteria: x        RADIOLOGY & ADDITIONAL TESTS:    Imaging Personally Reviewed:    Care Discussed with Consultants/Other Providers:    Care Discussed with Orthopedic PA about:

## 2023-08-02 NOTE — PROGRESS NOTE ADULT - SUBJECTIVE AND OBJECTIVE BOX
Orthopaedic Surgery Progress Note    Subjective:   Patient seen and examined this AM. No acute events overnight. States pain is controlled. Denies fever/chills/chest pain/shortness of breath/numbness/tingling.    ICU Vital Signs Last 24 Hrs  T(C): 36.6 (02 Aug 2023 05:30), Max: 36.8 (01 Aug 2023 17:30)  T(F): 97.8 (02 Aug 2023 05:30), Max: 98.2 (01 Aug 2023 17:30)  HR: 68 (02 Aug 2023 05:30) (65 - 81)  BP: 153/68 (02 Aug 2023 05:30) (126/67 - 153/68)  BP(mean): --  ABP: --  ABP(mean): --  RR: 18 (02 Aug 2023 05:30) (16 - 18)  SpO2: 100% (02 Aug 2023 05:30) (99% - 100%)    O2 Parameters below as of 02 Aug 2023 05:30  Patient On (Oxygen Delivery Method): room air      Physical Examination:  GEN: NAD, resting quietly  EXTR:   LLE:  - dressing c/d/i   - Motor: IP/quad/TA/gastroc/sol/EHL/FHL intact (hip flexion limited by pain)   - SILT  - DP 2 +

## 2023-08-02 NOTE — PROGRESS NOTE ADULT - PROBLEM SELECTOR PLAN 8
Appreciate cardiology consult for cardiac risk optimization.  No further cardiac w/u required.  Medically optimized for the planned VSx OR procedure.
Awaiting cardiology input for cardiac risk optimization  - pending cardio eval

## 2023-08-02 NOTE — PROGRESS NOTE ADULT - PROBLEM SELECTOR PLAN 3
Rt ASHER lopez placed  - S/P VSx balloon angioplasty of AVF 8/1  - plan for revision or creation of new AVF on 8/3.

## 2023-08-02 NOTE — PROGRESS NOTE ADULT - PROBLEM SELECTOR PLAN 1
Pt with ESRD on HD TIW MWF admitted with fall. Pt s/p Lt hemiarthroplasty on 7/19/2023, however surgery complicated by intraoperative hypotension requiring vasopressor support. Patient transferred to SICU for hemodynamic stabilization. CT Angio abd/pelv reviewed. HD treatment held on 7/19/23 due to above. Held on 7/20 due to blood loss anemia. Transferred back to medical floors. 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  S/p nontunneled catheter placement 7/28, planned for fistulogram by vascular on 8/1. Plan for maintenance HD today.  Monitor Labs Pt with ESRD on HD TIW MWF admitted with fall. Pt s/p Lt hemiarthroplasty on 7/19/2023, however surgery complicated by intraoperative hypotension requiring vasopressor support. Patient transferred to SICU for hemodynamic stabilization. CT Angio abd/pelv reviewed. HD treatment held on 7/19/23 due to above. Held on 7/20 due to blood loss anemia. Transferred back to medical floors. 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 S/p nontunneled catheter placement 7/28, s/p LUE BC AVF fistulogram 8/1 and Balloon angioplasty performed. With plans for AVF revision this week. Plan for maintenance HD today.  Monitor Labs Pt with ESRD on HD TIW MWF admitted with fall. Pt s/p Lt hemiarthroplasty on 7/19/2023, however surgery complicated by intraoperative hypotension requiring vasopressor support. Patient transferred to SICU for hemodynamic stabilization. CT Angio abd/pelv reviewed. HD treatment held on 7/19/23 due to above. Held on 7/20 due to blood loss anemia. Transferred back to medical floors. 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 S/p nontunneled catheter placement 7/28, s/p LUE BC AVF fistulogram 8/1 and Balloon angioplasty performed. With plans for AVF revision this week. Plan for maintenance HD today through temporary HD catheter.  Monitor Labs

## 2023-08-02 NOTE — PROGRESS NOTE ADULT - PROBLEM SELECTOR PLAN 2
Pt. with anemia in the setting of ESRD and blood loss from surgical procedure. Hgb 8.4 today. Will give RICA with HD treatments. Monitor hemoglobin levels.     If you have any questions, please feel free to contact me  Kyle Ngo  Nephrology Fellow  [employer]/Page [phone]  (After 5pm or on weekends please page the on-call fellow) Pt. with anemia in the setting of ESRD and blood loss from surgical procedure. Hgb 8.0 today. Will give RICA with HD treatments. Monitor hemoglobin levels.     If you have any questions, please feel free to contact me  Kyle Ngo  Nephrology Fellow  [employer]/Page [phone]  (After 5pm or on weekends please page the on-call fellow)

## 2023-08-02 NOTE — PROGRESS NOTE ADULT - ASSESSMENT
77F HTN, DM2, Type B Aortic Dissect, ESRD - HD MWF, Endometrial CA s/p CLINT, Lt femur fx s/p IMN 5/2023 p/w hardware defect s/p Lt hip nail removal, Lt THR on 7/19 c/b hemorrhagic shock from post-op hematoma c/b AVF malfx s/p Shiley placement 7/28, balloon angioplasty of AVF 8/1, plan for revision vs new AVF creation on 8/3.

## 2023-08-03 LAB
ANION GAP SERPL CALC-SCNC: 8 MMOL/L — SIGNIFICANT CHANGE UP (ref 7–14)
APTT BLD: 30.7 SEC — SIGNIFICANT CHANGE UP (ref 24.5–35.6)
BLD GP AB SCN SERPL QL: NEGATIVE — SIGNIFICANT CHANGE UP
BUN SERPL-MCNC: 23 MG/DL — SIGNIFICANT CHANGE UP (ref 7–23)
CALCIUM SERPL-MCNC: 8.2 MG/DL — LOW (ref 8.4–10.5)
CHLORIDE SERPL-SCNC: 101 MMOL/L — SIGNIFICANT CHANGE UP (ref 98–107)
CO2 SERPL-SCNC: 27 MMOL/L — SIGNIFICANT CHANGE UP (ref 22–31)
CREAT SERPL-MCNC: 3.45 MG/DL — HIGH (ref 0.5–1.3)
EGFR: 13 ML/MIN/1.73M2 — LOW
GLUCOSE BLDC GLUCOMTR-MCNC: 155 MG/DL — HIGH (ref 70–99)
GLUCOSE BLDC GLUCOMTR-MCNC: 159 MG/DL — HIGH (ref 70–99)
GLUCOSE BLDC GLUCOMTR-MCNC: 165 MG/DL — HIGH (ref 70–99)
GLUCOSE BLDC GLUCOMTR-MCNC: 180 MG/DL — HIGH (ref 70–99)
GLUCOSE BLDC GLUCOMTR-MCNC: 293 MG/DL — HIGH (ref 70–99)
GLUCOSE SERPL-MCNC: 151 MG/DL — HIGH (ref 70–99)
HCT VFR BLD CALC: 24.6 % — LOW (ref 34.5–45)
HGB BLD-MCNC: 7.8 G/DL — LOW (ref 11.5–15.5)
INR BLD: 0.96 RATIO — SIGNIFICANT CHANGE UP (ref 0.85–1.18)
MAGNESIUM SERPL-MCNC: 2 MG/DL — SIGNIFICANT CHANGE UP (ref 1.6–2.6)
MCHC RBC-ENTMCNC: 30.4 PG — SIGNIFICANT CHANGE UP (ref 27–34)
MCHC RBC-ENTMCNC: 31.7 GM/DL — LOW (ref 32–36)
MCV RBC AUTO: 95.7 FL — SIGNIFICANT CHANGE UP (ref 80–100)
NRBC # BLD: 0 /100 WBCS — SIGNIFICANT CHANGE UP (ref 0–0)
NRBC # FLD: 0 K/UL — SIGNIFICANT CHANGE UP (ref 0–0)
PHOSPHATE SERPL-MCNC: 3.2 MG/DL — SIGNIFICANT CHANGE UP (ref 2.5–4.5)
PLATELET # BLD AUTO: 139 K/UL — LOW (ref 150–400)
POTASSIUM SERPL-MCNC: 3.9 MMOL/L — SIGNIFICANT CHANGE UP (ref 3.5–5.3)
POTASSIUM SERPL-SCNC: 3.9 MMOL/L — SIGNIFICANT CHANGE UP (ref 3.5–5.3)
PROTHROM AB SERPL-ACNC: 10.9 SEC — SIGNIFICANT CHANGE UP (ref 9.5–13)
RBC # BLD: 2.57 M/UL — LOW (ref 3.8–5.2)
RBC # FLD: 24.3 % — HIGH (ref 10.3–14.5)
RH IG SCN BLD-IMP: POSITIVE — SIGNIFICANT CHANGE UP
SODIUM SERPL-SCNC: 136 MMOL/L — SIGNIFICANT CHANGE UP (ref 135–145)
SURGICAL PATHOLOGY STUDY: SIGNIFICANT CHANGE UP
WBC # BLD: 6.06 K/UL — SIGNIFICANT CHANGE UP (ref 3.8–10.5)
WBC # FLD AUTO: 6.06 K/UL — SIGNIFICANT CHANGE UP (ref 3.8–10.5)

## 2023-08-03 PROCEDURE — 36832 AV FISTULA REVISION OPEN: CPT | Mod: LT

## 2023-08-03 DEVICE — SURGIFLO MATRIX WITH THROMBIN KIT: Type: IMPLANTABLE DEVICE | Status: FUNCTIONAL

## 2023-08-03 DEVICE — SURGICEL FIBRILLAR 2 X 4": Type: IMPLANTABLE DEVICE | Status: FUNCTIONAL

## 2023-08-03 DEVICE — SURGIFOAM PAD 8CM X 12.5CM X 2MM (100C): Type: IMPLANTABLE DEVICE | Status: FUNCTIONAL

## 2023-08-03 DEVICE — LIGATING CLIPS WECK HORIZON MEDIUM (BLUE) 24: Type: IMPLANTABLE DEVICE | Status: FUNCTIONAL

## 2023-08-03 DEVICE — SURGICEL 2 X 14": Type: IMPLANTABLE DEVICE | Status: FUNCTIONAL

## 2023-08-03 DEVICE — SURGICEL 4 X 8": Type: IMPLANTABLE DEVICE | Status: FUNCTIONAL

## 2023-08-03 DEVICE — LIGATING CLIPS WECK HORIZON SMALL-WIDE (RED) 24: Type: IMPLANTABLE DEVICE | Status: FUNCTIONAL

## 2023-08-03 RX ORDER — INSULIN LISPRO 100/ML
VIAL (ML) SUBCUTANEOUS EVERY 6 HOURS
Refills: 0 | Status: DISCONTINUED | OUTPATIENT
Start: 2023-08-03 | End: 2023-08-03

## 2023-08-03 RX ORDER — OXYCODONE HYDROCHLORIDE 5 MG/1
5 TABLET ORAL EVERY 6 HOURS
Refills: 0 | Status: DISCONTINUED | OUTPATIENT
Start: 2023-08-03 | End: 2023-08-04

## 2023-08-03 RX ORDER — DESMOPRESSIN ACETATE 0.1 MG/1
70 TABLET ORAL ONCE
Refills: 0 | Status: DISCONTINUED | OUTPATIENT
Start: 2023-08-03 | End: 2023-08-03

## 2023-08-03 RX ORDER — DESMOPRESSIN ACETATE 0.1 MG/1
17 TABLET ORAL ONCE
Refills: 0 | Status: DISCONTINUED | OUTPATIENT
Start: 2023-08-03 | End: 2023-08-03

## 2023-08-03 RX ORDER — INSULIN LISPRO 100/ML
VIAL (ML) SUBCUTANEOUS
Refills: 0 | Status: DISCONTINUED | OUTPATIENT
Start: 2023-08-03 | End: 2023-08-16

## 2023-08-03 RX ADMIN — Medication 650 MILLIGRAM(S): at 05:36

## 2023-08-03 RX ADMIN — Medication 1 TABLET(S): at 21:36

## 2023-08-03 RX ADMIN — Medication 650 MILLIGRAM(S): at 06:30

## 2023-08-03 RX ADMIN — OXYCODONE HYDROCHLORIDE 5 MILLIGRAM(S): 5 TABLET ORAL at 20:09

## 2023-08-03 RX ADMIN — Medication 650 MILLIGRAM(S): at 17:41

## 2023-08-03 RX ADMIN — Medication 650 MILLIGRAM(S): at 17:27

## 2023-08-03 RX ADMIN — SENNA PLUS 2 TABLET(S): 8.6 TABLET ORAL at 21:36

## 2023-08-03 RX ADMIN — Medication 500 MILLIGRAM(S): at 05:35

## 2023-08-03 RX ADMIN — Medication 500 MILLIGRAM(S): at 17:41

## 2023-08-03 RX ADMIN — Medication 1: at 14:49

## 2023-08-03 RX ADMIN — Medication 12.5 MILLIGRAM(S): at 05:35

## 2023-08-03 RX ADMIN — Medication 1: at 07:53

## 2023-08-03 RX ADMIN — Medication 3: at 22:31

## 2023-08-03 RX ADMIN — Medication 1 TABLET(S): at 05:35

## 2023-08-03 RX ADMIN — OXYCODONE HYDROCHLORIDE 5 MILLIGRAM(S): 5 TABLET ORAL at 21:10

## 2023-08-03 NOTE — PRE PROCEDURE NOTE - DETAIL OF INTERVAL CHANGES
Pt initially presented with hip pain after hip replacement and during this hospital stay was found to have an ulcerated AVF. Pt is stable from orthopedic perspective to undergo this surgery

## 2023-08-03 NOTE — CHART NOTE - NSCHARTNOTEFT_GEN_A_CORE
Post Operative Note  Patient: VERO ALVAREZ 78y (1945) Female   MRN: [id number]  Location: [address]  Visit: 07-18-23 Inpatient  Date: 08-03-23 @ 17:45    Procedure: S/P left AV fistula revision on 8/3.     Subjective: Patient seen and examined post operatively. Reports pain as controlled. Denies nausea, vomiting, fever, chills, chest pain, SOB, cough. She has been able to eat and drink since the procedure. SHe has not voided or moved her bwoels.       Objective:  Vitals: T(F): 97.5 (08-03-23 @ 17:39), Max: 98.9 (08-02-23 @ 22:00)  HR: 63 (08-03-23 @ 17:39)  BP: 156/82 (08-03-23 @ 17:39) (118/55 - 161/70)  RR: 17 (08-03-23 @ 17:39)  SpO2: 99% (08-03-23 @ 17:39)  Vent Settings:     In:   08-02-23 @ 07:01  -  08-03-23 @ 07:00  --------------------------------------------------------  IN: 600 mL    08-03-23 @ 07:01  -  08-03-23 @ 17:45  --------------------------------------------------------  IN: 120 mL      IV Fluids: ascorbic acid 500 milliGRAM(s) Oral two times a day  calcium carbonate 1250 mG  + Vitamin D (OsCal 500 + D) 1 Tablet(s) Oral three times a day  folic acid 1 milliGRAM(s) Oral daily  multivitamin 1 Tablet(s) Oral daily      Out:   08-02-23 @ 07:01  -  08-03-23 @ 07:00  --------------------------------------------------------  OUT: 3100 mL    08-03-23 @ 07:01  -  08-03-23 @ 17:45  --------------------------------------------------------  OUT: 0 mL      EBL:     Voided Urine:   08-02-23 @ 07:01  -  08-03-23 @ 07:00  --------------------------------------------------------  OUT: 3100 mL    08-03-23 @ 07:01  -  08-03-23 @ 17:45  --------------------------------------------------------  OUT: 0 mL      Oconnell Catheter: yes no   Drains:   PORFIRIO:    ,   Chest Tube:      NG Tube:         Physical Examination:  General: NAD, resting comfortably in bed  HEENT: Normocephalic atraumatic  Respiratory: Nonlabored respirations, normal CW expansion.  Cardio: pulse present  Vascular: extremities are warm and well perfused. Left arm with ace wrap from hand to shoulder, in sling. Pulse palpable in fistula through ace wrap.     Imaging:  No post-op imaging studies    Assessment:  78yFemale patient S/P left AV fistula revision on 8/3. Patient is recovering appropriately.     Plan:  - Pain control PRN  - Diet: regular  - DVT ppx: SCD's    Date/Time: 08-03-23 @ 17:45

## 2023-08-03 NOTE — PROGRESS NOTE ADULT - SUBJECTIVE AND OBJECTIVE BOX
Cardiovascular Disease Progress Note  Date of Service: 23 @ 07:45    Overnight events: No acute events overnight.    Patient denies chest pain or SOB.   Otherwise review of systems negative    Objective Findings:  T(C): 36.9 (23 @ 02:00), Max: 37.2 (23 @ 22:00)  HR: 71 (23 @ 02:00) (61 - 78)  BP: 146/60 (23 @ 02:00) (128/56 - 146/60)  RR: 18 (23 @ 02:00) (18 - 18)  SpO2: 100% (23 @ 02:00) (100% - 100%)  Wt(kg): --  Daily Height in cm: 154.9 (03 Aug 2023 03:39)    Daily Weight in k.2 (03 Aug 2023 02:00)      Physical Exam:  Gen: NAD; Patient resting comfortably  HEENT: EOMI, Normocephalic/ atraumatic  CV: RRR, normal S1 + S2, no m/r/g  Lungs:  Normal respiratory effort; clear to auscultation bilaterally  Abd: soft, non-tender; bowel sounds present  Ext: No edema; warm and well perfused    Telemetry: n/a    Laboratory Data:                        7.8    6.06  )-----------( 139      ( 03 Aug 2023 02:25 )             24.6     -03    136  |  101  |  23  ----------------------------<  151<H>  3.9   |  27  |  3.45<H>    Ca    8.2<L>      03 Aug 2023 02:25  Phos  3.2     08-  Mg     2.00     -      PT/INR - ( 03 Aug 2023 02:25 )   PT: 10.9 sec;   INR: 0.96 ratio         PTT - ( 03 Aug 2023 02:25 )  PTT:30.7 sec          Inpatient Medications:  MEDICATIONS  (STANDING):  acetaminophen     Tablet .. 650 milliGRAM(s) Oral every 6 hours  ascorbic acid 500 milliGRAM(s) Oral two times a day  calcium carbonate 1250 mG  + Vitamin D (OsCal 500 + D) 1 Tablet(s) Oral three times a day  chlorhexidine 2% Cloths 1 Application(s) Topical daily  folic acid 1 milliGRAM(s) Oral daily  HYDROmorphone  Injectable 0.5 milliGRAM(s) IV Push once  insulin lispro (ADMELOG) corrective regimen sliding scale   SubCutaneous every 6 hours  metoprolol succinate ER 12.5 milliGRAM(s) Oral daily  multivitamin 1 Tablet(s) Oral daily  pantoprazole    Tablet 40 milliGRAM(s) Oral before breakfast  polyethylene glycol 3350 17 Gram(s) Oral daily  senna 2 Tablet(s) Oral at bedtime      Assessment: 77 year old woman with ESRD on HD (MWF), HTN, DM (diet controlled), chronic type B aortic dissection, heart murmur, endometrial ca in remission s/p total hysterectomy in  presents with L hip pain     Plan of Care:      #Pre-operative evaluation-  - Ms. Harris tolerated orthopedic intervention well.  She displays no signs of  coronary ischemia or decompensated CHF.   She is optimized from a cardiac standpoint to proceed with AV fistula intervention.   Continue current cardiac management.       #Type B aortic dissection  - Chronic (Diagnosed >10 years ago)  - Continue current management.        #ESRD  - HD as per renal.        Over 25 minutes spent on total encounter; more than 50% of the visit was spent counseling and/or coordinating care by the attending physician.      Gabo Burris MD [facility]  Cardiovascular Disease  [phone]

## 2023-08-03 NOTE — PACU DISCHARGE NOTE - HYDRATION STATUS:
24 hour chart check completed Electronically signed by Chelsea García RN on 12/22/2017 at 5:52 AM
Satisfactory
Satisfactory

## 2023-08-03 NOTE — PRE PROCEDURE NOTE - PROCEDURE SERVICE
Vascular and Interventional Radiology Vascular surgery/Vascular and Interventional Radiology Statement Selected

## 2023-08-03 NOTE — PRE-OP CHECKLIST - 3.
Dialysis - left arm do not use Dialysis access -left arm wrapped in ace wrap -- Left arm precautions - NO BP or BLOOD LEFT ARM - last dialysis 8/2/23

## 2023-08-03 NOTE — PRE-OP CHECKLIST - SELECT TESTS ORDERED
BMP/CBC/PT/PTT/INR/Type and Screen BMP/CBC/PT/PTT/INR/Type and Screen/EKG FS - 155/BMP/CBC/PT/PTT/INR/Type and Screen/EKG/POCT Blood Glucose FS - 155/CBC/CMP/PT/PTT/INR/Type and Cross/Type and Screen/EKG/POCT Blood Glucose

## 2023-08-03 NOTE — PROGRESS NOTE ADULT - ASSESSMENT
Pt is a 79 y/o female s/p left hip SUZANNA-> left hip hemiarthroplasty DOS 7/19    PLAN  - Pain control/ Analgesia  - DVT ppx SQH w SCDs  - PT/OT   - WBAT and OOB to chair   - Incentive Spirometer  - S/p DEREK Ramírez 7/28  - FU vascular surgery plan for possible fistula revision today

## 2023-08-03 NOTE — PROGRESS NOTE ADULT - SUBJECTIVE AND OBJECTIVE BOX
Orthopedic Progress Note     S:  No acute events overnight, pain is well controlled.  Patient denies any chest pain, SOB, N/V, fevers/chills.    T(C): 36.9 (08-03-23 @ 02:00), Max: 37.2 (08-02-23 @ 22:00)  HR: 71 (08-03-23 @ 02:00) (61 - 78)  BP: 146/60 (08-03-23 @ 02:00) (128/56 - 146/60)  RR: 18 (08-03-23 @ 02:00) (18 - 18)  SpO2: 100% (08-03-23 @ 02:00) (100% - 100%)  Wt(kg): --I&O's Summary    02 Aug 2023 07:01  -  03 Aug 2023 07:00  --------------------------------------------------------  IN: 600 mL / OUT: 3100 mL / NET: -2500 mL        O:  Physical exam:  Gen: Alert and Oriented x3, No Acute Distress  EXTR:   LLE:  - dressing c/d/i   - Motor: IP/quad/TA/gastroc/sol/EHL/FHL intact (hip flexion limited by pain)   - SILT  - DP 2 +            Labs:                        7.8    6.06  )-----------( 139      ( 03 Aug 2023 02:25 )             24.6    08-03    136  |  101  |  23  ----------------------------<  151<H>  3.9   |  27  |  3.45<H>    Ca    8.2<L>      03 Aug 2023 02:25  Phos  3.2     08-03  Mg     2.00     08-03

## 2023-08-04 LAB
ANISOCYTOSIS BLD QL: SLIGHT — SIGNIFICANT CHANGE UP
ELLIPTOCYTES BLD QL SMEAR: SLIGHT — SIGNIFICANT CHANGE UP
GLUCOSE BLDC GLUCOMTR-MCNC: 141 MG/DL — HIGH (ref 70–99)
GLUCOSE BLDC GLUCOMTR-MCNC: 186 MG/DL — HIGH (ref 70–99)
GLUCOSE BLDC GLUCOMTR-MCNC: 199 MG/DL — HIGH (ref 70–99)
GLUCOSE BLDC GLUCOMTR-MCNC: 209 MG/DL — HIGH (ref 70–99)
HCT VFR BLD CALC: 24.9 % — LOW (ref 34.5–45)
HGB BLD-MCNC: 8.1 G/DL — LOW (ref 11.5–15.5)
HYPOCHROMIA BLD QL: SLIGHT — SIGNIFICANT CHANGE UP
MACROCYTES BLD QL: SLIGHT — SIGNIFICANT CHANGE UP
MANUAL SMEAR VERIFICATION: SIGNIFICANT CHANGE UP
MCHC RBC-ENTMCNC: 29.9 PG — SIGNIFICANT CHANGE UP (ref 27–34)
MCHC RBC-ENTMCNC: 32.5 GM/DL — SIGNIFICANT CHANGE UP (ref 32–36)
MCV RBC AUTO: 91.9 FL — SIGNIFICANT CHANGE UP (ref 80–100)
MICROCYTES BLD QL: SLIGHT — SIGNIFICANT CHANGE UP
MYELOCYTES NFR BLD: 0.9 % — HIGH (ref 0–0)
NEUTS BAND # BLD: 3.5 % — SIGNIFICANT CHANGE UP (ref 0–6)
NRBC # BLD: 0 /100 WBCS — SIGNIFICANT CHANGE UP (ref 0–0)
NRBC # BLD: 1 /100 — HIGH (ref 0–0)
NRBC # FLD: 0 K/UL — SIGNIFICANT CHANGE UP (ref 0–0)
PLAT MORPH BLD: NORMAL — SIGNIFICANT CHANGE UP
PLATELET # BLD AUTO: 100 K/UL — LOW (ref 150–400)
PLATELET COUNT - ESTIMATE: ABNORMAL
POIKILOCYTOSIS BLD QL AUTO: SLIGHT — SIGNIFICANT CHANGE UP
POLYCHROMASIA BLD QL SMEAR: SLIGHT — SIGNIFICANT CHANGE UP
RBC # BLD: 2.71 M/UL — LOW (ref 3.8–5.2)
RBC # FLD: 23.3 % — HIGH (ref 10.3–14.5)
RBC BLD AUTO: NORMAL — SIGNIFICANT CHANGE UP
SPHEROCYTES BLD QL SMEAR: SLIGHT — SIGNIFICANT CHANGE UP
VARIANT LYMPHS # BLD: 1.7 % — SIGNIFICANT CHANGE UP (ref 0–6)
WBC # BLD: 6.15 K/UL — SIGNIFICANT CHANGE UP (ref 3.8–10.5)
WBC # FLD AUTO: 6.15 K/UL — SIGNIFICANT CHANGE UP (ref 3.8–10.5)

## 2023-08-04 PROCEDURE — 99232 SBSQ HOSP IP/OBS MODERATE 35: CPT

## 2023-08-04 PROCEDURE — 90935 HEMODIALYSIS ONE EVALUATION: CPT

## 2023-08-04 RX ORDER — POLYETHYLENE GLYCOL 3350 17 G/17G
17 POWDER, FOR SOLUTION ORAL ONCE
Refills: 0 | Status: COMPLETED | OUTPATIENT
Start: 2023-08-05 | End: 2023-08-05

## 2023-08-04 RX ORDER — ERYTHROPOIETIN 10000 [IU]/ML
4000 INJECTION, SOLUTION INTRAVENOUS; SUBCUTANEOUS
Refills: 0 | Status: DISCONTINUED | OUTPATIENT
Start: 2023-08-04 | End: 2023-08-16

## 2023-08-04 RX ADMIN — ERYTHROPOIETIN 4000 UNIT(S): 10000 INJECTION, SOLUTION INTRAVENOUS; SUBCUTANEOUS at 10:17

## 2023-08-04 RX ADMIN — Medication 650 MILLIGRAM(S): at 02:22

## 2023-08-04 RX ADMIN — POLYETHYLENE GLYCOL 3350 17 GRAM(S): 17 POWDER, FOR SOLUTION ORAL at 12:30

## 2023-08-04 RX ADMIN — Medication 12.5 MILLIGRAM(S): at 05:50

## 2023-08-04 RX ADMIN — Medication 650 MILLIGRAM(S): at 07:39

## 2023-08-04 RX ADMIN — Medication 650 MILLIGRAM(S): at 01:33

## 2023-08-04 RX ADMIN — Medication 1 MILLIGRAM(S): at 12:30

## 2023-08-04 RX ADMIN — Medication 650 MILLIGRAM(S): at 13:33

## 2023-08-04 RX ADMIN — OXYCODONE HYDROCHLORIDE 5 MILLIGRAM(S): 5 TABLET ORAL at 06:50

## 2023-08-04 RX ADMIN — Medication 500 MILLIGRAM(S): at 05:50

## 2023-08-04 RX ADMIN — Medication 650 MILLIGRAM(S): at 18:07

## 2023-08-04 RX ADMIN — OXYCODONE HYDROCHLORIDE 5 MILLIGRAM(S): 5 TABLET ORAL at 05:52

## 2023-08-04 RX ADMIN — Medication 1 TABLET(S): at 12:30

## 2023-08-04 RX ADMIN — Medication 500 MILLIGRAM(S): at 17:39

## 2023-08-04 RX ADMIN — PANTOPRAZOLE SODIUM 40 MILLIGRAM(S): 20 TABLET, DELAYED RELEASE ORAL at 05:50

## 2023-08-04 RX ADMIN — Medication 650 MILLIGRAM(S): at 12:30

## 2023-08-04 RX ADMIN — Medication 1 TABLET(S): at 22:56

## 2023-08-04 RX ADMIN — Medication 1: at 07:38

## 2023-08-04 RX ADMIN — SENNA PLUS 2 TABLET(S): 8.6 TABLET ORAL at 22:56

## 2023-08-04 RX ADMIN — CHLORHEXIDINE GLUCONATE 1 APPLICATION(S): 213 SOLUTION TOPICAL at 12:30

## 2023-08-04 RX ADMIN — Medication 1 TABLET(S): at 05:47

## 2023-08-04 RX ADMIN — Medication 2: at 11:37

## 2023-08-04 NOTE — PROGRESS NOTE ADULT - PROBLEM SELECTOR PLAN 1
Pt with ESRD on HD TIW MWF admitted with fall. Pt s/p Lt hemiarthroplasty on 7/19/2023, however surgery complicated by intraoperative hypotension requiring vasopressor support. Patient transferred to SICU for hemodynamic stabilization. CT Angio abd/pelv reviewed. HD treatment held on 7/19/23 due to above. Held on 7/20 due to blood loss anemia. Transferred back to medical floors. 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 S/p nontunneled catheter placement 7/28. Pt. s/p LUE BC AVF fistulogram 8/1 w/ Balloon angioplasty. s/p aneurysmal Fistula, resected with end to end anastomosis 8/4, now can access AVF at proximal marked site. Maintenance HD today. Need to access AVF before discharge. Monitor Labs

## 2023-08-04 NOTE — PROGRESS NOTE ADULT - ASSESSMENT
77F HTN, DM2, Type B Aortic Dissect, ESRD - HD MWF, Endometrial CA s/p CLINT, Lt femur fx s/p IMN 5/2023 p/w hardware defect s/p Lt hip nail removal, Lt THR on 7/19 c/b hemorrhagic shock from post-op hematoma c/b AVF malfx s/p Shiley placement 7/28, balloon angioplasty of AVF 8/1, AVF revision on 8/3.

## 2023-08-04 NOTE — PROGRESS NOTE ADULT - SUBJECTIVE AND OBJECTIVE BOX
[facility] Division of Kidney Diseases & Hypertension  FOLLOW UP NOTE  [phone]--------------------------------------------------------------------------------  Chief Complaint:Unspecified complication of procedure, initial encounter    HPI: 78F w/ PMH of ESRD on HD TIW (MWF) at [facility] (previously at [facility]), HTN, DM2, endometrial cancer (underwent hysterectomy and chemotherapy, type B aortic dissection, and L hip ORIF, admitted for revision of L hip hardware. Nephrology consulted for management of ESRD/HD. 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  S/p nontunneled catheter placement 7/28, plan or fistulogram by vascular on 8/1.     24 hour events/subjective: Pt. seen and evaluated while on dialysis. Overall feels well, no acute complaints. She denies fevers/chills, headaches, chest pain, SOB, abd pain, or leg swelling.     Pt. s/p LUE BC AVF fistulogram 8/1 w/ Balloon angioplasty. s/p aneurysmal Fistula, resected with end to end anastomosis 8/4, now can be access AVF at proximal marked site    PAST HISTORY  --------------------------------------------------------------------------------  No significant changes to PMH, PSH, FHx, SHx, unless otherwise noted    ALLERGIES & MEDICATIONS  --------------------------------------------------------------------------------  Allergies  No Known Allergies  Intolerances    Standing Inpatient Medications  acetaminophen     Tablet .. 650 milliGRAM(s) Oral every 6 hours  ascorbic acid 500 milliGRAM(s) Oral two times a day  calcium carbonate 1250 mG  + Vitamin D (OsCal 500 + D) 1 Tablet(s) Oral three times a day  chlorhexidine 2% Cloths 1 Application(s) Topical daily  folic acid 1 milliGRAM(s) Oral daily  HYDROmorphone  Injectable 0.5 milliGRAM(s) IV Push once  insulin lispro (ADMELOG) corrective regimen sliding scale   SubCutaneous Before meals and at bedtime  metoprolol succinate ER 12.5 milliGRAM(s) Oral daily  multivitamin 1 Tablet(s) Oral daily  pantoprazole    Tablet 40 milliGRAM(s) Oral before breakfast  polyethylene glycol 3350 17 Gram(s) Oral daily  senna 2 Tablet(s) Oral at bedtime    PRN Inpatient Medications  aluminum hydroxide/magnesium hydroxide/simethicone Suspension 30 milliLiter(s) Oral four times a day PRN  melatonin 3 milliGRAM(s) Oral at bedtime PRN  ondansetron Injectable 4 milliGRAM(s) IV Push every 6 hours PRN  oxyCODONE    IR 5 milliGRAM(s) Oral every 6 hours PRN    REVIEW OF SYSTEMS  --------------------------------------------------------------------------------  see HPI  All other systems were reviewed and are negative, except as noted.    VITALS/PHYSICAL EXAM  --------------------------------------------------------------------------------  T(C): 36.1 (08-04-23 @ 06:45), Max: 37.1 (08-03-23 @ 09:50)  HR: 74 (08-04-23 @ 06:45) (58 - 74)  BP: 156/62 (08-04-23 @ 05:50) (118/55 - 161/70)  RR: 18 (08-04-23 @ 06:45) (14 - 22)  SpO2: 98% (08-04-23 @ 05:50) (95% - 100%)  Wt(kg): --  Height (cm): 154.9 (08-03-23 @ 03:39)  Weight (kg): 56.9 (08-03-23 @ 03:39)  BMI (kg/m2): 23.7 (08-03-23 @ 03:39)  BSA (m2): 1.55 (08-03-23 @ 03:39)      08-03-23 @ 07:01  -  08-04-23 @ 07:00  --------------------------------------------------------  IN: 120 mL / OUT: 0 mL / NET: 120 mL      Physical Exam:  Gen: NAD  HEENT: MMM,   Pulm: minimal bibasilar crackles   CV: S1S2  Abd: Soft, +BS   Ext: LE edema B/L L>R, chronic LUE edema  Neuro: Awake  Skin: Warm and dry, ecchymotic RUE  Vascular access:+SOFI Lewis covered in clean and dry dressing, with audible bruit    LABS/STUDIES  --------------------------------------------------------------------------------              7.8    6.06  >-----------<  139      [08-03-23 @ 02:25]              24.6     136  |  101  |  23  ----------------------------<  151      [08-03-23 @ 02:25]  3.9   |  27  |  3.45        Ca     8.2     [08-03-23 @ 02:25]      Mg     2.00     [08-03-23 @ 02:25]      Phos  3.2     [08-03-23 @ 02:25]      PT/INR: PT 10.9 , INR 0.96       [08-03-23 @ 02:25]  PTT: 30.7       [08-03-23 @ 02:25]      Creatinine Trend:  SCr 3.45 [08-03 @ 02:25]  SCr 3.25 [08-01 @ 03:23]  SCr 5.14 [07-31 @ 05:35]  SCr 4.24 [07-30 @ 05:25]  SCr 3.00 [07-29 @ 05:46] [facility] Division of Kidney Diseases & Hypertension  FOLLOW UP NOTE  [phone]--------------------------------------------------------------------------------    Chief Complaint: Unspecified complication of procedure, initial encounter    HPI: 78F w/ PMH of ESRD on HD TIW (MWF) at [facility] (previously at [facility]), HTN, DM2, endometrial cancer (underwent hysterectomy and chemotherapy, type B aortic dissection, and L hip ORIF, admitted for revision of L hip hardware. Nephrology consulted for management of ESRD/HD. 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  S/p non-tunneled catheter placement 7/28, plan or fistulogram by vascular on 8/1.     24 hour events/subjective: Pt. seen and evaluated while on dialysis. Overall feels well, no acute complaints. She denies fevers/chills, headaches, chest pain, SOB, abd pain, or leg swelling.     Pt. s/p LUE BC AVF fistulogram 8/1 w/ Balloon angioplasty. s/p aneurysmal Fistula, resected with end to end anastomosis 8/4, now can be access AVF at proximal marked site    PAST HISTORY  --------------------------------------------------------------------------------  No significant changes to PMH, PSH, FHx, SHx, unless otherwise noted    ALLERGIES & MEDICATIONS  --------------------------------------------------------------------------------  Allergies  No Known Allergies  Intolerances    Standing Inpatient Medications  acetaminophen     Tablet .. 650 milliGRAM(s) Oral every 6 hours  ascorbic acid 500 milliGRAM(s) Oral two times a day  calcium carbonate 1250 mG  + Vitamin D (OsCal 500 + D) 1 Tablet(s) Oral three times a day  chlorhexidine 2% Cloths 1 Application(s) Topical daily  folic acid 1 milliGRAM(s) Oral daily  HYDROmorphone  Injectable 0.5 milliGRAM(s) IV Push once  insulin lispro (ADMELOG) corrective regimen sliding scale   SubCutaneous Before meals and at bedtime  metoprolol succinate ER 12.5 milliGRAM(s) Oral daily  multivitamin 1 Tablet(s) Oral daily  pantoprazole    Tablet 40 milliGRAM(s) Oral before breakfast  polyethylene glycol 3350 17 Gram(s) Oral daily  senna 2 Tablet(s) Oral at bedtime    PRN Inpatient Medications  aluminum hydroxide/magnesium hydroxide/simethicone Suspension 30 milliLiter(s) Oral four times a day PRN  melatonin 3 milliGRAM(s) Oral at bedtime PRN  ondansetron Injectable 4 milliGRAM(s) IV Push every 6 hours PRN  oxyCODONE    IR 5 milliGRAM(s) Oral every 6 hours PRN    REVIEW OF SYSTEMS  --------------------------------------------------------------------------------  see HPI  All other systems were reviewed and are negative, except as noted.    VITALS/PHYSICAL EXAM  --------------------------------------------------------------------------------  T(C): 36.1 (08-04-23 @ 06:45), Max: 37.1 (08-03-23 @ 09:50)  HR: 74 (08-04-23 @ 06:45) (58 - 74)  BP: 156/62 (08-04-23 @ 05:50) (118/55 - 161/70)  RR: 18 (08-04-23 @ 06:45) (14 - 22)  SpO2: 98% (08-04-23 @ 05:50) (95% - 100%)  Wt(kg): --  Height (cm): 154.9 (08-03-23 @ 03:39)  Weight (kg): 56.9 (08-03-23 @ 03:39)  BMI (kg/m2): 23.7 (08-03-23 @ 03:39)  BSA (m2): 1.55 (08-03-23 @ 03:39)      08-03-23 @ 07:01  -  08-04-23 @ 07:00  --------------------------------------------------------  IN: 120 mL / OUT: 0 mL / NET: 120 mL      Physical Exam:  Gen: NAD  HEENT: MMM,   Pulm: minimal bibasilar crackles   CV: S1S2  Abd: Soft, +BS   Ext: LE edema B/L L>R, chronic LUE edema  Neuro: Awake  Skin: Warm and dry, ecchymotic RUE  Vascular access:+SOFI Lewis covered in clean and dry dressing, with audible bruit    LABS/STUDIES  --------------------------------------------------------------------------------              7.8    6.06  >-----------<  139      [08-03-23 @ 02:25]              24.6     136  |  101  |  23  ----------------------------<  151      [08-03-23 @ 02:25]  3.9   |  27  |  3.45        Ca     8.2     [08-03-23 @ 02:25]      Mg     2.00     [08-03-23 @ 02:25]      Phos  3.2     [08-03-23 @ 02:25]      PT/INR: PT 10.9 , INR 0.96       [08-03-23 @ 02:25]  PTT: 30.7       [08-03-23 @ 02:25]      Creatinine Trend:  SCr 3.45 [08-03 @ 02:25]  SCr 3.25 [08-01 @ 03:23]  SCr 5.14 [07-31 @ 05:35]  SCr 4.24 [07-30 @ 05:25]  SCr 3.00 [07-29 @ 05:46]

## 2023-08-04 NOTE — PROGRESS NOTE ADULT - PROBLEM SELECTOR PLAN 3
Rt IJ jessica placed  - S/P VSx balloon angioplasty of AVF 8/1  - s/p AVF revision 8/3  - remove jessica prior to discharge

## 2023-08-04 NOTE — PROGRESS NOTE ADULT - ASSESSMENT
Pt is a 77 y/o female s/p left hip SUZANNA-> left hip hemiarthroplasty DOS 7/19    PLAN  - Pain control/ Analgesia  - DVT ppx SQH w SCDs  - PT/OT   - WBAT and OOB to chair   - Incentive Spirometer  - S/p DEREK Ramírez 7/28  - S/p fistula revision 8/3  - Dialysis today  - Dispo planning: [facility] with dialysis capabilities, to submit for placement today Pt is a 77 y/o female s/p left hip SUZANNA-> left hip hemiarthroplasty DOS 7/19    PLAN  - Pain control/ Analgesia  - Betadine applied to incision, dressing reapplied with gauze and tegaderm  - DVT ppx SQH w SCDs  - PT/OT   - WBAT and OOB to chair   - Incentive Spirometer  - S/p DEREK Ramírez 7/28  - S/p fistula revision 8/3  - Dialysis today  - Dispo planning: [facility] with dialysis capabilities, to submit for placement today

## 2023-08-04 NOTE — PROGRESS NOTE ADULT - SUBJECTIVE AND OBJECTIVE BOX
[facility] Division of Hospital Medicine  Donnell Mullen MD  Pager (M-F, 8A-5P): [phone]  Other Times:  [phone]    Patient is a 78y old  Female who presents with a chief complaint of s/p L hip orif concern for loosened hardwar (04 Aug 2023 09:22)    SUBJECTIVE / OVERNIGHT EVENTS:  Tolerated new HD session on a new AVF.  No new complaints.   No F/C, N/V, CP, SOB, Cough, lightheadedness, dizziness, abdominal pain, diarrhea, dysuria.    MEDICATIONS  (STANDING):  acetaminophen     Tablet .. 650 milliGRAM(s) Oral every 6 hours  ascorbic acid 500 milliGRAM(s) Oral two times a day  calcium carbonate 1250 mG  + Vitamin D (OsCal 500 + D) 1 Tablet(s) Oral three times a day  chlorhexidine 2% Cloths 1 Application(s) Topical daily  epoetin marla (EPOGEN) Injectable 4000 Unit(s) IV Push <User Schedule>  folic acid 1 milliGRAM(s) Oral daily  HYDROmorphone  Injectable 0.5 milliGRAM(s) IV Push once  insulin lispro (ADMELOG) corrective regimen sliding scale   SubCutaneous Before meals and at bedtime  metoprolol succinate ER 12.5 milliGRAM(s) Oral daily  multivitamin 1 Tablet(s) Oral daily  pantoprazole    Tablet 40 milliGRAM(s) Oral before breakfast  polyethylene glycol 3350 17 Gram(s) Oral daily  senna 2 Tablet(s) Oral at bedtime    MEDICATIONS  (PRN):  aluminum hydroxide/magnesium hydroxide/simethicone Suspension 30 milliLiter(s) Oral four times a day PRN Indigestion  melatonin 3 milliGRAM(s) Oral at bedtime PRN Insomnia  ondansetron Injectable 4 milliGRAM(s) IV Push every 6 hours PRN Nausea and/or Vomiting  oxyCODONE    IR 5 milliGRAM(s) Oral every 6 hours PRN Severe Pain (7 - 10)      Vital Signs Last 24 Hrs  T(C): 36.7 (04 Aug 2023 10:20), Max: 36.8 (03 Aug 2023 16:55)  T(F): 98.1 (04 Aug 2023 10:20), Max: 98.2 (03 Aug 2023 16:55)  HR: 71 (04 Aug 2023 10:20) (58 - 74)  BP: 159/66 (04 Aug 2023 10:20) (118/55 - 174/69)  BP(mean): 88 (03 Aug 2023 16:15) (70 - 99)  RR: 17 (04 Aug 2023 10:20) (15 - 22)  SpO2: 97% (04 Aug 2023 10:20) (95% - 100%)    Parameters below as of 04 Aug 2023 10:20  Patient On (Oxygen Delivery Method): room air      CAPILLARY BLOOD GLUCOSE      POCT Blood Glucose.: 209 mg/dL (04 Aug 2023 11:15)  POCT Blood Glucose.: 186 mg/dL (04 Aug 2023 07:15)  POCT Blood Glucose.: 293 mg/dL (03 Aug 2023 21:36)  POCT Blood Glucose.: 165 mg/dL (03 Aug 2023 16:39)  POCT Blood Glucose.: 159 mg/dL (03 Aug 2023 14:13)    I&O's Summary    03 Aug 2023 07:01  -  04 Aug 2023 07:00  --------------------------------------------------------  IN: 120 mL / OUT: 0 mL / NET: 120 mL    04 Aug 2023 07:01  -  04 Aug 2023 13:08  --------------------------------------------------------  IN: 500 mL / OUT: 3000 mL / NET: -2500 mL        PHYSICAL EXAM:  CONSTITUTIONAL: NAD  EYES: PERRLA; conjunctiva and sclera clear  ENMT: Moist oral mucosa, no pharyngeal injection or exudates; normal dentition  NECK: Supple, no palpable masses; no thyromegaly  RESPIRATORY: Normal respiratory effort; lungs are clear to auscultation bilaterally  CARDIOVASCULAR: Regular rate and rhythm, normal S1 and S2, no murmur/rub/gallop; No lower extremity edema; Peripheral pulses are 2+ bilaterally; LUE 3+ edema, AVF present, R IJ shiley in place, LUE AVF bruit present.  ABDOMEN: Nontender to palpation, normoactive bowel sounds, no rebound/guarding; No hepatosplenomegaly  MUSCULOSKELETAL:  did not assess gait; no clubbing or cyanosis of digits; no joint swelling or tenderness to palpation  PSYCH: A+O to person, place, and time; affect appropriate  NEUROLOGY: CN 2-12 are intact and symmetric; no gross sensory deficits   SKIN: No rashes; no palpable lesions, surgical dressing C/D/I    LABS:                        7.8    6.06  )-----------( 139      ( 03 Aug 2023 02:25 )             24.6     08-03    136  |  101  |  23  ----------------------------<  151<H>  3.9   |  27  |  3.45<H>    Ca    8.2<L>      03 Aug 2023 02:25  Phos  3.2     08-03  Mg     2.00     08-03      PT/INR - ( 03 Aug 2023 02:25 )   PT: 10.9 sec;   INR: 0.96 ratio         PTT - ( 03 Aug 2023 02:25 )  PTT:30.7 sec      Urinalysis Basic - ( 03 Aug 2023 02:25 )    Color: x / Appearance: x / SG: x / pH: x  Gluc: 151 mg/dL / Ketone: x  / Bili: x / Urobili: x   Blood: x / Protein: x / Nitrite: x   Leuk Esterase: x / RBC: x / WBC x   Sq Epi: x / Non Sq Epi: x / Bacteria: x        RADIOLOGY & ADDITIONAL TESTS:    Imaging Personally Reviewed:    Care Discussed with Consultants/Other Providers:

## 2023-08-04 NOTE — PROGRESS NOTE ADULT - SUBJECTIVE AND OBJECTIVE BOX
Cardiovascular Disease Progress Note  Date of Service: 08-04-23 @ 08:35    Overnight events: No acute events overnight.    Patient is undergoing HD in no distress.   Otherwise review of systems negative    Objective Findings:  T(C): 36.1 (08-04-23 @ 06:45), Max: 37.1 (08-03-23 @ 08:55)  HR: 74 (08-04-23 @ 06:45) (58 - 77)  BP: 156/62 (08-04-23 @ 05:50) (118/55 - 161/70)  RR: 18 (08-04-23 @ 06:45) (14 - 22)  SpO2: 98% (08-04-23 @ 05:50) (95% - 100%)  Wt(kg): --  Daily     Daily       Physical Exam:  Gen: NAD; Patient resting comfortably  HEENT: EOMI, Normocephalic/ atraumatic  CV: RRR, normal S1 + S2, no m/r/g  Lungs:  Normal respiratory effort; clear to auscultation bilaterally  Abd: soft, non-tender; bowel sounds present  Ext: No edema; warm and well perfused    Telemetry: n/a    Laboratory Data:                        7.8    6.06  )-----------( 139      ( 03 Aug 2023 02:25 )             24.6     08-03    136  |  101  |  23  ----------------------------<  151<H>  3.9   |  27  |  3.45<H>    Ca    8.2<L>      03 Aug 2023 02:25  Phos  3.2     08-03  Mg     2.00     08-03      PT/INR - ( 03 Aug 2023 02:25 )   PT: 10.9 sec;   INR: 0.96 ratio         PTT - ( 03 Aug 2023 02:25 )  PTT:30.7 sec          Inpatient Medications:  MEDICATIONS  (STANDING):  acetaminophen     Tablet .. 650 milliGRAM(s) Oral every 6 hours  ascorbic acid 500 milliGRAM(s) Oral two times a day  calcium carbonate 1250 mG  + Vitamin D (OsCal 500 + D) 1 Tablet(s) Oral three times a day  chlorhexidine 2% Cloths 1 Application(s) Topical daily  folic acid 1 milliGRAM(s) Oral daily  HYDROmorphone  Injectable 0.5 milliGRAM(s) IV Push once  insulin lispro (ADMELOG) corrective regimen sliding scale   SubCutaneous Before meals and at bedtime  metoprolol succinate ER 12.5 milliGRAM(s) Oral daily  multivitamin 1 Tablet(s) Oral daily  pantoprazole    Tablet 40 milliGRAM(s) Oral before breakfast  polyethylene glycol 3350 17 Gram(s) Oral daily  senna 2 Tablet(s) Oral at bedtime      Assessment:  77 year old woman with ESRD on HD (MWF), HTN, DM (diet controlled), chronic type B aortic dissection, heart murmur, endometrial ca in remission s/p total hysterectomy in 2007 presents with L hip pain     Plan of Care:      #Post-operative evaluation-  - Ms. Harris tolerated orthopedic and AV fistula interventions well.  She displays no signs of  coronary ischemia or decompensated CHF.   Continue current cardiac management.       #Type B aortic dissection  - Chronic (Diagnosed >10 years ago)  - Continue current management.        #ESRD  - HD currently underway at bedside, as per renal.    #ACP (advance care planning)-  Advanced care planning was discussed with the patient.    Cardiac findings were discussed in detail and all questions were answered.       Over 25 minutes spent on total encounter; more than 50% of the visit was spent counseling and/or coordinating care by the attending physician.      Gabo Burris MD [facility]  Cardiovascular Disease  [phone]

## 2023-08-04 NOTE — BRIEF OPERATIVE NOTE - OPERATION/FINDINGS
Aneurysmal Fistula, resected with end to end anastomosis.     Can access AVF at proximal marked site
left hip removal of gamma nail  placement of restoration modular peter arthroplasty
SOFI BUCIO AVF fistulogram, access obtained at the AVF.   - dilated and tortuous cephalic vein up to its insertion to the axillary vein   - Previous subclavian vein stent in stent stenosis noted   - Balloon angioplasty of subclavian stent with 8, 9, and 12mm balloon  Purse string stitch at access site.  Arm wrapped with ACE bandage and elevated

## 2023-08-04 NOTE — PROGRESS NOTE ADULT - SUBJECTIVE AND OBJECTIVE BOX
Orthopedic Progress Note     S:  No acute events overnight, pain is well controlled.  Patient denies any chest pain, SOB, N/V, fevers/chills. Underwent fistula revision with Vascular Surgery yesterday, procedure went well without complication.     T(C): 36.7 (08-04-23 @ 05:50), Max: 37.1 (08-03-23 @ 08:55)  HR: 72 (08-04-23 @ 05:50) (58 - 77)  BP: 156/62 (08-04-23 @ 05:50) (118/55 - 161/70)  RR: 16 (08-04-23 @ 05:50) (14 - 22)  SpO2: 98% (08-04-23 @ 05:50) (95% - 100%)  Wt(kg): --I&O's Summary    02 Aug 2023 07:01  -  03 Aug 2023 07:00  --------------------------------------------------------  IN: 600 mL / OUT: 3100 mL / NET: -2500 mL    03 Aug 2023 07:01  -  04 Aug 2023 06:55  --------------------------------------------------------  IN: 120 mL / OUT: 0 mL / NET: 120 mL        O:  Physical exam:  Gen: Alert and Oriented x3, No Acute Distress  Left lower ext:            Dressing: Some bloody saturation at the proximal 1/3 of the dressing with the tegaderm peeling, so dressing was removed. Incision healing well, one area in the proximal 1/3 with a spot of dark blood, no expressible drainage from wound.            Motor: EHL FHL GA TA SOL in tact            Sensation: SILT            Pulses: 2+ DP           Labs:                        7.8    6.06  )-----------( 139      ( 03 Aug 2023 02:25 )             24.6    08-03    136  |  101  |  23  ----------------------------<  151<H>  3.9   |  27  |  3.45<H>    Ca    8.2<L>      03 Aug 2023 02:25  Phos  3.2     08-03  Mg     2.00     08-03

## 2023-08-04 NOTE — PROGRESS NOTE ADULT - PROBLEM SELECTOR PLAN 2
Pt. with anemia in the setting of ESRD and blood loss from surgical procedure. Hgb 7.8 (8/3). Will give RICA with HD treatments. Monitor hemoglobin levels.     If you have any questions, please feel free to contact me  Kyle Ngo  Nephrology Fellow  [employer]/Page [phone]  (After 5pm or on weekends please page the on-call fellow)

## 2023-08-05 LAB
GLUCOSE BLDC GLUCOMTR-MCNC: 183 MG/DL — HIGH (ref 70–99)
GLUCOSE BLDC GLUCOMTR-MCNC: 186 MG/DL — HIGH (ref 70–99)
GLUCOSE BLDC GLUCOMTR-MCNC: 199 MG/DL — HIGH (ref 70–99)
GLUCOSE BLDC GLUCOMTR-MCNC: 225 MG/DL — HIGH (ref 70–99)

## 2023-08-05 PROCEDURE — 99232 SBSQ HOSP IP/OBS MODERATE 35: CPT

## 2023-08-05 RX ADMIN — Medication 1 TABLET(S): at 21:49

## 2023-08-05 RX ADMIN — Medication 650 MILLIGRAM(S): at 12:35

## 2023-08-05 RX ADMIN — Medication 12.5 MILLIGRAM(S): at 05:39

## 2023-08-05 RX ADMIN — Medication 500 MILLIGRAM(S): at 05:39

## 2023-08-05 RX ADMIN — Medication 1: at 16:36

## 2023-08-05 RX ADMIN — Medication 650 MILLIGRAM(S): at 06:05

## 2023-08-05 RX ADMIN — CHLORHEXIDINE GLUCONATE 1 APPLICATION(S): 213 SOLUTION TOPICAL at 11:11

## 2023-08-05 RX ADMIN — Medication 1: at 21:50

## 2023-08-05 RX ADMIN — Medication 650 MILLIGRAM(S): at 17:49

## 2023-08-05 RX ADMIN — Medication 650 MILLIGRAM(S): at 05:38

## 2023-08-05 RX ADMIN — Medication 1 TABLET(S): at 11:35

## 2023-08-05 RX ADMIN — Medication 1 MILLIGRAM(S): at 11:35

## 2023-08-05 RX ADMIN — Medication 1 TABLET(S): at 05:38

## 2023-08-05 RX ADMIN — Medication 500 MILLIGRAM(S): at 17:49

## 2023-08-05 RX ADMIN — Medication 2: at 11:34

## 2023-08-05 RX ADMIN — Medication 1: at 07:41

## 2023-08-05 RX ADMIN — Medication 1 TABLET(S): at 13:44

## 2023-08-05 RX ADMIN — PANTOPRAZOLE SODIUM 40 MILLIGRAM(S): 20 TABLET, DELAYED RELEASE ORAL at 05:39

## 2023-08-05 RX ADMIN — Medication 650 MILLIGRAM(S): at 11:35

## 2023-08-05 NOTE — PROGRESS NOTE ADULT - SUBJECTIVE AND OBJECTIVE BOX
Cardiovascular Disease Progress Note  Date of service: 08-05-23 @ 13:03    Overnight events: No acute events overnight.  Pt is in no distress. Denies chest pain but admits to SOB. Family is at bedside.   Otherwise review of systems negative    Objective Findings:  T(C): 36.7 (08-05-23 @ 09:21), Max: 37.2 (08-04-23 @ 14:07)  HR: 69 (08-05-23 @ 09:21) (69 - 77)  BP: 152/63 (08-05-23 @ 09:21) (131/54 - 153/60)  RR: 16 (08-05-23 @ 09:21) (16 - 17)  SpO2: 100% (08-05-23 @ 09:21) (96% - 100%)  Wt(kg): --  Daily     Daily       Physical Exam:  Gen: NAD; Patient resting comfortably  HEENT: EOMI, Normocephalic/ atraumatic  CV: RRR, normal S1 + S2, no m/r/g  Lungs:  Decreased breath sounds in R lung fields.   Abd: soft, non-tender; bowel sounds present  Ext: LUE edema with blister on R thumb    Telemetry: N/a    Laboratory Data:                        8.1    6.15  )-----------( 100      ( 04 Aug 2023 13:23 )             24.9                     Inpatient Medications:  MEDICATIONS  (STANDING):  acetaminophen     Tablet .. 650 milliGRAM(s) Oral every 6 hours  ascorbic acid 500 milliGRAM(s) Oral two times a day  calcium carbonate 1250 mG  + Vitamin D (OsCal 500 + D) 1 Tablet(s) Oral three times a day  chlorhexidine 2% Cloths 1 Application(s) Topical daily  epoetin marla (EPOGEN) Injectable 4000 Unit(s) IV Push <User Schedule>  folic acid 1 milliGRAM(s) Oral daily  HYDROmorphone  Injectable 0.5 milliGRAM(s) IV Push once  insulin lispro (ADMELOG) corrective regimen sliding scale   SubCutaneous Before meals and at bedtime  metoprolol succinate ER 12.5 milliGRAM(s) Oral daily  multivitamin 1 Tablet(s) Oral daily  pantoprazole    Tablet 40 milliGRAM(s) Oral before breakfast  polyethylene glycol 3350 17 Gram(s) Oral daily  senna 2 Tablet(s) Oral at bedtime      Assessment:  77 year old woman with ESRD on HD (MWF), HTN, DM (diet controlled), chronic type B aortic dissection, heart murmur, endometrial ca in remission s/p total hysterectomy in 2007 presents with L hip pain     Plan of Care:      #Post-operative evaluation-  - Ms. Harris tolerated orthopedic and AV fistula interventions well.  She displays no signs of  coronary ischemia or decompensated CHF.   - Encourage use of incentive spirometer  Continue current cardiac management.       #Type B aortic dissection  - Chronic (Diagnosed >10 years ago)  - Continue current management.        #ESRD  - HD currently underway at bedside, as per renal.  - Volume optimization with HD          Over 25 minutes spent on total encounter; more than 50% of the visit was spent counseling and/or coordinating care by the attending physician.      Adrian Burris DO [facility]  Cardiovascular Disease  [phone]

## 2023-08-05 NOTE — PROGRESS NOTE ADULT - PROBLEM SELECTOR PLAN 3
Rt ASHER lopez placed  - S/P VSx balloon angioplasty of AVF 8/1  - s/p AVF revision 8/3  - remove Desiree after AVF cleared for use by vascular and successful dialysis session

## 2023-08-05 NOTE — PROGRESS NOTE ADULT - SUBJECTIVE AND OBJECTIVE BOX
Patient is a 78y old  Female who presents with a chief complaint of s/p L hip orif concern for loosened hardwar (05 Aug 2023 13:03)    Mauricio Murphy MD   [facility] Division of Hospital Medicine   Pager [phone]  Reachable on [employer] Teams     SUBJECTIVE / OVERNIGHT EVENTS:  Patient seen and examined this morning. No events overnight. She states that her hand continues to be swollen.    MEDICATIONS  (STANDING):  acetaminophen     Tablet .. 650 milliGRAM(s) Oral every 6 hours  ascorbic acid 500 milliGRAM(s) Oral two times a day  calcium carbonate 1250 mG  + Vitamin D (OsCal 500 + D) 1 Tablet(s) Oral three times a day  chlorhexidine 2% Cloths 1 Application(s) Topical daily  epoetin marla (EPOGEN) Injectable 4000 Unit(s) IV Push <User Schedule>  folic acid 1 milliGRAM(s) Oral daily  HYDROmorphone  Injectable 0.5 milliGRAM(s) IV Push once  insulin lispro (ADMELOG) corrective regimen sliding scale   SubCutaneous Before meals and at bedtime  metoprolol succinate ER 12.5 milliGRAM(s) Oral daily  multivitamin 1 Tablet(s) Oral daily  pantoprazole    Tablet 40 milliGRAM(s) Oral before breakfast  polyethylene glycol 3350 17 Gram(s) Oral daily  senna 2 Tablet(s) Oral at bedtime    MEDICATIONS  (PRN):  aluminum hydroxide/magnesium hydroxide/simethicone Suspension 30 milliLiter(s) Oral four times a day PRN Indigestion  melatonin 3 milliGRAM(s) Oral at bedtime PRN Insomnia  ondansetron Injectable 4 milliGRAM(s) IV Push every 6 hours PRN Nausea and/or Vomiting  oxyCODONE    IR 5 milliGRAM(s) Oral every 6 hours PRN Severe Pain (7 - 10)      Vital Signs Last 24 Hrs  T(C): 36.9 (05 Aug 2023 13:53), Max: 36.9 (05 Aug 2023 13:53)  T(F): 98.5 (05 Aug 2023 13:53), Max: 98.5 (05 Aug 2023 13:53)  HR: 75 (05 Aug 2023 13:53) (69 - 77)  BP: 142/55 (05 Aug 2023 13:53) (131/54 - 153/60)  BP(mean): --  RR: 17 (05 Aug 2023 13:53) (16 - 17)  SpO2: 100% (05 Aug 2023 13:53) (100% - 100%)  CAPILLARY BLOOD GLUCOSE      POCT Blood Glucose.: 225 mg/dL (05 Aug 2023 11:14)  POCT Blood Glucose.: 186 mg/dL (05 Aug 2023 07:03)  POCT Blood Glucose.: 199 mg/dL (04 Aug 2023 21:31)  POCT Blood Glucose.: 141 mg/dL (04 Aug 2023 16:28)    I&O's Summary    04 Aug 2023 07:01  -  05 Aug 2023 07:00  --------------------------------------------------------  IN: 500 mL / OUT: 3000 mL / NET: -2500 mL        General: NAD, awake and alert  Eyes: conjunctiva clear, nonicteric sclera  HENMT: NCAT, MMM  Neck: Supple, trachea midline   Respiratory: No respiratory distress, CTABL, No rales, rhonchi, wheezing.  Cardiovascular: S1,S2; Regular rate and rhythm; No m/g/r. 2+ peripheral pulses  Gastrointestinal: Soft, Nontender, Nondistended; +BS.   Extremities: No c/c/e; warm to touch  Neurological: Moving all 4 extremities; Sensation to LT grossly in tact.  Skin: No rashes, No erythema   Psych: AAOx3; appropriate mood and affect    LABS:                        8.1    6.15  )-----------( 100      ( 04 Aug 2023 13:23 )             24.9                     RADIOLOGY & ADDITIONAL TESTS:    Imaging Personally Reviewed:    Consultant(s) Notes Reviewed:      Care Discussed with Consultants/Other Providers:

## 2023-08-05 NOTE — PROGRESS NOTE ADULT - SUBJECTIVE AND OBJECTIVE BOX
ORTHOPEDIC PROGRESS NOTE    POD # 17 s/p L hip SUZANNA, peter    Overnight events: None    SUBJECTIVE: Pt seen and examined at bedside. Patient is doing well, no acute complaints this AM. Pain is controlled with medication.      OBJECTIVE:  Vital Signs Last 24 Hrs  T(C): 36.6 (05 Aug 2023 01:34), Max: 37.2 (04 Aug 2023 14:07)  T(F): 97.9 (05 Aug 2023 01:34), Max: 98.9 (04 Aug 2023 14:07)  HR: 70 (05 Aug 2023 01:34) (68 - 77)  BP: 153/60 (05 Aug 2023 01:34) (131/54 - 174/69)  BP(mean): --  RR: 16 (05 Aug 2023 01:34) (16 - 18)  SpO2: 100% (05 Aug 2023 01:34) (96% - 100%)    Parameters below as of 05 Aug 2023 01:34  Patient On (Oxygen Delivery Method): room air      08-03-23 @ 07:01  -  08-04-23 @ 07:00  --------------------------------------------------------  IN: 120 mL / OUT: 0 mL / NET: 120 mL    08-04-23 @ 07:01  -  08-05-23 @ 05:07  --------------------------------------------------------  IN: 500 mL / OUT: 3000 mL / NET: -2500 mL        Physical Examination:  GEN: NAD, resting quietly  PULM: symmetric chest rise bilaterally, no increased WOB  ABD: nondistended  EXTR:   Left lower ext:            Dressing: C/D/I            Motor: EHL FHL GA TA SOL in tact            Sensation: SILT            Pulses: 2+ DP      LABS:                        8.1    6.15  )-----------( 100      ( 04 Aug 2023 13:23 )             24.9

## 2023-08-05 NOTE — PROGRESS NOTE ADULT - ASSESSMENT
Pt is a 77 y/o female s/p left hip SUZANNA-> left hip hemiarthroplasty DOS 7/19    PLAN  - Pain control/ Analgesia  - Betadine applied to incision, dressing reapplied with gauze and tegaderm  - DVT ppx SQH w SCDs  - PT/OT   - WBAT and OOB to chair   - Incentive Spirometer  - S/p DEREK Ramírez 7/28  - S/p fistula revision 8/3  - Dialysis 8/4  - Dispo planning: [facility] with dialysis capabilities, to submit for placement today    For all questions related to patient care, please reach out to the on-call team via the pager.     Elle Ulloa, PGY 2  Orthopaedic Surgery  [facility] [phone]  [facility] [phone]  [facility] [phone]

## 2023-08-06 LAB
GLUCOSE BLDC GLUCOMTR-MCNC: 152 MG/DL — HIGH (ref 70–99)
GLUCOSE BLDC GLUCOMTR-MCNC: 191 MG/DL — HIGH (ref 70–99)
GLUCOSE BLDC GLUCOMTR-MCNC: 208 MG/DL — HIGH (ref 70–99)
GLUCOSE BLDC GLUCOMTR-MCNC: 226 MG/DL — HIGH (ref 70–99)

## 2023-08-06 PROCEDURE — 99232 SBSQ HOSP IP/OBS MODERATE 35: CPT

## 2023-08-06 RX ADMIN — Medication 1 TABLET(S): at 05:36

## 2023-08-06 RX ADMIN — Medication 2: at 11:37

## 2023-08-06 RX ADMIN — Medication 3 MILLIGRAM(S): at 21:59

## 2023-08-06 RX ADMIN — Medication 650 MILLIGRAM(S): at 17:13

## 2023-08-06 RX ADMIN — SENNA PLUS 2 TABLET(S): 8.6 TABLET ORAL at 22:00

## 2023-08-06 RX ADMIN — Medication 1 MILLIGRAM(S): at 12:41

## 2023-08-06 RX ADMIN — Medication 1: at 07:40

## 2023-08-06 RX ADMIN — Medication 1 TABLET(S): at 12:41

## 2023-08-06 RX ADMIN — Medication 500 MILLIGRAM(S): at 17:13

## 2023-08-06 RX ADMIN — Medication 1 TABLET(S): at 21:59

## 2023-08-06 RX ADMIN — Medication 2: at 22:42

## 2023-08-06 RX ADMIN — CHLORHEXIDINE GLUCONATE 1 APPLICATION(S): 213 SOLUTION TOPICAL at 12:41

## 2023-08-06 RX ADMIN — Medication 500 MILLIGRAM(S): at 05:36

## 2023-08-06 RX ADMIN — Medication 1: at 16:39

## 2023-08-06 RX ADMIN — Medication 1 TABLET(S): at 13:59

## 2023-08-06 RX ADMIN — Medication 12.5 MILLIGRAM(S): at 05:36

## 2023-08-06 RX ADMIN — POLYETHYLENE GLYCOL 3350 17 GRAM(S): 17 POWDER, FOR SOLUTION ORAL at 12:41

## 2023-08-06 RX ADMIN — Medication 650 MILLIGRAM(S): at 17:43

## 2023-08-06 RX ADMIN — PANTOPRAZOLE SODIUM 40 MILLIGRAM(S): 20 TABLET, DELAYED RELEASE ORAL at 05:36

## 2023-08-06 NOTE — PROGRESS NOTE ADULT - ASSESSMENT
Pt is a 79 y/o female s/p left hip SUZANNA-> left hip hemiarthroplasty DOS 7/19    PLAN  - Pain control/ Analgesia  - DVT ppx SQH w SCDs  - PT/OT   - WBAT and OOB to chair   - Incentive Spirometer  - S/p DEREK Ramírez 7/28  - S/p fistula revision 8/3  - Dialysis 8/4  - Dispo planning: needs AVF accessed for HD inpatient before dc

## 2023-08-06 NOTE — PROGRESS NOTE ADULT - SUBJECTIVE AND OBJECTIVE BOX
Cardiovascular Disease Progress Note  Date of service: 08-06-23 @ 14:01    Overnight events: No acute events overnight.  Pt is in no distress. Denies chest pain or SOB.   Otherwise review of systems negative    Objective Findings:  T(C): 36.5 (08-06-23 @ 09:48), Max: 36.7 (08-05-23 @ 21:44)  HR: 77 (08-06-23 @ 09:48) (72 - 77)  BP: 154/74 (08-06-23 @ 09:48) (135/61 - 154/74)  RR: 16 (08-06-23 @ 09:48) (16 - 18)  SpO2: 100% (08-06-23 @ 09:48) (98% - 100%)  Wt(kg): --  Daily     Daily       Physical Exam:  Gen: NAD; Patient resting comfortably  HEENT: EOMI, Normocephalic/ atraumatic  CV: RRR, normal S1 + S2, no m/r/g  Lungs:  Normal respiratory effort; clear to auscultation bilaterally  Abd: soft, non-tender; bowel sounds present  Ext: Blister on L finger    Telemetry: N/a    Laboratory Data:                    Inpatient Medications:  MEDICATIONS  (STANDING):  acetaminophen     Tablet .. 650 milliGRAM(s) Oral every 6 hours  ascorbic acid 500 milliGRAM(s) Oral two times a day  calcium carbonate 1250 mG  + Vitamin D (OsCal 500 + D) 1 Tablet(s) Oral three times a day  chlorhexidine 2% Cloths 1 Application(s) Topical daily  epoetin marla (EPOGEN) Injectable 4000 Unit(s) IV Push <User Schedule>  folic acid 1 milliGRAM(s) Oral daily  HYDROmorphone  Injectable 0.5 milliGRAM(s) IV Push once  insulin lispro (ADMELOG) corrective regimen sliding scale   SubCutaneous Before meals and at bedtime  metoprolol succinate ER 12.5 milliGRAM(s) Oral daily  multivitamin 1 Tablet(s) Oral daily  pantoprazole    Tablet 40 milliGRAM(s) Oral before breakfast  polyethylene glycol 3350 17 Gram(s) Oral daily  senna 2 Tablet(s) Oral at bedtime      Assessment:  77 year old woman with ESRD on HD (MWF), HTN, DM (diet controlled), chronic type B aortic dissection, heart murmur, endometrial ca in remission s/p total hysterectomy in 2007 presents with L hip pain     Plan of Care:      #Post-operative evaluation-  - Ms. Harris tolerated orthopedic and AV fistula interventions well.  She displays no signs of  coronary ischemia or decompensated CHF.   - Encourage use of incentive spirometer  Continue current cardiac management.       #Type B aortic dissection  - Chronic (Diagnosed >10 years ago)  - Continue current management.        #ESRD  - HD currently underway at bedside, as per renal.  - Volume optimization with HD            Over 25 minutes spent on total encounter; more than 50% of the visit was spent counseling and/or coordinating care by the attending physician.      Adrian Burris,  [facility]  Cardiovascular Disease  [phone]

## 2023-08-06 NOTE — PROGRESS NOTE ADULT - PROBLEM SELECTOR PLAN 6
diet controlled  not on meds  FS stable  c/w diabetic diet.  last a1c 5% last a1c 5%, although likely falsely low in setting of ESRD given glucose trend  diet controlled  c/w ISS, diabetic diet.

## 2023-08-06 NOTE — PROGRESS NOTE ADULT - SUBJECTIVE AND OBJECTIVE BOX
ORTHOPAEDIC PROGRESS NOTE    SUBJECTIVE:  Pt seen and examined at bedside this am.  Doing well.  No acute events overnight.  Pt states pain is well controlled    OBJECTIVE:  Vital Signs Last 24 Hrs  T(C): 36.4 (06 Aug 2023 05:42), Max: 36.9 (05 Aug 2023 13:53)  T(F): 97.6 (06 Aug 2023 05:42), Max: 98.5 (05 Aug 2023 13:53)  HR: 73 (06 Aug 2023 05:42) (72 - 77)  BP: 154/66 (06 Aug 2023 05:42) (135/61 - 154/66)  BP(mean): --  RR: 17 (06 Aug 2023 05:42) (17 - 18)  SpO2: 98% (06 Aug 2023 05:42) (98% - 100%)    Parameters below as of 06 Aug 2023 05:42  Patient On (Oxygen Delivery Method): room air        Physical Exam:  General: NAD; resting comfrotably in bed  Resp: non labored  EXTR:   Left lower ext:            Dressing: C/D/I            Motor: EHL FHL GA TA SOL in tact            Sensation: SILT            Pulses: 2+ DP    LABS                        8.1    6.15  )-----------( 100      ( 04 Aug 2023 13:23 )             24.9                   I&O's Summary      acetaminophen     Tablet .. 650 milliGRAM(s) Oral every 6 hours  aluminum hydroxide/magnesium hydroxide/simethicone Suspension 30 milliLiter(s) Oral four times a day PRN  ascorbic acid 500 milliGRAM(s) Oral two times a day  calcium carbonate 1250 mG  + Vitamin D (OsCal 500 + D) 1 Tablet(s) Oral three times a day  chlorhexidine 2% Cloths 1 Application(s) Topical daily  epoetin marla (EPOGEN) Injectable 4000 Unit(s) IV Push <User Schedule>  folic acid 1 milliGRAM(s) Oral daily  HYDROmorphone  Injectable 0.5 milliGRAM(s) IV Push once  insulin lispro (ADMELOG) corrective regimen sliding scale   SubCutaneous Before meals and at bedtime  melatonin 3 milliGRAM(s) Oral at bedtime PRN  metoprolol succinate ER 12.5 milliGRAM(s) Oral daily  multivitamin 1 Tablet(s) Oral daily  ondansetron Injectable 4 milliGRAM(s) IV Push every 6 hours PRN  oxyCODONE    IR 5 milliGRAM(s) Oral every 6 hours PRN  pantoprazole    Tablet 40 milliGRAM(s) Oral before breakfast  polyethylene glycol 3350 17 Gram(s) Oral daily  senna 2 Tablet(s) Oral at bedtime

## 2023-08-06 NOTE — PROGRESS NOTE ADULT - PROBLEM SELECTOR PLAN 3
Rt ASHER lopez placed  - S/P VSx balloon angioplasty of AVF 8/1  - s/p AVF revision 8/3, cleared for use by vascular   - remove Desiree after if dialysis session with AVF is successful

## 2023-08-06 NOTE — PROGRESS NOTE ADULT - SUBJECTIVE AND OBJECTIVE BOX
Patient is a 78y old  Female who presents with a chief complaint of s/p L hip orif concern for loosened hardware (06 Aug 2023 09:30)    Mauricio Murphy MD   [facility] Division of Hospital Medicine   Pager [phone]  Reachable on [employer] Teams     SUBJECTIVE / OVERNIGHT EVENTS:  Patient seen and examined this morinng. No events overnight. She is sitting in the chair without any issues.  Reporting hand remains swollen, thinks she has better ROM today.     MEDICATIONS  (STANDING):  acetaminophen     Tablet .. 650 milliGRAM(s) Oral every 6 hours  ascorbic acid 500 milliGRAM(s) Oral two times a day  calcium carbonate 1250 mG  + Vitamin D (OsCal 500 + D) 1 Tablet(s) Oral three times a day  chlorhexidine 2% Cloths 1 Application(s) Topical daily  epoetin marla (EPOGEN) Injectable 4000 Unit(s) IV Push <User Schedule>  folic acid 1 milliGRAM(s) Oral daily  HYDROmorphone  Injectable 0.5 milliGRAM(s) IV Push once  insulin lispro (ADMELOG) corrective regimen sliding scale   SubCutaneous Before meals and at bedtime  metoprolol succinate ER 12.5 milliGRAM(s) Oral daily  multivitamin 1 Tablet(s) Oral daily  pantoprazole    Tablet 40 milliGRAM(s) Oral before breakfast  polyethylene glycol 3350 17 Gram(s) Oral daily  senna 2 Tablet(s) Oral at bedtime    MEDICATIONS  (PRN):  aluminum hydroxide/magnesium hydroxide/simethicone Suspension 30 milliLiter(s) Oral four times a day PRN Indigestion  melatonin 3 milliGRAM(s) Oral at bedtime PRN Insomnia  ondansetron Injectable 4 milliGRAM(s) IV Push every 6 hours PRN Nausea and/or Vomiting  oxyCODONE    IR 5 milliGRAM(s) Oral every 6 hours PRN Severe Pain (7 - 10)      Vital Signs Last 24 Hrs  T(C): 36.5 (06 Aug 2023 09:48), Max: 36.9 (05 Aug 2023 13:53)  T(F): 97.7 (06 Aug 2023 09:48), Max: 98.5 (05 Aug 2023 13:53)  HR: 77 (06 Aug 2023 09:48) (72 - 77)  BP: 154/74 (06 Aug 2023 09:48) (135/61 - 154/74)  BP(mean): --  RR: 16 (06 Aug 2023 09:48) (16 - 18)  SpO2: 100% (06 Aug 2023 09:48) (98% - 100%)  CAPILLARY BLOOD GLUCOSE      POCT Blood Glucose.: 208 mg/dL (06 Aug 2023 11:34)  POCT Blood Glucose.: 191 mg/dL (06 Aug 2023 07:05)  POCT Blood Glucose.: 199 mg/dL (05 Aug 2023 21:38)  POCT Blood Glucose.: 183 mg/dL (05 Aug 2023 16:32)    I&O's Summary    General: elderly woman in bed appers comfortble NAD, awake and alert  HENMT:  MMM, RT shiley,   Respiratory: No respiratory distress, crackles in the right base  Cardiovascular: S1,S2; Regular rate and rhythm; +mumur/ bruit  Gastrointestinal: Soft, Nontender, Nondistended; +BS.   Extremities: LUE swelling 3+ to hand (slightly improved today), ; warm to touch  Neurological: Moving all 4 extremities; Sensation to LT grossly in tact.  Skin: large blister on proximal LT thumb, No rashes, No erythema   Psych:  appropriate mood and affect    LABS:                        8.1    6.15  )-----------( 100      ( 04 Aug 2023 13:23 )             24.9                     RADIOLOGY & ADDITIONAL TESTS:    Imaging Personally Reviewed:    Consultant(s) Notes Reviewed:      Care Discussed with Consultants/Other Providers:

## 2023-08-07 LAB
ANION GAP SERPL CALC-SCNC: 11 MMOL/L — SIGNIFICANT CHANGE UP (ref 7–14)
BLD GP AB SCN SERPL QL: NEGATIVE — SIGNIFICANT CHANGE UP
BUN SERPL-MCNC: 50 MG/DL — HIGH (ref 7–23)
CALCIUM SERPL-MCNC: 8.4 MG/DL — SIGNIFICANT CHANGE UP (ref 8.4–10.5)
CHLORIDE SERPL-SCNC: 100 MMOL/L — SIGNIFICANT CHANGE UP (ref 98–107)
CO2 SERPL-SCNC: 24 MMOL/L — SIGNIFICANT CHANGE UP (ref 22–31)
CREAT SERPL-MCNC: 5.89 MG/DL — HIGH (ref 0.5–1.3)
EGFR: 7 ML/MIN/1.73M2 — LOW
GLUCOSE BLDC GLUCOMTR-MCNC: 154 MG/DL — HIGH (ref 70–99)
GLUCOSE BLDC GLUCOMTR-MCNC: 169 MG/DL — HIGH (ref 70–99)
GLUCOSE BLDC GLUCOMTR-MCNC: 173 MG/DL — HIGH (ref 70–99)
GLUCOSE BLDC GLUCOMTR-MCNC: 195 MG/DL — HIGH (ref 70–99)
GLUCOSE SERPL-MCNC: 184 MG/DL — HIGH (ref 70–99)
HCT VFR BLD CALC: 21.3 % — LOW (ref 34.5–45)
HCT VFR BLD CALC: 22.2 % — LOW (ref 34.5–45)
HGB BLD-MCNC: 7 G/DL — CRITICAL LOW (ref 11.5–15.5)
HGB BLD-MCNC: 7.1 G/DL — LOW (ref 11.5–15.5)
MAGNESIUM SERPL-MCNC: 1.9 MG/DL — SIGNIFICANT CHANGE UP (ref 1.6–2.6)
MCHC RBC-ENTMCNC: 30.5 PG — SIGNIFICANT CHANGE UP (ref 27–34)
MCHC RBC-ENTMCNC: 30.8 PG — SIGNIFICANT CHANGE UP (ref 27–34)
MCHC RBC-ENTMCNC: 32 GM/DL — SIGNIFICANT CHANGE UP (ref 32–36)
MCHC RBC-ENTMCNC: 32.9 GM/DL — SIGNIFICANT CHANGE UP (ref 32–36)
MCV RBC AUTO: 93.8 FL — SIGNIFICANT CHANGE UP (ref 80–100)
MCV RBC AUTO: 95.3 FL — SIGNIFICANT CHANGE UP (ref 80–100)
NRBC # BLD: 0 /100 WBCS — SIGNIFICANT CHANGE UP (ref 0–0)
NRBC # BLD: 0 /100 WBCS — SIGNIFICANT CHANGE UP (ref 0–0)
NRBC # FLD: 0 K/UL — SIGNIFICANT CHANGE UP (ref 0–0)
NRBC # FLD: 0 K/UL — SIGNIFICANT CHANGE UP (ref 0–0)
PHOSPHATE SERPL-MCNC: 4.2 MG/DL — SIGNIFICANT CHANGE UP (ref 2.5–4.5)
PLATELET # BLD AUTO: 111 K/UL — LOW (ref 150–400)
PLATELET # BLD AUTO: 136 K/UL — LOW (ref 150–400)
POTASSIUM SERPL-MCNC: 4.6 MMOL/L — SIGNIFICANT CHANGE UP (ref 3.5–5.3)
POTASSIUM SERPL-SCNC: 4.6 MMOL/L — SIGNIFICANT CHANGE UP (ref 3.5–5.3)
RBC # BLD: 2.27 M/UL — LOW (ref 3.8–5.2)
RBC # BLD: 2.33 M/UL — LOW (ref 3.8–5.2)
RBC # FLD: 22.5 % — HIGH (ref 10.3–14.5)
RBC # FLD: 22.9 % — HIGH (ref 10.3–14.5)
RH IG SCN BLD-IMP: POSITIVE — SIGNIFICANT CHANGE UP
SODIUM SERPL-SCNC: 135 MMOL/L — SIGNIFICANT CHANGE UP (ref 135–145)
WBC # BLD: 4.33 K/UL — SIGNIFICANT CHANGE UP (ref 3.8–10.5)
WBC # BLD: 4.48 K/UL — SIGNIFICANT CHANGE UP (ref 3.8–10.5)
WBC # FLD AUTO: 4.33 K/UL — SIGNIFICANT CHANGE UP (ref 3.8–10.5)
WBC # FLD AUTO: 4.48 K/UL — SIGNIFICANT CHANGE UP (ref 3.8–10.5)

## 2023-08-07 PROCEDURE — 99232 SBSQ HOSP IP/OBS MODERATE 35: CPT

## 2023-08-07 RX ADMIN — Medication 12.5 MILLIGRAM(S): at 05:37

## 2023-08-07 RX ADMIN — Medication 500 MILLIGRAM(S): at 05:38

## 2023-08-07 RX ADMIN — CHLORHEXIDINE GLUCONATE 1 APPLICATION(S): 213 SOLUTION TOPICAL at 11:43

## 2023-08-07 RX ADMIN — ERYTHROPOIETIN 4000 UNIT(S): 10000 INJECTION, SOLUTION INTRAVENOUS; SUBCUTANEOUS at 14:00

## 2023-08-07 RX ADMIN — Medication 650 MILLIGRAM(S): at 22:31

## 2023-08-07 RX ADMIN — Medication 1 MILLIGRAM(S): at 15:32

## 2023-08-07 RX ADMIN — Medication 500 MILLIGRAM(S): at 17:04

## 2023-08-07 RX ADMIN — Medication 1: at 07:52

## 2023-08-07 RX ADMIN — PANTOPRAZOLE SODIUM 40 MILLIGRAM(S): 20 TABLET, DELAYED RELEASE ORAL at 05:37

## 2023-08-07 RX ADMIN — Medication 1 TABLET(S): at 15:32

## 2023-08-07 RX ADMIN — Medication 650 MILLIGRAM(S): at 21:35

## 2023-08-07 RX ADMIN — Medication 650 MILLIGRAM(S): at 15:32

## 2023-08-07 RX ADMIN — Medication 1: at 17:03

## 2023-08-07 RX ADMIN — Medication 1: at 22:48

## 2023-08-07 RX ADMIN — Medication 650 MILLIGRAM(S): at 07:02

## 2023-08-07 RX ADMIN — Medication 1 TABLET(S): at 05:37

## 2023-08-07 RX ADMIN — Medication 650 MILLIGRAM(S): at 16:02

## 2023-08-07 RX ADMIN — Medication 650 MILLIGRAM(S): at 01:59

## 2023-08-07 RX ADMIN — Medication 1: at 11:41

## 2023-08-07 RX ADMIN — Medication 1 TABLET(S): at 21:33

## 2023-08-07 RX ADMIN — Medication 650 MILLIGRAM(S): at 08:04

## 2023-08-07 RX ADMIN — Medication 650 MILLIGRAM(S): at 00:59

## 2023-08-07 NOTE — PROVIDER CONTACT NOTE (CRITICAL VALUE NOTIFICATION) - SITUATION
hb 6.0/hct 17.8
hb5.2 hct 16.5
Hemoglobin 5.8 crit 18.2
hb 6.9/hct20.1
Hem 5.9 hematocrit 18.3
Patient H/H - 7.0/ 21.3. Labs were drawn during dialysis
negative...

## 2023-08-07 NOTE — PROVIDER CONTACT NOTE (CRITICAL VALUE NOTIFICATION) - NAME OF MD/NP/PA/DO NOTIFIED:
Ortho, [id number], Carlos ALEX)
sicu resident abigail alcala
p/a ian
Valentine Eddy
Valentine Eddy
cristhian allen p/a
p/a flora

## 2023-08-07 NOTE — PROVIDER CONTACT NOTE (OTHER) - ACTION/TREATMENT ORDERED:
Beni to Use Right Shiley for Venous access and Use AV Fistula for Arterial Access for the remaining 1.5 hours HD TX. Read back verified and confirmed.

## 2023-08-07 NOTE — PROGRESS NOTE ADULT - PROBLEM SELECTOR PLAN 2
Pt. with anemia in the setting of ESRD and blood loss from surgical procedure. Hgb 8.1 (8/4). Will give RICA with HD treatments. Monitor hemoglobin levels.     If you have any questions, please feel free to contact me  Kyle Ngo  Nephrology Fellow  [employer]/Page [phone]  (After 5pm or on weekends please page the on-call fellow)

## 2023-08-07 NOTE — PROGRESS NOTE ADULT - PROBLEM SELECTOR PLAN 2
post-op hematoma c/b hemorrhagic shock  - s/p 8u PRBC transfusion  - monitor CBC  - transfuse PRBC on 8/7.

## 2023-08-07 NOTE — PROVIDER CONTACT NOTE (CRITICAL VALUE NOTIFICATION) - ACTION/TREATMENT ORDERED:
Will order repeat CBC and a Type and screen in case PRBCs are required. PRBCs will be ordered based on repeat CBC result.
charo montoya
continue monitor.
informed md and continue monitor
No action at this time due to concern for fluid overload, continue to monitor and report any change or pt complaint.

## 2023-08-07 NOTE — PROGRESS NOTE ADULT - PROBLEM SELECTOR PLAN 6
last a1c 5%, although likely falsely low in setting of ESRD given glucose trend  diet controlled  c/w ISS, diabetic diet.

## 2023-08-07 NOTE — PROVIDER CONTACT NOTE (CRITICAL VALUE NOTIFICATION) - ASSESSMENT
Patient H/H - 7.0/ 21.3
no bleedind noted vss
no bleeding .vss
no bleeding vss.
Pt A&OX4 denies chest pain, or SOB resting in bed comfortably.
patient is alert and oriented X4. no respiratory distress noted. denies weakness or dizziness.

## 2023-08-07 NOTE — PROGRESS NOTE ADULT - SUBJECTIVE AND OBJECTIVE BOX
ORTHOPAEDIC PROGRESS NOTE    SUBJECTIVE:  Pt seen and examined at bedside this am.  Doing well.  No acute events overnight.  Planning for HD today w new AVF prior to dc clearance     OBJECTIVE:  Vital Signs Last 24 Hrs  T(C): 36.5 (07 Aug 2023 05:33), Max: 36.7 (06 Aug 2023 17:31)  T(F): 97.7 (07 Aug 2023 05:33), Max: 98 (06 Aug 2023 17:31)  HR: 59 (07 Aug 2023 05:33) (59 - 77)  BP: 143/62 (07 Aug 2023 05:33) (137/59 - 154/74)  BP(mean): --  RR: 17 (07 Aug 2023 05:33) (16 - 18)  SpO2: 98% (07 Aug 2023 05:33) (98% - 100%)    Parameters below as of 07 Aug 2023 05:33  Patient On (Oxygen Delivery Method): room air        Physical Exam:  General: NAD; resting in bed  Resp: non labored  EXTR:   Left lower ext:            Dressing: C/D/I ( changed yesterday PM)            Motor: EHL FHL GA TA SOL in tact            Sensation: SILT            Pulses: 2+ DP    LABS                  I&O's Summary    06 Aug 2023 07:01  -  07 Aug 2023 06:18  --------------------------------------------------------  IN: 0 mL / OUT: 25 mL / NET: -25 mL        acetaminophen     Tablet .. 650 milliGRAM(s) Oral every 6 hours  aluminum hydroxide/magnesium hydroxide/simethicone Suspension 30 milliLiter(s) Oral four times a day PRN  ascorbic acid 500 milliGRAM(s) Oral two times a day  calcium carbonate 1250 mG  + Vitamin D (OsCal 500 + D) 1 Tablet(s) Oral three times a day  chlorhexidine 2% Cloths 1 Application(s) Topical daily  epoetin marla (EPOGEN) Injectable 4000 Unit(s) IV Push <User Schedule>  folic acid 1 milliGRAM(s) Oral daily  HYDROmorphone  Injectable 0.5 milliGRAM(s) IV Push once  insulin lispro (ADMELOG) corrective regimen sliding scale   SubCutaneous Before meals and at bedtime  melatonin 3 milliGRAM(s) Oral at bedtime PRN  metoprolol succinate ER 12.5 milliGRAM(s) Oral daily  multivitamin 1 Tablet(s) Oral daily  ondansetron Injectable 4 milliGRAM(s) IV Push every 6 hours PRN  oxyCODONE    IR 5 milliGRAM(s) Oral every 6 hours PRN  pantoprazole    Tablet 40 milliGRAM(s) Oral before breakfast  polyethylene glycol 3350 17 Gram(s) Oral daily  senna 2 Tablet(s) Oral at bedtime

## 2023-08-07 NOTE — PROVIDER CONTACT NOTE (CRITICAL VALUE NOTIFICATION) - BACKGROUND
s/p Lt hip arthroplasty, renal disease on HD m/w/f
s/p left hip arhroplasty
s/p left hip arthroplasty
s/p LUE fistula revision
s/p left hip artroplasty
S/P Lt hip arthroplasty on HD

## 2023-08-07 NOTE — PROVIDER CONTACT NOTE (OTHER) - SITUATION
Venous access site for AVF working for 1.5 hours only and Stopped working. Venous access site use to complete Dialysis Treatment is Right IJ Shiley. Arterial access site use is AV fistula.

## 2023-08-07 NOTE — PROGRESS NOTE ADULT - PROBLEM SELECTOR PLAN 1
Pt with ESRD on HD TIW MWF admitted with fall. Pt s/p Lt hemiarthroplasty on 7/19/2023, however surgery complicated by intraoperative hypotension requiring vasopressor support. Patient transferred to SICU for hemodynamic stabilization. CT Angio abd/pelv reviewed. HD treatment held on 7/19/23 due to above. Held on 7/20 due to blood loss anemia. Transferred back to medical floors. 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 S/p nontunneled catheter placement 7/28. Pt. s/p LUE BC AVF fistulogram 8/1 w/ Balloon angioplasty. s/p aneurysmal Fistula, resected with end to end anastomosis 8/4, now can access AVF at proximal marked site. Maintenance HD today. Will attempt to access AVF today. Monitor Labs

## 2023-08-07 NOTE — PROGRESS NOTE ADULT - SUBJECTIVE AND OBJECTIVE BOX
Cardiovascular Disease Progress Note  Date of Service: 23 @ 08:48    Overnight events: No acute events overnight.    Patient denies chest pain or SOB.   Otherwise review of systems negative    Objective Findings:  T(C): 36.5 (23 @ 05:33), Max: 36.7 (23 @ 17:31)  HR: 59 (23 @ 05:33) (59 - 77)  BP: 143/62 (23 @ 05:33) (137/59 - 154/74)  RR: 17 (23 @ 05:33) (16 - 18)  SpO2: 98% (23 @ 05:33) (98% - 100%)  Wt(kg): --  Daily     Daily Weight in k.4 (07 Aug 2023 02:00)      Physical Exam:  Gen: NAD; Patient resting comfortably  HEENT: EOMI, Normocephalic/ atraumatic  CV: RRR, normal S1 + S2, no m/r/g  Lungs:  Normal respiratory effort; clear to auscultation bilaterally  Abd: soft, non-tender; bowel sounds present  Ext: No edema; warm and well perfused    Telemetry: n/a    Laboratory Data:    No recent labs        Inpatient Medications:  MEDICATIONS  (STANDING):  acetaminophen     Tablet .. 650 milliGRAM(s) Oral every 6 hours  ascorbic acid 500 milliGRAM(s) Oral two times a day  calcium carbonate 1250 mG  + Vitamin D (OsCal 500 + D) 1 Tablet(s) Oral three times a day  chlorhexidine 2% Cloths 1 Application(s) Topical daily  epoetin marla (EPOGEN) Injectable 4000 Unit(s) IV Push <User Schedule>  folic acid 1 milliGRAM(s) Oral daily  HYDROmorphone  Injectable 0.5 milliGRAM(s) IV Push once  insulin lispro (ADMELOG) corrective regimen sliding scale   SubCutaneous Before meals and at bedtime  metoprolol succinate ER 12.5 milliGRAM(s) Oral daily  multivitamin 1 Tablet(s) Oral daily  pantoprazole    Tablet 40 milliGRAM(s) Oral before breakfast  polyethylene glycol 3350 17 Gram(s) Oral daily  senna 2 Tablet(s) Oral at bedtime      Assessment: 77 year old woman with ESRD on HD (MWF), HTN, DM (diet controlled), chronic type B aortic dissection, heart murmur, endometrial ca in remission s/p total hysterectomy in  presents with L hip pain     Plan of Care:      #Post-operative evaluation-  - Ms. Harris tolerated orthopedic and AV fistula interventions well.  She displays no signs of  coronary ischemia or decompensated CHF.   - Encourage use of incentive spirometer  Continue current cardiac management.       #Type B aortic dissection  - Chronic (Diagnosed >10 years ago)  - Continue current management.        #ESRD  - HD, as per renal.    #ACP (advance care planning)-  Advanced care planning was discussed with the patient.    Cardiac findings were discussed in detail and all questions were answered.       Over 25 minutes spent on total encounter; more than 50% of the visit was spent counseling and/or coordinating care by the attending physician.      Gabo Burris MD [facility]  Cardiovascular Disease  [phone]

## 2023-08-07 NOTE — PROGRESS NOTE ADULT - SUBJECTIVE AND OBJECTIVE BOX
[facility] Division of Kidney Diseases & Hypertension  FOLLOW UP NOTE  [phone]--------------------------------------------------------------------------------  Chief Complaint:Unspecified complication of procedure, initial encounter  HPI: 78F w/ PMH of ESRD on HD TIW (MWF) at [facility] (previously at [facility]), HTN, DM2, endometrial cancer (underwent hysterectomy and chemotherapy, type B aortic dissection, and L hip ORIF, admitted for revision of L hip hardware. Nephrology consulted for management of ESRD/HD. 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  S/p non-tunneled catheter placement 7/28, plan or fistulogram by vascular on 8/1.  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      24 hour events/subjective: Pt. seen and evaluated at bedside. Overall feels well, no acute complaints. She denies fevers/chills, headaches, chest pain, SOB, abd pain, or leg swelling.       PAST HISTORY  --------------------------------------------------------------------------------  No significant changes to PMH, PSH, FHx, SHx, unless otherwise noted    ALLERGIES & MEDICATIONS  --------------------------------------------------------------------------------  Allergies    No Known Allergies    Intolerances      Standing Inpatient Medications  acetaminophen     Tablet .. 650 milliGRAM(s) Oral every 6 hours  ascorbic acid 500 milliGRAM(s) Oral two times a day  calcium carbonate 1250 mG  + Vitamin D (OsCal 500 + D) 1 Tablet(s) Oral three times a day  chlorhexidine 2% Cloths 1 Application(s) Topical daily  epoetin marla (EPOGEN) Injectable 4000 Unit(s) IV Push <User Schedule>  folic acid 1 milliGRAM(s) Oral daily  HYDROmorphone  Injectable 0.5 milliGRAM(s) IV Push once  insulin lispro (ADMELOG) corrective regimen sliding scale   SubCutaneous Before meals and at bedtime  metoprolol succinate ER 12.5 milliGRAM(s) Oral daily  multivitamin 1 Tablet(s) Oral daily  pantoprazole    Tablet 40 milliGRAM(s) Oral before breakfast  polyethylene glycol 3350 17 Gram(s) Oral daily  senna 2 Tablet(s) Oral at bedtime    PRN Inpatient Medications  aluminum hydroxide/magnesium hydroxide/simethicone Suspension 30 milliLiter(s) Oral four times a day PRN  melatonin 3 milliGRAM(s) Oral at bedtime PRN  ondansetron Injectable 4 milliGRAM(s) IV Push every 6 hours PRN  oxyCODONE    IR 5 milliGRAM(s) Oral every 6 hours PRN      REVIEW OF SYSTEMS  --------------------------------------------------------------------------------  see HPI  All other systems were reviewed and are negative, except as noted.    VITALS/PHYSICAL EXAM  --------------------------------------------------------------------------------  T(C): 36.3 (08-07-23 @ 09:26), Max: 36.7 (08-06-23 @ 17:31)  HR: 64 (08-07-23 @ 09:26) (59 - 77)  BP: 147/50 (08-07-23 @ 09:26) (137/59 - 154/74)  RR: 18 (08-07-23 @ 09:26) (16 - 18)  SpO2: 99% (08-07-23 @ 09:26) (98% - 100%)  Wt(kg): --    08-06-23 @ 07:01  -  08-07-23 @ 07:00  --------------------------------------------------------  IN: 0 mL / OUT: 25 mL / NET: -25 mL    Physical Exam:  Gen: NAD  HEENT: MMM,   Pulm: minimal bibasilar crackles   CV: S1S2  Abd: Soft, +BS   Ext: LE edema B/L L>R, chronic LUE edema  Neuro: Awake  Skin: Warm and dry, ecchymotic RUE  Vascular access:+SOFI Lewis covered in clean and dry dressing, with audible bruit    LABS/STUDIES  --------------------------------------------------------------------------------  Creatinine Trend:  SCr 3.45 [08-03 @ 02:25]  SCr 3.25 [08-01 @ 03:23]  SCr 5.14 [07-31 @ 05:35]  SCr 4.24 [07-30 @ 05:25]  SCr 3.00 [07-29 @ 05:46]     [facility] Division of Kidney Diseases & Hypertension  FOLLOW UP NOTE  [phone]--------------------------------------------------------------------------------  Chief Complaint: Unspecified complication of procedure, initial encounter    HPI: 78F w/ PMH of ESRD on HD TIW (MWF) at [facility] (previously at [facility]), HTN, DM2, endometrial cancer (underwent hysterectomy and chemotherapy, type B aortic dissection, and L hip ORIF, admitted for revision of L hip hardware. Nephrology consulted for management of ESRD/HD. 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  S/p non-tunneled catheter placement 7/28, plan or fistulogram by vascular on 8/1.  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      24 hour events/subjective: Pt. seen and evaluated at bedside. Overall feels well, no acute complaints. She denies fevers/chills, headaches, chest pain, SOB, abd pain, or leg swelling.     PAST HISTORY  --------------------------------------------------------------------------------  No significant changes to PMH, PSH, FHx, SHx, unless otherwise noted    ALLERGIES & MEDICATIONS  --------------------------------------------------------------------------------  Allergies    No Known Allergies    Intolerances      Standing Inpatient Medications  acetaminophen     Tablet .. 650 milliGRAM(s) Oral every 6 hours  ascorbic acid 500 milliGRAM(s) Oral two times a day  calcium carbonate 1250 mG  + Vitamin D (OsCal 500 + D) 1 Tablet(s) Oral three times a day  chlorhexidine 2% Cloths 1 Application(s) Topical daily  epoetin marla (EPOGEN) Injectable 4000 Unit(s) IV Push <User Schedule>  folic acid 1 milliGRAM(s) Oral daily  HYDROmorphone  Injectable 0.5 milliGRAM(s) IV Push once  insulin lispro (ADMELOG) corrective regimen sliding scale   SubCutaneous Before meals and at bedtime  metoprolol succinate ER 12.5 milliGRAM(s) Oral daily  multivitamin 1 Tablet(s) Oral daily  pantoprazole    Tablet 40 milliGRAM(s) Oral before breakfast  polyethylene glycol 3350 17 Gram(s) Oral daily  senna 2 Tablet(s) Oral at bedtime    PRN Inpatient Medications  aluminum hydroxide/magnesium hydroxide/simethicone Suspension 30 milliLiter(s) Oral four times a day PRN  melatonin 3 milliGRAM(s) Oral at bedtime PRN  ondansetron Injectable 4 milliGRAM(s) IV Push every 6 hours PRN  oxyCODONE    IR 5 milliGRAM(s) Oral every 6 hours PRN      REVIEW OF SYSTEMS  --------------------------------------------------------------------------------  see HPI  All other systems were reviewed and are negative, except as noted.    VITALS/PHYSICAL EXAM  --------------------------------------------------------------------------------  T(C): 36.3 (08-07-23 @ 09:26), Max: 36.7 (08-06-23 @ 17:31)  HR: 64 (08-07-23 @ 09:26) (59 - 77)  BP: 147/50 (08-07-23 @ 09:26) (137/59 - 154/74)  RR: 18 (08-07-23 @ 09:26) (16 - 18)  SpO2: 99% (08-07-23 @ 09:26) (98% - 100%)  Wt(kg): --    08-06-23 @ 07:01  -  08-07-23 @ 07:00  --------------------------------------------------------  IN: 0 mL / OUT: 25 mL / NET: -25 mL    Physical Exam:  Gen: NAD  HEENT: MMM,   Pulm: minimal bibasilar crackles   CV: S1S2  Abd: Soft, +BS   Ext: LE edema B/L L>R, chronic LUE edema  Neuro: Awake  Skin: Warm and dry, ecchymotic RUE  Vascular access:+SOFI Lewis covered in clean and dry dressing, with audible bruit    LABS/STUDIES  --------------------------------------------------------------------------------  Creatinine Trend:  SCr 3.45 [08-03 @ 02:25]  SCr 3.25 [08-01 @ 03:23]  SCr 5.14 [07-31 @ 05:35]  SCr 4.24 [07-30 @ 05:25]  SCr 3.00 [07-29 @ 05:46]

## 2023-08-07 NOTE — PROVIDER CONTACT NOTE (OTHER) - RECOMMENDATIONS
Use Right Shiley for Venous access and Use AV Fistula for Arterial Access for the remaining 1.5 hours HD TX.
AIDA Mortensen notified. Emergency titration of phenylephrine.
repeat labs

## 2023-08-07 NOTE — PROGRESS NOTE ADULT - SUBJECTIVE AND OBJECTIVE BOX
[facility] Division of Hospital Medicine  Donnell Mullen MD  Pager (DONTAE-F, 8A-5P): [phone]  Other Times:  [phone]    Patient is a 78y old  Female who presents with a chief complaint of s/p L hip orif concern for loosened hardwar (07 Aug 2023 09:34)    SUBJECTIVE / OVERNIGHT EVENTS:  Patient about to start on HD.  Complaining that her ACE wrap on her left arm is too tight. Noted blister on Lt thumb. No F/C, N/V, CP, SOB, Cough, lightheadedness, dizziness, abdominal pain, diarrhea, dysuria.    MEDICATIONS  (STANDING):  acetaminophen     Tablet .. 650 milliGRAM(s) Oral every 6 hours  ascorbic acid 500 milliGRAM(s) Oral two times a day  calcium carbonate 1250 mG  + Vitamin D (OsCal 500 + D) 1 Tablet(s) Oral three times a day  chlorhexidine 2% Cloths 1 Application(s) Topical daily  epoetin marla (EPOGEN) Injectable 4000 Unit(s) IV Push <User Schedule>  folic acid 1 milliGRAM(s) Oral daily  HYDROmorphone  Injectable 0.5 milliGRAM(s) IV Push once  insulin lispro (ADMELOG) corrective regimen sliding scale   SubCutaneous Before meals and at bedtime  metoprolol succinate ER 12.5 milliGRAM(s) Oral daily  multivitamin 1 Tablet(s) Oral daily  pantoprazole    Tablet 40 milliGRAM(s) Oral before breakfast  polyethylene glycol 3350 17 Gram(s) Oral daily  senna 2 Tablet(s) Oral at bedtime    MEDICATIONS  (PRN):  aluminum hydroxide/magnesium hydroxide/simethicone Suspension 30 milliLiter(s) Oral four times a day PRN Indigestion  melatonin 3 milliGRAM(s) Oral at bedtime PRN Insomnia  ondansetron Injectable 4 milliGRAM(s) IV Push every 6 hours PRN Nausea and/or Vomiting  oxyCODONE    IR 5 milliGRAM(s) Oral every 6 hours PRN Severe Pain (7 - 10)      Vital Signs Last 24 Hrs  T(C): 36.4 (07 Aug 2023 10:46), Max: 36.7 (06 Aug 2023 17:31)  T(F): 97.6 (07 Aug 2023 10:46), Max: 98 (06 Aug 2023 17:31)  HR: 60 (07 Aug 2023 10:46) (59 - 67)  BP: 149/72 (07 Aug 2023 10:46) (137/59 - 149/72)  BP(mean): --  RR: 18 (07 Aug 2023 10:46) (16 - 18)  SpO2: 99% (07 Aug 2023 09:26) (98% - 100%)    Parameters below as of 07 Aug 2023 10:46  Patient On (Oxygen Delivery Method): room air      CAPILLARY BLOOD GLUCOSE      POCT Blood Glucose.: 195 mg/dL (07 Aug 2023 11:19)  POCT Blood Glucose.: 173 mg/dL (07 Aug 2023 07:30)  POCT Blood Glucose.: 226 mg/dL (06 Aug 2023 22:18)  POCT Blood Glucose.: 152 mg/dL (06 Aug 2023 16:32)    I&O's Summary    06 Aug 2023 07:01  -  07 Aug 2023 07:00  --------------------------------------------------------  IN: 0 mL / OUT: 25 mL / NET: -25 mL        PHYSICAL EXAM:  CONSTITUTIONAL: NAD  EYES: PERRLA; conjunctiva and sclera clear  ENMT: Moist oral mucosa, no pharyngeal injection or exudates; normal dentition  NECK: Supple, no palpable masses; no thyromegaly  RESPIRATORY: Normal respiratory effort; lungs are clear to auscultation bilaterally  CARDIOVASCULAR: Regular rate and rhythm, normal S1 and S2, no murmur/rub/gallop; No lower extremity edema; Peripheral pulses are 2+ bilaterally; LUE 3+ edema, AVF present, R IJ shiley in place, LUE AVF bruit present.  ABDOMEN: Nontender to palpation, normoactive bowel sounds, no rebound/guarding; No hepatosplenomegaly  MUSCULOSKELETAL:  did not assess gait; no clubbing or cyanosis of digits; no joint swelling or tenderness to palpation  PSYCH: A+O to person, place, and time; affect appropriate  NEUROLOGY: CN 2-12 are intact and symmetric; no gross sensory deficits   SKIN: No rashes; no palpable lesions, surgical dressing C/D/I    LABS:                        7.0    4.48  )-----------( 136      ( 07 Aug 2023 11:28 )             21.3     08-07    135  |  100  |  50<H>  ----------------------------<  184<H>  4.6   |  24  |  5.89<H>    Ca    8.4      07 Aug 2023 11:28  Phos  4.2     08-07  Mg     1.90     08-07            Urinalysis Basic - ( 07 Aug 2023 11:28 )    Color: x / Appearance: x / SG: x / pH: x  Gluc: 184 mg/dL / Ketone: x  / Bili: x / Urobili: x   Blood: x / Protein: x / Nitrite: x   Leuk Esterase: x / RBC: x / WBC x   Sq Epi: x / Non Sq Epi: x / Bacteria: x        RADIOLOGY & ADDITIONAL TESTS:    Imaging Personally Reviewed:    Care Discussed with Consultants/Other Providers:    Care Discussed with Orthopedic PA about:

## 2023-08-07 NOTE — PROVIDER CONTACT NOTE (CRITICAL VALUE NOTIFICATION) - TEST AND RESULT REPORTED:
lactate on abg 6.3
5.9 crit 18.3
hb 5.8 crit 18.2
H/H - 7.0/ 21.3
hb 5.2 hct 16.5
hb 6.0/hct 17.8
hb 6.9/hct20.1

## 2023-08-07 NOTE — PROGRESS NOTE ADULT - PROBLEM SELECTOR PLAN 1
- s/p Lt hip nail removal, Lt THR on 7/19  - Pain control with tylenol PRN  - Bowel regiment - Senna/Miralax  - Surgical site management as per ortho  - PT/OT eval for safe dispo  - VTE ppx - Hep SC on hold.

## 2023-08-07 NOTE — PROGRESS NOTE ADULT - ASSESSMENT
Pt is a 77 y/o female s/p left hip SUZANNA-> left hip hemiarthroplasty DOS 7/19    PLAN  - Pain control/ Analgesia  - DVT ppx SQH w SCDs  - PT/OT   - WBAT and OOB to chair   - Incentive Spirometer  - Dialysis 8/7  - Dispo planning: needs AVF accessed for HD inpatient before dc

## 2023-08-08 LAB
ANION GAP SERPL CALC-SCNC: 9 MMOL/L — SIGNIFICANT CHANGE UP (ref 7–14)
BUN SERPL-MCNC: 37 MG/DL — HIGH (ref 7–23)
CALCIUM SERPL-MCNC: 8.7 MG/DL — SIGNIFICANT CHANGE UP (ref 8.4–10.5)
CHLORIDE SERPL-SCNC: 99 MMOL/L — SIGNIFICANT CHANGE UP (ref 98–107)
CO2 SERPL-SCNC: 25 MMOL/L — SIGNIFICANT CHANGE UP (ref 22–31)
CREAT SERPL-MCNC: 4.61 MG/DL — HIGH (ref 0.5–1.3)
EGFR: 9 ML/MIN/1.73M2 — LOW
GLUCOSE BLDC GLUCOMTR-MCNC: 143 MG/DL — HIGH (ref 70–99)
GLUCOSE BLDC GLUCOMTR-MCNC: 151 MG/DL — HIGH (ref 70–99)
GLUCOSE BLDC GLUCOMTR-MCNC: 159 MG/DL — HIGH (ref 70–99)
GLUCOSE BLDC GLUCOMTR-MCNC: 206 MG/DL — HIGH (ref 70–99)
GLUCOSE SERPL-MCNC: 134 MG/DL — HIGH (ref 70–99)
HCT VFR BLD CALC: 26.9 % — LOW (ref 34.5–45)
HGB BLD-MCNC: 8.7 G/DL — LOW (ref 11.5–15.5)
MCHC RBC-ENTMCNC: 30.4 PG — SIGNIFICANT CHANGE UP (ref 27–34)
MCHC RBC-ENTMCNC: 32.3 GM/DL — SIGNIFICANT CHANGE UP (ref 32–36)
MCV RBC AUTO: 94.1 FL — SIGNIFICANT CHANGE UP (ref 80–100)
NRBC # BLD: 0 /100 WBCS — SIGNIFICANT CHANGE UP (ref 0–0)
NRBC # FLD: 0 K/UL — SIGNIFICANT CHANGE UP (ref 0–0)
PLATELET # BLD AUTO: 116 K/UL — LOW (ref 150–400)
POTASSIUM SERPL-MCNC: 5 MMOL/L — SIGNIFICANT CHANGE UP (ref 3.5–5.3)
POTASSIUM SERPL-SCNC: 5 MMOL/L — SIGNIFICANT CHANGE UP (ref 3.5–5.3)
RBC # BLD: 2.86 M/UL — LOW (ref 3.8–5.2)
RBC # FLD: 23.5 % — HIGH (ref 10.3–14.5)
SODIUM SERPL-SCNC: 133 MMOL/L — LOW (ref 135–145)
WBC # BLD: 4.89 K/UL — SIGNIFICANT CHANGE UP (ref 3.8–10.5)
WBC # FLD AUTO: 4.89 K/UL — SIGNIFICANT CHANGE UP (ref 3.8–10.5)

## 2023-08-08 PROCEDURE — 99232 SBSQ HOSP IP/OBS MODERATE 35: CPT

## 2023-08-08 RX ADMIN — Medication 1 TABLET(S): at 21:58

## 2023-08-08 RX ADMIN — Medication 1 TABLET(S): at 06:13

## 2023-08-08 RX ADMIN — PANTOPRAZOLE SODIUM 40 MILLIGRAM(S): 20 TABLET, DELAYED RELEASE ORAL at 06:18

## 2023-08-08 RX ADMIN — CHLORHEXIDINE GLUCONATE 1 APPLICATION(S): 213 SOLUTION TOPICAL at 13:16

## 2023-08-08 RX ADMIN — Medication 1 MILLIGRAM(S): at 11:58

## 2023-08-08 RX ADMIN — Medication 2: at 11:57

## 2023-08-08 RX ADMIN — Medication 1: at 16:35

## 2023-08-08 RX ADMIN — Medication 1 TABLET(S): at 14:04

## 2023-08-08 RX ADMIN — Medication 500 MILLIGRAM(S): at 17:36

## 2023-08-08 RX ADMIN — Medication 1 TABLET(S): at 11:58

## 2023-08-08 RX ADMIN — Medication 500 MILLIGRAM(S): at 06:14

## 2023-08-08 RX ADMIN — Medication 12.5 MILLIGRAM(S): at 06:14

## 2023-08-08 NOTE — PROGRESS NOTE ADULT - PROBLEM SELECTOR PLAN 2
post-op hematoma c/b hemorrhagic shock  - s/p 8u PRBC transfusion  - monitor CBC  - s/o 1u PRBC on 8/7 with good response.

## 2023-08-08 NOTE — PROGRESS NOTE ADULT - PROBLEM SELECTOR PLAN 3
Rt ASHER lopez placed  - S/P VSx balloon angioplasty of AVF 8/1  - s/p AVF revision 8/3, cleared for use by vascular   - awaiting follow up from VSx for feasibility of LUE AVF.

## 2023-08-08 NOTE — PROGRESS NOTE ADULT - SUBJECTIVE AND OBJECTIVE BOX
[facility] Medicine  Donnell Mullen MD  Pager (M-F, 8A-5P): [phone]  Other Times:  [phone]    Patient is a 78y old  Female who presents with a chief complaint of s/p L hip orif concern for loosened hardwar (08 Aug 2023 07:26)    SUBJECTIVE / OVERNIGHT EVENTS:  Patient still with LUE edema. Noted to have difficulty with HD via AVF prior.  No F/C, N/V, CP, SOB, Cough, lightheadedness, dizziness, abdominal pain, diarrhea, dysuria.    MEDICATIONS  (STANDING):  acetaminophen     Tablet .. 650 milliGRAM(s) Oral every 6 hours  ascorbic acid 500 milliGRAM(s) Oral two times a day  calcium carbonate 1250 mG  + Vitamin D (OsCal 500 + D) 1 Tablet(s) Oral three times a day  chlorhexidine 2% Cloths 1 Application(s) Topical daily  epoetin marla (EPOGEN) Injectable 4000 Unit(s) IV Push <User Schedule>  folic acid 1 milliGRAM(s) Oral daily  HYDROmorphone  Injectable 0.5 milliGRAM(s) IV Push once  insulin lispro (ADMELOG) corrective regimen sliding scale   SubCutaneous Before meals and at bedtime  metoprolol succinate ER 12.5 milliGRAM(s) Oral daily  multivitamin 1 Tablet(s) Oral daily  pantoprazole    Tablet 40 milliGRAM(s) Oral before breakfast  polyethylene glycol 3350 17 Gram(s) Oral daily  senna 2 Tablet(s) Oral at bedtime    MEDICATIONS  (PRN):  aluminum hydroxide/magnesium hydroxide/simethicone Suspension 30 milliLiter(s) Oral four times a day PRN Indigestion  melatonin 3 milliGRAM(s) Oral at bedtime PRN Insomnia  ondansetron Injectable 4 milliGRAM(s) IV Push every 6 hours PRN Nausea and/or Vomiting  oxyCODONE    IR 5 milliGRAM(s) Oral every 6 hours PRN Severe Pain (7 - 10)      Vital Signs Last 24 Hrs  T(C): 36.7 (08 Aug 2023 09:30), Max: 37.8 (07 Aug 2023 16:00)  T(F): 98.1 (08 Aug 2023 09:30), Max: 100 (07 Aug 2023 16:00)  HR: 67 (08 Aug 2023 09:30) (60 - 86)  BP: 137/61 (08 Aug 2023 09:30) (118/51 - 159/63)  BP(mean): --  RR: 18 (08 Aug 2023 09:30) (18 - 18)  SpO2: 100% (08 Aug 2023 09:30) (100% - 100%)    Parameters below as of 08 Aug 2023 09:30  Patient On (Oxygen Delivery Method): room air      CAPILLARY BLOOD GLUCOSE      POCT Blood Glucose.: 206 mg/dL (08 Aug 2023 11:24)  POCT Blood Glucose.: 143 mg/dL (08 Aug 2023 07:14)  POCT Blood Glucose.: 154 mg/dL (07 Aug 2023 22:32)  POCT Blood Glucose.: 169 mg/dL (07 Aug 2023 16:41)    I&O's Summary    07 Aug 2023 07:01  -  08 Aug 2023 07:00  --------------------------------------------------------  IN: 800 mL / OUT: 3200 mL / NET: -2400 mL        PHYSICAL EXAM:  CONSTITUTIONAL: NAD  EYES: PERRLA; conjunctiva and sclera clear  ENMT: Moist oral mucosa, no pharyngeal injection or exudates; normal dentition  NECK: Supple, no palpable masses; no thyromegaly  RESPIRATORY: Normal respiratory effort; lungs are clear to auscultation bilaterally  CARDIOVASCULAR: Regular rate and rhythm, normal S1 and S2, no murmur/rub/gallop; No lower extremity edema; Peripheral pulses are 2+ bilaterally; LUE 3+ edema, AVF present, R IJ shiley in place, LUE AVF bruit present.  ABDOMEN: Nontender to palpation, normoactive bowel sounds, no rebound/guarding; No hepatosplenomegaly  MUSCULOSKELETAL:  did not assess gait; no clubbing or cyanosis of digits; no joint swelling or tenderness to palpation  PSYCH: A+O to person, place, and time; affect appropriate  NEUROLOGY: CN 2-12 are intact and symmetric; no gross sensory deficits   SKIN: No rashes; no palpable lesions, surgical dressing C/D/I    LABS:                        8.7    4.89  )-----------( 116      ( 08 Aug 2023 06:24 )             26.9     08-08    133<L>  |  99  |  37<H>  ----------------------------<  134<H>  5.0   |  25  |  4.61<H>    Ca    8.7      08 Aug 2023 06:24  Phos  4.2     08-07  Mg     1.90     08-07            Urinalysis Basic - ( 08 Aug 2023 06:24 )    Color: x / Appearance: x / SG: x / pH: x  Gluc: 134 mg/dL / Ketone: x  / Bili: x / Urobili: x   Blood: x / Protein: x / Nitrite: x   Leuk Esterase: x / RBC: x / WBC x   Sq Epi: x / Non Sq Epi: x / Bacteria: x        RADIOLOGY & ADDITIONAL TESTS:    Imaging Personally Reviewed:    Care Discussed with Consultants/Other Providers:    Care Discussed with Orthopedic PA about:

## 2023-08-08 NOTE — PROGRESS NOTE ADULT - SUBJECTIVE AND OBJECTIVE BOX
Orthopedic Progress Note     S:  No acute events overnight, pain is well controlled.  Patient denies any chest pain, SOB, N/V, fevers/chills.  Patient underwent HD yesterday, 1.5 hours into HD AVF was not working, clot was pulled from venous access site, and the R Shiley was used for venous access for the remainder of HD with the AVF used for arterial access.    T(C): 36.4 (08-07-23 @ 21:05), Max: 37.8 (08-07-23 @ 16:00)  HR: 66 (08-07-23 @ 21:05) (60 - 86)  BP: 150/65 (08-07-23 @ 21:05) (118/51 - 150/65)  RR: 18 (08-07-23 @ 21:05) (18 - 18)  SpO2: 100% (08-07-23 @ 21:05) (99% - 100%)  Wt(kg): --I&O's Summary    06 Aug 2023 07:01  -  07 Aug 2023 07:00  --------------------------------------------------------  IN: 0 mL / OUT: 25 mL / NET: -25 mL    07 Aug 2023 07:01  -  08 Aug 2023 06:17  --------------------------------------------------------  IN: 800 mL / OUT: 3200 mL / NET: -2400 mL        O:  Physical exam:  Gen: Alert and Oriented x3, No Acute Distress  Resp: non labored  EXTR:   Left lower ext:            Dressing: C/D/I ( changed yesterday PM)            Motor: EHL FHL GA TA SOL in tact            Sensation: SILT            Pulses: 2+ DP           Labs:                        7.1    4.33  )-----------( 111      ( 07 Aug 2023 13:30 )             22.2    08-07    135  |  100  |  50<H>  ----------------------------<  184<H>  4.6   |  24  |  5.89<H>    Ca    8.4      07 Aug 2023 11:28  Phos  4.2     08-07  Mg     1.90     08-07        Imaging:    A/P  Patient S/P----.  VSS. NAD.  PT/OT-WBAT  IS  DVT PPx  Pain Control  Dispo planning

## 2023-08-08 NOTE — PROGRESS NOTE ADULT - ASSESSMENT
Pt is a 77 y/o female s/p left hip SUZANNA-> left hip hemiarthroplasty DOS 7/19    PLAN  - Pain control/ Analgesia  - DVT ppx SQH w SCDs  - PT/OT   - WBAT and OOB to chair   - Incentive Spirometer  - Vascular surgery to assess AVF after clotting/high venous pressure during HD yesterday, appreciate input  - Dispo planning: needs AVF re-evaluated by vascular surgery before dc

## 2023-08-08 NOTE — PROGRESS NOTE ADULT - SUBJECTIVE AND OBJECTIVE BOX
Cardiovascular Disease Progress Note  Date of Service: 08-08-23 @ 07:26    Overnight events: No acute events overnight.    Patient denies chest pain or SOB.        Objective Findings:  T(C): 36.3 (08-08-23 @ 06:14), Max: 37.8 (08-07-23 @ 16:00)  HR: 65 (08-08-23 @ 06:14) (60 - 86)  BP: 159/63 (08-08-23 @ 06:14) (118/51 - 159/63)  RR: 18 (08-08-23 @ 06:14) (18 - 18)  SpO2: 100% (08-08-23 @ 06:14) (99% - 100%)  Wt(kg): --  Daily     Daily       Physical Exam:  Gen: NAD; Patient resting comfortably  HEENT: EOMI, Normocephalic/ atraumatic  CV: RRR, normal S1 + S2, no m/r/g  Lungs:  Normal respiratory effort; clear to auscultation bilaterally  Abd: soft, non-tender; bowel sounds present  Ext: No edema; warm and well perfused     Telemetry: n/a    Laboratory Data:                        7.1    4.33  )-----------( 111      ( 07 Aug 2023 13:30 )             22.2     08-07    135  |  100  |  50<H>  ----------------------------<  184<H>  4.6   |  24  |  5.89<H>    Ca    8.4      07 Aug 2023 11:28  Phos  4.2     08-07  Mg     1.90     08-07                Inpatient Medications:  MEDICATIONS  (STANDING):  acetaminophen     Tablet .. 650 milliGRAM(s) Oral every 6 hours  ascorbic acid 500 milliGRAM(s) Oral two times a day  calcium carbonate 1250 mG  + Vitamin D (OsCal 500 + D) 1 Tablet(s) Oral three times a day  chlorhexidine 2% Cloths 1 Application(s) Topical daily  epoetin marla (EPOGEN) Injectable 4000 Unit(s) IV Push <User Schedule>  folic acid 1 milliGRAM(s) Oral daily  HYDROmorphone  Injectable 0.5 milliGRAM(s) IV Push once  insulin lispro (ADMELOG) corrective regimen sliding scale   SubCutaneous Before meals and at bedtime  metoprolol succinate ER 12.5 milliGRAM(s) Oral daily  multivitamin 1 Tablet(s) Oral daily  pantoprazole    Tablet 40 milliGRAM(s) Oral before breakfast  polyethylene glycol 3350 17 Gram(s) Oral daily  senna 2 Tablet(s) Oral at bedtime      Assessment: 77 year old woman with ESRD on HD (MWF), HTN, DM (diet controlled), chronic type B aortic dissection, heart murmur, endometrial ca in remission s/p total hysterectomy in 2007 presents with L hip pain     Plan of Care:      #Post-operative evaluation-  - Ms. Harris tolerated orthopedic and AV fistula interventions well.  She displays no signs of  coronary ischemia or decompensated CHF.   Continue current cardiac management.       #Type B aortic dissection  - Chronic (Diagnosed >10 years ago)  - Continue current management.        #ESRD-  Vascular f/u regarding poor flow at fistula site.   - HD, as per renal.        Over 25 minutes spent on total encounter; more than 50% of the visit was spent counseling and/or coordinating care by the attending physician.      Gabo Burris MD [facility]  Cardiovascular Disease  [phone]

## 2023-08-09 LAB
BASOPHILS # BLD AUTO: 0.01 K/UL — SIGNIFICANT CHANGE UP (ref 0–0.2)
BASOPHILS NFR BLD AUTO: 0.2 % — SIGNIFICANT CHANGE UP (ref 0–2)
EOSINOPHIL # BLD AUTO: 0.18 K/UL — SIGNIFICANT CHANGE UP (ref 0–0.5)
EOSINOPHIL NFR BLD AUTO: 3.7 % — SIGNIFICANT CHANGE UP (ref 0–6)
GLUCOSE BLDC GLUCOMTR-MCNC: 123 MG/DL — HIGH (ref 70–99)
GLUCOSE BLDC GLUCOMTR-MCNC: 148 MG/DL — HIGH (ref 70–99)
GLUCOSE BLDC GLUCOMTR-MCNC: 156 MG/DL — HIGH (ref 70–99)
GLUCOSE BLDC GLUCOMTR-MCNC: 160 MG/DL — HIGH (ref 70–99)
HCT VFR BLD CALC: 26.6 % — LOW (ref 34.5–45)
HGB BLD-MCNC: 8.8 G/DL — LOW (ref 11.5–15.5)
IANC: 3.33 K/UL — SIGNIFICANT CHANGE UP (ref 1.8–7.4)
IMM GRANULOCYTES NFR BLD AUTO: 0.4 % — SIGNIFICANT CHANGE UP (ref 0–0.9)
LYMPHOCYTES # BLD AUTO: 0.89 K/UL — LOW (ref 1–3.3)
LYMPHOCYTES # BLD AUTO: 18.1 % — SIGNIFICANT CHANGE UP (ref 13–44)
MCHC RBC-ENTMCNC: 30 PG — SIGNIFICANT CHANGE UP (ref 27–34)
MCHC RBC-ENTMCNC: 33.1 GM/DL — SIGNIFICANT CHANGE UP (ref 32–36)
MCV RBC AUTO: 90.8 FL — SIGNIFICANT CHANGE UP (ref 80–100)
MONOCYTES # BLD AUTO: 0.5 K/UL — SIGNIFICANT CHANGE UP (ref 0–0.9)
MONOCYTES NFR BLD AUTO: 10.1 % — SIGNIFICANT CHANGE UP (ref 2–14)
NEUTROPHILS # BLD AUTO: 3.33 K/UL — SIGNIFICANT CHANGE UP (ref 1.8–7.4)
NEUTROPHILS NFR BLD AUTO: 67.5 % — SIGNIFICANT CHANGE UP (ref 43–77)
NRBC # BLD: 0 /100 WBCS — SIGNIFICANT CHANGE UP (ref 0–0)
NRBC # FLD: 0 K/UL — SIGNIFICANT CHANGE UP (ref 0–0)
PLATELET # BLD AUTO: 119 K/UL — LOW (ref 150–400)
RBC # BLD: 2.93 M/UL — LOW (ref 3.8–5.2)
RBC # FLD: 23.1 % — HIGH (ref 10.3–14.5)
WBC # BLD: 4.93 K/UL — SIGNIFICANT CHANGE UP (ref 3.8–10.5)
WBC # FLD AUTO: 4.93 K/UL — SIGNIFICANT CHANGE UP (ref 3.8–10.5)

## 2023-08-09 PROCEDURE — 99232 SBSQ HOSP IP/OBS MODERATE 35: CPT

## 2023-08-09 RX ORDER — HYDRALAZINE HCL 50 MG
10 TABLET ORAL THREE TIMES A DAY
Refills: 0 | Status: DISCONTINUED | OUTPATIENT
Start: 2023-08-09 | End: 2023-08-16

## 2023-08-09 RX ADMIN — Medication 650 MILLIGRAM(S): at 21:23

## 2023-08-09 RX ADMIN — Medication 650 MILLIGRAM(S): at 22:23

## 2023-08-09 RX ADMIN — Medication 1 TABLET(S): at 14:04

## 2023-08-09 RX ADMIN — Medication 500 MILLIGRAM(S): at 17:47

## 2023-08-09 RX ADMIN — Medication 12.5 MILLIGRAM(S): at 05:26

## 2023-08-09 RX ADMIN — Medication 1 TABLET(S): at 05:26

## 2023-08-09 RX ADMIN — Medication 1: at 06:47

## 2023-08-09 RX ADMIN — CHLORHEXIDINE GLUCONATE 1 APPLICATION(S): 213 SOLUTION TOPICAL at 12:42

## 2023-08-09 RX ADMIN — Medication 500 MILLIGRAM(S): at 05:27

## 2023-08-09 RX ADMIN — PANTOPRAZOLE SODIUM 40 MILLIGRAM(S): 20 TABLET, DELAYED RELEASE ORAL at 08:05

## 2023-08-09 RX ADMIN — Medication 1: at 16:47

## 2023-08-09 RX ADMIN — Medication 10 MILLIGRAM(S): at 22:32

## 2023-08-09 RX ADMIN — ERYTHROPOIETIN 4000 UNIT(S): 10000 INJECTION, SOLUTION INTRAVENOUS; SUBCUTANEOUS at 13:05

## 2023-08-09 RX ADMIN — Medication 1 MILLIGRAM(S): at 14:04

## 2023-08-09 RX ADMIN — Medication 10 MILLIGRAM(S): at 14:04

## 2023-08-09 RX ADMIN — Medication 1 TABLET(S): at 21:22

## 2023-08-09 RX ADMIN — POLYETHYLENE GLYCOL 3350 17 GRAM(S): 17 POWDER, FOR SOLUTION ORAL at 14:04

## 2023-08-09 NOTE — PROGRESS NOTE ADULT - PROBLEM SELECTOR PLAN 1
Pt with ESRD on HD TIW MWF admitted with fall. Pt s/p Lt hemiarthroplasty on 7/19/2023, however surgery complicated by intraoperative hypotension requiring vasopressor support. Patient transferred to SICU for hemodynamic stabilization. CT Angio abd/pelv reviewed. HD treatment held on 7/19/23 due to above. Held on 7/20 due to blood loss anemia. Transferred back to medical floors. 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 S/p nontunneled catheter placement 7/28. Pt. s/p LUE BC AVF fistulogram 8/1 w/ Balloon angioplasty. s/p aneurysmal Fistula, resected with end to end anastomosis 8/4, now can access AVF at proximal marked site. Last HD 8/7, venous access of AVF worked for 1.5hr, however cots were noted. Therefore used right shiley for venous access and AVF for arterial access for the remaining 1.5 hours. Maintenance HD planned for today. Will attempt to access AVF today. Monitor Labs

## 2023-08-09 NOTE — PROGRESS NOTE ADULT - SUBJECTIVE AND OBJECTIVE BOX
Cardiovascular Disease Progress Note  Date of Service: 08-09-23 @ 07:17    Overnight events: No acute events overnight.    Patient denies chest pain or SOB.   Otherwise review of systems negative    Objective Findings:  T(C): 36.3 (08-09-23 @ 05:26), Max: 36.7 (08-08-23 @ 09:30)  HR: 66 (08-09-23 @ 05:26) (63 - 72)  BP: 162/62 (08-09-23 @ 05:26) (137/61 - 162/62)  RR: 18 (08-09-23 @ 05:26) (18 - 18)  SpO2: 98% (08-09-23 @ 05:26) (98% - 100%)  Wt(kg): --  Daily     Daily       Physical Exam:  Gen: NAD; Patient resting comfortably  HEENT: EOMI, Normocephalic/ atraumatic  CV: RRR, normal S1 + S2, no m/r/g  Lungs:  Normal respiratory effort; clear to auscultation bilaterally  Abd: soft, non-tender; bowel sounds present  Ext: No edema; warm and well perfused    Telemetry: n/a    Laboratory Data:                        8.7    4.89  )-----------( 116      ( 08 Aug 2023 06:24 )             26.9     08-08    133<L>  |  99  |  37<H>  ----------------------------<  134<H>  5.0   |  25  |  4.61<H>    Ca    8.7      08 Aug 2023 06:24  Phos  4.2     08-07  Mg     1.90     08-07                Inpatient Medications:  MEDICATIONS  (STANDING):  acetaminophen     Tablet .. 650 milliGRAM(s) Oral every 6 hours  ascorbic acid 500 milliGRAM(s) Oral two times a day  calcium carbonate 1250 mG  + Vitamin D (OsCal 500 + D) 1 Tablet(s) Oral three times a day  chlorhexidine 2% Cloths 1 Application(s) Topical daily  epoetin marla (EPOGEN) Injectable 4000 Unit(s) IV Push <User Schedule>  folic acid 1 milliGRAM(s) Oral daily  HYDROmorphone  Injectable 0.5 milliGRAM(s) IV Push once  insulin lispro (ADMELOG) corrective regimen sliding scale   SubCutaneous Before meals and at bedtime  metoprolol succinate ER 12.5 milliGRAM(s) Oral daily  multivitamin 1 Tablet(s) Oral daily  pantoprazole    Tablet 40 milliGRAM(s) Oral before breakfast  polyethylene glycol 3350 17 Gram(s) Oral daily  senna 2 Tablet(s) Oral at bedtime      Assessment: 77 year old woman with ESRD on HD (MWF), HTN, DM (diet controlled), chronic type B aortic dissection, heart murmur, endometrial ca in remission s/p total hysterectomy in 2007 presents with L hip pain     Plan of Care:      #Post-operative evaluation-  - Ms. Harris tolerated orthopedic and AV fistula interventions well.  She displays no signs of  coronary ischemia or decompensated CHF.   Continue current cardiac management.       #Type B aortic dissection  - Chronic (Diagnosed >10 years ago)  - Continue current management.        #ESRD-  Vascular f/u regarding poor flow at fistula site.   - HD, as per renal.      Over 25 minutes spent on total encounter; more than 50% of the visit was spent counseling and/or coordinating care by the attending physician.      Gabo Burris MD [facility]  Cardiovascular Disease  [phone]

## 2023-08-09 NOTE — DISCHARGE NOTE PROVIDER - NSDCHOSPICE_GEN_A_CORE
What Type Of Note Output Would You Prefer (Optional)?: Bullet Format How Severe Is Your Acne?: mild Is This A New Presentation, Or A Follow-Up?: Acne No

## 2023-08-09 NOTE — PROGRESS NOTE ADULT - SUBJECTIVE AND OBJECTIVE BOX
TEAM [ ** ] Surgery Daily Progress Note  =====================================================    SUBJECTIVE: Patient seen and examined at bedside on AM rounds. Patient reports that they're feeling well, but is still having discomfort due to the swelling of her LUE. Denies fever, chills, N/V, chest pain, SOB    ALLERGIES:  No Known Allergies      --------------------------------------------------------------------------------------    MEDICATIONS:    Neurologic Medications  acetaminophen     Tablet .. 650 milliGRAM(s) Oral every 6 hours  HYDROmorphone  Injectable 0.5 milliGRAM(s) IV Push once  melatonin 3 milliGRAM(s) Oral at bedtime PRN Insomnia  ondansetron Injectable 4 milliGRAM(s) IV Push every 6 hours PRN Nausea and/or Vomiting  oxyCODONE    IR 5 milliGRAM(s) Oral every 6 hours PRN Severe Pain (7 - 10)    Respiratory Medications    Cardiovascular Medications  metoprolol succinate ER 12.5 milliGRAM(s) Oral daily    Gastrointestinal Medications  aluminum hydroxide/magnesium hydroxide/simethicone Suspension 30 milliLiter(s) Oral four times a day PRN Indigestion  ascorbic acid 500 milliGRAM(s) Oral two times a day  calcium carbonate 1250 mG  + Vitamin D (OsCal 500 + D) 1 Tablet(s) Oral three times a day  folic acid 1 milliGRAM(s) Oral daily  multivitamin 1 Tablet(s) Oral daily  pantoprazole    Tablet 40 milliGRAM(s) Oral before breakfast  polyethylene glycol 3350 17 Gram(s) Oral daily  senna 2 Tablet(s) Oral at bedtime    Genitourinary Medications    Hematologic/Oncologic Medications  epoetin marla (EPOGEN) Injectable 4000 Unit(s) IV Push <User Schedule>    Antimicrobial/Immunologic Medications    Endocrine/Metabolic Medications  insulin lispro (ADMELOG) corrective regimen sliding scale   SubCutaneous Before meals and at bedtime    Topical/Other Medications  chlorhexidine 2% Cloths 1 Application(s) Topical daily    --------------------------------------------------------------------------------------    VITAL SIGNS:  T(C): 36.3 (08-09-23 @ 05:26), Max: 36.7 (08-08-23 @ 09:30)  HR: 66 (08-09-23 @ 05:26) (63 - 72)  BP: 162/62 (08-09-23 @ 05:26) (137/61 - 162/62)  RR: 18 (08-09-23 @ 05:26) (18 - 18)  SpO2: 98% (08-09-23 @ 05:26) (98% - 100%)  --------------------------------------------------------------------------------------    INS AND OUTS:    --------------------------------------------------------------------------------------      EXAM    General: NAD, resting in bed comfortably.  Cardiac: regular rate, warm and well perfused  Respiratory: Nonlabored respirations, normal cw expansion.  Extremities: LUE swollen w/ blister over volar thumb, AVF aneurysmal and is pulsatile w/o thrill    --------------------------------------------------------------------------------------    LABS

## 2023-08-09 NOTE — PROGRESS NOTE ADULT - SUBJECTIVE AND OBJECTIVE BOX
ORTHO PROGRESS NOTE     Pt seen and examined at bedside, denies SOB, CP, Dizziness. N/V/D /HA.  No significant overnight events. Pain well controlled. Patient ambulated  with PT     Vital Signs Last 24 Hrs  T(C): 36.3 (09 Aug 2023 05:26), Max: 36.7 (08 Aug 2023 09:30)  T(F): 97.4 (09 Aug 2023 05:26), Max: 98.1 (08 Aug 2023 09:30)  HR: 66 (09 Aug 2023 05:26) (63 - 72)  BP: 162/62 (09 Aug 2023 05:26) (137/61 - 162/62)  BP(mean): --  RR: 18 (09 Aug 2023 05:26) (18 - 18)  SpO2: 98% (09 Aug 2023 05:26) (98% - 100%)    Parameters below as of 09 Aug 2023 05:26  Patient On (Oxygen Delivery Method): room air        Gen: No apparent distress    left LE:  Dressing C/D I    BLE: motor intact EHL/FHL/TA/GS .  Sensation is grossly intact to light touch in the distal extremities.  Compartments are soft bilaterally.  Extremities are warm .  DP 2+ BLE     Labs:  CBC Full  -  ( 08 Aug 2023 06:24 )  WBC Count : 4.89 K/uL  RBC Count : 2.86 M/uL  Hemoglobin : 8.7 g/dL  Hematocrit : 26.9 %  Platelet Count - Automated : 116 K/uL  Mean Cell Volume : 94.1 fL  Mean Cell Hemoglobin : 30.4 pg  Mean Cell Hemoglobin Concentration : 32.3 gm/dL  Auto Neutrophil # : x  Auto Lymphocyte # : x  Auto Monocyte # : x  Auto Eosinophil # : x  Auto Basophil # : x  Auto Neutrophil % : x  Auto Lymphocyte % : x  Auto Monocyte % : x  Auto Eosinophil % : x  Auto Basophil % : x        08-08    133<L>  |  99  |  37<H>  ----------------------------<  134<H>  5.0   |  25  |  4.61<H>    Ca    8.7      08 Aug 2023 06:24  Phos  4.2     08-07  Mg     1.90     08-07           A/P  s/p left hip hemiarthroplasty , POD #21    - Pain control/ Analgesia  - DVT ppx SQH  foot pumps  - PT/OT - wbat/oob    - Incentive Spirometer  - FU IR for permacath  - notify Ortho for questions

## 2023-08-09 NOTE — PROGRESS NOTE ADULT - PROBLEM SELECTOR PLAN 2
Pt. with anemia in the setting of ESRD and blood loss from surgical procedure. Hgb 8.7 (8/8). Will give RICA with HD treatments. Monitor hemoglobin levels.     If you have any questions, please feel free to contact me  Kyle Ngo  Nephrology Fellow  [place]/Page [phone]  (After 5pm or on weekends please page the on-call fellow)

## 2023-08-09 NOTE — PROGRESS NOTE ADULT - ASSESSMENT
A/P: Patient is a 78y y/o Female s/p R hip hemiarthroplasty, POD #0   - Posterior precautions  - Pain control  - Antibiotics - Ancef postop  - DVT ppx  - lovenox  - Incentive spirometry  - Venodynes  - F/U AM Labs  - PT/OT/WBAT  - Notify Orthopedics with any questions

## 2023-08-09 NOTE — PROGRESS NOTE ADULT - SUBJECTIVE AND OBJECTIVE BOX
[facility] Division of Kidney Diseases & Hypertension  FOLLOW UP NOTE  [phone]--------------------------------------------------------------------------------  Chief Complaint:Unspecified complication of procedure, initial encounter    HPI: 78F w/ PMH of ESRD on HD TIW (MWF) at [facility] (previously at [facility]), HTN, DM2, endometrial cancer (underwent hysterectomy and chemotherapy, type B aortic dissection, and L hip ORIF, admitted for revision of L hip hardware. Nephrology consulted for management of ESRD/HD. 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  S/p non-tunneled catheter placement 7/28, plan or fistulogram by vascular on 8/1.  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      24 hour events/subjective: Pt. seen and evaluated at bedside. Overall feels well, no acute complaints. She denies fevers/chills, headaches, chest pain, SOB, abd pain, or leg swelling.     PAST HISTORY  --------------------------------------------------------------------------------  No significant changes to PMH, PSH, FHx, SHx, unless otherwise noted    ALLERGIES & MEDICATIONS  --------------------------------------------------------------------------------  Allergies  No Known Allergies  Intolerances    Standing Inpatient Medications  acetaminophen     Tablet .. 650 milliGRAM(s) Oral every 6 hours  ascorbic acid 500 milliGRAM(s) Oral two times a day  calcium carbonate 1250 mG  + Vitamin D (OsCal 500 + D) 1 Tablet(s) Oral three times a day  chlorhexidine 2% Cloths 1 Application(s) Topical daily  epoetin marla (EPOGEN) Injectable 4000 Unit(s) IV Push <User Schedule>  folic acid 1 milliGRAM(s) Oral daily  HYDROmorphone  Injectable 0.5 milliGRAM(s) IV Push once  insulin lispro (ADMELOG) corrective regimen sliding scale   SubCutaneous Before meals and at bedtime  metoprolol succinate ER 12.5 milliGRAM(s) Oral daily  multivitamin 1 Tablet(s) Oral daily  pantoprazole    Tablet 40 milliGRAM(s) Oral before breakfast  polyethylene glycol 3350 17 Gram(s) Oral daily  senna 2 Tablet(s) Oral at bedtime  PRN Inpatient Medications  aluminum hydroxide/magnesium hydroxide/simethicone Suspension 30 milliLiter(s) Oral four times a day PRN  melatonin 3 milliGRAM(s) Oral at bedtime PRN  ondansetron Injectable 4 milliGRAM(s) IV Push every 6 hours PRN  oxyCODONE    IR 5 milliGRAM(s) Oral every 6 hours PRN    REVIEW OF SYSTEMS  --------------------------------------------------------------------------------  see HPI  All other systems were reviewed and are negative, except as noted.    VITALS/PHYSICAL EXAM  --------------------------------------------------------------------------------  T(C): 36.7 (08-09-23 @ 10:11), Max: 36.7 (08-09-23 @ 10:11)  HR: 99 (08-09-23 @ 10:11) (63 - 99)  BP: 144/64 (08-09-23 @ 10:11) (141/72 - 162/62)  RR: 18 (08-09-23 @ 10:11) (18 - 18)  SpO2: 99% (08-09-23 @ 10:11) (98% - 100%)  Wt(kg): --    Physical Exam:  Gen: NAD  HEENT: MMM,   Pulm: minimal bibasilar crackles   CV: S1S2  Abd: Soft, +BS   Ext: LE edema B/L L>R, chronic LUE edema  Neuro: Awake  Skin: Warm and dry, ecchymotic RUE  Vascular access:+RIJ Desiree, LUE AVF covered in clean and dry dressing, with audible bruit    LABS/STUDIES  --------------------------------------------------------------------------------              8.7    4.89  >-----------<  116      [08-08-23 @ 06:24]              26.9     133  |  99  |  37  ----------------------------<  134      [08-08-23 @ 06:24]  5.0   |  25  |  4.61        Ca     8.7     [08-08-23 @ 06:24]      Mg     1.90     [08-07-23 @ 11:28]      Phos  4.2     [08-07-23 @ 11:28]    Creatinine Trend:  SCr 4.61 [08-08 @ 06:24]  SCr 5.89 [08-07 @ 11:28]  SCr 3.45 [08-03 @ 02:25]  SCr 3.25 [08-01 @ 03:23]  SCr 5.14 [07-31 @ 05:35]   [facility] Division of Kidney Diseases & Hypertension  FOLLOW UP NOTE  [phone]--------------------------------------------------------------------------------    Chief Complaint: Unspecified complication of procedure, initial encounter    HPI: 78F w/ PMH of ESRD on HD TIW (MWF) at [facility] (previously at [facility]), HTN, DM2, endometrial cancer (underwent hysterectomy and chemotherapy, type B aortic dissection, and L hip ORIF, admitted for revision of L hip hardware. Nephrology consulted for management of ESRD/HD. 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  S/p non-tunneled catheter placement 7/28, plan or fistulogram by vascular on 8/1.  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      24 hour events/subjective: Pt. seen and evaluated at bedside. Overall feels well, no acute complaints. She denies fevers/chills, headaches, chest pain, SOB, abd pain, or leg swelling.     PAST HISTORY  --------------------------------------------------------------------------------  No significant changes to PMH, PSH, FHx, SHx, unless otherwise noted    ALLERGIES & MEDICATIONS  --------------------------------------------------------------------------------  Allergies  No Known Allergies  Intolerances    Standing Inpatient Medications  acetaminophen     Tablet .. 650 milliGRAM(s) Oral every 6 hours  ascorbic acid 500 milliGRAM(s) Oral two times a day  calcium carbonate 1250 mG  + Vitamin D (OsCal 500 + D) 1 Tablet(s) Oral three times a day  chlorhexidine 2% Cloths 1 Application(s) Topical daily  epoetin marla (EPOGEN) Injectable 4000 Unit(s) IV Push <User Schedule>  folic acid 1 milliGRAM(s) Oral daily  HYDROmorphone  Injectable 0.5 milliGRAM(s) IV Push once  insulin lispro (ADMELOG) corrective regimen sliding scale   SubCutaneous Before meals and at bedtime  metoprolol succinate ER 12.5 milliGRAM(s) Oral daily  multivitamin 1 Tablet(s) Oral daily  pantoprazole    Tablet 40 milliGRAM(s) Oral before breakfast  polyethylene glycol 3350 17 Gram(s) Oral daily  senna 2 Tablet(s) Oral at bedtime  PRN Inpatient Medications  aluminum hydroxide/magnesium hydroxide/simethicone Suspension 30 milliLiter(s) Oral four times a day PRN  melatonin 3 milliGRAM(s) Oral at bedtime PRN  ondansetron Injectable 4 milliGRAM(s) IV Push every 6 hours PRN  oxyCODONE    IR 5 milliGRAM(s) Oral every 6 hours PRN    REVIEW OF SYSTEMS  --------------------------------------------------------------------------------  see HPI  All other systems were reviewed and are negative, except as noted.    VITALS/PHYSICAL EXAM  --------------------------------------------------------------------------------  T(C): 36.7 (08-09-23 @ 10:11), Max: 36.7 (08-09-23 @ 10:11)  HR: 99 (08-09-23 @ 10:11) (63 - 99)  BP: 144/64 (08-09-23 @ 10:11) (141/72 - 162/62)  RR: 18 (08-09-23 @ 10:11) (18 - 18)  SpO2: 99% (08-09-23 @ 10:11) (98% - 100%)  Wt(kg): --    Physical Exam:  Gen: NAD  HEENT: MMM,   Pulm: minimal bibasilar crackles   CV: S1S2  Abd: Soft, +BS   Ext: LE edema B/L L>R, chronic LUE edema  Neuro: Awake  Skin: Warm and dry, ecchymotic RUE  Vascular access:+RIJ Desiree, LUE AVF covered in clean and dry dressing, with audible bruit    LABS/STUDIES  --------------------------------------------------------------------------------              8.7    4.89  >-----------<  116      [08-08-23 @ 06:24]              26.9     133  |  99  |  37  ----------------------------<  134      [08-08-23 @ 06:24]  5.0   |  25  |  4.61        Ca     8.7     [08-08-23 @ 06:24]      Mg     1.90     [08-07-23 @ 11:28]      Phos  4.2     [08-07-23 @ 11:28]    Creatinine Trend:  SCr 4.61 [08-08 @ 06:24]  SCr 5.89 [08-07 @ 11:28]  SCr 3.45 [08-03 @ 02:25]  SCr 3.25 [08-01 @ 03:23]  SCr 5.14 [07-31 @ 05:35]

## 2023-08-09 NOTE — PROGRESS NOTE ADULT - SUBJECTIVE AND OBJECTIVE BOX
[facility] Division of Hospital Medicine  Donnell Mullen MD  Pager (DONTAE-F, 8A-5P): [phone]  Other Times:  [phone]    Patient is a 78y old  Female who presents with a chief complaint of s/p L hip orif concern for loosened hardwar (09 Aug 2023 11:08)    SUBJECTIVE / OVERNIGHT EVENTS:  Patient was started on HD via new AVF.  LUE edema significantly improved.  No F/C, N/V, CP, SOB, Cough, lightheadedness, dizziness, abdominal pain, diarrhea, dysuria.    MEDICATIONS  (STANDING):  acetaminophen     Tablet .. 650 milliGRAM(s) Oral every 6 hours  ascorbic acid 500 milliGRAM(s) Oral two times a day  calcium carbonate 1250 mG  + Vitamin D (OsCal 500 + D) 1 Tablet(s) Oral three times a day  chlorhexidine 2% Cloths 1 Application(s) Topical daily  epoetin marla (EPOGEN) Injectable 4000 Unit(s) IV Push <User Schedule>  folic acid 1 milliGRAM(s) Oral daily  HYDROmorphone  Injectable 0.5 milliGRAM(s) IV Push once  insulin lispro (ADMELOG) corrective regimen sliding scale   SubCutaneous Before meals and at bedtime  metoprolol succinate ER 12.5 milliGRAM(s) Oral daily  multivitamin 1 Tablet(s) Oral daily  pantoprazole    Tablet 40 milliGRAM(s) Oral before breakfast  polyethylene glycol 3350 17 Gram(s) Oral daily  senna 2 Tablet(s) Oral at bedtime    MEDICATIONS  (PRN):  aluminum hydroxide/magnesium hydroxide/simethicone Suspension 30 milliLiter(s) Oral four times a day PRN Indigestion  melatonin 3 milliGRAM(s) Oral at bedtime PRN Insomnia  ondansetron Injectable 4 milliGRAM(s) IV Push every 6 hours PRN Nausea and/or Vomiting  oxyCODONE    IR 5 milliGRAM(s) Oral every 6 hours PRN Severe Pain (7 - 10)      Vital Signs Last 24 Hrs  T(C): 36.6 (09 Aug 2023 10:45), Max: 36.7 (09 Aug 2023 10:11)  T(F): 97.8 (09 Aug 2023 10:45), Max: 98 (09 Aug 2023 10:11)  HR: 63 (09 Aug 2023 10:45) (63 - 99)  BP: 173/78 (09 Aug 2023 10:45) (141/72 - 173/78)  BP(mean): --  RR: 18 (09 Aug 2023 10:45) (18 - 18)  SpO2: 99% (09 Aug 2023 10:11) (98% - 99%)    Parameters below as of 09 Aug 2023 10:45  Patient On (Oxygen Delivery Method): room air      CAPILLARY BLOOD GLUCOSE      POCT Blood Glucose.: 123 mg/dL (09 Aug 2023 11:37)  POCT Blood Glucose.: 160 mg/dL (09 Aug 2023 06:06)  POCT Blood Glucose.: 151 mg/dL (08 Aug 2023 21:52)  POCT Blood Glucose.: 159 mg/dL (08 Aug 2023 16:30)    I&O's Summary      PHYSICAL EXAM:  CONSTITUTIONAL: NAD  EYES: PERRLA; conjunctiva and sclera clear  ENMT: Moist oral mucosa, no pharyngeal injection or exudates; normal dentition  NECK: Supple, no palpable masses; no thyromegaly  RESPIRATORY: Normal respiratory effort; lungs are clear to auscultation bilaterally  CARDIOVASCULAR: Regular rate and rhythm, normal S1 and S2, no murmur/rub/gallop; No lower extremity edema; Peripheral pulses are 2+ bilaterally; LUE 2+ edema, AVF present, R IJ shiley in place, LUE AVF bruit present.  ABDOMEN: Nontender to palpation, normoactive bowel sounds, no rebound/guarding; No hepatosplenomegaly  MUSCULOSKELETAL:  did not assess gait; no clubbing or cyanosis of digits; no joint swelling or tenderness to palpation  PSYCH: A+O to person, place, and time; affect appropriate  NEUROLOGY: CN 2-12 are intact and symmetric; no gross sensory deficits   SKIN: No rashes; no palpable lesions, surgical dressing C/D/I    LABS:                        8.7    4.89  )-----------( 116      ( 08 Aug 2023 06:24 )             26.9     08-08    133<L>  |  99  |  37<H>  ----------------------------<  134<H>  5.0   |  25  |  4.61<H>    Ca    8.7      08 Aug 2023 06:24            Urinalysis Basic - ( 08 Aug 2023 06:24 )    Color: x / Appearance: x / SG: x / pH: x  Gluc: 134 mg/dL / Ketone: x  / Bili: x / Urobili: x   Blood: x / Protein: x / Nitrite: x   Leuk Esterase: x / RBC: x / WBC x   Sq Epi: x / Non Sq Epi: x / Bacteria: x        RADIOLOGY & ADDITIONAL TESTS:    Imaging Personally Reviewed:    Care Discussed with Consultants/Other Providers:    Care Discussed with Orthopedic PA about:

## 2023-08-09 NOTE — PROGRESS NOTE ADULT - PROBLEM SELECTOR PLAN 3
Rt IJ shiley placed  - S/P VSx balloon angioplasty of AVF 8/1  - s/p AVF revision 8/3, cleared for use by vascular   - awaiting follow up from VSx for feasibility of LUE AVF after HD session on 8/9.  - plan for Permacath placement and shiley removal.

## 2023-08-09 NOTE — PROGRESS NOTE ADULT - ASSESSMENT
Patient is a 78-year-old female s/p left hip hemiarthroplasty w/ a PMH of ESRD on HD by LUE AVF that was found to be ulcerated now s/p fistulogram, s/p AVF revision (8/3). Patient was unable to tolerate dialysis through the AVF due to clotting and poor venous flow and HD was completed through her IJ shiley (8/7). Plan is to reattempt HD through the AVF, and if unable to, IR will convert shiley to permacath and further AVF revision can be pursued outpatient.     - HD through fistula today  - IR to tunnel catheter  - Appreciate excellent care per primary    The above discussed with the vascular surgery fellow

## 2023-08-09 NOTE — PROGRESS NOTE ADULT - SUBJECTIVE AND OBJECTIVE BOX
Orthopedics Post-Op Check:  Patient was seen and examined at bedside. Denies CP/SOB/Dizziness, N/V/D, HA. Pain is controlled.     Vital Signs Last 24 Hrs  T(C): 36.4 (09 Aug 2023 13:50), Max: 36.7 (09 Aug 2023 10:11)  T(F): 97.6 (09 Aug 2023 13:50), Max: 98 (09 Aug 2023 10:11)  HR: 69 (09 Aug 2023 13:50) (63 - 99)  BP: 135/65 (09 Aug 2023 13:50) (135/65 - 173/78)  BP(mean): --  RR: 18 (09 Aug 2023 14:00) (18 - 18)  SpO2: 100% (09 Aug 2023 14:00) (98% - 100%)    Parameters below as of 09 Aug 2023 14:00  Patient On (Oxygen Delivery Method): room air    Labs:                        8.8    4.93  )-----------( 119      ( 09 Aug 2023 13:33 )             26.6     08-08    133<L>  |  99  |  37<H>  ----------------------------<  134<H>  5.0   |  25  |  4.61<H>    Ca    8.7      08 Aug 2023 06:24    Physical Exam:  Gen: NAD  RLE:   Dressing C/D/I. ABduction pillow in place.   Motor intact + EHL/FHL/TA/GS. Sensation is grossly intact.   Compartments are soft, extremities are warm, DP 2+

## 2023-08-10 LAB
GLUCOSE BLDC GLUCOMTR-MCNC: 147 MG/DL — HIGH (ref 70–99)
GLUCOSE BLDC GLUCOMTR-MCNC: 169 MG/DL — HIGH (ref 70–99)
GLUCOSE BLDC GLUCOMTR-MCNC: 173 MG/DL — HIGH (ref 70–99)
GLUCOSE BLDC GLUCOMTR-MCNC: 184 MG/DL — HIGH (ref 70–99)

## 2023-08-10 PROCEDURE — 99232 SBSQ HOSP IP/OBS MODERATE 35: CPT

## 2023-08-10 RX ADMIN — Medication 1 TABLET(S): at 05:13

## 2023-08-10 RX ADMIN — Medication 1: at 21:47

## 2023-08-10 RX ADMIN — Medication 12.5 MILLIGRAM(S): at 05:15

## 2023-08-10 RX ADMIN — Medication 1 MILLIGRAM(S): at 11:43

## 2023-08-10 RX ADMIN — Medication 650 MILLIGRAM(S): at 09:55

## 2023-08-10 RX ADMIN — Medication 10 MILLIGRAM(S): at 13:07

## 2023-08-10 RX ADMIN — Medication 1 TABLET(S): at 21:34

## 2023-08-10 RX ADMIN — SENNA PLUS 2 TABLET(S): 8.6 TABLET ORAL at 21:34

## 2023-08-10 RX ADMIN — Medication 500 MILLIGRAM(S): at 17:20

## 2023-08-10 RX ADMIN — Medication 10 MILLIGRAM(S): at 05:12

## 2023-08-10 RX ADMIN — Medication 500 MILLIGRAM(S): at 05:23

## 2023-08-10 RX ADMIN — Medication 650 MILLIGRAM(S): at 21:33

## 2023-08-10 RX ADMIN — Medication 650 MILLIGRAM(S): at 10:55

## 2023-08-10 RX ADMIN — Medication 10 MILLIGRAM(S): at 21:37

## 2023-08-10 RX ADMIN — Medication 1 TABLET(S): at 11:43

## 2023-08-10 RX ADMIN — Medication 1: at 11:43

## 2023-08-10 RX ADMIN — Medication 1 TABLET(S): at 13:07

## 2023-08-10 RX ADMIN — Medication 650 MILLIGRAM(S): at 22:31

## 2023-08-10 RX ADMIN — CHLORHEXIDINE GLUCONATE 1 APPLICATION(S): 213 SOLUTION TOPICAL at 11:46

## 2023-08-10 RX ADMIN — PANTOPRAZOLE SODIUM 40 MILLIGRAM(S): 20 TABLET, DELAYED RELEASE ORAL at 05:12

## 2023-08-10 RX ADMIN — Medication 3 MILLIGRAM(S): at 21:34

## 2023-08-10 RX ADMIN — Medication 1: at 07:41

## 2023-08-10 NOTE — PROGRESS NOTE ADULT - SUBJECTIVE AND OBJECTIVE BOX
ORTHOPAEDIC PROGRESS NOTE    SUBJECTIVE:  Pt seen and examined at bedside this am.  Doing well.  No acute events overnight.  Complaining of SOB this AM when using bedpan           OBJECTIVE:  Vital Signs Last 24 Hrs  T(C): 36.4 (10 Aug 2023 05:09), Max: 37.1 (09 Aug 2023 18:21)  T(F): 97.5 (10 Aug 2023 05:09), Max: 98.7 (09 Aug 2023 18:21)  HR: 76 (10 Aug 2023 05:09) (63 - 99)  BP: 164/73 (10 Aug 2023 05:09) (135/65 - 173/78)  BP(mean): --  RR: 18 (10 Aug 2023 05:09) (18 - 18)  SpO2: 100% (10 Aug 2023 05:09) (99% - 100%)    Parameters below as of 10 Aug 2023 05:09  Patient On (Oxygen Delivery Method): room air        Physical Exam:  General: NAD; resting in bed  Resp: non labored  LLE:           Dressing: saturated this AM (changed on rounds)            Motor: EHL FHL GA TA SOL in tact            Sensation: SILT            Pulses: 2+ DP    LABS                        8.8    4.93  )-----------( 119      ( 09 Aug 2023 13:33 )             26.6                   I&O's Summary    09 Aug 2023 07:01  -  10 Aug 2023 06:46  --------------------------------------------------------  IN: 400 mL / OUT: 2300 mL / NET: -1900 mL        acetaminophen     Tablet .. 650 milliGRAM(s) Oral every 6 hours  aluminum hydroxide/magnesium hydroxide/simethicone Suspension 30 milliLiter(s) Oral four times a day PRN  ascorbic acid 500 milliGRAM(s) Oral two times a day  calcium carbonate 1250 mG  + Vitamin D (OsCal 500 + D) 1 Tablet(s) Oral three times a day  chlorhexidine 2% Cloths 1 Application(s) Topical daily  epoetin marla (EPOGEN) Injectable 4000 Unit(s) IV Push <User Schedule>  folic acid 1 milliGRAM(s) Oral daily  hydrALAZINE 10 milliGRAM(s) Oral three times a day  HYDROmorphone  Injectable 0.5 milliGRAM(s) IV Push once  insulin lispro (ADMELOG) corrective regimen sliding scale   SubCutaneous Before meals and at bedtime  melatonin 3 milliGRAM(s) Oral at bedtime PRN  metoprolol succinate ER 12.5 milliGRAM(s) Oral daily  multivitamin 1 Tablet(s) Oral daily  ondansetron Injectable 4 milliGRAM(s) IV Push every 6 hours PRN  oxyCODONE    IR 5 milliGRAM(s) Oral every 6 hours PRN  pantoprazole    Tablet 40 milliGRAM(s) Oral before breakfast  polyethylene glycol 3350 17 Gram(s) Oral daily  senna 2 Tablet(s) Oral at bedtime

## 2023-08-10 NOTE — PROGRESS NOTE ADULT - SUBJECTIVE AND OBJECTIVE BOX
[facility] Division of Hospital Medicine  Donnell Mullen MD  Pager (M-F, 8A-5P): [phone]  Other Times:  [phone]    Patient is a 78y old  Female who presents with a chief complaint of s/p L hip orif concern for loosened hardwar (10 Aug 2023 08:45)    SUBJECTIVE / OVERNIGHT EVENTS:  Patient offers no new complaints.  No F/C, N/V, CP, SOB, Cough, lightheadedness, dizziness, abdominal pain, diarrhea, dysuria.    MEDICATIONS  (STANDING):  acetaminophen     Tablet .. 650 milliGRAM(s) Oral every 6 hours  ascorbic acid 500 milliGRAM(s) Oral two times a day  calcium carbonate 1250 mG  + Vitamin D (OsCal 500 + D) 1 Tablet(s) Oral three times a day  chlorhexidine 2% Cloths 1 Application(s) Topical daily  epoetin marla (EPOGEN) Injectable 4000 Unit(s) IV Push <User Schedule>  folic acid 1 milliGRAM(s) Oral daily  hydrALAZINE 10 milliGRAM(s) Oral three times a day  HYDROmorphone  Injectable 0.5 milliGRAM(s) IV Push once  insulin lispro (ADMELOG) corrective regimen sliding scale   SubCutaneous Before meals and at bedtime  metoprolol succinate ER 12.5 milliGRAM(s) Oral daily  multivitamin 1 Tablet(s) Oral daily  pantoprazole    Tablet 40 milliGRAM(s) Oral before breakfast  polyethylene glycol 3350 17 Gram(s) Oral daily  senna 2 Tablet(s) Oral at bedtime    MEDICATIONS  (PRN):  aluminum hydroxide/magnesium hydroxide/simethicone Suspension 30 milliLiter(s) Oral four times a day PRN Indigestion  melatonin 3 milliGRAM(s) Oral at bedtime PRN Insomnia  ondansetron Injectable 4 milliGRAM(s) IV Push every 6 hours PRN Nausea and/or Vomiting  oxyCODONE    IR 5 milliGRAM(s) Oral every 6 hours PRN Severe Pain (7 - 10)      Vital Signs Last 24 Hrs  T(C): 36.6 (10 Aug 2023 13:05), Max: 37.1 (09 Aug 2023 18:21)  T(F): 97.8 (10 Aug 2023 13:05), Max: 98.7 (09 Aug 2023 18:21)  HR: 67 (10 Aug 2023 13:05) (67 - 76)  BP: 144/73 (10 Aug 2023 13:05) (135/65 - 164/73)  BP(mean): --  RR: 18 (10 Aug 2023 13:05) (18 - 18)  SpO2: 100% (10 Aug 2023 13:05) (100% - 100%)    Parameters below as of 10 Aug 2023 13:05  Patient On (Oxygen Delivery Method): room air      CAPILLARY BLOOD GLUCOSE      POCT Blood Glucose.: 173 mg/dL (10 Aug 2023 11:26)  POCT Blood Glucose.: 169 mg/dL (10 Aug 2023 07:09)  POCT Blood Glucose.: 148 mg/dL (09 Aug 2023 22:03)  POCT Blood Glucose.: 156 mg/dL (09 Aug 2023 16:43)    I&O's Summary    09 Aug 2023 07:01  -  10 Aug 2023 07:00  --------------------------------------------------------  IN: 400 mL / OUT: 2300 mL / NET: -1900 mL        PHYSICAL EXAM:  CONSTITUTIONAL: NAD  EYES: PERRLA; conjunctiva and sclera clear  ENMT: Moist oral mucosa, no pharyngeal injection or exudates; normal dentition  NECK: Supple, no palpable masses; no thyromegaly  RESPIRATORY: Normal respiratory effort; lungs are clear to auscultation bilaterally  CARDIOVASCULAR: Regular rate and rhythm, normal S1 and S2, no murmur/rub/gallop; No lower extremity edema; Peripheral pulses are 2+ bilaterally; LUE 2+ edema, AVF present, R IJ shiley in place, LUE AVF bruit present.  ABDOMEN: Nontender to palpation, normoactive bowel sounds, no rebound/guarding; No hepatosplenomegaly  MUSCULOSKELETAL:  did not assess gait; no clubbing or cyanosis of digits; no joint swelling or tenderness to palpation  PSYCH: A+O to person, place, and time; affect appropriate  NEUROLOGY: CN 2-12 are intact and symmetric; no gross sensory deficits   SKIN: No rashes; no palpable lesions, surgical dressing C/D/I    LABS:                        8.8    4.93  )-----------( 119      ( 09 Aug 2023 13:33 )             26.6                     RADIOLOGY & ADDITIONAL TESTS:    Imaging Personally Reviewed:    Care Discussed with Consultants/Other Providers:    Care Discussed with Orthopedic PA about:

## 2023-08-10 NOTE — PROGRESS NOTE ADULT - ASSESSMENT
Pt is a 79 y/o female s/p left hip SUZANNA-> left hip hemiarthroplasty DOS 7/19    PLAN  - Pain control/ Analgesia  - PT/OT   - WBAT and OOB to chair   - Incentive Spirometer  - Dialysis 8/10  - Dispo planning: pt will have AVF re attempted for HD tomorrow

## 2023-08-10 NOTE — PROGRESS NOTE ADULT - ASSESSMENT
Patient is a 78-year-old female s/p left hip hemiarthroplasty w/ a PMH of ESRD on HD by LUE AVF that was found to be ulcerated now s/p fistulogram, s/p AVF revision (8/3). Patient was unable to tolerate dialysis through the AVF due to clotting and poor venous flow and HD was completed through her IJ shiley (8/7). Patient was able to complete dialysis through the AVF last night, and the dialysis team reports that there were no issues. IR no longer needed to tunnel catheter.    - Continue HD through AVF  - Appreciate excellent care per primary    The above discussed with the vascular surgery fellow

## 2023-08-10 NOTE — PROGRESS NOTE ADULT - SUBJECTIVE AND OBJECTIVE BOX
TEAM [ ** ] Surgery Daily Progress Note  =====================================================    SUBJECTIVE: Patient seen and examined at bedside on AM rounds. Patient reports that they're feeling well.    ALLERGIES:  No Known Allergies      --------------------------------------------------------------------------------------    MEDICATIONS:    Neurologic Medications  acetaminophen     Tablet .. 650 milliGRAM(s) Oral every 6 hours  HYDROmorphone  Injectable 0.5 milliGRAM(s) IV Push once  melatonin 3 milliGRAM(s) Oral at bedtime PRN Insomnia  ondansetron Injectable 4 milliGRAM(s) IV Push every 6 hours PRN Nausea and/or Vomiting  oxyCODONE    IR 5 milliGRAM(s) Oral every 6 hours PRN Severe Pain (7 - 10)    Respiratory Medications    Cardiovascular Medications  hydrALAZINE 10 milliGRAM(s) Oral three times a day  metoprolol succinate ER 12.5 milliGRAM(s) Oral daily    Gastrointestinal Medications  aluminum hydroxide/magnesium hydroxide/simethicone Suspension 30 milliLiter(s) Oral four times a day PRN Indigestion  ascorbic acid 500 milliGRAM(s) Oral two times a day  calcium carbonate 1250 mG  + Vitamin D (OsCal 500 + D) 1 Tablet(s) Oral three times a day  folic acid 1 milliGRAM(s) Oral daily  multivitamin 1 Tablet(s) Oral daily  pantoprazole    Tablet 40 milliGRAM(s) Oral before breakfast  polyethylene glycol 3350 17 Gram(s) Oral daily  senna 2 Tablet(s) Oral at bedtime    Genitourinary Medications    Hematologic/Oncologic Medications  epoetin marla (EPOGEN) Injectable 4000 Unit(s) IV Push <User Schedule>    Antimicrobial/Immunologic Medications    Endocrine/Metabolic Medications  insulin lispro (ADMELOG) corrective regimen sliding scale   SubCutaneous Before meals and at bedtime    Topical/Other Medications  chlorhexidine 2% Cloths 1 Application(s) Topical daily    --------------------------------------------------------------------------------------    VITAL SIGNS:  T(C): 36.4 (08-10-23 @ 05:09), Max: 37.1 (08-09-23 @ 18:21)  HR: 76 (08-10-23 @ 05:09) (63 - 99)  BP: 164/73 (08-10-23 @ 05:09) (135/65 - 173/78)  RR: 18 (08-10-23 @ 05:09) (18 - 18)  SpO2: 100% (08-10-23 @ 05:09) (99% - 100%)  --------------------------------------------------------------------------------------    INS AND OUTS:    08-09-23 @ 07:01  -  08-10-23 @ 07:00  --------------------------------------------------------  IN: 400 mL / OUT: 2300 mL / NET: -1900 mL      --------------------------------------------------------------------------------------      EXAM    General: NAD, resting in bed comfortably.  Cardiac: regular rate, warm and well perfused  Respiratory: Nonlabored respirations, normal cw expansion.  Abdomen: soft, nontender, nondistended. ___ incision is c/d/i, ostadriana, ANA, fede.   Extremities: normal strength, FROM, no deformities    --------------------------------------------------------------------------------------    LABS

## 2023-08-10 NOTE — PROGRESS NOTE ADULT - SUBJECTIVE AND OBJECTIVE BOX
Cardiovascular Disease Progress Note  Date of Service: 08-10-23 @ 07:59    Overnight events: No acute events overnight.    Patient denies chest pain or SOB.   Otherwise review of systems negative    Objective Findings:  T(C): 36.4 (08-10-23 @ 05:09), Max: 37.1 (23 @ 18:21)  HR: 76 (08-10-23 @ 05:09) (63 - 99)  BP: 164/73 (08-10-23 @ 05:09) (135/65 - 173/78)  RR: 18 (08-10-23 @ 05:09) (18 - 18)  SpO2: 100% (08-10-23 @ 05:09) (99% - 100%)  Wt(kg): --  Daily     Daily Weight in k.6 (09 Aug 2023 13:50)      Physical Exam:  Gen: NAD; Patient resting comfortably  HEENT: EOMI, Normocephalic/ atraumatic  CV: RRR, normal S1 + S2, no m/r/g  Lungs:  Normal respiratory effort; clear to auscultation bilaterally  Abd: soft, non-tender; bowel sounds present  Ext: No edema; warm and well perfused    Telemetry: n/a    Laboratory Data:                        8.8    4.93  )-----------( 119      ( 09 Aug 2023 13:33 )             26.6                     Inpatient Medications:  MEDICATIONS  (STANDING):  acetaminophen     Tablet .. 650 milliGRAM(s) Oral every 6 hours  ascorbic acid 500 milliGRAM(s) Oral two times a day  calcium carbonate 1250 mG  + Vitamin D (OsCal 500 + D) 1 Tablet(s) Oral three times a day  chlorhexidine 2% Cloths 1 Application(s) Topical daily  epoetin marla (EPOGEN) Injectable 4000 Unit(s) IV Push <User Schedule>  folic acid 1 milliGRAM(s) Oral daily  hydrALAZINE 10 milliGRAM(s) Oral three times a day  HYDROmorphone  Injectable 0.5 milliGRAM(s) IV Push once  insulin lispro (ADMELOG) corrective regimen sliding scale   SubCutaneous Before meals and at bedtime  metoprolol succinate ER 12.5 milliGRAM(s) Oral daily  multivitamin 1 Tablet(s) Oral daily  pantoprazole    Tablet 40 milliGRAM(s) Oral before breakfast  polyethylene glycol 3350 17 Gram(s) Oral daily  senna 2 Tablet(s) Oral at bedtime      Assessment:  77 year old woman with ESRD on HD (MWF), HTN, DM (diet controlled), chronic type B aortic dissection, heart murmur, endometrial ca in remission s/p total hysterectomy in  presents with L hip pain     Plan of Care:      #Post-operative evaluation-  - Ms. Harris tolerated orthopedic and AV fistula interventions well.  She displays no signs of  coronary ischemia or decompensated CHF.   Continue current cardiac management.       #Type B aortic dissection  - Chronic (Diagnosed >10 years ago)  - Continue current management.        #ESRD-  - HD, as per renal.    #ACP (advance care planning)-  Advanced care planning was discussed with the patient.   Cardiac findings were discussed in detail and all questions were answered.         Over 25 minutes spent on total encounter; more than 50% of the visit was spent counseling and/or coordinating care by the attending physician.      Gabo Burris MD [facility]  Cardiovascular Disease  [phone]

## 2023-08-10 NOTE — PROVIDER CONTACT NOTE (OTHER) - ASSESSMENT
Pt A&Ox4. No signs/symptoms or complaints of distress noted at this time.
no bleeding noted. afebrile
vital signs stable, BP - 115/60 , HR 60-80s bpm, No infiltration noted on the Venous access site. Clots present in the needle after pulled from the patient's venous access site. Patient is stable and not indistress
LUE swelling present - pt states LUE has been swollen, pt states LUE numbness present, LUE is warm, fingers mobile, +radial pulses, L AFV with +bruit/+thrill. No acute distress noted.

## 2023-08-10 NOTE — CHART NOTE - NSCHARTNOTEFT_GEN_A_CORE
Reason for Follow-Up Assessment: Follow-Up    77F HTN, DM2, Type B Aortic Dissect, ESRD - HD MWF, Endometrial CA s/p CLINT, Lt femur fx s/p IMN 5/2023 p/w hardware defect s/p Lt hip nail removal, Lt THR on 7/19 c/b hemorrhagic shock from post-op hematoma c/b AVF malfx s/p Shiley placement 7/28, balloon angioplasty of AVF 8/1, AVF revision on 8/3.      Patient endorses fair appetite, completing 51-75% of meals.  Patient requesting for Nepro, communicated same to Ortho team.  Patient denies any GI distress i.e. nausea, vomiting, diarrhea.  No chewing or swallowing difficulties reported.  Weights with fluctuation likely in setting of fluid status / edema related to HD; patient with variable edema to Lt arm.    Source: [ X ] Patient [ ] Family [ ] RN [ X ] Chart      Diet, Regular:   Consistent Carbohydrate {Evening Snack} (CSTCHOSN)  For patients receiving Renal Replacement - No Protein Restr, No Conc K, No Conc Phos, Low Sodium (RENAL) (07-21-23 @ 10:51)    GI: WDL. Last BM noted on [8/7]    PO intake:  [ ] Poor < 50%  [X] Fair 50-75% [ ] Good  % [ ] Inconsistent PO intake    Enteral /Parenteral Nutrition:       [X] n/a    Weight Hx: 8/9 - 63.5 / 61.6kg      8/7 - 67.4kg        8/3 - 56.9kg  7/31 - 70.1kg (?) / 54.1kg      7/28 - 71.9kg  & 7/27 - 68.8kg (? weight error vs. fluid related vs. scale discrepancy ?)      7/25 - 60.5kg      7/24 - 55.2kg     7/22 - 54.8kg      7/21 - 56kg      7/20 - 54.6kg      7/19 - 53.6kg          Edema: 2+ edema noted to lt arm.    Pressure Injuries: No pressure ulcers/DTI noted in flowsheets.     _______________ Pertinent Medications_______________  MEDICATIONS  (STANDING):  acetaminophen     Tablet .. 650 milliGRAM(s) Oral every 6 hours  ascorbic acid 500 milliGRAM(s) Oral two times a day  calcium carbonate 1250 mG  + Vitamin D (OsCal 500 + D) 1 Tablet(s) Oral three times a day  chlorhexidine 2% Cloths 1 Application(s) Topical daily  epoetin marla (EPOGEN) Injectable 4000 Unit(s) IV Push <User Schedule>  folic acid 1 milliGRAM(s) Oral daily  hydrALAZINE 10 milliGRAM(s) Oral three times a day  HYDROmorphone  Injectable 0.5 milliGRAM(s) IV Push once  insulin lispro (ADMELOG) corrective regimen sliding scale   SubCutaneous Before meals and at bedtime  metoprolol succinate ER 12.5 milliGRAM(s) Oral daily  multivitamin 1 Tablet(s) Oral daily  pantoprazole    Tablet 40 milliGRAM(s) Oral before breakfast  polyethylene glycol 3350 17 Gram(s) Oral daily  senna 2 Tablet(s) Oral at bedtime    MEDICATIONS  (PRN):  aluminum hydroxide/magnesium hydroxide/simethicone Suspension 30 milliLiter(s) Oral four times a day PRN Indigestion  melatonin 3 milliGRAM(s) Oral at bedtime PRN Insomnia  ondansetron Injectable 4 milliGRAM(s) IV Push every 6 hours PRN Nausea and/or Vomiting  oxyCODONE    IR 5 milliGRAM(s) Oral every 6 hours PRN Severe Pain (7 - 10)      __________________ Pertinent Labs__________________   08-08 Na133 mmol/L<L> Glu 134 mg/dL<H> K+ 5.0 mmol/L Cr  4.61 mg/dL<H> BUN 37 mg/dL<H> 08-07 Phos 4.2 mg/dL    POCT Blood Glucose.: 169 mg/dL (08-10-23 @ 07:09)  POCT Blood Glucose.: 148 mg/dL (08-09-23 @ 22:03)  POCT Blood Glucose.: 156 mg/dL (08-09-23 @ 16:43)  POCT Blood Glucose.: 123 mg/dL (08-09-23 @ 11:37)  POCT Blood Glucose.: 160 mg/dL (08-09-23 @ 06:06)  POCT Blood Glucose.: 151 mg/dL (08-08-23 @ 21:52)  POCT Blood Glucose.: 159 mg/dL (08-08-23 @ 16:30)  POCT Blood Glucose.: 206 mg/dL (08-08-23 @ 11:24)  POCT Blood Glucose.: 143 mg/dL (08-08-23 @ 07:14)  POCT Blood Glucose.: 154 mg/dL (08-07-23 @ 22:32)  POCT Blood Glucose.: 169 mg/dL (08-07-23 @ 16:41)  POCT Blood Glucose.: 195 mg/dL (08-07-23 @ 11:19)      Estimated Needs:   [X] no change since previous assessment  [ ] recalculated:     Previous Nutrition Diagnosis: Increased Nutrient Needs    Nutrition Diagnosis is [X] ongoing  [ ] resolved [ ] not applicable     New Nutrition Diagnosis: n/a    Monitoring and Evaluation:     [ x ] Tolerance to diet prescription / adequacy of meal intake  [ x ] Weight trends   [ x ] Pertinent labs    Recommendations:  1) Diet / Supplement Changes: Continue diet as ordered; communicated request for Nepro 2x daily (850 kcals, 38.2g protein).   2) Obtain and record current weights to best monitor for acute changes in nutritional status.  3) Please consistently document % meal intake in nursing flowsheets.    Martine Perez, MS, RDN, CDN  Pager [phone]  Also available on MS Teams

## 2023-08-11 LAB
GLUCOSE BLDC GLUCOMTR-MCNC: 146 MG/DL — HIGH (ref 70–99)
GLUCOSE BLDC GLUCOMTR-MCNC: 198 MG/DL — HIGH (ref 70–99)
GLUCOSE BLDC GLUCOMTR-MCNC: 229 MG/DL — HIGH (ref 70–99)
GLUCOSE BLDC GLUCOMTR-MCNC: 90 MG/DL — SIGNIFICANT CHANGE UP (ref 70–99)

## 2023-08-11 PROCEDURE — 99232 SBSQ HOSP IP/OBS MODERATE 35: CPT

## 2023-08-11 RX ORDER — CEFAZOLIN SODIUM 1 G
1000 VIAL (EA) INJECTION EVERY 24 HOURS
Refills: 0 | Status: DISCONTINUED | OUTPATIENT
Start: 2023-08-11 | End: 2023-08-16

## 2023-08-11 RX ORDER — CEFAZOLIN SODIUM 1 G
2000 VIAL (EA) INJECTION EVERY 8 HOURS
Refills: 0 | Status: DISCONTINUED | OUTPATIENT
Start: 2023-08-11 | End: 2023-08-11

## 2023-08-11 RX ADMIN — Medication 1 TABLET(S): at 14:08

## 2023-08-11 RX ADMIN — Medication 1 TABLET(S): at 05:15

## 2023-08-11 RX ADMIN — Medication 1: at 11:28

## 2023-08-11 RX ADMIN — Medication 100 MILLIGRAM(S): at 19:50

## 2023-08-11 RX ADMIN — Medication 500 MILLIGRAM(S): at 19:39

## 2023-08-11 RX ADMIN — Medication 12.5 MILLIGRAM(S): at 05:15

## 2023-08-11 RX ADMIN — SENNA PLUS 2 TABLET(S): 8.6 TABLET ORAL at 21:10

## 2023-08-11 RX ADMIN — Medication 10 MILLIGRAM(S): at 21:09

## 2023-08-11 RX ADMIN — Medication 500 MILLIGRAM(S): at 05:15

## 2023-08-11 RX ADMIN — Medication 10 MILLIGRAM(S): at 05:15

## 2023-08-11 RX ADMIN — Medication 2: at 22:40

## 2023-08-11 RX ADMIN — Medication 1 MILLIGRAM(S): at 11:21

## 2023-08-11 RX ADMIN — PANTOPRAZOLE SODIUM 40 MILLIGRAM(S): 20 TABLET, DELAYED RELEASE ORAL at 05:15

## 2023-08-11 RX ADMIN — CHLORHEXIDINE GLUCONATE 1 APPLICATION(S): 213 SOLUTION TOPICAL at 11:33

## 2023-08-11 RX ADMIN — Medication 1 TABLET(S): at 21:09

## 2023-08-11 RX ADMIN — Medication 1 TABLET(S): at 11:20

## 2023-08-11 RX ADMIN — ERYTHROPOIETIN 4000 UNIT(S): 10000 INJECTION, SOLUTION INTRAVENOUS; SUBCUTANEOUS at 18:37

## 2023-08-11 NOTE — PROGRESS NOTE ADULT - SUBJECTIVE AND OBJECTIVE BOX
[facility]  [address]  [facility] [address]               Adeel Gilmore   3/11/2017  5:41 PM   ED    Description:  Male : 1963   Department:  [facility]           Your Care was Coordinated By:     Provider Role From To    Marcin Avila MD Attending Provider [phone]    Heather Rodrigues MD Resident 17    Alexi Le MD Resident 17 --    Eusebio Callahan MD Admitting Provider -- --      Reason for Visit     food bolus- tx in  [place].           Diagnoses this Visit        Comments    Esophageal obstruction due to food impaction    -  Primary     Food impaction of esophagus, initial encounter           ED Disposition     ED Disposition Condition Comment    Discharge             To Do List           [facility] On Call     [facility] On Call Nurse Care Line -  Assistance  Registered nurses in the [facility] provide clinical advisement, health education, appointment booking, and other advisory services.  Call for this free service at [phone].             Medications           Message regarding Medications     Verify the changes and/or additions to your medication regime listed below are the same as discussed with your clinician today.  If any of these changes or additions are incorrect, please notify your healthcare provider.        These medications were administered today        Dose Freq    propofol (DIPRIVAN) 10 mg/mL infusion      Notes to Pharmacy: Created by cabinet override    fentaNYL (SUBLIMAZE) 50 mcg/mL injection      Notes to Pharmacy: Created by cabinet override    propofol (DIPRIVAN) 10 mg/mL infusion      Notes to Pharmacy: Created by cabinet override    albuterol 90 mcg/actuation inhaler      Notes to Pharmacy: Created by cabinet override    ondansetron injection 4 mg 4 mg Once    Sig: Inject 4 mg into the vein once.    Class: Normal    Route:  "Intravenous    ondansetron 4 mg/2 mL injection      Notes to Pharmacy: Created by cabinet override           Verify that the below list of medications is an accurate representation of the medications you are currently taking.  If none reported, the list may be blank. If incorrect, please contact your healthcare provider. Carry this list with you in case of emergency.           Current Medications     benazepril (LOTENSIN) 20 MG tablet Take 20 mg by mouth once daily.    escitalopram oxalate (LEXAPRO) 20 MG tablet Take 20 mg by mouth once daily.    nebivolol (BYSTOLIC) 5 MG Tab Take 10 mg by mouth once daily.    esomeprazole (NEXIUM) 40 MG capsule Take 1 capsule (40 mg total) by mouth before breakfast.           Clinical Reference Information           Your Vitals Were     BP Pulse Temp Resp Height Weight    116/64 72 99 °F (37.2 °C) (Oral) 20 5' 7" (1.702 m) 90.7 kg (200 lb)    SpO2 BMI             93% 31.32 kg/m2         Allergies as of 3/11/2017     No Known Allergies      Immunizations Administered on Date of Encounter - 3/11/2017     None      ED Micro, Lab, POCT     Start Ordered       Status Ordering Provider    03/11/17 1755 03/11/17 1754  CBC auto differential  STAT      Final result     03/11/17 1755 03/11/17 1754  Comprehensive metabolic panel  STAT      Final result     03/11/17 1755 03/11/17 1754  Protime-INR  STAT      Final result     03/11/17 1755 03/11/17 1754  APTT  STAT      Final result       ED Imaging Orders     None        Discharge Instructions         Upper GI Endoscopy     During endoscopy, a long, flexible tube is used to view the inside of your upper GI tract.      Upper GI endoscopy allows your healthcare provider to look directly into the beginning of your gastrointestinal (GI) tract. The esophagus, stomach, and duodenum (the first part of the small intestine) make up the upper GI tract.   Before the exam  Follow these and any other instructions you are given before your endoscopy. If you " dont follow the healthcare providers instructions carefully, the test may need to be canceled or done over:  · Don't eat or drink anything after midnight the night before your exam. If your exam is in the afternoon, drink only clear liquids in the morning. Don't eat or drink anything for 8 hours before the exam. In some cases, you may be able to take medicines with sips of water until 2 hours before the procedure. Speak with your healthcare provider about this.   · Bring your X-rays and any other test results you have.  · Because you will be sedated, arrange for an adult to drive you home after the exam.  · Tell your healthcare provider before the exam if you are taking any medicines or have any medical problems.  The procedure  Here is what to expect:  · You will lie on the endoscopy table. Usually patients lie on the left side.  · You will be monitored and given oxygen.  · Your throat may be numbed with a spray or gargle. You are given medicine through an intravenous (IV) line that will help you relax and remain comfortable. You may be awake or asleep during the procedure.  · The healthcare provider will put the endoscope in your mouth and down your esophagus. It is thinner than most pieces of food that you swallow. It will not affect your breathing. The medicine helps keep you from gagging.  · Air is put into your GI tract to expand it. It can make you burp.  · During the procedure, the healthcare provider can take biopsies (tissue samples), remove abnormalities, such as polyps, or treat abnormalities through a variety of devices placed through the endoscope. You will not feel this.   · The endoscope carries images of your upper GI tract to a video screen. If you are awake, you may be able to look at the images.  · After the procedure is done, you will rest for a time. An adult must drive you home.  When to call your healthcare provider  Contact your healthcare provider if you have:  · Black or tarry stools, or  blood in your stool  · Fever  · Pain in your belly that does not go away  · Nausea and vomiting, or vomiting blood   Date Last Reviewed: 7/1/2016  © 8471-4662 [employer] [address] [place] [address], [place] [address]. All rights reserved. This information is not intended as a substitute for professional medical care. Always follow your healthcare professional's instructions.      ***Secretions noted in airway after intubation, but nothing obvious on intubation.***  Please go to ER if within the next the days:  1. Worsening shortness of breath  2. Productive cough  3. Fevers and/or chills      Discharge References/Attachments     ASPIRATION FROM DYSPHAGIA, UNDERSTANDING (ENGLISH)      [facility] Sign-Up     Activating your [facility] account is as easy as 1-2-3!     1) Visit [email or web address], select Sign Up Now, enter this activation code and your date of birth, then select Next.  [email or web address]  Expires: 4/25/2017  8:35 PM      2) Create a username and password to use when you visit [facility] in the future and select a security question in case you lose your password and select Next.    3) Enter your e-mail address and click Sign Up!    Additional Information  If you have questions, please e-mail [email or web address] or call [phone] to talk to our [facility] staff. Remember, [facility] is NOT to be used for urgent needs. For medical emergencies, dial 911.          [facility] complies with applicable Federal civil rights laws and does not discriminate on the basis of race, color, national origin, age, disability, or sex.        Language Assistance Services     ATTENTION: Language assistance services are available, free of charge. Please call [phone].      ATENCIÓN: Si habla espminerva, tiene a hills disposición servicios gratuitos de asistencia lingüística. Llame al [phone].     CHÚ Ý: N?u b?n nói Ti?ng Vi?t, có các d?ch v? h? tr? ngôn ng? mi?n phí dành cho b?n. G?i s?  [phone].         [facility] Division of Hospital Medicine  Donnell Mullen MD  Pager (M-F, 8A-5P): [phone]  Other Times:  [phone]    Patient is a 78y old  Female who presents with a chief complaint of s/p L hip orif concern for loosened hardwar (11 Aug 2023 10:22)    SUBJECTIVE / OVERNIGHT EVENTS:  Patient complaining of LUE edema that's not resolving. No F/C, N/V, CP, SOB, Cough, lightheadedness, dizziness, abdominal pain, diarrhea, dysuria.    MEDICATIONS  (STANDING):  acetaminophen     Tablet .. 650 milliGRAM(s) Oral every 6 hours  ascorbic acid 500 milliGRAM(s) Oral two times a day  calcium carbonate 1250 mG  + Vitamin D (OsCal 500 + D) 1 Tablet(s) Oral three times a day  ceFAZolin   IVPB 1000 milliGRAM(s) IV Intermittent every 24 hours  chlorhexidine 2% Cloths 1 Application(s) Topical daily  epoetin marla (EPOGEN) Injectable 4000 Unit(s) IV Push <User Schedule>  folic acid 1 milliGRAM(s) Oral daily  hydrALAZINE 10 milliGRAM(s) Oral three times a day  HYDROmorphone  Injectable 0.5 milliGRAM(s) IV Push once  insulin lispro (ADMELOG) corrective regimen sliding scale   SubCutaneous Before meals and at bedtime  metoprolol succinate ER 12.5 milliGRAM(s) Oral daily  multivitamin 1 Tablet(s) Oral daily  pantoprazole    Tablet 40 milliGRAM(s) Oral before breakfast  polyethylene glycol 3350 17 Gram(s) Oral daily  senna 2 Tablet(s) Oral at bedtime    MEDICATIONS  (PRN):  aluminum hydroxide/magnesium hydroxide/simethicone Suspension 30 milliLiter(s) Oral four times a day PRN Indigestion  melatonin 3 milliGRAM(s) Oral at bedtime PRN Insomnia  ondansetron Injectable 4 milliGRAM(s) IV Push every 6 hours PRN Nausea and/or Vomiting      Vital Signs Last 24 Hrs  T(C): 36.3 (11 Aug 2023 09:08), Max: 36.9 (10 Aug 2023 17:06)  T(F): 97.3 (11 Aug 2023 09:08), Max: 98.4 (10 Aug 2023 17:06)  HR: 74 (11 Aug 2023 09:08) (67 - 75)  BP: 137/62 (11 Aug 2023 09:08) (137/62 - 164/68)  BP(mean): --  RR: 16 (11 Aug 2023 09:08) (16 - 18)  SpO2: 99% (11 Aug 2023 09:08) (97% - 100%)    Parameters below as of 11 Aug 2023 09:08  Patient On (Oxygen Delivery Method): room air      CAPILLARY BLOOD GLUCOSE      POCT Blood Glucose.: 198 mg/dL (11 Aug 2023 11:14)  POCT Blood Glucose.: 146 mg/dL (11 Aug 2023 07:12)  POCT Blood Glucose.: 184 mg/dL (10 Aug 2023 21:46)  POCT Blood Glucose.: 147 mg/dL (10 Aug 2023 16:32)    I&O's Summary      PHYSICAL EXAM:  CONSTITUTIONAL: NAD  EYES: PERRLA; conjunctiva and sclera clear  ENMT: Moist oral mucosa, no pharyngeal injection or exudates; normal dentition  NECK: Supple, no palpable masses; no thyromegaly  RESPIRATORY: Normal respiratory effort; lungs are clear to auscultation bilaterally  CARDIOVASCULAR: Regular rate and rhythm, normal S1 and S2, no murmur/rub/gallop; No lower extremity edema; Peripheral pulses are 2+ bilaterally; LUE 2+ edema, AVF present, R IJ shiley in place, LUE AVF bruit present.  ABDOMEN: Nontender to palpation, normoactive bowel sounds, no rebound/guarding; No hepatosplenomegaly  MUSCULOSKELETAL:  did not assess gait; no clubbing or cyanosis of digits; no joint swelling or tenderness to palpation  PSYCH: A+O to person, place, and time; affect appropriate  NEUROLOGY: CN 2-12 are intact and symmetric; no gross sensory deficits   SKIN: No rashes; no palpable lesions, surgical dressing C/D/I    LABS:                        8.8    4.93  )-----------( 119      ( 09 Aug 2023 13:33 )             26.6                     RADIOLOGY & ADDITIONAL TESTS:    Imaging Personally Reviewed:    Care Discussed with Consultants/Other Providers:    Care Discussed with Orthopedic PA about:

## 2023-08-11 NOTE — PROGRESS NOTE ADULT - SUBJECTIVE AND OBJECTIVE BOX
ORTHO PROGRESS NOTE     Pt seen and examined at bedside, denies SOB, CP, Dizziness. N/V/D /HA.  No significant overnight events. Pain well controlled. Patient ambulated  with PT     Vital Signs Last 24 Hrs  T(C): 36.5 (11 Aug 2023 05:14), Max: 36.9 (10 Aug 2023 17:06)  T(F): 97.7 (11 Aug 2023 05:14), Max: 98.4 (10 Aug 2023 17:06)  HR: 73 (11 Aug 2023 05:14) (67 - 75)  BP: 154/70 (11 Aug 2023 05:14) (144/73 - 164/68)  BP(mean): --  RR: 16 (11 Aug 2023 05:14) (16 - 18)  SpO2: 98% (11 Aug 2023 05:14) (97% - 100%)    Parameters below as of 11 Aug 2023 05:14  Patient On (Oxygen Delivery Method): room air        Gen: No apparent distress    left LE:  Dressing C/D I   BLE: motor intact EHL/FHL/TA/GS .  Sensation is grossly intact to light touch in the distal extremities.    Compartments are soft bilaterally.  Extremities are warm .  DP 2+ BLE     Labs:  CBC Full  -  ( 09 Aug 2023 13:33 )  WBC Count : 4.93 K/uL  RBC Count : 2.93 M/uL  Hemoglobin : 8.8 g/dL  Hematocrit : 26.6 %  Platelet Count - Automated : 119 K/uL  Mean Cell Volume : 90.8 fL  Mean Cell Hemoglobin : 30.0 pg  Mean Cell Hemoglobin Concentration : 33.1 gm/dL  Auto Neutrophil # : 3.33 K/uL  Auto Lymphocyte # : 0.89 K/uL  Auto Monocyte # : 0.50 K/uL  Auto Eosinophil # : 0.18 K/uL  Auto Basophil # : 0.01 K/uL  Auto Neutrophil % : 67.5 %  Auto Lymphocyte % : 18.1 %  Auto Monocyte % : 10.1 %  Auto Eosinophil % : 3.7 %  Auto Basophil % : 0.2 %                   A/P  s/p left hip SUZANNA-> left hip hemiarthroplasty  POD #23    - Pain control/ Analgesia  - DVT ppx SQH , foot pumps  - PT/OT - wbat/oob    - Incentive Spirometer  - HD today  - notify Ortho for questions

## 2023-08-11 NOTE — PROGRESS NOTE ADULT - PROBLEM SELECTOR PLAN 1
Pt with ESRD on HD TIW MWF admitted with fall. Pt s/p Lt hemiarthroplasty on 7/19/2023, however surgery complicated by intraoperative hypotension requiring vasopressor support. Patient transferred to SICU for hemodynamic stabilization. CT Angio abd/pelv reviewed. HD treatment held on 7/19/23 due to above. Held on 7/20 due to blood loss anemia. Transferred back to medical floors. 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 S/p nontunneled catheter placement 7/28. Pt. s/p LUE BC AVF fistulogram 8/1 w/ Balloon angioplasty. s/p aneurysmal Fistula, resected with end to end anastomosis 8/4, now can access AVF at proximal marked site. HD 8/7, venous access of AVF worked for 1.5hr, however cots were noted. Therefore used right shiley for venous access and AVF for arterial access for the remaining 1.5 hours. AVF successfully used throughout HD treatement 8/9. Plan for maintenance HD today. Plan for removal of non tunneled HD catheter if AVF access successful today. Monitor Labs

## 2023-08-11 NOTE — PROGRESS NOTE ADULT - PROBLEM SELECTOR PLAN 2
Pt. with anemia in the setting of ESRD and blood loss from surgical procedure. Hgb 8.7. Will give RICA with HD treatments. Monitor hemoglobin levels.     If you have any questions, please feel free to contact me  Kyle Ngo  Nephrology Fellow  [employer] [employer]/Page [phone]  (After 5pm or on weekends please page the on-call fellow)

## 2023-08-11 NOTE — CHART NOTE - NSCHARTNOTEFT_GEN_A_CORE
Pt having dialysis performed via fistula again today. No issues reported. Can remove shiley given fistula is functioning without issues.     Please re-contact vascular with any further questions.    C Team Surgery   [phone]

## 2023-08-11 NOTE — PROGRESS NOTE ADULT - SUBJECTIVE AND OBJECTIVE BOX
[facility] Division of Kidney Diseases & Hypertension  FOLLOW UP NOTE  [phone]--------------------------------------------------------------------------------  Chief Complaint:Unspecified complication of procedure, initial encounter    HPI: 78F w/ PMH of ESRD on HD TIW (MWF) at [facility] (previously at [facility]), HTN, DM2, endometrial cancer (underwent hysterectomy and chemotherapy, type B aortic dissection, and L hip ORIF, admitted for revision of L hip hardware. Nephrology consulted for management of ESRD/HD. 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  S/p non-tunneled catheter placement 7/28, plan or fistulogram by vascular on 8/1.  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      24 hour events/subjective: Pt. seen and evaluated at bedside. Overall feels well, no acute complaints. s/p HD 8/9 and AVF used for the whole treatment duration. She denies fevers/chills, headaches, chest pain, SOB, abd pain    PAST HISTORY  --------------------------------------------------------------------------------  No significant changes to PMH, PSH, FHx, SHx, unless otherwise noted    ALLERGIES & MEDICATIONS  --------------------------------------------------------------------------------  Allergies  No Known Allergies  Intolerances    Standing Inpatient Medications  acetaminophen     Tablet .. 650 milliGRAM(s) Oral every 6 hours  ascorbic acid 500 milliGRAM(s) Oral two times a day  calcium carbonate 1250 mG  + Vitamin D (OsCal 500 + D) 1 Tablet(s) Oral three times a day  ceFAZolin   IVPB 1000 milliGRAM(s) IV Intermittent every 24 hours  chlorhexidine 2% Cloths 1 Application(s) Topical daily  epoetin marla (EPOGEN) Injectable 4000 Unit(s) IV Push <User Schedule>  folic acid 1 milliGRAM(s) Oral daily  hydrALAZINE 10 milliGRAM(s) Oral three times a day  HYDROmorphone  Injectable 0.5 milliGRAM(s) IV Push once  insulin lispro (ADMELOG) corrective regimen sliding scale   SubCutaneous Before meals and at bedtime  metoprolol succinate ER 12.5 milliGRAM(s) Oral daily  multivitamin 1 Tablet(s) Oral daily  pantoprazole    Tablet 40 milliGRAM(s) Oral before breakfast  polyethylene glycol 3350 17 Gram(s) Oral daily  senna 2 Tablet(s) Oral at bedtime  PRN Inpatient Medications  aluminum hydroxide/magnesium hydroxide/simethicone Suspension 30 milliLiter(s) Oral four times a day PRN  melatonin 3 milliGRAM(s) Oral at bedtime PRN  ondansetron Injectable 4 milliGRAM(s) IV Push every 6 hours PRN    REVIEW OF SYSTEMS  --------------------------------------------------------------------------------  see HPI  All other systems were reviewed and are negative, except as noted.    VITALS/PHYSICAL EXAM  --------------------------------------------------------------------------------  T(C): 36.3 (08-11-23 @ 09:08), Max: 36.9 (08-10-23 @ 17:06)  HR: 74 (08-11-23 @ 09:08) (67 - 75)  BP: 137/62 (08-11-23 @ 09:08) (137/62 - 164/68)  RR: 16 (08-11-23 @ 09:08) (16 - 18)  SpO2: 99% (08-11-23 @ 09:08) (97% - 100%)  Wt(kg): --    Physical Exam:  Gen: NAD  HEENT: MMM,   Pulm: minimal bibasilar crackles   CV: S1S2  Abd: Soft, +BS   Ext: LE edema B/L L>R, chronic LUE edema  Neuro: Awake  Skin: Warm and dry, ecchymotic RUE  Vascular access:+SOFI Lewis AVJORGE covered in clean and dry dressing, with audible bruit    LABS/STUDIES  --------------------------------------------------------------------------------              8.8    4.93  >-----------<  119      [08-09-23 @ 13:33]              26.6     Creatinine Trend:  SCr 4.61 [08-08 @ 06:24]  SCr 5.89 [08-07 @ 11:28]  SCr 3.45 [08-03 @ 02:25]  SCr 3.25 [08-01 @ 03:23]  SCr 5.14 [07-31 @ 05:35]         [facility] Division of Kidney Diseases & Hypertension  FOLLOW UP NOTE  [phone]--------------------------------------------------------------------------------  Chief Complaint:Unspecified complication of procedure, initial encounter    HPI: 78F w/ PMH of ESRD on HD TIW (MWF) at [facility] (previously at [facility]), HTN, DM2, endometrial cancer (underwent hysterectomy and chemotherapy, type B aortic dissection, and L hip ORIF, admitted for revision of L hip hardware. Nephrology consulted for management of ESRD/HD. 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  S/p non-tunneled catheter placement 7/28, plan or fistulogram by vascular on 8/1.  s/p aneurysmal Fistula repair, resected with end to end anastomosis 8/4, now can be accessed.      24 hour events/subjective: Pt. seen and evaluated at bedside. Overall feels well, no acute complaints. s/p HD 8/9 and AVF used for the whole treatment duration. She denies fevers/chills, headaches, chest pain, SOB, abd pain    PAST HISTORY  --------------------------------------------------------------------------------  No significant changes to PMH, PSH, FHx, SHx, unless otherwise noted    ALLERGIES & MEDICATIONS  --------------------------------------------------------------------------------  Allergies  No Known Allergies  Intolerances    Standing Inpatient Medications  acetaminophen     Tablet .. 650 milliGRAM(s) Oral every 6 hours  ascorbic acid 500 milliGRAM(s) Oral two times a day  calcium carbonate 1250 mG  + Vitamin D (OsCal 500 + D) 1 Tablet(s) Oral three times a day  ceFAZolin   IVPB 1000 milliGRAM(s) IV Intermittent every 24 hours  chlorhexidine 2% Cloths 1 Application(s) Topical daily  epoetin marla (EPOGEN) Injectable 4000 Unit(s) IV Push <User Schedule>  folic acid 1 milliGRAM(s) Oral daily  hydrALAZINE 10 milliGRAM(s) Oral three times a day  HYDROmorphone  Injectable 0.5 milliGRAM(s) IV Push once  insulin lispro (ADMELOG) corrective regimen sliding scale   SubCutaneous Before meals and at bedtime  metoprolol succinate ER 12.5 milliGRAM(s) Oral daily  multivitamin 1 Tablet(s) Oral daily  pantoprazole    Tablet 40 milliGRAM(s) Oral before breakfast  polyethylene glycol 3350 17 Gram(s) Oral daily  senna 2 Tablet(s) Oral at bedtime  PRN Inpatient Medications  aluminum hydroxide/magnesium hydroxide/simethicone Suspension 30 milliLiter(s) Oral four times a day PRN  melatonin 3 milliGRAM(s) Oral at bedtime PRN  ondansetron Injectable 4 milliGRAM(s) IV Push every 6 hours PRN    REVIEW OF SYSTEMS  --------------------------------------------------------------------------------  see HPI  All other systems were reviewed and are negative, except as noted.    VITALS/PHYSICAL EXAM  --------------------------------------------------------------------------------  T(C): 36.3 (08-11-23 @ 09:08), Max: 36.9 (08-10-23 @ 17:06)  HR: 74 (08-11-23 @ 09:08) (67 - 75)  BP: 137/62 (08-11-23 @ 09:08) (137/62 - 164/68)  RR: 16 (08-11-23 @ 09:08) (16 - 18)  SpO2: 99% (08-11-23 @ 09:08) (97% - 100%)  Wt(kg): --    Physical Exam:  Gen: NAD  HEENT: MMM,   Pulm: minimal bibasilar crackles   CV: S1S2  Abd: Soft, +BS   Ext: LE edema B/L L>R, chronic LUE edema  Neuro: Awake  Skin: Warm and dry, ecchymotic RUE  Vascular access:+SOFI Lewis AVJORGE covered in clean and dry dressing, with audible bruit    LABS/STUDIES  --------------------------------------------------------------------------------              8.8    4.93  >-----------<  119      [08-09-23 @ 13:33]              26.6     Creatinine Trend:  SCr 4.61 [08-08 @ 06:24]  SCr 5.89 [08-07 @ 11:28]  SCr 3.45 [08-03 @ 02:25]  SCr 3.25 [08-01 @ 03:23]  SCr 5.14 [07-31 @ 05:35]

## 2023-08-11 NOTE — PROGRESS NOTE ADULT - ASSESSMENT
Patient is a 78-year-old female s/p left hip hemiarthroplasty w/ a PMH of ESRD on HD by LUE AVF that was found to be ulcerated now s/p fistulogram, s/p AVF revision (8/3). Patient was unable to tolerate dialysis through the AVF due to clotting and poor venous flow and HD was completed through her IJ shiley (8/7). Patient was able to complete dialysis through the AVF last night, and the dialysis team reports that there were no issues. HD again today.    - Continue HD through AVF  - If HD able to be completed fully through AVF, plan on removing shiley  - Vascular to follow up after dialysis today  - Appreciate excellent care per primary    The above discussed with the vascular surgery fellow

## 2023-08-11 NOTE — PROGRESS NOTE ADULT - SUBJECTIVE AND OBJECTIVE BOX
Cardiovascular Disease Progress Note  Date of Service: 08-11-23 @ 10:10    Overnight events: No acute events overnight.    Patient denies chest pain or SOB.   Otherwise review of systems negative    Objective Findings:  T(C): 36.3 (08-11-23 @ 09:08), Max: 36.9 (08-10-23 @ 17:06)  HR: 74 (08-11-23 @ 09:08) (67 - 75)  BP: 137/62 (08-11-23 @ 09:08) (137/62 - 164/68)  RR: 16 (08-11-23 @ 09:08) (16 - 18)  SpO2: 99% (08-11-23 @ 09:08) (97% - 100%)  Wt(kg): --  Daily     Daily       Physical Exam:  Gen: NAD; Patient resting comfortably  HEENT: EOMI, Normocephalic/ atraumatic  CV: RRR, normal S1 + S2, no m/r/g  Lungs:  Normal respiratory effort; clear to auscultation bilaterally  Abd: soft, non-tender; bowel sounds present  Ext: No edema; warm and well perfused    Telemetry: n/a    Laboratory Data:                        8.8    4.93  )-----------( 119      ( 09 Aug 2023 13:33 )             26.6                     Inpatient Medications:  MEDICATIONS  (STANDING):  acetaminophen     Tablet .. 650 milliGRAM(s) Oral every 6 hours  ascorbic acid 500 milliGRAM(s) Oral two times a day  calcium carbonate 1250 mG  + Vitamin D (OsCal 500 + D) 1 Tablet(s) Oral three times a day  ceFAZolin   IVPB 1000 milliGRAM(s) IV Intermittent every 24 hours  chlorhexidine 2% Cloths 1 Application(s) Topical daily  epoetin marla (EPOGEN) Injectable 4000 Unit(s) IV Push <User Schedule>  folic acid 1 milliGRAM(s) Oral daily  hydrALAZINE 10 milliGRAM(s) Oral three times a day  HYDROmorphone  Injectable 0.5 milliGRAM(s) IV Push once  insulin lispro (ADMELOG) corrective regimen sliding scale   SubCutaneous Before meals and at bedtime  metoprolol succinate ER 12.5 milliGRAM(s) Oral daily  multivitamin 1 Tablet(s) Oral daily  pantoprazole    Tablet 40 milliGRAM(s) Oral before breakfast  polyethylene glycol 3350 17 Gram(s) Oral daily  senna 2 Tablet(s) Oral at bedtime      Assessment: 77 year old woman with ESRD on HD (MWF), HTN, DM (diet controlled), chronic type B aortic dissection, heart murmur, endometrial ca in remission s/p total hysterectomy in 2007 presents with L hip pain     Plan of Care:      #Post-operative evaluation-  - Ms. Harris tolerated orthopedic and AV fistula interventions well.  She displays no signs of  coronary ischemia or decompensated CHF.   Continue current cardiac management.       #Type B aortic dissection  - Chronic (Diagnosed >10 years ago)  - Continue current management.        #ESRD-  - HD, as per renal.      Over 25 minutes spent on total encounter; more than 50% of the visit was spent counseling and/or coordinating care by the attending physician.      Gabo Burris MD [facility]  Cardiovascular Disease  [phone]

## 2023-08-11 NOTE — PROGRESS NOTE ADULT - SUBJECTIVE AND OBJECTIVE BOX
ARTIFICIAL TEARS to affected eye(s) as needed. TEAM [ ** ] Surgery Daily Progress Note  =====================================================    SUBJECTIVE: Patient seen and examined at bedside on AM rounds. Patient reports that they're feeling well.     ALLERGIES:  No Known Allergies      --------------------------------------------------------------------------------------    MEDICATIONS:    Neurologic Medications  acetaminophen     Tablet .. 650 milliGRAM(s) Oral every 6 hours  HYDROmorphone  Injectable 0.5 milliGRAM(s) IV Push once  melatonin 3 milliGRAM(s) Oral at bedtime PRN Insomnia  ondansetron Injectable 4 milliGRAM(s) IV Push every 6 hours PRN Nausea and/or Vomiting    Respiratory Medications    Cardiovascular Medications  hydrALAZINE 10 milliGRAM(s) Oral three times a day  metoprolol succinate ER 12.5 milliGRAM(s) Oral daily    Gastrointestinal Medications  aluminum hydroxide/magnesium hydroxide/simethicone Suspension 30 milliLiter(s) Oral four times a day PRN Indigestion  ascorbic acid 500 milliGRAM(s) Oral two times a day  calcium carbonate 1250 mG  + Vitamin D (OsCal 500 + D) 1 Tablet(s) Oral three times a day  folic acid 1 milliGRAM(s) Oral daily  multivitamin 1 Tablet(s) Oral daily  pantoprazole    Tablet 40 milliGRAM(s) Oral before breakfast  polyethylene glycol 3350 17 Gram(s) Oral daily  senna 2 Tablet(s) Oral at bedtime    Genitourinary Medications    Hematologic/Oncologic Medications  epoetin marla (EPOGEN) Injectable 4000 Unit(s) IV Push <User Schedule>    Antimicrobial/Immunologic Medications  ceFAZolin   IVPB 2000 milliGRAM(s) IV Intermittent every 8 hours    Endocrine/Metabolic Medications  insulin lispro (ADMELOG) corrective regimen sliding scale   SubCutaneous Before meals and at bedtime    Topical/Other Medications  chlorhexidine 2% Cloths 1 Application(s) Topical daily    --------------------------------------------------------------------------------------    VITAL SIGNS:  T(C): 36.5 (08-11-23 @ 05:14), Max: 36.9 (08-10-23 @ 17:06)  HR: 73 (08-11-23 @ 05:14) (67 - 75)  BP: 154/70 (08-11-23 @ 05:14) (144/73 - 164/68)  RR: 16 (08-11-23 @ 05:14) (16 - 18)  SpO2: 98% (08-11-23 @ 05:14) (97% - 100%)  --------------------------------------------------------------------------------------    INS AND OUTS:    --------------------------------------------------------------------------------------      EXAM    General: NAD, resting in bed comfortably.  HEENT: Desiree in place in [facility]  Cardiac: regular rate, warm and well perfused  Respiratory: Nonlabored respirations, normal cw expansion.  Extremities: RUE swollen but improved this AM, aneurysm present, incision c/d/i, palpable thrill    --------------------------------------------------------------------------------------    LABS

## 2023-08-12 LAB
GLUCOSE BLDC GLUCOMTR-MCNC: 122 MG/DL — HIGH (ref 70–99)
GLUCOSE BLDC GLUCOMTR-MCNC: 131 MG/DL — HIGH (ref 70–99)
GLUCOSE BLDC GLUCOMTR-MCNC: 166 MG/DL — HIGH (ref 70–99)
GLUCOSE BLDC GLUCOMTR-MCNC: 211 MG/DL — HIGH (ref 70–99)

## 2023-08-12 PROCEDURE — 99233 SBSQ HOSP IP/OBS HIGH 50: CPT

## 2023-08-12 RX ADMIN — Medication 1 TABLET(S): at 22:31

## 2023-08-12 RX ADMIN — Medication 650 MILLIGRAM(S): at 06:43

## 2023-08-12 RX ADMIN — Medication 500 MILLIGRAM(S): at 18:10

## 2023-08-12 RX ADMIN — Medication 100 MILLIGRAM(S): at 19:25

## 2023-08-12 RX ADMIN — Medication 1 TABLET(S): at 13:48

## 2023-08-12 RX ADMIN — Medication 1: at 22:30

## 2023-08-12 RX ADMIN — Medication 12.5 MILLIGRAM(S): at 05:41

## 2023-08-12 RX ADMIN — Medication 10 MILLIGRAM(S): at 13:48

## 2023-08-12 RX ADMIN — Medication 10 MILLIGRAM(S): at 22:31

## 2023-08-12 RX ADMIN — PANTOPRAZOLE SODIUM 40 MILLIGRAM(S): 20 TABLET, DELAYED RELEASE ORAL at 05:41

## 2023-08-12 RX ADMIN — Medication 2: at 12:02

## 2023-08-12 RX ADMIN — Medication 1 TABLET(S): at 05:42

## 2023-08-12 RX ADMIN — Medication 650 MILLIGRAM(S): at 05:42

## 2023-08-12 RX ADMIN — Medication 650 MILLIGRAM(S): at 13:21

## 2023-08-12 RX ADMIN — Medication 10 MILLIGRAM(S): at 05:42

## 2023-08-12 RX ADMIN — Medication 1 TABLET(S): at 12:21

## 2023-08-12 RX ADMIN — Medication 500 MILLIGRAM(S): at 05:41

## 2023-08-12 RX ADMIN — Medication 650 MILLIGRAM(S): at 12:21

## 2023-08-12 RX ADMIN — Medication 1 MILLIGRAM(S): at 12:21

## 2023-08-12 RX ADMIN — CHLORHEXIDINE GLUCONATE 1 APPLICATION(S): 213 SOLUTION TOPICAL at 12:25

## 2023-08-12 NOTE — PROGRESS NOTE ADULT - SUBJECTIVE AND OBJECTIVE BOX
Cardiovascular Disease Progress Note  Date of Service: 23 @ 08:26    Overnight events: No acute events overnight.    Patient denies chest pain or SOB.   Otherwise review of systems negative    Objective Findings:  T(C): 36.6 (23 @ 05:40), Max: 36.9 (23 @ 19:00)  HR: 63 (23 @ 05:40) (63 - 80)  BP: 156/56 (23 @ 05:40) (137/62 - 173/85)  RR: 16 (23 @ 05:40) (16 - 18)  SpO2: 98% (23 @ 05:40) (98% - 100%)  Wt(kg): --  Daily     Daily Weight in k.5 (11 Aug 2023 19:00)      Physical Exam:  Gen: NAD; Patient resting comfortably  HEENT: EOMI, Normocephalic/ atraumatic  CV: RRR, normal S1 + S2, no m/r/g  Lungs:  Normal respiratory effort; clear to auscultation bilaterally  Abd: soft, non-tender; bowel sounds present  Ext: No edema; warm and well perfused     Telemetry: n/a    Laboratory Data:    No recent labs    Inpatient Medications:  MEDICATIONS  (STANDING):  acetaminophen     Tablet .. 650 milliGRAM(s) Oral every 6 hours  ascorbic acid 500 milliGRAM(s) Oral two times a day  calcium carbonate 1250 mG  + Vitamin D (OsCal 500 + D) 1 Tablet(s) Oral three times a day  ceFAZolin   IVPB 1000 milliGRAM(s) IV Intermittent every 24 hours  chlorhexidine 2% Cloths 1 Application(s) Topical daily  epoetin marla (EPOGEN) Injectable 4000 Unit(s) IV Push <User Schedule>  folic acid 1 milliGRAM(s) Oral daily  hydrALAZINE 10 milliGRAM(s) Oral three times a day  HYDROmorphone  Injectable 0.5 milliGRAM(s) IV Push once  insulin lispro (ADMELOG) corrective regimen sliding scale   SubCutaneous Before meals and at bedtime  metoprolol succinate ER 12.5 milliGRAM(s) Oral daily  multivitamin 1 Tablet(s) Oral daily  pantoprazole    Tablet 40 milliGRAM(s) Oral before breakfast  polyethylene glycol 3350 17 Gram(s) Oral daily  senna 2 Tablet(s) Oral at bedtime      Assessment: 77 year old woman with ESRD on HD (MWF), HTN, DM (diet controlled), chronic type B aortic dissection, heart murmur, endometrial ca in remission s/p total hysterectomy in  presents with L hip pain     Plan of Care:      #Post-operative evaluation-  - Ms. Harris tolerated orthopedic and AV fistula interventions well.  She displays no signs of  coronary ischemia or decompensated CHF.   Continue current cardiac management.       #Type B aortic dissection  - Chronic (Diagnosed >10 years ago)  - Continue current management.        #ESRD-  - HD, as per renal.      Over 25 minutes spent on total encounter; more than 50% of the visit was spent counseling and/or coordinating care by the attending physician.      Gabo Burris MD [facility]  Cardiovascular Disease  [phone]

## 2023-08-12 NOTE — PROGRESS NOTE ADULT - SUBJECTIVE AND OBJECTIVE BOX
ORTHO PROGRESS NOTE     Bedside evac of hematoma today. No significant overnight events. Pain controlled.    Vital Signs Last 24 Hrs  T(C): 36.8 (11 Aug 2023 22:23), Max: 36.9 (11 Aug 2023 19:00)  T(F): 98.3 (11 Aug 2023 22:23), Max: 98.4 (11 Aug 2023 19:00)  HR: 80 (11 Aug 2023 22:23) (72 - 80)  BP: 141/52 (11 Aug 2023 22:23) (137/62 - 173/85)  RR: 17 (11 Aug 2023 22:23) (16 - 18)  SpO2: 99% (11 Aug 2023 22:23) (98% - 100%)  Parameters below as of 11 Aug 2023 22:23  Patient On (Oxygen Delivery Method): room air    Gen: No apparent distress  left LE:  Dressing C/D/I   BLE: motor intact EHL/FHL/TA/GS. Sensation is grossly intact to light touch in the distal extremities.    Compartments are soft bilaterally. Extremities are warm. DP pulse palpable.      A/P 77F s/p L hip SUZANNA -> hemiarthroplasty on 7/19/23.  - Per Nephro/Vascular, OK to remove Shiley (Vascular paged for removal)  - Daily pressure dressings changes  - Pain control/ Analgesia  - DVT ppx: Venodynes  - PT/OT/WBAT/OOB    - Incentive Spirometer

## 2023-08-12 NOTE — PROGRESS NOTE ADULT - PROBLEM SELECTOR PLAN 3
Rt IJ shiley placed  - S/P VSx balloon angioplasty of AVF 8/1  - s/p AVF revision 8/3, cleared for use by vascular   - awaiting follow up from VSx for feasibility of LUE AVF after HD session on 8/9.  - plan for Permacath placement and shiley removal. Rt IJ jessica placed  - S/P VSx balloon angioplasty of AVF 8/1  - s/p AVF revision 8/3, cleared for use by vascular   - Unclear of when shiley is to be removed since AVF is now working.

## 2023-08-12 NOTE — PROGRESS NOTE ADULT - SUBJECTIVE AND OBJECTIVE BOX
Patient is a 78y old  Female who presents with a chief complaint of s/p L hip orif concern for loosened hardwar (12 Aug 2023 08:26)      SUBJECTIVE / OVERNIGHT EVENTS:    MEDICATIONS  (STANDING):  acetaminophen     Tablet .. 650 milliGRAM(s) Oral every 6 hours  ascorbic acid 500 milliGRAM(s) Oral two times a day  calcium carbonate 1250 mG  + Vitamin D (OsCal 500 + D) 1 Tablet(s) Oral three times a day  ceFAZolin   IVPB 1000 milliGRAM(s) IV Intermittent every 24 hours  chlorhexidine 2% Cloths 1 Application(s) Topical daily  epoetin marla (EPOGEN) Injectable 4000 Unit(s) IV Push <User Schedule>  folic acid 1 milliGRAM(s) Oral daily  hydrALAZINE 10 milliGRAM(s) Oral three times a day  HYDROmorphone  Injectable 0.5 milliGRAM(s) IV Push once  insulin lispro (ADMELOG) corrective regimen sliding scale   SubCutaneous Before meals and at bedtime  metoprolol succinate ER 12.5 milliGRAM(s) Oral daily  multivitamin 1 Tablet(s) Oral daily  pantoprazole    Tablet 40 milliGRAM(s) Oral before breakfast  polyethylene glycol 3350 17 Gram(s) Oral daily  senna 2 Tablet(s) Oral at bedtime    MEDICATIONS  (PRN):  aluminum hydroxide/magnesium hydroxide/simethicone Suspension 30 milliLiter(s) Oral four times a day PRN Indigestion  melatonin 3 milliGRAM(s) Oral at bedtime PRN Insomnia  ondansetron Injectable 4 milliGRAM(s) IV Push every 6 hours PRN Nausea and/or Vomiting      Vital Signs Last 24 Hrs  T(C): 36.6 (12 Aug 2023 09:45), Max: 36.9 (11 Aug 2023 19:00)  T(F): 97.9 (12 Aug 2023 09:45), Max: 98.4 (11 Aug 2023 19:00)  HR: 74 (12 Aug 2023 09:45) (63 - 80)  BP: 142/64 (12 Aug 2023 09:45) (141/52 - 173/85)  BP(mean): --  RR: 16 (12 Aug 2023 09:45) (16 - 18)  SpO2: 100% (12 Aug 2023 09:45) (98% - 100%)    Parameters below as of 12 Aug 2023 09:45  Patient On (Oxygen Delivery Method): room air      CAPILLARY BLOOD GLUCOSE      POCT Blood Glucose.: 211 mg/dL (12 Aug 2023 11:38)  POCT Blood Glucose.: 122 mg/dL (12 Aug 2023 07:15)  POCT Blood Glucose.: 229 mg/dL (11 Aug 2023 22:16)  POCT Blood Glucose.: 90 mg/dL (11 Aug 2023 19:12)    I&O's Summary    11 Aug 2023 07:01  -  12 Aug 2023 07:00  --------------------------------------------------------  IN: 900 mL / OUT: 3400 mL / NET: -2500 mL        PHYSICAL EXAM:  GENERAL: NAD, well-developed  HEAD:  Atraumatic, Normocephalic  EYES: EOMI, PERRLA, conjunctiva and sclera clear  NECK: Supple, No JVD  CHEST/LUNG: Clear to auscultation bilaterally; No wheeze  HEART: Regular rate and rhythm; No murmurs, rubs, or gallops  ABDOMEN: Soft, Nontender, Nondistended; Bowel sounds present  EXTREMITIES:  2+ Peripheral Pulses, No clubbing, cyanosis, or edema  PSYCH: AAOx3  NEUROLOGY: non-focal  SKIN: No rashes or lesions    LABS:                    RADIOLOGY & ADDITIONAL TESTS:    Imaging Personally Reviewed:    Consultant(s) Notes Reviewed:      Care Discussed with Consultants/Other Providers:   Patient is a 78y old  Female who presents with a chief complaint of s/p L hip orif concern for loosened hardware (12 Aug 2023 08:26)      SUBJECTIVE / OVERNIGHT EVENTS:  Patient has no new complaints. Denies cp, SOB, abdominal pain, N/V/D     MEDICATIONS  (STANDING):  acetaminophen     Tablet .. 650 milliGRAM(s) Oral every 6 hours  ascorbic acid 500 milliGRAM(s) Oral two times a day  calcium carbonate 1250 mG  + Vitamin D (OsCal 500 + D) 1 Tablet(s) Oral three times a day  ceFAZolin   IVPB 1000 milliGRAM(s) IV Intermittent every 24 hours  chlorhexidine 2% Cloths 1 Application(s) Topical daily  epoetin marla (EPOGEN) Injectable 4000 Unit(s) IV Push <User Schedule>  folic acid 1 milliGRAM(s) Oral daily  hydrALAZINE 10 milliGRAM(s) Oral three times a day  HYDROmorphone  Injectable 0.5 milliGRAM(s) IV Push once  insulin lispro (ADMELOG) corrective regimen sliding scale   SubCutaneous Before meals and at bedtime  metoprolol succinate ER 12.5 milliGRAM(s) Oral daily  multivitamin 1 Tablet(s) Oral daily  pantoprazole    Tablet 40 milliGRAM(s) Oral before breakfast  polyethylene glycol 3350 17 Gram(s) Oral daily  senna 2 Tablet(s) Oral at bedtime    MEDICATIONS  (PRN):  aluminum hydroxide/magnesium hydroxide/simethicone Suspension 30 milliLiter(s) Oral four times a day PRN Indigestion  melatonin 3 milliGRAM(s) Oral at bedtime PRN Insomnia  ondansetron Injectable 4 milliGRAM(s) IV Push every 6 hours PRN Nausea and/or Vomiting      Vital Signs Last 24 Hrs  T(C): 36.6 (12 Aug 2023 09:45), Max: 36.9 (11 Aug 2023 19:00)  T(F): 97.9 (12 Aug 2023 09:45), Max: 98.4 (11 Aug 2023 19:00)  HR: 74 (12 Aug 2023 09:45) (63 - 80)  BP: 142/64 (12 Aug 2023 09:45) (141/52 - 173/85)  BP(mean): --  RR: 16 (12 Aug 2023 09:45) (16 - 18)  SpO2: 100% (12 Aug 2023 09:45) (98% - 100%)    Parameters below as of 12 Aug 2023 09:45  Patient On (Oxygen Delivery Method): room air      CAPILLARY BLOOD GLUCOSE      POCT Blood Glucose.: 211 mg/dL (12 Aug 2023 11:38)  POCT Blood Glucose.: 122 mg/dL (12 Aug 2023 07:15)  POCT Blood Glucose.: 229 mg/dL (11 Aug 2023 22:16)  POCT Blood Glucose.: 90 mg/dL (11 Aug 2023 19:12)    I&O's Summary    11 Aug 2023 07:01  -  12 Aug 2023 07:00  --------------------------------------------------------  IN: 900 mL / OUT: 3400 mL / NET: -2500 mL        PHYSICAL EXAM:  GENERAL: NAD, well-developed  HEAD:  Atraumatic, Normocephalic  EYES: EOMI, PERRLA, conjunctiva and sclera clear  NECK: Supple, No JVD  CHEST/LUNG: Clear to auscultation bilaterally; No wheeze  HEART: Regular rate and rhythm; No murmurs, rubs, or gallops  ABDOMEN: Soft, Nontender, Nondistended; Bowel sounds present  EXTREMITIES: LUE edema w/ fistula  2+ Peripheral Pulses, No clubbing, or cyanosis  PSYCH: AAOx3  NEUROLOGY: non-focal  SKIN: No rashes or lesions    LABS:                    RADIOLOGY & ADDITIONAL TESTS:    Imaging Personally Reviewed:    Consultant(s) Notes Reviewed:      Care Discussed with Consultants/Other Providers:

## 2023-08-13 LAB
GLUCOSE BLDC GLUCOMTR-MCNC: 147 MG/DL — HIGH (ref 70–99)
GLUCOSE BLDC GLUCOMTR-MCNC: 158 MG/DL — HIGH (ref 70–99)
GLUCOSE BLDC GLUCOMTR-MCNC: 184 MG/DL — HIGH (ref 70–99)
GLUCOSE BLDC GLUCOMTR-MCNC: 190 MG/DL — HIGH (ref 70–99)

## 2023-08-13 PROCEDURE — 99232 SBSQ HOSP IP/OBS MODERATE 35: CPT

## 2023-08-13 RX ADMIN — Medication 12.5 MILLIGRAM(S): at 06:17

## 2023-08-13 RX ADMIN — Medication 1 MILLIGRAM(S): at 13:49

## 2023-08-13 RX ADMIN — Medication 100 MILLIGRAM(S): at 19:21

## 2023-08-13 RX ADMIN — Medication 1 TABLET(S): at 06:16

## 2023-08-13 RX ADMIN — Medication 500 MILLIGRAM(S): at 18:14

## 2023-08-13 RX ADMIN — Medication 500 MILLIGRAM(S): at 06:16

## 2023-08-13 RX ADMIN — Medication 10 MILLIGRAM(S): at 21:57

## 2023-08-13 RX ADMIN — CHLORHEXIDINE GLUCONATE 1 APPLICATION(S): 213 SOLUTION TOPICAL at 13:50

## 2023-08-13 RX ADMIN — Medication 1 TABLET(S): at 13:50

## 2023-08-13 RX ADMIN — Medication 10 MILLIGRAM(S): at 13:49

## 2023-08-13 RX ADMIN — Medication 1: at 07:42

## 2023-08-13 RX ADMIN — Medication 1 TABLET(S): at 21:56

## 2023-08-13 RX ADMIN — Medication 1: at 11:49

## 2023-08-13 RX ADMIN — Medication 10 MILLIGRAM(S): at 06:16

## 2023-08-13 RX ADMIN — Medication 1 TABLET(S): at 13:49

## 2023-08-13 RX ADMIN — Medication 1: at 22:56

## 2023-08-13 RX ADMIN — Medication 650 MILLIGRAM(S): at 10:07

## 2023-08-13 RX ADMIN — SENNA PLUS 2 TABLET(S): 8.6 TABLET ORAL at 21:57

## 2023-08-13 RX ADMIN — Medication 650 MILLIGRAM(S): at 11:16

## 2023-08-13 NOTE — PROGRESS NOTE ADULT - ASSESSMENT
A/P 77F s/p L hip SUZANNA -> hemiarthroplasty on 7/19/23.    PLAN  - Per Nephro/Vascular, OK to remove Shiley (Vascular paged for removal)  - Daily pressure dressings changes  - Pain control/ Analgesia  - DVT ppx: Venodynes  - PT/OT/WBAT/OOB    - Incentive Spirometer

## 2023-08-13 NOTE — CHART NOTE - NSCHARTNOTEFT_GEN_A_CORE
Paged by team regarding nontunneled HD catheter removal as patient has been using AVF for HD and not using shiley any longer.     Patient seen at bedside. Nontunneled removed without complication. No bleeding. Dressing placed over site.

## 2023-08-13 NOTE — PROGRESS NOTE ADULT - SUBJECTIVE AND OBJECTIVE BOX
Patient is a 78y old  Female who presents with a chief complaint of s/p L hip orif concern for loosened hardwar (13 Aug 2023 09:04)      SUBJECTIVE / OVERNIGHT EVENTS:  Patient has no new complaints. Denies cp, SOB, abdominal pain, N/V/D     MEDICATIONS  (STANDING):  acetaminophen     Tablet .. 650 milliGRAM(s) Oral every 6 hours  ascorbic acid 500 milliGRAM(s) Oral two times a day  calcium carbonate 1250 mG  + Vitamin D (OsCal 500 + D) 1 Tablet(s) Oral three times a day  ceFAZolin   IVPB 1000 milliGRAM(s) IV Intermittent every 24 hours  chlorhexidine 2% Cloths 1 Application(s) Topical daily  epoetin marla (EPOGEN) Injectable 4000 Unit(s) IV Push <User Schedule>  folic acid 1 milliGRAM(s) Oral daily  hydrALAZINE 10 milliGRAM(s) Oral three times a day  HYDROmorphone  Injectable 0.5 milliGRAM(s) IV Push once  insulin lispro (ADMELOG) corrective regimen sliding scale   SubCutaneous Before meals and at bedtime  metoprolol succinate ER 12.5 milliGRAM(s) Oral daily  multivitamin 1 Tablet(s) Oral daily  pantoprazole    Tablet 40 milliGRAM(s) Oral before breakfast  polyethylene glycol 3350 17 Gram(s) Oral daily  senna 2 Tablet(s) Oral at bedtime    MEDICATIONS  (PRN):  aluminum hydroxide/magnesium hydroxide/simethicone Suspension 30 milliLiter(s) Oral four times a day PRN Indigestion  melatonin 3 milliGRAM(s) Oral at bedtime PRN Insomnia  ondansetron Injectable 4 milliGRAM(s) IV Push every 6 hours PRN Nausea and/or Vomiting      Vital Signs Last 24 Hrs  T(C): 36.6 (13 Aug 2023 13:40), Max: 36.7 (12 Aug 2023 17:30)  T(F): 97.9 (13 Aug 2023 13:40), Max: 98.1 (12 Aug 2023 17:30)  HR: 71 (13 Aug 2023 13:40) (69 - 82)  BP: 161/74 (13 Aug 2023 13:40) (124/60 - 161/74)  BP(mean): --  RR: 16 (13 Aug 2023 13:40) (16 - 18)  SpO2: 99% (13 Aug 2023 13:40) (98% - 100%)    Parameters below as of 13 Aug 2023 13:40  Patient On (Oxygen Delivery Method): room air      CAPILLARY BLOOD GLUCOSE      POCT Blood Glucose.: 190 mg/dL (13 Aug 2023 11:16)  POCT Blood Glucose.: 158 mg/dL (13 Aug 2023 07:18)  POCT Blood Glucose.: 166 mg/dL (12 Aug 2023 21:50)  POCT Blood Glucose.: 131 mg/dL (12 Aug 2023 16:45)    I&O's Summary      PHYSICAL EXAM:  GENERAL: NAD, well-developed  HEAD:  Atraumatic, Normocephalic  EYES: EOMI, PERRLA, conjunctiva and sclera clear  NECK: Supple, No JVD  CHEST/LUNG: Clear to auscultation bilaterally; No wheeze  HEART: Regular rate and rhythm; No murmurs, rubs, or gallops  ABDOMEN: Soft, Nontender, Nondistended; Bowel sounds present  EXTREMITIES: LUE edema w/ fistula  2+ Peripheral Pulses, No clubbing, or cyanosis  PSYCH: AAOx3  NEUROLOGY: non-focal  SKIN: No rashes or lesions    LABS:                    RADIOLOGY & ADDITIONAL TESTS:    Imaging Personally Reviewed:    Consultant(s) Notes Reviewed:      Care Discussed with Consultants/Other Providers:

## 2023-08-13 NOTE — CHART NOTE - NSCHARTNOTESELECT_GEN_ALL_CORE
Event Note
Follow-Up/Nutrition Services
Follow-Up/Nutrition Services
Post Operative Check
Postop Check
Event Note
IR/Event Note
Interventional Radiology/Event Note
Transfer Note

## 2023-08-13 NOTE — PROGRESS NOTE ADULT - SUBJECTIVE AND OBJECTIVE BOX
Cardiovascular Disease Progress Note  Date of Service: 23 @ 08:59    Overnight events: No acute events overnight.    Patient denies chest pain or SOB.        Objective Findings:  T(C): 36.7 (23 @ 06:12), Max: 36.7 (23 @ 17:30)  HR: 79 (23 @ 06:12) (69 - 82)  BP: 153/53 (23 @ 06:12) (124/60 - 153/53)  RR: 18 (23 @ 06:12) (16 - 18)  SpO2: 98% (23 @ 06:12) (98% - 100%)  Wt(kg): --  Daily     Daily Weight in k.8 (13 Aug 2023 01:10)      Physical Exam:  Gen: NAD; Patient resting comfortably  HEENT: EOMI, Normocephalic/ atraumatic  CV: RRR, normal S1 + S2, no m/r/g  Lungs:  Normal respiratory effort; clear to auscultation bilaterally  Abd: soft, non-tender; bowel sounds present  Ext: No edema; warm and well perfused    Telemetry: n/a    Laboratory Data:    No recent labs    Inpatient Medications:  MEDICATIONS  (STANDING):  acetaminophen     Tablet .. 650 milliGRAM(s) Oral every 6 hours  ascorbic acid 500 milliGRAM(s) Oral two times a day  calcium carbonate 1250 mG  + Vitamin D (OsCal 500 + D) 1 Tablet(s) Oral three times a day  ceFAZolin   IVPB 1000 milliGRAM(s) IV Intermittent every 24 hours  chlorhexidine 2% Cloths 1 Application(s) Topical daily  epoetin marla (EPOGEN) Injectable 4000 Unit(s) IV Push <User Schedule>  folic acid 1 milliGRAM(s) Oral daily  hydrALAZINE 10 milliGRAM(s) Oral three times a day  HYDROmorphone  Injectable 0.5 milliGRAM(s) IV Push once  insulin lispro (ADMELOG) corrective regimen sliding scale   SubCutaneous Before meals and at bedtime  metoprolol succinate ER 12.5 milliGRAM(s) Oral daily  multivitamin 1 Tablet(s) Oral daily  pantoprazole    Tablet 40 milliGRAM(s) Oral before breakfast  polyethylene glycol 3350 17 Gram(s) Oral daily  senna 2 Tablet(s) Oral at bedtime      Assessment:  77 year old woman with ESRD on HD (MWF), HTN, DM (diet controlled), chronic type B aortic dissection, heart murmur, endometrial ca in remission s/p total hysterectomy in  presents with L hip pain     Plan of Care:      #Post-operative evaluation-  - Ms. Harris tolerated orthopedic and AV fistula interventions well.  She displays no signs of  coronary ischemia or decompensated CHF.   Continue current cardiac management.       #Type B aortic dissection  - Chronic (Diagnosed >10 years ago)  - Continue current management.        #ESRD-  - HD, as per renal.    #ACP (advance care planning)-  Advanced care planning was discussed with the patient.    Cardiac findings were discussed in detail and all questions were answered.          Over 25 minutes spent on total encounter; more than 50% of the visit was spent counseling and/or coordinating care by the attending physician.      Gabo Burris MD [facility]  Cardiovascular Disease  [phone]

## 2023-08-13 NOTE — PROGRESS NOTE ADULT - ASSESSMENT
77 y.o. Female w/ hx HTN, DM2, Type B Aortic Dissect, ESRD - HD MWF, Endometrial CA s/p CLINT, Lt femur fx s/p IMN 5/2023 p/w hardware defect s/p Lt hip nail removal, Lt THR on 7/19 c/b hemorrhagic shock from post-op hematoma c/b AVF malfx s/p Shiley placement 7/28, balloon angioplasty of AVF 8/1, AVF revision on 8/3.

## 2023-08-13 NOTE — PROGRESS NOTE ADULT - SUBJECTIVE AND OBJECTIVE BOX
ORTHOPAEDIC PROGRESS NOTE    SUBJECTIVE:  Pt seen and examined at bedside this am.  Doing well.  No acute events overnight. Pt pain well controlled and reports no oozing fro dressing overnight     OBJECTIVE:  Vital Signs Last 24 Hrs  T(C): 36.7 (13 Aug 2023 06:12), Max: 36.7 (12 Aug 2023 17:30)  T(F): 98 (13 Aug 2023 06:12), Max: 98.1 (12 Aug 2023 17:30)  HR: 79 (13 Aug 2023 06:12) (69 - 82)  BP: 153/53 (13 Aug 2023 06:12) (124/60 - 153/53)  BP(mean): --  RR: 18 (13 Aug 2023 06:12) (16 - 18)  SpO2: 98% (13 Aug 2023 06:12) (98% - 100%)    Parameters below as of 13 Aug 2023 06:12  Patient On (Oxygen Delivery Method): room air        Physical Exam:  General: NAD; resting comfrotably in bed  Resp: non labored  LLE:  Dressing C/D/I (re-applied pressure dressing this AM)   BLE: motor intact EHL/FHL/TA/GS.   Sensation is grossly intact to light touch in the distal extremities.    Compartments are soft bilaterally.   Extremities are warm. DP pulse palpable.          LABS                  I&O's Summary      acetaminophen     Tablet .. 650 milliGRAM(s) Oral every 6 hours  aluminum hydroxide/magnesium hydroxide/simethicone Suspension 30 milliLiter(s) Oral four times a day PRN  ascorbic acid 500 milliGRAM(s) Oral two times a day  calcium carbonate 1250 mG  + Vitamin D (OsCal 500 + D) 1 Tablet(s) Oral three times a day  ceFAZolin   IVPB 1000 milliGRAM(s) IV Intermittent every 24 hours  chlorhexidine 2% Cloths 1 Application(s) Topical daily  epoetin marla (EPOGEN) Injectable 4000 Unit(s) IV Push <User Schedule>  folic acid 1 milliGRAM(s) Oral daily  hydrALAZINE 10 milliGRAM(s) Oral three times a day  HYDROmorphone  Injectable 0.5 milliGRAM(s) IV Push once  insulin lispro (ADMELOG) corrective regimen sliding scale   SubCutaneous Before meals and at bedtime  melatonin 3 milliGRAM(s) Oral at bedtime PRN  metoprolol succinate ER 12.5 milliGRAM(s) Oral daily  multivitamin 1 Tablet(s) Oral daily  ondansetron Injectable 4 milliGRAM(s) IV Push every 6 hours PRN  pantoprazole    Tablet 40 milliGRAM(s) Oral before breakfast  polyethylene glycol 3350 17 Gram(s) Oral daily  senna 2 Tablet(s) Oral at bedtime

## 2023-08-13 NOTE — PROGRESS NOTE ADULT - PROBLEM SELECTOR PLAN 3
Rt ASHER lopez placed  - S/P VSx balloon angioplasty of AVF 8/1  - s/p AVF revision 8/3, cleared for use by vascular   - jessica removed by IR on 8/13

## 2023-08-14 ENCOUNTER — TRANSCRIPTION ENCOUNTER (OUTPATIENT)
Age: 78
End: 2023-08-14

## 2023-08-14 LAB
GLUCOSE BLDC GLUCOMTR-MCNC: 100 MG/DL — HIGH (ref 70–99)
GLUCOSE BLDC GLUCOMTR-MCNC: 117 MG/DL — HIGH (ref 70–99)
GLUCOSE BLDC GLUCOMTR-MCNC: 163 MG/DL — HIGH (ref 70–99)
GLUCOSE BLDC GLUCOMTR-MCNC: 175 MG/DL — HIGH (ref 70–99)
GLUCOSE BLDC GLUCOMTR-MCNC: 239 MG/DL — HIGH (ref 70–99)
GLUCOSE BLDC GLUCOMTR-MCNC: 256 MG/DL — HIGH (ref 70–99)

## 2023-08-14 PROCEDURE — 71045 X-RAY EXAM CHEST 1 VIEW: CPT | Mod: 26

## 2023-08-14 PROCEDURE — 99232 SBSQ HOSP IP/OBS MODERATE 35: CPT

## 2023-08-14 RX ORDER — FOLIC ACID 0.8 MG
1 TABLET ORAL
Qty: 0 | Refills: 0 | DISCHARGE
Start: 2023-08-14

## 2023-08-14 RX ORDER — PANTOPRAZOLE SODIUM 20 MG/1
1 TABLET, DELAYED RELEASE ORAL
Qty: 0 | Refills: 0 | DISCHARGE
Start: 2023-08-14

## 2023-08-14 RX ORDER — ASCORBIC ACID 60 MG
1 TABLET,CHEWABLE ORAL
Qty: 0 | Refills: 0 | DISCHARGE
Start: 2023-08-14

## 2023-08-14 RX ORDER — LANOLIN ALCOHOL/MO/W.PET/CERES
1 CREAM (GRAM) TOPICAL
Qty: 0 | Refills: 0 | DISCHARGE
Start: 2023-08-14

## 2023-08-14 RX ADMIN — Medication 650 MILLIGRAM(S): at 18:07

## 2023-08-14 RX ADMIN — Medication 2: at 10:59

## 2023-08-14 RX ADMIN — PANTOPRAZOLE SODIUM 40 MILLIGRAM(S): 20 TABLET, DELAYED RELEASE ORAL at 05:50

## 2023-08-14 RX ADMIN — Medication 500 MILLIGRAM(S): at 17:35

## 2023-08-14 RX ADMIN — Medication 1 TABLET(S): at 16:06

## 2023-08-14 RX ADMIN — CHLORHEXIDINE GLUCONATE 1 APPLICATION(S): 213 SOLUTION TOPICAL at 16:07

## 2023-08-14 RX ADMIN — Medication 10 MILLIGRAM(S): at 16:05

## 2023-08-14 RX ADMIN — Medication 1 TABLET(S): at 22:47

## 2023-08-14 RX ADMIN — Medication 12.5 MILLIGRAM(S): at 05:49

## 2023-08-14 RX ADMIN — Medication 650 MILLIGRAM(S): at 17:35

## 2023-08-14 RX ADMIN — Medication 100 MILLIGRAM(S): at 19:09

## 2023-08-14 RX ADMIN — Medication 10 MILLIGRAM(S): at 05:49

## 2023-08-14 RX ADMIN — ERYTHROPOIETIN 4000 UNIT(S): 10000 INJECTION, SOLUTION INTRAVENOUS; SUBCUTANEOUS at 12:15

## 2023-08-14 RX ADMIN — Medication 1: at 22:47

## 2023-08-14 RX ADMIN — Medication 500 MILLIGRAM(S): at 08:20

## 2023-08-14 RX ADMIN — Medication 650 MILLIGRAM(S): at 08:21

## 2023-08-14 RX ADMIN — Medication 1: at 07:40

## 2023-08-14 RX ADMIN — Medication 1 MILLIGRAM(S): at 16:06

## 2023-08-14 RX ADMIN — Medication 1 TABLET(S): at 08:20

## 2023-08-14 RX ADMIN — Medication 10 MILLIGRAM(S): at 22:47

## 2023-08-14 RX ADMIN — Medication 650 MILLIGRAM(S): at 09:21

## 2023-08-14 NOTE — PROGRESS NOTE ADULT - PROBLEM SELECTOR PLAN 1
Pt with ESRD on HD TIW MWF admitted with fall. Pt s/p Lt hemiarthroplasty on 7/19/2023, however surgery complicated by intraoperative hypotension requiring vasopressor support. Patient transferred to SICU for hemodynamic stabilization. CT Angio abd/pelv reviewed. HD treatment held on 7/19/23 due to above. Held on 7/20 due to blood loss anemia. Transferred back to medical floors. 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 S/p nontunneled catheter placement 7/28. Pt. s/p LUE BC AVF fistulogram 8/1 w/ Balloon angioplasty. s/p aneurysmal Fistula, resected with end to end anastomosis 8/4, now can access AVF at proximal marked site. HD 8/7, venous access of AVF worked for 1.5hr, however cots were noted. Therefore used right shiley for venous access and AVF for arterial access for the remaining 1.5 hours. AVF successfully used throughout HD treatement 8/9 and 8/11. s/p non tunneled hd catheter removal 8/13. Plan for maintenance HD today. Monitor Labs Pt with ESRD on HD TIW MWF admitted with fall. Pt s/p Lt hemiarthroplasty on 7/19/2023, however surgery complicated by intraoperative hypotension requiring vasopressor support. Patient transferred to SICU for hemodynamic stabilization. CT Angio abd/pelv reviewed. HD treatment held on 7/19/23 due to above. Held on 7/20 due to blood loss anemia. Transferred back to medical floors. 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 S/p nontunneled catheter placement 7/28. Pt. s/p LUE BC AVF fistulogram 8/1 w/ Balloon angioplasty. s/p aneurysmal Fistula, resected with end to end anastomosis 8/4, now can access AVF at proximal marked site. HD 8/7, venous access of AVF worked for 1.5hr, however cLots were noted. Therefore used right shiley for venous access and AVF for arterial access for the remaining 1.5 hours. AVF successfully used throughout HD treatement 8/9 and 8/11. s/p non tunneled hd catheter removal 8/13. Plan for maintenance HD today. Monitor Labs

## 2023-08-14 NOTE — PROGRESS NOTE ADULT - SUBJECTIVE AND OBJECTIVE BOX
Patient is a 78y old  Female who presents with a chief complaint of s/p L hip orif concern for loosened hardwar (14 Aug 2023 09:45)      SUBJECTIVE / OVERNIGHT EVENTS:  Patient has no new complaints. Denies cp, SOB, abdominal pain, N/V/D     MEDICATIONS  (STANDING):  acetaminophen     Tablet .. 650 milliGRAM(s) Oral every 6 hours  ascorbic acid 500 milliGRAM(s) Oral two times a day  calcium carbonate 1250 mG  + Vitamin D (OsCal 500 + D) 1 Tablet(s) Oral three times a day  ceFAZolin   IVPB 1000 milliGRAM(s) IV Intermittent every 24 hours  chlorhexidine 2% Cloths 1 Application(s) Topical daily  epoetin marla (EPOGEN) Injectable 4000 Unit(s) IV Push <User Schedule>  folic acid 1 milliGRAM(s) Oral daily  hydrALAZINE 10 milliGRAM(s) Oral three times a day  HYDROmorphone  Injectable 0.5 milliGRAM(s) IV Push once  insulin lispro (ADMELOG) corrective regimen sliding scale   SubCutaneous Before meals and at bedtime  metoprolol succinate ER 12.5 milliGRAM(s) Oral daily  multivitamin 1 Tablet(s) Oral daily  pantoprazole    Tablet 40 milliGRAM(s) Oral before breakfast  polyethylene glycol 3350 17 Gram(s) Oral daily  senna 2 Tablet(s) Oral at bedtime    MEDICATIONS  (PRN):  aluminum hydroxide/magnesium hydroxide/simethicone Suspension 30 milliLiter(s) Oral four times a day PRN Indigestion  melatonin 3 milliGRAM(s) Oral at bedtime PRN Insomnia  ondansetron Injectable 4 milliGRAM(s) IV Push every 6 hours PRN Nausea and/or Vomiting      Vital Signs Last 24 Hrs  T(C): 37.1 (14 Aug 2023 11:20), Max: 37.1 (13 Aug 2023 18:05)  T(F): 98.7 (14 Aug 2023 11:20), Max: 98.8 (13 Aug 2023 18:05)  HR: 66 (14 Aug 2023 11:20) (66 - 73)  BP: 125/59 (14 Aug 2023 11:20) (125/59 - 161/74)  BP(mean): --  RR: 18 (14 Aug 2023 11:20) (16 - 18)  SpO2: 100% (14 Aug 2023 09:43) (98% - 100%)    Parameters below as of 14 Aug 2023 11:20  Patient On (Oxygen Delivery Method): room air      CAPILLARY BLOOD GLUCOSE      POCT Blood Glucose.: 239 mg/dL (14 Aug 2023 10:56)  POCT Blood Glucose.: 256 mg/dL (14 Aug 2023 09:52)  POCT Blood Glucose.: 175 mg/dL (14 Aug 2023 07:12)  POCT Blood Glucose.: 184 mg/dL (13 Aug 2023 22:34)  POCT Blood Glucose.: 147 mg/dL (13 Aug 2023 16:52)    I&O's Summary      PHYSICAL EXAM:  GENERAL: NAD, well-developed  HEAD:  Atraumatic, Normocephalic  EYES: EOMI, PERRLA, conjunctiva and sclera clear  NECK: Supple, No JVD  CHEST/LUNG: Clear to auscultation bilaterally; No wheeze  HEART: Regular rate and rhythm; No murmurs, rubs, or gallops  ABDOMEN: Soft, Nontender, Nondistended; Bowel sounds present  EXTREMITIES: LUE edema w/ fistula  2+ Peripheral Pulses, No clubbing, or cyanosis  PSYCH: AAOx3  NEUROLOGY: non-focal  SKIN: No rashes or lesions    LABS:                    RADIOLOGY & ADDITIONAL TESTS:    Imaging Personally Reviewed:    Consultant(s) Notes Reviewed:      Care Discussed with Consultants/Other Providers:

## 2023-08-14 NOTE — PROGRESS NOTE ADULT - SUBJECTIVE AND OBJECTIVE BOX
Orthopaedic Surgery Progress Note    Subjective:   Patient seen and examined. No acute events overnight. States pain is controlled. Denies fever/chills/chest pain/shortness of breath/numbness/tingling.    Objective:  T(C): 36.8 (08-14-23 @ 05:52), Max: 37.1 (08-13-23 @ 18:05)  HR: 73 (08-14-23 @ 05:52) (70 - 73)  BP: 158/64 (08-14-23 @ 05:52) (132/76 - 161/74)  RR: 18 (08-14-23 @ 05:52) (16 - 18)  SpO2: 98% (08-14-23 @ 05:52) (98% - 100%)  Wt(kg): --      Physical Exam:  General: NAD; resting comfrotably in bed  Resp: non labored  LLE:  Dressing C/D/I (re-applied pressure dressing this AM)   BLE: motor intact EHL/FHL/TA/GS.   Sensation is grossly intact to light touch in the distal extremities.    Compartments are soft bilaterally.   Extremities are warm. DP pulse palpable.

## 2023-08-14 NOTE — PROGRESS NOTE ADULT - PROBLEM SELECTOR PLAN 2
Pt. with anemia in the setting of ESRD and blood loss from surgical procedure. Hgb 8.8. Will give RICA with HD treatments. Monitor hemoglobin levels.     If you have any questions, please feel free to contact me  Kyle Ngo  Nephrology Fellow  XDC/Page [phone]  (After 5pm or on weekends please page the on-call fellow)

## 2023-08-14 NOTE — PROGRESS NOTE ADULT - ASSESSMENT
A/P 77F s/p L hip SUZANNA -> hemiarthroplasty on 7/19/23.    PLAN  - Daily pressure dressings changes  - Pain control/ Analgesia  - DVT ppx: Venodynes  - PT/OT/WBAT/OOB    - Incentive Spirometer

## 2023-08-14 NOTE — DISCHARGE NOTE PROVIDER - CARE PROVIDERS DIRECT ADDRESSES
,[email or web address]_Unknown

## 2023-08-14 NOTE — PROGRESS NOTE ADULT - SUBJECTIVE AND OBJECTIVE BOX
Cardiovascular Disease Progress Note  Date of Service: 08-14-23 @ 07:49    Overnight events: No acute events overnight.   Patient denies chest pain or SOB.    Otherwise review of systems negative    Objective Findings:  T(C): 36.8 (08-14-23 @ 05:52), Max: 37.1 (08-13-23 @ 18:05)  HR: 73 (08-14-23 @ 05:52) (70 - 73)  BP: 158/64 (08-14-23 @ 05:52) (132/76 - 161/74)  RR: 18 (08-14-23 @ 05:52) (16 - 18)  SpO2: 98% (08-14-23 @ 05:52) (98% - 100%)  Wt(kg): --  Daily     Daily       Physical Exam:  Gen: NAD; Patient resting comfortably  HEENT: EOMI, Normocephalic/ atraumatic  CV: RRR, normal S1 + S2, no m/r/g  Lungs:  Normal respiratory effort; clear to auscultation bilaterally  Abd: soft, non-tender; bowel sounds present  Ext: No edema; warm and well perfused    Telemetry: n/a    Laboratory Data:    No recent labs    Inpatient Medications:  MEDICATIONS  (STANDING):  acetaminophen     Tablet .. 650 milliGRAM(s) Oral every 6 hours  ascorbic acid 500 milliGRAM(s) Oral two times a day  calcium carbonate 1250 mG  + Vitamin D (OsCal 500 + D) 1 Tablet(s) Oral three times a day  ceFAZolin   IVPB 1000 milliGRAM(s) IV Intermittent every 24 hours  chlorhexidine 2% Cloths 1 Application(s) Topical daily  epoetin marla (EPOGEN) Injectable 4000 Unit(s) IV Push <User Schedule>  folic acid 1 milliGRAM(s) Oral daily  hydrALAZINE 10 milliGRAM(s) Oral three times a day  HYDROmorphone  Injectable 0.5 milliGRAM(s) IV Push once  insulin lispro (ADMELOG) corrective regimen sliding scale   SubCutaneous Before meals and at bedtime  metoprolol succinate ER 12.5 milliGRAM(s) Oral daily  multivitamin 1 Tablet(s) Oral daily  pantoprazole    Tablet 40 milliGRAM(s) Oral before breakfast  polyethylene glycol 3350 17 Gram(s) Oral daily  senna 2 Tablet(s) Oral at bedtime      Assessment: 77 year old woman with ESRD on HD (MWF), HTN, DM (diet controlled), chronic type B aortic dissection, heart murmur, endometrial ca in remission s/p total hysterectomy in 2007 presents with L hip pain     Plan of Care:      #Post-operative evaluation-  - Ms. Harris tolerated orthopedic and AV fistula interventions well.  She displays no signs of  coronary ischemia or decompensated CHF.   Continue current cardiac management.       #Type B aortic dissection  - Chronic (Diagnosed >10 years ago)  - Continue current management.        #ESRD-  - HD, as per renal.        Over 25 minutes spent on total encounter; more than 50% of the visit was spent counseling and/or coordinating care by the attending physician.      Gabo Burris MD [facility]  Cardiovascular Disease  [phone]

## 2023-08-14 NOTE — DISCHARGE NOTE PROVIDER - REASON FOR ADMISSION
s/p L hip orif concern for loosened hardware s/p L femur SUZANNA, Conrad s/p L hip orif concern for loosened hardwar

## 2023-08-14 NOTE — DISCHARGE NOTE PROVIDER - HOSPITAL COURSE
76 yo female is s/p above without any intraoperative complications. Her immediate post operative course was complicated by post operative anemia and hematoma formation, which was treated with transfusion and compression spica dressing as needed. When recovered from this, she was stable from an orthopedic standpoint but required additional inpatient care due to her fistula failing and requiring revision by vascular surgery. Subsequent trials of her fistula were successful and she is now stable for discharge back to [facility] facility. Her hematoma has begun to decompress, her incision was opened slightly to allow evacuation and one retention staple is in place which should be removed 7-10 days from DC.  Pt is doing well and stable for discharge.  Pt is tolerating physical therapy: WBAT, TOTAL HIP PRECAUTIONS, gait training.  Leave dressing ON and apply additional supplies for pressure as needed as hematoma is evacuating in the area. 76 yo female is s/p LEFT femur removal of hardware, hemiarthroplasty without any intraoperative complications. Her wound was closed by the plastics teams. Her immediate post operative course was complicated by post operative anemia and hematoma formation, which was treated with transfusion and compression spica dressing as needed. When recovered from this, she was stable from an orthopedic standpoint but required additional inpatient care due to her fistula failing and requiring revision by vascular surgery. Subsequent trials of her fistula were successful and she is now stable for discharge back to [facility] facility. Throughout her stay, she was co-managed by the nephrology team who managed her dialysis. Her left thigh hematoma has begun to decompress, her incision was opened slightly to allow evacuation and one retention staple is in place which should be removed 7-10 days from DC.  Pt is doing well and stable for discharge.  Pt is tolerating physical therapy: WBAT, TOTAL HIP PRECAUTIONS, gait training.  Leave dressing ON and apply additional supplies for pressure as needed as hematoma is evacuating in the area. 76 yo female is s/p LEFT femur removal of hardware, hemiarthroplasty without any intraoperative complications. Her wound was closed by the plastics teams. Her immediate post operative course was complicated by post operative anemia and hematoma formation, which was treated with transfusion and compression spica dressing as needed. When recovered from this, she was stable from an orthopedic standpoint but required additional inpatient care due to her fistula failing and requiring revision by vascular surgery. Subsequent trials of her fistula were successful and she is now stable for discharge back to [facility] facility. Throughout her stay, she was co-managed by the nephrology team who managed her dialysis. Her left thigh hematoma has begun to decompress, her incision was opened slightly to allow evacuation and one retention staple is in place which should be removed 7-10 days from DC.  Pt is doing well and stable for discharge.  Pt is tolerating physical therapy: WBAT, TOTAL HIP PRECAUTIONS, gait training.  Leave dressing ON and apply additional supplies for pressure as needed as hematoma is evacuating in the area.      Follow up with DR Jensen (Vascular surgery)  in 1 week, call for appt

## 2023-08-14 NOTE — DISCHARGE NOTE PROVIDER - NSDCCPTREATMENT_GEN_ALL_CORE_FT
PRINCIPAL PROCEDURE  Procedure: Hemiarthroplasty, hip  Findings and Treatment:       SECONDARY PROCEDURE  Procedure: Fistulogram, with PTA  Findings and Treatment:

## 2023-08-14 NOTE — DISCHARGE NOTE PROVIDER - CARE PROVIDER_API CALL
Vasyl Patel  Orthopaedic Surgery  [address], [address]  [place] [address]  Phone: [phone]  Fax: [phone]  Follow Up Time:     Diamond Jensen  Vascular Surgery  [address], [address]  [place] [address]  Phone: [phone]  Fax: [phone]  Follow Up Time:     Didier Valencia  Plastic Surgery  [address], [address]  [place] [address]  Phone: [phone]  Fax: [phone]  Follow Up Time:

## 2023-08-14 NOTE — PROGRESS NOTE ADULT - SUBJECTIVE AND OBJECTIVE BOX
[facility] Division of Kidney Diseases & Hypertension  FOLLOW UP NOTE  [phone]--------------------------------------------------------------------------------  Chief Complaint:Unspecified complication of procedure, initial encounter    HPI: 78F w/ PMH of ESRD on HD TIW (MWF) at [facility] (previously at [facility]), HTN, DM2, endometrial cancer (underwent hysterectomy and chemotherapy, type B aortic dissection, and L hip ORIF, admitted for revision of L hip hardware. Nephrology consulted for management of ESRD/HD. 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  S/p non-tunneled catheter placement 7/28, plan or fistulogram by vascular on 8/1.  s/p aneurysmal Fistula repair, resected with end to end anastomosis 8/4, now can be accessed.      24 hour events/subjective: Pt. seen and evaluated at bedside. Overall feels well, no acute complaints. s/p HD 8/9 and 8/11 and AVF used for the whole treatment duration. s/p non tunneled HD catheter removal 8/13. She denies fevers/chills, headaches, chest pain, SOB, abd pain    PAST HISTORY  --------------------------------------------------------------------------------  No significant changes to PMH, PSH, FHx, SHx, unless otherwise noted    ALLERGIES & MEDICATIONS  --------------------------------------------------------------------------------  Allergies  No Known Allergies  Intolerances    Standing Inpatient Medications  acetaminophen     Tablet .. 650 milliGRAM(s) Oral every 6 hours  ascorbic acid 500 milliGRAM(s) Oral two times a day  calcium carbonate 1250 mG  + Vitamin D (OsCal 500 + D) 1 Tablet(s) Oral three times a day  ceFAZolin   IVPB 1000 milliGRAM(s) IV Intermittent every 24 hours  chlorhexidine 2% Cloths 1 Application(s) Topical daily  epoetin marla (EPOGEN) Injectable 4000 Unit(s) IV Push <User Schedule>  folic acid 1 milliGRAM(s) Oral daily  hydrALAZINE 10 milliGRAM(s) Oral three times a day  HYDROmorphone  Injectable 0.5 milliGRAM(s) IV Push once  insulin lispro (ADMELOG) corrective regimen sliding scale   SubCutaneous Before meals and at bedtime  metoprolol succinate ER 12.5 milliGRAM(s) Oral daily  multivitamin 1 Tablet(s) Oral daily  pantoprazole    Tablet 40 milliGRAM(s) Oral before breakfast  polyethylene glycol 3350 17 Gram(s) Oral daily  senna 2 Tablet(s) Oral at bedtime  PRN Inpatient Medications  aluminum hydroxide/magnesium hydroxide/simethicone Suspension 30 milliLiter(s) Oral four times a day PRN  melatonin 3 milliGRAM(s) Oral at bedtime PRN  ondansetron Injectable 4 milliGRAM(s) IV Push every 6 hours PRN    REVIEW OF SYSTEMS  --------------------------------------------------------------------------------  as above    VITALS/PHYSICAL EXAM  --------------------------------------------------------------------------------  T(C): 36.5 (08-14-23 @ 09:43), Max: 37.1 (08-13-23 @ 18:05)  HR: 71 (08-14-23 @ 09:43) (70 - 73)  BP: 150/65 (08-14-23 @ 09:43) (132/76 - 161/74)  RR: 18 (08-14-23 @ 09:43) (16 - 18)  SpO2: 100% (08-14-23 @ 09:43) (98% - 100%)  Wt(kg): --    Physical Exam:  Gen: NAD  HEENT: MMM,   Pulm: minimal bibasilar crackles   CV: S1S2, systolic ejection murmur noted posterior chest well  Abd: Soft, +BS   Ext: LE edema B/L L>R, chronic LUE edema  Neuro: Awake  Skin: Warm and dry, ecchymotic RUE  Vascular access: LUE AVF covered in clean and dry dressing, with audible bruit; pressure dressing over previous RIJ site CDI    LABS/STUDIES  --------------------------------------------------------------------------------  Creatinine Trend:  SCr 4.61 [08-08 @ 06:24]  SCr 5.89 [08-07 @ 11:28]  SCr 3.45 [08-03 @ 02:25]  SCr 3.25 [08-01 @ 03:23]  SCr 5.14 [07-31 @ 05:35]   [facility] Division of Kidney Diseases & Hypertension  FOLLOW UP NOTE  [phone]--------------------------------------------------------------------------------  Chief Complaint:Unspecified complication of procedure, initial encounter    HPI: 78F w/ PMH of ESRD on HD TIW (MWF) at [facility] (previously at [facility]), HTN, DM2, endometrial cancer (underwent hysterectomy and chemotherapy, type B aortic dissection, and L hip ORIF, admitted for revision of L hip hardware. Nephrology consulted for management of ESRD/HD. 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  S/p non-tunneled catheter placement 7/28, plan or fistulogram by vascular on 8/1.  s/p aneurysmal Fistula repair, resected with end to end anastomosis 8/4, now can be accessed.      24 hour events/subjective: Pt. seen and evaluated at bedside. Overall feels well, no acute complaints. s/p HD 8/9 and 8/11 and AVF used for the whole treatment duration. s/p non tunneled HD catheter removal 8/13. She denies fevers/chills, headaches, chest pain, SOB, abd pain    PAST HISTORY  --------------------------------------------------------------------------------  No significant changes to PMH, PSH, FHx, SHx, unless otherwise noted    ALLERGIES & MEDICATIONS  --------------------------------------------------------------------------------  Allergies  No Known Allergies  Intolerances    Standing Inpatient Medications  acetaminophen     Tablet .. 650 milliGRAM(s) Oral every 6 hours  ascorbic acid 500 milliGRAM(s) Oral two times a day  calcium carbonate 1250 mG  + Vitamin D (OsCal 500 + D) 1 Tablet(s) Oral three times a day  ceFAZolin   IVPB 1000 milliGRAM(s) IV Intermittent every 24 hours  chlorhexidine 2% Cloths 1 Application(s) Topical daily  epoetin marla (EPOGEN) Injectable 4000 Unit(s) IV Push <User Schedule>  folic acid 1 milliGRAM(s) Oral daily  hydrALAZINE 10 milliGRAM(s) Oral three times a day  HYDROmorphone  Injectable 0.5 milliGRAM(s) IV Push once  insulin lispro (ADMELOG) corrective regimen sliding scale   SubCutaneous Before meals and at bedtime  metoprolol succinate ER 12.5 milliGRAM(s) Oral daily  multivitamin 1 Tablet(s) Oral daily  pantoprazole    Tablet 40 milliGRAM(s) Oral before breakfast  polyethylene glycol 3350 17 Gram(s) Oral daily  senna 2 Tablet(s) Oral at bedtime  PRN Inpatient Medications  aluminum hydroxide/magnesium hydroxide/simethicone Suspension 30 milliLiter(s) Oral four times a day PRN  melatonin 3 milliGRAM(s) Oral at bedtime PRN  ondansetron Injectable 4 milliGRAM(s) IV Push every 6 hours PRN    REVIEW OF SYSTEMS  --------------------------------------------------------------------------------  as above    VITALS/PHYSICAL EXAM  --------------------------------------------------------------------------------  T(C): 36.5 (08-14-23 @ 09:43), Max: 37.1 (08-13-23 @ 18:05)  HR: 71 (08-14-23 @ 09:43) (70 - 73)  BP: 150/65 (08-14-23 @ 09:43) (132/76 - 161/74)  RR: 18 (08-14-23 @ 09:43) (16 - 18)  SpO2: 100% (08-14-23 @ 09:43) (98% - 100%)  Wt(kg): --    Physical Exam:  Gen: NAD  HEENT: MMM,   Pulm: minimal bibasilar crackles   CV: S1S2, systolic ejection murmur noted posterior chest   Abd: Soft, +BS   Ext: LE edema B/L L>R, chronic LUE edema  Neuro: Awake  Skin: Warm and dry, ecchymotic RUE  Vascular access: LUE AVF covered in clean and dry dressing, with audible bruit; pressure dressing over previous RIJ site CDI    LABS/STUDIES  --------------------------------------------------------------------------------  Creatinine Trend:  SCr 4.61 [08-08 @ 06:24]  SCr 5.89 [08-07 @ 11:28]  SCr 3.45 [08-03 @ 02:25]  SCr 3.25 [08-01 @ 03:23]  SCr 5.14 [07-31 @ 05:35]

## 2023-08-14 NOTE — DISCHARGE NOTE PROVIDER - NSDCMRMEDTOKEN_GEN_ALL_CORE_FT
acetaminophen 325 mg oral tablet: 3 tab(s) orally every 8 hours  ascorbic acid 500 mg oral tablet: 1 tab(s) orally 2 times a day  calcium-vitamin D 500 mg-5 mcg (200 intl units) oral tablet: 1 tab(s) orally 3 times a day  folic acid 1 mg oral tablet: 1 tab(s) orally once a day  hydrALAZINE 50 mg oral tablet: 1 orally once a day  melatonin 3 mg oral tablet: 1 tab(s) orally once a day (at bedtime) As needed Insomnia  metoprolol succinate 25 mg oral tablet, extended release: 0.5 orally once a day  multivitamin: 1 tab(s) orally once a day  oxyCODONE 5 mg oral tablet: 1 tab(s) orally every 4 hours As needed Moderate Pain (4 - 6)  pantoprazole 40 mg oral delayed release tablet: 1 tab(s) orally once a day (before a meal)  PhosLo 667 mg oral capsule: 1 orally 3 times a day  polyethylene glycol 3350 oral powder for reconstitution: 17 gram(s) orally 2 times a day  senna leaf extract oral tablet: 2 tab(s) orally once a day (at bedtime)   acetaminophen 325 mg oral tablet: 3 tab(s) orally every 8 hours  ascorbic acid 500 mg oral tablet: 1 tab(s) orally 2 times a day  calcium-vitamin D 500 mg-5 mcg (200 intl units) oral tablet: 1 tab(s) orally 3 times a day  cefadroxil 500 mg oral capsule: 1 cap(s) orally every 12 hours  folic acid 1 mg oral tablet: 1 tab(s) orally once a day  hydrALAZINE 50 mg oral tablet: 1 orally once a day  melatonin 3 mg oral tablet: 1 tab(s) orally once a day (at bedtime) As needed Insomnia  metoprolol succinate 25 mg oral tablet, extended release: 0.5 orally once a day  multivitamin: 1 tab(s) orally once a day  oxyCODONE 5 mg oral tablet: 1 tab(s) orally every 4 hours As needed Moderate Pain (4 - 6)  pantoprazole 40 mg oral delayed release tablet: 1 tab(s) orally once a day (before a meal)  PhosLo 667 mg oral capsule: 1 orally 3 times a day  polyethylene glycol 3350 oral powder for reconstitution: 17 gram(s) orally 2 times a day  senna leaf extract oral tablet: 2 tab(s) orally once a day (at bedtime)

## 2023-08-14 NOTE — DISCHARGE NOTE PROVIDER - NSDCCPCAREPLAN_GEN_ALL_CORE_FT
PRINCIPAL DISCHARGE DIAGNOSIS  Diagnosis: Postoperative or surgical complication  Assessment and Plan of Treatment:

## 2023-08-14 NOTE — DISCHARGE NOTE PROVIDER - PROVIDER TOKENS
PROVIDER:[TOKEN:[[id number]]],PROVIDER:[TOKEN:[[id number]]],PROVIDER:[TOKEN:[[id number]]]

## 2023-08-15 LAB
GLUCOSE BLDC GLUCOMTR-MCNC: 144 MG/DL — HIGH (ref 70–99)
GLUCOSE BLDC GLUCOMTR-MCNC: 145 MG/DL — HIGH (ref 70–99)
GLUCOSE BLDC GLUCOMTR-MCNC: 184 MG/DL — HIGH (ref 70–99)
GLUCOSE BLDC GLUCOMTR-MCNC: 221 MG/DL — HIGH (ref 70–99)
GLUCOSE BLDC GLUCOMTR-MCNC: 243 MG/DL — HIGH (ref 70–99)

## 2023-08-15 PROCEDURE — 93990 DOPPLER FLOW TESTING: CPT | Mod: 26

## 2023-08-15 PROCEDURE — 99233 SBSQ HOSP IP/OBS HIGH 50: CPT

## 2023-08-15 RX ADMIN — Medication 1 TABLET(S): at 14:47

## 2023-08-15 RX ADMIN — Medication 650 MILLIGRAM(S): at 06:51

## 2023-08-15 RX ADMIN — Medication 10 MILLIGRAM(S): at 06:52

## 2023-08-15 RX ADMIN — Medication 650 MILLIGRAM(S): at 18:29

## 2023-08-15 RX ADMIN — Medication 12.5 MILLIGRAM(S): at 06:52

## 2023-08-15 RX ADMIN — Medication 500 MILLIGRAM(S): at 18:29

## 2023-08-15 RX ADMIN — Medication 10 MILLIGRAM(S): at 14:47

## 2023-08-15 RX ADMIN — Medication 2: at 12:02

## 2023-08-15 RX ADMIN — Medication 650 MILLIGRAM(S): at 13:00

## 2023-08-15 RX ADMIN — Medication 100 MILLIGRAM(S): at 19:05

## 2023-08-15 RX ADMIN — Medication 10 MILLIGRAM(S): at 21:52

## 2023-08-15 RX ADMIN — Medication 1 MILLIGRAM(S): at 12:03

## 2023-08-15 RX ADMIN — CHLORHEXIDINE GLUCONATE 1 APPLICATION(S): 213 SOLUTION TOPICAL at 12:03

## 2023-08-15 RX ADMIN — Medication 1 TABLET(S): at 21:53

## 2023-08-15 RX ADMIN — Medication 500 MILLIGRAM(S): at 06:52

## 2023-08-15 RX ADMIN — Medication 650 MILLIGRAM(S): at 07:00

## 2023-08-15 RX ADMIN — Medication 650 MILLIGRAM(S): at 19:18

## 2023-08-15 RX ADMIN — PANTOPRAZOLE SODIUM 40 MILLIGRAM(S): 20 TABLET, DELAYED RELEASE ORAL at 06:51

## 2023-08-15 RX ADMIN — Medication 650 MILLIGRAM(S): at 12:02

## 2023-08-15 RX ADMIN — Medication 1 TABLET(S): at 06:52

## 2023-08-15 RX ADMIN — Medication 1: at 21:52

## 2023-08-15 RX ADMIN — Medication 1 TABLET(S): at 12:03

## 2023-08-15 NOTE — PROGRESS NOTE ADULT - PROBLEM SELECTOR PLAN 3
Rt ASHER lopez placed  - S/P VSx balloon angioplasty of AVF 8/1  - s/p AVF revision 8/3, cleared for use by vascular   - jessica removed by IR on 8/13  -now fistula may be dysfunctional since thrill pronounce at left deltoid and can be heard in left lung field with enlarged left breast. Recommend doppler ultrasound of fistula and Vascular surgery reevaluation. Rt ASHER lopez placed  - S/P VSx balloon angioplasty of AVF 8/1  - s/p AVF revision 8/3, cleared for use by vascular   - jessica removed by IR on 8/13  -now fistula was suspected to be dysfunctional since thrill pronounce at left deltoid and can be heard in left lung field with enlarged left breast.   -VA duplex of LUE on 8/15  shows patent flow  -Spoke to Vascular attending Dr Diamond Jensen on 8/15/23 who says the PE findings are expected given patient has cental venous stenosis and edema in breast and arm is usual. She recommended outpatient follow up.

## 2023-08-15 NOTE — PROGRESS NOTE ADULT - ASSESSMENT
A/P  Patient S/P L hip SUZANNA, hemiarthroplasty DOS 7/19.  VSS. NAD.  PT/OT-WBAT  IS  DVT PPx: Venodynes  Continue pressure dressing to lateral thigh incision. One staple in place to be removed in 2 weeks. Allow hematoma to self evacuate  Concern for LUE swelling, will discuss with consulting teams prior to DC to ensure follow up outpatient for additional work up and treatment for this  Pain Control  Dispo planning: [facility] today

## 2023-08-15 NOTE — PROGRESS NOTE ADULT - SUBJECTIVE AND OBJECTIVE BOX
Patient is a 78y old  Female who presents with a chief complaint of s/p L hip orif concern for loosened hardware (15 Aug 2023 08:11)      SUBJECTIVE / OVERNIGHT EVENTS:  Patient c/o LUE numbness. Denies cp, SOB, abdominal pain, N/V/D     MEDICATIONS  (STANDING):  acetaminophen     Tablet .. 650 milliGRAM(s) Oral every 6 hours  ascorbic acid 500 milliGRAM(s) Oral two times a day  calcium carbonate 1250 mG  + Vitamin D (OsCal 500 + D) 1 Tablet(s) Oral three times a day  ceFAZolin   IVPB 1000 milliGRAM(s) IV Intermittent every 24 hours  chlorhexidine 2% Cloths 1 Application(s) Topical daily  epoetin marla (EPOGEN) Injectable 4000 Unit(s) IV Push <User Schedule>  folic acid 1 milliGRAM(s) Oral daily  hydrALAZINE 10 milliGRAM(s) Oral three times a day  HYDROmorphone  Injectable 0.5 milliGRAM(s) IV Push once  insulin lispro (ADMELOG) corrective regimen sliding scale   SubCutaneous Before meals and at bedtime  metoprolol succinate ER 12.5 milliGRAM(s) Oral daily  multivitamin 1 Tablet(s) Oral daily  pantoprazole    Tablet 40 milliGRAM(s) Oral before breakfast  polyethylene glycol 3350 17 Gram(s) Oral daily  senna 2 Tablet(s) Oral at bedtime    MEDICATIONS  (PRN):  aluminum hydroxide/magnesium hydroxide/simethicone Suspension 30 milliLiter(s) Oral four times a day PRN Indigestion  melatonin 3 milliGRAM(s) Oral at bedtime PRN Insomnia  ondansetron Injectable 4 milliGRAM(s) IV Push every 6 hours PRN Nausea and/or Vomiting      Vital Signs Last 24 Hrs  T(C): 36.7 (15 Aug 2023 09:42), Max: 37.3 (14 Aug 2023 22:23)  T(F): 98.1 (15 Aug 2023 09:42), Max: 99.1 (14 Aug 2023 22:23)  HR: 72 (15 Aug 2023 09:42) (63 - 73)  BP: 120/73 (15 Aug 2023 09:42) (120/73 - 161/68)  BP(mean): --  RR: 16 (15 Aug 2023 09:42) (16 - 18)  SpO2: 98% (15 Aug 2023 09:42) (98% - 99%)    Parameters below as of 15 Aug 2023 09:42  Patient On (Oxygen Delivery Method): room air      CAPILLARY BLOOD GLUCOSE      POCT Blood Glucose.: 243 mg/dL (15 Aug 2023 10:09)  POCT Blood Glucose.: 145 mg/dL (15 Aug 2023 07:16)  POCT Blood Glucose.: 163 mg/dL (14 Aug 2023 22:08)  POCT Blood Glucose.: 117 mg/dL (14 Aug 2023 16:13)  POCT Blood Glucose.: 100 mg/dL (14 Aug 2023 13:44)  POCT Blood Glucose.: 239 mg/dL (14 Aug 2023 10:56)    I&O's Summary    14 Aug 2023 07:01  -  15 Aug 2023 07:00  --------------------------------------------------------  IN: 700 mL / OUT: 3300 mL / NET: -2600 mL        PHYSICAL EXAM:  GENERAL: NAD, well-developed  HEAD:  Atraumatic, Normocephalic  EYES: EOMI, PERRLA, conjunctiva and sclera clear  NECK: Supple, No JVD  CHEST/LUNG: Left breast swelling. Transmitted fistula thrill sounds could be heard in Left lung field. Otherwise clear to auscultation bilaterally; No wheeze  HEART: Regular rate and rhythm; No murmurs, rubs, or gallops  ABDOMEN: Soft, Nontender, Nondistended; Bowel sounds present  EXTREMITIES: Palpable thrill from fistula all the way up to left frontal deltoid.  2+ Peripheral Pulses, No clubbing, or cyanosis  PSYCH: AAOx3  NEUROLOGY: non-focal  SKIN: No rashes or lesions    LABS:                    RADIOLOGY & ADDITIONAL TESTS:    Imaging Personally Reviewed: < from: Xray Chest 1 View- PORTABLE-Urgent (Xray Chest 1 View- PORTABLE-Urgent .) (08.14.23 @ 19:10) >  IMPRESSION:  Increasing small to moderate pleural effusions without obvious   consolidation to suggest pneumonia.    < end of copied text >      Consultant(s) Notes Reviewed:      Care Discussed with Consultants/Other Providers:   Patient is a 78y old  Female who presents with a chief complaint of s/p L hip orif concern for loosened hardware (15 Aug 2023 08:11)      SUBJECTIVE / OVERNIGHT EVENTS:  Patient c/o LUE numbness. Denies cp, SOB, abdominal pain, N/V/D     MEDICATIONS  (STANDING):  acetaminophen     Tablet .. 650 milliGRAM(s) Oral every 6 hours  ascorbic acid 500 milliGRAM(s) Oral two times a day  calcium carbonate 1250 mG  + Vitamin D (OsCal 500 + D) 1 Tablet(s) Oral three times a day  ceFAZolin   IVPB 1000 milliGRAM(s) IV Intermittent every 24 hours  chlorhexidine 2% Cloths 1 Application(s) Topical daily  epoetin marla (EPOGEN) Injectable 4000 Unit(s) IV Push <User Schedule>  folic acid 1 milliGRAM(s) Oral daily  hydrALAZINE 10 milliGRAM(s) Oral three times a day  HYDROmorphone  Injectable 0.5 milliGRAM(s) IV Push once  insulin lispro (ADMELOG) corrective regimen sliding scale   SubCutaneous Before meals and at bedtime  metoprolol succinate ER 12.5 milliGRAM(s) Oral daily  multivitamin 1 Tablet(s) Oral daily  pantoprazole    Tablet 40 milliGRAM(s) Oral before breakfast  polyethylene glycol 3350 17 Gram(s) Oral daily  senna 2 Tablet(s) Oral at bedtime    MEDICATIONS  (PRN):  aluminum hydroxide/magnesium hydroxide/simethicone Suspension 30 milliLiter(s) Oral four times a day PRN Indigestion  melatonin 3 milliGRAM(s) Oral at bedtime PRN Insomnia  ondansetron Injectable 4 milliGRAM(s) IV Push every 6 hours PRN Nausea and/or Vomiting      Vital Signs Last 24 Hrs  T(C): 36.7 (15 Aug 2023 09:42), Max: 37.3 (14 Aug 2023 22:23)  T(F): 98.1 (15 Aug 2023 09:42), Max: 99.1 (14 Aug 2023 22:23)  HR: 72 (15 Aug 2023 09:42) (63 - 73)  BP: 120/73 (15 Aug 2023 09:42) (120/73 - 161/68)  BP(mean): --  RR: 16 (15 Aug 2023 09:42) (16 - 18)  SpO2: 98% (15 Aug 2023 09:42) (98% - 99%)    Parameters below as of 15 Aug 2023 09:42  Patient On (Oxygen Delivery Method): room air      CAPILLARY BLOOD GLUCOSE      POCT Blood Glucose.: 243 mg/dL (15 Aug 2023 10:09)  POCT Blood Glucose.: 145 mg/dL (15 Aug 2023 07:16)  POCT Blood Glucose.: 163 mg/dL (14 Aug 2023 22:08)  POCT Blood Glucose.: 117 mg/dL (14 Aug 2023 16:13)  POCT Blood Glucose.: 100 mg/dL (14 Aug 2023 13:44)  POCT Blood Glucose.: 239 mg/dL (14 Aug 2023 10:56)    I&O's Summary    14 Aug 2023 07:01  -  15 Aug 2023 07:00  --------------------------------------------------------  IN: 700 mL / OUT: 3300 mL / NET: -2600 mL        PHYSICAL EXAM:  GENERAL: NAD, well-developed  HEAD:  Atraumatic, Normocephalic  EYES: EOMI, PERRLA, conjunctiva and sclera clear  NECK: Supple, No JVD  CHEST/LUNG: Left breast swelling. Transmitted fistula thrill sounds could be heard in Left lung field. Otherwise clear to auscultation bilaterally; No wheeze  HEART: Regular rate and rhythm; No murmurs, rubs, or gallops  ABDOMEN: Soft, Nontender, Nondistended; Bowel sounds present  EXTREMITIES: Palpable thrill from fistula all the way up to left frontal deltoid.  2+ Peripheral Pulses, No clubbing, or cyanosis  PSYCH: AAOx3  NEUROLOGY: non-focal  SKIN: No rashes or lesions    LABS:                    RADIOLOGY & ADDITIONAL TESTS:    Imaging Personally Reviewed: < from: Xray Chest 1 View- PORTABLE-Urgent (Xray Chest 1 View- PORTABLE-Urgent .) (08.14.23 @ 19:10) >  IMPRESSION:  Increasing small to moderate pleural effusions without obvious   consolidation to suggest pneumonia.    < end of copied text >    < from: VA Duplex Hemodialysis Access, Left. (08.15.23 @ 10:10) >  Summary/Impressions:  Patent left upper extremity brachiocephalic arteriovenous  fistula.  Patent inflow and outflow vessels.  The maximal volume flow velocity obtained within the  fistula is 3499 ml/min at its proximal segment.    < end of copied text >        Consultant(s) Notes Reviewed:      Care Discussed with Consultants/Other Providers:

## 2023-08-15 NOTE — PROGRESS NOTE ADULT - SUBJECTIVE AND OBJECTIVE BOX
Orthopedic Progress Note     S:  No acute events overnight, pain is well controlled.  Patient denies any chest pain, SOB, N/V, fevers/chills. Reports continued difficulty ambulating and concern with LUE persistent swelling.     T(C): 37.3 (08-14-23 @ 22:23), Max: 37.3 (08-14-23 @ 22:23)  HR: 63 (08-14-23 @ 22:23) (63 - 71)  BP: 130/54 (08-14-23 @ 22:23) (125/59 - 161/68)  RR: 17 (08-14-23 @ 22:23) (16 - 18)  SpO2: 99% (08-14-23 @ 22:23) (99% - 100%)  Wt(kg): --I&O's Summary    14 Aug 2023 07:01  -  15 Aug 2023 06:06  --------------------------------------------------------  IN: 700 mL / OUT: 3300 mL / NET: -2600 mL        O:  Physical exam:  Gen: Alert and Oriented x3, No Acute Distress  Left lower extremity:            Dressing: pressure dressing c/d/i            Motor: EHL FHL GA TA SOL in tact            Sensation: SILT            Pulses: 2+ DP

## 2023-08-15 NOTE — PROGRESS NOTE ADULT - PROBLEM SELECTOR PROBLEM 3
Dialysis AV fistula malfunction
ESRD (end stage renal disease) on dialysis
ESRD (end stage renal disease) on dialysis
Dialysis AV fistula malfunction
ESRD (end stage renal disease) on dialysis
Dialysis AV fistula malfunction
Dialysis AV fistula malfunction
ESRD (end stage renal disease) on dialysis
Dialysis AV fistula malfunction
ESRD (end stage renal disease) on dialysis
Dialysis AV fistula malfunction

## 2023-08-15 NOTE — PROGRESS NOTE ADULT - SUBJECTIVE AND OBJECTIVE BOX
Cardiovascular Disease Progress Note  Date of Service: 08-15-23 @ 08:11    Overnight events: No acute events overnight.    Patient denies chest pain or SOB.   Otherwise review of systems negative    Objective Findings:  T(C): 37.2 (08-15-23 @ 06:00), Max: 37.3 (23 @ 22:23)  HR: 73 (08-15-23 @ 06:00) (63 - 73)  BP: 143/63 (08-15-23 @ 06:00) (125/59 - 161/68)  RR: 17 (08-15-23 @ 06:00) (16 - 18)  SpO2: 98% (08-15-23 @ 06:00) (98% - 100%)  Wt(kg): --  Daily     Daily Weight in k.9 (14 Aug 2023 14:25)      Physical Exam:  Gen: NAD; Patient resting comfortably  HEENT: EOMI, Normocephalic/ atraumatic  CV: RRR, normal S1 + S2, no m/r/g  Lungs:  Normal respiratory effort; clear to auscultation bilaterally  Abd: soft, non-tender; bowel sounds present  Ext: No edema; warm and well perfused    Telemetry: n/a    Laboratory Data:    No recent labs      Inpatient Medications:  MEDICATIONS  (STANDING):  acetaminophen     Tablet .. 650 milliGRAM(s) Oral every 6 hours  ascorbic acid 500 milliGRAM(s) Oral two times a day  calcium carbonate 1250 mG  + Vitamin D (OsCal 500 + D) 1 Tablet(s) Oral three times a day  ceFAZolin   IVPB 1000 milliGRAM(s) IV Intermittent every 24 hours  chlorhexidine 2% Cloths 1 Application(s) Topical daily  epoetin marla (EPOGEN) Injectable 4000 Unit(s) IV Push <User Schedule>  folic acid 1 milliGRAM(s) Oral daily  hydrALAZINE 10 milliGRAM(s) Oral three times a day  HYDROmorphone  Injectable 0.5 milliGRAM(s) IV Push once  insulin lispro (ADMELOG) corrective regimen sliding scale   SubCutaneous Before meals and at bedtime  metoprolol succinate ER 12.5 milliGRAM(s) Oral daily  multivitamin 1 Tablet(s) Oral daily  pantoprazole    Tablet 40 milliGRAM(s) Oral before breakfast  polyethylene glycol 3350 17 Gram(s) Oral daily  senna 2 Tablet(s) Oral at bedtime      Assessment:  77 year old woman with ESRD on HD (MWF), HTN, DM (diet controlled), chronic type B aortic dissection, heart murmur, endometrial ca in remission s/p total hysterectomy in  presents with L hip pain     Plan of Care:      #Post-operative evaluation-  - Ms. Harris tolerated orthopedic and AV fistula interventions well.  She displays no signs of  coronary ischemia or decompensated CHF.   Continue current cardiac management.       #Type B aortic dissection  - Chronic (Diagnosed >10 years ago)  - Continue current management.        #ESRD-  - HD, as per renal.        Over 25 minutes spent on total encounter; more than 50% of the visit was spent counseling and/or coordinating care by the attending physician.      Gabo Burris MD [facility]  Cardiovascular Disease  [phone]

## 2023-08-15 NOTE — PROGRESS NOTE ADULT - PROBLEM SELECTOR PROBLEM 6
Type 2 diabetes mellitus
H/O aortic dissection
H/O aortic dissection
Type 2 diabetes mellitus
H/O aortic dissection
Type 2 diabetes mellitus
H/O aortic dissection
H/O aortic dissection
Type 2 diabetes mellitus

## 2023-08-15 NOTE — PROGRESS NOTE ADULT - PROBLEM SELECTOR PLAN 7
chronic type B aortic dissection  -diagnosed 10 years ago  - recommend maintaining SBP < 135.

## 2023-08-15 NOTE — PROGRESS NOTE ADULT - TIME BILLING
Reviewed lab data, radiology results, consultants' recommendations, documentation in [place], discussed with family, ACP, interdisciplinary staff and/or intervention were performed.
Reviewed lab data, radiology results, consultants' recommendations, documentation in [place], discussed with family, ACP, interdisciplinary staff and/or intervention were performed.
Reviewed lab data, radiology results, consultants' recommendations, documentation in [place], discussed with family, ACP, interdisciplinary staff and/or intervention were performed.
Reviewed lab data, radiology results, consultants' recommendations, documentation in [place], discussed with family, ACP, interdisciplinary staff and/or intervention were performed.

## 2023-08-15 NOTE — PROGRESS NOTE ADULT - PROBLEM SELECTOR PROBLEM 5
HTN (hypertension)
HTN (hypertension)
Type 2 diabetes mellitus
HTN (hypertension)
Type 2 diabetes mellitus
Type 2 diabetes mellitus
HTN (hypertension)
Type 2 diabetes mellitus
Type 2 diabetes mellitus
HTN (hypertension)

## 2023-08-15 NOTE — PROGRESS NOTE ADULT - PROBLEM SELECTOR PLAN 4
BPs stable  Holding metoprolol and hydralazine for hypotension
continue HD via Shiley  - renal consult appreciated
continue HD  - renal consult appreciated
continue HD  - renal consult appreciated
continue HD via Shiley  - renal consult appreciated
continue HD via fistula  - renal consult appreciated
BPs stable  Holding metoprolol and hydralazine for hypotension
continue HD  - renal consult appreciated
BPs stable  Holding metoprolol and hydralazine for hypotension
continue HD  - renal consult appreciated
continue HD via Shiley  - renal consult appreciated
continue HD via fistula  - renal consult appreciated
BPs stable  Holding metoprolol and hydralazine for hypotension
continue HD  - renal consult appreciated
BPs stable  Holding metoprolol and hydralazine for hypotension
continue HD  - renal consult appreciated
continue HD via fistula  - renal consult appreciated

## 2023-08-15 NOTE — PROGRESS NOTE ADULT - PROBLEM SELECTOR PLAN 5
Has been off all BP meds since the hemorrhagic shock  - resume Toprol XL 12.5 daily as per home dose.  - BP improved. Resume hydralazine but at lower dose to start.
Has been off all BP meds since the hemorrhagic shock  - resume Toprol XL 12.5 daily as per home dose.
Has been off all BP meds since the hemorrhagic shock  - resume Toprol XL 12.5 daily as per home dose.
diet controlled  not on meds  FS stable  c/w diabetic diet.  last a1c 5%
diet controlled  not on meds  FS stable  c/w diabetic diet.  last a1c 5%
Has been off all BP meds since the hemorrhagic shock  - resume Toprol XL 12.5 daily as per home dose.  - BP improved. Resume hydralazine but at lower dose to start.
Has been off all BP meds since the hemorrhagic shock  - resume Toprol XL 12.5 daily as per home dose.
Has been off all BP meds since the hemorrhagic shock  - resume Toprol XL 12.5 daily as per home dose.
diet controlled  not on meds  FS stable  c/w diabetic diet.
diet controlled  not on meds  FS stable  c/w diabetic diet.
Has been off all BP meds since the hemorrhagic shock  - resume Toprol XL 12.5 daily as per home dose.  - BP improved. Resume hydralazine but at lower dose to start.
Has been off all BP meds since the hemorrhagic shock  - resume Toprol XL 12.5 daily as per home dose.
Has been off all BP meds since the hemorrhagic shock  - resume Toprol XL 12.5 daily as per home dose.  - BP improved. Resume hydralazine but at lower dose to start.
Has been off all BP meds since the hemorrhagic shock  - resume Toprol XL 12.5 daily as per home dose.  - BP improved. Resume hydralazine but at lower dose to start.
diet controlled  not on meds  FS stable  c/w diabetic diet.
Has been off all BP meds since the hemorrhagic shock  - resume Toprol XL 12.5 daily as per home dose.
Has been off all BP meds since the hemorrhagic shock  - resume Toprol XL 12.5 daily as per home dose.
Has been off all BP meds since the hemorrhagic shock  - monitor BP - if elevated resume home meds of Lopressor
Has been off all BP meds since the hemorrhagic shock  - resume Toprol XL 12.5 daily as per home dose.  - BP improved. Resume hydralazine but at lower dose to start.
Has been off all BP meds since the hemorrhagic shock  - resume Toprol XL 12.5 daily as per home dose.  - BP improved. Resume hydralazine but at lower dose to start.

## 2023-08-15 NOTE — PROGRESS NOTE ADULT - PROBLEM SELECTOR PROBLEM 4
ESRD (end stage renal disease) on dialysis
HTN (hypertension)
ESRD (end stage renal disease) on dialysis
HTN (hypertension)
ESRD (end stage renal disease) on dialysis
HTN (hypertension)
ESRD (end stage renal disease) on dialysis

## 2023-08-15 NOTE — PROGRESS NOTE ADULT - PROBLEM SELECTOR PROBLEM 7
H/O aortic dissection

## 2023-08-16 VITALS
HEART RATE: 73 BPM | OXYGEN SATURATION: 99 % | SYSTOLIC BLOOD PRESSURE: 137 MMHG | RESPIRATION RATE: 17 BRPM | TEMPERATURE: 98 F | DIASTOLIC BLOOD PRESSURE: 71 MMHG

## 2023-08-16 LAB
ANION GAP SERPL CALC-SCNC: 15 MMOL/L — HIGH (ref 7–14)
BLD GP AB SCN SERPL QL: NEGATIVE — SIGNIFICANT CHANGE UP
BUN SERPL-MCNC: 40 MG/DL — HIGH (ref 7–23)
CALCIUM SERPL-MCNC: 8.7 MG/DL — SIGNIFICANT CHANGE UP (ref 8.4–10.5)
CHLORIDE SERPL-SCNC: 95 MMOL/L — LOW (ref 98–107)
CO2 SERPL-SCNC: 23 MMOL/L — SIGNIFICANT CHANGE UP (ref 22–31)
CREAT SERPL-MCNC: 5.51 MG/DL — HIGH (ref 0.5–1.3)
EGFR: 8 ML/MIN/1.73M2 — LOW
GLUCOSE BLDC GLUCOMTR-MCNC: 100 MG/DL — HIGH (ref 70–99)
GLUCOSE BLDC GLUCOMTR-MCNC: 101 MG/DL — HIGH (ref 70–99)
GLUCOSE BLDC GLUCOMTR-MCNC: 148 MG/DL — HIGH (ref 70–99)
GLUCOSE BLDC GLUCOMTR-MCNC: 199 MG/DL — HIGH (ref 70–99)
GLUCOSE BLDC GLUCOMTR-MCNC: 212 MG/DL — HIGH (ref 70–99)
GLUCOSE SERPL-MCNC: 207 MG/DL — HIGH (ref 70–99)
HCT VFR BLD CALC: 29.1 % — LOW (ref 34.5–45)
HGB BLD-MCNC: 9.5 G/DL — LOW (ref 11.5–15.5)
MCHC RBC-ENTMCNC: 31.6 PG — SIGNIFICANT CHANGE UP (ref 27–34)
MCHC RBC-ENTMCNC: 32.6 GM/DL — SIGNIFICANT CHANGE UP (ref 32–36)
MCV RBC AUTO: 96.7 FL — SIGNIFICANT CHANGE UP (ref 80–100)
NRBC # BLD: 0 /100 WBCS — SIGNIFICANT CHANGE UP (ref 0–0)
NRBC # FLD: 0 K/UL — SIGNIFICANT CHANGE UP (ref 0–0)
PLATELET # BLD AUTO: 142 K/UL — LOW (ref 150–400)
POTASSIUM SERPL-MCNC: 4 MMOL/L — SIGNIFICANT CHANGE UP (ref 3.5–5.3)
POTASSIUM SERPL-SCNC: 4 MMOL/L — SIGNIFICANT CHANGE UP (ref 3.5–5.3)
RBC # BLD: 3.01 M/UL — LOW (ref 3.8–5.2)
RBC # FLD: 22.8 % — HIGH (ref 10.3–14.5)
RH IG SCN BLD-IMP: POSITIVE — SIGNIFICANT CHANGE UP
SODIUM SERPL-SCNC: 133 MMOL/L — LOW (ref 135–145)
WBC # BLD: 5.65 K/UL — SIGNIFICANT CHANGE UP (ref 3.8–10.5)
WBC # FLD AUTO: 5.65 K/UL — SIGNIFICANT CHANGE UP (ref 3.8–10.5)

## 2023-08-16 PROCEDURE — 90935 HEMODIALYSIS ONE EVALUATION: CPT | Mod: GC

## 2023-08-16 RX ADMIN — CHLORHEXIDINE GLUCONATE 1 APPLICATION(S): 213 SOLUTION TOPICAL at 11:57

## 2023-08-16 RX ADMIN — Medication 650 MILLIGRAM(S): at 05:50

## 2023-08-16 RX ADMIN — Medication 650 MILLIGRAM(S): at 06:50

## 2023-08-16 RX ADMIN — Medication 500 MILLIGRAM(S): at 05:49

## 2023-08-16 RX ADMIN — Medication 1 MILLIGRAM(S): at 11:56

## 2023-08-16 RX ADMIN — Medication 12.5 MILLIGRAM(S): at 05:49

## 2023-08-16 RX ADMIN — Medication 10 MILLIGRAM(S): at 05:50

## 2023-08-16 RX ADMIN — Medication 650 MILLIGRAM(S): at 11:57

## 2023-08-16 RX ADMIN — Medication 1: at 11:57

## 2023-08-16 RX ADMIN — PANTOPRAZOLE SODIUM 40 MILLIGRAM(S): 20 TABLET, DELAYED RELEASE ORAL at 05:49

## 2023-08-16 RX ADMIN — Medication 650 MILLIGRAM(S): at 12:27

## 2023-08-16 RX ADMIN — Medication 1 TABLET(S): at 05:49

## 2023-08-16 RX ADMIN — Medication 1 TABLET(S): at 11:56

## 2023-08-16 RX ADMIN — ERYTHROPOIETIN 4000 UNIT(S): 10000 INJECTION, SOLUTION INTRAVENOUS; SUBCUTANEOUS at 13:33

## 2023-08-16 NOTE — PROGRESS NOTE ADULT - SUBJECTIVE AND OBJECTIVE BOX
Cardiovascular Disease Progress Note  Date of Service: 08-16-23 @ 06:36    Overnight events: No acute events overnight.    No chest pain or SOB.   Otherwise review of systems negative    Objective Findings:  T(C): 36.5 (08-16-23 @ 05:43), Max: 37.3 (08-15-23 @ 17:47)  HR: 74 (08-16-23 @ 05:43) (69 - 74)  BP: 155/63 (08-16-23 @ 05:43) (120/73 - 155/63)  RR: 19 (08-16-23 @ 05:43) (16 - 19)  SpO2: 97% (08-16-23 @ 05:43) (97% - 100%)  Wt(kg): --  Daily     Daily       Physical Exam:  Gen: NAD; Patient resting comfortably  HEENT: EOMI, Normocephalic/ atraumatic  CV: RRR, normal S1 + S2, no m/r/g  Lungs:  Normal respiratory effort;    Abd: soft, non-tender; bowel sounds present  Ext: No edema; warm and well perfused    Telemetry: n/a    Laboratory Data:    No recent labs        Inpatient Medications:  MEDICATIONS  (STANDING):  acetaminophen     Tablet .. 650 milliGRAM(s) Oral every 6 hours  ascorbic acid 500 milliGRAM(s) Oral two times a day  calcium carbonate 1250 mG  + Vitamin D (OsCal 500 + D) 1 Tablet(s) Oral three times a day  ceFAZolin   IVPB 1000 milliGRAM(s) IV Intermittent every 24 hours  chlorhexidine 2% Cloths 1 Application(s) Topical daily  epoetin marla (EPOGEN) Injectable 4000 Unit(s) IV Push <User Schedule>  folic acid 1 milliGRAM(s) Oral daily  hydrALAZINE 10 milliGRAM(s) Oral three times a day  HYDROmorphone  Injectable 0.5 milliGRAM(s) IV Push once  insulin lispro (ADMELOG) corrective regimen sliding scale   SubCutaneous Before meals and at bedtime  metoprolol succinate ER 12.5 milliGRAM(s) Oral daily  multivitamin 1 Tablet(s) Oral daily  pantoprazole    Tablet 40 milliGRAM(s) Oral before breakfast  polyethylene glycol 3350 17 Gram(s) Oral daily  senna 2 Tablet(s) Oral at bedtime      Assessment: 77 year old woman with ESRD on HD (MWF), HTN, DM (diet controlled), chronic type B aortic dissection, heart murmur, endometrial ca in remission s/p total hysterectomy in 2007 presents with L hip pain     Plan of Care:      #Post-operative evaluation-  - Ms. Harris tolerated orthopedic and AV fistula interventions well.  She displays no signs of  coronary ischemia or decompensated CHF.   Continue current cardiac management.       #Type B aortic dissection  - Chronic (Diagnosed >10 years ago)  - Continue current management.        #ESRD-  - HD, as per renal.    #ACP (advance care planning)-  Advanced care planning was discussed with the patient.    Cardiac findings were discussed in detail and all questions were answered.        Over 25 minutes spent on total encounter; more than 50% of the visit was spent counseling and/or coordinating care by the attending physician.      Gabo Burris MD [facility]  Cardiovascular Disease  [phone]

## 2023-08-16 NOTE — PROGRESS NOTE ADULT - ATTENDING SUPERVISION STATEMENT
Resident
Resident
Fellow
Resident
Fellow
Resident
Fellow

## 2023-08-16 NOTE — PROGRESS NOTE ADULT - PROBLEM SELECTOR PLAN 2
Pt. with anemia in the setting of ESRD and blood loss from surgical procedure. Hgb 9.5. ON RICA with HD treatments. Monitor hemoglobin levels.

## 2023-08-16 NOTE — PROGRESS NOTE ADULT - PROBLEM SELECTOR PROBLEM 2
Anemia
Anemia due to blood loss
Anemia
Anemia due to blood loss
Anemia
Anemia due to blood loss
Anemia
Anemia due to blood loss
Hip hematoma, right
Hip hematoma, right
Anemia due to blood loss
Hip hematoma, right
Hip hematoma, right
Anemia due to blood loss
Hip hematoma, right
Anemia due to blood loss

## 2023-08-16 NOTE — PROGRESS NOTE ADULT - PROVIDER SPECIALTY LIST ADULT
Anesthesia
Cardiology
Nephrology
Orthopedics
SICU
SICU
Vascular Surgery
Vascular Surgery
Cardiology
Orthopedics
SICU
Cardiology
Hospitalist
Orthopedics
SICU
Vascular Surgery
Nephrology
Orthopedics
SICU
Vascular Surgery
Nephrology
Hospitalist
Hospitalist
Nephrology
Hospitalist

## 2023-08-16 NOTE — PROGRESS NOTE ADULT - ATTENDING COMMENTS
seen and evaluated some residual volume overload on examination plan for HD today.
Called from rehab after 6 week postop image demonstrated lag screw cutout. Plan was for patient to follow-up in my office on 7/19 but had progressive pain over the past 2 weeks. 2 week postop with appropriate lag screw placement. Discussed with physician at rehab to transfer to [facility].  Planned for CT scan and likely conversion to FESTUS.  Discussed with patient after admission to hospital regarding the complication as well as the difficulty in maintaining her reduction intraoperatively at the time of surgery.  She is in agreement with plan for revision/ conversion.  All of her questions were asked and answered.
I agree with the detailed interval history, physical, and plan, which I have reviewed and edited where appropriate'; also agree with notes/assessment with my team on service.  I have personally examined the patient.  I was physically present for the key portions of the evaluation and management (E/M) service provided.  I reviewed all the pertinent data.  The patient is a critical care patient with life threatening hemodynamic and metabolic instability in SICU.  The SICU team has a constant risk benefit analyzes discussion and coordinating care with the primary team and all consultants.   The patient is in SICU with the chief complaint and diagnosis mentioned in the note.   The plan will be specified in the note.  76 y/o female s/p  left hip removal of gamma nail and placement of restoration modular peter arthroplasty in SICU with post procedure hypotension secondary to hypovolemia requiring vasopressor support.  EXAM  NEUROLOGY  Exam: alert, oriented x3,   RESPIRATORY  Exam: Lungs clear   CARDIOVASCULAR  Exam: Regular rate   GI/NUTRITION  Exam: Abdomen soft  PLAN:  NEURO:   -Tylenol ATC  RESPIRATORY  - Saturation >92%  CARDIOVASCULAR  - MAP goal >65  - Wean Norepinephrine  GI/NUTRITION  - Diet: NPO   GENITOURINARY/RENAL  - IVF @ 125  HEMATOLOGIC  - Holding DVT ppx   INFECTIOUS DISEASES:  - Zosyn  ENDOCRINE  - ISS     Disposition: SICU
acute blood loss anemia with shock  ESRD    remains about transfusion threshold and hct stable at 7  shock resolved  tolerating HD for ESRD per schedule    PT  transfer to regular [facility] for ongoing care coordinated by primary surgical service
I agree with the detailed interval history, physical, and plan, which I have reviewed and edited where appropriate'; also agree with notes/assessment with my team on service.  I have personally examined the patient.  I was physically present for the key portions of the evaluation and management (E/M) service provided.  I reviewed all the pertinent data.  The patient is a critical care patient with life threatening hemodynamic and metabolic instability in SICU.  The SICU team has a constant risk benefit analyzes discussion and coordinating care with the primary team and all consultants.   The patient is in SICU with the chief complaint and diagnosis mentioned in the note.   The plan will be specified in the note.  78 y/o female s/p left hip removal of gamma nail and placement of restoration modular peter arthroplasty in SICU for hemodynamic management.     EXAM  NEUROLOGY  Exam: no focal deficits.  RESPIRATORY  Exam: Lungs clear   CARDIOVASCULAR  Exam: Regular rate   GI/NUTRITION  Exam: Abdomen soft  VASCULAR  Exam: Extremities warm  PLAN:  NEURO:   - Tylenol ATC and oxycodone PRn  RESPIRATORY  - Saturation >92%  -CARDIOVASCULAR  - MAP goal >65  -IVF: LR @ 50  GI/NUTRITION  - Diet: Regular diet   GENITOURINARY/RENAL  - IVF: LR @ 50  HEMATOLOGIC  - DVT ppx: SC heparin q8h  INFECTIOUS DISEASES:  - Abx: Zosyn  ENDOCRINE  - ISS     Disposition: dc floor
aneurysmal AVF with overlying skin ulcerations  will plan for fistulogram first to evaluate for central venous stenosis as an underlying etiology for AVF aneurysms   will then plan for AVF revision on this admission  risks (bleeding, infection, arm ischemia, avf thrombosis), benefits (prevention of AVF rupture) and alternative (cont observation)  of the procedures were discussed and pt agrees to proceed   All the questions were answered
I agree with the detailed interval history, physical, and plan, which I have reviewed and edited where appropriate'; also agree with notes/assessment with my team on service.  I have personally examined the patient.  I was physically present for the key portions of the evaluation and management (E/M) service provided.  I reviewed all the pertinent data.  The patient is a critical care patient with life threatening hemodynamic and metabolic instability in SICU.  The SICU team has a constant risk benefit analyzes discussion and coordinating care with the primary team and all consultants.   The patient is in SICU with the chief complaint and diagnosis mentioned in the note.   The plan will be specified in the note.  78 y/o female s/p  left hip removal of gamma nail and placement of restoration modular peter arthroplasty in SICU for vasopressor support and hemodynamic management.   EXAM  NEUROLOGY  Exam: NAD  RESPIRATORY  Exam: Lungs clear  CARDIOVASCULAR  Exam: Regular rate   GI/NUTRITION  Exam: Abdomen soft, Non-tender  PLAN:  NEURO:   -Tylenol ATC  -oxycodone PRN   RESPIRATORY  -RA   CARDIOVASCULAR  - MAP goal >65  GI/NUTRITION  - Diet: Regular diet   - Protonix  GENITOURINARY/RENAL  - Monitor electrolytes   HEMATOLOGIC  - SQH   INFECTIOUS DISEASES:  - Trend WBC  ENDOCRINE  - ISS     Disposition: dc floors
Pt seen and examined for first time on 7/19  Please refer to initial consult note
acute blood loss anemia and in setting of postop and chronic anemia due to ESRD  -PRBC transfusion for hgb<7 (done this am)    ESRD  -HD per schedule    PT    stable for transfer to regular [facility] for continued coordinated care by primary team
ESRD    s/p HD yesterday  Will hold off from today as pt was hypotensive yesterday  Will reassess in AM
ESRD on HD    Will plan for HD today with UF as BP is better, monitor labs and Is/Os  Cont to dose meds for GFR
ESRD on HD  Anemia from blood loss    Receiving blood products but patient is still actively bleeding, will hold off from HD today  Last HD was Friday     Monitor resp status and Is/Os  Possible HD tomorrow with pressors if needed
ESRD on HD  Access clot    Will need catheter and intervention with vascular. Will plan for HD once catheter is placed.
ESRD on HD  Anemia    Pt received only 1L of UF today because of low BP. Will assess again in AM if she needs more UF.  Monitor labs and Is/Os
ESRD onHD    HD this AM, no complaints- tolerated well  Plan for HD again on Friday
seen and evaluated at bedside.  no chest pain no shortness of breath.  called by RN later this morning that AVF difficult to cannulate but was able to access.
seen and evaluated during dialysis treatment at bedside this morning.  case d/w dr merlos.  getting HD through catheter today.  will attempt to use AVF at marked site on next HD treatment.  volume status improved.
seen and evaluated this morning.  as per discussion with RN fistula successfully cannulated.  notified vascular team.  would hold off on tunneled catheter.
seen and evaluated course as above.  fistula successfully used on 8/9 however slight increase in venous access pressures at end of treatment.  overall tolerated 300 BF.  If AVF successfully used today then no objection to removal of HD catheter.  still volume overloaded UF with HD today.
seen and evaluated labs reviewed ESRD on HD anemia will do hd today and monitor.
seen and evaluated some residual volume overload on examination plan for HD today.
seen and evaluated this morning.  volume overloaded on examination.  2.5 kg UF goal today.

## 2023-08-16 NOTE — PROGRESS NOTE ADULT - NSPROGADDITIONALINFOA_GEN_ALL_CORE
pt has questions about the fistula and sutures. spoke with Dr. Jensen on the phone who will send her team to address pt

## 2023-08-16 NOTE — PROGRESS NOTE ADULT - SUBJECTIVE AND OBJECTIVE BOX
[facility] DIVISION OF KIDNEY DISEASES AND HYPERTENSION -- HEMODIALYSIS NOTE   Nehrology Office [phone]  available on [employer] teams--> Bhanu Torres   --------------------------------------------------------------------------------  Chief Complaint: ESRD/Ongoing hemodialysis requirement    24 hour events/subjective:      ALLERGIES & MEDICATIONS  --------------------------------------------------------------------------------  Allergies    No Known Allergies    Intolerances      Standing Inpatient Medications  acetaminophen     Tablet .. 650 milliGRAM(s) Oral every 6 hours  ascorbic acid 500 milliGRAM(s) Oral two times a day  calcium carbonate 1250 mG  + Vitamin D (OsCal 500 + D) 1 Tablet(s) Oral three times a day  ceFAZolin   IVPB 1000 milliGRAM(s) IV Intermittent every 24 hours  chlorhexidine 2% Cloths 1 Application(s) Topical daily  epoetin marla (EPOGEN) Injectable 4000 Unit(s) IV Push <User Schedule>  folic acid 1 milliGRAM(s) Oral daily  hydrALAZINE 10 milliGRAM(s) Oral three times a day  HYDROmorphone  Injectable 0.5 milliGRAM(s) IV Push once  insulin lispro (ADMELOG) corrective regimen sliding scale   SubCutaneous Before meals and at bedtime  metoprolol succinate ER 12.5 milliGRAM(s) Oral daily  multivitamin 1 Tablet(s) Oral daily  pantoprazole    Tablet 40 milliGRAM(s) Oral before breakfast  polyethylene glycol 3350 17 Gram(s) Oral daily  senna 2 Tablet(s) Oral at bedtime    PRN Inpatient Medications  aluminum hydroxide/magnesium hydroxide/simethicone Suspension 30 milliLiter(s) Oral four times a day PRN  melatonin 3 milliGRAM(s) Oral at bedtime PRN  ondansetron Injectable 4 milliGRAM(s) IV Push every 6 hours PRN      REVIEW OF SYSTEMS  --------------------------------------------------------------------------------  Gen: No  fevers/chills  Skin: No rashes  Respiratory: no SOB  CV: No chest pain,   GI: No abdominal pain  :  + -oliguria   MSK: No joint pain/  + -swelling;   All other systems were reviewed and are negative, except as noted.    VITALS/PHYSICAL EXAM  --------------------------------------------------------------------------------  T(C): 36.6 (08-16-23 @ 16:01), Max: 37.3 (08-15-23 @ 17:47)  HR: 73 (08-16-23 @ 16:01) (68 - 74)  BP: 137/71 (08-16-23 @ 16:01) (135/52 - 155/63)  RR: 17 (08-16-23 @ 16:01) (16 - 19)  SpO2: 99% (08-16-23 @ 16:01) (97% - 100%)  Wt(kg): --        08-16-23 @ 07:01  -  08-16-23 @ 16:33  --------------------------------------------------------  IN: 500 mL / OUT: 3000 mL / NET: -2500 mL      Physical Exam:  	Gen NAD  	HEENT: no JVD  	Pulm: CTABL  	CV: S1S2,  	Abd: Soft,   	Ext:  + - edema B/L LE   	Neuro: Awake and alert  	Skin: Warm and dry          Vascular:  IJ tunneled HD catheter, AVF + bruit    LABS/STUDIES  --------------------------------------------------------------------------------              9.5    5.65  >-----------<  142      [08-16-23 @ 12:28]              29.1     133  |  95  |  40  ----------------------------<  207      [08-16-23 @ 12:28]  4.0   |  23  |  5.51        Ca     8.7     [08-16-23 @ 12:28]               [facility] DIVISION OF KIDNEY DISEASES AND HYPERTENSION -- HEMODIALYSIS NOTE   Nehrology Office [phone]  available on [employer] teams--> Bhanu Torres   --------------------------------------------------------------------------------  Chief Complaint: ESRD/Ongoing hemodialysis requirement  pt was evaluated during HD session this am. BP and Blood flow during dialysis are acceptable   24 hour events/subjective:      ALLERGIES & MEDICATIONS  --------------------------------------------------------------------------------  Allergies    No Known Allergies    Intolerances      Standing Inpatient Medications  acetaminophen     Tablet .. 650 milliGRAM(s) Oral every 6 hours  ascorbic acid 500 milliGRAM(s) Oral two times a day  calcium carbonate 1250 mG  + Vitamin D (OsCal 500 + D) 1 Tablet(s) Oral three times a day  ceFAZolin   IVPB 1000 milliGRAM(s) IV Intermittent every 24 hours  chlorhexidine 2% Cloths 1 Application(s) Topical daily  epoetin marla (EPOGEN) Injectable 4000 Unit(s) IV Push <User Schedule>  folic acid 1 milliGRAM(s) Oral daily  hydrALAZINE 10 milliGRAM(s) Oral three times a day  HYDROmorphone  Injectable 0.5 milliGRAM(s) IV Push once  insulin lispro (ADMELOG) corrective regimen sliding scale   SubCutaneous Before meals and at bedtime  metoprolol succinate ER 12.5 milliGRAM(s) Oral daily  multivitamin 1 Tablet(s) Oral daily  pantoprazole    Tablet 40 milliGRAM(s) Oral before breakfast  polyethylene glycol 3350 17 Gram(s) Oral daily  senna 2 Tablet(s) Oral at bedtime    PRN Inpatient Medications  aluminum hydroxide/magnesium hydroxide/simethicone Suspension 30 milliLiter(s) Oral four times a day PRN  melatonin 3 milliGRAM(s) Oral at bedtime PRN  ondansetron Injectable 4 milliGRAM(s) IV Push every 6 hours PRN        VITALS/PHYSICAL EXAM  --------------------------------------------------------------------------------  T(C): 36.6 (08-16-23 @ 16:01), Max: 37.3 (08-15-23 @ 17:47)  HR: 73 (08-16-23 @ 16:01) (68 - 74)  BP: 137/71 (08-16-23 @ 16:01) (135/52 - 155/63)  RR: 17 (08-16-23 @ 16:01) (16 - 19)  SpO2: 99% (08-16-23 @ 16:01) (97% - 100%)  Wt(kg): --        08-16-23 @ 07:01  -  08-16-23 @ 16:33  --------------------------------------------------------  IN: 500 mL / OUT: 3000 mL / NET: -2500 mL      Physical Exam:  	Gen NAD  	HEENT: no JVD  	Pulm: CTABL  	CV: S1S2,  	Abd: Soft,   	Ext:  - edema B/L LE   	Neuro: Awake and alert  	Skin: Warm and dry          Vascular:  L  AVF + bruit, sutures, edema.     LABS/STUDIES  --------------------------------------------------------------------------------              9.5    5.65  >-----------<  142      [08-16-23 @ 12:28]              29.1     133  |  95  |  40  ----------------------------<  207      [08-16-23 @ 12:28]  4.0   |  23  |  5.51        Ca     8.7     [08-16-23 @ 12:28]

## 2023-08-16 NOTE — PROGRESS NOTE ADULT - PROBLEM SELECTOR PLAN 1
Pt with ESRD on HD TIW MWF admitted with fall. Pt s/p Lt hemiarthroplasty on 7/19/2023, however surgery complicated by intraoperative hypotension requiring vasopressor support. Patient transferred to SICU for hemodynamic stabilization. CT Angio abd/pelv reviewed. HD treatment held on 7/19/23 due to above. Held on 7/20 due to blood loss anemia. Transferred back to medical floors. 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 S/p nontunneled catheter placement 7/28. Pt. s/p LUE BC AVF fistulogram 8/1 w/ Balloon angioplasty. s/p aneurysmal Fistula, resected with end to end anastomosis 8/4, now can access AVF at proximal marked site. HD 8/7, venous access of AVF worked for 1.5hr, however cLots were noted. Therefore used right shiley for venous access and AVF for arterial access for the remaining 1.5 hours. AVF successfully used throughout HD treatement 8/9 and 8/11. s/p non tunneled hd catheter removal 8/13.seen while on maintenance HD today. Monitor Labs

## 2023-08-16 NOTE — PROGRESS NOTE ADULT - PROBLEM SELECTOR PROBLEM 1
ESRD (end stage renal disease) on dialysis
Fracture of neck of left femur
Fracture of neck of left femur
ESRD on hemodialysis
Fracture of neck of left femur
ESRD (end stage renal disease) on dialysis
Fracture of neck of left femur
ESRD (end stage renal disease) on dialysis
Fracture of neck of left femur
Fracture of neck of left femur
ESRD (end stage renal disease) on dialysis
Fracture of neck of left femur
ESRD (end stage renal disease) on dialysis
Fracture of neck of left femur
ESRD (end stage renal disease) on dialysis
Fracture of neck of left femur
ESRD (end stage renal disease) on dialysis
Fracture of neck of left femur
Fracture of neck of left femur
ESRD (end stage renal disease) on dialysis
ESRD (end stage renal disease) on dialysis
Fracture of neck of left femur
Fracture of neck of left femur

## 2023-08-16 NOTE — PROGRESS NOTE ADULT - SUBJECTIVE AND OBJECTIVE BOX
ORTHO PROGRESS NOTE     Pt seen and examined at bedside, denies SOB, CP, Dizziness. N/V/D /HA.  No significant overnight events. Pain well controlled. Patient ambulated  with PT     Vital Signs Last 24 Hrs  T(C): 36.5 (16 Aug 2023 05:43), Max: 37.3 (15 Aug 2023 17:47)  T(F): 97.7 (16 Aug 2023 05:43), Max: 99.1 (15 Aug 2023 17:47)  HR: 74 (16 Aug 2023 05:43) (69 - 74)  BP: 155/63 (16 Aug 2023 05:43) (120/73 - 155/63)  BP(mean): --  RR: 19 (16 Aug 2023 05:43) (16 - 19)  SpO2: 97% (16 Aug 2023 05:43) (97% - 100%)    Parameters below as of 16 Aug 2023 05:43  Patient On (Oxygen Delivery Method): room air        Gen: No apparent distress    left LE:  Dressing C/D I   BLE: motor intact EHL/FHL/TA/GS .  Sensation is grossly intact to light touch in the distal extremities.  Compartments are soft bilaterally.  Extremities are warm .  DP 2+ BLE          A/P  s/p left hip SUZANNA-> left hip hemiarthroplasty , POD #28  - Pain control/ Analgesia  - Ancef  - DVT ppx SQH foot pumps  - PT/OT - wbat/oob    - Incentive Spirometer  - Rehab planning   - notify Ortho for questions

## 2023-08-30 ENCOUNTER — TRANSCRIPTION ENCOUNTER (OUTPATIENT)
Age: 78
End: 2023-08-30

## 2023-08-30 ENCOUNTER — INPATIENT (INPATIENT)
Facility: HOSPITAL | Age: 78
LOS: 7 days | Discharge: NURSING, REHAB OR LONG-TERM CARE | End: 2023-09-07
Attending: SURGERY | Admitting: SURGERY
Payer: MEDICARE

## 2023-08-30 VITALS
HEART RATE: 66 BPM | DIASTOLIC BLOOD PRESSURE: 72 MMHG | HEIGHT: 61 IN | RESPIRATION RATE: 16 BRPM | OXYGEN SATURATION: 98 % | TEMPERATURE: 98 F | SYSTOLIC BLOOD PRESSURE: 166 MMHG

## 2023-08-30 DIAGNOSIS — Z90.710 ACQUIRED ABSENCE OF BOTH CERVIX AND UTERUS: Chronic | ICD-10-CM

## 2023-08-30 LAB
ALBUMIN SERPL ELPH-MCNC: 2.8 G/DL — LOW (ref 3.3–5)
ALP SERPL-CCNC: 341 U/L — HIGH (ref 40–120)
ALT FLD-CCNC: 20 U/L — SIGNIFICANT CHANGE UP (ref 4–33)
ANION GAP SERPL CALC-SCNC: 13 MMOL/L — SIGNIFICANT CHANGE UP (ref 7–14)
AST SERPL-CCNC: 37 U/L — HIGH (ref 4–32)
BASE EXCESS BLDV CALC-SCNC: 5.7 MMOL/L — HIGH (ref -2–3)
BASOPHILS # BLD AUTO: 0.02 K/UL — SIGNIFICANT CHANGE UP (ref 0–0.2)
BASOPHILS NFR BLD AUTO: 0.3 % — SIGNIFICANT CHANGE UP (ref 0–2)
BILIRUB SERPL-MCNC: 0.8 MG/DL — SIGNIFICANT CHANGE UP (ref 0.2–1.2)
BLOOD GAS VENOUS COMPREHENSIVE RESULT: SIGNIFICANT CHANGE UP
BUN SERPL-MCNC: 49 MG/DL — HIGH (ref 7–23)
CALCIUM SERPL-MCNC: 10.5 MG/DL — SIGNIFICANT CHANGE UP (ref 8.4–10.5)
CHLORIDE BLDV-SCNC: 95 MMOL/L — LOW (ref 96–108)
CHLORIDE SERPL-SCNC: 94 MMOL/L — LOW (ref 98–107)
CO2 BLDV-SCNC: 32 MMOL/L — HIGH (ref 22–26)
CO2 SERPL-SCNC: 27 MMOL/L — SIGNIFICANT CHANGE UP (ref 22–31)
CREAT SERPL-MCNC: 5.02 MG/DL — HIGH (ref 0.5–1.3)
CRP SERPL-MCNC: 27.3 MG/L — HIGH
EGFR: 8 ML/MIN/1.73M2 — LOW
EOSINOPHIL # BLD AUTO: 0.21 K/UL — SIGNIFICANT CHANGE UP (ref 0–0.5)
EOSINOPHIL NFR BLD AUTO: 3.3 % — SIGNIFICANT CHANGE UP (ref 0–6)
ERYTHROCYTE [SEDIMENTATION RATE] IN BLOOD: 59 MM/HR — HIGH (ref 4–25)
GAS PNL BLDV: 132 MMOL/L — LOW (ref 136–145)
GLUCOSE BLDV-MCNC: 174 MG/DL — HIGH (ref 70–99)
GLUCOSE SERPL-MCNC: 176 MG/DL — HIGH (ref 70–99)
HCO3 BLDV-SCNC: 31 MMOL/L — HIGH (ref 22–29)
HCT VFR BLD CALC: 32.5 % — LOW (ref 34.5–45)
HCT VFR BLDA CALC: 33 % — LOW (ref 34.5–46.5)
HGB BLD CALC-MCNC: 11.1 G/DL — LOW (ref 11.7–16.1)
HGB BLD-MCNC: 10.6 G/DL — LOW (ref 11.5–15.5)
IANC: 4.08 K/UL — SIGNIFICANT CHANGE UP (ref 1.8–7.4)
IMM GRANULOCYTES NFR BLD AUTO: 0.2 % — SIGNIFICANT CHANGE UP (ref 0–0.9)
LACTATE BLDV-MCNC: 1.9 MMOL/L — SIGNIFICANT CHANGE UP (ref 0.5–2)
LIDOCAIN IGE QN: 260 U/L — HIGH (ref 7–60)
LYMPHOCYTES # BLD AUTO: 1.36 K/UL — SIGNIFICANT CHANGE UP (ref 1–3.3)
LYMPHOCYTES # BLD AUTO: 21.4 % — SIGNIFICANT CHANGE UP (ref 13–44)
MCHC RBC-ENTMCNC: 31.6 PG — SIGNIFICANT CHANGE UP (ref 27–34)
MCHC RBC-ENTMCNC: 32.6 GM/DL — SIGNIFICANT CHANGE UP (ref 32–36)
MCV RBC AUTO: 97 FL — SIGNIFICANT CHANGE UP (ref 80–100)
MONOCYTES # BLD AUTO: 0.69 K/UL — SIGNIFICANT CHANGE UP (ref 0–0.9)
MONOCYTES NFR BLD AUTO: 10.8 % — SIGNIFICANT CHANGE UP (ref 2–14)
NEUTROPHILS # BLD AUTO: 4.08 K/UL — SIGNIFICANT CHANGE UP (ref 1.8–7.4)
NEUTROPHILS NFR BLD AUTO: 64 % — SIGNIFICANT CHANGE UP (ref 43–77)
NRBC # BLD: 0 /100 WBCS — SIGNIFICANT CHANGE UP (ref 0–0)
NRBC # FLD: 0 K/UL — SIGNIFICANT CHANGE UP (ref 0–0)
PCO2 BLDV: 45 MMHG — SIGNIFICANT CHANGE UP (ref 39–52)
PH BLDV: 7.44 — HIGH (ref 7.32–7.43)
PLATELET # BLD AUTO: 183 K/UL — SIGNIFICANT CHANGE UP (ref 150–400)
PO2 BLDV: 31 MMHG — SIGNIFICANT CHANGE UP (ref 25–45)
POTASSIUM BLDV-SCNC: 3.9 MMOL/L — SIGNIFICANT CHANGE UP (ref 3.5–5.1)
POTASSIUM SERPL-MCNC: 3.9 MMOL/L — SIGNIFICANT CHANGE UP (ref 3.5–5.3)
POTASSIUM SERPL-SCNC: 3.9 MMOL/L — SIGNIFICANT CHANGE UP (ref 3.5–5.3)
PROT SERPL-MCNC: 7.8 G/DL — SIGNIFICANT CHANGE UP (ref 6–8.3)
RBC # BLD: 3.35 M/UL — LOW (ref 3.8–5.2)
RBC # FLD: 20.2 % — HIGH (ref 10.3–14.5)
SAO2 % BLDV: 50.4 % — LOW (ref 67–88)
SODIUM SERPL-SCNC: 134 MMOL/L — LOW (ref 135–145)
WBC # BLD: 6.37 K/UL — SIGNIFICANT CHANGE UP (ref 3.8–10.5)
WBC # FLD AUTO: 6.37 K/UL — SIGNIFICANT CHANGE UP (ref 3.8–10.5)

## 2023-08-30 PROCEDURE — 99285 EMERGENCY DEPT VISIT HI MDM: CPT

## 2023-08-30 RX ORDER — VANCOMYCIN HCL 1 G
1000 VIAL (EA) INTRAVENOUS ONCE
Refills: 0 | Status: COMPLETED | OUTPATIENT
Start: 2023-08-30 | End: 2023-08-30

## 2023-08-30 RX ORDER — PIPERACILLIN AND TAZOBACTAM 4; .5 G/20ML; G/20ML
3.38 INJECTION, POWDER, LYOPHILIZED, FOR SOLUTION INTRAVENOUS ONCE
Refills: 0 | Status: COMPLETED | OUTPATIENT
Start: 2023-08-30 | End: 2023-08-30

## 2023-08-30 RX ADMIN — PIPERACILLIN AND TAZOBACTAM 200 GRAM(S): 4; .5 INJECTION, POWDER, LYOPHILIZED, FOR SOLUTION INTRAVENOUS at 23:00

## 2023-08-30 RX ADMIN — Medication 100 MILLIGRAM(S): at 19:48

## 2023-08-30 NOTE — ED ADULT NURSE REASSESSMENT NOTE - NSFALLHARMRISKINTERV_ED_ALL_ED

## 2023-08-30 NOTE — ED PROVIDER NOTE - NSICDXPASTMEDICALHX_GEN_ALL_CORE_FT
PAST MEDICAL HISTORY:  Adult Onset Diabetes Mellitus,     Cancer of Endometrium s/p Hysterectomy , s/p Chemo.    CKD (chronic kidney disease)     Dissection of Aorta Type B    ESRD (end stage renal disease) on dialysis M,W,F   [facility] [address]    History of Hysterectomy with O     Hypertension

## 2023-08-30 NOTE — ED ADULT TRIAGE NOTE - NS ED TRIAGE AVPU SCALE
[id number] - [place], [place] - [address]  [phone]     Patient calling had just finished her medication for a sinus infection. Patient said now she has a cough, has no fever. She would like to know if something can be prescribed for her. Please call patient back. The caller stated Yes  for clinical staff to leave a detailed message on the patient's voice mail with their results or other medical information.     Phone number to leave message on [phone]   Alert-The patient is alert, awake and responds to voice. The patient is oriented to time, place, and person. The triage nurse is able to obtain subjective information.

## 2023-08-30 NOTE — ED PROVIDER NOTE - CLINICAL SUMMARY MEDICAL DECISION MAKING FREE TEXT BOX
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 unable to get hd today. no constitutional sx. vitals stable. exam w/ NAD, Lungs CTA, no abd ttp, LUE w/ edema, palpable thrill at AVF, bulging at AC fossa, ulceration at urgical site with some discharge and erythema. c/f superficial cellulitis over site. However given recent revision, c/f ulceration deven fistula. will get duplex, labs (eval for need for emergent HD), and abx. TBA w/ vascular consult.

## 2023-08-30 NOTE — CONSULT NOTE ADULT - SUBJECTIVE AND OBJECTIVE BOX
GENERAL SURGERY CONSULT NOTE  --------------------------------------------------------------------------------------------    HPI:   77 y.o. Female w/ Hx HTN, DM, chronic type B aortic dissection, ESRD on HD ( M, W, F ) via LUE brachiocephalic (2018) fistula, endometrial ca in remission s/p total hysterectomy in 2007 recently admitted for L hip revision in July during which the LUE AVF was noted to be     ROS: 10-system review is otherwise negative except HPI above.      PAST MEDICAL & SURGICAL HISTORY:  Adult Onset Diabetes Mellitus,  Cancer of Endometrium  s/p Hysterectomy , s/p Chemo.  Dissection of Aorta  Type B  History of Hysterectomy with O  Hypertension  CKD (chronic kidney disease)  ESRD (end stage renal disease) on dialysis  M,W,F   [facility] ave  H/O total hysterectomy  in 2007 for endometrial CA        FAMILY HISTORY:  No pertinent family history in first degree relatives    [] Family history not pertinent as reviewed with the patient and family    ALLERGIES: No Known Allergies      --------------------------------------------------------------------------------------------    Vitals:   T(C): 36.6 (08-30-23 @ 19:22), Max: 36.6 (08-30-23 @ 19:22)  HR: 66 (08-30-23 @ 19:22) (66 - 66)  BP: 165/86 (08-30-23 @ 19:22) (165/86 - 166/72)  RR: 22 (08-30-23 @ 19:22) (16 - 22)  SpO2: 99% (08-30-23 @ 19:22) (98% - 99%)  CAPILLARY BLOOD GLUCOSE      POCT Blood Glucose.: 196 mg/dL (30 Aug 2023 22:53)  POCT Blood Glucose.: 195 mg/dL (30 Aug 2023 17:36)      Height (cm): 154.9 (08-30 @ 17:32)    Physical Examination:  GEN: NAD, resting quietly  NEURO: AAOx3, CN II-XII grossly intact, no focal deficits  PULM: symmetric chest rise bilaterally, no increased WOB  ABD: soft, nontender, nondistended  EXTR: no lower extremity edema, moving all extremities, LUE with brachiocephalic fistula with thrill and noted aneurysmal dilation just proximal to the AC fossa. Sutures in place from prior surgery but wound breakdown in the middle of the repair with faint surrounding cellulitis, no active bleeding  --------------------------------------------------------------------------------------------    LABS  CBC (08-30 @ 19:00)                              10.6<L>                         6.37    )----------------(  183        64.0  % Neutrophils, 21.4  % Lymphocytes, ANC: 4.08                                32.5<L>    BMP (08-30 @ 19:00)             134<L>  |  94<L>   |  49<H> 		Ca++ --      Ca 10.5               ---------------------------------( 176<H>		Mg --                 3.9     |  27      |  5.02<H>			Ph --        LFTs (08-30 @ 19:00)      TPro 7.8 / Alb 2.8<L> / TBili 0.8 / DBili -- / AST 37<H> / ALT 20 / AlkPhos 341<H>          VBG (08-30 @ 19:00)     7.44<H> / 45 / 31 / 31<H> / 5.7<H> / 50.4<L>%     Lactate: 1.9    --------------------------------------------------------------------------------------------    MICROBIOLOGY  Urinalysis (08-30 @ 19:00):     Color:  / Appearance:  / SG:  / pH:  / Gluc: 176<H> / Ketones:  / Bili:  / Urobili:  / Protein : / Nitrites:  / Leuk.Est:  / RBC:  / WBC:  / Sq Epi:  / Non Sq Epi:  / Bacteria          --------------------------------------------------------------------------------------------    IMAGING  n/a    --------------------------------------------------------------------------------------------    ASSESSMENT:       PLAN:    - LUE wound dressed with adaptic, gauze, kerlex, ACE, left sutures in place  - recommend nephology consult for HD as unable to receive as an outpatient today - BMP mostly unremarkable with no signs of gross volume overload, low suspicion for emergent HD need  - recommend IR consult in AM for possible tunneled catheter placement as fistula should not be accessed currently with overlying wound  - NPO @MN for possible IR tomorrow  - recommend [facility] Duplex of fistula in AM- order placed  - IV abx - would tx with ophelia/deo Goodwin, PGY3  C Team Surgery   [id number]   VASCULAR SURGERY CONSULT NOTE  --------------------------------------------------------------------------------------------    HPI:   77 y.o. Female w/ Hx HTN, DM, chronic type B aortic dissection, ESRD on HD ( M, W, F ) via LUE brachiocephalic (2018) fistula, endometrial ca in remission s/p total hysterectomy in 2007 recently admitted for L hip revision in July during which the LUE AVF was noted to be aneurysmal with ulceration. She underwent LUE subclavian vein stenting 8/1 for stenosis and subsequent brachiocephalic AVF revision 8/3 with resection of the involved segment and primary anastomosis. The fistula was used thrice during admission without issue and was used upon discharge without issue. She reports now that soon after she left she noticed some redness of the middle of the surgical repair which has been growing. The skin breakdown that is currently appreciated on exam has been there "for a couple weeks" per the patient. She denies any fever or chills and denies any arm pain, numbness, or tingling in the left hand. She has a blister over the L thumb that was from an ACE wrap during her last admission. She says she presented today because the HD team at her o/p center was uncomfortable dialyzing through the fistula given the possible infection. Her last HD session was Monday and was without issue. In the ED the patient was afebrile, hypertensive, and normocardic. Her WBC was 6.5, BMP fairly unremarkable despite missed HD session. There was no ulceration into the fistula and no active bleeding. The wound appeared to be a breakdown of skin at the site of a suture in the repair. Adaptic was placed and the LUE was wrapped.    ROS: 10-system review is otherwise negative except HPI above.      PAST MEDICAL & SURGICAL HISTORY:  Adult Onset Diabetes Mellitus,  Cancer of Endometrium  s/p Hysterectomy , s/p Chemo.  Dissection of Aorta  Type B  History of Hysterectomy with O  Hypertension  CKD (chronic kidney disease)  ESRD (end stage renal disease) on dialysis  M,W,F   [facility] ave  H/O total hysterectomy  in 2007 for endometrial CA        FAMILY HISTORY:  No pertinent family history in first degree relatives    [] Family history not pertinent as reviewed with the patient and family    ALLERGIES: No Known Allergies      --------------------------------------------------------------------------------------------    Vitals:   T(C): 36.6 (08-30-23 @ 19:22), Max: 36.6 (08-30-23 @ 19:22)  HR: 66 (08-30-23 @ 19:22) (66 - 66)  BP: 165/86 (08-30-23 @ 19:22) (165/86 - 166/72)  RR: 22 (08-30-23 @ 19:22) (16 - 22)  SpO2: 99% (08-30-23 @ 19:22) (98% - 99%)  CAPILLARY BLOOD GLUCOSE      POCT Blood Glucose.: 196 mg/dL (30 Aug 2023 22:53)  POCT Blood Glucose.: 195 mg/dL (30 Aug 2023 17:36)      Height (cm): 154.9 (08-30 @ 17:32)    Physical Examination:  GEN: NAD, resting quietly  NEURO: AAOx3, CN II-XII grossly intact, no focal deficits  PULM: symmetric chest rise bilaterally, no increased WOB  ABD: soft, nontender, nondistended  EXTR: no lower extremity edema, moving all extremities, LUE with brachiocephalic fistula with thrill and noted aneurysmal dilation just proximal to the AC fossa. Sutures in place from prior surgery but wound breakdown in the middle of the repair with faint surrounding cellulitis, no active bleeding  --------------------------------------------------------------------------------------------    LABS  CBC (08-30 @ 19:00)                              10.6<L>                         6.37    )----------------(  183        64.0  % Neutrophils, 21.4  % Lymphocytes, ANC: 4.08                                32.5<L>    BMP (08-30 @ 19:00)             134<L>  |  94<L>   |  49<H> 		Ca++ --      Ca 10.5               ---------------------------------( 176<H>		Mg --                 3.9     |  27      |  5.02<H>			Ph --        LFTs (08-30 @ 19:00)      TPro 7.8 / Alb 2.8<L> / TBili 0.8 / DBili -- / AST 37<H> / ALT 20 / AlkPhos 341<H>          VBG (08-30 @ 19:00)     7.44<H> / 45 / 31 / 31<H> / 5.7<H> / 50.4<L>%     Lactate: 1.9    --------------------------------------------------------------------------------------------    MICROBIOLOGY  Urinalysis (08-30 @ 19:00):     Color:  / Appearance:  / SG:  / pH:  / Gluc: 176<H> / Ketones:  / Bili:  / Urobili:  / Protein : / Nitrites:  / Leuk.Est:  / RBC:  / WBC:  / Sq Epi:  / Non Sq Epi:  / Bacteria          --------------------------------------------------------------------------------------------    IMAGING  n/a    --------------------------------------------------------------------------------------------    ASSESSMENT:   77 y.o. Female w/ Hx HTN, DM, chronic type B aortic dissection, ESRD on HD ( M, W, F ) via LUE brachiocephalic (2018) fistula, endometrial ca in remission s/p total hysterectomy in 2007 recently admitted for L hip revision (07-08/2023) during which she underwent LUE subclavian vein stenting 8/1 for stenosis and subsequent brachiocephalic AVF revision 8/3 with resection of the involved segment and primary anastomosis for ulceration. She presents after a missed HD session due to concern for overlying infection at the AVF site.    PLAN:    - LUE wound dressed with adaptic, gauze, kerlex, ACE, left sutures in place  - recommend nephology consult for HD as unable to receive as an outpatient today - BMP mostly unremarkable with no signs of gross volume overload, low suspicion for emergent HD need  - recommend IR consult in AM for possible tunneled catheter placement as fistula should not be accessed currently with overlying wound  - NPO @MN for possible IR tomorrow  - recommend [facility] Duplex of fistula in AM- order placed  - IV abx - would tx with ophelia/deo Goodwin, PGY3  C Team Surgery   [id number]

## 2023-08-30 NOTE — ED ADULT TRIAGE NOTE - CHIEF COMPLAINT QUOTE
sent by dialysis center for "infection to AV fistula", drainage/pus noted, did not get treatment today, last dialyzed on Monday, denies fever, h/o ESRD MWF left av fistula

## 2023-08-30 NOTE — ED ADULT TRIAGE NOTE - HEIGHT IN INCHES
[facility] for RN and aide, CM notified Nasrin Patiño with [facility] that pt is in hospital     Medication compliance issues:  Daughter assists with medications     Financial issues that could impact healthcare: none         Tentative discharge plan: hc vs snf   Discussed and provided facilities of choice if transition to a skilled nursing facility is required at the time of discharge     therapy pending if snf would like referred to: [facility] and [facility], [facility]       Discussed with patient and/or family that on the day of discharge home tentative time of discharge will be between 10 AM and noon.     Transportation at the time of discharge: daughter vs lyft or transportation     Damion Jha, [facility]  [phone]

## 2023-08-30 NOTE — ED ADULT NURSE NOTE - OBJECTIVE STATEMENT
Patient is a 79 yo female, phx DM2, ESRD on HD MWF, HTN, presenting with L AVF purulent drainage. AAOx4, no signs of distress, reports drainage and LUE swelling x 1 month after AVF surgery. LUE with pitting edema and purulent drainage, sutures in place over L AVF. Per patient fistula has been in use, last HD Monday. Denies fevers. Also with recent L THR and b/l lower extremity pitting edema. Patient bedbound from [facility]. VSS. 20G PIV placed to [facility], labs sent per orders. Pending ultrasound. Fall precautions maintained.

## 2023-08-30 NOTE — ED PROVIDER NOTE - PHYSICAL EXAMINATION
General: Alert and Orientated x 3. No apparent distress.  Head: Normocephalic and atraumatic.  Eyes: PERRLA with EOMI.  Neck: Supple. Trachea midline.   Cardiac: Normal S1 and S2 w/ RRR. No murmurs appreciated. No JVD appreciated.  Pulmonary: CTA bilaterally. No increased WOB. No wheezes or crackles.  Abdominal: Soft, non-tender. (+) bowel sounds appreciated in all 4 quadrants. No hepatosplenomegaly.   Neurologic: No focal sensory or motor deficits.  Musculoskeletal: Strength appropriate in all 4 extremities for age with no limited ROM.  Skin: LUE w/ palpable thrill, bulging at AC fossa. wound at surgical site, w/ some discharge and erythema.   Psychiatric: Appropriate mood and affect. No apparent risk to self or others.

## 2023-08-30 NOTE — ED PROVIDER NOTE - OBJECTIVE STATEMENT
77 y/o F w/ hx of HTN, DM, chronic type B aortic dissection, ESRD on HD ( M, W, F ) via LUE brachiocephalic (2018) fistula, endometrial ca in remission s/p total hysterectomy Pt is a 77 y/o F w/ hx of HTN, DM, ESRD on HD ( MWF ) via LUE brachiocephalic fistula, endometrial ca (remote) PTED with concern for fistula site infection. Pt recently admitted for L hip sx, at time AVF found lena aneurysmal w/ ulceration. revision was done. new site used in hospital x3 and 1x Monday. Today at HD, there was noted erythema, warmth and discharge near surgical site. HD not done. No f/c, n/v, urinary or bowel changes. No numbness, tingling of arm.

## 2023-08-30 NOTE — ED ADULT NURSE NOTE - NSFALLRISKINTERV_ED_ALL_ED

## 2023-08-30 NOTE — ED PROVIDER NOTE - PROGRESS NOTE DETAILS
Iraida Olmedo MD (PGY3): Patient evaluated by vascular surgery team.  No concern that the fistula is exposed.  This ulceration is lateral to fistula.  Recommend broadening antibiotics to Vanco Zosyn given recent hospitalization.  VA duplex tomorrow.  IR consult placed for Desiree.  no emergent need for dialysis at this time.  Spoke to hospitalist will admit under their service.  She has an appointment to go Iraida Olmedo MD (PGY3): Patient evaluated by vascular surgery team.  No concern that the fistula is exposed.  This ulceration is lateral to fistula.  Recommend broadening antibiotics to Vanco Zosyn given recent hospitalization.  VA duplex tomorrow.  IR consult placed for Desiree.  no emergent need for dialysis at this time.  Spoke to hospitalist will admit under their service.

## 2023-08-30 NOTE — ED PROVIDER NOTE - ATTENDING CONTRIBUTION TO CARE
Dr. Joshua, Attending Physician-  I performed a face to face bedside interview with patient regarding history of present illness, review of symptoms and past medical history. I completed an independent physical exam.  I have discussed patient's plan of care with the resident.    78F, ESRD (MWF), DM who is sent in from HD center for LUE infection. According to patient, had a LUE fistula revision last month. Sutures are still in place. For the past 1-2 days, reports progressive LUE swelling, redness, and warmth. Went to HD today and they sent patient here - did NOT do HD today. Denies fevers/chills. Physical: afebrile, non-toxic appearing, rrr, ctabl, abdomen soft. LUE with two palpable thrills - one by shoulder and one just proximal to L AC fossa. Distal to AC fossa fistula is a area of scant purulence, erythema, and warmth. Plan: labs, ABX, vascular, admit.

## 2023-08-31 ENCOUNTER — APPOINTMENT (OUTPATIENT)
Dept: PLASTIC SURGERY | Facility: HOSPITAL | Age: 78
End: 2023-08-31
Payer: MEDICARE

## 2023-08-31 DIAGNOSIS — T82.7XXA INFECTION AND INFLAMMATORY REACTION DUE TO OTHER CARDIAC AND VASCULAR DEVICES, IMPLANTS AND GRAFTS, INITIAL ENCOUNTER: ICD-10-CM

## 2023-08-31 DIAGNOSIS — N18.6 END STAGE RENAL DISEASE: ICD-10-CM

## 2023-08-31 DIAGNOSIS — I10 ESSENTIAL (PRIMARY) HYPERTENSION: ICD-10-CM

## 2023-08-31 DIAGNOSIS — Z98.890 OTHER SPECIFIED POSTPROCEDURAL STATES: Chronic | ICD-10-CM

## 2023-08-31 DIAGNOSIS — R09.89 OTHER SPECIFIED SYMPTOMS AND SIGNS INVOLVING THE CIRCULATORY AND RESPIRATORY SYSTEMS: ICD-10-CM

## 2023-08-31 DIAGNOSIS — M79.89 OTHER SPECIFIED SOFT TISSUE DISORDERS: ICD-10-CM

## 2023-08-31 DIAGNOSIS — L03.90 CELLULITIS, UNSPECIFIED: ICD-10-CM

## 2023-08-31 DIAGNOSIS — Z96.641 PRESENCE OF RIGHT ARTIFICIAL HIP JOINT: Chronic | ICD-10-CM

## 2023-08-31 DIAGNOSIS — Z98.890 OTHER SPECIFIED POSTPROCEDURAL STATES: ICD-10-CM

## 2023-08-31 DIAGNOSIS — E11.9 TYPE 2 DIABETES MELLITUS WITHOUT COMPLICATIONS: ICD-10-CM

## 2023-08-31 DIAGNOSIS — Z29.9 ENCOUNTER FOR PROPHYLACTIC MEASURES, UNSPECIFIED: ICD-10-CM

## 2023-08-31 DIAGNOSIS — R74.8 ABNORMAL LEVELS OF OTHER SERUM ENZYMES: ICD-10-CM

## 2023-08-31 DIAGNOSIS — Z86.79 PERSONAL HISTORY OF OTHER DISEASES OF THE CIRCULATORY SYSTEM: ICD-10-CM

## 2023-08-31 DIAGNOSIS — R60.0 LOCALIZED EDEMA: ICD-10-CM

## 2023-08-31 DIAGNOSIS — D64.9 ANEMIA, UNSPECIFIED: ICD-10-CM

## 2023-08-31 LAB
A1C WITH ESTIMATED AVERAGE GLUCOSE RESULT: 5 % — SIGNIFICANT CHANGE UP (ref 4–5.6)
ALBUMIN SERPL ELPH-MCNC: 2.8 G/DL — LOW (ref 3.3–5)
ALP SERPL-CCNC: 309 U/L — HIGH (ref 40–120)
ALT FLD-CCNC: 16 U/L — SIGNIFICANT CHANGE UP (ref 4–33)
ANION GAP SERPL CALC-SCNC: 15 MMOL/L — HIGH (ref 7–14)
APTT BLD: 31.6 SEC — SIGNIFICANT CHANGE UP (ref 24.5–35.6)
AST SERPL-CCNC: 31 U/L — SIGNIFICANT CHANGE UP (ref 4–32)
BILIRUB SERPL-MCNC: 1.2 MG/DL — SIGNIFICANT CHANGE UP (ref 0.2–1.2)
BLD GP AB SCN SERPL QL: NEGATIVE — SIGNIFICANT CHANGE UP
BUN SERPL-MCNC: 53 MG/DL — HIGH (ref 7–23)
CALCIUM SERPL-MCNC: 10.2 MG/DL — SIGNIFICANT CHANGE UP (ref 8.4–10.5)
CHLORIDE SERPL-SCNC: 93 MMOL/L — LOW (ref 98–107)
CO2 SERPL-SCNC: 25 MMOL/L — SIGNIFICANT CHANGE UP (ref 22–31)
CREAT SERPL-MCNC: 5.48 MG/DL — HIGH (ref 0.5–1.3)
EGFR: 8 ML/MIN/1.73M2 — LOW
ESTIMATED AVERAGE GLUCOSE: 97 — SIGNIFICANT CHANGE UP
GLUCOSE BLDC GLUCOMTR-MCNC: 140 MG/DL — HIGH (ref 70–99)
GLUCOSE BLDC GLUCOMTR-MCNC: 148 MG/DL — HIGH (ref 70–99)
GLUCOSE SERPL-MCNC: 171 MG/DL — HIGH (ref 70–99)
HCT VFR BLD CALC: 31.9 % — LOW (ref 34.5–45)
HGB BLD-MCNC: 10.6 G/DL — LOW (ref 11.5–15.5)
INR BLD: 1.01 RATIO — SIGNIFICANT CHANGE UP (ref 0.85–1.18)
MAGNESIUM SERPL-MCNC: 2.3 MG/DL — SIGNIFICANT CHANGE UP (ref 1.6–2.6)
MCHC RBC-ENTMCNC: 32 PG — SIGNIFICANT CHANGE UP (ref 27–34)
MCHC RBC-ENTMCNC: 33.2 GM/DL — SIGNIFICANT CHANGE UP (ref 32–36)
MCV RBC AUTO: 96.4 FL — SIGNIFICANT CHANGE UP (ref 80–100)
NRBC # BLD: 0 /100 WBCS — SIGNIFICANT CHANGE UP (ref 0–0)
NRBC # FLD: 0 K/UL — SIGNIFICANT CHANGE UP (ref 0–0)
PHOSPHATE SERPL-MCNC: 3 MG/DL — SIGNIFICANT CHANGE UP (ref 2.5–4.5)
PLATELET # BLD AUTO: 178 K/UL — SIGNIFICANT CHANGE UP (ref 150–400)
POTASSIUM SERPL-MCNC: 3.8 MMOL/L — SIGNIFICANT CHANGE UP (ref 3.5–5.3)
POTASSIUM SERPL-SCNC: 3.8 MMOL/L — SIGNIFICANT CHANGE UP (ref 3.5–5.3)
PROT SERPL-MCNC: 7.7 G/DL — SIGNIFICANT CHANGE UP (ref 6–8.3)
PROTHROM AB SERPL-ACNC: 11.3 SEC — SIGNIFICANT CHANGE UP (ref 9.5–13)
RBC # BLD: 3.31 M/UL — LOW (ref 3.8–5.2)
RBC # FLD: 20.1 % — HIGH (ref 10.3–14.5)
RH IG SCN BLD-IMP: POSITIVE — SIGNIFICANT CHANGE UP
SODIUM SERPL-SCNC: 133 MMOL/L — LOW (ref 135–145)
WBC # BLD: 6.46 K/UL — SIGNIFICANT CHANGE UP (ref 3.8–10.5)
WBC # FLD AUTO: 6.46 K/UL — SIGNIFICANT CHANGE UP (ref 3.8–10.5)

## 2023-08-31 PROCEDURE — 99222 1ST HOSP IP/OBS MODERATE 55: CPT | Mod: GC

## 2023-08-31 PROCEDURE — 15003 WOUND PREP ADDL 100 CM: CPT

## 2023-08-31 PROCEDURE — 14302 TIS TRNFR ADDL 30 SQ CM: CPT

## 2023-08-31 PROCEDURE — 93970 EXTREMITY STUDY: CPT | Mod: 26,59

## 2023-08-31 PROCEDURE — 93990 DOPPLER FLOW TESTING: CPT | Mod: 26

## 2023-08-31 PROCEDURE — 37607 LIG/BANDING ANGIOACS AV FSTL: CPT | Mod: LT,78

## 2023-08-31 PROCEDURE — 14301 TIS TRNFR ANY 30.1-60 SQ CM: CPT

## 2023-08-31 PROCEDURE — 97606 NEG PRS WND THER DME>50 SQCM: CPT | Mod: 59

## 2023-08-31 PROCEDURE — 15002 WOUND PREP TRK/ARM/LEG: CPT

## 2023-08-31 PROCEDURE — 99223 1ST HOSP IP/OBS HIGH 75: CPT

## 2023-08-31 PROCEDURE — 12345: CPT | Mod: NC

## 2023-08-31 PROCEDURE — 93971 EXTREMITY STUDY: CPT | Mod: 26,59

## 2023-08-31 DEVICE — LIGATING CLIPS WECK HORIZON MEDIUM (BLUE) 24: Type: IMPLANTABLE DEVICE | Site: LEFT | Status: FUNCTIONAL

## 2023-08-31 DEVICE — LIGATING CLIPS WECK HORIZON SMALL-WIDE (RED) 24: Type: IMPLANTABLE DEVICE | Site: LEFT | Status: FUNCTIONAL

## 2023-08-31 DEVICE — ARISTA 3GR: Type: IMPLANTABLE DEVICE | Site: LEFT | Status: FUNCTIONAL

## 2023-08-31 RX ORDER — CALCIUM ACETATE 667 MG
667 TABLET ORAL
Refills: 0 | Status: DISCONTINUED | OUTPATIENT
Start: 2023-08-31 | End: 2023-09-07

## 2023-08-31 RX ORDER — SODIUM CHLORIDE 9 MG/ML
100 INJECTION INTRAMUSCULAR; INTRAVENOUS; SUBCUTANEOUS
Refills: 0 | Status: DISCONTINUED | OUTPATIENT
Start: 2023-08-31 | End: 2023-09-07

## 2023-08-31 RX ORDER — SODIUM CHLORIDE 9 MG/ML
10 INJECTION INTRAMUSCULAR; INTRAVENOUS; SUBCUTANEOUS
Refills: 0 | Status: DISCONTINUED | OUTPATIENT
Start: 2023-08-31 | End: 2023-09-07

## 2023-08-31 RX ORDER — HEPARIN SODIUM 5000 [USP'U]/ML
5000 INJECTION INTRAVENOUS; SUBCUTANEOUS EVERY 12 HOURS
Refills: 0 | Status: DISCONTINUED | OUTPATIENT
Start: 2023-08-31 | End: 2023-09-06

## 2023-08-31 RX ORDER — HYDRALAZINE HCL 50 MG
50 TABLET ORAL DAILY
Refills: 0 | Status: DISCONTINUED | OUTPATIENT
Start: 2023-08-31 | End: 2023-09-07

## 2023-08-31 RX ORDER — ONDANSETRON 8 MG/1
4 TABLET, FILM COATED ORAL ONCE
Refills: 0 | Status: DISCONTINUED | OUTPATIENT
Start: 2023-08-31 | End: 2023-08-31

## 2023-08-31 RX ORDER — PANTOPRAZOLE SODIUM 20 MG/1
40 TABLET, DELAYED RELEASE ORAL
Refills: 0 | Status: DISCONTINUED | OUTPATIENT
Start: 2023-08-31 | End: 2023-09-07

## 2023-08-31 RX ORDER — POLYETHYLENE GLYCOL 3350 17 G/17G
17 POWDER, FOR SOLUTION ORAL
Refills: 0 | Status: DISCONTINUED | OUTPATIENT
Start: 2023-08-31 | End: 2023-09-07

## 2023-08-31 RX ORDER — ASCORBIC ACID 60 MG
500 TABLET,CHEWABLE ORAL DAILY
Refills: 0 | Status: DISCONTINUED | OUTPATIENT
Start: 2023-08-31 | End: 2023-09-07

## 2023-08-31 RX ORDER — ONDANSETRON 8 MG/1
4 TABLET, FILM COATED ORAL EVERY 8 HOURS
Refills: 0 | Status: DISCONTINUED | OUTPATIENT
Start: 2023-08-31 | End: 2023-09-07

## 2023-08-31 RX ORDER — LANOLIN ALCOHOL/MO/W.PET/CERES
3 CREAM (GRAM) TOPICAL AT BEDTIME
Refills: 0 | Status: DISCONTINUED | OUTPATIENT
Start: 2023-08-31 | End: 2023-09-07

## 2023-08-31 RX ORDER — PIPERACILLIN AND TAZOBACTAM 4; .5 G/20ML; G/20ML
4.5 INJECTION, POWDER, LYOPHILIZED, FOR SOLUTION INTRAVENOUS EVERY 12 HOURS
Refills: 0 | Status: DISCONTINUED | OUTPATIENT
Start: 2023-08-31 | End: 2023-09-07

## 2023-08-31 RX ORDER — CHLORHEXIDINE GLUCONATE 213 G/1000ML
1 SOLUTION TOPICAL
Refills: 0 | Status: DISCONTINUED | OUTPATIENT
Start: 2023-08-31 | End: 2023-09-01

## 2023-08-31 RX ORDER — METOPROLOL TARTRATE 50 MG
12.5 TABLET ORAL DAILY
Refills: 0 | Status: DISCONTINUED | OUTPATIENT
Start: 2023-08-31 | End: 2023-09-07

## 2023-08-31 RX ORDER — FOLIC ACID 0.8 MG
1 TABLET ORAL DAILY
Refills: 0 | Status: DISCONTINUED | OUTPATIENT
Start: 2023-08-31 | End: 2023-09-07

## 2023-08-31 RX ORDER — PIPERACILLIN AND TAZOBACTAM 4; .5 G/20ML; G/20ML
3.38 INJECTION, POWDER, LYOPHILIZED, FOR SOLUTION INTRAVENOUS EVERY 12 HOURS
Refills: 0 | Status: DISCONTINUED | OUTPATIENT
Start: 2023-08-31 | End: 2023-08-31

## 2023-08-31 RX ORDER — ACETAMINOPHEN 500 MG
650 TABLET ORAL EVERY 6 HOURS
Refills: 0 | Status: DISCONTINUED | OUTPATIENT
Start: 2023-08-31 | End: 2023-09-04

## 2023-08-31 RX ORDER — SENNA PLUS 8.6 MG/1
2 TABLET ORAL AT BEDTIME
Refills: 0 | Status: DISCONTINUED | OUTPATIENT
Start: 2023-08-31 | End: 2023-09-07

## 2023-08-31 RX ADMIN — Medication 250 MILLIGRAM(S): at 00:08

## 2023-08-31 RX ADMIN — Medication 500 MILLIGRAM(S): at 13:32

## 2023-08-31 RX ADMIN — Medication 1 TABLET(S): at 13:32

## 2023-08-31 RX ADMIN — Medication 1 MILLIGRAM(S): at 13:32

## 2023-08-31 RX ADMIN — Medication 667 MILLIGRAM(S): at 13:32

## 2023-08-31 RX ADMIN — PIPERACILLIN AND TAZOBACTAM 25 GRAM(S): 4; .5 INJECTION, POWDER, LYOPHILIZED, FOR SOLUTION INTRAVENOUS at 10:52

## 2023-08-31 NOTE — H&P ADULT - NSICDXPASTSURGICALHX_GEN_ALL_CORE_FT
PAST SURGICAL HISTORY:  H/O total hysterectomy in 2007 for endometrial CA    History of hip replacement, total, right     S/P arteriovenous (AV) fistula repair

## 2023-08-31 NOTE — H&P ADULT - PROBLEM SELECTOR PLAN 5
- Patient with persistent serosanguinous drainage without surrounding erythema/warmth/TTP -> low concern for infection, spoke with orthopedic surgery, patient's wound would likely take a while to heal but they will come and evaluate patient  - L UE chronic humerus fracture -> L UE in sling, WBAT as per [facility] orders, neurovasc checks LUE q8h  - L LE WBAT as per [facility] orders

## 2023-08-31 NOTE — CONSULT NOTE ADULT - SUBJECTIVE AND OBJECTIVE BOX
Plastic Surgery Consult Note  (pg [facility]: [id number], [facility]: [phone])    HPI: Presents after a missed HD session due to concern for overlying infection at the AVF site. Plastic surgery consulted for wound care and coverage    PAST MEDICAL & SURGICAL HISTORY:  Adult Onset Diabetes Mellitus,      Cancer of Endometrium  s/p Hysterectomy , s/p Chemo.      Dissection of Aorta  Type B      History of Hysterectomy with O      Hypertension      CKD (chronic kidney disease)      ESRD (end stage renal disease) on dialysis  M,W,F   [facility]      H/O total hysterectomy  in 2007 for endometrial CA      History of hip replacement, total, right      S/P arteriovenous (AV) fistula repair        Allergies    No Known Allergies    Intolerances      Home Medications:  acetaminophen 325 mg oral tablet: 3 tab(s) orally every 8 hours (31 Aug 2023 08:13)  ascorbic acid 500 mg oral tablet: 1 tab(s) orally 2 times a day (31 Aug 2023 08:13)  bacitracin 500 units/g topical ointment: Apply topically to affected area 2 times a day (31 Aug 2023 08:12)  calcium-vitamin D 500 mg-5 mcg (200 intl units) oral tablet: 1 tab(s) orally 3 times a day (31 Aug 2023 08:13)  cefadroxil 1000 mg oral tablet: 1 tab(s) orally every 3 days (31 Aug 2023 08:12)  folic acid 1 mg oral tablet: 1 tab(s) orally once a day (31 Aug 2023 08:13)  hydrALAZINE 50 mg oral tablet: 1 orally once a day (31 Aug 2023 08:13)  metoprolol succinate 25 mg oral tablet, extended release: 0.5 orally once a day (31 Aug 2023 08:13)  omeprazole 20 mg oral delayed release capsule: 1 cap(s) orally once a day (31 Aug 2023 08:12)  PhosLo 667 mg oral capsule: 1 orally 3 times a day (31 Aug 2023 08:13)  polyethylene glycol 3350 oral powder for reconstitution: 17 gram(s) orally 2 times a day (31 Aug 2023 08:13)  Rosa-Skyla oral tablet: 1 tab(s) orally once a day (31 Aug 2023 08:12)  senna leaf extract oral tablet: 2 tab(s) orally once a day (at bedtime) (31 Aug 2023 08:13)    MEDICATIONS  (STANDING):  ascorbic acid 500 milliGRAM(s) Oral daily  calcium acetate 1334 milliGRAM(s) Oral three times a day with meals  calcium carbonate 1250 mG  + Vitamin D (OsCal 500 + D) 1 Tablet(s) Oral three times a day  folic acid 1 milliGRAM(s) Oral daily  heparin   Injectable 5000 Unit(s) SubCutaneous every 12 hours  hydrALAZINE 50 milliGRAM(s) Oral daily  metoprolol succinate ER 12.5 milliGRAM(s) Oral daily  Nephro-skyla 1 Tablet(s) Oral daily  pantoprazole    Tablet 40 milliGRAM(s) Oral before breakfast  piperacillin/tazobactam IVPB.. 3.375 Gram(s) IV Intermittent every 12 hours  polyethylene glycol 3350 17 Gram(s) Oral two times a day  senna 2 Tablet(s) Oral at bedtime      SOCIAL HISTORY:  FAMILY HISTORY:  No pertinent family history in first degree relatives        ___________________________________________  OBJECTIVE:  Vital Signs Last 24 Hrs  T(C): 36.5 (31 Aug 2023 06:54), Max: 36.6 (30 Aug 2023 19:22)  T(F): 97.7 (31 Aug 2023 06:54), Max: 97.8 (30 Aug 2023 19:22)  HR: 85 (31 Aug 2023 06:54) (63 - 85)  BP: 151/86 (31 Aug 2023 06:54) (151/86 - 166/72)  BP(mean): --  RR: 20 (31 Aug 2023 06:54) (16 - 26)  SpO2: 96% (31 Aug 2023 06:54) (96% - 100%)    Parameters below as of 31 Aug 2023 06:54  Patient On (Oxygen Delivery Method): room air    CAPILLARY BLOOD GLUCOSE      POCT Blood Glucose.: 196 mg/dL (30 Aug 2023 22:53)    I&O's Detail    General: Well developed, well nourished, NAD  Neuro: Alert and oriented, no focal deficits, moves all extremities spontaneously  HEENT: NCAT, EOMI, anicteric, mucosa moist  Respiratory: Airway patent, respirations unlabored  CVS: Regular rate and rhythm  Abdomen: Soft, nontender, nondistended  Extremities: No edema, sensation and movement grossly intact  Skin: Warm, dry, appropriate color  ____________________________________________  LABS:  CBC Full  -  ( 31 Aug 2023 09:13 )  WBC Count : 6.46 K/uL  RBC Count : 3.31 M/uL  Hemoglobin : 10.6 g/dL  Hematocrit : 31.9 %  Platelet Count - Automated : 178 K/uL  Mean Cell Volume : 96.4 fL  Mean Cell Hemoglobin : 32.0 pg  Mean Cell Hemoglobin Concentration : 33.2 gm/dL  Auto Neutrophil # : x  Auto Lymphocyte # : x  Auto Monocyte # : x  Auto Eosinophil # : x  Auto Basophil # : x  Auto Neutrophil % : x  Auto Lymphocyte % : x  Auto Monocyte % : x  Auto Eosinophil % : x  Auto Basophil % : x    08-31    133<L>  |  93<L>  |  53<H>  ----------------------------<  171<H>  3.8   |  25  |  5.48<H>    Ca    10.2      31 Aug 2023 09:13  Phos  3.0     08-31  Mg     2.30     08-31    TPro  7.7  /  Alb  2.8<L>  /  TBili  1.2  /  DBili  x   /  AST  31  /  ALT  16  /  AlkPhos  309<H>  08-31    LIVER FUNCTIONS - ( 31 Aug 2023 09:13 )  Alb: 2.8 g/dL / Pro: 7.7 g/dL / ALK PHOS: 309 U/L / ALT: 16 U/L / AST: 31 U/L / GGT: x           PT/INR - ( 31 Aug 2023 09:13 )   PT: 11.3 sec;   INR: 1.01 ratio         PTT - ( 31 Aug 2023 09:13 )  PTT:31.6 sec  Urinalysis Basic - ( 31 Aug 2023 09:13 )    Color: x / Appearance: x / SG: x / pH: x  Gluc: 171 mg/dL / Ketone: x  / Bili: x / Urobili: x   Blood: x / Protein: x / Nitrite: x   Leuk Esterase: x / RBC: x / WBC x   Sq Epi: x / Non Sq Epi: x / Bacteria: x            ____________________________________________  MICRO:  RECENT CULTURES:    ____________________________________________  RADIOLOGY:

## 2023-08-31 NOTE — CONSULT NOTE ADULT - PROBLEM SELECTOR RECOMMENDATION 9
Pt with ESRD on HD TIW MWF admitted with purulent drainage from AVF. Pts last HD 8/28/23. On review of [facility] pt was admitted last month for Lt hemiarthroplasty course complicated by fistula failure. s/p LUE BC AVF fistulogram 8/1 w/ balloon angioplasty. s/p aneurysmal fistula, resected with end to end anastomosis 8/4. AVF successfully used prior to dc. Consent for HD signed and placed in chart. s/p R femoral HD catheter placement. Today pt. is euvolemic on exam. Pt. clinically stable. Labs reviewed. Plan for HD after vascular procedure. Monitor labs and VS. Dose medications for ESRD.

## 2023-08-31 NOTE — H&P ADULT - PROBLEM SELECTOR PLAN 10
DVT ppx - HSQ  Diet - NPO for now, otherwise would place on DASH/CC/Renal diet with 1.2L fluid restriction  Activity - OOB to chair/with assistance, PT eval    Fall and aspiration precautions

## 2023-08-31 NOTE — PROGRESS NOTE ADULT - PROBLEM SELECTOR PLAN 1
- Concern for AV fistula infection, s/p clinda/vanc/zosyn  Plan:  - vascular following, recs appreciated  - renal following, recs appreciated  - IR consulted, recs appreciated  - c/w zosyn and vanc by level with HD  - plan for OR today for AVF revision- R fem HD line placed by vascular. discussed with IR who recommends keeping femoral catheter in place until able to place tunnelled catheter in order to prevent further vascular trauma. tentative plan to place tunnelled catheter 9/5 pending ID approval  - ID consult will be placed 9/4  - f/u BCx - Concern for AV fistula infection, s/p clinda/vanc/zosyn  Plan:  - vascular following, recs appreciated  - renal following, recs appreciated  - IR consulted, recs appreciated  - c/w zosyn and vanc by level with HD  - plan for OR today for AVF revision- R fem HD line placed by vascular. discussed with IR who recommends keeping femoral catheter in place until able to place tunnelled catheter in order to prevent further vascular trauma. tentative plan to place tunnelled catheter early next week pending ID approval  - ID consult will be placed 9/4  - f/u BCx - Concern for AV fistula infection, s/p clinda/vanc/zosyn  Plan:  - vascular following, recs appreciated  - renal following, recs appreciated  - IR consulted, recs appreciated  - c/w zosyn and vanc by level with HD  - plan for OR today for AVF revision- R fem HD line placed by vascular. discussed with IR who recommends keeping femoral catheter in place until able to place tunnelled catheter in order to prevent further vascular trauma. tentative plan to place tunnelled catheter early next week pending ID approval  - as per cards, pt is medically optimized for procedure  - ID consult will be placed 9/4  - f/u BCx

## 2023-08-31 NOTE — H&P ADULT - PROBLEM SELECTOR PLAN 2
- noted to have b/l leg edema on examination, also with decreased breath sounds bibasilarly and with recent outpatient CXR with findings concerning for bilateral pleural effusions  - Patient also with worsening transaminitis concerning for possible hepatic congestion  - Likely would benefit from additional fluid removal with HD -> f/u nephrology consultation (called)  - Check VA duplex venous b/l LE and L UE to r/o DVT but overall low concern  - c/w bilateral leg and L UE elevations

## 2023-08-31 NOTE — CHART NOTE - NSCHARTNOTEFT_GEN_A_CORE
IR Follow-Up     femoral non-tunneled HD catheter placed by vascular surgery.     can re-consult PRN next week for tunneled HD catheter placement if still needed.     --  Hao Estrada DO/FRANK  Interventional Radiology Resident (PGY-5)  Available on [employer] TEAMS    For EMERGENT inquiries/questions:  IR Pager ([facility]): [phone]  IR Pager ([facility]): [phone] ; [phone]    For non-emergent consults/questions:   Please place a sunrise order "Consult- Interventional Radiology" with an appropriate callback number    For questions about scheduling during appropriate work hours, call IR :  [facility]: [phone]  [facility]: [phone]    For outpatient IR booking:  [facility]: [phone]  [facility]: [phone] IR Follow-Up     femoral non-tunneled HD catheter placed by vascular surgery.     can re-consult PRN next week for tunneled HD catheter placement if still needed & if blood Cx are negative and ID clears for tunneled placement.     --  Hao Estrada DO/FRANK  Interventional Radiology Resident (PGY-5)  Available on [employer] TEAMS    For EMERGENT inquiries/questions:  IR Pager ([facility]): [phone]  IR Pager ([facility]): [phone] ; [phone]    For non-emergent consults/questions:   Please place a sunrise order "Consult- Interventional Radiology" with an appropriate callback number    For questions about scheduling during appropriate work hours, call IR :  [facility]: [phone]  [facility]: [phone]    For outpatient IR booking:  [facility]: [phone]  [facility]: [phone]

## 2023-08-31 NOTE — H&P ADULT - PROBLEM SELECTOR PLAN 9
- chronic type B aortic dissection  - diagnosed 10 years ago as per prior notes  - maintain SBP < 135

## 2023-08-31 NOTE — H&P ADULT - NSICDXPASTMEDICALHX_GEN_ALL_CORE_FT
PAST MEDICAL HISTORY:  Adult Onset Diabetes Mellitus,     Cancer of Endometrium s/p Hysterectomy , s/p Chemo.    CKD (chronic kidney disease)     Dissection of Aorta Type B    ESRD (end stage renal disease) on dialysis M,W,F   [facility]    History of Hysterectomy with O     Hypertension

## 2023-08-31 NOTE — H&P ADULT - PROBLEM SELECTOR PLAN 7
- Diet contolled, not on medications  - Previous A1C 5 in May and August -> will monitor off ISS, place on CC diet when restarting diet

## 2023-08-31 NOTE — H&P ADULT - PROBLEM SELECTOR PLAN 3
- Likely multifactorial in setting suspected L AVF infection and known L humerus fracture  - Check VA duplex venous study  - L arm in sling, L UE WBAT as per [facility] orders

## 2023-08-31 NOTE — PROGRESS NOTE ADULT - ASSESSMENT
78F PMH DM2 (last A1C 5 8/30/23), ESRD on HD M/W/F, Endometrial cancer s/p CLINT, Chronic Type B aortic dissection, HTN, L humerus fracture (non-op), L hip fracture s/p ORIF (5/2023) with recent prolonged hospitalization 7/18-8/16 for L hip hardware failure s/p hemiarthroplasty with post-op course complicated by hematoma s/p pRBCs and compression dressing. Hospitalization further complicated by malfunctioning LUE AVF s/p AVF revision with vascular surgery (8/4/23), with subsequent discharge now presents from [facility] for persistent drainage from her LUE fistula site.

## 2023-08-31 NOTE — ED ADULT NURSE REASSESSMENT NOTE - NS ED NURSE REASSESS COMMENT FT1
Pt appears resting comfortably in stretcher, HOB elevated. Respirations even and unlabored. No acute distress noted. Bed in lowest position, in vision of nurses station, wheels locked, appropriate side rails up, safety maintained Awaiting bed assignment as per admission order.
Report given to [facility] RNSheila.
Report received form BONI Navarro. CARROL&Ox4. Well-appearing laying in stretcher HOB elevated. Speaking in full and complete sentences. No acute distress noted. Respirations even and unlabored. No acute distress noted. Left arm wrapped in ace wrap. Endorsing hip pain, however states this is baseline from recent hip surgery. Bed in lowest position, in vision of nurses station in spot 12A, wheels locked, appropriate side rails up, fall precautions in effect, safety maintained. Awaiting further orders.

## 2023-08-31 NOTE — H&P ADULT - PROBLEM SELECTOR PLAN 1
- Concern for AV fistula infection, s/p clinda/vanc/zosyn, will c/w zosyn and vanc by level (check trough post HD)  - VA duples of fistula ordered, prnding study  - SOFI goetz, f/u further vascular reccs  - IR consultation for tunnelled catheter placement for HD access, will keep NPO for now pending IR eval

## 2023-08-31 NOTE — CONSULT NOTE ADULT - SUBJECTIVE AND OBJECTIVE BOX
Cardiovascular Disease Initial Evaluation  Date of Service: 08-31-23 @ 09:27    CHIEF COMPLAINT: Fistula not working    HISTORY OF PRESENT ILLNESS:    This is a 78 year old woman with ESRD on HD, HTN, and chronic type B aortic dissection who presented to [facility] on 8/30/2023 due to AV fistula malfunction and pain.  Ms. Harris was admitted recently on 7/7/2023 with 2 weeks of atraumtic  L hip pain.   She underwent hip intervention and hospital course was complicated by fistula malfunction requiring vascular intervention on that admission.    She denies chest pain or SOB.     Allergies  No Known Allergies      	    MEDICATIONS:    piperacillin/tazobactam IVPB.. 3.375 Gram(s) IV Intermittent every 12 hours      acetaminophen     Tablet .. 650 milliGRAM(s) Oral every 6 hours PRN  melatonin 3 milliGRAM(s) Oral at bedtime PRN  ondansetron Injectable 4 milliGRAM(s) IV Push every 8 hours PRN    aluminum hydroxide/magnesium hydroxide/simethicone Suspension 30 milliLiter(s) Oral every 4 hours PRN          PAST MEDICAL & SURGICAL HISTORY:  Adult Onset Diabetes Mellitus,      Cancer of Endometrium  s/p Hysterectomy , s/p Chemo.      Dissection of Aorta  Type B      History of Hysterectomy with O      Hypertension      CKD (chronic kidney disease)      ESRD (end stage renal disease) on dialysis  M,W,F   [facility]      H/O total hysterectomy  in 2007 for endometrial CA          FAMILY HISTORY:  No pertinent family history in first degree relatives        SOCIAL HISTORY:    The patient is a nonsmoker       REVIEW OF SYSTEMS:  See HPI, otherwise complete 14 point review of systems negative      PHYSICAL EXAM:  T(C): 36.5 (08-31-23 @ 06:54), Max: 36.6 (08-30-23 @ 19:22)  HR: 85 (08-31-23 @ 06:54) (63 - 85)  BP: 151/86 (08-31-23 @ 06:54) (151/86 - 166/72)  RR: 20 (08-31-23 @ 06:54) (16 - 26)  SpO2: 96% (08-31-23 @ 06:54) (96% - 100%)  Wt(kg): --  I&O's Summary      Appearance: No Acute Distress; resting comfortably  HEENT:  Normal oral mucosa, PERRL, EOMI	  Cardiovascular: Normal S1 S2, No JVD, No murmurs/rubs/gallops  Respiratory: Normal respiratory effort; Lungs clear to auscultation bilaterally  Gastrointestinal:  Soft, Non-tender, + BS	  Skin: No rashes, No ecchymoses, No cyanosis	  Neurologic: Non-focal; no weakness  Extremities: No clubbing, cyanosis or edema  Vascular: Peripheral pulses palpable 2+ bilaterally  Psychiatry: A & O x 3, Mood & affect appropriate    Laboratory Data:	 	    CBC Full  -  ( 30 Aug 2023 19:00 )  WBC Count : 6.37 K/uL  Hemoglobin : 10.6 g/dL  Hematocrit : 32.5 %  Platelet Count - Automated : 183 K/uL  Mean Cell Volume : 97.0 fL  Mean Cell Hemoglobin : 31.6 pg  Mean Cell Hemoglobin Concentration : 32.6 gm/dL  Auto Neutrophil # : 4.08 K/uL  Auto Lymphocyte # : 1.36 K/uL  Auto Monocyte # : 0.69 K/uL  Auto Eosinophil # : 0.21 K/uL  Auto Basophil # : 0.02 K/uL  Auto Neutrophil % : 64.0 %  Auto Lymphocyte % : 21.4 %  Auto Monocyte % : 10.8 %  Auto Eosinophil % : 3.3 %  Auto Basophil % : 0.3 %    08-30    134<L>  |  94<L>  |  49<H>  ----------------------------<  176<H>  3.9   |  27  |  5.02<H>    Ca    10.5      30 Aug 2023 19:00    TPro  7.8  /  Alb  2.8<L>  /  TBili  0.8  /  DBili  x   /  AST  37<H>  /  ALT  20  /  AlkPhos  341<H>  08-30    Interpretation of Telemetry: n/a	    ECG:  	Sinus; RBBB    Assessment: 78 year old woman with ESRD on HD (MWF), HTN, DM (diet controlled), chronic type B aortic dissection, heart murmur, endometrial ca in remission s/p total hysterectomy in 2007 presents with fistula malfunction.      Plan of Care:      #Pre-operative risk evaluation-  - Ms. Harris displays no signs of  coronary ischemia or decompensated CHF.   EKG is sinus with a known RBBB.  She is optimized from a cardiac standpoint to proceed with AV fistula intervention.          #Type B aortic dissection  - Chronic (Diagnosed >10 years ago)  - Optimal BP control.        #ESRD-  - HD as per renal.      72 minutes spent on total encounter; more than 50% of the visit was spent counseling and/or coordinating care by the attending physician.   	  Gabo Burris MD [facility]  Cardiovascular Diseases  [phone]

## 2023-08-31 NOTE — CONSULT NOTE ADULT - PROBLEM SELECTOR RECOMMENDATION 2
Pt. with anemia in the setting of ESRD. Hgb 10.6, at goal for ESRD. Monitor Hgb, transfusion per primary team.    Final recommendations pending attending signature.    If you have any questions, please feel free to contact me  Kyle Ngo  Nephrology Fellow  [email or web address] [phone]  (After 5pm or on weekends please page the on-call fellow)

## 2023-08-31 NOTE — CHART NOTE - NSCHARTNOTEFT_GEN_A_CORE
Patient seen in ED, L hip orthopedic surgical incision assessed. Noted to have mild serous drainage from hematoma evacuation site. Dressed with gauze and tape.  Please call orthopedics with any questions, rest of care as per primary team.    Clarisa Saravia PA-C  Team Pager #[phone]

## 2023-08-31 NOTE — PROGRESS NOTE ADULT - SUBJECTIVE AND OBJECTIVE BOX
SURGERY DAILY PROGRESS NOTE    --------------- OVERNIGHT/INTERVAL---------------  - No acute events overnight    --------------- SUBJECTIVE ---------------  - Patient describes no acute issues or concerns at this time  - -/- BM +void. Denies CP, SOB, n/v, abdominal pain.   - Patient seen and examined at bedside with surgical team    --------------- OBJECTIVE ---------------  -----VITALS-----  T(C): 36.5, Max: 36.6 (08-30)  T(F): 97.7, Max: 97.8 (08-30)  HR: 85 (63 - 85)  BP: 151/86 (151/86 - 166/72)  BP(mean): --  ABP: --  ABP(mean): --  RR: 20 (16 - 26)  SpO2: 96% (96% - 100%)  CVP(mm Hg): --  CVP(cm H2O): --  room air          -----PHYSICAL EXAM-----  GEN: NAD, resting quietly  NEURO: AAOx3, CN II-XII grossly intact, no focal deficits  PULM: symmetric chest rise bilaterally, no increased WOB  ABD: soft, nontender, nondistended  EXTR: no lower extremity edema, moving all extremities, LUE with brachiocephalic fistula with thrill and noted aneurysmal dilation just proximal to the AC fossa. Sutures in place from prior surgery but wound breakdown in the middle of the repair with faint surrounding cellulitis, no active bleeding     -----INs & OUTs-----    -----MEDICATIONS-----  STANDING  ascorbic acid 500 milliGRAM(s) Oral daily  calcium acetate 1334 milliGRAM(s) Oral three times a day with meals  calcium carbonate 1250 mG  + Vitamin D (OsCal 500 + D) 1 Tablet(s) Oral three times a day  folic acid 1 milliGRAM(s) Oral daily  heparin   Injectable 5000 Unit(s) SubCutaneous every 12 hours  hydrALAZINE 50 milliGRAM(s) Oral daily  metoprolol succinate ER 12.5 milliGRAM(s) Oral daily  Nephro-nathanael 1 Tablet(s) Oral daily  pantoprazole    Tablet 40 milliGRAM(s) Oral before breakfast  piperacillin/tazobactam IVPB.. 3.375 Gram(s) IV Intermittent every 12 hours  polyethylene glycol 3350 17 Gram(s) Oral two times a day  senna 2 Tablet(s) Oral at bedtime    PRN  acetaminophen     Tablet .. 650 milliGRAM(s) Oral every 6 hours PRN Temp greater or equal to 38C (100.4F), Mild Pain (1 - 3)  aluminum hydroxide/magnesium hydroxide/simethicone Suspension 30 milliLiter(s) Oral every 4 hours PRN Dyspepsia  melatonin 3 milliGRAM(s) Oral at bedtime PRN Insomnia  ondansetron Injectable 4 milliGRAM(s) IV Push every 8 hours PRN Nausea and/or Vomiting    -----LABS-----  CBC (08-31 @ 09:13)                              10.6<L>                         6.46    )----------------(  178        --    % Neutrophils, --    % Lymphocytes, ANC: --                                  31.9<L>  CBC (08-30 @ 19:00)                              10.6<L>                         6.37    )----------------(  183        64.0  % Neutrophils, 21.4  % Lymphocytes, ANC: 4.08                                32.5<L>    BMP (08-30 @ 19:00)             134<L>  |  94<L>   |  49<H> 		Ca++ --      Ca 10.5               ---------------------------------( 176<H>		Mg --                 3.9     |  27      |  5.02<H>			Ph --        LFTs (08-30 @ 19:00)      TPro 7.8 / Alb 2.8<L> / TBili 0.8 / DBili -- / AST 37<H> / ALT 20 / AlkPhos 341<H>          VBG (08-30 @ 19:00)     7.44<H> / 45 / 31 / 31<H> / 5.7<H> / 50.4<L>%     Lactate: 1.9    Urinalysis (08-30 @ 19:00):     Color:  / Appearance:  / SG:  / pH:  / Gluc: 176<H> / Ketones:  / Bili:  / Urobili:  / Protein : / Nitrites:  / Leuk.Est:  / RBC:  / WBC:  / Sq Epi:  / Non Sq Epi:  / Bacteria

## 2023-08-31 NOTE — CONSULT NOTE ADULT - SUBJECTIVE AND OBJECTIVE BOX
[facility] DIVISION OF KIDNEY DISEASES AND HYPERTENSION -- [phone]  -- INITIAL CONSULT NOTE  --------------------------------------------------------------------------------  HPI:77yo F w/ PMH of ESRD on HD MWF at [facility] (previously at [facility]), HTN, DM2, endometrial cancer (underwent hysterectomy and chemotherapy), type B aortic dissection, and L hip ORIF, admitted for AVF site infection. Nephrology consulted for management of ESRD/HD. Pt. has been on HD for 5yrs and ESRD 2/2 uncontrolled DM2. Last HD tx 8/28 at [facility]. Pt seen and evaluated at bedside. Reports doing well and no acute complaints. Reports on and off drainage from the AVF site and left hip. Denies any other complaints. Denies n/v/d, abd pain, pain at AVF site, HA, dizziness, sob, cp.     PAST HISTORY  --------------------------------------------------------------------------------  PAST MEDICAL & SURGICAL HISTORY:  Adult Onset Diabetes Mellitus,  Cancer of Endometrium  s/p Hysterectomy , s/p Chemo.  Dissection of Aorta  Type B  History of Hysterectomy with O  Hypertension  CKD (chronic kidney disease)  ESRD (end stage renal disease) on dialysis  M,W,F   [facility] av  H/O total hysterectomy  in 2007 for endometrial CA      History of hip replacement, total, right  S/P arteriovenous (AV) fistula repair    FAMILY HISTORY:  No pertinent family history in first degree relatives    PAST SOCIAL HISTORY:    ALLERGIES & MEDICATIONS  --------------------------------------------------------------------------------  Allergies  No Known Allergies  Intolerances    Standing Inpatient Medications  ascorbic acid 500 milliGRAM(s) Oral daily  calcium acetate 667 milliGRAM(s) Oral three times a day with meals  calcium carbonate 1250 mG  + Vitamin D (OsCal 500 + D) 1 Tablet(s) Oral three times a day  folic acid 1 milliGRAM(s) Oral daily  heparin   Injectable 5000 Unit(s) SubCutaneous every 12 hours  hydrALAZINE 50 milliGRAM(s) Oral daily  metoprolol succinate ER 12.5 milliGRAM(s) Oral daily  Nephro-nathanael 1 Tablet(s) Oral daily  pantoprazole    Tablet 40 milliGRAM(s) Oral before breakfast  piperacillin/tazobactam IVPB.. 3.375 Gram(s) IV Intermittent every 12 hours  polyethylene glycol 3350 17 Gram(s) Oral two times a day  senna 2 Tablet(s) Oral at bedtime    PRN Inpatient Medications  acetaminophen     Tablet .. 650 milliGRAM(s) Oral every 6 hours PRN  aluminum hydroxide/magnesium hydroxide/simethicone Suspension 30 milliLiter(s) Oral every 4 hours PRN  melatonin 3 milliGRAM(s) Oral at bedtime PRN  ondansetron Injectable 4 milliGRAM(s) IV Push every 8 hours PRN  sodium chloride 0.9% Bolus. 100 milliLiter(s) IV Bolus every 5 minutes PRN      REVIEW OF SYSTEMS  --------------------------------------------------------------------------------  Gen: No fevers/chills  Skin: LUE wound   Head/Eyes/Ears: No HA  Respiratory: No dyspnea, cough  CV: No chest pain  GI: No abdominal pain, diarrhea  : makes urine  MSK: LE edema    All other systems were reviewed and are negative, except as noted.    VITALS/PHYSICAL EXAM  --------------------------------------------------------------------------------  T(C): 36.5 (08-31-23 @ 06:54), Max: 36.6 (08-30-23 @ 19:22)  HR: 85 (08-31-23 @ 06:54) (63 - 85)  BP: 151/86 (08-31-23 @ 06:54) (151/86 - 166/72)  RR: 20 (08-31-23 @ 06:54) (16 - 26)  SpO2: 96% (08-31-23 @ 06:54) (96% - 100%)  Wt(kg): --  Height (cm): 154.9 (08-30-23 @ 17:32)  Weight (kg): 53.4 (08-31-23 @ 09:27)  BMI (kg/m2): 22.3 (08-31-23 @ 09:27)  BSA (m2): 1.51 (08-31-23 @ 09:27)    Physical Exam:  Gen: NAD  HEENT: MMM  Pulm: CTA B/L  CV: S1S2  Abd: Soft, +BS   Ext: +LE edema B/L  Neuro: Awake  Skin: Warm and dry  Vascular access: LUE AVF with thrill and aneurysmal dilation. Sutures present. Skin breakdown noted with purulent drainage. +R femoral non tunneled HD catheter     LABS/STUDIES  --------------------------------------------------------------------------------              10.6   6.46  >-----------<  178      [08-31-23 @ 09:13]              31.9     133  |  93  |  53  ----------------------------<  171      [08-31-23 @ 09:13]  3.8   |  25  |  5.48        Ca     10.2     [08-31-23 @ 09:13]      Mg     2.30     [08-31-23 @ 09:13]      Phos  3.0     [08-31-23 @ 09:13]    TPro  7.7  /  Alb  2.8  /  TBili  1.2  /  DBili  x   /  AST  31  /  ALT  16  /  AlkPhos  309  [08-31-23 @ 09:13]    PT/INR: PT 11.3 , INR 1.01       [08-31-23 @ 09:13]  PTT: 31.6       [08-31-23 @ 09:13]    Creatinine Trend:  SCr 5.48 [08-31 @ 09:13]  SCr 5.02 [08-30 @ 19:00]  SCr 5.51 [08-16 @ 12:28]  SCr 4.61 [08-08 @ 06:24]  SCr 5.89 [08-07 @ 11:28]    Urinalysis - [08-31-23 @ 09:13]      Color  / Appearance  / SG  / pH       Gluc 171 / Ketone   / Bili  / Urobili        Blood  / Protein  / Leuk Est  / Nitrite       RBC  / WBC  / Hyaline  / Gran  / Sq Epi  / Non Sq Epi  / Bacteria     HBsAb 44.0      [05-24-23 @ 07:00]  HBsAg Nonreact      [05-24-23 @ 07:00]  HBcAb Reactive      [05-24-23 @ 07:00]  HCV 0.25, Nonreact      [05-24-23 @ 07:00]     [facility] DIVISION OF KIDNEY DISEASES AND HYPERTENSION -- [phone]  -- INITIAL CONSULT NOTE  --------------------------------------------------------------------------------  HPI:79yo F w/ PMH of ESRD on HD MWF at [facility] (previously at [facility]), HTN, DM2, endometrial cancer (underwent hysterectomy and chemotherapy), type B aortic dissection, and L hip ORIF, admitted for AVF site infection. Nephrology consulted for management of ESRD/HD. Pt. has been on HD for 5yrs and ESRD 2/2 uncontrolled DM2. Last HD tx 8/28 at [facility]. Pt seen and evaluated at bedside. Reports doing well and no acute complaints. Reports on and off drainage from the AVF site and left hip. Denies any other complaints. Denies n/v/d, abd pain, pain at AVF site, HA, dizziness, sob, cp.     PAST HISTORY  --------------------------------------------------------------------------------  PAST MEDICAL & SURGICAL HISTORY:  Adult Onset Diabetes Mellitus,  Cancer of Endometrium  s/p Hysterectomy , s/p Chemo.  Dissection of Aorta  Type B  History of Hysterectomy with O  Hypertension  CKD (chronic kidney disease)  ESRD (end stage renal disease) on dialysis  M,W,F   [facility] av  H/O total hysterectomy  in 2007 for endometrial CA      History of hip replacement, total, right  S/P arteriovenous (AV) fistula repair    FAMILY HISTORY:  No pertinent family history in first degree relatives    PAST SOCIAL HISTORY:    ALLERGIES & MEDICATIONS  --------------------------------------------------------------------------------  Allergies  No Known Allergies  Intolerances    Standing Inpatient Medications  ascorbic acid 500 milliGRAM(s) Oral daily  calcium acetate 667 milliGRAM(s) Oral three times a day with meals  calcium carbonate 1250 mG  + Vitamin D (OsCal 500 + D) 1 Tablet(s) Oral three times a day  folic acid 1 milliGRAM(s) Oral daily  heparin   Injectable 5000 Unit(s) SubCutaneous every 12 hours  hydrALAZINE 50 milliGRAM(s) Oral daily  metoprolol succinate ER 12.5 milliGRAM(s) Oral daily  Nephro-nathanael 1 Tablet(s) Oral daily  pantoprazole    Tablet 40 milliGRAM(s) Oral before breakfast  piperacillin/tazobactam IVPB.. 3.375 Gram(s) IV Intermittent every 12 hours  polyethylene glycol 3350 17 Gram(s) Oral two times a day  senna 2 Tablet(s) Oral at bedtime    PRN Inpatient Medications  acetaminophen     Tablet .. 650 milliGRAM(s) Oral every 6 hours PRN  aluminum hydroxide/magnesium hydroxide/simethicone Suspension 30 milliLiter(s) Oral every 4 hours PRN  melatonin 3 milliGRAM(s) Oral at bedtime PRN  ondansetron Injectable 4 milliGRAM(s) IV Push every 8 hours PRN  sodium chloride 0.9% Bolus. 100 milliLiter(s) IV Bolus every 5 minutes PRN      REVIEW OF SYSTEMS  --------------------------------------------------------------------------------  Gen: No fevers/chills  Skin: LUE wound   Head/Eyes/Ears: No HA  Respiratory: No dyspnea, cough  CV: No chest pain  GI: No abdominal pain, diarrhea  : makes urine  MSK: LE edema    All other systems were reviewed and are negative, except as noted.    VITALS/PHYSICAL EXAM  --------------------------------------------------------------------------------  T(C): 36.5 (08-31-23 @ 06:54), Max: 36.6 (08-30-23 @ 19:22)  HR: 85 (08-31-23 @ 06:54) (63 - 85)  BP: 151/86 (08-31-23 @ 06:54) (151/86 - 166/72)  RR: 20 (08-31-23 @ 06:54) (16 - 26)  SpO2: 96% (08-31-23 @ 06:54) (96% - 100%)  Wt(kg): --  Height (cm): 154.9 (08-30-23 @ 17:32)  Weight (kg): 53.4 (08-31-23 @ 09:27)  BMI (kg/m2): 22.3 (08-31-23 @ 09:27)  BSA (m2): 1.51 (08-31-23 @ 09:27)    Physical Exam:  Gen: NAD  HEENT: MMM  Pulm: CTA B/L  CV: S1S2  Abd: Soft, +BS   Ext: +LE edema B/L  Neuro: Awake  Skin: Warm and dry  Vascular access: LUE AVF with thrill and aneurysmal dilation. Sutures present. Skin breakdown noted with purulent drainage. +R femoral non tunneled HD catheter     LABS/STUDIES  --------------------------------------------------------------------------------              10.6   6.46  >-----------<  178      [08-31-23 @ 09:13]              31.9     133  |  93  |  53  ----------------------------<  171      [08-31-23 @ 09:13]  3.8   |  25  |  5.48        Ca     10.2     [08-31-23 @ 09:13]      Mg     2.30     [08-31-23 @ 09:13]      Phos  3.0     [08-31-23 @ 09:13]    TPro  7.7  /  Alb  2.8  /  TBili  1.2  /  DBili  x   /  AST  31  /  ALT  16  /  AlkPhos  309  [08-31-23 @ 09:13]    PT/INR: PT 11.3 , INR 1.01       [08-31-23 @ 09:13]  PTT: 31.6       [08-31-23 @ 09:13]    Creatinine Trend:  SCr 5.48 [08-31 @ 09:13]  SCr 5.02 [08-30 @ 19:00]  SCr 5.51 [08-16 @ 12:28]  SCr 4.61 [08-08 @ 06:24]  SCr 5.89 [08-07 @ 11:28]    Urinalysis - [08-31-23 @ 09:13]      Color  / Appearance  / SG  / pH       Gluc 171 / Ketone   / Bili  / Urobili        Blood  / Protein  / Leuk Est  / Nitrite       RBC  / WBC  / Hyaline  / Gran  / Sq Epi  / Non Sq Epi  / Bacteria     HBsAb 44.0      [05-24-23 @ 07:00]  HBsAg Nonreact      [05-24-23 @ 07:00]  HBcAb Reactive      [05-24-23 @ 07:00]  HCV 0.25, Nonreact      [05-24-23 @ 07:00]

## 2023-08-31 NOTE — H&P ADULT - PROBLEM SELECTOR PLAN 6
- Likely spuriously elevated, patient without abdominal complaints, would monitor for symptoms if they present

## 2023-08-31 NOTE — H&P ADULT - NSHPPHYSICALEXAM_GEN_ALL_CORE
Vital Signs Last 24 Hrs  T(C): 36.5 (31 Aug 2023 06:54), Max: 36.6 (30 Aug 2023 19:22)  T(F): 97.7 (31 Aug 2023 06:54), Max: 97.8 (30 Aug 2023 19:22)  HR: 85 (31 Aug 2023 06:54) (63 - 85)  BP: 151/86 (31 Aug 2023 06:54) (151/86 - 166/72)  BP(mean): --  RR: 20 (31 Aug 2023 06:54) (16 - 26)  SpO2: 96% (31 Aug 2023 06:54) (96% - 100%)    Parameters below as of 31 Aug 2023 06:54  Patient On (Oxygen Delivery Method): room air    GENERAL: No acute distress, well-developed  EYES: EOMI, PERRL, conjunctiva and sclera clear  ENT: Neck supple, No JVD, moist mucosa  CHEST/LUNG: Decreased breath sounds b/l lung bases but overall clear to auscultation bilaterally; No wheeze, equal breath sounds bilaterally   BACK: No spinal tenderness  HEART: Regular rate and rhythm; +ELOY (chronic as per patient)  ABDOMEN: Soft, Nontender, Nondistended; Bowel sounds present  EXTREMITIES: +DP/PT/Radial pulses, 1+ pitting edema of the LUE, 1-2+ pitting edema of b/l LE up to b/l knees, AVF with palpable thrill  PSYCH: Nl behavior, nl affect  NEUROLOGY: AAOx3, non-focal  SKIN: L UE AVF site with some dried purulent drainage, L hip surgical incision with serosanguinous drainage, Normal color, No rashes or lesions

## 2023-08-31 NOTE — CONSULT NOTE ADULT - ATTENDING COMMENTS
patient with exposed AV fistula in the upper extremity. Vascular surgery concerned it might become infected and blow out. Will take to the OR urgently with vascular today.
s/p L AVF revision now with wound dehiscence over the AVF  no sings of infection, AVF is not exposed, no bleeding  good thrill   arm is swollen 2/2 central venous stenosis  will plan for urgent wound debridement and closure to prevent AVF blow out  will get plastics involved to help with closure  will needs fistulogram to evaluate central venous outflow  risks of the procedure including but not limited to bleeding, infection, limb ischemia, nerve damage and avf thrombosis were discussed. The benefits of the procedure including prevention of avf blowout, preservation of AVF. Alternatives include avf ligation and HD cathter insertion for dialysis.   Pt. understands all the risks , benefits and alternatives and agrees to proceed with surgery.
ESRD admitted with fistula wound dehiscence  she is not v/ol and lytes are ok  will plan for HD after OR. discussed with Dr. Jensen

## 2023-08-31 NOTE — H&P ADULT - NSHPSOCIALHISTORY_GEN_ALL_CORE
Currently at [facility], previously was living at home by herself  Currently ambulatory with walker for short distances  Denies tobacco, EtOH, or illicit substance use

## 2023-08-31 NOTE — PROGRESS NOTE ADULT - ASSESSMENT
77 y.o. Female w/ Hx HTN, DM, chronic type B aortic dissection, ESRD on HD ( M, W, F ) via LUE brachiocephalic (2018) fistula, endometrial ca in remission s/p total hysterectomy in 2007 recently admitted for L hip revision (07-08/2023) during which she underwent LUE subclavian vein stenting 8/1 for stenosis and subsequent brachiocephalic AVF revision 8/3 with resection of the involved segment and primary anastomosis for ulceration. She presents after a missed HD session due to concern for overlying infection at the AVF site.    PLAN:    - Plan for AVF revision tomorrow, Friday 9/1  - NPO@MN  - Preoperative labs -- 1AM -- CBC, BMP, Mg, Phos, PT/PTT/INR, T&S  - Nephrology consult for HD  - IR consult -- tunneled catheter placement as fistula should not be accessed currently with overlying wound  - f/u [facility] Duplex of fistula  - c/w vanc/zosyn    Vascular Surgery  [id number]  77 y.o. Female w/ Hx HTN, DM, chronic type B aortic dissection, ESRD on HD ( M, W, F ) via LUE brachiocephalic (2018) fistula, endometrial ca in remission s/p total hysterectomy in 2007 recently admitted for L hip revision (07-08/2023) during which she underwent LUE subclavian vein stenting 8/1 for stenosis and subsequent brachiocephalic AVF revision 8/3 with resection of the involved segment and primary anastomosis for ulceration. She presents after a missed HD session due to concern for overlying infection at the AVF site.    PLAN:    - Plan for AVF revision tomorrow, Friday 9/1  - NPO@MN  - Preoperative labs -- 1AM -- CBC, BMP, Mg, Phos, PT/PTT/INR, T&S  - Nephrology consult for HD  - IR consult -- tunneled catheter placement as fistula should not be accessed currently with overlying wound  - f/u [facility] Duplex of fistula  - c/w vanc/zosyn    CLEARANCES   - Cardiology -- optimized 8/31  - Medicine -- PENDING    Vascular Surgery  [id number]  77 y.o. Female w/ Hx HTN, DM, chronic type B aortic dissection, ESRD on HD ( M, W, F ) via LUE brachiocephalic (2018) fistula, endometrial ca in remission s/p total hysterectomy in 2007 recently admitted for L hip revision (07-08/2023) during which she underwent LUE subclavian vein stenting 8/1 for stenosis and subsequent brachiocephalic AVF revision 8/3 with resection of the involved segment and primary anastomosis for ulceration. She presents after a missed HD session due to concern for overlying infection at the AVF site.    PLAN:    - Plan for AVF revision tomorrow, Friday 9/1  - NPO@MN  - Preoperative labs -- 1AM -- CBC, BMP, Mg, Phos, PT/PTT/INR, T&S  - Nephrology consult for HD  - IR consult -- for Shiley or tunneled catheter placement as fistula should not be accessed currently with overlying wound  - f/u [facility] Duplex of fistula  - c/w vanc/zosyn    CLEARANCES   - Cardiology -- optimized 8/31  - Medicine -- PENDING    Vascular Surgery  [id number]  77 y.o. Female w/ Hx HTN, DM, chronic type B aortic dissection, ESRD on HD ( M, W, F ) via LUE brachiocephalic (2018) fistula, endometrial ca in remission s/p total hysterectomy in 2007 recently admitted for L hip revision (07-08/2023) during which she underwent LUE subclavian vein stenting 8/1 for stenosis and subsequent brachiocephalic AVF revision 8/3 with resection of the involved segment and primary anastomosis for ulceration. She presents after a missed HD session due to concern for overlying infection at the AVF site.    PLAN:    - Plan for AVF revision tomorrow, Friday 9/1  - NPO@MN  - Preoperative labs -- 1AM -- CBC, BMP, Mg, Phos, PT/PTT/INR, T&S  - Requires HD today before OR  - Nephrology consult for HD  - IR consult -- for Shiley or tunneled catheter placement as fistula should not be accessed currently with overlying wound  - f/u [facility] Duplex of fistula  - c/w vanc/zosyn    CLEARANCES   - Cardiology -- optimized 8/31  - Medicine -- PENDING    Vascular Surgery  [id number]

## 2023-08-31 NOTE — PROGRESS NOTE ADULT - SUBJECTIVE AND OBJECTIVE BOX
CC: Patient is a 78y old  Female who presents with a chief complaint of L AVF infection (31 Aug 2023 10:40)    SUBJECTIVE / INTERVAL HPI: Patient seen and examined at bedside. Denies fever, chills, chest pain, sob, n/v, pain at fistula site.    ROS: negative unless otherwise stated above.    VITAL SIGNS:  Vital Signs Last 24 Hrs  T(C): 36.9 (31 Aug 2023 11:21), Max: 36.9 (31 Aug 2023 11:21)  T(F): 98.5 (31 Aug 2023 11:21), Max: 98.5 (31 Aug 2023 11:21)  HR: 67 (31 Aug 2023 11:21) (63 - 85)  BP: 182/68 (31 Aug 2023 11:21) (151/86 - 182/68)  BP(mean): --  RR: 18 (31 Aug 2023 11:21) (16 - 26)  SpO2: 100% (31 Aug 2023 11:21) (96% - 100%)    Parameters below as of 31 Aug 2023 11:21  Patient On (Oxygen Delivery Method): room air            PHYSICAL EXAM:    General: NAD  HEENT: MMM  Neck: supple  Cardiovascular: +S1/S2; RRR  Respiratory: CTA B/L; no W/R/R  Gastrointestinal: soft, NT/ND  Extremities: WWP; LUE fistula with wound and purulent drainage; R femoral HD cath in place  Vascular: 2+ radial, DP pulses B/L  Neurological: AAOx3; no focal deficits    MEDICATIONS:  MEDICATIONS  (STANDING):  ascorbic acid 500 milliGRAM(s) Oral daily  calcium acetate 667 milliGRAM(s) Oral three times a day with meals  calcium carbonate 1250 mG  + Vitamin D (OsCal 500 + D) 1 Tablet(s) Oral three times a day  chlorhexidine 4% Liquid 1 Application(s) Topical <User Schedule>  folic acid 1 milliGRAM(s) Oral daily  heparin   Injectable 5000 Unit(s) SubCutaneous every 12 hours  hydrALAZINE 50 milliGRAM(s) Oral daily  metoprolol succinate ER 12.5 milliGRAM(s) Oral daily  Nephro-nathanael 1 Tablet(s) Oral daily  pantoprazole    Tablet 40 milliGRAM(s) Oral before breakfast  piperacillin/tazobactam IVPB.. 4.5 Gram(s) IV Intermittent every 12 hours  polyethylene glycol 3350 17 Gram(s) Oral two times a day  senna 2 Tablet(s) Oral at bedtime    MEDICATIONS  (PRN):  acetaminophen     Tablet .. 650 milliGRAM(s) Oral every 6 hours PRN Temp greater or equal to 38C (100.4F), Mild Pain (1 - 3)  aluminum hydroxide/magnesium hydroxide/simethicone Suspension 30 milliLiter(s) Oral every 4 hours PRN Dyspepsia  melatonin 3 milliGRAM(s) Oral at bedtime PRN Insomnia  ondansetron Injectable 4 milliGRAM(s) IV Push every 8 hours PRN Nausea and/or Vomiting  sodium chloride 0.9% Bolus. 100 milliLiter(s) IV Bolus every 5 minutes PRN SBP LESS THAN or EQUAL to 90 mmHg  sodium chloride 0.9% lock flush 10 milliLiter(s) IV Push every 1 hour PRN Pre/post blood products, medications, blood draw, and to maintain line patency      ALLERGIES:  Allergies    No Known Allergies    Intolerances        LABS:                        10.6   6.46  )-----------( 178      ( 31 Aug 2023 09:13 )             31.9     08-31    133<L>  |  93<L>  |  53<H>  ----------------------------<  171<H>  3.8   |  25  |  5.48<H>    Ca    10.2      31 Aug 2023 09:13  Phos  3.0     08-31  Mg     2.30     08-31    TPro  7.7  /  Alb  2.8<L>  /  TBili  1.2  /  DBili  x   /  AST  31  /  ALT  16  /  AlkPhos  309<H>  08-31    PT/INR - ( 31 Aug 2023 09:13 )   PT: 11.3 sec;   INR: 1.01 ratio         PTT - ( 31 Aug 2023 09:13 )  PTT:31.6 sec  Urinalysis Basic - ( 31 Aug 2023 09:13 )    Color: x / Appearance: x / SG: x / pH: x  Gluc: 171 mg/dL / Ketone: x  / Bili: x / Urobili: x   Blood: x / Protein: x / Nitrite: x   Leuk Esterase: x / RBC: x / WBC x   Sq Epi: x / Non Sq Epi: x / Bacteria: x      CAPILLARY BLOOD GLUCOSE      POCT Blood Glucose.: 196 mg/dL (30 Aug 2023 22:53)      RADIOLOGY & ADDITIONAL TESTS: Reviewed.

## 2023-08-31 NOTE — BRIEF OPERATIVE NOTE - OPERATION/FINDINGS
Primary closure of L arm wound, local tissue rearrangement, prevena incisional vac placement
LUE AVF Ligated and aneurysm resected. Arterial and venous end oversewn. Plastics closure. Prevena Vac placed. Palpable radial pulse.

## 2023-08-31 NOTE — H&P ADULT - NSHPREVIEWOFSYSTEMS_GEN_ALL_CORE
REVIEW OF SYSTEMS:    CONSTITUTIONAL: No weakness, fevers or chills  EYES: No visual changes or eye discharge  ENT: No rhinorrhea or sore throat  NECK: No pain or stiffness  RESPIRATORY: No cough, wheezing, hemoptysis; No shortness of breath  CARDIOVASCULAR: No chest pain or palpitations; No lower extremity edema  GASTROINTESTINAL: No abdominal or epigastric pain. No nausea, vomiting, or hematemesis; No diarrhea or constipation. No melena or hematochezia.  MSK: +Drainage L UE AVF, L Hip surgical site, +Swelling L UE, No back pain  GENITOURINARY: No dysuria, frequency or hematuria  NEUROLOGICAL: No numbness or weakness  SKIN: No itching, burning, rashes, or lesions

## 2023-09-01 LAB
HBV SURFACE AG SER-ACNC: SIGNIFICANT CHANGE UP
MRSA PCR RESULT.: SIGNIFICANT CHANGE UP
S AUREUS DNA NOSE QL NAA+PROBE: SIGNIFICANT CHANGE UP
VANCOMYCIN FLD-MCNC: 12.7 UG/ML — SIGNIFICANT CHANGE UP

## 2023-09-01 PROCEDURE — 99232 SBSQ HOSP IP/OBS MODERATE 35: CPT | Mod: GC

## 2023-09-01 PROCEDURE — 36556 INSERT NON-TUNNEL CV CATH: CPT | Mod: RT,58

## 2023-09-01 PROCEDURE — 71260 CT THORAX DX C+: CPT | Mod: 26

## 2023-09-01 RX ORDER — CHLORHEXIDINE GLUCONATE 213 G/1000ML
1 SOLUTION TOPICAL DAILY
Refills: 0 | Status: DISCONTINUED | OUTPATIENT
Start: 2023-09-01 | End: 2023-09-07

## 2023-09-01 RX ORDER — OXYCODONE HYDROCHLORIDE 5 MG/1
5 TABLET ORAL EVERY 6 HOURS
Refills: 0 | Status: DISCONTINUED | OUTPATIENT
Start: 2023-09-01 | End: 2023-09-07

## 2023-09-01 RX ORDER — INFLUENZA VIRUS VACCINE 15; 15; 15; 15 UG/.5ML; UG/.5ML; UG/.5ML; UG/.5ML
0.7 SUSPENSION INTRAMUSCULAR ONCE
Refills: 0 | Status: DISCONTINUED | OUTPATIENT
Start: 2023-09-01 | End: 2023-09-07

## 2023-09-01 RX ORDER — OXYCODONE HYDROCHLORIDE 5 MG/1
2.5 TABLET ORAL EVERY 6 HOURS
Refills: 0 | Status: DISCONTINUED | OUTPATIENT
Start: 2023-09-01 | End: 2023-09-07

## 2023-09-01 RX ORDER — VANCOMYCIN HCL 1 G
500 VIAL (EA) INTRAVENOUS ONCE
Refills: 0 | Status: COMPLETED | OUTPATIENT
Start: 2023-09-01 | End: 2023-09-01

## 2023-09-01 RX ADMIN — Medication 1 TABLET(S): at 07:00

## 2023-09-01 RX ADMIN — Medication 667 MILLIGRAM(S): at 18:13

## 2023-09-01 RX ADMIN — Medication 667 MILLIGRAM(S): at 09:25

## 2023-09-01 RX ADMIN — POLYETHYLENE GLYCOL 3350 17 GRAM(S): 17 POWDER, FOR SOLUTION ORAL at 07:01

## 2023-09-01 RX ADMIN — HEPARIN SODIUM 5000 UNIT(S): 5000 INJECTION INTRAVENOUS; SUBCUTANEOUS at 07:00

## 2023-09-01 RX ADMIN — Medication 3 MILLIGRAM(S): at 21:44

## 2023-09-01 RX ADMIN — Medication 667 MILLIGRAM(S): at 12:20

## 2023-09-01 RX ADMIN — Medication 1 TABLET(S): at 12:20

## 2023-09-01 RX ADMIN — PANTOPRAZOLE SODIUM 40 MILLIGRAM(S): 20 TABLET, DELAYED RELEASE ORAL at 06:59

## 2023-09-01 RX ADMIN — Medication 12.5 MILLIGRAM(S): at 07:00

## 2023-09-01 RX ADMIN — Medication 650 MILLIGRAM(S): at 01:53

## 2023-09-01 RX ADMIN — Medication 1 MILLIGRAM(S): at 12:19

## 2023-09-01 RX ADMIN — Medication 30 MILLILITER(S): at 21:43

## 2023-09-01 RX ADMIN — HEPARIN SODIUM 5000 UNIT(S): 5000 INJECTION INTRAVENOUS; SUBCUTANEOUS at 18:13

## 2023-09-01 RX ADMIN — PIPERACILLIN AND TAZOBACTAM 25 GRAM(S): 4; .5 INJECTION, POWDER, LYOPHILIZED, FOR SOLUTION INTRAVENOUS at 12:18

## 2023-09-01 RX ADMIN — Medication 500 MILLIGRAM(S): at 12:19

## 2023-09-01 RX ADMIN — CHLORHEXIDINE GLUCONATE 1 APPLICATION(S): 213 SOLUTION TOPICAL at 12:36

## 2023-09-01 RX ADMIN — Medication 50 MILLIGRAM(S): at 06:59

## 2023-09-01 RX ADMIN — Medication 100 MILLIGRAM(S): at 07:01

## 2023-09-01 RX ADMIN — Medication 1 TABLET(S): at 18:14

## 2023-09-01 NOTE — PATIENT PROFILE ADULT - FALL HARM RISK - HARM RISK INTERVENTIONS

## 2023-09-01 NOTE — PROGRESS NOTE ADULT - SUBJECTIVE AND OBJECTIVE BOX
Plastic Surgery Progress Note (pg [facility]: [id number], [facility]: [phone])    SUBJECTIVE  The patient was seen and examined. Patient comfortable in bed. No acute concerns. Seen with plastic surgery team    OBJECTIVE  ___________________________________________________  VITAL SIGNS / I&O's   Vital Signs Last 24 Hrs  T(C): 36.5 (01 Sep 2023 09:09), Max: 36.8 (31 Aug 2023 17:05)  T(F): 97.7 (01 Sep 2023 09:09), Max: 98.3 (31 Aug 2023 17:05)  HR: 64 (01 Sep 2023 09:09) (63 - 71)  BP: 118/63 (01 Sep 2023 09:09) (117/57 - 186/75)  BP(mean): 75 (31 Aug 2023 22:30) (75 - 100)  RR: 18 (01 Sep 2023 09:09) (16 - 22)  SpO2: 98% (01 Sep 2023 09:09) (96% - 100%)    Parameters below as of 01 Sep 2023 09:09  Patient On (Oxygen Delivery Method): room air          31 Aug 2023 07:01  -  01 Sep 2023 07:00  --------------------------------------------------------  IN:    Other (mL): 400 mL  Total IN: 400 mL    OUT:    Bulb (mL): 13 mL    Other (mL): 2400 mL  Total OUT: 2413 mL    Total NET: -2013 mL        ___________________________________________________  PHYSICAL EXAM    CONSTITUTIONAL: no apparent distress  PSYCH: Appropriate insight/judgment; A+O x 3, mood and affect appropriate  HENT: CN II-XII grossly intact, No conjunctival or scleral injection, non-icteric  NECK: Supple, symmetric and without tracheal deviation   RESP: No respiratory distress, no use of accessory muscles  SKIN: No rashes or ulcers noted, Dressings intact. Wound vac on holding suction, but indicating potential mechanical error. Vac exchanged for prevena+. New vac holding suction and functioning without issue    ___________________________________________________  LABS                        10.6   6.46  )-----------( 178      ( 31 Aug 2023 09:13 )             31.9     31 Aug 2023 09:13    133    |  93     |  53     ----------------------------<  171    3.8     |  25     |  5.48     Ca    10.2       31 Aug 2023 09:13  Phos  3.0       31 Aug 2023 09:13  Mg     2.30      31 Aug 2023 09:13    TPro  7.7    /  Alb  2.8    /  TBili  1.2    /  DBili  x      /  AST  31     /  ALT  16     /  AlkPhos  309    31 Aug 2023 09:13    PT/INR - ( 31 Aug 2023 09:13 )   PT: 11.3 sec;   INR: 1.01 ratio         PTT - ( 31 Aug 2023 09:13 )  PTT:31.6 sec  CAPILLARY BLOOD GLUCOSE      POCT Blood Glucose.: 103 mg/dL (01 Sep 2023 02:06)  POCT Blood Glucose.: 151 mg/dL (31 Aug 2023 22:57)  POCT Blood Glucose.: 148 mg/dL (31 Aug 2023 21:34)  POCT Blood Glucose.: 140 mg/dL (31 Aug 2023 17:48)        Urinalysis Basic - ( 31 Aug 2023 09:13 )    Color: x / Appearance: x / SG: x / pH: x  Gluc: 171 mg/dL / Ketone: x  / Bili: x / Urobili: x   Blood: x / Protein: x / Nitrite: x   Leuk Esterase: x / RBC: x / WBC x   Sq Epi: x / Non Sq Epi: x / Bacteria: x      ___________________________________________________  MICRO  Recent Cultures:    ___________________________________________________  MEDICATIONS  (STANDING):  ascorbic acid 500 milliGRAM(s) Oral daily  calcium acetate 667 milliGRAM(s) Oral three times a day with meals  calcium carbonate 1250 mG  + Vitamin D (OsCal 500 + D) 1 Tablet(s) Oral three times a day  chlorhexidine 2% Cloths 1 Application(s) Topical daily  folic acid 1 milliGRAM(s) Oral daily  heparin   Injectable 5000 Unit(s) SubCutaneous every 12 hours  hydrALAZINE 50 milliGRAM(s) Oral daily  influenza  Vaccine (HIGH DOSE) 0.7 milliLiter(s) IntraMuscular once  metoprolol succinate ER 12.5 milliGRAM(s) Oral daily  Nephro-nathanael 1 Tablet(s) Oral daily  pantoprazole    Tablet 40 milliGRAM(s) Oral before breakfast  piperacillin/tazobactam IVPB.. 4.5 Gram(s) IV Intermittent every 12 hours  polyethylene glycol 3350 17 Gram(s) Oral two times a day  senna 2 Tablet(s) Oral at bedtime    MEDICATIONS  (PRN):  acetaminophen     Tablet .. 650 milliGRAM(s) Oral every 6 hours PRN Temp greater or equal to 38C (100.4F), Mild Pain (1 - 3)  aluminum hydroxide/magnesium hydroxide/simethicone Suspension 30 milliLiter(s) Oral every 4 hours PRN Dyspepsia  melatonin 3 milliGRAM(s) Oral at bedtime PRN Insomnia  ondansetron Injectable 4 milliGRAM(s) IV Push every 8 hours PRN Nausea and/or Vomiting  oxyCODONE    IR 2.5 milliGRAM(s) Oral every 6 hours PRN Moderate Pain (4 - 6)  oxyCODONE    IR 5 milliGRAM(s) Oral every 6 hours PRN Severe Pain (7 - 10)  sodium chloride 0.9% Bolus. 100 milliLiter(s) IV Bolus every 5 minutes PRN SBP LESS THAN or EQUAL to 90 mmHg  sodium chloride 0.9% lock flush 10 milliLiter(s) IV Push every 1 hour PRN Pre/post blood products, medications, blood draw, and to maintain line patency          Assessment & Plan    Ms. 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 Arterial and venous end oversewn. Plastics closure. Prevena Vac placed.    -Prevena to be removed POD5  -Compressive dressing and elevation    [facility]  Plastic & Reconstructive Surgery, PGY-1  #[id number]

## 2023-09-01 NOTE — PROGRESS NOTE ADULT - ASSESSMENT
78F w L hip surgical incision w mild drainage s/p SUZANNA and peter    Plan:  dressing changes PRN as saturated  monitor labs  WBAT LLE  PT/OT  rest of care per primary team    Gladys Fuchs MD  Orthopaedic Surgery Resident    For all questions, please reach out via the following numbers for the on-call resident; do not reach out via Teams.  [facility] [phone]  [facility]        [phone]  [facility]  [phone]/ [phone]

## 2023-09-01 NOTE — CONSULT NOTE ADULT - ASSESSMENT
ESRD on HD 
Interventional Radiology    Evaluate for Procedure: Tunneled HD catheter placement    HPI: 78y female w/ Hx HTN, DM, chronic type B aortic dissection, ESRD on HD ( M, W, F ) via LUE brachiocephalic (2018) fistula, endometrial ca in remission s/p total hysterectomy in 2007 recently admitted for L hip revision (07-08/2023) during which she underwent LUE subclavian vein stenting 8/1 for stenosis and subsequent brachiocephalic AVF revision 8/3 with resection of the involved segment and primary anastomosis for ulceration. She presents after a missed HD session due to concern for overlying infection at the AVF site.  S/p R femoral HD catheter placement and ligation of LUE AVF with aneurysm resection 8/31. IR consulted for tunneled HD catheter placement.     Allergies: No Known Allergies    Medications (Abx/Cardiac/Anticoagulation/Blood Products)    clindamycin IVPB: 100 mL/Hr IV Intermittent (08-30 @ 19:48)  heparin   Injectable: 5000 Unit(s) SubCutaneous (09-01 @ 07:00)  hydrALAZINE: 50 milliGRAM(s) Oral (09-01 @ 06:59)  metoprolol succinate ER: 12.5 milliGRAM(s) Oral (09-01 @ 07:00)  piperacillin/tazobactam IVPB..: 25 mL/Hr IV Intermittent (08-31 @ 10:52)  piperacillin/tazobactam IVPB...: 200 mL/Hr IV Intermittent (08-30 @ 23:00)  vancomycin  IVPB: 100 mL/Hr IV Intermittent (09-01 @ 07:01)  vancomycin  IVPB.: 250 mL/Hr IV Intermittent (08-31 @ 00:08)    Data:    T(C): 36.5  HR: 64  BP: 118/63  RR: 18  SpO2: 98%    -WBC 6.46 / HgB 10.6 / Hct 31.9 / Plt 178  -Na 133 / Cl 93 / BUN 53 / Glucose 171  -K 3.8 / CO2 25 / Cr 5.48  -ALT 16 / Alk Phos 309 / T.Bili 1.2  -INR 1.01 / PTT 31.6      Radiology:   Imaging reviewed    Assessment/Plan:   78y female w/ Hx HTN, DM, chronic type B aortic dissection, ESRD on HD ( M, W, F ) via LUE brachiocephalic (2018) fistula, endometrial ca in remission s/p total hysterectomy in 2007 recently admitted for L hip revision (07-08/2023) during which she underwent LUE subclavian vein stenting 8/1 for stenosis and subsequent brachiocephalic AVF revision 8/3 with resection of the involved segment and primary anastomosis for ulceration. She presents after a missed HD session due to concern for overlying infection at the AVF site.  S/p R femoral HD catheter placement and ligation of LUE AVF with aneurysm resection 8/31. IR consulted for tunneled HD catheter placement.     -- IR will plan to tentatively plan perform tunneled HD catheter placement Tuesday 9/5 pending continued negative blood cultures and ID clearance.  -- Currently with R femoral HD catheter in place.   -- hold a.m. anticoagulation on 9/5  -- NPO midnight 9/4  -- AM labs 9/5: Coags, CBC, Coags  -- please place IR procedure request order under Dr. Munoz    
77 y.o. Female w/ Hx HTN, DM, chronic type B aortic dissection, ESRD on HD ( M, W, F ) via LUE brachiocephalic (2018) fistula, endometrial ca in remission s/p total hysterectomy in 2007 recently admitted for L hip revision (07-08/2023) during which she underwent LUE subclavian vein stenting 8/1 for stenosis and subsequent brachiocephalic AVF revision 8/3 with resection of the involved segment and primary anastomosis for ulceration. She presents after a missed HD session due to concern for overlying infection at the AVF site.    - OR today for debridement of wound with potential local tissue rearrangement, possible muscle flap coverage, possible wound vac placement  - Patient consented and pre-oped

## 2023-09-01 NOTE — PROGRESS NOTE ADULT - ASSESSMENT
77 y.o. Female w/ Hx HTN, DM, chronic type B aortic dissection, ESRD on HD ( M, W, F ) via LUE brachiocephalic (2018) fistula, endometrial ca in remission s/p total hysterectomy in 2007 recently admitted for L hip revision (07-08/2023) during which she underwent LUE subclavian vein stenting 8/1 for stenosis and subsequent brachiocephalic AVF revision 8/3 with resection of the involved segment and primary anastomosis for ulceration. She presents after a missed HD session due to concern for overlying infection at the AVF site.    PLAN:    - Plan for AVF revision tomorrow, Friday 9/1  - NPO@MN  - Preoperative labs -- 1AM -- CBC, BMP, Mg, Phos, PT/PTT/INR, T&S  - Requires HD today before OR  - Nephrology consult for HD  - IR consult -- for Shiley or tunneled catheter placement as fistula should not be accessed currently with overlying wound  - f/u [facility] Duplex of fistula  - c/w vanc/zosyn    CLEARANCES   - Cardiology -- optimized 8/31  - Medicine -- PENDING    Vascular Surgery  [id number]  77 y.o. Female w/ Hx HTN, DM, chronic type B aortic dissection, ESRD on HD ( M, W, F ) via LUE brachiocephalic (2018) fistula, endometrial ca in remission s/p total hysterectomy in 2007 recently admitted for L hip revision (07-08/2023) during which she underwent LUE subclavian vein stenting 8/1 for stenosis and subsequent brachiocephalic AVF revision 8/3 with resection of the involved segment and primary anastomosis for ulceration. She presents after a missed HD session due to concern for overlying infection at the AVF site.    PLAN:    - Pain control PRN  - ELevate LUE while in bed / chair.   - keep ACE wrap in place  - diet as tolerated  - check am labs.   - plan for tunneled catheter to be placed by IR  - RUE vein mapping for RUE AVF planning   - check CT venogram to rule out central stenosis   - DVT ppx  - HD tomorrow as per Renal       Vascular Surgery  [id number]

## 2023-09-01 NOTE — PROGRESS NOTE ADULT - SUBJECTIVE AND OBJECTIVE BOX
[facility] Division of Kidney Diseases & Hypertension  FOLLOW UP NOTE  [phone]--------------------------------------------------------------------------------  Chief Complaint:Cellulitis      24 hour events/subjective: pt seen and examined at bedside this AM. Endorses LUE pain and fatigue otherwise no other complaints.  Denies n/v/d, abd pain, HA, dizziness, sob, cp.   s/p ligation of LUE AVF with aneurysm resection 8/31      PAST HISTORY  --------------------------------------------------------------------------------  No significant changes to PMH, PSH, FHx, SHx, unless otherwise noted    ALLERGIES & MEDICATIONS  --------------------------------------------------------------------------------  Allergies    No Known Allergies    Intolerances      Standing Inpatient Medications  ascorbic acid 500 milliGRAM(s) Oral daily  calcium acetate 667 milliGRAM(s) Oral three times a day with meals  calcium carbonate 1250 mG  + Vitamin D (OsCal 500 + D) 1 Tablet(s) Oral three times a day  chlorhexidine 2% Cloths 1 Application(s) Topical daily  folic acid 1 milliGRAM(s) Oral daily  heparin   Injectable 5000 Unit(s) SubCutaneous every 12 hours  hydrALAZINE 50 milliGRAM(s) Oral daily  influenza  Vaccine (HIGH DOSE) 0.7 milliLiter(s) IntraMuscular once  metoprolol succinate ER 12.5 milliGRAM(s) Oral daily  Nephro-nathanael 1 Tablet(s) Oral daily  pantoprazole    Tablet 40 milliGRAM(s) Oral before breakfast  piperacillin/tazobactam IVPB.. 4.5 Gram(s) IV Intermittent every 12 hours  polyethylene glycol 3350 17 Gram(s) Oral two times a day  senna 2 Tablet(s) Oral at bedtime    PRN Inpatient Medications  acetaminophen     Tablet .. 650 milliGRAM(s) Oral every 6 hours PRN  aluminum hydroxide/magnesium hydroxide/simethicone Suspension 30 milliLiter(s) Oral every 4 hours PRN  melatonin 3 milliGRAM(s) Oral at bedtime PRN  ondansetron Injectable 4 milliGRAM(s) IV Push every 8 hours PRN  oxyCODONE    IR 2.5 milliGRAM(s) Oral every 6 hours PRN  oxyCODONE    IR 5 milliGRAM(s) Oral every 6 hours PRN  sodium chloride 0.9% Bolus. 100 milliLiter(s) IV Bolus every 5 minutes PRN  sodium chloride 0.9% lock flush 10 milliLiter(s) IV Push every 1 hour PRN      REVIEW OF SYSTEMS  --------------------------------------------------------------------------------  as above    VITALS/PHYSICAL EXAM  --------------------------------------------------------------------------------  T(C): 35.7 (09-01-23 @ 03:18), Max: 36.9 (08-31-23 @ 11:21)  HR: 63 (09-01-23 @ 03:18) (63 - 71)  BP: 134/60 (09-01-23 @ 03:18) (117/57 - 186/75)  RR: 16 (09-01-23 @ 03:18) (16 - 22)  SpO2: 100% (09-01-23 @ 03:18) (96% - 100%)  Wt(kg): --  Height (cm): 154.9 (08-30-23 @ 17:32)  Weight (kg): 53.4 (08-31-23 @ 09:27)  BMI (kg/m2): 22.3 (08-31-23 @ 09:27)  BSA (m2): 1.51 (08-31-23 @ 09:27)      08-31-23 @ 07:01  -  09-01-23 @ 07:00  --------------------------------------------------------  IN: 400 mL / OUT: 2413 mL / NET: -2013 mL      Physical Exam:  	Gen: NAD, well-appearing  	HEENT: PERRL, supple neck, clear oropharynx  	Pulm: CTA B/L  	CV: RRR, S1S2;  	Back: No spinal or CVA tenderness  	Abd: +BS, soft, nontender/nondistended  	: No suprapubic tenderness                      Extremities: no bilateral LE edema noted.                       Neuro: No focal deficits, intact gait  	Skin: Warm, without rashes  	Vascular access:    LABS/STUDIES  --------------------------------------------------------------------------------              10.6   6.46  >-----------<  178      [08-31-23 @ 09:13]              31.9     133  |  93  |  53  ----------------------------<  171      [08-31-23 @ 09:13]  3.8   |  25  |  5.48        Ca     10.2     [08-31-23 @ 09:13]      Mg     2.30     [08-31-23 @ 09:13]      Phos  3.0     [08-31-23 @ 09:13]    TPro  7.7  /  Alb  2.8  /  TBili  1.2  /  DBili  x   /  AST  31  /  ALT  16  /  AlkPhos  309  [08-31-23 @ 09:13]    PT/INR: PT 11.3 , INR 1.01       [08-31-23 @ 09:13]  PTT: 31.6       [08-31-23 @ 09:13]      Creatinine Trend:  SCr 5.48 [08-31 @ 09:13]  SCr 5.02 [08-30 @ 19:00]  SCr 5.51 [08-16 @ 12:28]  SCr 4.61 [08-08 @ 06:24]  SCr 5.89 [08-07 @ 11:28]    Urinalysis - [08-31-23 @ 09:13]      Color  / Appearance  / SG  / pH       Gluc 171 / Ketone   / Bili  / Urobili        Blood  / Protein  / Leuk Est  / Nitrite       RBC  / WBC  / Hyaline  / Gran  / Sq Epi  / Non Sq Epi  / Bacteria            [facility] Division of Kidney Diseases & Hypertension  FOLLOW UP NOTE  [phone]--------------------------------------------------------------------------------  Chief Complaint:Cellulitis      24 hour events/subjective: pt seen and examined at bedside this AM. Endorses LUE pain and fatigue otherwise no other complaints.  Denies n/v/d, abd pain, HA, dizziness, sob, cp.   s/p ligation of LUE AVF with aneurysm resection 8/31      PAST HISTORY  --------------------------------------------------------------------------------  No significant changes to PMH, PSH, FHx, SHx, unless otherwise noted    ALLERGIES & MEDICATIONS  --------------------------------------------------------------------------------  Allergies    No Known Allergies    Intolerances      Standing Inpatient Medications  ascorbic acid 500 milliGRAM(s) Oral daily  calcium acetate 667 milliGRAM(s) Oral three times a day with meals  calcium carbonate 1250 mG  + Vitamin D (OsCal 500 + D) 1 Tablet(s) Oral three times a day  chlorhexidine 2% Cloths 1 Application(s) Topical daily  folic acid 1 milliGRAM(s) Oral daily  heparin   Injectable 5000 Unit(s) SubCutaneous every 12 hours  hydrALAZINE 50 milliGRAM(s) Oral daily  influenza  Vaccine (HIGH DOSE) 0.7 milliLiter(s) IntraMuscular once  metoprolol succinate ER 12.5 milliGRAM(s) Oral daily  Nephro-nathanael 1 Tablet(s) Oral daily  pantoprazole    Tablet 40 milliGRAM(s) Oral before breakfast  piperacillin/tazobactam IVPB.. 4.5 Gram(s) IV Intermittent every 12 hours  polyethylene glycol 3350 17 Gram(s) Oral two times a day  senna 2 Tablet(s) Oral at bedtime    PRN Inpatient Medications  acetaminophen     Tablet .. 650 milliGRAM(s) Oral every 6 hours PRN  aluminum hydroxide/magnesium hydroxide/simethicone Suspension 30 milliLiter(s) Oral every 4 hours PRN  melatonin 3 milliGRAM(s) Oral at bedtime PRN  ondansetron Injectable 4 milliGRAM(s) IV Push every 8 hours PRN  oxyCODONE    IR 2.5 milliGRAM(s) Oral every 6 hours PRN  oxyCODONE    IR 5 milliGRAM(s) Oral every 6 hours PRN  sodium chloride 0.9% Bolus. 100 milliLiter(s) IV Bolus every 5 minutes PRN  sodium chloride 0.9% lock flush 10 milliLiter(s) IV Push every 1 hour PRN      REVIEW OF SYSTEMS  --------------------------------------------------------------------------------  as above    VITALS/PHYSICAL EXAM  --------------------------------------------------------------------------------  T(C): 35.7 (09-01-23 @ 03:18), Max: 36.9 (08-31-23 @ 11:21)  HR: 63 (09-01-23 @ 03:18) (63 - 71)  BP: 134/60 (09-01-23 @ 03:18) (117/57 - 186/75)  RR: 16 (09-01-23 @ 03:18) (16 - 22)  SpO2: 100% (09-01-23 @ 03:18) (96% - 100%)  Wt(kg): --  Height (cm): 154.9 (08-30-23 @ 17:32)  Weight (kg): 53.4 (08-31-23 @ 09:27)  BMI (kg/m2): 22.3 (08-31-23 @ 09:27)  BSA (m2): 1.51 (08-31-23 @ 09:27)      08-31-23 @ 07:01  -  09-01-23 @ 07:00  --------------------------------------------------------  IN: 400 mL / OUT: 2413 mL / NET: -2013 mL      Physical Exam:  Gen: NAD  HEENT: MMM  Pulm: CTA B/L  CV: S1S2  Abd: Soft, +BS   Ext: +LE edema B/L, +LUE edema   Neuro: Awake  Skin: Warm and dry  Vascular access: LUE AVF s/p ligation wrapped in pressure dressing with prevena incisional vac. +R femoral non tunneled HD catheter   LABS/STUDIES  --------------------------------------------------------------------------------              10.6   6.46  >-----------<  178      [08-31-23 @ 09:13]              31.9     133  |  93  |  53  ----------------------------<  171      [08-31-23 @ 09:13]  3.8   |  25  |  5.48        Ca     10.2     [08-31-23 @ 09:13]      Mg     2.30     [08-31-23 @ 09:13]      Phos  3.0     [08-31-23 @ 09:13]    TPro  7.7  /  Alb  2.8  /  TBili  1.2  /  DBili  x   /  AST  31  /  ALT  16  /  AlkPhos  309  [08-31-23 @ 09:13]    PT/INR: PT 11.3 , INR 1.01       [08-31-23 @ 09:13]  PTT: 31.6       [08-31-23 @ 09:13]      Creatinine Trend:  SCr 5.48 [08-31 @ 09:13]  SCr 5.02 [08-30 @ 19:00]  SCr 5.51 [08-16 @ 12:28]  SCr 4.61 [08-08 @ 06:24]  SCr 5.89 [08-07 @ 11:28]    Urinalysis - [08-31-23 @ 09:13]      Color  / Appearance  / SG  / pH       Gluc 171 / Ketone   / Bili  / Urobili        Blood  / Protein  / Leuk Est  / Nitrite       RBC  / WBC  / Hyaline  / Gran  / Sq Epi  / Non Sq Epi  / Bacteria

## 2023-09-01 NOTE — PROGRESS NOTE ADULT - PROBLEM SELECTOR PLAN 1
Pt with ESRD on HD TIW MWF admitted with purulent drainage from AVF. Pts last out[facility] HD [facility]28/23[facility] complicated by fistula failure. s/p LUE BC AVF fistulogram 8/1 w/ balloon angioplasty. s/p aneurysmal fistula, resected with end to end anastomosis 8/4. AVF successfully used prior to dc. Consent for HD signed and placed in chart. s/p R femoral HD catheter placement and ligation of LUE AVF with aneurysm resection 8/31. Pt. clinically stable. Labs reviewed. Last HD 8/31. Next HD Saturday. Will need tunneled HD catheter. Monitor labs and VS. Dose medications for ESRD.

## 2023-09-01 NOTE — PROGRESS NOTE ADULT - SUBJECTIVE AND OBJECTIVE BOX
SURGERY DAILY PROGRESS NOTE    --------------- OVERNIGHT/INTERVAL---------------  - No acute events overnight.     --------------- SUBJECTIVE ---------------  - Patient describes no acute issues or concerns at this time  - -/- BM +void. Denies CP, SOB, n/v, abdominal pain.   - Patient seen and examined at bedside with surgical team      --------------- OBJECTIVE ---------------  -----VITALS-----  T(C): 36.5, Max: 36.6 (08-30)  T(F): 97.7, Max: 97.8 (08-30)  HR: 85 (63 - 85)  BP: 151/86 (151/86 - 166/72)  BP(mean): --  ABP: --  ABP(mean): --  RR: 20 (16 - 26)  SpO2: 96% (96% - 100%)  CVP(mm Hg): --  CVP(cm H2O): --  room air          -----PHYSICAL EXAM-----  GEN: NAD, resting quietly  NEURO: AAOx3, CN II-XII grossly intact, no focal deficits  PULM: symmetric chest rise bilaterally, no increased WOB  ABD: soft, nontender, nondistended  EXTR: no lower extremity edema, moving all extremities, LUE with brachiocephalic fistula with thrill and noted aneurysmal dilation just proximal to the AC fossa. Sutures in place from prior surgery but wound breakdown in the middle of the repair with faint surrounding cellulitis, no active bleeding     -----INs & OUTs-----    -----MEDICATIONS-----  STANDING  ascorbic acid 500 milliGRAM(s) Oral daily  calcium acetate 1334 milliGRAM(s) Oral three times a day with meals  calcium carbonate 1250 mG  + Vitamin D (OsCal 500 + D) 1 Tablet(s) Oral three times a day  folic acid 1 milliGRAM(s) Oral daily  heparin   Injectable 5000 Unit(s) SubCutaneous every 12 hours  hydrALAZINE 50 milliGRAM(s) Oral daily  metoprolol succinate ER 12.5 milliGRAM(s) Oral daily  Nephro-nathanael 1 Tablet(s) Oral daily  pantoprazole    Tablet 40 milliGRAM(s) Oral before breakfast  piperacillin/tazobactam IVPB.. 3.375 Gram(s) IV Intermittent every 12 hours  polyethylene glycol 3350 17 Gram(s) Oral two times a day  senna 2 Tablet(s) Oral at bedtime    PRN  acetaminophen     Tablet .. 650 milliGRAM(s) Oral every 6 hours PRN Temp greater or equal to 38C (100.4F), Mild Pain (1 - 3)  aluminum hydroxide/magnesium hydroxide/simethicone Suspension 30 milliLiter(s) Oral every 4 hours PRN Dyspepsia  melatonin 3 milliGRAM(s) Oral at bedtime PRN Insomnia  ondansetron Injectable 4 milliGRAM(s) IV Push every 8 hours PRN Nausea and/or Vomiting    -----LABS-----  CBC (08-31 @ 09:13)                              10.6<L>                         6.46    )----------------(  178        --    % Neutrophils, --    % Lymphocytes, ANC: --                                  31.9<L>  CBC (08-30 @ 19:00)                              10.6<L>                         6.37    )----------------(  183        64.0  % Neutrophils, 21.4  % Lymphocytes, ANC: 4.08                                32.5<L>    BMP (08-30 @ 19:00)             134<L>  |  94<L>   |  49<H> 		Ca++ --      Ca 10.5               ---------------------------------( 176<H>		Mg --                 3.9     |  27      |  5.02<H>			Ph --        LFTs (08-30 @ 19:00)      TPro 7.8 / Alb 2.8<L> / TBili 0.8 / DBili -- / AST 37<H> / ALT 20 / AlkPhos 341<H>          VBG (08-30 @ 19:00)     7.44<H> / 45 / 31 / 31<H> / 5.7<H> / 50.4<L>%     Lactate: 1.9    Urinalysis (08-30 @ 19:00):     Color:  / Appearance:  / SG:  / pH:  / Gluc: 176<H> / Ketones:  / Bili:  / Urobili:  / Protein : / Nitrites:  / Leuk.Est:  / RBC:  / WBC:  / Sq Epi:  / Non Sq Epi:  / Bacteria         C Team Surgery Progress Note     S: Patient resting comfortably on morning rounds. Pain well-controlled currently.  Denies numbness / paresthesias.       MEDICATIONS  (STANDING):  ascorbic acid 500 milliGRAM(s) Oral daily  calcium acetate 667 milliGRAM(s) Oral three times a day with meals  calcium carbonate 1250 mG  + Vitamin D (OsCal 500 + D) 1 Tablet(s) Oral three times a day  chlorhexidine 2% Cloths 1 Application(s) Topical daily  folic acid 1 milliGRAM(s) Oral daily  heparin   Injectable 5000 Unit(s) SubCutaneous every 12 hours  hydrALAZINE 50 milliGRAM(s) Oral daily  influenza  Vaccine (HIGH DOSE) 0.7 milliLiter(s) IntraMuscular once  metoprolol succinate ER 12.5 milliGRAM(s) Oral daily  Nephro-nathanael 1 Tablet(s) Oral daily  pantoprazole    Tablet 40 milliGRAM(s) Oral before breakfast  piperacillin/tazobactam IVPB.. 4.5 Gram(s) IV Intermittent every 12 hours  polyethylene glycol 3350 17 Gram(s) Oral two times a day  senna 2 Tablet(s) Oral at bedtime    MEDICATIONS  (PRN):  acetaminophen     Tablet .. 650 milliGRAM(s) Oral every 6 hours PRN Temp greater or equal to 38C (100.4F), Mild Pain (1 - 3)  aluminum hydroxide/magnesium hydroxide/simethicone Suspension 30 milliLiter(s) Oral every 4 hours PRN Dyspepsia  melatonin 3 milliGRAM(s) Oral at bedtime PRN Insomnia  ondansetron Injectable 4 milliGRAM(s) IV Push every 8 hours PRN Nausea and/or Vomiting  oxyCODONE    IR 2.5 milliGRAM(s) Oral every 6 hours PRN Moderate Pain (4 - 6)  oxyCODONE    IR 5 milliGRAM(s) Oral every 6 hours PRN Severe Pain (7 - 10)  sodium chloride 0.9% Bolus. 100 milliLiter(s) IV Bolus every 5 minutes PRN SBP LESS THAN or EQUAL to 90 mmHg  sodium chloride 0.9% lock flush 10 milliLiter(s) IV Push every 1 hour PRN Pre/post blood products, medications, blood draw, and to maintain line patency      Physical Exam:    Vital Signs Last 24 Hrs  T(C): 36.5 (01 Sep 2023 09:09), Max: 36.9 (31 Aug 2023 11:21)  T(F): 97.7 (01 Sep 2023 09:09), Max: 98.5 (31 Aug 2023 11:21)  HR: 64 (01 Sep 2023 09:09) (63 - 71)  BP: 118/63 (01 Sep 2023 09:09) (117/57 - 186/75)  BP(mean): 75 (31 Aug 2023 22:30) (75 - 100)  RR: 18 (01 Sep 2023 09:09) (16 - 22)  SpO2: 98% (01 Sep 2023 09:09) (96% - 100%)    Parameters below as of 01 Sep 2023 09:09  Patient On (Oxygen Delivery Method): room air    I&Os:    08-31-23 @ 07:01  -  09-01-23 @ 07:00  --------------------------------------------------------  IN: 400 mL / OUT: 2413 mL / NET: -2013 mL      General: NAD, AOx3  LUE: Wrapped with ACE. Moderate swelling. Arm elevated on Pillow by surgical team. Sensation intact.   PORFIRIO drain in place - serosang.   Pulses exam: Palp radial.       LABS:                        10.6   6.46  )-----------( 178      ( 31 Aug 2023 09:13 )             31.9     08-31    133<L>  |  93<L>  |  53<H>  ----------------------------<  171<H>  3.8   |  25  |  5.48<H>    Ca    10.2      31 Aug 2023 09:13  Phos  3.0     08-31  Mg     2.30     08-31    TPro  7.7  /  Alb  2.8<L>  /  TBili  1.2  /  DBili  x   /  AST  31  /  ALT  16  /  AlkPhos  309<H>  08-31

## 2023-09-01 NOTE — CHART NOTE - NSCHARTNOTEFT_GEN_A_CORE
Patient transferred under vascular service s/p ligation of LUE AVF with aneurysm resection. Confirmed with vascular surgery.

## 2023-09-01 NOTE — PROGRESS NOTE ADULT - SUBJECTIVE AND OBJECTIVE BOX
Cardiovascular Disease Progress Note  Date of Service: 23 @ 12:23    Overnight events: No acute events overnight.    Patient denies chest pain or SOB.   Otherwise review of systems negative    Objective Findings:  T(C): 36.5 (23 @ 09:09), Max: 36.8 (23 @ 17:05)  HR: 64 (23 @ 09:09) (63 - 71)  BP: 118/63 (23 @ 09:09) (117/57 - 186/75)  RR: 18 (23 @ 09:09) (16 - 22)  SpO2: 98% (23 @ 09:09) (96% - 100%)  Wt(kg): --  Daily     Daily Weight in k.1 (01 Sep 2023 02:38)      Physical Exam:  Gen: NAD; Patient resting comfortably  HEENT: EOMI, Normocephalic/ atraumatic  CV: RRR, normal S1 + S2, no m/r/g  Lungs:  Normal respiratory effort; clear to auscultation bilaterally  Abd: soft, non-tender; bowel sounds present  Ext: No edema; warm and well perfused    Telemetry: n/a    Laboratory Data:                        10.6   6.46  )-----------( 178      ( 31 Aug 2023 09:13 )             31.9         133<L>  |  93<L>  |  53<H>  ----------------------------<  171<H>  3.8   |  25  |  5.48<H>    Ca    10.2      31 Aug 2023 09:13  Phos  3.0       Mg     2.30         TPro  7.7  /  Alb  2.8<L>  /  TBili  1.2  /  DBili  x   /  AST  31  /  ALT  16  /  AlkPhos  309<H>      PT/INR - ( 31 Aug 2023 09:13 )   PT: 11.3 sec;   INR: 1.01 ratio         PTT - ( 31 Aug 2023 09:13 )  PTT:31.6 sec          Inpatient Medications:  MEDICATIONS  (STANDING):  ascorbic acid 500 milliGRAM(s) Oral daily  calcium acetate 667 milliGRAM(s) Oral three times a day with meals  calcium carbonate 1250 mG  + Vitamin D (OsCal 500 + D) 1 Tablet(s) Oral three times a day  chlorhexidine 2% Cloths 1 Application(s) Topical daily  folic acid 1 milliGRAM(s) Oral daily  heparin   Injectable 5000 Unit(s) SubCutaneous every 12 hours  hydrALAZINE 50 milliGRAM(s) Oral daily  influenza  Vaccine (HIGH DOSE) 0.7 milliLiter(s) IntraMuscular once  metoprolol succinate ER 12.5 milliGRAM(s) Oral daily  Nephro-nathanael 1 Tablet(s) Oral daily  pantoprazole    Tablet 40 milliGRAM(s) Oral before breakfast  piperacillin/tazobactam IVPB.. 4.5 Gram(s) IV Intermittent every 12 hours  polyethylene glycol 3350 17 Gram(s) Oral two times a day  senna 2 Tablet(s) Oral at bedtime      Assessment: 78 year old woman with ESRD on HD (MWF), HTN, DM (diet controlled), chronic type B aortic dissection, heart murmur, endometrial ca in remission s/p total hysterectomy in  presents with fistula malfunction.      Plan of Care:      #Pre-operative risk evaluation-  - Ms. Harris displays no signs of  coronary ischemia or decompensated CHF.   EKG is sinus with a known RBBB.  She is optimized from a cardiac standpoint to proceed with Permacath.           #Type B aortic dissection  - Chronic (Diagnosed >10 years ago)  - Optimal BP control.        #ESRD-  - HD as per renal.    #ACP (advance care planning)-  Advanced care planning was discussed with the patient.    Cardiac findings were discussed in detail and all questions were answered.      Over 25 minutes spent on total encounter; more than 50% of the visit was spent counseling and/or coordinating care by the attending physician.      Gabo Burris MD [facility]  Cardiovascular Disease  [phone]

## 2023-09-01 NOTE — PHYSICAL THERAPY INITIAL EVALUATION ADULT - PERTINENT HX OF CURRENT PROBLEM, REHAB EVAL
Pt is a 78 year old female with a past medical history of DM2 (last A1C 5 8/30/23), ESRD on HD M/W/F, Endometrial cancer s/p CLINT, Chronic Type B aortic dissection, HTN, L humerus fracture (non-op), L hip fracture s/p ORIF (5/2023) with recent prolonged hospitalization from July 18 to August 16 for L hip hardware failure s/p hemiarthroplasty with post-op course complicated by hematoma s/p pRBCs and compression dressing. Hospitalization further complicated by malfunctioning LUE AVF s/p AVF revision with vascular surgery (8/4/23), with subsequent discharge to [facility] who now presents from [facility] for persistent drainage from her L UE fistula site. Patient states that since her discharge to [facility] she has had persistent drainage from the L AVF site. States that the drainage is serosanguinous in color, denies any pain to the L arm but said that the L color became more "fleshy". Denies fevers/chills. Said that she has dressing changes daily but the dressing sometimes leaks due to the drainage from the AVF. Also reports continued drainage from her L hip surgical site though said that this drainage is improving slowly. Denies any pain to the L hip. No other acute complaints. Of note, reports having persistent L arm swelling since her L humerus fracture in May 2023.

## 2023-09-01 NOTE — CHART NOTE - NSCHARTNOTEFT_GEN_A_CORE
SURGERY POST OP CHECK    STATUS POST PROCEDURE: Ligation of LUE AVF with aneurysm resection. PRS closure.    SUBJECTIVE: Pt seen and examined at Dialysis. Patient still very sleepy since the operation but she is AOx3. Endorses pain in her left upper extremity. Denies changes numbness and tingling/ motor changes in her LUE. Denies CP/SOB/N/V.       OBJECTIVE:  Vital Signs Last 24 Hrs  T(C): 36.7 (31 Aug 2023 21:25), Max: 36.9 (31 Aug 2023 11:21)  T(F): 98.1 (31 Aug 2023 21:25), Max: 98.5 (31 Aug 2023 11:21)  HR: 64 (31 Aug 2023 22:30) (64 - 85)  BP: 119/56 (31 Aug 2023 22:30) (119/56 - 186/75)  BP(mean): 75 (31 Aug 2023 22:30) (75 - 100)  RR: 20 (31 Aug 2023 22:30) (18 - 22)  SpO2: 96% (31 Aug 2023 22:30) (96% - 100%)    Parameters below as of 31 Aug 2023 22:00  Patient On (Oxygen Delivery Method): room air      I&O's Summary    31 Aug 2023 07:01  -  01 Sep 2023 00:33  --------------------------------------------------------  IN: 0 mL / OUT: 13 mL / NET: -13 mL        PHYSICAL EXAM:  Gen: A&Ox3  Pulm: No respiratory distress, no subcostal retractions  CV: RRR, no JVD  Abd: Soft, non-tender, non-distended   Extremities: LUE surgical site with overlying prevena to good suction. Surrounding area appears dry without bleeding. PORFIRIO x1 with serosanguinous output. Extremity wrapped in an ACE. Motor and sensory intact. L radial pulse palpable.    ASSESSMENT/PLAN: HPI:  This is a 78F with history of DM2 (last A1C 5 8/30/23), ESRD on HD M/W/F, Endometrial cancer s/p CLINT, Chronic Type B aortic dissection, HTN, L humerus fracture (non-op), L hip fracture s/p ORIF (5/2023) with recent prolonged hospitalization from July 18 to August 16 for L hip hardware failure s/p hemiarthroplasty with post-op course complicated by hematoma s/p pRBCs and compression dressing. Hospitalization further complicated by malfunctioning LUE AVF s/p AVF revision with vascular surgery (8/4/23), with subsequent discharge to [facility] who now presents from [facility] for persistent drainage from her L UE fistula site. Patient states that since her discharge to [facility] she has had persistent drainage from the L AVF site. States that the drainage is serosanguinous in color, denies any pain to the L arm but said that the L color became more "fleshy". Denies fevers/chills. Said that she has dressing changes daily but the dressing sometimes leaks due to the drainage from the AVF. Also reports continued drainage from her L hip surgical site though said that this drainage is improving slowly. Denies any pain to the L hip. No other acute complaints. Of note, reports having persistent L arm swelling since her L humerus fracture in May 2023.     On arrival to the hospital, her vitals were T 97.7, P 66, /72, RR 16, O2 sat 98% RA. Her lab work was most significant for mildly elevated ESR/CRP. Her lactate was within normal limits. Her lipase was elevated but she denied any abdominal complaints. She was seen by vascular surgery who recommended not using her fistula at this time and recommended IR eval for tunnelled catheter placement for HD. She was given clinda 600mg IV x1, zosyn 3.375g IV x1, and vanc 1g IV x1. She was admitted to medicine.    Patient is now s/p ligation of LUE AVF with aneurysm resection.    Plan:   - Hemodialysis (on M/W/F outpatient)  - Pain control  - Continue to monitor for bleeding at the surgical site  - Diet: Advance as tolerated  - Continue Zosyn  - F/u AM labs  - Dispo: to the surgical floors under Dr. Mikey NUÑEZ Team Surgery  [id number] SURGERY POST OP CHECK    STATUS POST PROCEDURE: Ligation of LUE AVF with aneurysm resection. PRS closure.    SUBJECTIVE: Pt seen and examined at Dialysis. Patient still very sleepy since the operation but she is AOx3. Endorses pain in her left upper extremity. Denies changes numbness and tingling/ motor changes in her LUE. Denies CP/SOB/N/V.       OBJECTIVE:  Vital Signs Last 24 Hrs  T(C): 36.7 (31 Aug 2023 21:25), Max: 36.9 (31 Aug 2023 11:21)  T(F): 98.1 (31 Aug 2023 21:25), Max: 98.5 (31 Aug 2023 11:21)  HR: 64 (31 Aug 2023 22:30) (64 - 85)  BP: 119/56 (31 Aug 2023 22:30) (119/56 - 186/75)  BP(mean): 75 (31 Aug 2023 22:30) (75 - 100)  RR: 20 (31 Aug 2023 22:30) (18 - 22)  SpO2: 96% (31 Aug 2023 22:30) (96% - 100%)    Parameters below as of 31 Aug 2023 22:00  Patient On (Oxygen Delivery Method): room air      I&O's Summary    31 Aug 2023 07:01  -  01 Sep 2023 00:33  --------------------------------------------------------  IN: 0 mL / OUT: 13 mL / NET: -13 mL        PHYSICAL EXAM:  Gen: A&Ox3  Pulm: No respiratory distress, no subcostal retractions  CV: RRR, no JVD  Abd: Soft, non-tender, non-distended   Extremities: LUE surgical site with overlying prevena to good suction. Surrounding area appears dry without bleeding. PORFIRIO x1 with serosanguinous output. Extremity wrapped in an ACE. Motor and sensory intact. L radial pulse palpable.    ASSESSMENT/PLAN: HPI:  This is a 78F with history of DM2 (last A1C 5 8/30/23), ESRD on HD M/W/F, Endometrial cancer s/p CLINT, Chronic Type B aortic dissection, HTN, L humerus fracture (non-op), L hip fracture s/p ORIF (5/2023) with recent prolonged hospitalization from July 18 to August 16 for L hip hardware failure s/p hemiarthroplasty with post-op course complicated by hematoma s/p pRBCs and compression dressing. Hospitalization further complicated by malfunctioning LUE AVF s/p AVF revision with vascular surgery (8/4/23), with subsequent discharge to [facility] who now presents from [facility] for persistent drainage from her L UE fistula site. Patient states that since her discharge to [facility] she has had persistent drainage from the L AVF site. States that the drainage is serosanguinous in color, denies any pain to the L arm but said that the L color became more "fleshy". Denies fevers/chills. Said that she has dressing changes daily but the dressing sometimes leaks due to the drainage from the AVF. Also reports continued drainage from her L hip surgical site though said that this drainage is improving slowly. Denies any pain to the L hip. No other acute complaints. Of note, reports having persistent L arm swelling since her L humerus fracture in May 2023.     On arrival to the hospital, her vitals were T 97.7, P 66, /72, RR 16, O2 sat 98% RA. Her lab work was most significant for mildly elevated ESR/CRP. Her lactate was within normal limits. Her lipase was elevated but she denied any abdominal complaints. She was seen by vascular surgery who recommended not using her fistula at this time and recommended IR eval for tunnelled catheter placement for HD. She was given clinda 600mg IV x1, zosyn 3.375g IV x1, and vanc 1g IV x1. She was admitted to medicine.    Patient is now s/p ligation of LUE AVF with aneurysm resection.    Plan:   - Hemodialysis (on M/W/F outpatient)  - Pain control: Oxycodone 2.5/5, tylenol  - Continue to monitor for bleeding at the surgical site  - Diet: Advance as tolerated  - Continue Vanc/Zosyn  - F/u AM labs  - Dispo: to the surgical floors under Dr. Mikey NUÑEZ Team Surgery  [id number]

## 2023-09-01 NOTE — PHYSICAL THERAPY INITIAL EVALUATION ADULT - ADDITIONAL COMMENTS
Pt is a short term resident of subacute rehab facility and was receiving PT services and using a rolling walker. Prior pt lives alone in a house with 2 steps to enter. Pt uses a cane when going to dialysis but did not use a device in the household and was independent with ADLs.   Pt left semi supine in bed in NAD, call bell in reach, all lines intact, BONI Del Valle made aware.

## 2023-09-01 NOTE — PHYSICAL THERAPY INITIAL EVALUATION ADULT - MD/RN NOTIFIED
below:  [] Constitutional  [] Eyes  [] Ear/Nose/Mouth/Throat  [] Respiratory  [] CV  [] GI  []   [] Musculoskeletal  [] Skin/Breast  [] Neurological  [] Endocrine  [] Heme/Lymph  [] Allergic/Immunologic    Explanation:     MEDICATIONS:    Current Facility-Administered Medications:     paliperidone (INVEGA) extended release tablet 9 mg, 9 mg, Oral, Daily, Yusra Young MD, 9 mg at 06/27/18 0901    sertraline (ZOLOFT) tablet 100 mg, 100 mg, Oral, Daily, Jose J Cano MD, 100 mg at 06/27/18 0901    benztropine (COGENTIN) tablet 2 mg, 2 mg, Oral, BID PRN **OR** benztropine mesylate (COGENTIN) injection 2 mg, 2 mg, Intramuscular, BID PRN, Yusra Young MD    hydrALAZINE (APRESOLINE) tablet 25 mg, 25 mg, Oral, 3 times per day, OCTAVIO Huang CNP, 25 mg at 06/27/18 0608    acetaminophen (TYLENOL) tablet 650 mg, 650 mg, Oral, Q4H PRN, Chuck Wang MD, 650 mg at 06/26/18 1706    traZODone (DESYREL) tablet 50 mg, 50 mg, Oral, Nightly PRN, Chuck Wang MD, 50 mg at 06/26/18 2033    magnesium hydroxide (MILK OF MAGNESIA) 400 MG/5ML suspension 30 mL, 30 mL, Oral, Daily PRN, Chuck Wang MD    aluminum & magnesium hydroxide-simethicone (MAALOX) 200-200-20 MG/5ML suspension 30 mL, 30 mL, Oral, PRN, Sarwat Cano MD    hydrOXYzine (VISTARIL) injection 50 mg, 50 mg, Intramuscular, Q6H PRN **OR** hydrOXYzine (VISTARIL) capsule 50 mg, 50 mg, Oral, Q6H PRN, Chuck Wang MD, 50 mg at 06/21/18 0842    haloperidol lactate (HALDOL) injection 5 mg, 5 mg, Intramuscular, Q6H PRN **OR** haloperidol (HALDOL) tablet 5 mg, 5 mg, Oral, Q6H PRN, Chuck Wang MD, 5 mg at 06/22/18 1658    diphenhydrAMINE (BENADRYL) capsule 50 mg, 50 mg, Oral, Q6H PRN, 50 mg at 06/22/18 1658 **OR** diphenhydrAMINE (BENADRYL) injection 50 mg, 50 mg, Intramuscular, Q6H PRN, Chuck Wang MD      Examination:  BP (!) 155/108 Comment: Lia ZEE notified Pulse 65   Temp 98 °F (36.7 °C) (Oral)   Resp 20   Ht 6' (1.829 m)   Wt 215 lb (97.5 kg)   SpO2 94%   BMI 29.16 kg/m²   Gait - steady  Medication side effects(SE): no    Mental Status Examination:    Level of consciousness:  within normal limits   Appearance:  poor grooming and poor hygiene  Behavior/Motor:  psychomotor retardation  Attitude toward examiner:  cooperative  Speech:  slow   Mood: less depressed  Affect:  flat  Thought processes:  slow   Thought content:  Suicidal Ideation:  passive  Delusions:  no evidence of delusions  Perceptual Disturbance: Auditory, same content  Cognition:  oriented to person, place, and time   Concentration poor  Insight poor   Judgement poor     ASSESSMENT:   Patient symptoms are:  [] Well controlled  [x] Improving  [] Worsening  [] No change      Diagnosis:   Principal Problem:    [facility]  Resolved Problems:    * No resolved hospital problems. *      LABS:    No results for input(s): WBC, HGB, PLT in the last 72 hours. No results for input(s): NA, K, CL, CO2, BUN, CREATININE, GLUCOSE in the last 72 hours. No results for input(s): BILITOT, ALKPHOS, AST, ALT in the last 72 hours. Lab Results   Component Value Date    LABAMPH Neg 06/20/2018    BARBSCNU Neg 06/20/2018    LABBENZ Neg 06/20/2018    OPIATESCREENURINE Neg 06/20/2018    PHENCYCLIDINESCREENURINE Neg 06/20/2018    ETOH <10 06/20/2018     Lab Results   Component Value Date    TSH 1.870 06/20/2018     No results found for: LITHIUM  No results found for: VALPROATE, CBMZ    Treatment Plan:  Reviewed current Medications with the patient. Increase Invega to 6 mg  Risks, benefits, side effects, drug-to-drug interactions and alternatives to treatment were discussed. Collateral information  CD evaluation  Encourage patient to attend group and other milieu activities.   Discharge planning discussed with the patient and treatment team.    PSYCHOTHERAPY/COUNSELING:  [x] Therapeutic interview  [x] yes

## 2023-09-01 NOTE — PROGRESS NOTE ADULT - SUBJECTIVE AND OBJECTIVE BOX
POST ANESTHESIA EVALUATION    78y Female POSTOP DAY 1      MENTAL STATUS: Patient participation [ x ] Awake     [  ] Arousable     [  ] Sedated    AIRWAY PATENCY: [ x ] Satisfactory  [  ] Other:     Vital Signs Last 24 Hrs  T(C): 35.7 (01 Sep 2023 03:18), Max: 36.9 (31 Aug 2023 11:21)  T(F): 96.3 (01 Sep 2023 03:18), Max: 98.5 (31 Aug 2023 11:21)  HR: 63 (01 Sep 2023 03:18) (63 - 71)  BP: 134/60 (01 Sep 2023 03:18) (117/57 - 186/75)  BP(mean): 75 (31 Aug 2023 22:30) (75 - 100)  RR: 16 (01 Sep 2023 03:18) (16 - 22)  SpO2: 100% (01 Sep 2023 03:18) (96% - 100%)    Parameters below as of 01 Sep 2023 03:18  Patient On (Oxygen Delivery Method): room air      I&O's Summary    31 Aug 2023 07:01  -  01 Sep 2023 07:00  --------------------------------------------------------  IN: 400 mL / OUT: 2413 mL / NET: -2013 mL          NAUSEA/ VOMITTING:  [x  ] NONE  [  ] CONTROLLED [  ] OTHER     PAIN: [ x ] CONTROLLED WITH CURRENT REGIMEN  [  ] OTHER    [ x ] NO APPARENT ANESTHESIA COMPLICATIONS

## 2023-09-02 LAB
ALBUMIN SERPL ELPH-MCNC: 2.5 G/DL — LOW (ref 3.3–5)
ALP SERPL-CCNC: 256 U/L — HIGH (ref 40–120)
ALT FLD-CCNC: 14 U/L — SIGNIFICANT CHANGE UP (ref 4–33)
ANION GAP SERPL CALC-SCNC: 10 MMOL/L — SIGNIFICANT CHANGE UP (ref 7–14)
AST SERPL-CCNC: 27 U/L — SIGNIFICANT CHANGE UP (ref 4–32)
BILIRUB SERPL-MCNC: 1 MG/DL — SIGNIFICANT CHANGE UP (ref 0.2–1.2)
BUN SERPL-MCNC: 38 MG/DL — HIGH (ref 7–23)
CALCIUM SERPL-MCNC: 9.3 MG/DL — SIGNIFICANT CHANGE UP (ref 8.4–10.5)
CHLORIDE SERPL-SCNC: 97 MMOL/L — LOW (ref 98–107)
CO2 SERPL-SCNC: 27 MMOL/L — SIGNIFICANT CHANGE UP (ref 22–31)
CREAT SERPL-MCNC: 4.91 MG/DL — HIGH (ref 0.5–1.3)
EGFR: 9 ML/MIN/1.73M2 — LOW
GLUCOSE SERPL-MCNC: 192 MG/DL — HIGH (ref 70–99)
HCT VFR BLD CALC: 27.7 % — LOW (ref 34.5–45)
HGB BLD-MCNC: 8.8 G/DL — LOW (ref 11.5–15.5)
MAGNESIUM SERPL-MCNC: 2.2 MG/DL — SIGNIFICANT CHANGE UP (ref 1.6–2.6)
MCHC RBC-ENTMCNC: 31.1 PG — SIGNIFICANT CHANGE UP (ref 27–34)
MCHC RBC-ENTMCNC: 31.8 GM/DL — LOW (ref 32–36)
MCV RBC AUTO: 97.9 FL — SIGNIFICANT CHANGE UP (ref 80–100)
NRBC # BLD: 0 /100 WBCS — SIGNIFICANT CHANGE UP (ref 0–0)
NRBC # FLD: 0 K/UL — SIGNIFICANT CHANGE UP (ref 0–0)
PHOSPHATE SERPL-MCNC: 3.2 MG/DL — SIGNIFICANT CHANGE UP (ref 2.5–4.5)
PLATELET # BLD AUTO: 136 K/UL — LOW (ref 150–400)
POTASSIUM SERPL-MCNC: 4.2 MMOL/L — SIGNIFICANT CHANGE UP (ref 3.5–5.3)
POTASSIUM SERPL-SCNC: 4.2 MMOL/L — SIGNIFICANT CHANGE UP (ref 3.5–5.3)
PROT SERPL-MCNC: 6.8 G/DL — SIGNIFICANT CHANGE UP (ref 6–8.3)
RBC # BLD: 2.83 M/UL — LOW (ref 3.8–5.2)
RBC # FLD: 19.9 % — HIGH (ref 10.3–14.5)
SODIUM SERPL-SCNC: 134 MMOL/L — LOW (ref 135–145)
VANCOMYCIN TROUGH SERPL-MCNC: 16.4 UG/ML — SIGNIFICANT CHANGE UP (ref 10–20)
WBC # BLD: 6.24 K/UL — SIGNIFICANT CHANGE UP (ref 3.8–10.5)
WBC # FLD AUTO: 6.24 K/UL — SIGNIFICANT CHANGE UP (ref 3.8–10.5)

## 2023-09-02 PROCEDURE — 90935 HEMODIALYSIS ONE EVALUATION: CPT | Mod: GC

## 2023-09-02 RX ORDER — VANCOMYCIN HCL 1 G
500 VIAL (EA) INTRAVENOUS ONCE
Refills: 0 | Status: DISCONTINUED | OUTPATIENT
Start: 2023-09-02 | End: 2023-09-02

## 2023-09-02 RX ADMIN — PIPERACILLIN AND TAZOBACTAM 25 GRAM(S): 4; .5 INJECTION, POWDER, LYOPHILIZED, FOR SOLUTION INTRAVENOUS at 14:54

## 2023-09-02 RX ADMIN — Medication 1 TABLET(S): at 18:14

## 2023-09-02 RX ADMIN — Medication 667 MILLIGRAM(S): at 18:14

## 2023-09-02 RX ADMIN — ONDANSETRON 4 MILLIGRAM(S): 8 TABLET, FILM COATED ORAL at 18:14

## 2023-09-02 RX ADMIN — Medication 1 MILLIGRAM(S): at 14:56

## 2023-09-02 RX ADMIN — HEPARIN SODIUM 5000 UNIT(S): 5000 INJECTION INTRAVENOUS; SUBCUTANEOUS at 11:24

## 2023-09-02 RX ADMIN — Medication 1 TABLET(S): at 01:42

## 2023-09-02 RX ADMIN — Medication 500 MILLIGRAM(S): at 14:55

## 2023-09-02 RX ADMIN — PIPERACILLIN AND TAZOBACTAM 25 GRAM(S): 4; .5 INJECTION, POWDER, LYOPHILIZED, FOR SOLUTION INTRAVENOUS at 01:42

## 2023-09-02 RX ADMIN — HEPARIN SODIUM 5000 UNIT(S): 5000 INJECTION INTRAVENOUS; SUBCUTANEOUS at 21:02

## 2023-09-02 RX ADMIN — CHLORHEXIDINE GLUCONATE 1 APPLICATION(S): 213 SOLUTION TOPICAL at 14:54

## 2023-09-02 RX ADMIN — Medication 1 TABLET(S): at 21:03

## 2023-09-02 RX ADMIN — Medication 1 TABLET(S): at 14:55

## 2023-09-02 RX ADMIN — Medication 667 MILLIGRAM(S): at 13:30

## 2023-09-02 NOTE — DIETITIAN INITIAL EVALUATION ADULT - PERTINENT LABORATORY DATA
09-02    134<L>  |  97<L>  |  38<H>  ----------------------------<  192<H>  4.2   |  27  |  4.91<H>    Ca    9.3      02 Sep 2023 06:54  Phos  3.2     09-02  Mg     2.20     09-02    TPro  6.8  /  Alb  2.5<L>  /  TBili  1.0  /  DBili  x   /  AST  27  /  ALT  14  /  AlkPhos  256<H>  09-02  POCT Blood Glucose.: 179 mg/dL (09-02-23 @ 12:38)  A1C with Estimated Average Glucose Result: 5.0 % (08-30-23 @ 19:00)  A1C with Estimated Average Glucose Result: 5.0 % (05-23-23 @ 07:00)

## 2023-09-02 NOTE — DIETITIAN INITIAL EVALUATION ADULT - ORAL INTAKE PTA/DIET HISTORY
As per patient and family at bedside, patient has not been eating well these three months related to repeated hospital courses. Patient was admitted from [place]. As per medical chart, patient was receiving renal, regular diet, 1200 mL fluid restriction, LPS 60mL 2x daily and [employer] 2x daily (475 kcal, 22g protein).

## 2023-09-02 NOTE — DIETITIAN INITIAL EVALUATION ADULT - PERTINENT MEDS FT
MEDICATIONS  (STANDING):  ascorbic acid 500 milliGRAM(s) Oral daily  calcium acetate 667 milliGRAM(s) Oral three times a day with meals  calcium carbonate 1250 mG  + Vitamin D (OsCal 500 + D) 1 Tablet(s) Oral three times a day  chlorhexidine 2% Cloths 1 Application(s) Topical daily  folic acid 1 milliGRAM(s) Oral daily  heparin   Injectable 5000 Unit(s) SubCutaneous every 12 hours  hydrALAZINE 50 milliGRAM(s) Oral daily  influenza  Vaccine (HIGH DOSE) 0.7 milliLiter(s) IntraMuscular once  metoprolol succinate ER 12.5 milliGRAM(s) Oral daily  Nephro-nathanael 1 Tablet(s) Oral daily  pantoprazole    Tablet 40 milliGRAM(s) Oral before breakfast  piperacillin/tazobactam IVPB.. 4.5 Gram(s) IV Intermittent every 12 hours  polyethylene glycol 3350 17 Gram(s) Oral two times a day  senna 2 Tablet(s) Oral at bedtime    MEDICATIONS  (PRN):  acetaminophen     Tablet .. 650 milliGRAM(s) Oral every 6 hours PRN Temp greater or equal to 38C (100.4F), Mild Pain (1 - 3)  aluminum hydroxide/magnesium hydroxide/simethicone Suspension 30 milliLiter(s) Oral every 4 hours PRN Dyspepsia  melatonin 3 milliGRAM(s) Oral at bedtime PRN Insomnia  ondansetron Injectable 4 milliGRAM(s) IV Push every 8 hours PRN Nausea and/or Vomiting

## 2023-09-02 NOTE — DIETITIAN INITIAL EVALUATION ADULT - ETIOLOGY
In the context of chronic illness related to physiological illness including L AVF infection, ESRD on HD and decreased intake

## 2023-09-02 NOTE — PROGRESS NOTE ADULT - ASSESSMENT
78F w L hip surgical incision w mild drainage s/p SUZANNA and peter    Plan:  dressing changes PRN as saturated. DO NOT USE SILK TAPE ON SKIN  monitor labs  WBAT LLE  PT/OT  Vascular plans for tunnelled cath pending ID clearance

## 2023-09-02 NOTE — PROGRESS NOTE ADULT - SUBJECTIVE AND OBJECTIVE BOX
Cardiovascular Disease Progress Note  Date of Service: 09-02-23 @ 06:59    Overnight events: No acute events overnight.    Patient denies chest pain or SOB.       Objective Findings:  T(C): 36.9 (09-01-23 @ 20:55), Max: 36.9 (09-01-23 @ 20:55)  HR: 73 (09-01-23 @ 20:55) (64 - 73)  BP: 158/69 (09-01-23 @ 20:55) (118/63 - 158/69)  RR: 18 (09-01-23 @ 20:55) (17 - 18)  SpO2: 98% (09-01-23 @ 20:55) (98% - 98%)  Wt(kg): --  Daily     Daily       Physical Exam:  Gen: NAD; Patient resting comfortably  HEENT: EOMI, Normocephalic/ atraumatic  CV: RRR, normal S1 + S2, no m/r/g  Lungs:  Normal respiratory effort; clear to auscultation bilaterally  Abd: soft, non-tender; bowel sounds present  Ext: No edema;      Telemetry: n/a    Laboratory Data:                        10.6   6.46  )-----------( 178      ( 31 Aug 2023 09:13 )             31.9     08-31    133<L>  |  93<L>  |  53<H>  ----------------------------<  171<H>  3.8   |  25  |  5.48<H>    Ca    10.2      31 Aug 2023 09:13  Phos  3.0     08-31  Mg     2.30     08-31    TPro  7.7  /  Alb  2.8<L>  /  TBili  1.2  /  DBili  x   /  AST  31  /  ALT  16  /  AlkPhos  309<H>  08-31    PT/INR - ( 31 Aug 2023 09:13 )   PT: 11.3 sec;   INR: 1.01 ratio         PTT - ( 31 Aug 2023 09:13 )  PTT:31.6 sec          Inpatient Medications:  MEDICATIONS  (STANDING):  ascorbic acid 500 milliGRAM(s) Oral daily  calcium acetate 667 milliGRAM(s) Oral three times a day with meals  calcium carbonate 1250 mG  + Vitamin D (OsCal 500 + D) 1 Tablet(s) Oral three times a day  chlorhexidine 2% Cloths 1 Application(s) Topical daily  folic acid 1 milliGRAM(s) Oral daily  heparin   Injectable 5000 Unit(s) SubCutaneous every 12 hours  hydrALAZINE 50 milliGRAM(s) Oral daily  influenza  Vaccine (HIGH DOSE) 0.7 milliLiter(s) IntraMuscular once  metoprolol succinate ER 12.5 milliGRAM(s) Oral daily  Nephro-nathanael 1 Tablet(s) Oral daily  pantoprazole    Tablet 40 milliGRAM(s) Oral before breakfast  piperacillin/tazobactam IVPB.. 4.5 Gram(s) IV Intermittent every 12 hours  polyethylene glycol 3350 17 Gram(s) Oral two times a day  senna 2 Tablet(s) Oral at bedtime      Assessment:  78 year old woman with ESRD on HD (MWF), HTN, DM (diet controlled), chronic type B aortic dissection, heart murmur, endometrial ca in remission s/p total hysterectomy in 2007 presents with fistula malfunction.      Plan of Care:      #Pre-operative risk evaluation-  - Ms. Harris displays no signs of  coronary ischemia or decompensated CHF.   EKG is sinus with a known RBBB.  She is optimized from a cardiac standpoint to proceed with Permacath.           #Type B aortic dissection  - Chronic (Diagnosed >10 years ago)  - Optimal BP control.        #ESRD-  - HD as per renal.         Over 25 minutes spent on total encounter; more than 50% of the visit was spent counseling and/or coordinating care by the attending physician.      Gabo Burris MD [facility]  Cardiovascular Disease  [phone]

## 2023-09-02 NOTE — PROGRESS NOTE ADULT - PROBLEM SELECTOR PLAN 1
Pt with ESRD on HD TIW MWF admitted with purulent drainage from AVF. On review of sunrise pt was admitted last month for Lt hemiarthroplasty course complicated by fistula failure. Underwent LUE BC AVF fistulogram 8/1 w/ balloon angioplasty. 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 AVF successfully used prior to dc. R femoral HD catheter placement and ligation of LUE AVF with aneurysm resection 8/31. Pt. clinically stable. Labs reviewed. Tunneled dialysis catheter planned for 9/5 by IR. Plan for HD today. Monitor labs and VS. Dose medications for ESRD.

## 2023-09-02 NOTE — PROGRESS NOTE ADULT - SUBJECTIVE AND OBJECTIVE BOX
Orthopedic Surgery Progress Note     S: Patient seen and examined today. No acute events overnight. Pain is well controlled. Denies f/c, chest pain, shortness of breath, dizziness. Has been having continued leakage from wound on LLE. Working w PT, walking around room.    MEDICATIONS  (STANDING):  ascorbic acid 500 milliGRAM(s) Oral daily  calcium acetate 667 milliGRAM(s) Oral three times a day with meals  calcium carbonate 1250 mG  + Vitamin D (OsCal 500 + D) 1 Tablet(s) Oral three times a day  chlorhexidine 2% Cloths 1 Application(s) Topical daily  folic acid 1 milliGRAM(s) Oral daily  heparin   Injectable 5000 Unit(s) SubCutaneous every 12 hours  hydrALAZINE 50 milliGRAM(s) Oral daily  influenza  Vaccine (HIGH DOSE) 0.7 milliLiter(s) IntraMuscular once  metoprolol succinate ER 12.5 milliGRAM(s) Oral daily  Nephro-nathanael 1 Tablet(s) Oral daily  pantoprazole    Tablet 40 milliGRAM(s) Oral before breakfast  piperacillin/tazobactam IVPB.. 4.5 Gram(s) IV Intermittent every 12 hours  polyethylene glycol 3350 17 Gram(s) Oral two times a day  senna 2 Tablet(s) Oral at bedtime    MEDICATIONS  (PRN):  acetaminophen     Tablet .. 650 milliGRAM(s) Oral every 6 hours PRN Temp greater or equal to 38C (100.4F), Mild Pain (1 - 3)  aluminum hydroxide/magnesium hydroxide/simethicone Suspension 30 milliLiter(s) Oral every 4 hours PRN Dyspepsia  melatonin 3 milliGRAM(s) Oral at bedtime PRN Insomnia  ondansetron Injectable 4 milliGRAM(s) IV Push every 8 hours PRN Nausea and/or Vomiting  oxyCODONE    IR 2.5 milliGRAM(s) Oral every 6 hours PRN Moderate Pain (4 - 6)  oxyCODONE    IR 5 milliGRAM(s) Oral every 6 hours PRN Severe Pain (7 - 10)  sodium chloride 0.9% Bolus. 100 milliLiter(s) IV Bolus every 5 minutes PRN SBP LESS THAN or EQUAL to 90 mmHg  sodium chloride 0.9% lock flush 10 milliLiter(s) IV Push every 1 hour PRN Pre/post blood products, medications, blood draw, and to maintain line patency      Vital Signs Last 24 Hrs  T(C): 36.9 (02 Sep 2023 09:55), Max: 37.1 (02 Sep 2023 06:50)  T(F): 98.5 (02 Sep 2023 09:55), Max: 98.7 (02 Sep 2023 06:50)  HR: 68 (02 Sep 2023 09:55) (68 - 73)  BP: 142/80 (02 Sep 2023 09:55) (142/80 - 170/77)  BP(mean): --  RR: 18 (02 Sep 2023 09:55) (17 - 18)  SpO2: 98% (02 Sep 2023 09:55) (98% - 98%)    Parameters below as of 02 Sep 2023 09:55  Patient On (Oxygen Delivery Method): room air        09-01-23 @ 07:01  -  09-02-23 @ 07:00  --------------------------------------------------------  IN: 0 mL / OUT: 65 mL / NET: -65 mL    09-02-23 @ 07:01  -  09-02-23 @ 13:11  --------------------------------------------------------  IN: 400 mL / OUT: 2400 mL / NET: -2000 mL        Physical Exam:  Gen: NAD  L Lower Extremity:  Silk tape and guaze bandage soaked with serous drainage. Removed, some superficial skin breakdown posteriorly from tape. Mild amount of serous fluid expressed. Replaced with guaze and paper tape.   SILT S/S/DP/SP/T  +EHL/FHL/TA/GSC  Compartments soft/non-tender  Toes warm, Cap refill brisk/warm and perfused, +DP/PT pulse         LABS:                        8.8    6.24  )-----------( 136      ( 02 Sep 2023 06:54 )             27.7     09-02    134<L>  |  97<L>  |  38<H>  ----------------------------<  192<H>  4.2   |  27  |  4.91<H>    Ca    9.3      02 Sep 2023 06:54  Phos  3.2     09-02  Mg     2.20     09-02    TPro  6.8  /  Alb  2.5<L>  /  TBili  1.0  /  DBili  x   /  AST  27  /  ALT  14  /  AlkPhos  256<H>  09-02

## 2023-09-02 NOTE — PROGRESS NOTE ADULT - ASSESSMENT
77 y.o. Female w/ Hx HTN, DM, chronic type B aortic dissection, ESRD on HD ( M, W, F ) via LUE brachiocephalic (2018) fistula, endometrial ca in remission s/p total hysterectomy in 2007 recently admitted for L hip revision (07-08/2023) during which she underwent LUE subclavian vein stenting 8/1 for stenosis and subsequent brachiocephalic AVF revision 8/3 with resection of the involved segment and primary anastomosis for ulceration. She presents after a missed HD session due to concern for overlying infection at the AVF site.    PLAN:    - Pain control PRN  - ELevate LUE while in bed / chair.   - keep ACE wrap in place  - diet as tolerated  - check am labs.   - plan for tunneled catheter to be placed by IR  - RUE vein mapping for RUE AVF planning   - check CT venogram to rule out central stenosis   - DVT ppx  - HD tomorrow as per Renal       Vascular Surgery  [id number]

## 2023-09-02 NOTE — DIETITIAN INITIAL EVALUATION ADULT - OTHER INFO
As per chart, patient is a 78 y.o. female PMH DM2 (last A1C 5 8/30/23), ESRD on HD M/W/F, Endometrial cancer s/p CLINT, Chronic Type B aortic dissection, HTN, L humerus fracture (non-op), L hip fracture s/p ORIF (5/2023) with recent prolonged hospitalization 7/18-8/16 for L hip hardware failure s/p hemiarthroplasty with post-op course complicated by hematoma s/p pRBCs and compression dressing. Hospitalization further complicated by malfunctioning LUE AVF s/p AVF revision with vascular surgery (8/4/23), with subsequent discharge now presents from [facility] for persistent drainage from her LUE fistula site. AS per IR (9/1), tunneled HD catheter placement 9/5; currently with R femoral HD catheter in place.     Patient is A&O x 4. Patients' families were at bedside at time of visit. Stated fair po intake. Completed % of meals as per RN flowsheet. No reported GI issues (nausea/vomiting/diarrhea/constipation). Last BM was 9/1 as per patient. Noticed Zofran and bowel regimen in use. Denied swallowing/chewing difficulties. Patient is receiving calcium, nephro-nathanael, vitamin C and folic acid for micronutrient coverage. Noticed depleted sodium (Na 134). Patient is noticed with weight loss and edema. Receiving regular diet. Renal diet reinforced; recommend Nepro 2x daily. Patient amenable. RD to remain available for further nutritional interventions as indicated.

## 2023-09-02 NOTE — DIETITIAN INITIAL EVALUATION ADULT - ADD RECOMMEND
1. Continue to monitor diet/supplement tolerance, GI distress, fluid status.  2. Continue with nutrient supplement for micronutrient coverage.   3. Continue to encourage po intake.

## 2023-09-02 NOTE — PROGRESS NOTE ADULT - SUBJECTIVE AND OBJECTIVE BOX
Vascular Progress Note    S: Patient seen and examined. No acute events overnight.  Patient in dialysis this AM.    O:  Physical Exam:  Gen: Laying in bed, NAD  HEENT: atrumatic, EMOI  Resp: Unlabored breathing  Vascular:       Vital Signs Last 24 Hrs  T(C): 37.1 (02 Sep 2023 06:50), Max: 37.1 (02 Sep 2023 06:50)  T(F): 98.7 (02 Sep 2023 06:50), Max: 98.7 (02 Sep 2023 06:50)  HR: 68 (02 Sep 2023 06:50) (68 - 73)  BP: 170/77 (02 Sep 2023 06:50) (145/64 - 170/77)  BP(mean): --  RR: 18 (02 Sep 2023 06:50) (17 - 18)  SpO2: 98% (02 Sep 2023 06:50) (98% - 98%)    Parameters below as of 02 Sep 2023 06:50  Patient On (Oxygen Delivery Method): room air        I&O's Detail    01 Sep 2023 07:01  -  02 Sep 2023 07:00  --------------------------------------------------------  IN:  Total IN: 0 mL    OUT:    Bulb (mL): 65 mL  Total OUT: 65 mL    Total NET: -65 mL                                8.8    6.24  )-----------( 136      ( 02 Sep 2023 06:54 )             27.7       09-02    134<L>  |  97<L>  |  38<H>  ----------------------------<  192<H>  4.2   |  27  |  4.91<H>    Ca    9.3      02 Sep 2023 06:54  Phos  3.2     09-02  Mg     2.20     09-02    TPro  6.8  /  Alb  2.5<L>  /  TBili  1.0  /  DBili  x   /  AST  27  /  ALT  14  /  AlkPhos  256<H>  09-02

## 2023-09-02 NOTE — DIETITIAN INITIAL EVALUATION ADULT - NSICDXPASTMEDICALHX_GEN_ALL_CORE_FT
PAST MEDICAL HISTORY:  Adult Onset Diabetes Mellitus,     Cancer of Endometrium s/p Hysterectomy , s/p Chemo.    CKD (chronic kidney disease)     Dissection of Aorta Type B    ESRD (end stage renal disease) on dialysis M,W,F   [facility] [address]    History of Hysterectomy with O     Hypertension

## 2023-09-02 NOTE — PROGRESS NOTE ADULT - SUBJECTIVE AND OBJECTIVE BOX
[facility] Division of Kidney Diseases & Hypertension  FOLLOW UP NOTE  [phone]--------------------------------------------------------------------------------  Chief Complaint:Cellulitis        24 hour events/subjective:        PAST HISTORY  --------------------------------------------------------------------------------  No significant changes to PMH, PSH, FHx, SHx, unless otherwise noted    ALLERGIES & MEDICATIONS  --------------------------------------------------------------------------------  Allergies    No Known Allergies    Intolerances      Standing Inpatient Medications  ascorbic acid 500 milliGRAM(s) Oral daily  calcium acetate 667 milliGRAM(s) Oral three times a day with meals  calcium carbonate 1250 mG  + Vitamin D (OsCal 500 + D) 1 Tablet(s) Oral three times a day  chlorhexidine 2% Cloths 1 Application(s) Topical daily  folic acid 1 milliGRAM(s) Oral daily  heparin   Injectable 5000 Unit(s) SubCutaneous every 12 hours  hydrALAZINE 50 milliGRAM(s) Oral daily  influenza  Vaccine (HIGH DOSE) 0.7 milliLiter(s) IntraMuscular once  metoprolol succinate ER 12.5 milliGRAM(s) Oral daily  Nephro-nathanael 1 Tablet(s) Oral daily  pantoprazole    Tablet 40 milliGRAM(s) Oral before breakfast  piperacillin/tazobactam IVPB.. 4.5 Gram(s) IV Intermittent every 12 hours  polyethylene glycol 3350 17 Gram(s) Oral two times a day  senna 2 Tablet(s) Oral at bedtime    PRN Inpatient Medications  acetaminophen     Tablet .. 650 milliGRAM(s) Oral every 6 hours PRN  aluminum hydroxide/magnesium hydroxide/simethicone Suspension 30 milliLiter(s) Oral every 4 hours PRN  melatonin 3 milliGRAM(s) Oral at bedtime PRN  ondansetron Injectable 4 milliGRAM(s) IV Push every 8 hours PRN  oxyCODONE    IR 5 milliGRAM(s) Oral every 6 hours PRN  oxyCODONE    IR 2.5 milliGRAM(s) Oral every 6 hours PRN  sodium chloride 0.9% Bolus. 100 milliLiter(s) IV Bolus every 5 minutes PRN  sodium chloride 0.9% lock flush 10 milliLiter(s) IV Push every 1 hour PRN      REVIEW OF SYSTEMS  --------------------------------------------------------------------------------  Gen: No  fevers/chills  Skin: No rashes  Head/Eyes/Ears/Mouth: No headache; Normal hearing; Normal vision w/o blurriness  Respiratory: No dyspnea, cough, wheezing, hemoptysis  CV: No chest pain, PND, orthopnea  GI: No abdominal pain, diarrhea, constipation, nausea, vomiting  : No increased frequency, dysuria, hematuria, nocturia  MSK: No joint pain/swelling; no back pain; no edema  Neuro: No dizziness/lightheadedness, weakness, seizures, numbness, tingling      All other systems were reviewed and are negative, except as noted.    VITALS/PHYSICAL EXAM  --------------------------------------------------------------------------------  T(C): 36.9 (09-02-23 @ 09:55), Max: 37.1 (09-02-23 @ 06:50)  HR: 68 (09-02-23 @ 09:55) (68 - 73)  BP: 142/80 (09-02-23 @ 09:55) (142/80 - 170/77)  RR: 18 (09-02-23 @ 09:55) (17 - 18)  SpO2: 98% (09-02-23 @ 09:55) (98% - 98%)  Wt(kg): --        09-01-23 @ 07:01  -  09-02-23 @ 07:00  --------------------------------------------------------  IN: 0 mL / OUT: 65 mL / NET: -65 mL    09-02-23 @ 07:01  -  09-02-23 @ 14:52  --------------------------------------------------------  IN: 400 mL / OUT: 2400 mL / NET: -2000 mL      Physical Exam:  	Gen: NAD, well-appearing  	HEENT: PERRL, supple neck, clear oropharynx  	Pulm: CTA B/L  	CV: RRR, S1S2;  	Back: No spinal or CVA tenderness  	Abd: +BS, soft, nontender/nondistended  	: No suprapubic tenderness                      Extremities: no bilateral LE edema noted.                       Neuro: No focal deficits, intact gait  	Skin: Warm, without rashes  	Vascular access:    LABS/STUDIES  --------------------------------------------------------------------------------              8.8    6.24  >-----------<  136      [09-02-23 @ 06:54]              27.7     134  |  97  |  38  ----------------------------<  192      [09-02-23 @ 06:54]  4.2   |  27  |  4.91        Ca     9.3     [09-02-23 @ 06:54]      Mg     2.20     [09-02-23 @ 06:54]      Phos  3.2     [09-02-23 @ 06:54]    TPro  6.8  /  Alb  2.5  /  TBili  1.0  /  DBili  x   /  AST  27  /  ALT  14  /  AlkPhos  256  [09-02-23 @ 06:54]          Creatinine Trend:  SCr 4.91 [09-02 @ 06:54]  SCr 5.48 [08-31 @ 09:13]  SCr 5.02 [08-30 @ 19:00]  SCr 5.51 [08-16 @ 12:28]  SCr 4.61 [08-08 @ 06:24]    Urinalysis - [09-02-23 @ 06:54]      Color  / Appearance  / SG  / pH       Gluc 192 / Ketone   / Bili  / Urobili        Blood  / Protein  / Leuk Est  / Nitrite       RBC  / WBC  / Hyaline  / Gran  / Sq Epi  / Non Sq Epi  / Bacteria         HBsAg Nonreact      [08-31-23 @ 23:25]     [facility] Division of Kidney Diseases & Hypertension  FOLLOW UP NOTE  [phone]--------------------------------------------------------------------------------    Chief Complaint: ESRD/HD      24 hour events/subjective: Pt seen and examined in dialysis unit this morning. Complains of LUE discomfort and cold feeling in extremities during HD. Denies headaches, fevers/chills, SOB, CP, abd pain.  s/p ligation of LUE AVF with aneurysm resection 8/31    PAST HISTORY  --------------------------------------------------------------------------------  No significant changes to PMH, PSH, FHx, SHx, unless otherwise noted    ALLERGIES & MEDICATIONS  --------------------------------------------------------------------------------  Allergies  No Known Allergies    Intolerances    Standing Inpatient Medications  ascorbic acid 500 milliGRAM(s) Oral daily  calcium acetate 667 milliGRAM(s) Oral three times a day with meals  calcium carbonate 1250 mG  + Vitamin D (OsCal 500 + D) 1 Tablet(s) Oral three times a day  chlorhexidine 2% Cloths 1 Application(s) Topical daily  folic acid 1 milliGRAM(s) Oral daily  heparin   Injectable 5000 Unit(s) SubCutaneous every 12 hours  hydrALAZINE 50 milliGRAM(s) Oral daily  influenza  Vaccine (HIGH DOSE) 0.7 milliLiter(s) IntraMuscular once  metoprolol succinate ER 12.5 milliGRAM(s) Oral daily  Nephro-nathanael 1 Tablet(s) Oral daily  pantoprazole    Tablet 40 milliGRAM(s) Oral before breakfast  piperacillin/tazobactam IVPB.. 4.5 Gram(s) IV Intermittent every 12 hours  polyethylene glycol 3350 17 Gram(s) Oral two times a day  senna 2 Tablet(s) Oral at bedtime    PRN Inpatient Medications  acetaminophen     Tablet .. 650 milliGRAM(s) Oral every 6 hours PRN  aluminum hydroxide/magnesium hydroxide/simethicone Suspension 30 milliLiter(s) Oral every 4 hours PRN  melatonin 3 milliGRAM(s) Oral at bedtime PRN  ondansetron Injectable 4 milliGRAM(s) IV Push every 8 hours PRN  oxyCODONE    IR 5 milliGRAM(s) Oral every 6 hours PRN  oxyCODONE    IR 2.5 milliGRAM(s) Oral every 6 hours PRN  sodium chloride 0.9% Bolus. 100 milliLiter(s) IV Bolus every 5 minutes PRN  sodium chloride 0.9% lock flush 10 milliLiter(s) IV Push every 1 hour PRN    REVIEW OF SYSTEMS  --------------------------------------------------------------------------------  Gen: No fevers/chills  Skin: No rashes  Head/Eyes/Ears/Mouth: No headache  Respiratory: No dyspnea, cough  CV: No chest pain  GI: No abdominal pain, diarrhea, constipation, nausea, vomiting  MSK: No joint pain/swelling, +cold extremities during HD, LUE discomfort  Neuro: No dizziness/lightheadedness    All other systems were reviewed and are negative, except as noted.    VITALS/PHYSICAL EXAM  --------------------------------------------------------------------------------  T(C): 36.9 (09-02-23 @ 09:55), Max: 37.1 (09-02-23 @ 06:50)  HR: 68 (09-02-23 @ 09:55) (68 - 73)  BP: 142/80 (09-02-23 @ 09:55) (142/80 - 170/77)  RR: 18 (09-02-23 @ 09:55) (17 - 18)  SpO2: 98% (09-02-23 @ 09:55) (98% - 98%)  Wt(kg): --    09-01-23 @ 07:01  -  09-02-23 @ 07:00  --------------------------------------------------------  IN: 0 mL / OUT: 65 mL / NET: -65 mL    09-02-23 @ 07:01  -  09-02-23 @ 14:52  --------------------------------------------------------  IN: 400 mL / OUT: 2400 mL / NET: -2000 mL    Physical Exam:  Gen: NAD  HEENT: MMM  Pulm: CTA B/L  CV: S1S2  Abd: Soft, +BS   Ext: +LE edema B/L, +LUE edema   Neuro: Awake  Skin: Warm and dry  Vascular access: LUE AVF s/p ligation wrapped in pressure dressing with prevena incisional vac. +R femoral non tunneled HD catheter     LABS/STUDIES  --------------------------------------------------------------------------------              8.8    6.24  >-----------<  136      [09-02-23 @ 06:54]              27.7     134  |  97  |  38  ----------------------------<  192      [09-02-23 @ 06:54]  4.2   |  27  |  4.91        Ca     9.3     [09-02-23 @ 06:54]      Mg     2.20     [09-02-23 @ 06:54]      Phos  3.2     [09-02-23 @ 06:54]    TPro  6.8  /  Alb  2.5  /  TBili  1.0  /  DBili  x   /  AST  27  /  ALT  14  /  AlkPhos  256  [09-02-23 @ 06:54]    Creatinine Trend:  SCr 4.91 [09-02 @ 06:54]  SCr 5.48 [08-31 @ 09:13]  SCr 5.02 [08-30 @ 19:00]  SCr 5.51 [08-16 @ 12:28]  SCr 4.61 [08-08 @ 06:24]    Urinalysis - [09-02-23 @ 06:54]      Color  / Appearance  / SG  / pH       Gluc 192 / Ketone   / Bili  / Urobili        Blood  / Protein  / Leuk Est  / Nitrite       RBC  / WBC  / Hyaline  / Gran  / Sq Epi  / Non Sq Epi  / Bacteria     HBsAg Nonreact      [08-31-23 @ 23:25]

## 2023-09-03 RX ADMIN — CHLORHEXIDINE GLUCONATE 1 APPLICATION(S): 213 SOLUTION TOPICAL at 13:58

## 2023-09-03 RX ADMIN — SENNA PLUS 2 TABLET(S): 8.6 TABLET ORAL at 22:13

## 2023-09-03 RX ADMIN — HEPARIN SODIUM 5000 UNIT(S): 5000 INJECTION INTRAVENOUS; SUBCUTANEOUS at 19:12

## 2023-09-03 RX ADMIN — Medication 1 TABLET(S): at 22:13

## 2023-09-03 RX ADMIN — PANTOPRAZOLE SODIUM 40 MILLIGRAM(S): 20 TABLET, DELAYED RELEASE ORAL at 05:27

## 2023-09-03 RX ADMIN — Medication 500 MILLIGRAM(S): at 13:57

## 2023-09-03 RX ADMIN — Medication 1 TABLET(S): at 19:12

## 2023-09-03 RX ADMIN — Medication 12.5 MILLIGRAM(S): at 05:27

## 2023-09-03 RX ADMIN — Medication 667 MILLIGRAM(S): at 19:12

## 2023-09-03 RX ADMIN — Medication 50 MILLIGRAM(S): at 05:26

## 2023-09-03 RX ADMIN — Medication 1 TABLET(S): at 05:26

## 2023-09-03 RX ADMIN — PIPERACILLIN AND TAZOBACTAM 25 GRAM(S): 4; .5 INJECTION, POWDER, LYOPHILIZED, FOR SOLUTION INTRAVENOUS at 14:29

## 2023-09-03 RX ADMIN — PIPERACILLIN AND TAZOBACTAM 25 GRAM(S): 4; .5 INJECTION, POWDER, LYOPHILIZED, FOR SOLUTION INTRAVENOUS at 00:19

## 2023-09-03 RX ADMIN — Medication 1 TABLET(S): at 13:45

## 2023-09-03 RX ADMIN — Medication 667 MILLIGRAM(S): at 13:45

## 2023-09-03 RX ADMIN — Medication 667 MILLIGRAM(S): at 09:39

## 2023-09-03 RX ADMIN — Medication 1 MILLIGRAM(S): at 13:57

## 2023-09-03 RX ADMIN — HEPARIN SODIUM 5000 UNIT(S): 5000 INJECTION INTRAVENOUS; SUBCUTANEOUS at 05:26

## 2023-09-03 NOTE — PROGRESS NOTE ADULT - SUBJECTIVE AND OBJECTIVE BOX
Orthopedic Surgery Progress Note     S: Patient seen and examined today. No acute events overnight. Pain controlled.    Vital Signs Last 24 Hrs  T(C): 36.7 (03 Sep 2023 05:25), Max: 36.9 (02 Sep 2023 09:55)  T(F): 98 (03 Sep 2023 05:25), Max: 98.5 (02 Sep 2023 09:55)  HR: 69 (03 Sep 2023 05:25) (67 - 74)  BP: 155/86 (03 Sep 2023 05:25) (136/74 - 155/86)  RR: 18 (03 Sep 2023 05:25) (16 - 18)  SpO2: 96% (03 Sep 2023 05:25) (96% - 99%)  Parameters below as of 03 Sep 2023 05:25  Patient On (Oxygen Delivery Method): room air    Physical Exam:  Gen: NAD  LLE:  Silk tape and guaze bandage soaked with serous drainage. Removed, some superficial skin breakdown posteriorly from tape. Mild amount of serous fluid expressed. Replaced with gauze and paper tape.   SILT [place]/Sa/DP/SP/T  +EHL/FHL/TA/GSC  Compartments soft/non-tender  Toes warm, Cap refill brisk/warm and perfused, +DP pulse       LABS:                              8.8    6.24  )-----------( 136      ( 02 Sep 2023 06:54 )             27.7   09-02    134<L>  |  97<L>  |  38<H>  ----------------------------<  192<H>  4.2   |  27  |  4.91<H>    Ca    9.3      02 Sep 2023 06:54  Phos  3.2     09-02  Mg     2.20     09-02    TPro  6.8  /  Alb  2.5<L>  /  TBili  1.0  /  DBili  x   /  AST  27  /  ALT  14  /  AlkPhos  256<H>  09-02

## 2023-09-03 NOTE — PROGRESS NOTE ADULT - SUBJECTIVE AND OBJECTIVE BOX
Cardiovascular Disease Progress Note  Date of Service: 09-03-23 @ 10:01    Overnight events: No acute events overnight.    Patient denies chest pain or SOB.   Otherwise review of systems negative    Objective Findings:  T(C): 36.7 (09-03-23 @ 05:25), Max: 36.9 (09-02-23 @ 20:30)  HR: 69 (09-03-23 @ 05:25) (67 - 74)  BP: 155/86 (09-03-23 @ 05:25) (136/74 - 155/86)  RR: 18 (09-03-23 @ 05:25) (16 - 18)  SpO2: 96% (09-03-23 @ 05:25) (96% - 99%)  Wt(kg): --  Daily     Daily       Physical Exam:  Gen: NAD; Patient resting comfortably  HEENT: EOMI, Normocephalic/ atraumatic  CV: RRR, normal S1 + S2, no m/r/g  Lungs:  Normal respiratory effort; clear to auscultation bilaterally  Abd: soft, non-tender; bowel sounds present  Ext: No edema;      Telemetry: n/a    Laboratory Data:                        8.8    6.24  )-----------( 136      ( 02 Sep 2023 06:54 )             27.7     09-02    134<L>  |  97<L>  |  38<H>  ----------------------------<  192<H>  4.2   |  27  |  4.91<H>    Ca    9.3      02 Sep 2023 06:54  Phos  3.2     09-02  Mg     2.20     09-02    TPro  6.8  /  Alb  2.5<L>  /  TBili  1.0  /  DBili  x   /  AST  27  /  ALT  14  /  AlkPhos  256<H>  09-02              Inpatient Medications:  MEDICATIONS  (STANDING):  ascorbic acid 500 milliGRAM(s) Oral daily  calcium acetate 667 milliGRAM(s) Oral three times a day with meals  calcium carbonate 1250 mG  + Vitamin D (OsCal 500 + D) 1 Tablet(s) Oral three times a day  chlorhexidine 2% Cloths 1 Application(s) Topical daily  folic acid 1 milliGRAM(s) Oral daily  heparin   Injectable 5000 Unit(s) SubCutaneous every 12 hours  hydrALAZINE 50 milliGRAM(s) Oral daily  influenza  Vaccine (HIGH DOSE) 0.7 milliLiter(s) IntraMuscular once  metoprolol succinate ER 12.5 milliGRAM(s) Oral daily  Nephro-nathanael 1 Tablet(s) Oral daily  pantoprazole    Tablet 40 milliGRAM(s) Oral before breakfast  piperacillin/tazobactam IVPB.. 4.5 Gram(s) IV Intermittent every 12 hours  polyethylene glycol 3350 17 Gram(s) Oral two times a day  senna 2 Tablet(s) Oral at bedtime      Assessment: 78 year old woman with ESRD on HD (MWF), HTN, DM (diet controlled), chronic type B aortic dissection, heart murmur, endometrial ca in remission s/p total hysterectomy in 2007 presents with fistula malfunction.      Plan of Care:      #Pre-operative risk evaluation-  - Ms. Harris displays no signs of  coronary ischemia or decompensated CHF.   EKG is sinus with a known RBBB.  She is optimized from a cardiac standpoint to proceed with Permacath.           #Type B aortic dissection  - Chronic (Diagnosed >10 years ago)  - Optimal BP control.        #ESRD-  - HD as per renal.           Over 25 minutes spent on total encounter; more than 50% of the visit was spent counseling and/or coordinating care by the attending physician.      Gabo Burris MD [facility]  Cardiovascular Disease  [phone]

## 2023-09-03 NOTE — PROGRESS NOTE ADULT - ASSESSMENT
Ms. 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 Arterial and venous end oversewn. Plastics closure. Prevena Vac placed.    -Prevena holding suction, to be removed POD5  - PORFIRIO drain caire  -  pain control  - continue care per primary team

## 2023-09-03 NOTE — PROGRESS NOTE ADULT - ASSESSMENT
Assessment and Plan:   · Assessment	  77 y.o. Female w/ Hx HTN, DM, chronic type B aortic dissection, ESRD on HD ( M, W, F ) via LUE brachiocephalic (2018) fistula, endometrial ca in remission s/p total hysterectomy in 2007 recently admitted for L hip revision (07-08/2023) during which she underwent LUE subclavian vein stenting 8/1 for stenosis and subsequent brachiocephalic AVF revision 8/3 with resection of the involved segment and primary anastomosis for ulceration. She presents after a missed HD session due to concern for overlying infection at the AVF site.    PLAN:    - Pain control PRN  - ELevate LUE while in bed / chair.   - keep ACE wrap in place  - diet as tolerated  - check am labs.   - plan for tunneled catheter to be placed by IR  - RUE vein mapping   - Prevena removal POD 5 per PRS  - CT venogram - no evidence of central stenosis   - DVT ppx  - HD yesterday as per Renal       Vascular Surgery  [id number]      Assessment and Plan:   · Assessment	  77 y.o. Female w/ Hx HTN, DM, chronic type B aortic dissection, ESRD on HD ( M, W, F ) via LUE brachiocephalic (2018) fistula, endometrial ca in remission s/p total hysterectomy in 2007 recently admitted for L hip revision (07-08/2023) during which she underwent LUE subclavian vein stenting 8/1 for stenosis and subsequent brachiocephalic AVF revision 8/3 with resection of the involved segment and primary anastomosis for ulceration. She presents after a missed HD session due to concern for overlying infection at the AVF site.    PLAN:    - PT eval  - Pain control PRN  - ELevate LUE while in bed / chair.   - keep ACE wrap in place  - diet as tolerated  - check am labs.   - plan for tunneled catheter to be placed by IR  - RUE vein mapping   - Prevena removal POD 5 per PRS  - CT venogram - no evidence of central stenosis   - DVT ppx  - HD yesterday as per Renal       Vascular Surgery  [id number]

## 2023-09-03 NOTE — PROGRESS NOTE ADULT - SUBJECTIVE AND OBJECTIVE BOX
Plastic Surgery Progress Note (pg [facility]: [id number], [facility]: [phone])    SUBJECTIVE  The patient was seen and examined.     OBJECTIVE  ___________________________________________________  VITAL SIGNS / I&O's   Vital Signs Last 24 Hrs  T(C): 36.7 (03 Sep 2023 05:25), Max: 36.9 (02 Sep 2023 09:55)  T(F): 98 (03 Sep 2023 05:25), Max: 98.5 (02 Sep 2023 09:55)  HR: 69 (03 Sep 2023 05:25) (67 - 74)  BP: 155/86 (03 Sep 2023 05:25) (136/74 - 155/86)  BP(mean): --  RR: 18 (03 Sep 2023 05:25) (16 - 18)  SpO2: 96% (03 Sep 2023 05:25) (96% - 99%)    Parameters below as of 03 Sep 2023 05:25  Patient On (Oxygen Delivery Method): room air          02 Sep 2023 07:01  -  03 Sep 2023 07:00  --------------------------------------------------------  IN:    Other (mL): 400 mL  Total IN: 400 mL    OUT:    Other (mL): 2400 mL  Total OUT: 2400 mL    Total NET: -2000 mL        ___________________________________________________  PHYSICAL EXAM        CONSTITUTIONAL: no apparent distress  PSYCH: Appropriate insight/judgment; A+O x 3, mood and affect appropriate  HENT: CN II-XII grossly intact, No conjunctival or scleral injection, non-icteric  NECK: Supple, symmetric and without tracheal deviation   RESP: No respiratory distress, no use of accessory muscles  SKIN: No rashes or ulcers noted, Dressings intact.  New vac holding suction and functioning without issue  ___________________________________________________  LABS                        8.8    6.24  )-----------( 136      ( 02 Sep 2023 06:54 )             27.7     02 Sep 2023 06:54    134    |  97     |  38     ----------------------------<  192    4.2     |  27     |  4.91     Ca    9.3        02 Sep 2023 06:54  Phos  3.2       02 Sep 2023 06:54  Mg     2.20      02 Sep 2023 06:54    TPro  6.8    /  Alb  2.5    /  TBili  1.0    /  DBili  x      /  AST  27     /  ALT  14     /  AlkPhos  256    02 Sep 2023 06:54      CAPILLARY BLOOD GLUCOSE      POCT Blood Glucose.: 196 mg/dL (02 Sep 2023 20:53)  POCT Blood Glucose.: 170 mg/dL (02 Sep 2023 17:48)  POCT Blood Glucose.: 179 mg/dL (02 Sep 2023 12:38)  POCT Blood Glucose.: 108 mg/dL (02 Sep 2023 09:26)        Urinalysis Basic - ( 02 Sep 2023 06:54 )    Color: x / Appearance: x / SG: x / pH: x  Gluc: 192 mg/dL / Ketone: x  / Bili: x / Urobili: x   Blood: x / Protein: x / Nitrite: x   Leuk Esterase: x / RBC: x / WBC x   Sq Epi: x / Non Sq Epi: x / Bacteria: x      ___________________________________________________  MICRO  Recent Cultures:  Specimen Source: .Blood Blood-Venous, 09-01 @ 19:33; Results   No growth at 24 hours; Gram Stain: --; Organism: --  Specimen Source: .Blood Blood-Peripheral, 09-01 @ 19:30; Results   No growth at 24 hours; Gram Stain: --; Organism: --    ___________________________________________________  MEDICATIONS  (STANDING):  ascorbic acid 500 milliGRAM(s) Oral daily  calcium acetate 667 milliGRAM(s) Oral three times a day with meals  calcium carbonate 1250 mG  + Vitamin D (OsCal 500 + D) 1 Tablet(s) Oral three times a day  chlorhexidine 2% Cloths 1 Application(s) Topical daily  folic acid 1 milliGRAM(s) Oral daily  heparin   Injectable 5000 Unit(s) SubCutaneous every 12 hours  hydrALAZINE 50 milliGRAM(s) Oral daily  influenza  Vaccine (HIGH DOSE) 0.7 milliLiter(s) IntraMuscular once  metoprolol succinate ER 12.5 milliGRAM(s) Oral daily  Nephro-nathanael 1 Tablet(s) Oral daily  pantoprazole    Tablet 40 milliGRAM(s) Oral before breakfast  piperacillin/tazobactam IVPB.. 4.5 Gram(s) IV Intermittent every 12 hours  polyethylene glycol 3350 17 Gram(s) Oral two times a day  senna 2 Tablet(s) Oral at bedtime    MEDICATIONS  (PRN):  acetaminophen     Tablet .. 650 milliGRAM(s) Oral every 6 hours PRN Temp greater or equal to 38C (100.4F), Mild Pain (1 - 3)  aluminum hydroxide/magnesium hydroxide/simethicone Suspension 30 milliLiter(s) Oral every 4 hours PRN Dyspepsia  melatonin 3 milliGRAM(s) Oral at bedtime PRN Insomnia  ondansetron Injectable 4 milliGRAM(s) IV Push every 8 hours PRN Nausea and/or Vomiting  oxyCODONE    IR 2.5 milliGRAM(s) Oral every 6 hours PRN Moderate Pain (4 - 6)  oxyCODONE    IR 5 milliGRAM(s) Oral every 6 hours PRN Severe Pain (7 - 10)  sodium chloride 0.9% Bolus. 100 milliLiter(s) IV Bolus every 5 minutes PRN SBP LESS THAN or EQUAL to 90 mmHg  sodium chloride 0.9% lock flush 10 milliLiter(s) IV Push every 1 hour PRN Pre/post blood products, medications, blood draw, and to maintain line patency

## 2023-09-03 NOTE — PROGRESS NOTE ADULT - SUBJECTIVE AND OBJECTIVE BOX
Vascular SURGERY DAILY PROGRESS NOTE    SUBJECTIVE: Patient seen and evaluated on AM rounds. Pt is resting comfortably in bed with no complaints.    Overnight Events:  No acute events overnight  -----------------------------------------------------------------------------------------------------------------------------------------------------------------------------------------------------------  OBJECTIVE:  Vital Signs Last 24 Hrs  T(C): 36.7 (03 Sep 2023 05:25), Max: 36.9 (02 Sep 2023 20:30)  T(F): 98 (03 Sep 2023 05:25), Max: 98.5 (02 Sep 2023 20:35)  HR: 69 (03 Sep 2023 05:25) (67 - 74)  BP: 155/86 (03 Sep 2023 05:25) (136/74 - 155/86)  BP(mean): --  RR: 18 (03 Sep 2023 05:25) (16 - 18)  SpO2: 96% (03 Sep 2023 05:25) (96% - 99%)    Parameters below as of 03 Sep 2023 05:25  Patient On (Oxygen Delivery Method): room air      I&O's Detail    02 Sep 2023 07:01  -  03 Sep 2023 07:00  --------------------------------------------------------  IN:    Other (mL): 400 mL  Total IN: 400 mL    OUT:    Other (mL): 2400 mL  Total OUT: 2400 mL    Total NET: -2000 mL        Daily     Daily   MEDICATIONS  (STANDING):  ascorbic acid 500 milliGRAM(s) Oral daily  calcium acetate 667 milliGRAM(s) Oral three times a day with meals  calcium carbonate 1250 mG  + Vitamin D (OsCal 500 + D) 1 Tablet(s) Oral three times a day  chlorhexidine 2% Cloths 1 Application(s) Topical daily  folic acid 1 milliGRAM(s) Oral daily  heparin   Injectable 5000 Unit(s) SubCutaneous every 12 hours  hydrALAZINE 50 milliGRAM(s) Oral daily  influenza  Vaccine (HIGH DOSE) 0.7 milliLiter(s) IntraMuscular once  metoprolol succinate ER 12.5 milliGRAM(s) Oral daily  Nephro-nathanael 1 Tablet(s) Oral daily  pantoprazole    Tablet 40 milliGRAM(s) Oral before breakfast  piperacillin/tazobactam IVPB.. 4.5 Gram(s) IV Intermittent every 12 hours  polyethylene glycol 3350 17 Gram(s) Oral two times a day  senna 2 Tablet(s) Oral at bedtime    MEDICATIONS  (PRN):  acetaminophen     Tablet .. 650 milliGRAM(s) Oral every 6 hours PRN Temp greater or equal to 38C (100.4F), Mild Pain (1 - 3)  aluminum hydroxide/magnesium hydroxide/simethicone Suspension 30 milliLiter(s) Oral every 4 hours PRN Dyspepsia  melatonin 3 milliGRAM(s) Oral at bedtime PRN Insomnia  ondansetron Injectable 4 milliGRAM(s) IV Push every 8 hours PRN Nausea and/or Vomiting  oxyCODONE    IR 2.5 milliGRAM(s) Oral every 6 hours PRN Moderate Pain (4 - 6)  oxyCODONE    IR 5 milliGRAM(s) Oral every 6 hours PRN Severe Pain (7 - 10)  sodium chloride 0.9% Bolus. 100 milliLiter(s) IV Bolus every 5 minutes PRN SBP LESS THAN or EQUAL to 90 mmHg  sodium chloride 0.9% lock flush 10 milliLiter(s) IV Push every 1 hour PRN Pre/post blood products, medications, blood draw, and to maintain line patency      LABS:                        8.8    6.24  )-----------( 136      ( 02 Sep 2023 06:54 )             27.7     09-02    134<L>  |  97<L>  |  38<H>  ----------------------------<  192<H>  4.2   |  27  |  4.91<H>    Ca    9.3      02 Sep 2023 06:54  Phos  3.2     09-02  Mg     2.20     09-02    TPro  6.8  /  Alb  2.5<L>  /  TBili  1.0  /  DBili  x   /  AST  27  /  ALT  14  /  AlkPhos  256<H>  09-02      Urinalysis Basic - ( 02 Sep 2023 06:54 )    Color: x / Appearance: x / SG: x / pH: x  Gluc: 192 mg/dL / Ketone: x  / Bili: x / Urobili: x   Blood: x / Protein: x / Nitrite: x   Leuk Esterase: x / RBC: x / WBC x   Sq Epi: x / Non Sq Epi: x / Bacteria: x    PHYSICAL EXAM:  Physical Exam:  General: WN/WD NAD  Neurology: nonfocal, follows commands  Respiratory: nonlabored breathing on RA  CV: HDS  PORFIRIO serosanguinous output  Extremities: LUE palpable radial and Ulnar pulses, hand re-wrapped in ace to include hand.

## 2023-09-03 NOTE — PROGRESS NOTE ADULT - ASSESSMENT
78F w L hip surgical incision w mild drainage s/p SUZANNA and peter    Plan:  dressing changes PRN as saturated. DO NOT USE SILK TAPE ON SKIN  monitor labs  WBAT LLE  PT/OT  Vascular/IR plans for tunnelled cath pending ID clearance

## 2023-09-04 LAB
ANION GAP SERPL CALC-SCNC: 13 MMOL/L — SIGNIFICANT CHANGE UP (ref 7–14)
BUN SERPL-MCNC: 33 MG/DL — HIGH (ref 7–23)
CALCIUM SERPL-MCNC: 9.3 MG/DL — SIGNIFICANT CHANGE UP (ref 8.4–10.5)
CHLORIDE SERPL-SCNC: 96 MMOL/L — LOW (ref 98–107)
CO2 SERPL-SCNC: 25 MMOL/L — SIGNIFICANT CHANGE UP (ref 22–31)
CREAT SERPL-MCNC: 4.87 MG/DL — HIGH (ref 0.5–1.3)
EGFR: 9 ML/MIN/1.73M2 — LOW
GLUCOSE SERPL-MCNC: 243 MG/DL — HIGH (ref 70–99)
HCT VFR BLD CALC: 31 % — LOW (ref 34.5–45)
HGB BLD-MCNC: 9.9 G/DL — LOW (ref 11.5–15.5)
MAGNESIUM SERPL-MCNC: 2.1 MG/DL — SIGNIFICANT CHANGE UP (ref 1.6–2.6)
MCHC RBC-ENTMCNC: 31.8 PG — SIGNIFICANT CHANGE UP (ref 27–34)
MCHC RBC-ENTMCNC: 31.9 GM/DL — LOW (ref 32–36)
MCV RBC AUTO: 99.7 FL — SIGNIFICANT CHANGE UP (ref 80–100)
NRBC # BLD: 0 /100 WBCS — SIGNIFICANT CHANGE UP (ref 0–0)
NRBC # FLD: 0 K/UL — SIGNIFICANT CHANGE UP (ref 0–0)
PHOSPHATE SERPL-MCNC: 3.9 MG/DL — SIGNIFICANT CHANGE UP (ref 2.5–4.5)
PLATELET # BLD AUTO: 153 K/UL — SIGNIFICANT CHANGE UP (ref 150–400)
POTASSIUM SERPL-MCNC: 4.8 MMOL/L — SIGNIFICANT CHANGE UP (ref 3.5–5.3)
POTASSIUM SERPL-SCNC: 4.8 MMOL/L — SIGNIFICANT CHANGE UP (ref 3.5–5.3)
RBC # BLD: 3.11 M/UL — LOW (ref 3.8–5.2)
RBC # FLD: 18.3 % — HIGH (ref 10.3–14.5)
SODIUM SERPL-SCNC: 134 MMOL/L — LOW (ref 135–145)
VANCOMYCIN TROUGH SERPL-MCNC: 11 UG/ML — SIGNIFICANT CHANGE UP (ref 10–20)
WBC # BLD: 7.2 K/UL — SIGNIFICANT CHANGE UP (ref 3.8–10.5)
WBC # FLD AUTO: 7.2 K/UL — SIGNIFICANT CHANGE UP (ref 3.8–10.5)

## 2023-09-04 RX ORDER — ACETAMINOPHEN 500 MG
975 TABLET ORAL EVERY 6 HOURS
Refills: 0 | Status: DISCONTINUED | OUTPATIENT
Start: 2023-09-04 | End: 2023-09-07

## 2023-09-04 RX ADMIN — Medication 500 MILLIGRAM(S): at 12:06

## 2023-09-04 RX ADMIN — Medication 50 MILLIGRAM(S): at 06:02

## 2023-09-04 RX ADMIN — Medication 1 TABLET(S): at 21:22

## 2023-09-04 RX ADMIN — Medication 1 TABLET(S): at 14:36

## 2023-09-04 RX ADMIN — HEPARIN SODIUM 5000 UNIT(S): 5000 INJECTION INTRAVENOUS; SUBCUTANEOUS at 18:14

## 2023-09-04 RX ADMIN — HEPARIN SODIUM 5000 UNIT(S): 5000 INJECTION INTRAVENOUS; SUBCUTANEOUS at 06:01

## 2023-09-04 RX ADMIN — PANTOPRAZOLE SODIUM 40 MILLIGRAM(S): 20 TABLET, DELAYED RELEASE ORAL at 06:03

## 2023-09-04 RX ADMIN — CHLORHEXIDINE GLUCONATE 1 APPLICATION(S): 213 SOLUTION TOPICAL at 12:06

## 2023-09-04 RX ADMIN — Medication 667 MILLIGRAM(S): at 12:06

## 2023-09-04 RX ADMIN — Medication 1 TABLET(S): at 12:06

## 2023-09-04 RX ADMIN — Medication 1 MILLIGRAM(S): at 12:07

## 2023-09-04 RX ADMIN — Medication 667 MILLIGRAM(S): at 09:09

## 2023-09-04 RX ADMIN — POLYETHYLENE GLYCOL 3350 17 GRAM(S): 17 POWDER, FOR SOLUTION ORAL at 06:02

## 2023-09-04 RX ADMIN — PIPERACILLIN AND TAZOBACTAM 25 GRAM(S): 4; .5 INJECTION, POWDER, LYOPHILIZED, FOR SOLUTION INTRAVENOUS at 00:34

## 2023-09-04 RX ADMIN — Medication 667 MILLIGRAM(S): at 18:14

## 2023-09-04 RX ADMIN — Medication 12.5 MILLIGRAM(S): at 06:01

## 2023-09-04 RX ADMIN — Medication 975 MILLIGRAM(S): at 18:14

## 2023-09-04 RX ADMIN — OXYCODONE HYDROCHLORIDE 5 MILLIGRAM(S): 5 TABLET ORAL at 01:32

## 2023-09-04 RX ADMIN — OXYCODONE HYDROCHLORIDE 5 MILLIGRAM(S): 5 TABLET ORAL at 00:33

## 2023-09-04 RX ADMIN — Medication 975 MILLIGRAM(S): at 12:09

## 2023-09-04 RX ADMIN — PIPERACILLIN AND TAZOBACTAM 25 GRAM(S): 4; .5 INJECTION, POWDER, LYOPHILIZED, FOR SOLUTION INTRAVENOUS at 12:05

## 2023-09-04 RX ADMIN — Medication 1 TABLET(S): at 06:02

## 2023-09-04 NOTE — PROGRESS NOTE ADULT - SUBJECTIVE AND OBJECTIVE BOX
Cardiovascular Disease Progress Note  Date of Service: 09-04-23 @ 11:33    Overnight events: No acute events overnight.    Patient denies chest pain or SOB.        Objective Findings:  T(C): 36.9 (09-04-23 @ 10:18), Max: 36.9 (09-04-23 @ 05:59)  HR: 55 (09-04-23 @ 10:18) (55 - 72)  BP: 157/84 (09-04-23 @ 10:18) (128/81 - 157/84)  RR: 17 (09-04-23 @ 10:18) (17 - 18)  SpO2: 98% (09-04-23 @ 10:18) (96% - 98%)  Wt(kg): --  Daily     Daily       Physical Exam:  Gen: NAD; Patient resting comfortably  HEENT: EOMI, Normocephalic/ atraumatic  CV: RRR, normal S1 + S2, no m/r/g  Lungs:  Normal respiratory effort; clear to auscultation bilaterally  Abd: soft, non-tender; bowel sounds present  Ext: No edema; warm and well perfused    Telemetry: n/a    Laboratory Data:                        9.9    7.20  )-----------( 153      ( 04 Sep 2023 10:00 )             31.0     09-04    134<L>  |  96<L>  |  33<H>  ----------------------------<  243<H>  4.8   |  25  |  4.87<H>    Ca    9.3      04 Sep 2023 10:00  Phos  3.9     09-04  Mg     2.10     09-04                Inpatient Medications:  MEDICATIONS  (STANDING):  acetaminophen     Tablet .. 975 milliGRAM(s) Oral every 6 hours  ascorbic acid 500 milliGRAM(s) Oral daily  calcium acetate 667 milliGRAM(s) Oral three times a day with meals  calcium carbonate 1250 mG  + Vitamin D (OsCal 500 + D) 1 Tablet(s) Oral three times a day  chlorhexidine 2% Cloths 1 Application(s) Topical daily  folic acid 1 milliGRAM(s) Oral daily  heparin   Injectable 5000 Unit(s) SubCutaneous every 12 hours  hydrALAZINE 50 milliGRAM(s) Oral daily  influenza  Vaccine (HIGH DOSE) 0.7 milliLiter(s) IntraMuscular once  metoprolol succinate ER 12.5 milliGRAM(s) Oral daily  Nephro-nathanael 1 Tablet(s) Oral daily  pantoprazole    Tablet 40 milliGRAM(s) Oral before breakfast  piperacillin/tazobactam IVPB.. 4.5 Gram(s) IV Intermittent every 12 hours  polyethylene glycol 3350 17 Gram(s) Oral two times a day  senna 2 Tablet(s) Oral at bedtime      Assessment: 78 year old woman with ESRD on HD (MWF), HTN, DM (diet controlled), chronic type B aortic dissection, heart murmur, endometrial ca in remission s/p total hysterectomy in 2007 presents with fistula malfunction.      Plan of Care:      #Pre-operative risk evaluation-  - Ms. Harris displays no signs of  coronary ischemia or decompensated CHF.   EKG is sinus with a known RBBB.  She is optimized from a cardiac standpoint to proceed with Permacath.           #Type B aortic dissection  - Chronic (Diagnosed >10 years ago)  - Optimal BP control.        #ESRD-  - HD as per renal.     #ACP (advance care planning)-  Advanced care planning was discussed with the patient.   Cardiac findings were discussed in detail and all questions were answered.     Over 25 minutes spent on total encounter; more than 50% of the visit was spent counseling and/or coordinating care by the attending physician.      Gabo Burris MD [facility]  Cardiovascular Disease  [phone]

## 2023-09-04 NOTE — PROGRESS NOTE ADULT - ASSESSMENT
Ms. 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 Arterial and venous end oversewn. Plastics closure. Prevena Vac placed.    -Prevena holding suction, to be removed POD5  - PORFIRIO drain caire  -  pain control  - continue care per primary team

## 2023-09-04 NOTE — PROGRESS NOTE ADULT - SUBJECTIVE AND OBJECTIVE BOX
Vascular Progress Note    S: Patient seen and examined. No acute events overnight. Patient reports feeling pain with the prevena vac.     O:  Physical Exam:  Gen: Laying in bed, NAD  HEENT: atrumatic, EMOI  Resp: Unlabored breathing  Vascular: Palpable L radial pulse; LUE swelling improving, wrapped in ace bandage and elevated      Vital Signs Last 24 Hrs  T(C): 36.9 (04 Sep 2023 05:59), Max: 36.9 (04 Sep 2023 05:59)  T(F): 98.5 (04 Sep 2023 05:59), Max: 98.5 (04 Sep 2023 05:59)  HR: 72 (04 Sep 2023 05:59) (64 - 72)  BP: 156/92 (04 Sep 2023 05:59) (128/81 - 156/92)  BP(mean): 109 (04 Sep 2023 05:59) (109 - 109)  RR: 17 (04 Sep 2023 05:59) (17 - 18)  SpO2: 97% (04 Sep 2023 05:59) (96% - 97%)    Parameters below as of 04 Sep 2023 05:59  Patient On (Oxygen Delivery Method): room air        I&O's Detail    03 Sep 2023 07:01  -  04 Sep 2023 07:00  --------------------------------------------------------  IN:    IV PiggyBack: 200 mL    Oral Fluid: 390 mL  Total IN: 590 mL    OUT:    Bulb (mL): 35 mL  Total OUT: 35 mL    Total NET: 555 mL

## 2023-09-04 NOTE — PROGRESS NOTE ADULT - SUBJECTIVE AND OBJECTIVE BOX
Plastic Surgery Progress Note (pg [facility]: [id number], [facility]: [phone])    SUBJECTIVE  The patient was seen and examined.     OBJECTIVE  ___________________________________________________  VITAL SIGNS / I&O's   Vital Signs Last 24 Hrs  T(C): 36.7 (03 Sep 2023 19:09), Max: 36.7 (03 Sep 2023 19:09)  T(F): 98 (03 Sep 2023 19:09), Max: 98 (03 Sep 2023 19:09)  HR: 64 (03 Sep 2023 19:09) (64 - 69)  BP: 128/81 (03 Sep 2023 19:09) (128/81 - 151/75)  BP(mean): --  RR: 18 (03 Sep 2023 19:09) (18 - 18)  SpO2: 96% (03 Sep 2023 19:09) (96% - 96%)    Parameters below as of 03 Sep 2023 19:09  Patient On (Oxygen Delivery Method): room air          02 Sep 2023 07:01  -  03 Sep 2023 07:00  --------------------------------------------------------  IN:    Other (mL): 400 mL  Total IN: 400 mL    OUT:    Other (mL): 2400 mL  Total OUT: 2400 mL    Total NET: -2000 mL      03 Sep 2023 07:01  -  04 Sep 2023 05:37  --------------------------------------------------------  IN:    IV PiggyBack: 100 mL  Total IN: 100 mL    OUT:    Bulb (mL): 20 mL  Total OUT: 20 mL    Total NET: 80 mL        ___________________________________________________  PHYSICAL EXAM      CONSTITUTIONAL: no apparent distress  PSYCH: Appropriate insight/judgment; A+O x 3, mood and affect appropriate  HENT: CN II-XII grossly intact, No conjunctival or scleral injection, non-icteric  NECK: Supple, symmetric and without tracheal deviation   RESP: No respiratory distress, no use of accessory muscles  SKIN: No rashes or ulcers noted, Dressings intact.  New vac holding suction and functioning without issue  ________________________________________________________  LABS                        8.8    6.24  )-----------( 136      ( 02 Sep 2023 06:54 )             27.7     02 Sep 2023 06:54    134    |  97     |  38     ----------------------------<  192    4.2     |  27     |  4.91     Ca    9.3        02 Sep 2023 06:54  Phos  3.2       02 Sep 2023 06:54  Mg     2.20      02 Sep 2023 06:54    TPro  6.8    /  Alb  2.5    /  TBili  1.0    /  DBili  x      /  AST  27     /  ALT  14     /  AlkPhos  256    02 Sep 2023 06:54      CAPILLARY BLOOD GLUCOSE            Urinalysis Basic - ( 02 Sep 2023 06:54 )    Color: x / Appearance: x / SG: x / pH: x  Gluc: 192 mg/dL / Ketone: x  / Bili: x / Urobili: x   Blood: x / Protein: x / Nitrite: x   Leuk Esterase: x / RBC: x / WBC x   Sq Epi: x / Non Sq Epi: x / Bacteria: x      ___________________________________________________  MICRO  Recent Cultures:  Specimen Source: .Blood Blood-Venous, 09-01 @ 19:33; Results   No growth at 48 Hours; Gram Stain: --; Organism: --  Specimen Source: .Blood Blood-Peripheral, 09-01 @ 19:30; Results   No growth at 48 Hours; Gram Stain: --; Organism: --    ___________________________________________________  MEDICATIONS  (STANDING):  ascorbic acid 500 milliGRAM(s) Oral daily  calcium acetate 667 milliGRAM(s) Oral three times a day with meals  calcium carbonate 1250 mG  + Vitamin D (OsCal 500 + D) 1 Tablet(s) Oral three times a day  chlorhexidine 2% Cloths 1 Application(s) Topical daily  folic acid 1 milliGRAM(s) Oral daily  heparin   Injectable 5000 Unit(s) SubCutaneous every 12 hours  hydrALAZINE 50 milliGRAM(s) Oral daily  influenza  Vaccine (HIGH DOSE) 0.7 milliLiter(s) IntraMuscular once  metoprolol succinate ER 12.5 milliGRAM(s) Oral daily  Nephro-nathanael 1 Tablet(s) Oral daily  pantoprazole    Tablet 40 milliGRAM(s) Oral before breakfast  piperacillin/tazobactam IVPB.. 4.5 Gram(s) IV Intermittent every 12 hours  polyethylene glycol 3350 17 Gram(s) Oral two times a day  senna 2 Tablet(s) Oral at bedtime    MEDICATIONS  (PRN):  acetaminophen     Tablet .. 650 milliGRAM(s) Oral every 6 hours PRN Temp greater or equal to 38C (100.4F), Mild Pain (1 - 3)  aluminum hydroxide/magnesium hydroxide/simethicone Suspension 30 milliLiter(s) Oral every 4 hours PRN Dyspepsia  melatonin 3 milliGRAM(s) Oral at bedtime PRN Insomnia  ondansetron Injectable 4 milliGRAM(s) IV Push every 8 hours PRN Nausea and/or Vomiting  oxyCODONE    IR 2.5 milliGRAM(s) Oral every 6 hours PRN Moderate Pain (4 - 6)  oxyCODONE    IR 5 milliGRAM(s) Oral every 6 hours PRN Severe Pain (7 - 10)  sodium chloride 0.9% Bolus. 100 milliLiter(s) IV Bolus every 5 minutes PRN SBP LESS THAN or EQUAL to 90 mmHg  sodium chloride 0.9% lock flush 10 milliLiter(s) IV Push every 1 hour PRN Pre/post blood products, medications, blood draw, and to maintain line patency           Plastic Surgery Progress Note (pg [facility]: [id number], [facility]: [phone])    SUBJECTIVE  The patient was seen and examined.     OBJECTIVE  ___________________________________________________  VITAL SIGNS / I&O's   Vital Signs Last 24 Hrs  T(C): 36.7 (03 Sep 2023 19:09), Max: 36.7 (03 Sep 2023 19:09)  T(F): 98 (03 Sep 2023 19:09), Max: 98 (03 Sep 2023 19:09)  HR: 64 (03 Sep 2023 19:09) (64 - 69)  BP: 128/81 (03 Sep 2023 19:09) (128/81 - 151/75)  BP(mean): --  RR: 18 (03 Sep 2023 19:09) (18 - 18)  SpO2: 96% (03 Sep 2023 19:09) (96% - 96%)    Parameters below as of 03 Sep 2023 19:09  Patient On (Oxygen Delivery Method): room air          02 Sep 2023 07:01  -  03 Sep 2023 07:00  --------------------------------------------------------  IN:    Other (mL): 400 mL  Total IN: 400 mL    OUT:    Other (mL): 2400 mL  Total OUT: 2400 mL    Total NET: -2000 mL      03 Sep 2023 07:01  -  04 Sep 2023 05:37  --------------------------------------------------------  IN:    IV PiggyBack: 100 mL  Total IN: 100 mL    OUT:    Bulb (mL): 20 mL  Total OUT: 20 mL    Total NET: 80 mL        ___________________________________________________  PHYSICAL EXAM      CONSTITUTIONAL: no apparent distress  PSYCH: Appropriate insight/judgment; A+O x 3, mood and affect appropriate  HENT: CN II-XII grossly intact, No conjunctival or scleral injection, non-icteric  NECK: Supple, symmetric and without tracheal deviation   RESP: No respiratory distress, no use of accessory muscles  Extremities: LUE: Dressings intact.  New vac on holding suction, PORFIRIO drain s/s holding suction, hand edematous, neurosensory intact  ________________________________________________________  LABS                        8.8    6.24  )-----------( 136      ( 02 Sep 2023 06:54 )             27.7     02 Sep 2023 06:54    134    |  97     |  38     ----------------------------<  192    4.2     |  27     |  4.91     Ca    9.3        02 Sep 2023 06:54  Phos  3.2       02 Sep 2023 06:54  Mg     2.20      02 Sep 2023 06:54    TPro  6.8    /  Alb  2.5    /  TBili  1.0    /  DBili  x      /  AST  27     /  ALT  14     /  AlkPhos  256    02 Sep 2023 06:54      CAPILLARY BLOOD GLUCOSE            Urinalysis Basic - ( 02 Sep 2023 06:54 )    Color: x / Appearance: x / SG: x / pH: x  Gluc: 192 mg/dL / Ketone: x  / Bili: x / Urobili: x   Blood: x / Protein: x / Nitrite: x   Leuk Esterase: x / RBC: x / WBC x   Sq Epi: x / Non Sq Epi: x / Bacteria: x      ___________________________________________________  MICRO  Recent Cultures:  Specimen Source: .Blood Blood-Venous, 09-01 @ 19:33; Results   No growth at 48 Hours; Gram Stain: --; Organism: --  Specimen Source: .Blood Blood-Peripheral, 09-01 @ 19:30; Results   No growth at 48 Hours; Gram Stain: --; Organism: --    ___________________________________________________  MEDICATIONS  (STANDING):  ascorbic acid 500 milliGRAM(s) Oral daily  calcium acetate 667 milliGRAM(s) Oral three times a day with meals  calcium carbonate 1250 mG  + Vitamin D (OsCal 500 + D) 1 Tablet(s) Oral three times a day  chlorhexidine 2% Cloths 1 Application(s) Topical daily  folic acid 1 milliGRAM(s) Oral daily  heparin   Injectable 5000 Unit(s) SubCutaneous every 12 hours  hydrALAZINE 50 milliGRAM(s) Oral daily  influenza  Vaccine (HIGH DOSE) 0.7 milliLiter(s) IntraMuscular once  metoprolol succinate ER 12.5 milliGRAM(s) Oral daily  Nephro-nathanael 1 Tablet(s) Oral daily  pantoprazole    Tablet 40 milliGRAM(s) Oral before breakfast  piperacillin/tazobactam IVPB.. 4.5 Gram(s) IV Intermittent every 12 hours  polyethylene glycol 3350 17 Gram(s) Oral two times a day  senna 2 Tablet(s) Oral at bedtime    MEDICATIONS  (PRN):  acetaminophen     Tablet .. 650 milliGRAM(s) Oral every 6 hours PRN Temp greater or equal to 38C (100.4F), Mild Pain (1 - 3)  aluminum hydroxide/magnesium hydroxide/simethicone Suspension 30 milliLiter(s) Oral every 4 hours PRN Dyspepsia  melatonin 3 milliGRAM(s) Oral at bedtime PRN Insomnia  ondansetron Injectable 4 milliGRAM(s) IV Push every 8 hours PRN Nausea and/or Vomiting  oxyCODONE    IR 2.5 milliGRAM(s) Oral every 6 hours PRN Moderate Pain (4 - 6)  oxyCODONE    IR 5 milliGRAM(s) Oral every 6 hours PRN Severe Pain (7 - 10)  sodium chloride 0.9% Bolus. 100 milliLiter(s) IV Bolus every 5 minutes PRN SBP LESS THAN or EQUAL to 90 mmHg  sodium chloride 0.9% lock flush 10 milliLiter(s) IV Push every 1 hour PRN Pre/post blood products, medications, blood draw, and to maintain line patency

## 2023-09-04 NOTE — PROGRESS NOTE ADULT - ASSESSMENT
78F w L hip surgical incision w mild drainage s/p SUZANNA and peter    Plan:  Prevena vac stays on at all times  monitor labs  WBAT LLE  PT/OT  Vascular/IR plans for tunnelled cath pending ID clearance      For all questions related to patient care, please reach out to the on-call team via the pager.     Elle Ulloa, PGY 2  Orthopaedic Surgery  [facility] [id number]  [facility] [phone]  [facility] [phone]

## 2023-09-04 NOTE — PROGRESS NOTE ADULT - SUBJECTIVE AND OBJECTIVE BOX
ORTHOPEDIC PROGRESS NOTE    Overnight events: None    SUBJECTIVE: Pt seen and examined at bedside. Patient is doing well, no acute complaints this AM. Pain is controlled with medication      OBJECTIVE:  Vital Signs Last 24 Hrs  T(C): 36.7 (03 Sep 2023 19:09), Max: 36.7 (03 Sep 2023 19:09)  T(F): 98 (03 Sep 2023 19:09), Max: 98 (03 Sep 2023 19:09)  HR: 64 (03 Sep 2023 19:09) (64 - 69)  BP: 128/81 (03 Sep 2023 19:09) (128/81 - 151/75)  BP(mean): --  RR: 18 (03 Sep 2023 19:09) (18 - 18)  SpO2: 96% (03 Sep 2023 19:09) (96% - 96%)    Parameters below as of 03 Sep 2023 19:09  Patient On (Oxygen Delivery Method): room air          09-02-23 @ 07:01  -  09-03-23 @ 07:00  --------------------------------------------------------  IN: 400 mL / OUT: 2400 mL / NET: -2000 mL    09-03-23 @ 07:01  -  09-04-23 @ 05:59  --------------------------------------------------------  IN: 100 mL / OUT: 20 mL / NET: 80 mL        Physical Examination:  GEN: NAD, resting quietly  PULM: symmetric chest rise bilaterally, no increased WOB  ABD: nondistended  EXTR:   LLE:  Prevena intact, holding suction  SILT Bedolla/Sa/DP/SP/T  +EHL/FHL/TA/GSC  Compartments soft/non-tender  Toes warm, Cap refill brisk/warm and perfused, +DP pulse       LABS:                        8.8    6.24  )-----------( 136      ( 02 Sep 2023 06:54 )             27.7       09-02    134<L>  |  97<L>  |  38<H>  ----------------------------<  192<H>  4.2   |  27  |  4.91<H>    Ca    9.3      02 Sep 2023 06:54  Phos  3.2     09-02  Mg     2.20     09-02    TPro  6.8  /  Alb  2.5<L>  /  TBili  1.0  /  DBili  x   /  AST  27  /  ALT  14  /  AlkPhos  256<H>  09-02

## 2023-09-04 NOTE — PROGRESS NOTE ADULT - ASSESSMENT
Assessment and Plan:   · Assessment	  77 y.o. Female w/ Hx HTN, DM, chronic type B aortic dissection, ESRD on HD ( M, W, F ) via LUE brachiocephalic (2018) fistula, endometrial ca in remission s/p total hysterectomy in 2007 recently admitted for L hip revision (07-08/2023) during which she underwent LUE subclavian vein stenting 8/1 for stenosis and subsequent brachiocephalic AVF revision 8/3 with resection of the involved segment and primary anastomosis for ulceration. She presents after a missed HD session due to concern for overlying infection at the AVF site. S/p LUE ACF ligation and aneurysm resection, closure by PRS and prevena vac placement on 8/31. Planning for RUE AV fistula.    PLAN:  - plan for tunneled catheter to be placed by IR  - RUE vein mapping   - Continue PT  - Pain control PRN  - ELevate LUE while in bed / chair.   - keep ACE wrap in place  - diet as tolerated  - check am labs.   - Prevena removal POD 5 per PRS  - CT venogram - no evidence of central stenosis   - DVT ppx  - HD today    Vascular Surgery [id number]

## 2023-09-05 LAB
ALBUMIN SERPL ELPH-MCNC: 2.3 G/DL — LOW (ref 3.3–5)
ALP SERPL-CCNC: 226 U/L — HIGH (ref 40–120)
ALT FLD-CCNC: 19 U/L — SIGNIFICANT CHANGE UP (ref 4–33)
ANION GAP SERPL CALC-SCNC: 14 MMOL/L — SIGNIFICANT CHANGE UP (ref 7–14)
AST SERPL-CCNC: 39 U/L — HIGH (ref 4–32)
BILIRUB SERPL-MCNC: 0.7 MG/DL — SIGNIFICANT CHANGE UP (ref 0.2–1.2)
BUN SERPL-MCNC: 43 MG/DL — HIGH (ref 7–23)
CALCIUM SERPL-MCNC: 9.1 MG/DL — SIGNIFICANT CHANGE UP (ref 8.4–10.5)
CHLORIDE SERPL-SCNC: 93 MMOL/L — LOW (ref 98–107)
CO2 SERPL-SCNC: 24 MMOL/L — SIGNIFICANT CHANGE UP (ref 22–31)
CREAT SERPL-MCNC: 5.55 MG/DL — HIGH (ref 0.5–1.3)
EGFR: 7 ML/MIN/1.73M2 — LOW
GLUCOSE SERPL-MCNC: 179 MG/DL — HIGH (ref 70–99)
HCT VFR BLD CALC: 25.5 % — LOW (ref 34.5–45)
HGB BLD-MCNC: 8.5 G/DL — LOW (ref 11.5–15.5)
MAGNESIUM SERPL-MCNC: 2.2 MG/DL — SIGNIFICANT CHANGE UP (ref 1.6–2.6)
MCHC RBC-ENTMCNC: 32.1 PG — SIGNIFICANT CHANGE UP (ref 27–34)
MCHC RBC-ENTMCNC: 33.3 GM/DL — SIGNIFICANT CHANGE UP (ref 32–36)
MCV RBC AUTO: 96.2 FL — SIGNIFICANT CHANGE UP (ref 80–100)
NRBC # BLD: 0 /100 WBCS — SIGNIFICANT CHANGE UP (ref 0–0)
NRBC # FLD: 0 K/UL — SIGNIFICANT CHANGE UP (ref 0–0)
PHOSPHATE SERPL-MCNC: 4.2 MG/DL — SIGNIFICANT CHANGE UP (ref 2.5–4.5)
PLATELET # BLD AUTO: 138 K/UL — LOW (ref 150–400)
POTASSIUM SERPL-MCNC: 4.5 MMOL/L — SIGNIFICANT CHANGE UP (ref 3.5–5.3)
POTASSIUM SERPL-SCNC: 4.5 MMOL/L — SIGNIFICANT CHANGE UP (ref 3.5–5.3)
PROT SERPL-MCNC: 6.5 G/DL — SIGNIFICANT CHANGE UP (ref 6–8.3)
RBC # BLD: 2.65 M/UL — LOW (ref 3.8–5.2)
RBC # FLD: 17.7 % — HIGH (ref 10.3–14.5)
SODIUM SERPL-SCNC: 131 MMOL/L — LOW (ref 135–145)
WBC # BLD: 6.85 K/UL — SIGNIFICANT CHANGE UP (ref 3.8–10.5)
WBC # FLD AUTO: 6.85 K/UL — SIGNIFICANT CHANGE UP (ref 3.8–10.5)

## 2023-09-05 PROCEDURE — 71045 X-RAY EXAM CHEST 1 VIEW: CPT | Mod: 26

## 2023-09-05 PROCEDURE — 99232 SBSQ HOSP IP/OBS MODERATE 35: CPT | Mod: FS

## 2023-09-05 RX ADMIN — Medication 1 TABLET(S): at 22:04

## 2023-09-05 RX ADMIN — PIPERACILLIN AND TAZOBACTAM 25 GRAM(S): 4; .5 INJECTION, POWDER, LYOPHILIZED, FOR SOLUTION INTRAVENOUS at 02:30

## 2023-09-05 RX ADMIN — HEPARIN SODIUM 5000 UNIT(S): 5000 INJECTION INTRAVENOUS; SUBCUTANEOUS at 05:06

## 2023-09-05 RX ADMIN — Medication 975 MILLIGRAM(S): at 13:28

## 2023-09-05 RX ADMIN — PANTOPRAZOLE SODIUM 40 MILLIGRAM(S): 20 TABLET, DELAYED RELEASE ORAL at 05:07

## 2023-09-05 RX ADMIN — Medication 667 MILLIGRAM(S): at 13:29

## 2023-09-05 RX ADMIN — Medication 975 MILLIGRAM(S): at 00:07

## 2023-09-05 RX ADMIN — Medication 975 MILLIGRAM(S): at 19:06

## 2023-09-05 RX ADMIN — Medication 1 TABLET(S): at 05:06

## 2023-09-05 RX ADMIN — Medication 1 TABLET(S): at 13:29

## 2023-09-05 RX ADMIN — CHLORHEXIDINE GLUCONATE 1 APPLICATION(S): 213 SOLUTION TOPICAL at 13:30

## 2023-09-05 RX ADMIN — Medication 500 MILLIGRAM(S): at 13:29

## 2023-09-05 RX ADMIN — HEPARIN SODIUM 5000 UNIT(S): 5000 INJECTION INTRAVENOUS; SUBCUTANEOUS at 19:06

## 2023-09-05 RX ADMIN — PIPERACILLIN AND TAZOBACTAM 25 GRAM(S): 4; .5 INJECTION, POWDER, LYOPHILIZED, FOR SOLUTION INTRAVENOUS at 13:29

## 2023-09-05 RX ADMIN — Medication 1 MILLIGRAM(S): at 13:29

## 2023-09-05 RX ADMIN — Medication 975 MILLIGRAM(S): at 05:05

## 2023-09-05 RX ADMIN — Medication 1 TABLET(S): at 13:30

## 2023-09-05 RX ADMIN — Medication 12.5 MILLIGRAM(S): at 05:08

## 2023-09-05 RX ADMIN — Medication 975 MILLIGRAM(S): at 23:05

## 2023-09-05 RX ADMIN — Medication 50 MILLIGRAM(S): at 05:07

## 2023-09-05 RX ADMIN — SENNA PLUS 2 TABLET(S): 8.6 TABLET ORAL at 22:04

## 2023-09-05 RX ADMIN — Medication 975 MILLIGRAM(S): at 22:05

## 2023-09-05 RX ADMIN — Medication 667 MILLIGRAM(S): at 19:06

## 2023-09-05 NOTE — PROGRESS NOTE ADULT - ATTENDING COMMENTS
pt is stable s/p AVF ligation  CTV reviewed no central stenosis on the R   will plan R arm HD access creation as outpt
pt is stable s/p AVF ligation  AVF was not salvageble intraop and we made a decision to ligate it  will get PC by IR  CT venogram to access for central venous stenosis on the R in preparation for a new access creation   will plan for new access as outpt.
1. ESRD - Seen on dialysis today.  Tunneled catheter per IR.  S/P AVF surgery.   2. Anemia - monitor hemoglobin.  Review for RICA use prior to next dialysis.  3. Hypertension - monitor on current regimen.  4. Hyperphosphatemia - on binder along with calcium carbonate.  May be able to remove calcium carbonate per serial labs.  To be reviewed again prior to next scheduled diaysis.
ESRD on HD s/p fistula ligation due to wound dehiscence  plan for Hd in am thru femoral cath   PermCath insertion pending for next week  discussed with Dr. Jensen   anemia Hg at goal. start epo when <10  phos controlled on binders  HTn well controlled

## 2023-09-05 NOTE — PROGRESS NOTE ADULT - ASSESSMENT
Ms. 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 Arterial and venous end oversewn. Plastics closure. Prevena Vac placed.    - Prevena holding suction, to be removed POD5  - PORFIRIO drain caire  -  pain control  - continue care per primary team

## 2023-09-05 NOTE — PROGRESS NOTE ADULT - ASSESSMENT
78F w L hip surgical incision w mild drainage s/p SUZANNA and peter    Plan:  Prevena vac on LLE stays on at all times  monitor labs  WBAT LLE  PT/OT  Vascular/IR plans for tunnelled cath pending ID clearance  Remainder of care per primary team

## 2023-09-05 NOTE — PROGRESS NOTE ADULT - ASSESSMENT
77 y.o. Female w/ Hx HTN, DM, chronic type B aortic dissection, ESRD on HD ( M, W, F ) via LUE brachiocephalic (2018) fistula, endometrial ca in remission s/p total hysterectomy in 2007 recently admitted for L hip revision (07-08/2023) during which she underwent LUE subclavian vein stenting 8/1 for stenosis and subsequent brachiocephalic AVF revision 8/3 with resection of the involved segment and primary anastomosis for ulceration. She presents after a missed HD session due to concern for overlying infection at the AVF site. S/p LUE ACF ligation and aneurysm resection, closure by PRS and prevena vac placement on 8/31. Planning for RUE AV fistula.    PLAN:  - plan for tunneled catheter to be placed by IR tentatively today  - RUE vein mapping   - Continue PT  - Pain control PRN  - ELevate LUE while in bed / chair.   - keep ACE wrap in place  - diet as tolerated  - check am labs.   - Prevena removal POD 5 per PRS  - CT venogram - no evidence of central stenosis   - DVT ppx  - HD today. Normally on MWF schedule outpatient.    Vascular Surgery [id number]

## 2023-09-05 NOTE — PROGRESS NOTE ADULT - SUBJECTIVE AND OBJECTIVE BOX
TEAM SURGERY PROGRESS NOTE    POST OPERATIVE DAY #: 5 s/p LUE AVF ligation and aneursym resection w/ primary closure and prevena vac placement by Plastics    SUBJECTIVE: Patient seen and examined at bedside on AM rounds, patient without complaints. En route to dialysis    Vital Signs Last 24 Hrs  T(C): 36.2 (05 Sep 2023 06:25), Max: 36.9 (04 Sep 2023 10:18)  T(F): 97.2 (05 Sep 2023 06:25), Max: 98.4 (04 Sep 2023 10:18)  HR: 52 (05 Sep 2023 06:25) (52 - 55)  BP: 136/68 (05 Sep 2023 06:25) (136/68 - 163/78)  BP(mean): --  RR: 17 (05 Sep 2023 06:25) (17 - 18)  SpO2: 99% (05 Sep 2023 05:00) (98% - 100%)    Parameters below as of 05 Sep 2023 06:25  Patient On (Oxygen Delivery Method): room air      I&O's Detail    04 Sep 2023 07:01  -  05 Sep 2023 07:00  --------------------------------------------------------  IN:    IV PiggyBack: 100 mL    Oral Fluid: 200 mL  Total IN: 300 mL    OUT:    Bulb (mL): 10 mL    Voided (mL): 0 mL  Total OUT: 10 mL    Total NET: 290 mL        MEDICATIONS  (STANDING):  acetaminophen     Tablet .. 975 milliGRAM(s) Oral every 6 hours  ascorbic acid 500 milliGRAM(s) Oral daily  calcium acetate 667 milliGRAM(s) Oral three times a day with meals  calcium carbonate 1250 mG  + Vitamin D (OsCal 500 + D) 1 Tablet(s) Oral three times a day  chlorhexidine 2% Cloths 1 Application(s) Topical daily  folic acid 1 milliGRAM(s) Oral daily  heparin   Injectable 5000 Unit(s) SubCutaneous every 12 hours  hydrALAZINE 50 milliGRAM(s) Oral daily  influenza  Vaccine (HIGH DOSE) 0.7 milliLiter(s) IntraMuscular once  metoprolol succinate ER 12.5 milliGRAM(s) Oral daily  Nephro-nathanael 1 Tablet(s) Oral daily  pantoprazole    Tablet 40 milliGRAM(s) Oral before breakfast  piperacillin/tazobactam IVPB.. 4.5 Gram(s) IV Intermittent every 12 hours  polyethylene glycol 3350 17 Gram(s) Oral two times a day  senna 2 Tablet(s) Oral at bedtime    MEDICATIONS  (PRN):  aluminum hydroxide/magnesium hydroxide/simethicone Suspension 30 milliLiter(s) Oral every 4 hours PRN Dyspepsia  melatonin 3 milliGRAM(s) Oral at bedtime PRN Insomnia  ondansetron Injectable 4 milliGRAM(s) IV Push every 8 hours PRN Nausea and/or Vomiting  oxyCODONE    IR 5 milliGRAM(s) Oral every 6 hours PRN Severe Pain (7 - 10)  oxyCODONE    IR 2.5 milliGRAM(s) Oral every 6 hours PRN Moderate Pain (4 - 6)  sodium chloride 0.9% Bolus. 100 milliLiter(s) IV Bolus every 5 minutes PRN SBP LESS THAN or EQUAL to 90 mmHg  sodium chloride 0.9% lock flush 10 milliLiter(s) IV Push every 1 hour PRN Pre/post blood products, medications, blood draw, and to maintain line patency      Physical Exam  Gen: Laying in bed, NAD  HEENT: atrumatic, EMOI  Resp: Unlabored breathing  Vascular: Palpable L radial pulse; LUE swelling improving, wrapped in ace bandage and elevated    LABS:                        8.5    6.85  )-----------( 138      ( 05 Sep 2023 02:30 )             25.5     09-05    131<L>  |  93<L>  |  43<H>  ----------------------------<  179<H>  4.5   |  24  |  5.55<H>    Ca    9.1      05 Sep 2023 02:30  Phos  4.2     09-05  Mg     2.20     09-05    TPro  6.5  /  Alb  2.3<L>  /  TBili  0.7  /  DBili  x   /  AST  39<H>  /  ALT  19  /  AlkPhos  226<H>  09-05      Urinalysis Basic - ( 05 Sep 2023 02:30 )    Color: x / Appearance: x / SG: x / pH: x  Gluc: 179 mg/dL / Ketone: x  / Bili: x / Urobili: x   Blood: x / Protein: x / Nitrite: x   Leuk Esterase: x / RBC: x / WBC x   Sq Epi: x / Non Sq Epi: x / Bacteria: x          Patient is a 78y Female

## 2023-09-05 NOTE — PROGRESS NOTE ADULT - SUBJECTIVE AND OBJECTIVE BOX
ORTHOPEDIC PROGRESS NOTE    Overnight events: None    SUBJECTIVE: Pt seen and examined at bedside. Patient is doing well, no acute complaints this AM. Pain is controlled with medication. Has been walking in the halls with PT      OBJECTIVE:  Vital Signs Last 24 Hrs  T(C): 36.3 (04 Sep 2023 22:00), Max: 36.9 (04 Sep 2023 10:18)  T(F): 97.4 (04 Sep 2023 22:00), Max: 98.4 (04 Sep 2023 10:18)  HR: 54 (04 Sep 2023 22:00) (54 - 55)  BP: 147/75 (04 Sep 2023 22:00) (145/79 - 157/84)  BP(mean): --  RR: 18 (04 Sep 2023 22:00) (17 - 18)  SpO2: 100% (04 Sep 2023 22:00) (98% - 100%)    Parameters below as of 04 Sep 2023 22:00  Patient On (Oxygen Delivery Method): room air        Physical Examination:  GEN: NAD, resting quietly  PULM: symmetric chest rise bilaterally, no increased WOB  ABD: nondistended  EXTR:   LLE:  Prevena intact, holding suction  SILT Bedolla/Sa/DP/SP/T  +EHL/FHL/TA/GSC  Compartments soft/non-tender  Toes warm, Cap refill brisk/warm and perfused, +DP pulse       LABS:                             8.5    6.85  )-----------( 138      ( 05 Sep 2023 02:30 )             25.5

## 2023-09-05 NOTE — PROGRESS NOTE ADULT - SUBJECTIVE AND OBJECTIVE BOX
[facility] DIVISION OF KIDNEY DISEASES AND HYPERTENSION -- FOLLOW UP NOTE  --------------------------------------------------------------------------------  Chief Complaint: ESRD    24 hour events/subjective:  Pt seen and examined. Had HD this AM via femoral catheter without any issues.       PAST HISTORY  --------------------------------------------------------------------------------  No significant changes to PMH, PSH, FHx, SHx, unless otherwise noted    ALLERGIES & MEDICATIONS  --------------------------------------------------------------------------------  Allergies    No Known Allergies    Intolerances      Standing Inpatient Medications  acetaminophen     Tablet .. 975 milliGRAM(s) Oral every 6 hours  ascorbic acid 500 milliGRAM(s) Oral daily  calcium acetate 667 milliGRAM(s) Oral three times a day with meals  calcium carbonate 1250 mG  + Vitamin D (OsCal 500 + D) 1 Tablet(s) Oral three times a day  chlorhexidine 2% Cloths 1 Application(s) Topical daily  folic acid 1 milliGRAM(s) Oral daily  heparin   Injectable 5000 Unit(s) SubCutaneous every 12 hours  hydrALAZINE 50 milliGRAM(s) Oral daily  influenza  Vaccine (HIGH DOSE) 0.7 milliLiter(s) IntraMuscular once  metoprolol succinate ER 12.5 milliGRAM(s) Oral daily  Nephro-nathanael 1 Tablet(s) Oral daily  pantoprazole    Tablet 40 milliGRAM(s) Oral before breakfast  piperacillin/tazobactam IVPB.. 4.5 Gram(s) IV Intermittent every 12 hours  polyethylene glycol 3350 17 Gram(s) Oral two times a day  senna 2 Tablet(s) Oral at bedtime    PRN Inpatient Medications  aluminum hydroxide/magnesium hydroxide/simethicone Suspension 30 milliLiter(s) Oral every 4 hours PRN  melatonin 3 milliGRAM(s) Oral at bedtime PRN  ondansetron Injectable 4 milliGRAM(s) IV Push every 8 hours PRN  oxyCODONE    IR 2.5 milliGRAM(s) Oral every 6 hours PRN  oxyCODONE    IR 5 milliGRAM(s) Oral every 6 hours PRN  sodium chloride 0.9% Bolus. 100 milliLiter(s) IV Bolus every 5 minutes PRN  sodium chloride 0.9% lock flush 10 milliLiter(s) IV Push every 1 hour PRN          VITALS/PHYSICAL EXAM  --------------------------------------------------------------------------------  T(C): 36.6 (09-05-23 @ 10:29), Max: 36.6 (09-05-23 @ 05:00)  HR: 62 (09-05-23 @ 10:29) (52 - 64)  BP: 130/67 (09-05-23 @ 10:29) (125/62 - 163/78)  RR: 18 (09-05-23 @ 10:29) (16 - 18)  SpO2: 99% (09-05-23 @ 10:29) (99% - 100%)  Wt(kg): --        09-04-23 @ 07:01  -  09-05-23 @ 07:00  --------------------------------------------------------  IN: 300 mL / OUT: 10 mL / NET: 290 mL    09-05-23 @ 07:01  -  09-05-23 @ 15:47  --------------------------------------------------------  IN: 600 mL / OUT: 2100 mL / NET: -1500 mL      Physical Exam:  Gen: NAD, lying in bed  HEENT: MMM  Pulm: CTA B/L  CV: S1S2  Abd: Soft, +BS   Ext: mild LE edema B/L, +LUE edema   Neuro: Awake  Skin: Warm and dry  Vascular access: LUE AVF s/p ligation wrapped in pressure dressing with prevena incisional vac. +R femoral non tunneled HD catheter     LABS/STUDIES  --------------------------------------------------------------------------------              8.5    6.85  >-----------<  138      [09-05-23 @ 02:30]              25.5     131  |  93  |  43  ----------------------------<  179      [09-05-23 @ 02:30]  4.5   |  24  |  5.55        Ca     9.1     [09-05-23 @ 02:30]      Mg     2.20     [09-05-23 @ 02:30]      Phos  4.2     [09-05-23 @ 02:30]    TPro  6.5  /  Alb  2.3  /  TBili  0.7  /  DBili  x   /  AST  39  /  ALT  19  /  AlkPhos  226  [09-05-23 @ 02:30]          Creatinine Trend:  SCr 5.55 [09-05 @ 02:30]  SCr 4.87 [09-04 @ 10:00]  SCr 4.91 [09-02 @ 06:54]  SCr 5.48 [08-31 @ 09:13]  SCr 5.02 [08-30 @ 19:00]    Urinalysis - [09-05-23 @ 02:30]      Color  / Appearance  / SG  / pH       Gluc 179 / Ketone   / Bili  / Urobili        Blood  / Protein  / Leuk Est  / Nitrite       RBC  / WBC  / Hyaline  / Gran  / Sq Epi  / Non Sq Epi  / Bacteria         HBsAg Nonreact      [08-31-23 @ 23:25]

## 2023-09-05 NOTE — PROGRESS NOTE ADULT - SUBJECTIVE AND OBJECTIVE BOX
Cardiovascular Disease Progress Note  Date of Service: 23 @ 08:39    Overnight events: No acute events overnight.   Patient denies chest pain or SOB.    Otherwise review of systems negative    Objective Findings:  T(C): 36.2 (23 @ 06:25), Max: 36.9 (23 @ 10:18)  HR: 52 (23 @ 06:25) (52 - 55)  BP: 136/68 (23 @ 06:25) (136/68 - 163/78)  RR: 17 (23 @ 06:25) (17 - 18)  SpO2: 99% (23 @ 05:00) (98% - 100%)  Wt(kg): --  Daily     Daily Weight in k (05 Sep 2023 06:25)      Physical Exam:  Gen: NAD; Patient resting comfortably  HEENT: EOMI, Normocephalic/ atraumatic  CV: RRR, normal S1 + S2, no m/r/g  Lungs:  Normal respiratory effort; clear to auscultation bilaterally  Abd: soft, non-tender; bowel sounds present  Ext: No edema;      Telemetry: n/a    Laboratory Data:                        8.5    6.85  )-----------( 138      ( 05 Sep 2023 02:30 )             25.5         131<L>  |  93<L>  |  43<H>  ----------------------------<  179<H>  4.5   |  24  |  5.55<H>    Ca    9.1      05 Sep 2023 02:30  Phos  4.2       Mg     2.20         TPro  6.5  /  Alb  2.3<L>  /  TBili  0.7  /  DBili  x   /  AST  39<H>  /  ALT  19  /  AlkPhos  226<H>                Inpatient Medications:  MEDICATIONS  (STANDING):  acetaminophen     Tablet .. 975 milliGRAM(s) Oral every 6 hours  ascorbic acid 500 milliGRAM(s) Oral daily  calcium acetate 667 milliGRAM(s) Oral three times a day with meals  calcium carbonate 1250 mG  + Vitamin D (OsCal 500 + D) 1 Tablet(s) Oral three times a day  chlorhexidine 2% Cloths 1 Application(s) Topical daily  folic acid 1 milliGRAM(s) Oral daily  heparin   Injectable 5000 Unit(s) SubCutaneous every 12 hours  hydrALAZINE 50 milliGRAM(s) Oral daily  influenza  Vaccine (HIGH DOSE) 0.7 milliLiter(s) IntraMuscular once  metoprolol succinate ER 12.5 milliGRAM(s) Oral daily  Nephro-nathanael 1 Tablet(s) Oral daily  pantoprazole    Tablet 40 milliGRAM(s) Oral before breakfast  piperacillin/tazobactam IVPB.. 4.5 Gram(s) IV Intermittent every 12 hours  polyethylene glycol 3350 17 Gram(s) Oral two times a day  senna 2 Tablet(s) Oral at bedtime      Assessment: 78 year old woman with ESRD on HD (MWF), HTN, DM (diet controlled), chronic type B aortic dissection, heart murmur, endometrial ca in remission s/p total hysterectomy in  presents with fistula malfunction.      Plan of Care:      #Pre-operative risk evaluation-  - Ms. Harris displays no signs of  coronary ischemia or decompensated CHF.   EKG is sinus with a known RBBB.  She is optimized from a cardiac standpoint to proceed with Permacath.           #Type B aortic dissection  - Chronic (Diagnosed >10 years ago)  - Optimal BP control.        #ESRD-  - HD as per renal.        Over 25 minutes spent on total encounter; more than 50% of the visit was spent counseling and/or coordinating care by the attending physician.      Gabo Burris MD [facility]  Cardiovascular Disease  [phone]

## 2023-09-05 NOTE — PROGRESS NOTE ADULT - PROBLEM SELECTOR PLAN 2
Pt. with anemia in the setting of ESRD. Hgb 8.5 today (9/5). Goal Hgb 10-11. Monitor Hgb, transfusion per primary team.    If you have any questions, please feel free to contact fellow.  Verónica Madrigal MD  Nephrology Fellow  [employer] Teams (Preferred)  (After 4pm or on weekends, please call the on-call Fellow)
Pt. with anemia in the setting of ESRD. Hgb 10.6, at goal for ESRD. Monitor Hgb, transfusion per primary team.      Final recommendations pending attending signature.    If you have any questions, please feel free to contact me  Kyle Ngo  Nephrology Fellow  [employer]/Page [phone]  (After 5pm or on weekends please page the on-call fellow)
Pt. with anemia in the setting of ESRD. Hgb 8.8 today (9/2). Goal Hgb 10-11. Monitor Hgb, transfusion per primary team.    If you have any questions, please feel free to contact me.  John Torres  Nephrology Fellow  [id number] / [phone] / [employer] Teams (Preferred)  (After 4pm or on weekends, please call the on-call Fellow)
- HD and fluid removal as per nephrology, c/w home renal meds

## 2023-09-05 NOTE — PROGRESS NOTE ADULT - SUBJECTIVE AND OBJECTIVE BOX
Plastic Surgery Progress Note (pg [facility]: [id number], [facility]: [phone])    SUBJECTIVE  The patient was seen and examined.     OBJECTIVE  ___________________________________________________  VITAL SIGNS / I&O's   Vital Signs Last 24 Hrs  T(C): 36.2 (05 Sep 2023 06:25), Max: 36.9 (04 Sep 2023 10:18)  T(F): 97.2 (05 Sep 2023 06:25), Max: 98.4 (04 Sep 2023 10:18)  HR: 52 (05 Sep 2023 06:25) (52 - 55)  BP: 136/68 (05 Sep 2023 06:25) (136/68 - 163/78)  BP(mean): --  RR: 17 (05 Sep 2023 06:25) (17 - 18)  SpO2: 99% (05 Sep 2023 05:00) (98% - 100%)    Parameters below as of 05 Sep 2023 06:25  Patient On (Oxygen Delivery Method): room air          04 Sep 2023 07:01  -  05 Sep 2023 07:00  --------------------------------------------------------  IN:    IV PiggyBack: 100 mL    Oral Fluid: 200 mL  Total IN: 300 mL    OUT:    Bulb (mL): 10 mL    Voided (mL): 0 mL  Total OUT: 10 mL    Total NET: 290 mL        ___________________________________________________  PHYSICAL EXAM    CONSTITUTIONAL: no apparent distress  PSYCH: Appropriate insight/judgment; A+O x 3, mood and affect appropriate  HENT: CN II-XII grossly intact, No conjunctival or scleral injection, non-icteric  NECK: Supple, symmetric and without tracheal deviation   RESP: No respiratory distress, no use of accessory muscles  Extremities: LUE: Dressings intact.  New vac on holding suction, PORFIRIO drain s/s holding suction, hand edematous, neurosensory intact    ___________________________________________________  LABS                        8.5    6.85  )-----------( 138      ( 05 Sep 2023 02:30 )             25.5     05 Sep 2023 02:30    131    |  93     |  43     ----------------------------<  179    4.5     |  24     |  5.55     Ca    9.1        05 Sep 2023 02:30  Phos  4.2       05 Sep 2023 02:30  Mg     2.20      05 Sep 2023 02:30    TPro  6.5    /  Alb  2.3    /  TBili  0.7    /  DBili  x      /  AST  39     /  ALT  19     /  AlkPhos  226    05 Sep 2023 02:30      CAPILLARY BLOOD GLUCOSE            Urinalysis Basic - ( 05 Sep 2023 02:30 )    Color: x / Appearance: x / SG: x / pH: x  Gluc: 179 mg/dL / Ketone: x  / Bili: x / Urobili: x   Blood: x / Protein: x / Nitrite: x   Leuk Esterase: x / RBC: x / WBC x   Sq Epi: x / Non Sq Epi: x / Bacteria: x      ___________________________________________________  MICRO  Recent Cultures:  Specimen Source: .Blood Blood-Venous, 09-01 @ 19:33; Results   No growth at 72 Hours; Gram Stain: --; Organism: --  Specimen Source: .Blood Blood-Peripheral, 09-01 @ 19:30; Results   No growth at 72 Hours; Gram Stain: --; Organism: --    ___________________________________________________  MEDICATIONS  (STANDING):  acetaminophen     Tablet .. 975 milliGRAM(s) Oral every 6 hours  ascorbic acid 500 milliGRAM(s) Oral daily  calcium acetate 667 milliGRAM(s) Oral three times a day with meals  calcium carbonate 1250 mG  + Vitamin D (OsCal 500 + D) 1 Tablet(s) Oral three times a day  chlorhexidine 2% Cloths 1 Application(s) Topical daily  folic acid 1 milliGRAM(s) Oral daily  heparin   Injectable 5000 Unit(s) SubCutaneous every 12 hours  hydrALAZINE 50 milliGRAM(s) Oral daily  influenza  Vaccine (HIGH DOSE) 0.7 milliLiter(s) IntraMuscular once  metoprolol succinate ER 12.5 milliGRAM(s) Oral daily  Nephro-nathanael 1 Tablet(s) Oral daily  pantoprazole    Tablet 40 milliGRAM(s) Oral before breakfast  piperacillin/tazobactam IVPB.. 4.5 Gram(s) IV Intermittent every 12 hours  polyethylene glycol 3350 17 Gram(s) Oral two times a day  senna 2 Tablet(s) Oral at bedtime    MEDICATIONS  (PRN):  aluminum hydroxide/magnesium hydroxide/simethicone Suspension 30 milliLiter(s) Oral every 4 hours PRN Dyspepsia  melatonin 3 milliGRAM(s) Oral at bedtime PRN Insomnia  ondansetron Injectable 4 milliGRAM(s) IV Push every 8 hours PRN Nausea and/or Vomiting  oxyCODONE    IR 5 milliGRAM(s) Oral every 6 hours PRN Severe Pain (7 - 10)  oxyCODONE    IR 2.5 milliGRAM(s) Oral every 6 hours PRN Moderate Pain (4 - 6)  sodium chloride 0.9% Bolus. 100 milliLiter(s) IV Bolus every 5 minutes PRN SBP LESS THAN or EQUAL to 90 mmHg  sodium chloride 0.9% lock flush 10 milliLiter(s) IV Push every 1 hour PRN Pre/post blood products, medications, blood draw, and to maintain line patency

## 2023-09-05 NOTE — PROGRESS NOTE ADULT - PROBLEM SELECTOR PLAN 1
Pt with ESRD on HD TIW MWF admitted with purulent drainage from AVF. [facility] pt was admitted last month for Lt hemiarthroplasty course complicated by fistula failure. Underwent LUE BC AVF fistulogram 8/1 w/ balloon angioplasty. 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 AVF successfully used prior to dc. R femoral HD catheter placement and ligation of LUE AVF with aneurysm resection 8/31. Pt. clinically stable. Labs reviewed. Tunneled dialysis catheter planned now for 9/6 by IR. S/p HD today. Monitor labs and VS. Dose medications for ESRD. Next HD planned for 9/7.

## 2023-09-06 LAB
ALBUMIN SERPL ELPH-MCNC: 2.3 G/DL — LOW (ref 3.3–5)
ALP SERPL-CCNC: 199 U/L — HIGH (ref 40–120)
ALT FLD-CCNC: 15 U/L — SIGNIFICANT CHANGE UP (ref 4–33)
ANION GAP SERPL CALC-SCNC: 9 MMOL/L — SIGNIFICANT CHANGE UP (ref 7–14)
AST SERPL-CCNC: 31 U/L — SIGNIFICANT CHANGE UP (ref 4–32)
BILIRUB SERPL-MCNC: 0.9 MG/DL — SIGNIFICANT CHANGE UP (ref 0.2–1.2)
BUN SERPL-MCNC: 23 MG/DL — SIGNIFICANT CHANGE UP (ref 7–23)
CALCIUM SERPL-MCNC: 8.8 MG/DL — SIGNIFICANT CHANGE UP (ref 8.4–10.5)
CHLORIDE SERPL-SCNC: 96 MMOL/L — LOW (ref 98–107)
CO2 SERPL-SCNC: 27 MMOL/L — SIGNIFICANT CHANGE UP (ref 22–31)
CREAT SERPL-MCNC: 3.56 MG/DL — HIGH (ref 0.5–1.3)
EGFR: 13 ML/MIN/1.73M2 — LOW
GLUCOSE SERPL-MCNC: 140 MG/DL — HIGH (ref 70–99)
HAV IGM SER-ACNC: SIGNIFICANT CHANGE UP
HBV CORE IGM SER-ACNC: SIGNIFICANT CHANGE UP
HBV SURFACE AG SER-ACNC: SIGNIFICANT CHANGE UP
HCT VFR BLD CALC: 26.8 % — LOW (ref 34.5–45)
HCV AB S/CO SERPL IA: 0.21 S/CO — SIGNIFICANT CHANGE UP (ref 0–0.99)
HCV AB SERPL-IMP: SIGNIFICANT CHANGE UP
HGB BLD-MCNC: 8.8 G/DL — LOW (ref 11.5–15.5)
MAGNESIUM SERPL-MCNC: 2 MG/DL — SIGNIFICANT CHANGE UP (ref 1.6–2.6)
MCHC RBC-ENTMCNC: 31.5 PG — SIGNIFICANT CHANGE UP (ref 27–34)
MCHC RBC-ENTMCNC: 32.8 GM/DL — SIGNIFICANT CHANGE UP (ref 32–36)
MCV RBC AUTO: 96.1 FL — SIGNIFICANT CHANGE UP (ref 80–100)
NRBC # BLD: 0 /100 WBCS — SIGNIFICANT CHANGE UP (ref 0–0)
NRBC # FLD: 0 K/UL — SIGNIFICANT CHANGE UP (ref 0–0)
PHOSPHATE SERPL-MCNC: 3.1 MG/DL — SIGNIFICANT CHANGE UP (ref 2.5–4.5)
PLATELET # BLD AUTO: 145 K/UL — LOW (ref 150–400)
POTASSIUM SERPL-MCNC: 4 MMOL/L — SIGNIFICANT CHANGE UP (ref 3.5–5.3)
POTASSIUM SERPL-SCNC: 4 MMOL/L — SIGNIFICANT CHANGE UP (ref 3.5–5.3)
PROT SERPL-MCNC: 6.7 G/DL — SIGNIFICANT CHANGE UP (ref 6–8.3)
RBC # BLD: 2.79 M/UL — LOW (ref 3.8–5.2)
RBC # FLD: 18.3 % — HIGH (ref 10.3–14.5)
SODIUM SERPL-SCNC: 132 MMOL/L — LOW (ref 135–145)
WBC # BLD: 5.07 K/UL — SIGNIFICANT CHANGE UP (ref 3.8–10.5)
WBC # FLD AUTO: 5.07 K/UL — SIGNIFICANT CHANGE UP (ref 3.8–10.5)

## 2023-09-06 PROCEDURE — 36558 INSERT TUNNELED CV CATH: CPT

## 2023-09-06 PROCEDURE — 76937 US GUIDE VASCULAR ACCESS: CPT | Mod: 26

## 2023-09-06 PROCEDURE — 77001 FLUOROGUIDE FOR VEIN DEVICE: CPT | Mod: 26,GC

## 2023-09-06 RX ORDER — CHLORHEXIDINE GLUCONATE 213 G/1000ML
1 SOLUTION TOPICAL
Refills: 0 | Status: DISCONTINUED | OUTPATIENT
Start: 2023-09-06 | End: 2023-09-07

## 2023-09-06 RX ADMIN — Medication 1 TABLET(S): at 23:09

## 2023-09-06 RX ADMIN — Medication 975 MILLIGRAM(S): at 11:08

## 2023-09-06 RX ADMIN — Medication 667 MILLIGRAM(S): at 18:10

## 2023-09-06 RX ADMIN — SENNA PLUS 2 TABLET(S): 8.6 TABLET ORAL at 23:08

## 2023-09-06 RX ADMIN — PANTOPRAZOLE SODIUM 40 MILLIGRAM(S): 20 TABLET, DELAYED RELEASE ORAL at 06:34

## 2023-09-06 RX ADMIN — Medication 12.5 MILLIGRAM(S): at 06:33

## 2023-09-06 RX ADMIN — Medication 975 MILLIGRAM(S): at 23:09

## 2023-09-06 RX ADMIN — Medication 667 MILLIGRAM(S): at 13:28

## 2023-09-06 RX ADMIN — Medication 1 TABLET(S): at 13:26

## 2023-09-06 RX ADMIN — PIPERACILLIN AND TAZOBACTAM 25 GRAM(S): 4; .5 INJECTION, POWDER, LYOPHILIZED, FOR SOLUTION INTRAVENOUS at 03:12

## 2023-09-06 RX ADMIN — Medication 975 MILLIGRAM(S): at 07:32

## 2023-09-06 RX ADMIN — Medication 975 MILLIGRAM(S): at 18:11

## 2023-09-06 RX ADMIN — Medication 1 MILLIGRAM(S): at 13:26

## 2023-09-06 RX ADMIN — Medication 1 TABLET(S): at 13:28

## 2023-09-06 RX ADMIN — Medication 500 MILLIGRAM(S): at 13:26

## 2023-09-06 RX ADMIN — CHLORHEXIDINE GLUCONATE 1 APPLICATION(S): 213 SOLUTION TOPICAL at 13:27

## 2023-09-06 RX ADMIN — Medication 50 MILLIGRAM(S): at 06:34

## 2023-09-06 RX ADMIN — PIPERACILLIN AND TAZOBACTAM 25 GRAM(S): 4; .5 INJECTION, POWDER, LYOPHILIZED, FOR SOLUTION INTRAVENOUS at 15:14

## 2023-09-06 RX ADMIN — Medication 975 MILLIGRAM(S): at 06:32

## 2023-09-06 RX ADMIN — Medication 1 TABLET(S): at 06:34

## 2023-09-06 NOTE — PROGRESS NOTE ADULT - ASSESSMENT
Ms. 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 Arterial and venous end oversewn. Plastics closure.     - Prevena replaced today  - PORFIRIO drain care  -  pain control  - continue care per primary team      Umair Oliveira PGY3  Plastic Surgery  [phone]# [facility] pager  [phone] [facility] pager  Available on Teams

## 2023-09-06 NOTE — PROGRESS NOTE ADULT - ASSESSMENT
78F w L hip surgical incision w mild drainage s/p SUZANNA and peter    Plan:  Prevena vac on LLE stays on at all times  monitor labs  WBAT LLE  PT/OT  Vascular/IR plans for tunnelled cath pending ID clearance  Remainder of care per primary team    Gladys Fuchs MD  Orthopaedic Surgery Resident    For all questions, please reach out via the following numbers for the on-call resident; do not reach out via Teams.  [facility] [id number]  [facility]        [id number]  [facility]  [id number]/ [phone]   Peripheral Block    Start time: 11/7/2019 9:43 AM  End time: 11/7/2019 9:48 AM  Performed by: Bala Niño DO  Authorized by: Bala Niño DO       Pre-procedure: Indications: at surgeon's request and post-op pain management    Preanesthetic Checklist: patient identified, risks and benefits discussed, site marked, timeout performed, anesthesia consent given and patient being monitored    Timeout Time: 09:43          Block Type:   Block Type:   Interscalene  Laterality:  Left  Monitoring:  Standard ASA monitoring, responsive to questions, continuous pulse ox, oxygen, frequent vital sign checks and heart rate  Injection Technique:  Single shot  Procedures: ultrasound guided    Patient Position: supine  Prep: chlorhexidine    Location:  Interscalene  Needle Type:  Stimuplex  Needle Gauge:  22 G  Needle Localization:  Ultrasound guidance    Assessment:  Number of attempts:  1  Injection Assessment:  Incremental injection every 5 mL, negative aspiration for CSF, no paresthesia, no intravascular symptoms, negative aspiration for blood and local visualized surrounding nerve on ultrasound  Patient tolerance:  Patient tolerated the procedure well with no immediate complications

## 2023-09-06 NOTE — PROCEDURE NOTE - PATIENT CONDITION/DISPOSITION
[FreeTextEntry6] : non smoker \par occasional wine\par no other drug us
Recovery, then floor if stable

## 2023-09-06 NOTE — PROVIDER CONTACT NOTE (OTHER) - ACTION/TREATMENT ORDERED:
Provider notified. No new orders at this time. As per provider its okay to wait until new catheter placement.

## 2023-09-06 NOTE — PROGRESS NOTE ADULT - SUBJECTIVE AND OBJECTIVE BOX
Orthopedic Surgery Progress Note     S: Patient seen and examined today. No acute events overnight. Pain is well controlled. Denies f/c, chest pain, shortness of breath, dizziness.    MEDICATIONS  (STANDING):  acetaminophen     Tablet .. 975 milliGRAM(s) Oral every 6 hours  ascorbic acid 500 milliGRAM(s) Oral daily  calcium acetate 667 milliGRAM(s) Oral three times a day with meals  calcium carbonate 1250 mG  + Vitamin D (OsCal 500 + D) 1 Tablet(s) Oral three times a day  chlorhexidine 2% Cloths 1 Application(s) Topical daily  folic acid 1 milliGRAM(s) Oral daily  hydrALAZINE 50 milliGRAM(s) Oral daily  influenza  Vaccine (HIGH DOSE) 0.7 milliLiter(s) IntraMuscular once  metoprolol succinate ER 12.5 milliGRAM(s) Oral daily  Nephro-nathanael 1 Tablet(s) Oral daily  pantoprazole    Tablet 40 milliGRAM(s) Oral before breakfast  piperacillin/tazobactam IVPB.. 4.5 Gram(s) IV Intermittent every 12 hours  polyethylene glycol 3350 17 Gram(s) Oral two times a day  senna 2 Tablet(s) Oral at bedtime    MEDICATIONS  (PRN):  aluminum hydroxide/magnesium hydroxide/simethicone Suspension 30 milliLiter(s) Oral every 4 hours PRN Dyspepsia  melatonin 3 milliGRAM(s) Oral at bedtime PRN Insomnia  ondansetron Injectable 4 milliGRAM(s) IV Push every 8 hours PRN Nausea and/or Vomiting  oxyCODONE    IR 2.5 milliGRAM(s) Oral every 6 hours PRN Moderate Pain (4 - 6)  oxyCODONE    IR 5 milliGRAM(s) Oral every 6 hours PRN Severe Pain (7 - 10)  sodium chloride 0.9% Bolus. 100 milliLiter(s) IV Bolus every 5 minutes PRN SBP LESS THAN or EQUAL to 90 mmHg  sodium chloride 0.9% lock flush 10 milliLiter(s) IV Push every 1 hour PRN Pre/post blood products, medications, blood draw, and to maintain line patency      Physical Exam:  GEN: NAD, resting quietly  PULM: symmetric chest rise bilaterally, no increased WOB  ABD: nondistended  EXTR:   LLE:  Prevena intact, holding suction  SILT [place]/Sa/DP/SP/T  +EHL/FHL/TA/GSC  Compartments soft/non-tender  Toes warm, Cap refill brisk/warm and perfused, +DP pulse     Vital Signs Last 24 Hrs  T(C): 36.5 (05 Sep 2023 22:00), Max: 36.6 (05 Sep 2023 10:29)  T(F): 97.7 (05 Sep 2023 22:00), Max: 97.9 (05 Sep 2023 10:29)  HR: 64 (05 Sep 2023 22:00) (60 - 64)  BP: 154/65 (05 Sep 2023 22:00) (125/62 - 154/65)  BP(mean): --  RR: 18 (05 Sep 2023 22:00) (16 - 18)  SpO2: 100% (05 Sep 2023 22:00) (99% - 100%)    Parameters below as of 05 Sep 2023 22:00  Patient On (Oxygen Delivery Method): room air        09-04-23 @ 07:01  -  09-05-23 @ 07:00  --------------------------------------------------------  IN: 300 mL / OUT: 10 mL / NET: 290 mL    09-05-23 @ 07:01  -  09-06-23 @ 06:38  --------------------------------------------------------  IN: 600 mL / OUT: 2100 mL / NET: -1500 mL          LABS:                        8.5    6.85  )-----------( 138      ( 05 Sep 2023 02:30 )             25.5     09-05    131<L>  |  93<L>  |  43<H>  ----------------------------<  179<H>  4.5   |  24  |  5.55<H>    Ca    9.1      05 Sep 2023 02:30  Phos  4.2     09-05  Mg     2.20     09-05    TPro  6.5  /  Alb  2.3<L>  /  TBili  0.7  /  DBili  x   /  AST  39<H>  /  ALT  19  /  AlkPhos  226<H>  09-05

## 2023-09-06 NOTE — PROGRESS NOTE ADULT - SUBJECTIVE AND OBJECTIVE BOX
Cardiovascular Disease Progress Note  Date of Service: 23 @ 07:52    Overnight events: No acute events overnight.  Patient denies chest pain or SOB.     Otherwise review of systems negative    Objective Findings:  T(C): 36.8 (23 @ 06:32), Max: 36.8 (23 @ 06:32)  HR: 72 (23 @ 06:32) (60 - 72)  BP: 169/89 (23 @ 06:32) (125/62 - 169/89)  RR: 18 (23 @ 06:32) (16 - 18)  SpO2: 99% (23 @ 06:32) (99% - 100%)  Wt(kg): --  Daily     Daily Weight in k.5 (05 Sep 2023 09:43)      Physical Exam:  Gen: NAD; Patient resting comfortably  HEENT: EOMI, Normocephalic/ atraumatic  CV: RRR, normal S1 + S2, no m/r/g  Lungs:  Normal respiratory effort; clear to auscultation bilaterally  Abd: soft, non-tender; bowel sounds present  Ext: No edema; warm and well perfused    Telemetry: n/a    Laboratory Data:                        8.8    5.07  )-----------( 145      ( 06 Sep 2023 06:34 )             26.8         132<L>  |  96<L>  |  23  ----------------------------<  140<H>  4.0   |  27  |  3.56<H>    Ca    8.8      06 Sep 2023 06:34  Phos  3.1       Mg     2.00         TPro  6.7  /  Alb  2.3<L>  /  TBili  0.9  /  DBili  x   /  AST  31  /  ALT  15  /  AlkPhos  199<H>                Inpatient Medications:  MEDICATIONS  (STANDING):  acetaminophen     Tablet .. 975 milliGRAM(s) Oral every 6 hours  ascorbic acid 500 milliGRAM(s) Oral daily  calcium acetate 667 milliGRAM(s) Oral three times a day with meals  calcium carbonate 1250 mG  + Vitamin D (OsCal 500 + D) 1 Tablet(s) Oral three times a day  chlorhexidine 2% Cloths 1 Application(s) Topical daily  folic acid 1 milliGRAM(s) Oral daily  hydrALAZINE 50 milliGRAM(s) Oral daily  influenza  Vaccine (HIGH DOSE) 0.7 milliLiter(s) IntraMuscular once  metoprolol succinate ER 12.5 milliGRAM(s) Oral daily  Nephro-nathanael 1 Tablet(s) Oral daily  pantoprazole    Tablet 40 milliGRAM(s) Oral before breakfast  piperacillin/tazobactam IVPB.. 4.5 Gram(s) IV Intermittent every 12 hours  polyethylene glycol 3350 17 Gram(s) Oral two times a day  senna 2 Tablet(s) Oral at bedtime      Assessment: 78 year old woman with ESRD on HD (MWF), HTN, DM (diet controlled), chronic type B aortic dissection, heart murmur, endometrial ca in remission s/p total hysterectomy in  presents with fistula malfunction.      Plan of Care:      #Pre-operative risk evaluation-  - Ms. Harris displays no signs of  coronary ischemia or decompensated CHF.   EKG is sinus with a known RBBB.  She is optimized from a cardiac standpoint to proceed with Permacath.           #Type B aortic dissection  - Chronic (Diagnosed >10 years ago)  - Optimal BP control.        #ESRD-  - HD as per renal.      Over 25 minutes spent on total encounter; more than 50% of the visit was spent counseling and/or coordinating care by the attending physician.      Gabo Burris MD [facility]  Cardiovascular Disease  [phone]

## 2023-09-06 NOTE — PRE PROCEDURE NOTE - PRE PROCEDURE EVALUATION
Interventional Radiology    HPI: 78y Female with ESRD s/p failed LUE AVF and stented left subclavian vein requiring tunneled HD catheter for hemodialysis. Patient completed abx, no infectious signs and blood cultures negative.    Allergies: No Known Allergies    Medications (Abx/Cardiac/Anticoagulation/Blood Products)    heparin   Injectable: 5000 Unit(s) SubCutaneous (09-05 @ 19:06)  hydrALAZINE: 50 milliGRAM(s) Oral (09-06 @ 06:34)  metoprolol succinate ER: 12.5 milliGRAM(s) Oral (09-06 @ 06:33)  piperacillin/tazobactam IVPB..: 25 mL/Hr IV Intermittent (09-06 @ 03:12)    Data:    T(C): 36.6  HR: 69  BP: 145/67  RR: 18  SpO2: 100%    Exam  General: No acute distress  Chest: Non labored breathing  Abdomen: Non-distended  Extremities: No swelling, warm    -WBC 5.07 / HgB 8.8 / Hct 26.8 / Plt 145  -Na 132 / Cl 96 / BUN 23 / Glucose 140  -K 4.0 / CO2 27 / Cr 3.56  -ALT 15 / Alk Phos 199 / T.Bili 0.9    Imaging: Reviewed.     Plan: 78y Female presents for tunneled HD catheter placement.  -Risks/Benefits/alternatives explained with the patient and/or healthcare proxy and witnessed informed consent obtained.

## 2023-09-06 NOTE — PROGRESS NOTE ADULT - ASSESSMENT
77 y.o. Female w/ Hx HTN, DM, chronic type B aortic dissection, ESRD on HD ( M, W, F ) via LUE brachiocephalic (2018) fistula, endometrial ca in remission s/p total hysterectomy in 2007 recently admitted for L hip revision (07-08/2023) during which she underwent LUE subclavian vein stenting 8/1 for stenosis and subsequent brachiocephalic AVF revision 8/3 with resection of the involved segment and primary anastomosis for ulceration. She presents after a missed HD session due to concern for overlying infection at the AVF site. S/p LUE ACF ligation and aneurysm resection, closure by PRS and prevena vac placement on 8/31. Planning for RUE AV fistula.    PLAN:  - plan for tunneled catheter to be placed by IR tentatively today  - RUE vein mapping   - Continue PT  - Pain control PRN  - ELevate LUE while in bed / chair.   - keep ACE wrap in place  - diet as tolerated  - check am labs.   - Prevena to be replaced today by PRS  - CT venogram - no evidence of central stenosis   - DVT ppx  - HD MWF schedule outpatient    Vascular Surgery [id number]

## 2023-09-06 NOTE — PROVIDER CONTACT NOTE (OTHER) - BACKGROUND
Ventricular Rate : 63   Atrial Rate : 63   P-R Interval : 134   QRS Duration : 82   Q-T Interval : 406   QTC Calculation(Bezet) : 415   P Axis : 61   R Axis : 60   T Axis : 29   Diagnosis : ** ** ** ** * Pediatric ECG Analysis * ** ** ** **~Normal sinus rhythm~Normal ECG~When compared with ECG of 19-SEP-2018 14:10,~No significant change was found~Confirmed by YOSELIN CHILD (PEDS CARDIOLOGIST), CHRISTEN ([id number]) on 11/11/2018 4:18:09 PM      Pt admitted for failed L arm AV fistula and cellulitis. PmHx of CKD, ESRD

## 2023-09-06 NOTE — PROGRESS NOTE ADULT - SUBJECTIVE AND OBJECTIVE BOX
Plastic Surgery    SUBJECTIVE: Pt seen and examined on rounds with team. NAEON.      VITALS  T(C): 36.8 (09-06-23 @ 06:32), Max: 36.8 (09-06-23 @ 06:32)  HR: 72 (09-06-23 @ 06:32) (60 - 72)  BP: 169/89 (09-06-23 @ 06:32) (125/62 - 169/89)  RR: 18 (09-06-23 @ 06:32) (16 - 18)  SpO2: 99% (09-06-23 @ 06:32) (99% - 100%)  CAPILLARY BLOOD GLUCOSE          Is/Os    09-05 @ 07:01  -  09-06 @ 07:00  --------------------------------------------------------  IN:    Other (mL): 600 mL  Total IN: 600 mL    OUT:    Bulb (mL): 10 mL    Other (mL): 2100 mL  Total OUT: 2110 mL    Total NET: -1510 mL          PHYSICAL EXAM:   General: NAD, Lying in bed comfortably  Neuro: Awake and alert  Extremity: L arm with cdi dressing, some edema of the distal extremity.   GI/Abd: Soft, NT/ND,       LABS  CBC (09-06 @ 06:34)                              8.8<L>                         5.07    )----------------(  145<L>     --    % Neutrophils, --    % Lymphocytes, ANC: --                                  26.8<L>  CBC (09-05 @ 02:30)                              8.5<L>                         6.85    )----------------(  138<L>     --    % Neutrophils, --    % Lymphocytes, ANC: --                                  25.5<L>    BMP (09-05 @ 02:30)             131<L>  |  93<L>   |  43<H> 		Ca++ --      Ca 9.1                ---------------------------------( 179<H>		Mg 2.20               4.5     |  24      |  5.55<H>			Ph 4.2       LFTs (09-05 @ 02:30)      TPro 6.5 / Alb 2.3<L> / TBili 0.7 / DBili -- / AST 39<H> / ALT 19 / AlkPhos 226<H>

## 2023-09-06 NOTE — PROGRESS NOTE ADULT - SUBJECTIVE AND OBJECTIVE BOX
TEAM SURGERY PROGRESS NOTE    POST OPERATIVE DAY #: 6 s/p LUE AVF ligation and aneursym resection w/ primary closure and prevena vac placement by Plastics    SUBJECTIVE: Patient seen and examined at bedside on AM rounds, patient without complaints.     Vital Signs Last 24 Hrs  T(C): 36.8 (06 Sep 2023 06:32), Max: 36.8 (06 Sep 2023 06:32)  T(F): 98.3 (06 Sep 2023 06:32), Max: 98.3 (06 Sep 2023 06:32)  HR: 72 (06 Sep 2023 06:32) (60 - 72)  BP: 169/89 (06 Sep 2023 06:32) (125/62 - 169/89)  BP(mean): --  RR: 18 (06 Sep 2023 06:32) (16 - 18)  SpO2: 99% (06 Sep 2023 06:32) (99% - 100%)    Parameters below as of 06 Sep 2023 06:32  Patient On (Oxygen Delivery Method): room air      I&O's Detail    05 Sep 2023 07:01  -  06 Sep 2023 07:00  --------------------------------------------------------  IN:    Other (mL): 600 mL  Total IN: 600 mL    OUT:    Bulb (mL): 10 mL    Other (mL): 2100 mL  Total OUT: 2110 mL    Total NET: -1510 mL        MEDICATIONS  (STANDING):  acetaminophen     Tablet .. 975 milliGRAM(s) Oral every 6 hours  ascorbic acid 500 milliGRAM(s) Oral daily  calcium acetate 667 milliGRAM(s) Oral three times a day with meals  calcium carbonate 1250 mG  + Vitamin D (OsCal 500 + D) 1 Tablet(s) Oral three times a day  chlorhexidine 2% Cloths 1 Application(s) Topical daily  folic acid 1 milliGRAM(s) Oral daily  hydrALAZINE 50 milliGRAM(s) Oral daily  influenza  Vaccine (HIGH DOSE) 0.7 milliLiter(s) IntraMuscular once  metoprolol succinate ER 12.5 milliGRAM(s) Oral daily  Nephro-nathanael 1 Tablet(s) Oral daily  pantoprazole    Tablet 40 milliGRAM(s) Oral before breakfast  piperacillin/tazobactam IVPB.. 4.5 Gram(s) IV Intermittent every 12 hours  polyethylene glycol 3350 17 Gram(s) Oral two times a day  senna 2 Tablet(s) Oral at bedtime    MEDICATIONS  (PRN):  aluminum hydroxide/magnesium hydroxide/simethicone Suspension 30 milliLiter(s) Oral every 4 hours PRN Dyspepsia  melatonin 3 milliGRAM(s) Oral at bedtime PRN Insomnia  ondansetron Injectable 4 milliGRAM(s) IV Push every 8 hours PRN Nausea and/or Vomiting  oxyCODONE    IR 2.5 milliGRAM(s) Oral every 6 hours PRN Moderate Pain (4 - 6)  oxyCODONE    IR 5 milliGRAM(s) Oral every 6 hours PRN Severe Pain (7 - 10)  sodium chloride 0.9% Bolus. 100 milliLiter(s) IV Bolus every 5 minutes PRN SBP LESS THAN or EQUAL to 90 mmHg  sodium chloride 0.9% lock flush 10 milliLiter(s) IV Push every 1 hour PRN Pre/post blood products, medications, blood draw, and to maintain line patency      Physical Exam  Gen: Laying in bed, NAD  HEENT: atrumatic, EMOI  Resp: Unlabored breathing  Vascular: Palpable L radial pulse; LUE swelling improving, wrapped in ace bandage and elevated      LABS:                        8.8    5.07  )-----------( 145      ( 06 Sep 2023 06:34 )             26.8     09-06    132<L>  |  96<L>  |  x   ----------------------------<  x   4.0   |  x   |  x     Ca    9.1      05 Sep 2023 02:30  Phos  4.2     09-05  Mg     2.00     09-06    TPro  6.5  /  Alb  2.3<L>  /  TBili  0.7  /  DBili  x   /  AST  39<H>  /  ALT  19  /  AlkPhos  226<H>  09-05      Urinalysis Basic - ( 05 Sep 2023 02:30 )    Color: x / Appearance: x / SG: x / pH: x  Gluc: 179 mg/dL / Ketone: x  / Bili: x / Urobili: x   Blood: x / Protein: x / Nitrite: x   Leuk Esterase: x / RBC: x / WBC x   Sq Epi: x / Non Sq Epi: x / Bacteria: x          Patient is a 78y Female

## 2023-09-06 NOTE — CHART NOTE - NSCHARTNOTEFT_GEN_A_CORE
PRE-INTERVENTIONAL RADIOLOGY PROCEDURE NOTE      Patient Age: 77 y/o    Patient Gender: female    Procedure: Tunneled catheter insertion    Diagnosis/Indication: Dialysis    Interventional Radiology Attending Physician: Dr. Munoz    Ordering Attending Physician: Dr. Jensen    Pertinent Medical History: 77 y.o. Female w/ Hx HTN, DM, chronic type B aortic dissection, ESRD on HD ( M, W, F ) via LUE brachiocephalic (2018) fistula, endometrial ca in remission s/p total hysterectomy in 2007 recently admitted for L hip revision (07-08/2023) during which she underwent LUE subclavian vein stenting 8/1 for stenosis and subsequent brachiocephalic AVF revision 8/3 with resection of the involved segment and primary anastomosis for ulceration. She presents after a missed HD session due to concern for overlying infection at the AVF site. S/p LUE ACF ligation and aneurysm resection, closure by PRS and prevena vac placement on 8/31. Planning for RUE AV fistula.    Pertinent labs:                      8.5    6.85  )-----------( 138      ( 05 Sep 2023 02:30 )             25.5       09-05    131<L>  |  93<L>  |  43<H>  ----------------------------<  179<H>  4.5   |  24  |  5.55<H>    Ca    9.1      05 Sep 2023 02:30  Phos  4.2     09-05  Mg     2.20     09-05    TPro  6.5  /  Alb  2.3<L>  /  TBili  0.7  /  DBili  x   /  AST  39<H>  /  ALT  19  /  AlkPhos  226<H>  09-05              Patient and Family Aware ? Yes

## 2023-09-07 ENCOUNTER — TRANSCRIPTION ENCOUNTER (OUTPATIENT)
Age: 78
End: 2023-09-07

## 2023-09-07 VITALS
OXYGEN SATURATION: 100 % | HEART RATE: 66 BPM | SYSTOLIC BLOOD PRESSURE: 130 MMHG | TEMPERATURE: 98 F | RESPIRATION RATE: 18 BRPM | DIASTOLIC BLOOD PRESSURE: 70 MMHG

## 2023-09-07 LAB
ANION GAP SERPL CALC-SCNC: 17 MMOL/L — HIGH (ref 7–14)
BLD GP AB SCN SERPL QL: NEGATIVE — SIGNIFICANT CHANGE UP
BUN SERPL-MCNC: 30 MG/DL — HIGH (ref 7–23)
CALCIUM SERPL-MCNC: 8.6 MG/DL — SIGNIFICANT CHANGE UP (ref 8.4–10.5)
CHLORIDE SERPL-SCNC: 95 MMOL/L — LOW (ref 98–107)
CO2 SERPL-SCNC: 21 MMOL/L — LOW (ref 22–31)
CREAT SERPL-MCNC: 4.3 MG/DL — HIGH (ref 0.5–1.3)
CULTURE RESULTS: SIGNIFICANT CHANGE UP
CULTURE RESULTS: SIGNIFICANT CHANGE UP
EGFR: 10 ML/MIN/1.73M2 — LOW
GLUCOSE SERPL-MCNC: 139 MG/DL — HIGH (ref 70–99)
HCT VFR BLD CALC: 24 % — LOW (ref 34.5–45)
HCT VFR BLD CALC: 28.4 % — LOW (ref 34.5–45)
HGB BLD-MCNC: 7.6 G/DL — LOW (ref 11.5–15.5)
HGB BLD-MCNC: 8.8 G/DL — LOW (ref 11.5–15.5)
MAGNESIUM SERPL-MCNC: 2 MG/DL — SIGNIFICANT CHANGE UP (ref 1.6–2.6)
MCHC RBC-ENTMCNC: 31 GM/DL — LOW (ref 32–36)
MCHC RBC-ENTMCNC: 31.1 PG — SIGNIFICANT CHANGE UP (ref 27–34)
MCHC RBC-ENTMCNC: 31.3 PG — SIGNIFICANT CHANGE UP (ref 27–34)
MCHC RBC-ENTMCNC: 31.7 GM/DL — LOW (ref 32–36)
MCV RBC AUTO: 100.4 FL — HIGH (ref 80–100)
MCV RBC AUTO: 98.8 FL — SIGNIFICANT CHANGE UP (ref 80–100)
NRBC # BLD: 0 /100 WBCS — SIGNIFICANT CHANGE UP (ref 0–0)
NRBC # BLD: 0 /100 WBCS — SIGNIFICANT CHANGE UP (ref 0–0)
NRBC # FLD: 0 K/UL — SIGNIFICANT CHANGE UP (ref 0–0)
NRBC # FLD: 0 K/UL — SIGNIFICANT CHANGE UP (ref 0–0)
PHOSPHATE SERPL-MCNC: 4.2 MG/DL — SIGNIFICANT CHANGE UP (ref 2.5–4.5)
PLATELET # BLD AUTO: 132 K/UL — LOW (ref 150–400)
PLATELET # BLD AUTO: 141 K/UL — LOW (ref 150–400)
POTASSIUM SERPL-MCNC: 4.2 MMOL/L — SIGNIFICANT CHANGE UP (ref 3.5–5.3)
POTASSIUM SERPL-SCNC: 4.2 MMOL/L — SIGNIFICANT CHANGE UP (ref 3.5–5.3)
RBC # BLD: 2.43 M/UL — LOW (ref 3.8–5.2)
RBC # BLD: 2.83 M/UL — LOW (ref 3.8–5.2)
RBC # FLD: 18.3 % — HIGH (ref 10.3–14.5)
RBC # FLD: 18.3 % — HIGH (ref 10.3–14.5)
RH IG SCN BLD-IMP: POSITIVE — SIGNIFICANT CHANGE UP
SODIUM SERPL-SCNC: 133 MMOL/L — LOW (ref 135–145)
SPECIMEN SOURCE: SIGNIFICANT CHANGE UP
SPECIMEN SOURCE: SIGNIFICANT CHANGE UP
WBC # BLD: 4.71 K/UL — SIGNIFICANT CHANGE UP (ref 3.8–10.5)
WBC # BLD: 4.89 K/UL — SIGNIFICANT CHANGE UP (ref 3.8–10.5)
WBC # FLD AUTO: 4.71 K/UL — SIGNIFICANT CHANGE UP (ref 3.8–10.5)
WBC # FLD AUTO: 4.89 K/UL — SIGNIFICANT CHANGE UP (ref 3.8–10.5)

## 2023-09-07 PROCEDURE — 90935 HEMODIALYSIS ONE EVALUATION: CPT

## 2023-09-07 RX ORDER — ASCORBIC ACID 60 MG
1 TABLET,CHEWABLE ORAL
Qty: 0 | Refills: 0 | DISCHARGE
Start: 2023-09-07

## 2023-09-07 RX ORDER — OXYCODONE HYDROCHLORIDE 5 MG/1
1 TABLET ORAL
Qty: 0 | Refills: 0 | DISCHARGE
Start: 2023-09-07

## 2023-09-07 RX ORDER — CALCIUM ACETATE 667 MG
667 TABLET ORAL
Qty: 0 | Refills: 0 | DISCHARGE
Start: 2023-09-07

## 2023-09-07 RX ORDER — BACITRACIN ZINC 500 UNIT/G
1 OINTMENT IN PACKET (EA) TOPICAL
Refills: 0 | DISCHARGE

## 2023-09-07 RX ORDER — FOLIC ACID 0.8 MG
1 TABLET ORAL
Qty: 0 | Refills: 0 | DISCHARGE
Start: 2023-09-07

## 2023-09-07 RX ORDER — HYDRALAZINE HCL 50 MG
1 TABLET ORAL
Refills: 0 | DISCHARGE
Start: 2023-09-07

## 2023-09-07 RX ORDER — LANOLIN ALCOHOL/MO/W.PET/CERES
1 CREAM (GRAM) TOPICAL
Qty: 0 | Refills: 0 | DISCHARGE
Start: 2023-09-07

## 2023-09-07 RX ORDER — OXYCODONE HYDROCHLORIDE 5 MG/1
2.5 TABLET ORAL
Qty: 0 | Refills: 0 | DISCHARGE
Start: 2023-09-07

## 2023-09-07 RX ORDER — HYDRALAZINE HCL 50 MG
1 TABLET ORAL
Refills: 0 | DISCHARGE

## 2023-09-07 RX ORDER — CALCIUM ACETATE 667 MG
1 TABLET ORAL
Refills: 0 | DISCHARGE

## 2023-09-07 RX ADMIN — Medication 975 MILLIGRAM(S): at 00:05

## 2023-09-07 RX ADMIN — PANTOPRAZOLE SODIUM 40 MILLIGRAM(S): 20 TABLET, DELAYED RELEASE ORAL at 05:44

## 2023-09-07 RX ADMIN — POLYETHYLENE GLYCOL 3350 17 GRAM(S): 17 POWDER, FOR SOLUTION ORAL at 05:45

## 2023-09-07 RX ADMIN — Medication 1 TABLET(S): at 14:38

## 2023-09-07 RX ADMIN — Medication 975 MILLIGRAM(S): at 05:45

## 2023-09-07 RX ADMIN — Medication 1 TABLET(S): at 05:45

## 2023-09-07 RX ADMIN — Medication 50 MILLIGRAM(S): at 12:07

## 2023-09-07 RX ADMIN — Medication 1 MILLIGRAM(S): at 12:04

## 2023-09-07 RX ADMIN — Medication 12.5 MILLIGRAM(S): at 12:03

## 2023-09-07 RX ADMIN — PIPERACILLIN AND TAZOBACTAM 25 GRAM(S): 4; .5 INJECTION, POWDER, LYOPHILIZED, FOR SOLUTION INTRAVENOUS at 01:36

## 2023-09-07 RX ADMIN — CHLORHEXIDINE GLUCONATE 1 APPLICATION(S): 213 SOLUTION TOPICAL at 12:05

## 2023-09-07 RX ADMIN — Medication 1 TABLET(S): at 12:04

## 2023-09-07 RX ADMIN — Medication 500 MILLIGRAM(S): at 12:03

## 2023-09-07 RX ADMIN — Medication 667 MILLIGRAM(S): at 12:04

## 2023-09-07 RX ADMIN — CHLORHEXIDINE GLUCONATE 1 APPLICATION(S): 213 SOLUTION TOPICAL at 05:37

## 2023-09-07 NOTE — DISCHARGE NOTE PROVIDER - NSDCMRMEDTOKEN_GEN_ALL_CORE_FT
acetaminophen 325 mg oral tablet: 3 tab(s) orally every 8 hours  ascorbic acid 500 mg oral tablet: 1 tab(s) orally 2 times a day  bacitracin 500 units/g topical ointment: Apply topically to affected area 2 times a day  calcium-vitamin D 500 mg-5 mcg (200 intl units) oral tablet: 1 tab(s) orally 3 times a day  cefadroxil 1000 mg oral tablet: 1 tab(s) orally every 3 days  folic acid 1 mg oral tablet: 1 tab(s) orally once a day  hydrALAZINE 50 mg oral tablet: 1 orally once a day  metoprolol succinate 25 mg oral tablet, extended release: 0.5 orally once a day  omeprazole 20 mg oral delayed release capsule: 1 cap(s) orally once a day  PhosLo 667 mg oral capsule: 1 orally 3 times a day  polyethylene glycol 3350 oral powder for reconstitution: 17 gram(s) orally 2 times a day  Rosa-Skyla oral tablet: 1 tab(s) orally once a day  senna leaf extract oral tablet: 2 tab(s) orally once a day (at bedtime)   acetaminophen 325 mg oral tablet: 3 tab(s) orally every 8 hours  aluminum hydroxide-magnesium hydroxide 200 mg-200 mg/5 mL oral suspension: 30 milliliter(s) orally every 4 hours As needed Dyspepsia  ascorbic acid 500 mg oral tablet: 1 tab(s) orally once a day  calcium acetate 667 mg oral tablet: 667 milligram(s) orally 3 times a day (with meals)  calcium-vitamin D 500 mg-5 mcg (200 intl units) oral tablet: 1 tab(s) orally 3 times a day  folic acid 1 mg oral tablet: 1 tab(s) orally once a day  hydrALAZINE 50 mg oral tablet: 1 tab(s) orally once a day  melatonin 3 mg oral tablet: 1 tab(s) orally once a day (at bedtime) As needed Insomnia  metoprolol succinate 25 mg oral tablet, extended release: 0.5 orally once a day  omeprazole 20 mg oral delayed release capsule: 1 cap(s) orally once a day  oxyCODONE: 2.5 milligram(s) orally every 6 hours as needed for  moderate pain  oxyCODONE 5 mg oral tablet: 1 tab(s) orally every 6 hours As needed Severe Pain (7 - 10)  polyethylene glycol 3350 oral powder for reconstitution: 17 gram(s) orally 2 times a day  Rosa-Skyla oral tablet: 1 tab(s) orally once a day  senna leaf extract oral tablet: 2 tab(s) orally once a day (at bedtime)

## 2023-09-07 NOTE — PROGRESS NOTE ADULT - NUTRITIONAL ASSESSMENT
This patient has been assessed with a concern for Malnutrition and has been determined to have a diagnosis/diagnoses of Severe protein-calorie malnutrition.    This patient is being managed with:   Diet Renal Restrictions-  For patients receiving Renal Replacement - No Protein Restr No Conc K No Conc Phos Low Sodium  Supplement Feeding Modality:  Oral  Nepro Cans or Servings Per Day:  1       Frequency:  Two Times a day  Entered: Sep  2 2023  3:04PM  
This patient has been assessed with a concern for Malnutrition and has been determined to have a diagnosis/diagnoses of Severe protein-calorie malnutrition.    This patient is being managed with:   Diet NPO after Midnight-     NPO Start Date: 05-Sep-2023   NPO Start Time: 23:59  Entered: Sep  5 2023 11:44AM    Diet Renal Restrictions-  For patients receiving Renal Replacement - No Protein Restr No Conc K No Conc Phos Low Sodium  Supplement Feeding Modality:  Oral  Nepro Cans or Servings Per Day:  1       Frequency:  Two Times a day  Entered: Sep  2 2023  3:04PM  
This patient has been assessed with a concern for Malnutrition and has been determined to have a diagnosis/diagnoses of Severe protein-calorie malnutrition.    This patient is being managed with:   Diet Renal Restrictions-  For patients receiving Renal Replacement - No Protein Restr No Conc K No Conc Phos Low Sodium  Supplement Feeding Modality:  Oral  Nepro Cans or Servings Per Day:  1       Frequency:  Two Times a day  Entered: Sep  2 2023  3:04PM  
This patient has been assessed with a concern for Malnutrition and has been determined to have a diagnosis/diagnoses of Severe protein-calorie malnutrition.    This patient is being managed with:   Diet Renal Restrictions-  For patients receiving Renal Replacement - No Protein Restr No Conc K No Conc Phos Low Sodium  Supplement Feeding Modality:  Oral  Nepro Cans or Servings Per Day:  1       Frequency:  Two Times a day  Entered: Sep  2 2023  3:04PM  
This patient has been assessed with a concern for Malnutrition and has been determined to have a diagnosis/diagnoses of Severe protein-calorie malnutrition.    This patient is being managed with:   Diet NPO after Midnight-     NPO Start Date: 05-Sep-2023   NPO Start Time: 23:59  Entered: Sep  5 2023 11:44AM    Diet Renal Restrictions-  For patients receiving Renal Replacement - No Protein Restr No Conc K No Conc Phos Low Sodium  Supplement Feeding Modality:  Oral  Nepro Cans or Servings Per Day:  1       Frequency:  Two Times a day  Entered: Sep  2 2023  3:04PM  
This patient has been assessed with a concern for Malnutrition and has been determined to have a diagnosis/diagnoses of Severe protein-calorie malnutrition.    This patient is being managed with:   Diet Renal Restrictions-  For patients receiving Renal Replacement - No Protein Restr No Conc K No Conc Phos Low Sodium  Supplement Feeding Modality:  Oral  Nepro Cans or Servings Per Day:  1       Frequency:  Two Times a day  Entered: Sep  2 2023  3:04PM

## 2023-09-07 NOTE — PROGRESS NOTE ADULT - PROBLEM SELECTOR PROBLEM 1
ESRD on hemodialysis
AV fistula infection
ESRD on hemodialysis

## 2023-09-07 NOTE — PROGRESS NOTE ADULT - SUBJECTIVE AND OBJECTIVE BOX
Cardiovascular Disease Progress Note  Date of Service: 23 @ 08:24    Overnight events: No acute events overnight.    Patient is undergoing HD in no distress.        Objective Findings:  T(C): 36.4 (23 @ 06:30), Max: 36.7 (23 @ 20:56)  HR: 64 (23 @ 06:30) (60 - 69)  BP: 159/76 (23 @ 06:30) (130/67 - 159/83)  RR: 18 (23 @ 06:30) (16 - 18)  SpO2: 97% (23 @ 05:35) (97% - 100%)  Wt(kg): --  Daily Height in cm: 154.9 (06 Sep 2023 16:49)    Daily Weight in k (07 Sep 2023 06:30)      Physical Exam:  Gen: NAD; Patient resting comfortably  HEENT: EOMI, Normocephalic/ atraumatic  CV: RRR, normal S1 + S2, no m/r/g  Lungs:  Normal respiratory effort; clear to auscultation bilaterally  Abd: soft, non-tender; bowel sounds present  Ext: No edema;     Telemetry: n/a    Laboratory Data:                        7.6    4.71  )-----------( 141      ( 07 Sep 2023 06:30 )             24.0         132<L>  |  96<L>  |  23  ----------------------------<  140<H>  4.0   |  27  |  3.56<H>    Ca    8.8      06 Sep 2023 06:34  Phos  3.1       Mg     2.00         TPro  6.7  /  Alb  2.3<L>  /  TBili  0.9  /  DBili  x   /  AST  31  /  ALT  15  /  AlkPhos  199<H>                Inpatient Medications:  MEDICATIONS  (STANDING):  acetaminophen     Tablet .. 975 milliGRAM(s) Oral every 6 hours  ascorbic acid 500 milliGRAM(s) Oral daily  calcium acetate 667 milliGRAM(s) Oral three times a day with meals  calcium carbonate 1250 mG  + Vitamin D (OsCal 500 + D) 1 Tablet(s) Oral three times a day  chlorhexidine 2% Cloths 1 Application(s) Topical daily  chlorhexidine 4% Liquid 1 Application(s) Topical <User Schedule>  folic acid 1 milliGRAM(s) Oral daily  hydrALAZINE 50 milliGRAM(s) Oral daily  influenza  Vaccine (HIGH DOSE) 0.7 milliLiter(s) IntraMuscular once  metoprolol succinate ER 12.5 milliGRAM(s) Oral daily  Nephro-nathanael 1 Tablet(s) Oral daily  pantoprazole    Tablet 40 milliGRAM(s) Oral before breakfast  piperacillin/tazobactam IVPB.. 4.5 Gram(s) IV Intermittent every 12 hours  polyethylene glycol 3350 17 Gram(s) Oral two times a day  senna 2 Tablet(s) Oral at bedtime      Assessment: 78 year old woman with ESRD on HD (MWF), HTN, DM (diet controlled), chronic type B aortic dissection, heart murmur, endometrial ca in remission s/p total hysterectomy in  presents with fistula malfunction.      Plan of Care:      #Post-operative evaluation-  - Ms. Harris tolerated Permacath placement again.  She displays no signs of  coronary ischemia or decompensated CHF.   EKG is sinus with a known RBBB.  Continue current cardiac management.          #Type B aortic dissection  - Chronic (Diagnosed >10 years ago)  - Optimal BP control.        #ESRD-  - HD as per renal.    #ACP (advance care planning)-  Advanced care planning was discussed with the patient.    Cardiac findings were discussed in detail and all questions were answered.     Outpatient cardiology f/u with Dr. Cheo Grimes.       Over 25 minutes spent on total encounter; more than 50% of the visit was spent counseling and/or coordinating care by the attending physician.      Gabo Burris MD [facility]  Cardiovascular Disease  [phone]

## 2023-09-07 NOTE — PROGRESS NOTE ADULT - REASON FOR ADMISSION
L AVF infection

## 2023-09-07 NOTE — DISCHARGE NOTE PROVIDER - HOSPITAL COURSE
77 y.o. Female w/ Hx HTN, DM, chronic type B aortic dissection, ESRD on HD ( M, W, F ) via LUE brachiocephalic (2018) fistula, endometrial ca in remission s/p total hysterectomy in 2007 recently admitted for L hip revision (07-08/2023) during which she underwent LUE subclavian vein stenting 8/1 for stenosis and subsequent brachiocephalic AVF revision 8/3 with resection of the involved segment and primary anastomosis for ulceration. She presents after a missed HD session due to concern for overlying infection at the AVF site. S/p LUE ACF ligation and aneurysm resection, closure by PRS and prevena vac placement on 8/31. s/p Zosyn x7 days. IR placement of RIJ Permacath on 9/6/23. Ortho followed during admission to continue LLE preveena vac. Plastic surgery replaced preveena vac 9/6/23.   PT was consulted, recommended patient to be discharged back to rehab.  At the time of discharge, the patient was hemodynamically stable, was tolerating PO diet, was voiding urine and passing stool, was ambulating, and was comfortable with adequate pain control. The patient was instructed to follow up with Dr. Jensen within 1-2 weeks after discharge from the hospital.  The patient had no other issues.

## 2023-09-07 NOTE — PROGRESS NOTE ADULT - PROVIDER SPECIALTY LIST ADULT
Anesthesia
Cardiology
Plastic Surgery
Vascular Surgery
Vascular Surgery
Cardiology
Orthopedics
Plastic Surgery
Vascular Surgery
Anesthesia
Cardiology
Nephrology
Orthopedics
Orthopedics
Vascular Surgery
Orthopedics
Plastic Surgery
Plastic Surgery
Vascular Surgery
Plastic Surgery
Nephrology
Internal Medicine

## 2023-09-07 NOTE — DISCHARGE NOTE PROVIDER - CARE PROVIDERS DIRECT ADDRESSES
[email or web address],[email or web address],DirectAddress_Unknown

## 2023-09-07 NOTE — PROGRESS NOTE ADULT - PROBLEM SELECTOR PLAN 1
Pt. tolerating HD. BP elevated during HD rounds. UF goal increased. Pt. underwent right IJ tunneled HD catheter placement by IR team yesterday (9/6/23). HD catheter functioning during HD rounds. Pt. with hyperphosphatemia, on oral calcium acetate therapy. Monitor BP and labs.

## 2023-09-07 NOTE — PROGRESS NOTE ADULT - SUBJECTIVE AND OBJECTIVE BOX
Vascular SURGERY DAILY PROGRESS NOTE    SUBJECTIVE: Patient seen and evaluated on AM rounds. Pt is resting comfortably in bed with no complaints. Pain is adequately controlled on current regimen.  Denies fevers/chills, chest pain, dyspnea, abdominal pain, nausea, vomiting, and diarrhea    Overnight Events:  No acute events overnight  -----------------------------------------------------------------------------------------------------------------------------------------------------------------------------------------------------------  OBJECTIVE:  Vital Signs Last 24 Hrs  T(C): 36.4 (07 Sep 2023 06:30), Max: 36.7 (06 Sep 2023 20:56)  T(F): 97.6 (07 Sep 2023 06:30), Max: 98 (06 Sep 2023 20:56)  HR: 64 (07 Sep 2023 06:30) (60 - 69)  BP: 159/76 (07 Sep 2023 06:30) (130/67 - 159/83)  BP(mean): --  RR: 18 (07 Sep 2023 06:30) (16 - 18)  SpO2: 97% (07 Sep 2023 05:35) (97% - 100%)    Parameters below as of 07 Sep 2023 06:30  Patient On (Oxygen Delivery Method): room air      I&O's Detail    06 Sep 2023 07:01  -  07 Sep 2023 07:00  --------------------------------------------------------  IN:    Oral Fluid: 240 mL  Total IN: 240 mL    OUT:  Total OUT: 0 mL    Total NET: 240 mL        Daily Height in cm: 154.9 (06 Sep 2023 16:49)    Daily Weight in k (07 Sep 2023 06:30)  MEDICATIONS  (STANDING):  acetaminophen     Tablet .. 975 milliGRAM(s) Oral every 6 hours  ascorbic acid 500 milliGRAM(s) Oral daily  calcium acetate 667 milliGRAM(s) Oral three times a day with meals  calcium carbonate 1250 mG  + Vitamin D (OsCal 500 + D) 1 Tablet(s) Oral three times a day  chlorhexidine 2% Cloths 1 Application(s) Topical daily  chlorhexidine 4% Liquid 1 Application(s) Topical <User Schedule>  folic acid 1 milliGRAM(s) Oral daily  hydrALAZINE 50 milliGRAM(s) Oral daily  influenza  Vaccine (HIGH DOSE) 0.7 milliLiter(s) IntraMuscular once  metoprolol succinate ER 12.5 milliGRAM(s) Oral daily  Nephro-nathanael 1 Tablet(s) Oral daily  pantoprazole    Tablet 40 milliGRAM(s) Oral before breakfast  piperacillin/tazobactam IVPB.. 4.5 Gram(s) IV Intermittent every 12 hours  polyethylene glycol 3350 17 Gram(s) Oral two times a day  senna 2 Tablet(s) Oral at bedtime    MEDICATIONS  (PRN):  aluminum hydroxide/magnesium hydroxide/simethicone Suspension 30 milliLiter(s) Oral every 4 hours PRN Dyspepsia  melatonin 3 milliGRAM(s) Oral at bedtime PRN Insomnia  ondansetron Injectable 4 milliGRAM(s) IV Push every 8 hours PRN Nausea and/or Vomiting  oxyCODONE    IR 2.5 milliGRAM(s) Oral every 6 hours PRN Moderate Pain (4 - 6)  oxyCODONE    IR 5 milliGRAM(s) Oral every 6 hours PRN Severe Pain (7 - 10)  sodium chloride 0.9% Bolus. 100 milliLiter(s) IV Bolus every 5 minutes PRN SBP LESS THAN or EQUAL to 90 mmHg  sodium chloride 0.9% lock flush 10 milliLiter(s) IV Push every 1 hour PRN Pre/post blood products, medications, blood draw, and to maintain line patency      LABS:                        7.6    4.71  )-----------( 141      ( 07 Sep 2023 06:30 )             24.0         133<L>  |  95<L>  |  30<H>  ----------------------------<  139<H>  4.2   |  21<L>  |  4.30<H>    Ca    8.6      07 Sep 2023 06:30  Phos  3.1       Mg     2.00         TPro  6.7  /  Alb  2.3<L>  /  TBili  0.9  /  DBili  x   /  AST  31  /  ALT  15  /  AlkPhos  199<H>        Urinalysis Basic - ( 07 Sep 2023 06:30 )    Color: x / Appearance: x / SG: x / pH: x  Gluc: 139 mg/dL / Ketone: x  / Bili: x / Urobili: x   Blood: x / Protein: x / Nitrite: x   Leuk Esterase: x / RBC: x / WBC x   Sq Epi: x / Non Sq Epi: x / Bacteria: x        PHYSICAL EXAM:  Physical Exam:  General: WN/WD NAD  Neurology: nonfocal, follows commands  Respiratory: nonlabored breathing on RA  CV: HDS  Extremities: Left upper extremity, Vac in place. Unchanged hand swelling, in ace wrap.  Vascular SURGERY DAILY PROGRESS NOTE    SUBJECTIVE: Patient seen and evaluated on AM rounds. Pt is resting comfortably in bed with no complaints. Pain is adequately controlled on current regimen.  Denies fevers/chills, chest pain, dyspnea, abdominal pain, nausea, vomiting, and diarrhea    Overnight Events:  No acute events overnight  -----------------------------------------------------------------------------------------------------------------------------------------------------------------------------------------------------------  OBJECTIVE:  Vital Signs Last 24 Hrs  T(C): 36.4 (07 Sep 2023 06:30), Max: 36.7 (06 Sep 2023 20:56)  T(F): 97.6 (07 Sep 2023 06:30), Max: 98 (06 Sep 2023 20:56)  HR: 64 (07 Sep 2023 06:30) (60 - 69)  BP: 159/76 (07 Sep 2023 06:30) (130/67 - 159/83)  BP(mean): --  RR: 18 (07 Sep 2023 06:30) (16 - 18)  SpO2: 97% (07 Sep 2023 05:35) (97% - 100%)    Parameters below as of 07 Sep 2023 06:30  Patient On (Oxygen Delivery Method): room air      I&O's Detail    06 Sep 2023 07:01  -  07 Sep 2023 07:00  --------------------------------------------------------  IN:    Oral Fluid: 240 mL  Total IN: 240 mL    OUT:  Total OUT: 0 mL    Total NET: 240 mL        Daily Height in cm: 154.9 (06 Sep 2023 16:49)    Daily Weight in k (07 Sep 2023 06:30)  MEDICATIONS  (STANDING):  acetaminophen     Tablet .. 975 milliGRAM(s) Oral every 6 hours  ascorbic acid 500 milliGRAM(s) Oral daily  calcium acetate 667 milliGRAM(s) Oral three times a day with meals  calcium carbonate 1250 mG  + Vitamin D (OsCal 500 + D) 1 Tablet(s) Oral three times a day  chlorhexidine 2% Cloths 1 Application(s) Topical daily  chlorhexidine 4% Liquid 1 Application(s) Topical <User Schedule>  folic acid 1 milliGRAM(s) Oral daily  hydrALAZINE 50 milliGRAM(s) Oral daily  influenza  Vaccine (HIGH DOSE) 0.7 milliLiter(s) IntraMuscular once  metoprolol succinate ER 12.5 milliGRAM(s) Oral daily  Nephro-nathanael 1 Tablet(s) Oral daily  pantoprazole    Tablet 40 milliGRAM(s) Oral before breakfast  piperacillin/tazobactam IVPB.. 4.5 Gram(s) IV Intermittent every 12 hours  polyethylene glycol 3350 17 Gram(s) Oral two times a day  senna 2 Tablet(s) Oral at bedtime    MEDICATIONS  (PRN):  aluminum hydroxide/magnesium hydroxide/simethicone Suspension 30 milliLiter(s) Oral every 4 hours PRN Dyspepsia  melatonin 3 milliGRAM(s) Oral at bedtime PRN Insomnia  ondansetron Injectable 4 milliGRAM(s) IV Push every 8 hours PRN Nausea and/or Vomiting  oxyCODONE    IR 2.5 milliGRAM(s) Oral every 6 hours PRN Moderate Pain (4 - 6)  oxyCODONE    IR 5 milliGRAM(s) Oral every 6 hours PRN Severe Pain (7 - 10)  sodium chloride 0.9% Bolus. 100 milliLiter(s) IV Bolus every 5 minutes PRN SBP LESS THAN or EQUAL to 90 mmHg  sodium chloride 0.9% lock flush 10 milliLiter(s) IV Push every 1 hour PRN Pre/post blood products, medications, blood draw, and to maintain line patency      LABS:                        7.6    4.71  )-----------( 141      ( 07 Sep 2023 06:30 )             24.0         133<L>  |  95<L>  |  30<H>  ----------------------------<  139<H>  4.2   |  21<L>  |  4.30<H>    Ca    8.6      07 Sep 2023 06:30  Phos  3.1       Mg     2.00         TPro  6.7  /  Alb  2.3<L>  /  TBili  0.9  /  DBili  x   /  AST  31  /  ALT  15  /  AlkPhos  199<H>        Urinalysis Basic - ( 07 Sep 2023 06:30 )    Color: x / Appearance: x / SG: x / pH: x  Gluc: 139 mg/dL / Ketone: x  / Bili: x / Urobili: x   Blood: x / Protein: x / Nitrite: x   Leuk Esterase: x / RBC: x / WBC x   Sq Epi: x / Non Sq Epi: x / Bacteria: x        PHYSICAL EXAM:  Physical Exam:  General: WN/WD NAD  Neurology: nonfocal, follows commands  Respiratory: nonlabored breathing on RA  CV: HDS  Extremities: LUE Vac in place. Unchanged hand swelling, in ace wrap.

## 2023-09-07 NOTE — DISCHARGE NOTE PROVIDER - NSDCCPCAREPLAN_GEN_ALL_CORE_FT
PRINCIPAL DISCHARGE DIAGNOSIS  Diagnosis: AV fistula infection  Assessment and Plan of Treatment: WOUND CARE:  Please keep incisions clean and dry. Please do not Scrub or rub incisions. Do not use lotion or powder on incisions.   BATHING: You may shower and/or sponge bathe. You may use warm soapy water in the shower and rinse, pat dry.  ACTIVITY: No heavy lifting or straining. Otherwise, you may return to your usual level of physical activity. If you are taking narcotic pain medication DO NOT drive a car, operate machinery or make important decisions.  DIET: Return to your usual diet.  NOTIFY YOUR SURGEON IF YOU HAVE: any bleeding that does not stop, any pus draining from your wound(s), any fever (over 100.4 F) persistent nausea/vomiting, or if your pain is not controlled on your discharge pain medications, unable to urinate.  Please follow up with your primary care physician in one week regarding your hospitalization, bring copies of your discharge paperwork.  Please follow up with your surgeon, Dr. Jensen within 1-2 weeks of discharge. Please call to schedule an appointment.     PRINCIPAL DISCHARGE DIAGNOSIS  Diagnosis: AV fistula infection  Assessment and Plan of Treatment: Roberto Dutton drain (PORFIRIO Drain)   You are being discharged with a PORFIRIO drain. It is important that you measure and record the fluid that is in it (output) on the output record sheet daily. Please bring the output record sheet to your follow-up appointment. Keep the drain secured to your clothing with a safety pin at all times to avoid accidental displacement. Monitor the insertion site for redness, pain, swelling, or purulent drainage.  You may shower with the drain. Wash around the drain with soap and water, pat dry, and reapply dressing. If you noticed a sudden change in the color or amount of fluid in the drain, please contact your surgeon’s office.  Your drain will be removed at an outpatient follow up appointment.   WOUND CARE:  Please keep incisions clean and dry. Please do not Scrub or rub incisions. Do not use lotion or powder on incisions.   BATHING: You may shower and/or sponge bathe. You may use warm soapy water in the shower and rinse, pat dry.  ACTIVITY: No heavy lifting or straining. Otherwise, you may return to your usual level of physical activity. If you are taking narcotic pain medication DO NOT drive a car, operate machinery or make important decisions.  DIET: Return to your usual diet.  NOTIFY YOUR SURGEON IF YOU HAVE: any bleeding that does not stop, any pus draining from your wound(s), any fever (over 100.4 F) persistent nausea/vomiting, or if your pain is not controlled on your discharge pain medications, unable to urinate.  Please follow up with your primary care physician in one week regarding your hospitalization, bring copies of your discharge paperwork.  Please follow up with your surgeon, Dr. Jensen within 1-2 weeks of discharge. Please call to schedule an appointment.

## 2023-09-07 NOTE — DISCHARGE NOTE PROVIDER - DETAILS OF MALNUTRITION DIAGNOSIS/DIAGNOSES
This patient has been assessed with a concern for Malnutrition and was treated during this hospitalization for the following Nutrition diagnosis/diagnoses:     -  09/02/2023: Severe protein-calorie malnutrition

## 2023-09-07 NOTE — DISCHARGE NOTE NURSING/CASE MANAGEMENT/SOCIAL WORK - NSDCPEFALRISK_GEN_ALL_CORE
For information on Fall & Injury Prevention, visit: [email or web address] OR  [email or web address] OR  https://www.cdc.gov/steadi/patient.html

## 2023-09-07 NOTE — PROGRESS NOTE ADULT - ASSESSMENT
Assessment and Plan:   · Assessment	  77 y.o. Female w/ Hx HTN, DM, chronic type B aortic dissection, ESRD on HD ( M, W, F ) via LUE brachiocephalic (2018) fistula, endometrial ca in remission s/p total hysterectomy in 2007 recently admitted for L hip revision (07-08/2023) during which she underwent LUE subclavian vein stenting 8/1 for stenosis and subsequent brachiocephalic AVF revision 8/3 with resection of the involved segment and primary anastomosis for ulceration. She presents after a missed HD session due to concern for overlying infection at the AVF site. S/p LUE ACF ligation and aneurysm resection, closure by PRS and prevena vac placement on 8/31. Planning for RUE AV fistula.    PLAN:  - tunneled catheter to be placed by IR 9/6  - RUE vein mapping   - Continue PT  - Pain control PRN  - ELevate LUE while in bed / chair.   - keep ACE wrap in place  - Endy drain removal and vac replacement with PRS 9/6  - check am labs.   - CT venogram - no evidence of central stenosis   - DVT ppx  - HD MWF schedule outpatient  - patient stable for discharge per vascular and PRS  - Dispo planning - return to rehab     Vascular Surgery [id number]   Assessment and Plan:   · Assessment	  77 y.o. Female w/ Hx HTN, DM, chronic type B aortic dissection, ESRD on HD ( M, W, F ) via LUE brachiocephalic (2018) fistula, endometrial ca in remission s/p total hysterectomy in 2007 recently admitted for L hip revision (07-08/2023) during which she underwent LUE subclavian vein stenting 8/1 for stenosis and subsequent brachiocephalic AVF revision 8/3 with resection of the involved segment and primary anastomosis for ulceration. She presents after a missed HD session due to concern for overlying infection at the AVF site. S/p LUE ACF ligation and aneurysm resection, closure by PRS and prevena vac placement on 8/31. Planning for RUE AV fistula.    PLAN:  - tunneled catheter to be placed by IR 9/6  - HD today  - RUE vein mapping   - Continue PT  - Pain control PRN  - ELevate LUE while in bed / chair.   - keep ACE wrap in place  - Endy drain removal and vac replacement with PRS 9/6  - check am labs.   - CT venogram - no evidence of central stenosis   - DVT ppx  - HD MWF schedule outpatient  - patient stable for discharge per vascular and PRS  - Dispo planning - return to rehab     Vascular Surgery [id number]

## 2023-09-07 NOTE — DISCHARGE NOTE PROVIDER - CARE PROVIDER_API CALL
Diamond Jensen  Vascular Surgery  [address], [address]  [place] [address]  Phone: [phone]  Fax: [phone]  Follow Up Time: 2 weeks    Didier Valencia  Plastic Surgery  [address], [address]  [place] [address]  Phone: [phone]  Fax: [phone]  Follow Up Time: 1 week    Vasyl Patel  Orthopaedic Surgery  [address], [address]  [place] [address]  Phone: [phone]  Fax: [phone]  Follow Up Time: 1 week    Gabo Burris.  Internal Medicine  [address] [place] [address]  Phone: [phone]  Fax: [phone]  Follow Up Time: 1 month

## 2023-09-07 NOTE — DISCHARGE NOTE PROVIDER - PROVIDER TOKENS
PROVIDER:[TOKEN:[[id number]],FOLLOWUP:[2 weeks]],PROVIDER:[TOKEN:[[id number]],FOLLOWUP:[1 week]],PROVIDER:[TOKEN:[[id number]],FOLLOWUP:[1 week]],PROVIDER:[TOKEN:[[id number]],FOLLOWUP:[1 month]]

## 2023-09-07 NOTE — PROGRESS NOTE ADULT - SUBJECTIVE AND OBJECTIVE BOX
[facility] DIVISION OF KIDNEY DISEASES AND HYPERTENSION --   --------------------------------------------------------------------------------  Chief Complaint: ESRD/Ongoing hemodialysis requirement    24 hour events/subjective: Pt. seen and examined during HD today. Pt. feels better but gives history of intermittent LUE pain. No fever, CP, SOB, HA or dizziness during HD rounds. Pt. underwent right IJ tunneled HD catheter placement by IR team yesterday (9/6/23).     PAST HISTORY  --------------------------------------------------------------------------------  No significant changes to PMH, PSH, FHx, SHx, unless otherwise noted    ALLERGIES & MEDICATIONS  --------------------------------------------------------------------------------  Allergies    No Known Allergies    Intolerances    Standing Inpatient Medications  acetaminophen     Tablet .. 975 milliGRAM(s) Oral every 6 hours  ascorbic acid 500 milliGRAM(s) Oral daily  calcium acetate 667 milliGRAM(s) Oral three times a day with meals  calcium carbonate 1250 mG  + Vitamin D (OsCal 500 + D) 1 Tablet(s) Oral three times a day  chlorhexidine 2% Cloths 1 Application(s) Topical daily  chlorhexidine 4% Liquid 1 Application(s) Topical <User Schedule>  folic acid 1 milliGRAM(s) Oral daily  hydrALAZINE 50 milliGRAM(s) Oral daily  influenza  Vaccine (HIGH DOSE) 0.7 milliLiter(s) IntraMuscular once  metoprolol succinate ER 12.5 milliGRAM(s) Oral daily  Nephro-nathanael 1 Tablet(s) Oral daily  pantoprazole    Tablet 40 milliGRAM(s) Oral before breakfast  piperacillin/tazobactam IVPB.. 4.5 Gram(s) IV Intermittent every 12 hours  polyethylene glycol 3350 17 Gram(s) Oral two times a day  senna 2 Tablet(s) Oral at bedtime    PRN Inpatient Medications  aluminum hydroxide/magnesium hydroxide/simethicone Suspension 30 milliLiter(s) Oral every 4 hours PRN  melatonin 3 milliGRAM(s) Oral at bedtime PRN  ondansetron Injectable 4 milliGRAM(s) IV Push every 8 hours PRN  oxyCODONE    IR 5 milliGRAM(s) Oral every 6 hours PRN  oxyCODONE    IR 2.5 milliGRAM(s) Oral every 6 hours PRN  sodium chloride 0.9% Bolus. 100 milliLiter(s) IV Bolus every 5 minutes PRN  sodium chloride 0.9% lock flush 10 milliLiter(s) IV Push every 1 hour PRN    REVIEW OF SYSTEMS  --------------------------------------------------------------------------------  Gen: No fever  Respiratory: No dyspnea  CV: No chest pain  GI: No abdominal pain  MSK: LUE swelling and intermittent pain  Neuro: No dizziness    VITALS/PHYSICAL EXAM  --------------------------------------------------------------------------------  T(C): 36.4 (09-07-23 @ 06:30), Max: 36.7 (09-06-23 @ 20:56)  HR: 64 (09-07-23 @ 06:30) (60 - 69)  BP: 159/76 (09-07-23 @ 06:30) (130/67 - 159/83)  RR: 18 (09-07-23 @ 06:30) (16 - 18)  SpO2: 97% (09-07-23 @ 05:35) (97% - 100%)  Wt(kg): --  Height (cm): 154.9 (09-06-23 @ 16:49)  Weight (kg): 53.4 (09-06-23 @ 16:49)  BMI (kg/m2): 22.3 (09-06-23 @ 16:49)  BSA (m2): 1.51 (09-06-23 @ 16:49)    09-06-23 @ 07:01  -  09-07-23 @ 07:00  --------------------------------------------------------  IN: 240 mL / OUT: 0 mL / NET: 240 mL    Physical Exam:  	Gen: resting, NAD  	HEENT: No JVD  	Pulm: CTA B/L  	CV: S1S2+  	Abd: Soft              UE: LUE dressing seen+   	LE: B/L LE mild pitting edema+          	Vascular access: Right IJ tunneled HD catheter being used for HD      LABS/STUDIES  --------------------------------------------------------------------------------              7.6    4.71  >-----------<  141      [09-07-23 @ 06:30]              24.0     133  |  95  |  30  ----------------------------<  139      [09-07-23 @ 06:30]  4.2   |  21  |  4.30        Ca     8.6     [09-07-23 @ 06:30]      Mg     2.00     [09-07-23 @ 06:30]      Phos  4.2     [09-07-23 @ 06:30]    TPro  6.7  /  Alb  2.3  /  TBili  0.9  /  DBili  x   /  AST  31  /  ALT  15  /  AlkPhos  199  [09-06-23 @ 06:34]    HBsAg Nonreact      [09-06-23 @ 06:34]  HCV 0.21, Nonreact      [09-06-23 @ 06:34]

## 2023-09-07 NOTE — PROGRESS NOTE ADULT - SUBJECTIVE AND OBJECTIVE BOX
ORTHOPEDIC PROGRESS NOTE      Overnight events: None    SUBJECTIVE: Pt seen and examined at bedside. Patient is doing well, no acute complaints this AM. Pain is controlled with medication. Pt stated she walked with PT two days ago, however, since she had a procedure yesterday, she didn't feel up to walking yesterday.      OBJECTIVE:  Vital Signs Last 24 Hrs  T(C): 36.6 (07 Sep 2023 01:00), Max: 36.8 (06 Sep 2023 06:32)  T(F): 97.8 (07 Sep 2023 01:00), Max: 98.3 (06 Sep 2023 06:32)  HR: 64 (07 Sep 2023 01:00) (60 - 72)  BP: 130/67 (07 Sep 2023 01:00) (130/67 - 169/89)  BP(mean): --  RR: 16 (07 Sep 2023 01:00) (16 - 18)  SpO2: 99% (07 Sep 2023 01:00) (99% - 100%)    Parameters below as of 07 Sep 2023 01:00  Patient On (Oxygen Delivery Method): room air          09-05-23 @ 07:01  -  09-06-23 @ 07:00  --------------------------------------------------------  IN: 600 mL / OUT: 2110 mL / NET: -1510 mL    09-06-23 @ 07:01  -  09-07-23 @ 04:53  --------------------------------------------------------  IN: 240 mL / OUT: 0 mL / NET: 240 mL        Physical Examination:   GEN: NAD, resting quietly  PULM: symmetric chest rise bilaterally, no increased WOB  ABD: nondistended  EXTR:   LLE:  Prevena intact, holding suction  SILT [place]/Sa/DP/SP/T  +EHL/FHL/TA/GSC  Compartments soft/non-tender  Toes warm, Cap refill brisk/warm and perfused, +DP pulse     LABS:                        8.8    5.07  )-----------( 145      ( 06 Sep 2023 06:34 )             26.8       09-06    132<L>  |  96<L>  |  23  ----------------------------<  140<H>  4.0   |  27  |  3.56<H>    Ca    8.8      06 Sep 2023 06:34  Phos  3.1     09-06  Mg     2.00     09-06    TPro  6.7  /  Alb  2.3<L>  /  TBili  0.9  /  DBili  x   /  AST  31  /  ALT  15  /  AlkPhos  199<H>  09-06

## 2023-09-07 NOTE — DISCHARGE NOTE NURSING/CASE MANAGEMENT/SOCIAL WORK - PATIENT PORTAL LINK FT
You can access the FollowMyHealth Patient Portal offered by [facility] by registering at the following website: [email or web address]. By joining [facility]’s FollowMyHealth portal, you will also be able to view your health information using other applications (apps) compatible with our system.

## 2023-09-07 NOTE — PROGRESS NOTE ADULT - ASSESSMENT
78F w L hip surgical incision w mild drainage s/p SUZANNA and peter    Plan:  - Prevena vac on LLE stays on at all times  - monitor labs  - WBAT LLE  - PT/OT  - Remainder of care per primary team      For all questions related to patient care, please reach out to the on-call team via the pager.     Elle Ulloa, PGY 2  Orthopaedic Surgery  [facility] [phone]  [facility] [place] [facility] [id number]  [facility] [phone]

## 2023-09-12 ENCOUNTER — INPATIENT (INPATIENT)
Facility: HOSPITAL | Age: 78
LOS: 6 days | Discharge: NURSING, REHAB OR LONG-TERM CARE | End: 2023-09-19
Attending: INTERNAL MEDICINE | Admitting: INTERNAL MEDICINE
Payer: MEDICARE

## 2023-09-12 VITALS
RESPIRATION RATE: 18 BRPM | DIASTOLIC BLOOD PRESSURE: 66 MMHG | SYSTOLIC BLOOD PRESSURE: 140 MMHG | HEART RATE: 76 BPM | HEIGHT: 61 IN | OXYGEN SATURATION: 97 % | TEMPERATURE: 99 F

## 2023-09-12 DIAGNOSIS — Z96.641 PRESENCE OF RIGHT ARTIFICIAL HIP JOINT: Chronic | ICD-10-CM

## 2023-09-12 DIAGNOSIS — Z90.710 ACQUIRED ABSENCE OF BOTH CERVIX AND UTERUS: Chronic | ICD-10-CM

## 2023-09-12 DIAGNOSIS — Z98.890 OTHER SPECIFIED POSTPROCEDURAL STATES: Chronic | ICD-10-CM

## 2023-09-12 LAB
ALBUMIN SERPL ELPH-MCNC: 2.5 G/DL — LOW (ref 3.3–5)
ALP SERPL-CCNC: 362 U/L — HIGH (ref 40–120)
ALT FLD-CCNC: 43 U/L — HIGH (ref 4–33)
ANION GAP SERPL CALC-SCNC: 13 MMOL/L — SIGNIFICANT CHANGE UP (ref 7–14)
APTT BLD: 33.8 SEC — SIGNIFICANT CHANGE UP (ref 24.5–35.6)
AST SERPL-CCNC: 98 U/L — HIGH (ref 4–32)
BASE EXCESS BLDV CALC-SCNC: 3 MMOL/L — SIGNIFICANT CHANGE UP (ref -2–3)
BASOPHILS # BLD AUTO: 0.01 K/UL — SIGNIFICANT CHANGE UP (ref 0–0.2)
BASOPHILS NFR BLD AUTO: 0.2 % — SIGNIFICANT CHANGE UP (ref 0–2)
BILIRUB SERPL-MCNC: 0.8 MG/DL — SIGNIFICANT CHANGE UP (ref 0.2–1.2)
BLD GP AB SCN SERPL QL: NEGATIVE — SIGNIFICANT CHANGE UP
BLOOD GAS VENOUS COMPREHENSIVE RESULT: SIGNIFICANT CHANGE UP
BUN SERPL-MCNC: 21 MG/DL — SIGNIFICANT CHANGE UP (ref 7–23)
CALCIUM SERPL-MCNC: 8.9 MG/DL — SIGNIFICANT CHANGE UP (ref 8.4–10.5)
CHLORIDE BLDV-SCNC: 102 MMOL/L — SIGNIFICANT CHANGE UP (ref 96–108)
CHLORIDE SERPL-SCNC: 97 MMOL/L — LOW (ref 98–107)
CO2 BLDV-SCNC: 30.4 MMOL/L — HIGH (ref 22–26)
CO2 SERPL-SCNC: 26 MMOL/L — SIGNIFICANT CHANGE UP (ref 22–31)
CREAT SERPL-MCNC: 3.06 MG/DL — HIGH (ref 0.5–1.3)
CRP SERPL-MCNC: 40.5 MG/L — HIGH
EGFR: 15 ML/MIN/1.73M2 — LOW
EOSINOPHIL # BLD AUTO: 0.12 K/UL — SIGNIFICANT CHANGE UP (ref 0–0.5)
EOSINOPHIL NFR BLD AUTO: 2.6 % — SIGNIFICANT CHANGE UP (ref 0–6)
ERYTHROCYTE [SEDIMENTATION RATE] IN BLOOD: 88 MM/HR — HIGH (ref 4–25)
GAS PNL BLDV: 137 MMOL/L — SIGNIFICANT CHANGE UP (ref 136–145)
GLUCOSE BLDV-MCNC: 269 MG/DL — HIGH (ref 70–99)
GLUCOSE SERPL-MCNC: 270 MG/DL — HIGH (ref 70–99)
HCO3 BLDV-SCNC: 29 MMOL/L — SIGNIFICANT CHANGE UP (ref 22–29)
HCT VFR BLD CALC: 27.1 % — LOW (ref 34.5–45)
HCT VFR BLDA CALC: 27 % — LOW (ref 34.5–46.5)
HGB BLD CALC-MCNC: 9 G/DL — LOW (ref 11.7–16.1)
HGB BLD-MCNC: 8.7 G/DL — LOW (ref 11.5–15.5)
IANC: 3.11 K/UL — SIGNIFICANT CHANGE UP (ref 1.8–7.4)
IMM GRANULOCYTES NFR BLD AUTO: 0.4 % — SIGNIFICANT CHANGE UP (ref 0–0.9)
INR BLD: 0.95 RATIO — SIGNIFICANT CHANGE UP (ref 0.85–1.18)
LACTATE BLDV-MCNC: 2.2 MMOL/L — HIGH (ref 0.5–2)
LYMPHOCYTES # BLD AUTO: 0.64 K/UL — LOW (ref 1–3.3)
LYMPHOCYTES # BLD AUTO: 14.1 % — SIGNIFICANT CHANGE UP (ref 13–44)
MCHC RBC-ENTMCNC: 31.6 PG — SIGNIFICANT CHANGE UP (ref 27–34)
MCHC RBC-ENTMCNC: 32.1 GM/DL — SIGNIFICANT CHANGE UP (ref 32–36)
MCV RBC AUTO: 98.5 FL — SIGNIFICANT CHANGE UP (ref 80–100)
MONOCYTES # BLD AUTO: 0.65 K/UL — SIGNIFICANT CHANGE UP (ref 0–0.9)
MONOCYTES NFR BLD AUTO: 14.3 % — HIGH (ref 2–14)
NEUTROPHILS # BLD AUTO: 3.11 K/UL — SIGNIFICANT CHANGE UP (ref 1.8–7.4)
NEUTROPHILS NFR BLD AUTO: 68.4 % — SIGNIFICANT CHANGE UP (ref 43–77)
NRBC # BLD: 0 /100 WBCS — SIGNIFICANT CHANGE UP (ref 0–0)
NRBC # FLD: 0 K/UL — SIGNIFICANT CHANGE UP (ref 0–0)
PCO2 BLDV: 50 MMHG — SIGNIFICANT CHANGE UP (ref 39–52)
PH BLDV: 7.37 — SIGNIFICANT CHANGE UP (ref 7.32–7.43)
PLATELET # BLD AUTO: 164 K/UL — SIGNIFICANT CHANGE UP (ref 150–400)
PO2 BLDV: 36 MMHG — SIGNIFICANT CHANGE UP (ref 25–45)
POTASSIUM BLDV-SCNC: 3.5 MMOL/L — SIGNIFICANT CHANGE UP (ref 3.5–5.1)
POTASSIUM SERPL-MCNC: 3.3 MMOL/L — LOW (ref 3.5–5.3)
POTASSIUM SERPL-SCNC: 3.3 MMOL/L — LOW (ref 3.5–5.3)
PROT SERPL-MCNC: 7 G/DL — SIGNIFICANT CHANGE UP (ref 6–8.3)
PROTHROM AB SERPL-ACNC: 10.7 SEC — SIGNIFICANT CHANGE UP (ref 9.5–13)
RBC # BLD: 2.75 M/UL — LOW (ref 3.8–5.2)
RBC # FLD: 18.1 % — HIGH (ref 10.3–14.5)
RH IG SCN BLD-IMP: POSITIVE — SIGNIFICANT CHANGE UP
SAO2 % BLDV: 57.1 % — LOW (ref 67–88)
SODIUM SERPL-SCNC: 136 MMOL/L — SIGNIFICANT CHANGE UP (ref 135–145)
WBC # BLD: 4.55 K/UL — SIGNIFICANT CHANGE UP (ref 3.8–10.5)
WBC # FLD AUTO: 4.55 K/UL — SIGNIFICANT CHANGE UP (ref 3.8–10.5)

## 2023-09-12 PROCEDURE — 72170 X-RAY EXAM OF PELVIS: CPT | Mod: 26,XU

## 2023-09-12 PROCEDURE — 73502 X-RAY EXAM HIP UNI 2-3 VIEWS: CPT | Mod: 26,LT

## 2023-09-12 PROCEDURE — 73552 X-RAY EXAM OF FEMUR 2/>: CPT | Mod: 26,LT

## 2023-09-12 PROCEDURE — 99285 EMERGENCY DEPT VISIT HI MDM: CPT

## 2023-09-12 RX ORDER — SODIUM CHLORIDE 9 MG/ML
500 INJECTION INTRAMUSCULAR; INTRAVENOUS; SUBCUTANEOUS ONCE
Refills: 0 | Status: COMPLETED | OUTPATIENT
Start: 2023-09-12 | End: 2023-09-12

## 2023-09-12 RX ORDER — PIPERACILLIN AND TAZOBACTAM 4; .5 G/20ML; G/20ML
3.38 INJECTION, POWDER, LYOPHILIZED, FOR SOLUTION INTRAVENOUS ONCE
Refills: 0 | Status: COMPLETED | OUTPATIENT
Start: 2023-09-12 | End: 2023-09-12

## 2023-09-12 RX ORDER — VANCOMYCIN HCL 1 G
1000 VIAL (EA) INTRAVENOUS ONCE
Refills: 0 | Status: COMPLETED | OUTPATIENT
Start: 2023-09-12 | End: 2023-09-12

## 2023-09-12 RX ORDER — ACETAMINOPHEN 500 MG
975 TABLET ORAL ONCE
Refills: 0 | Status: COMPLETED | OUTPATIENT
Start: 2023-09-12 | End: 2023-09-12

## 2023-09-12 RX ADMIN — Medication 975 MILLIGRAM(S): at 20:42

## 2023-09-12 RX ADMIN — PIPERACILLIN AND TAZOBACTAM 200 GRAM(S): 4; .5 INJECTION, POWDER, LYOPHILIZED, FOR SOLUTION INTRAVENOUS at 18:17

## 2023-09-12 RX ADMIN — SODIUM CHLORIDE 500 MILLILITER(S): 9 INJECTION INTRAMUSCULAR; INTRAVENOUS; SUBCUTANEOUS at 18:17

## 2023-09-12 RX ADMIN — Medication 250 MILLIGRAM(S): at 19:22

## 2023-09-12 NOTE — ED PROVIDER NOTE - NSICDXPASTMEDICALHX_GEN_ALL_CORE_FT
PAST MEDICAL HISTORY:  Adult Onset Diabetes Mellitus,     Cancer of Endometrium s/p Hysterectomy , s/p Chemo.    CKD (chronic kidney disease)     Dissection of Aorta Type B    ESRD (end stage renal disease) on dialysis M,W,F   [facility] [address]    History of Hysterectomy with O     Hypertension

## 2023-09-12 NOTE — CONSULT NOTE ADULT - SUBJECTIVE AND OBJECTIVE BOX
Vascular Surgery Consult    Consulting attending: Mikey       HPI: 77 y.o. Female w/ Hx HTN, DM, chronic type B aortic dissection, ESRD on HD ( M, W, F ) via LUE brachiocephalic (2018) fistula, endometrial ca in remission s/p total hysterectomy in 2007 recently admitted for L hip revision (07-08/2023) during which she underwent LUE subclavian vein stenting 8/1 for stenosis and subsequent brachiocephalic AVF revision 8/3 with resection of the involved segment and primary anastomosis for ulceration. She presents after a missed HD session due to concern for overlying infection at the AVF site. S/p LUE ACF ligation and aneurysm resection, closure by PRS and prevena vac placement on 8/31. s/p Zosyn x7 days. IR placement of RIJ Permacath on 9/6/23. Ortho followed during admission to continue LLE preveena vac. Plastic surgery replaced preveena vac 9/6/23.     Pt sent back to ED by [facility] for c/f hip infection. Vascular consulted for prior fistula excision site.       PAST MEDICAL HISTORY:  Adult Onset Diabetes Mellitus,    Cancer of Endometrium    Dissection of Aorta    History of Hysterectomy with O    Hypertension    CKD (chronic kidney disease)    ESRD (end stage renal disease) on dialysis          PAST SURGICAL HISTORY:  H/O total hysterectomy    History of hip replacement, total, right    S/P arteriovenous (AV) fistula repair          MEDICATIONS:  vancomycin  IVPB. 1000 milliGRAM(s) IV Intermittent once        ALLERGIES:  No Known Allergies        VITALS & I/Os:  Vital Signs Last 24 Hrs  T(C): 37.1 (12 Sep 2023 16:22), Max: 37.2 (12 Sep 2023 12:56)  T(F): 98.7 (12 Sep 2023 16:22), Max: 98.9 (12 Sep 2023 12:56)  HR: 82 (12 Sep 2023 16:22) (76 - 82)  BP: 179/63 (12 Sep 2023 16:22) (140/66 - 179/63)  BP(mean): --  RR: 18 (12 Sep 2023 16:22) (18 - 18)  SpO2: 98% (12 Sep 2023 16:22) (97% - 98%)    Parameters below as of 12 Sep 2023 16:22  Patient On (Oxygen Delivery Method): room air        I&O's Summary        PHYSICAL EXAM:  General: No acute distress  Respiratory: Nonlabored  Cardiovascular: RRR  Abdominal: Soft, nondistended, nontender. No rebound or guarding. No organomegaly, no palpable mass.  Extremities: Warm, LUE edematous  Vascular: LUE fistula site edematous, dried blood on dressing. clean, dry.        LABS:                        8.7    4.55  )-----------( 164      ( 12 Sep 2023 14:30 )             27.1     09-12    136  |  97<L>  |  21  ----------------------------<  270<H>  3.3<L>   |  26  |  3.06<H>    Ca    8.9      12 Sep 2023 14:30    TPro  7.0  /  Alb  2.5<L>  /  TBili  0.8  /  DBili  x   /  AST  98<H>  /  ALT  43<H>  /  AlkPhos  362<H>  09-12    Lactate:  09-12 @ 14:30  2.2    PT/INR - ( 12 Sep 2023 14:30 )   PT: 10.7 sec;   INR: 0.95 ratio         PTT - ( 12 Sep 2023 14:30 )  PTT:33.8 sec          Urinalysis Basic - ( 12 Sep 2023 14:30 )    Color: x / Appearance: x / SG: x / pH: x  Gluc: 270 mg/dL / Ketone: x  / Bili: x / Urobili: x   Blood: x / Protein: x / Nitrite: x   Leuk Esterase: x / RBC: x / WBC x   Sq Epi: x / Non Sq Epi: x / Bacteria: x          IMAGING:

## 2023-09-12 NOTE — ED PROVIDER NOTE - PHYSICAL EXAMINATION
GENERAL: Awake, alert, NAD  LUNGS: CTAB, no wheezes or crackles   CARDIAC: RRR, no m/r/g  ABDOMEN: Soft, , non tender, non distended, no rebound, no guarding left upper thigh no erythema, slight warmth mild drainage to the area   EXT: left upper extremity very slight thrill to left upper fistula w/ warmth   SKIN: Warm and dry. No rash.  PSYCH: Normal affect.

## 2023-09-12 NOTE — ED PROVIDER NOTE - OBJECTIVE STATEMENT
78-year-old female chief complaint sent in from [facility] for concern for infection of the left hip.  Patient just discharged from the hospital on September 7 after missing dialysis and a long complex course of having her left AV fistula infected in conjunction with a left hip surgical incision status post SUZANNA.  Patient got dialysis yesterday to completion 2.2 L taken off.  Patient otherwise has no active complaints denies any fever chest pain shortness of breath nausea vomiting

## 2023-09-12 NOTE — ED PROVIDER NOTE - CLINICAL SUMMARY MEDICAL DECISION MAKING FREE TEXT BOX
78-year-old female with multiple medical conditions chief complaint of evaluation for left knee.  Given history and physical patient is able to range the knee no tenderness with axial manipulation will assess with labs imaging probable orthopedic consult after x-rays

## 2023-09-12 NOTE — ED ADULT TRIAGE NOTE - CHIEF COMPLAINT QUOTE
pt brought in by EMS from [place] for possible infection to L hip after surgery on 7/19. Pt also arrives with arm wrapped after having an infection to L AV fistula. Pt denies chest pain, sob, n/v/d, fever or chills.

## 2023-09-12 NOTE — ED ADULT NURSE NOTE - OBJECTIVE STATEMENT
pt received to 5a, aox4.  pt sent to ED from [place] for possible infection to left hip surgical site and left upper arm site of AV fistula.  SL placed, labs sent.  pt arrives with ace wrap to left upper arm.  pt has clotted blood and open skin with sutures in place to LUE.  + swelling to area.  Dr. Bose redressing wound with telfa.  pt has serosanguinous fluid draining from surgical site on left hip.  pt denies pain at this time.  awaiting vascular consult.

## 2023-09-12 NOTE — ED PROVIDER NOTE - CARE PLAN
1 Principal Discharge DX:	Open wound   Principal Discharge DX:	Open wound  Secondary Diagnosis:	Wound dehiscence

## 2023-09-12 NOTE — ED ADULT NURSE NOTE - NSICDXPASTMEDICALHX_GEN_ALL_CORE_FT
PAST MEDICAL HISTORY:  Adult Onset Diabetes Mellitus,     Cancer of Endometrium s/p Hysterectomy , s/p Chemo.    CKD (chronic kidney disease)     Dissection of Aorta Type B    ESRD (end stage renal disease) on dialysis M,W,F   [facility] [address]    History of Hysterectomy with O     Hypertension

## 2023-09-12 NOTE — CONSULT NOTE ADULT - SUBJECTIVE AND OBJECTIVE BOX
HPI  78yFemale w/ PSH of L hip hemiarthroplasty with modular femoral component on 7/19/23 with small amount of incisional drainage. Patient was recently discharged from [facility] on 9/7 after a complicated hospital course. Patient was discharged with a prevena incisional vac due to serous drainage, the prevena was D/C'd on 9/9 at [place] and incision was dry and intact. Over the last couple of days, patient states the drainage has restarted over the last couple of days. Patient denies any fever, chills, or any increasing hip pain. Patient states she has been able to walk with PT yesterday with no issues.     ROS  Negative unless otherwise specified in HPI.    PAST MEDICAL & SURGICAL Hx  PAST MEDICAL & SURGICAL HISTORY:  Adult Onset Diabetes Mellitus,      Cancer of Endometrium  s/p Hysterectomy , s/p Chemo.      Dissection of Aorta  Type B      History of Hysterectomy with O      Hypertension      CKD (chronic kidney disease)      ESRD (end stage renal disease) on dialysis  M,W,F   [facility] [address]      H/O total hysterectomy  in 2007 for endometrial CA      History of hip replacement, total, right      S/P arteriovenous (AV) fistula repair          MEDICATIONS  Home Medications:  acetaminophen 325 mg oral tablet: 3 tab(s) orally every 8 hours (31 Aug 2023 08:13)  aluminum hydroxide-magnesium hydroxide 200 mg-200 mg/5 mL oral suspension: 30 milliliter(s) orally every 4 hours As needed Dyspepsia (07 Sep 2023 13:23)  ascorbic acid 500 mg oral tablet: 1 tab(s) orally once a day (07 Sep 2023 13:23)  calcium acetate 667 mg oral tablet: 667 milligram(s) orally 3 times a day (with meals) (07 Sep 2023 13:23)  calcium-vitamin D 500 mg-5 mcg (200 intl units) oral tablet: 1 tab(s) orally 3 times a day (07 Sep 2023 13:23)  folic acid 1 mg oral tablet: 1 tab(s) orally once a day (07 Sep 2023 13:23)  hydrALAZINE 50 mg oral tablet: 1 tab(s) orally once a day (07 Sep 2023 13:23)  melatonin 3 mg oral tablet: 1 tab(s) orally once a day (at bedtime) As needed Insomnia (07 Sep 2023 13:23)  metoprolol succinate 25 mg oral tablet, extended release: 0.5 orally once a day (31 Aug 2023 08:13)  omeprazole 20 mg oral delayed release capsule: 1 cap(s) orally once a day (31 Aug 2023 08:12)  oxyCODONE: 2.5 milligram(s) orally every 6 hours as needed for  moderate pain (07 Sep 2023 13:23)  oxyCODONE 5 mg oral tablet: 1 tab(s) orally every 6 hours As needed Severe Pain (7 - 10) (07 Sep 2023 13:23)  polyethylene glycol 3350 oral powder for reconstitution: 17 gram(s) orally 2 times a day (31 Aug 2023 08:13)  Rosa-Skyla oral tablet: 1 tab(s) orally once a day (31 Aug 2023 08:12)  senna leaf extract oral tablet: 2 tab(s) orally once a day (at bedtime) (31 Aug 2023 08:13)      ALLERGIES  No Known Allergies      FAMILY Hx  FAMILY HISTORY:  No pertinent family history in first degree relatives        SOCIAL Hx  Social History:      VITALS  Vital Signs Last 24 Hrs  T(C): 37.1 (12 Sep 2023 16:22), Max: 37.2 (12 Sep 2023 12:56)  T(F): 98.7 (12 Sep 2023 16:22), Max: 98.9 (12 Sep 2023 12:56)  HR: 82 (12 Sep 2023 16:22) (76 - 82)  BP: 179/63 (12 Sep 2023 16:22) (140/66 - 179/63)  BP(mean): --  RR: 18 (12 Sep 2023 16:22) (18 - 18)  SpO2: 98% (12 Sep 2023 16:22) (97% - 98%)    Parameters below as of 12 Sep 2023 16:22  Patient On (Oxygen Delivery Method): room air        PHYSICAL EXAM  Gen: Lying in bed, NAD  Resp: No increased WOB  LLE:  Incision with some serous drainage in the mid portion of incision, with a small pocket of fluid directly under incision  No TTP over L hip and TTP along remainder of extremity; compartments soft  Hip ROM 0-70, painless with no pain on micromotion  Motor: TA/EHL/GS/FHL intact  Sensory: DP/SP/Tib/Varsha/Saph SILT  +DP pulse, WWP    LABS                        8.7    4.55  )-----------( 164      ( 12 Sep 2023 14:30 )             27.1     09-12    136  |  97<L>  |  21  ----------------------------<  270<H>  3.3<L>   |  26  |  3.06<H>    Ca    8.9      12 Sep 2023 14:30    TPro  7.0  /  Alb  2.5<L>  /  TBili  0.8  /  DBili  x   /  AST  98<H>  /  ALT  43<H>  /  AlkPhos  362<H>  09-12    PT/INR - ( 12 Sep 2023 14:30 )   PT: 10.7 sec;   INR: 0.95 ratio         PTT - ( 12 Sep 2023 14:30 )  PTT:33.8 sec    IMAGING  XRs: L hip s/p hemiarthroplasty

## 2023-09-12 NOTE — CONSULT NOTE ADULT - ASSESSMENT
77 y.o. Female w/ Hx HTN, DM, chronic type B aortic dissection, ESRD on HD ( M, W, F ) via LUE brachiocephalic (2018) fistula, endometrial ca in remission s/p total hysterectomy in 2007 recently admitted for L hip revision (07-08/2023) during which she underwent LUE subclavian vein stenting 8/1 for stenosis and subsequent brachiocephalic AVF revision 8/3 with resection of the involved segment and primary anastomosis for ulceration. She presents after a missed HD session due to concern for overlying infection at the AVF site. S/p LUE ACF ligation and aneurysm resection, closure by PRS and prevena vac placement on 8/31. s/p Zosyn x7 days. IR placement of RIJ Permacath on 9/6/23. Ortho followed during admission to continue LLE preveena vac. Plastic surgery replaced preveena vac 9/6/23.     Pt sent back to ED by [facility] for c/f hip infection. While in ED, vascular consulted for prior LUE wound. PRS consulted. Pt states she noticed wound has become "black" and they were not treating it well at rehab, however denies pain, bleeding, or change in motor/sensation.     plan  - hematoma evacuated at bedside, no active bleed noted  - WTD to open area for now, sutures placed in ulnar most area for coverage  - to further discuss w/ vascular    d/w attending Dr. Erik Tilley MD  Plastic and Reconstructive Surgery, PGY3  Available on Microsoft Teams  [facility]: [id number], [facility]: [phone]

## 2023-09-12 NOTE — CONSULT NOTE ADULT - SUBJECTIVE AND OBJECTIVE BOX
Plastic Surgery Consult Note  (pg [facility]: [id number], [facility]: [phone])    HPI:   77 y.o. Female w/ Hx HTN, DM, chronic type B aortic dissection, ESRD on HD ( M, W, F ) via LUE brachiocephalic (2018) fistula, endometrial ca in remission s/p total hysterectomy in 2007 recently admitted for L hip revision (07-08/2023) during which she underwent LUE subclavian vein stenting 8/1 for stenosis and subsequent brachiocephalic AVF revision 8/3 with resection of the involved segment and primary anastomosis for ulceration. She presents after a missed HD session due to concern for overlying infection at the AVF site. S/p LUE ACF ligation and aneurysm resection, closure by PRS and prevena vac placement on 8/31. s/p Zosyn x7 days. IR placement of RIJ Permacath on 9/6/23. Ortho followed during admission to continue LLE preveena vac. Plastic surgery replaced preveena vac 9/6/23.     Pt sent back to ED by [facility] for c/f hip infection. While in ED, vascular consulted for prior LUE wound. PRS consulted. Pt states she noticed wound has become "black" and they were not treating it well at rehab, however denies pain, bleeding, or change in motor/sensation.     PAST MEDICAL & SURGICAL HISTORY:  Adult Onset Diabetes Mellitus,      Cancer of Endometrium  s/p Hysterectomy , s/p Chemo.      Dissection of Aorta  Type B      History of Hysterectomy with O      Hypertension      CKD (chronic kidney disease)      ESRD (end stage renal disease) on dialysis  M,W,F   [facility] [address]      H/O total hysterectomy  in 2007 for endometrial CA      History of hip replacement, total, right      S/P arteriovenous (AV) fistula repair        Allergies    No Known Allergies    Intolerances      Home Medications:  acetaminophen 325 mg oral tablet: 3 tab(s) orally every 8 hours (31 Aug 2023 08:13)  aluminum hydroxide-magnesium hydroxide 200 mg-200 mg/5 mL oral suspension: 30 milliliter(s) orally every 4 hours As needed Dyspepsia (07 Sep 2023 13:23)  ascorbic acid 500 mg oral tablet: 1 tab(s) orally once a day (07 Sep 2023 13:23)  calcium acetate 667 mg oral tablet: 667 milligram(s) orally 3 times a day (with meals) (07 Sep 2023 13:23)  calcium-vitamin D 500 mg-5 mcg (200 intl units) oral tablet: 1 tab(s) orally 3 times a day (07 Sep 2023 13:23)  folic acid 1 mg oral tablet: 1 tab(s) orally once a day (07 Sep 2023 13:23)  hydrALAZINE 50 mg oral tablet: 1 tab(s) orally once a day (07 Sep 2023 13:23)  melatonin 3 mg oral tablet: 1 tab(s) orally once a day (at bedtime) As needed Insomnia (07 Sep 2023 13:23)  metoprolol succinate 25 mg oral tablet, extended release: 0.5 orally once a day (31 Aug 2023 08:13)  omeprazole 20 mg oral delayed release capsule: 1 cap(s) orally once a day (31 Aug 2023 08:12)  oxyCODONE: 2.5 milligram(s) orally every 6 hours as needed for  moderate pain (07 Sep 2023 13:23)  oxyCODONE 5 mg oral tablet: 1 tab(s) orally every 6 hours As needed Severe Pain (7 - 10) (07 Sep 2023 13:23)  polyethylene glycol 3350 oral powder for reconstitution: 17 gram(s) orally 2 times a day (31 Aug 2023 08:13)  Rosa-Skyla oral tablet: 1 tab(s) orally once a day (31 Aug 2023 08:12)  senna leaf extract oral tablet: 2 tab(s) orally once a day (at bedtime) (31 Aug 2023 08:13)    MEDICATIONS  (STANDING):      SOCIAL HISTORY:  FAMILY HISTORY:  No pertinent family history in first degree relatives        ___________________________________________  OBJECTIVE:  Vital Signs Last 24 Hrs  T(C): 36.2 (12 Sep 2023 20:30), Max: 37.2 (12 Sep 2023 12:56)  T(F): 97.2 (12 Sep 2023 20:30), Max: 98.9 (12 Sep 2023 12:56)  HR: 83 (12 Sep 2023 20:30) (76 - 83)  BP: 170/80 (12 Sep 2023 20:30) (140/66 - 179/63)  BP(mean): --  RR: 20 (12 Sep 2023 20:30) (18 - 20)  SpO2: 98% (12 Sep 2023 20:30) (97% - 98%)    Parameters below as of 12 Sep 2023 20:30  Patient On (Oxygen Delivery Method): room air    CAPILLARY BLOOD GLUCOSE        I&O's Detail    General: Well developed, well nourished, NAD  Neuro: Alert and oriented, no focal deficits, moves all extremities spontaneously  HEENT: NCAT, EOMI, anicteric, mucosa moist  Respiratory: Airway patent, respirations unlabored  LUE: superior incision intact over LUE, brachial   antecubital fossa with wound dehiscence, significant clotting noted  hematoma evacuated, no active bleed noted on muscle belly    LABS:  CBC Full  -  ( 12 Sep 2023 14:30 )  WBC Count : 4.55 K/uL  RBC Count : 2.75 M/uL  Hemoglobin : 8.7 g/dL  Hematocrit : 27.1 %  Platelet Count - Automated : 164 K/uL  Mean Cell Volume : 98.5 fL  Mean Cell Hemoglobin : 31.6 pg  Mean Cell Hemoglobin Concentration : 32.1 gm/dL  Auto Neutrophil # : 3.11 K/uL  Auto Lymphocyte # : 0.64 K/uL  Auto Monocyte # : 0.65 K/uL  Auto Eosinophil # : 0.12 K/uL  Auto Basophil # : 0.01 K/uL  Auto Neutrophil % : 68.4 %  Auto Lymphocyte % : 14.1 %  Auto Monocyte % : 14.3 %  Auto Eosinophil % : 2.6 %  Auto Basophil % : 0.2 %    09-12    136  |  97<L>  |  21  ----------------------------<  270<H>  3.3<L>   |  26  |  3.06<H>    Ca    8.9      12 Sep 2023 14:30    TPro  7.0  /  Alb  2.5<L>  /  TBili  0.8  /  DBili  x   /  AST  98<H>  /  ALT  43<H>  /  AlkPhos  362<H>  09-12    LIVER FUNCTIONS - ( 12 Sep 2023 14:30 )  Alb: 2.5 g/dL / Pro: 7.0 g/dL / ALK PHOS: 362 U/L / ALT: 43 U/L / AST: 98 U/L / GGT: x           PT/INR - ( 12 Sep 2023 14:30 )   PT: 10.7 sec;   INR: 0.95 ratio         PTT - ( 12 Sep 2023 14:30 )  PTT:33.8 sec  Urinalysis Basic - ( 12 Sep 2023 14:30 )    Color: x / Appearance: x / SG: x / pH: x  Gluc: 270 mg/dL / Ketone: x  / Bili: x / Urobili: x   Blood: x / Protein: x / Nitrite: x   Leuk Esterase: x / RBC: x / WBC x   Sq Epi: x / Non Sq Epi: x / Bacteria: x            ____________________________________________  MICRO:  RECENT CULTURES:    ____________________________________________  RADIOLOGY:

## 2023-09-12 NOTE — ED PROVIDER NOTE - PROGRESS NOTE DETAILS
Patient seen and evaluated by vascular surgery, vascular recommending plastics consult as plastics stated closure of left upper extremity fistula on last admission. Patient seen and evaluated by Ortho, recommending MRI to evaluate for abscess, low suspicion for abscess at this time.  patient also evaluated by plastic surgery, concern for very superficial vessel/possible exposed vessel, no acute interventions recommended at this time however plastics to follow.   Patient to be admitted to medicine. - Gunnar Mondragon, PGY-3 Patient seen and evaluated by Ortho to close the L hip wound, no further recommendations at this time. Patient also evaluated by plastic surgery, concern for very superficial vessel/possible exposed vessel, no acute interventions recommended at this time however plastics to follow.  Patient to be admitted to medicine. - Gunnar Mondragon, PGY-3

## 2023-09-12 NOTE — ED ADULT NURSE NOTE - CHIEF COMPLAINT QUOTE
pt brought in by EMS from [place] for possible infection to L hip after surgery on 7/19. Pt also arrives with arm wrapped after having an infection to L AV fistula. Pt denies chest pain, sob, n/v/d, fever or chills.

## 2023-09-12 NOTE — CONSULT NOTE ADULT - ASSESSMENT
ASSESSMENT & PLAN  78yFemale w/ serous drainage of L hip incision. Patient has been afebrile with w 4.5, CRP 40/ESR 88   -WBAT LLE, bedrest  - Compressive dressing  - Consider MRI with MARS sequencing   -pain control  -ice/cold compress      For all questions related to patient care, please reach out to the on-call team via the pager.     Elle Ulloa, PGY 2  Orthopaedic Surgery  [facility] [phone]  [facility] [phone]  [facility] [phone]   ASSESSMENT & PLAN  78yFemale w/ serous drainage of L hip incision. Patient has been afebrile with w 4.5, CRP 40/ESR 88   -WBAT LLE, bedrest  - keep dressing clean and dry  -pain control  -ice/cold compress      For all questions related to patient care, please reach out to the on-call team via the pager.     Elle Ulloa, PGY 2  Orthopaedic Surgery  [facility] [phone]  [facility] [phone]  [facility] [phone]

## 2023-09-12 NOTE — ED PROVIDER NOTE - SEVERE SEPSIS CRITERIA MET YN (MLM)
06/05/2016  Jeb Castro is a 56 y.o., male.  Past Medical History:   Diagnosis Date    Cataract     Obesity (BMI 30-39.9)      Past Surgical History:   Procedure Laterality Date    ROTATOR CUFF REPAIR Left            Anesthesia Evaluation    I have reviewed the Patient Summary Reports.     I have reviewed the Medications.     Review of Systems  Anesthesia Hx:  No problems with previous Anesthesia    Hematology/Oncology:  Hematology Normal   Oncology Normal     EENT/Dental:EENT/Dental Normal   Cardiovascular:  Cardiovascular Normal     Pulmonary:  Pulmonary Normal    Renal/:  Renal/ Normal     Musculoskeletal:  Musculoskeletal Normal    Neurological:  Neurology Normal    Endocrine:  Endocrine Normal    Dermatological:  Skin Normal    Psych:  Psychiatric Normal           Physical Exam  General:  Well nourished    Airway/Jaw/Neck:  Airway Findings: Mouth Opening: Normal Tongue: Normal  General Airway Assessment: Adult  Mallampati: II  TM Distance: Normal, at least 6 cm        Eyes/Ears/Nose:  EYES/EARS/NOSE FINDINGS: Normal    Chest/Lungs:  Chest/Lungs Clear    Heart/Vascular:  Heart Findings: Rate: Normal  Rhythm: Regular Rhythm  Sounds: Normal  Heart murmur: negative Vascular Findings: Normal    Abdomen:  Abdomen Findings: Normal    Musculoskeletal:  Musculoskeletal Findings: Normal   Skin:  Skin Findings: Normal    Mental Status:  Mental Status Findings:  Alert and Oriented, Cooperative         Anesthesia Plan  Type of Anesthesia, risks & benefits discussed:  Anesthesia Type:  general  Patient's Preference: general  Intra-op Monitoring Plan: standard ASA monitors  Intra-op Monitoring Plan Comments:   Post Op Pain Control Plan:   Post Op Pain Control Plan Comments:   Induction:   IV  Beta Blocker:  Patient is not currently on a Beta-Blocker (No further documentation required).       Informed  Consent: Patient understands risks and agrees with Anesthesia plan.  Questions answered. Anesthesia consent signed with patient.  ASA Score: 1     Day of Surgery Review of History & Physical:    H&P update referred to the surgeon.         Ready For Surgery From Anesthesia Perspective.        Sepsis Criteria were met:

## 2023-09-12 NOTE — ED PROVIDER NOTE - ATTENDING CONTRIBUTION TO CARE
78F L hip pain.  Pt had L hip replacedment this summer, had hematoma, went back to OR, also had infection of L AVF, yesterday had full HD.  Given AVF infection stopped using it, using permacath.  At [facility] was told L hip "doesn't look good" that it might look infected.   Pt feels no pain there.  L hip slight purulent drainage, mildly warm; pt unable to flex hip actively but able to range passively.  LUE warmth and mild swelling.  LUE AVF site with sutures, inferior portion with mildly bloody dressing, inferior aspect of wound with large adherent black clot x 2. No active bleeding.  LUE mild redness and edema from above AVF to mid-forearm . Distal hand NVT intact.  Light dsg placed with nonstick telfa to LUE.  L hip with suture line intact except small area of dehiscence with serous drainage, diffuse woody edema around wound, mild ttp, no redness or warmth.   Plan check labs, cultures.  Ortho eval for L hip.  Xray L hip.  VA duplex LUE, vasc eval.  Pt not in pain but frustrated with complications of surgeries.    VS:  unremarkable except HTN    GEN - NAD;   malaise;   A+O x3   HEAD - NC/AT     ENT - PEERL, EOMI, mucous membranes    moist , no discharge      NECK: Neck supple, non-tender without lymphadenopathy, no masses, no JVD  PULM - CTA b/l,  symmetric breath sounds.  RUE permacath site c/d/i  COR -  normal heart sounds    ABD - , ND, NT, soft,  BACK - no CVA tenderness, nontender spine     EXTREMS - no edema, no deformity, warm and well perfused  except L hip slight purulent drainage, mildly warm; pt unable to flex hip actively but able to range passively.  LUE warmth and mild swelling.  LUE AVF site with sutures, inferior portion with mildly bloody dressing, inferior aspect of wound with large adherent black clot x 2. No active bleeding.  LUE mild redness and edema from above AVF to mid-forearm . Distal hand NVT intact.  Light dsg placed with nonstick telfa to LUE.  L hip with suture line intact except small area of dehiscence with serous drainage, diffuse woody edema around wound, mild ttp, no redness or warmth.  Distal NVT intact x 4.    SKIN - no rash    or bruising      NEUROLOGIC - alert, face symmetric, speech fluent, sensation nl, motor no focal deficit.

## 2023-09-12 NOTE — CONSULT NOTE ADULT - ASSESSMENT
ASSESSMENT: 77 y.o. Female w/ Hx HTN, DM, chronic type B aortic dissection, ESRD on HD ( M, W, F ) via LUE brachiocephalic (2018) fistula, endometrial ca in remission s/p total hysterectomy in 2007 recently admitted for L hip revision (07-08/2023) during which she underwent LUE subclavian vein stenting 8/1 for stenosis and subsequent brachiocephalic AVF revision 8/3 with resection of the involved segment and primary anastomosis for ulceration. She presents after a missed HD session due to concern for overlying infection at the AVF site. S/p LUE ACF ligation and aneurysm resection, closure by PRS and prevena vac placement on 8/31. Discharged, sent back to ED for L hip infection.     RECOMMENDATIONS:  - Vascular to follow  - recommend PRS consultation for evaluation of LUE AVF ligation site  - f/u ortho/medicine plans re: hip    Discussed with vascular fellow on behalf of attending    C Surgery [id number]

## 2023-09-12 NOTE — ED ADULT NURSE NOTE - NSFALLHARMRISKINTERV_ED_ALL_ED
Assistance OOB with selected safe patient handling equipment if applicable/Assistance with ambulation/Communicate risk of Fall with Harm to all staff, patient, and family/Monitor gait and stability/Provide visual cue: red socks, yellow wristband, yellow gown, etc/Reinforce activity limits and safety measures with patient and family/Bed in lowest position, wheels locked, appropriate side rails in place/Call bell, personal items and telephone in reach/Instruct patient to call for assistance before getting out of bed/chair/stretcher/Non-slip footwear applied when patient is off stretcher/[place] to call system/Physically safe environment - no spills, clutter or unnecessary equipment/Purposeful Proactive Rounding/Room/bathroom lighting operational, light cord in reach

## 2023-09-12 NOTE — ED ADULT NURSE REASSESSMENT NOTE - NS ED NURSE REASSESS COMMENT FT1
Received report from DAy RN av. Patient received in spot 5a. A&O3. Respirations even and unlabored. No signs of acute distress noted. Patient lying in stretcher comfortably. Comfort and safety maintained.

## 2023-09-13 DIAGNOSIS — C54.1 MALIGNANT NEOPLASM OF ENDOMETRIUM: ICD-10-CM

## 2023-09-13 DIAGNOSIS — R05.9 COUGH, UNSPECIFIED: ICD-10-CM

## 2023-09-13 DIAGNOSIS — Z29.9 ENCOUNTER FOR PROPHYLACTIC MEASURES, UNSPECIFIED: ICD-10-CM

## 2023-09-13 DIAGNOSIS — E11.9 TYPE 2 DIABETES MELLITUS WITHOUT COMPLICATIONS: ICD-10-CM

## 2023-09-13 DIAGNOSIS — T88.8XXA OTHER SPECIFIED COMPLICATIONS OF SURGICAL AND MEDICAL CARE, NOT ELSEWHERE CLASSIFIED, INITIAL ENCOUNTER: ICD-10-CM

## 2023-09-13 DIAGNOSIS — R93.89 ABNORMAL FINDINGS ON DIAGNOSTIC IMAGING OF OTHER SPECIFIED BODY STRUCTURES: ICD-10-CM

## 2023-09-13 DIAGNOSIS — T14.8XXA OTHER INJURY OF UNSPECIFIED BODY REGION, INITIAL ENCOUNTER: ICD-10-CM

## 2023-09-13 DIAGNOSIS — R74.01 ELEVATION OF LEVELS OF LIVER TRANSAMINASE LEVELS: ICD-10-CM

## 2023-09-13 DIAGNOSIS — H26.9 UNSPECIFIED CATARACT: ICD-10-CM

## 2023-09-13 DIAGNOSIS — N18.6 END STAGE RENAL DISEASE: ICD-10-CM

## 2023-09-13 DIAGNOSIS — I10 ESSENTIAL (PRIMARY) HYPERTENSION: ICD-10-CM

## 2023-09-13 LAB
ALBUMIN SERPL ELPH-MCNC: 2.5 G/DL — LOW (ref 3.3–5)
ALP SERPL-CCNC: 348 U/L — HIGH (ref 40–120)
ALT FLD-CCNC: 44 U/L — HIGH (ref 4–33)
ANION GAP SERPL CALC-SCNC: 11 MMOL/L — SIGNIFICANT CHANGE UP (ref 7–14)
AST SERPL-CCNC: 77 U/L — HIGH (ref 4–32)
BASOPHILS # BLD AUTO: 0.01 K/UL — SIGNIFICANT CHANGE UP (ref 0–0.2)
BASOPHILS NFR BLD AUTO: 0.2 % — SIGNIFICANT CHANGE UP (ref 0–2)
BILIRUB SERPL-MCNC: 0.8 MG/DL — SIGNIFICANT CHANGE UP (ref 0.2–1.2)
BLOOD GAS VENOUS COMPREHENSIVE RESULT: SIGNIFICANT CHANGE UP
BUN SERPL-MCNC: 24 MG/DL — HIGH (ref 7–23)
CALCIUM SERPL-MCNC: 9 MG/DL — SIGNIFICANT CHANGE UP (ref 8.4–10.5)
CHLORIDE SERPL-SCNC: 99 MMOL/L — SIGNIFICANT CHANGE UP (ref 98–107)
CO2 SERPL-SCNC: 26 MMOL/L — SIGNIFICANT CHANGE UP (ref 22–31)
CREAT SERPL-MCNC: 3.73 MG/DL — HIGH (ref 0.5–1.3)
EGFR: 12 ML/MIN/1.73M2 — LOW
EOSINOPHIL # BLD AUTO: 0.33 K/UL — SIGNIFICANT CHANGE UP (ref 0–0.5)
EOSINOPHIL NFR BLD AUTO: 8.1 % — HIGH (ref 0–6)
GLUCOSE BLDC GLUCOMTR-MCNC: 137 MG/DL — HIGH (ref 70–99)
GLUCOSE BLDC GLUCOMTR-MCNC: 164 MG/DL — HIGH (ref 70–99)
GLUCOSE SERPL-MCNC: 127 MG/DL — HIGH (ref 70–99)
HCT VFR BLD CALC: 27.3 % — LOW (ref 34.5–45)
HGB BLD-MCNC: 8.7 G/DL — LOW (ref 11.5–15.5)
IANC: 2.35 K/UL — SIGNIFICANT CHANGE UP (ref 1.8–7.4)
IMM GRANULOCYTES NFR BLD AUTO: 0.2 % — SIGNIFICANT CHANGE UP (ref 0–0.9)
LYMPHOCYTES # BLD AUTO: 0.87 K/UL — LOW (ref 1–3.3)
LYMPHOCYTES # BLD AUTO: 21.5 % — SIGNIFICANT CHANGE UP (ref 13–44)
MAGNESIUM SERPL-MCNC: 2 MG/DL — SIGNIFICANT CHANGE UP (ref 1.6–2.6)
MCHC RBC-ENTMCNC: 31.4 PG — SIGNIFICANT CHANGE UP (ref 27–34)
MCHC RBC-ENTMCNC: 31.9 GM/DL — LOW (ref 32–36)
MCV RBC AUTO: 98.6 FL — SIGNIFICANT CHANGE UP (ref 80–100)
MONOCYTES # BLD AUTO: 0.48 K/UL — SIGNIFICANT CHANGE UP (ref 0–0.9)
MONOCYTES NFR BLD AUTO: 11.9 % — SIGNIFICANT CHANGE UP (ref 2–14)
NEUTROPHILS # BLD AUTO: 2.35 K/UL — SIGNIFICANT CHANGE UP (ref 1.8–7.4)
NEUTROPHILS NFR BLD AUTO: 58.1 % — SIGNIFICANT CHANGE UP (ref 43–77)
NRBC # BLD: 0 /100 WBCS — SIGNIFICANT CHANGE UP (ref 0–0)
NRBC # FLD: 0 K/UL — SIGNIFICANT CHANGE UP (ref 0–0)
NT-PROBNP SERPL-SCNC: HIGH PG/ML
PHOSPHATE SERPL-MCNC: 2.8 MG/DL — SIGNIFICANT CHANGE UP (ref 2.5–4.5)
PLATELET # BLD AUTO: 160 K/UL — SIGNIFICANT CHANGE UP (ref 150–400)
POTASSIUM SERPL-MCNC: 3.5 MMOL/L — SIGNIFICANT CHANGE UP (ref 3.5–5.3)
POTASSIUM SERPL-SCNC: 3.5 MMOL/L — SIGNIFICANT CHANGE UP (ref 3.5–5.3)
PROCALCITONIN SERPL-MCNC: 0.64 NG/ML — HIGH (ref 0.02–0.1)
PROT SERPL-MCNC: 6.7 G/DL — SIGNIFICANT CHANGE UP (ref 6–8.3)
RBC # BLD: 2.77 M/UL — LOW (ref 3.8–5.2)
RBC # FLD: 18 % — HIGH (ref 10.3–14.5)
SODIUM SERPL-SCNC: 136 MMOL/L — SIGNIFICANT CHANGE UP (ref 135–145)
WBC # BLD: 4.05 K/UL — SIGNIFICANT CHANGE UP (ref 3.8–10.5)
WBC # FLD AUTO: 4.05 K/UL — SIGNIFICANT CHANGE UP (ref 3.8–10.5)

## 2023-09-13 PROCEDURE — 93990 DOPPLER FLOW TESTING: CPT | Mod: 26

## 2023-09-13 PROCEDURE — 99223 1ST HOSP IP/OBS HIGH 75: CPT

## 2023-09-13 PROCEDURE — 99222 1ST HOSP IP/OBS MODERATE 55: CPT | Mod: FS

## 2023-09-13 PROCEDURE — 93971 EXTREMITY STUDY: CPT | Mod: 26,59

## 2023-09-13 PROCEDURE — 71250 CT THORAX DX C-: CPT | Mod: 26

## 2023-09-13 RX ORDER — PANTOPRAZOLE SODIUM 20 MG/1
40 TABLET, DELAYED RELEASE ORAL
Refills: 0 | Status: DISCONTINUED | OUTPATIENT
Start: 2023-09-13 | End: 2023-09-19

## 2023-09-13 RX ORDER — HYDRALAZINE HCL 50 MG
50 TABLET ORAL DAILY
Refills: 0 | Status: DISCONTINUED | OUTPATIENT
Start: 2023-09-13 | End: 2023-09-15

## 2023-09-13 RX ORDER — ONDANSETRON 8 MG/1
4 TABLET, FILM COATED ORAL EVERY 8 HOURS
Refills: 0 | Status: DISCONTINUED | OUTPATIENT
Start: 2023-09-13 | End: 2023-09-19

## 2023-09-13 RX ORDER — CHLORHEXIDINE GLUCONATE 213 G/1000ML
1 SOLUTION TOPICAL DAILY
Refills: 0 | Status: DISCONTINUED | OUTPATIENT
Start: 2023-09-13 | End: 2023-09-19

## 2023-09-13 RX ORDER — ACETAMINOPHEN 500 MG
650 TABLET ORAL EVERY 6 HOURS
Refills: 0 | Status: DISCONTINUED | OUTPATIENT
Start: 2023-09-13 | End: 2023-09-19

## 2023-09-13 RX ORDER — CALCIUM ACETATE 667 MG
667 TABLET ORAL
Refills: 0 | Status: DISCONTINUED | OUTPATIENT
Start: 2023-09-13 | End: 2023-09-14

## 2023-09-13 RX ORDER — ASCORBIC ACID 60 MG
500 TABLET,CHEWABLE ORAL DAILY
Refills: 0 | Status: DISCONTINUED | OUTPATIENT
Start: 2023-09-13 | End: 2023-09-19

## 2023-09-13 RX ORDER — ERYTHROPOIETIN 10000 [IU]/ML
4000 INJECTION, SOLUTION INTRAVENOUS; SUBCUTANEOUS
Refills: 0 | Status: DISCONTINUED | OUTPATIENT
Start: 2023-09-13 | End: 2023-09-19

## 2023-09-13 RX ORDER — OXYCODONE HYDROCHLORIDE 5 MG/1
5 TABLET ORAL EVERY 6 HOURS
Refills: 0 | Status: DISCONTINUED | OUTPATIENT
Start: 2023-09-13 | End: 2023-09-15

## 2023-09-13 RX ORDER — HEPARIN SODIUM 5000 [USP'U]/ML
5000 INJECTION INTRAVENOUS; SUBCUTANEOUS EVERY 8 HOURS
Refills: 0 | Status: DISCONTINUED | OUTPATIENT
Start: 2023-09-13 | End: 2023-09-19

## 2023-09-13 RX ORDER — METOPROLOL TARTRATE 50 MG
12.5 TABLET ORAL DAILY
Refills: 0 | Status: DISCONTINUED | OUTPATIENT
Start: 2023-09-13 | End: 2023-09-19

## 2023-09-13 RX ORDER — SENNA PLUS 8.6 MG/1
2 TABLET ORAL AT BEDTIME
Refills: 0 | Status: DISCONTINUED | OUTPATIENT
Start: 2023-09-13 | End: 2023-09-19

## 2023-09-13 RX ORDER — FOLIC ACID 0.8 MG
1 TABLET ORAL DAILY
Refills: 0 | Status: DISCONTINUED | OUTPATIENT
Start: 2023-09-13 | End: 2023-09-19

## 2023-09-13 RX ORDER — LANOLIN ALCOHOL/MO/W.PET/CERES
3 CREAM (GRAM) TOPICAL AT BEDTIME
Refills: 0 | Status: DISCONTINUED | OUTPATIENT
Start: 2023-09-13 | End: 2023-09-19

## 2023-09-13 RX ORDER — POLYETHYLENE GLYCOL 3350 17 G/17G
17 POWDER, FOR SOLUTION ORAL DAILY
Refills: 0 | Status: DISCONTINUED | OUTPATIENT
Start: 2023-09-13 | End: 2023-09-19

## 2023-09-13 RX ADMIN — ERYTHROPOIETIN 4000 UNIT(S): 10000 INJECTION, SOLUTION INTRAVENOUS; SUBCUTANEOUS at 22:50

## 2023-09-13 RX ADMIN — POLYETHYLENE GLYCOL 3350 17 GRAM(S): 17 POWDER, FOR SOLUTION ORAL at 12:51

## 2023-09-13 RX ADMIN — Medication 50 MILLIGRAM(S): at 12:51

## 2023-09-13 RX ADMIN — Medication 500 MILLIGRAM(S): at 16:12

## 2023-09-13 RX ADMIN — HEPARIN SODIUM 5000 UNIT(S): 5000 INJECTION INTRAVENOUS; SUBCUTANEOUS at 16:12

## 2023-09-13 RX ADMIN — Medication 667 MILLIGRAM(S): at 12:51

## 2023-09-13 RX ADMIN — Medication 667 MILLIGRAM(S): at 18:07

## 2023-09-13 RX ADMIN — Medication 667 MILLIGRAM(S): at 09:27

## 2023-09-13 RX ADMIN — Medication 1 TABLET(S): at 16:12

## 2023-09-13 RX ADMIN — PANTOPRAZOLE SODIUM 40 MILLIGRAM(S): 20 TABLET, DELAYED RELEASE ORAL at 09:27

## 2023-09-13 NOTE — H&P ADULT - PROBLEM SELECTOR PLAN 9
- Patient reports having a R eye cataract for a few months, has not been able to schedule an ophthalmology evaluation 2/2 frequent hospitalizations  - Recommend close outpatient follow up with ophthalmology at [facility]

## 2023-09-13 NOTE — CONSULT NOTE ADULT - SUBJECTIVE AND OBJECTIVE BOX
[facility] DIVISION OF KIDNEY DISEASES AND HYPERTENSION -- INITIAL CONSULT NOTE  --------------------------------------------------------------------------------  HPI:  78F with history of DM2 (last A1C 5 8/30/23), ESRD on HD M/W/F, Endometrial cancer s/p CLINT, Chronic Type B aortic dissection, HTN, L humerus fracture (non-op), L hip fracture s/p ORIF (5/2023) with prolonged hospitalization from July 18 to August 16 for L hip hardware failure s/p hemiarthroplasty with post-op course complicated by hematoma s/p pRBCs and compression dressing. Pt was also noted to have a malfunctioning LUE AVF s/p AVF revision with vascular surgery (8/4/23) and was discharged to [place]. She returned to [facility] from 8/31 - 9/7 for  infection at the AVF site s/p LUE AVF ligation and aneurysm resection, closure by PRS, and prevena vac placement. s/p Zosyn for 7 days. Had R IJ permacath placed by IR for HD. She was then discharged back to [place] for MARYANN. She was doing well there but on 9/12 was sent back to [facility] ED with concerns for infection of her surgical site at the L hip. She has a chronic draining wound on her L hip surgical site but said that the drainage from there has reduced and is minimal, light yellow in color. Had some swelling at the AVF surgery site but denied any known drainage. Denies any recent fevers/chills.     Of note patient does not make urine. No shortness of breath today.         PAST HISTORY  --------------------------------------------------------------------------------  PAST MEDICAL & SURGICAL HISTORY:  Adult Onset Diabetes Mellitus,      Cancer of Endometrium  s/p Hysterectomy , s/p Chemo.      Dissection of Aorta  Type B      History of Hysterectomy with O      Hypertension      CKD (chronic kidney disease)      ESRD (end stage renal disease) on dialysis  M,W,F   [facility]      H/O total hysterectomy  in 2007 for endometrial CA      History of hip replacement, total, right      S/P arteriovenous (AV) fistula repair        FAMILY HISTORY:  No pertinent family history in first degree relatives      PAST SOCIAL HISTORY: Returned for [place] rehab, no smoking, drugs of alcohol use    ALLERGIES & MEDICATIONS  --------------------------------------------------------------------------------  Allergies    No Known Allergies    Intolerances      Standing Inpatient Medications  ascorbic acid 500 milliGRAM(s) Oral daily  calcium acetate 667 milliGRAM(s) Oral three times a day with meals  calcium carbonate 1250 mG  + Vitamin D (OsCal 500 + D) 1 Tablet(s) Oral three times a day  folic acid 1 milliGRAM(s) Oral daily  heparin   Injectable 5000 Unit(s) SubCutaneous every 8 hours  hydrALAZINE 50 milliGRAM(s) Oral daily  metoprolol succinate ER 12.5 milliGRAM(s) Oral daily  Nephro-nathanael 1 Tablet(s) Oral daily  pantoprazole    Tablet 40 milliGRAM(s) Oral before breakfast  polyethylene glycol 3350 17 Gram(s) Oral daily  senna 2 Tablet(s) Oral at bedtime    PRN Inpatient Medications  acetaminophen     Tablet .. 650 milliGRAM(s) Oral every 6 hours PRN  aluminum hydroxide/magnesium hydroxide/simethicone Suspension 30 milliLiter(s) Oral every 4 hours PRN  melatonin 3 milliGRAM(s) Oral at bedtime PRN  ondansetron Injectable 4 milliGRAM(s) IV Push every 8 hours PRN  oxyCODONE    IR 5 milliGRAM(s) Oral every 6 hours PRN      REVIEW OF SYSTEMS  --------------------------------------------------------------------------------  Gen: No weight changes, fatigue, fevers/chills, weakness  Skin: No rashes  Head/Eyes/Ears/Mouth: No headache; Normal hearing; +vision blurriness  Respiratory: No dyspnea, cough, wheezing  CV: No chest pain, PND, orthopnea  GI: No abdominal pain, diarrhea, constipation, nausea, vomiting  MSK: No joint pain/swelling; no back pain; no edema  Neuro: No dizziness/lightheadedness, weakness, seizures  Heme: No easy bruising or bleeding  Endo: No heat/cold intolerance  Psych: No significant nervousness, anxiety, stress, depression    All other systems were reviewed and are negative, except as noted.    VITALS/PHYSICAL EXAM  --------------------------------------------------------------------------------  T(C): 36.9 (09-13-23 @ 11:00), Max: 37.1 (09-12-23 @ 16:22)  HR: 60 (09-13-23 @ 12:50) (59 - 83)  BP: 169/80 (09-13-23 @ 12:50) (153/60 - 179/63)  RR: 16 (09-13-23 @ 12:50) (16 - 20)  SpO2: 99% (09-13-23 @ 12:50) (98% - 100%)  Wt(kg): --  Height (cm): 154.9 (09-12-23 @ 12:56)  Weight (kg): 52.9 (09-13-23 @ 03:39)  BMI (kg/m2): 22 (09-13-23 @ 03:39)  BSA (m2): 1.5 (09-13-23 @ 03:39)      09-13-23 @ 07:01  -  09-13-23 @ 14:01  --------------------------------------------------------  IN: 90 mL / OUT: 0 mL / NET: 90 mL      Physical Exam:  	Gen: NAD, well-appearing  	HEENT: PERRL, supple neck, clear oropharynx  	Pulm: CTA B/L  	CV: RRR, S1S2; no rub  	Back: No spinal or CVA tenderness  	Abd: +BS, soft, nontender/nondistended  	UE: Warm, FROM, no clubbing, intact strength; no edema; no asterixis  	LE: Warm, FROM, no clubbing, intact strength; no edema  	Neuro: No focal deficits  	Psych: Normal affect and mood  	Skin: Warm, without rashes  	Vascular access: chest tunneled HD catheter    LABS/STUDIES  --------------------------------------------------------------------------------              8.7    4.05  >-----------<  160      [09-13-23 @ 07:00]              27.3     136  |  99  |  24  ----------------------------<  127      [09-13-23 @ 07:00]  3.5   |  26  |  3.73        Ca     9.0     [09-13-23 @ 07:00]      Mg     2.00     [09-13-23 @ 07:00]      Phos  2.8     [09-13-23 @ 07:00]    TPro  6.7  /  Alb  2.5  /  TBili  0.8  /  DBili  x   /  AST  77  /  ALT  44  /  AlkPhos  348  [09-13-23 @ 07:00]    PT/INR: PT 10.7 , INR 0.95       [09-12-23 @ 14:30]  PTT: 33.8       [09-12-23 @ 14:30]      Creatinine Trend:  SCr 3.73 [09-13 @ 07:00]  SCr 3.06 [09-12 @ 14:30]  SCr 4.30 [09-07 @ 06:30]  SCr 3.56 [09-06 @ 06:34]  SCr 5.55 [09-05 @ 02:30]    Urinalysis - [09-13-23 @ 07:00]      Color  / Appearance  / SG  / pH       Gluc 127 / Ketone   / Bili  / Urobili        Blood  / Protein  / Leuk Est  / Nitrite       RBC  / WBC  / Hyaline  / Gran  / Sq Epi  / Non Sq Epi  / Bacteria         HBsAb 44.0      [05-24-23 @ 07:00]  HBsAg Nonreact      [09-06-23 @ 06:34]  HBcAb Reactive      [05-24-23 @ 07:00]  HCV 0.21, Nonreact      [09-06-23 @ 06:34]

## 2023-09-13 NOTE — H&P ADULT - NSHPPHYSICALEXAM_GEN_ALL_CORE
Vital Signs Last 24 Hrs  T(C): 36.3 (13 Sep 2023 06:20), Max: 37.2 (12 Sep 2023 12:56)  T(F): 97.3 (13 Sep 2023 06:20), Max: 98.9 (12 Sep 2023 12:56)  HR: 59 (13 Sep 2023 06:20) (59 - 83)  BP: 166/78 (13 Sep 2023 06:20) (140/66 - 179/63)  BP(mean): --  RR: 16 (13 Sep 2023 06:20) (16 - 20)  SpO2: 100% (13 Sep 2023 06:20) (97% - 100%)    Parameters below as of 13 Sep 2023 06:20  Patient On (Oxygen Delivery Method): room air    GENERAL: No acute distress, well-developed  EYES: EOMI, PERRL, conjunctiva and sclera clear  ENT: Neck supple, No JVD, moist mucosa  CHEST/LUNG: +crackles b/l lungs  BACK: No spinal tenderness  HEART: Regular rate and rhythm; +ELOY (chronic as per patient)  ABDOMEN: Soft, Nontender, Nondistended; Bowel sounds present  EXTREMITIES: +DP/PT/Radial pulses, 1+ pitting edema of the LUE, 1-2+ pitting edema of b/l LE up to b/l knees, AVF with palpable thrill  PSYCH: Nl behavior, nl affect  NEUROLOGY: AAOx3, non-focal  SKIN: L UE AVF site in ACE dressing, noted to have sutures on the ulnar aspect, L hip surgical incision with very mild seros drainage, Normal color, No rashes or lesions

## 2023-09-13 NOTE — H&P ADULT - NSHPSOCIALHISTORY_GEN_ALL_CORE
Currently at [facility] for MARYANN, previously was living at home by herself  Currently ambulatory with walker for short distances  Denies tobacco, EtOH, or illicit substance use

## 2023-09-13 NOTE — H&P ADULT - PROBLEM SELECTOR PLAN 3
- New cough with white sputum but no SOB, suspect likely 2/2 fluid overload state but procalcitonin elevated to 0.64  - Patient without leukocytosis, no fevers, no septic vitals  - Low concern for infection  - Check CT Chest non-con for further evaluation

## 2023-09-13 NOTE — CONSULT NOTE ADULT - PROBLEM SELECTOR RECOMMENDATION 9
Pt usually has dialysis on MWF as an outpatient. Consent obtained and placed in patient's chart. Will plan for HD today with UF. Phos is WNL, cont phos binder with meals. Hgb low, goal is 10-11, would continue on RICA with HD. Monitor labs and Is/Os.

## 2023-09-13 NOTE — H&P ADULT - PROBLEM SELECTOR PLAN 2
- Looks volume overloaded on examination with b/l LE edema, crackles on lung auscultation, R UE edema  - proBNP elevated but her recent TTE in May 2023 showed a grossly normal LV systolic function with LVEF 55-60%, wasn't able to determine diastolic function  - Patient herself denies any cardiac complaints so unlikely patient with cardiac causes to her fluid overload  - Likely needs more fluid removal with HD  - Renal consulted, f/u reccs  - For now, limit fluid intake to 1.2L per day

## 2023-09-13 NOTE — H&P ADULT - NSHPLABSRESULTS_GEN_ALL_CORE
LABS and ADDITIONAL STUDIES:                        8.7    4.55  )-----------( 164      ( 12 Sep 2023 14:30 )             27.1     136  |  97<L>  |  21  ----------------------------<  270<H>     09-12  3.3<L>   |  26  |  3.06<H>    Ca    8.9      12 Sep 2023 14:30    TPro  7.0  /  Alb  2.5<L>  /  TBili  0.8  /  DBili  x   /  AST  98<H>  /  ALT  43<H>  /  AlkPhos  362<H>  09-12    PT/INR: 10.7/0.95 (09-12-23 @ 14:30)  PTT: 33.8 (09-12-23 @ 14:30)    14:30 - VBG - pH: 7.37  | pCO2: 50    | pO2: 36    | Lactate: 2.2      Sedimentation Rate, Erythrocyte (09.12.23 @ 14:30)   Sedimentation Rate, Erythrocyte: 88 mm/hr    C-Reactive Protein, Serum (09.12.23 @ 14:30)   C-Reactive Protein, Serum: 40.5 mg/L    < from: Xray Hip/Femur 2-3 Views, Left (09.12.23 @ 17:23) >  IMPRESSION:  Soft tissue gas within the lateral soft tissues. Correlate for recent procedures. Necrotizing soft tissue infection could have the same appearance and should be excluded on clinical basis.    Increased diameter of the abdominal aorta which may be related to obliquity/differences in exam types. Dedicated imaging can be performed as clinically warranted.  --- End ofReport ---  < end of copied text >    < from: Xray Pelvis AP only (09.12.23 @ 20:03) >  IMPRESSION:  Redemonstration of left hip arthroplasty. Soft tissue air overlying the left hip. This may be related to overlying wound, however clinical correlation is again recommended to exclude the possibility of necrotizing infection.  --- End of Report ---  < end of copied text >

## 2023-09-13 NOTE — H&P ADULT - HISTORY OF PRESENT ILLNESS
This is a 78F with history of DM2 (last A1C 5 8/30/23), ESRD on HD M/W/F, Endometrial cancer s/p CLINT, Chronic Type B aortic dissection, HTN, L humerus fracture (non-op), L hip fracture s/p ORIF (5/2023) with recent prolonged hospitalization from July 18 to August 16 for L hip hardware failure s/p hemiarthroplasty with post-op course complicated by hematoma s/p pRBCs and compression dressing. Hospitalization further complicated by malfunctioning LUE AVF s/p AVF revision with vascular surgery (8/4/23), with subsequent discharge to [facility] who This is a 78F with history of DM2 (last A1C 5 8/30/23), ESRD on HD M/W/F, Endometrial cancer s/p CLINT, Chronic Type B aortic dissection, HTN, L humerus fracture (non-op), L hip fracture s/p ORIF (5/2023) with prolonged hospitalization from July 18 to August 16 for L hip hardware failure s/p hemiarthroplasty with post-op course complicated by hematoma s/p pRBCs and compression dressing. Hospitalization further complicated by malfunctioning LUE AVF s/p AVF revision with vascular surgery (8/4/23), with subsequent discharge to [facility] who represented to [facility] from August 31 - Sept 7 for overlying infection at the AVF site s/p LUE AVF ligation and aneurysm resection, closure by PRS, and prevena vac placement. s/p Zosyn for 7 days. Had R IJ permacath placed by IR for HD. She was then discharged to [facility] for [facility] and states that she was doing well there. Said that on 9/12 she was sent back to [facility] due to concern for infection of her surgical site though she is confused as to which site was possibly infected. Has a chronic draining wound on her L hip surgical site but said that the drainage from there has reduced and is minimal, light yellow in color. Had some swelling at the AVF surgery site but denied any known drainage. Denies any recent fevers/chills. Does report a cough for the past few days, productive of white sputum but no other acute complaints.     On arrival to the ED, her vitals were T 98.9, P 76, /66, RR 18, O2 sat 97% RA. Her lab work showed her to have anemia (chronic), transaminitis (higher than usual), Her ESR and CRP were elevated. Her imaging showed concerns for possible infection of the L hip surgical site and orthopedics evaluated patient and noted serous drainage and recommended continued wound care for the patient. Vascular surgery and plastic surgery evaluated patient for her L AVF site, plastics evacuated a hematoma from the site and placed dressings on the site. Patient was admitted to medicine for further management.

## 2023-09-13 NOTE — H&P ADULT - PROBLEM SELECTOR PLAN 1
- Spoke with orthopedics, low concern for infection at the surgical site, state it is a chronic wound that will close eventually  - Spoke with PRS about patient's L UE AVF site, said they drained a hematoma, currently discussing further plans but no recommendations as of yet  - Wound care evaluation placed  - Dressings as per wound care/surgery services

## 2023-09-13 NOTE — H&P ADULT - NSICDXPASTMEDICALHX_GEN_ALL_CORE_FT
PAST MEDICAL HISTORY:  Adult Onset Diabetes Mellitus,     Cancer of Endometrium s/p Hysterectomy , s/p Chemo.    CKD (chronic kidney disease)     Dissection of Aorta Type B    ESRD (end stage renal disease) on dialysis M,W,F   [facility] Dialysis [facility] [address]    History of Hysterectomy with O     Hypertension

## 2023-09-13 NOTE — PROGRESS NOTE ADULT - SUBJECTIVE AND OBJECTIVE BOX
Plastic Surgery Progress Note (pg [facility]: [id number], [facility]: [phone])    SUBJECTIVE  The patient was seen and examined. No acute events overnight.    OBJECTIVE  ___________________________________________________  VITAL SIGNS / I&O's   Vital Signs Last 24 Hrs  T(C): 36.3 (13 Sep 2023 06:20), Max: 37.2 (12 Sep 2023 12:56)  T(F): 97.3 (13 Sep 2023 06:20), Max: 98.9 (12 Sep 2023 12:56)  HR: 59 (13 Sep 2023 06:20) (59 - 83)  BP: 166/78 (13 Sep 2023 06:20) (140/66 - 179/63)  BP(mean): --  RR: 16 (13 Sep 2023 06:20) (16 - 20)  SpO2: 100% (13 Sep 2023 06:20) (97% - 100%)    Parameters below as of 13 Sep 2023 06:20  Patient On (Oxygen Delivery Method): room air          ___________________________________________________  PHYSICAL EXAM    -- CONSTITUTIONAL: NAD, lying in bed  -- NEURO: Awake, alert  -- LUE: superior incision intact over LUE, brachial   antecubital fossa with wound dehiscence, clotting at basenoted - not evacuated due to concern for vessel exposure  no active bleed    ___________________________________________________  LABS                        8.7    4.05  )-----------( 160      ( 13 Sep 2023 07:00 )             27.3     12 Sep 2023 14:30    136    |  97     |  21     ----------------------------<  270    3.3     |  26     |  3.06     Ca    8.9        12 Sep 2023 14:30    TPro  7.0    /  Alb  2.5    /  TBili  0.8    /  DBili  x      /  AST  98     /  ALT  43     /  AlkPhos  362    12 Sep 2023 14:30    PT/INR - ( 12 Sep 2023 14:30 )   PT: 10.7 sec;   INR: 0.95 ratio         PTT - ( 12 Sep 2023 14:30 )  PTT:33.8 sec  CAPILLARY BLOOD GLUCOSE      POCT Blood Glucose.: 164 mg/dL (13 Sep 2023 02:50)        Urinalysis Basic - ( 12 Sep 2023 14:30 )    Color: x / Appearance: x / SG: x / pH: x  Gluc: 270 mg/dL / Ketone: x  / Bili: x / Urobili: x   Blood: x / Protein: x / Nitrite: x   Leuk Esterase: x / RBC: x / WBC x   Sq Epi: x / Non Sq Epi: x / Bacteria: x      ___________________________________________________  MICRO  Recent Cultures:    ___________________________________________________  MEDICATIONS  (STANDING):  ascorbic acid 500 milliGRAM(s) Oral daily  calcium acetate 667 milliGRAM(s) Oral three times a day with meals  calcium carbonate 1250 mG  + Vitamin D (OsCal 500 + D) 1 Tablet(s) Oral three times a day  folic acid 1 milliGRAM(s) Oral daily  heparin   Injectable 5000 Unit(s) SubCutaneous every 8 hours  hydrALAZINE 50 milliGRAM(s) Oral daily  metoprolol succinate ER 12.5 milliGRAM(s) Oral daily  Nephro-nathanael 1 Tablet(s) Oral daily  pantoprazole    Tablet 40 milliGRAM(s) Oral before breakfast  polyethylene glycol 3350 17 Gram(s) Oral daily  senna 2 Tablet(s) Oral at bedtime    MEDICATIONS  (PRN):  acetaminophen     Tablet .. 650 milliGRAM(s) Oral every 6 hours PRN Temp greater or equal to 38C (100.4F), Mild Pain (1 - 3)  aluminum hydroxide/magnesium hydroxide/simethicone Suspension 30 milliLiter(s) Oral every 4 hours PRN Dyspepsia  melatonin 3 milliGRAM(s) Oral at bedtime PRN Insomnia  ondansetron Injectable 4 milliGRAM(s) IV Push every 8 hours PRN Nausea and/or Vomiting  oxyCODONE    IR 5 milliGRAM(s) Oral every 6 hours PRN Severe Pain (7 - 10)

## 2023-09-13 NOTE — CONSULT NOTE ADULT - SUBJECTIVE AND OBJECTIVE BOX
Cardiovascular Disease Initial Evaluation  Date of Service: 09-13-23 @ 07:04    CHIEF COMPLAINT: L hip pain    HISTORY OF PRESENT ILLNESS:  This is a 78 year old woman with ESRD on HD, HTN, and chronic type B aortic dissection who presented to [facility] on 9/12/2023 from [facility] due to concerns for a L hip infection.  The patient was recently admitted on 8/30/2023 due to AV fistula malfunction and pain.  Ms. Harris was also here on 7/7/2023 with 2 weeks of atraumtic  L hip pain.   She underwent hip intervention and hospital course was complicated by fistula malfunction requiring vascular intervention on those admissions.    She denies chest pain or SOB.     Allergies  No Known Allergies      	    MEDICATIONS:  heparin   Injectable 5000 Unit(s) SubCutaneous every 8 hours  hydrALAZINE 50 milliGRAM(s) Oral daily  metoprolol succinate ER 12.5 milliGRAM(s) Oral daily        acetaminophen     Tablet .. 650 milliGRAM(s) Oral every 6 hours PRN  melatonin 3 milliGRAM(s) Oral at bedtime PRN  ondansetron Injectable 4 milliGRAM(s) IV Push every 8 hours PRN  oxyCODONE    IR 5 milliGRAM(s) Oral every 6 hours PRN    aluminum hydroxide/magnesium hydroxide/simethicone Suspension 30 milliLiter(s) Oral every 4 hours PRN  pantoprazole    Tablet 40 milliGRAM(s) Oral before breakfast  polyethylene glycol 3350 17 Gram(s) Oral daily  senna 2 Tablet(s) Oral at bedtime      ascorbic acid 500 milliGRAM(s) Oral daily  calcium acetate 667 milliGRAM(s) Oral three times a day with meals  calcium carbonate 1250 mG  + Vitamin D (OsCal 500 + D) 1 Tablet(s) Oral three times a day  folic acid 1 milliGRAM(s) Oral daily  Nephro-nathanael 1 Tablet(s) Oral daily      PAST MEDICAL & SURGICAL HISTORY:  Adult Onset Diabetes Mellitus,      Cancer of Endometrium  s/p Hysterectomy , s/p Chemo.      Dissection of Aorta  Type B      History of Hysterectomy with O      Hypertension      CKD (chronic kidney disease)      ESRD (end stage renal disease) on dialysis  M,W,F   [facility] Dialysis [facility] [address]      H/O total hysterectomy  in 2007 for endometrial CA      History of hip replacement, total, right      S/P arteriovenous (AV) fistula repair          FAMILY HISTORY:  No pertinent family history in first degree relatives        SOCIAL HISTORY:    The patient is a nonsmoker       REVIEW OF SYSTEMS:  See HPI, otherwise complete 14 point review of systems negative        PHYSICAL EXAM:  T(C): 36.3 (09-13-23 @ 06:20), Max: 37.2 (09-12-23 @ 12:56)  HR: 59 (09-13-23 @ 06:20) (59 - 83)  BP: 166/78 (09-13-23 @ 06:20) (140/66 - 179/63)  RR: 16 (09-13-23 @ 06:20) (16 - 20)  SpO2: 100% (09-13-23 @ 06:20) (97% - 100%)  Wt(kg): --  I&O's Summary      Appearance: No Acute Distress; resting comfortably  HEENT:  Normal oral mucosa, PERRL, EOMI	  Cardiovascular: Normal S1 S2, No JVD, No murmurs/rubs/gallops  Respiratory: Normal respiratory effort; Lungs clear to auscultation bilaterally  Gastrointestinal:  Soft, Non-tender, + BS	  Skin: No rashes, No ecchymoses, No cyanosis	  Neurologic: Non-focal; no weakness  Extremities: No clubbing, cyanosis or edema  Vascular: Peripheral pulses palpable 2+ bilaterally  Psychiatry:  Mood & affect appropriate    Laboratory Data:	 	    CBC Full  -  ( 12 Sep 2023 14:30 )  WBC Count : 4.55 K/uL  Hemoglobin : 8.7 g/dL  Hematocrit : 27.1 %  Platelet Count - Automated : 164 K/uL  Mean Cell Volume : 98.5 fL  Mean Cell Hemoglobin : 31.6 pg  Mean Cell Hemoglobin Concentration : 32.1 gm/dL  Auto Neutrophil # : 3.11 K/uL  Auto Lymphocyte # : 0.64 K/uL  Auto Monocyte # : 0.65 K/uL  Auto Eosinophil # : 0.12 K/uL  Auto Basophil # : 0.01 K/uL  Auto Neutrophil % : 68.4 %  Auto Lymphocyte % : 14.1 %  Auto Monocyte % : 14.3 %  Auto Eosinophil % : 2.6 %  Auto Basophil % : 0.2 %    09-12    136  |  97<L>  |  21  ----------------------------<  270<H>  3.3<L>   |  26  |  3.06<H>    Ca    8.9      12 Sep 2023 14:30    TPro  7.0  /  Alb  2.5<L>  /  TBili  0.8  /  DBili  x   /  AST  98<H>  /  ALT  43<H>  /  AlkPhos  362<H>  09-12          Interpretation of Telemetry: n/a	    ECG:  	Sinus; RBBB          Assessment: 78 year old woman with ESRD on HD (MWF), HTN, DM (diet controlled), chronic type B aortic dissection, heart murmur, endometrial ca in remission s/p total hysterectomy in 2007 presents with fistula malfunction.      Plan of Care:      #Post-operative evaluation-  - Ms. Harris tolerated bedside plastic surgery intervention well yesterday.  She displays no signs of  coronary ischemia or decompensated CHF.   EKG is sinus with a known RBBB.  Continue current cardiac management.          #Type B aortic dissection  - Chronic (Diagnosed >10 years ago)  - Optimal BP control.        #ESRD-  - HD as per renal.       Outpatient cardiology f/u with Dr. Cheo Grimes.          44 minutes spent on total encounter; more than 50% of the visit was spent counseling and/or coordinating care by the attending physician.   	  Gabo Burris MD [facility]  Cardiovascular Diseases  [phone]

## 2023-09-13 NOTE — PROGRESS NOTE ADULT - SUBJECTIVE AND OBJECTIVE BOX
Patient evaluated at bedside. Hematoma evacuated from proximal surgical site. Her wound was irrigated without signs of active bleeding. The wound was repacked.     She continue to complain of hip drainage. States her arm is less swollen. Mild pain.                     --------------------------------------------------------------------------------------------    Vitals:   T(C): 36.3 (09-13-23 @ 06:20), Max: 37.2 (09-12-23 @ 12:56)  HR: 59 (09-13-23 @ 06:20) (59 - 83)  BP: 166/78 (09-13-23 @ 06:20) (140/66 - 179/63)  RR: 16 (09-13-23 @ 06:20) (16 - 20)  SpO2: 100% (09-13-23 @ 06:20) (97% - 100%)  CAPILLARY BLOOD GLUCOSE      POCT Blood Glucose.: 137 mg/dL (13 Sep 2023 09:24)  POCT Blood Glucose.: 164 mg/dL (13 Sep 2023 02:50)    CAPILLARY BLOOD GLUCOSE      POCT Blood Glucose.: 137 mg/dL (13 Sep 2023 09:24)  POCT Blood Glucose.: 164 mg/dL (13 Sep 2023 02:50)      Height (cm): 154.9 (09-12 @ 12:56)  Weight (kg): 52.9 (09-13 @ 03:39)  BMI (kg/m2): 22 (09-13 @ 03:39)  BSA (m2): 1.5 (09-13 @ 03:39)    PHYSICAL EXAM:   General: NAD, Lying in bed comfortably  Neuro: A+Ox3  HEENT: NC/AT, EOMI  Neck: Soft, supple  Cardio: RRR  Resp: Good effor  Vascular:     LUE incision open above the AC fossa. Sutures intact proximally.   --------------------------------------------------------------------------------------------    LABS  CBC (09-13 @ 07:00)                              8.7<L>                         4.05    )----------------(  160        58.1  % Neutrophils, 21.5  % Lymphocytes, ANC: 2.35                                27.3<L>  CBC (09-12 @ 14:30)                              8.7<L>                         4.55    )----------------(  164        68.4  % Neutrophils, 14.1  % Lymphocytes, ANC: 3.11                                27.1<L>    BMP (09-13 @ 07:00)             136     |  99      |  24<H> 		Ca++ --      Ca 9.0                ---------------------------------( 127<H>		Mg 2.00               3.5     |  26      |  3.73<H>			Ph 2.8     BMP (09-12 @ 14:30)             136     |  97<L>   |  21    		Ca++ --      Ca 8.9                ---------------------------------( 270<H>		Mg --                 3.3<L>  |  26      |  3.06<H>			Ph --        LFTs (09-13 @ 07:00)      TPro 6.7 / Alb 2.5<L> / TBili 0.8 / DBili -- / AST 77<H> / ALT 44<H> / AlkPhos 348<H>  LFTs (09-12 @ 14:30)      TPro 7.0 / Alb 2.5<L> / TBili 0.8 / DBili -- / AST 98<H> / ALT 43<H> / AlkPhos 362<H>    Coags (09-12 @ 14:30)  aPTT 33.8 / INR 0.95 / PT 10.7        VBG (09-12 @ 14:30)     7.37 / 50 / 36 / 29 / 3.0 / 57.1<L>%     Lactate: 2.2<H>    --------------------------------------------------------------------------------------------    MICROBIOLOGY  Urinalysis (09-13 @ 07:00):     Color:  / Appearance:  / SG:  / pH:  / Gluc: 127<H> / Ketones:  / Bili:  / Urobili:  / Protein : / Nitrites:  / Leuk.Est:  / RBC:  / WBC:  / Sq Epi:  / Non Sq Epi:  / Bacteria              --------------------------------------------------------------------------------------------    IMAGING      77 y.o. Female w/ Hx HTN, DM, chronic type B aortic dissection, ESRD on HD ( M, W, F ) via LUE brachiocephalic (2018) fistula, endometrial ca in remission s/p total hysterectomy in 2007 recently admitted for L hip revision (07-08/2023) during which she underwent LUE subclavian vein stenting 8/1 for stenosis and subsequent brachiocephalic AVF revision 8/3 with resection of the involved segment and primary anastomosis for ulceration. S/p LUE ACF ligation and aneurysm resection, closure by PRS and prevena vac placement on 8/31. Discharged, sent back to ED for L hip drainage.     Wound opened and hematoma evacuated     Recommendations:     ACE  to arm at all times. Arm must be elevated. Will check arm for any signs of bleeding. Plastic surgery plans for wound care. Ortho following for hip. Can continue HD via [facility].     D/W Dr. Weaver

## 2023-09-13 NOTE — H&P ADULT - ASSESSMENT
This is a 78F with history as above who presents from [facility] for possible infection of her surgical sites.

## 2023-09-13 NOTE — H&P ADULT - PROBLEM SELECTOR PLAN 10
DVT ppx - HSQ  Diet - DASH/CC/Renal 1.2L fluid restricted diet  Activity - OOB with assistance, increase as tolerated    Fall and aspiration precautions

## 2023-09-13 NOTE — PHYSICAL THERAPY INITIAL EVALUATION ADULT - PERTINENT HX OF CURRENT PROBLEM, REHAB EVAL
This is a 78 year old female who presents from [facility] for possible infection of her surgical sites.

## 2023-09-13 NOTE — H&P ADULT - NSHPREVIEWOFSYSTEMS_GEN_ALL_CORE
REVIEW OF SYSTEMS:    CONSTITUTIONAL: No weakness, fevers or chills  EYES: No visual changes or eye discharge  ENT: No rhinorrhea or sore throat  NECK: No pain or stiffness  RESPIRATORY: +cough, No wheezing, no hemoptysis; No shortness of breath  CARDIOVASCULAR: No chest pain or palpitations  GASTROINTESTINAL: No abdominal or epigastric pain. No nausea, vomiting, or hematemesis; No diarrhea or constipation. No melena or hematochezia.  MSK: No back pain, +b/l LE edema, +L UE edema  GENITOURINARY: No dysuria, frequency or hematuria  NEUROLOGICAL: No numbness or weakness  SKIN: No itching, burning, rashes, or lesions

## 2023-09-13 NOTE — PHYSICAL THERAPY INITIAL EVALUATION ADULT - ADDITIONAL COMMENTS
Patient presents on this admission from [place], patient has been there for 2 weeks or so and has been ambulating with walker and assist. Patient requires assist with ADL.

## 2023-09-13 NOTE — H&P ADULT - PROBLEM SELECTOR PLAN 7
- Has known chronic type B aortic dissection, here on imaging might be bigger but likely 2/2 imaging differences  - BP control  - Discuss with cardiology in AM to see if further imaging would be warranted

## 2023-09-13 NOTE — PROGRESS NOTE ADULT - ASSESSMENT
77 y.o. Female w/ Hx HTN, DM, chronic type B aortic dissection, ESRD on HD ( M, W, F ) via LUE brachiocephalic (2018) fistula, endometrial ca in remission s/p total hysterectomy in 2007 recently admitted for L hip revision (07-08/2023) during which she underwent LUE subclavian vein stenting 8/1 for stenosis and subsequent brachiocephalic AVF revision 8/3 with resection of the involved segment and primary anastomosis for ulceration. She presents after a missed HD session due to concern for overlying infection at the AVF site. S/p LUE ACF ligation and aneurysm resection, closure by PRS and prevena vac placement on 8/31. s/p Zosyn x7 days. IR placement of RIJ Permacath on 9/6/23. Ortho followed during admission to continue LLE preveena vac. Plastic surgery replaced preveena vac 9/6/23.     Pt sent back to ED by [facility] for c/f hip infection. While in ED, vascular consulted for prior LUE wound. PRS consulted. Pt states she noticed wound has become "black" and they were not treating it well at rehab, however denies pain, bleeding, or change in motor/sensation.     plan  -WTD to open area for now, sutures placed in ulnar most area for coverage, no plans to evacute more hematoma  - to further discuss w/ vascular    Enrique Tilley MD  Plastic and Reconstructive Surgery, PGY3  Available on [employer] Teams  [facility]: [id number], [facility]: [phone]

## 2023-09-14 DIAGNOSIS — N18.6 END STAGE RENAL DISEASE: ICD-10-CM

## 2023-09-14 LAB
ANION GAP SERPL CALC-SCNC: 11 MMOL/L — SIGNIFICANT CHANGE UP (ref 7–14)
BUN SERPL-MCNC: 10 MG/DL — SIGNIFICANT CHANGE UP (ref 7–23)
CALCIUM SERPL-MCNC: 8.7 MG/DL — SIGNIFICANT CHANGE UP (ref 8.4–10.5)
CHLORIDE SERPL-SCNC: 98 MMOL/L — SIGNIFICANT CHANGE UP (ref 98–107)
CO2 SERPL-SCNC: 26 MMOL/L — SIGNIFICANT CHANGE UP (ref 22–31)
CREAT SERPL-MCNC: 2.14 MG/DL — HIGH (ref 0.5–1.3)
EGFR: 23 ML/MIN/1.73M2 — LOW
GLUCOSE SERPL-MCNC: 127 MG/DL — HIGH (ref 70–99)
HCT VFR BLD CALC: 25 % — LOW (ref 34.5–45)
HGB BLD-MCNC: 8.3 G/DL — LOW (ref 11.5–15.5)
MAGNESIUM SERPL-MCNC: 2.1 MG/DL — SIGNIFICANT CHANGE UP (ref 1.6–2.6)
MCHC RBC-ENTMCNC: 32 PG — SIGNIFICANT CHANGE UP (ref 27–34)
MCHC RBC-ENTMCNC: 33.2 GM/DL — SIGNIFICANT CHANGE UP (ref 32–36)
MCV RBC AUTO: 96.5 FL — SIGNIFICANT CHANGE UP (ref 80–100)
MRSA PCR RESULT.: SIGNIFICANT CHANGE UP
NRBC # BLD: 0 /100 WBCS — SIGNIFICANT CHANGE UP (ref 0–0)
NRBC # FLD: 0 K/UL — SIGNIFICANT CHANGE UP (ref 0–0)
PHOSPHATE SERPL-MCNC: 1.8 MG/DL — LOW (ref 2.5–4.5)
PLATELET # BLD AUTO: 169 K/UL — SIGNIFICANT CHANGE UP (ref 150–400)
POTASSIUM SERPL-MCNC: 3.6 MMOL/L — SIGNIFICANT CHANGE UP (ref 3.5–5.3)
POTASSIUM SERPL-SCNC: 3.6 MMOL/L — SIGNIFICANT CHANGE UP (ref 3.5–5.3)
RBC # BLD: 2.59 M/UL — LOW (ref 3.8–5.2)
RBC # FLD: 17.9 % — HIGH (ref 10.3–14.5)
S AUREUS DNA NOSE QL NAA+PROBE: SIGNIFICANT CHANGE UP
SODIUM SERPL-SCNC: 135 MMOL/L — SIGNIFICANT CHANGE UP (ref 135–145)
WBC # BLD: 4.84 K/UL — SIGNIFICANT CHANGE UP (ref 3.8–10.5)
WBC # FLD AUTO: 4.84 K/UL — SIGNIFICANT CHANGE UP (ref 3.8–10.5)

## 2023-09-14 PROCEDURE — 99232 SBSQ HOSP IP/OBS MODERATE 35: CPT

## 2023-09-14 PROCEDURE — 99223 1ST HOSP IP/OBS HIGH 75: CPT

## 2023-09-14 RX ORDER — CEFAZOLIN SODIUM 1 G
1000 VIAL (EA) INJECTION EVERY 24 HOURS
Refills: 0 | Status: DISCONTINUED | OUTPATIENT
Start: 2023-09-14 | End: 2023-09-19

## 2023-09-14 RX ORDER — INFLUENZA VIRUS VACCINE 15; 15; 15; 15 UG/.5ML; UG/.5ML; UG/.5ML; UG/.5ML
0.7 SUSPENSION INTRAMUSCULAR ONCE
Refills: 0 | Status: DISCONTINUED | OUTPATIENT
Start: 2023-09-14 | End: 2023-09-19

## 2023-09-14 RX ORDER — HYDRALAZINE HCL 50 MG
50 TABLET ORAL ONCE
Refills: 0 | Status: COMPLETED | OUTPATIENT
Start: 2023-09-14 | End: 2023-09-14

## 2023-09-14 RX ADMIN — HEPARIN SODIUM 5000 UNIT(S): 5000 INJECTION INTRAVENOUS; SUBCUTANEOUS at 06:07

## 2023-09-14 RX ADMIN — Medication 1 TABLET(S): at 06:06

## 2023-09-14 RX ADMIN — PANTOPRAZOLE SODIUM 40 MILLIGRAM(S): 20 TABLET, DELAYED RELEASE ORAL at 06:06

## 2023-09-14 RX ADMIN — Medication 667 MILLIGRAM(S): at 08:16

## 2023-09-14 RX ADMIN — Medication 1 TABLET(S): at 01:04

## 2023-09-14 RX ADMIN — HEPARIN SODIUM 5000 UNIT(S): 5000 INJECTION INTRAVENOUS; SUBCUTANEOUS at 01:04

## 2023-09-14 RX ADMIN — Medication 100 MILLIGRAM(S): at 19:33

## 2023-09-14 RX ADMIN — Medication 3 MILLIGRAM(S): at 01:04

## 2023-09-14 RX ADMIN — Medication 1 TABLET(S): at 21:46

## 2023-09-14 RX ADMIN — Medication 667 MILLIGRAM(S): at 17:10

## 2023-09-14 RX ADMIN — Medication 12.5 MILLIGRAM(S): at 06:07

## 2023-09-14 RX ADMIN — SENNA PLUS 2 TABLET(S): 8.6 TABLET ORAL at 01:03

## 2023-09-14 RX ADMIN — Medication 1 TABLET(S): at 13:49

## 2023-09-14 RX ADMIN — Medication 50 MILLIGRAM(S): at 21:46

## 2023-09-14 RX ADMIN — Medication 50 MILLIGRAM(S): at 06:07

## 2023-09-14 NOTE — PROGRESS NOTE ADULT - SUBJECTIVE AND OBJECTIVE BOX
SURGERY DAILY PROGRESS NOTE    --------------- OVERNIGHT/INTERVAL---------------  - No acute events overnight  - VA duplexes complete    --------------- SUBJECTIVE ---------------  - Patient seen and examined at bedside with surgical team    --------------- OBJECTIVE ---------------  -----VITALS-----  T(C): 36.8, Max: 36.9 (09-13)  T(F): 98.2, Max: 98.5 (09-13)  HR: 76 (60 - 80)  BP: 163/73 (140/89 - 171/75)  BP(mean): --  ABP: --  ABP(mean): --  RR: 18 (16 - 18)  SpO2: 100% (99% - 100%)  CVP(mm Hg): --  CVP(cm H2O): --  room air        09-13 - 09-14  --------------------------------------------------------  IN:    Other (mL) - Hemodialysis: 400 mL  Total IN: 400 mL    OUT:    Other (mL) - Hemodialysis: 2900 mL  Total OUT: 2900 mL    Total NET: -2500 mL          -----PHYSICAL EXAM-----  GENERAL: NAD, lying in bed   NEURO: AOx3, awake alert appropriate  HEENT: NCAT, trachea midline  PULM: Respirations non-labored  ABD: Soft, non-tender, non-distended, no peritonitis/rebound tenderness  EXT: Warm, well perfused  - LUE fistula wound open, minimal drainage, no pus or fluctuance  -----DRAINS-----  PORFIRIO:      Chest Tube:      NG Tube:     -----INs & OUTs-----    09-13 - 09-14  --------------------------------------------------------  IN:    Other (mL) - Hemodialysis: 400 mL  Total IN: 400 mL    OUT:    Other (mL) - Hemodialysis: 2900 mL  Total OUT: 2900 mL    Total NET: -2500 mL        -----MEDICATIONS-----  STANDING  ascorbic acid 500 milliGRAM(s) Oral daily  calcium acetate 667 milliGRAM(s) Oral three times a day with meals  calcium carbonate 1250 mG  + Vitamin D (OsCal 500 + D) 1 Tablet(s) Oral three times a day  chlorhexidine 2% Cloths 1 Application(s) Topical daily  epoetin marla (EPOGEN) Injectable 4000 Unit(s) IV Push <User Schedule>  folic acid 1 milliGRAM(s) Oral daily  heparin   Injectable 5000 Unit(s) SubCutaneous every 8 hours  hydrALAZINE 50 milliGRAM(s) Oral daily  metoprolol succinate ER 12.5 milliGRAM(s) Oral daily  Nephro-nathanael 1 Tablet(s) Oral daily  pantoprazole    Tablet 40 milliGRAM(s) Oral before breakfast  polyethylene glycol 3350 17 Gram(s) Oral daily  senna 2 Tablet(s) Oral at bedtime    PRN  acetaminophen     Tablet .. 650 milliGRAM(s) Oral every 6 hours PRN Temp greater or equal to 38C (100.4F), Mild Pain (1 - 3)  aluminum hydroxide/magnesium hydroxide/simethicone Suspension 30 milliLiter(s) Oral every 4 hours PRN Dyspepsia  melatonin 3 milliGRAM(s) Oral at bedtime PRN Insomnia  ondansetron Injectable 4 milliGRAM(s) IV Push every 8 hours PRN Nausea and/or Vomiting  oxyCODONE    IR 5 milliGRAM(s) Oral every 6 hours PRN Severe Pain (7 - 10)    -----LABS-----  CBC (09-14 @ 05:30)                              8.3<L>                         4.84    )----------------(  169        --    % Neutrophils, --    % Lymphocytes, ANC: --                                  25.0<L>  CBC (09-13 @ 07:00)                              8.7<L>                         4.05    )----------------(  160        58.1  % Neutrophils, 21.5  % Lymphocytes, ANC: 2.35                                27.3<L>    BMP (09-14 @ 05:30)             135     |  98      |  10    		Ca++ --      Ca 8.7                ---------------------------------( 127<H>		Mg 2.10               3.6     |  26      |  2.14<H>			Ph 1.8<L>  BMP (09-13 @ 07:00)             136     |  99      |  24<H> 		Ca++ --      Ca 9.0                ---------------------------------( 127<H>		Mg 2.00               3.5     |  26      |  3.73<H>			Ph 2.8       LFTs (09-13 @ 07:00)      TPro 6.7 / Alb 2.5<L> / TBili 0.8 / DBili -- / AST 77<H> / ALT 44<H> / AlkPhos 348<H>          VBG (09-13 @ 22:53)     7.50<H> / 37<L> / 104<H> / 29 / 5.4<H> / 99.1<H>%     Lactate: 1.1    Urinalysis (09-14 @ 05:30):     Color:  / Appearance:  / SG:  / pH:  / Gluc: 127<H> / Ketones:  / Bili:  / Urobili:  / Protein : / Nitrites:  / Leuk.Est:  / RBC:  / WBC:  / Sq Epi:  / Non Sq Epi:  / Bacteria        -> .Blood Blood-Peripheral Culture (09-12 @ 14:45)     NG    NG    No growth at 24 hours    -> .Blood Blood-Peripheral Culture (09-12 @ 13:42)     NG    NG    No growth at 24 hours      < from: VA Duplex Hemodialysis Access, Left. (09.13.23 @ 08:25) >  Summary/Impressions:  Status post excision of left upper extremity  brachiobasilic arteriovenous fistula.  Dr Toney (Vascular Surgery) present at bedside during  study.< from: VA Duplex Ext Veins Upper Comp, Left. (09.13.23 @ 07:30) >  RESULT:  ------------------------------------------------------------------------  LEFT:  Deep venous thrombosis: Yes  Superficial venous thrombosis: (Not Examined)  ------------------------------------------------------------------------  Left Findings: Age-indeterminate, non-occlusive thrombosis  is noted within the proximal subclavian vein stent.  The visualized left internal jugular, mid to distal  subclavian, axillary, radial, and ulnar veins otherwise  appear patent, and demonstrate full compressibility and  without evidence of thrombosis in these venous segments.  Multiple patent varicosities are noted atthe subclavian  and axillary regions.  The left brachial and basilic veins were not visualized  due to the presence of overlying dressings.  ------------------------------------------------------------------------  Summary/Impressions:  Technically difficult and limited study.  Age-indeterminate, non-occlusive deep vein thrombosis is  noted within the proximal subclavian vein stent.  Dr. Toney (Vascular Surgery team) present at bedside  during study.    < end of copied text >      < end of copied text >

## 2023-09-14 NOTE — PROGRESS NOTE ADULT - ASSESSMENT
77 y.o. Female w/ Hx HTN, DM, chronic type B aortic dissection, ESRD on HD ( M, W, F ) via LUE brachiocephalic (2018) fistula, endometrial ca in remission s/p total hysterectomy in 2007 recently admitted for L hip revision (07-08/2023) during which she underwent LUE subclavian vein stenting 8/1 for stenosis and subsequent brachiocephalic AVF revision 8/3 with resection of the involved segment and primary anastomosis for ulceration. She presents after a missed HD session due to concern for overlying infection at the AVF site. S/p LUE ACF ligation and aneurysm resection, closure by PRS and prevena vac placement on 8/31. Planning for RUE AV fistula. s/p hematoma evacuation 9/12    PLAN  - daily dressing changes  - pain control  - arm elevation, ace wrap    Vascular Surgery  [id number]

## 2023-09-14 NOTE — PROGRESS NOTE ADULT - ASSESSMENT
78yFemale w/ serous drainage of L hip incision. Dermabond and dressing applied to incision    - Please start ancef in house and plan to discharge on duricef   -WBAT LLE, bedrest  - keep dressing clean and dry  -pain control  -ice/cold compress      For all questions related to patient care, please reach out to the on-call team via the pager.     Elle Ulloa, PGY 2  Orthopaedic Surgery  [facility] [phone]  [facility] [phone]  [facility] [phone]

## 2023-09-14 NOTE — PROGRESS NOTE ADULT - ASSESSMENT
{\rtf1\qddscw21066\ansi\osnmxpk7491\ftnbj\uc1\deff0  {\fonttbl{\f0 \fnil Segoe UI;}{\f1 \fnil \fcharset0 Segoe UI;}{\f2 \fnil Times New Henry;}}  {\colortbl ;\jeq635\sflow637\ztch838 ;\red0\green0\blue0 ;\red0\green0\migd128 ;\red0\green0\blue0 ;}  {\stylesheet{\f0\fs20 Normal;}{\cs1 Default Paragraph Font;}{\cs2\f0\fs16 Line Number;}{\cs3\f2\fs24\ul\cf3 Hyperlink;}}  {\*\revtbl{Unknown;}}  \foujxl25652\rbprai72077\ewdvj8414\qaxun3742\nomsl6833\vcsck0032\wlmobjs647\pkvbpir411\nogrowautofit\mhfgju039\formshade\nofeaturethrottle1\dntblnsbdb\fet4\aendnotes\aftnnrlc\pgbrdrhead\pgbrdrfoot  \sectd\poptlx43228\txldhu47346\guttersxn0\jhznokxs5174\khrynibf9754\qmhseflk9932\osesuuzf3024\liqdoyu516\wbiiokt865\sbkpage\pgncont\pgndec  \plain\plain\f0\fs24\ql\plain\f0\fs24\plain\f1\fs16\ixfa8090\hich\f1\dbch\f1\loch\f1\cf2\fs16 78F with history as above who presents from [facility] for possible infection of her surgical sites. \par  \par  \par  \plain\f1\fs16\tvsq5774\hich\f1\dbch\f1\loch\f1\cf2\fs16\b\ul{\field{\*\fldinst HYPERLINK [id number] }{\fldrslt Problem/Plan - 1:}}\plain\f1\fs16\vmei1863\hich\f1\dbch\f1\loch\f1\cf2\fs16\ql\par  \'b7  {\*\bkmkstart nh18611391972}{\*\bkmkend vb55457125035}Problem: {\*\bkmkstart sq10853870194}{\*\bkmkend om49665903475}Fluid collection at surgical site. \par  \'b7  {\*\bkmkstart aa44742298999}{\*\bkmkend cz34649988592}Plan: {\*\bkmkstart sp25339702160}{\*\bkmkend qt30738453436}- Spoke with orthopedics, low concern for infection at the surgical site, state it is a chronic wound that will close eventually\par  - Spoke with PRS about patient's L UE AVF site, said they drained a hematoma, currently discussing further plans but no recommendations as of yet\par  - Wound care fu \par  \par  \plain\f1\fs16\nozl4349\hich\f1\dbch\f1\loch\f1\cf2\fs16\b\ul{\field{\*\fldinst HYPERLINK [id number] }{\fldrslt Problem/Plan - 2:}}\plain\f1\fs16\plaw7026\hich\f1\dbch\f1\loch\f1\cf2\fs16\ql\par  \'b7  {\*\bkmkstart lq19136108772}{\*\bkmkend na03951778669}Problem: {\*\bkmkstart hc73033645584}{\*\bkmkend gb87589177794}ESRD on dialysis. \par  \'b7  {\*\bkmkstart bl55292002911}{\*\bkmkend qa03838389547}Plan:\plain\f1\fs16\ofwd1736\hich\f1\dbch\f1\loch\f1\cf2\fs16\strike\plain\f1\fs16\pzcp3149\hich\f1\dbch\f1\loch\f1\cf2\fs16  {\*\bkmkstart mw60264696340}{\*\bkmkend gs34872967432}- HD as scheduled\par  - renal fu \par  \par  \plain\f1\fs16\espp4856\hich\f1\dbch\f1\loch\f1\cf2\fs16\b\ul{\field{\*\fldinst HYPERLINK [id number] }{\fldrslt Problem/Plan - 3:}}\plain\f1\fs16\oxvn2020\hich\f1\dbch\f1\loch\f1\cf2\fs16\ql\par  \'b7  {\*\bkmkstart zi53829426447}{\*\bkmkend yc73108685438}Problem: {\*\bkmkstart mp21153140702}{\*\bkmkend zs33796058856}Cough.\plain\f1\fs16\fyaf7633\hich\f1\dbch\f1\loch\f1\cf2\fs16\strike\plain\f1\fs16\lrgc2930\hich\f1\dbch\f1\loch\f1\cf2\fs16\par  \'b7  {\*\bkmkstart yb90729758461}{\*\bkmkend xe51758708803}Plan:\plain\f1\fs16\icgn5403\hich\f1\dbch\f1\loch\f1\cf2\fs16\strike\plain\f1\fs16\pqwj8169\hich\f1\dbch\f1\loch\f1\cf2\fs16  {\*\bkmkstart nx20297151532}{\*\bkmkend sa21643749889}- New cough   with white sputum but no SOB, suspect likely 2/2 fluid overload state but procalcitonin elevated to 0.64\par  - Patient without leukocytosis, no fevers, no septic vitals\par  - Low concern for infection\par  \par  \par  \plain\f1\fs16\wumy7144\hich\f1\dbch\f1\loch\f1\cf2\fs16\b\ul{\field{\*\fldinst HYPERLINK [id number] }{\fldrslt Problem/Plan - 4:}}\plain\f1\fs16\lryc1850\hich\f1\dbch\f1\loch\f1\cf2\fs16\ql\par  \'b7  {\*\bkmkstart ex25569447950}{\*\bkmkend aa19822711332}Problem: {\*\bkmkstart fp76157184931}{\*\bkmkend wd96959775359}Transaminitis.\plain\f1\fs16\ahys2154\hich\f1\dbch\f1\loch\f1\cf2\fs16\strike\plain\f1\fs16\uiiy3865\hich\f1\dbch\f1\loch\f1\cf2\fs16\par  \'b7  {\*\bkmkstart gx07109950415}{\*\bkmkend oz17533472278}Plan:\plain\f1\fs16\qafg2215\hich\f1\dbch\f1\loch\f1\cf2\fs16\strike\plain\f1\fs16\pwzy5002\hich\f1\dbch\f1\loch\f1\cf2\fs16  {\*\bkmkstart sz12577831412}{\*\bkmkend ye69722924639}- MIld transaminitis   likely in setting of fluid overload state, trend for now.\par  \par  \plain\f1\fs16\bzvb6245\hich\f1\dbch\f1\loch\f1\cf2\fs16\b\ul{\field{\*\fldinst HYPERLINK [id number] }{\fldrslt Problem/Plan - 5:}}\plain\f1\fs16\ngcw7980\hich\f1\dbch\f1\loch\f1\cf2\fs16\ql\par  \'b7  {\*\bkmkstart uu18109706277}{\*\bkmkend mc38857060698}Problem: {\*\bkmkstart zx03619390065}{\*\bkmkend hc79186329227}Type 2 diabetes mellitus.\plain\f1\fs16\qbvf9116\hich\f1\dbch\f1\loch\f1\cf2\fs16\strike\plain\f1\fs16\bxmw7636\hich\f1\dbch\f1\loch\f1\cf2\fs16\par  \'b7  {\*\bkmkstart lo59823859957}{\*\bkmkend vd81309220514}Plan:\plain\f1\fs16\lpfg3300\hich\f1\dbch\f1\loch\f1\cf2\fs16\strike\plain\f1\fs16\hqsr0088\hich\f1\dbch\f1\loch\f1\cf2\fs16  {\*\bkmkstart oy81075741340}{\*\bkmkend fz55338338287}- Not on medications   since starting dialysis, last A1C 5, monitor off meds for now, CC diet.\par  \par  \plain\f1\fs16\qbfv5687\hich\f1\dbch\f1\loch\f1\cf2\fs16\b\ul{\field{\*\fldinst HYPERLINK [id number] }{\fldrslt Problem/Plan - 6:}}\plain\f1\fs16\tged2192\hich\f1\dbch\f1\loch\f1\cf2\fs16 \'b7  {\*\bkmkstart uf00953858940}{\*\bkmkend   vf46397460208}Problem: {\*\bkmkstart zx85307082977}{\*\bkmkend jv87093100136}Essential hypertension.\plain\f1\fs16\ugip7446\hich\f1\dbch\f1\loch\f1\cf2\fs16\strike\plain\f1\fs16\jwln3188\hich\f1\dbch\f1\loch\f1\cf2\fs16\ql\par  \'b7  {\*\bkmkstart es99034109070}{\*\bkmkend zs46265527782}Plan:\plain\f1\fs16\eyxa7667\hich\f1\dbch\f1\loch\f1\cf2\fs16\strike\plain\f1\fs16\xysu4587\hich\f1\dbch\f1\loch\f1\cf2\fs16  {\*\bkmkstart rl11334085581}{\*\bkmkend bw76057522692}- c/w home   medications.\par  \par  \plain\f1\fs16\wijk7137\hich\f1\dbch\f1\loch\f1\cf2\fs16\b\ul{\field{\*\fldinst HYPERLINK [id number] }{\fldrslt Problem/Plan - 7:}}\plain\f1\fs16\jtbq8750\hich\f1\dbch\f1\loch\f1\cf2\fs16\ql\par  \'b7  {\*\bkmkstart to05878629926}{\*\bkmkend bi62285245880}Problem: {\*\bkmkstart ig36393756900}{\*\bkmkend rz94134701548}Imaging abnormality.\plain\f1\fs16\gxli9524\hich\f1\dbch\f1\loch\f1\cf2\fs16\strike\plain\f1\fs16\kliz9777\hich\f1\dbch\f1\loch\f1\cf2\fs16\par  \'b7  {\*\bkmkstart iq62054073186}{\*\bkmkend pm19680050486}Plan:\plain\f1\fs16\jslq8927\hich\f1\dbch\f1\loch\f1\cf2\fs16\strike\plain\f1\fs16\ojxg2426\hich\f1\dbch\f1\loch\f1\cf2\fs16  {\*\bkmkstart lp76866965184}{\*\bkmkend no19613210217}- Has known   chronic type B aortic dissection, here on imaging might be bigger but likely 2/2 imaging differences\par  - BP control\par  - cards fu \par  \par  \plain\f1\fs16\qiij1601\hich\f1\dbch\f1\loch\f1\cf2\fs16\b\ul{\field{\*\fldinst HYPERLINK [id number] }{\fldrslt Problem/Plan - 8:}}\plain\f1\fs16\cyoc3828\hich\f1\dbch\f1\loch\f1\cf2\fs16\ql\par  \'b7  {\*\bkmkstart sl99334762260}{\*\bkmkend ku09441012465}Problem: {\*\bkmkstart fz30227473233}{\*\bkmkend hs89599854453}Endometrial cancer.\plain\f1\fs16\fmef9210\hich\f1\dbch\f1\loch\f1\cf2\fs16\strike\plain\f1\fs16\aejg3739\hich\f1\dbch\f1\loch\f1\cf2\fs16\par  \'b7  {\*\bkmkstart ts02782426500}{\*\bkmkend cm78611922939}Plan:\plain\f1\fs16\hxel7091\hich\f1\dbch\f1\loch\f1\cf2\fs16\strike\plain\f1\fs16\vrag6480\hich\f1\dbch\f1\loch\f1\cf2\fs16  {\*\bkmkstart hx64266259411}{\*\bkmkend yc26154147119}- c/w outpatient   follow up.\par  \plain\f0\fs20\ljei1373\hich\f0\dbch\f0\loch\f0\fs20\par  }

## 2023-09-14 NOTE — PROGRESS NOTE ADULT - PROBLEM SELECTOR PLAN 1
Pt. with ESRD on HD TIW. Pt. received HD yesterday. Pt. clinically stable. Labs reviewed. Plan for maintenance HD tomorrow. Pt. with anemia, on RICA treatment. Serum phosphorus low at 1.8 today, discontinue oral calcium acetate therapy. Monitor BP and labs.

## 2023-09-14 NOTE — PATIENT PROFILE ADULT - FALL HARM RISK - HARM RISK INTERVENTIONS

## 2023-09-14 NOTE — PATIENT PROFILE ADULT - INTERNATIONAL TRAVEL
[facility]        NAME: Varsha Baumann is a 64 y o  male  : 1959    MRN: [id number]  DATE: 2019  TIME: 10:22 AM    Assessment and Plan   Urticaria of entire body [L50 9]  1  Urticaria of entire body  Lyme Antibody Profile with reflex to WB     Rash is likely secondary to allergic reaction  However other etiologies are possible such as Lyme disease or even undiagnosed sarcoidosis  Does not have a PCP currently  Will obtain a Lyme profile for rule out  Has been instructed to remain well hydrated and take a daily antihistamines such as Claritin, Allegra or Zyrtec  Patient Instructions     Follow up with PCP in 3-5 days  Proceed to  ER if symptoms worsen  Chief Complaint     Chief Complaint   Patient presents with    Rash     Pt here for a rash on going x 3 days ago pt states it is all over  Pt has large red spots,  area is warm  Pt states he also had chills,  bodyaches x1 week  Pt used  Tylenol and Advil  History of Present Illness       62-year-old male presents today due to a week of fevers, chills and myalgias followed by a generalized rash which has persisted for the past 4 days  At work, he reports that some employees have flu-like symptoms  Otherwise no obvious sick contacts prior to the onset of his symptoms  The rash initially thought to be poison ivy due to its itchy nature  However it is in patches and widely dispersed over the upper, lower extremities and torso  Has never experienced this before  Review of Systems   Review of Systems   Constitutional: Positive for chills and fever  Respiratory: Positive for cough (Throat clearing)  Negative for shortness of breath  Musculoskeletal: Positive for myalgias  Skin: Positive for rash  Allergic/Immunologic: Positive for environmental allergies (Poison ivy)  Neurological: Positive for headaches  Negative for dizziness           Current Medications       Current Outpatient Medications:    aspirin (ECOTRIN LOW STRENGTH) 81 mg EC tablet, Take 81 mg by mouth daily, Disp: , Rfl:     atorvastatin (LIPITOR) 80 mg tablet, , Disp: , Rfl:     levothyroxine 75 mcg tablet, , Disp: , Rfl:     sertraline (ZOLOFT) 100 mg tablet, , Disp: , Rfl:     Current Allergies     Allergies as of 07/14/2019    (No Known Allergies)            The following portions of the patient's history were reviewed and updated as appropriate: allergies, current medications, past family history, past medical history, past social history, past surgical history and problem list      Past Medical History:   Diagnosis Date    Anxiety     Hyperlipidemia        Past Surgical History:   Procedure Laterality Date    CORONARY ARTERY BYPASS GRAFT      2011       Family History   Problem Relation Age of Onset    Diabetes Mother     No Known Problems Father          Medications have been verified  Objective   /78 (BP Location: Right arm, Patient Position: Sitting, Cuff Size: Standard)   Pulse 87   Temp 99 °F (37 2 °C) (Tympanic)   Resp 18   Ht 5' 9" (1 753 m)   Wt 89 3 kg (196 lb 12 8 oz)   SpO2 100%   BMI 29 06 kg/m²        Physical Exam     Physical Exam   Constitutional: He is oriented to person, place, and time  He appears well-developed and well-nourished  No distress  HENT:   Head: Normocephalic and atraumatic  Eyes: Pupils are equal, round, and reactive to light  Conjunctivae are normal    Neck: No thyromegaly present  Cardiovascular: Normal rate and regular rhythm  Pulmonary/Chest: Effort normal and breath sounds normal  No stridor  No respiratory distress  He has no wheezes  He has no rales  Lymphadenopathy:     He has no cervical adenopathy  Neurological: He is alert and oriented to person, place, and time  Skin: Skin is warm  Rash noted  He is not diaphoretic  There is erythema  Round, poorly demarcated areas of faint erythema and warmth with mild central clearing    These patches are approximately 4-6 cm in diameter and located all over the body  Psychiatric: He has a normal mood and affect  His behavior is normal  Judgment and thought content normal    Nursing note and vitals reviewed  No

## 2023-09-14 NOTE — PROGRESS NOTE ADULT - SUBJECTIVE AND OBJECTIVE BOX
ORTHOPEDIC PROGRESS NOTE  Overnight events: None    SUBJECTIVE: Pt seen and examined at bedside. Patient is doing well, patient states her L hip incision is still draining serous fluid.    OBJECTIVE:  Vital Signs Last 24 Hrs  T(C): 36.8 (14 Sep 2023 17:52), Max: 36.8 (14 Sep 2023 01:10)  T(F): 98.2 (14 Sep 2023 17:52), Max: 98.2 (14 Sep 2023 01:10)  HR: 71 (14 Sep 2023 17:52) (65 - 81)  BP: 160/69 (14 Sep 2023 17:52) (140/89 - 167/86)  BP(mean): --  RR: 18 (14 Sep 2023 17:52) (18 - 19)  SpO2: 100% (14 Sep 2023 17:52) (97% - 100%)    Parameters below as of 14 Sep 2023 17:52  Patient On (Oxygen Delivery Method): room air          09-13-23 @ 07:01  -  09-14-23 @ 07:00  --------------------------------------------------------  IN: 490 mL / OUT: 2900 mL / NET: -2410 mL        Physical Examination:  GEN: NAD, resting quietly  PULM: symmetric chest rise bilaterally, no increased WOB  ABD: nondistended  EXTR:   Incision with some serous drainage in the distal portion of incision  No TTP over L hip and TTP along remainder of extremity; compartments soft  Hip ROM 0-70, painless with no pain on micromotion  Motor: TA/EHL/GS/FHL intact  Sensory: DP/SP/Tib/Varsha/Saph SILT  +DP pulse, WWP      LABS:                        8.3    4.84  )-----------( 169      ( 14 Sep 2023 05:30 )             25.0       09-14    135  |  98  |  10  ----------------------------<  127<H>  3.6   |  26  |  2.14<H>    Ca    8.7      14 Sep 2023 05:30  Phos  1.8     09-14  Mg     2.10     09-14    TPro  6.7  /  Alb  2.5<L>  /  TBili  0.8  /  DBili  x   /  AST  77<H>  /  ALT  44<H>  /  AlkPhos  348<H>  09-13

## 2023-09-14 NOTE — PROGRESS NOTE ADULT - ASSESSMENT
77 y.o. Female w/ Hx HTN, DM, chronic type B aortic dissection, ESRD on HD ( M, W, F ) via LUE brachiocephalic (2018) fistula, endometrial ca in remission s/p total hysterectomy in 2007 recently admitted for L hip revision (07-08/2023) during which she underwent LUE subclavian vein stenting 8/1 for stenosis and subsequent brachiocephalic AVF revision 8/3 with resection of the involved segment and primary anastomosis for ulceration. She presents after a missed HD session due to concern for overlying infection at the AVF site. S/p LUE ACF ligation and aneurysm resection, closure by PRS and prevena vac placement on 8/31. s/p Zosyn x7 days. IR placement of RIJ Permacath on 9/6/23. Ortho followed during admission to continue LLE preveena vac. Plastic surgery replaced preveena vac 9/6/23.     Pt sent back to ED by Jah for c/f hip infection. While in ED, vascular consulted for prior LUE wound. PRS consulted. Pt states she noticed wound has become "black" and they were not treating it well at rehab, however denies pain, bleeding, or change in motor/sensation.     plan  -WTD to open area for now, sutures placed in ulnar most area for coverage  -Wound discussed with vascular at bedside. Clot evacuated yesterday. Wound photographs appear clean    Scott Moya MD  Plastic and Reconstructive Surgery, PGY1  Available on [employer] Teams  [facility]: [id number], [facility]: [phone]

## 2023-09-14 NOTE — PROGRESS NOTE ADULT - SUBJECTIVE AND OBJECTIVE BOX
Patient is a 78y old  Female who presents with a chief complaint of L hip drainage (14 Sep 2023 17:18)    Date of servie : 09-14-23 @ 17:30  INTERVAL HPI/OVERNIGHT EVENTS:  T(C): 36.4 (09-14-23 @ 14:12), Max: 36.8 (09-14-23 @ 01:10)  HR: 81 (09-14-23 @ 14:12) (65 - 81)  BP: 167/86 (09-14-23 @ 14:12) (140/89 - 167/86)  RR: 19 (09-14-23 @ 14:12) (18 - 19)  SpO2: 97% (09-14-23 @ 14:12) (97% - 100%)  Wt(kg): --  I&O's Summary    13 Sep 2023 07:01  -  14 Sep 2023 07:00  --------------------------------------------------------  IN: 490 mL / OUT: 2900 mL / NET: -2410 mL        LABS:                        8.3    4.84  )-----------( 169      ( 14 Sep 2023 05:30 )             25.0     09-14    135  |  98  |  10  ----------------------------<  127<H>  3.6   |  26  |  2.14<H>    Ca    8.7      14 Sep 2023 05:30  Phos  1.8     09-14  Mg     2.10     09-14    TPro  6.7  /  Alb  2.5<L>  /  TBili  0.8  /  DBili  x   /  AST  77<H>  /  ALT  44<H>  /  AlkPhos  348<H>  09-13      Urinalysis Basic - ( 14 Sep 2023 05:30 )    Color: x / Appearance: x / SG: x / pH: x  Gluc: 127 mg/dL / Ketone: x  / Bili: x / Urobili: x   Blood: x / Protein: x / Nitrite: x   Leuk Esterase: x / RBC: x / WBC x   Sq Epi: x / Non Sq Epi: x / Bacteria: x      CAPILLARY BLOOD GLUCOSE            Urinalysis Basic - ( 14 Sep 2023 05:30 )    Color: x / Appearance: x / SG: x / pH: x  Gluc: 127 mg/dL / Ketone: x  / Bili: x / Urobili: x   Blood: x / Protein: x / Nitrite: x   Leuk Esterase: x / RBC: x / WBC x   Sq Epi: x / Non Sq Epi: x / Bacteria: x        MEDICATIONS  (STANDING):  ascorbic acid 500 milliGRAM(s) Oral daily  calcium acetate 667 milliGRAM(s) Oral three times a day with meals  calcium carbonate 1250 mG  + Vitamin D (OsCal 500 + D) 1 Tablet(s) Oral three times a day  chlorhexidine 2% Cloths 1 Application(s) Topical daily  epoetin marla (EPOGEN) Injectable 4000 Unit(s) IV Push <User Schedule>  folic acid 1 milliGRAM(s) Oral daily  heparin   Injectable 5000 Unit(s) SubCutaneous every 8 hours  hydrALAZINE 50 milliGRAM(s) Oral daily  metoprolol succinate ER 12.5 milliGRAM(s) Oral daily  Nephro-nathanael 1 Tablet(s) Oral daily  pantoprazole    Tablet 40 milliGRAM(s) Oral before breakfast  polyethylene glycol 3350 17 Gram(s) Oral daily  senna 2 Tablet(s) Oral at bedtime    MEDICATIONS  (PRN):  acetaminophen     Tablet .. 650 milliGRAM(s) Oral every 6 hours PRN Temp greater or equal to 38C (100.4F), Mild Pain (1 - 3)  aluminum hydroxide/magnesium hydroxide/simethicone Suspension 30 milliLiter(s) Oral every 4 hours PRN Dyspepsia  melatonin 3 milliGRAM(s) Oral at bedtime PRN Insomnia  ondansetron Injectable 4 milliGRAM(s) IV Push every 8 hours PRN Nausea and/or Vomiting  oxyCODONE    IR 5 milliGRAM(s) Oral every 6 hours PRN Severe Pain (7 - 10)          PHYSICAL EXAM:  GENERAL: NAD, well-groomed, well-developed  HEAD:  Atraumatic, Normocephalic  CHEST/LUNG: Clear to percussion bilaterally; No rales, rhonchi, wheezing, or rubs  HEART: Regular rate and rhythm; No murmurs, rubs, or gallops  ABDOMEN: Soft, Nontender, Nondistended; Bowel sounds present  EXTREMITIES:  2+ Peripheral Pulses, No clubbing, cyanosis, or edema  LYMPH: No lymphadenopathy noted  SKIN: No rashes or lesions    Care Discussed with Consultants/Other Providers [ x] YES  [ ] NO

## 2023-09-14 NOTE — PATIENT PROFILE ADULT - FUNCTIONAL ASSESSMENT - DAILY ACTIVITY 1.
Spoke to pt, advised per PCP, to keep 3pm appt to get evaluated, pt verbalized understanding and stated she will try to go to her appt.
3 = A little assistance

## 2023-09-14 NOTE — PROGRESS NOTE ADULT - SUBJECTIVE AND OBJECTIVE BOX
[facility] DIVISION OF KIDNEY DISEASES AND HYPERTENSION --   --------------------------------------------------------------------------------  Chief Complaint: ESRD/Ongoing hemodialysis requirement    24 hour events/subjective: Pt. seen and examined earlier today. Pt. feels better but gives history of LUE swelling, No fever, CP, SOB, HA or dizziness during rounds. Pt. received HD treatment yesterday      PAST HISTORY  --------------------------------------------------------------------------------  No significant changes to PMH, PSH, FHx, SHx, unless otherwise noted    ALLERGIES & MEDICATIONS  --------------------------------------------------------------------------------  Allergies    No Known Allergies    Intolerances    Standing Inpatient Medications  ascorbic acid 500 milliGRAM(s) Oral daily  calcium acetate 667 milliGRAM(s) Oral three times a day with meals  calcium carbonate 1250 mG  + Vitamin D (OsCal 500 + D) 1 Tablet(s) Oral three times a day  chlorhexidine 2% Cloths 1 Application(s) Topical daily  epoetin marla (EPOGEN) Injectable 4000 Unit(s) IV Push <User Schedule>  folic acid 1 milliGRAM(s) Oral daily  heparin   Injectable 5000 Unit(s) SubCutaneous every 8 hours  hydrALAZINE 50 milliGRAM(s) Oral daily  metoprolol succinate ER 12.5 milliGRAM(s) Oral daily  Nephro-nathanael 1 Tablet(s) Oral daily  pantoprazole    Tablet 40 milliGRAM(s) Oral before breakfast  polyethylene glycol 3350 17 Gram(s) Oral daily  senna 2 Tablet(s) Oral at bedtime    PRN Inpatient Medications  acetaminophen     Tablet .. 650 milliGRAM(s) Oral every 6 hours PRN  aluminum hydroxide/magnesium hydroxide/simethicone Suspension 30 milliLiter(s) Oral every 4 hours PRN  melatonin 3 milliGRAM(s) Oral at bedtime PRN  ondansetron Injectable 4 milliGRAM(s) IV Push every 8 hours PRN  oxyCODONE    IR 5 milliGRAM(s) Oral every 6 hours PRN    REVIEW OF SYSTEMS  --------------------------------------------------------------------------------  Gen: No fever  Respiratory: No dyspnea  CV: No chest pain  GI: No abdominal pain  MSK: LUE swelling and intermittent pain  Neuro: No dizziness    All other systems were reviewed and are negative, except as noted.    VITALS/PHYSICAL EXAM  --------------------------------------------------------------------------------  T(C): 36.4 (09-14-23 @ 14:12), Max: 36.8 (09-14-23 @ 01:10)  HR: 81 (09-14-23 @ 14:12) (65 - 81)  BP: 167/86 (09-14-23 @ 14:12) (140/89 - 167/86)  RR: 19 (09-14-23 @ 14:12) (18 - 19)  SpO2: 97% (09-14-23 @ 14:12) (97% - 100%)  Wt(kg): --    Weight (kg): 52.9 (09-13-23 @ 03:39)    09-13-23 @ 07:01  -  09-14-23 @ 07:00  --------------------------------------------------------  IN: 490 mL / OUT: 2900 mL / NET: -2410 mL    Physical Exam:  	Gen: resting, NAD  	HEENT: No JVD  	Pulm: CTA B/L  	CV: S1S2+  	Abd: Soft              UE: LUE dressing  	LE: B/L LE pitting edema          	Vascular access: Right IJ tunneled HD catheter site: dressing seen    LABS/STUDIES  --------------------------------------------------------------------------------              8.3    4.84  >-----------<  169      [09-14-23 @ 05:30]              25.0     135  |  98  |  10  ----------------------------<  127      [09-14-23 @ 05:30]  3.6   |  26  |  2.14        Ca     8.7     [09-14-23 @ 05:30]      Mg     2.10     [09-14-23 @ 05:30]      Phos  1.8     [09-14-23 @ 05:30]    TPro  6.7  /  Alb  2.5  /  TBili  0.8  /  DBili  x   /  AST  77  /  ALT  44  /  AlkPhos  348  [09-13-23 @ 07:00]    HBsAg Nonreact      [09-06-23 @ 06:34]  HCV 0.21, Nonreact      [09-06-23 @ 06:34]

## 2023-09-14 NOTE — PROGRESS NOTE ADULT - SUBJECTIVE AND OBJECTIVE BOX
Plastic Surgery Progress Note (pg [facility]: [id number], [facility]: [phone])    SUBJECTIVE  The patient was seen and examined. No acute events overnight. Patient re-iterates that she would like to avoid surgery    OBJECTIVE  ___________________________________________________  VITAL SIGNS / I&O's   Vital Signs Last 24 Hrs  T(C): 36.3 (13 Sep 2023 06:20), Max: 37.2 (12 Sep 2023 12:56)  T(F): 97.3 (13 Sep 2023 06:20), Max: 98.9 (12 Sep 2023 12:56)  HR: 59 (13 Sep 2023 06:20) (59 - 83)  BP: 166/78 (13 Sep 2023 06:20) (140/66 - 179/63)  BP(mean): --  RR: 16 (13 Sep 2023 06:20) (16 - 20)  SpO2: 100% (13 Sep 2023 06:20) (97% - 100%)    Parameters below as of 13 Sep 2023 06:20  Patient On (Oxygen Delivery Method): room air          ___________________________________________________  PHYSICAL EXAM    -- CONSTITUTIONAL: NAD, lying in bed  -- NEURO: Awake, alert  -- LUE: dressings freshly applied by vascular surgery team.     ___________________________________________________  LABS                        8.7    4.05  )-----------( 160      ( 13 Sep 2023 07:00 )             27.3     12 Sep 2023 14:30    136    |  97     |  21     ----------------------------<  270    3.3     |  26     |  3.06     Ca    8.9        12 Sep 2023 14:30    TPro  7.0    /  Alb  2.5    /  TBili  0.8    /  DBili  x      /  AST  98     /  ALT  43     /  AlkPhos  362    12 Sep 2023 14:30    PT/INR - ( 12 Sep 2023 14:30 )   PT: 10.7 sec;   INR: 0.95 ratio         PTT - ( 12 Sep 2023 14:30 )  PTT:33.8 sec  CAPILLARY BLOOD GLUCOSE      POCT Blood Glucose.: 164 mg/dL (13 Sep 2023 02:50)        Urinalysis Basic - ( 12 Sep 2023 14:30 )    Color: x / Appearance: x / SG: x / pH: x  Gluc: 270 mg/dL / Ketone: x  / Bili: x / Urobili: x   Blood: x / Protein: x / Nitrite: x   Leuk Esterase: x / RBC: x / WBC x   Sq Epi: x / Non Sq Epi: x / Bacteria: x      ___________________________________________________  MICRO  Recent Cultures:    ___________________________________________________  MEDICATIONS  (STANDING):  ascorbic acid 500 milliGRAM(s) Oral daily  calcium acetate 667 milliGRAM(s) Oral three times a day with meals  calcium carbonate 1250 mG  + Vitamin D (OsCal 500 + D) 1 Tablet(s) Oral three times a day  folic acid 1 milliGRAM(s) Oral daily  heparin   Injectable 5000 Unit(s) SubCutaneous every 8 hours  hydrALAZINE 50 milliGRAM(s) Oral daily  metoprolol succinate ER 12.5 milliGRAM(s) Oral daily  Nephro-nathanael 1 Tablet(s) Oral daily  pantoprazole    Tablet 40 milliGRAM(s) Oral before breakfast  polyethylene glycol 3350 17 Gram(s) Oral daily  senna 2 Tablet(s) Oral at bedtime    MEDICATIONS  (PRN):  acetaminophen     Tablet .. 650 milliGRAM(s) Oral every 6 hours PRN Temp greater or equal to 38C (100.4F), Mild Pain (1 - 3)  aluminum hydroxide/magnesium hydroxide/simethicone Suspension 30 milliLiter(s) Oral every 4 hours PRN Dyspepsia  melatonin 3 milliGRAM(s) Oral at bedtime PRN Insomnia  ondansetron Injectable 4 milliGRAM(s) IV Push every 8 hours PRN Nausea and/or Vomiting  oxyCODONE    IR 5 milliGRAM(s) Oral every 6 hours PRN Severe Pain (7 - 10)

## 2023-09-14 NOTE — PROGRESS NOTE ADULT - SUBJECTIVE AND OBJECTIVE BOX
Cardiovascular Disease Progress Note  Date of Service: 09-14-23 @ 08:33    Overnight events: No acute events overnight.   Patient is in no distress.         Objective Findings:  T(C): 36.8 (09-14-23 @ 06:05), Max: 36.9 (09-13-23 @ 11:00)  HR: 76 (09-14-23 @ 06:05) (60 - 80)  BP: 163/73 (09-14-23 @ 06:05) (140/89 - 171/75)  RR: 18 (09-14-23 @ 06:05) (16 - 18)  SpO2: 100% (09-14-23 @ 06:05) (99% - 100%)  Wt(kg): --  Daily     Daily       Physical Exam:  Gen: NAD; Patient resting comfortably  HEENT: EOMI, Normocephalic/ atraumatic  CV: RRR, normal S1 + S2, no m/r/g  Lungs:  Normal respiratory effort; clear to auscultation bilaterally  Abd: soft, non-tender; bowel sounds present  Ext: No edema; warm and well perfused    Telemetry: n/a    Laboratory Data:                        8.3    4.84  )-----------( 169      ( 14 Sep 2023 05:30 )             25.0     09-14    135  |  98  |  10  ----------------------------<  127<H>  3.6   |  26  |  2.14<H>    Ca    8.7      14 Sep 2023 05:30  Phos  1.8     09-14  Mg     2.10     09-14    TPro  6.7  /  Alb  2.5<L>  /  TBili  0.8  /  DBili  x   /  AST  77<H>  /  ALT  44<H>  /  AlkPhos  348<H>  09-13    PT/INR - ( 12 Sep 2023 14:30 )   PT: 10.7 sec;   INR: 0.95 ratio         PTT - ( 12 Sep 2023 14:30 )  PTT:33.8 sec          Inpatient Medications:  MEDICATIONS  (STANDING):  ascorbic acid 500 milliGRAM(s) Oral daily  calcium acetate 667 milliGRAM(s) Oral three times a day with meals  calcium carbonate 1250 mG  + Vitamin D (OsCal 500 + D) 1 Tablet(s) Oral three times a day  chlorhexidine 2% Cloths 1 Application(s) Topical daily  epoetin marla (EPOGEN) Injectable 4000 Unit(s) IV Push <User Schedule>  folic acid 1 milliGRAM(s) Oral daily  heparin   Injectable 5000 Unit(s) SubCutaneous every 8 hours  hydrALAZINE 50 milliGRAM(s) Oral daily  metoprolol succinate ER 12.5 milliGRAM(s) Oral daily  Nephro-nathanael 1 Tablet(s) Oral daily  pantoprazole    Tablet 40 milliGRAM(s) Oral before breakfast  polyethylene glycol 3350 17 Gram(s) Oral daily  senna 2 Tablet(s) Oral at bedtime      Assessment: 78 year old woman with ESRD on HD (MWF), HTN, DM (diet controlled), chronic type B aortic dissection, heart murmur, endometrial ca in remission s/p total hysterectomy in 2007 presents with fistula malfunction.      Plan of Care:      #Post-operative evaluation-  - Ms. Harris tolerated bedside plastic surgery intervention well yesterday.  She displays no signs of  coronary ischemia or decompensated CHF.   EKG is sinus with a known RBBB.  Continue current cardiac management.          #Type B aortic dissection  - Chronic (Diagnosed >10 years ago)  - Optimal BP control.        #ESRD-  - HD as per renal.    #ACP (advance care planning)-  Advanced care planning was discussed with the patient.    Cardiac findings were discussed in detail and all questions were answered.         Outpatient cardiology f/u with Dr. Cheo Grimes.               Over 25 minutes spent on total encounter; more than 50% of the visit was spent counseling and/or coordinating care by the attending physician.      Gabo Burris MD [facility]  Cardiovascular Disease  [phone]

## 2023-09-14 NOTE — PATIENT PROFILE ADULT - NSPROIMPLANTSMEDDEV_GEN_A_NUR
Upper and lower dentures  Stents in heart  Metal in L hip/Prosthetic device(s)/Vascular stents/Clips

## 2023-09-15 LAB
ALBUMIN SERPL ELPH-MCNC: 2.4 G/DL — LOW (ref 3.3–5)
ALP SERPL-CCNC: 287 U/L — HIGH (ref 40–120)
ALT FLD-CCNC: 26 U/L — SIGNIFICANT CHANGE UP (ref 4–33)
ANION GAP SERPL CALC-SCNC: 10 MMOL/L — SIGNIFICANT CHANGE UP (ref 7–14)
AST SERPL-CCNC: 47 U/L — HIGH (ref 4–32)
B PERT DNA SPEC QL NAA+PROBE: SIGNIFICANT CHANGE UP
B PERT+PARAPERT DNA PNL SPEC NAA+PROBE: SIGNIFICANT CHANGE UP
BILIRUB SERPL-MCNC: 0.6 MG/DL — SIGNIFICANT CHANGE UP (ref 0.2–1.2)
BORDETELLA PARAPERTUSSIS (RAPRVP): SIGNIFICANT CHANGE UP
BUN SERPL-MCNC: 18 MG/DL — SIGNIFICANT CHANGE UP (ref 7–23)
C PNEUM DNA SPEC QL NAA+PROBE: SIGNIFICANT CHANGE UP
CALCIUM SERPL-MCNC: 8.7 MG/DL — SIGNIFICANT CHANGE UP (ref 8.4–10.5)
CHLORIDE SERPL-SCNC: 96 MMOL/L — LOW (ref 98–107)
CO2 SERPL-SCNC: 29 MMOL/L — SIGNIFICANT CHANGE UP (ref 22–31)
CREAT SERPL-MCNC: 3.4 MG/DL — HIGH (ref 0.5–1.3)
EGFR: 13 ML/MIN/1.73M2 — LOW
FLUAV SUBTYP SPEC NAA+PROBE: SIGNIFICANT CHANGE UP
FLUBV RNA SPEC QL NAA+PROBE: SIGNIFICANT CHANGE UP
GLUCOSE BLDC GLUCOMTR-MCNC: 131 MG/DL — HIGH (ref 70–99)
GLUCOSE BLDC GLUCOMTR-MCNC: 132 MG/DL — HIGH (ref 70–99)
GLUCOSE BLDC GLUCOMTR-MCNC: 214 MG/DL — HIGH (ref 70–99)
GLUCOSE BLDC GLUCOMTR-MCNC: 99 MG/DL — SIGNIFICANT CHANGE UP (ref 70–99)
GLUCOSE SERPL-MCNC: 133 MG/DL — HIGH (ref 70–99)
HADV DNA SPEC QL NAA+PROBE: SIGNIFICANT CHANGE UP
HCOV 229E RNA SPEC QL NAA+PROBE: SIGNIFICANT CHANGE UP
HCOV HKU1 RNA SPEC QL NAA+PROBE: SIGNIFICANT CHANGE UP
HCOV NL63 RNA SPEC QL NAA+PROBE: SIGNIFICANT CHANGE UP
HCOV OC43 RNA SPEC QL NAA+PROBE: SIGNIFICANT CHANGE UP
HCT VFR BLD CALC: 23.9 % — LOW (ref 34.5–45)
HGB BLD-MCNC: 7.8 G/DL — LOW (ref 11.5–15.5)
HMPV RNA SPEC QL NAA+PROBE: SIGNIFICANT CHANGE UP
HPIV1 RNA SPEC QL NAA+PROBE: SIGNIFICANT CHANGE UP
HPIV2 RNA SPEC QL NAA+PROBE: SIGNIFICANT CHANGE UP
HPIV3 RNA SPEC QL NAA+PROBE: SIGNIFICANT CHANGE UP
HPIV4 RNA SPEC QL NAA+PROBE: SIGNIFICANT CHANGE UP
M PNEUMO DNA SPEC QL NAA+PROBE: SIGNIFICANT CHANGE UP
MAGNESIUM SERPL-MCNC: 1.9 MG/DL — SIGNIFICANT CHANGE UP (ref 1.6–2.6)
MCHC RBC-ENTMCNC: 31.7 PG — SIGNIFICANT CHANGE UP (ref 27–34)
MCHC RBC-ENTMCNC: 32.6 GM/DL — SIGNIFICANT CHANGE UP (ref 32–36)
MCV RBC AUTO: 97.2 FL — SIGNIFICANT CHANGE UP (ref 80–100)
MRSA PCR RESULT.: SIGNIFICANT CHANGE UP
NRBC # BLD: 0 /100 WBCS — SIGNIFICANT CHANGE UP (ref 0–0)
NRBC # FLD: 0 K/UL — SIGNIFICANT CHANGE UP (ref 0–0)
PHOSPHATE SERPL-MCNC: 2.7 MG/DL — SIGNIFICANT CHANGE UP (ref 2.5–4.5)
PLATELET # BLD AUTO: 178 K/UL — SIGNIFICANT CHANGE UP (ref 150–400)
POTASSIUM SERPL-MCNC: 3.8 MMOL/L — SIGNIFICANT CHANGE UP (ref 3.5–5.3)
POTASSIUM SERPL-SCNC: 3.8 MMOL/L — SIGNIFICANT CHANGE UP (ref 3.5–5.3)
PROT SERPL-MCNC: 6.3 G/DL — SIGNIFICANT CHANGE UP (ref 6–8.3)
RAPID RVP RESULT: SIGNIFICANT CHANGE UP
RBC # BLD: 2.46 M/UL — LOW (ref 3.8–5.2)
RBC # FLD: 18.1 % — HIGH (ref 10.3–14.5)
RSV RNA SPEC QL NAA+PROBE: SIGNIFICANT CHANGE UP
RV+EV RNA SPEC QL NAA+PROBE: SIGNIFICANT CHANGE UP
S AUREUS DNA NOSE QL NAA+PROBE: SIGNIFICANT CHANGE UP
SARS-COV-2 RNA SPEC QL NAA+PROBE: SIGNIFICANT CHANGE UP
SODIUM SERPL-SCNC: 135 MMOL/L — SIGNIFICANT CHANGE UP (ref 135–145)
WBC # BLD: 3.96 K/UL — SIGNIFICANT CHANGE UP (ref 3.8–10.5)
WBC # FLD AUTO: 3.96 K/UL — SIGNIFICANT CHANGE UP (ref 3.8–10.5)

## 2023-09-15 PROCEDURE — 90935 HEMODIALYSIS ONE EVALUATION: CPT

## 2023-09-15 RX ORDER — SODIUM CHLORIDE 9 MG/ML
100 INJECTION INTRAMUSCULAR; INTRAVENOUS; SUBCUTANEOUS
Refills: 0 | Status: DISCONTINUED | OUTPATIENT
Start: 2023-09-15 | End: 2023-09-19

## 2023-09-15 RX ORDER — OXYCODONE HYDROCHLORIDE 5 MG/1
5 TABLET ORAL EVERY 6 HOURS
Refills: 0 | Status: DISCONTINUED | OUTPATIENT
Start: 2023-09-15 | End: 2023-09-19

## 2023-09-15 RX ORDER — HYDRALAZINE HCL 50 MG
50 TABLET ORAL
Refills: 0 | Status: DISCONTINUED | OUTPATIENT
Start: 2023-09-15 | End: 2023-09-19

## 2023-09-15 RX ADMIN — Medication 50 MILLIGRAM(S): at 13:49

## 2023-09-15 RX ADMIN — Medication 1 TABLET(S): at 05:52

## 2023-09-15 RX ADMIN — POLYETHYLENE GLYCOL 3350 17 GRAM(S): 17 POWDER, FOR SOLUTION ORAL at 13:51

## 2023-09-15 RX ADMIN — PANTOPRAZOLE SODIUM 40 MILLIGRAM(S): 20 TABLET, DELAYED RELEASE ORAL at 05:49

## 2023-09-15 RX ADMIN — Medication 100 MILLIGRAM(S): at 18:48

## 2023-09-15 RX ADMIN — HEPARIN SODIUM 5000 UNIT(S): 5000 INJECTION INTRAVENOUS; SUBCUTANEOUS at 13:49

## 2023-09-15 RX ADMIN — CHLORHEXIDINE GLUCONATE 1 APPLICATION(S): 213 SOLUTION TOPICAL at 13:50

## 2023-09-15 RX ADMIN — Medication 12.5 MILLIGRAM(S): at 13:49

## 2023-09-15 RX ADMIN — Medication 1 TABLET(S): at 13:48

## 2023-09-15 RX ADMIN — Medication 500 MILLIGRAM(S): at 13:49

## 2023-09-15 RX ADMIN — Medication 1 TABLET(S): at 21:22

## 2023-09-15 RX ADMIN — Medication 50 MILLIGRAM(S): at 15:22

## 2023-09-15 RX ADMIN — SENNA PLUS 2 TABLET(S): 8.6 TABLET ORAL at 21:20

## 2023-09-15 RX ADMIN — ERYTHROPOIETIN 4000 UNIT(S): 10000 INJECTION, SOLUTION INTRAVENOUS; SUBCUTANEOUS at 09:21

## 2023-09-15 RX ADMIN — Medication 1 TABLET(S): at 18:07

## 2023-09-15 RX ADMIN — Medication 1 MILLIGRAM(S): at 13:48

## 2023-09-15 NOTE — PROGRESS NOTE ADULT - SUBJECTIVE AND OBJECTIVE BOX
Plastic Surgery Progress Note (pg [facility]: [id number], [facility]: [phone])    SUBJECTIVE  The patient was seen and examined. No acute events overnight.    OBJECTIVE  ___________________________________________________  VITAL SIGNS / I&O's   Vital Signs Last 24 Hrs  T(C): 36.3 (13 Sep 2023 06:20), Max: 37.2 (12 Sep 2023 12:56)  T(F): 97.3 (13 Sep 2023 06:20), Max: 98.9 (12 Sep 2023 12:56)  HR: 59 (13 Sep 2023 06:20) (59 - 83)  BP: 166/78 (13 Sep 2023 06:20) (140/66 - 179/63)  BP(mean): --  RR: 16 (13 Sep 2023 06:20) (16 - 20)  SpO2: 100% (13 Sep 2023 06:20) (97% - 100%)    Parameters below as of 13 Sep 2023 06:20  Patient On (Oxygen Delivery Method): room air          ___________________________________________________  PHYSICAL EXAM    -- CONSTITUTIONAL: NAD, lying in bed  -- NEURO: Awake, alert  -- LUE: dressings freshly applied by vascular surgery team.     ___________________________________________________  LABS                        8.7    4.05  )-----------( 160      ( 13 Sep 2023 07:00 )             27.3     12 Sep 2023 14:30    136    |  97     |  21     ----------------------------<  270    3.3     |  26     |  3.06     Ca    8.9        12 Sep 2023 14:30    TPro  7.0    /  Alb  2.5    /  TBili  0.8    /  DBili  x      /  AST  98     /  ALT  43     /  AlkPhos  362    12 Sep 2023 14:30    PT/INR - ( 12 Sep 2023 14:30 )   PT: 10.7 sec;   INR: 0.95 ratio         PTT - ( 12 Sep 2023 14:30 )  PTT:33.8 sec  CAPILLARY BLOOD GLUCOSE      POCT Blood Glucose.: 164 mg/dL (13 Sep 2023 02:50)        Urinalysis Basic - ( 12 Sep 2023 14:30 )    Color: x / Appearance: x / SG: x / pH: x  Gluc: 270 mg/dL / Ketone: x  / Bili: x / Urobili: x   Blood: x / Protein: x / Nitrite: x   Leuk Esterase: x / RBC: x / WBC x   Sq Epi: x / Non Sq Epi: x / Bacteria: x      ___________________________________________________  MICRO  Recent Cultures:    ___________________________________________________  MEDICATIONS  (STANDING):  ascorbic acid 500 milliGRAM(s) Oral daily  calcium acetate 667 milliGRAM(s) Oral three times a day with meals  calcium carbonate 1250 mG  + Vitamin D (OsCal 500 + D) 1 Tablet(s) Oral three times a day  folic acid 1 milliGRAM(s) Oral daily  heparin   Injectable 5000 Unit(s) SubCutaneous every 8 hours  hydrALAZINE 50 milliGRAM(s) Oral daily  metoprolol succinate ER 12.5 milliGRAM(s) Oral daily  Nephro-nathanael 1 Tablet(s) Oral daily  pantoprazole    Tablet 40 milliGRAM(s) Oral before breakfast  polyethylene glycol 3350 17 Gram(s) Oral daily  senna 2 Tablet(s) Oral at bedtime    MEDICATIONS  (PRN):  acetaminophen     Tablet .. 650 milliGRAM(s) Oral every 6 hours PRN Temp greater or equal to 38C (100.4F), Mild Pain (1 - 3)  aluminum hydroxide/magnesium hydroxide/simethicone Suspension 30 milliLiter(s) Oral every 4 hours PRN Dyspepsia  melatonin 3 milliGRAM(s) Oral at bedtime PRN Insomnia  ondansetron Injectable 4 milliGRAM(s) IV Push every 8 hours PRN Nausea and/or Vomiting  oxyCODONE    IR 5 milliGRAM(s) Oral every 6 hours PRN Severe Pain (7 - 10)

## 2023-09-15 NOTE — ADVANCED PRACTICE NURSE CONSULT - REASON FOR CONSULT
Wound consulted by primary team for management of L hip surgical wound s/p removal of prevena vac 9/9. Patient followed by orthopedic surgery during prior admission and saw patient 9/12. As per Orthopedic surgery AIDA Gonzalez, wound care consult is not necessary as patient was seen by ortho and does not require any further dressing changes. Discussed with ACP Sanam Sandy.
Patient seen on skin care rounds for NPWT/VAC placement to left upper extremity. Wound care team will continue to see patient on M W F schedule.

## 2023-09-15 NOTE — PROGRESS NOTE ADULT - SUBJECTIVE AND OBJECTIVE BOX
Cardiovascular Disease Progress Note  Date of Service: 09-15-23 @ 11:39    Overnight events: No acute events overnight.    Patient is undergoing HD in no distress.        Objective Findings:  T(C): 36.4 (09-15-23 @ 09:42), Max: 36.8 (23 @ 17:52)  HR: 67 (09-15-23 @ 09:42) (67 - 81)  BP: 161/83 (09-15-23 @ 09:42) (147/72 - 181/74)  RR: 18 (09-15-23 @ 09:42) (18 - 19)  SpO2: 100% (09-15-23 @ 09:42) (97% - 100%)  Wt(kg): --  Daily     Daily Weight in k.5 (15 Sep 2023 09:42)      Physical Exam:  Gen: NAD; Patient resting comfortably  HEENT: EOMI, Normocephalic/ atraumatic  CV: RRR, normal S1 + S2, no m/r/g  Lungs:  Normal respiratory effort; clear to auscultation bilaterally  Abd: soft, non-tender; bowel sounds present  Ext: No edema; warm and well perfused    Telemetry: n/a    Laboratory Data:                        7.8    3.96  )-----------( 178      ( 15 Sep 2023 06:10 )             23.9     09-15    135  |  96<L>  |  18  ----------------------------<  133<H>  3.8   |  29  |  3.40<H>    Ca    8.7      15 Sep 2023 06:10  Phos  2.7     09-15  Mg     1.90     09-15    TPro  6.3  /  Alb  2.4<L>  /  TBili  0.6  /  DBili  x   /  AST  47<H>  /  ALT  26  /  AlkPhos  287<H>  09-15              Inpatient Medications:  MEDICATIONS  (STANDING):  ascorbic acid 500 milliGRAM(s) Oral daily  calcium carbonate 1250 mG  + Vitamin D (OsCal 500 + D) 1 Tablet(s) Oral three times a day  ceFAZolin   IVPB 1000 milliGRAM(s) IV Intermittent every 24 hours  chlorhexidine 2% Cloths 1 Application(s) Topical daily  epoetin marla (EPOGEN) Injectable 4000 Unit(s) IV Push <User Schedule>  folic acid 1 milliGRAM(s) Oral daily  heparin   Injectable 5000 Unit(s) SubCutaneous every 8 hours  hydrALAZINE 50 milliGRAM(s) Oral daily  influenza  Vaccine (HIGH DOSE) 0.7 milliLiter(s) IntraMuscular once  metoprolol succinate ER 12.5 milliGRAM(s) Oral daily  Nephro-nathanael 1 Tablet(s) Oral daily  pantoprazole    Tablet 40 milliGRAM(s) Oral before breakfast  polyethylene glycol 3350 17 Gram(s) Oral daily  senna 2 Tablet(s) Oral at bedtime      Assessment: 78 year old woman with ESRD on HD (MWF), HTN, DM (diet controlled), chronic type B aortic dissection, heart murmur, endometrial ca in remission s/p total hysterectomy in  presents with fistula malfunction.      Plan of Care:      #Post-operative evaluation-  - Ms. Harris tolerated bedside plastic surgery intervention well yesterday.  She displays no signs of  coronary ischemia or decompensated CHF.   EKG is sinus with a known RBBB.  Continue current cardiac management.          #Type B aortic dissection  - Chronic (Diagnosed >10 years ago)  - Optimal BP control.        #ESRD-  - HD as per renal.       Outpatient cardiology f/u with Dr. Cheo Grimes.          Over 25 minutes spent on total encounter; more than 50% of the visit was spent counseling and/or coordinating care by the attending physician.      Gabo Burris MD [facility]  Cardiovascular Disease  [phone]

## 2023-09-15 NOTE — PROCEDURE NOTE - NSPOSTCAREGUIDE_GEN_A_CORE
Established Patient        Chief Complaint:   Chief Complaint   Patient presents with    Hypertension     3 month follow up on BP and medications.       Med Refill         History of Present Illness:    Silva Maldonado is a 62 y.o. female who presents today for 3 month follow up of HTN, T2DM, HLD. Recent diagnosis of LIMON.    HTN--Losartan 25mg--tolerating well--has started exercising more and has also started working so she is not as sedentary    T2DM--Ozempic 1 mg--patient checking glucose 1-2 times per day, tolerating well but still feels like her glucose is still higher than it should be. Patient reports that she has been eating out more the past week so glucose has been elevated but prior to that she was eating at home more and glucose was averaging around 150--A1c 10.9 in February    Neuropathy--patient has recently started taking magnesium supplements and states that she has noticed improvement in symptoms    HLD--triglycerides elevated at 303 during lab work for GI last month--Fenofibrate ordered to take in addition to Simvastatin 20mg nightly--diagnosed with LIMON, elevated liver enzymes--reports that she is tolerating the Fenofibrate well, denies adverse effects.     Pap smear 9/7--Negative  Subjective     The following portions of the patient's history were reviewed and updated as appropriate: allergies, current medications, past family history, past medical history, past social history, past surgical history and problem list.    ALLERGIES  No Known Allergies    ROS  Review of Systems   Constitutional:  Negative for fatigue and unexpected weight change.   Respiratory:  Negative for cough and shortness of breath.    Cardiovascular:  Negative for chest pain and palpitations.   Gastrointestinal:  Negative for abdominal pain, constipation, diarrhea and nausea.   Neurological:  Negative for dizziness and weakness.   Psychiatric/Behavioral:  Negative for confusion and sleep disturbance.   "    Lab Results   Component Value Date    HGBA1C 9.0 09/15/2023       Objective     Vital Signs:   /84   Pulse 88   Temp 97.2 °F (36.2 °C)   Resp 16   Ht 157.5 cm (62\")   Wt 106 kg (233 lb)   SpO2 98%   BMI 42.62 kg/m²     Class 3 Severe Obesity (BMI >=40). Obesity-related health conditions include the following: hypertension, diabetes mellitus and dyslipidemias. Obesity is unchanged. BMI is is above average; BMI management plan is completed. We discussed portion control and increasing exercise.       Physical Exam   Physical Exam  Vitals and nursing note reviewed.   HENT:      Nose: Nose normal.      Mouth/Throat:      Lips: Pink.   Eyes:      General: Lids are normal.      Conjunctiva/sclera: Conjunctivae normal.      Pupils: Pupils are equal, round, and reactive to light.   Cardiovascular:      Rate and Rhythm: Normal rate and regular rhythm.      Heart sounds: Normal heart sounds.   Pulmonary:      Effort: Pulmonary effort is normal.      Breath sounds: Normal breath sounds.   Abdominal:      General: Bowel sounds are normal.      Palpations: Abdomen is soft.   Musculoskeletal:         General: Normal range of motion.   Feet:      Right foot:      Protective Sensation: 10 sites tested.  10 sites sensed.      Left foot:      Protective Sensation: 10 sites tested.  10 sites sensed.   Neurological:      Mental Status: She is alert and oriented to person, place, and time.      Gait: Gait is intact.   Psychiatric:         Attention and Perception: Attention normal.         Mood and Affect: Mood and affect normal.         Speech: Speech normal.         Behavior: Behavior normal. Behavior is cooperative.     Assessment and Plan      Assessment/Plan:   Diagnoses and all orders for this visit:    1. Type 2 diabetes mellitus with diabetic autonomic neuropathy, without long-term current use of insulin (Primary)  Assessment & Plan:  Diabetes is improving with treatment.   Continue current treatment " regimen.  Regular aerobic exercise.  Diabetes will be reassessed in 3 months.      2. Mixed hyperlipidemia  -     simvastatin (ZOCOR) 20 MG tablet; Take 1 tablet by mouth Every Evening.  Dispense: 30 tablet; Refill: 3  -     POC Glycosylated Hemoglobin (Hb A1C)    3. Primary hypertension  Assessment & Plan:  Hypertension is improving with treatment.  Continue current treatment regimen.  Dietary sodium restriction.  Weight loss.  Regular aerobic exercise.  Ambulatory blood pressure monitoring.  Blood pressure will be reassessed at the next regular appointment.      4. Polyneuropathy    Patient was encouraged to keep me informed of any acute changes, lack of improvement, or any new concerning symptoms.    Patient to continue magnesium for treatment of neuropathy.    Patient voiced understanding of all instructions and denied further questions.  Agreeable to plan of care.     Discussion Summary:  Discussed plan of care in detail with pt today; pt verb understanding and agrees.      I spent 35 minutes caring for Silva on this date of service. This time includes time spent by me in the following activities:preparing for the visit, reviewing tests, obtaining and/or reviewing a separately obtained history, performing a medically appropriate examination and/or evaluation , counseling and educating the patient/family/caregiver, ordering medications, tests, or procedures and documenting information in the medical record      I have reviewed and updated all copied forward information, as appropriate.  I attest to the accuracy and relevance of any unchanged information.      Follow up:  Return in about 3 months (around 12/15/2023).     Patient Education:  There are no Patient Instructions on file for this visit.    ROSY Cruz  09/27/23  16:31 EDT          Please note that portions of this note may have been completed with a voice recognition program. Answers submitted by the patient for this visit:  Other  (Submitted on 9/15/2023)  Please describe your symptoms.: 3 month follow up  Have you had these symptoms before?: No  How long have you been having these symptoms?: Greater than 2 weeks  Primary Reason for Visit (Submitted on 9/15/2023)  What is the primary reason for your visit?: Other     Verbal/written post procedure instructions were given to patient/caregiver

## 2023-09-15 NOTE — PROGRESS NOTE ADULT - ASSESSMENT
77 y.o. Female w/ Hx HTN, DM, chronic type B aortic dissection, ESRD on HD ( M, W, F ) via LUE brachiocephalic (2018) fistula, endometrial ca in remission s/p total hysterectomy in 2007 recently admitted for L hip revision (07-08/2023) during which she underwent LUE subclavian vein stenting 8/1 for stenosis and subsequent brachiocephalic AVF revision 8/3 with resection of the involved segment and primary anastomosis for ulceration. She presents after a missed HD session due to concern for overlying infection at the AVF site. S/p LUE ACF ligation and aneurysm resection, closure by PRS and prevena vac placement on 8/31. s/p Zosyn x7 days. IR placement of RIJ Permacath on 9/6/23. Ortho followed during admission to continue LLE preveena vac. Plastic surgery replaced preveena vac 9/6/23.     Pt sent back to ED by Jah for c/f hip infection. While in ED, vascular consulted for prior LUE wound. PRS consulted. Pt states she noticed wound has become "black" and they were not treating it well at rehab, however denies pain, bleeding, or change in motor/sensation.     plan    - Vac placed today  - MWF Vac changes    Scott Moya MD  Plastic and Reconstructive Surgery, PGY1  Available on [employer] Teams  [facility]: [id number], [place]: [phone]

## 2023-09-15 NOTE — PROGRESS NOTE ADULT - SUBJECTIVE AND OBJECTIVE BOX
Patient is a 78y old  Female who presents with a chief complaint of L hip drainage (15 Sep 2023 11:39)    Date of servie : 09-15-23 @ 15:45  INTERVAL HPI/OVERNIGHT EVENTS:  T(C): 36.4 (09-15-23 @ 13:18), Max: 36.8 (09-14-23 @ 17:52)  HR: 70 (09-15-23 @ 13:18) (67 - 72)  BP: 178/81 (09-15-23 @ 13:18) (147/72 - 181/74)  RR: 18 (09-15-23 @ 09:42) (18 - 18)  SpO2: 100% (09-15-23 @ 13:18) (99% - 100%)  Wt(kg): --  I&O's Summary    15 Sep 2023 07:01  -  15 Sep 2023 15:45  --------------------------------------------------------  IN: 400 mL / OUT: 2900 mL / NET: -2500 mL        LABS:                        7.8    3.96  )-----------( 178      ( 15 Sep 2023 06:10 )             23.9     09-15    135  |  96<L>  |  18  ----------------------------<  133<H>  3.8   |  29  |  3.40<H>    Ca    8.7      15 Sep 2023 06:10  Phos  2.7     09-15  Mg     1.90     09-15    TPro  6.3  /  Alb  2.4<L>  /  TBili  0.6  /  DBili  x   /  AST  47<H>  /  ALT  26  /  AlkPhos  287<H>  09-15      Urinalysis Basic - ( 15 Sep 2023 06:10 )    Color: x / Appearance: x / SG: x / pH: x  Gluc: 133 mg/dL / Ketone: x  / Bili: x / Urobili: x   Blood: x / Protein: x / Nitrite: x   Leuk Esterase: x / RBC: x / WBC x   Sq Epi: x / Non Sq Epi: x / Bacteria: x      CAPILLARY BLOOD GLUCOSE      POCT Blood Glucose.: 99 mg/dL (15 Sep 2023 09:13)  POCT Blood Glucose.: 131 mg/dL (15 Sep 2023 05:42)        Urinalysis Basic - ( 15 Sep 2023 06:10 )    Color: x / Appearance: x / SG: x / pH: x  Gluc: 133 mg/dL / Ketone: x  / Bili: x / Urobili: x   Blood: x / Protein: x / Nitrite: x   Leuk Esterase: x / RBC: x / WBC x   Sq Epi: x / Non Sq Epi: x / Bacteria: x        MEDICATIONS  (STANDING):  ascorbic acid 500 milliGRAM(s) Oral daily  calcium carbonate 1250 mG  + Vitamin D (OsCal 500 + D) 1 Tablet(s) Oral three times a day  ceFAZolin   IVPB 1000 milliGRAM(s) IV Intermittent every 24 hours  chlorhexidine 2% Cloths 1 Application(s) Topical daily  epoetin marla (EPOGEN) Injectable 4000 Unit(s) IV Push <User Schedule>  folic acid 1 milliGRAM(s) Oral daily  heparin   Injectable 5000 Unit(s) SubCutaneous every 8 hours  hydrALAZINE 50 milliGRAM(s) Oral two times a day  influenza  Vaccine (HIGH DOSE) 0.7 milliLiter(s) IntraMuscular once  metoprolol succinate ER 12.5 milliGRAM(s) Oral daily  Nephro-nathanael 1 Tablet(s) Oral daily  pantoprazole    Tablet 40 milliGRAM(s) Oral before breakfast  polyethylene glycol 3350 17 Gram(s) Oral daily  senna 2 Tablet(s) Oral at bedtime    MEDICATIONS  (PRN):  acetaminophen     Tablet .. 650 milliGRAM(s) Oral every 6 hours PRN Temp greater or equal to 38C (100.4F), Mild Pain (1 - 3)  aluminum hydroxide/magnesium hydroxide/simethicone Suspension 30 milliLiter(s) Oral every 4 hours PRN Dyspepsia  melatonin 3 milliGRAM(s) Oral at bedtime PRN Insomnia  ondansetron Injectable 4 milliGRAM(s) IV Push every 8 hours PRN Nausea and/or Vomiting  oxyCODONE    IR 5 milliGRAM(s) Oral every 6 hours PRN Severe Pain (7 - 10)  sodium chloride 0.9% Bolus. 100 milliLiter(s) IV Bolus every 5 minutes PRN SBP LESS THAN or EQUAL to 90 mmHg          PHYSICAL EXAM:  GENERAL: NAD, well-groomed, well-developed  HEAD:  Atraumatic, Normocephalic  CHEST/LUNG: Clear to percussion bilaterally; No rales, rhonchi, wheezing, or rubs  HEART: Regular rate and rhythm; No murmurs, rubs, or gallops  ABDOMEN: Soft, Nontender, Nondistended; Bowel sounds present  EXTREMITIES:  2+ Peripheral Pulses, No clubbing, cyanosis, or edema  LYMPH: No lymphadenopathy noted  SKIN: No rashes or lesions    Care Discussed with Consultants/Other Providers [ ] YES  [ ] NO

## 2023-09-15 NOTE — PROGRESS NOTE ADULT - SUBJECTIVE AND OBJECTIVE BOX
[facility] DIVISION OF KIDNEY DISEASES AND HYPERTENSION --   --------------------------------------------------------------------------------  Chief Complaint: ESRD/Ongoing hemodialysis requirement    24 hour events/subjective: Pt. seen and examined during HD today. Pt. resting, gives history of LUE swelling, No fever, CP, SOB, HA or dizziness during rounds.     PAST HISTORY  --------------------------------------------------------------------------------  No significant changes to PMH, PSH, FHx, SHx, unless otherwise noted    ALLERGIES & MEDICATIONS  --------------------------------------------------------------------------------  Allergies    No Known Allergies    Intolerances    Standing Inpatient Medications  ascorbic acid 500 milliGRAM(s) Oral daily  calcium carbonate 1250 mG  + Vitamin D (OsCal 500 + D) 1 Tablet(s) Oral three times a day  ceFAZolin   IVPB 1000 milliGRAM(s) IV Intermittent every 24 hours  chlorhexidine 2% Cloths 1 Application(s) Topical daily  epoetin marla (EPOGEN) Injectable 4000 Unit(s) IV Push <User Schedule>  folic acid 1 milliGRAM(s) Oral daily  heparin   Injectable 5000 Unit(s) SubCutaneous every 8 hours  hydrALAZINE 50 milliGRAM(s) Oral daily  influenza  Vaccine (HIGH DOSE) 0.7 milliLiter(s) IntraMuscular once  metoprolol succinate ER 12.5 milliGRAM(s) Oral daily  Nephro-nathanael 1 Tablet(s) Oral daily  pantoprazole    Tablet 40 milliGRAM(s) Oral before breakfast  polyethylene glycol 3350 17 Gram(s) Oral daily  senna 2 Tablet(s) Oral at bedtime    PRN Inpatient Medications  acetaminophen     Tablet .. 650 milliGRAM(s) Oral every 6 hours PRN  aluminum hydroxide/magnesium hydroxide/simethicone Suspension 30 milliLiter(s) Oral every 4 hours PRN  melatonin 3 milliGRAM(s) Oral at bedtime PRN  ondansetron Injectable 4 milliGRAM(s) IV Push every 8 hours PRN  oxyCODONE    IR 5 milliGRAM(s) Oral every 6 hours PRN    REVIEW OF SYSTEMS  --------------------------------------------------------------------------------  Gen: No fever  Respiratory: No dyspnea  CV: No chest pain  GI: No abdominal pain  MSK: LUE swelling and intermittent pain  Neuro: No dizziness    VITALS/PHYSICAL EXAM  --------------------------------------------------------------------------------  T(C): 36.8 (09-15-23 @ 06:35), Max: 36.8 (09-14-23 @ 17:52)  HR: 69 (09-15-23 @ 06:35) (69 - 81)  BP: 160/91 (09-15-23 @ 06:35) (147/72 - 181/74)  RR: 18 (09-15-23 @ 06:35) (18 - 19)  SpO2: 99% (09-15-23 @ 06:35) (97% - 100%)  Wt(kg): --    Physical Exam:  	Gen: resting, NAD  	HEENT: No JVD  	Pulm: CTA B/L  	CV: S1S2+  	Abd: Soft              UE: LUE dressing  	LE: B/L LE pitting edema          	Vascular access: Right IJ tunneled HD catheter being used for HD    LABS/STUDIES  --------------------------------------------------------------------------------              7.8    3.96  >-----------<  178      [09-15-23 @ 06:10]              23.9     135  |  96  |  18  ----------------------------<  133      [09-15-23 @ 06:10]  3.8   |  29  |  3.40        Ca     8.7     [09-15-23 @ 06:10]      Mg     1.90     [09-15-23 @ 06:10]      Phos  2.7     [09-15-23 @ 06:10]    TPro  6.3  /  Alb  2.4  /  TBili  0.6  /  DBili  x   /  AST  47  /  ALT  26  /  AlkPhos  287  [09-15-23 @ 06:10]    HBsAg Nonreact      [09-06-23 @ 06:34]  HCV 0.21, Nonreact      [09-06-23 @ 06:34]

## 2023-09-15 NOTE — ADVANCED PRACTICE NURSE CONSULT - ASSESSMENT
General: A&Ox4, somnolent post dialysis, able to turn with 1x assistance, continent of urine and stool. Upon assessment patient placed on and removed off bedpan for approximately 50ccs of yellow urine. Skin warm, dry with increased moisture in intertriginous folds, scattered areas of hyperpigmentation and hypopigmentation, scattered areas of ecchymosis on bilateral upper extremities. Blanchable erythema on bilateral heels.     Vascular: Bilateral lower extremities with scattered areas of hyper and hypopigmentation. Lichenification. Dry, flaky skin. No temperature changes or edema noted. Capillary refill <3 seconds. Palpable bounding DP/PT pulses, with bilateral biphasic doppler sounds. Stable ecchymosis with no hematoma noted to R 2nd digit toe. No evidence of ulceration or skin breakdown on examination.     Please see A&I flowsheet for full NPWT/VAC assessment, thank you.

## 2023-09-15 NOTE — PROGRESS NOTE ADULT - PROBLEM SELECTOR PLAN 1
Pt. tolerating HD. BP elevated during HD. Continue with current UF goal with HD as tolerated. Pt. with anemia, on RICA treatment. Serum phosphorus was low at 1.8 yesterday hence oral calcium acetate therapy was discontinued. Serum phosphorus has improved to 2.7 today. Pt. with ESRD, avoid aluminum hydroxide/magnesium hydroxide/simethicone suspension. Monitor BP and labs. Pt. tolerating HD. BP elevated during HD. Continue with current UF goal with HD as tolerated. HD catheter functioning well during HD rounds. Pt. with anemia, on RICA treatment. Serum phosphorus was low at 1.8 yesterday hence oral calcium acetate therapy was discontinued. Serum phosphorus has improved to 2.7 today. Pt. with ESRD, avoid aluminum hydroxide/magnesium hydroxide/simethicone suspension. Monitor BP and labs.

## 2023-09-15 NOTE — PROGRESS NOTE ADULT - ASSESSMENT
78F with history as above who presents from [facility] for possible infection of her surgical sites.       Problem/Plan - 1:  ·  Problem: Fluid collection at surgical site.   ·  Plan: - Spoke with orthopedics, low concern for infection at the surgical site, state it is a chronic wound that will close eventually  - Spoke with PRS about patient's L UE AVF site, said they drained a hematoma, currently discussing further plans but no recommendations as of yet  - Wound care fu     Problem/Plan - 2:  ·  Problem: ESRD on dialysis.   ·  Plan: - HD as scheduled  - renal fu     Problem/Plan - 3:  ·  Problem: Cough.  ·  Plan: - New cough with white sputum but no SOB, suspect likely 2/2 fluid overload state but procalcitonin elevated to 0.64- Patient without leukocytosis, no fevers, no septic vitals  - Low concern for infection      Problem/Plan - 4:  ·  Problem: Transaminitis.  ·  Plan: - MIld transaminitis likely in setting of fluid overload state, trend for now.    Problem/Plan - 5:  ·  Problem: Type 2 diabetes mellitus.  ·  Plan: - Not on medications since starting dialysis, last A1C 5, monitor off meds for now, CC diet.    Problem/Plan - 6:·  Problem: Essential hypertension.  ·  Plan: - c/w home medications.    Problem/Plan - 7:  ·  Problem: Imaging abnormality.  ·  Plan: - Has known chronic type B aortic dissection, here on imaging might be bigger but likely 2/2 imaging differences  - BP control  - cards fu     Problem/Plan - 8:  ·  Problem: Endometrial cancer.  ·  Plan: - c/w outpatient follow up.

## 2023-09-15 NOTE — ADVANCED PRACTICE NURSE CONSULT - RECOMMEDATIONS
Please contact Wound/Ostomy Care Service Line if we can be of further assistance (ext [phone]). 
Topical Recommendations    L upper extremity: NPWT/VAC to be managed by WOCN service line M, W, F. If dressing compromised for >2 hours, please stop machine, contact plastic surgery and MD, and remove dressing and replace with alternative dressing: Cleanse with NS, pat dry. Apply Liquid barrier film to periwound skin (allow to dry). Apply hydrofiber (aquacel) to base of wound, secure with silicone foam with borders.     Continue low air loss bed therapy, continue heel elevation, continue to turn & reposition per protocol, soft pillow between bony prominences, continue moisture management with barrier creams & single breathable pad, continue measures to decrease friction/shear/pressure. Continue with nutritional support as per dietary/orders.    Please contact Wound Care Service Line if we can be of further assistance (ext [phone]).

## 2023-09-16 LAB
ALBUMIN SERPL ELPH-MCNC: 2.4 G/DL — LOW (ref 3.3–5)
ALP SERPL-CCNC: 296 U/L — HIGH (ref 40–120)
ALT FLD-CCNC: 19 U/L — SIGNIFICANT CHANGE UP (ref 4–33)
ANION GAP SERPL CALC-SCNC: 9 MMOL/L — SIGNIFICANT CHANGE UP (ref 7–14)
AST SERPL-CCNC: 44 U/L — HIGH (ref 4–32)
BILIRUB SERPL-MCNC: 0.6 MG/DL — SIGNIFICANT CHANGE UP (ref 0.2–1.2)
BUN SERPL-MCNC: 14 MG/DL — SIGNIFICANT CHANGE UP (ref 7–23)
CALCIUM SERPL-MCNC: 8.8 MG/DL — SIGNIFICANT CHANGE UP (ref 8.4–10.5)
CHLORIDE SERPL-SCNC: 101 MMOL/L — SIGNIFICANT CHANGE UP (ref 98–107)
CO2 SERPL-SCNC: 25 MMOL/L — SIGNIFICANT CHANGE UP (ref 22–31)
CREAT SERPL-MCNC: 2.73 MG/DL — HIGH (ref 0.5–1.3)
EGFR: 18 ML/MIN/1.73M2 — LOW
GLUCOSE SERPL-MCNC: 129 MG/DL — HIGH (ref 70–99)
HCT VFR BLD CALC: 27.3 % — LOW (ref 34.5–45)
HGB BLD-MCNC: 8.6 G/DL — LOW (ref 11.5–15.5)
MAGNESIUM SERPL-MCNC: 2 MG/DL — SIGNIFICANT CHANGE UP (ref 1.6–2.6)
MCHC RBC-ENTMCNC: 31.5 GM/DL — LOW (ref 32–36)
MCHC RBC-ENTMCNC: 31.5 PG — SIGNIFICANT CHANGE UP (ref 27–34)
MCV RBC AUTO: 100 FL — SIGNIFICANT CHANGE UP (ref 80–100)
NRBC # BLD: 0 /100 WBCS — SIGNIFICANT CHANGE UP (ref 0–0)
NRBC # FLD: 0 K/UL — SIGNIFICANT CHANGE UP (ref 0–0)
PHOSPHATE SERPL-MCNC: 2.5 MG/DL — SIGNIFICANT CHANGE UP (ref 2.5–4.5)
PLATELET # BLD AUTO: 170 K/UL — SIGNIFICANT CHANGE UP (ref 150–400)
POTASSIUM SERPL-MCNC: 3.8 MMOL/L — SIGNIFICANT CHANGE UP (ref 3.5–5.3)
POTASSIUM SERPL-SCNC: 3.8 MMOL/L — SIGNIFICANT CHANGE UP (ref 3.5–5.3)
PROT SERPL-MCNC: 6.5 G/DL — SIGNIFICANT CHANGE UP (ref 6–8.3)
RBC # BLD: 2.73 M/UL — LOW (ref 3.8–5.2)
RBC # FLD: 18.1 % — HIGH (ref 10.3–14.5)
SODIUM SERPL-SCNC: 135 MMOL/L — SIGNIFICANT CHANGE UP (ref 135–145)
WBC # BLD: 4.17 K/UL — SIGNIFICANT CHANGE UP (ref 3.8–10.5)
WBC # FLD AUTO: 4.17 K/UL — SIGNIFICANT CHANGE UP (ref 3.8–10.5)

## 2023-09-16 RX ORDER — ACETAMINOPHEN 500 MG
975 TABLET ORAL ONCE
Refills: 0 | Status: DISCONTINUED | OUTPATIENT
Start: 2023-09-16 | End: 2023-09-16

## 2023-09-16 RX ORDER — LATANOPROST 0.05 MG/ML
1 SOLUTION/ DROPS OPHTHALMIC; TOPICAL AT BEDTIME
Refills: 0 | Status: DISCONTINUED | OUTPATIENT
Start: 2023-09-16 | End: 2023-09-19

## 2023-09-16 RX ORDER — ACETAZOLAMIDE 250 MG/1
125 TABLET ORAL DAILY
Refills: 0 | Status: DISCONTINUED | OUTPATIENT
Start: 2023-09-16 | End: 2023-09-19

## 2023-09-16 RX ORDER — DORZOLAMIDE HYDROCHLORIDE 20 MG/ML
1 SOLUTION/ DROPS OPHTHALMIC THREE TIMES A DAY
Refills: 0 | Status: DISCONTINUED | OUTPATIENT
Start: 2023-09-16 | End: 2023-09-19

## 2023-09-16 RX ORDER — ACETAZOLAMIDE 250 MG/1
500 TABLET ORAL DAILY
Refills: 0 | Status: DISCONTINUED | OUTPATIENT
Start: 2023-09-16 | End: 2023-09-16

## 2023-09-16 RX ORDER — BRIMONIDINE TARTRATE 2 MG/MG
1 SOLUTION/ DROPS OPHTHALMIC THREE TIMES A DAY
Refills: 0 | Status: DISCONTINUED | OUTPATIENT
Start: 2023-09-16 | End: 2023-09-19

## 2023-09-16 RX ADMIN — Medication 1 TABLET(S): at 13:24

## 2023-09-16 RX ADMIN — DORZOLAMIDE HYDROCHLORIDE 1 DROP(S): 20 SOLUTION/ DROPS OPHTHALMIC at 22:47

## 2023-09-16 RX ADMIN — SENNA PLUS 2 TABLET(S): 8.6 TABLET ORAL at 22:45

## 2023-09-16 RX ADMIN — Medication 500 MILLIGRAM(S): at 11:31

## 2023-09-16 RX ADMIN — Medication 12.5 MILLIGRAM(S): at 05:12

## 2023-09-16 RX ADMIN — ACETAZOLAMIDE 125 MILLIGRAM(S): 250 TABLET ORAL at 18:36

## 2023-09-16 RX ADMIN — Medication 1 MILLIGRAM(S): at 11:31

## 2023-09-16 RX ADMIN — Medication 100 MILLIGRAM(S): at 18:35

## 2023-09-16 RX ADMIN — CHLORHEXIDINE GLUCONATE 1 APPLICATION(S): 213 SOLUTION TOPICAL at 11:32

## 2023-09-16 RX ADMIN — Medication 50 MILLIGRAM(S): at 05:13

## 2023-09-16 RX ADMIN — BRIMONIDINE TARTRATE 1 DROP(S): 2 SOLUTION/ DROPS OPHTHALMIC at 16:15

## 2023-09-16 RX ADMIN — PANTOPRAZOLE SODIUM 40 MILLIGRAM(S): 20 TABLET, DELAYED RELEASE ORAL at 05:12

## 2023-09-16 RX ADMIN — Medication 50 MILLIGRAM(S): at 18:36

## 2023-09-16 RX ADMIN — Medication 1 TABLET(S): at 22:45

## 2023-09-16 RX ADMIN — BRIMONIDINE TARTRATE 1 DROP(S): 2 SOLUTION/ DROPS OPHTHALMIC at 22:47

## 2023-09-16 RX ADMIN — Medication 1 TABLET(S): at 11:31

## 2023-09-16 RX ADMIN — LATANOPROST 1 DROP(S): 0.05 SOLUTION/ DROPS OPHTHALMIC; TOPICAL at 23:32

## 2023-09-16 RX ADMIN — DORZOLAMIDE HYDROCHLORIDE 1 DROP(S): 20 SOLUTION/ DROPS OPHTHALMIC at 16:15

## 2023-09-16 RX ADMIN — Medication 1 TABLET(S): at 05:55

## 2023-09-16 NOTE — DIETITIAN INITIAL EVALUATION ADULT - PERTINENT LABORATORY DATA
09-16    135  |  101  |  14  ----------------------------<  129<H>  3.8   |  25  |  2.73<H>    Ca    8.8      16 Sep 2023 05:20  Phos  2.5     09-16  Mg     2.00     09-16    TPro  6.5  /  Alb  2.4<L>  /  TBili  0.6  /  DBili  x   /  AST  44<H>  /  ALT  19  /  AlkPhos  296<H>  09-16  POCT Blood Glucose.: 132 mg/dL (09-15-23 @ 22:03)  A1C with Estimated Average Glucose Result: 5.0 % (08-30-23 @ 19:00)  A1C with Estimated Average Glucose Result: 5.0 % (05-23-23 @ 07:00)

## 2023-09-16 NOTE — DIETITIAN INITIAL EVALUATION ADULT - REASON FOR ADMISSION
Other injury of unspecified body region, initial encounter    78F with history of DM2 (last A1C 5 8/30/23), ESRD on HD M/W/F, Endometrial cancer s/p CLINT, Chronic Type B aortic dissection, HTN, L humerus fracture (non-op), L hip fracture s/p ORIF (5/2023) with prolonged hospitalization from July 18 to August 16 for L hip hardware failure s/p hemiarthroplasty with post-op course complicated by hematoma s/p pRBCs and compression dressing. Hospitalization further complicated by malfunctioning LUE AVF s/p AVF revision with vascular surgery (8/4/23), with subsequent discharge to [facility] who represented to [facility] from August 31 - Sept 7 for overlying infection at the AVF site s/p LUE AVF ligation and aneurysm resection, closure by PRS, and prevena vac placement. s/p Zosyn for 7 days

## 2023-09-16 NOTE — DIETITIAN INITIAL EVALUATION ADULT - PERTINENT MEDS FT
MEDICATIONS  (STANDING):  ascorbic acid 500 milliGRAM(s) Oral daily  calcium carbonate 1250 mG  + Vitamin D (OsCal 500 + D) 1 Tablet(s) Oral three times a day  ceFAZolin   IVPB 1000 milliGRAM(s) IV Intermittent every 24 hours  chlorhexidine 2% Cloths 1 Application(s) Topical daily  epoetin marla (EPOGEN) Injectable 4000 Unit(s) IV Push <User Schedule>  folic acid 1 milliGRAM(s) Oral daily  heparin   Injectable 5000 Unit(s) SubCutaneous every 8 hours  hydrALAZINE 50 milliGRAM(s) Oral two times a day  influenza  Vaccine (HIGH DOSE) 0.7 milliLiter(s) IntraMuscular once  metoprolol succinate ER 12.5 milliGRAM(s) Oral daily  Nephro-nathanael 1 Tablet(s) Oral daily  pantoprazole    Tablet 40 milliGRAM(s) Oral before breakfast  polyethylene glycol 3350 17 Gram(s) Oral daily  senna 2 Tablet(s) Oral at bedtime    MEDICATIONS  (PRN):  acetaminophen     Tablet .. 650 milliGRAM(s) Oral every 6 hours PRN Temp greater or equal to 38C (100.4F), Mild Pain (1 - 3)  aluminum hydroxide/magnesium hydroxide/simethicone Suspension 30 milliLiter(s) Oral every 4 hours PRN Dyspepsia  melatonin 3 milliGRAM(s) Oral at bedtime PRN Insomnia  ondansetron Injectable 4 milliGRAM(s) IV Push every 8 hours PRN Nausea and/or Vomiting  oxyCODONE    IR 5 milliGRAM(s) Oral every 6 hours PRN Severe Pain (7 - 10)  sodium chloride 0.9% Bolus. 100 milliLiter(s) IV Bolus every 5 minutes PRN SBP LESS THAN or EQUAL to 90 mmHg

## 2023-09-16 NOTE — PROGRESS NOTE ADULT - SUBJECTIVE AND OBJECTIVE BOX
Patient is a 78y old  Female who presents with a chief complaint of L hip drainage (16 Sep 2023 13:00)    Date of servie : 09-16-23 @ 15:17  INTERVAL HPI/OVERNIGHT EVENTS:  T(C): 36.6 (09-16-23 @ 05:00), Max: 36.9 (09-15-23 @ 21:12)  HR: 66 (09-16-23 @ 07:55) (66 - 70)  BP: 168/80 (09-16-23 @ 07:55) (151/79 - 185/88)  RR: 18 (09-16-23 @ 05:00) (18 - 18)  SpO2: 97% (09-16-23 @ 05:00) (97% - 99%)  Wt(kg): --  I&O's Summary    15 Sep 2023 07:01  -  16 Sep 2023 07:00  --------------------------------------------------------  IN: 400 mL / OUT: 2900 mL / NET: -2500 mL    16 Sep 2023 07:01  -  16 Sep 2023 15:17  --------------------------------------------------------  IN: 500 mL / OUT: 0 mL / NET: 500 mL        LABS:                        8.6    4.17  )-----------( 170      ( 16 Sep 2023 05:20 )             27.3     09-16    135  |  101  |  14  ----------------------------<  129<H>  3.8   |  25  |  2.73<H>    Ca    8.8      16 Sep 2023 05:20  Phos  2.5     09-16  Mg     2.00     09-16    TPro  6.5  /  Alb  2.4<L>  /  TBili  0.6  /  DBili  x   /  AST  44<H>  /  ALT  19  /  AlkPhos  296<H>  09-16      Urinalysis Basic - ( 16 Sep 2023 05:20 )    Color: x / Appearance: x / SG: x / pH: x  Gluc: 129 mg/dL / Ketone: x  / Bili: x / Urobili: x   Blood: x / Protein: x / Nitrite: x   Leuk Esterase: x / RBC: x / WBC x   Sq Epi: x / Non Sq Epi: x / Bacteria: x      CAPILLARY BLOOD GLUCOSE      POCT Blood Glucose.: 132 mg/dL (15 Sep 2023 22:03)  POCT Blood Glucose.: 214 mg/dL (15 Sep 2023 18:05)        Urinalysis Basic - ( 16 Sep 2023 05:20 )    Color: x / Appearance: x / SG: x / pH: x  Gluc: 129 mg/dL / Ketone: x  / Bili: x / Urobili: x   Blood: x / Protein: x / Nitrite: x   Leuk Esterase: x / RBC: x / WBC x   Sq Epi: x / Non Sq Epi: x / Bacteria: x        MEDICATIONS  (STANDING):  acetaZOLAMIDE    Tablet 125 milliGRAM(s) Oral daily  ascorbic acid 500 milliGRAM(s) Oral daily  brimonidine 0.2% Ophthalmic Solution 1 Drop(s) Right EYE three times a day  calcium carbonate 1250 mG  + Vitamin D (OsCal 500 + D) 1 Tablet(s) Oral three times a day  ceFAZolin   IVPB 1000 milliGRAM(s) IV Intermittent every 24 hours  chlorhexidine 2% Cloths 1 Application(s) Topical daily  dorzolamide 2% Ophthalmic Solution 1 Drop(s) Right EYE three times a day  epoetin marla (EPOGEN) Injectable 4000 Unit(s) IV Push <User Schedule>  folic acid 1 milliGRAM(s) Oral daily  heparin   Injectable 5000 Unit(s) SubCutaneous every 8 hours  hydrALAZINE 50 milliGRAM(s) Oral two times a day  influenza  Vaccine (HIGH DOSE) 0.7 milliLiter(s) IntraMuscular once  latanoprost 0.005% Ophthalmic Solution 1 Drop(s) Right EYE at bedtime  metoprolol succinate ER 12.5 milliGRAM(s) Oral daily  Nephro-nathanael 1 Tablet(s) Oral daily  pantoprazole    Tablet 40 milliGRAM(s) Oral before breakfast  polyethylene glycol 3350 17 Gram(s) Oral daily  senna 2 Tablet(s) Oral at bedtime    MEDICATIONS  (PRN):  acetaminophen     Tablet .. 650 milliGRAM(s) Oral every 6 hours PRN Temp greater or equal to 38C (100.4F), Mild Pain (1 - 3)  aluminum hydroxide/magnesium hydroxide/simethicone Suspension 30 milliLiter(s) Oral every 4 hours PRN Dyspepsia  melatonin 3 milliGRAM(s) Oral at bedtime PRN Insomnia  ondansetron Injectable 4 milliGRAM(s) IV Push every 8 hours PRN Nausea and/or Vomiting  oxyCODONE    IR 5 milliGRAM(s) Oral every 6 hours PRN Severe Pain (7 - 10)  sodium chloride 0.9% Bolus. 100 milliLiter(s) IV Bolus every 5 minutes PRN SBP LESS THAN or EQUAL to 90 mmHg          PHYSICAL EXAM:  GENERAL: NAD, well-groomed, well-developed  HEAD:  Atraumatic, Normocephalic  CHEST/LUNG: Clear to percussion bilaterally; No rales, rhonchi, wheezing, or rubs  HEART: Regular rate and rhythm; No murmurs, rubs, or gallops  ABDOMEN: Soft, Nontender, Nondistended; Bowel sounds present  EXTREMITIES:  edema +    Care Discussed with Consultants/Other Providers [ ] YES  [ ] NO

## 2023-09-16 NOTE — CONSULT NOTE ADULT - CONSULT REASON
L Hip Incisional drainage
wound
Operative risk evaluation
Prior LUE fistula ligation
ESRD
vision loss OD

## 2023-09-16 NOTE — DIETITIAN NUTRITION RISK NOTIFICATION - TREATMENT: THE FOLLOWING DIET HAS BEEN RECOMMENDED
Diet, Regular:   Consistent Carbohydrate {Evening Snack} (CSTCHOSN)  DASH/TLC {Sodium & Cholesterol Restricted} (DASH)  1200mL Fluid Restriction ([id number])  For patients receiving Renal Replacement - No Protein Restr, No Conc K, No Conc Phos, Low Sodium (RENAL) (09-13-23 @ 06:57) [Active]

## 2023-09-16 NOTE — DIETITIAN INITIAL EVALUATION ADULT - NSICDXPASTMEDICALHX_GEN_ALL_CORE_FT
PAST MEDICAL HISTORY:  Adult Onset Diabetes Mellitus,     Cancer of Endometrium s/p Hysterectomy , s/p Chemo.    CKD (chronic kidney disease)     Dissection of Aorta Type B    ESRD (end stage renal disease) on dialysis M,W,F   [facility] [address]    History of Hysterectomy with O     Hypertension

## 2023-09-16 NOTE — CONSULT NOTE ADULT - ASSESSMENT
78 year old woman with ESRD on HD (MWF), HTN, DM (diet controlled), chronic type B aortic dissection, heart murmur, endometrial ca in remission s/p total hysterectomy in 2007 presents with fistula malfunction.  Ophthalmology consulted for blurry vision OD, found to have elevated IOP to 45 with APD OD.     #Vision Loss, OD  - patient endorses gradual loss of vision in right eye over past year and half. Baseline vision is chronically poor. Endorses that she has noticed a further decrease in vision over the past two days. Denies any pain in the eye, any pain with EOMS, any headaches, any jaw claudication. GCA ROS negative.   - On exam OD patient with Va CF, only supertemporal field preserved on confrontation testing, with trace-1+ APD, and with an IOP of 45  - anterior segment exam with advanced cataract OD   - posterior segment exam with...  - IOP lowered with iop lowering drops to...  - please begin brimonidine three times daily to the right eye, and xalatan nightly to the right eye.  - It is likely that chronically worsening vision OD in setting of elevated IOP and exam findings is secondary to advanced glaucoma  - Recommend MRI brain/orbits w/wo contrast given unilateral nature of elevated IOP with vision loss, APD, and poor central vision which is uncharacteristic for glaucoma  - Will recheck IOP tomorrow to evaluate for further need of drops   - patient will require outpatient follow up with further glaucoma diagnostic testing and management of cataracts following discharge form hospital       DW Dr. Bedolla     Outpatient follow-up: Patient should follow-up with his/her ophthalmologist or with [facility] of [facility] upon discharge at the address below     [facility]  [address] [place] [address]. [address]  [place] [address]  [phone]       78 year old woman with ESRD on HD (MWF), HTN, DM (diet controlled), chronic type B aortic dissection, heart murmur, endometrial ca in remission s/p total hysterectomy in 2007 presents with fistula malfunction.  Ophthalmology consulted for blurry vision OD, found to have elevated IOP to 45 with APD OD.     #Vision Loss, OD  - patient endorses gradual loss of vision in right eye over past year and half. Baseline vision is chronically poor. Endorses that she has noticed a further decrease in vision over the past two days. Denies any pain in the eye, any pain with EOMS, any headaches, any jaw claudication. GCA ROS negative.   - On exam OD patient with Va CF, only supertemporal field preserved on confrontation testing, with trace-1+ APD, and with an IOP of 45  - anterior segment exam with advanced cataract OD   - posterior segment exam with...  - IOP lowered with iop lowering drops to...  - please begin brimonidine three times daily to the right eye, and xalatan nightly to the right eye.  - It is likely that chronically worsening vision OD in setting of elevated IOP and exam findings is secondary to advanced glaucoma  - Recommend MRI brain/orbits w/wo contrast given unilateral nature of elevated IOP with vision loss, APD, and poor central vision which is uncharacteristic for glaucoma  - Will recheck IOP tomorrow to evaluate for further need of drops   - patient will require outpatient follow up with further glaucoma diagnostic testing and management of cataracts following discharge form hospital       DW Dr. Bedolla     Outpatient follow-up: Patient should follow-up with his/her ophthalmologist or with [facility] Department of Ophthalmology upon discharge at the address below     [facility] Department of Ophthalmology  [address] [place] [address]. [address]  [place] [address]  [phone]       78 year old woman with ESRD on HD (MWF), HTN, DM (diet controlled), chronic type B aortic dissection, heart murmur, endometrial ca in remission s/p total hysterectomy in 2007 presents with fistula malfunction.  Ophthalmology consulted for blurry vision OD, found to have elevated IOP to 45 with APD OD.     #Vision Loss, OD  - patient endorses gradual loss of vision in right eye over past year and half. Baseline vision is chronically poor. Endorses that she has noticed a further decrease in vision over the past two days. Denies any pain in the eye, any pain with EOMS, any headaches, any jaw claudication. GCA ROS negative.   - On exam OD patient with Va CF, only supertemporal field preserved on confrontation testing, with trace-1+ APD, and with an IOP of 45  - anterior segment exam with advanced cataract OD   - posterior segment exam with...  - IOP lowered with iop lowering drops to...  - please begin brimonidine three times daily to the right eye, and xalatan nightly to the right eye.  - It is likely that chronically worsening vision OD in setting of elevated IOP and exam findings is secondary to advanced glaucoma  - Recommend MRI brain/orbits w/wo contrast given unilateral nature of elevated IOP with vision loss, APD, and poor central vision which is uncharacteristic for glaucoma  - Will recheck IOP tomorrow to evaluate for further need of drops   - patient will require outpatient follow up with further glaucoma diagnostic testing and management of cataracts following discharge form hospital       DW Dr. Bedolla     Outpatient follow-up: Patient should follow-up with his/her ophthalmologist or with [facility] Department of Ophthalmology upon discharge at the address below     [facility] Department of Ophthalmology  [address] [place] [address]. [address]  [place] [address]  [phone]       78 year old woman with ESRD on HD (MWF), HTN, DM (diet controlled), chronic type B aortic dissection, heart murmur, endometrial ca in remission s/p total hysterectomy in 2007 presents with fistula malfunction.  Ophthalmology consulted for blurry vision OD, found to have elevated IOP to 45 with APD OD.     #Vision Loss, OD  - patient endorses gradual loss of vision in right eye over past year and half. Baseline vision is chronically poor. Endorses that she has noticed a further decrease in vision over the past two days. Denies any pain in the eye, any pain with EOMS, any headaches, any jaw claudication. GCA ROS negative.   - On exam OD patient with Va CF, only supertemporal field preserved on confrontation testing, with trace-1+ APD, and with an IOP of 45  - anterior segment exam with advanced cataract OD   - posterior segment exam with...  - IOP lowered with iop lowering drops to...  - please begin brimonidine three times daily to the right eye, and xalatan nightly to the right eye.  - It is likely that chronically worsening vision OD in setting of elevated IOP and exam findings is secondary to advanced glaucoma  - Recommend MRI brain/orbits w/wo contrast given unilateral nature of elevated IOP with vision loss, APD, and poor central vision which is uncharacteristic for glaucoma  - Will recheck IOP tomorrow to evaluate for further need of drops   - patient will require outpatient follow up with further glaucoma diagnostic testing and management of cataracts following discharge form hospital       DW Dr. Bedolla     Outpatient follow-up: Patient should follow-up with his/her ophthalmologist or with [facility] Department of Ophthalmology upon discharge at the address below     [facility] Department of Ophthalmology  [address] [place] [address]. [address]  [place] [address]  [phone]       78 year old woman with ESRD on HD (MWF), HTN, DM (diet controlled), chronic type B aortic dissection, heart murmur, endometrial ca in remission s/p total hysterectomy in 2007 presents with fistula malfunction.  Ophthalmology consulted for blurry vision OD, found to have elevated IOP to 45 with APD OD.     #Vision Loss, OD  #subluxated cataract OD  - patient endorses gradual loss of vision in right eye over past year and half. Baseline vision is chronically poor. Endorses that she has noticed a further decrease in vision over the past two days. Denies any pain in the eye, any pain with EOMS, any headaches, any jaw claudication. GCA ROS negative.   - On exam OD patient with Va CF, only supertemporal field preserved on confrontation testing, with trace-1+ APD, and with an IOP of 45  - anterior segment exam with advanced cataracts OU OD>OS; the cataract is subluxated inferonasally OD  - posterior segment exam with overall poor view to the periphery in setting of cataract OD. The disc appears non glaucomatous with normal cupping and the macula appears wnl.   - IOP lowered with iop lowering drops to...  - It is likely that the chronically worsening vision OD is secondary to advanced cataract OD possibly leading to glaucomatous nerve damage in setting of a subluxated lens leading to very high IOP. The patient's field defects can be explained by the inferonasal subluxation of the lens, which spares a clear view into the eye superotemporally.   - please begin brimonidine three times daily to the right eye, and xalatan nightly to the right eye.  - Recommend MRI brain/orbits w/wo contrast given the APD OD with normal appearing optic nerves to rule out other causes of vision loss OD   - Will recheck IOP tomorrow to evaluate need for further drops   - patient will require outpatient follow up with further cataract evaluation and glaucoma diagnostic testing following discharge form hospital        Dr. Bedolla     Outpatient follow-up: Patient should follow-up with his/her ophthalmologist or with [facility] Department of Ophthalmology upon discharge at the address below     [facility] Department of Ophthalmology  [address] [place] [address]. [address]  [place] [address]  [phone]       78 year old woman with ESRD on HD (MWF), HTN, DM (diet controlled), chronic type B aortic dissection, heart murmur, endometrial ca in remission s/p total hysterectomy in 2007 presents with fistula malfunction.  Ophthalmology consulted for blurry vision OD, found to have elevated IOP to 45 with APD OD.     #Vision Loss, OD  #subluxated cataract OD  - patient endorses gradual loss of vision in right eye over past year and half. Baseline vision is chronically poor. Endorses that she has noticed a further decrease in vision over the past two days. Denies any pain in the eye, any pain with EOMS, any headaches, any jaw claudication. GCA ROS negative.   - On exam OD patient with Va CF, only supertemporal field preserved on confrontation testing, with trace-1+ APD, and with an IOP of 45  - anterior segment exam with advanced cataracts OU OD>OS; the cataract is subluxated inferonasally OD  - posterior segment exam with overall poor view to the periphery in setting of cataract OD. The disc appears non glaucomatous with normal cupping and the macula appears wnl.   - IOP lowered with iop lowering drops to...  - It is likely that the chronically worsening vision OD is secondary to advanced cataract OD possibly leading to glaucomatous nerve damage in setting of a subluxated lens leading to very high IOP. The patient's field defects can be explained by the inferonasal subluxation of the lens, which spares a clear view into the eye superotemporally.   - please begin brimonidine three times daily to the right eye, and xalatan nightly to the right eye.  - Recommend MRI brain/orbits w/wo contrast given the APD OD with normal appearing optic nerves to rule out other causes of vision loss OD   - Will recheck IOP tomorrow to evaluate need for further drops   - patient will require outpatient follow up with further cataract evaluation and glaucoma diagnostic testing following discharge form hospital        Dr. Bedolla     Outpatient follow-up: Patient should follow-up with his/her ophthalmologist or with [facility] Department of Ophthalmology upon discharge at the address below     [facility] Department of Ophthalmology  [address] [place] [address]. [address]  [place] [address]  [phone]       78 year old woman with ESRD on HD (MWF), HTN, DM (diet controlled), chronic type B aortic dissection, heart murmur, endometrial ca in remission s/p total hysterectomy in 2007 presents with fistula malfunction.  Ophthalmology consulted for blurry vision OD, found to have elevated IOP to 45 with APD OD.     #Vision Loss, OD  #subluxated cataract OD  - patient endorses gradual loss of vision in right eye over past year and half. Baseline vision is chronically poor. Endorses that she has noticed a further decrease in vision over the past two days. Denies any pain in the eye, any pain with EOMS, any headaches, any jaw claudication. GCA ROS negative.   - On exam OD patient with Va CF, only supertemporal field preserved on confrontation testing, with trace-1+ APD, and with an IOP of 45  - anterior segment exam with advanced cataracts OU OD>OS; the cataract is subluxated inferonasally OD  - posterior segment exam with overall poor view to the periphery in setting of cataract OD. The disc appears non glaucomatous with normal cupping and the macula appears wnl.   - IOP lowered with iop lowering drops to...  - It is likely that the chronically worsening vision OD is secondary to advanced cataract OD possibly leading to glaucomatous nerve damage in setting of a subluxated lens leading to very high IOP. The patient's field defects can be explained by the inferonasal subluxation of the lens, which spares a clear view into the eye superotemporally.   - please begin brimonidine three times daily to the right eye, and xalatan nightly to the right eye.  - Recommend MRI brain/orbits w/wo contrast given the APD OD with normal appearing optic nerves to rule out other causes of vision loss OD   - Will recheck IOP tomorrow to evaluate need for further drops   - patient will require outpatient follow up with further cataract evaluation and glaucoma diagnostic testing following discharge form hospital        Dr. Bedolla     Outpatient follow-up: Patient should follow-up with his/her ophthalmologist or with [facility] Department of Ophthalmology upon discharge at the address below     [facility] Department of Ophthalmology  [address] [place] [address]. [address]  [place] [address]  [phone]       78 year old woman with ESRD on HD (MWF), HTN, DM (diet controlled), chronic type B aortic dissection, heart murmur, endometrial ca in remission s/p total hysterectomy in 2007 presents with fistula malfunction.  Ophthalmology consulted for blurry vision OD, found to have elevated IOP to 45 with APD OD.     #Vision Loss, OD  #subluxated cataract OD  - patient endorses gradual loss of vision in right eye over past year and half. Baseline vision is chronically poor. Endorses that she has noticed a further decrease in vision over the past two days. Denies any pain in the eye, any pain with EOMS, any headaches, any jaw claudication. GCA ROS negative.   - On exam OD patient with Va CF, only supertemporal field preserved on confrontation testing, with trace-1+ APD, and with an IOP of 45  - anterior segment exam with advanced cataracts OU OD>OS; the cataract is subluxated inferonasally OD  - posterior segment exam with overall poor view to the periphery in setting of cataract OD. The disc appears non glaucomatous with normal cupping and the macula appears wnl.   - IOP lowered with iop lowering drops to...  - It is likely that the chronically worsening vision OD is secondary to advanced cataract OD possibly leading to glaucomatous nerve damage in setting of a subluxated lens leading to very high IOP. The patient's field defects can be explained by the inferonasal subluxation of the lens, which spares a clear view into the eye superotemporally.   - please begin brimonidine three times daily to the right eye, and xalatan nightly to the right eye.  - Recommend MRI brain/orbits w/wo contrast given the APD OD with normal appearing optic nerves to rule out other causes of vision loss OD   - Will recheck IOP tomorrow to evaluate need for further drops   - patient will require outpatient follow up with further cataract evaluation and glaucoma diagnostic testing following discharge form hospital        Dr. Bedolla     Outpatient follow-up: Patient should follow-up with his/her ophthalmologist or with [facility] Department of Ophthalmology upon discharge at the address below     [facility] Department of Ophthalmology  [address] [place] [address]. [address]  [place] [address]  [phone]       78 year old woman with ESRD on HD (MWF), HTN, DM (diet controlled), chronic type B aortic dissection, heart murmur, endometrial ca in remission s/p total hysterectomy in 2007 presents with fistula malfunction.  Ophthalmology consulted for blurry vision OD, found to have elevated IOP to 45 with APD OD.     #Vision Loss, OD  #subluxated cataract OD  - patient endorses gradual loss of vision in right eye over past year and half. Baseline vision is chronically poor. Endorses that she has noticed a further decrease in vision over the past two days. Denies any pain in the eye, any pain with EOMS, any headaches, any jaw claudication. GCA ROS negative.   - On exam OD patient with Va CF, only supertemporal field preserved on confrontation testing, with trace-1+ APD, and with an IOP of 45  - anterior segment exam with advanced cataracts OU OD>OS; the cataract is subluxated inferonasally OD  - posterior segment exam with overall poor view to the periphery in setting of cataract OD. The disc appears non glaucomatous with normal cupping and the macula appears wnl.   - IOP lowered to 37 with IOP lowering drops   - It is likely that the chronically worsening vision OD is secondary to advanced cataract OD possibly leading to glaucomatous nerve damage in setting of a subluxated lens leading to very high IOP. The patient's field defects can be explained by the inferonasal subluxation of the lens, which spares a clear view into the eye superotemporally.   - please begin brimonidine three times daily to the right eye, dorzolamide twice a day to the right eye, xalatan nightly to the right eye (ordered)  - If ok with nephrology, please begin 500 mg diamox daily   - Recommend MRI brain/orbits w/wo contrast if able given the trace APD OD with normal appearing optic nerves to rule out other causes of vision loss OD   - Will recheck IOP tomorrow to evaluate need for further drops   - patient will require outpatient follow up with further cataract evaluation and glaucoma diagnostic testing following discharge form hospital       SDW Dr. Bedolla     Outpatient follow-up: Patient should follow-up with his/her ophthalmologist or with [facility] Department of Ophthalmology upon discharge at the address below     [facility] Department of Ophthalmology  [address] [place] [address]. [address] [place] [address]  [phone]       78 year old woman with ESRD on HD (MWF), HTN, DM (diet controlled), chronic type B aortic dissection, heart murmur, endometrial ca in remission s/p total hysterectomy in 2007 presents with fistula malfunction.  Ophthalmology consulted for blurry vision OD, found to have elevated IOP to 45 with APD OD.     #Vision Loss, OD  #subluxated cataract OD  - patient endorses gradual loss of vision in right eye over past year and half. Baseline vision is chronically poor. Endorses that she has noticed a further decrease in vision over the past two days. Denies any pain in the eye, any pain with EOMS, any headaches, any jaw claudication. GCA ROS negative.   - On exam OD patient with Va CF, only supertemporal field preserved on confrontation testing, with trace-1+ APD, and with an IOP of 45  - anterior segment exam with advanced cataracts OU OD>OS; the cataract is subluxated inferonasally OD  - posterior segment exam with overall poor view to the periphery in setting of cataract OD. The disc appears non glaucomatous with normal cupping and the macula appears wnl.   - IOP lowered to 37 with IOP lowering drops   - It is likely that the chronically worsening vision OD is secondary to advanced cataract OD possibly leading to glaucomatous nerve damage in setting of a subluxated lens leading to very high IOP. The patient's field defects can be explained by the inferonasal subluxation of the lens, which spares a clear view into the eye superotemporally.   - please begin brimonidine three times daily to the right eye, dorzolamide twice a day to the right eye, xalatan nightly to the right eye (ordered)  - If ok with nephrology, please begin 500 mg diamox daily   - Recommend MRI brain/orbits w/wo contrast if able given the trace APD OD with normal appearing optic nerves to rule out other causes of vision loss OD   - Will recheck IOP tomorrow to evaluate need for further drops   - patient will require outpatient follow up with further cataract evaluation and glaucoma diagnostic testing following discharge form hospital       SDW Dr. Bedolla     Outpatient follow-up: Patient should follow-up with his/her ophthalmologist or with [facility] Department of Ophthalmology upon discharge at the address below     [facility] Department of Ophthalmology  [address] [place] [address]. [address] [place] [address]  [phone]       78 year old woman with ESRD on HD (MWF), HTN, DM (diet controlled), chronic type B aortic dissection, heart murmur, endometrial ca in remission s/p total hysterectomy in 2007 presents with fistula malfunction.  Ophthalmology consulted for blurry vision OD, found to have elevated IOP to 45 with APD OD.     #Vision Loss, OD  #subluxated cataract OD  - patient endorses gradual loss of vision in right eye over past year and half. Baseline vision is chronically poor. Endorses that she has noticed a further decrease in vision over the past two days. Denies any pain in the eye, any pain with EOMS, any headaches, any jaw claudication. GCA ROS negative.   - On exam OD patient with Va CF, only supertemporal field preserved on confrontation testing, with trace-1+ APD, and with an IOP of 45  - anterior segment exam with advanced cataracts OU OD>OS; the cataract is subluxated inferonasally OD  - posterior segment exam with overall poor view to the periphery in setting of cataract OD. The disc appears non glaucomatous with normal cupping and the macula appears wnl.   - IOP lowered to 37 with IOP lowering drops   - It is likely that the chronically worsening vision OD is secondary to advanced cataract OD possibly leading to glaucomatous nerve damage in setting of a subluxated lens leading to very high IOP. The patient's field defects can be explained by the inferonasal subluxation of the lens, which spares a clear view into the eye superotemporally.   - please begin brimonidine three times daily to the right eye, dorzolamide twice a day to the right eye, xalatan nightly to the right eye (ordered)  - If ok with nephrology, please begin 500 mg diamox daily   - Recommend MRI brain/orbits w/wo contrast if able given the trace APD OD with normal appearing optic nerves to rule out other causes of vision loss OD   - Will recheck IOP tomorrow to evaluate need for further drops   - patient will require outpatient follow up with further cataract evaluation and glaucoma diagnostic testing following discharge form hospital       SDW Dr. Bedolla     Outpatient follow-up: Patient should follow-up with his/her ophthalmologist or with [facility] Department of Ophthalmology upon discharge at the address below     [facility] Department of Ophthalmology  [address] [place] [address]. [address] [place] [address]  [phone]       78 year old woman with ESRD on HD (MWF), HTN, DM (diet controlled), chronic type B aortic dissection, heart murmur, endometrial ca in remission s/p total hysterectomy in 2007 presents with fistula malfunction.  Ophthalmology consulted for blurry vision OD, found to have elevated IOP to 45 with APD OD.     #Vision Loss, OD  #subluxated cataract OD  - patient endorses gradual loss of vision in right eye over past year and half. Baseline vision is chronically poor. Endorses that she has noticed a further decrease in vision over the past two days. Denies any pain in the eye, any pain with EOMS, any headaches, any jaw claudication. GCA ROS negative.   - On exam OD patient with Va CF, only supertemporal field preserved on confrontation testing, with trace-1+ APD, and with an IOP of 45  - anterior segment exam with advanced cataracts OU OD>OS; the cataract is subluxated inferonasally OD  - posterior segment exam with overall poor view to the periphery in setting of cataract OD. The disc appears non glaucomatous with normal cupping and the macula appears wnl.   - IOP lowered to 37 with IOP lowering drops   - It is likely that the chronically worsening vision OD is secondary to advanced cataract OD possibly leading to glaucomatous nerve damage in setting of a subluxated lens leading to very high IOP. The patient's field defects can be explained by the inferonasal subluxation of the lens, which spares a clear view into the eye superotemporally.   - please begin brimonidine three times daily to the right eye, dorzolamide twice a day to the right eye, xalatan nightly to the right eye (ordered)  - If ok with nephrology, please begin 500 mg diamox daily   - Recommend MRI brain/orbits w/wo contrast if able given the trace APD OD with normal appearing optic nerves to rule out other causes of vision loss OD   - Will recheck IOP tomorrow to evaluate need for further drops   - patient will require outpatient follow up with further cataract evaluation and glaucoma diagnostic testing following discharge form hospital       SDW Dr. Bedolla     Outpatient follow-up: Patient should follow-up with his/her ophthalmologist or with [facility] Department of Ophthalmology upon discharge at the address below     [facility] Department of Ophthalmology  [address] [place] [address]. [address] [place] [address]  [phone]       78 year old woman with ESRD on HD (MWF), HTN, DM (diet controlled), chronic type B aortic dissection, heart murmur, endometrial ca in remission s/p total hysterectomy in 2007 presents with fistula malfunction.  Ophthalmology consulted for blurry vision OD, found to have elevated IOP to 45 with APD OD.     #Vision Loss, OD  #subluxated cataract OD  - patient endorses gradual loss of vision in right eye over past year and half. Baseline vision is chronically poor. Endorses that she has noticed a further decrease in vision over the past two days. Denies any pain in the eye, any pain with EOMS, any headaches, any jaw claudication. GCA ROS negative.   - On exam OD patient with Va CF, only supertemporal field preserved on confrontation testing, with trace-1+ APD, and with an IOP of 45  - anterior segment exam with advanced cataracts OU OD>OS; the cataract is subluxated inferonasally OD  - posterior segment exam with overall poor view to the periphery in setting of cataract OD. The disc appears non glaucomatous with normal cupping and the macula appears wnl.   - IOP lowered to 37 with IOP lowering drops   - It is likely that the chronically worsening vision OD is secondary to advanced cataract OD possibly leading to glaucomatous nerve damage in setting of a subluxated lens leading to very high IOP. The patient's field defects can be explained by the inferonasal subluxation of the lens, which spares a clear view into the eye superotemporally.   - please begin brimonidine three times daily to the right eye, dorzolamide three times a day to the right eye, xalatan nightly to the right eye (ordered)  - If ok with nephrology, please begin 500 mg diamox daily   - Recommend MRI brain/orbits w/wo contrast if of with nephrology given the trace APD OD with normal appearing optic nerves to rule out other causes of vision loss OD   - Will recheck IOP tomorrow to evaluate need for further drops   - patient will require outpatient follow up with further cataract evaluation and glaucoma diagnostic testing following discharge form hospital       SDW Dr. Bedolla     Outpatient follow-up: Patient should follow-up with his/her ophthalmologist or with [facility] Department of Ophthalmology upon discharge at the address below     [facility] Department of Ophthalmology  [address] [place] [address]. [address]  [place] [address]  [phone]       78 year old woman with ESRD on HD (MWF), HTN, DM (diet controlled), chronic type B aortic dissection, heart murmur, endometrial ca in remission s/p total hysterectomy in 2007 presents with fistula malfunction.  Ophthalmology consulted for blurry vision OD, found to have elevated IOP to 45 with APD OD.     #Vision Loss, OD  #subluxated cataract OD  - patient endorses gradual loss of vision in right eye over past year and half. Baseline vision is chronically poor. Endorses that she has noticed a further decrease in vision over the past two days. Denies any pain in the eye, any pain with EOMS, any headaches, any jaw claudication. GCA ROS negative.   - On exam OD patient with Va CF, only supertemporal field preserved on confrontation testing, with trace-1+ APD, and with an IOP of 45  - anterior segment exam with advanced cataracts OU OD>OS; the cataract is subluxated inferonasally OD  - posterior segment exam with overall poor view to the periphery in setting of cataract OD. The disc appears non glaucomatous with normal cupping and the macula appears wnl.   - IOP lowered to 37 with IOP lowering drops   - It is likely that the chronically worsening vision OD is secondary to advanced cataract OD possibly leading to glaucomatous nerve damage in setting of a subluxated lens leading to very high IOP. The patient's field defects can be explained by the inferonasal subluxation of the lens, which spares a clear view into the eye superotemporally.   - please begin brimonidine three times daily to the right eye, dorzolamide three times a day to the right eye, xalatan nightly to the right eye (ordered)  - If ok with nephrology, please begin 500 mg diamox daily   - Recommend MRI brain/orbits w/wo contrast if of with nephrology given the trace APD OD with normal appearing optic nerves to rule out other causes of vision loss OD   - Will recheck IOP tomorrow to evaluate need for further drops   - patient will require outpatient follow up with further cataract evaluation and glaucoma diagnostic testing following discharge form hospital       SDW Dr. Bedolla     Outpatient follow-up: Patient should follow-up with his/her ophthalmologist or with [facility] Department of Ophthalmology upon discharge at the address below     [facility] Department of Ophthalmology  [address] [place] [address]. [address]  [place] [address]  [phone]       78 year old woman with ESRD on HD (MWF), HTN, DM (diet controlled), chronic type B aortic dissection, heart murmur, endometrial ca in remission s/p total hysterectomy in 2007 presents with fistula malfunction.  Ophthalmology consulted for blurry vision OD, found to have elevated IOP to 45 with APD OD.     #Vision Loss, OD  #subluxated cataract OD  - patient endorses gradual loss of vision in right eye over past year and half. Baseline vision is chronically poor. Endorses that she has noticed a further decrease in vision over the past two days. Denies any pain in the eye, any pain with EOMS, any headaches, any jaw claudication. GCA ROS negative.   - On exam OD patient with Va CF, only supertemporal field preserved on confrontation testing, with trace-1+ APD, and with an IOP of 45  - anterior segment exam with advanced cataracts OU OD>OS; the cataract is subluxated inferonasally OD  - posterior segment exam with overall poor view to the periphery in setting of cataract OD. The disc appears non glaucomatous with normal cupping and the macula appears wnl.   - IOP lowered to 37 with IOP lowering drops   - It is likely that the chronically worsening vision OD is secondary to advanced cataract OD possibly leading to glaucomatous nerve damage in setting of a subluxated lens leading to very high IOP. The patient's field defects can be explained by the inferonasal subluxation of the lens, which spares a clear view into the eye superotemporally.   - please begin brimonidine three times daily to the right eye, dorzolamide three times a day to the right eye, xalatan nightly to the right eye (ordered)  - If ok with nephrology, please begin 500 mg diamox daily   - Recommend MRI brain/orbits w/wo contrast if of with nephrology given the trace APD OD with normal appearing optic nerves to rule out other causes of vision loss OD   - Will recheck IOP tomorrow to evaluate need for further drops   - patient will require outpatient follow up with further cataract evaluation and glaucoma diagnostic testing following discharge form hospital       SDW Dr. Bedolla     Outpatient follow-up: Patient should follow-up with his/her ophthalmologist or with [facility] Department of Ophthalmology upon discharge at the address below     [facility] Department of Ophthalmology  [address] [place] [address]. [address]  [place] [address]  [phone]       78 year old woman with ESRD on HD (MWF), HTN, DM (diet controlled), chronic type B aortic dissection, heart murmur, endometrial ca in remission s/p total hysterectomy in 2007 presents with fistula malfunction.  Ophthalmology consulted for blurry vision OD, found to have elevated IOP to 45 with APD OD.     #Vision Loss, OD  #subluxated cataract OD  - patient endorses gradual loss of vision in right eye over past year and half. Baseline vision is chronically poor. Endorses that she has noticed a further decrease in vision over the past two days. Denies any pain in the eye, any pain with EOMS, any headaches, any jaw claudication. GCA ROS negative.   - On exam OD patient with Va CF, only supertemporal field preserved on confrontation testing, with trace-1+ APD, and with an IOP of 45  - anterior segment exam with advanced cataracts OU OD>OS; the cataract is subluxated inferonasally OD  - posterior segment exam with overall poor view to the periphery in setting of cataract OD. The disc appears non glaucomatous with normal cupping and the macula appears wnl.   - IOP lowered to 37 with IOP lowering drops   - It is likely that the chronically worsening vision OD is secondary to advanced cataract OD possibly leading to glaucomatous nerve damage in setting of a subluxated lens leading to very high IOP. The patient's field defects can be explained by the inferonasal subluxation of the lens, which spares a clear view into the eye superotemporally.   - please begin brimonidine three times daily to the right eye, dorzolamide three times a day to the right eye, xalatan nightly to the right eye (ordered)  - If ok with nephrology, please begin 500 mg diamox daily   - Recommend MRI brain/orbits w/wo contrast if of with nephrology given the trace APD OD with normal appearing optic nerves to rule out other causes of vision loss OD   - Will recheck IOP tomorrow to evaluate need for further drops   - patient will require outpatient follow up with further cataract evaluation and glaucoma diagnostic testing following discharge form hospital       SDW Dr. Bedolla     Outpatient follow-up: Patient should follow-up with his/her ophthalmologist or with [facility] Department of Ophthalmology upon discharge at the address below     [facility] Department of Ophthalmology  [address] [place] [address]. [address]  [place] [address]  [phone]

## 2023-09-16 NOTE — CONSULT NOTE ADULT - SUBJECTIVE AND OBJECTIVE BOX
[facility] DEPARTMENT OF OPHTHALMOLOGY - INITIAL ADULT CONSULT  -----------------------------------------------------------------------------  Amaury Byrd MD, PGY-2  Contact: TEAMS  -----------------------------------------------------------------------------    HPI:  This is a 78F with history of DM2 (last A1C 5 8/30/23), ESRD on HD M/W/F, Endometrial cancer s/p CLINT, Chronic Type B aortic dissection, HTN, L humerus fracture (non-op), L hip fracture s/p ORIF (5/2023) with prolonged hospitalization from July 18 to August 16 for L hip hardware failure s/p hemiarthroplasty with post-op course complicated by hematoma s/p pRBCs and compression dressing. 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 s/p Zosyn for 7 days. Had R IJ permacath placed by IR for HD. She was then discharged to [facility] for MARYANN and states that she was doing well there. Said that on 9/12 she was sent back to [facility] due to concern for infection of her surgical site though she is confused as to which site was possibly infected. Has a chronic draining wound on her L hip surgical site but said that the drainage from there has reduced and is minimal, light yellow in color. Had some swelling at the AVF surgery site but denied any known drainage. Denies any recent fevers/chills. Does report a cough for the past few days, productive of white sputum but no other acute complaints.     On arrival to the ED, her vitals were T 98.9, P 76, /66, RR 18, O2 sat 97% RA. Her lab work showed her to have anemia (chronic), transaminitis (higher than usual), Her ESR and CRP were elevated. Her imaging showed concerns for possible infection of the L hip surgical site and orthopedics evaluated patient and noted serous drainage and recommended continued wound care for the patient. Vascular surgery and plastic surgery evaluated patient for her L AVF site, plastics evacuated a hematoma from the site and placed dressings on the site. Patient was admitted to medicine for further management.  (13 Sep 2023 06:58)    Interval History:   Ophthalmology consulted for vision loss of the right eye that has been worsening over the past two days. On interview with the patient. she endorses that she has had gradual worsening in vision of the right eye over the past year and a half. She last saw an eye doctor in April 2022 where she was told that she had a cataract in the right eye impairing her vision. She has not seen an eye doctor since. She endorses that her vision has been gradually worsening since that time, and she has not had an acute loss in vision. She has bene unable to read or see well with her right eye for "quite some time". During the exam with the eye doctor in April 2022, she was not told that she had glaucoma, AMD, or anything else wrong with her eyes aside from cataracts. The patient denies any eye surgery or laser. She denies any eye pain or headaches.     PMH: as above, diabetes, esrd   POcHx: denies surg/laser  FH: denies glc/amd  Social History: denies etoh/tobacco  Ophthalmic Medications: none  Allergies: NKDA    Review of Systems:  Constitutional: No fever, chills  Eyes: blurry vision OD   Neuro: No tremors  Cardiovascular: No chest pain, palpitations  Respiratory: No SOB, no cough  GI: No nausea, vomiting, abdominal pain  : No dysuria  Skin: no rash  Psych: no depression  Endocrine: no polyuria, polydipsia  Heme/lymph: no swelling    VITALS: T(C): 36.6 (09-16-23 @ 05:00)  T(F): 97.9 (09-16-23 @ 05:00), Max: 98.5 (09-15-23 @ 21:12)  HR: 66 (09-16-23 @ 07:55) (66 - 70)  BP: 168/80 (09-16-23 @ 07:55) (151/79 - 185/88)  RR:  (18 - 18)  SpO2:  (97% - 100%)  Wt(kg): --  General: AAO x 3, appropriate mood and affect    Ophthalmology Exam:  Visual acuity (sc): CF OD, 20/40 phni OS   Pupils: round and briskly reactive OU, with trace-1+ APD OD   Ttono: 45 OD, 17 OS   Extraocular movements (EOMs): Full OU, no pain, no diplopia  Confrontational Visual Field (CVF): VF deficit in all fields except superotemporally OD; full OS   Color Plates: unable to do control OD , full OS     Pen Light Exam (PLE)  External: Flat OU  Lids/Lashes/Lacrimal Ducts: Flat OU    Sclera/Conjunctiva: W+Q OU  Cornea: Cl OU  Anterior Chamber: D+F OU    Iris: Flat OU  Lens: Cortical changes with NS OU     Fundus Exam: dilated with 1% tropicamide and 2.5% phenylephrine  Approval obtained from primary team for dilation  Patient aware that pupils can remained dilated for at least 4-6 hours  Exam performed with 20D lens    Vitreous: wnl OU  Disc, cup/disc: sharp and pink, 0.4 OU  Macula: wnl OU  Vessels: wnl OU  Periphery: wnl OU    Labs/Imaging:  *** [facility] DEPARTMENT OF OPHTHALMOLOGY - INITIAL ADULT CONSULT  -----------------------------------------------------------------------------  Amaury Byrd MD, PGY-2  Contact: TEAMS  -----------------------------------------------------------------------------    HPI:  This is a 78F with history of DM2 (last A1C 5 8/30/23), ESRD on HD M/W/F, Endometrial cancer s/p CLINT, Chronic Type B aortic dissection, HTN, L humerus fracture (non-op), L hip fracture s/p ORIF (5/2023) with prolonged hospitalization from July 18 to August 16 for L hip hardware failure s/p hemiarthroplasty with post-op course complicated by hematoma s/p pRBCs and compression dressing. 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 s/p Zosyn for 7 days. Had R IJ permacath placed by IR for HD. She was then discharged to [facility] for MARYANN and states that she was doing well there. Said that on 9/12 she was sent back to [facility] due to concern for infection of her surgical site though she is confused as to which site was possibly infected. Has a chronic draining wound on her L hip surgical site but said that the drainage from there has reduced and is minimal, light yellow in color. Had some swelling at the AVF surgery site but denied any known drainage. Denies any recent fevers/chills. Does report a cough for the past few days, productive of white sputum but no other acute complaints.     On arrival to the ED, her vitals were T 98.9, P 76, /66, RR 18, O2 sat 97% RA. Her lab work showed her to have anemia (chronic), transaminitis (higher than usual), Her ESR and CRP were elevated. Her imaging showed concerns for possible infection of the L hip surgical site and orthopedics evaluated patient and noted serous drainage and recommended continued wound care for the patient. Vascular surgery and plastic surgery evaluated patient for her L AVF site, plastics evacuated a hematoma from the site and placed dressings on the site. Patient was admitted to medicine for further management.  (13 Sep 2023 06:58)    Interval History:   Ophthalmology consulted for vision loss of the right eye that has been worsening over the past two days. On interview with the patient. she endorses that she has had gradual worsening in vision of the right eye over the past year and a half. She last saw an eye doctor in April 2022 where she was told that she had a cataract in the right eye impairing her vision. She has not seen an eye doctor since. She endorses that her vision has been gradually worsening since that time, and she has not had an acute loss in vision. She has bene unable to read or see well with her right eye for "quite some time". During the exam with the eye doctor in April 2022, she was not told that she had glaucoma, AMD, or anything else wrong with her eyes aside from cataracts. The patient denies any eye surgery or laser. She denies any eye pain or headaches.     PMH: as above, diabetes, esrd   POcHx: denies surg/laser  FH: denies glc/amd  Social History: denies etoh/tobacco  Ophthalmic Medications: none  Allergies: NKDA    Review of Systems:  Constitutional: No fever, chills  Eyes: blurry vision OD   Neuro: No tremors  Cardiovascular: No chest pain, palpitations  Respiratory: No SOB, no cough  GI: No nausea, vomiting, abdominal pain  : No dysuria  Skin: no rash  Psych: no depression  Endocrine: no polyuria, polydipsia  Heme/lymph: no swelling    VITALS: T(C): 36.6 (09-16-23 @ 05:00)  T(F): 97.9 (09-16-23 @ 05:00), Max: 98.5 (09-15-23 @ 21:12)  HR: 66 (09-16-23 @ 07:55) (66 - 70)  BP: 168/80 (09-16-23 @ 07:55) (151/79 - 185/88)  RR:  (18 - 18)  SpO2:  (97% - 100%)  Wt(kg): --  General: AAO x 3, appropriate mood and affect    Ophthalmology Exam:  Visual acuity (sc): CF OD, 20/40 phni OS   Pupils: round and briskly reactive OU, with trace-1+ APD OD   Ttono: 45 OD, 17 OS   Extraocular movements (EOMs): Full OU, no pain, no diplopia  Confrontational Visual Field (CVF): VF deficit in all fields except superotemporally OD; full OS   Color Plates: unable to do control OD , full OS     Pen Light Exam (PLE)  External: Flat OU  Lids/Lashes/Lacrimal Ducts: Flat OU    Sclera/Conjunctiva: W+Q OU  Cornea: Cl OU  Anterior Chamber: D+F OU    Iris: Flat OU  Lens: Cortical changes with NS OU     Fundus Exam: dilated with 1% tropicamide and 2.5% phenylephrine  Approval obtained from primary team for dilation  Patient aware that pupils can remained dilated for at least 4-6 hours  Exam performed with 20D lens    Vitreous: wnl OU  Disc, cup/disc: sharp and pink, 0.4 OU  Macula: wnl OU  Vessels: wnl OU  Periphery: wnl OU    Labs/Imaging:  *** [facility] DEPARTMENT OF OPHTHALMOLOGY - INITIAL ADULT CONSULT  -----------------------------------------------------------------------------  Amaury Byrd MD, PGY-2  Contact: TEAMS  -----------------------------------------------------------------------------    HPI:  This is a 78F with history of DM2 (last A1C 5 8/30/23), ESRD on HD M/W/F, Endometrial cancer s/p CLINT, Chronic Type B aortic dissection, HTN, L humerus fracture (non-op), L hip fracture s/p ORIF (5/2023) with prolonged hospitalization from July 18 to August 16 for L hip hardware failure s/p hemiarthroplasty with post-op course complicated by hematoma s/p pRBCs and compression dressing. 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 s/p Zosyn for 7 days. Had R IJ permacath placed by IR for HD. She was then discharged to [facility] for MARYANN and states that she was doing well there. Said that on 9/12 she was sent back to [facility] due to concern for infection of her surgical site though she is confused as to which site was possibly infected. Has a chronic draining wound on her L hip surgical site but said that the drainage from there has reduced and is minimal, light yellow in color. Had some swelling at the AVF surgery site but denied any known drainage. Denies any recent fevers/chills. Does report a cough for the past few days, productive of white sputum but no other acute complaints.     On arrival to the ED, her vitals were T 98.9, P 76, /66, RR 18, O2 sat 97% RA. Her lab work showed her to have anemia (chronic), transaminitis (higher than usual), Her ESR and CRP were elevated. Her imaging showed concerns for possible infection of the L hip surgical site and orthopedics evaluated patient and noted serous drainage and recommended continued wound care for the patient. Vascular surgery and plastic surgery evaluated patient for her L AVF site, plastics evacuated a hematoma from the site and placed dressings on the site. Patient was admitted to medicine for further management.  (13 Sep 2023 06:58)    Interval History:   Ophthalmology consulted for vision loss of the right eye that has been worsening over the past two days. On interview with the patient. she endorses that she has had gradual worsening in vision of the right eye over the past year and a half. She last saw an eye doctor in April 2022 where she was told that she had a cataract in the right eye impairing her vision. She has not seen an eye doctor since. She endorses that her vision has been gradually worsening since that time, and she has not had an acute loss in vision. She has bene unable to read or see well with her right eye for "quite some time". During the exam with the eye doctor in April 2022, she was not told that she had glaucoma, AMD, or anything else wrong with her eyes aside from cataracts. The patient denies any eye surgery or laser. She denies any eye pain or headaches.     PMH: as above, diabetes, esrd   POcHx: denies surg/laser  FH: denies glc/amd  Social History: denies etoh/tobacco  Ophthalmic Medications: none  Allergies: NKDA    Review of Systems:  Constitutional: No fever, chills  Eyes: blurry vision OD   Neuro: No tremors  Cardiovascular: No chest pain, palpitations  Respiratory: No SOB, no cough  GI: No nausea, vomiting, abdominal pain  : No dysuria  Skin: no rash  Psych: no depression  Endocrine: no polyuria, polydipsia  Heme/lymph: no swelling    VITALS: T(C): 36.6 (09-16-23 @ 05:00)  T(F): 97.9 (09-16-23 @ 05:00), Max: 98.5 (09-15-23 @ 21:12)  HR: 66 (09-16-23 @ 07:55) (66 - 70)  BP: 168/80 (09-16-23 @ 07:55) (151/79 - 185/88)  RR:  (18 - 18)  SpO2:  (97% - 100%)  Wt(kg): --  General: AAO x 3, appropriate mood and affect    Ophthalmology Exam:  Visual acuity (sc): CF OD, 20/40 phni OS   Pupils: round and briskly reactive OU, with trace-1+ APD OD   Ttono: 45 OD, 17 OS   Extraocular movements (EOMs): Full OU, no pain, no diplopia  Confrontational Visual Field (CVF): VF deficit in all fields except superotemporally OD; full OS   Color Plates: unable to do control OD , full OS     Pen Light Exam (PLE)  External: Flat OU  Lids/Lashes/Lacrimal Ducts: Flat OU    Sclera/Conjunctiva: W+Q OU  Cornea: Cl OU  Anterior Chamber: D+F OU    Iris: Flat OU  Lens: Cortical changes with NS OU     Fundus Exam: dilated with 1% tropicamide and 2.5% phenylephrine  Approval obtained from primary team for dilation  Patient aware that pupils can remained dilated for at least 4-6 hours  Exam performed with 20D lens    Vitreous: wnl OU  Disc, cup/disc: sharp and pink, 0.4 OU  Macula: wnl OU  Vessels: wnl OU  Periphery: wnl OU    Labs/Imaging:  *** [facility] DEPARTMENT OF OPHTHALMOLOGY - INITIAL ADULT CONSULT  -----------------------------------------------------------------------------  Amaury Byrd MD, PGY-2  Contact: TEAMS  -----------------------------------------------------------------------------    HPI:  This is a 78F with history of DM2 (last A1C 5 8/30/23), ESRD on HD M/W/F, Endometrial cancer s/p CLINT, Chronic Type B aortic dissection, HTN, L humerus fracture (non-op), L hip fracture s/p ORIF (5/2023) with prolonged hospitalization from July 18 to August 16 for L hip hardware failure s/p hemiarthroplasty with post-op course complicated by hematoma s/p pRBCs and compression dressing. 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 s/p Zosyn for 7 days. Had R IJ permacath placed by IR for HD. She was then discharged to [facility] for MARYANN and states that she was doing well there. Said that on 9/12 she was sent back to [facility] due to concern for infection of her surgical site though she is confused as to which site was possibly infected. Has a chronic draining wound on her L hip surgical site but said that the drainage from there has reduced and is minimal, light yellow in color. Had some swelling at the AVF surgery site but denied any known drainage. Denies any recent fevers/chills. Does report a cough for the past few days, productive of white sputum but no other acute complaints.     On arrival to the ED, her vitals were T 98.9, P 76, /66, RR 18, O2 sat 97% RA. Her lab work showed her to have anemia (chronic), transaminitis (higher than usual), Her ESR and CRP were elevated. Her imaging showed concerns for possible infection of the L hip surgical site and orthopedics evaluated patient and noted serous drainage and recommended continued wound care for the patient. Vascular surgery and plastic surgery evaluated patient for her L AVF site, plastics evacuated a hematoma from the site and placed dressings on the site. Patient was admitted to medicine for further management.  (13 Sep 2023 06:58)    Interval History:   Ophthalmology consulted for vision loss of the right eye that has been worsening over the past two days. On interview with the patient. she endorses that she has had gradual worsening in vision of the right eye over the past year and a half. She last saw an eye doctor in April 2022 where she was told that she had a cataract in the right eye impairing her vision. She has not seen an eye doctor since. She endorses that her vision has been gradually worsening since that time, and she has not had an acute loss in vision. She has bene unable to read or see well with her right eye for "quite some time". During the exam with the eye doctor in April 2022, she was not told that she had glaucoma, AMD, or anything else wrong with her eyes aside from cataracts. The patient denies any eye surgery or laser. She denies any eye pain or headaches.     PMH: as above, diabetes, esrd   POcHx: denies surg/laser  FH: denies glc/amd  Social History: denies etoh/tobacco  Ophthalmic Medications: none  Allergies: NKDA    Review of Systems:  Constitutional: No fever, chills  Eyes: blurry vision OD   Neuro: No tremors  Cardiovascular: No chest pain, palpitations  Respiratory: No SOB, no cough  GI: No nausea, vomiting, abdominal pain  : No dysuria  Skin: no rash  Psych: no depression  Endocrine: no polyuria, polydipsia  Heme/lymph: no swelling    VITALS: T(C): 36.6 (09-16-23 @ 05:00)  T(F): 97.9 (09-16-23 @ 05:00), Max: 98.5 (09-15-23 @ 21:12)  HR: 66 (09-16-23 @ 07:55) (66 - 70)  BP: 168/80 (09-16-23 @ 07:55) (151/79 - 185/88)  RR:  (18 - 18)  SpO2:  (97% - 100%)  Wt(kg): --  General: AAO x 3, appropriate mood and affect    Ophthalmology Exam:  Visual acuity (sc): CF OD, 20/40 phni OS   Pupils: round and briskly reactive OU, with trace-1+ APD OD   Ttono: 45 OD, 17 OS   Extraocular movements (EOMs): Full OU, no pain, no diplopia  Confrontational Visual Field (CVF): VF deficit in all fields except superotemporally OD; full OS   Color Plates: unable to do control OD , full OS     Pen Light Exam (PLE)  External: Flat OU  Lids/Lashes/Lacrimal Ducts: Flat OU    Sclera/Conjunctiva: W+Q OU  Cornea: Cl OU  Anterior Chamber: D+F OU    Iris: Flat OU  Lens: Cortical changes with NS OU     Fundus Exam: dilated with 1% tropicamide and 2.5% phenylephrine  Approval obtained from primary team for dilation  Patient aware that pupils can remained dilated for at least 4-6 hours  Exam performed with 20D lens    Vitreous: wnl OU  Disc, cup/disc: sharp and pink, 0.4 OU  Macula: wnl OU  Vessels: wnl OU  Periphery: wnl OU    Labs/Imaging:  *** [facility] DEPARTMENT OF OPHTHALMOLOGY - INITIAL ADULT CONSULT  -----------------------------------------------------------------------------  Amaury Byrd MD, PGY-2  Contact: TEAMS  -----------------------------------------------------------------------------    HPI:  This is a 78F with history of DM2 (last A1C 5 8/30/23), ESRD on HD M/W/F, Endometrial cancer s/p CLINT, Chronic Type B aortic dissection, HTN, L humerus fracture (non-op), L hip fracture s/p ORIF (5/2023) with prolonged hospitalization from July 18 to August 16 for L hip hardware failure s/p hemiarthroplasty with post-op course complicated by hematoma s/p pRBCs and compression dressing. 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 s/p Zosyn for 7 days. Had R IJ permacath placed by IR for HD. She was then discharged to [facility] for MARYANN and states that she was doing well there. Said that on 9/12 she was sent back to [facility] due to concern for infection of her surgical site though she is confused as to which site was possibly infected. Has a chronic draining wound on her L hip surgical site but said that the drainage from there has reduced and is minimal, light yellow in color. Had some swelling at the AVF surgery site but denied any known drainage. Denies any recent fevers/chills. Does report a cough for the past few days, productive of white sputum but no other acute complaints.     On arrival to the ED, her vitals were T 98.9, P 76, /66, RR 18, O2 sat 97% RA. Her lab work showed her to have anemia (chronic), transaminitis (higher than usual), Her ESR and CRP were elevated. Her imaging showed concerns for possible infection of the L hip surgical site and orthopedics evaluated patient and noted serous drainage and recommended continued wound care for the patient. Vascular surgery and plastic surgery evaluated patient for her L AVF site, plastics evacuated a hematoma from the site and placed dressings on the site. Patient was admitted to medicine for further management.  (13 Sep 2023 06:58)    Interval History:   Ophthalmology consulted for vision loss of the right eye that has been worsening over the past two days. On interview with the patient. she endorses that she has had gradual worsening in vision of the right eye over the past year and a half. She last saw an eye doctor in April 2022 where she was told that she had a cataract in the right eye impairing her vision. She has not seen an eye doctor since. She endorses that her vision has been gradually worsening since that time, and she has not had an acute loss in vision. She has bene unable to read or see well with her right eye for "quite some time". During the exam with the eye doctor in April 2022, she was not told that she had glaucoma, AMD, or anything else wrong with her eyes aside from cataracts. The patient denies any eye surgery or laser. She denies any eye pain or headaches.     PMH: as above, diabetes, esrd   POcHx: denies surg/laser  FH: denies glc/amd  Social History: denies etoh/tobacco  Ophthalmic Medications: none  Allergies: NKDA    Review of Systems:  Constitutional: No fever, chills  Eyes: blurry vision OD   Neuro: No tremors  Cardiovascular: No chest pain, palpitations  Respiratory: No SOB, no cough  GI: No nausea, vomiting, abdominal pain  : No dysuria  Skin: no rash  Psych: no depression  Endocrine: no polyuria, polydipsia  Heme/lymph: no swelling    VITALS: T(C): 36.6 (09-16-23 @ 05:00)  T(F): 97.9 (09-16-23 @ 05:00), Max: 98.5 (09-15-23 @ 21:12)  HR: 66 (09-16-23 @ 07:55) (66 - 70)  BP: 168/80 (09-16-23 @ 07:55) (151/79 - 185/88)  RR:  (18 - 18)  SpO2:  (97% - 100%)  Wt(kg): --  General: AAO x 3, appropriate mood and affect    Ophthalmology Exam:  Visual acuity (sc): CF OD, 20/40 phni OS   Pupils: round and briskly reactive OU, with trace-1+ APD OD   Ttono: 45 OD, 17 OS   Extraocular movements (EOMs): Full OU, no pain, no diplopia  Confrontational Visual Field (CVF): VF deficit in all fields except superotemporally OD; full OS   Color Plates: unable to do control OD , full OS     Pen Light Exam (PLE)  External: Flat OU  Lids/Lashes/Lacrimal Ducts: Flat OU    Sclera/Conjunctiva: W+Q OU  Cornea: Cl OU  Anterior Chamber: D+F OU    Iris: Flat OU  Lens: 3-4+ Cortical changes/NS OU; with inferonasally subluxated lens OD     Fundus Exam: dilated with 1% tropicamide and 2.5% phenylephrine  Approval obtained from primary team for dilation  Patient aware that pupils can remained dilated for at least 4-6 hours  Exam performed with 20D lens    Vitreous: wnl OU  Disc, cup/disc: sharp and pink, 0.4 OU  Macula: wnl OU  Vessels: wnl OU  Periphery: poor view OD, wnl OS     Labs/Imaging:  *** [facility] DEPARTMENT OF OPHTHALMOLOGY - INITIAL ADULT CONSULT  -----------------------------------------------------------------------------  Amaury Byrd MD, PGY-2  Contact: TEAMS  -----------------------------------------------------------------------------    HPI:  This is a 78F with history of DM2 (last A1C 5 8/30/23), ESRD on HD M/W/F, Endometrial cancer s/p CLINT, Chronic Type B aortic dissection, HTN, L humerus fracture (non-op), L hip fracture s/p ORIF (5/2023) with prolonged hospitalization from July 18 to August 16 for L hip hardware failure s/p hemiarthroplasty with post-op course complicated by hematoma s/p pRBCs and compression dressing. 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 s/p Zosyn for 7 days. Had R IJ permacath placed by IR for HD. She was then discharged to [facility] for MARYANN and states that she was doing well there. Said that on 9/12 she was sent back to [facility] due to concern for infection of her surgical site though she is confused as to which site was possibly infected. Has a chronic draining wound on her L hip surgical site but said that the drainage from there has reduced and is minimal, light yellow in color. Had some swelling at the AVF surgery site but denied any known drainage. Denies any recent fevers/chills. Does report a cough for the past few days, productive of white sputum but no other acute complaints.     On arrival to the ED, her vitals were T 98.9, P 76, /66, RR 18, O2 sat 97% RA. Her lab work showed her to have anemia (chronic), transaminitis (higher than usual), Her ESR and CRP were elevated. Her imaging showed concerns for possible infection of the L hip surgical site and orthopedics evaluated patient and noted serous drainage and recommended continued wound care for the patient. Vascular surgery and plastic surgery evaluated patient for her L AVF site, plastics evacuated a hematoma from the site and placed dressings on the site. Patient was admitted to medicine for further management.  (13 Sep 2023 06:58)    Interval History:   Ophthalmology consulted for vision loss of the right eye that has been worsening over the past two days. On interview with the patient. she endorses that she has had gradual worsening in vision of the right eye over the past year and a half. She last saw an eye doctor in April 2022 where she was told that she had a cataract in the right eye impairing her vision. She has not seen an eye doctor since. She endorses that her vision has been gradually worsening since that time, and she has not had an acute loss in vision. She has bene unable to read or see well with her right eye for "quite some time". During the exam with the eye doctor in April 2022, she was not told that she had glaucoma, AMD, or anything else wrong with her eyes aside from cataracts. The patient denies any eye surgery or laser. She denies any eye pain or headaches.     PMH: as above, diabetes, esrd   POcHx: denies surg/laser  FH: denies glc/amd  Social History: denies etoh/tobacco  Ophthalmic Medications: none  Allergies: NKDA    Review of Systems:  Constitutional: No fever, chills  Eyes: blurry vision OD   Neuro: No tremors  Cardiovascular: No chest pain, palpitations  Respiratory: No SOB, no cough  GI: No nausea, vomiting, abdominal pain  : No dysuria  Skin: no rash  Psych: no depression  Endocrine: no polyuria, polydipsia  Heme/lymph: no swelling    VITALS: T(C): 36.6 (09-16-23 @ 05:00)  T(F): 97.9 (09-16-23 @ 05:00), Max: 98.5 (09-15-23 @ 21:12)  HR: 66 (09-16-23 @ 07:55) (66 - 70)  BP: 168/80 (09-16-23 @ 07:55) (151/79 - 185/88)  RR:  (18 - 18)  SpO2:  (97% - 100%)  Wt(kg): --  General: AAO x 3, appropriate mood and affect    Ophthalmology Exam:  Visual acuity (sc): CF OD, 20/40 phni OS   Pupils: round and briskly reactive OU, with trace-1+ APD OD   Ttono: 45 OD, 17 OS   Extraocular movements (EOMs): Full OU, no pain, no diplopia  Confrontational Visual Field (CVF): VF deficit in all fields except superotemporally OD; full OS   Color Plates: unable to do control OD , full OS     Pen Light Exam (PLE)  External: Flat OU  Lids/Lashes/Lacrimal Ducts: Flat OU    Sclera/Conjunctiva: W+Q OU  Cornea: Cl OU  Anterior Chamber: D+F OU    Iris: Flat OU  Lens: 3-4+ Cortical changes/NS OU; with inferonasally subluxated lens OD     Fundus Exam: dilated with 1% tropicamide and 2.5% phenylephrine  Approval obtained from primary team for dilation  Patient aware that pupils can remained dilated for at least 4-6 hours  Exam performed with 20D lens    Vitreous: wnl OU  Disc, cup/disc: sharp and pink, 0.4 OU  Macula: wnl OU  Vessels: wnl OU  Periphery: poor view OD, wnl OS     Labs/Imaging:  *** [facility] DEPARTMENT OF OPHTHALMOLOGY - INITIAL ADULT CONSULT  -----------------------------------------------------------------------------  Amaury Byrd MD, PGY-2  Contact: TEAMS  -----------------------------------------------------------------------------    HPI:  This is a 78F with history of DM2 (last A1C 5 8/30/23), ESRD on HD M/W/F, Endometrial cancer s/p CLINT, Chronic Type B aortic dissection, HTN, L humerus fracture (non-op), L hip fracture s/p ORIF (5/2023) with prolonged hospitalization from July 18 to August 16 for L hip hardware failure s/p hemiarthroplasty with post-op course complicated by hematoma s/p pRBCs and compression dressing. 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 s/p Zosyn for 7 days. Had R IJ permacath placed by IR for HD. She was then discharged to [facility] for MARYANN and states that she was doing well there. Said that on 9/12 she was sent back to [facility] due to concern for infection of her surgical site though she is confused as to which site was possibly infected. Has a chronic draining wound on her L hip surgical site but said that the drainage from there has reduced and is minimal, light yellow in color. Had some swelling at the AVF surgery site but denied any known drainage. Denies any recent fevers/chills. Does report a cough for the past few days, productive of white sputum but no other acute complaints.     On arrival to the ED, her vitals were T 98.9, P 76, /66, RR 18, O2 sat 97% RA. Her lab work showed her to have anemia (chronic), transaminitis (higher than usual), Her ESR and CRP were elevated. Her imaging showed concerns for possible infection of the L hip surgical site and orthopedics evaluated patient and noted serous drainage and recommended continued wound care for the patient. Vascular surgery and plastic surgery evaluated patient for her L AVF site, plastics evacuated a hematoma from the site and placed dressings on the site. Patient was admitted to medicine for further management.  (13 Sep 2023 06:58)    Interval History:   Ophthalmology consulted for vision loss of the right eye that has been worsening over the past two days. On interview with the patient. she endorses that she has had gradual worsening in vision of the right eye over the past year and a half. She last saw an eye doctor in April 2022 where she was told that she had a cataract in the right eye impairing her vision. She has not seen an eye doctor since. She endorses that her vision has been gradually worsening since that time, and she has not had an acute loss in vision. She has bene unable to read or see well with her right eye for "quite some time". During the exam with the eye doctor in April 2022, she was not told that she had glaucoma, AMD, or anything else wrong with her eyes aside from cataracts. The patient denies any eye surgery or laser. She denies any eye pain or headaches.     PMH: as above, diabetes, esrd   POcHx: denies surg/laser  FH: denies glc/amd  Social History: denies etoh/tobacco  Ophthalmic Medications: none  Allergies: NKDA    Review of Systems:  Constitutional: No fever, chills  Eyes: blurry vision OD   Neuro: No tremors  Cardiovascular: No chest pain, palpitations  Respiratory: No SOB, no cough  GI: No nausea, vomiting, abdominal pain  : No dysuria  Skin: no rash  Psych: no depression  Endocrine: no polyuria, polydipsia  Heme/lymph: no swelling    VITALS: T(C): 36.6 (09-16-23 @ 05:00)  T(F): 97.9 (09-16-23 @ 05:00), Max: 98.5 (09-15-23 @ 21:12)  HR: 66 (09-16-23 @ 07:55) (66 - 70)  BP: 168/80 (09-16-23 @ 07:55) (151/79 - 185/88)  RR:  (18 - 18)  SpO2:  (97% - 100%)  Wt(kg): --  General: AAO x 3, appropriate mood and affect    Ophthalmology Exam:  Visual acuity (sc): CF OD, 20/40 phni OS   Pupils: round and briskly reactive OU, with trace-1+ APD OD   Ttono: 45 OD, 17 OS   Extraocular movements (EOMs): Full OU, no pain, no diplopia  Confrontational Visual Field (CVF): VF deficit in all fields except superotemporally OD; full OS   Color Plates: unable to do control OD , full OS     Pen Light Exam (PLE)  External: Flat OU  Lids/Lashes/Lacrimal Ducts: Flat OU    Sclera/Conjunctiva: W+Q OU  Cornea: Cl OU  Anterior Chamber: D+F OU    Iris: Flat OU  Lens: 3-4+ Cortical changes/NS OU; with inferonasally subluxated lens OD     Fundus Exam: dilated with 1% tropicamide and 2.5% phenylephrine  Approval obtained from primary team for dilation  Patient aware that pupils can remained dilated for at least 4-6 hours  Exam performed with 20D lens    Vitreous: wnl OU  Disc, cup/disc: sharp and pink, 0.4 OU  Macula: wnl OU  Vessels: wnl OU  Periphery: poor view OD, wnl OS     Labs/Imaging:  *** [facility] DEPARTMENT OF OPHTHALMOLOGY - INITIAL ADULT CONSULT  -----------------------------------------------------------------------------  Amaury Byrd MD, PGY-2  Contact: TEAMS  -----------------------------------------------------------------------------    HPI:  This is a 78F with history of DM2 (last A1C 5 8/30/23), ESRD on HD M/W/F, Endometrial cancer s/p CLINT, Chronic Type B aortic dissection, HTN, L humerus fracture (non-op), L hip fracture s/p ORIF (5/2023) with prolonged hospitalization from July 18 to August 16 for L hip hardware failure s/p hemiarthroplasty with post-op course complicated by hematoma s/p pRBCs and compression dressing. 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 s/p Zosyn for 7 days. Had R IJ permacath placed by IR for HD. She was then discharged to [facility] for MARYANN and states that she was doing well there. Said that on 9/12 she was sent back to [facility] due to concern for infection of her surgical site though she is confused as to which site was possibly infected. Has a chronic draining wound on her L hip surgical site but said that the drainage from there has reduced and is minimal, light yellow in color. Had some swelling at the AVF surgery site but denied any known drainage. Denies any recent fevers/chills. Does report a cough for the past few days, productive of white sputum but no other acute complaints.     On arrival to the ED, her vitals were T 98.9, P 76, /66, RR 18, O2 sat 97% RA. Her lab work showed her to have anemia (chronic), transaminitis (higher than usual), Her ESR and CRP were elevated. Her imaging showed concerns for possible infection of the L hip surgical site and orthopedics evaluated patient and noted serous drainage and recommended continued wound care for the patient. Vascular surgery and plastic surgery evaluated patient for her L AVF site, plastics evacuated a hematoma from the site and placed dressings on the site. Patient was admitted to medicine for further management.  (13 Sep 2023 06:58)    Interval History:   Ophthalmology consulted for vision loss of the right eye that has been worsening over the past two days. On interview with the patient. she endorses that she has had gradual worsening in vision of the right eye over the past year and a half. She last saw an eye doctor in April 2022 where she was told that she had a cataract in the right eye impairing her vision. She has not seen an eye doctor since. She endorses that her vision has been gradually worsening since that time, and she has not had an acute loss in vision. She has bene unable to read or see well with her right eye for "quite some time". During the exam with the eye doctor in April 2022, she was not told that she had glaucoma, AMD, or anything else wrong with her eyes aside from cataracts. The patient denies any eye surgery or laser. She denies any eye pain or headaches.     PMH: as above, diabetes, esrd   POcHx: denies surg/laser  FH: denies glc/amd  Social History: denies etoh/tobacco  Ophthalmic Medications: none  Allergies: NKDA    Review of Systems:  Constitutional: No fever, chills  Eyes: blurry vision OD   Neuro: No tremors  Cardiovascular: No chest pain, palpitations  Respiratory: No SOB, no cough  GI: No nausea, vomiting, abdominal pain  : No dysuria  Skin: no rash  Psych: no depression  Endocrine: no polyuria, polydipsia  Heme/lymph: no swelling    VITALS: T(C): 36.6 (09-16-23 @ 05:00)  T(F): 97.9 (09-16-23 @ 05:00), Max: 98.5 (09-15-23 @ 21:12)  HR: 66 (09-16-23 @ 07:55) (66 - 70)  BP: 168/80 (09-16-23 @ 07:55) (151/79 - 185/88)  RR:  (18 - 18)  SpO2:  (97% - 100%)  Wt(kg): --  General: AAO x 3, appropriate mood and affect    Ophthalmology Exam:  Visual acuity (sc): CF OD, 20/40 phni OS   Pupils: round and briskly reactive OU, with trace-1+ APD OD   Ttono: 45 OD, 17 OS   Extraocular movements (EOMs): Full OU, no pain, no diplopia  Confrontational Visual Field (CVF): VF deficit in all fields except superotemporally OD; full OS   Color Plates: unable to do control OD , full OS     Pen Light Exam (PLE)  External: Flat OU  Lids/Lashes/Lacrimal Ducts: Flat OU    Sclera/Conjunctiva: W+Q OU  Cornea: Cl OU  Anterior Chamber: D+F OU    Iris: Flat OU  Lens: 3-4+ Cortical changes/NS OU; with inferonasally subluxated lens OD     Fundus Exam: dilated with 1% tropicamide and 2.5% phenylephrine  Approval obtained from primary team for dilation  Patient aware that pupils can remained dilated for at least 4-6 hours  Exam performed with 20D lens    Vitreous: wnl OU  Disc, cup/disc: sharp and pink, 0.4 OU  Macula: wnl OU  Vessels: wnl OU  Periphery: poor view OD, wnl OS     Labs/Imaging:  ***

## 2023-09-16 NOTE — CONSULT NOTE ADULT - CONSULT REQUESTED DATE/TIME
13-Sep-2023 07:04
12-Sep-2023 19:09
12-Sep-2023 21:26
12-Sep-2023 22:15
13-Sep-2023 14:01
16-Sep-2023 13:01

## 2023-09-16 NOTE — DIETITIAN INITIAL EVALUATION ADULT - PROBLEM SELECTOR PROBLEM 2
H&P History of Present Illness





- General


Date of Service: 02/08/18


Source of Information: Patient, Old Records, RN





- History of Present Illness


Initial Comments - Free Text/Narative: 


This 89-year-old gentleman who is a resident of a long-term care center here in 

Counts include 234 beds at the [facility] was admitted yesterday through the ED after falling and 

hitting head and possibly suffering a TIA.  Patient was using his walker lost 

his balance due to slippery socks and fell onto a trash can out the right side 

of his upper eye.  Denied LOC.  Head CT did not show any intracranial bleeding 

and he was to be released from the ED however did start having some weakness, 

leaning to his right side with some difficulty speech so he was admitted for 

further observation and placed on aspirin. He did well overnight without any 

neurological deficits in a head CT repeated was not needed.  At the time of 

arrival he had no chest pain, no shortness of breath nor did he have any 

headache.  His blood pressure was slightly elevated upon arrival.  Pertinent 

labs were hemoglobin 10.3, BUN 33 creatinine 1.28














- Related Data


Allergies/Adverse Reactions: 


 Allergies











Allergy/AdvReac Type Severity Reaction Status Date / Time


 


No Known Drug Allergies Allergy  NKDA Verified 02/07/18 20:51











Home Medications: 


 Home Meds





Cholecalciferol (Vitamin D3) [Vitamin D3] 2,000 unit PO DAILY 06/10/14 [History]


Cyanocobalamin (Vitamin B-12) [Cyanocobalamin Injection] 1,000 mcg IM 

ASDIRECTED 06/10/14 [History]


FLUoxetine HCl [Fluoxetine HCl] 20 mg PO DAILY 06/10/14 [History]


Sennosides/Docusate Sodium [Senna S Tablet] 1 each PO DAILY 06/10/14 [History]


Multivitamin with Minerals [Multiple Vitamin] 1 tab PO DAILY 01/02/16 [History]


Acetaminophen [Acetaminophen Extra Strength] 500 mg PO BID 02/27/16 [History]


Acetaminophen [Tylenol Extra Strength] 500 mg PO ASDIRECTED PRN 02/27/16 [

History]


Midodrine 2.5 mg PO TIDAC #90 tablet 02/29/16 [Rx]


Ascorbic Acid 500 mg PO BID 07/28/16 [History]


Magnesium Hydroxide [Milk of Magnesia] 30 ml PO DAILY PRN 07/28/16 [History]


Loperamide [Imodium] 2 mg PO Q4H PRN #30 cap 08/01/16 [Rx]


Carbidopa/Levodopa [Sinemet  mg Tablet] 1 tab PO TID 02/07/18 [History]


Ferrous Sulfate [Iron] 325 mg PO DAILY 02/07/18 [History]


Levothyroxine Sodium [Synthroid] 100 mcg PO ACBREAKFAST 02/07/18 [History]


Lutein/Minerals/Vit A,C & E [Ocuvite] 1 tab PO DAILY 02/07/18 [History]


Mirtazapine [Remeron] 15 mg PO BEDTIME 02/07/18 [History]


Omeprazole 20 mg PO ACBREAKFAST 02/07/18 [History]


Sucralfate [Carafate] 1 gram PO BID 02/07/18 [History]


Trolamine Salicylate/Aloe Vera [Aspercreme 10% Cream] 1 applic TOP BEDTIME PRN 

02/07/18 [History]


Trolamine Salicylate/Aloe Vera [Aspercreme 10% Cream] 1 applic TOP TID 02/07/18 

[History]


prednisoLONE Acetate [Pred Forte 1% Ophth Susp] 1 drop EYELF QID PRN 02/07/18 [

History]











Past Medical History


HEENT History: Reports: Cataract, Epistaxis, Hard of Hearing, Impaired Vision, 

Sinusitis


Cardiovascular History: Reports: CAD, High Cholesterol


Other Cardiovascular History: hypotension, athrosclerotic heart disease.


Gastrointestinal History: Reports: Chronic Constipation, Chronic Diarrhea, GERD


Genitourinary History: Reports: UTI, Recurrent


Musculoskeletal History: Reports: Osteoarthritis


Other Musculoskeletal History: generalized muscle weakness


Neurological History: Reports: Parkinson's


Other Neuro History: dementia


Psychiatric History: Reports: Depression


Other Psychiatric History: adult failure to thrive


Endocrine/Metabolic History: Reports: Hypothyroidism, Vitamin D Deficiency


Hematologic History: Reports: Anemia





- Past Surgical History


HEENT Surgical History: Reports: Cataract Surgery


Cardiovascular Surgical History: Reports: None


Respiratory Surgical History: Reports: None


Other Musculoskeletal Surgeries/Procedures:: arthritis all over.





Social & Family History





- Family History


Family Medical History: Noncontributory





- Tobacco Use


Smoking Status *Q: Former Smoker


Years of Tobacco use: 30


Packs/Tins Daily: 2


Used Tobacco, but Quit: Yes


Month Tobacco Last Used: 50 years


Second Hand Smoke Exposure: No





- Caffeine Use


Caffeine Use: Reports: Soda





- Alcohol Use


Days Per Week of Alcohol Use: 0





- Recreational Drug Use


Recreational Drug Use: No





H&P Review of Systems





- Review of Systems:


Review Of Systems: See Below


General: Reports: Weight Loss


HEENT: Reports: No Symptoms


Pulmonary: Reports: No Symptoms


Cardiovascular: Reports: No Symptoms


Gastrointestinal: Denies: Abdominal Pain, Black Stool, Bloody Stool, Diarrhea, 

Decreased Appetite


Genitourinary: Reports: No Symptoms


Musculoskeletal: Reports: No Symptoms


Skin: Reports: Bruising


Psychiatric: Reports: Mood Lability.  Denies: Confusion


Neurological: Reports: Pre-Existing Deficit, Difficulty Walking.  Denies: 

Confusion, Dizziness, Headache, Paresthesia, Syncope, Trouble Speaking


Hematologic/Lymphatic: Reports: Easy Bleeding, Easy Bruising


Immunologic: Reports: No Symptoms





Exam





- Exam


Exam: See Below





- Vital Signs


Vital Signs: 


 Last Vital Signs











Temp  98.6 F   02/08/18 06:34


 


Pulse  54 L  02/08/18 06:34


 


Resp  20   02/08/18 06:34


 


BP  148/54 H  02/08/18 06:34


 


Pulse Ox  100   02/08/18 06:34











Weight: 86 lb





- Exam


Quality Assessment: No: Supplemental Oxygen


General: Alert, Oriented, Cooperative.  No: Mild Distress, Sedated, Lethargic


HEENT: Mucosa Moist & Pink, Pupils Reactive.  No: Hearing Intact (hard of 

hearing. )


Neck: Supple


Lungs: Clear to Auscultation, Normal Respiratory Effort


Cardiovascular: Regular Rate, Regular Rhythm


GI/Abdominal Exam: Normal Bowel Sounds, Soft


Rectal (Males) Exam: Deferred


Extremities: Normal Capillary Refill


Neurological: Cranial Nerves Intact (2-12 grossly intact), Normal Speech (Off 

spoken and slow speech however normal for patient baseline)


Neuro Extensive - Mental Status: Alert, Oriented x3, Normal Mood/Affect, Normal 

Cognition, Memory Intact


Neuro Extensive - Motor, Sensory, Reflexes: CN II-XII Intact.  No: Ataxia, 

Tongue Deviation (L), Dysarthria, Total Aphasia, Facial Palsy w Forehead


Psychiatric: Alert, Normal Affect, Normal Mood





- Patient Data


Lab Results Last 24 hrs: 


 Laboratory Results - last 24 hr











  02/07/18 02/07/18 Range/Units





  21:03 21:03 


 


WBC  6.5   (5.0-10.0)  10^3/uL


 


RBC  3.19 L   (4.50-6.00)  10^6/uL


 


Hgb  10.3 L   (13.0-17.0)  g/dL


 


Hct  31.4 L   (40.0-52.0)  %


 


MCV  98.4 H   (82.0-92.0)  fL


 


MCH  32.4 H   (27.0-31.0)  pg


 


MCHC  32.9   (32.0-36.0)  g/dL


 


RDW  12.4   (11.5-14.5)  %


 


Plt Count  148 L   (150-300)  10^3/uL


 


MPV  8.5   (7.4-10.4)  fL


 


Neut % (Auto)  75.6 H   (50.0-70.0)  %


 


Lymph % (Auto)  15.5 L   (20.0-40.0)  %


 


Mono % (Auto)  4.7   (2.0-8.0)  %


 


Eos % (Auto)  3.1 H   (1.0-3.0)  %


 


Baso % (Auto)  1.1 H   (0.0-1.0)  %


 


Neut # (Auto)  4.9   (2.5-7.0)  10^3/uL


 


Lymph # (Auto)  1.0   (1.0-4.0)  10^3/uL


 


Mono # (Auto)  0.3   (0.1-0.8)  10^3/uL


 


Eos # (Auto)  0.2   (0.1-0.3)  10^3/uL


 


Baso # (Auto)  0.1   (0.0-0.1)  10^3/uL


 


Sodium   140  (136-145)  mmol/L


 


Potassium   4.8  (3.3-5.3)  mmol/L


 


Chloride   107  ()  mmol/L


 


Carbon Dioxide   25.0  (21.0-32.0)  mmol/L


 


BUN   33 H  (6-25)  mg/dL


 


Creatinine   1.28 H  (0.51-1.17)  mg/dL


 


Est Cr Clr Drug Dosing   21.59  mL/min


 


Estimated GFR (MDRD)   53  mL/min


 


Glucose   91  ()  mg/dL


 


Calcium   8.5 L  (8.7-10.3)  mg/dL











Result Diagrams: 


 02/07/18 21:03





 02/07/18 21:03





*Q Meaningful Use (ADM)





- VTE *Q


VTE Criteria *Q: 








- Stroke *Q


Stroke Criteria *Q: 








- AMI *Q


AMI Criteria *Q: 





Problem List Initiated/Reviewed/Updated: Yes


Orders Last 24hrs: 


 Active Orders 24 hr











 Category Date Time Status


 


 Bedrest [RC] ASDIRECTED Care  02/07/18 22:07 Active


 


 Regular Diet [DIET] Diet  02/08/18 Breakfast Active


 


 Aspirin Med  02/07/18 21:00 Active





 81 mg PO BEDTIME   


 


 Sodium Chloride 0.9% [Normal Saline] 1,000 ml Med  02/07/18 20:15 Active





 IV ASDIRECTED   


 


 Code Status [Resuscitation Status] Routine Resus Stat  02/07/18 22:05 Ordered








 Medication Orders





Aspirin (Aspirin)  81 mg PO BEDTIME МАРИЯ


   Last Admin: 02/07/18 21:29  Dose: 81 mg


Sodium Chloride (Normal Saline)  1,000 mls @ 50 mls/hr IV ASDIRECTED МАРИЯ


   Last Admin: 02/07/18 23:34  Dose: 50 mls/hr








Assessment/Plan Comment:: 


This 89-year-old gentleman who is a resident of a long-term care center here in 

Counts include 234 beds at the [facility] was admitted yesterday through the ED after falling and 

hitting head and possibly suffering a TIA.  Patient was using his walker lost 

his balance due to slippery socks and fell onto a trash can out the right side 

of his upper eye.  Denied LOC.  Head CT did not show any intracranial bleeding 

and he was to be released from the ED however did start having some weakness, 

leaning to his right side with some difficulty speech so he was admitted for 

further observation and placed on aspirin. He did well overnight without any 

neurological deficits in a head CT repeated was not needed.  At the time of 

arrival he had no chest pain, no shortness of breath nor did he have any 

headache.  His blood pressure was slightly elevated upon arrival.  Pertinent 

labs were hemoglobin 10.3, BUN 33 creatinine 1.28


_________________________________________________________________





Final diagnosis





Fall, head injury. 


High fall risk 


Transient ischemic attack


Malnourished
ESRD on dialysis

## 2023-09-16 NOTE — PROGRESS NOTE ADULT - SUBJECTIVE AND OBJECTIVE BOX
Cardiovascular Disease Progress Note  Date of Service: 23 @ 08:32    Overnight events: No acute events overnight.    Patient is in no distress.   No chest pain or SOB.        Objective Findings:  T(C): 36.6 (23 @ 05:00), Max: 36.9 (09-15-23 @ 21:12)  HR: 66 (23 @ 07:55) (66 - 70)  BP: 168/80 (23 @ 07:55) (151/79 - 185/88)  RR: 18 (23 @ 05:00) (18 - 18)  SpO2: 97% (23 @ 05:00) (97% - 100%)  Wt(kg): --  Daily     Daily Weight in k.5 (15 Sep 2023 09:42)      Physical Exam:  Gen: NAD; Patient resting comfortably  HEENT: EOMI, Normocephalic/ atraumatic  CV: RRR, normal S1 + S2, no m/r/g  Lungs:  Normal respiratory effort; clear to auscultation bilaterally  Abd: soft, non-tender; bowel sounds present  Ext: No edema; warm and well perfused    Telemetry: n/a    Laboratory Data:                        8.6    4.17  )-----------( 170      ( 16 Sep 2023 05:20 )             27.3         135  |  101  |  14  ----------------------------<  129<H>  3.8   |  25  |  2.73<H>    Ca    8.8      16 Sep 2023 05:20  Phos  2.5       Mg     2.00         TPro  6.5  /  Alb  2.4<L>  /  TBili  0.6  /  DBili  x   /  AST  44<H>  /  ALT  19  /  AlkPhos  296<H>                Inpatient Medications:  MEDICATIONS  (STANDING):  ascorbic acid 500 milliGRAM(s) Oral daily  calcium carbonate 1250 mG  + Vitamin D (OsCal 500 + D) 1 Tablet(s) Oral three times a day  ceFAZolin   IVPB 1000 milliGRAM(s) IV Intermittent every 24 hours  chlorhexidine 2% Cloths 1 Application(s) Topical daily  epoetin marla (EPOGEN) Injectable 4000 Unit(s) IV Push <User Schedule>  folic acid 1 milliGRAM(s) Oral daily  heparin   Injectable 5000 Unit(s) SubCutaneous every 8 hours  hydrALAZINE 50 milliGRAM(s) Oral two times a day  influenza  Vaccine (HIGH DOSE) 0.7 milliLiter(s) IntraMuscular once  metoprolol succinate ER 12.5 milliGRAM(s) Oral daily  Nephro-nathanael 1 Tablet(s) Oral daily  pantoprazole    Tablet 40 milliGRAM(s) Oral before breakfast  polyethylene glycol 3350 17 Gram(s) Oral daily  senna 2 Tablet(s) Oral at bedtime      Assessment: 78 year old woman with ESRD on HD (MWF), HTN, DM (diet controlled), chronic type B aortic dissection, heart murmur, endometrial ca in remission s/p total hysterectomy in  presents with fistula malfunction.      Plan of Care:      #Post-operative evaluation-  - Ms. Harris tolerated bedside plastic surgery intervention well yesterday.  She displays no signs of  coronary ischemia or decompensated CHF.   EKG is sinus with a known RBBB.  Continue current cardiac management.          #Type B aortic dissection  - Chronic (Diagnosed >10 years ago)  - Optimal BP control.        #ESRD-  - HD as per renal.       Outpatient cardiology f/u with Dr. Ceho Grimes.        Over 25 minutes spent on total encounter; more than 50% of the visit was spent counseling and/or coordinating care by the attending physician.      Gabo Burris MD [facility]  Cardiovascular Disease  [phone]

## 2023-09-16 NOTE — PROGRESS NOTE ADULT - ASSESSMENT
78F with history as above who presents from [facility] for possible infection of her surgical sites.       Problem/Plan - 1:  ·  Problem: Fluid collection at surgical site.   ·  Plan: - plastics fu  - sp vac  - Wound care fu     Problem/Plan - 2:  ·  Problem: ESRD on dialysis.   ·  Plan: - HD as scheduled  - renal fu     Problem/Plan - 3:  ·  Problem: vision loss   ·  Plan: - check MRI  - opthalmology fu       Problem/Plan - 4:  ·  Problem: Transaminitis.  ·  Plan: - MIld transaminitis likely in setting of fluid overload state, trend for now.    Problem/Plan - 5:  ·  Problem: Type 2 diabetes mellitus.  ·  Plan: - Not on medications since starting dialysis, last A1C 5, monitor off meds for now, CC diet.    Problem/Plan - 6:·  Problem: Essential hypertension.  ·  Plan: - c/w home medications.    Problem/Plan - 7:  ·  Problem: Imaging abnormality.  ·  Plan: - Has known chronic type B aortic dissection, here on imaging might be bigger but likely 2/2 imaging differences  - BP control  - cards fu     Problem/Plan - 8:  ·  Problem: Endometrial cancer.  ·  Plan: - c/w outpatient follow up.

## 2023-09-16 NOTE — DIETITIAN INITIAL EVALUATION ADULT - NS FNS DIET ORDER
Diet, Regular:   Consistent Carbohydrate {Evening Snack} (CSTCHOSN)  DASH/TLC {Sodium & Cholesterol Restricted} (DASH)  1200mL Fluid Restriction ([id number])  For patients receiving Renal Replacement - No Protein Restr, No Conc K, No Conc Phos, Low Sodium (RENAL) (09-13-23 @ 06:57)

## 2023-09-16 NOTE — DIETITIAN INITIAL EVALUATION ADULT - ADD RECOMMEND
1. Monitor bowel movements, labs, PO intake/tolerance, skin integrity, and weights  1. Liberalize diet to Renal 1200 mL Fluid Restriction  2. Monitor bowel movements, labs, PO intake/tolerance, skin integrity, and weights

## 2023-09-16 NOTE — DIETITIAN INITIAL EVALUATION ADULT - OTHER INFO
Patient seen at bedside. A&Ox4. No known food allergies. Patient is consuming <50% of diet, since diet is to restricted. No chewing or swallowing issues. Denies nausea or vomiting. No constipation or diarrhea. Last bowel movement was 9/15. Per patient weight was 117.5 lbs last week at [facility]. Patient usual body weight is 130 lbs 6/7 Per HIE and patient recollection, patient current weight is 116.4 lbs, weight loss -13.6 lbs (-10.5%) in 4 months. Patient seen at bedside. A&Ox4. No known food allergies. Patient is consuming <50% of diet, since diet is too restricted. Patient has A1c 5.0 and blood glucose <200 mg/dL. DASH/TLC diet had sodium restrictions which are also found in Renal Diet. No chewing or swallowing issues. Denies nausea or vomiting. No constipation or diarrhea. Last bowel movement was 9/15. Per patient weight was 117.5 lbs last week at [place]. Patient usual body weight is 130 lbs 6/7 Per HIE and patient recollection, patient current weight is 116.4 lbs, weight loss -13.6 lbs (-10.5%) in 4 months.

## 2023-09-16 NOTE — DIETITIAN INITIAL EVALUATION ADULT - PERSON TAUGHT/METHOD
Verbally educated patient on Renal therapeutic Diet and Fluid restrictions. Handouts on Carbohydrate Counting For People With Diabetes, Diabetes Label Reading Tips, Plate Method For Diabetes, Hypertension Nutrition Therapy, Fluid-Restricted Nutrition Therapy, Potassium, Phosphorus, and Sodium were provided to patient./verbal instruction/patient instructed

## 2023-09-16 NOTE — PROVIDER CONTACT NOTE (OTHER) - ASSESSMENT
Other VS remain stable bp 134/74, HR 61, RR 18, O2 saturation 100% on room air. Pt asymptomatic no complaints. Other VS remain stable bp 134/74, HR 61, RR 18, O2 saturation 100% on room air. Pt asymptomatic no complaints. Rectal temperature of 94.7.

## 2023-09-17 LAB
ALBUMIN SERPL ELPH-MCNC: 2.3 G/DL — LOW (ref 3.3–5)
ALP SERPL-CCNC: 285 U/L — HIGH (ref 40–120)
ALT FLD-CCNC: 10 U/L — SIGNIFICANT CHANGE UP (ref 4–33)
ANION GAP SERPL CALC-SCNC: 11 MMOL/L — SIGNIFICANT CHANGE UP (ref 7–14)
AST SERPL-CCNC: 48 U/L — HIGH (ref 4–32)
B PERT DNA SPEC QL NAA+PROBE: SIGNIFICANT CHANGE UP
B PERT+PARAPERT DNA PNL SPEC NAA+PROBE: SIGNIFICANT CHANGE UP
BILIRUB SERPL-MCNC: 0.6 MG/DL — SIGNIFICANT CHANGE UP (ref 0.2–1.2)
BORDETELLA PARAPERTUSSIS (RAPRVP): SIGNIFICANT CHANGE UP
BUN SERPL-MCNC: 24 MG/DL — HIGH (ref 7–23)
C PNEUM DNA SPEC QL NAA+PROBE: SIGNIFICANT CHANGE UP
CALCIUM SERPL-MCNC: 9.1 MG/DL — SIGNIFICANT CHANGE UP (ref 8.4–10.5)
CHLORIDE SERPL-SCNC: 100 MMOL/L — SIGNIFICANT CHANGE UP (ref 98–107)
CO2 SERPL-SCNC: 26 MMOL/L — SIGNIFICANT CHANGE UP (ref 22–31)
CREAT SERPL-MCNC: 3.98 MG/DL — HIGH (ref 0.5–1.3)
CULTURE RESULTS: SIGNIFICANT CHANGE UP
CULTURE RESULTS: SIGNIFICANT CHANGE UP
EGFR: 11 ML/MIN/1.73M2 — LOW
FLUAV SUBTYP SPEC NAA+PROBE: SIGNIFICANT CHANGE UP
FLUBV RNA SPEC QL NAA+PROBE: SIGNIFICANT CHANGE UP
GLUCOSE SERPL-MCNC: 133 MG/DL — HIGH (ref 70–99)
HADV DNA SPEC QL NAA+PROBE: SIGNIFICANT CHANGE UP
HCOV 229E RNA SPEC QL NAA+PROBE: SIGNIFICANT CHANGE UP
HCOV HKU1 RNA SPEC QL NAA+PROBE: SIGNIFICANT CHANGE UP
HCOV NL63 RNA SPEC QL NAA+PROBE: SIGNIFICANT CHANGE UP
HCOV OC43 RNA SPEC QL NAA+PROBE: SIGNIFICANT CHANGE UP
HCT VFR BLD CALC: 26.5 % — LOW (ref 34.5–45)
HGB BLD-MCNC: 8.5 G/DL — LOW (ref 11.5–15.5)
HMPV RNA SPEC QL NAA+PROBE: SIGNIFICANT CHANGE UP
HPIV1 RNA SPEC QL NAA+PROBE: SIGNIFICANT CHANGE UP
HPIV2 RNA SPEC QL NAA+PROBE: SIGNIFICANT CHANGE UP
HPIV3 RNA SPEC QL NAA+PROBE: SIGNIFICANT CHANGE UP
HPIV4 RNA SPEC QL NAA+PROBE: SIGNIFICANT CHANGE UP
M PNEUMO DNA SPEC QL NAA+PROBE: SIGNIFICANT CHANGE UP
MAGNESIUM SERPL-MCNC: 2.1 MG/DL — SIGNIFICANT CHANGE UP (ref 1.6–2.6)
MCHC RBC-ENTMCNC: 32.1 GM/DL — SIGNIFICANT CHANGE UP (ref 32–36)
MCHC RBC-ENTMCNC: 32.2 PG — SIGNIFICANT CHANGE UP (ref 27–34)
MCV RBC AUTO: 100.4 FL — HIGH (ref 80–100)
NRBC # BLD: 0 /100 WBCS — SIGNIFICANT CHANGE UP (ref 0–0)
NRBC # FLD: 0 K/UL — SIGNIFICANT CHANGE UP (ref 0–0)
PHOSPHATE SERPL-MCNC: 3.8 MG/DL — SIGNIFICANT CHANGE UP (ref 2.5–4.5)
PLATELET # BLD AUTO: 171 K/UL — SIGNIFICANT CHANGE UP (ref 150–400)
POTASSIUM SERPL-MCNC: 3.8 MMOL/L — SIGNIFICANT CHANGE UP (ref 3.5–5.3)
POTASSIUM SERPL-SCNC: 3.8 MMOL/L — SIGNIFICANT CHANGE UP (ref 3.5–5.3)
PROT SERPL-MCNC: 6.6 G/DL — SIGNIFICANT CHANGE UP (ref 6–8.3)
RAPID RVP RESULT: SIGNIFICANT CHANGE UP
RBC # BLD: 2.64 M/UL — LOW (ref 3.8–5.2)
RBC # FLD: 18.2 % — HIGH (ref 10.3–14.5)
RSV RNA SPEC QL NAA+PROBE: SIGNIFICANT CHANGE UP
RV+EV RNA SPEC QL NAA+PROBE: SIGNIFICANT CHANGE UP
SARS-COV-2 RNA SPEC QL NAA+PROBE: SIGNIFICANT CHANGE UP
SODIUM SERPL-SCNC: 137 MMOL/L — SIGNIFICANT CHANGE UP (ref 135–145)
SPECIMEN SOURCE: SIGNIFICANT CHANGE UP
SPECIMEN SOURCE: SIGNIFICANT CHANGE UP
WBC # BLD: 4.11 K/UL — SIGNIFICANT CHANGE UP (ref 3.8–10.5)
WBC # FLD AUTO: 4.11 K/UL — SIGNIFICANT CHANGE UP (ref 3.8–10.5)

## 2023-09-17 RX ADMIN — DORZOLAMIDE HYDROCHLORIDE 1 DROP(S): 20 SOLUTION/ DROPS OPHTHALMIC at 21:27

## 2023-09-17 RX ADMIN — CHLORHEXIDINE GLUCONATE 1 APPLICATION(S): 213 SOLUTION TOPICAL at 11:18

## 2023-09-17 RX ADMIN — Medication 50 MILLIGRAM(S): at 05:41

## 2023-09-17 RX ADMIN — Medication 12.5 MILLIGRAM(S): at 05:42

## 2023-09-17 RX ADMIN — BRIMONIDINE TARTRATE 1 DROP(S): 2 SOLUTION/ DROPS OPHTHALMIC at 21:27

## 2023-09-17 RX ADMIN — BRIMONIDINE TARTRATE 1 DROP(S): 2 SOLUTION/ DROPS OPHTHALMIC at 13:31

## 2023-09-17 RX ADMIN — Medication 1 TABLET(S): at 13:30

## 2023-09-17 RX ADMIN — Medication 1 MILLIGRAM(S): at 11:16

## 2023-09-17 RX ADMIN — DORZOLAMIDE HYDROCHLORIDE 1 DROP(S): 20 SOLUTION/ DROPS OPHTHALMIC at 05:42

## 2023-09-17 RX ADMIN — LATANOPROST 1 DROP(S): 0.05 SOLUTION/ DROPS OPHTHALMIC; TOPICAL at 21:27

## 2023-09-17 RX ADMIN — BRIMONIDINE TARTRATE 1 DROP(S): 2 SOLUTION/ DROPS OPHTHALMIC at 05:43

## 2023-09-17 RX ADMIN — Medication 100 MILLIGRAM(S): at 17:06

## 2023-09-17 RX ADMIN — Medication 1 TABLET(S): at 11:15

## 2023-09-17 RX ADMIN — Medication 50 MILLIGRAM(S): at 17:07

## 2023-09-17 RX ADMIN — ACETAZOLAMIDE 125 MILLIGRAM(S): 250 TABLET ORAL at 13:30

## 2023-09-17 RX ADMIN — PANTOPRAZOLE SODIUM 40 MILLIGRAM(S): 20 TABLET, DELAYED RELEASE ORAL at 05:40

## 2023-09-17 RX ADMIN — DORZOLAMIDE HYDROCHLORIDE 1 DROP(S): 20 SOLUTION/ DROPS OPHTHALMIC at 13:31

## 2023-09-17 RX ADMIN — Medication 1 TABLET(S): at 21:26

## 2023-09-17 RX ADMIN — Medication 500 MILLIGRAM(S): at 11:15

## 2023-09-17 RX ADMIN — Medication 1 TABLET(S): at 05:41

## 2023-09-17 NOTE — PROGRESS NOTE ADULT - SUBJECTIVE AND OBJECTIVE BOX
SURGERY DAILY PROGRESS NOTE    --------------- OVERNIGHT/INTERVAL---------------  - No acute events overnight    --------------- SUBJECTIVE ---------------  - Patient seen and examined at bedside with surgical team    --------------- OBJECTIVE ---------------  -----VITALS-----  T(C): 36.2, Max: 36.7 (09-16)  T(F): 97.2, Max: 98 (09-16)  HR: 63 (58 - 66)  BP: 164/84 (123/67 - 198/86)  BP(mean): --  ABP: --  ABP(mean): --  RR: 18 (17 - 18)  SpO2: 99% (98% - 100%)  CVP(mm Hg): --  CVP(cm H2O): --  room air            -----PHYSICAL EXAM-----  GENERAL: NAD, lying in bed   NEURO: AOx3, awake alert appropriate  HEENT: NCAT, trachea midline  PULM: Respirations non-labored  ABD: Soft, non-tender, non-distended, no peritonitis/rebound tenderness  EXT: Warm, well perfused  - LUE w/ wound vac to suction, minimal output, incisions c/d/i     -----INs & OUTs-----      -----MEDICATIONS-----  STANDING  acetaZOLAMIDE    Tablet 125 milliGRAM(s) Oral daily  ascorbic acid 500 milliGRAM(s) Oral daily  brimonidine 0.2% Ophthalmic Solution 1 Drop(s) Right EYE three times a day  calcium carbonate 1250 mG  + Vitamin D (OsCal 500 + D) 1 Tablet(s) Oral three times a day  ceFAZolin   IVPB 1000 milliGRAM(s) IV Intermittent every 24 hours  chlorhexidine 2% Cloths 1 Application(s) Topical daily  dorzolamide 2% Ophthalmic Solution 1 Drop(s) Right EYE three times a day  epoetin marla (EPOGEN) Injectable 4000 Unit(s) IV Push <User Schedule>  folic acid 1 milliGRAM(s) Oral daily  heparin   Injectable 5000 Unit(s) SubCutaneous every 8 hours  hydrALAZINE 50 milliGRAM(s) Oral two times a day  influenza  Vaccine (HIGH DOSE) 0.7 milliLiter(s) IntraMuscular once  latanoprost 0.005% Ophthalmic Solution 1 Drop(s) Right EYE at bedtime  metoprolol succinate ER 12.5 milliGRAM(s) Oral daily  Nephro-nathanael 1 Tablet(s) Oral daily  pantoprazole    Tablet 40 milliGRAM(s) Oral before breakfast  polyethylene glycol 3350 17 Gram(s) Oral daily  senna 2 Tablet(s) Oral at bedtime    PRN  acetaminophen     Tablet .. 650 milliGRAM(s) Oral every 6 hours PRN Temp greater or equal to 38C (100.4F), Mild Pain (1 - 3)  aluminum hydroxide/magnesium hydroxide/simethicone Suspension 30 milliLiter(s) Oral every 4 hours PRN Dyspepsia  melatonin 3 milliGRAM(s) Oral at bedtime PRN Insomnia  ondansetron Injectable 4 milliGRAM(s) IV Push every 8 hours PRN Nausea and/or Vomiting  oxyCODONE    IR 5 milliGRAM(s) Oral every 6 hours PRN Severe Pain (7 - 10)  sodium chloride 0.9% Bolus. 100 milliLiter(s) IV Bolus every 5 minutes PRN SBP LESS THAN or EQUAL to 90 mmHg    -----LABS-----  CBC (09-17 @ 05:20)                              8.5<L>                         4.11    )----------------(  171        --    % Neutrophils, --    % Lymphocytes, ANC: --                                  26.5<L>  CBC (09-16 @ 05:20)                              8.6<L>                         4.17    )----------------(  170        --    % Neutrophils, --    % Lymphocytes, ANC: --                                  27.3<L>    BMP (09-17 @ 05:20)             137     |  100     |  24<H> 		Ca++ --      Ca 9.1                ---------------------------------( 133<H>		Mg 2.10               3.8     |  26      |  3.98<H>			Ph 3.8     BMP (09-16 @ 05:20)             135     |  101     |  14    		Ca++ --      Ca 8.8                ---------------------------------( 129<H>		Mg 2.00               3.8     |  25      |  2.73<H>			Ph 2.5       LFTs (09-17 @ 05:20)      TPro 6.6 / Alb 2.3<L> / TBili 0.6 / DBili -- / AST 48<H> / ALT 10 / AlkPhos 285<H>  LFTs (09-16 @ 05:20)      TPro 6.5 / Alb 2.4<L> / TBili 0.6 / DBili -- / AST 44<H> / ALT 19 / AlkPhos 296<H>            Urinalysis (09-17 @ 05:20):     Color:  / Appearance:  / SG:  / pH:  / Gluc: 133<H> / Ketones:  / Bili:  / Urobili:  / Protein : / Nitrites:  / Leuk.Est:  / RBC:  / WBC:  / Sq Epi:  / Non Sq Epi:  / Bacteria        -> .Blood Blood-Peripheral Culture (09-12 @ 14:45)     NG    NG    No growth at 4 days    -> .Blood Blood-Peripheral Culture (09-12 @ 13:42)     NG    NG    No growth at 4 days

## 2023-09-17 NOTE — PROGRESS NOTE ADULT - ASSESSMENT
77 y.o. Female w/ Hx HTN, DM, chronic type B aortic dissection, ESRD on HD ( M, W, F ) via LUE brachiocephalic (2018) fistula, endometrial ca in remission s/p total hysterectomy in 2007 recently admitted for L hip revision (07-08/2023) during which she underwent LUE subclavian vein stenting 8/1 for stenosis and subsequent brachiocephalic AVF revision 8/3 with resection of the involved segment and primary anastomosis for ulceration. She presents after a missed HD session due to concern for overlying infection at the AVF site. S/p LUE ACF ligation and aneurysm resection, closure by PRS and prevena vac placement on 8/31. Planning for RUE AV fistula. s/p hematoma evacuation 9/12    PLAN  - daily dressing changes  - pain control  - arm elevation, ace wrap  - wound vac per PRS, F  Vascular Surgery  [id number]

## 2023-09-17 NOTE — PROGRESS NOTE ADULT - SUBJECTIVE AND OBJECTIVE BOX
Patient is a 78y old  Female who presents with a chief complaint of L hip drainage (17 Sep 2023 10:16)    Date of servie : 09-17-23 @ 17:30  INTERVAL HPI/OVERNIGHT EVENTS:  T(C): 36.8 (09-17-23 @ 12:27), Max: 36.8 (09-17-23 @ 12:27)  HR: 62 (09-17-23 @ 17:03) (58 - 66)  BP: 152/80 (09-17-23 @ 17:03) (123/67 - 198/86)  RR: 17 (09-17-23 @ 12:27) (17 - 18)  SpO2: 98% (09-17-23 @ 12:27) (98% - 100%)  Wt(kg): --  I&O's Summary    16 Sep 2023 07:01  -  17 Sep 2023 07:00  --------------------------------------------------------  IN: 750 mL / OUT: 0 mL / NET: 750 mL        LABS:                        8.5    4.11  )-----------( 171      ( 17 Sep 2023 05:20 )             26.5     09-17    137  |  100  |  24<H>  ----------------------------<  133<H>  3.8   |  26  |  3.98<H>    Ca    9.1      17 Sep 2023 05:20  Phos  3.8     09-17  Mg     2.10     09-17    TPro  6.6  /  Alb  2.3<L>  /  TBili  0.6  /  DBili  x   /  AST  48<H>  /  ALT  10  /  AlkPhos  285<H>  09-17      Urinalysis Basic - ( 17 Sep 2023 05:20 )    Color: x / Appearance: x / SG: x / pH: x  Gluc: 133 mg/dL / Ketone: x  / Bili: x / Urobili: x   Blood: x / Protein: x / Nitrite: x   Leuk Esterase: x / RBC: x / WBC x   Sq Epi: x / Non Sq Epi: x / Bacteria: x      CAPILLARY BLOOD GLUCOSE            Urinalysis Basic - ( 17 Sep 2023 05:20 )    Color: x / Appearance: x / SG: x / pH: x  Gluc: 133 mg/dL / Ketone: x  / Bili: x / Urobili: x   Blood: x / Protein: x / Nitrite: x   Leuk Esterase: x / RBC: x / WBC x   Sq Epi: x / Non Sq Epi: x / Bacteria: x        MEDICATIONS  (STANDING):  acetaZOLAMIDE    Tablet 125 milliGRAM(s) Oral daily  ascorbic acid 500 milliGRAM(s) Oral daily  brimonidine 0.2% Ophthalmic Solution 1 Drop(s) Right EYE three times a day  calcium carbonate 1250 mG  + Vitamin D (OsCal 500 + D) 1 Tablet(s) Oral three times a day  ceFAZolin   IVPB 1000 milliGRAM(s) IV Intermittent every 24 hours  chlorhexidine 2% Cloths 1 Application(s) Topical daily  dorzolamide 2% Ophthalmic Solution 1 Drop(s) Right EYE three times a day  epoetin marla (EPOGEN) Injectable 4000 Unit(s) IV Push <User Schedule>  folic acid 1 milliGRAM(s) Oral daily  heparin   Injectable 5000 Unit(s) SubCutaneous every 8 hours  hydrALAZINE 50 milliGRAM(s) Oral two times a day  influenza  Vaccine (HIGH DOSE) 0.7 milliLiter(s) IntraMuscular once  latanoprost 0.005% Ophthalmic Solution 1 Drop(s) Right EYE at bedtime  metoprolol succinate ER 12.5 milliGRAM(s) Oral daily  Nephro-nathanael 1 Tablet(s) Oral daily  pantoprazole    Tablet 40 milliGRAM(s) Oral before breakfast  polyethylene glycol 3350 17 Gram(s) Oral daily  senna 2 Tablet(s) Oral at bedtime    MEDICATIONS  (PRN):  acetaminophen     Tablet .. 650 milliGRAM(s) Oral every 6 hours PRN Temp greater or equal to 38C (100.4F), Mild Pain (1 - 3)  aluminum hydroxide/magnesium hydroxide/simethicone Suspension 30 milliLiter(s) Oral every 4 hours PRN Dyspepsia  melatonin 3 milliGRAM(s) Oral at bedtime PRN Insomnia  ondansetron Injectable 4 milliGRAM(s) IV Push every 8 hours PRN Nausea and/or Vomiting  oxyCODONE    IR 5 milliGRAM(s) Oral every 6 hours PRN Severe Pain (7 - 10)  sodium chloride 0.9% Bolus. 100 milliLiter(s) IV Bolus every 5 minutes PRN SBP LESS THAN or EQUAL to 90 mmHg          PHYSICAL EXAM:  GENERAL: NAD, well-groomed, well-developed  HEAD:  Atraumatic, Normocephalic  CHEST/LUNG: Clear to percussion bilaterally; No rales, rhonchi, wheezing, or rubs  HEART: Regular rate and rhythm; No murmurs, rubs, or gallops  ABDOMEN: Soft, Nontender, Nondistended; Bowel sounds present  EXTREMITIES:  2+ Peripheral Pulses, No clubbing, cyanosis, or edema  LYMPH: No lymphadenopathy noted  SKIN: No rashes or lesions    Care Discussed with Consultants/Other Providers [ ] YES  [ ] NO

## 2023-09-17 NOTE — PROGRESS NOTE ADULT - ASSESSMENT
78F with history as above who presents from [facility] for possible infection of her surgical sites.       Problem/Plan - 1:  ·  Problem: Fluid collection at surgical site.   ·  Plan: - plastics fu  - sp vac  - Wound care fu     Problem/Plan - 2:  ·  Problem: ESRD on dialysis.   ·  Plan: - HD as scheduled  - renal fu     Problem/Plan - 3:  ·  Problem: vision loss   ·  Plan: - check MRI  - opthalmology fu       Problem/Plan - 4:  ·  Problem: Transaminitis.  ·  Plan: - MIld transaminitis likely in setting of fluid overload state, trend for now.    Problem/Plan - 5:  ·  Problem: Type 2 diabetes mellitus.  ·  Plan: - Not on medications since starting dialysis, last A1C 5, monitor off meds for now, CC diet.    Problem/Plan - 6:·  Problem: Essential hypertension.  ·  Plan: - c/w home medications.    Problem/Plan - 7:  ·  Problem: Imaging abnormality.  ·  Plan: - Has known chronic type B aortic dissection, here on imaging might be bigger but likely 2/2 imaging differences  - BP control  - cards fu     Problem/Plan - 8:·  Problem: Endometrial cancer.  ·  Plan: - c/w outpatient follow up.

## 2023-09-17 NOTE — PROGRESS NOTE ADULT - SUBJECTIVE AND OBJECTIVE BOX
Cardiovascular Disease Progress Note  Date of Service: 09-17-23 @ 10:16    Overnight events: No acute events overnight.    Patient denies chest pain or SOB.   Otherwise review of systems negative    Objective Findings:  T(C): 36.2 (09-17-23 @ 05:30), Max: 36.7 (09-16-23 @ 15:20)  HR: 63 (09-17-23 @ 06:30) (58 - 66)  BP: 164/84 (09-17-23 @ 06:30) (123/67 - 198/86)  RR: 18 (09-17-23 @ 05:30) (17 - 18)  SpO2: 99% (09-17-23 @ 05:30) (98% - 100%)  Wt(kg): --  Daily     Daily       Physical Exam:  Gen: NAD; Patient resting comfortably  HEENT: EOMI, Normocephalic/ atraumatic  CV: RRR, normal S1 + S2, no m/r/g  Lungs:  Normal respiratory effort; clear to auscultation bilaterally  Abd: soft, non-tender; bowel sounds present  Ext: No edema; warm and well perfused    Telemetry: n/a    Laboratory Data:                        8.5    4.11  )-----------( 171      ( 17 Sep 2023 05:20 )             26.5     09-17    137  |  100  |  24<H>  ----------------------------<  133<H>  3.8   |  26  |  3.98<H>    Ca    9.1      17 Sep 2023 05:20  Phos  3.8     09-17  Mg     2.10     09-17    TPro  6.6  /  Alb  2.3<L>  /  TBili  0.6  /  DBili  x   /  AST  48<H>  /  ALT  10  /  AlkPhos  285<H>  09-17              Inpatient Medications:  MEDICATIONS  (STANDING):  acetaZOLAMIDE    Tablet 125 milliGRAM(s) Oral daily  ascorbic acid 500 milliGRAM(s) Oral daily  brimonidine 0.2% Ophthalmic Solution 1 Drop(s) Right EYE three times a day  calcium carbonate 1250 mG  + Vitamin D (OsCal 500 + D) 1 Tablet(s) Oral three times a day  ceFAZolin   IVPB 1000 milliGRAM(s) IV Intermittent every 24 hours  chlorhexidine 2% Cloths 1 Application(s) Topical daily  dorzolamide 2% Ophthalmic Solution 1 Drop(s) Right EYE three times a day  epoetin marla (EPOGEN) Injectable 4000 Unit(s) IV Push <User Schedule>  folic acid 1 milliGRAM(s) Oral daily  heparin   Injectable 5000 Unit(s) SubCutaneous every 8 hours  hydrALAZINE 50 milliGRAM(s) Oral two times a day  influenza  Vaccine (HIGH DOSE) 0.7 milliLiter(s) IntraMuscular once  latanoprost 0.005% Ophthalmic Solution 1 Drop(s) Right EYE at bedtime  metoprolol succinate ER 12.5 milliGRAM(s) Oral daily  Nephro-nathanael 1 Tablet(s) Oral daily  pantoprazole    Tablet 40 milliGRAM(s) Oral before breakfast  polyethylene glycol 3350 17 Gram(s) Oral daily  senna 2 Tablet(s) Oral at bedtime      Assessment: 78 year old woman with ESRD on HD (MWF), HTN, DM (diet controlled), chronic type B aortic dissection, heart murmur, endometrial ca in remission s/p total hysterectomy in 2007 presents with fistula malfunction.      Plan of Care:      #Post-operative evaluation-  - Ms. Harris tolerated bedside plastic surgery intervention well.  She displays no signs of  coronary ischemia or decompensated CHF.   EKG is sinus with a known RBBB.  Continue current cardiac management.          #Type B aortic dissection  - Chronic (Diagnosed >10 years ago)  - Optimal BP control.        #ESRD-  - HD as per renal.    #ACP (advance care planning)-  Advanced care planning was discussed with the patient.    Cardiac findings were discussed in detail and all questions were answered.      Outpatient cardiology f/u with Dr. Cheo Grimes.        Over 25 minutes spent on total encounter; more than 50% of the visit was spent counseling and/or coordinating care by the attending physician.      Gabo Burris MD [facility]  Cardiovascular Disease  [phone]

## 2023-09-18 LAB
ALBUMIN SERPL ELPH-MCNC: 2.5 G/DL — LOW (ref 3.3–5)
ALP SERPL-CCNC: 292 U/L — HIGH (ref 40–120)
ALT FLD-CCNC: <5 U/L — LOW (ref 4–33)
ANION GAP SERPL CALC-SCNC: 11 MMOL/L — SIGNIFICANT CHANGE UP (ref 7–14)
AST SERPL-CCNC: 53 U/L — HIGH (ref 4–32)
BILIRUB SERPL-MCNC: 0.4 MG/DL — SIGNIFICANT CHANGE UP (ref 0.2–1.2)
BUN SERPL-MCNC: 39 MG/DL — HIGH (ref 7–23)
CALCIUM SERPL-MCNC: 8.8 MG/DL — SIGNIFICANT CHANGE UP (ref 8.4–10.5)
CHLORIDE SERPL-SCNC: 99 MMOL/L — SIGNIFICANT CHANGE UP (ref 98–107)
CO2 SERPL-SCNC: 26 MMOL/L — SIGNIFICANT CHANGE UP (ref 22–31)
CREAT SERPL-MCNC: 5.61 MG/DL — HIGH (ref 0.5–1.3)
EGFR: 7 ML/MIN/1.73M2 — LOW
GLUCOSE SERPL-MCNC: 175 MG/DL — HIGH (ref 70–99)
HCT VFR BLD CALC: 24.6 % — LOW (ref 34.5–45)
HGB BLD-MCNC: 7.9 G/DL — LOW (ref 11.5–15.5)
MAGNESIUM SERPL-MCNC: 2.2 MG/DL — SIGNIFICANT CHANGE UP (ref 1.6–2.6)
MCHC RBC-ENTMCNC: 31.9 PG — SIGNIFICANT CHANGE UP (ref 27–34)
MCHC RBC-ENTMCNC: 32.1 GM/DL — SIGNIFICANT CHANGE UP (ref 32–36)
MCV RBC AUTO: 99.2 FL — SIGNIFICANT CHANGE UP (ref 80–100)
NRBC # BLD: 0 /100 WBCS — SIGNIFICANT CHANGE UP (ref 0–0)
NRBC # FLD: 0 K/UL — SIGNIFICANT CHANGE UP (ref 0–0)
PHOSPHATE SERPL-MCNC: 4.8 MG/DL — HIGH (ref 2.5–4.5)
PLATELET # BLD AUTO: 135 K/UL — LOW (ref 150–400)
POTASSIUM SERPL-MCNC: 4 MMOL/L — SIGNIFICANT CHANGE UP (ref 3.5–5.3)
POTASSIUM SERPL-SCNC: 4 MMOL/L — SIGNIFICANT CHANGE UP (ref 3.5–5.3)
PROT SERPL-MCNC: 6.9 G/DL — SIGNIFICANT CHANGE UP (ref 6–8.3)
RBC # BLD: 2.48 M/UL — LOW (ref 3.8–5.2)
RBC # FLD: 18.1 % — HIGH (ref 10.3–14.5)
SODIUM SERPL-SCNC: 136 MMOL/L — SIGNIFICANT CHANGE UP (ref 135–145)
WBC # BLD: 4.47 K/UL — SIGNIFICANT CHANGE UP (ref 3.8–10.5)
WBC # FLD AUTO: 4.47 K/UL — SIGNIFICANT CHANGE UP (ref 3.8–10.5)

## 2023-09-18 PROCEDURE — 99232 SBSQ HOSP IP/OBS MODERATE 35: CPT

## 2023-09-18 RX ORDER — HYDRALAZINE HCL 50 MG
25 TABLET ORAL ONCE
Refills: 0 | Status: COMPLETED | OUTPATIENT
Start: 2023-09-18 | End: 2023-09-18

## 2023-09-18 RX ORDER — CYCLOPENTOLATE HYDROCHLORIDE 10 MG/ML
1 SOLUTION/ DROPS OPHTHALMIC THREE TIMES A DAY
Refills: 0 | Status: DISCONTINUED | OUTPATIENT
Start: 2023-09-18 | End: 2023-09-19

## 2023-09-18 RX ORDER — LABETALOL HCL 100 MG
100 TABLET ORAL ONCE
Refills: 0 | Status: COMPLETED | OUTPATIENT
Start: 2023-09-18 | End: 2023-09-18

## 2023-09-18 RX ADMIN — BRIMONIDINE TARTRATE 1 DROP(S): 2 SOLUTION/ DROPS OPHTHALMIC at 06:26

## 2023-09-18 RX ADMIN — Medication 12.5 MILLIGRAM(S): at 12:02

## 2023-09-18 RX ADMIN — Medication 1 TABLET(S): at 23:25

## 2023-09-18 RX ADMIN — DORZOLAMIDE HYDROCHLORIDE 1 DROP(S): 20 SOLUTION/ DROPS OPHTHALMIC at 23:24

## 2023-09-18 RX ADMIN — PANTOPRAZOLE SODIUM 40 MILLIGRAM(S): 20 TABLET, DELAYED RELEASE ORAL at 06:20

## 2023-09-18 RX ADMIN — Medication 1 TABLET(S): at 13:11

## 2023-09-18 RX ADMIN — Medication 50 MILLIGRAM(S): at 06:25

## 2023-09-18 RX ADMIN — ACETAZOLAMIDE 125 MILLIGRAM(S): 250 TABLET ORAL at 12:01

## 2023-09-18 RX ADMIN — Medication 25 MILLIGRAM(S): at 00:32

## 2023-09-18 RX ADMIN — Medication 1 MILLIGRAM(S): at 12:01

## 2023-09-18 RX ADMIN — DORZOLAMIDE HYDROCHLORIDE 1 DROP(S): 20 SOLUTION/ DROPS OPHTHALMIC at 06:26

## 2023-09-18 RX ADMIN — BRIMONIDINE TARTRATE 1 DROP(S): 2 SOLUTION/ DROPS OPHTHALMIC at 23:25

## 2023-09-18 RX ADMIN — Medication 1 TABLET(S): at 12:01

## 2023-09-18 RX ADMIN — Medication 100 MILLIGRAM(S): at 23:26

## 2023-09-18 RX ADMIN — Medication 50 MILLIGRAM(S): at 23:26

## 2023-09-18 RX ADMIN — ERYTHROPOIETIN 4000 UNIT(S): 10000 INJECTION, SOLUTION INTRAVENOUS; SUBCUTANEOUS at 21:33

## 2023-09-18 RX ADMIN — Medication 1 TABLET(S): at 06:21

## 2023-09-18 RX ADMIN — LATANOPROST 1 DROP(S): 0.05 SOLUTION/ DROPS OPHTHALMIC; TOPICAL at 23:24

## 2023-09-18 RX ADMIN — CHLORHEXIDINE GLUCONATE 1 APPLICATION(S): 213 SOLUTION TOPICAL at 12:02

## 2023-09-18 RX ADMIN — BRIMONIDINE TARTRATE 1 DROP(S): 2 SOLUTION/ DROPS OPHTHALMIC at 17:38

## 2023-09-18 RX ADMIN — Medication 500 MILLIGRAM(S): at 12:00

## 2023-09-18 RX ADMIN — Medication 100 MILLIGRAM(S): at 02:17

## 2023-09-18 RX ADMIN — DORZOLAMIDE HYDROCHLORIDE 1 DROP(S): 20 SOLUTION/ DROPS OPHTHALMIC at 17:37

## 2023-09-18 NOTE — PROGRESS NOTE ADULT - ASSESSMENT
78 year old woman with ESRD on HD (MWF), HTN, DM (diet controlled), chronic type B aortic dissection, heart murmur, endometrial ca in remission s/p total hysterectomy in 2007 presents with fistula malfunction.  Ophthalmology consulted for blurry vision OD, found to have elevated IOP to 45 with APD OD.     #Vision Loss, OD  #subluxated cataract OD  - patient endorses gradual loss of vision in right eye over past year and half. Baseline vision is chronically poor. Endorses that she has noticed a further decrease in vision over the past two days. Denies any pain in the eye, any pain with EOMS, any headaches, any jaw claudication. GCA ROS negative.   - On initial exam OD with Va CF, only supertemporal field preserved on confrontation testing, with trace-1+ APD, and with an IOP of 45; 9/18 VA remains CF, IOP improved to 7, no RAPD seen.  - anterior segment exam with advanced cataracts OU OD>OS; the cataract is subluxated inferonasally OD  - posterior segment exam with overall poor view to the periphery in setting of cataract OD. The disc appears non glaucomatous with normal cupping and the macula appears wnl.   - It is likely that the chronically worsening vision OD is secondary to advanced cataract OD possibly leading to glaucomatous nerve damage in setting of a subluxated lens leading to very high IOP. The patient's field defects can be explained by the inferonasal subluxation of the lens, which spares a clear view into the eye superotemporally.   - Continue brimonidine three times daily to the right eye, dorzolamide three times a day to the right eye, xalatan nightly to the right eye (ordered)  - If ok with nephrology, please begin 500 mg diamox daily   - Recommend MRI brain/orbits w/wo contrast if of with nephrology given the trace APD OD with normal appearing optic nerves to rule out other causes of vision loss OD   - patient will require outpatient follow up with further cataract evaluation and glaucoma diagnostic testing following discharge form hospital       SDW Dr. Rooney    Outpatient follow-up: Patient should follow-up with his/her ophthalmologist or with [facility] Department of Ophthalmology upon discharge at the address below     [facility] Department of Ophthalmology  [address] [place] [address]. [address]  [place] [address]  [phone]       78 year old woman with ESRD on HD (MWF), HTN, DM (diet controlled), chronic type B aortic dissection, heart murmur, endometrial ca in remission s/p total hysterectomy in 2007 presents with fistula malfunction.  Ophthalmology consulted for blurry vision OD, found to have elevated IOP to 45 with APD OD.     #Inferiorly subluxed lens OD  #Phacomorphic glaucoma OD  - Patient notes progressive vision loss OD over the past few months. Of note, she fell in May 2023  - Initial exam OD with Va CF,  and IOP of 45; 9/18 VA remains CF, IOP improved to 7, no RAPD seen.  - anterior segment exam with subluxed lens inferonasally OD,   - Subluxed lens likely lead to pupillary block causing initial increase in IOP  - IOP now well controlled on full drops and diamox. Please discontinue Diamox given significantly improved IOP.  - Continue brimonidine three times daily to the right eye, dorzolamide three times a day to the right eye, xalatan nightly to the right eye (ordered)  - No need for MRI brain and orbits at this time given no RAPD seen today and subluxed lens explains patient's symptoms  - Patient should be able to see better than CF with pinholes given flat macula and healthy appearing optic nerve, despite lens subluxation; will recheck VA with pinhole at next exam  - Patient will need cataract extraction promptly following discharge  - Patient instructed to avoid lying face down, to try to prevent another episode of pupillary block  - patient will require outpatient follow up with further cataract evaluation and glaucoma diagnostic testing following discharge form hospital     SDW Dr. Rooney    Outpatient follow-up: Patient should follow-up with his/her ophthalmologist or with [facility] Department of Ophthalmology upon discharge at the address below     [facility] Department of Ophthalmology  [address] [place] [address]. [address]  [place] [address]  [phone]       78 year old woman with ESRD on HD (MWF), HTN, DM (diet controlled), chronic type B aortic dissection, heart murmur, endometrial ca in remission s/p total hysterectomy in 2007 presents with fistula malfunction.  Ophthalmology consulted for blurry vision OD, found to have elevated IOP to 45 with APD OD.     #Inferiorly subluxed lens OD  #Phacomorphic glaucoma OD  - Patient notes progressive vision loss OD over the past few months. Of note, she fell in May 2023  - Initial exam OD with Va CF,  and IOP of 45; 9/18 VA remains CF, IOP improved to 7, no RAPD seen.  - anterior segment exam with subluxed lens inferonasally OD,   - Subluxed lens likely lead to pupillary block causing initial increase in IOP  - IOP now well controlled on full drops and diamox. Please discontinue Diamox given significantly improved IOP.  - Continue brimonidine three times daily to the right eye, dorzolamide three times a day to the right eye, xalatan nightly to the right eye (ordered)  - Start cyclogyl three times a day right eye  - No need for MRI brain and orbits at this time given no RAPD seen today and subluxed lens explains patient's symptoms  - Patient should be able to see better than CF with pinholes given flat macula and healthy appearing optic nerve, despite lens subluxation; will recheck VA with pinhole at next exam  - Patient will need cataract extraction promptly following discharge  - Patient instructed to avoid lying face down, to try to prevent another episode of pupillary block  - patient will require outpatient follow up with further cataract evaluation and glaucoma diagnostic testing following discharge form hospital     SDW Dr. Rooney    Outpatient follow-up: Patient should follow-up with his/her ophthalmologist or with [facility] Department of Ophthalmology upon discharge at the address below     [facility] Department of Ophthalmology  [address] [place] [address]. [address]  [place] [address]  [phone]

## 2023-09-18 NOTE — PROVIDER CONTACT NOTE (MEDICATION) - SITUATION
Patients blood pressure 160/71 HR 63. Pt received hydralazine 25mg PO X1 @00:32 & Labetalol 100mg PO @2:17.

## 2023-09-18 NOTE — PROVIDER CONTACT NOTE (OTHER) - ASSESSMENT
BP rechecked s/p hydralazine /83 on leg. HR 63. Pt remains asymptomatic & denies pain. BP rechecked s/p hydralazine /83 on leg. HR 63.

## 2023-09-18 NOTE — PROGRESS NOTE ADULT - SUBJECTIVE AND OBJECTIVE BOX
[facility] DEPARTMENT OF OPHTHALMOLOGY  ------------------------------------------------------------------------------  Adelina Lowe MD, PGY-3  Contact: TEAMS  ------------------------------------------------------------------------------    Interval History: No acute events overnight. Today patient states vision in right eye remains blurry, denies any pain.     MEDICATIONS  (STANDING):  acetaZOLAMIDE    Tablet 125 milliGRAM(s) Oral daily  ascorbic acid 500 milliGRAM(s) Oral daily  brimonidine 0.2% Ophthalmic Solution 1 Drop(s) Right EYE three times a day  calcium carbonate 1250 mG  + Vitamin D (OsCal 500 + D) 1 Tablet(s) Oral three times a day  ceFAZolin   IVPB 1000 milliGRAM(s) IV Intermittent every 24 hours  chlorhexidine 2% Cloths 1 Application(s) Topical daily  dorzolamide 2% Ophthalmic Solution 1 Drop(s) Right EYE three times a day  epoetin marla (EPOGEN) Injectable 4000 Unit(s) IV Push <User Schedule>  folic acid 1 milliGRAM(s) Oral daily  heparin   Injectable 5000 Unit(s) SubCutaneous every 8 hours  hydrALAZINE 50 milliGRAM(s) Oral two times a day  influenza  Vaccine (HIGH DOSE) 0.7 milliLiter(s) IntraMuscular once  latanoprost 0.005% Ophthalmic Solution 1 Drop(s) Right EYE at bedtime  metoprolol succinate ER 12.5 milliGRAM(s) Oral daily  Nephro-nathanael 1 Tablet(s) Oral daily  pantoprazole    Tablet 40 milliGRAM(s) Oral before breakfast  polyethylene glycol 3350 17 Gram(s) Oral daily  senna 2 Tablet(s) Oral at bedtime    MEDICATIONS  (PRN):  acetaminophen     Tablet .. 650 milliGRAM(s) Oral every 6 hours PRN Temp greater or equal to 38C (100.4F), Mild Pain (1 - 3)  aluminum hydroxide/magnesium hydroxide/simethicone Suspension 30 milliLiter(s) Oral every 4 hours PRN Dyspepsia  melatonin 3 milliGRAM(s) Oral at bedtime PRN Insomnia  ondansetron Injectable 4 milliGRAM(s) IV Push every 8 hours PRN Nausea and/or Vomiting  oxyCODONE    IR 5 milliGRAM(s) Oral every 6 hours PRN Severe Pain (7 - 10)  sodium chloride 0.9% Bolus. 100 milliLiter(s) IV Bolus every 5 minutes PRN SBP LESS THAN or EQUAL to 90 mmHg      VITALS: T(C): 36.4 (09-18-23 @ 05:49)  T(F): 97.5 (09-18-23 @ 05:49), Max: 98.2 (09-17-23 @ 12:27)  HR: 63 (09-18-23 @ 05:49) (59 - 65)  BP: 160/71 (09-18-23 @ 05:49) (150/82 - 176/86)  RR:  (17 - 18)  SpO2:  (98% - 100%)  Wt(kg): --  General: AAO x 3, appropriate mood and affect    Ophthalmology Exam:  Visual acuity (cc): CF OD, 20/30 OS  Pupils: round and briskly reactive OU with no RAPD OU  Ttono: 7OD, 10 OS  Extraocular movements (EOMs): Full OU, no pain, no diplopia  Confrontational Visual Field (CVF): PATSY OD, full OS   Color Plates: unable to do control OD , full OS     Pen Light Exam (PLE)  External: Flat OU  Lids/Lashes/Lacrimal Ducts: Flat OU    Sclera/Conjunctiva: W+Q OU  Cornea: Cl OU  Anterior Chamber: D+F OU    Iris: Flat OU  Lens: 3-4+ Cortical changes/NS OU; with inferonasally subluxated lens OD     Fundus Exam: dilated with 1% tropicamide and 2.5% phenylephrine  Approval obtained from primary team for dilation  Patient aware that pupils can remained dilated for at least 4-6 hours  Exam performed with 20D lens    Vitreous: wnl OU  Disc, cup/disc: sharp and pink, 0.4 OU  Macula: wnl OU  Vessels: wnl OU  Periphery: poor view OD, wnl OS    [facility] DEPARTMENT OF OPHTHALMOLOGY  ------------------------------------------------------------------------------  Adelina Lowe MD, PGY-3  Contact: TEAMS  ------------------------------------------------------------------------------    Interval History: No acute events overnight. Today patient states vision in right eye remains blurry, denies any pain.     MEDICATIONS  (STANDING):  acetaZOLAMIDE    Tablet 125 milliGRAM(s) Oral daily  ascorbic acid 500 milliGRAM(s) Oral daily  brimonidine 0.2% Ophthalmic Solution 1 Drop(s) Right EYE three times a day  calcium carbonate 1250 mG  + Vitamin D (OsCal 500 + D) 1 Tablet(s) Oral three times a day  ceFAZolin   IVPB 1000 milliGRAM(s) IV Intermittent every 24 hours  chlorhexidine 2% Cloths 1 Application(s) Topical daily  dorzolamide 2% Ophthalmic Solution 1 Drop(s) Right EYE three times a day  epoetin marla (EPOGEN) Injectable 4000 Unit(s) IV Push <User Schedule>  folic acid 1 milliGRAM(s) Oral daily  heparin   Injectable 5000 Unit(s) SubCutaneous every 8 hours  hydrALAZINE 50 milliGRAM(s) Oral two times a day  influenza  Vaccine (HIGH DOSE) 0.7 milliLiter(s) IntraMuscular once  latanoprost 0.005% Ophthalmic Solution 1 Drop(s) Right EYE at bedtime  metoprolol succinate ER 12.5 milliGRAM(s) Oral daily  Nephro-nathanael 1 Tablet(s) Oral daily  pantoprazole    Tablet 40 milliGRAM(s) Oral before breakfast  polyethylene glycol 3350 17 Gram(s) Oral daily  senna 2 Tablet(s) Oral at bedtime    MEDICATIONS  (PRN):  acetaminophen     Tablet .. 650 milliGRAM(s) Oral every 6 hours PRN Temp greater or equal to 38C (100.4F), Mild Pain (1 - 3)  aluminum hydroxide/magnesium hydroxide/simethicone Suspension 30 milliLiter(s) Oral every 4 hours PRN Dyspepsia  melatonin 3 milliGRAM(s) Oral at bedtime PRN Insomnia  ondansetron Injectable 4 milliGRAM(s) IV Push every 8 hours PRN Nausea and/or Vomiting  oxyCODONE    IR 5 milliGRAM(s) Oral every 6 hours PRN Severe Pain (7 - 10)  sodium chloride 0.9% Bolus. 100 milliLiter(s) IV Bolus every 5 minutes PRN SBP LESS THAN or EQUAL to 90 mmHg      VITALS: T(C): 36.4 (09-18-23 @ 05:49)  T(F): 97.5 (09-18-23 @ 05:49), Max: 98.2 (09-17-23 @ 12:27)  HR: 63 (09-18-23 @ 05:49) (59 - 65)  BP: 160/71 (09-18-23 @ 05:49) (150/82 - 176/86)  RR:  (17 - 18)  SpO2:  (98% - 100%)  Wt(kg): --  General: AAO x 3, appropriate mood and affect    Ophthalmology Exam:  Visual acuity (cc): CF OD, 20/30 OS  Pupils: round and briskly reactive OU with no RAPD OU  Ttono: 7OD, 10 OS  Extraocular movements (EOMs): Full OU, no pain, no diplopia  Confrontational Visual Field (CVF): PATSY OD, full OS   Color Plates: unable to do control OD , full OS     Pen Light Exam (PLE)  External: Flat OU  Lids/Lashes/Lacrimal Ducts: Flat OU    Sclera/Conjunctiva: W+Q OU  Cornea: Cl OU  Anterior Chamber: D+F OU    Iris: Flat OU  Lens: Cortical cataract and NS OD with inferiorly subluxed lens, 6 clock hours of zonules missing superior.  Cortical cataract and NS OS.     Fundus Exam: dilated with 1% tropicamide and 2.5% phenylephrine  Approval obtained from primary team for dilation  Patient aware that pupils can remained dilated for at least 4-6 hours  Exam performed with 20D lens    Vitreous: wnl OU  Disc, cup/disc: sharp and pink, 0.6 OU  Macula: wnl OU  Vessels: wnl OU  Periphery: poor view OD, wnl OS

## 2023-09-18 NOTE — PROVIDER CONTACT NOTE (OTHER) - BACKGROUND
Pt admitted for other injury of unspecified body region. Pt has L arm fistula wound with wound vac & L hip wound. Pt is on cefazolin q24hrs.
Pt admitted for other injury of unspecified body region. Pt has R arm precautions, preserving for fistula. L arm wound vac. BP readings being done on LE.

## 2023-09-18 NOTE — PROGRESS NOTE ADULT - SUBJECTIVE AND OBJECTIVE BOX
[facility] DIVISION OF KIDNEY DISEASES AND HYPERTENSION --   --------------------------------------------------------------------------------  Chief Complaint: ESRD/Ongoing hemodialysis requirement    24 hour events/subjective: Pt. seen and examined today. Pt. resting, gives history of right eye blurriness and LUE swelling. No fever, CP, SOB, HA or dizziness during rounds. Pt. scheduled for HD treatment today.     PAST HISTORY  --------------------------------------------------------------------------------  No significant changes to PMH, PSH, FHx, SHx, unless otherwise noted    ALLERGIES & MEDICATIONS  --------------------------------------------------------------------------------  Allergies    No Known Allergies    Intolerances    Standing Inpatient Medications  acetaZOLAMIDE    Tablet 125 milliGRAM(s) Oral daily  ascorbic acid 500 milliGRAM(s) Oral daily  brimonidine 0.2% Ophthalmic Solution 1 Drop(s) Right EYE three times a day  calcium carbonate 1250 mG  + Vitamin D (OsCal 500 + D) 1 Tablet(s) Oral three times a day  ceFAZolin   IVPB 1000 milliGRAM(s) IV Intermittent every 24 hours  chlorhexidine 2% Cloths 1 Application(s) Topical daily  dorzolamide 2% Ophthalmic Solution 1 Drop(s) Right EYE three times a day  epoetin marla (EPOGEN) Injectable 4000 Unit(s) IV Push <User Schedule>  folic acid 1 milliGRAM(s) Oral daily  heparin   Injectable 5000 Unit(s) SubCutaneous every 8 hours  hydrALAZINE 50 milliGRAM(s) Oral two times a day  influenza  Vaccine (HIGH DOSE) 0.7 milliLiter(s) IntraMuscular once  latanoprost 0.005% Ophthalmic Solution 1 Drop(s) Right EYE at bedtime  metoprolol succinate ER 12.5 milliGRAM(s) Oral daily  Nephro-nathanael 1 Tablet(s) Oral daily  pantoprazole    Tablet 40 milliGRAM(s) Oral before breakfast  polyethylene glycol 3350 17 Gram(s) Oral daily  senna 2 Tablet(s) Oral at bedtime    PRN Inpatient Medications  acetaminophen     Tablet .. 650 milliGRAM(s) Oral every 6 hours PRN  aluminum hydroxide/magnesium hydroxide/simethicone Suspension 30 milliLiter(s) Oral every 4 hours PRN  melatonin 3 milliGRAM(s) Oral at bedtime PRN  ondansetron Injectable 4 milliGRAM(s) IV Push every 8 hours PRN  oxyCODONE    IR 5 milliGRAM(s) Oral every 6 hours PRN  sodium chloride 0.9% Bolus. 100 milliLiter(s) IV Bolus every 5 minutes PRN    REVIEW OF SYSTEMS  --------------------------------------------------------------------------------  Gen: No fever  HEENT: See HPI  Respiratory: No dyspnea  CV: No chest pain  GI: No abdominal pain  MSK: LUE swelling and intermittent pain  Neuro: No dizziness    All other systems were reviewed and are negative, except as noted.    VITALS/PHYSICAL EXAM  --------------------------------------------------------------------------------  T(C): 36.4 (09-18-23 @ 05:49), Max: 36.8 (09-17-23 @ 12:27)  HR: 63 (09-18-23 @ 05:49) (59 - 65)  BP: 160/71 (09-18-23 @ 05:49) (150/82 - 176/86)  RR: 18 (09-18-23 @ 05:49) (17 - 18)  SpO2: 99% (09-18-23 @ 05:49) (98% - 100%)  Wt(kg): --    Physical Exam:  	Gen: resting, NAD  	HEENT: No JVD  	Pulm: CTA B/L  	CV: S1S2+  	Abd: Soft              UE: LUE dressing with drain seen  	LE: B/L LE mild pitting edema          	Vascular access: Right IJ tunneled HD catheter site: dressing seen    LABS/STUDIES  --------------------------------------------------------------------------------              8.5    4.11  >-----------<  171      [09-17-23 @ 05:20]              26.5     137  |  100  |  24  ----------------------------<  133      [09-17-23 @ 05:20]  3.8   |  26  |  3.98        Ca     9.1     [09-17-23 @ 05:20]      Mg     2.10     [09-17-23 @ 05:20]      Phos  3.8     [09-17-23 @ 05:20]    TPro  6.6  /  Alb  2.3  /  TBili  0.6  /  DBili  x   /  AST  48  /  ALT  10  /  AlkPhos  285  [09-17-23 @ 05:20]    HBsAg Nonreact      [09-06-23 @ 06:34]  HCV 0.21, Nonreact      [09-06-23 @ 06:34]

## 2023-09-18 NOTE — PROGRESS NOTE ADULT - SUBJECTIVE AND OBJECTIVE BOX
Cardiovascular Disease Progress Note  Date of Service: 09-18-23 @ 07:51    Overnight events: No acute events overnight.    Patient denies chest pain or SOB.   Otherwise review of systems negative    Objective Findings:  T(C): 36.4 (09-18-23 @ 05:49), Max: 36.8 (09-17-23 @ 12:27)  HR: 63 (09-18-23 @ 05:49) (59 - 65)  BP: 160/71 (09-18-23 @ 05:49) (150/82 - 176/86)  RR: 18 (09-18-23 @ 05:49) (17 - 18)  SpO2: 99% (09-18-23 @ 05:49) (98% - 100%)  Wt(kg): --  Daily     Daily       Physical Exam:  Gen: NAD; Patient resting comfortably  HEENT: EOMI, Normocephalic/ atraumatic  CV: RRR, normal S1 + S2, no m/r/g  Lungs:  Normal respiratory effort; clear to auscultation bilaterally  Abd: soft, non-tender; bowel sounds present  Ext: No edema; warm and well perfused    Telemetry: n/a    Laboratory Data:                        8.5    4.11  )-----------( 171      ( 17 Sep 2023 05:20 )             26.5     09-17    137  |  100  |  24<H>  ----------------------------<  133<H>  3.8   |  26  |  3.98<H>    Ca    9.1      17 Sep 2023 05:20  Phos  3.8     09-17  Mg     2.10     09-17    TPro  6.6  /  Alb  2.3<L>  /  TBili  0.6  /  DBili  x   /  AST  48<H>  /  ALT  10  /  AlkPhos  285<H>  09-17              Inpatient Medications:  MEDICATIONS  (STANDING):  acetaZOLAMIDE    Tablet 125 milliGRAM(s) Oral daily  ascorbic acid 500 milliGRAM(s) Oral daily  brimonidine 0.2% Ophthalmic Solution 1 Drop(s) Right EYE three times a day  calcium carbonate 1250 mG  + Vitamin D (OsCal 500 + D) 1 Tablet(s) Oral three times a day  ceFAZolin   IVPB 1000 milliGRAM(s) IV Intermittent every 24 hours  chlorhexidine 2% Cloths 1 Application(s) Topical daily  dorzolamide 2% Ophthalmic Solution 1 Drop(s) Right EYE three times a day  epoetin marla (EPOGEN) Injectable 4000 Unit(s) IV Push <User Schedule>  folic acid 1 milliGRAM(s) Oral daily  heparin   Injectable 5000 Unit(s) SubCutaneous every 8 hours  hydrALAZINE 50 milliGRAM(s) Oral two times a day  influenza  Vaccine (HIGH DOSE) 0.7 milliLiter(s) IntraMuscular once  latanoprost 0.005% Ophthalmic Solution 1 Drop(s) Right EYE at bedtime  metoprolol succinate ER 12.5 milliGRAM(s) Oral daily  Nephro-nathanael 1 Tablet(s) Oral daily  pantoprazole    Tablet 40 milliGRAM(s) Oral before breakfast  polyethylene glycol 3350 17 Gram(s) Oral daily  senna 2 Tablet(s) Oral at bedtime      Assessment: 78 year old woman with ESRD on HD (MWF), HTN, DM (diet controlled), chronic type B aortic dissection, heart murmur, endometrial ca in remission s/p total hysterectomy in 2007 presents with fistula malfunction.      Plan of Care:      #Post-operative evaluation-  - Ms. Harris tolerated bedside plastic surgery intervention well.  She displays no signs of  coronary ischemia or decompensated CHF.   EKG is sinus with a known RBBB.  Continue current cardiac management.          #Type B aortic dissection  - Chronic (Diagnosed >10 years ago)  - Patient will need better BP control.  Up titrate hydralazine if no renal objection.        #ESRD-  - HD as per renal.        Over 25 minutes spent on total encounter; more than 50% of the visit was spent counseling and/or coordinating care by the attending physician.      Gbao Burris MD [facility]  Cardiovascular Disease  [phone]

## 2023-09-18 NOTE — PROVIDER CONTACT NOTE (OTHER) - SITUATION
Patients blood pressure elevated overnight. Hydralazine 25mg PO administered as ordered.
Difficulty obtaining temperature orally/axillary. Portable oral thermometer used, reading of 94.1.

## 2023-09-18 NOTE — PROVIDER CONTACT NOTE (OTHER) - ACTION/TREATMENT ORDERED:
No further orders at this time. Labetolol 100mg PO administered as ordered, to recheck bp before AM bp meds. No further orders.

## 2023-09-18 NOTE — PROGRESS NOTE ADULT - SUBJECTIVE AND OBJECTIVE BOX
Patient is a 78y old  Female who presents with a chief complaint of L hip drainage (18 Sep 2023 11:22)    Date of servie : 09-18-23 @ 13:01  INTERVAL HPI/OVERNIGHT EVENTS:  T(C): 36.6 (09-18-23 @ 12:00), Max: 36.6 (09-18-23 @ 12:00)  HR: 67 (09-18-23 @ 12:00) (59 - 67)  BP: 130/68 (09-18-23 @ 12:00) (130/68 - 176/86)  RR: 18 (09-18-23 @ 12:00) (18 - 18)  SpO2: 99% (09-18-23 @ 12:00) (99% - 100%)  Wt(kg): --  I&O's Summary      LABS:                        8.5    4.11  )-----------( 171      ( 17 Sep 2023 05:20 )             26.5     09-17    137  |  100  |  24<H>  ----------------------------<  133<H>  3.8   |  26  |  3.98<H>    Ca    9.1      17 Sep 2023 05:20  Phos  3.8     09-17  Mg     2.10     09-17    TPro  6.6  /  Alb  2.3<L>  /  TBili  0.6  /  DBili  x   /  AST  48<H>  /  ALT  10  /  AlkPhos  285<H>  09-17      Urinalysis Basic - ( 17 Sep 2023 05:20 )    Color: x / Appearance: x / SG: x / pH: x  Gluc: 133 mg/dL / Ketone: x  / Bili: x / Urobili: x   Blood: x / Protein: x / Nitrite: x   Leuk Esterase: x / RBC: x / WBC x   Sq Epi: x / Non Sq Epi: x / Bacteria: x      CAPILLARY BLOOD GLUCOSE            Urinalysis Basic - ( 17 Sep 2023 05:20 )    Color: x / Appearance: x / SG: x / pH: x  Gluc: 133 mg/dL / Ketone: x  / Bili: x / Urobili: x   Blood: x / Protein: x / Nitrite: x   Leuk Esterase: x / RBC: x / WBC x   Sq Epi: x / Non Sq Epi: x / Bacteria: x        MEDICATIONS  (STANDING):  acetaZOLAMIDE    Tablet 125 milliGRAM(s) Oral daily  ascorbic acid 500 milliGRAM(s) Oral daily  brimonidine 0.2% Ophthalmic Solution 1 Drop(s) Right EYE three times a day  calcium carbonate 1250 mG  + Vitamin D (OsCal 500 + D) 1 Tablet(s) Oral three times a day  ceFAZolin   IVPB 1000 milliGRAM(s) IV Intermittent every 24 hours  chlorhexidine 2% Cloths 1 Application(s) Topical daily  dorzolamide 2% Ophthalmic Solution 1 Drop(s) Right EYE three times a day  epoetin marla (EPOGEN) Injectable 4000 Unit(s) IV Push <User Schedule>  folic acid 1 milliGRAM(s) Oral daily  heparin   Injectable 5000 Unit(s) SubCutaneous every 8 hours  hydrALAZINE 50 milliGRAM(s) Oral two times a day  influenza  Vaccine (HIGH DOSE) 0.7 milliLiter(s) IntraMuscular once  latanoprost 0.005% Ophthalmic Solution 1 Drop(s) Right EYE at bedtime  metoprolol succinate ER 12.5 milliGRAM(s) Oral daily  Nephro-nathanael 1 Tablet(s) Oral daily  pantoprazole    Tablet 40 milliGRAM(s) Oral before breakfast  polyethylene glycol 3350 17 Gram(s) Oral daily  senna 2 Tablet(s) Oral at bedtime    MEDICATIONS  (PRN):  acetaminophen     Tablet .. 650 milliGRAM(s) Oral every 6 hours PRN Temp greater or equal to 38C (100.4F), Mild Pain (1 - 3)  aluminum hydroxide/magnesium hydroxide/simethicone Suspension 30 milliLiter(s) Oral every 4 hours PRN Dyspepsia  melatonin 3 milliGRAM(s) Oral at bedtime PRN Insomnia  ondansetron Injectable 4 milliGRAM(s) IV Push every 8 hours PRN Nausea and/or Vomiting  oxyCODONE    IR 5 milliGRAM(s) Oral every 6 hours PRN Severe Pain (7 - 10)  sodium chloride 0.9% Bolus. 100 milliLiter(s) IV Bolus every 5 minutes PRN SBP LESS THAN or EQUAL to 90 mmHg          PHYSICAL EXAM:  GENERAL: NAD, well-groomed, well-developed  HEAD:  Atraumatic, Normocephalic  CHEST/LUNG: Clear to percussion bilaterally; No rales, rhonchi, wheezing, or rubs  HEART: Regular rate and rhythm; No murmurs, rubs, or gallops  ABDOMEN: Soft, Nontender, Nondistended; Bowel sounds present  EXTREMITIES:  2+ Peripheral Pulses, No clubbing, cyanosis, or edema  LYMPH: No lymphadenopathy noted  SKIN: No rashes or lesions    Care Discussed with Consultants/Other Providers [ ] YES  [ ] NO

## 2023-09-19 ENCOUNTER — TRANSCRIPTION ENCOUNTER (OUTPATIENT)
Age: 78
End: 2023-09-19

## 2023-09-19 VITALS
DIASTOLIC BLOOD PRESSURE: 62 MMHG | HEART RATE: 62 BPM | OXYGEN SATURATION: 100 % | TEMPERATURE: 97 F | SYSTOLIC BLOOD PRESSURE: 128 MMHG | RESPIRATION RATE: 16 BRPM

## 2023-09-19 LAB
ANION GAP SERPL CALC-SCNC: 11 MMOL/L — SIGNIFICANT CHANGE UP (ref 7–14)
BUN SERPL-MCNC: 16 MG/DL — SIGNIFICANT CHANGE UP (ref 7–23)
CALCIUM SERPL-MCNC: 8.4 MG/DL — SIGNIFICANT CHANGE UP (ref 8.4–10.5)
CHLORIDE SERPL-SCNC: 99 MMOL/L — SIGNIFICANT CHANGE UP (ref 98–107)
CO2 SERPL-SCNC: 27 MMOL/L — SIGNIFICANT CHANGE UP (ref 22–31)
CREAT SERPL-MCNC: 2.85 MG/DL — HIGH (ref 0.5–1.3)
EGFR: 17 ML/MIN/1.73M2 — LOW
GLUCOSE SERPL-MCNC: 202 MG/DL — HIGH (ref 70–99)
HCT VFR BLD CALC: 24.8 % — LOW (ref 34.5–45)
HGB BLD-MCNC: 8.1 G/DL — LOW (ref 11.5–15.5)
MAGNESIUM SERPL-MCNC: 2 MG/DL — SIGNIFICANT CHANGE UP (ref 1.6–2.6)
MCHC RBC-ENTMCNC: 32.1 PG — SIGNIFICANT CHANGE UP (ref 27–34)
MCHC RBC-ENTMCNC: 32.7 GM/DL — SIGNIFICANT CHANGE UP (ref 32–36)
MCV RBC AUTO: 98.4 FL — SIGNIFICANT CHANGE UP (ref 80–100)
NRBC # BLD: 0 /100 WBCS — SIGNIFICANT CHANGE UP (ref 0–0)
NRBC # FLD: 0 K/UL — SIGNIFICANT CHANGE UP (ref 0–0)
PHOSPHATE SERPL-MCNC: 2.3 MG/DL — LOW (ref 2.5–4.5)
PLATELET # BLD AUTO: 117 K/UL — LOW (ref 150–400)
POTASSIUM SERPL-MCNC: 3.5 MMOL/L — SIGNIFICANT CHANGE UP (ref 3.5–5.3)
POTASSIUM SERPL-SCNC: 3.5 MMOL/L — SIGNIFICANT CHANGE UP (ref 3.5–5.3)
RBC # BLD: 2.52 M/UL — LOW (ref 3.8–5.2)
RBC # FLD: 18.2 % — HIGH (ref 10.3–14.5)
SODIUM SERPL-SCNC: 137 MMOL/L — SIGNIFICANT CHANGE UP (ref 135–145)
WBC # BLD: 4.2 K/UL — SIGNIFICANT CHANGE UP (ref 3.8–10.5)
WBC # FLD AUTO: 4.2 K/UL — SIGNIFICANT CHANGE UP (ref 3.8–10.5)

## 2023-09-19 RX ORDER — LATANOPROST 0.05 MG/ML
1 SOLUTION/ DROPS OPHTHALMIC; TOPICAL
Qty: 0 | Refills: 0 | DISCHARGE
Start: 2023-09-19

## 2023-09-19 RX ORDER — DORZOLAMIDE HYDROCHLORIDE 20 MG/ML
1 SOLUTION/ DROPS OPHTHALMIC
Refills: 0 | DISCHARGE
Start: 2023-09-19

## 2023-09-19 RX ORDER — BRIMONIDINE TARTRATE 2 MG/MG
1 SOLUTION/ DROPS OPHTHALMIC
Refills: 0 | DISCHARGE
Start: 2023-09-19

## 2023-09-19 RX ORDER — CYCLOPENTOLATE HYDROCHLORIDE 10 MG/ML
1 SOLUTION/ DROPS OPHTHALMIC
Qty: 0 | Refills: 0 | DISCHARGE
Start: 2023-09-19

## 2023-09-19 RX ADMIN — CYCLOPENTOLATE HYDROCHLORIDE 1 DROP(S): 10 SOLUTION/ DROPS OPHTHALMIC at 06:30

## 2023-09-19 RX ADMIN — ACETAZOLAMIDE 125 MILLIGRAM(S): 250 TABLET ORAL at 11:59

## 2023-09-19 RX ADMIN — CYCLOPENTOLATE HYDROCHLORIDE 1 DROP(S): 10 SOLUTION/ DROPS OPHTHALMIC at 14:19

## 2023-09-19 RX ADMIN — Medication 1 TABLET(S): at 12:00

## 2023-09-19 RX ADMIN — Medication 1 TABLET(S): at 06:29

## 2023-09-19 RX ADMIN — Medication 500 MILLIGRAM(S): at 11:59

## 2023-09-19 RX ADMIN — CHLORHEXIDINE GLUCONATE 1 APPLICATION(S): 213 SOLUTION TOPICAL at 12:01

## 2023-09-19 RX ADMIN — Medication 12.5 MILLIGRAM(S): at 06:29

## 2023-09-19 RX ADMIN — BRIMONIDINE TARTRATE 1 DROP(S): 2 SOLUTION/ DROPS OPHTHALMIC at 14:19

## 2023-09-19 RX ADMIN — Medication 1 MILLIGRAM(S): at 12:00

## 2023-09-19 RX ADMIN — Medication 1 TABLET(S): at 14:19

## 2023-09-19 RX ADMIN — BRIMONIDINE TARTRATE 1 DROP(S): 2 SOLUTION/ DROPS OPHTHALMIC at 06:30

## 2023-09-19 RX ADMIN — DORZOLAMIDE HYDROCHLORIDE 1 DROP(S): 20 SOLUTION/ DROPS OPHTHALMIC at 14:19

## 2023-09-19 RX ADMIN — DORZOLAMIDE HYDROCHLORIDE 1 DROP(S): 20 SOLUTION/ DROPS OPHTHALMIC at 06:29

## 2023-09-19 RX ADMIN — Medication 50 MILLIGRAM(S): at 06:29

## 2023-09-19 RX ADMIN — PANTOPRAZOLE SODIUM 40 MILLIGRAM(S): 20 TABLET, DELAYED RELEASE ORAL at 06:29

## 2023-09-19 NOTE — DISCHARGE NOTE PROVIDER - PROVIDER TOKENS
PROVIDER:[TOKEN:[[id number]],FOLLOWUP:[1 week]],PROVIDER:[TOKEN:[[id number]],FOLLOWUP:[1 week]] PROVIDER:[TOKEN:[[id number]],FOLLOWUP:[1 week]],PROVIDER:[TOKEN:[[id number]],FOLLOWUP:[1 week]],PROVIDER:[TOKEN:[[id number]],FOLLOWUP:[1 week]] PROVIDER:[TOKEN:[[id number]],FOLLOWUP:[1 week]],PROVIDER:[TOKEN:[[id number]],FOLLOWUP:[1 week]],PROVIDER:[TOKEN:[[id number]],FOLLOWUP:[1 week]],FREE:[LAST:[Ophthalmology],PHONE:[(   )    -],FAX:[(   )    -],ADDRESS:[Outpatient follow-up: Patient should follow-up with his/her [facility] Department of Ophthalmology  [address] [place] [address]. [place] [address]  [phone]],FOLLOWUP:[1-3 days]]

## 2023-09-19 NOTE — PROGRESS NOTE ADULT - REASON FOR ADMISSION
L hip drainage

## 2023-09-19 NOTE — DISCHARGE NOTE PROVIDER - CARE PROVIDER_API CALL
Vasyl Patel  Orthopaedic Surgery  [address], [address]  [place] [address]  Phone: [phone]  Fax: [phone]  Follow Up Time: 1 week    Diamond Jensen  Vascular Surgery  [address], [address]  [place] [address]  Phone: [phone]  Fax: [phone]  Follow Up Time: 1 week   Vasyl Patel  Orthopaedic Surgery  [address], [address]  [place] [address]  Phone: [phone]  Fax: [phone]  Follow Up Time: 1 week    Diamond Jensen  Vascular Surgery  [address], [address]  [place] [address]  Phone: [phone]  Fax: [phone]  Follow Up Time: 1 week    Didier Valencia  Plastic Surgery  [address], [address]  [place] [address]  Phone: [phone]  Fax: [phone]  Follow Up Time: 1 week   Vasyl Patel  Orthopaedic Surgery  [address], [address]  [place] [address]  Phone: [phone]  Fax: [phone]  Follow Up Time: 1 week    Diamond Jensen  Vascular Surgery  [address], [address]  [place] [address]  Phone: [phone]  Fax: [phone]  Follow Up Time: 1 week    Didier Valencia  Plastic Surgery  [address], [address]  [place] [address]  Phone: [phone]  Fax: [phone]  Follow Up Time: 1 week    Ophthalmology,   Outpatient follow-up: Patient should follow-up with his/her [facility] Department of Ophthalmology  [address]  [place] [address]  [phone]  Phone: (   )    -  Fax: (   )    -  Follow Up Time: 1-3 days

## 2023-09-19 NOTE — DISCHARGE NOTE PROVIDER - NSDCFUSCHEDAPPT_GEN_ALL_CORE_FT
[facility] Physician [facility]  [address]ASTIC[address]  Scheduled Appointment: 09/20/2023

## 2023-09-19 NOTE — DISCHARGE NOTE PROVIDER - NPI NUMBER (FOR SYSADMIN USE ONLY) :
[[id number] [[id number] [[id number]],[[id number]]

## 2023-09-19 NOTE — DISCHARGE NOTE PROVIDER - DETAILS OF MALNUTRITION DIAGNOSIS/DIAGNOSES
This patient has been assessed with a concern for Malnutrition and was treated during this hospitalization for the following Nutrition diagnosis/diagnoses:     -  09/16/2023: Moderate protein-calorie malnutrition

## 2023-09-19 NOTE — PROGRESS NOTE ADULT - SUBJECTIVE AND OBJECTIVE BOX
Patient is a 78y old  Female who presents with a chief complaint of L hip drainage (19 Sep 2023 10:24)    Date of servie : 09-19-23 @ 13:47  INTERVAL HPI/OVERNIGHT EVENTS:  T(C): 36.3 (09-19-23 @ 13:02), Max: 36.7 (09-18-23 @ 19:20)  HR: 62 (09-19-23 @ 13:02) (59 - 63)  BP: 128/62 (09-19-23 @ 13:02) (128/62 - 153/64)  RR: 16 (09-19-23 @ 13:02) (16 - 17)  SpO2: 100% (09-19-23 @ 13:02) (100% - 100%)  Wt(kg): --  I&O's Summary    18 Sep 2023 07:01  -  19 Sep 2023 07:00  --------------------------------------------------------  IN: 1200 mL / OUT: 2900 mL / NET: -1700 mL        LABS:                        8.1    4.20  )-----------( 117      ( 19 Sep 2023 05:33 )             24.8     09-19    137  |  99  |  16  ----------------------------<  202<H>  3.5   |  27  |  2.85<H>    Ca    8.4      19 Sep 2023 05:33  Phos  2.3     09-19  Mg     2.00     09-19    TPro  6.9  /  Alb  2.5<L>  /  TBili  0.4  /  DBili  x   /  AST  53<H>  /  ALT  <5<L>  /  AlkPhos  292<H>  09-18      Urinalysis Basic - ( 19 Sep 2023 05:33 )    Color: x / Appearance: x / SG: x / pH: x  Gluc: 202 mg/dL / Ketone: x  / Bili: x / Urobili: x   Blood: x / Protein: x / Nitrite: x   Leuk Esterase: x / RBC: x / WBC x   Sq Epi: x / Non Sq Epi: x / Bacteria: x      CAPILLARY BLOOD GLUCOSE            Urinalysis Basic - ( 19 Sep 2023 05:33 )    Color: x / Appearance: x / SG: x / pH: x  Gluc: 202 mg/dL / Ketone: x  / Bili: x / Urobili: x   Blood: x / Protein: x / Nitrite: x   Leuk Esterase: x / RBC: x / WBC x   Sq Epi: x / Non Sq Epi: x / Bacteria: x        MEDICATIONS  (STANDING):  acetaZOLAMIDE    Tablet 125 milliGRAM(s) Oral daily  ascorbic acid 500 milliGRAM(s) Oral daily  brimonidine 0.2% Ophthalmic Solution 1 Drop(s) Right EYE three times a day  calcium carbonate 1250 mG  + Vitamin D (OsCal 500 + D) 1 Tablet(s) Oral three times a day  ceFAZolin   IVPB 1000 milliGRAM(s) IV Intermittent every 24 hours  chlorhexidine 2% Cloths 1 Application(s) Topical daily  cyclopentolate 1% Solution 1 Drop(s) Right EYE three times a day  dorzolamide 2% Ophthalmic Solution 1 Drop(s) Right EYE three times a day  epoetin marla (EPOGEN) Injectable 4000 Unit(s) IV Push <User Schedule>  folic acid 1 milliGRAM(s) Oral daily  heparin   Injectable 5000 Unit(s) SubCutaneous every 8 hours  hydrALAZINE 50 milliGRAM(s) Oral two times a day  influenza  Vaccine (HIGH DOSE) 0.7 milliLiter(s) IntraMuscular once  latanoprost 0.005% Ophthalmic Solution 1 Drop(s) Right EYE at bedtime  metoprolol succinate ER 12.5 milliGRAM(s) Oral daily  Nephro-nathanael 1 Tablet(s) Oral daily  pantoprazole    Tablet 40 milliGRAM(s) Oral before breakfast  polyethylene glycol 3350 17 Gram(s) Oral daily  senna 2 Tablet(s) Oral at bedtime    MEDICATIONS  (PRN):  acetaminophen     Tablet .. 650 milliGRAM(s) Oral every 6 hours PRN Temp greater or equal to 38C (100.4F), Mild Pain (1 - 3)  aluminum hydroxide/magnesium hydroxide/simethicone Suspension 30 milliLiter(s) Oral every 4 hours PRN Dyspepsia  melatonin 3 milliGRAM(s) Oral at bedtime PRN Insomnia  ondansetron Injectable 4 milliGRAM(s) IV Push every 8 hours PRN Nausea and/or Vomiting  oxyCODONE    IR 5 milliGRAM(s) Oral every 6 hours PRN Severe Pain (7 - 10)  sodium chloride 0.9% Bolus. 100 milliLiter(s) IV Bolus every 5 minutes PRN SBP LESS THAN or EQUAL to 90 mmHg          PHYSICAL EXAM:  GENERAL: NAD, well-groomed, well-developed  HEAD:  Atraumatic, Normocephalic  CHEST/LUNG: Clear to percussion bilaterally; No rales, rhonchi, wheezing, or rubs  HEART: Regular rate and rhythm; No murmurs, rubs, or gallops  ABDOMEN: Soft, Nontender, Nondistended; Bowel sounds present  EXTREMITIES:  2+ Peripheral Pulses, No clubbing, cyanosis, or edema  LYMPH: No lymphadenopathy noted  SKIN: No rashes or lesions    Care Discussed with Consultants/Other Providers [x ] YES  [ ] NO

## 2023-09-19 NOTE — DISCHARGE NOTE NURSING/CASE MANAGEMENT/SOCIAL WORK - PATIENT PORTAL LINK FT
You can access the FollowMyHealth Patient Portal offered by [facility] by registering at the following website: [email or web address]. By joining [facility]’s FollowMyHealth portal, you will also be able to view your health information using other applications (apps) compatible with our system.

## 2023-09-19 NOTE — DISCHARGE NOTE PROVIDER - NSDCMRMEDTOKEN_GEN_ALL_CORE_FT
acetaminophen 325 mg oral tablet: 3 tab(s) orally every 8 hours  ascorbic acid 500 mg oral tablet: 1 tab(s) orally once a day  calcium acetate 667 mg oral tablet: 667 milligram(s) orally 3 times a day (with meals)  calcium-vitamin D 500 mg-5 mcg (200 intl units) oral tablet: 1 tab(s) orally 3 times a day  folic acid 1 mg oral tablet: 1 tab(s) orally once a day  hydrALAZINE 50 mg oral tablet: 1 tab(s) orally once a day  melatonin 3 mg oral tablet: 1 tab(s) orally once a day (at bedtime) As needed Insomnia  metoprolol succinate 25 mg oral tablet, extended release: 0.5 orally once a day  MiraLax oral powder for reconstitution: 17 gram(s) orally once a day  omeprazole 20 mg oral delayed release capsule: 1 cap(s) orally once a day  oxyCODONE 5 mg oral tablet: 1 tab(s) orally every 6 hours As needed Severe Pain (7 - 10)  Rosa-Skyla oral tablet: 1 tab(s) orally once a day  senna leaf extract oral tablet: 2 tab(s) orally once a day (at bedtime)   acetaminophen 325 mg oral tablet: 3 tab(s) orally every 8 hours  ascorbic acid 500 mg oral tablet: 1 tab(s) orally once a day  brimonidine 0.2% ophthalmic solution: 1 drop(s) to each affected eye 3 times a day  calcium acetate 667 mg oral tablet: 667 milligram(s) orally 3 times a day (with meals)  calcium-vitamin D 500 mg-5 mcg (200 intl units) oral tablet: 1 tab(s) orally 3 times a day  cefadroxil 500 mg oral capsule: 1 cap(s) orally once a day  cyclopentolate 1% ophthalmic solution: 1 drop(s) to each affected eye 3 times a day  dorzolamide 2% ophthalmic solution: 1 drop(s) to each affected eye 3 times a day  folic acid 1 mg oral tablet: 1 tab(s) orally once a day  hydrALAZINE 50 mg oral tablet: 1 tab(s) orally once a day  latanoprost 0.005% ophthalmic solution: 1 drop(s) to each affected eye once a day (at bedtime)  melatonin 3 mg oral tablet: 1 tab(s) orally once a day (at bedtime) As needed Insomnia  metoprolol succinate 25 mg oral tablet, extended release: 0.5 orally once a day  MiraLax oral powder for reconstitution: 17 gram(s) orally once a day  omeprazole 20 mg oral delayed release capsule: 1 cap(s) orally once a day  oxyCODONE 5 mg oral tablet: 1 tab(s) orally every 6 hours As needed Severe Pain (7 - 10)  Rosa-Skyla oral tablet: 1 tab(s) orally once a day  senna leaf extract oral tablet: 2 tab(s) orally once a day (at bedtime)

## 2023-09-19 NOTE — PROGRESS NOTE ADULT - NUTRITIONAL ASSESSMENT
This patient has been assessed with a concern for Malnutrition and has been determined to have a diagnosis/diagnoses of Moderate protein-calorie malnutrition.    This patient is being managed with:   Diet Regular-  Consistent Carbohydrate {Evening Snack} (CSTCHOSN)  DASH/TLC {Sodium & Cholesterol Restricted} (DASH)  1200mL Fluid Restriction ([id number])  For patients receiving Renal Replacement - No Protein Restr No Conc K No Conc Phos Low Sodium (RENAL)  Supplement Feeding Modality:  Oral  Nepro Cans or Servings Per Day:  2       Frequency:  Two Times a day  Entered: Sep 16 2023 12:22PM  
This patient has been assessed with a concern for Malnutrition and has been determined to have a diagnosis/diagnoses of Moderate protein-calorie malnutrition.    This patient is being managed with:   Diet Regular-  Consistent Carbohydrate {Evening Snack} (CSTCHOSN)  DASH/TLC {Sodium & Cholesterol Restricted} (DASH)  1200mL Fluid Restriction ([id number])  For patients receiving Renal Replacement - No Protein Restr No Conc K No Conc Phos Low Sodium (RENAL)  Supplement Feeding Modality:  Oral  Nepro Cans or Servings Per Day:  2       Frequency:  Two Times a day  Entered: Sep 16 2023 12:22PM  
This patient has been assessed with a concern for Malnutrition and has been determined to have a diagnosis/diagnoses of Moderate protein-calorie malnutrition.    This patient is being managed with:   Diet Regular-  Consistent Carbohydrate {Evening Snack} (CSTCHOSN)  DASH/TLC {Sodium & Cholesterol Restricted} (DASH)  1200mL Fluid Restriction ([id number])  For patients receiving Renal Replacement - No Protein Restr No Conc K No Conc Phos Low Sodium (RENAL)  Supplement Feeding Modality:  Oral  Nepro Cans or Servings Per Day:  2       Frequency:  Two Times a day  Entered: Sep 16 2023 12:22PM  
This patient has been assessed with a concern for Malnutrition and has been determined to have a diagnosis/diagnoses of Moderate protein-calorie malnutrition.    This patient is being managed with:   Diet Regular-  Consistent Carbohydrate {Evening Snack} (CSTCHOSN)  DASH/TLC {Sodium & Cholesterol Restricted} (DASH)  1200mL Fluid Restriction ([id number])  For patients receiving Renal Replacement - No Protein Restr No Conc K No Conc Phos Low Sodium (RENAL)  Supplement Feeding Modality:  Oral  Nepro Cans or Servings Per Day:  2       Frequency:  Two Times a day  Entered: Sep 16 2023 12:22PM    This patient has been assessed with a concern for Malnutrition and has been determined to have a diagnosis/diagnoses of Moderate protein-calorie malnutrition.    This patient is being managed with:   Diet Regular-  Consistent Carbohydrate {Evening Snack} (CSTCHOSN)  DASH/TLC {Sodium & Cholesterol Restricted} (DASH)  1200mL Fluid Restriction ([id number])  For patients receiving Renal Replacement - No Protein Restr No Conc K No Conc Phos Low Sodium (RENAL)  Supplement Feeding Modality:  Oral  Nepro Cans or Servings Per Day:  2       Frequency:  Two Times a day  Entered: Sep 16 2023 12:22PM

## 2023-09-19 NOTE — PROGRESS NOTE ADULT - PROVIDER SPECIALTY LIST ADULT
Hospitalist
Nephrology
Vascular Surgery
Cardiology
Hospitalist
Orthopedics
Vascular Surgery
Cardiology
Cardiology
Hospitalist
Plastic Surgery
Surgery
Hospitalist
Ophthalmology
Plastic Surgery
Plastic Surgery
Nephrology
Nephrology

## 2023-09-19 NOTE — CHART NOTE - NSCHARTNOTESELECT_GEN_ALL_CORE
Event Note
Tomorrow being Thursday    Hosea Lockett M.D.    
ACP NP/Event Note
Event Note
Event Note

## 2023-09-19 NOTE — DISCHARGE NOTE NURSING/CASE MANAGEMENT/SOCIAL WORK - NSDCFUADDAPPT_GEN_ALL_CORE_FT
Follow up with ortho MD Patel (for left thigh wound)  Follow up with vascular MD Jensen (for fistula)  Follow up with plastic MD Valencia (for wound vac)  Follow up with opthalmology

## 2023-09-19 NOTE — DISCHARGE NOTE PROVIDER - CARE PROVIDERS DIRECT ADDRESSES
,[email or web address],DirectAddress_Unknown ,[email or web address],DirectAddress_Unknown,DirectAddress_Unknown

## 2023-09-19 NOTE — DISCHARGE NOTE PROVIDER - NSDCFUADDAPPT_GEN_ALL_CORE_FT
Follow up with ortho MD Patel (for left thigh wound)  Follow up with vascular MD Jensen (for fistula)  Follow up with plastic MD Valencia (for wound vac) Follow up with ortho MD Patel (for left thigh wound)  Follow up with vascular MD Jensen (for fistula)  Follow up with plastic MD Valencia (for wound vac)  Follow up with opthalmology

## 2023-09-19 NOTE — PROGRESS NOTE ADULT - SUBJECTIVE AND OBJECTIVE BOX
Cardiovascular Disease Progress Note  Date of Service: 23 @ 10:25    Overnight events: No acute events overnight.   Patient denies chest pain or SOB.    Otherwise review of systems negative    Objective Findings:  T(C): 36.7 (23 @ 06:38), Max: 36.7 (23 @ 19:20)  HR: 63 (23 @ 06:38) (59 - 67)  BP: 153/64 (23 @ 06:38) (129/64 - 153/64)  RR: 16 (23 @ 06:38) (16 - 18)  SpO2: 100% (23 @ 06:38) (99% - 100%)  Wt(kg): --  Daily     Daily Weight in k.8 (18 Sep 2023 22:30)      Physical Exam:  Gen: NAD; Patient resting comfortably  HEENT: EOMI, Normocephalic/ atraumatic  CV: RRR, normal S1 + S2, no m/r/g  Lungs:  Normal respiratory effort; clear to auscultation bilaterally  Abd: soft, non-tender; bowel sounds present  Ext: No edema; warm and well perfused    Telemetry: n/a    Laboratory Data:                        8.1    4.20  )-----------( 117      ( 19 Sep 2023 05:33 )             24.8         137  |  99  |  16  ----------------------------<  202<H>  3.5   |  27  |  2.85<H>    Ca    8.4      19 Sep 2023 05:33  Phos  2.3       Mg     2.00         TPro  6.9  /  Alb  2.5<L>  /  TBili  0.4  /  DBili  x   /  AST  53<H>  /  ALT  <5<L>  /  AlkPhos  292<H>                Inpatient Medications:  MEDICATIONS  (STANDING):  acetaZOLAMIDE    Tablet 125 milliGRAM(s) Oral daily  ascorbic acid 500 milliGRAM(s) Oral daily  brimonidine 0.2% Ophthalmic Solution 1 Drop(s) Right EYE three times a day  calcium carbonate 1250 mG  + Vitamin D (OsCal 500 + D) 1 Tablet(s) Oral three times a day  ceFAZolin   IVPB 1000 milliGRAM(s) IV Intermittent every 24 hours  chlorhexidine 2% Cloths 1 Application(s) Topical daily  cyclopentolate 1% Solution 1 Drop(s) Right EYE three times a day  dorzolamide 2% Ophthalmic Solution 1 Drop(s) Right EYE three times a day  epoetin marla (EPOGEN) Injectable 4000 Unit(s) IV Push <User Schedule>  folic acid 1 milliGRAM(s) Oral daily  heparin   Injectable 5000 Unit(s) SubCutaneous every 8 hours  hydrALAZINE 50 milliGRAM(s) Oral two times a day  influenza  Vaccine (HIGH DOSE) 0.7 milliLiter(s) IntraMuscular once  latanoprost 0.005% Ophthalmic Solution 1 Drop(s) Right EYE at bedtime  metoprolol succinate ER 12.5 milliGRAM(s) Oral daily  Nephro-nathanael 1 Tablet(s) Oral daily  pantoprazole    Tablet 40 milliGRAM(s) Oral before breakfast  polyethylene glycol 3350 17 Gram(s) Oral daily  senna 2 Tablet(s) Oral at bedtime      Assessment: 78 year old woman with ESRD on HD (MWF), HTN, DM (diet controlled), chronic type B aortic dissection, heart murmur, endometrial ca in remission s/p total hysterectomy in  presents with fistula malfunction.      Plan of Care:      #Post-operative evaluation-  - Ms. Harris tolerated bedside plastic surgery intervention well.  She displays no signs of  coronary ischemia or decompensated CHF.   EKG is sinus with a known RBBB.  Continue current cardiac management.          #Type B aortic dissection  - Chronic (Diagnosed >10 years ago)  - Optimal BP control.          #ESRD-  - HD as per renal.        Over 25 minutes spent on total encounter; more than 50% of the visit was spent counseling and/or coordinating care by the attending physician.      Gabo Burris MD [facility]  Cardiovascular Disease  [phone]

## 2023-09-19 NOTE — CHART NOTE - NSCHARTNOTEFT_GEN_A_CORE
Cleared for discharge by ortho, can d/c on duracef 500 daily for total 14 days with outpatient follow up with MD Patel.   Cleared for discharge by plastics. Pt to continue wound vac until follow up in 2 weeks with MD Erik Velásquez.  Cleared for discharge by vascular. Pt to follow up with MD Jensen for outpt eval and fistula creation.    Gianna Jacobo NP  [id number]
Ophthalmology evdavion august, eye gtt ordered, recommended to start Diamox 500mg Po daily, discussed with Nephrology fellow rod Hong for adjusted renal dose of Diamox 125mg Po daily.
Per discussion with vascular resident, no plan for inpatient RUE AVF. Patient to follow up outpatient with vascular surgery for AVF creation. RUE precautions ordered.
Notified by RN that patient is hypertensive to 176/86 mm hg. Patient is 78 year old woman with ESRD on HD (MWF), HTN, DM (diet controlled), chronic type B aortic dissection, heart murmur, endometrial ca in remission s/p total hysterectomy in 2007 presents with fistula malfunction.  Seen and assessed the patient at bedside. Patient is Axox3, lying comfortably in bed. Patient denies any pain, distress, head ache, dizziness, visual disturbances, CP, SOB  at the time of assessment.     Patient received standing regimen of hydralazine 50 mg po after 1700 on 9/17. Extra dose of hydralazine 25 mg po x1 dose ordered. Post administration of hydralazine 25 mg is noted to be the same, as 175/83 mm hg.  Patient is asymptomatic. As patient is with Type B aortic dissection which is chronic, cardiology recommends, optimal BP control.    Labetalol 100 mg po x 1 dose ordered . Will f/u with BP  and will continue to monitor.     Addendum; On  reassessment, BP improved to 160/70 mm hg. Endorsed the day team to monitor and f/u BP

## 2023-09-19 NOTE — DISCHARGE NOTE PROVIDER - NSDCCPCAREPLAN_GEN_ALL_CORE_FT
PRINCIPAL DISCHARGE DIAGNOSIS  Diagnosis: Open wound  Assessment and Plan of Treatment:       SECONDARY DISCHARGE DIAGNOSES  Diagnosis: Wound dehiscence  Assessment and Plan of Treatment:     Diagnosis: Cataract  Assessment and Plan of Treatment: - Continue brimonidine three times daily to the right eye, dorzolamide three times a day to the right eye, xalatan nightly to the right eye (ordered)  - continue cyclogyl three times a day right eye  - Please promptly follow up  cataract extraction promptly following discharge  - Patient instructed to avoid lying face down, to try to prevent another episode of pupillary block  - patient will require outpatient follow up with further cataract evaluation and glaucoma diagnostic testing following discharge form hospital        PRINCIPAL DISCHARGE DIAGNOSIS  Diagnosis: Wound dehiscence  Assessment and Plan of Treatment: - ortho: serous drainage of L hip incision. Dermabond and dressing applied to incision.   - On ancef inpt with transition to duricef for total 14 days        SECONDARY DISCHARGE DIAGNOSES  Diagnosis: Cataract  Assessment and Plan of Treatment: - Continue brimonidine three times daily to the right eye, dorzolamide three times a day to the right eye, xalatan nightly to the right eye (ordered)  - continue cyclogyl three times a day right eye  - Please promptly follow up  cataract extraction promptly following discharge  - Patient instructed to avoid lying face down, to try to prevent another episode of pupillary block  - patient will require outpatient follow up with further cataract evaluation and glaucoma diagnostic testing following discharge form hospital       Diagnosis: ESRD on dialysis  Assessment and Plan of Treatment: Your L arm AVF was   You had a shiley placed for dialysis now, and your Right arm is protected for future fistula placement. Please follow up with MD Jensen for further fistula creation.     PRINCIPAL DISCHARGE DIAGNOSIS  Diagnosis: Wound dehiscence  Assessment and Plan of Treatment: You had a drainage of L hip incision and dermabond and dressing applied to incision. You were treated with IV antiboitic in the hospital. Complete your course with oral antibiotics for a total of 14 days. Follow up with MD Patel in 1 week for follow up.      SECONDARY DISCHARGE DIAGNOSES  Diagnosis: Cataract  Assessment and Plan of Treatment: - Continue brimonidine three times daily to the right eye, dorzolamide three times a day to the right eye, xalatan nightly to the right eye (ordered)  - continue cyclogyl three times a day right eye  - Please promptly follow up  cataract extraction promptly following discharge  - Please avoid lying face down, to try to prevent another episode of pupillary block  - Please have outpatient follow up with further cataract evaluation and glaucoma diagnostic testing following discharge form hospital       Diagnosis: ESRD on dialysis  Assessment and Plan of Treatment: Your L arm fistula had a ligation and resection with closure and wound vac placement. You had a shiley placed for dialysis now, and your Right arm is protected for future fistula placement. Please follow up with MD Jensen for further fistula creation. Please continue to follow your dialysis schedule and refer to your primary provider/nephrologist for further care/recommendations. Continue your medications and supplementation as directed.  Follow up with plastics doctor MD Valencia for wound vac management.

## 2023-09-19 NOTE — DISCHARGE NOTE PROVIDER - HOSPITAL COURSE
78F with history as above who presents from [facility] for possible infection of her surgical sites.     Fluid collection at surgical site.   - ortho: serous drainage of L hip incision. Dermabond and dressing applied to incision.   - On ancef inpt with transition to duricef for total 14 days    +LUE fistula   - LUE subclavian vein stenting 8/1 for stenosis and subsequent brachiocephalic AVF revision 8/3 with resection of the involved segment and primary anastomosis for ulceration.   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   - s/p Zosyn x7 days.  - IR placement of RIJ Permacath on 9/6/23.   -Ortho followed during admission to continue LLE preveena vac. Plastic surgery replaced preveena vac 9/6/23.   - plan for vascular follow up with MD Jensen for outpt RUE fistula placement    ESRD on dialysis.   - HD as scheduled via [facility]  - plan for vascular follow up with MD Jensen for outpt RUE fistula placement    vision loss   - #Inferiorly subluxed lens OD  #Phacomorphic glaucoma OD  - Patient notes progressive vision loss OD over the past few months. Of note, she fell in May 2023  - Initial exam OD with Va CF,  and IOP of 45; 9/18 VA remains CF, IOP improved to 7, no RAPD seen.  - anterior segment exam with subluxed lens inferonasally OD,   - Subluxed lens likely lead to pupillary block causing initial increase in IOP  - IOP now well controlled on full drops and diamox. Please discontinue Diamox given significantly improved IOP.  - Continue brimonidine three times daily to the right eye, dorzolamide three times a day to the right eye, xalatan nightly to the right eye (ordered)  - Start cyclogyl three times a day right eye  - No need for MRI brain and orbits at this time given no RAPD seen today and subluxed lens explains patient's symptoms  - Patient should be able to see better than CF with pinholes given flat macula and healthy appearing optic nerve, despite lens subluxation; will recheck VA with pinhole at next exam  - Patient will need cataract extraction promptly following discharge  - Patient instructed to avoid lying face down, to try to prevent another episode of pupillary block  - patient will require outpatient follow up with further cataract evaluation and glaucoma diagnostic testing following discharge form hospital       Transaminitis.  - MIld transaminitis likely in setting of fluid overload state, trend for now.    Type 2 diabetes mellitus.  - Not on medications since starting dialysis, last A1C 5, monitor off meds for now, CC diet.     Problem: Essential hypertension.   - c/w home medications.    Imaging abnormality.   - Has known chronic type B aortic dissection, here on imaging might be bigger but likely 2/2 imaging differences    Endometrial cancer.   - c/w outpatient follow up.    Case discussed with Dr. Dave on 9/19/23 and patient cleared for discharge. Reviewed discharge medications with patient; All new medications requiring new prescription sent to pharmacy of patients choice. Reviewed need for prescription for previous home medications and new prescriptions sent if requested. Patient in agreement and understands.     78F with history as above who presents from [facility] for possible infection of her surgical sites.     Fluid collection at surgical site.   - ortho: serous drainage of L hip incision. Dermabond and dressing applied to incision.   - On ancef inpt with transition to duricef for total 14 days    +LUE fistula ligation/aneurysm resection  - LUE subclavian vein stenting 8/1 for stenosis and subsequent brachiocephalic AVF revision 8/3 with resection of the involved segment and primary anastomosis for ulceration.   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   - s/p Zosyn x7 days.  - IR placement of RIJ Permacath on 9/6/23.   -Ortho followed during admission to continue LLE preveena vac. Plastic surgery replaced preveena vac 9/6/23.   - plan for vascular follow up with MD Jensen for outpt RUE fistula placement    ESRD on dialysis.   - HD as scheduled via [facility]  - plan for vascular follow up with MD Jensen for outpt RUE fistula placement    vision loss   - Inferiorly subluxed lens OD/Phacomorphic glaucoma OD  - Initial exam OD with Va CF,  and IOP of 45; 9/18 VA remains CF, IOP improved to 7, no RAPD seen.  - anterior segment exam with subluxed lens inferonasally OD,   - Subluxed lens likely lead to pupillary block causing initial increase in IOP  - IOP now well controlled on full drops and diamox. discontinued 9/19  - Continue brimonidine three times daily to the right eye, dorzolamide three times a day to the right eye, xalatan nightly to the right eye (ordered) & cyclogyl three times a day right eye  - No need for MRI brain and orbits at this time given no RAPD seen  - Patient should be able to see better than CF with pinholes given flat macula and healthy appearing optic nerve, despite lens subluxation; will recheck VA with pinhole at next exam  - Patient will need cataract extraction promptly following discharge  - patient will require outpatient follow up with further cataract evaluation and glaucoma diagnostic testing following discharge form hospital     Transaminitis.  - MIld transaminitis likely in setting of fluid overload state, trend for now.    Type 2 diabetes mellitus.  - Not on medications since starting dialysis, last A1C 5, monitor off meds for now, CC diet.     Problem: Essential hypertension.   - c/w home medications.    Imaging abnormality.   - Has known chronic type B aortic dissection, here on imaging might be bigger but likely 2/2 imaging differences    Endometrial cancer.   - c/w outpatient follow up.    Case discussed with Dr. Dave on 9/19/23 and patient cleared for discharge. Reviewed discharge medications with patient; All new medications requiring new prescription sent to pharmacy of patients choice. Reviewed need for prescription for previous home medications and new prescriptions sent if requested. Patient in agreement and understands.

## 2023-09-20 ENCOUNTER — APPOINTMENT (OUTPATIENT)
Dept: PLASTIC SURGERY | Facility: CLINIC | Age: 78
End: 2023-09-20

## 2023-09-24 NOTE — PROGRESS NOTE ADULT - SUBJECTIVE AND OBJECTIVE BOX
Subjective:Interval History - No events overnight    Objective:   Vital Signs Last 24 Hrs  T(C): 37.1 (31 Dec 2017 12:00), Max: 37.1 (31 Dec 2017 12:00)  T(F): 98.7 (31 Dec 2017 12:00), Max: 98.7 (31 Dec 2017 12:00)  HR: 67 (31 Dec 2017 14:00) (58 - 73)  BP: 136/65 (31 Dec 2017 14:00) (122/61 - 148/78)  BP(mean): 81 (31 Dec 2017 14:00) (76 - 95)  RR: 14 (31 Dec 2017 14:00) (14 - 15)  SpO2: 100% (31 Dec 2017 14:00) (100% - 100%)    General Exam:   General appearance: No acute distress                   Neurological Exam:  Mental Status: Remains intubated and non-verbal.  Awake and alert, making eye contact and following simpl commands.     Cranial Nerves: CN I - not tested.  PERRL, EOMI, VFF, no nystagmus or diplopia.  No APD.  Fundi not visualized bilaterally.  Corneals brisk. No facial asymmetry.       Motor:   Tone: normal.                  Strength: moving all 4 symmetrically  Pronator drift: none                 Tremor: No resting, postural or action tremor.  No myoclonus.    Sensation: responds to noxious stimuli symmetrically.         12-31    133<L>  |  85<L>  |  106<H>  ----------------------------<  142<H>  3.9   |  24  |  9.38<H>    Ca    7.8<L>      31 Dec 2017 02:50  Phos  8.8     12-31  Mg     2.4     12-31    TPro  5.8<L>  /  Alb  2.8<L>  /  TBili  0.5  /  DBili  x   /  AST  43<H>  /  ALT  27  /  AlkPhos  116  12-31    LIVER FUNCTIONS - ( 31 Dec 2017 02:50 )  Alb: 2.8 g/dL / Pro: 5.8 g/dL / ALK PHOS: 116 u/L / ALT: 27 u/L / AST: 43 u/L / GGT: x                                 8.6    4.77  )-----------( 121      ( 31 Dec 2017 02:50 )             25.0     Radiology        MEDICATIONS  (STANDING):  aspirin  chewable 81 milliGRAM(s) Oral daily  chlorhexidine 4% Liquid 1 Application(s) Topical daily  desmopressin IVPB 18 MICROGram(s) IV Intermittent once  dextrose 5%. 1000 milliLiter(s) (50 mL/Hr) IV Continuous <Continuous>  dextrose 50% Injectable 12.5 Gram(s) IV Push once  dextrose 50% Injectable 25 Gram(s) IV Push once  dextrose 50% Injectable 25 Gram(s) IV Push once  heparin  Injectable 5000 Unit(s) SubCutaneous every 12 hours  insulin lispro (HumaLOG) corrective regimen sliding scale   SubCutaneous every 6 hours  propofol Injectable 50 milliGRAM(s) IV Push once  propofol Injectable 50 milliGRAM(s) IV Push once  sodium bicarbonate 325 milliGRAM(s) Oral two times a day  valproate sodium IVPB 500 milliGRAM(s) IV Intermittent every 8 hours    MEDICATIONS  (PRN):  dextrose Gel 1 Dose(s) Oral once PRN Blood Glucose LESS THAN 70 milliGRAM(s)/deciliter  glucagon  Injectable 1 milliGRAM(s) IntraMuscular once PRN Glucose LESS THAN 70 milligrams/deciliter English

## 2023-10-16 ENCOUNTER — APPOINTMENT (OUTPATIENT)
Dept: ORTHOPEDIC SURGERY | Facility: CLINIC | Age: 78
End: 2023-10-16
Payer: COMMERCIAL

## 2023-10-16 VITALS
SYSTOLIC BLOOD PRESSURE: 147 MMHG | BODY MASS INDEX: 20.2 KG/M2 | HEIGHT: 61 IN | HEART RATE: 73 BPM | OXYGEN SATURATION: 98 % | WEIGHT: 107 LBS | DIASTOLIC BLOOD PRESSURE: 67 MMHG

## 2023-10-16 DIAGNOSIS — S72.142D DISPLACED INTERTROCHANTERIC FRACTURE OF LEFT FEMUR, SUBSEQUENT ENCOUNTER FOR CLOSED FRACTURE WITH ROUTINE HEALING: ICD-10-CM

## 2023-10-16 PROCEDURE — 73552 X-RAY EXAM OF FEMUR 2/>: CPT | Mod: TC

## 2023-10-16 PROCEDURE — 99213 OFFICE O/P EST LOW 20 MIN: CPT

## 2023-10-16 PROCEDURE — 72170 X-RAY EXAM OF PELVIS: CPT | Mod: TC

## 2023-10-16 RX ORDER — CEFADROXIL 500 MG/1
500 CAPSULE ORAL TWICE DAILY
Qty: 60 | Refills: 2 | Status: ACTIVE | COMMUNITY
Start: 2023-10-16 | End: 1900-01-01

## 2023-10-31 ENCOUNTER — APPOINTMENT (OUTPATIENT)
Dept: VASCULAR SURGERY | Facility: CLINIC | Age: 78
End: 2023-10-31
Payer: MEDICARE

## 2023-10-31 VITALS
HEART RATE: 70 BPM | TEMPERATURE: 97.5 F | DIASTOLIC BLOOD PRESSURE: 71 MMHG | SYSTOLIC BLOOD PRESSURE: 136 MMHG | HEIGHT: 61 IN | BODY MASS INDEX: 20.2 KG/M2 | WEIGHT: 107 LBS

## 2023-10-31 PROCEDURE — 93990 DOPPLER FLOW TESTING: CPT

## 2023-10-31 PROCEDURE — 99214 OFFICE O/P EST MOD 30 MIN: CPT

## 2023-11-02 ENCOUNTER — APPOINTMENT (OUTPATIENT)
Dept: ORTHOPEDIC SURGERY | Facility: CLINIC | Age: 78
End: 2023-11-02
Payer: MEDICARE

## 2023-11-02 PROCEDURE — 99213 OFFICE O/P EST LOW 20 MIN: CPT

## 2023-11-02 PROCEDURE — 73552 X-RAY EXAM OF FEMUR 2/>: CPT | Mod: LT

## 2023-11-02 PROCEDURE — 72170 X-RAY EXAM OF PELVIS: CPT

## 2023-11-17 RX ORDER — CALCIUM ACETATE 667 MG/1
667 CAPSULE ORAL 3 TIMES DAILY
Qty: 180 | Refills: 3 | Status: ACTIVE | COMMUNITY
Start: 2018-05-09 | End: 1900-01-01

## 2023-12-01 ENCOUNTER — EMERGENCY (EMERGENCY)
Facility: HOSPITAL | Age: 78
LOS: 1 days | Discharge: HOME | End: 2023-12-01
Attending: EMERGENCY MEDICINE | Admitting: EMERGENCY MEDICINE
Payer: MEDICARE

## 2023-12-01 VITALS
RESPIRATION RATE: 17 BRPM | TEMPERATURE: 97 F | HEART RATE: 60 BPM | SYSTOLIC BLOOD PRESSURE: 181 MMHG | DIASTOLIC BLOOD PRESSURE: 85 MMHG | OXYGEN SATURATION: 99 %

## 2023-12-01 VITALS
HEART RATE: 67 BPM | SYSTOLIC BLOOD PRESSURE: 192 MMHG | DIASTOLIC BLOOD PRESSURE: 100 MMHG | OXYGEN SATURATION: 98 % | RESPIRATION RATE: 18 BRPM | TEMPERATURE: 98 F

## 2023-12-01 DIAGNOSIS — Z98.890 OTHER SPECIFIED POSTPROCEDURAL STATES: Chronic | ICD-10-CM

## 2023-12-01 DIAGNOSIS — Z96.641 PRESENCE OF RIGHT ARTIFICIAL HIP JOINT: Chronic | ICD-10-CM

## 2023-12-01 DIAGNOSIS — Z90.710 ACQUIRED ABSENCE OF BOTH CERVIX AND UTERUS: Chronic | ICD-10-CM

## 2023-12-01 LAB
ALBUMIN SERPL ELPH-MCNC: 3.2 G/DL — LOW (ref 3.3–5)
ALBUMIN SERPL ELPH-MCNC: 3.2 G/DL — LOW (ref 3.3–5)
ALP SERPL-CCNC: 323 U/L — HIGH (ref 40–120)
ALP SERPL-CCNC: 323 U/L — HIGH (ref 40–120)
ALT FLD-CCNC: 27 U/L — SIGNIFICANT CHANGE UP (ref 4–33)
ALT FLD-CCNC: 27 U/L — SIGNIFICANT CHANGE UP (ref 4–33)
ANION GAP SERPL CALC-SCNC: 13 MMOL/L — SIGNIFICANT CHANGE UP (ref 7–14)
ANION GAP SERPL CALC-SCNC: 13 MMOL/L — SIGNIFICANT CHANGE UP (ref 7–14)
APTT BLD: 35.1 SEC — SIGNIFICANT CHANGE UP (ref 24.5–35.6)
APTT BLD: 35.1 SEC — SIGNIFICANT CHANGE UP (ref 24.5–35.6)
AST SERPL-CCNC: 44 U/L — HIGH (ref 4–32)
AST SERPL-CCNC: 44 U/L — HIGH (ref 4–32)
BASOPHILS # BLD AUTO: 0.02 K/UL — SIGNIFICANT CHANGE UP (ref 0–0.2)
BASOPHILS # BLD AUTO: 0.02 K/UL — SIGNIFICANT CHANGE UP (ref 0–0.2)
BASOPHILS NFR BLD AUTO: 0.4 % — SIGNIFICANT CHANGE UP (ref 0–2)
BASOPHILS NFR BLD AUTO: 0.4 % — SIGNIFICANT CHANGE UP (ref 0–2)
BILIRUB SERPL-MCNC: 0.8 MG/DL — SIGNIFICANT CHANGE UP (ref 0.2–1.2)
BILIRUB SERPL-MCNC: 0.8 MG/DL — SIGNIFICANT CHANGE UP (ref 0.2–1.2)
BUN SERPL-MCNC: 46 MG/DL — HIGH (ref 7–23)
BUN SERPL-MCNC: 46 MG/DL — HIGH (ref 7–23)
CALCIUM SERPL-MCNC: 8.9 MG/DL — SIGNIFICANT CHANGE UP (ref 8.4–10.5)
CALCIUM SERPL-MCNC: 8.9 MG/DL — SIGNIFICANT CHANGE UP (ref 8.4–10.5)
CHLORIDE SERPL-SCNC: 97 MMOL/L — LOW (ref 98–107)
CHLORIDE SERPL-SCNC: 97 MMOL/L — LOW (ref 98–107)
CO2 SERPL-SCNC: 26 MMOL/L — SIGNIFICANT CHANGE UP (ref 22–31)
CO2 SERPL-SCNC: 26 MMOL/L — SIGNIFICANT CHANGE UP (ref 22–31)
CREAT SERPL-MCNC: 5.12 MG/DL — HIGH (ref 0.5–1.3)
CREAT SERPL-MCNC: 5.12 MG/DL — HIGH (ref 0.5–1.3)
EGFR: 8 ML/MIN/1.73M2 — LOW
EGFR: 8 ML/MIN/1.73M2 — LOW
EOSINOPHIL # BLD AUTO: 0.42 K/UL — SIGNIFICANT CHANGE UP (ref 0–0.5)
EOSINOPHIL # BLD AUTO: 0.42 K/UL — SIGNIFICANT CHANGE UP (ref 0–0.5)
EOSINOPHIL NFR BLD AUTO: 7.4 % — HIGH (ref 0–6)
EOSINOPHIL NFR BLD AUTO: 7.4 % — HIGH (ref 0–6)
GLUCOSE SERPL-MCNC: 196 MG/DL — HIGH (ref 70–99)
GLUCOSE SERPL-MCNC: 196 MG/DL — HIGH (ref 70–99)
HCT VFR BLD CALC: 31.9 % — LOW (ref 34.5–45)
HCT VFR BLD CALC: 31.9 % — LOW (ref 34.5–45)
HGB BLD-MCNC: 10.5 G/DL — LOW (ref 11.5–15.5)
HGB BLD-MCNC: 10.5 G/DL — LOW (ref 11.5–15.5)
IANC: 3.52 K/UL — SIGNIFICANT CHANGE UP (ref 1.8–7.4)
IANC: 3.52 K/UL — SIGNIFICANT CHANGE UP (ref 1.8–7.4)
IMM GRANULOCYTES NFR BLD AUTO: 0.5 % — SIGNIFICANT CHANGE UP (ref 0–0.9)
IMM GRANULOCYTES NFR BLD AUTO: 0.5 % — SIGNIFICANT CHANGE UP (ref 0–0.9)
INR BLD: 0.97 RATIO — SIGNIFICANT CHANGE UP (ref 0.85–1.18)
INR BLD: 0.97 RATIO — SIGNIFICANT CHANGE UP (ref 0.85–1.18)
LYMPHOCYTES # BLD AUTO: 0.95 K/UL — LOW (ref 1–3.3)
LYMPHOCYTES # BLD AUTO: 0.95 K/UL — LOW (ref 1–3.3)
LYMPHOCYTES # BLD AUTO: 16.8 % — SIGNIFICANT CHANGE UP (ref 13–44)
LYMPHOCYTES # BLD AUTO: 16.8 % — SIGNIFICANT CHANGE UP (ref 13–44)
MCHC RBC-ENTMCNC: 29.2 PG — SIGNIFICANT CHANGE UP (ref 27–34)
MCHC RBC-ENTMCNC: 29.2 PG — SIGNIFICANT CHANGE UP (ref 27–34)
MCHC RBC-ENTMCNC: 32.9 GM/DL — SIGNIFICANT CHANGE UP (ref 32–36)
MCHC RBC-ENTMCNC: 32.9 GM/DL — SIGNIFICANT CHANGE UP (ref 32–36)
MCV RBC AUTO: 88.6 FL — SIGNIFICANT CHANGE UP (ref 80–100)
MCV RBC AUTO: 88.6 FL — SIGNIFICANT CHANGE UP (ref 80–100)
MONOCYTES # BLD AUTO: 0.71 K/UL — SIGNIFICANT CHANGE UP (ref 0–0.9)
MONOCYTES # BLD AUTO: 0.71 K/UL — SIGNIFICANT CHANGE UP (ref 0–0.9)
MONOCYTES NFR BLD AUTO: 12.6 % — SIGNIFICANT CHANGE UP (ref 2–14)
MONOCYTES NFR BLD AUTO: 12.6 % — SIGNIFICANT CHANGE UP (ref 2–14)
NEUTROPHILS # BLD AUTO: 3.52 K/UL — SIGNIFICANT CHANGE UP (ref 1.8–7.4)
NEUTROPHILS # BLD AUTO: 3.52 K/UL — SIGNIFICANT CHANGE UP (ref 1.8–7.4)
NEUTROPHILS NFR BLD AUTO: 62.3 % — SIGNIFICANT CHANGE UP (ref 43–77)
NEUTROPHILS NFR BLD AUTO: 62.3 % — SIGNIFICANT CHANGE UP (ref 43–77)
NRBC # BLD: 0 /100 WBCS — SIGNIFICANT CHANGE UP (ref 0–0)
NRBC # BLD: 0 /100 WBCS — SIGNIFICANT CHANGE UP (ref 0–0)
NRBC # FLD: 0 K/UL — SIGNIFICANT CHANGE UP (ref 0–0)
NRBC # FLD: 0 K/UL — SIGNIFICANT CHANGE UP (ref 0–0)
PLATELET # BLD AUTO: 160 K/UL — SIGNIFICANT CHANGE UP (ref 150–400)
PLATELET # BLD AUTO: 160 K/UL — SIGNIFICANT CHANGE UP (ref 150–400)
POTASSIUM SERPL-MCNC: 4.1 MMOL/L — SIGNIFICANT CHANGE UP (ref 3.5–5.3)
POTASSIUM SERPL-MCNC: 4.1 MMOL/L — SIGNIFICANT CHANGE UP (ref 3.5–5.3)
POTASSIUM SERPL-SCNC: 4.1 MMOL/L — SIGNIFICANT CHANGE UP (ref 3.5–5.3)
POTASSIUM SERPL-SCNC: 4.1 MMOL/L — SIGNIFICANT CHANGE UP (ref 3.5–5.3)
PROT SERPL-MCNC: 7.5 G/DL — SIGNIFICANT CHANGE UP (ref 6–8.3)
PROT SERPL-MCNC: 7.5 G/DL — SIGNIFICANT CHANGE UP (ref 6–8.3)
PROTHROM AB SERPL-ACNC: 10.9 SEC — SIGNIFICANT CHANGE UP (ref 9.5–13)
PROTHROM AB SERPL-ACNC: 10.9 SEC — SIGNIFICANT CHANGE UP (ref 9.5–13)
RBC # BLD: 3.6 M/UL — LOW (ref 3.8–5.2)
RBC # BLD: 3.6 M/UL — LOW (ref 3.8–5.2)
RBC # FLD: 17.6 % — HIGH (ref 10.3–14.5)
RBC # FLD: 17.6 % — HIGH (ref 10.3–14.5)
SODIUM SERPL-SCNC: 136 MMOL/L — SIGNIFICANT CHANGE UP (ref 135–145)
SODIUM SERPL-SCNC: 136 MMOL/L — SIGNIFICANT CHANGE UP (ref 135–145)
WBC # BLD: 5.65 K/UL — SIGNIFICANT CHANGE UP (ref 3.8–10.5)
WBC # BLD: 5.65 K/UL — SIGNIFICANT CHANGE UP (ref 3.8–10.5)
WBC # FLD AUTO: 5.65 K/UL — SIGNIFICANT CHANGE UP (ref 3.8–10.5)
WBC # FLD AUTO: 5.65 K/UL — SIGNIFICANT CHANGE UP (ref 3.8–10.5)

## 2023-12-01 PROCEDURE — 93010 ELECTROCARDIOGRAM REPORT: CPT

## 2023-12-01 PROCEDURE — 99284 EMERGENCY DEPT VISIT MOD MDM: CPT | Mod: GC

## 2023-12-01 NOTE — ED ADULT NURSE NOTE - NSFALLHARMRISKINTERV_ED_ALL_ED

## 2023-12-01 NOTE — ED PROVIDER NOTE - PHYSICAL EXAMINATION
Constitutional: VS reviewed. Alert and orientedx3, well appearing, no apparent distress  HEENT: Atraumatic, EOMI, PERRL. Oozing epistaxis from left nostril with blood in posterior oropharynx  CV: RRR  Lungs: Clear and equal bilaterally, no wheezes, rales or crackles  Abdomen: Soft, nondistended, nontender  MSK: No deformities  Skin: Warm and dry. As visualized no rashes, lesions, bruising or erythema  Neuro: Strength and sensation intact.   Lymph: No pitting edema in extremities.

## 2023-12-01 NOTE — ED PROVIDER NOTE - NSFOLLOWUPINSTRUCTIONS_ED_ALL_ED_FT
Epistaxis    Epistaxis is the medical term for a nosebleed. Nosebleeds are common and can be caused by many conditions, such as injury, infections, dry environments, medicines, nose picking, and home heating and cooling systems. Try controlling your nosebleed by pinching your nose continuously for at least 10 minutes. Avoid lying down while you are having a nosebleed. Sit up and lean forward. Avoid blowing or sniffing your nose for a number of hours after having a nosebleed. Resume your normal activities as you are able, but avoid straining, lifting, or bending at the waist for several days. Maintain humidity in your home by using less air conditioning or by using a humidifier.     Aspirin and blood thinners make bleeding more likely. If you are prescribed these medicines and you suffer from nosebleeds, ask your health care provider if you should stop taking the medicines or adjust the dose. Do not stop medicines unless directed by your health care provider.    SEEK IMMEDIATE MEDICAL CARE IF YOU HAVE ANY OF THE FOLLOWING SYMPTOMS: nosebleed lasting longer than 20 minutes, unusual bleeding from or bruising on other parts of your body, dizziness or lightheadedness, fainting, nosebleed occurring after a head injury, or fever.

## 2023-12-01 NOTE — ED ADULT TRIAGE NOTE - BP NONINVASIVE DIASTOLIC (MM HG)
Kenalog Preparation: kenalog Total Volume (Ccs): 2 Administered By (Optional): SC Concentration (Mg/Ml): 40.0 Add Option For Additional Mediation: No Consent: The risks of atrophy were reviewed with the patient. Concentration (Mg/Ml) Of Additional Medication: 2.5 Detail Level: None 100

## 2023-12-01 NOTE — ED PROVIDER NOTE - PATIENT PORTAL LINK FT
You can access the FollowMyHealth Patient Portal offered by [facility] by registering at the following website: [email or web address]. By joining [employer]’s FollowMyHealth portal, you will also be able to view your health information using other applications (apps) compatible with our system.

## 2023-12-01 NOTE — ED PROVIDER NOTE - OBJECTIVE STATEMENT
78-year-old female with past medical history of HTN, ESRD on HD (MWF) presents emergency department for epistaxis.  Patient states over the last few weeks she has had intermittent episodes of epistaxis from the left nostril.  Patient states symptoms usually self resolve.  Patient endorses some bleeding down the back of the throat as well.  Patient states she went to HD this morning and started having nosebleed prior to initiating and was advised to come to the ED.  Patient has applied minimal pressure to the area per the niece at bedside.  Patient did not complete HD today.  Patient denies dizziness or lightheadedness.  Patient denies fevers, chills, headache, vision changes, chest pain, trouble breathing, abdominal pain, nausea/vomiting, diarrhea/constipation.

## 2023-12-01 NOTE — ED ADULT NURSE NOTE - OBJECTIVE STATEMENT
Receive pt. in ER room 22 alert and oriented x 4, presenting to the ER with complaints of nise bleed. Pt. stated " when the weather becomes cold and I blow my nose it bleed, but today it started bleeding and will not stop". Pt. arrived to room with gauze in left nostril, bright red blood present on gauze, no c/o pain, no c/o dizziness. Place on cardiac monitor, labs sent, call bell place within reach. Right upper chest wall with dialysis port, intact, left arm with old A-V fistula.

## 2023-12-01 NOTE — ED PROVIDER NOTE - ATTENDING CONTRIBUTION TO CARE
78-year-old female with past medical history of HTN, ESRD on HD (MWF) presents emergency department for epistaxis. Patient had wheezing on arrival to the ED, given oxymetazoline, no recurrent bleeding.  Hemodynamically stable, labs unremarkable including potassium.  Given the patient has no active bleeding, hemodynamically stable, hemoglobin reassuring, pt spoke with his dialysis center and can get dialyzed tomorrow morning at 930am and pt has no indication for emergent dialysis, will discharge now with return precautions.

## 2023-12-01 NOTE — ED PROVIDER NOTE - PROGRESS NOTE DETAILS
Lena Simms PGY2: Afrin spray applied with direct pressure. Bleeding controlled for last 2 hours without rebleeding. Pt's niece is at bedside and called HD center and confirmed appointment with them at 9:30 AM tomorrow. Pt okay for dc at this time with strict return precautions.

## 2023-12-01 NOTE — ED PROVIDER NOTE - CLINICAL SUMMARY MEDICAL DECISION MAKING FREE TEXT BOX
78-year-old female with past medical history of HTN, ESRD on HD (MWF) presents emergency department for epistaxis. Intermittent episodes usually self resolved. Pt sent from HD today for epistaxis, did not do session. + trickling blood from left nostril. No septal hematoma. Friable tissue of septum. + some blood in posterior oropharynx. Epistaxis likely 2/2 anterior bleeding from cold/dry weather. Plan for labs, EKG as pt did not get HD today. Will use Afrin spray and direct pressure. Will continue to reassess. Dispo pending labs and bleeding control.

## 2023-12-07 ENCOUNTER — OUTPATIENT (OUTPATIENT)
Dept: OUTPATIENT SERVICES | Facility: HOSPITAL | Age: 78
LOS: 1 days | Discharge: HOME | End: 2023-12-07

## 2023-12-07 DIAGNOSIS — Z90.710 ACQUIRED ABSENCE OF BOTH CERVIX AND UTERUS: Chronic | ICD-10-CM

## 2023-12-07 DIAGNOSIS — Z96.641 PRESENCE OF RIGHT ARTIFICIAL HIP JOINT: Chronic | ICD-10-CM

## 2023-12-07 DIAGNOSIS — Z98.890 OTHER SPECIFIED POSTPROCEDURAL STATES: Chronic | ICD-10-CM

## 2023-12-08 LAB
HGB BLD-MCNC: 9 G/DL — LOW (ref 11.5–15.5)
HGB BLD-MCNC: 9 G/DL — LOW (ref 11.5–15.5)

## 2024-01-04 ENCOUNTER — APPOINTMENT (OUTPATIENT)
Dept: ORTHOPEDIC SURGERY | Facility: CLINIC | Age: 79
End: 2024-01-04
Payer: MEDICARE

## 2024-01-04 PROCEDURE — 72170 X-RAY EXAM OF PELVIS: CPT

## 2024-01-04 PROCEDURE — 73552 X-RAY EXAM OF FEMUR 2/>: CPT | Mod: LT

## 2024-01-04 PROCEDURE — 99213 OFFICE O/P EST LOW 20 MIN: CPT

## 2024-01-15 NOTE — HISTORY OF PRESENT ILLNESS
[___ Months Post Op] : [unfilled] months post op [3] : the patient reports pain that is 3/10 in severity [Clean/Dry/Intact] : clean, dry and intact [Neuro Intact] : an unremarkable neurological exam [Vascular Intact] : ~T peripheral vascular exam normal [Negative Lito's] : maneuvers demonstrated a negative Lito's sign [Slow Progress] : is progressing slowly [Excellent Pain Control] : has excellent pain control [de-identified] : 7/19/2023 - L hip SUZANNA, reest mod peter for failed IM Nail [de-identified] : Patient no longer has any open wounds.  She has not been draining for a few months.  She has been moving.  She is also being worked with the renal team and has a Port-A-Cath in with a plan for another fistula in the other arm.  She has been walking somewhat but mostly is in a wheelchair and a walker. [de-identified] : X-rays AP pelvis and views of the left femur show the implant in position.  There are no changes.   [de-identified] : On exam the incision is clean dry and intact.  She has no obvious tenderness.  She has no fullness to suggest any seroma or abscess.  She is able to flex up to 30 degrees her self and can do single-leg stance. [de-identified] : Patient is almost 6 months out.  She did have extended drainage and was on antibiotics however now looks good.  She may have a chronic infection but I think this is more likely not healing well because of her overall condition including dialysis.  Should she get worse we can watch this out however at this point I would leave her alone as she is doing well.  I would let her walk.  She is walking with a walker somewhat however I think she can even do better.  Continue physical therapy. [de-identified] : Continue physical therapy as well as other ambulation treatment. Follow-up in 3 to 6 months. If imaging was ordered, the patient was told to make an appointment to review findings right after all imaging is completed.  We discussed risks, benefits and alternatives. Rationale of care was reviewed and all questions were answered. Patient (and family) had all questions answered to her degree of the level of satisfaction. Patient (and family) expressed understanding and interest in proceeding with the plan as outlined.     This note was done with a voice recognition transcription software and any typos are related to this rather than medical error. Surgical risks reviewed. Patient (and family) had all questions answered to an agreeable level of satisfaction. Patient (and family) expressed understanding and interest in proceeding with the plan as outlined.

## 2024-01-15 NOTE — H&P ADULT - PATIENT'S PREFERRED PRONOUN
Apolonia called in to report she continues to spot / bleed since endometrial biopsy on 1/5/24.  She has now been spotting for 10 days.    (See TE from 1/10/24: Per E-advice with Dr. Tellez's response, 'I think that the bleeding is more likely due to the fact that I stirred up the lining of the uterus when I did the biopsy. Usually when the cyst resolves, the fluid is just absorbed abdominally. We will call as soon as the results are known.')    Spotting slowed and then Apolonia returned to her normal workout of elliptical / running and she noticed an increase in the spotting and bleeding last Monday through Wednesday 1/8-1/10.    (I recommended holding off on exercise for now.)    Reports cramping, bloating, spotting and increased bleeding, mostly with wiping after urination. This happens every 2-3 times she urinates.   Toilet bowl is 'red' several times a day!  At times she needs to wipe 2-3 times before toilet paper is clean.     (Apolonia has NOT started using the vaginal cream yet.)     Apolonia is wondering if this is still 'normal'?    Routed to Dr. Tellez, please advise.     Patient is aware we will call her back with recommendations.             Her/She

## 2024-01-16 RX ORDER — LOSARTAN POTASSIUM 50 MG/1
50 TABLET, FILM COATED ORAL
Qty: 30 | Refills: 3 | Status: ACTIVE | COMMUNITY
Start: 2022-11-07 | End: 1900-01-01

## 2024-02-01 ENCOUNTER — OUTPATIENT (OUTPATIENT)
Dept: OUTPATIENT SERVICES | Facility: HOSPITAL | Age: 79
LOS: 1 days | End: 2024-02-01

## 2024-02-01 VITALS
OXYGEN SATURATION: 96 % | TEMPERATURE: 98 F | RESPIRATION RATE: 20 BRPM | HEART RATE: 56 BPM | WEIGHT: 111.99 LBS | HEIGHT: 60.5 IN | DIASTOLIC BLOOD PRESSURE: 73 MMHG | SYSTOLIC BLOOD PRESSURE: 136 MMHG

## 2024-02-01 DIAGNOSIS — Z98.890 OTHER SPECIFIED POSTPROCEDURAL STATES: Chronic | ICD-10-CM

## 2024-02-01 DIAGNOSIS — N18.6 END STAGE RENAL DISEASE: ICD-10-CM

## 2024-02-01 DIAGNOSIS — Z96.641 PRESENCE OF RIGHT ARTIFICIAL HIP JOINT: Chronic | ICD-10-CM

## 2024-02-01 DIAGNOSIS — Z90.710 ACQUIRED ABSENCE OF BOTH CERVIX AND UTERUS: Chronic | ICD-10-CM

## 2024-02-01 DIAGNOSIS — I10 ESSENTIAL (PRIMARY) HYPERTENSION: ICD-10-CM

## 2024-02-01 LAB
ANION GAP SERPL CALC-SCNC: 14 MMOL/L — SIGNIFICANT CHANGE UP (ref 7–14)
BUN SERPL-MCNC: 39 MG/DL — HIGH (ref 7–23)
CALCIUM SERPL-MCNC: 9.2 MG/DL — SIGNIFICANT CHANGE UP (ref 8.4–10.5)
CHLORIDE SERPL-SCNC: 98 MMOL/L — SIGNIFICANT CHANGE UP (ref 98–107)
CO2 SERPL-SCNC: 25 MMOL/L — SIGNIFICANT CHANGE UP (ref 22–31)
CREAT SERPL-MCNC: 4.8 MG/DL — HIGH (ref 0.5–1.3)
EGFR: 9 ML/MIN/1.73M2 — LOW
GLUCOSE SERPL-MCNC: 197 MG/DL — HIGH (ref 70–99)
HCT VFR BLD CALC: 36.2 % — SIGNIFICANT CHANGE UP (ref 34.5–45)
HGB BLD-MCNC: 11.7 G/DL — SIGNIFICANT CHANGE UP (ref 11.5–15.5)
MCHC RBC-ENTMCNC: 30.2 PG — SIGNIFICANT CHANGE UP (ref 27–34)
MCHC RBC-ENTMCNC: 32.3 GM/DL — SIGNIFICANT CHANGE UP (ref 32–36)
MCV RBC AUTO: 93.3 FL — SIGNIFICANT CHANGE UP (ref 80–100)
NRBC # BLD: 0 /100 WBCS — SIGNIFICANT CHANGE UP (ref 0–0)
NRBC # FLD: 0 K/UL — SIGNIFICANT CHANGE UP (ref 0–0)
PLATELET # BLD AUTO: 107 K/UL — LOW (ref 150–400)
POTASSIUM SERPL-MCNC: 4.8 MMOL/L — SIGNIFICANT CHANGE UP (ref 3.5–5.3)
POTASSIUM SERPL-SCNC: 4.8 MMOL/L — SIGNIFICANT CHANGE UP (ref 3.5–5.3)
RBC # BLD: 3.88 M/UL — SIGNIFICANT CHANGE UP (ref 3.8–5.2)
RBC # FLD: 14.9 % — HIGH (ref 10.3–14.5)
SODIUM SERPL-SCNC: 137 MMOL/L — SIGNIFICANT CHANGE UP (ref 135–145)
WBC # BLD: 3.81 K/UL — SIGNIFICANT CHANGE UP (ref 3.8–10.5)
WBC # FLD AUTO: 3.81 K/UL — SIGNIFICANT CHANGE UP (ref 3.8–10.5)

## 2024-02-01 RX ORDER — OMEPRAZOLE 10 MG/1
1 CAPSULE, DELAYED RELEASE ORAL
Refills: 0 | DISCHARGE

## 2024-02-01 RX ORDER — POLYETHYLENE GLYCOL 3350 17 G/17G
17 POWDER, FOR SOLUTION ORAL
Refills: 0 | DISCHARGE

## 2024-02-01 NOTE — H&P PST ADULT - MS GEN HX ROS MEA POS PC
left hip/arthralgia/arthritis/joint pain left hip, h/o hip replacement/arthralgia/arthritis/joint pain

## 2024-02-01 NOTE — H&P PST ADULT - PROBLEM SELECTOR PLAN 1
Scheduled for right AVF creation possible right AV graft   Written & verbal preop instructions, gi prophylaxis & surgical soap given  Pt verbalized good understanding.  Teach back done on surgical soap instructions.

## 2024-02-01 NOTE — H&P PST ADULT - HISTORY OF PRESENT ILLNESS
79y/o female presents for preop eval for scheduled right AVF creation possible right AV graft.  77y/o female presents for preop eval for scheduled right AVF creation possible right AV graft.   Pt states dx ESRD on dialysis in 2017, had left upper arm AVF that eventually became nonfunctioning.  Pt currently with Perma-cath and on HD 3/week (mon-wed-fri)

## 2024-02-01 NOTE — H&P PST ADULT - CARDIOVASCULAR
details… regular rate and rhythm/S1 S2 present/no murmur/normal PMI regular rate and rhythm/S1 S2 present/normal PMI/murmur

## 2024-02-01 NOTE — H&P PST ADULT - NEUROLOGICAL SYMPTOMS
ambulates with rollator/difficulty walking ambulates with rollator due to left hip pain/difficulty walking

## 2024-02-01 NOTE — H&P PST ADULT - NSICDXPASTMEDICALHX_GEN_ALL_CORE_FT
PAST MEDICAL HISTORY:  Adult Onset Diabetes Mellitus,     Cancer of Endometrium s/p Hysterectomy , s/p Chemo.    CKD (chronic kidney disease)     Dissection of Aorta Type B    ESRD (end stage renal disease) on dialysis M,W,F   [facility] D[facility] [address]    History of Hysterectomy with O     Hypertension      PAST MEDICAL HISTORY:  Adult Onset Diabetes Mellitus,     Cancer of Endometrium s/p Hysterectomy , s/p Chemo.    CKD (chronic kidney disease)     Dissection of Aorta Type B    ESRD (end stage renal disease) on dialysis M,W,F   [facility]    ESRD on hemodialysis     Hypertension

## 2024-02-09 PROBLEM — N18.6 END STAGE RENAL DISEASE: Chronic | Status: ACTIVE | Noted: 2024-02-01

## 2024-02-12 ENCOUNTER — TRANSCRIPTION ENCOUNTER (OUTPATIENT)
Age: 79
End: 2024-02-12

## 2024-02-12 NOTE — ASU PATIENT PROFILE, ADULT - FALL HARM RISK - HARM RISK INTERVENTIONS

## 2024-02-12 NOTE — ASU PATIENT PROFILE, ADULT - NSICDXPASTMEDICALHX_GEN_ALL_CORE_FT
PAST MEDICAL HISTORY:  Adult Onset Diabetes Mellitus,     Cancer of Endometrium s/p Hysterectomy , s/p Chemo.    CKD (chronic kidney disease)     Dissection of Aorta Type B    ESRD (end stage renal disease) on dialysis M,W,F   [facility]    ESRD on hemodialysis     Hypertension

## 2024-02-13 ENCOUNTER — TRANSCRIPTION ENCOUNTER (OUTPATIENT)
Age: 79
End: 2024-02-13

## 2024-02-13 ENCOUNTER — APPOINTMENT (OUTPATIENT)
Dept: VASCULAR SURGERY | Facility: HOSPITAL | Age: 79
End: 2024-02-13

## 2024-02-13 ENCOUNTER — OUTPATIENT (OUTPATIENT)
Dept: OUTPATIENT SERVICES | Facility: HOSPITAL | Age: 79
LOS: 1 days | Discharge: HOME | End: 2024-02-13
Payer: MEDICARE

## 2024-02-13 VITALS
TEMPERATURE: 98 F | DIASTOLIC BLOOD PRESSURE: 60 MMHG | RESPIRATION RATE: 19 BRPM | SYSTOLIC BLOOD PRESSURE: 136 MMHG | OXYGEN SATURATION: 99 % | HEART RATE: 56 BPM

## 2024-02-13 VITALS
TEMPERATURE: 98 F | OXYGEN SATURATION: 98 % | RESPIRATION RATE: 16 BRPM | HEART RATE: 47 BPM | DIASTOLIC BLOOD PRESSURE: 56 MMHG | HEIGHT: 60.5 IN | SYSTOLIC BLOOD PRESSURE: 136 MMHG | WEIGHT: 111.99 LBS

## 2024-02-13 DIAGNOSIS — Z98.890 OTHER SPECIFIED POSTPROCEDURAL STATES: Chronic | ICD-10-CM

## 2024-02-13 DIAGNOSIS — Z90.710 ACQUIRED ABSENCE OF BOTH CERVIX AND UTERUS: Chronic | ICD-10-CM

## 2024-02-13 DIAGNOSIS — N18.6 END STAGE RENAL DISEASE: ICD-10-CM

## 2024-02-13 DIAGNOSIS — Z96.641 PRESENCE OF RIGHT ARTIFICIAL HIP JOINT: Chronic | ICD-10-CM

## 2024-02-13 PROCEDURE — 36819 AV FUSE UPPR ARM BASILIC: CPT | Mod: RT

## 2024-02-13 DEVICE — SURGICEL 2 X 14": Type: IMPLANTABLE DEVICE | Site: RIGHT | Status: FUNCTIONAL

## 2024-02-13 DEVICE — SURGIFOAM PAD 8CM X 12.5CM X 2MM (100C): Type: IMPLANTABLE DEVICE | Site: RIGHT | Status: FUNCTIONAL

## 2024-02-13 DEVICE — SURGIFLO MATRIX WITH THROMBIN KIT: Type: IMPLANTABLE DEVICE | Site: RIGHT | Status: FUNCTIONAL

## 2024-02-13 DEVICE — LIGATING CLIPS WECK HORIZON SMALL-WIDE (RED) 24: Type: IMPLANTABLE DEVICE | Site: RIGHT | Status: FUNCTIONAL

## 2024-02-13 DEVICE — LIGATING CLIPS WECK HORIZON MEDIUM (BLUE) 24: Type: IMPLANTABLE DEVICE | Site: RIGHT | Status: FUNCTIONAL

## 2024-02-13 DEVICE — SURGICEL FIBRILLAR 2 X 4": Type: IMPLANTABLE DEVICE | Site: RIGHT | Status: FUNCTIONAL

## 2024-02-13 RX ORDER — OXYCODONE HYDROCHLORIDE 5 MG/1
5 TABLET ORAL ONCE
Refills: 0 | Status: DISCONTINUED | OUTPATIENT
Start: 2024-02-13 | End: 2024-02-13

## 2024-02-13 RX ORDER — FENTANYL CITRATE 50 UG/ML
25 INJECTION INTRAVENOUS
Refills: 0 | Status: DISCONTINUED | OUTPATIENT
Start: 2024-02-13 | End: 2024-02-13

## 2024-02-13 RX ORDER — ONDANSETRON 8 MG/1
4 TABLET, FILM COATED ORAL ONCE
Refills: 0 | Status: DISCONTINUED | OUTPATIENT
Start: 2024-02-13 | End: 2024-02-27

## 2024-02-13 RX ORDER — DESMOPRESSIN ACETATE 0.1 MG/1
15 TABLET ORAL ONCE
Refills: 0 | Status: COMPLETED | OUTPATIENT
Start: 2024-02-13 | End: 2024-02-13

## 2024-02-13 RX ORDER — FENTANYL CITRATE 50 UG/ML
50 INJECTION INTRAVENOUS
Refills: 0 | Status: DISCONTINUED | OUTPATIENT
Start: 2024-02-13 | End: 2024-02-13

## 2024-02-13 RX ORDER — OXYCODONE HYDROCHLORIDE 5 MG/1
1 TABLET ORAL
Qty: 5 | Refills: 0
Start: 2024-02-13

## 2024-02-13 RX ADMIN — FENTANYL CITRATE 25 MICROGRAM(S): 50 INJECTION INTRAVENOUS at 18:14

## 2024-02-13 RX ADMIN — FENTANYL CITRATE 25 MICROGRAM(S): 50 INJECTION INTRAVENOUS at 18:45

## 2024-02-13 RX ADMIN — DESMOPRESSIN ACETATE 215 MICROGRAM(S): 0.1 TABLET ORAL at 16:30

## 2024-02-13 NOTE — ASU PREOP CHECKLIST - LOOSE TEETH
Return Medical Weight Management Note     Alba Lee  MRN:  [id number]  :  1987  LUNA:  3/22/2021    Dear Marlene Coto, OSCAR JORGENSEN,    I had the pleasure of seeing your patient Alba Lee. She is a 33 year old female who I am continuing to see for treatment of obesity related to:       2021   I have the following health issues associated with obesity: None of the above   I have the following symptoms associated with obesity: Depression, Fatigue       Assessment & Plan   Problem List Items Addressed This Visit        Behavioral    Depression with anxiety     Stable. Screening sent via [employer]          Relevant Medications    topiramate (TOPAMAX) 25 MG tablet       Other    Obesity, Class I, BMI 30-34.9 - Primary     Down 20lbs (10% total body weight since 2021) when starting weight management. Doing well on topiramate 75mg - feels like a helpful tool for her- decreased thoughts about food, easier time making food choices, improved cravings. Has decreased portion sizes and increased vegetables. Denies adverse side effects. Mood feels good. Will continue topiramate at 75mg. Continue with RD    Labs reviewed- started vitamin D. Discussed cholesterol. Overall reassuring.     Discussed exercise and finding enjoyable exercise to help with weight maintenance and mood overall. Has a very active job.     Has quit smoking x 3 months.            Relevant Medications    topiramate (TOPAMAX) 25 MG tablet             34 minutes spent on the date of the encounter doing chart review, history and exam, documentation and further activities as noted above  0956}  MEDICATIONS:  Continue current medications without change  FUTURE APPOINTMENTS:       - Follow-up visit in 2 months       - RD 1-2 months     [facility] 2021   INTERVAL HISTORY:  Started topiramate, taper to 75mg at consult. Labs showed- vitamin D deficiency, elevated LDL but total cholesterol normal.   Has since had an insurance  "lapse.     topiramate- has decreased thoughts about foods, increased cravings for vegetables. Some tingling but not bothersome -     Continues to crave cheese. Has questions about how much is too much or if she needs to \"give this up\"     Hasn't found time for exercise yet. Works as a cook- standing all day     Has not had any alcohol - warning on medication.     Was only smoking when drinking. Was only smoking when drinking. Has quit smoking as a consequence.     Mood is okay. No adverse side effects.     PHQ 1/5/2021 1/5/2021   PHQ-9 Total Score 9 11   Q9: Thoughts of better off dead/self-harm past 2 weeks Not at all Not at all     DOMINIC-7 SCORE 1/5/2021   Total Score 10 (moderate anxiety)   Total Score 10         CURRENT WEIGHT:   162 lbs 0 oz    Initial Weight (lbs): 182 lbs  Last Visits Weight: 82.6 kg (182 lb)  Cumulative weight loss (lbs): 20  Weight Loss Percentage: 10.99%    Changes and Difficulties 3/22/2021   I have made the following changes to my diet since my last visit: Eat less, eat a lot more vegetables,  often in place of meat, dont eat after 7pm, always take my meal replacement shake in the morning   With regards to my diet, I am still struggling with: Need to cut back on cheese consumption   With regards to my activity/exercise, I am still struggling with: I need to exercise, I work 7 days a week and have not made the time to exercise       VITALS:  Ht 1.575 m (5' 2\")   Wt 73.5 kg (162 lb)   BMI 29.63 kg/m      MEDICATIONS:   Current Outpatient Medications   Medication Sig Dispense Refill     desogestrel-ethinyl estradiol (RECLIPSEN) 0.15-30 MG-MCG tablet Take 1 tablet by mouth daily 84 tablet 0     topiramate (TOPAMAX) 25 MG tablet Take 3 tablets (75 mg) by mouth At Bedtime 90 tablet 0     vitamin D3 (CHOLECALCIFEROL) 1.25 MG (24191 UT) capsule Take 1 capsule (50,000 Units) by mouth every 7 days 12 capsule 0       Weight Loss Medication History Reviewed With Patient 3/22/2021   Which weight loss " medications are you currently taking on a regular basis?  Topamax (topiramate)   Are you having any side effects from the weight loss medication that we have prescribed you? Yes   If you are having side effects please describe: Tingling       Orders Only on 03/19/2021   Component Date Value Ref Range Status     Vitamin B12 03/19/2021 374  180 - 914 pg/mL Final     Cholesterol 03/19/2021 196  <200 mg/dL Final     Triglycerides 03/19/2021 56  <150 mg/dL Final     HDL Cholesterol 03/19/2021 66  >49 mg/dL Final     LDL Cholesterol Calculated 03/19/2021 120* <100 mg/dL Final    Comment: Above desirable:  100-129 mg/dl  Borderline High:  130-159 mg/dL  High:             160-189 mg/dL  Very high:       >189 mg/dl       Non HDL Cholesterol 03/19/2021 131* <130 mg/dL Final    Comment: Above Desirable:  130-159 mg/dl  Borderline high:  160-189 mg/dl  High:             190-219 mg/dl  Very high:       >219 mg/dl       Hemoglobin A1C 03/19/2021 4.7  4.0 - 6.0 % Final     Ferritin 03/19/2021 80  23.9 - 336.2 ng/mL Final     WBC 03/19/2021 5.3  4.0 - 11.0 10e9/L Final     RBC Count 03/19/2021 4.70  3.8 - 5.2 10e12/L Final     Hemoglobin 03/19/2021 13.7  11.7 - 15.7 g/dL Final     Hematocrit 03/19/2021 41.0  35.0 - 47.0 % Final     MCV 03/19/2021 87  78 - 100 fl Final     MCH 03/19/2021 29.1  26.5 - 33.0 pg Final     MCHC 03/19/2021 33.4  31.5 - 36.5 g/dL Final     RDW 03/19/2021 12.4  10.0 - 15.0 % Final     Platelet Count 03/19/2021 272  150 - 450 10e9/L Final     Sodium 03/19/2021 137  134 - 144 mmol/L Final     Potassium 03/19/2021 4.1  3.5 - 5.1 mmol/L Final     Chloride 03/19/2021 107  98 - 107 mmol/L Final     Carbon Dioxide 03/19/2021 23  21 - 31 mmol/L Final     Anion Gap 03/19/2021 7  3 - 14 mmol/L Final     Glucose 03/19/2021 102  70 - 105 mg/dL Final     Urea Nitrogen 03/19/2021 12  7 - 25 mg/dL Final     Creatinine 03/19/2021 0.83  0.60 - 1.20 mg/dL Final     GFR Estimate 03/19/2021 80  >60 mL/min/[1.73_m2] Final      "GFR Estimate If Black 03/19/2021 >90  >60 mL/min/[1.73_m2] Final     Calcium 03/19/2021 9.2  8.6 - 10.3 mg/dL Final     Bilirubin Total 03/19/2021 0.4  0.3 - 1.0 mg/dL Final     Albumin 03/19/2021 4.6  3.5 - 5.7 g/dL Final     Protein Total 03/19/2021 7.5  6.4 - 8.9 g/dL Final     Alkaline Phosphatase 03/19/2021 68  34 - 104 U/L Final     ALT 03/19/2021 14  7 - 52 U/L Final     AST 03/19/2021 15  13 - 39 U/L Final     Vitamin D Total 03/19/2021 <10.0  ng/mL Final    Comment: Comments:  Deficiency:             <10 ng/mL  Insufficiency:        10-29 ng/mL  Sufficiency:          ng/mL  Possible Toxicity:     >100 ng/mL           PHYSICAL EXAM:  Objective    Ht 1.575 m (5' 2\")   Wt 73.5 kg (162 lb)   BMI 29.63 kg/m    Vitals - Patient Reported  Pain Score: No Pain (0)      Vitals:  No vitals were obtained today due to virtual visit.    Physical Exam   GENERAL: Healthy, alert and no distress  EYES: Eyes grossly normal to inspection.  No discharge or erythema, or obvious scleral/conjunctival abnormalities.  RESP: No audible wheeze, cough, or visible cyanosis.  No visible retractions or increased work of breathing.    SKIN: Visible skin clear. No significant rash, abnormal pigmentation or lesions.  NEURO: Cranial nerves grossly intact.  Mentation and speech appropriate for age.  PSYCH: Mentation appears normal, affect normal/bright, judgement and insight intact, normal speech and appearance well-groomed.      Sincerely,    Radha Alvarado NP  " no

## 2024-02-13 NOTE — ASU DISCHARGE PLAN (ADULT/PEDIATRIC) - CARE PROVIDER_API CALL
Diamond Jensen  Vascular Surgery  [address], [address]  [place] [address]  Phone: [phone]  Fax: [phone]  Follow Up Time: 2 weeks

## 2024-02-13 NOTE — ASU PREOP CHECKLIST - SELECT TESTS ORDERED
FS -     K -/CBC/CMP/EKG/POCT Blood Glucose FS -202 @ 11:06 am     K - 4.8/CBC/CMP/EKG/POCT Blood Glucose

## 2024-02-13 NOTE — ASU PREOP CHECKLIST - 1.
warm [place] warm [place]                                                                            report to Jackie PLATA ASU - lunch relief

## 2024-02-13 NOTE — ASU DISCHARGE PLAN (ADULT/PEDIATRIC) - FOLLOW UP APPOINTMENTS
[facility] After 8 pm PACU /[facility]

## 2024-02-13 NOTE — ASU DISCHARGE PLAN (ADULT/PEDIATRIC) - NS MD DC FALL RISK RISK
Syed Solomon with current guidelines and MD's request to schedule 10 days after positive test or 5 days after symptoms completed. He verbalizes understanding. For information on Fall & Injury Prevention, visit: [email or web address] OR  [email or web address] OR  https://www.cdc.gov/steadi/patient.html

## 2024-02-13 NOTE — ASU PREOP CHECKLIST - 4.
Right chest wall permacath - last dialysis 2/12/24  ////old left fistula - not working Right chest wall permacath - site is CDI - last dialysis 2/12/24  ////old left fistula - not working

## 2024-02-13 NOTE — ASU DISCHARGE PLAN (ADULT/PEDIATRIC) - DISCHARGE PLAN IS COMPLETE AND GIVEN TO PATIENT
41y  G    P    s/p /pLTCS/rLTCS on       here with elevated BP and concern for PP PEC.      BP elevated to severe range >160/110 with other symptoms of PEC including HA, blurry vision, n/v, RUQ/epigastric pain, and lower extremity swelling.  Given this presentation patient requires admission for BP control and IV Mg treatment for seizure ppx.     Plan:     Neuro: PO pain medication for relief.        -Seizure ppx: will start IV Mg for seizure ppx.  Will monitor closely for signs/symptoms of Mg toxicity.   CV: Hemodynamically stable.      -HTN:  s/p labetolol IV push x         Will start labetolol ....   for BP control.  Will continue to monitor BP closely.   Pulm: saturating well on RA  vs. concern for pulmonary edema given hx of SOB and crackles on exam- will order chest x-ray   GI: regular diet.  Colace prn   : Voiding spontanously   Heme: -DVT ppx: SCD's and HSQ for DVT ppx      -sPEC: will do HELLP labs q6 hours due to elevated....       ID: afebrile  FEN: Fluids: SLIV.   Electrolytes: replete prn  Endo: no acute issues  Dispo: admit to antepartum service : Yes 42yo  POD#5 s/p pLTCS (breech) here with elevated BP and concern for PP PEC.      Mild range BPs however AST/ALT elevated to 208/208. Given this presentation patient requires admission for BP control and IV Mg treatment for seizure ppx.     Plan:     Neuro: PO pain medication for relief.        -Seizure ppx: will start IV Mg for seizure ppx.  Will monitor closely for signs/symptoms of Mg toxicity.   CV: Hemodynamically stable.      -HTN:  s/p labetolol IV push x         Will start labetolol ....   for BP control.  Will continue to monitor BP closely.   Pulm: saturating well on RA  vs. concern for pulmonary edema given hx of SOB and crackles on exam- will order chest x-ray   GI: regular diet.  Colace prn   : Voiding spontanously   Heme: -DVT ppx: SCD's and HSQ for DVT ppx      -sPEC: will do HELLP labs q6 hours due to elevated....       ID: afebrile  FEN: Fluids: SLIV.   Electrolytes: replete prn  Endo: no acute issues  Dispo: admit to antepartum service 40yo  POD#5 s/p pLTCS (breech) here with elevated BP and concern for PP PEC.  While in ED, pt with mild range BPs. However AST/ALT elevated to 208/208. Given this presentation consistent with PEC with severe features, patient requires admission for BP control and IV Mg treatment for seizure ppx.

## 2024-02-29 ENCOUNTER — APPOINTMENT (OUTPATIENT)
Dept: ORTHOPEDIC SURGERY | Facility: CLINIC | Age: 79
End: 2024-02-29
Payer: MEDICARE

## 2024-02-29 DIAGNOSIS — Z96.642 PRESENCE OF LEFT ARTIFICIAL HIP JOINT: ICD-10-CM

## 2024-02-29 PROCEDURE — 72170 X-RAY EXAM OF PELVIS: CPT

## 2024-02-29 PROCEDURE — 99213 OFFICE O/P EST LOW 20 MIN: CPT

## 2024-02-29 PROCEDURE — 73552 X-RAY EXAM OF FEMUR 2/>: CPT | Mod: LT

## 2024-03-11 NOTE — HISTORY OF PRESENT ILLNESS
[___ Months Post Op] : [unfilled] months post op [2] : the patient reports pain that is 2/10 in severity [Clean/Dry/Intact] : clean, dry and intact [Neuro Intact] : an unremarkable neurological exam [Vascular Intact] : ~T peripheral vascular exam normal [Negative Lito's] : maneuvers demonstrated a negative Lito's sign [Slow Progress] : is progressing slowly [Excellent Pain Control] : has excellent pain control [de-identified] : Patient is a considerably less is walking.  She has not been draining for a few months.  She is still working on getting dialysis access.  She is encountered in a wheelchair but says that she does walk with a walker and is able to move better than before.   [de-identified] : 7/19/2023 - L hip SUZANNA, reest mod peter for failed IM Nail [de-identified] : X-rays AP pelvis and views of the left femur show the implant in position.  The longstem modular arthroplasty is in position.  The hemiarthroplasty is well-seated.  There does not seem to be any signs of prosthetic settling.  Limb lengths appear equal.  There is some minimal heterotopic ossification about the greater trochanter.    [de-identified] : On exam the incision is clean dry and intact.  She has no obvious tenderness.  She has no fullness to suggest any seroma or abscess.  She is able to flex up to 30 degrees her self and can do single-leg stance.  Passively I can move her to 80 degrees with 40 degrees of abduction and no pain with rotation. [de-identified] : Continue physical therapy as well as other ambulation treatment. Follow-up in 3 to 6 months. If imaging was ordered, the patient was told to make an appointment to review findings right after all imaging is completed.  We discussed risks, benefits and alternatives. Rationale of care was reviewed and all questions were answered. Patient (and family) had all questions answered to her degree of the level of satisfaction. Patient (and family) expressed understanding and interest in proceeding with the plan as outlined.     This note was done with a voice recognition transcription software and any typos are related to this rather than medical error. Surgical risks reviewed. Patient (and family) had all questions answered to an agreeable level of satisfaction. Patient (and family) expressed understanding and interest in proceeding with the plan as outlined.    [de-identified] : Patient is almost 8 months out.  She did have extended drainage and was on antibiotics however now looks good off antibiotics for many months.  She understands that she may have a chronic infection but I think this is more likely not healing well because of her overall condition including dialysis.  Should she get worse we can watch this out however at this point I would leave her alone as she is doing well.  I would let her walk.  I have encouraged her to advance to cane and continue physical therapy for support.

## 2024-03-19 ENCOUNTER — APPOINTMENT (OUTPATIENT)
Dept: VASCULAR SURGERY | Facility: CLINIC | Age: 79
End: 2024-03-19
Payer: MEDICARE

## 2024-03-19 VITALS — HEIGHT: 61 IN | BODY MASS INDEX: 20.2 KG/M2 | TEMPERATURE: 97.4 F | WEIGHT: 107 LBS

## 2024-03-19 DIAGNOSIS — Z99.2 END STAGE RENAL DISEASE: ICD-10-CM

## 2024-03-19 DIAGNOSIS — N18.6 END STAGE RENAL DISEASE: ICD-10-CM

## 2024-03-19 PROCEDURE — 99024 POSTOP FOLLOW-UP VISIT: CPT

## 2024-03-19 PROCEDURE — 93990 DOPPLER FLOW TESTING: CPT

## 2024-03-19 NOTE — HISTORY OF PRESENT ILLNESS
[FreeTextEntry1] : Pt presents for avf access planning and suture removal. She is s/p LUE avf exploration, wound debridement, avf ligation and plastic surgery wound closure on 8/31/2023. Pt is doing well. LUE incision well healed. She has been getting HD via right chest permacath M/W/F without any issues.   She is right hand dominant. Denies pacemaker/AICD. [de-identified] : 3/19/2024 - Pt is here for post op visit. She is s/p RUE single stage basilic vein transposition on 2/13/2024. RUE incision c/d/i, healing well. Reports mild pain and right forearm numbness. Pain is improving. She is on HD via right IJ permacath on M/W/F. No issues with HD.

## 2024-03-19 NOTE — ASSESSMENT
[Arterial/Venous Disease] : arterial/venous disease [Other: _____] : [unfilled] [FreeTextEntry1] : Impression - ESRD on HD via permacath, right avf is not fully mature and with >50% stenosis in venous outflow   Plan Continue HD via permacath 3x/week RUE precautions - no BP, IV or blood draw d/w pt indication, risks, and benefits of right avf fistulogram pt agrees to right avf fistulogram right avf fistulogram/BAM to be scheduled

## 2024-03-19 NOTE — PHYSICAL EXAM
[No Rash or Lesion] : No rash or lesion [Alert] : alert [Oriented to Place] : oriented to place [Oriented to Person] : oriented to person [Oriented to Time] : oriented to time [Calm] : calm [2+] : right 2+ [0] : right 0 [de-identified] : NAD [de-identified] : NCAT [de-identified] : unlabored breathing [FreeTextEntry1] : RUE incision c/d/i, healing well mild swelling and ecchymosis no bleeding or drainage unpalpable right radial, palpable right ulnar good palpable thrill over right avf brisk capillary refill < 2 sec [de-identified] : jose cranial nerves 2-12 jose grossly intact [de-identified] : cooperative

## 2024-03-19 NOTE — DATA REVIEWED
[FreeTextEntry1] : 10/31/2023 bilateral upper extremity vein mapping no suitable vein in RUE LUE basilic vein 3 to 3.5 mm  3/19/2024 RUE dialysis ultrasound patent right basilic vein transposition with < 50% at the anastomosis and >50% stenosis at the proximal venous outflow tract volume flow 588 ml/min diam prox 2.6 mm, diam mid 5.2 mm, diam distal 7.5 mm

## 2024-03-28 ENCOUNTER — APPOINTMENT (OUTPATIENT)
Dept: VASCULAR SURGERY | Facility: HOSPITAL | Age: 79
End: 2024-03-28

## 2024-03-28 ENCOUNTER — OUTPATIENT (OUTPATIENT)
Dept: OUTPATIENT SERVICES | Facility: HOSPITAL | Age: 79
LOS: 1 days | Discharge: HOME | End: 2024-03-28
Payer: MEDICARE

## 2024-03-28 DIAGNOSIS — Z96.641 PRESENCE OF RIGHT ARTIFICIAL HIP JOINT: Chronic | ICD-10-CM

## 2024-03-28 DIAGNOSIS — N18.6 END STAGE RENAL DISEASE: ICD-10-CM

## 2024-03-28 DIAGNOSIS — Z98.890 OTHER SPECIFIED POSTPROCEDURAL STATES: Chronic | ICD-10-CM

## 2024-03-28 DIAGNOSIS — Z90.710 ACQUIRED ABSENCE OF BOTH CERVIX AND UTERUS: Chronic | ICD-10-CM

## 2024-03-28 PROCEDURE — 36903 INTRO CATH DIALYSIS CIRCUIT: CPT | Mod: RT,58

## 2024-03-28 PROCEDURE — 99152 MOD SED SAME PHYS/QHP 5/>YRS: CPT

## 2024-03-28 PROCEDURE — 76937 US GUIDE VASCULAR ACCESS: CPT | Mod: 26

## 2024-03-28 RX ORDER — METOPROLOL TARTRATE 50 MG
0.5 TABLET ORAL
Refills: 0 | DISCHARGE

## 2024-03-28 RX ORDER — CHOLECALCIFEROL (VITAMIN D3) 125 MCG
0 CAPSULE ORAL
Refills: 0 | DISCHARGE

## 2024-03-28 RX ORDER — DOCUSATE SODIUM 100 MG
1 CAPSULE ORAL
Refills: 0 | DISCHARGE

## 2024-03-28 RX ORDER — LOSARTAN POTASSIUM 100 MG/1
1 TABLET, FILM COATED ORAL
Refills: 0 | DISCHARGE

## 2024-03-28 RX ORDER — SODIUM CHLORIDE 9 MG/ML
3 INJECTION INTRAMUSCULAR; INTRAVENOUS; SUBCUTANEOUS EVERY 8 HOURS
Refills: 0 | Status: DISCONTINUED | OUTPATIENT
Start: 2024-03-28 | End: 2024-04-11

## 2024-03-28 RX ORDER — SENNOSIDES/DOCUSATE SODIUM 8.6MG-50MG
2 TABLET ORAL
Refills: 0 | DISCHARGE

## 2024-03-28 NOTE — H&P CARDIOLOGY - REVIEW OF SYSTEMS
The patient denies chest pain, SOB, palpitations, dizziness, presyncope, syncope,  headache, visual disturbances, CVA, PE, DVT, RENNY, abdominal pain, N/V/D/C, hematochezia, melena, dysuria, hematuria, fever, chills.

## 2024-03-28 NOTE — H&P CARDIOLOGY - ADDITIONAL PE
L arm AV fistula - no thrill  R arm AV fistula - + thrill  R IJ permacath covered with dry clean dressing

## 2024-03-28 NOTE — H&P CARDIOLOGY - HISTORY OF PRESENT ILLNESS
78 y.o.  female presents today for elective R arm AV fistulogram to assess maturation.  The patient has non functional L AV fistula and at present, receives dialysis via R IJ permacath. The patient c/o cough for 3 days, started to be productive yesterday. The patient denies sore throat, nasal congestion, fever, chills, chest pain, SOB, palpitations, dizziness, presyncope, syncope,  headache, visual disturbances, CVA, PE, DVT, RENNY, abdominal pain, N/V/D/C, hematochezia, melena, dysuria, hematuria. The patient was seen by her PMD, prescribed Tessalon Pearls  and antibiotic, however the patient did not start the antibiotic yet, as she was waiting for today's procedure. She denies feeling worse.    78 y.o.  female presents today for elective R arm AV fistulogram to assess maturation.  The patient has non functional L AV fistula and at present, receives dialysis via R IJ permacath. The patient c/o cough for 3 days, started to be productive yesterday. The patient denies sore throat, nasal congestion, fever, chills, chest pain, SOB, palpitations, dizziness, presyncope, syncope,  headache, visual disturbances, CVA, PE, DVT, RENNY, abdominal pain, N/V/D/C, hematochezia, melena, dysuria, hematuria. The patient was seen by her PMD, prescribed Tessalon Pearls  and Doxycycline however the patient did not start the antibiotic yet, as she was waiting for today's procedure. She denies feeling worse.

## 2024-03-28 NOTE — H&P CARDIOLOGY - NSICDXPASTMEDICALHX_GEN_ALL_CORE_FT
PAST MEDICAL HISTORY:  Adult Onset Diabetes Mellitus,     Cancer of Endometrium s/p Hysterectomy , s/p Chemo.    Dissection of Aorta Type B    ESRD (end stage renal disease) on dialysis M,W,F   [facility]    ESRD on hemodialysis     Hypertension

## 2024-03-28 NOTE — CHART NOTE - NSCHARTNOTEFT_GEN_A_CORE
The patient is s/p AV fistulogram.   As per Dr. Jensen, the patient had beeding from the access site in the cath lab, manual pressure held by the vascular team members in the procedure room. The arm was wrapped with ACE bandage upon arrival to [facility].    Access site suture was removed by a vascular surgery fellow 2 hrs post procedure and the arm was covered with ACE wrap. AS per vascular surgery fellow, the ace wrap is to be removed tomorrow.   The patient is stable and denies any complaints.   As per Dr. Jensen, the patient can be d/c home today.

## 2024-04-09 NOTE — ED PROVIDER NOTE - NS ED MD DISPO ISOLATION TYPES
Rx  What they offer: Good Rx tracks prescription drug prices and provides free drug coupons for discounts on medications.  Website: https://www.[employer]    [employer]  What they offer: [employer] offers free information on medications and healthcare cost savings programs including prescription assistance programs, coupons, and discount programs.  Helpline: 684.288.1861  Website: https://www.[employer].org    RX Assist  What they offer: Information about free and low-cost medicine programs.  Website: https://www.rxassist.[employer]/    [employer] $4 Prescription Program  What they offer: Prescription Program includes up to a 30-day supply for $4 and a 90-day supply for $10 of some covered generic drugs at commonly prescribed dosages  Website: https://www.[employer]/cp/4-prescriptions/4771747    [facility] Prescription Assistance Program  Phone: [phone]  Address: [address] [place] [address]         None

## 2024-04-17 ENCOUNTER — APPOINTMENT (OUTPATIENT)
Dept: VASCULAR SURGERY | Facility: CLINIC | Age: 79
End: 2024-04-17
Payer: MEDICARE

## 2024-04-17 VITALS
BODY MASS INDEX: 21.34 KG/M2 | TEMPERATURE: 98.5 F | SYSTOLIC BLOOD PRESSURE: 182 MMHG | HEART RATE: 70 BPM | WEIGHT: 113 LBS | HEIGHT: 61 IN | DIASTOLIC BLOOD PRESSURE: 95 MMHG

## 2024-04-17 PROCEDURE — 93985 DUP-SCAN HEMO COMPL BI STD: CPT

## 2024-04-17 PROCEDURE — 99212 OFFICE O/P EST SF 10 MIN: CPT | Mod: 24

## 2024-04-17 NOTE — PHYSICAL EXAM
[Thrill] : thrill [2+] : left 2+ [Normal] : coordination grossly intact, no focal deficits [Redness surrounding] : no redness surrounding [Drainage] : no drainage [de-identified] : LUE forearm mild numbness, grossly intact motor and sensation throughout, no wrist drop

## 2024-04-17 NOTE — ASSESSMENT
[FreeTextEntry1] : 78F RUE 1 stage BVT s/p BAM , c/b bleeding requiring covered stent of outflow vein c/o LUE mild forearm numbness similar to before, RUE doing well - wounds well healed  Palp thrill, resolving hematoma, no nerve compression on exam VDUS shows VF adequate, adequate size of outflow vein  Will plan for use of RUE AVF for HD Plan for TDC removal once RUE AVF is regularly accessed

## 2024-04-19 RX ORDER — LIDOCAINE AND PRILOCAINE 25; 25 MG/G; MG/G
2.5-2.5 CREAM TOPICAL
Qty: 2 | Refills: 3 | Status: ACTIVE | COMMUNITY
Start: 2018-03-22 | End: 1900-01-01

## 2024-04-23 ENCOUNTER — APPOINTMENT (OUTPATIENT)
Dept: VASCULAR SURGERY | Facility: CLINIC | Age: 79
End: 2024-04-23

## 2024-05-01 ENCOUNTER — APPOINTMENT (OUTPATIENT)
Dept: VASCULAR SURGERY | Facility: CLINIC | Age: 79
End: 2024-05-01

## 2024-05-09 ENCOUNTER — APPOINTMENT (OUTPATIENT)
Dept: ENDOVASCULAR SURGERY | Facility: CLINIC | Age: 79
End: 2024-05-09
Payer: MEDICARE

## 2024-05-09 PROCEDURE — 36589 REMOVAL TUNNELED CV CATH: CPT

## 2024-05-15 ENCOUNTER — APPOINTMENT (OUTPATIENT)
Dept: VASCULAR SURGERY | Facility: CLINIC | Age: 79
End: 2024-05-15

## 2024-05-30 NOTE — DISCHARGE NOTE ADULT - DO YOU HAVE DIFFICULTY CLIMBING STAIRS
No [Chaperone Present] : A chaperone was present in the examining room during all aspects of the physical examination [[id number]] : A chaperone was present during the pelvic exam. [FreeTextEntry2] : Trudy [Appropriately responsive] : appropriately responsive [Alert] : alert [No Acute Distress] : no acute distress [No Lymphadenopathy] : no lymphadenopathy [Soft] : soft [Non-tender] : non-tender [Non-distended] : non-distended [No HSM] : No HSM [No Lesions] : no lesions [No Mass] : no mass [Oriented x3] : oriented x3 [Examination Of The Breasts] : a normal appearance [Normal] : normal [No Masses] : no breast masses were palpable

## 2024-06-04 NOTE — GOALS
[Staff: _____] : staff - [unfilled] [FreeTextEntry7] : Contacted by Rehab physician at [facility] that Ms. Harris had developed increasing pain at the hip over the past week. They had obtainde an XR of the L hip which had demonstrated lag screw cutout into the acetabulum. I have directed them to refer her into the hospital as she will require possible conversion total vs. hemiarthroplasty.  I have discussed the case with Dr. Patel who is my senior musculoskeletal oncology partner and the surgeon who performs our complex hip revisions within our practice, as I do not perform this procedure myself. He has agreed to assume care of the patient and I will transfer subsequent care to him.  [facility] well as with the patient.  I expressed my apologies to the patient that this happened and let her know that I would assist Dr. Patel with revising her case.  All were in understanding and she will transfer to [facility] today.

## 2024-06-04 NOTE — PROGRESS NOTE ADULT - PROBLEM/PLAN-1
Health Maintenance       Depression Screening (Yearly)  Never done    HPV Vaccine (1 - 3-dose series)  Never done    Chlamydia and Gonorrhea Screening (if sexually active) (Yearly)  Never done    COVID-19 Vaccine (1 - 2023-24 season)  Never done           Following review of the above:  Patient is not proceeding with: Chlamydia and Gonorrhea Screening, COVID-19, and HPV    Note: Refer to final orders and clinician documentation.      
DISPLAY PLAN FREE TEXT

## 2024-07-22 NOTE — PHYSICAL THERAPY INITIAL EVALUATION ADULT - REFERRAL TO ANOTHER SERVICE NEEDED, PT EVAL
8/18/2023 CBC with differential stable with hemoglobin 13.8, hematocrit 40.8, MCV 87.7 compared to previous 13.6, 41.8 and 88.6 respectively on 5/12/2023. Continue iron and vitamin C coadministration daily.  Blood counts pending with blood work 7/22/2024, management per results.   occupational therapy/social work

## 2024-07-30 ENCOUNTER — APPOINTMENT (OUTPATIENT)
Dept: ORTHOPEDIC SURGERY | Facility: CLINIC | Age: 79
End: 2024-07-30
Payer: MEDICARE

## 2024-07-30 DIAGNOSIS — Z96.642 PRESENCE OF LEFT ARTIFICIAL HIP JOINT: ICD-10-CM

## 2024-07-30 PROCEDURE — 99213 OFFICE O/P EST LOW 20 MIN: CPT

## 2024-07-30 PROCEDURE — 73552 X-RAY EXAM OF FEMUR 2/>: CPT | Mod: LT

## 2024-07-30 PROCEDURE — 72170 X-RAY EXAM OF PELVIS: CPT

## 2024-07-30 NOTE — END OF VISIT
[FreeTextEntry3] : All medical record entries made by the Scribe were at my, Dr. Vasyl Patel, direction and personally dictated by me on 07/30/2024. I have reviewed the chart and agree that the record accurately reflects my personal performance of the history, physical exam, assessment and plan. I have also personally directed, reviewed, and agreed with the chart.

## 2024-07-30 NOTE — DATA REVIEWED
[Imaging Present] : Present [de-identified] : X-rays today (07/30/2024), AP pelvis and multiple views of the left femur, show the bipolar long stem modular arthroplasty in position. Length is maintained. The distal tip is against the medial cortex but there is no obvious stress reaction. Overall, this is stable from before.

## 2024-07-30 NOTE — HISTORY OF PRESENT ILLNESS
[FreeTextEntry1] : Patient states she is not able to walk without use of a walker due to posterior hip tightness. She is no longer doing PT, stopped one month ago, though states she did not have hip stretching when she was still going. She is continuing with dialysis with no problems, using right arm for access. [Improving] : improving [___ mths] : [unfilled] month(s) ago [3] : currently ~his/her~ pain is 3 out of 10

## 2024-07-30 NOTE — DISCUSSION/SUMMARY
[All Questions Answered] : Patient (and family) had all questions answered to an agreeable level of satisfaction [Interested in Proceeding] : Patient (and family) expressed understanding and interest in proceeding with the plan as outlined [de-identified] : 1 year s/p left hip revision for gamma nail cutout. She still has significant tightness which can certainly happen in the abductors. Will write for PT, with focus on hip stretching. She has some acetabular wear, but has no groin pain with this hemiarthroplasty  Follow up in 6 months, sooner if any new or worsening pain. She uses her right arm for dialysis access, and she does not want to use this arm only for weightbearing with a cane, so she is comfortable with a walker.  If imaging or pathology/biopsy was ordered, the patient was told to make an appointment to review findings right after all imaging is completed.   We discussed risks, benefits and alternatives. Rationale of care was reviewed and all questions were answered.

## 2024-07-30 NOTE — PHYSICAL EXAM
[General Appearance - Well-Appearing] : Well appearing [General Appearance - Well Nourished] : well nourished [Oriented To Time, Place, And Person] : Oriented to person, place, and time [Sclera] : the sclera and conjunctiva were normal [Neck Cervical Mass (___cm)] : no neck mass was observed [Heart Rate And Rhythm] : heart rate was normal and rhythm regular [] : No respiratory distress [Abdomen Soft] : Soft [Walker] : Uses walker for ambulation. [FreeTextEntry1] : On exam incision is clean, dry, and intact with no erythema or warmth to suggest infection. She can actively flex to 80 degrees and passively to 110 degrees with good abduction and rotation. Limb lengths are approximately equal.

## 2024-08-19 NOTE — PROVIDER CONTACT NOTE (OTHER) - ACTION/TREATMENT ORDERED:
acp made aware. Heat packs applied unit obtaining hypothermia blanket. No further orders. Calm/Appropriate Additional Safety/Bands:

## 2024-08-22 NOTE — ADDENDUM
[FreeTextEntry1] : I, Mauricio Larson, documented this note as a scribe on behalf of Dr. Vasyl Patel on 07/30/2024. normal appearance, no deformities, trachea midline, thyroid nontender

## 2024-12-12 ENCOUNTER — INPATIENT (INPATIENT)
Facility: HOSPITAL | Age: 79
LOS: 13 days | Discharge: NURSING, REHAB OR LONG-TERM CARE | End: 2024-12-26
Attending: INTERNAL MEDICINE | Admitting: INTERNAL MEDICINE
Payer: MEDICARE

## 2024-12-12 VITALS
DIASTOLIC BLOOD PRESSURE: 95 MMHG | OXYGEN SATURATION: 97 % | SYSTOLIC BLOOD PRESSURE: 219 MMHG | TEMPERATURE: 103 F | WEIGHT: 119.93 LBS | RESPIRATION RATE: 16 BRPM | HEART RATE: 88 BPM

## 2024-12-12 DIAGNOSIS — Z98.890 OTHER SPECIFIED POSTPROCEDURAL STATES: Chronic | ICD-10-CM

## 2024-12-12 DIAGNOSIS — Z96.641 PRESENCE OF RIGHT ARTIFICIAL HIP JOINT: Chronic | ICD-10-CM

## 2024-12-12 DIAGNOSIS — Z90.710 ACQUIRED ABSENCE OF BOTH CERVIX AND UTERUS: Chronic | ICD-10-CM

## 2024-12-12 LAB
ALBUMIN SERPL ELPH-MCNC: 2.8 G/DL — LOW (ref 3.3–5)
ALP SERPL-CCNC: 161 U/L — HIGH (ref 40–120)
ALT FLD-CCNC: 21 U/L — SIGNIFICANT CHANGE UP (ref 4–33)
ANION GAP SERPL CALC-SCNC: 19 MMOL/L — HIGH (ref 7–14)
APTT BLD: 32 SEC — SIGNIFICANT CHANGE UP (ref 24.5–35.6)
AST SERPL-CCNC: 36 U/L — HIGH (ref 4–32)
B-OH-BUTYR SERPL-SCNC: 0.2 MMOL/L — SIGNIFICANT CHANGE UP (ref 0–0.4)
BASOPHILS # BLD AUTO: 0 K/UL — SIGNIFICANT CHANGE UP (ref 0–0.2)
BASOPHILS NFR BLD AUTO: 0 % — SIGNIFICANT CHANGE UP (ref 0–2)
BILIRUB SERPL-MCNC: 1.7 MG/DL — HIGH (ref 0.2–1.2)
BUN SERPL-MCNC: 62 MG/DL — HIGH (ref 7–23)
CALCIUM SERPL-MCNC: 8.6 MG/DL — SIGNIFICANT CHANGE UP (ref 8.4–10.5)
CHLORIDE SERPL-SCNC: 89 MMOL/L — LOW (ref 98–107)
CO2 SERPL-SCNC: 20 MMOL/L — LOW (ref 22–31)
CREAT SERPL-MCNC: 5.76 MG/DL — HIGH (ref 0.5–1.3)
EGFR: 7 ML/MIN/1.73M2 — LOW
EOSINOPHIL # BLD AUTO: 0.05 K/UL — SIGNIFICANT CHANGE UP (ref 0–0.5)
EOSINOPHIL NFR BLD AUTO: 0.9 % — SIGNIFICANT CHANGE UP (ref 0–6)
FLUAV AG NPH QL: SIGNIFICANT CHANGE UP
FLUBV AG NPH QL: SIGNIFICANT CHANGE UP
GAS PNL BLDV: SIGNIFICANT CHANGE UP
GLUCOSE SERPL-MCNC: 394 MG/DL — HIGH (ref 70–99)
HCT VFR BLD CALC: 29.9 % — LOW (ref 34.5–45)
HGB BLD-MCNC: 10.3 G/DL — LOW (ref 11.5–15.5)
IANC: 5.5 K/UL — SIGNIFICANT CHANGE UP (ref 1.8–7.4)
INR BLD: 1.25 RATIO — HIGH (ref 0.85–1.16)
LYMPHOCYTES # BLD AUTO: 0.41 K/UL — LOW (ref 1–3.3)
LYMPHOCYTES # BLD AUTO: 6.9 % — LOW (ref 13–44)
MCHC RBC-ENTMCNC: 31.2 PG — SIGNIFICANT CHANGE UP (ref 27–34)
MCHC RBC-ENTMCNC: 34.4 G/DL — SIGNIFICANT CHANGE UP (ref 32–36)
MCV RBC AUTO: 90.6 FL — SIGNIFICANT CHANGE UP (ref 80–100)
MONOCYTES # BLD AUTO: 0.16 K/UL — SIGNIFICANT CHANGE UP (ref 0–0.9)
MONOCYTES NFR BLD AUTO: 2.6 % — SIGNIFICANT CHANGE UP (ref 2–14)
NEUTROPHILS # BLD AUTO: 5.32 K/UL — SIGNIFICANT CHANGE UP (ref 1.8–7.4)
NEUTROPHILS NFR BLD AUTO: 73.9 % — SIGNIFICANT CHANGE UP (ref 43–77)
PLATELET # BLD AUTO: 78 K/UL — LOW (ref 150–400)
POTASSIUM SERPL-MCNC: 4.9 MMOL/L — SIGNIFICANT CHANGE UP (ref 3.5–5.3)
POTASSIUM SERPL-SCNC: 4.9 MMOL/L — SIGNIFICANT CHANGE UP (ref 3.5–5.3)
PROT SERPL-MCNC: 7.1 G/DL — SIGNIFICANT CHANGE UP (ref 6–8.3)
PROTHROM AB SERPL-ACNC: 14.8 SEC — HIGH (ref 9.9–13.4)
RBC # BLD: 3.3 M/UL — LOW (ref 3.8–5.2)
RBC # FLD: 13.6 % — SIGNIFICANT CHANGE UP (ref 10.3–14.5)
RSV RNA NPH QL NAA+NON-PROBE: SIGNIFICANT CHANGE UP
SARS-COV-2 RNA SPEC QL NAA+PROBE: SIGNIFICANT CHANGE UP
SODIUM SERPL-SCNC: 128 MMOL/L — LOW (ref 135–145)
TROPONIN T, HIGH SENSITIVITY RESULT: 209 NG/L — CRITICAL HIGH
WBC # BLD: 6 K/UL — SIGNIFICANT CHANGE UP (ref 3.8–10.5)
WBC # FLD AUTO: 6 K/UL — SIGNIFICANT CHANGE UP (ref 3.8–10.5)

## 2024-12-12 PROCEDURE — 99285 EMERGENCY DEPT VISIT HI MDM: CPT | Mod: GC

## 2024-12-12 PROCEDURE — 71045 X-RAY EXAM CHEST 1 VIEW: CPT | Mod: 26

## 2024-12-12 RX ORDER — DORZOLAMIDE/TIMOLOL/PF 2 %-0.5 %
1 DROPS OPHTHALMIC (EYE) ONCE
Refills: 0 | Status: COMPLETED | OUTPATIENT
Start: 2024-12-12 | End: 2024-12-12

## 2024-12-12 RX ORDER — SODIUM CHLORIDE 9 MG/ML
500 INJECTION, SOLUTION INTRAMUSCULAR; INTRAVENOUS; SUBCUTANEOUS ONCE
Refills: 0 | Status: COMPLETED | OUTPATIENT
Start: 2024-12-12 | End: 2024-12-12

## 2024-12-12 RX ORDER — BRIMONIDINE TARTRATE 1 MG/ML
1 SOLUTION/ DROPS OPHTHALMIC ONCE
Refills: 0 | Status: COMPLETED | OUTPATIENT
Start: 2024-12-12 | End: 2024-12-12

## 2024-12-12 RX ORDER — ACETAMINOPHEN 80 MG/.8ML
1000 SOLUTION/ DROPS ORAL ONCE
Refills: 0 | Status: COMPLETED | OUTPATIENT
Start: 2024-12-12 | End: 2024-12-12

## 2024-12-12 RX ORDER — VANCOMYCIN HYDROCHLORIDE 5 G/100ML
1000 INJECTION, POWDER, LYOPHILIZED, FOR SOLUTION INTRAVENOUS ONCE
Refills: 0 | Status: COMPLETED | OUTPATIENT
Start: 2024-12-12 | End: 2024-12-12

## 2024-12-12 RX ORDER — CEFTRIAXONE SODIUM 1 G/1
1000 INJECTION, POWDER, FOR SOLUTION INTRAMUSCULAR; INTRAVENOUS ONCE
Refills: 0 | Status: COMPLETED | OUTPATIENT
Start: 2024-12-12 | End: 2024-12-12

## 2024-12-12 RX ORDER — POLYMYXIN B SULFATE AND TRIMETHOPRIM SULFATE 10000; 1 [USP'U]/ML; MG/ML
1 SOLUTION/ DROPS OPHTHALMIC ONCE
Refills: 0 | Status: COMPLETED | OUTPATIENT
Start: 2024-12-12 | End: 2024-12-12

## 2024-12-12 RX ADMIN — POLYMYXIN B SULFATE AND TRIMETHOPRIM SULFATE 1 DROP(S): 10000; 1 SOLUTION/ DROPS OPHTHALMIC at 21:58

## 2024-12-12 RX ADMIN — ACETAMINOPHEN 400 MILLIGRAM(S): 80 SOLUTION/ DROPS ORAL at 23:22

## 2024-12-12 RX ADMIN — VANCOMYCIN HYDROCHLORIDE 250 MILLIGRAM(S): 5 INJECTION, POWDER, LYOPHILIZED, FOR SOLUTION INTRAVENOUS at 23:00

## 2024-12-12 RX ADMIN — SODIUM CHLORIDE 500 MILLILITER(S): 9 INJECTION, SOLUTION INTRAMUSCULAR; INTRAVENOUS; SUBCUTANEOUS at 23:00

## 2024-12-12 RX ADMIN — CEFTRIAXONE SODIUM 100 MILLIGRAM(S): 1 INJECTION, POWDER, FOR SOLUTION INTRAMUSCULAR; INTRAVENOUS at 22:30

## 2024-12-12 NOTE — ED PROVIDER NOTE - OBJECTIVE STATEMENT
Radha: ESRD (M, W, F; makes a little bit of urine), HTN, DM, R cataract (Ophtho=Jj. R eye cataract (surgery 1/25), glaucoma (brimonidine, latanoprost). P/w: 1) fever w/o localizing Sx, 2) rapid decline in L eye vision: usually reads w/ glasses. Decreased vision starting this AM. 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 Some pain.

## 2024-12-12 NOTE — ED PROVIDER NOTE - SHIFT CHANGE DETAILS
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    CT imaging does not show primary source for infection, patient to be admitted to medicine, ophthalmology will re-evaluate in day time.

## 2024-12-12 NOTE — ED ADULT NURSE NOTE - NSFALLRISKINTERV_ED_ALL_ED

## 2024-12-12 NOTE — ED PROVIDER NOTE - CLINICAL SUMMARY MEDICAL DECISION MAKING FREE TEXT BOX
Radha: Concern for: 1) sepsis: Check sepsis labs (UA, UCx, BCx, lactate, RVP). Give antipyretics (if needed). Give IVF and ABx. Small amount of IVF (ESRD). 2) ANAG glaucoma: Ophtho consult, IOP-lowering eye drops. Polytrim.Admit.

## 2024-12-12 NOTE — ED PROVIDER NOTE - PHYSICAL EXAMINATION
Ill-appearing, well nourished, awake, alert, oriented to person, place, time/situation and in no apparent distress.    Airway patent. Neck supple.    Eyes: OD: no scleral injection, not reactive to light. OS: scleral injection, fluoroscein: punctate lesions in center of visual axis, small amount of yellow discharge, not reactive to light, cornea is cloudy. Unable to tell light from dark. IOP: OD: 20, OS: 35.     Strong pulse.    Respirations unlabored.    Abdomen soft, non-tender, no guarding.    MSK: Spine appears normal, range of motion is not limited, no muscle or joint tenderness.    Alert and oriented, no gross motor or sensory deficits.    Skin: normal color for race, warm, dry and intact. No evidence of rash.    No SI/HI.

## 2024-12-12 NOTE — ED ADULT TRIAGE NOTE - CHIEF COMPLAINT QUOTE
Pt presents for hyperglycemia, pt endorses cloudy vision to Lt eye starting this morning, now pt states she can not see out of Lt eye. Pt is scheduled cataract on Jan 2025 to Lt eye. 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 Pt is ESRD, goes Monday, Weds, Friday, last full session was yesterday. MD Garcia called for code stroke evaluation, no code stroke initiated.

## 2024-12-12 NOTE — ED ADULT NURSE NOTE - OBJECTIVE STATEMENT
Pt arrives to room 1 A&Ox3 and ambulatory with walker at baseline. PH of ESRD (M,W,F last session yesterday), Legally blind in R eye, HTN and DM. Pt comes to ED c/o worsening vision to L eye starting this AM. Pt also endorsing hyperglycemia with BS in 400's. Pt states headache, chills and body aches starting 12/8/24. Denies headache, dizziness, chest pain, SOB, nausea, vomiting and diarrhea at this time. Respirations even and unlabored on room air. Pt placed on continuous cardiac monitor, NSR noted. 20 gauge placed L hand, labs drawn and sent. Pt medicated per EMAR. L arm precautions noted, L AV fistula +bruit and thrill. Plan of care ongoing, comfort measures provided and safety measures maintained. Awaiting results.

## 2024-12-12 NOTE — ED PROVIDER NOTE - ATTENDING CONTRIBUTION TO CARE
I performed a face-to-face evaluation of the patient and performed a history and physical examination. I agree with the history and physical examination. If this was a PA visit, I personally saw the patient with the PA and performed a substantive portion of the visit including all aspects of the medical decision making.    Radha: Concern for: 1) sepsis: Check sepsis labs (UA, UCx, BCx, lactate, RVP). Give antipyretics (if needed). Give IVF and ABx. Small amount of IVF (ESRD). 2) ANAG glaucoma: Ophtho consult, IOP-lowering eye drops. Polytrim.Admit.

## 2024-12-13 DIAGNOSIS — A41.89 OTHER SPECIFIED SEPSIS: ICD-10-CM

## 2024-12-13 DIAGNOSIS — N18.6 END STAGE RENAL DISEASE: ICD-10-CM

## 2024-12-13 DIAGNOSIS — D63.8 ANEMIA IN OTHER CHRONIC DISEASES CLASSIFIED ELSEWHERE: ICD-10-CM

## 2024-12-13 LAB
A1C WITH ESTIMATED AVERAGE GLUCOSE RESULT: 7.6 % — HIGH (ref 4–5.6)
ADD ON TEST-SPECIMEN IN LAB: SIGNIFICANT CHANGE UP
ADD ON TEST-SPECIMEN IN LAB: SIGNIFICANT CHANGE UP
ALBUMIN SERPL ELPH-MCNC: 2.8 G/DL — LOW (ref 3.3–5)
ALP SERPL-CCNC: 133 U/L — HIGH (ref 40–120)
ALT FLD-CCNC: 14 U/L — SIGNIFICANT CHANGE UP (ref 4–33)
ALT FLD-CCNC: 17 U/L — SIGNIFICANT CHANGE UP (ref 4–33)
ANION GAP SERPL CALC-SCNC: 13 MMOL/L — SIGNIFICANT CHANGE UP (ref 7–14)
AST SERPL-CCNC: 24 U/L — SIGNIFICANT CHANGE UP (ref 4–32)
BASOPHILS # BLD AUTO: 0.01 K/UL — SIGNIFICANT CHANGE UP (ref 0–0.2)
BASOPHILS NFR BLD AUTO: 0.1 % — SIGNIFICANT CHANGE UP (ref 0–2)
BILIRUB SERPL-MCNC: 1.2 MG/DL — SIGNIFICANT CHANGE UP (ref 0.2–1.2)
BUN SERPL-MCNC: 38 MG/DL — HIGH (ref 7–23)
CALCIUM SERPL-MCNC: 8.5 MG/DL — SIGNIFICANT CHANGE UP (ref 8.4–10.5)
CHLORIDE SERPL-SCNC: 95 MMOL/L — LOW (ref 98–107)
CHOLEST SERPL-MCNC: 139 MG/DL — SIGNIFICANT CHANGE UP
CO2 SERPL-SCNC: 26 MMOL/L — SIGNIFICANT CHANGE UP (ref 22–31)
CREAT SERPL-MCNC: 3.51 MG/DL — HIGH (ref 0.5–1.3)
DIALYSIS INSTRUMENT RESULT - HEPATITIS B SURFACE ANTIGEN: NEGATIVE — SIGNIFICANT CHANGE UP
EGFR: 13 ML/MIN/1.73M2 — LOW
EOSINOPHIL # BLD AUTO: 0.01 K/UL — SIGNIFICANT CHANGE UP (ref 0–0.5)
EOSINOPHIL NFR BLD AUTO: 0.1 % — SIGNIFICANT CHANGE UP (ref 0–6)
ESTIMATED AVERAGE GLUCOSE: 171 — SIGNIFICANT CHANGE UP
GLUCOSE BLDC GLUCOMTR-MCNC: 158 MG/DL — HIGH (ref 70–99)
GLUCOSE BLDC GLUCOMTR-MCNC: 193 MG/DL — HIGH (ref 70–99)
GLUCOSE BLDC GLUCOMTR-MCNC: 249 MG/DL — HIGH (ref 70–99)
GLUCOSE BLDC GLUCOMTR-MCNC: 250 MG/DL — HIGH (ref 70–99)
GLUCOSE BLDC GLUCOMTR-MCNC: 252 MG/DL — HIGH (ref 70–99)
GLUCOSE BLDC GLUCOMTR-MCNC: 96 MG/DL — SIGNIFICANT CHANGE UP (ref 70–99)
GLUCOSE SERPL-MCNC: 129 MG/DL — HIGH (ref 70–99)
GP B STREP DNA BLD POS QL NAA+NON-PROBE: SIGNIFICANT CHANGE UP
GRAM STN FLD: ABNORMAL
HCT VFR BLD CALC: 31.3 % — LOW (ref 34.5–45)
HDLC SERPL-MCNC: 45 MG/DL — LOW
HGB BLD-MCNC: 11 G/DL — LOW (ref 11.5–15.5)
IANC: 5.95 K/UL — SIGNIFICANT CHANGE UP (ref 1.8–7.4)
IMM GRANULOCYTES NFR BLD AUTO: 0.4 % — SIGNIFICANT CHANGE UP (ref 0–0.9)
LACTATE BLDV-MCNC: 2.1 MMOL/L — HIGH (ref 0.5–2)
LIPID PNL WITH DIRECT LDL SERPL: 65 MG/DL — SIGNIFICANT CHANGE UP
LYMPHOCYTES # BLD AUTO: 0.56 K/UL — LOW (ref 1–3.3)
LYMPHOCYTES # BLD AUTO: 8.1 % — LOW (ref 13–44)
MAGNESIUM SERPL-MCNC: 2 MG/DL — SIGNIFICANT CHANGE UP (ref 1.6–2.6)
MCHC RBC-ENTMCNC: 30.8 PG — SIGNIFICANT CHANGE UP (ref 27–34)
MCHC RBC-ENTMCNC: 35.1 G/DL — SIGNIFICANT CHANGE UP (ref 32–36)
MCV RBC AUTO: 87.7 FL — SIGNIFICANT CHANGE UP (ref 80–100)
METHOD TYPE: SIGNIFICANT CHANGE UP
MONOCYTES # BLD AUTO: 0.32 K/UL — SIGNIFICANT CHANGE UP (ref 0–0.9)
MONOCYTES NFR BLD AUTO: 4.7 % — SIGNIFICANT CHANGE UP (ref 2–14)
NEUTROPHILS # BLD AUTO: 5.95 K/UL — SIGNIFICANT CHANGE UP (ref 1.8–7.4)
NEUTROPHILS NFR BLD AUTO: 86.6 % — HIGH (ref 43–77)
NON HDL CHOLESTEROL: 94 MG/DL — SIGNIFICANT CHANGE UP
NRBC # BLD: 0 /100 WBCS — SIGNIFICANT CHANGE UP (ref 0–0)
NRBC # FLD: 0 K/UL — SIGNIFICANT CHANGE UP (ref 0–0)
PHOSPHATE SERPL-MCNC: 2.2 MG/DL — LOW (ref 2.5–4.5)
PLATELET # BLD AUTO: 80 K/UL — LOW (ref 150–400)
POTASSIUM SERPL-MCNC: 3.2 MMOL/L — LOW (ref 3.5–5.3)
POTASSIUM SERPL-SCNC: 3.2 MMOL/L — LOW (ref 3.5–5.3)
PROT SERPL-MCNC: 6.9 G/DL — SIGNIFICANT CHANGE UP (ref 6–8.3)
RBC # BLD: 3.57 M/UL — LOW (ref 3.8–5.2)
RBC # FLD: 13.3 % — SIGNIFICANT CHANGE UP (ref 10.3–14.5)
SODIUM SERPL-SCNC: 134 MMOL/L — LOW (ref 135–145)
SPECIMEN SOURCE: SIGNIFICANT CHANGE UP
SPECIMEN SOURCE: SIGNIFICANT CHANGE UP
TRIGL SERPL-MCNC: 170 MG/DL — HIGH
TROPONIN T, HIGH SENSITIVITY RESULT: 160 NG/L — CRITICAL HIGH
TROPONIN T, HIGH SENSITIVITY RESULT: 192 NG/L — CRITICAL HIGH
TSH SERPL-MCNC: 1.73 UIU/ML — SIGNIFICANT CHANGE UP (ref 0.27–4.2)
WBC # BLD: 6.88 K/UL — SIGNIFICANT CHANGE UP (ref 3.8–10.5)
WBC # FLD AUTO: 6.88 K/UL — SIGNIFICANT CHANGE UP (ref 3.8–10.5)

## 2024-12-13 PROCEDURE — 99223 1ST HOSP IP/OBS HIGH 75: CPT

## 2024-12-13 PROCEDURE — 99233 SBSQ HOSP IP/OBS HIGH 50: CPT

## 2024-12-13 PROCEDURE — 71260 CT THORAX DX C+: CPT | Mod: 26,MC

## 2024-12-13 PROCEDURE — 74177 CT ABD & PELVIS W/CONTRAST: CPT | Mod: 26,MC

## 2024-12-13 RX ORDER — CEFTRIAXONE SODIUM 1 G/1
INJECTION, POWDER, FOR SOLUTION INTRAMUSCULAR; INTRAVENOUS
Refills: 0 | Status: COMPLETED | OUTPATIENT
Start: 2024-12-13 | End: 2024-12-22

## 2024-12-13 RX ORDER — LIDOCAINE/PRILOCAINE 2.5 %-2.5%
1 CREAM (GRAM) TOPICAL DAILY
Refills: 0 | Status: DISCONTINUED | OUTPATIENT
Start: 2024-12-13 | End: 2024-12-26

## 2024-12-13 RX ORDER — INSULIN LISPRO 100/ML
VIAL (ML) SUBCUTANEOUS AT BEDTIME
Refills: 0 | Status: DISCONTINUED | OUTPATIENT
Start: 2024-12-13 | End: 2024-12-21

## 2024-12-13 RX ORDER — DEXTROSE MONOHYDRATE 25 G/50ML
12.5 INJECTION, SOLUTION INTRAVENOUS ONCE
Refills: 0 | Status: DISCONTINUED | OUTPATIENT
Start: 2024-12-13 | End: 2024-12-26

## 2024-12-13 RX ORDER — INSULIN LISPRO 100/ML
VIAL (ML) SUBCUTANEOUS
Refills: 0 | Status: DISCONTINUED | OUTPATIENT
Start: 2024-12-13 | End: 2024-12-18

## 2024-12-13 RX ORDER — DEXTROSE MONOHYDRATE 25 G/50ML
25 INJECTION, SOLUTION INTRAVENOUS ONCE
Refills: 0 | Status: DISCONTINUED | OUTPATIENT
Start: 2024-12-13 | End: 2024-12-26

## 2024-12-13 RX ORDER — DEXTROSE MONOHYDRATE 25 G/50ML
15 INJECTION, SOLUTION INTRAVENOUS ONCE
Refills: 0 | Status: DISCONTINUED | OUTPATIENT
Start: 2024-12-13 | End: 2024-12-26

## 2024-12-13 RX ORDER — HYDRALAZINE HYDROCHLORIDE 10 MG/1
50 TABLET ORAL AT BEDTIME
Refills: 0 | Status: DISCONTINUED | OUTPATIENT
Start: 2024-12-13 | End: 2024-12-26

## 2024-12-13 RX ORDER — SODIUM CHLORIDE 9 MG/ML
500 INJECTION, SOLUTION INTRAMUSCULAR; INTRAVENOUS; SUBCUTANEOUS ONCE
Refills: 0 | Status: COMPLETED | OUTPATIENT
Start: 2024-12-13 | End: 2024-12-13

## 2024-12-13 RX ORDER — POVIDONE-IODINE 5 %
1 SOLUTION, NON-ORAL OPHTHALMIC (EYE) ONCE
Refills: 0 | Status: DISCONTINUED | OUTPATIENT
Start: 2024-12-13 | End: 2024-12-22

## 2024-12-13 RX ORDER — PREDNISOLONE ACETATE 10 MG/ML
1 SUSPENSION OPHTHALMIC EVERY 4 HOURS
Refills: 0 | Status: DISCONTINUED | OUTPATIENT
Start: 2024-12-13 | End: 2024-12-14

## 2024-12-13 RX ORDER — SODIUM CHLORIDE 9 MG/ML
1000 INJECTION, SOLUTION INTRAVENOUS
Refills: 0 | Status: DISCONTINUED | OUTPATIENT
Start: 2024-12-13 | End: 2024-12-26

## 2024-12-13 RX ORDER — VANCOMYCIN HYDROCHLORIDE 5 G/100ML
0.1 INJECTION, POWDER, LYOPHILIZED, FOR SOLUTION INTRAVENOUS ONCE
Refills: 0 | Status: DISCONTINUED | OUTPATIENT
Start: 2024-12-13 | End: 2024-12-22

## 2024-12-13 RX ORDER — PIPERACILLIN AND TAZOBACTAM 3; .375 G/15ML; G/15ML
3.38 INJECTION, POWDER, LYOPHILIZED, FOR SOLUTION INTRAVENOUS ONCE
Refills: 0 | Status: COMPLETED | OUTPATIENT
Start: 2024-12-13 | End: 2024-12-13

## 2024-12-13 RX ORDER — INSULIN LISPRO 100/ML
5 VIAL (ML) SUBCUTANEOUS ONCE
Refills: 0 | Status: DISCONTINUED | OUTPATIENT
Start: 2024-12-13 | End: 2024-12-13

## 2024-12-13 RX ORDER — POLYETHYLENE GLYCOL 3350 17 G/DOSE
17 POWDER (GRAM) ORAL DAILY
Refills: 0 | Status: DISCONTINUED | OUTPATIENT
Start: 2024-12-13 | End: 2024-12-26

## 2024-12-13 RX ORDER — CHLORHEXIDINE GLUCONATE 1.2 MG/ML
1 RINSE ORAL DAILY
Refills: 0 | Status: DISCONTINUED | OUTPATIENT
Start: 2024-12-13 | End: 2024-12-26

## 2024-12-13 RX ORDER — GLUCAGON INJECTION, SOLUTION 0.5 MG/.1ML
1 INJECTION, SOLUTION SUBCUTANEOUS ONCE
Refills: 0 | Status: DISCONTINUED | OUTPATIENT
Start: 2024-12-13 | End: 2024-12-26

## 2024-12-13 RX ORDER — INSULIN LISPRO 100/ML
5 VIAL (ML) SUBCUTANEOUS ONCE
Refills: 0 | Status: COMPLETED | OUTPATIENT
Start: 2024-12-13 | End: 2024-12-13

## 2024-12-13 RX ORDER — ACETAMINOPHEN 80 MG/.8ML
1000 SOLUTION/ DROPS ORAL ONCE
Refills: 0 | Status: COMPLETED | OUTPATIENT
Start: 2024-12-13 | End: 2024-12-13

## 2024-12-13 RX ORDER — VANCOMYCIN HYDROCHLORIDE 5 G/100ML
1000 INJECTION, POWDER, LYOPHILIZED, FOR SOLUTION INTRAVENOUS ONCE
Refills: 0 | Status: DISCONTINUED | OUTPATIENT
Start: 2024-12-13 | End: 2024-12-13

## 2024-12-13 RX ORDER — INSULIN LISPRO 100/ML
2 VIAL (ML) SUBCUTANEOUS ONCE
Refills: 0 | Status: COMPLETED | OUTPATIENT
Start: 2024-12-13 | End: 2024-12-13

## 2024-12-13 RX ORDER — MOXIFLOXACIN/BAL.SALT SOLN/PF 1 MG/ML
1 SYRINGE (ML) INTRAOCULAR
Refills: 0 | Status: DISCONTINUED | OUTPATIENT
Start: 2024-12-13 | End: 2024-12-13

## 2024-12-13 RX ORDER — ACETAMINOPHEN 80 MG/.8ML
650 SOLUTION/ DROPS ORAL EVERY 6 HOURS
Refills: 0 | Status: DISCONTINUED | OUTPATIENT
Start: 2024-12-13 | End: 2024-12-26

## 2024-12-13 RX ORDER — LIDOCAINE HYDROCHLORIDE,EPINEPHRINE BITARTRATE 20; .005 MG/ML; MG/ML
3 INJECTION, SOLUTION EPIDURAL; INFILTRATION; INTRACAUDAL; PERINEURAL ONCE
Refills: 0 | Status: DISCONTINUED | OUTPATIENT
Start: 2024-12-13 | End: 2024-12-26

## 2024-12-13 RX ORDER — CEFTRIAXONE SODIUM 1 G/1
2000 INJECTION, POWDER, FOR SOLUTION INTRAMUSCULAR; INTRAVENOUS EVERY 12 HOURS
Refills: 0 | Status: COMPLETED | OUTPATIENT
Start: 2024-12-14 | End: 2024-12-22

## 2024-12-13 RX ORDER — HEPARIN SODIUM 1000 [USP'U]/ML
5000 INJECTION, SOLUTION INTRAVENOUS; SUBCUTANEOUS EVERY 12 HOURS
Refills: 0 | Status: DISCONTINUED | OUTPATIENT
Start: 2024-12-13 | End: 2024-12-17

## 2024-12-13 RX ORDER — CEFTAZIDIME 1 G/20ML
0.1 INJECTION, POWDER, FOR SOLUTION INTRAMUSCULAR; INTRAVENOUS ONCE
Refills: 0 | Status: DISCONTINUED | OUTPATIENT
Start: 2024-12-13 | End: 2024-12-22

## 2024-12-13 RX ORDER — ATROPINE SULFATE 1 %
1 DROPS OPHTHALMIC (EYE)
Refills: 0 | Status: DISCONTINUED | OUTPATIENT
Start: 2024-12-13 | End: 2024-12-23

## 2024-12-13 RX ORDER — GINKGO BILOBA 40 MG
3 CAPSULE ORAL ONCE
Refills: 0 | Status: COMPLETED | OUTPATIENT
Start: 2024-12-13 | End: 2024-12-13

## 2024-12-13 RX ORDER — CEFTRIAXONE SODIUM 1 G/1
2000 INJECTION, POWDER, FOR SOLUTION INTRAMUSCULAR; INTRAVENOUS ONCE
Refills: 0 | Status: COMPLETED | OUTPATIENT
Start: 2024-12-13 | End: 2024-12-13

## 2024-12-13 RX ORDER — ERYTHROMYCIN 5 MG/G
1 OINTMENT OPHTHALMIC
Refills: 0 | Status: DISCONTINUED | OUTPATIENT
Start: 2024-12-13 | End: 2024-12-13

## 2024-12-13 RX ORDER — MOXIFLOXACIN/BAL.SALT SOLN/PF 1 MG/ML
1 SYRINGE (ML) INTRAOCULAR
Refills: 0 | Status: DISCONTINUED | OUTPATIENT
Start: 2024-12-13 | End: 2024-12-22

## 2024-12-13 RX ADMIN — CHLORHEXIDINE GLUCONATE 1 APPLICATION(S): 1.2 RINSE ORAL at 13:00

## 2024-12-13 RX ADMIN — Medication 5 UNIT(S): at 02:42

## 2024-12-13 RX ADMIN — ACETAMINOPHEN 650 MILLIGRAM(S): 80 SOLUTION/ DROPS ORAL at 13:14

## 2024-12-13 RX ADMIN — Medication 1 APPLICATION(S): at 16:30

## 2024-12-13 RX ADMIN — PIPERACILLIN AND TAZOBACTAM 25 GRAM(S): 3; .375 INJECTION, POWDER, LYOPHILIZED, FOR SOLUTION INTRAVENOUS at 14:15

## 2024-12-13 RX ADMIN — Medication 1 DROP(S): at 10:23

## 2024-12-13 RX ADMIN — Medication 1: at 20:58

## 2024-12-13 RX ADMIN — Medication 1 DROP(S): at 23:40

## 2024-12-13 RX ADMIN — Medication 3 MILLIGRAM(S): at 23:12

## 2024-12-13 RX ADMIN — SODIUM CHLORIDE 500 MILLILITER(S): 9 INJECTION, SOLUTION INTRAMUSCULAR; INTRAVENOUS; SUBCUTANEOUS at 00:23

## 2024-12-13 RX ADMIN — Medication 3: at 12:57

## 2024-12-13 RX ADMIN — ACETAMINOPHEN 400 MILLIGRAM(S): 80 SOLUTION/ DROPS ORAL at 05:47

## 2024-12-13 RX ADMIN — CEFTRIAXONE SODIUM 100 MILLIGRAM(S): 1 INJECTION, POWDER, FOR SOLUTION INTRAMUSCULAR; INTRAVENOUS at 22:58

## 2024-12-13 RX ADMIN — Medication 17 GRAM(S): at 23:12

## 2024-12-13 RX ADMIN — PIPERACILLIN AND TAZOBACTAM 200 GRAM(S): 3; .375 INJECTION, POWDER, LYOPHILIZED, FOR SOLUTION INTRAVENOUS at 06:18

## 2024-12-13 RX ADMIN — Medication 2 UNIT(S): at 09:09

## 2024-12-13 RX ADMIN — ACETAMINOPHEN 650 MILLIGRAM(S): 80 SOLUTION/ DROPS ORAL at 12:14

## 2024-12-13 RX ADMIN — Medication 1 DROP(S): at 01:55

## 2024-12-13 RX ADMIN — HEPARIN SODIUM 5000 UNIT(S): 1000 INJECTION, SOLUTION INTRAVENOUS; SUBCUTANEOUS at 21:07

## 2024-12-13 RX ADMIN — HYDRALAZINE HYDROCHLORIDE 50 MILLIGRAM(S): 10 TABLET ORAL at 22:59

## 2024-12-13 RX ADMIN — Medication 1 DROP(S): at 23:15

## 2024-12-13 NOTE — PROCEDURE NOTE - SUPERVISORY STATEMENT
Anterior chamber paracentesis and intravitreal injection of vancomycin and ceftazidime, left eye  -R/B/A of procedure discussed with patient and informed consent obtained.  Procedure performed without complication.

## 2024-12-13 NOTE — H&P ADULT - NSHPLABSRESULTS_GEN_ALL_CORE
Lab Results:  CBC  CBC Full  -  ( 12 Dec 2024 22:25 )  WBC Count : 6.00 K/uL  RBC Count : 3.30 M/uL  Hemoglobin : 10.3 g/dL  Hematocrit : 29.9 %  Platelet Count - Automated : 78 K/uL  Mean Cell Volume : 90.6 fL  Mean Cell Hemoglobin : 31.2 pg  Mean Cell Hemoglobin Concentration : 34.4 g/dL  Auto Neutrophil # : 5.32 K/uL  Auto Lymphocyte # : 0.41 K/uL  Auto Monocyte # : 0.16 K/uL  Auto Eosinophil # : 0.05 K/uL  Auto Basophil # : 0.00 K/uL  Auto Neutrophil % : 73.9 %  Auto Lymphocyte % : 6.9 %  Auto Monocyte % : 2.6 %  Auto Eosinophil % : 0.9 %  Auto Basophil % : 0.0 %    .		Differential:	[] Automated		[] Manual  Chemistry                        10.3   6.00  )-----------( 78       ( 12 Dec 2024 22:25 )             29.9     12-12    128[L]  |  89[L]  |  62[H]  ----------------------------<  394[H]  4.9   |  20[L]  |  5.76[H]    Ca    8.6      12 Dec 2024 22:25    TPro  7.1  /  Alb  2.8[L]  /  TBili  1.7[H]  /  DBili  x   /  AST  36[H]  /  ALT  21  /  AlkPhos  161[H]  12-12    LIVER FUNCTIONS - ( 12 Dec 2024 22:25 )  Alb: 2.8 g/dL / Pro: 7.1 g/dL / ALK PHOS: 161 U/L / ALT: 21 U/L / AST: 36 U/L / GGT: x           PT/INR - ( 12 Dec 2024 22:25 )   PT: 14.8 sec;   INR: 1.25 ratio         PTT - ( 12 Dec 2024 22:25 )  PTT:32.0 sec  Urinalysis Basic - ( 12 Dec 2024 22:25 )    Color: x / Appearance: x / SG: x / pH: x  Gluc: 394 mg/dL / Ketone: x  / Bili: x / Urobili: x   Blood: x / Protein: x / Nitrite: x   Leuk Esterase: x / RBC: x / WBC x   Sq Epi: x / Non Sq Epi: x / Bacteria: x            MICROBIOLOGY/CULTURES:      RADIOLOGY RESULTS: reviewed

## 2024-12-13 NOTE — CHART NOTE - NSCHARTNOTEFT_GEN_A_CORE
Patient s/p anterior chamber paracentesis diagnostic fluid aspiration and intravitreal injection of vancomycin/ceftazidime of left eye. Aqueous humor sample sent to microbiology for culture.     Recommendations:   -Please increase moxifloxacin to 1 drop q4 hours in the left eye  -Please start prednisolone acetate 1%, 1 drop q4 hours in the left eye  -Please discontinue erythromycin ointment  -Patient to be followed daily. Will follow-up cultures.

## 2024-12-13 NOTE — ED ADULT NURSE REASSESSMENT NOTE - NS ED NURSE REASSESS COMMENT FT1
Upon reassessment pt sleeping in stretcher at this time, arousable to verbal and tactile stimuli. Respirations even and unlabored on room air. No acute distress noted. Pt remains on continuous cardiac monitor, NSR noted. VS as noted in flow sheet. FS performed for transport. EKG performed per MD orders. Denies headache, dizziness, chest pain and SOB at this time. Pt endorsing relief of body aches with medication. Pt medicated per EMAR. Pt awaiting transport to bed assignment.

## 2024-12-13 NOTE — CONSULT NOTE ADULT - PROBLEM SELECTOR RECOMMENDATION 2
Pt. with low Hgb level, however at target range for ESRD. Will obtain outpatient records and resume RICA therapy with HD, if patient receiving it with HD. Monitor CBC.

## 2024-12-13 NOTE — PROVIDER CONTACT NOTE (CRITICAL VALUE NOTIFICATION) - SITUATION
Blood cultures taken from december 12, growth in aerobic and anaerobic bottle: Gram positive Cocci in pairs and chains.

## 2024-12-13 NOTE — CONSULT NOTE ADULT - ATTENDING COMMENTS
78 y/o F, PMH ESRD thru R AV fistula, HTN, DM, R cataract (surgery scheduled for Jan 2025), glaucoma and L hip replacement, presented on 12/13 with fever, pain, and rapid decline in L vision, with opthalmology exam suggestive of bacterial endophthalmitis now with blood cultures growing Group B Strep. Infectious Disease consulted for assessment of infectious source and antimicrobial management     Upon presentation, patient febrile to 102.8F  Labs showed no leukocytosis but with 14.8% bandemia     Workup:   -Blood Cx (12/12): Strep B (4/4 bottles)  -CT C/A/P: No infectious source in the chest, abdomen, or pelvis. Stable thoracic abdominal aortic dissection. Small left and trace right pleural effusions.    #Sepsis, fevers, bandemia   #High grade Group B Strep Bacteremia  #L vison loss, Endogenous Bacterial Endophthalmitis  #New RLE joints (hip, knee and ankle) pain   #New R hand middle finger PIP joint swelling   #Presence of RUE AV fistula with no clinical evidence of infection     Recommendations:   -Start Ceftriaxone 2 g IV q12hrs   -Discontinue Zosyn and Vancomycin   -Discussed with opthalmology at bedside, will need Intravitreal Vancomycin   -Get TTE   -Will need CT w/IV cont R hip, R knee and R ankle, if evidence of fluid effusions get orthopedics eval    -Get R hand X ray   -Repeat blood Cx in am then q48hrs till clears  -Monitor T curve and WBC trend      James Moe MD  ID physician  [employer] Teams Preferred  After 5pm/weekends call [phone]

## 2024-12-13 NOTE — CONSULT NOTE ADULT - ASSESSMENT
CP is a 78 y/o F, PMH ESRD (M, W, F; makes a little bit of urine), HTN, DM, R cataract (surgery scheduled for Jan 2025), glaucoma (brimonidine, latanoprost) and L hip replacement, presenting with fever, pain, and rapid decline in L vision, with opthalmology exam suggestive of bacterial endophthalmitis now with blood cultures growing Group B Strep. Infectious Disease consulted for assessment of infectious source and antimicrobial management     #Endogenous Bacterial Endophthalmitis  #Group B Strep Bacteremia  - Patient with fevers, chills, and acute visual changes in L eye with bandemia of 14.8%  - BCx growing Group B Strep  - Change Zosyn to Ceftriaxone 2g q12 for better eye penetration  - Can dc Vancomycin  - Vitreal Cultures  - Intra-Vitreal Abx Vancomycin 1 mg plus ceftazidime 2.25 mg;   - TTE to assess for endocarditis  - X-Ray R Hand   - CT RLE (Hip, Knee, Ankle)   - Additional eye drops per optho    Leon Boyer, MS4   CP is a 78 y/o F, PMH ESRD (M, W, F; makes a little bit of urine), HTN, DM, R cataract (surgery scheduled for Jan 2025), glaucoma (brimonidine, latanoprost) and L hip replacement, presenting with fever, pain, and rapid decline in L vision, with opthalmology exam suggestive of bacterial endophthalmitis now with blood cultures growing Group B Strep. Infectious Disease consulted for assessment of infectious source and antimicrobial management     #Endogenous Bacterial Endophthalmitis  #Group B Strep Bacteremia  - Patient with fevers, chills, and acute visual changes in L eye with bandemia of 14.8%  - BCx growing Group B Strep  - CT A/P negative for intra-abdominal infection  - Painful joints with decreased ROM could be potential source for bacteremia  - Change Zosyn to Ceftriaxone 2g q12 for better eye penetration  - Can dc Vancomycin  - Vitreal Cultures  - Intra-Vitreal Abx Vancomycin 1 mg plus ceftazidime 2.25 mg;   - TTE to assess for endocarditis  - X-Ray R Hand   - CT RLE (Hip, Knee, Ankle)   - Additional eye drops per optho    Leon Boyer, MS4

## 2024-12-13 NOTE — PATIENT PROFILE ADULT - NSPROHMDIABETMGMTSTRAT_GEN_A_NUR
patient states she checks her blood sugar about every two weeks and her MD took her off of her oral DM medications/routine screenings

## 2024-12-13 NOTE — CONSULT NOTE ADULT - SUBJECTIVE AND OBJECTIVE BOX
[facility] DEPARTMENT OF OPHTHALMOLOGY - INITIAL ADULT CONSULT  -----------------------------------------------------------------------------  Donald Mg MD, PGY-2  Contact: TEAMS  -----------------------------------------------------------------------------    HPI:    Interval History: ***    PMH: ***  POcHx: denies surg/laser  FH: denies glc/amd  Social History: denies etoh/tobacco  Ophthalmic Medications: none  Allergies: NKDA    Review of Systems:  Constitutional: No fever, chills  Eyes: No blurry vision, flashes, floaters, FBS, erythema, discharge, double vision, OU  Neuro: No tremors  Cardiovascular: No chest pain, palpitations  Respiratory: No SOB, no cough  GI: No nausea, vomiting, abdominal pain  : No dysuria  Skin: no rash  Psych: no depression  Endocrine: no polyuria, polydipsia  Heme/lymph: no swelling    VITALS: T(C): 37.2 (12-12-24 @ 23:37)  T(F): 99 (12-12-24 @ 23:37), Max: 102.8 (12-12-24 @ 20:03)  HR: 81 (12-13-24 @ 00:23) (81 - 90)  BP: 124/67 (12-13-24 @ 00:23) (124/67 - 219/95)  RR:  (16 - 20)  SpO2:  (97% - 98%)  Wt(kg): --  General: AAO x 3, appropriate mood and affect    Ophthalmology Exam:  Visual acuity (sc): 20/20 OD, 20/20 OS  Pupils: PERRL OU, no RAPD  Ttono: 16 OD 16 OS  Extraocular movements (EOMs): Full OU, no pain, no diplopia  Confrontational Visual Field (CVF): Full OD, Full OS  Color Plates: 12/12 OD, 12/12 OS    Pen Light Exam (PLE)  External: Flat OU  Lids/Lashes/Lacrimal Ducts: Flat OU    Sclera/Conjunctiva: W+Q OU  Cornea: Cl OU  Anterior Chamber: D+F OU    Iris: Flat OU  Lens: Cl OU    Fundus Exam: dilated with 1% tropicamide and 2.5% phenylephrine  Approval obtained from primary team for dilation  Patient aware that pupils can remained dilated for at least 4-6 hours  Exam performed with 20D lens    Vitreous: wnl OU  Disc, cup/disc: sharp and pink, 0.4 OU  Macula: Flat OU  Vessels: Normal caliber OU  Periphery: flat OU    Labs/Imaging:  *** [facility] DEPARTMENT OF OPHTHALMOLOGY - INITIAL ADULT CONSULT  -----------------------------------------------------------------------------  Donald Mg MD, PGY-2  Contact: TEAMS  -----------------------------------------------------------------------------    HPI: ESRD (M, W, F; makes a little bit of urine), HTN, DM, R cataract (Ophtho=Jj. R eye cataract (surgery 1/25), glaucoma (brimonidine, latanoprost). P/w: 1) fever w/o localizing Sx, 2) rapid decline in L eye vision: usually reads w/ glasses. Decreased vision starting this AM. 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 Some pain.    Interval History: No overnight events      PMH: As above  POcHx: denies surg/laser  FH: denies glc/amd  Social History: denies etoh/tobacco  Ophthalmic Medications: none  Allergies: NKDA    Review of Systems:  Constitutional: No fever, chills  Eyes: +Blurry Vision No flashes, floaters, FBS, erythema, discharge, double vision, OU  Neuro: No tremors  Cardiovascular: No chest pain, palpitations  Respiratory: No SOB, no cough  GI: No nausea, vomiting, abdominal pain  : No dysuria  Skin: no rash  Psych: no depression  Endocrine: no polyuria, polydipsia  Heme/lymph: no swelling    VITALS: T(C): 37.2 (12-12-24 @ 23:37)  T(F): 99 (12-12-24 @ 23:37), Max: 102.8 (12-12-24 @ 20:03)  HR: 81 (12-13-24 @ 00:23) (81 - 90)  BP: 124/67 (12-13-24 @ 00:23) (124/67 - 219/95)  RR:  (16 - 20)  SpO2:  (97% - 98%)  Wt(kg): --  General: AAO x 3, appropriate mood and affect    Ophthalmology Exam:  Visual acuity (sc): HM OD, LP OS  Pupils: Minimally reactive OU, no RAPD  Ttono: 18 OD 28 OS  Extraocular movements (EOMs): Full OU, no pain, no diplopia  Confrontational Visual Field (CVF): PATSY  Color Plates: PATSY    Slit Lamp Exam (PLE)  External: Flat OU  Lids/Lashes/Lacrimal Ducts: Flat OU    Sclera/Conjunctiva: W+Q OD, 2+ Injection OS  Cornea: Cl OD, 3+ Corneal Haze OS  Anterior Chamber: D+F OD Cell OS    Iris: Flat OU  Lens: Dislocated inferior nasally, pt aware, occurred after fall, PATSY OS    Fundus Exam: dilated with 1% tropicamide and 2.5% phenylephrine  Approval obtained from primary team for dilation  Patient aware that pupils can remained dilated for at least 4-6 hours  Exam performed with 20D lens  PATSY OS    Vitreous: wnl OD  Disc, cup/disc: sharp and pink, 0.35 OD  Macula: Flat OD  Vessels: PATSY  Periphery: PATSY    Labs/Imaging:  B Scan OS  - consolidated vitreous debris with attached retina

## 2024-12-13 NOTE — CONSULT NOTE ADULT - SUBJECTIVE AND OBJECTIVE BOX
[facility] DIVISION OF KIDNEY DISEASES AND HYPERTENSION -- INITIAL CONSULT NOTE  --------------------------------------------------------------------------------  HPI: 79 year old female, with ESRD on HD (gets HD TIW, on MWF at [facility], nephrologist Dr. Simpson), presented with fever, and vision loss, admitted with hyperglycemia and sepsis/ bacteremia. Pt. states that she started running fever few days ago, and was having progressive loss of vision. Yesterday, she could not see anything when EMS was called, who found her to have hyperglycemia and brought her to the ER. Pt. noted to be bacteremic and admitted subsequently.     Pt. states that she has been on HD for 6 years and generally tolerates HD well with intermittent hypotension during some sessions. She states that she went for last outpatient HD to [facility] on Wednesday 12/11/24, however was feeling cold with rigors and became hypotensive during treatment for which she was placed on oxygen during HD and also HD treatment had to be stopped prematurely. Overall, states she is better however continues to complain of issues with vision.        PAST HISTORY  --------------------------------------------------------------------------------  PAST MEDICAL & SURGICAL HISTORY:  Adult Onset Diabetes Mellitus,      Cancer of Endometrium  s/p Hysterectomy , s/p Chemo.      Dissection of Aorta  Type B      Hypertension      ESRD (end stage renal disease) on dialysis  M,W,F   [facility] av      ESRD on hemodialysis      H/O total hysterectomy  in 2007 for endometrial CA      History of hip replacement, total, right      S/P arteriovenous (AV) fistula repair        FAMILY HISTORY:  Family hx of hypertension (Father)      PAST SOCIAL HISTORY:    ALLERGIES & MEDICATIONS  --------------------------------------------------------------------------------  Allergies    No Known Allergies    Intolerances      Standing Inpatient Medications  atropine 1% Solution 1 Drop(s) Left EYE two times a day  chlorhexidine 2% Cloths 1 Application(s) Topical daily  dextrose 5%. 1000 milliLiter(s) IV Continuous <Continuous>  dextrose 5%. 1000 milliLiter(s) IV Continuous <Continuous>  dextrose 50% Injectable 25 Gram(s) IV Push once  dextrose 50% Injectable 12.5 Gram(s) IV Push once  dextrose 50% Injectable 25 Gram(s) IV Push once  glucagon  Injectable 1 milliGRAM(s) IntraMuscular once  heparin   Injectable 5000 Unit(s) SubCutaneous every 12 hours  hydrALAZINE 50 milliGRAM(s) Oral at bedtime  insulin lispro (ADMELOG) corrective regimen sliding scale   SubCutaneous three times a day before meals  insulin lispro (ADMELOG) corrective regimen sliding scale   SubCutaneous at bedtime  piperacillin/tazobactam IVPB.- 3.375 Gram(s) IV Intermittent once    PRN Inpatient Medications  acetaminophen     Tablet .. 650 milliGRAM(s) Oral every 6 hours PRN  dextrose Oral Gel 15 Gram(s) Oral once PRN      REVIEW OF SYSTEMS  --------------------------------------------------------------------------------  Constitutional: [ ] Fever [ ] Chills [ ] Fatigue [ ] Weight change   HEENT: [ ] Blurred vision [ ] Eye Pain [ ] Headache [ ] Runny nose [ ] Sore Throat   Respiratory: [ ] Cough [ ] Wheezing [ ] Shortness of breath  Cardiovascular: [ ] Chest Pain [ ] Palpitations [ ] RANDHAWA [ ] PND [ ] Orthopnea  Gastrointestinal: [ ] Abdominal Pain [ ] Diarrhea [ ] Constipation [ ] Hemorrhoids [ ] Nausea [ ] Vomiting  Genitourinary: [ ] Nocturia [ ] Dysuria [ ] Incontinence  Extremities: [ ] Swelling [ ] Joint Pain  Neurologic: [ ] Focal deficit [ ] Paresthesias [ ] Syncope  Lymphatic: [ ] Swelling [ ] Lymphadenopathy   Skin: [ ] Rash [ ] Ecchymoses [ ] Wounds [ ] Lesions  Psychiatry: [ ] Depression [ ] Suicidal/Homicidal Ideation [ ] Anxiety [ ] Sleep Disturbances  x ] 10 point review of systems is otherwise negative except as mentioned above              [ ]Unable to obtain due to   All other systems were reviewed and are negative, except as noted.      VITALS/PHYSICAL EXAM  --------------------------------------------------------------------------------  T(C): 36.3 (12-13-24 @ 11:51), Max: 39.3 (12-12-24 @ 20:03)  HR: 57 (12-13-24 @ 11:51) (57 - 90)  BP: 126/63 (12-13-24 @ 11:51) (106/66 - 219/95)  RR: 17 (12-13-24 @ 11:51) (16 - 22)  SpO2: 98% (12-13-24 @ 11:51) (96% - 99%)  Wt(kg): --    Weight (kg): 54.4 (12-12-24 @ 20:03)      Physical Exam:  	Gen: NAD, laying in bed comfortably  	HEENT: on room air  	Pulm: CTA B/L  	CV: +murmur  	Abd: soft                  : No mistry  	LE: no edema  	Skin: Warm, without rashes  	Vascular access: RUE AVF in situ, skin intact, thrill and bruit present    LABS/STUDIES  --------------------------------------------------------------------------------              10.3   6.00  >-----------<  78       [12-12-24 @ 22:25]              29.9     128  |  89  |  62  ----------------------------<  394      [12-12-24 @ 22:25]  4.9   |  20  |  5.76        Ca     8.6     [12-12-24 @ 22:25]    TPro  7.1  /  Alb  2.8  /  TBili  1.7  /  DBili  x   /  AST  36  /  ALT  21  /  AlkPhos  161  [12-12-24 @ 22:25]    PT/INR: PT 14.8 , INR 1.25       [12-12-24 @ 22:25]  PTT: 32.0       [12-12-24 @ 22:25]      Creatinine Trend:  SCr 5.76 [12-12 @ 22:25]    Urinalysis - [12-12-24 @ 22:25]      Color  / Appearance  / SG  / pH       Gluc 394 / Ketone   / Bili  / Urobili        Blood  / Protein  / Leuk Est  / Nitrite       RBC  / WBC  / Hyaline  / Gran  / Sq Epi  / Non Sq Epi  / Bacteria         HBsAb 44.0      [05-24-23 @ 07:00]  HBsAg Nonreact      [09-06-23 @ 06:34]  HBcAb Reactive      [05-24-23 @ 07:00]  HCV 0.21, Nonreact      [09-06-23 @ 06:34]

## 2024-12-13 NOTE — CONSULT NOTE ADULT - PROBLEM SELECTOR RECOMMENDATION 9
Pt. with ESRD on HD (TIW) on MWF, had last outpatient maintenance HD on 12/11/24 that needed to be terminated prematurely due to hypotension and rigors. Pt. now admitted with bacteremia. Labs reviewed. Pt. clinically stable. Plan for maintenance HD today. Monitor BMP and BP.

## 2024-12-13 NOTE — CONSULT NOTE ADULT - ASSESSMENT
Assessment and Recommendations:  79y female with a past medical history/ocular history of *** consulted for ***, found to have ***    Seen and discussed with ***.    Outpatient Follow-up: Patient should follow-up with his/her ophthalmologist or with [facility] Department of Ophthalmology within 1 week of after discharge at:    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   - All occurring in the setting of bacteremia/sepsis (bandemia of 14%) with glucose in 400s and high troponins  - Start broad spectrum Abx  per primary team  - Consider ID consult   - Start Atropine drop BID    Seen and discussed with Dr. Mukherjee    Outpatient Follow-up: Patient should follow-up with his/her ophthalmologist or with [facility] Department of Ophthalmology within 1 week of after discharge at:    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   - All occurring in the setting of bacteremia/sepsis (bandemia of 14%) with glucose in 400s and high troponins  - Start broad spectrum Abx  per primary team  - Consider ID consult for sepsis  - Start Atropine drop BID  - START prednisone forte  QID  - START vigamox QID    Seen and discussed with Dr. Mukherjee    Outpatient Follow-up: Patient should follow-up with his/her ophthalmologist or with [facility] Department of Ophthalmology within 1 week of after discharge at:    [address]  [place] [address]  [phone]    Donald Mg MD, PGY-2  Contact: [employer] Teams

## 2024-12-13 NOTE — H&P ADULT - NSHPPHYSICALEXAM_GEN_ALL_CORE
General: WN/WD NAD  PERRLA  Neurology: A&Ox3, nonfocal, DALY x 4  Respiratory: CTA B/L  CV: RRR, S1S2, no murmurs, rubs or gallops  Abdominal: Soft, NT, ND +BS, Last BM  Extremities: No edema, + peripheral pulses  Skin Normal

## 2024-12-13 NOTE — PROCEDURE NOTE - NSICDXPROCEDURE_GEN_ALL_CORE_FT
PROCEDURES:  Intravitreal injection of antibiotic into left eye 13-Dec-2024 22:29:59  Dina Vizcaino  Anterior chamber paracentesis with diagnostic fluid aspiration 13-Dec-2024 22:30:32 left eye Dina Vizcaino

## 2024-12-13 NOTE — PROGRESS NOTE ADULT - SUBJECTIVE AND OBJECTIVE BOX
[facility] DEPARTMENT OF OPHTHALMOLOGY    ------------------------------------------------------------------------------    Interval History: No acute events overnight.     MEDICATIONS  (STANDING):  atropine 1% Solution 1 Drop(s) Left EYE two times a day  cefTAZidime  2.25 mG/0.1 mL Ophthalmic Injectable 0.1 milliLiter(s) IntraVitreal once  cefTRIAXone   IVPB      cefTRIAXone   IVPB 2000 milliGRAM(s) IV Intermittent once  chlorhexidine 2% Cloths 1 Application(s) Topical daily  dextrose 5%. 1000 milliLiter(s) (100 mL/Hr) IV Continuous <Continuous>  dextrose 5%. 1000 milliLiter(s) (50 mL/Hr) IV Continuous <Continuous>  dextrose 50% Injectable 25 Gram(s) IV Push once  dextrose 50% Injectable 12.5 Gram(s) IV Push once  dextrose 50% Injectable 25 Gram(s) IV Push once  glucagon  Injectable 1 milliGRAM(s) IntraMuscular once  heparin   Injectable 5000 Unit(s) SubCutaneous every 12 hours  hydrALAZINE 50 milliGRAM(s) Oral at bedtime  insulin lispro (ADMELOG) corrective regimen sliding scale   SubCutaneous three times a day before meals  insulin lispro (ADMELOG) corrective regimen sliding scale   SubCutaneous at bedtime  lidocaine 1%/epinephrine 1:200,000 Inj 3 milliLiter(s) Local Injection once  povidone iodine 5% Ophthalmic Solution 1 Drop(s) Left EYE once  vancomycin 1 mG/0.1 mL Ophthalmic Injectable 0.1 milliLiter(s) IntraVitreal once    MEDICATIONS  (PRN):  acetaminophen     Tablet .. 650 milliGRAM(s) Oral every 6 hours PRN Temp greater or equal to 38C (100.4F), Mild Pain (1 - 3), Moderate Pain (4 - 6)  dextrose Oral Gel 15 Gram(s) Oral once PRN Blood Glucose LESS THAN 70 milliGRAM(s)/deciliter  lidocaine/prilocaine Cream 1 Application(s) Topical daily PRN on HD days for pain      VITALS: T(C): 36.7 (12-13-24 @ 16:30)  T(F): 98.1 (12-13-24 @ 16:30), Max: 102.8 (12-12-24 @ 20:03)  HR: 65 (12-13-24 @ 16:30) (57 - 90)  BP: 115/65 (12-13-24 @ 16:30) (106/66 - 219/95)  RR:  (16 - 22)  SpO2:  (96% - 99%)  Wt(kg): --  General: AAO x 3, appropriate mood and affect    Ophthalmology Exam:  Visual acuity (sc): CF/HM OD, LP OS  Pupils: Minimally reactive OU, no RAPD  Ttono: 18 OD 17 OS  Extraocular movements (EOMs): Full OU, no pain, no diplopia  Confrontational Visual Field (CVF): PATSY  Color Plates: PATSY    Slit Lamp Exam (PLE)  External: Flat OU  Lids/Lashes/Lacrimal Ducts: Flat OU    Sclera/Conjunctiva: W+Q OD, 2+ Injection OS  Cornea: Cl OD, 3+ Corneal Haze OS  Anterior Chamber: D+F OD Cell OS    Iris: Flat OU  Lens: Dislocated inferior nasally, pt aware, occurred after fall, PATSY OS    Fundus Exam: dilated with 1% tropicamide and 2.5% phenylephrine  Approval obtained from primary team for dilation  Patient aware that pupils can remained dilated for at least 4-6 hours  Exam performed with 20D lens  PATSY OS    Vitreous: wnl OD  Disc, cup/disc: sharp and pink, 0.35 OD  Macula: Flat OD  Vessels: PATSY  Periphery: PATSY    Labs/Imaging:  B Scan OS  - consolidated vitreous debris with attached retina   [facility] DEPARTMENT OF OPHTHALMOLOGY    ------------------------------------------------------------------------------    Interval History: No acute events overnight.     MEDICATIONS  (STANDING):  atropine 1% Solution 1 Drop(s) Left EYE two times a day  cefTAZidime  2.25 mG/0.1 mL Ophthalmic Injectable 0.1 milliLiter(s) IntraVitreal once  cefTRIAXone   IVPB      cefTRIAXone   IVPB 2000 milliGRAM(s) IV Intermittent once  chlorhexidine 2% Cloths 1 Application(s) Topical daily  dextrose 5%. 1000 milliLiter(s) (100 mL/Hr) IV Continuous <Continuous>  dextrose 5%. 1000 milliLiter(s) (50 mL/Hr) IV Continuous <Continuous>  dextrose 50% Injectable 25 Gram(s) IV Push once  dextrose 50% Injectable 12.5 Gram(s) IV Push once  dextrose 50% Injectable 25 Gram(s) IV Push once  glucagon  Injectable 1 milliGRAM(s) IntraMuscular once  heparin   Injectable 5000 Unit(s) SubCutaneous every 12 hours  hydrALAZINE 50 milliGRAM(s) Oral at bedtime  insulin lispro (ADMELOG) corrective regimen sliding scale   SubCutaneous three times a day before meals  insulin lispro (ADMELOG) corrective regimen sliding scale   SubCutaneous at bedtime  lidocaine 1%/epinephrine 1:200,000 Inj 3 milliLiter(s) Local Injection once  povidone iodine 5% Ophthalmic Solution 1 Drop(s) Left EYE once  vancomycin 1 mG/0.1 mL Ophthalmic Injectable 0.1 milliLiter(s) IntraVitreal once    MEDICATIONS  (PRN):  acetaminophen     Tablet .. 650 milliGRAM(s) Oral every 6 hours PRN Temp greater or equal to 38C (100.4F), Mild Pain (1 - 3), Moderate Pain (4 - 6)  dextrose Oral Gel 15 Gram(s) Oral once PRN Blood Glucose LESS THAN 70 milliGRAM(s)/deciliter  lidocaine/prilocaine Cream 1 Application(s) Topical daily PRN on HD days for pain      VITALS: T(C): 36.7 (12-13-24 @ 16:30)  T(F): 98.1 (12-13-24 @ 16:30), Max: 102.8 (12-12-24 @ 20:03)  HR: 65 (12-13-24 @ 16:30) (57 - 90)  BP: 115/65 (12-13-24 @ 16:30) (106/66 - 219/95)  RR:  (16 - 22)  SpO2:  (96% - 99%)  Wt(kg): --  General: AAO x 3, appropriate mood and affect    Ophthalmology Exam:  Visual acuity (sc): CF/HM OD, LP OS  Pupils: Minimally reactive OU, no RAPD  Ttono: 18 OD 17 OS  Extraocular movements (EOMs): Full OU, no pain, no diplopia  Confrontational Visual Field (CVF): PATSY  Color Plates: PATSY    Slit Lamp Exam (PLE)  External: Flat OU  Lids/Lashes/Lacrimal Ducts: Flat OU    Sclera/Conjunctiva: W+Q OD, 3+ injection OS   Cornea: Cl OD, OD: large epi defect with heaped up edges, corneal haze  Anterior Chamber: D+F OD; OS: dense fibrin in AC  Iris: Flat OU  Lens: Dislocated inferior nasally, pt aware, occurred after fall, PATSY OS    Fundus Exam: dilated with 1% tropicamide and 2.5% phenylephrine  Approval obtained from primary team for dilation  Patient aware that pupils can remained dilated for at least 4-6 hours  Exam performed with 20D lens  PATSY OS    Vitreous: wnl OD  Disc, cup/disc: sharp and pink, 0.35 OD  Macula: Flat OD  Vessels: PATSY  Periphery: PATSY    Labs/Imaging:  B Scan OS  - consolidated vitreous debris with attached retina   [facility] DEPARTMENT OF OPHTHALMOLOGY    ------------------------------------------------------------------------------    Interval History: No acute events overnight.     MEDICATIONS  (STANDING):  atropine 1% Solution 1 Drop(s) Left EYE two times a day  cefTAZidime  2.25 mG/0.1 mL Ophthalmic Injectable 0.1 milliLiter(s) IntraVitreal once  cefTRIAXone   IVPB      cefTRIAXone   IVPB 2000 milliGRAM(s) IV Intermittent once  chlorhexidine 2% Cloths 1 Application(s) Topical daily  dextrose 5%. 1000 milliLiter(s) (100 mL/Hr) IV Continuous <Continuous>  dextrose 5%. 1000 milliLiter(s) (50 mL/Hr) IV Continuous <Continuous>  dextrose 50% Injectable 25 Gram(s) IV Push once  dextrose 50% Injectable 12.5 Gram(s) IV Push once  dextrose 50% Injectable 25 Gram(s) IV Push once  glucagon  Injectable 1 milliGRAM(s) IntraMuscular once  heparin   Injectable 5000 Unit(s) SubCutaneous every 12 hours  hydrALAZINE 50 milliGRAM(s) Oral at bedtime  insulin lispro (ADMELOG) corrective regimen sliding scale   SubCutaneous three times a day before meals  insulin lispro (ADMELOG) corrective regimen sliding scale   SubCutaneous at bedtime  lidocaine 1%/epinephrine 1:200,000 Inj 3 milliLiter(s) Local Injection once  povidone iodine 5% Ophthalmic Solution 1 Drop(s) Left EYE once  vancomycin 1 mG/0.1 mL Ophthalmic Injectable 0.1 milliLiter(s) IntraVitreal once    MEDICATIONS  (PRN):  acetaminophen     Tablet .. 650 milliGRAM(s) Oral every 6 hours PRN Temp greater or equal to 38C (100.4F), Mild Pain (1 - 3), Moderate Pain (4 - 6)  dextrose Oral Gel 15 Gram(s) Oral once PRN Blood Glucose LESS THAN 70 milliGRAM(s)/deciliter  lidocaine/prilocaine Cream 1 Application(s) Topical daily PRN on HD days for pain      VITALS: T(C): 36.7 (12-13-24 @ 16:30)  T(F): 98.1 (12-13-24 @ 16:30), Max: 102.8 (12-12-24 @ 20:03)  HR: 65 (12-13-24 @ 16:30) (57 - 90)  BP: 115/65 (12-13-24 @ 16:30) (106/66 - 219/95)  RR:  (16 - 22)  SpO2:  (96% - 99%)  Wt(kg): --  General: AAO x 3, appropriate mood and affect    Ophthalmology Exam:  Visual acuity (sc): CF/HM OD, LP OS  Pupils: Minimally reactive OU, no RAPD  Ttono: 18 OD 17 OS  Extraocular movements (EOMs): Full OU, no pain, no diplopia  Confrontational Visual Field (CVF): PATSY  Color Plates: PATSY    Slit Lamp Exam (PLE)  External: Flat OU  Lids/Lashes/Lacrimal Ducts: Flat OU    Sclera/Conjunctiva: W+Q OD, 3+ injection OS   Cornea: Cl OD, OD: large near total epi defect with heaped up edges, corneal haze  Anterior Chamber: D+F OD; OS: dense fibrin in AC, inferonasal collections of early hypopyon  Iris: Flat OD, Posterior synechiae OS  Lens: Dislocated inferior nasally, pt aware, occurred after fall, 2-3+ NS OS    Fundus Exam: dilated with 1% tropicamide and 2.5% phenylephrine  Approval obtained from primary team for dilation  Patient aware that pupils can remained dilated for at least 4-6 hours  Exam performed with 20D lens  PATSY OS    Vitreous: wnl OD  Disc, cup/disc: sharp and pink, 0.35 OD  Macula: Flat OD  Vessels: PATSY  Periphery: PATSY    Labs/Imaging:  B Scan OS  - consolidated vitreous debris with attached retina

## 2024-12-13 NOTE — ED ADULT NURSE REASSESSMENT NOTE - NS ED NURSE REASSESS COMMENT FT1
Report received from natalya Espinal. Pt sleeping in stretcher at this time, arousable to verbal and tactile stimuli. Respirations even and unlabored on room air. No acute distress noted. Pt remains on continuous cardiac monitor, NSR noted. Denies headache, dizziness, chest pain and SOB at this time. Plan of care ongoing, comfort measures provided and safety measures maintained. Awaiting CT.

## 2024-12-13 NOTE — CONSULT NOTE ADULT - SUBJECTIVE AND OBJECTIVE BOX
HPI:    CP is a 80 y/o F, PMH ESRD (M, W, F; makes a little bit of urine), HTN, DM, R cataract (surgery scheduled for Jan 2025), glaucoma (brimonidine, latanoprost) and L hip replacement, presenting with fever, pain, and rapid decline in L vision. The patient was in her usual state of health until 12/09 (four days ago) when she began feeling feverish with chills. She did not take her temperature but managed the fever with tylenol. She began feeling progressively weak and vision in her L eye started to become blurry. In AM of 12/12, patient woke up with pain in R hip and knee, with decreased ROM and difficulty getting out of bed. Additionally she noted pain in her R middle finger. Over the course of the day her L eye vision diminished to the point she could not see at all out of her L eye and she presented to the ER where she was found to be febrile and hypertensive. Patient reports no cough, congestion, or shortness of breath. She has been feeling nauseous and had one episode of nonbilious nonbloody vomiting on 12/11 (two days ago). While she does not make much urine, she does not report dysuria.     prior hospital charts reviewed [x]  primary team notes reviewed [x]  other consultant notes reviewed [x]    PAST MEDICAL & SURGICAL HISTORY:  Adult Onset Diabetes Mellitus,      Cancer of Endometrium  s/p Hysterectomy , s/p Chemo.      Dissection of Aorta  Type B      Hypertension      ESRD (end stage renal disease) on dialysis  M,W,F   [facility]      ESRD on hemodialysis      H/O total hysterectomy  in 2007 for endometrial CA      History of hip replacement, total, left      S/P arteriovenous (AV) fistula repair    Allergies    No Known Allergies    Intolerances    MEDICATIONS  (prior antimicrobials ):  cefTRIAXone   IVPB   100 mL/Hr IV Intermittent (12-12-24 @ 22:30)    piperacillin/tazobactam IVPB.   200 mL/Hr IV Intermittent (12-13-24 @ 06:18)    piperacillin/tazobactam IVPB.-   25 mL/Hr IV Intermittent (12-13-24 @ 14:15)    vancomycin  IVPB.   250 mL/Hr IV Intermittent (12-12-24 @ 23:00)    MEDICATIONS  (STANDING):  atropine 1% Solution 1 Drop(s) Left EYE two times a day  chlorhexidine 2% Cloths 1 Application(s) Topical daily  dextrose 5%. 1000 milliLiter(s) (100 mL/Hr) IV Continuous <Continuous>  dextrose 5%. 1000 milliLiter(s) (50 mL/Hr) IV Continuous <Continuous>  dextrose 50% Injectable 25 Gram(s) IV Push once  dextrose 50% Injectable 12.5 Gram(s) IV Push once  dextrose 50% Injectable 25 Gram(s) IV Push once  glucagon  Injectable 1 milliGRAM(s) IntraMuscular once  heparin   Injectable 5000 Unit(s) SubCutaneous every 12 hours  hydrALAZINE 50 milliGRAM(s) Oral at bedtime  insulin lispro (ADMELOG) corrective regimen sliding scale   SubCutaneous three times a day before meals  insulin lispro (ADMELOG) corrective regimen sliding scale   SubCutaneous at bedtime  vancomycin  IVPB 1000 milliGRAM(s) IV Intermittent once    MEDICATIONS  (PRN):  acetaminophen     Tablet .. 650 milliGRAM(s) Oral every 6 hours PRN Temp greater or equal to 38C (100.4F), Mild Pain (1 - 3), Moderate Pain (4 - 6)  dextrose Oral Gel 15 Gram(s) Oral once PRN Blood Glucose LESS THAN 70 milliGRAM(s)/deciliter    Social History:  neg (13 Dec 2024 09:36)    FAMILY HISTORY:  Family hx of hypertension (Father)    REVIEW OF SYSTEMS  [  ] ROS unobtainable because:    [ x ] All other systems negative except as noted below:	    Constitutional:  [x ] fever [x ] chills    Skin:  [ ] rash [ ] phlebitis	  Eyes: [ ] icterus [ ] pain  [ ] discharge [x] visual changes	  ENMT: [ ] sore throat  [ ] thrush  Respiratory: [ ] dyspnea [ ] cough [ ] sputum	  Cardiovascular:  [ ] chest pain   Gastrointestinal:  [x ] nausea [x ] vomiting [ ] diarrhea [ ] constipation [ ] pain	  Genitourinary:  [ ] dysuria [ ] frequency  Musculoskeletal:  [ ] myalgias [ ] back pain [x] joint pain  Neurological:  [ ] headache [ ] seizures  [ ] confusion/altered mental status  Psychiatric:  [ ] anxiety [ ] depression	  Endocrine:  [ ] adrenal [ ] thyroid    Vital Signs Last 24 Hrs  T(C): 36.3 (13 Dec 2024 11:51), Max: 39.3 (12 Dec 2024 20:03)  T(F): 97.4 (13 Dec 2024 11:51), Max: 102.8 (12 Dec 2024 20:03)  HR: 57 (13 Dec 2024 11:51) (57 - 90)  BP: 126/63 (13 Dec 2024 11:51) (106/66 - 219/95)  BP(mean): --  RR: 17 (13 Dec 2024 11:51) (16 - 22)  SpO2: 98% (13 Dec 2024 11:51) (96% - 99%)    Parameters below as of 13 Dec 2024 11:51  Patient On (Oxygen Delivery Method): room air    PHYSICAL EXAM:    CONSTITUTIONAL: Lying in bed comfortable, NAD  EYES: Pupils minimally reactive, patient unable to see from either eye  ENMT: Moist oral mucosa, no pharyngeal injection or exudates; normal dentition  RESPIRATORY: Normal respiratory effort; lungs are clear to auscultation bilaterally  CARDIOVASCULAR: Regular rate and rhythm, normal S1 and S2, no murmur/rub/gallop; No lower extremity edema;   ABDOMEN: Nontender to palpation, normoactive bowel sounds, no rebound/guarding; No hepatosplenomegaly  MSK: Tenderness in R middle finger, R hip, R knee. Decreased AROM and PROM in R hip/knee. Normal ROM in LLE.   NEUROLOGY: AOx3, no focal deficits aside from visual nerves.    SKIN: No rashes; no palpable lesions    LABS:                          10.3   6.00  )-----------( 78       ( 12 Dec 2024 22:25 )             29.9     12-12    128[L]  |  89[L]  |  62[H]  ----------------------------<  394[H]  4.9   |  20[L]  |  5.76[H]    Ca    8.6      12 Dec 2024 22:25    TPro  7.1  /  Alb  2.8[L]  /  TBili  1.7[H]  /  DBili  x   /  AST  36[H]  /  ALT  21  /  AlkPhos  161[H]  12-12    Culture - Blood (12.12.24 @ 21:58)   Gram Stain:   Growth in aerobic bottle: Gram Positive Cocci in Pairs and Chains   Growth in anaerobic bottle: Gram Positive Cocci in Pairs and Chains  - Streptococcus agalactiae (Group B): Detec  Specimen Source: .Blood BLOOD  Organism: Blood Culture PCR  Culture Results:   Growth in aerobic bottle: Gram Positive Cocci in Pairs and Chains   Growth in anaerobic bottle: Gram Positive Cocci in Pairs and Chains     RADIOLOGY:    < from: CT Chest w/ IV Cont (12.13.24 @ 04:16) >    ACC: [id number] EXAM:  CT ABDOMEN AND PELVIS IC   ORDERED BY: HANSEL KNAPP     ACC: [id number] EXAM:  CT CHEST IC   ORDERED BY: HANSEL KNAPP     PROCEDURE DATE:  12/13/2024          INTERPRETATION:  CLINICAL INFORMATION: Bandemia. Evaluate for infectious   source.    COMPARISON: CT chest 9/1/2023. CT abdomen pelvis 7/20/2023.    CONTRAST/COMPLICATIONS:  IV Contrast: Omnipaque 350  90 cc administered   10 cc discarded  Oral Contrast: NONE  .    PROCEDURE:  CT of the Chest, Abdomen and Pelvis was performed.  Sagittal and coronal reformats were performed.    FINDINGS:  VESSELS: Type B aortic dissection originating distal to the left   subclavian artery origin and extending to the left common iliac   bifurcation. The true lumen supplies the celiac and superior mesenteric   arteries. 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   The bilateral renal arteries originate from the false lumen. The   descending thoracic aorta measures 4.5 cm (601-77), stable. Infrarenal   abdominalaortic aneurysm measures 5.0 cm (601-50), stable. Left common   iliac artery aneurysm measures 3.0 cm (601-65), stable. Right common   iliac artery aneurysm measures 2.8 cm (601-64), stable. Narrowing of the   left brachycephalic vein as it courses between the sternum and aortic   arch with prominent left chest wall vascular collaterals. Prominent left   abdominal wall vessels draining into the left common femoral vein. Aortic   calcifications. Coronary artery calcifications and stenting.    CHEST:  LUNGS AND LARGE AIRWAYS: Patent central airways. Left upper lobe   calcified granuloma. Bibasilar compressive atelectasis.  PLEURA: Small left and trace right pleural effusions.  HEART: Cardiomegaly. No pericardial effusion.  MEDIASTINUM AND JA:No lymphadenopathy.  CHEST WALL AND LOWER NECK: Bilateral subcentimeter thyroid nodules.    ABDOMEN AND PELVIS:  LIVER: Within normal limits.  BILE DUCTS: Normal caliber.  GALLBLADDER: Cholelithiasis.  SPLEEN: Stable 2.3 cm splenic cyst.  PANCREAS: Within normal limits.  ADRENALS: Within normal limits.  KIDNEYS/URETERS: No hydronephrosis. Bilateral renal atrophy. Left renal   cyst.    BLADDER: Within normal limits.  REPRODUCTIVE ORGANS: Hysterectomy.    BOWEL: No bowel obstruction. Appendix is not visualized. No evidence of   inflammation in the pericecal region.  PERITONEUM/RETROPERITONEUM: Within normal limits.  LYMPH NODES: No lymphadenopathy.  ABDOMINAL WALL: Within normal limits.  BONES: Degenerative changes. Left total hip arthroplasty.    IMPRESSION:  No infectious source in the chest, abdomen, or pelvis.    Stable thoracic abdominal aortic dissection.    Small left and trace right pleural effusions.    --- End of Report ---          SHANNON GOODE MD; Resident Radiologist  This document has been electronically signed.  LIVIA LARES MD; Attending Radiologist  This document has been electronically signed. Dec 13 2024  5:14AM    < end of copied text >  < from: Xray Chest 1 View- PORTABLE-Urgent (Xray Chest 1 View- PORTABLE-Urgent .) (12.12.24 @ 22:13) >    ACC: [id number] EXAM:  XR CHEST PORTABLE URGENT 1V   ORDERED BY: KINGA FINK     PROCEDURE DATE:  12/12/2024          INTERPRETATION:  EXAMINATION: XR CHEST URGENT    CLINICAL INDICATION: sepsis    TECHNIQUE: Single frontal, portable view of the chest was obtained.    COMPARISON: Chest x-ray 9/5/2023.    FINDINGS:  Endovascular stents overlying the left clavicle and right upper arm.  The heart is magnified in this projection.  Trace left pleural effusion.  No pneumothorax.  No acute osseous abnormalities. Chronic deformity of the left proximal   humerus.    IMPRESSION: Clear lungs with small left effusion.      --- End of Report ---          SENG PÉREZ MD; Resident Radiologist  This document has been electronically signed.  JINNY DONATO MD; Attending Radiologist  This document has been electronically signed. Dec 13 2024  6:47AM    < end of copied text >

## 2024-12-13 NOTE — H&P ADULT - HISTORY OF PRESENT ILLNESS
79 yr old female with PMH of  ESRD on HD , HTN, DM, R cataract , glaucoma here for fever and rapid decline of R eye vision .

## 2024-12-13 NOTE — PROGRESS NOTE ADULT - ASSESSMENT
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   - All occurring in the setting of bacteremia/sepsis (bandemia of 14%) with glucose in 400s and high troponins  - Start broad spectrum abx per primary team and ID  - Appreciate ID recs   - Start Atropine drop BID to left eye  - Start vigamox QID to left eye   - Start erythromycin ointment QID to left eye  - Plan for tap and injection of intravitreal vancomycin and ceftazidine today.     Seen and discussed with Dr. Valle, ophthalmology attending. Discussed with Dr. Kim, retina attending.     Outpatient Follow-up: Patient should follow-up with his/her ophthalmologist or with [facility] Department of Ophthalmology within 1 week of after discharge at:    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   - All occurring in the setting of bacteremia/sepsis (bandemia of 14%) with glucose in 400s and high troponins  - Start broad spectrum abx per primary team and ID  - Appreciate ID recs   - Start Atropine drop BID to left eye  - Start moxifloxacin QID to left eye   - Start erythromycin ointment QID to left eye  - Plan for tap and injection of intravitreal vancomycin and ceftazidine today.     Seen and discussed with Dr. Valle, ophthalmology attending. Discussed with Dr. Kim, retina attending.     Outpatient Follow-up: Patient should follow-up with his/her ophthalmologist or with [facility] Department of Ophthalmology within 1 week of after discharge at:    [address]  [place], [place] [id number] [phone]

## 2024-12-13 NOTE — PROCEDURE NOTE - GENERAL PROCEDURE NAME
Anterior Chamber Paracentesis and Intravitreal Injection, Left Eye Anterior Chamber Paracentesis and Intravitreal Injection of Antibiotics, Left Eye Anterior Chamber Paracentesis with Diagnostic Fluid Aspiration and Intravitreal Injection of Vancomycin & Ceftazidime, Left Eye

## 2024-12-13 NOTE — PROCEDURE NOTE - NSCOMPLICATION_GEN_A_CORE
no complications
Pt and sons would like to see pt improve with her pain level and see her improve her functional status-- but would like to take her home when medically stable

## 2024-12-13 NOTE — PATIENT PROFILE ADULT - FALL HARM RISK - HARM RISK INTERVENTIONS

## 2024-12-13 NOTE — H&P ADULT - NSICDXPASTMEDICALHX_GEN_ALL_CORE_FT
PAST MEDICAL HISTORY:  Adult Onset Diabetes Mellitus,     Cancer of Endometrium s/p Hysterectomy , s/p Chemo.    Dissection of Aorta Type B    ESRD (end stage renal disease) on dialysis M,W,F   [facility]    ESRD on hemodialysis     Hypertension

## 2024-12-13 NOTE — CONSULT NOTE ADULT - SUBJECTIVE AND OBJECTIVE BOX
NEPHROLOGY      HPI:  79 yr old female with PMH of  ESRD on HD , HTN, DM, R cataract , glaucoma here for fever and rapid decline of R eye vision.    (13 Dec 2024 09:36)    Pt seen and examined with family at bedside. Pt resting comfortably on room air, report feeling ok, vision has worsened and states everything is blurry, she denies sp or sob, in no acute distress. Reports her last HD was on Wednesday at [facility] and treatment ended 1 hour early due to hypotension. She states she is usually able to remove 2.4L but unsure how much was removed. Consent obtained, signed by cousin, witnessed and placed in chart.       PAST MEDICAL & SURGICAL HISTORY:  Adult Onset Diabetes Mellitus,      Cancer of Endometrium  s/p Hysterectomy , s/p Chemo.      Dissection of Aorta  Type B      Hypertension      ESRD (end stage renal disease) on dialysis  M,W,F   [facility] [address]      ESRD on hemodialysis      H/O total hysterectomy  in 2007 for endometrial CA      History of hip replacement, total, right      S/P arteriovenous (AV) fistula repair          MEDICATIONS  (STANDING):  atropine 1% Solution 1 Drop(s) Left EYE two times a day  chlorhexidine 2% Cloths 1 Application(s) Topical daily  dextrose 5%. 1000 milliLiter(s) (100 mL/Hr) IV Continuous <Continuous>  dextrose 5%. 1000 milliLiter(s) (50 mL/Hr) IV Continuous <Continuous>  dextrose 50% Injectable 25 Gram(s) IV Push once  dextrose 50% Injectable 12.5 Gram(s) IV Push once  dextrose 50% Injectable 25 Gram(s) IV Push once  glucagon  Injectable 1 milliGRAM(s) IntraMuscular once  heparin   Injectable 5000 Unit(s) SubCutaneous every 12 hours  hydrALAZINE 50 milliGRAM(s) Oral at bedtime  insulin lispro (ADMELOG) corrective regimen sliding scale   SubCutaneous three times a day before meals  insulin lispro (ADMELOG) corrective regimen sliding scale   SubCutaneous at bedtime  piperacillin/tazobactam IVPB.- 3.375 Gram(s) IV Intermittent once      Allergies    No Known Allergies    Intolerances        SOCIAL HISTORY:  Denies alcohol abuse, drug abuse or tobacco usage.     FAMILY HISTORY:  Family hx of hypertension (Father)        VITALS:  T(C): 36.3 (12-13-24 @ 11:51), Max: 39.3 (12-12-24 @ 20:03)  HR: 57 (12-13-24 @ 11:51) (57 - 90)  BP: 126/63 (12-13-24 @ 11:51) (106/66 - 219/95)  RR: 17 (12-13-24 @ 11:51) (16 - 22)  SpO2: 98% (12-13-24 @ 11:51) (96% - 99%)    REVIEW OF SYSTEMS:  Denies any nausea, vomiting, diarrhea, fever or chills. Denies chest pain, SOB, focal weakness. Good oral intake and denies fatigue or weakness. All other pertinent systems are reviewed and are negative.    PHYSICAL EXAM:  Constitutional: NAD  HEENT: EOMI  Neck:  No JVD, supple   Respiratory: CTA B/L  Cardiovascular: S1 and S2, RRR  Gastrointestinal: + BS, soft, NT, ND  Extremities: No peripheral edema, + peripheral pulses  Neurological: A/O x 3, CN2-12 intact  Psychiatric: Normal mood, normal affect  : No Oconnell  Skin: No rashes, C/D/I  Access: RUE AVF (+ thrill)     I and O's:      Weight (kg): 54.4 (12-12 @ 20:03)    LABS:                        10.3   6.00  )-----------( 78       ( 12 Dec 2024 22:25 )             29.9     12-12    128[L]  |  89[L]  |  62[H]  ----------------------------<  394[H]  4.9   |  20[L]  |  5.76[H]    Ca    8.6      12 Dec 2024 22:25    TPro  7.1  /  Alb  2.8[L]  /  TBili  1.7[H]  /  DBili  x   /  AST  36[H]  /  ALT  21  /  AlkPhos  161[H]  12-12      URINE:  Urinalysis Basic - ( 12 Dec 2024 22:25 )    Color: x / Appearance: x / SG: x / pH: x  Gluc: 394 mg/dL / Ketone: x  / Bili: x / Urobili: x   Blood: x / Protein: x / Nitrite: x   Leuk Esterase: x / RBC: x / WBC x   Sq Epi: x / Non Sq Epi: x / Bacteria: x        RADIOLOGY & ADDITIONAL STUDIES:  rad< from: CT Chest w/ IV Cont (12.13.24 @ 04:16) >  IMPRESSION:  No infectious source in the chest, abdomen, or pelvis.    Stable thoracic abdominal aortic dissection.    Small left and trace right pleural effusions.    --- End of Report ---      SHANNON GOODE MD; Resident Radiologist  This document has been electronically signed.  LIVIA LARES MD; Attending Radiologist  This document has been electronically signed. Dec 13 2024  5:14AM    < end of copied text >    ASSESSMENT/PLAN:  79 yr old female with PMH of  ESRD on HD , HTN, DM, R cataract , glaucoma here for fever and rapid decline of R eye vision.   ESRD on HD - MWF    1 Renal - last dialyzed on Wednesday, due today   2 ID - on IV abx   3 Optho eval noted

## 2024-12-13 NOTE — H&P ADULT - ASSESSMENT
79 yr old female with PMH of  ESRD on HD , HTN, DM, R cataract , glaucoma here for fever and rapid decline of R eye vision .    79 yr old female with PMH of  ESRD on HD , HTN, DM, R cataract , glaucoma here for fever and rapid decline of R eye vision .       Bacterial Endophthalmitis OS  - cw  iv antibiotics  - cultures reviewed  - check repeat cultures  - cw eye drops  - ID fu   - will monitor     ESRD  - HD as scheduled  - renal fu     HTN  - cw hydralazine  - DASH diet    DM  - monitor FS  - ISS    Venodynes  79 yr old female with PMH of  ESRD on HD , HTN, DM, R cataract , glaucoma here for fever and rapid decline of R eye vision .       Bacterial Endophthalmitis OS  - cw  iv antibiotics  - cultures reviewed  - check repeat cultures  - cw eye drops  - ID fu   - will monitor     High grade bacteremia  - cw iv abx  - ID fu   - fu repeat cultures  - has multiple joint pains, will get CT     ESRD  - HD as scheduled  - renal fu     HTN  - cw hydralazine  - DASH diet    DM  - monitor FS  - ISS    Venodynes

## 2024-12-14 ENCOUNTER — RESULT REVIEW (OUTPATIENT)
Age: 79
End: 2024-12-14

## 2024-12-14 LAB
ALBUMIN SERPL ELPH-MCNC: 2.5 G/DL — LOW (ref 3.3–5)
ALP SERPL-CCNC: 138 U/L — HIGH (ref 40–120)
ALT FLD-CCNC: 18 U/L — SIGNIFICANT CHANGE UP (ref 4–33)
ANION GAP SERPL CALC-SCNC: 14 MMOL/L — SIGNIFICANT CHANGE UP (ref 7–14)
AST SERPL-CCNC: 27 U/L — SIGNIFICANT CHANGE UP (ref 4–32)
BASOPHILS # BLD AUTO: 0.01 K/UL — SIGNIFICANT CHANGE UP (ref 0–0.2)
BASOPHILS NFR BLD AUTO: 0.1 % — SIGNIFICANT CHANGE UP (ref 0–2)
BILIRUB SERPL-MCNC: 1.1 MG/DL — SIGNIFICANT CHANGE UP (ref 0.2–1.2)
BUN SERPL-MCNC: 43 MG/DL — HIGH (ref 7–23)
CALCIUM SERPL-MCNC: 8.4 MG/DL — SIGNIFICANT CHANGE UP (ref 8.4–10.5)
CHLORIDE SERPL-SCNC: 96 MMOL/L — LOW (ref 98–107)
CO2 SERPL-SCNC: 24 MMOL/L — SIGNIFICANT CHANGE UP (ref 22–31)
CREAT SERPL-MCNC: 3.72 MG/DL — HIGH (ref 0.5–1.3)
EGFR: 12 ML/MIN/1.73M2 — LOW
EOSINOPHIL # BLD AUTO: 0 K/UL — SIGNIFICANT CHANGE UP (ref 0–0.5)
EOSINOPHIL NFR BLD AUTO: 0 % — SIGNIFICANT CHANGE UP (ref 0–6)
GLUCOSE BLDC GLUCOMTR-MCNC: 176 MG/DL — HIGH (ref 70–99)
GLUCOSE BLDC GLUCOMTR-MCNC: 178 MG/DL — HIGH (ref 70–99)
GLUCOSE BLDC GLUCOMTR-MCNC: 187 MG/DL — HIGH (ref 70–99)
GLUCOSE BLDC GLUCOMTR-MCNC: 204 MG/DL — HIGH (ref 70–99)
GLUCOSE BLDC GLUCOMTR-MCNC: 284 MG/DL — HIGH (ref 70–99)
GLUCOSE BLDC GLUCOMTR-MCNC: 291 MG/DL — HIGH (ref 70–99)
GLUCOSE SERPL-MCNC: 212 MG/DL — HIGH (ref 70–99)
GRAM STN FLD: ABNORMAL
GRAM STN FLD: SIGNIFICANT CHANGE UP
HAV IGM SER-ACNC: SIGNIFICANT CHANGE UP
HBV CORE AB SER-ACNC: REACTIVE
HBV CORE IGM SER-ACNC: SIGNIFICANT CHANGE UP
HBV SURFACE AB SER-ACNC: 62.2 MIU/ML — SIGNIFICANT CHANGE UP
HBV SURFACE AG SER-ACNC: SIGNIFICANT CHANGE UP
HCT VFR BLD CALC: 31.5 % — LOW (ref 34.5–45)
HCV AB S/CO SERPL IA: 0.2 S/CO — SIGNIFICANT CHANGE UP (ref 0–0.99)
HCV AB SERPL-IMP: SIGNIFICANT CHANGE UP
HGB BLD-MCNC: 10.9 G/DL — LOW (ref 11.5–15.5)
IANC: 6.23 K/UL — SIGNIFICANT CHANGE UP (ref 1.8–7.4)
IMM GRANULOCYTES NFR BLD AUTO: 0.5 % — SIGNIFICANT CHANGE UP (ref 0–0.9)
LYMPHOCYTES # BLD AUTO: 0.62 K/UL — LOW (ref 1–3.3)
LYMPHOCYTES # BLD AUTO: 8.1 % — LOW (ref 13–44)
MAGNESIUM SERPL-MCNC: 2.1 MG/DL — SIGNIFICANT CHANGE UP (ref 1.6–2.6)
MCHC RBC-ENTMCNC: 31.1 PG — SIGNIFICANT CHANGE UP (ref 27–34)
MCHC RBC-ENTMCNC: 34.6 G/DL — SIGNIFICANT CHANGE UP (ref 32–36)
MCV RBC AUTO: 89.7 FL — SIGNIFICANT CHANGE UP (ref 80–100)
MONOCYTES # BLD AUTO: 0.73 K/UL — SIGNIFICANT CHANGE UP (ref 0–0.9)
MONOCYTES NFR BLD AUTO: 9.6 % — SIGNIFICANT CHANGE UP (ref 2–14)
MRSA PCR RESULT.: SIGNIFICANT CHANGE UP
NEUTROPHILS # BLD AUTO: 6.23 K/UL — SIGNIFICANT CHANGE UP (ref 1.8–7.4)
NEUTROPHILS NFR BLD AUTO: 81.7 % — HIGH (ref 43–77)
NRBC # BLD: 0 /100 WBCS — SIGNIFICANT CHANGE UP (ref 0–0)
NRBC # FLD: 0 K/UL — SIGNIFICANT CHANGE UP (ref 0–0)
PHOSPHATE SERPL-MCNC: 4.3 MG/DL — SIGNIFICANT CHANGE UP (ref 2.5–4.5)
PLATELET # BLD AUTO: 93 K/UL — LOW (ref 150–400)
POTASSIUM SERPL-MCNC: 4.5 MMOL/L — SIGNIFICANT CHANGE UP (ref 3.5–5.3)
POTASSIUM SERPL-SCNC: 4.5 MMOL/L — SIGNIFICANT CHANGE UP (ref 3.5–5.3)
PROT SERPL-MCNC: 6.7 G/DL — SIGNIFICANT CHANGE UP (ref 6–8.3)
RBC # BLD: 3.51 M/UL — LOW (ref 3.8–5.2)
RBC # FLD: 13.6 % — SIGNIFICANT CHANGE UP (ref 10.3–14.5)
S AUREUS DNA NOSE QL NAA+PROBE: DETECTED
SODIUM SERPL-SCNC: 134 MMOL/L — LOW (ref 135–145)
SPECIMEN SOURCE: SIGNIFICANT CHANGE UP
SPECIMEN SOURCE: SIGNIFICANT CHANGE UP
WBC # BLD: 7.63 K/UL — SIGNIFICANT CHANGE UP (ref 3.8–10.5)
WBC # FLD AUTO: 7.63 K/UL — SIGNIFICANT CHANGE UP (ref 3.8–10.5)

## 2024-12-14 PROCEDURE — 73120 X-RAY EXAM OF HAND: CPT | Mod: 26,RT

## 2024-12-14 PROCEDURE — 93356 MYOCRD STRAIN IMG SPCKL TRCK: CPT

## 2024-12-14 PROCEDURE — 76376 3D RENDER W/INTRP POSTPROCES: CPT | Mod: 26

## 2024-12-14 PROCEDURE — 93306 TTE W/DOPPLER COMPLETE: CPT | Mod: 26

## 2024-12-14 PROCEDURE — 99232 SBSQ HOSP IP/OBS MODERATE 35: CPT

## 2024-12-14 RX ORDER — LIDOCAINE 50 MG/G
3 OINTMENT TOPICAL DAILY
Refills: 0 | Status: DISCONTINUED | OUTPATIENT
Start: 2024-12-14 | End: 2024-12-26

## 2024-12-14 RX ORDER — GINKGO BILOBA 40 MG
3 CAPSULE ORAL ONCE
Refills: 0 | Status: COMPLETED | OUTPATIENT
Start: 2024-12-14 | End: 2024-12-14

## 2024-12-14 RX ORDER — ACETAMINOPHEN 80 MG/.8ML
1000 SOLUTION/ DROPS ORAL ONCE
Refills: 0 | Status: COMPLETED | OUTPATIENT
Start: 2024-12-14 | End: 2024-12-14

## 2024-12-14 RX ORDER — ERYTHROMYCIN 5 MG/G
1 OINTMENT OPHTHALMIC
Refills: 0 | Status: DISCONTINUED | OUTPATIENT
Start: 2024-12-14 | End: 2024-12-16

## 2024-12-14 RX ADMIN — HEPARIN SODIUM 5000 UNIT(S): 1000 INJECTION, SOLUTION INTRAVENOUS; SUBCUTANEOUS at 18:58

## 2024-12-14 RX ADMIN — Medication 1: at 18:58

## 2024-12-14 RX ADMIN — Medication 1 DROP(S): at 09:25

## 2024-12-14 RX ADMIN — PREDNISOLONE ACETATE 1 DROP(S): 10 SUSPENSION OPHTHALMIC at 01:17

## 2024-12-14 RX ADMIN — Medication 1 DROP(S): at 19:44

## 2024-12-14 RX ADMIN — Medication 1 DROP(S): at 12:15

## 2024-12-14 RX ADMIN — LIDOCAINE 3 PATCH: 50 OINTMENT TOPICAL at 13:07

## 2024-12-14 RX ADMIN — Medication 1 DROP(S): at 23:09

## 2024-12-14 RX ADMIN — LIDOCAINE 3 PATCH: 50 OINTMENT TOPICAL at 19:02

## 2024-12-14 RX ADMIN — CHLORHEXIDINE GLUCONATE 1 APPLICATION(S): 1.2 RINSE ORAL at 19:01

## 2024-12-14 RX ADMIN — Medication 1: at 09:24

## 2024-12-14 RX ADMIN — Medication 1 DROP(S): at 18:59

## 2024-12-14 RX ADMIN — Medication 17 GRAM(S): at 13:08

## 2024-12-14 RX ADMIN — ERYTHROMYCIN 1 APPLICATION(S): 5 OINTMENT OPHTHALMIC at 14:12

## 2024-12-14 RX ADMIN — CEFTRIAXONE SODIUM 100 MILLIGRAM(S): 1 INJECTION, POWDER, FOR SOLUTION INTRAMUSCULAR; INTRAVENOUS at 18:57

## 2024-12-14 RX ADMIN — ACETAMINOPHEN 400 MILLIGRAM(S): 80 SOLUTION/ DROPS ORAL at 23:06

## 2024-12-14 RX ADMIN — Medication 3 MILLIGRAM(S): at 21:45

## 2024-12-14 RX ADMIN — Medication 1 DROP(S): at 05:27

## 2024-12-14 RX ADMIN — Medication 3: at 14:11

## 2024-12-14 RX ADMIN — PREDNISOLONE ACETATE 1 DROP(S): 10 SUSPENSION OPHTHALMIC at 05:27

## 2024-12-14 RX ADMIN — HEPARIN SODIUM 5000 UNIT(S): 1000 INJECTION, SOLUTION INTRAVENOUS; SUBCUTANEOUS at 05:26

## 2024-12-14 RX ADMIN — ERYTHROMYCIN 1 APPLICATION(S): 5 OINTMENT OPHTHALMIC at 23:10

## 2024-12-14 RX ADMIN — ERYTHROMYCIN 1 APPLICATION(S): 5 OINTMENT OPHTHALMIC at 18:59

## 2024-12-14 RX ADMIN — HYDRALAZINE HYDROCHLORIDE 50 MILLIGRAM(S): 10 TABLET ORAL at 21:44

## 2024-12-14 NOTE — PROGRESS NOTE ADULT - ASSESSMENT
79 yr old female with PMH of  ESRD on HD , HTN, DM, R cataract , glaucoma here for fever and rapid decline of R eye vision .       Bacterial Endophthalmitis OS  - cw  iv antibiotics  - cultures reviewed  - check repeat cultures  - cw eye drops  - ID fu   - will monitor     High grade bacteremia  - cw iv abx  - ID fu   - fu repeat cultures  - has multiple joint pains, will get CT     ESRD  - HD as scheduled  - renal fu     HTN  - cw hydralazine  - DASH diet    DM  - monitor FS  - ISS    Venodynes

## 2024-12-14 NOTE — PROGRESS NOTE ADULT - ASSESSMENT
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   - All occurring in the setting of bacteremia/sepsis (bandemia of 14%) with glucose in 400s and high troponins  - Start broad spectrum abx per primary team and ID  - Appreciate ID recs   - Start Atropine drop BID to left eye  - Start moxifloxacin QID to left eye   - Start erythromycin ointment QID to left eye  - Plan for tap and injection of intravitreal vancomycin and ceftazidine today.     Seen and discussed with Dr. Valle, ophthalmology attending. Discussed with Dr. Kim, retina attending.     Outpatient Follow-up: Patient should follow-up with his/her ophthalmologist or with [facility] Department of Ophthalmology within 1 week of after discharge at:    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   - All occurring in the setting of bacteremia/sepsis (bandemia of 14%) with glucose in 400s and high troponins  - s/p anterior chamber tap and intravitreal injection of vancomycin and ceftazidime OS on 12/13. Tolerated well. See note.   - AC tap cultures pending. Gram stain showin gram + cocci in pairs.   - Anterior exam and b-scan grossly stable today OS. Vision remains LP.   - continue broad spectrum abx per primary team and ID  - Appreciate ID recs   - continue with atropine drop BID to left eye  - continue with moxifloxacin q4 hours to left eye  - start erythromycin ointment QID to left eye  - Will hold off on topical steroids for now given epi defect        DW Dr. Kim, retina attending    Outpatient Follow-up: Patient should follow-up with his/her ophthalmologist or with [facility] Department of Ophthalmology within 1 week of after discharge at:    [address]  [place] [address]  [phone]

## 2024-12-14 NOTE — PROGRESS NOTE ADULT - SUBJECTIVE AND OBJECTIVE BOX
[facility] DEPARTMENT OF OPHTHALMOLOGY  ------------------------------------------------------------------------------  Dina Vizcaino MD PGY-3  ------------------------------------------------------------------------------    Interval History: Patient s/p anterior chamber tap and intravitreal injection of vanc/ceftazidime OS yesterday.     MEDICATIONS  (STANDING):  atropine 1% Solution 1 Drop(s) Left EYE two times a day  cefTAZidime  2.25 mG/0.1 mL Ophthalmic Injectable 0.1 milliLiter(s) IntraVitreal once  cefTRIAXone   IVPB      cefTRIAXone   IVPB 2000 milliGRAM(s) IV Intermittent every 12 hours  chlorhexidine 2% Cloths 1 Application(s) Topical daily  dextrose 5%. 1000 milliLiter(s) (100 mL/Hr) IV Continuous <Continuous>  dextrose 5%. 1000 milliLiter(s) (50 mL/Hr) IV Continuous <Continuous>  dextrose 50% Injectable 25 Gram(s) IV Push once  dextrose 50% Injectable 12.5 Gram(s) IV Push once  dextrose 50% Injectable 25 Gram(s) IV Push once  glucagon  Injectable 1 milliGRAM(s) IntraMuscular once  heparin   Injectable 5000 Unit(s) SubCutaneous every 12 hours  hydrALAZINE 50 milliGRAM(s) Oral at bedtime  insulin lispro (ADMELOG) corrective regimen sliding scale   SubCutaneous three times a day before meals  insulin lispro (ADMELOG) corrective regimen sliding scale   SubCutaneous at bedtime  lidocaine 1%/epinephrine 1:200,000 Inj 3 milliLiter(s) Local Injection once  moxifloxacin 0.5% Solution 1 Drop(s) Left EYE <User Schedule>  polyethylene glycol 3350 17 Gram(s) Oral daily  povidone iodine 5% Ophthalmic Solution 1 Drop(s) Left EYE once  prednisoLONE acetate 1% Suspension 1 Drop(s) Left EYE every 4 hours  vancomycin 1 mG/0.1 mL Ophthalmic Injectable 0.1 milliLiter(s) IntraVitreal once    MEDICATIONS  (PRN):  acetaminophen     Tablet .. 650 milliGRAM(s) Oral every 6 hours PRN Temp greater or equal to 38C (100.4F), Mild Pain (1 - 3), Moderate Pain (4 - 6)  dextrose Oral Gel 15 Gram(s) Oral once PRN Blood Glucose LESS THAN 70 milliGRAM(s)/deciliter  lidocaine/prilocaine Cream 1 Application(s) Topical daily PRN on HD days for pain      VITALS: T(C): 36.7 (12-14-24 @ 05:12)  T(F): 98.1 (12-14-24 @ 05:12), Max: 98.3 (12-13-24 @ 06:25)  HR: 78 (12-14-24 @ 05:12) (57 - 81)  BP: 140/62 (12-14-24 @ 05:12) (115/65 - 148/79)  RR:  (17 - 20)  SpO2:  (97% - 98%)  Wt(kg): --  General: AAO x 3, appropriate mood and affect      Ophthalmology Exam:  Visual acuity (sc): CF/HM OD, LP OS  Pupils: Minimally reactive OU, no RAPD  Ttono: 18 OD 17 OS  Extraocular movements (EOMs): Full OU, no pain, no diplopia  Confrontational Visual Field (CVF): PATSY  Color Plates: PATSY    Slit Lamp Exam (PLE)  External: Flat OU  Lids/Lashes/Lacrimal Ducts: Flat OU    Sclera/Conjunctiva: W+Q OD, 3+ injection OS   Cornea: Cl OD, OD: large near total epi defect with heaped up edges, corneal haze  Anterior Chamber: D+F OD; OS: dense fibrin in AC, inferonasal collections of early hypopyon  Iris: Flat OD, Posterior synechiae OS  Lens: Dislocated inferior nasally, pt aware, occurred after fall, 2-3+ NS OS      B-scan OS:  [facility] DEPARTMENT OF OPHTHALMOLOGY  ------------------------------------------------------------------------------  Dina Vizcaino MD PGY-3  ------------------------------------------------------------------------------    Interval History: Patient s/p anterior chamber tap and intravitreal injection of vanc/ceftazidime OS yesterday.     MEDICATIONS  (STANDING):  atropine 1% Solution 1 Drop(s) Left EYE two times a day  cefTAZidime  2.25 mG/0.1 mL Ophthalmic Injectable 0.1 milliLiter(s) IntraVitreal once  cefTRIAXone   IVPB      cefTRIAXone   IVPB 2000 milliGRAM(s) IV Intermittent every 12 hours  chlorhexidine 2% Cloths 1 Application(s) Topical daily  dextrose 5%. 1000 milliLiter(s) (100 mL/Hr) IV Continuous <Continuous>  dextrose 5%. 1000 milliLiter(s) (50 mL/Hr) IV Continuous <Continuous>  dextrose 50% Injectable 25 Gram(s) IV Push once  dextrose 50% Injectable 12.5 Gram(s) IV Push once  dextrose 50% Injectable 25 Gram(s) IV Push once  glucagon  Injectable 1 milliGRAM(s) IntraMuscular once  heparin   Injectable 5000 Unit(s) SubCutaneous every 12 hours  hydrALAZINE 50 milliGRAM(s) Oral at bedtime  insulin lispro (ADMELOG) corrective regimen sliding scale   SubCutaneous three times a day before meals  insulin lispro (ADMELOG) corrective regimen sliding scale   SubCutaneous at bedtime  lidocaine 1%/epinephrine 1:200,000 Inj 3 milliLiter(s) Local Injection once  moxifloxacin 0.5% Solution 1 Drop(s) Left EYE <User Schedule>  polyethylene glycol 3350 17 Gram(s) Oral daily  povidone iodine 5% Ophthalmic Solution 1 Drop(s) Left EYE once  prednisoLONE acetate 1% Suspension 1 Drop(s) Left EYE every 4 hours  vancomycin 1 mG/0.1 mL Ophthalmic Injectable 0.1 milliLiter(s) IntraVitreal once    MEDICATIONS  (PRN):  acetaminophen     Tablet .. 650 milliGRAM(s) Oral every 6 hours PRN Temp greater or equal to 38C (100.4F), Mild Pain (1 - 3), Moderate Pain (4 - 6)  dextrose Oral Gel 15 Gram(s) Oral once PRN Blood Glucose LESS THAN 70 milliGRAM(s)/deciliter  lidocaine/prilocaine Cream 1 Application(s) Topical daily PRN on HD days for pain      VITALS: T(C): 36.7 (12-14-24 @ 05:12)  T(F): 98.1 (12-14-24 @ 05:12), Max: 98.3 (12-13-24 @ 06:25)  HR: 78 (12-14-24 @ 05:12) (57 - 81)  BP: 140/62 (12-14-24 @ 05:12) (115/65 - 148/79)  RR:  (17 - 20)  SpO2:  (97% - 98%)  Wt(kg): --  General: AAO x 3, appropriate mood and affect      Ophthalmology Exam:  Visual acuity (sc): CF/HM OD, LP OS  Pupils: Minimally reactive OU, no RAPD  Ttono: 15 OS    Pen Light Exam (PLE)  External: Flat OU  Lids/Lashes/Lacrimal Ducts: Flat OU    Sclera/Conjunctiva: W+Q OD, 3+ injection OS   Cornea: Cl OD, OD: large near total epi defect with mildly heaped up edges, corneal haze. No infiltrate. Adán neg.   Anterior Chamber: D+F OD; OS: improved fibrin in AC, no overt hypopyon.   Iris: Flat OD, Posterior synechiae OS  Lens: Dislocated inferior nasally, pt aware, occurred after fall, 2-3+ NS OS      B-scan OS: Stable vitreous debris OS. No RD [facility] DEPARTMENT OF OPHTHALMOLOGY  ------------------------------------------------------------------------------  Dina Vizcaino MD PGY-3  ------------------------------------------------------------------------------    Interval History: Patient s/p anterior chamber tap and intravitreal injection of vanc/ceftazidime OS yesterday. Denies any eye pain. Reports that vision is unchanged in the left eye.     MEDICATIONS  (STANDING):  atropine 1% Solution 1 Drop(s) Left EYE two times a day  cefTAZidime  2.25 mG/0.1 mL Ophthalmic Injectable 0.1 milliLiter(s) IntraVitreal once  cefTRIAXone   IVPB      cefTRIAXone   IVPB 2000 milliGRAM(s) IV Intermittent every 12 hours  chlorhexidine 2% Cloths 1 Application(s) Topical daily  dextrose 5%. 1000 milliLiter(s) (100 mL/Hr) IV Continuous <Continuous>  dextrose 5%. 1000 milliLiter(s) (50 mL/Hr) IV Continuous <Continuous>  dextrose 50% Injectable 25 Gram(s) IV Push once  dextrose 50% Injectable 12.5 Gram(s) IV Push once  dextrose 50% Injectable 25 Gram(s) IV Push once  glucagon  Injectable 1 milliGRAM(s) IntraMuscular once  heparin   Injectable 5000 Unit(s) SubCutaneous every 12 hours  hydrALAZINE 50 milliGRAM(s) Oral at bedtime  insulin lispro (ADMELOG) corrective regimen sliding scale   SubCutaneous three times a day before meals  insulin lispro (ADMELOG) corrective regimen sliding scale   SubCutaneous at bedtime  lidocaine 1%/epinephrine 1:200,000 Inj 3 milliLiter(s) Local Injection once  moxifloxacin 0.5% Solution 1 Drop(s) Left EYE <User Schedule>  polyethylene glycol 3350 17 Gram(s) Oral daily  povidone iodine 5% Ophthalmic Solution 1 Drop(s) Left EYE once  prednisoLONE acetate 1% Suspension 1 Drop(s) Left EYE every 4 hours  vancomycin 1 mG/0.1 mL Ophthalmic Injectable 0.1 milliLiter(s) IntraVitreal once    MEDICATIONS  (PRN):  acetaminophen     Tablet .. 650 milliGRAM(s) Oral every 6 hours PRN Temp greater or equal to 38C (100.4F), Mild Pain (1 - 3), Moderate Pain (4 - 6)  dextrose Oral Gel 15 Gram(s) Oral once PRN Blood Glucose LESS THAN 70 milliGRAM(s)/deciliter  lidocaine/prilocaine Cream 1 Application(s) Topical daily PRN on HD days for pain      VITALS: T(C): 36.7 (12-14-24 @ 05:12)  T(F): 98.1 (12-14-24 @ 05:12), Max: 98.3 (12-13-24 @ 06:25)  HR: 78 (12-14-24 @ 05:12) (57 - 81)  BP: 140/62 (12-14-24 @ 05:12) (115/65 - 148/79)  RR:  (17 - 20)  SpO2:  (97% - 98%)  Wt(kg): --  General: AAO x 3, appropriate mood and affect      Ophthalmology Exam:  Visual acuity (sc): CF/HM OD, LP OS  Pupils: Minimally reactive OU, no RAPD  Ttono: 15 OS    Pen Light Exam (PLE)  External: Flat OU  Lids/Lashes/Lacrimal Ducts: Flat OU    Sclera/Conjunctiva: W+Q OD, 3+ injection OS   Cornea: Cl OD, OD: large near total epi defect with mildly heaped up edges, corneal haze. No infiltrate. Adán neg.   Anterior Chamber: D+F OD; OS: improved fibrin in AC, no overt hypopyon.   Iris: Flat OD, Posterior synechiae OS  Lens: Dislocated inferior nasally, pt aware, occurred after fall, 2-3+ NS OS      B-scan OS: Stable vitreous debris OS. No RD [facility] DEPARTMENT OF OPHTHALMOLOGY  ------------------------------------------------------------------------------  Dina Vizcaino MD PGY-3  ------------------------------------------------------------------------------    Interval History: Patient s/p anterior chamber tap and intravitreal injection of vanc/ceftazidime OS yesterday. Denies any eye pain. Reports that vision is unchanged in the left eye.     MEDICATIONS  (STANDING):  atropine 1% Solution 1 Drop(s) Left EYE two times a day  cefTAZidime  2.25 mG/0.1 mL Ophthalmic Injectable 0.1 milliLiter(s) IntraVitreal once  cefTRIAXone   IVPB      cefTRIAXone   IVPB 2000 milliGRAM(s) IV Intermittent every 12 hours  chlorhexidine 2% Cloths 1 Application(s) Topical daily  dextrose 5%. 1000 milliLiter(s) (100 mL/Hr) IV Continuous <Continuous>  dextrose 5%. 1000 milliLiter(s) (50 mL/Hr) IV Continuous <Continuous>  dextrose 50% Injectable 25 Gram(s) IV Push once  dextrose 50% Injectable 12.5 Gram(s) IV Push once  dextrose 50% Injectable 25 Gram(s) IV Push once  glucagon  Injectable 1 milliGRAM(s) IntraMuscular once  heparin   Injectable 5000 Unit(s) SubCutaneous every 12 hours  hydrALAZINE 50 milliGRAM(s) Oral at bedtime  insulin lispro (ADMELOG) corrective regimen sliding scale   SubCutaneous three times a day before meals  insulin lispro (ADMELOG) corrective regimen sliding scale   SubCutaneous at bedtime  lidocaine 1%/epinephrine 1:200,000 Inj 3 milliLiter(s) Local Injection once  moxifloxacin 0.5% Solution 1 Drop(s) Left EYE <User Schedule>  polyethylene glycol 3350 17 Gram(s) Oral daily  povidone iodine 5% Ophthalmic Solution 1 Drop(s) Left EYE once  prednisoLONE acetate 1% Suspension 1 Drop(s) Left EYE every 4 hours  vancomycin 1 mG/0.1 mL Ophthalmic Injectable 0.1 milliLiter(s) IntraVitreal once    MEDICATIONS  (PRN):  acetaminophen     Tablet .. 650 milliGRAM(s) Oral every 6 hours PRN Temp greater or equal to 38C (100.4F), Mild Pain (1 - 3), Moderate Pain (4 - 6)  dextrose Oral Gel 15 Gram(s) Oral once PRN Blood Glucose LESS THAN 70 milliGRAM(s)/deciliter  lidocaine/prilocaine Cream 1 Application(s) Topical daily PRN on HD days for pain      VITALS: T(C): 36.7 (12-14-24 @ 05:12)  T(F): 98.1 (12-14-24 @ 05:12), Max: 98.3 (12-13-24 @ 06:25)  HR: 78 (12-14-24 @ 05:12) (57 - 81)  BP: 140/62 (12-14-24 @ 05:12) (115/65 - 148/79)  RR:  (17 - 20)  SpO2:  (97% - 98%)  Wt(kg): --  General: AAO x 3, appropriate mood and affect      Ophthalmology Exam:  Visual acuity (sc): CF/HM OD, LP OS  Pupils: Minimally reactive OU, no RAPD  Ttono: 15 OS    Pen Light Exam (PLE)  External: Flat OU  Lids/Lashes/Lacrimal Ducts: Flat OU    Sclera/Conjunctiva: W+Q OD, 3+ injection OS   Cornea: Cl OD, OD: large near total epi defect with mildly heaped up edges, corneal haze. No infiltrate. D-folds. Adán neg.   Anterior Chamber: D+F OD; OS: improved fibrin in AC, no overt hypopyon.   Iris: Flat OD, Posterior synechiae OS  Lens: Dislocated inferior nasally, pt aware, occurred after fall, 2-3+ NS OS      B-scan OS: Stable vitreous debris OS. No RD

## 2024-12-14 NOTE — PROGRESS NOTE ADULT - ASSESSMENT
80 y/o F, PMH ESRD thru R AV fistula, HTN, DM, R cataract (surgery scheduled for Jan 2025), glaucoma and L hip replacement, presented on 12/13 with fever, pain, and rapid decline in L vision, with opthalmology exam suggestive of bacterial endophthalmitis now with blood cultures growing Group B Strep. Infectious Disease consulted for assessment of infectious source and antimicrobial management     Upon presentation, patient febrile to 102.8F  Labs showed no leukocytosis but with 14.8% bandemia     Workup:   -Blood Cx (12/12): Strep B (4/4 bottles)  -CT C/A/P: No infectious source in the chest, abdomen, or pelvis. Stable thoracic abdominal aortic dissection. Small left and trace right pleural effusions.    #Sepsis, fevers, bandemia   #High grade Group B Strep Bacteremia  #L vison loss, Endogenous Bacterial Endophthalmitis  #New RLE joints (hip, knee and ankle) pain   #New R hand middle finger PIP joint swelling   #Presence of RUE AV fistula with no clinical evidence of infection     Recommendations:   -Continue Ceftriaxone 2 g IV q12hrs   - Intravitreal Vancomycin   - TTE without vegetation, may need BLANE  -Will need CT w/IV cont R hip, R knee and R ankle, if evidence of fluid effusions get orthopedics eval    -Get R hand X ray   -Repeat blood Cx in am then q48hrs till clears  -Monitor T curve and WBC trend    -check US of fistula

## 2024-12-14 NOTE — PROGRESS NOTE ADULT - SUBJECTIVE AND OBJECTIVE BOX
Patient is a 79y old  Female who presents with a chief complaint of fever, vision loss (14 Dec 2024 10:07)    Date of servie : 12-14-24 @ 13:54  INTERVAL HPI/OVERNIGHT EVENTS:  T(C): 36.7 (12-14-24 @ 05:12), Max: 36.7 (12-13-24 @ 16:30)  HR: 78 (12-14-24 @ 05:12) (64 - 81)  BP: 140/62 (12-14-24 @ 05:12) (115/65 - 140/62)  RR: 17 (12-14-24 @ 05:12) (17 - 18)  SpO2: 98% (12-14-24 @ 05:12) (97% - 98%)  Wt(kg): --  I&O's Summary    13 Dec 2024 07:01  -  14 Dec 2024 07:00  --------------------------------------------------------  IN: 900 mL / OUT: 1900 mL / NET: -1000 mL        LABS:                        10.9   7.63  )-----------( 93       ( 14 Dec 2024 07:55 )             31.5     12-14    134[L]  |  96[L]  |  43[H]  ----------------------------<  212[H]  4.5   |  24  |  3.72[H]    Ca    8.4      14 Dec 2024 07:55  Phos  4.3     12-14  Mg     2.10     12-14    TPro  6.7  /  Alb  2.5[L]  /  TBili  1.1  /  DBili  x   /  AST  27  /  ALT  18  /  AlkPhos  138[H]  12-14    PT/INR - ( 12 Dec 2024 22:25 )   PT: 14.8 sec;   INR: 1.25 ratio         PTT - ( 12 Dec 2024 22:25 )  PTT:32.0 sec  Urinalysis Basic - ( 14 Dec 2024 07:55 )    Color: x / Appearance: x / SG: x / pH: x  Gluc: 212 mg/dL / Ketone: x  / Bili: x / Urobili: x   Blood: x / Protein: x / Nitrite: x   Leuk Esterase: x / RBC: x / WBC x   Sq Epi: x / Non Sq Epi: x / Bacteria: x      CAPILLARY BLOOD GLUCOSE      POCT Blood Glucose.: 291 mg/dL (14 Dec 2024 12:02)  POCT Blood Glucose.: 178 mg/dL (14 Dec 2024 08:45)  POCT Blood Glucose.: 193 mg/dL (13 Dec 2024 22:11)  POCT Blood Glucose.: 158 mg/dL (13 Dec 2024 20:49)  POCT Blood Glucose.: 96 mg/dL (13 Dec 2024 19:17)        Urinalysis Basic - ( 14 Dec 2024 07:55 )    Color: x / Appearance: x / SG: x / pH: x  Gluc: 212 mg/dL / Ketone: x  / Bili: x / Urobili: x   Blood: x / Protein: x / Nitrite: x   Leuk Esterase: x / RBC: x / WBC x   Sq Epi: x / Non Sq Epi: x / Bacteria: x        MEDICATIONS  (STANDING):  atropine 1% Solution 1 Drop(s) Left EYE two times a day  cefTAZidime  2.25 mG/0.1 mL Ophthalmic Injectable 0.1 milliLiter(s) IntraVitreal once  cefTRIAXone   IVPB      cefTRIAXone   IVPB 2000 milliGRAM(s) IV Intermittent every 12 hours  chlorhexidine 2% Cloths 1 Application(s) Topical daily  dextrose 5%. 1000 milliLiter(s) (100 mL/Hr) IV Continuous <Continuous>  dextrose 5%. 1000 milliLiter(s) (50 mL/Hr) IV Continuous <Continuous>  dextrose 50% Injectable 25 Gram(s) IV Push once  dextrose 50% Injectable 12.5 Gram(s) IV Push once  dextrose 50% Injectable 25 Gram(s) IV Push once  erythromycin   Ointment 1 Application(s) Left EYE four times a day  glucagon  Injectable 1 milliGRAM(s) IntraMuscular once  heparin   Injectable 5000 Unit(s) SubCutaneous every 12 hours  hydrALAZINE 50 milliGRAM(s) Oral at bedtime  insulin lispro (ADMELOG) corrective regimen sliding scale   SubCutaneous three times a day before meals  insulin lispro (ADMELOG) corrective regimen sliding scale   SubCutaneous at bedtime  lidocaine   4% Patch 3 Patch Transdermal daily  lidocaine 1%/epinephrine 1:200,000 Inj 3 milliLiter(s) Local Injection once  moxifloxacin 0.5% Solution 1 Drop(s) Left EYE <User Schedule>  polyethylene glycol 3350 17 Gram(s) Oral daily  povidone iodine 5% Ophthalmic Solution 1 Drop(s) Left EYE once  vancomycin 1 mG/0.1 mL Ophthalmic Injectable 0.1 milliLiter(s) IntraVitreal once    MEDICATIONS  (PRN):  acetaminophen     Tablet .. 650 milliGRAM(s) Oral every 6 hours PRN Temp greater or equal to 38C (100.4F), Mild Pain (1 - 3), Moderate Pain (4 - 6)  dextrose Oral Gel 15 Gram(s) Oral once PRN Blood Glucose LESS THAN 70 milliGRAM(s)/deciliter  lidocaine/prilocaine Cream 1 Application(s) Topical daily PRN on HD days for pain          PHYSICAL EXAM:  GENERAL: NAD, well-groomed, well-developed  HEAD:  Atraumatic, Normocephalic  CHEST/LUNG: Clear to percussion bilaterally; No rales, rhonchi, wheezing, or rubs  HEART: Regular rate and rhythm; No murmurs, rubs, or gallops  ABDOMEN: Soft, Nontender, Nondistended; Bowel sounds present  EXTREMITIES:  2+ Peripheral Pulses, No clubbing, cyanosis, or edema  LYMPH: No lymphadenopathy noted  SKIN: No rashes or lesions    Care Discussed with Consultants/Other Providers [ ] YES  [ ] NO

## 2024-12-14 NOTE — PROGRESS NOTE ADULT - SUBJECTIVE AND OBJECTIVE BOX
Follow Up:      Inverval History/ROS:Patient is a 79y old  Female who presents with a chief complaint of fever, vision loss (14 Dec 2024 13:51)    Notes some light perception  No fever    Allergies    No Known Allergies    Intolerances        ANTIMICROBIALS:  cefTAZidime  2.25 mG/0.1 mL Ophthalmic Injectable 0.1 once  cefTRIAXone   IVPB    cefTRIAXone   IVPB 2000 every 12 hours  vancomycin 1 mG/0.1 mL Ophthalmic Injectable 0.1 once      OTHER MEDS:  acetaminophen     Tablet .. 650 milliGRAM(s) Oral every 6 hours PRN  atropine 1% Solution 1 Drop(s) Left EYE two times a day  chlorhexidine 2% Cloths 1 Application(s) Topical daily  dextrose 5%. 1000 milliLiter(s) IV Continuous <Continuous>  dextrose 5%. 1000 milliLiter(s) IV Continuous <Continuous>  dextrose 50% Injectable 25 Gram(s) IV Push once  dextrose 50% Injectable 12.5 Gram(s) IV Push once  dextrose 50% Injectable 25 Gram(s) IV Push once  dextrose Oral Gel 15 Gram(s) Oral once PRN  erythromycin   Ointment 1 Application(s) Left EYE four times a day  glucagon  Injectable 1 milliGRAM(s) IntraMuscular once  heparin   Injectable 5000 Unit(s) SubCutaneous every 12 hours  hydrALAZINE 50 milliGRAM(s) Oral at bedtime  insulin lispro (ADMELOG) corrective regimen sliding scale   SubCutaneous three times a day before meals  insulin lispro (ADMELOG) corrective regimen sliding scale   SubCutaneous at bedtime  lidocaine   4% Patch 3 Patch Transdermal daily  lidocaine 1%/epinephrine 1:200,000 Inj 3 milliLiter(s) Local Injection once  lidocaine/prilocaine Cream 1 Application(s) Topical daily PRN  moxifloxacin 0.5% Solution 1 Drop(s) Left EYE <User Schedule>  polyethylene glycol 3350 17 Gram(s) Oral daily  povidone iodine 5% Ophthalmic Solution 1 Drop(s) Left EYE once      Vital Signs Last 24 Hrs  T(C): 36.7 (14 Dec 2024 05:12), Max: 36.7 (13 Dec 2024 16:30)  T(F): 98.1 (14 Dec 2024 05:12), Max: 98.1 (13 Dec 2024 16:30)  HR: 78 (14 Dec 2024 05:12) (64 - 81)  BP: 140/62 (14 Dec 2024 05:12) (115/65 - 140/62)  BP(mean): --  RR: 17 (14 Dec 2024 05:12) (17 - 18)  SpO2: 98% (14 Dec 2024 05:12) (97% - 98%)    Parameters below as of 14 Dec 2024 05:12  Patient On (Oxygen Delivery Method): room air        PHYSICAL EXAM:  General: [ x] non-toxic  HEAD/EYES: [ ] PERRL [ ] white sclera [ ] icterus  ENT:  [ ] normal x[ ] supple [ ] thrush [ ] pharyngeal exudate  Cardiovascular:   [ ] murmur [ x] normal [ ] PPM/AICD  Respiratory:  [ ] clear to ausculation bilaterally  GI:  [x ] soft, non-tender, normal bowel sounds  :  [ ] mistry [ ] no CVA tenderness   Musculoskeletal:  [ ] no synovitis  Neurologic:  [ ] non-focal exam   Skin:  [x ] no rash  Lymph: [ ] no lymphadenopathy  Psychiatric:  [ ] appropriate affect [ ] alert & oriented  Lines:  [ x] no phlebitis [ ] central line                                10.9   7.63  )-----------( 93       ( 14 Dec 2024 07:55 )             31.5       12-14    134[L]  |  96[L]  |  43[H]  ----------------------------<  212[H]  4.5   |  24  |  3.72[H]    Ca    8.4      14 Dec 2024 07:55  Phos  4.3     12-14  Mg     2.10     12-14    TPro  6.7  /  Alb  2.5[L]  /  TBili  1.1  /  DBili  x   /  AST  27  /  ALT  18  /  AlkPhos  138[H]  12-14      Urinalysis Basic - ( 14 Dec 2024 07:55 )    Color: x / Appearance: x / SG: x / pH: x  Gluc: 212 mg/dL / Ketone: x  / Bili: x / Urobili: x   Blood: x / Protein: x / Nitrite: x   Leuk Esterase: x / RBC: x / WBC x   Sq Epi: x / Non Sq Epi: x / Bacteria: x        MICROBIOLOGY:Culture Results:   No growth to date. (12-13-24 @ 15:53)  Culture Results:   Growth in aerobic and anaerobic bottles: Streptococcus agalactiae (Group  B)  Direct identification is available within approximately 3-5  hours either by Blood Panel Multiplexed PCR or Direct  MALDI-TOF. Details: [email or web address] (12-12-24 @ 21:58)  Culture Results:   Growth in aerobic and anaerobic bottles: Streptococcus agalactiae (Group  B)  See previous culture [id number] (12-12-24 @ 21:43)      RADIOLOGY:

## 2024-12-14 NOTE — CONSULT NOTE ADULT - SUBJECTIVE AND OBJECTIVE BOX
Cardiovascular Disease Initial Evaluation  DATE OF SERVICE: 12-14-24 @ 10:07    CHIEF COMPLAINT: Eye pain    HISTORY OF PRESENT ILLNESS:  This is a 79 year old woman with ESRD on HD, HTN, and chronic type B aortic dissection who presented to [facility] on 12/13/2024 with eye pain post surgery.  The patient was noted to have bacteremia.     Allergies  No Known Allergies      MEDICATIONS:  heparin   Injectable 5000 Unit(s) SubCutaneous every 12 hours  hydrALAZINE 50 milliGRAM(s) Oral at bedtime    cefTAZidime  2.25 mG/0.1 mL Ophthalmic Injectable 0.1 milliLiter(s) IntraVitreal once  cefTRIAXone   IVPB      cefTRIAXone   IVPB 2000 milliGRAM(s) IV Intermittent every 12 hours  vancomycin 1 mG/0.1 mL Ophthalmic Injectable 0.1 milliLiter(s) IntraVitreal once      acetaminophen     Tablet .. 650 milliGRAM(s) Oral every 6 hours PRN    polyethylene glycol 3350 17 Gram(s) Oral daily    dextrose 50% Injectable 25 Gram(s) IV Push once  dextrose 50% Injectable 12.5 Gram(s) IV Push once  dextrose 50% Injectable 25 Gram(s) IV Push once  dextrose Oral Gel 15 Gram(s) Oral once PRN  glucagon  Injectable 1 milliGRAM(s) IntraMuscular once  insulin lispro (ADMELOG) corrective regimen sliding scale   SubCutaneous three times a day before meals  insulin lispro (ADMELOG) corrective regimen sliding scale   SubCutaneous at bedtime    atropine 1% Solution 1 Drop(s) Left EYE two times a day  chlorhexidine 2% Cloths 1 Application(s) Topical daily  dextrose 5%. 1000 milliLiter(s) IV Continuous <Continuous>  dextrose 5%. 1000 milliLiter(s) IV Continuous <Continuous>  erythromycin   Ointment 1 Application(s) Left EYE four times a day  lidocaine/prilocaine Cream 1 Application(s) Topical daily PRN  moxifloxacin 0.5% Solution 1 Drop(s) Left EYE <User Schedule>  povidone iodine 5% Ophthalmic Solution 1 Drop(s) Left EYE once      PAST MEDICAL & SURGICAL HISTORY:  Adult Onset Diabetes Mellitus,      Cancer of Endometrium  s/p Hysterectomy , s/p Chemo.      Dissection of Aorta  Type B      Hypertension      ESRD (end stage renal disease) on dialysis  M,W,F   [facility] [address]      ESRD on hemodialysis      H/O total hysterectomy  in 2007 for endometrial CA      History of hip replacement, total, right      S/P arteriovenous (AV) fistula repair          FAMILY HISTORY:  Family hx of hypertension (Father)        SOCIAL HISTORY:    The patient is a nonsmoker       REVIEW OF SYSTEMS:  See HPI, otherwise complete 14 point review of systems negative        PHYSICAL EXAM:  T(C): 36.7 (12-14-24 @ 05:12), Max: 36.7 (12-13-24 @ 16:30)  HR: 78 (12-14-24 @ 05:12) (57 - 81)  BP: 140/62 (12-14-24 @ 05:12) (115/65 - 140/62)  RR: 17 (12-14-24 @ 05:12) (17 - 18)  SpO2: 98% (12-14-24 @ 05:12) (97% - 98%)  Wt(kg): --  I&O's Summary    13 Dec 2024 07:01  -  14 Dec 2024 07:00  --------------------------------------------------------  IN: 900 mL / OUT: 1900 mL / NET: -1000 mL        Appearance: No Acute Distress; resting comfortably  HEENT:  Normal oral mucosa  Cardiovascular: Normal S1 S2, No JVD, No murmurs/rubs/gallops  Respiratory: Normal respiratory effort; Lungs clear to auscultation bilaterally  Gastrointestinal:  Soft, Non-tender, + BS	  Skin: No rashes, No ecchymoses, No cyanosis	  Neurologic: Non-focal; no weakness  Extremities: No clubbing, cyanosis or edema  Vascular: Peripheral pulses palpable 2+ bilaterally  Psychiatry: A & O x 3, Mood & affect appropriate    Laboratory Data:	 	    CBC Full  -  ( 14 Dec 2024 07:55 )  WBC Count : 7.63 K/uL  Hemoglobin : 10.9 g/dL  Hematocrit : 31.5 %  Platelet Count - Automated : 93 K/uL  Mean Cell Volume : 89.7 fL  Mean Cell Hemoglobin : 31.1 pg  Mean Cell Hemoglobin Concentration : 34.6 g/dL  Auto Neutrophil # : 6.23 K/uL  Auto Lymphocyte # : 0.62 K/uL  Auto Monocyte # : 0.73 K/uL  Auto Eosinophil # : 0.00 K/uL  Auto Basophil # : 0.01 K/uL  Auto Neutrophil % : 81.7 %  Auto Lymphocyte % : 8.1 %  Auto Monocyte % : 9.6 %  Auto Eosinophil % : 0.0 %  Auto Basophil % : 0.1 %    12-14    134[L]  |  96[L]  |  43[H]  ----------------------------<  212[H]  4.5   |  24  |  3.72[H]  12-13    134[L]  |  95[L]  |  38[H]  ----------------------------<  129[H]  3.2[L]   |  26  |  3.51[H]    Ca    8.4      14 Dec 2024 07:55  Ca    8.5      13 Dec 2024 17:56  Phos  4.3     12-14  Phos  2.2     12-13  Mg     2.10     12-14  Mg     2.00     12-13    TPro  6.7  /  Alb  2.5[L]  /  TBili  1.1  /  DBili  x   /  AST  27  /  ALT  18  /  AlkPhos  138[H]  12-14  TPro  6.9  /  Alb  2.8[L]  /  TBili  1.2  /  DBili  x   /  AST  24  /  ALT  17  /  AlkPhos  133[H]  12-13      Interpretation of Telemetry: n/a	    ECG:  	Sinus; RBBB    Assessment: 79 year old woman with ESRD on HD ([facility]), HTN, DM (diet controlled), chronic type B aortic dissection, heart murmur, endometrial ca in remission s/p total hysterectomy in 2007 presents with bacteremia.    Plan of Care:      #Bacteremia-  In the setting of ophthalmic infection.   Will consider BLANE to assess for endocarditis if BCx are persistently positive.          #Type B aortic dissection  - Chronic (Diagnosed >10 years ago)  - Optimal BP control.          #ESRD-  - HD as per renal.      Care discussed at length with family at bedside.       76 minutes spent on total encounter; more than 50% of the visit was spent counseling and/or coordinating care by the attending physician.   	  Gabo Burris MD [facility]  Cardiovascular Diseases  [phone]

## 2024-12-14 NOTE — CHART NOTE - NSCHARTNOTEFT_GEN_A_CORE
Contacted second floor CT scan to request expedited scan. Advised they would attempt to accommodate. Contacted second floor CT scan to request expedited scan. Advised they would attempt to accommodate. Second floor CT scan unable to accommodate by closing time. Contacted ED CT scan ~11:47 PM and was advised to reattempt at 3 AM, no availability at this time for inpatient scans. Will reattempt in AM and endorse to day team.

## 2024-12-15 LAB
-  CEFTRIAXONE: SIGNIFICANT CHANGE UP
-  CLINDAMYCIN: SIGNIFICANT CHANGE UP
-  LEVOFLOXACIN: SIGNIFICANT CHANGE UP
-  PENICILLIN: SIGNIFICANT CHANGE UP
-  TETRACYCLINE: SIGNIFICANT CHANGE UP
-  VANCOMYCIN: SIGNIFICANT CHANGE UP
ALBUMIN SERPL ELPH-MCNC: 2.3 G/DL — LOW (ref 3.3–5)
ALP SERPL-CCNC: 161 U/L — HIGH (ref 40–120)
ALT FLD-CCNC: 14 U/L — SIGNIFICANT CHANGE UP (ref 4–33)
ANION GAP SERPL CALC-SCNC: 16 MMOL/L — HIGH (ref 7–14)
AST SERPL-CCNC: 27 U/L — SIGNIFICANT CHANGE UP (ref 4–32)
BASOPHILS # BLD AUTO: 0.01 K/UL — SIGNIFICANT CHANGE UP (ref 0–0.2)
BASOPHILS NFR BLD AUTO: 0.1 % — SIGNIFICANT CHANGE UP (ref 0–2)
BILIRUB SERPL-MCNC: 0.8 MG/DL — SIGNIFICANT CHANGE UP (ref 0.2–1.2)
BUN SERPL-MCNC: 68 MG/DL — HIGH (ref 7–23)
CALCIUM SERPL-MCNC: 8.2 MG/DL — LOW (ref 8.4–10.5)
CHLORIDE SERPL-SCNC: 92 MMOL/L — LOW (ref 98–107)
CO2 SERPL-SCNC: 23 MMOL/L — SIGNIFICANT CHANGE UP (ref 22–31)
CREAT SERPL-MCNC: 5.16 MG/DL — HIGH (ref 0.5–1.3)
CULTURE RESULTS: ABNORMAL
CULTURE RESULTS: ABNORMAL
EGFR: 8 ML/MIN/1.73M2 — LOW
EOSINOPHIL # BLD AUTO: 0.03 K/UL — SIGNIFICANT CHANGE UP (ref 0–0.5)
EOSINOPHIL NFR BLD AUTO: 0.4 % — SIGNIFICANT CHANGE UP (ref 0–6)
GLUCOSE BLDC GLUCOMTR-MCNC: 195 MG/DL — HIGH (ref 70–99)
GLUCOSE BLDC GLUCOMTR-MCNC: 207 MG/DL — HIGH (ref 70–99)
GLUCOSE BLDC GLUCOMTR-MCNC: 250 MG/DL — HIGH (ref 70–99)
GLUCOSE SERPL-MCNC: 150 MG/DL — HIGH (ref 70–99)
HCT VFR BLD CALC: 27.9 % — LOW (ref 34.5–45)
HGB BLD-MCNC: 9.7 G/DL — LOW (ref 11.5–15.5)
IANC: 4.82 K/UL — SIGNIFICANT CHANGE UP (ref 1.8–7.4)
IMM GRANULOCYTES NFR BLD AUTO: 0.6 % — SIGNIFICANT CHANGE UP (ref 0–0.9)
LYMPHOCYTES # BLD AUTO: 1.18 K/UL — SIGNIFICANT CHANGE UP (ref 1–3.3)
LYMPHOCYTES # BLD AUTO: 17 % — SIGNIFICANT CHANGE UP (ref 13–44)
MAGNESIUM SERPL-MCNC: 2.2 MG/DL — SIGNIFICANT CHANGE UP (ref 1.6–2.6)
MCHC RBC-ENTMCNC: 31.2 PG — SIGNIFICANT CHANGE UP (ref 27–34)
MCHC RBC-ENTMCNC: 34.8 G/DL — SIGNIFICANT CHANGE UP (ref 32–36)
MCV RBC AUTO: 89.7 FL — SIGNIFICANT CHANGE UP (ref 80–100)
METHOD TYPE: SIGNIFICANT CHANGE UP
MONOCYTES # BLD AUTO: 0.85 K/UL — SIGNIFICANT CHANGE UP (ref 0–0.9)
MONOCYTES NFR BLD AUTO: 12.3 % — SIGNIFICANT CHANGE UP (ref 2–14)
NEUTROPHILS # BLD AUTO: 4.82 K/UL — SIGNIFICANT CHANGE UP (ref 1.8–7.4)
NEUTROPHILS NFR BLD AUTO: 69.6 % — SIGNIFICANT CHANGE UP (ref 43–77)
NRBC # BLD: 0 /100 WBCS — SIGNIFICANT CHANGE UP (ref 0–0)
NRBC # FLD: 0 K/UL — SIGNIFICANT CHANGE UP (ref 0–0)
ORGANISM # SPEC MICROSCOPIC CNT: ABNORMAL
PHOSPHATE SERPL-MCNC: 4.8 MG/DL — HIGH (ref 2.5–4.5)
PLATELET # BLD AUTO: 92 K/UL — LOW (ref 150–400)
POTASSIUM SERPL-MCNC: 4.7 MMOL/L — SIGNIFICANT CHANGE UP (ref 3.5–5.3)
POTASSIUM SERPL-SCNC: 4.7 MMOL/L — SIGNIFICANT CHANGE UP (ref 3.5–5.3)
PROT SERPL-MCNC: 6.1 G/DL — SIGNIFICANT CHANGE UP (ref 6–8.3)
RBC # BLD: 3.11 M/UL — LOW (ref 3.8–5.2)
RBC # FLD: 13.8 % — SIGNIFICANT CHANGE UP (ref 10.3–14.5)
SODIUM SERPL-SCNC: 131 MMOL/L — LOW (ref 135–145)
SPECIMEN SOURCE: SIGNIFICANT CHANGE UP
SPECIMEN SOURCE: SIGNIFICANT CHANGE UP
WBC # BLD: 6.93 K/UL — SIGNIFICANT CHANGE UP (ref 3.8–10.5)
WBC # FLD AUTO: 6.93 K/UL — SIGNIFICANT CHANGE UP (ref 3.8–10.5)

## 2024-12-15 PROCEDURE — 93931 UPPER EXTREMITY STUDY: CPT | Mod: 26,RT

## 2024-12-15 RX ORDER — ACETAMINOPHEN 80 MG/.8ML
1000 SOLUTION/ DROPS ORAL ONCE
Refills: 0 | Status: COMPLETED | OUTPATIENT
Start: 2024-12-15 | End: 2024-12-15

## 2024-12-15 RX ORDER — GINKGO BILOBA 40 MG
3 CAPSULE ORAL AT BEDTIME
Refills: 0 | Status: DISCONTINUED | OUTPATIENT
Start: 2024-12-15 | End: 2024-12-26

## 2024-12-15 RX ORDER — MUPIROCIN 2 %
1 OINTMENT (GRAM) TOPICAL EVERY 12 HOURS
Refills: 0 | Status: COMPLETED | OUTPATIENT
Start: 2024-12-15 | End: 2024-12-20

## 2024-12-15 RX ORDER — LIDOCAINE 50 MG/G
1 OINTMENT TOPICAL ONCE
Refills: 0 | Status: COMPLETED | OUTPATIENT
Start: 2024-12-15 | End: 2024-12-15

## 2024-12-15 RX ADMIN — ERYTHROMYCIN 1 APPLICATION(S): 5 OINTMENT OPHTHALMIC at 23:21

## 2024-12-15 RX ADMIN — Medication 1 DROP(S): at 06:13

## 2024-12-15 RX ADMIN — ERYTHROMYCIN 1 APPLICATION(S): 5 OINTMENT OPHTHALMIC at 13:08

## 2024-12-15 RX ADMIN — Medication 2: at 13:03

## 2024-12-15 RX ADMIN — CEFTRIAXONE SODIUM 100 MILLIGRAM(S): 1 INJECTION, POWDER, FOR SOLUTION INTRAMUSCULAR; INTRAVENOUS at 06:12

## 2024-12-15 RX ADMIN — HEPARIN SODIUM 5000 UNIT(S): 1000 INJECTION, SOLUTION INTRAVENOUS; SUBCUTANEOUS at 17:30

## 2024-12-15 RX ADMIN — LIDOCAINE 1 PATCH: 50 OINTMENT TOPICAL at 06:41

## 2024-12-15 RX ADMIN — ACETAMINOPHEN 650 MILLIGRAM(S): 80 SOLUTION/ DROPS ORAL at 09:50

## 2024-12-15 RX ADMIN — Medication 1 DROP(S): at 09:19

## 2024-12-15 RX ADMIN — HEPARIN SODIUM 5000 UNIT(S): 1000 INJECTION, SOLUTION INTRAVENOUS; SUBCUTANEOUS at 06:15

## 2024-12-15 RX ADMIN — Medication 3 MILLIGRAM(S): at 22:18

## 2024-12-15 RX ADMIN — LIDOCAINE 1 PATCH: 50 OINTMENT TOPICAL at 17:25

## 2024-12-15 RX ADMIN — Medication 1 DROP(S): at 16:48

## 2024-12-15 RX ADMIN — Medication 17 GRAM(S): at 13:04

## 2024-12-15 RX ADMIN — CHLORHEXIDINE GLUCONATE 1 APPLICATION(S): 1.2 RINSE ORAL at 16:48

## 2024-12-15 RX ADMIN — LIDOCAINE 3 PATCH: 50 OINTMENT TOPICAL at 06:27

## 2024-12-15 RX ADMIN — ERYTHROMYCIN 1 APPLICATION(S): 5 OINTMENT OPHTHALMIC at 17:32

## 2024-12-15 RX ADMIN — ERYTHROMYCIN 1 APPLICATION(S): 5 OINTMENT OPHTHALMIC at 06:14

## 2024-12-15 RX ADMIN — LIDOCAINE 3 PATCH: 50 OINTMENT TOPICAL at 13:03

## 2024-12-15 RX ADMIN — Medication 2: at 17:43

## 2024-12-15 RX ADMIN — CEFTRIAXONE SODIUM 100 MILLIGRAM(S): 1 INJECTION, POWDER, FOR SOLUTION INTRAMUSCULAR; INTRAVENOUS at 17:34

## 2024-12-15 RX ADMIN — Medication 1 DROP(S): at 19:35

## 2024-12-15 RX ADMIN — ACETAMINOPHEN 1000 MILLIGRAM(S): 80 SOLUTION/ DROPS ORAL at 00:05

## 2024-12-15 RX ADMIN — ACETAMINOPHEN 650 MILLIGRAM(S): 80 SOLUTION/ DROPS ORAL at 10:50

## 2024-12-15 RX ADMIN — HYDRALAZINE HYDROCHLORIDE 50 MILLIGRAM(S): 10 TABLET ORAL at 22:19

## 2024-12-15 RX ADMIN — LIDOCAINE 3 PATCH: 50 OINTMENT TOPICAL at 19:31

## 2024-12-15 RX ADMIN — Medication 1 DROP(S): at 23:21

## 2024-12-15 RX ADMIN — Medication 1 DROP(S): at 13:03

## 2024-12-15 RX ADMIN — Medication 1 DROP(S): at 17:33

## 2024-12-15 RX ADMIN — ACETAMINOPHEN 400 MILLIGRAM(S): 80 SOLUTION/ DROPS ORAL at 06:41

## 2024-12-15 NOTE — PROGRESS NOTE ADULT - SUBJECTIVE AND OBJECTIVE BOX
Cardiovascular Disease Progress Note  DATE OF SERVICE: 12-15-24 @ 10:01    Overnight events: No acute events overnight.  The patient denies chest pain or SOB.     Otherwise review of systems negative    Objective Findings:  T(C): 36.3 (12-15-24 @ 09:57), Max: 37.1 (12-14-24 @ 23:00)  HR: 67 (12-15-24 @ 09:57) (67 - 80)  BP: 140/71 (12-15-24 @ 09:57) (109/51 - 140/71)  RR: 18 (12-15-24 @ 09:57) (17 - 18)  SpO2: 98% (12-15-24 @ 09:57) (95% - 100%)  Wt(kg): --  Daily Height in cm: 149.86 (14 Dec 2024 10:42)    Daily       Physical Exam:  Gen: NAD; Patient resting comfortably  HEENT: EOMI, Normocephalic/ atraumatic  CV: RRR, normal S1 + S2, no m/r/g  Lungs:  Normal respiratory effort; clear to auscultation bilaterally  Abd: soft, non-tender; bowel sounds present  Ext: No edema; warm and well perfused    Telemetry: n/a    Laboratory Data:                        9.7    6.93  )-----------( 92       ( 15 Dec 2024 04:42 )             27.9     12-15    131[L]  |  92[L]  |  68[H]  ----------------------------<  150[H]  4.7   |  23  |  5.16[H]    Ca    8.2[L]      15 Dec 2024 04:42  Phos  4.8     12-15  Mg     2.20     12-15    TPro  6.1  /  Alb  2.3[L]  /  TBili  0.8  /  DBili  x   /  AST  27  /  ALT  14  /  AlkPhos  161[H]  12-15              Inpatient Medications:  MEDICATIONS  (STANDING):  atropine 1% Solution 1 Drop(s) Left EYE two times a day  cefTAZidime  2.25 mG/0.1 mL Ophthalmic Injectable 0.1 milliLiter(s) IntraVitreal once  cefTRIAXone   IVPB      cefTRIAXone   IVPB 2000 milliGRAM(s) IV Intermittent every 12 hours  chlorhexidine 2% Cloths 1 Application(s) Topical daily  dextrose 5%. 1000 milliLiter(s) (100 mL/Hr) IV Continuous <Continuous>  dextrose 5%. 1000 milliLiter(s) (50 mL/Hr) IV Continuous <Continuous>  dextrose 50% Injectable 25 Gram(s) IV Push once  dextrose 50% Injectable 12.5 Gram(s) IV Push once  dextrose 50% Injectable 25 Gram(s) IV Push once  erythromycin   Ointment 1 Application(s) Left EYE four times a day  glucagon  Injectable 1 milliGRAM(s) IntraMuscular once  heparin   Injectable 5000 Unit(s) SubCutaneous every 12 hours  hydrALAZINE 50 milliGRAM(s) Oral at bedtime  insulin lispro (ADMELOG) corrective regimen sliding scale   SubCutaneous three times a day before meals  insulin lispro (ADMELOG) corrective regimen sliding scale   SubCutaneous at bedtime  lidocaine   4% Patch 3 Patch Transdermal daily  lidocaine 1%/epinephrine 1:200,000 Inj 3 milliLiter(s) Local Injection once  moxifloxacin 0.5% Solution 1 Drop(s) Left EYE <User Schedule>  polyethylene glycol 3350 17 Gram(s) Oral daily  povidone iodine 5% Ophthalmic Solution 1 Drop(s) Left EYE once  vancomycin 1 mG/0.1 mL Ophthalmic Injectable 0.1 milliLiter(s) IntraVitreal once      Assessment: 79 year old woman with ESRD on HD (MWF), HTN, DM (diet controlled), chronic type B aortic dissection, heart murmur, endometrial ca in remission s/p total hysterectomy in 2007 presents with bacteremia.    Plan of Care:      #Bacteremia-  In the setting of ophthalmic infection.   TTE with no obvious evidence of vegetations.  Will consider BLANE if BCx are persistently positive or imaging shows evidence of embolic disease.          #Type B aortic dissection  - Chronic (Diagnosed >10 years ago)  - Optimal BP control.          #ESRD-  - HD as per renal.      #ACP (advance care planning)-  CPT 50026  Advanced care planning was discussed with the patient and family at bedside.    Cardiac findings were discussed in detail and all questions were answered.        Over 55 minutes spent on total encounter; more than 50% of the visit was spent counseling and/or coordinating care by the attending physician.      Gabo Burris MD [facility]  Cardiovascular Disease  [phone]

## 2024-12-15 NOTE — PROGRESS NOTE ADULT - SUBJECTIVE AND OBJECTIVE BOX
Patient is a 79y old  Female who presents with a chief complaint of fever, vision loss (14 Dec 2024 16:06)    Date of servie : 12-15-24 @ 07:32  INTERVAL HPI/OVERNIGHT EVENTS:  T(C): 37.1 (12-15-24 @ 06:10), Max: 37.1 (12-14-24 @ 23:00)  HR: 68 (12-15-24 @ 06:10) (68 - 80)  BP: 137/69 (12-15-24 @ 06:10) (109/51 - 137/69)  RR: 17 (12-15-24 @ 06:10) (17 - 18)  SpO2: 99% (12-15-24 @ 06:10) (95% - 100%)  Wt(kg): --  I&O's Summary    14 Dec 2024 07:01  -  15 Dec 2024 07:00  --------------------------------------------------------  IN: 280 mL / OUT: 0 mL / NET: 280 mL        LABS:                        9.7    6.93  )-----------( 92       ( 15 Dec 2024 04:42 )             27.9     12-15    131[L]  |  92[L]  |  68[H]  ----------------------------<  150[H]  4.7   |  23  |  5.16[H]    Ca    8.2[L]      15 Dec 2024 04:42  Phos  4.8     12-15  Mg     2.20     12-15    TPro  6.1  /  Alb  2.3[L]  /  TBili  0.8  /  DBili  x   /  AST  27  /  ALT  14  /  AlkPhos  161[H]  12-15      Urinalysis Basic - ( 15 Dec 2024 04:42 )    Color: x / Appearance: x / SG: x / pH: x  Gluc: 150 mg/dL / Ketone: x  / Bili: x / Urobili: x   Blood: x / Protein: x / Nitrite: x   Leuk Esterase: x / RBC: x / WBC x   Sq Epi: x / Non Sq Epi: x / Bacteria: x      CAPILLARY BLOOD GLUCOSE      POCT Blood Glucose.: 187 mg/dL (14 Dec 2024 22:14)  POCT Blood Glucose.: 176 mg/dL (14 Dec 2024 18:33)  POCT Blood Glucose.: 204 mg/dL (14 Dec 2024 17:26)  POCT Blood Glucose.: 284 mg/dL (14 Dec 2024 14:08)  POCT Blood Glucose.: 291 mg/dL (14 Dec 2024 12:02)  POCT Blood Glucose.: 178 mg/dL (14 Dec 2024 08:45)        Urinalysis Basic - ( 15 Dec 2024 04:42 )    Color: x / Appearance: x / SG: x / pH: x  Gluc: 150 mg/dL / Ketone: x  / Bili: x / Urobili: x   Blood: x / Protein: x / Nitrite: x   Leuk Esterase: x / RBC: x / WBC x   Sq Epi: x / Non Sq Epi: x / Bacteria: x        MEDICATIONS  (STANDING):  atropine 1% Solution 1 Drop(s) Left EYE two times a day  cefTAZidime  2.25 mG/0.1 mL Ophthalmic Injectable 0.1 milliLiter(s) IntraVitreal once  cefTRIAXone   IVPB      cefTRIAXone   IVPB 2000 milliGRAM(s) IV Intermittent every 12 hours  chlorhexidine 2% Cloths 1 Application(s) Topical daily  dextrose 5%. 1000 milliLiter(s) (100 mL/Hr) IV Continuous <Continuous>  dextrose 5%. 1000 milliLiter(s) (50 mL/Hr) IV Continuous <Continuous>  dextrose 50% Injectable 25 Gram(s) IV Push once  dextrose 50% Injectable 12.5 Gram(s) IV Push once  dextrose 50% Injectable 25 Gram(s) IV Push once  erythromycin   Ointment 1 Application(s) Left EYE four times a day  glucagon  Injectable 1 milliGRAM(s) IntraMuscular once  heparin   Injectable 5000 Unit(s) SubCutaneous every 12 hours  hydrALAZINE 50 milliGRAM(s) Oral at bedtime  insulin lispro (ADMELOG) corrective regimen sliding scale   SubCutaneous three times a day before meals  insulin lispro (ADMELOG) corrective regimen sliding scale   SubCutaneous at bedtime  lidocaine   4% Patch 3 Patch Transdermal daily  lidocaine 1%/epinephrine 1:200,000 Inj 3 milliLiter(s) Local Injection once  moxifloxacin 0.5% Solution 1 Drop(s) Left EYE <User Schedule>  polyethylene glycol 3350 17 Gram(s) Oral daily  povidone iodine 5% Ophthalmic Solution 1 Drop(s) Left EYE once  vancomycin 1 mG/0.1 mL Ophthalmic Injectable 0.1 milliLiter(s) IntraVitreal once    MEDICATIONS  (PRN):  acetaminophen     Tablet .. 650 milliGRAM(s) Oral every 6 hours PRN Temp greater or equal to 38C (100.4F), Mild Pain (1 - 3), Moderate Pain (4 - 6)  dextrose Oral Gel 15 Gram(s) Oral once PRN Blood Glucose LESS THAN 70 milliGRAM(s)/deciliter  lidocaine/prilocaine Cream 1 Application(s) Topical daily PRN on HD days for pain          PHYSICAL EXAM:  GENERAL: NAD, well-groomed, well-developed  HEAD:  Atraumatic, Normocephalic  CHEST/LUNG: Clear to percussion bilaterally; No rales, rhonchi, wheezing, or rubs  HEART: Regular rate and rhythm; No murmurs, rubs, or gallops  ABDOMEN: Soft, Nontender, Nondistended; Bowel sounds present  EXTREMITIES:  2+ Peripheral Pulses, No clubbing, cyanosis, or edema  LYMPH: No lymphadenopathy noted  SKIN: No rashes or lesions    Care Discussed with Consultants/Other Providers [ ] YES  [ ] NO

## 2024-12-15 NOTE — PROGRESS NOTE ADULT - ASSESSMENT
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 Tolerated well. See note.   - Va today worsened to NLP OS  - AC tap cultures pending. Gram stain showing gram + cocci in pairs.   - Anterior exam with no view of Lens OS. B scan with dropped lens. Does not appear to be an RD.   - continue broad spectrum abx per primary team and ID  - Appreciate ID recs   - continue with atropine drop BID to left eye  - continue with moxifloxacin q4 hours to left eye  - erythromycin ointment QID to left eye  - Will hold off on topical steroids for now given epi defect        DW Dr. Kim, retina attending    Outpatient Follow-up: Patient should follow-up with his/her ophthalmologist or with [facility] Department of Ophthalmology within 1 week of after discharge at:    [address]  [place], [place] [id number] [phone]

## 2024-12-15 NOTE — PROGRESS NOTE ADULT - ASSESSMENT
Copy received from Patient Care.     79 yr old female with PMH of  ESRD on HD , HTN, DM, R cataract , glaucoma here for fever and rapid decline of R eye vision .       Bacterial Endophthalmitis OS  - cw  iv antibiotics  - cultures reviewed  -- Intravitreal Vancomycin   - cw eye drops  - ID fu   - will monitor     High grade bacteremia  - cw iv abx  - ID fu   - repeat cultures neg so far  -check US of fistula  - ECHO no vegetations, may need BLANE  - fu XRay had and joint CT scans       ESRD  - HD as scheduled  - renal fu     HTN  - cw hydralazine  - DASH diet    DM  - monitor FS  - ISS    Heparin sc for DVT prophylaxis

## 2024-12-15 NOTE — PROGRESS NOTE ADULT - SUBJECTIVE AND OBJECTIVE BOX
[facility] DEPARTMENT OF OPHTHALMOLOGY  ------------------------------------------------------------------------------  Amaury Byrd MD PGY-3  ------------------------------------------------------------------------------    Interval History: NAEO     MEDICATIONS  (STANDING):  atropine 1% Solution 1 Drop(s) Left EYE two times a day  cefTAZidime  2.25 mG/0.1 mL Ophthalmic Injectable 0.1 milliLiter(s) IntraVitreal once  cefTRIAXone   IVPB      cefTRIAXone   IVPB 2000 milliGRAM(s) IV Intermittent every 12 hours  chlorhexidine 2% Cloths 1 Application(s) Topical daily  dextrose 5%. 1000 milliLiter(s) (100 mL/Hr) IV Continuous <Continuous>  dextrose 5%. 1000 milliLiter(s) (50 mL/Hr) IV Continuous <Continuous>  dextrose 50% Injectable 25 Gram(s) IV Push once  dextrose 50% Injectable 12.5 Gram(s) IV Push once  dextrose 50% Injectable 25 Gram(s) IV Push once  glucagon  Injectable 1 milliGRAM(s) IntraMuscular once  heparin   Injectable 5000 Unit(s) SubCutaneous every 12 hours  hydrALAZINE 50 milliGRAM(s) Oral at bedtime  insulin lispro (ADMELOG) corrective regimen sliding scale   SubCutaneous three times a day before meals  insulin lispro (ADMELOG) corrective regimen sliding scale   SubCutaneous at bedtime  lidocaine 1%/epinephrine 1:200,000 Inj 3 milliLiter(s) Local Injection once  moxifloxacin 0.5% Solution 1 Drop(s) Left EYE <User Schedule>  polyethylene glycol 3350 17 Gram(s) Oral daily  povidone iodine 5% Ophthalmic Solution 1 Drop(s) Left EYE once  prednisoLONE acetate 1% Suspension 1 Drop(s) Left EYE every 4 hours  vancomycin 1 mG/0.1 mL Ophthalmic Injectable 0.1 milliLiter(s) IntraVitreal once    MEDICATIONS  (PRN):  acetaminophen     Tablet .. 650 milliGRAM(s) Oral every 6 hours PRN Temp greater or equal to 38C (100.4F), Mild Pain (1 - 3), Moderate Pain (4 - 6)  dextrose Oral Gel 15 Gram(s) Oral once PRN Blood Glucose LESS THAN 70 milliGRAM(s)/deciliter  lidocaine/prilocaine Cream 1 Application(s) Topical daily PRN on HD days for pain      VITALS: T(C): 36.7 (12-14-24 @ 05:12)  T(F): 98.1 (12-14-24 @ 05:12), Max: 98.3 (12-13-24 @ 06:25)  HR: 78 (12-14-24 @ 05:12) (57 - 81)  BP: 140/62 (12-14-24 @ 05:12) (115/65 - 148/79)  RR:  (17 - 20)  SpO2:  (97% - 98%)  Wt(kg): --  General: AAO x 3, appropriate mood and affect      Ophthalmology Exam:  Visual acuity (sc): CF/HM OD, NLP OS  Pupils: Minimally reactive OU, no RAPD  Ttono: 15 OS    Pen Light Exam (PLE)  External: Flat OU  Lids/Lashes/Lacrimal Ducts: Flat OU    Sclera/Conjunctiva: W+Q OD, 3+ injection OS   Cornea: Cl OD, OD: large near total epi defect with mildly heaped up edges, corneal haze. No infiltrate. D-folds. Adán neg.   Anterior Chamber: D+F OD; OS: no hypopyon, with flare and likely vitreous in AC  Iris: Flat OD, Posterior synechiae OS  Lens: Dislocated inferior nasally, pt aware, occurred after fall, no view of lens OS       B-scan OS: with dropped lens

## 2024-12-16 ENCOUNTER — RESULT REVIEW (OUTPATIENT)
Age: 79
End: 2024-12-16

## 2024-12-16 LAB
-  CEFTRIAXONE: SIGNIFICANT CHANGE UP
-  CLINDAMYCIN: SIGNIFICANT CHANGE UP
-  LEVOFLOXACIN: SIGNIFICANT CHANGE UP
-  PENICILLIN: SIGNIFICANT CHANGE UP
-  TETRACYCLINE: SIGNIFICANT CHANGE UP
-  VANCOMYCIN: SIGNIFICANT CHANGE UP
ANION GAP SERPL CALC-SCNC: 16 MMOL/L — HIGH (ref 7–14)
BUN SERPL-MCNC: 50 MG/DL — HIGH (ref 7–23)
CALCIUM SERPL-MCNC: 8.5 MG/DL — SIGNIFICANT CHANGE UP (ref 8.4–10.5)
CHLORIDE SERPL-SCNC: 94 MMOL/L — LOW (ref 98–107)
CO2 SERPL-SCNC: 23 MMOL/L — SIGNIFICANT CHANGE UP (ref 22–31)
CREAT SERPL-MCNC: 3.72 MG/DL — HIGH (ref 0.5–1.3)
EGFR: 12 ML/MIN/1.73M2 — LOW
GLUCOSE BLDC GLUCOMTR-MCNC: 122 MG/DL — HIGH (ref 70–99)
GLUCOSE BLDC GLUCOMTR-MCNC: 155 MG/DL — HIGH (ref 70–99)
GLUCOSE BLDC GLUCOMTR-MCNC: 195 MG/DL — HIGH (ref 70–99)
GLUCOSE BLDC GLUCOMTR-MCNC: 224 MG/DL — HIGH (ref 70–99)
GLUCOSE BLDC GLUCOMTR-MCNC: 227 MG/DL — HIGH (ref 70–99)
GLUCOSE SERPL-MCNC: 128 MG/DL — HIGH (ref 70–99)
HCT VFR BLD CALC: 29.7 % — LOW (ref 34.5–45)
HGB BLD-MCNC: 10.6 G/DL — LOW (ref 11.5–15.5)
MAGNESIUM SERPL-MCNC: 2.1 MG/DL — SIGNIFICANT CHANGE UP (ref 1.6–2.6)
MCHC RBC-ENTMCNC: 30.5 PG — SIGNIFICANT CHANGE UP (ref 27–34)
MCHC RBC-ENTMCNC: 35.7 G/DL — SIGNIFICANT CHANGE UP (ref 32–36)
MCV RBC AUTO: 85.3 FL — SIGNIFICANT CHANGE UP (ref 80–100)
METHOD TYPE: SIGNIFICANT CHANGE UP
NRBC # BLD: 0 /100 WBCS — SIGNIFICANT CHANGE UP (ref 0–0)
NRBC # FLD: 0 K/UL — SIGNIFICANT CHANGE UP (ref 0–0)
PHOSPHATE SERPL-MCNC: 4 MG/DL — SIGNIFICANT CHANGE UP (ref 2.5–4.5)
POTASSIUM SERPL-MCNC: 3.9 MMOL/L — SIGNIFICANT CHANGE UP (ref 3.5–5.3)
POTASSIUM SERPL-SCNC: 3.9 MMOL/L — SIGNIFICANT CHANGE UP (ref 3.5–5.3)
RBC # BLD: 3.48 M/UL — LOW (ref 3.8–5.2)
RBC # FLD: 13.5 % — SIGNIFICANT CHANGE UP (ref 10.3–14.5)
SODIUM SERPL-SCNC: 133 MMOL/L — LOW (ref 135–145)
WBC # BLD: 10.56 K/UL — HIGH (ref 3.8–10.5)
WBC # FLD AUTO: 10.56 K/UL — HIGH (ref 3.8–10.5)

## 2024-12-16 PROCEDURE — 99223 1ST HOSP IP/OBS HIGH 75: CPT

## 2024-12-16 PROCEDURE — 73701 CT LOWER EXTREMITY W/DYE: CPT | Mod: 26,RT

## 2024-12-16 PROCEDURE — 99233 SBSQ HOSP IP/OBS HIGH 50: CPT

## 2024-12-16 PROCEDURE — 93971 EXTREMITY STUDY: CPT | Mod: 26,LT

## 2024-12-16 PROCEDURE — 70450 CT HEAD/BRAIN W/O DYE: CPT | Mod: 26

## 2024-12-16 PROCEDURE — 99232 SBSQ HOSP IP/OBS MODERATE 35: CPT

## 2024-12-16 RX ORDER — ACETAMINOPHEN 80 MG/.8ML
1000 SOLUTION/ DROPS ORAL ONCE
Refills: 0 | Status: COMPLETED | OUTPATIENT
Start: 2024-12-16 | End: 2024-12-16

## 2024-12-16 RX ORDER — EPOETIN ALFA 2000 [IU]/ML
3000 SOLUTION INTRAVENOUS; SUBCUTANEOUS
Refills: 0 | Status: DISCONTINUED | OUTPATIENT
Start: 2024-12-16 | End: 2024-12-20

## 2024-12-16 RX ORDER — ERYTHROMYCIN 5 MG/G
1 OINTMENT OPHTHALMIC
Refills: 0 | Status: DISCONTINUED | OUTPATIENT
Start: 2024-12-16 | End: 2024-12-21

## 2024-12-16 RX ADMIN — CEFTRIAXONE SODIUM 100 MILLIGRAM(S): 1 INJECTION, POWDER, FOR SOLUTION INTRAMUSCULAR; INTRAVENOUS at 18:09

## 2024-12-16 RX ADMIN — HEPARIN SODIUM 5000 UNIT(S): 1000 INJECTION, SOLUTION INTRAVENOUS; SUBCUTANEOUS at 18:11

## 2024-12-16 RX ADMIN — CEFTRIAXONE SODIUM 100 MILLIGRAM(S): 1 INJECTION, POWDER, FOR SOLUTION INTRAMUSCULAR; INTRAVENOUS at 05:35

## 2024-12-16 RX ADMIN — ACETAMINOPHEN 400 MILLIGRAM(S): 80 SOLUTION/ DROPS ORAL at 03:58

## 2024-12-16 RX ADMIN — Medication 1 DROP(S): at 18:10

## 2024-12-16 RX ADMIN — ERYTHROMYCIN 1 APPLICATION(S): 5 OINTMENT OPHTHALMIC at 12:53

## 2024-12-16 RX ADMIN — Medication 2: at 12:44

## 2024-12-16 RX ADMIN — Medication 1: at 18:09

## 2024-12-16 RX ADMIN — Medication 2: at 09:23

## 2024-12-16 RX ADMIN — ACETAMINOPHEN 1000 MILLIGRAM(S): 80 SOLUTION/ DROPS ORAL at 04:45

## 2024-12-16 RX ADMIN — ACETAMINOPHEN 650 MILLIGRAM(S): 80 SOLUTION/ DROPS ORAL at 22:49

## 2024-12-16 RX ADMIN — LIDOCAINE 3 PATCH: 50 OINTMENT TOPICAL at 12:53

## 2024-12-16 RX ADMIN — ACETAMINOPHEN 650 MILLIGRAM(S): 80 SOLUTION/ DROPS ORAL at 23:49

## 2024-12-16 RX ADMIN — Medication 1 APPLICATION(S): at 18:12

## 2024-12-16 RX ADMIN — LIDOCAINE 3 PATCH: 50 OINTMENT TOPICAL at 21:04

## 2024-12-16 RX ADMIN — Medication 1 DROP(S): at 05:37

## 2024-12-16 RX ADMIN — ERYTHROMYCIN 1 APPLICATION(S): 5 OINTMENT OPHTHALMIC at 21:13

## 2024-12-16 RX ADMIN — ERYTHROMYCIN 1 APPLICATION(S): 5 OINTMENT OPHTHALMIC at 18:10

## 2024-12-16 RX ADMIN — HEPARIN SODIUM 5000 UNIT(S): 1000 INJECTION, SOLUTION INTRAVENOUS; SUBCUTANEOUS at 05:36

## 2024-12-16 RX ADMIN — Medication 1 APPLICATION(S): at 05:36

## 2024-12-16 RX ADMIN — ERYTHROMYCIN 1 APPLICATION(S): 5 OINTMENT OPHTHALMIC at 05:37

## 2024-12-16 RX ADMIN — LIDOCAINE 3 PATCH: 50 OINTMENT TOPICAL at 01:00

## 2024-12-16 RX ADMIN — Medication 1 DROP(S): at 21:13

## 2024-12-16 RX ADMIN — Medication 1 DROP(S): at 09:50

## 2024-12-16 RX ADMIN — Medication 1 DROP(S): at 12:52

## 2024-12-16 RX ADMIN — EPOETIN ALFA 3000 UNIT(S): 2000 SOLUTION INTRAVENOUS; SUBCUTANEOUS at 22:50

## 2024-12-16 NOTE — PROGRESS NOTE ADULT - SUBJECTIVE AND OBJECTIVE BOX
Follow Up:      Interval History/ROS:Patient is a 79y old  Female who presents with a chief complaint of fever, vision loss.  Seen at bedside, afebrile, awaiting CT RLE.      Allergies  No Known Allergies    ANTIMICROBIALS:    cefTAZidime  2.25 mG/0.1 mL Ophthalmic Injectable 0.1 once  cefTRIAXone   IVPB    cefTRIAXone   IVPB 2000 every 12 hours  vancomycin 1 mG/0.1 mL Ophthalmic Injectable 0.1 once    OTHER MEDS: MEDICATIONS  (STANDING):  acetaminophen     Tablet .. 650 every 6 hours PRN  dextrose 50% Injectable 25 once  dextrose 50% Injectable 12.5 once  dextrose 50% Injectable 25 once  dextrose Oral Gel 15 once PRN  epoetin marla (EPOGEN) Injectable 3000 <User Schedule>  glucagon  Injectable 1 once  heparin   Injectable 5000 every 12 hours  hydrALAZINE 50 at bedtime  insulin lispro (ADMELOG) corrective regimen sliding scale  three times a day before meals  insulin lispro (ADMELOG) corrective regimen sliding scale  at bedtime  melatonin 3 at bedtime PRN  polyethylene glycol 3350 17 daily      Vital Signs Last 24 Hrs  T(F): 98.1 (12-16-24 @ 04:50), Max: 102.8 (12-12-24 @ 20:03)    Vital Signs Last 24 Hrs  HR: 72 (12-16-24 @ 04:50) (65 - 72)  BP: 131/59 (12-16-24 @ 04:50) (131/59 - 149/67)  RR: 18 (12-16-24 @ 04:50)  SpO2: 98% (12-16-24 @ 04:50) (97% - 98%)  Wt(kg): --    EXAM:    GA: NAD  CV: nl S1/S2, no RMG  Lungs: CTAB, no distress  Abd: soft, nontender, no rebounding pain  Ext: R hip, knee, ankle decrease ROM, mild swelling and tenderness  Neuro: No focal deficits  Skin: Intact  IV: RUE AV fistula     Labs:                        9.7    6.93  )-----------( 92       ( 15 Dec 2024 04:42 )             27.9     12-15    131[L]  |  92[L]  |  68[H]  ----------------------------<  150[H]  4.7   |  23  |  5.16[H]    Ca    8.2[L]      15 Dec 2024 04:42  Phos  4.8     12-15  Mg     2.20     12-15    TPro  6.1  /  Alb  2.3[L]  /  TBili  0.8  /  DBili  x   /  AST  27  /  ALT  14  /  AlkPhos  161[H]  12-15      WBC Trend:  WBC Count: 6.93 (12-15-24 @ 04:42)  WBC Count: 7.63 (12-14-24 @ 07:55)  WBC Count: 6.88 (12-13-24 @ 17:56)  WBC Count: 6.00 (12-12-24 @ 22:25)      Creatine Trend:  Creatinine: 5.16 (12-15)  Creatinine: 3.72 (12-14)  Creatinine: 3.51 (12-13)  Creatinine: 5.76 (12-12)      Liver Biochemical Testing Trend:  Alanine Aminotransferase (ALT/SGPT): 14 (12-15)  Alanine Aminotransferase (ALT/SGPT): 18 (12-14)  Alanine Aminotransferase (ALT/SGPT): 17 (12-13)  Alanine Aminotransferase (ALT/SGPT): 14 (12-13)  Alanine Aminotransferase (ALT/SGPT): 21 (12-12)  Aspartate Aminotransferase (AST/SGOT): 27 (12-15-24 @ 04:42)  Aspartate Aminotransferase (AST/SGOT): 27 (12-14-24 @ 07:55)  Aspartate Aminotransferase (AST/SGOT): 24 (12-13-24 @ 17:56)  Aspartate Aminotransferase (AST/SGOT): 36 (12-12-24 @ 22:25)  Bilirubin Total: 0.8 (12-15)  Bilirubin Total: 1.1 (12-14)  Bilirubin Total: 1.2 (12-13)  Bilirubin Total: 1.7 (12-12)      Trend LDH      Urinalysis Basic - ( 15 Dec 2024 04:42 )    Color: x / Appearance: x / SG: x / pH: x  Gluc: 150 mg/dL / Ketone: x  / Bili: x / Urobili: x   Blood: x / Protein: x / Nitrite: x   Leuk Esterase: x / RBC: x / WBC x   Sq Epi: x / Non Sq Epi: x / Bacteria: x        MICROBIOLOGY:    MRSA PCR Result.: Miriam (12-13-24 @ 16:43)      Culture - Blood (collected 14 Dec 2024 18:56)  Source: .Blood BLOOD  Preliminary Report:    No growth at 24 hours    Culture - Blood (collected 14 Dec 2024 14:15)  Source: .Blood BLOOD  Preliminary Report:    No growth at 24 hours    Culture - Fungal, Other (collected 13 Dec 2024 21:20)  Source: .Other  Preliminary Report:    No growth    Culture - Eye (collected 13 Dec 2024 21:20)  Source: .Eye  Preliminary Report:    Numerous Streptococcus agalactiae (Group B)    Culture - Blood (collected 13 Dec 2024 16:30)  Source: .Blood BLOOD  Preliminary Report:    No growth at 48 Hours    Culture - Eye (collected 13 Dec 2024 15:53)  Source: .Eye  Preliminary Report:    No growth to date.    Culture - Blood (collected 12 Dec 2024 21:58)  Source: .Blood BLOOD  Final Report:    Growth in aerobic and anaerobic bottles: Streptococcus agalactiae (Group    B)    Direct identification is available within approximately 3-5    hours either by Blood Panel Multiplexed PCR or Direct    MALDI-TOF. Details: [email or web address]  Organism: Blood Culture PCR  Streptococcus agalactiae (Group B)  Organism: Streptococcus agalactiae (Group B)    Sensitivities:      Method Type: CANDE      -  Ceftriaxone: S <=0.25      -  Clindamycin: S <=0.06      -  Levofloxacin: S 0.5      -  Penicillin: S 0.06 Predicts results for ampicillin, amoxicillin, amoxicillin/clavulanate, ampicillin/sulbactam, 1st, 2nd and 3rd generation cephalosporins and carbapenems.      -  Tetracycline: R >4      -  Vancomycin: S 0.5  Organism: Blood Culture PCR    Sensitivities:      Method Type: PCR      -  Streptococcus agalactiae (Group B): Detec    Culture - Blood (collected 12 Dec 2024 21:43)  Source: .Blood BLOOD  Final Report:    Growth in aerobic and anaerobic bottles: Streptococcus agalactiae (Group    B)    See previous culture [id number]    Culture - Blood (collected 12 Sep 2023 14:45)  Source: .Blood Blood-Peripheral  Final Report:    No growth at 5 days    Culture - Blood (collected 12 Sep 2023 13:42)  Source: .Blood Blood-Peripheral  Final Report:    No growth at 5 days      Rapid RVP Result: NotDetec (12-12 @ 21:58)    Troponin T, High Sensitivity Result: 160 (12-13)  Troponin T, High Sensitivity Result: 192 (12-13)  Troponin T, High Sensitivity Result: 209 (12-12)    RADIOLOGY:  imaging below personally reviewed        < from: Xray Hand 2 Views, Right (12.14.24 @ 18:07) >  IMPRESSION:  No fractures or dislocations.    Variable degree IP joint arthritic changes. Preserved remaining joint   spaces.    Carpal bones normally aligned.    Neutral ulnar variance.    Generalized osteopenia. Well marginated cystic change in trapezoid bone.   No additional focal osseous lesions.    --- End of Report ---          < end of copied text >

## 2024-12-16 NOTE — PROGRESS NOTE ADULT - ASSESSMENT
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 Tolerated well. See note.   - Va worsened to NLP OS 12/15  - AC tap cultures pending. Gram stain showing gram + cocci in pairs.   - Anterior exam with no view of Lens OS. B scan with dropped lens. Does not appear to be an RD.   - 12/16: VA OS NLP, no hypopyon, B-scan with vitritis and dropped lens  - continue broad spectrum abx per primary team and ID  - Appreciate ID recs   - continue with atropine drop BID to left eye  - continue with moxifloxacin q4 hours to left eye  - Increase erythromycin ointment q2hr to left eye  - Will hold off on topical steroids for now given epi defect        DW Dr. Kim, retina attending. SDW Dr. Kim    Outpatient Follow-up: Patient should follow-up with his/her ophthalmologist or with [facility] Department of Ophthalmology within 1 week of after discharge at:    [address]  [place] [address]  [phone]

## 2024-12-16 NOTE — PROGRESS NOTE ADULT - SUBJECTIVE AND OBJECTIVE BOX
[facility] DEPARTMENT OF OPHTHALMOLOGY  ------------------------------------------------------------------------------    Interval History: No acute events overnight.    MEDICATIONS  (STANDING):  atropine 1% Solution 1 Drop(s) Left EYE two times a day  cefTAZidime  2.25 mG/0.1 mL Ophthalmic Injectable 0.1 milliLiter(s) IntraVitreal once  cefTRIAXone   IVPB      cefTRIAXone   IVPB 2000 milliGRAM(s) IV Intermittent every 12 hours  chlorhexidine 2% Cloths 1 Application(s) Topical daily  dextrose 5%. 1000 milliLiter(s) (50 mL/Hr) IV Continuous <Continuous>  dextrose 5%. 1000 milliLiter(s) (100 mL/Hr) IV Continuous <Continuous>  dextrose 50% Injectable 25 Gram(s) IV Push once  dextrose 50% Injectable 12.5 Gram(s) IV Push once  dextrose 50% Injectable 25 Gram(s) IV Push once  erythromycin   Ointment 1 Application(s) Left EYE four times a day  glucagon  Injectable 1 milliGRAM(s) IntraMuscular once  heparin   Injectable 5000 Unit(s) SubCutaneous every 12 hours  hydrALAZINE 50 milliGRAM(s) Oral at bedtime  insulin lispro (ADMELOG) corrective regimen sliding scale   SubCutaneous three times a day before meals  insulin lispro (ADMELOG) corrective regimen sliding scale   SubCutaneous at bedtime  lidocaine   4% Patch 3 Patch Transdermal daily  lidocaine 1%/epinephrine 1:200,000 Inj 3 milliLiter(s) Local Injection once  moxifloxacin 0.5% Solution 1 Drop(s) Left EYE <User Schedule>  mupirocin 2% Ointment 1 Application(s) Topical every 12 hours  polyethylene glycol 3350 17 Gram(s) Oral daily  povidone iodine 5% Ophthalmic Solution 1 Drop(s) Left EYE once  vancomycin 1 mG/0.1 mL Ophthalmic Injectable 0.1 milliLiter(s) IntraVitreal once    MEDICATIONS  (PRN):  acetaminophen     Tablet .. 650 milliGRAM(s) Oral every 6 hours PRN Temp greater or equal to 38C (100.4F), Mild Pain (1 - 3), Moderate Pain (4 - 6)  dextrose Oral Gel 15 Gram(s) Oral once PRN Blood Glucose LESS THAN 70 milliGRAM(s)/deciliter  lidocaine/prilocaine Cream 1 Application(s) Topical daily PRN on HD days for pain  melatonin 3 milliGRAM(s) Oral at bedtime PRN Insomnia      VITALS: T(C): 36.7 (12-16-24 @ 04:50)  T(F): 98.1 (12-16-24 @ 04:50), Max: 98.3 (12-15-24 @ 22:19)  HR: 72 (12-16-24 @ 04:50) (65 - 72)  BP: 131/59 (12-16-24 @ 04:50) (131/59 - 149/67)  RR:  (18 - 20)  SpO2:  (97% - 98%)  Wt(kg): --  General: AAO x 3, appropriate mood and affect    Ophthalmology Exam:  Visual acuity (sc): HM OD, NLP OS   Pupils: Minimally reactive OU, no RAPD  Ttono: 15 OS    Pen Light Exam (PLE)  External: Flat OU  Lids/Lashes/Lacrimal Ducts: Flat OU    Sclera/Conjunctiva: W+Q OD, 3+ injection OS   Cornea: Cl OD, OD: large near total epi defect with mildly heaped up edges, corneal haze. No infiltrate. D-folds. Adán neg.   Anterior Chamber: D+F OD; OS: no hypopyon, with flare and likely vitreous in AC  Iris: Flat OD, Posterior synechiae OS  Lens: Dislocated inferior nasally, pt aware, occurred after fall, no view of lens OS       B-scan OS: with dropped lens

## 2024-12-16 NOTE — PROGRESS NOTE ADULT - NSPROGADDITIONALINFOA_GEN_ALL_CORE
seen and evaluated at bedside. Pt with endogenous endophthalmitis, severe. s/p Ivt injection. Poor prognosis discussed. c/w drops as stated in resident note. Pt on IV abx. Appreciate ID input.

## 2024-12-16 NOTE — PROGRESS NOTE ADULT - SUBJECTIVE AND OBJECTIVE BOX
[facility] DIVISION OF KIDNEY DISEASES AND HYPERTENSION -- HEMODIALYSIS NOTE  --------------------------------------------------------------------------------  Chief Complaint: ESRD/Ongoing hemodialysis requirement    24 hour events/subjective:        PAST HISTORY  --------------------------------------------------------------------------------  No significant changes to PMH, PSH, FHx, SHx, unless otherwise noted    ALLERGIES & MEDICATIONS  --------------------------------------------------------------------------------  Allergies    No Known Allergies    Intolerances      Standing Inpatient Medications  atropine 1% Solution 1 Drop(s) Left EYE two times a day  cefTAZidime  2.25 mG/0.1 mL Ophthalmic Injectable 0.1 milliLiter(s) IntraVitreal once  cefTRIAXone   IVPB      cefTRIAXone   IVPB 2000 milliGRAM(s) IV Intermittent every 12 hours  chlorhexidine 2% Cloths 1 Application(s) Topical daily  dextrose 5%. 1000 milliLiter(s) IV Continuous <Continuous>  dextrose 5%. 1000 milliLiter(s) IV Continuous <Continuous>  dextrose 50% Injectable 25 Gram(s) IV Push once  dextrose 50% Injectable 12.5 Gram(s) IV Push once  dextrose 50% Injectable 25 Gram(s) IV Push once  erythromycin   Ointment 1 Application(s) Left EYE <User Schedule>  glucagon  Injectable 1 milliGRAM(s) IntraMuscular once  heparin   Injectable 5000 Unit(s) SubCutaneous every 12 hours  hydrALAZINE 50 milliGRAM(s) Oral at bedtime  insulin lispro (ADMELOG) corrective regimen sliding scale   SubCutaneous three times a day before meals  insulin lispro (ADMELOG) corrective regimen sliding scale   SubCutaneous at bedtime  lidocaine   4% Patch 3 Patch Transdermal daily  lidocaine 1%/epinephrine 1:200,000 Inj 3 milliLiter(s) Local Injection once  moxifloxacin 0.5% Solution 1 Drop(s) Left EYE <User Schedule>  mupirocin 2% Ointment 1 Application(s) Topical every 12 hours  polyethylene glycol 3350 17 Gram(s) Oral daily  povidone iodine 5% Ophthalmic Solution 1 Drop(s) Left EYE once  vancomycin 1 mG/0.1 mL Ophthalmic Injectable 0.1 milliLiter(s) IntraVitreal once    PRN Inpatient Medications  acetaminophen     Tablet .. 650 milliGRAM(s) Oral every 6 hours PRN  dextrose Oral Gel 15 Gram(s) Oral once PRN  lidocaine/prilocaine Cream 1 Application(s) Topical daily PRN  melatonin 3 milliGRAM(s) Oral at bedtime PRN      REVIEW OF SYSTEMS  --------------------------------------------------------------------------------  Constitutional: [ ] Fever [ ] Chills [ ] Fatigue [ ] Weight change   HEENT: [ ] Blurred vision [ ] Eye Pain [ ] Headache [ ] Runny nose [ ] Sore Throat   Respiratory: [ ] Cough [ ] Wheezing [ ] Shortness of breath  Cardiovascular: [ ] Chest Pain [ ] Palpitations [ ] RANDHAWA [ ] PND [ ] Orthopnea  Gastrointestinal: [ ] Abdominal Pain [ ] Diarrhea [ ] Constipation [ ] Hemorrhoids [ ] Nausea [ ] Vomiting  Genitourinary: [ ] Nocturia [ ] Dysuria [ ] Incontinence  Extremities: [ ] Swelling [ ] Joint Pain  Neurologic: [ ] Focal deficit [ ] Paresthesias [ ] Syncope  Lymphatic: [ ] Swelling [ ] Lymphadenopathy   Skin: [ ] Rash [ ] Ecchymoses [ ] Wounds [ ] Lesions  Psychiatry: [ ] Depression [ ] Suicidal/Homicidal Ideation [ ] Anxiety [ ] Sleep Disturbances  [ ] 10 point review of systems is otherwise negative except as mentioned above              [ ]Unable to obtain due to   All other systems were reviewed and are negative, except as noted.      VITALS/PHYSICAL EXAM  --------------------------------------------------------------------------------  T(C): 36.7 (12-16-24 @ 04:50), Max: 36.8 (12-15-24 @ 22:19)  HR: 72 (12-16-24 @ 04:50) (65 - 72)  BP: 131/59 (12-16-24 @ 04:50) (131/59 - 149/67)  RR: 18 (12-16-24 @ 04:50) (18 - 20)  SpO2: 98% (12-16-24 @ 04:50) (97% - 98%)  Wt(kg): --        Physical Exam:  	Gen: NAD, resting comfortably in bed  	HEENT: on room air  	Pulm: CTA B/L  	CV: + murmur  	Abd: soft  	: No mistry  	LE: no edema  	Skin: Warm, without rashes  	Vascular access: RUE AVF with thrill and bruit    LABS/STUDIES  --------------------------------------------------------------------------------              9.7    6.93  >-----------<  92       [12-15-24 @ 04:42]              27.9     131  |  92  |  68  ----------------------------<  150      [12-15-24 @ 04:42]  4.7   |  23  |  5.16        Ca     8.2     [12-15-24 @ 04:42]      Mg     2.20     [12-15-24 @ 04:42]      Phos  4.8     [12-15-24 @ 04:42]    TPro  6.1  /  Alb  2.3  /  TBili  0.8  /  DBili  x   /  AST  27  /  ALT  14  /  AlkPhos  161  [12-15-24 @ 04:42]          TSH 1.73      [12-13-24 @ 17:56]  Lipid: chol 139, , HDL 45, LDL --      [12-13-24 @ 17:56]    HBsAb 62.2      [12-13-24 @ 16:38]  HBsAg Nonreact      [12-13-24 @ 16:38]  HBcAb Reactive      [12-13-24 @ 16:38]  HCV 0.20, Nonreact      [12-13-24 @ 16:38]

## 2024-12-16 NOTE — PROGRESS NOTE ADULT - SUBJECTIVE AND OBJECTIVE BOX
Patient is a 79y old  Female who presents with a chief complaint of fever, vision loss (16 Dec 2024 08:12)    Date of servie : 12-16-24 @ 10:09  INTERVAL HPI/OVERNIGHT EVENTS:  T(C): 36.7 (12-16-24 @ 04:50), Max: 36.8 (12-15-24 @ 22:19)  HR: 72 (12-16-24 @ 04:50) (65 - 72)  BP: 131/59 (12-16-24 @ 04:50) (131/59 - 149/67)  RR: 18 (12-16-24 @ 04:50) (18 - 20)  SpO2: 98% (12-16-24 @ 04:50) (97% - 98%)  Wt(kg): --  I&O's Summary      LABS:                        9.7    6.93  )-----------( 92       ( 15 Dec 2024 04:42 )             27.9     12-15    131[L]  |  92[L]  |  68[H]  ----------------------------<  150[H]  4.7   |  23  |  5.16[H]    Ca    8.2[L]      15 Dec 2024 04:42  Phos  4.8     12-15  Mg     2.20     12-15    TPro  6.1  /  Alb  2.3[L]  /  TBili  0.8  /  DBili  x   /  AST  27  /  ALT  14  /  AlkPhos  161[H]  12-15      Urinalysis Basic - ( 15 Dec 2024 04:42 )    Color: x / Appearance: x / SG: x / pH: x  Gluc: 150 mg/dL / Ketone: x  / Bili: x / Urobili: x   Blood: x / Protein: x / Nitrite: x   Leuk Esterase: x / RBC: x / WBC x   Sq Epi: x / Non Sq Epi: x / Bacteria: x      CAPILLARY BLOOD GLUCOSE      POCT Blood Glucose.: 227 mg/dL (16 Dec 2024 08:32)  POCT Blood Glucose.: 195 mg/dL (15 Dec 2024 22:26)  POCT Blood Glucose.: 207 mg/dL (15 Dec 2024 17:22)  POCT Blood Glucose.: 250 mg/dL (15 Dec 2024 12:19)        Urinalysis Basic - ( 15 Dec 2024 04:42 )    Color: x / Appearance: x / SG: x / pH: x  Gluc: 150 mg/dL / Ketone: x  / Bili: x / Urobili: x   Blood: x / Protein: x / Nitrite: x   Leuk Esterase: x / RBC: x / WBC x   Sq Epi: x / Non Sq Epi: x / Bacteria: x        MEDICATIONS  (STANDING):  atropine 1% Solution 1 Drop(s) Left EYE two times a day  cefTAZidime  2.25 mG/0.1 mL Ophthalmic Injectable 0.1 milliLiter(s) IntraVitreal once  cefTRIAXone   IVPB      cefTRIAXone   IVPB 2000 milliGRAM(s) IV Intermittent every 12 hours  chlorhexidine 2% Cloths 1 Application(s) Topical daily  dextrose 5%. 1000 milliLiter(s) (50 mL/Hr) IV Continuous <Continuous>  dextrose 5%. 1000 milliLiter(s) (100 mL/Hr) IV Continuous <Continuous>  dextrose 50% Injectable 25 Gram(s) IV Push once  dextrose 50% Injectable 12.5 Gram(s) IV Push once  dextrose 50% Injectable 25 Gram(s) IV Push once  erythromycin   Ointment 1 Application(s) Left EYE four times a day  glucagon  Injectable 1 milliGRAM(s) IntraMuscular once  heparin   Injectable 5000 Unit(s) SubCutaneous every 12 hours  hydrALAZINE 50 milliGRAM(s) Oral at bedtime  insulin lispro (ADMELOG) corrective regimen sliding scale   SubCutaneous three times a day before meals  insulin lispro (ADMELOG) corrective regimen sliding scale   SubCutaneous at bedtime  lidocaine   4% Patch 3 Patch Transdermal daily  lidocaine 1%/epinephrine 1:200,000 Inj 3 milliLiter(s) Local Injection once  moxifloxacin 0.5% Solution 1 Drop(s) Left EYE <User Schedule>  mupirocin 2% Ointment 1 Application(s) Topical every 12 hours  polyethylene glycol 3350 17 Gram(s) Oral daily  povidone iodine 5% Ophthalmic Solution 1 Drop(s) Left EYE once  vancomycin 1 mG/0.1 mL Ophthalmic Injectable 0.1 milliLiter(s) IntraVitreal once    MEDICATIONS  (PRN):  acetaminophen     Tablet .. 650 milliGRAM(s) Oral every 6 hours PRN Temp greater or equal to 38C (100.4F), Mild Pain (1 - 3), Moderate Pain (4 - 6)  dextrose Oral Gel 15 Gram(s) Oral once PRN Blood Glucose LESS THAN 70 milliGRAM(s)/deciliter  lidocaine/prilocaine Cream 1 Application(s) Topical daily PRN on HD days for pain  melatonin 3 milliGRAM(s) Oral at bedtime PRN Insomnia          PHYSICAL EXAM:  GENERAL: NAD, well-groomed, well-developed  HEAD:  Atraumatic, Normocephalic  CHEST/LUNG: Clear to percussion bilaterally; No rales, rhonchi, wheezing, or rubs  HEART: Regular rate and rhythm; No murmurs, rubs, or gallops  ABDOMEN: Soft, Nontender, Nondistended; Bowel sounds present  EXTREMITIES:  edema+    Care Discussed with Consultants/Other Providers [x ] YES  [ ] NO

## 2024-12-16 NOTE — PROGRESS NOTE ADULT - SUBJECTIVE AND OBJECTIVE BOX
Cardiovascular Disease Progress Note  DATE OF SERVICE: 12-16-24 @ 08:12    Overnight events: No acute events overnight.    The patient is laying flat and comfortably on room air.   Otherwise review of systems negative    Objective Findings:  T(C): 36.7 (12-16-24 @ 04:50), Max: 36.8 (12-15-24 @ 22:19)  HR: 72 (12-16-24 @ 04:50) (65 - 72)  BP: 131/59 (12-16-24 @ 04:50) (131/59 - 149/67)  RR: 18 (12-16-24 @ 04:50) (18 - 20)  SpO2: 98% (12-16-24 @ 04:50) (97% - 98%)  Wt(kg): --  Daily     Daily       Physical Exam:  Gen: NAD; Patient resting comfortably  HEENT:   Normocephalic/ atraumatic  CV: RRR, normal S1 + S2, no m/r/g  Lungs:  Normal respiratory effort; clear to auscultation bilaterally  Abd: soft, non-tender; bowel sounds present  Ext: No edema; warm and well perfused    Telemetry: n/a    Laboratory Data:                        9.7    6.93  )-----------( 92       ( 15 Dec 2024 04:42 )             27.9     12-15    131[L]  |  92[L]  |  68[H]  ----------------------------<  150[H]  4.7   |  23  |  5.16[H]    Ca    8.2[L]      15 Dec 2024 04:42  Phos  4.8     12-15  Mg     2.20     12-15    TPro  6.1  /  Alb  2.3[L]  /  TBili  0.8  /  DBili  x   /  AST  27  /  ALT  14  /  AlkPhos  161[H]  12-15              Inpatient Medications:  MEDICATIONS  (STANDING):  atropine 1% Solution 1 Drop(s) Left EYE two times a day  cefTAZidime  2.25 mG/0.1 mL Ophthalmic Injectable 0.1 milliLiter(s) IntraVitreal once  cefTRIAXone   IVPB      cefTRIAXone   IVPB 2000 milliGRAM(s) IV Intermittent every 12 hours  chlorhexidine 2% Cloths 1 Application(s) Topical daily  dextrose 5%. 1000 milliLiter(s) (50 mL/Hr) IV Continuous <Continuous>  dextrose 5%. 1000 milliLiter(s) (100 mL/Hr) IV Continuous <Continuous>  dextrose 50% Injectable 25 Gram(s) IV Push once  dextrose 50% Injectable 12.5 Gram(s) IV Push once  dextrose 50% Injectable 25 Gram(s) IV Push once  erythromycin   Ointment 1 Application(s) Left EYE four times a day  glucagon  Injectable 1 milliGRAM(s) IntraMuscular once  heparin   Injectable 5000 Unit(s) SubCutaneous every 12 hours  hydrALAZINE 50 milliGRAM(s) Oral at bedtime  insulin lispro (ADMELOG) corrective regimen sliding scale   SubCutaneous three times a day before meals  insulin lispro (ADMELOG) corrective regimen sliding scale   SubCutaneous at bedtime  lidocaine   4% Patch 3 Patch Transdermal daily  lidocaine 1%/epinephrine 1:200,000 Inj 3 milliLiter(s) Local Injection once  moxifloxacin 0.5% Solution 1 Drop(s) Left EYE <User Schedule>  mupirocin 2% Ointment 1 Application(s) Topical every 12 hours  polyethylene glycol 3350 17 Gram(s) Oral daily  povidone iodine 5% Ophthalmic Solution 1 Drop(s) Left EYE once  vancomycin 1 mG/0.1 mL Ophthalmic Injectable 0.1 milliLiter(s) IntraVitreal once      Assessment: 79 year old woman with ESRD on HD (MWF), HTN, DM (diet controlled), chronic type B aortic dissection, heart murmur, endometrial ca in remission s/p total hysterectomy in 2007 presents with bacteremia.    Plan of Care:      #Bacteremia-  In the setting of ophthalmic infection.   TTE with no obvious evidence of vegetations.  Will consider BLANE if BCx are persistently positive or imaging shows evidence of embolic disease.          #Type B aortic dissection  - Chronic (Diagnosed >10 years ago)  - Optimal BP control.          #ESRD-  - HD as per renal.      Over 55 minutes spent on total encounter; more than 50% of the visit was spent counseling and/or coordinating care by the attending physician.      Gabo Burris MD [facility]  Cardiovascular Disease  [phone]

## 2024-12-16 NOTE — PROGRESS NOTE ADULT - ASSESSMENT
80 y/o F, PMH ESRD thru R AV fistula, HTN, DM, R cataract (surgery scheduled for Jan 2025), glaucoma and L hip replacement, presented on 12/13 with fever, pain, and rapid decline in L vision, with opthalmology exam suggestive of bacterial endophthalmitis now with blood cultures growing Group B Strep. Infectious Disease consulted for assessment of infectious source and antimicrobial management     Upon presentation, patient febrile to 102.8F  Labs showed no leukocytosis but with 14.8% bandemia     Workup:   -Blood Cx (12/12): Strep B (4/4 bottles)  -CT C/A/P: No infectious source in the chest, abdomen, or pelvis. Stable thoracic abdominal aortic dissection. Small left and trace right pleural effusions.  -Repeat blood Cx (12/14): NGTD  -Intravitreal Cx: + Strep B   -R hand Xray: Variable degree IP joint arthritic changes.    #Sepsis, fevers, bandemia   #High grade Group B Strep Bacteremia  #L vison loss, Endogenous Bacterial Endophthalmitis  #New RLE joints (hip, knee and ankle) pain   #Presence of RUE AV fistula with no clinical evidence of infection     Recommendations:   -Continue Ceftriaxone 2 g IV q12hrs   -Opthalmology following - Intravitreal Vancomycin   -TTE without vegetation, will need a BLANE  -F/up CT w/IV cont R hip, R knee and R ankle, if evidence of fluid effusions get orthopedics eval    -F/up repeat blood Cx   -Check US of fistula  -Monitor T curve and WBC trend      James Moe MD  ID physician  [employer] Preferred  After 5pm/weekends call [phone]

## 2024-12-16 NOTE — CHART NOTE - NSCHARTNOTEFT_GEN_A_CORE
Case discussed with Dr. Dave, Pt and family at bedside concerned with RLE pain limiting mobility. Pt ordered for CT RLE which is pending. Per PT report, pain started prior to admission following outpatient physical therapy initially in lower back and going down right leg. Pts family member expressing concern if this could be neurologic, d/w Dr. Dave, no neurologic deficits on exam, RLE mobility limitations appear to be secondary to pain, tenderness to palpation going down RLE to foot, sensation intact. On exam, Pt is A+O x 3, speech clear, language fluent, no dysmetria, b/l upper extremity and LLE strength and sensation intact. Per Dr. Dave, recommend to check CTH as well in setting of bacteremia. Called CT to expedite both head and RLE CT. Will continue to monitor closely and follow up results.

## 2024-12-16 NOTE — PROGRESS NOTE ADULT - ASSESSMENT
79 yr old female with PMH of  ESRD on HD , HTN, DM, R cataract , glaucoma here for fever and rapid decline of R eye vision .       Bacterial Endophthalmitis OS  - cw  iv antibiotics as per ID   - cultures reviewed repeat neg   - sp Intravitreal Vancomycin   - cw eye drops  - ID fu   - will monitor     High grade bacteremia  - cw iv abx  - ID fu   - repeat cultures neg   - imaging reviewed     ESRD  - HD as scheduled  - renal fu     HTN  - cw hydralazine  - DASH diet    DM  - monitor FS  - ISS    Heparin sc for DVT prophylaxis     case guerita family at bedside   case guerita ACP

## 2024-12-17 LAB
ANION GAP SERPL CALC-SCNC: 14 MMOL/L — SIGNIFICANT CHANGE UP (ref 7–14)
B PERT IGG+IGM PNL SER: ABNORMAL
BUN SERPL-MCNC: 35 MG/DL — HIGH (ref 7–23)
CALCIUM SERPL-MCNC: 8.4 MG/DL — SIGNIFICANT CHANGE UP (ref 8.4–10.5)
CHLORIDE SERPL-SCNC: 96 MMOL/L — LOW (ref 98–107)
CO2 SERPL-SCNC: 25 MMOL/L — SIGNIFICANT CHANGE UP (ref 22–31)
COLOR FLD: ABNORMAL
CREAT SERPL-MCNC: 3.29 MG/DL — HIGH (ref 0.5–1.3)
CRP SERPL-MCNC: 174 MG/L — HIGH
EGFR: 14 ML/MIN/1.73M2 — LOW
FLUID INTAKE SUBSTANCE CLASS: SIGNIFICANT CHANGE UP
GLUCOSE BLDC GLUCOMTR-MCNC: 162 MG/DL — HIGH (ref 70–99)
GLUCOSE BLDC GLUCOMTR-MCNC: 188 MG/DL — HIGH (ref 70–99)
GLUCOSE BLDC GLUCOMTR-MCNC: 274 MG/DL — HIGH (ref 70–99)
GLUCOSE BLDC GLUCOMTR-MCNC: 277 MG/DL — HIGH (ref 70–99)
GLUCOSE SERPL-MCNC: 151 MG/DL — HIGH (ref 70–99)
GRAM STN FLD: SIGNIFICANT CHANGE UP
HCT VFR BLD CALC: 28.8 % — LOW (ref 34.5–45)
HGB BLD-MCNC: 10.2 G/DL — LOW (ref 11.5–15.5)
LYMPHOCYTES # FLD: 1 % — SIGNIFICANT CHANGE UP
MAGNESIUM SERPL-MCNC: 2.2 MG/DL — SIGNIFICANT CHANGE UP (ref 1.6–2.6)
MCHC RBC-ENTMCNC: 31.1 PG — SIGNIFICANT CHANGE UP (ref 27–34)
MCHC RBC-ENTMCNC: 35.4 G/DL — SIGNIFICANT CHANGE UP (ref 32–36)
MCV RBC AUTO: 87.8 FL — SIGNIFICANT CHANGE UP (ref 80–100)
MONOS+MACROS # FLD: 9 % — SIGNIFICANT CHANGE UP
NEUTROPHILS-BODY FLUID: 90 % — SIGNIFICANT CHANGE UP
NRBC # BLD: 0 /100 WBCS — SIGNIFICANT CHANGE UP (ref 0–0)
NRBC # FLD: 0 K/UL — SIGNIFICANT CHANGE UP (ref 0–0)
PHOSPHATE SERPL-MCNC: 4.6 MG/DL — HIGH (ref 2.5–4.5)
PLATELET # BLD AUTO: 164 K/UL — SIGNIFICANT CHANGE UP (ref 150–400)
PLATELET # BLD AUTO: 167 K/UL — SIGNIFICANT CHANGE UP (ref 150–400)
POTASSIUM SERPL-MCNC: 4.3 MMOL/L — SIGNIFICANT CHANGE UP (ref 3.5–5.3)
POTASSIUM SERPL-SCNC: 4.3 MMOL/L — SIGNIFICANT CHANGE UP (ref 3.5–5.3)
RBC # BLD: 3.28 M/UL — LOW (ref 3.8–5.2)
RBC # FLD: 13.6 % — SIGNIFICANT CHANGE UP (ref 10.3–14.5)
RCV VOL RI: HIGH CELLS/UL (ref 0–5)
SODIUM SERPL-SCNC: 135 MMOL/L — SIGNIFICANT CHANGE UP (ref 135–145)
SPECIMEN SOURCE FLD: SIGNIFICANT CHANGE UP
SPECIMEN SOURCE: SIGNIFICANT CHANGE UP
SYNOVIAL CRYSTALS CLARITY: ABNORMAL
SYNOVIAL CRYSTALS COLOR: ABNORMAL
SYNOVIAL CRYSTALS ID: SIGNIFICANT CHANGE UP
SYNOVIAL CRYSTALS TUBE: SIGNIFICANT CHANGE UP
TOTAL CELLS COUNTED, BODY FLUID: 100 CELLS — SIGNIFICANT CHANGE UP
TOTAL NUCLEATED CELL COUNT, BODY FLUID: HIGH CELLS/UL (ref 0–5)
TUBE TYPE: SIGNIFICANT CHANGE UP
WBC # BLD: 9.2 K/UL — SIGNIFICANT CHANGE UP (ref 3.8–10.5)
WBC # FLD AUTO: 9.2 K/UL — SIGNIFICANT CHANGE UP (ref 3.8–10.5)

## 2024-12-17 PROCEDURE — 20610 DRAIN/INJ JOINT/BURSA W/O US: CPT | Mod: RT

## 2024-12-17 PROCEDURE — 99232 SBSQ HOSP IP/OBS MODERATE 35: CPT

## 2024-12-17 RX ORDER — ACETAMINOPHEN 80 MG/.8ML
1000 SOLUTION/ DROPS ORAL ONCE
Refills: 0 | Status: COMPLETED | OUTPATIENT
Start: 2024-12-17 | End: 2024-12-17

## 2024-12-17 RX ORDER — POVIDONE IODINE USP, 10% W/W 10 MG/ML
1 SWAB TOPICAL ONCE
Refills: 0 | Status: DISCONTINUED | OUTPATIENT
Start: 2024-12-17 | End: 2024-12-26

## 2024-12-17 RX ORDER — CHLORHEXIDINE GLUCONATE 1.2 MG/ML
1 RINSE ORAL EVERY 12 HOURS
Refills: 0 | Status: COMPLETED | OUTPATIENT
Start: 2024-12-17 | End: 2024-12-18

## 2024-12-17 RX ADMIN — Medication 1 DROP(S): at 23:25

## 2024-12-17 RX ADMIN — Medication 1: at 17:32

## 2024-12-17 RX ADMIN — Medication 1 APPLICATION(S): at 05:46

## 2024-12-17 RX ADMIN — LIDOCAINE 3 PATCH: 50 OINTMENT TOPICAL at 02:12

## 2024-12-17 RX ADMIN — Medication 1: at 08:48

## 2024-12-17 RX ADMIN — ERYTHROMYCIN 1 APPLICATION(S): 5 OINTMENT OPHTHALMIC at 21:11

## 2024-12-17 RX ADMIN — ERYTHROMYCIN 1 APPLICATION(S): 5 OINTMENT OPHTHALMIC at 19:29

## 2024-12-17 RX ADMIN — Medication 1 APPLICATION(S): at 17:30

## 2024-12-17 RX ADMIN — ACETAMINOPHEN 400 MILLIGRAM(S): 80 SOLUTION/ DROPS ORAL at 07:27

## 2024-12-17 RX ADMIN — ERYTHROMYCIN 1 APPLICATION(S): 5 OINTMENT OPHTHALMIC at 12:45

## 2024-12-17 RX ADMIN — HYDRALAZINE HYDROCHLORIDE 50 MILLIGRAM(S): 10 TABLET ORAL at 21:11

## 2024-12-17 RX ADMIN — Medication 1: at 23:24

## 2024-12-17 RX ADMIN — LIDOCAINE 3 PATCH: 50 OINTMENT TOPICAL at 19:50

## 2024-12-17 RX ADMIN — ACETAMINOPHEN 1000 MILLIGRAM(S): 80 SOLUTION/ DROPS ORAL at 08:15

## 2024-12-17 RX ADMIN — Medication 1 DROP(S): at 12:45

## 2024-12-17 RX ADMIN — ERYTHROMYCIN 1 APPLICATION(S): 5 OINTMENT OPHTHALMIC at 08:49

## 2024-12-17 RX ADMIN — Medication 1 DROP(S): at 17:31

## 2024-12-17 RX ADMIN — Medication 1 DROP(S): at 08:48

## 2024-12-17 RX ADMIN — ERYTHROMYCIN 1 APPLICATION(S): 5 OINTMENT OPHTHALMIC at 17:34

## 2024-12-17 RX ADMIN — Medication 1 DROP(S): at 17:32

## 2024-12-17 RX ADMIN — CHLORHEXIDINE GLUCONATE 1 APPLICATION(S): 1.2 RINSE ORAL at 21:11

## 2024-12-17 RX ADMIN — ERYTHROMYCIN 1 APPLICATION(S): 5 OINTMENT OPHTHALMIC at 05:48

## 2024-12-17 RX ADMIN — CEFTRIAXONE SODIUM 100 MILLIGRAM(S): 1 INJECTION, POWDER, FOR SOLUTION INTRAMUSCULAR; INTRAVENOUS at 17:30

## 2024-12-17 RX ADMIN — HEPARIN SODIUM 5000 UNIT(S): 1000 INJECTION, SOLUTION INTRAVENOUS; SUBCUTANEOUS at 05:46

## 2024-12-17 RX ADMIN — Medication 3: at 12:43

## 2024-12-17 RX ADMIN — LIDOCAINE 3 PATCH: 50 OINTMENT TOPICAL at 12:46

## 2024-12-17 RX ADMIN — Medication 3 MILLIGRAM(S): at 01:41

## 2024-12-17 RX ADMIN — ERYTHROMYCIN 1 APPLICATION(S): 5 OINTMENT OPHTHALMIC at 15:05

## 2024-12-17 RX ADMIN — CEFTRIAXONE SODIUM 100 MILLIGRAM(S): 1 INJECTION, POWDER, FOR SOLUTION INTRAMUSCULAR; INTRAVENOUS at 05:47

## 2024-12-17 RX ADMIN — Medication 1 DROP(S): at 05:47

## 2024-12-17 RX ADMIN — Medication 1 DROP(S): at 21:11

## 2024-12-17 NOTE — CONSULT NOTE ADULT - CONSULT REASON
Bacteremia; concern for endocarditis
L Eye Endopathalmitis
L Vision Loss
r/o right septic knee
HD management
ESRD on HD

## 2024-12-17 NOTE — CONSULT NOTE ADULT - REASON FOR ADMISSION
fever, vision loss
L vision Loss

## 2024-12-17 NOTE — CONSULT NOTE ADULT - SUBJECTIVE AND OBJECTIVE BOX
Orthopedic Surgery Consult Note    79yFemale presents with PMH of ESRD on HD MWF, galucoma, HTN, HLD. aortic dissection, endometrial CA s/p hysterectomy and PSH of left hip hemiarthroplasty with Dr. Patel July 2023 who has been admitted with bacterial endophthalmitis and found to have GSB bacteremia with new complaint of right knee pain. Patient was started on IV abx and most recent BCx are no growth to date. Patient states she is able to walk but does have increased difficulty 2/2 pain.  Patient denies radiation of pain. Patient denies numbness, tingling, or burning in the affected extremity. Patient denies recent trauma.  A RLE CT was performed and revealed a large joint effusion which prompted Orthopedic consult for r/o septic knee.      PMH:  Adult Onset Diabetes Mellitus,  Cancer of Endometrium  Dissection of Aorta  History of Hysterectomy with O  Hypertension  CKD (chronic kidney disease)  ESRD (end stage renal disease) on dialysis  ESRD on hemodialysis    PSH:  H/O total hysterectomy  History of hip replacement, total, right  S/P arteriovenous (AV) fistula repair    AH:  No Known Allergies  Meds: See med rec    T(C): 36.5 (12-17-24 @ 10:37)  HR: 80 (12-17-24 @ 10:37)  BP: 155/66 (12-17-24 @ 10:37)  RR: 18 (12-17-24 @ 10:37)  SpO2: 100% (12-17-24 @ 10:37)  Wt(kg): --    PE:  Gen: NAD  RLE:   Skin intact, + soft tissue swelling, moderate effusion. No erythema, No warmth of knee  + TTP about knee  pROM 0-90 and aROM 0-110  Compartments soft  Motor intact GS/TA/EHL/FHL  SILT S/S/SP/DP/T, 2+ DP    Imaging:  < from: CT Lower Extremity w/ IV Cont, Right (12.16.24 @ 19:40) >  IMPRESSION:  1.  Largeright knee joint effusion with synovial enhancement. Consider   direct sampling of joint fluid there is concern for septic arthritis.  2.  No abscess or CT evidence of advanced osteomyelitis.    --- End of Report ---      < end of copied text >           Orthopedic Surgery Consult Note    79yFemale presents with PMH of ESRD on HD MWF, galucoma, HTN, HLD. aortic dissection, endometrial CA s/p hysterectomy and PSH of left hip hemiarthroplasty with Dr. Patel July 2023 who has been admitted with bacterial endophthalmitis and found to have GSB bacteremia with new complaint of right knee pain. Patient was started on IV abx and most recent BCx are no growth to date. Patient states she is able to walk but does have increased difficulty 2/2 pain.  Patient denies radiation of pain. Patient denies numbness, tingling, or burning in the affected extremity. Patient denies recent trauma.  A RLE CT was performed and revealed a large joint effusion which prompted Orthopedic consult for r/o septic knee.      PMH:  Adult Onset Diabetes Mellitus,  Cancer of Endometrium  Dissection of Aorta  History of Hysterectomy with O  Hypertension  CKD (chronic kidney disease)  ESRD (end stage renal disease) on dialysis  ESRD on hemodialysis    PSH:  H/O total hysterectomy  History of hip replacement, total, right  S/P arteriovenous (AV) fistula repair    AH:  No Known Allergies  Meds: See med rec    T(C): 36.5 (12-17-24 @ 10:37)  HR: 80 (12-17-24 @ 10:37)  BP: 155/66 (12-17-24 @ 10:37)  RR: 18 (12-17-24 @ 10:37)  SpO2: 100% (12-17-24 @ 10:37)  Wt(kg): --    PE:  Gen: NAD  RLE:   Skin intact, + soft tissue swelling, moderate effusion. No erythema, No warmth of knee  + TTP about knee  pROM 0-90 and aROM 0-110  Compartments soft  Motor intact GS/TA/EHL/FHL  SILT S/S/SP/DP/T, 2+ DP    Imaging:  < from: CT Lower Extremity w/ IV Cont, Right (12.16.24 @ 19:40) >  IMPRESSION:  1.  Largeright knee joint effusion with synovial enhancement. Consider   direct sampling of joint fluid there is concern for septic arthritis.  2.  No abscess or CT evidence of advanced osteomyelitis.    --- End of Report ---      < end of copied text >      Procedure Note:    After sterile skin preparation with 2 chlorhexidine prep sticks, an 18 gauge needle was introduced into the superolateral capsule and around __cc of __ appearing fluid was aspirated. Joint fluid was then sent for cell count, crystals, gram stain and culture. The knee was then cleaned and a sterile dressing and ace wrap was placed over the aspiration site. The Patient tolerated the procedure well and NVI post procedure.        Orthopedic Surgery Consult Note    79yFemale presents with PMH of ESRD on HD MWF, galucoma, HTN, HLD. aortic dissection, endometrial CA s/p hysterectomy and PSH of left hip hemiarthroplasty with Dr. Patel July 2023 who has been admitted with bacterial endophthalmitis and found to have GSB bacteremia with new complaint of right knee pain. Patient was started on IV abx and most recent BCx are no growth to date. Patient states she is able to walk but does have increased difficulty 2/2 pain.  Patient denies radiation of pain. Patient denies numbness, tingling, or burning in the affected extremity. Patient denies recent trauma.  A RLE CT was performed and revealed a large joint effusion which prompted Orthopedic consult for r/o septic knee.      PMH:  Adult Onset Diabetes Mellitus,  Cancer of Endometrium  Dissection of Aorta  History of Hysterectomy with O  Hypertension  CKD (chronic kidney disease)  ESRD (end stage renal disease) on dialysis  ESRD on hemodialysis    PSH:  H/O total hysterectomy  History of hip replacement, total, right  S/P arteriovenous (AV) fistula repair    AH:  No Known Allergies  Meds: See med rec    T(C): 36.5 (12-17-24 @ 10:37)  HR: 80 (12-17-24 @ 10:37)  BP: 155/66 (12-17-24 @ 10:37)  RR: 18 (12-17-24 @ 10:37)  SpO2: 100% (12-17-24 @ 10:37)  Wt(kg): --    PE:  Gen: NAD  RLE:   Skin intact, + soft tissue swelling, moderate effusion. No erythema, No warmth of knee  + TTP about knee  pROM 0-90 and aROM 0-110  Compartments soft  Motor intact GS/TA/EHL/FHL  SILT S/S/SP/DP/T, 2+ DP    Imaging:  < from: CT Lower Extremity w/ IV Cont, Right (12.16.24 @ 19:40) >  IMPRESSION:  1.  Largeright knee joint effusion with synovial enhancement. Consider   direct sampling of joint fluid there is concern for septic arthritis.  2.  No abscess or CT evidence of advanced osteomyelitis.    --- End of Report ---      < end of copied text >      Procedure Note:  After sterile skin preparation with 2 chlorhexidine prep sticks, an 18 gauge needle was introduced into the superolateral capsule and around 30cc of murky appearing fluid was aspirated. Joint fluid was then sent for cell count, crystals, gram stain and culture. The knee was then cleaned and a sterile dressing and ace wrap was placed over the aspiration site. The Patient tolerated the procedure well and NVI post procedure.        Orthopedic Surgery Consult Note    79yFemale presents with PMH of ESRD on HD MWF, galucoma, HTN, HLD. aortic dissection, endometrial CA s/p hysterectomy and PSH of left hip hemiarthroplasty with Dr. Patel July 2023 who has been admitted with bacterial endophthalmitis and found to have GSB bacteremia with new complaint of right knee pain. Patient was started on IV abx and most recent BCx are no growth to date. Patient states she is able to walk but does have increased difficulty 2/2 pain.  Patient denies radiation of pain. Patient denies numbness, tingling, or burning in the affected extremity. Patient denies recent trauma.  A RLE CT was performed and revealed a large joint effusion which prompted Orthopedic consult for r/o septic knee.      PMH:  Adult Onset Diabetes Mellitus,  Cancer of Endometrium  Dissection of Aorta  History of Hysterectomy with O  Hypertension  CKD (chronic kidney disease)  ESRD (end stage renal disease) on dialysis  ESRD on hemodialysis    PSH:  H/O total hysterectomy  History of hip replacement, total, right  S/P arteriovenous (AV) fistula repair    AH:  No Known Allergies  Meds: See med rec    T(C): 36.5 (12-17-24 @ 10:37)  HR: 80 (12-17-24 @ 10:37)  BP: 155/66 (12-17-24 @ 10:37)  RR: 18 (12-17-24 @ 10:37)  SpO2: 100% (12-17-24 @ 10:37)  Wt(kg): --    PE:  Gen: NAD  RLE:   Skin intact, + soft tissue swelling, moderate effusion. No erythema, No warmth of knee  + TTP about knee  pROM 0-90 and aROM 0-110  Compartments soft  Motor intact GS/TA/EHL/FHL  SILT S/S/SP/DP/T, 2+ DP    Imaging:  < from: CT Lower Extremity w/ IV Cont, Right (12.16.24 @ 19:40) >  IMPRESSION:  1.  Largeright knee joint effusion with synovial enhancement. Consider   direct sampling of joint fluid there is concern for septic arthritis.  2.  No abscess or CT evidence of advanced osteomyelitis.    --- End of Report ---      < end of copied text >      Procedure Note:  After sterile skin preparation with 2 chlorhexidine prep sticks, an 18 gauge needle was introduced into the superolateral capsule and around 30cc of turbid appearing fluid was aspirated. Joint fluid was then sent for cell count, crystals, gram stain and culture. The knee was then cleaned and a sterile dressing and ace wrap was placed over the aspiration site. The Patient tolerated the procedure well and NVI post procedure.

## 2024-12-17 NOTE — PROGRESS NOTE ADULT - SUBJECTIVE AND OBJECTIVE BOX
[facility] DEPARTMENT OF OPHTHALMOLOGY    ------------------------------------------------------------------------------    Interval History: No acute events overnight. Today patient denying blurred vision, eye pain, flashes, floaters, FBS, erythema, or discharge.     MEDICATIONS  (STANDING):  atropine 1% Solution 1 Drop(s) Left EYE two times a day  cefTAZidime  2.25 mG/0.1 mL Ophthalmic Injectable 0.1 milliLiter(s) IntraVitreal once  cefTRIAXone   IVPB      cefTRIAXone   IVPB 2000 milliGRAM(s) IV Intermittent every 12 hours  chlorhexidine 2% Cloths 1 Application(s) Topical daily  chlorhexidine 2% Cloths 1 Application(s) Topical every 12 hours  dextrose 5%. 1000 milliLiter(s) (100 mL/Hr) IV Continuous <Continuous>  dextrose 5%. 1000 milliLiter(s) (50 mL/Hr) IV Continuous <Continuous>  dextrose 50% Injectable 25 Gram(s) IV Push once  dextrose 50% Injectable 12.5 Gram(s) IV Push once  dextrose 50% Injectable 25 Gram(s) IV Push once  epoetin marla (EPOGEN) Injectable 3000 Unit(s) IV Push <User Schedule>  erythromycin   Ointment 1 Application(s) Left EYE <User Schedule>  glucagon  Injectable 1 milliGRAM(s) IntraMuscular once  hydrALAZINE 50 milliGRAM(s) Oral at bedtime  insulin lispro (ADMELOG) corrective regimen sliding scale   SubCutaneous three times a day before meals  insulin lispro (ADMELOG) corrective regimen sliding scale   SubCutaneous at bedtime  lidocaine   4% Patch 3 Patch Transdermal daily  lidocaine 1%/epinephrine 1:200,000 Inj 3 milliLiter(s) Local Injection once  moxifloxacin 0.5% Solution 1 Drop(s) Left EYE <User Schedule>  mupirocin 2% Ointment 1 Application(s) Topical every 12 hours  polyethylene glycol 3350 17 Gram(s) Oral daily  povidone iodine 10% Nasal Swab 1 Application(s) Both Nostrils once  povidone iodine 5% Ophthalmic Solution 1 Drop(s) Left EYE once  vancomycin 1 mG/0.1 mL Ophthalmic Injectable 0.1 milliLiter(s) IntraVitreal once    MEDICATIONS  (PRN):  acetaminophen     Tablet .. 650 milliGRAM(s) Oral every 6 hours PRN Temp greater or equal to 38C (100.4F), Mild Pain (1 - 3), Moderate Pain (4 - 6)  dextrose Oral Gel 15 Gram(s) Oral once PRN Blood Glucose LESS THAN 70 milliGRAM(s)/deciliter  lidocaine/prilocaine Cream 1 Application(s) Topical daily PRN on HD days for pain  melatonin 3 milliGRAM(s) Oral at bedtime PRN Insomnia      VITALS: T(C): 36.6 (12-17-24 @ 18:11)  T(F): 97.8 (12-17-24 @ 18:11), Max: 98.3 (12-16-24 @ 21:30)  HR: 83 (12-17-24 @ 18:11) (64 - 83)  BP: 133/67 (12-17-24 @ 18:11) (124/52 - 155/66)  RR:  (16 - 18)  SpO2:  (95% - 100%)  Wt(kg): --  General: AAO x 3, appropriate mood and affect      Ophthalmology Exam:  Visual acuity (sc): HM OD, NLP OS   Pupils: Minimally reactive OU, no RAPD  Ttono: 15 OS    Pen Light Exam (PLE)  External: Flat OU  Lids/Lashes/Lacrimal Ducts: Flat OU    Sclera/Conjunctiva: W+Q OD, 3+ injection OS   Cornea: Cl OD, OD: large near total epi defect with mildly heaped up edges, corneal haze. No infiltrate. D-folds. Adán neg.   Anterior Chamber: D+F OD; OS: no hypopyon, with flare and likely vitreous in AC  Iris: Flat OD, Posterior synechiae OS  Lens: Dislocated inferior nasally, pt aware, occurred after fall, no view of lens OS       B-scan OS: with dropped lens, vitritis

## 2024-12-17 NOTE — CONSULT NOTE ADULT - ASSESSMENT
INCOMPLETE NOTE*********  AP: 79yFemale w/ r/o right septic knee  - Pain control  - WBAT on affected extremity  - FU XR R knee   - FU ESR/CRP and BCx  - PT/OT   - Med management per primary team  - IV abx per primary team/ID    To be discussed with Orthopedic Surgeon on call, Dr. Peters INCOMPLETE NOTE*********  AP: 79yFemale w/ r/o right septic knee  - Pain control  - WBAT on affected extremity  - FU XR R knee   - FU ESR/CRP and BCx  - FU labs, cell count, crystals, gram stain, culture  - PT/OT   - Med management per primary team  - IV abx per primary team/ID  - Patient should be kept NPO pending synovial fluid analysis    Discussed with Orthopedic Surgeon on call, Dr. Peters       AP: 79yFemale w/ r/o right septic knee  - Pain control  - WBAT on affected extremity  - FU XR R knee   - FU ESR/CRP and BCx  - FU labs, cell count, crystals, gram stain, culture  - PT/OT   - Med management per primary team  - IV abx per primary team/ID  - Patient should be kept NPO pending synovial fluid analysis    Discussed with Orthopedic Surgeon on call, Dr. Peters       AP: 79yFemale w/ r/o right septic knee  - Pain control  - WBAT on affected extremity  - FU XR R knee   - FU ESR/CRP and BCx  - FU labs, cell count, crystals, gram stain, culture  - PT/OT   - Med management per primary team  - IV abx per primary team/ID  - Patient should be kept NPO pending synovial fluid analysis    Discussed with Orthopedic Surgeon on call, Dr. Peters    Time-based billing (NON-critical care).     35 minutes spent on total encounter. The necessity of the time spent during the encounter on this date of service was due to:     Please note that over 35 minutes of time was spent in care of this patient including  - pre visit preparation  - in person visit  - post visit documentation  - review of imaging  - discussion with attendings/colleagues.  Assessment: 79yFemale w/ right septic knee, cell count 130k, crystals neg    Plan:  - OR 12/18 for I&D of right knee   - NPO @ [facility]  - Please document preoperative risk stratification for OR  - Pain control  - WBAT on affected extremity  - FU XR R knee   - FU ESR/CRP and BCx  - FU labs, gram stain, culture  - PT/OT   - Med management per primary team  - IV abx per primary team/ID      Discussed with Orthopedic Surgeon on call, Dr. Peters    Time-based billing (NON-critical care).     35 minutes spent on total encounter. The necessity of the time spent during the encounter on this date of service was due to:     Please note that over 35 minutes of time was spent in care of this patient including  - pre visit preparation  - in person visit  - post visit documentation  - review of imaging  - discussion with attendings/colleagues.

## 2024-12-17 NOTE — PROGRESS NOTE ADULT - SUBJECTIVE AND OBJECTIVE BOX
Patient is a 79y old  Female who presents with a chief complaint of fever, vision loss (17 Dec 2024 10:48)    Date of servie : 12-17-24 @ 13:28  INTERVAL HPI/OVERNIGHT EVENTS:  T(C): 36.5 (12-17-24 @ 10:37), Max: 36.8 (12-16-24 @ 21:30)  HR: 80 (12-17-24 @ 10:37) (64 - 80)  BP: 155/66 (12-17-24 @ 10:37) (124/52 - 155/66)  RR: 18 (12-17-24 @ 10:37) (16 - 18)  SpO2: 100% (12-17-24 @ 10:37) (95% - 100%)  Wt(kg): --  I&O's Summary    16 Dec 2024 07:01  -  17 Dec 2024 07:00  --------------------------------------------------------  IN: 400 mL / OUT: 2400 mL / NET: -2000 mL        LABS:                        10.2   9.20  )-----------( 164      ( 17 Dec 2024 06:57 )             28.8     12-17    135  |  96[L]  |  35[H]  ----------------------------<  151[H]  4.3   |  25  |  3.29[H]    Ca    8.4      17 Dec 2024 06:57  Phos  4.6     12-17  Mg     2.20     12-17        Urinalysis Basic - ( 17 Dec 2024 06:57 )    Color: x / Appearance: x / SG: x / pH: x  Gluc: 151 mg/dL / Ketone: x  / Bili: x / Urobili: x   Blood: x / Protein: x / Nitrite: x   Leuk Esterase: x / RBC: x / WBC x   Sq Epi: x / Non Sq Epi: x / Bacteria: x      CAPILLARY BLOOD GLUCOSE      POCT Blood Glucose.: 277 mg/dL (17 Dec 2024 12:13)  POCT Blood Glucose.: 162 mg/dL (17 Dec 2024 08:19)  POCT Blood Glucose.: 122 mg/dL (16 Dec 2024 23:25)  POCT Blood Glucose.: 195 mg/dL (16 Dec 2024 21:12)  POCT Blood Glucose.: 155 mg/dL (16 Dec 2024 17:22)        Urinalysis Basic - ( 17 Dec 2024 06:57 )    Color: x / Appearance: x / SG: x / pH: x  Gluc: 151 mg/dL / Ketone: x  / Bili: x / Urobili: x   Blood: x / Protein: x / Nitrite: x   Leuk Esterase: x / RBC: x / WBC x   Sq Epi: x / Non Sq Epi: x / Bacteria: x        MEDICATIONS  (STANDING):  atropine 1% Solution 1 Drop(s) Left EYE two times a day  cefTAZidime  2.25 mG/0.1 mL Ophthalmic Injectable 0.1 milliLiter(s) IntraVitreal once  cefTRIAXone   IVPB      cefTRIAXone   IVPB 2000 milliGRAM(s) IV Intermittent every 12 hours  chlorhexidine 2% Cloths 1 Application(s) Topical daily  dextrose 5%. 1000 milliLiter(s) (100 mL/Hr) IV Continuous <Continuous>  dextrose 5%. 1000 milliLiter(s) (50 mL/Hr) IV Continuous <Continuous>  dextrose 50% Injectable 25 Gram(s) IV Push once  dextrose 50% Injectable 12.5 Gram(s) IV Push once  dextrose 50% Injectable 25 Gram(s) IV Push once  epoetin marla (EPOGEN) Injectable 3000 Unit(s) IV Push <User Schedule>  erythromycin   Ointment 1 Application(s) Left EYE <User Schedule>  glucagon  Injectable 1 milliGRAM(s) IntraMuscular once  heparin   Injectable 5000 Unit(s) SubCutaneous every 12 hours  hydrALAZINE 50 milliGRAM(s) Oral at bedtime  insulin lispro (ADMELOG) corrective regimen sliding scale   SubCutaneous three times a day before meals  insulin lispro (ADMELOG) corrective regimen sliding scale   SubCutaneous at bedtime  lidocaine   4% Patch 3 Patch Transdermal daily  lidocaine 1%/epinephrine 1:200,000 Inj 3 milliLiter(s) Local Injection once  moxifloxacin 0.5% Solution 1 Drop(s) Left EYE <User Schedule>  mupirocin 2% Ointment 1 Application(s) Topical every 12 hours  polyethylene glycol 3350 17 Gram(s) Oral daily  povidone iodine 5% Ophthalmic Solution 1 Drop(s) Left EYE once  vancomycin 1 mG/0.1 mL Ophthalmic Injectable 0.1 milliLiter(s) IntraVitreal once    MEDICATIONS  (PRN):  acetaminophen     Tablet .. 650 milliGRAM(s) Oral every 6 hours PRN Temp greater or equal to 38C (100.4F), Mild Pain (1 - 3), Moderate Pain (4 - 6)  dextrose Oral Gel 15 Gram(s) Oral once PRN Blood Glucose LESS THAN 70 milliGRAM(s)/deciliter  lidocaine/prilocaine Cream 1 Application(s) Topical daily PRN on HD days for pain  melatonin 3 milliGRAM(s) Oral at bedtime PRN Insomnia          PHYSICAL EXAM:  GENERAL: NAD, well-groomed, well-developed  HEAD:  Atraumatic, Normocephalic  CHEST/LUNG: Clear to percussion bilaterally; No rales, rhonchi, wheezing, or rubs  HEART: Regular rate and rhythm; No murmurs, rubs, or gallops  ABDOMEN: Soft, Nontender, Nondistended; Bowel sounds present  EXTREMITIES:  2+ Peripheral Pulses, No clubbing, cyanosis, or edema  LYMPH: No lymphadenopathy noted  SKIN: No rashes or lesions    Care Discussed with Consultants/Other Providers [ x] YES  [ ] NO

## 2024-12-17 NOTE — PROGRESS NOTE ADULT - ASSESSMENT
79 yr old female with PMH of  ESRD on HD , HTN, DM, R cataract , glaucoma here for fever and rapid decline of R eye vision .       Bacterial Endophthalmitis OS  - cw  iv antibiotics as per ID   - cultures reviewed repeat neg   - sp Intravitreal Vancomycin   - cw eye drops  - ID fu   - will monitor     High grade bacteremia  - cw iv abx  - ID fu   - repeat cultures neg   - imaging reviewed   - ortho fu for large R knee effusion  - check BLANE    ESRD  - HD as scheduled  - renal fu     HTN  - cw hydralazine  - DASH diet    DM  - monitor FS  - ISS    Heparin sc for DVT prophylaxis     case dw family at bedside   case dw ACP

## 2024-12-17 NOTE — PROGRESS NOTE ADULT - ASSESSMENT
80 y/o F, PMH ESRD thru R AV fistula, HTN, DM, R cataract (surgery scheduled for Jan 2025), glaucoma and L hip replacement, presented on 12/13 with fever, pain, and rapid decline in L vision, with opthalmology exam suggestive of bacterial endophthalmitis now with blood cultures growing Group B Strep. Infectious Disease consulted for assessment of infectious source and antimicrobial management     Upon presentation, patient febrile to 102.8F  Labs showed no leukocytosis but with 14.8% bandemia     Workup:   -Blood Cx (12/12): Strep B (4/4 bottles)  -CT C/A/P: No infectious source in the chest, abdomen, or pelvis. Stable thoracic abdominal aortic dissection. Small left and trace right pleural effusions.  -Repeat blood Cx (12/14): NGTD  -Intravitreal Cx: + Strep B   -R hand Xray: Variable degree IP joint arthritic changes.  -TTE: no documented vegetations   -CT RLE: large R knee effusions     #Sepsis, fevers, bandemia   #High grade Group B Strep Bacteremia  #L vison loss, Endogenous Bacterial Endophthalmitis  #Large R knee effusion r/o septic arthritis   #Presence of RUE AV fistula with no clinical evidence of infection     Recommendations:   -Continue Ceftriaxone 2 g IV q12hrs   -Opthalmology following - s/p Intravitreal Vancomycin   -TTE without vegetation, will need a BLANE  -CT RLE with evidence of large R knee effusion, get orthopedics eval    -Monitor T curve and WBC trend      James Moe MD  ID physician  [facility]  After 5pm/weekends call [phone]       Patient said she is not sure if it is red or swollen . She said it just hurts.  She has done the rinses and antibiotics and she is still pain

## 2024-12-17 NOTE — CONSULT NOTE ADULT - CONSULT REQUESTED DATE/TIME
13-Dec-2024
14-Dec-2024 10:07
13-Dec-2024 00:30
13-Dec-2024 12:00
17-Dec-2024 14:08
13-Dec-2024 14:01

## 2024-12-17 NOTE — PROGRESS NOTE ADULT - ASSESSMENT
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 Tolerated well. See note.   - Va worsened to NLP OS 12/15  - AC tap cultures pending. Gram stain showing gram + cocci in pairs.   - Anterior exam with no view of Lens OS. B scan with dropped lens. Does not appear to be an RD.   - 12/17: VA OS NLP, no hypopyon, B-scan with vitritis and dropped lens  - continue broad spectrum abx per primary team and ID  - Appreciate ID recs   - continue with atropine drop BID to left eye  - continue with moxifloxacin q4 hours to left eye  - Increase erythromycin ointment q2hr to left eye  - Will hold off on topical steroids for now given epi defect        DW Dr. Kim, retina attending.     Outpatient Follow-up: Patient should follow-up with his/her ophthalmologist or with [facility] Department of Ophthalmology within 1 week of after discharge at:    [address]  [place] [address]  [phone]

## 2024-12-17 NOTE — PROGRESS NOTE ADULT - SUBJECTIVE AND OBJECTIVE BOX
Cardiovascular Disease Progress Note  DATE OF SERVICE: 24 @ 08:19    Overnight events: No acute events overnight.    The patient denies chest pain or SOB.   Otherwise review of systems negative    Objective Findings:  T(C): 36.6 (24 @ 04:55), Max: 36.8 (24 @ 21:30)  HR: 78 (24 @ 04:55) (64 - 79)  BP: 139/67 (24 @ 04:55) (124/52 - 139/67)  RR: 17 (24 @ 04:55) (16 - 18)  SpO2: 100% (24 @ 04:55) (95% - 100%)  Wt(kg): --  Daily     Daily Weight in k (17 Dec 2024 01:34)      Physical Exam:  Gen: NAD; Patient resting comfortably  HEENT:  Normocephalic/ atraumatic  CV: RRR, normal S1 + S2, no m/r/g  Lungs:  Normal respiratory effort; clear to auscultation bilaterally  Abd: soft, non-tender; bowel sounds present  Ext: No edema; warm and well perfused    Telemetry: n/a    Laboratory Data:                        10.2   9.20  )-----------( 164      ( 17 Dec 2024 06:57 )             28.8         135  |  96[L]  |  35[H]  ----------------------------<  151[H]  4.3   |  25  |  3.29[H]    Ca    8.4      17 Dec 2024 06:57  Phos  4.6       Mg     2.20                     Inpatient Medications:  MEDICATIONS  (STANDING):  atropine 1% Solution 1 Drop(s) Left EYE two times a day  cefTAZidime  2.25 mG/0.1 mL Ophthalmic Injectable 0.1 milliLiter(s) IntraVitreal once  cefTRIAXone   IVPB      cefTRIAXone   IVPB 2000 milliGRAM(s) IV Intermittent every 12 hours  chlorhexidine 2% Cloths 1 Application(s) Topical daily  dextrose 5%. 1000 milliLiter(s) (100 mL/Hr) IV Continuous <Continuous>  dextrose 5%. 1000 milliLiter(s) (50 mL/Hr) IV Continuous <Continuous>  dextrose 50% Injectable 25 Gram(s) IV Push once  dextrose 50% Injectable 12.5 Gram(s) IV Push once  dextrose 50% Injectable 25 Gram(s) IV Push once  epoetin marla (EPOGEN) Injectable 3000 Unit(s) IV Push <User Schedule>  erythromycin   Ointment 1 Application(s) Left EYE <User Schedule>  glucagon  Injectable 1 milliGRAM(s) IntraMuscular once  heparin   Injectable 5000 Unit(s) SubCutaneous every 12 hours  hydrALAZINE 50 milliGRAM(s) Oral at bedtime  insulin lispro (ADMELOG) corrective regimen sliding scale   SubCutaneous three times a day before meals  insulin lispro (ADMELOG) corrective regimen sliding scale   SubCutaneous at bedtime  lidocaine   4% Patch 3 Patch Transdermal daily  lidocaine 1%/epinephrine 1:200,000 Inj 3 milliLiter(s) Local Injection once  moxifloxacin 0.5% Solution 1 Drop(s) Left EYE <User Schedule>  mupirocin 2% Ointment 1 Application(s) Topical every 12 hours  polyethylene glycol 3350 17 Gram(s) Oral daily  povidone iodine 5% Ophthalmic Solution 1 Drop(s) Left EYE once  vancomycin 1 mG/0.1 mL Ophthalmic Injectable 0.1 milliLiter(s) IntraVitreal once      Assessment: 79 year old woman with ESRD on HD (MWF), HTN, DM (diet controlled), chronic type B aortic dissection, heart murmur, endometrial ca in remission s/p total hysterectomy in  presents with bacteremia.    Plan of Care:      #Bacteremia-  In the setting of ophthalmic infection.   TTE with no obvious evidence of vegetations.  ID input appreciated- plan for BLANE given + BCx and polyarthritis.           #Type B aortic dissection  - Chronic (Diagnosed >10 years ago)  - Optimal BP control.          #ESRD-  - HD as per renal.     #ACP (advance care planning)-  CPT 33874  Advanced care planning was discussed with the patient.    Cardiac findings were discussed in detail and all questions were answered.          Over 55 minutes spent on total encounter; more than 50% of the visit was spent counseling and/or coordinating care by the attending physician.      Gabo Burris MD [facility]  Cardiovascular Disease  [phone]

## 2024-12-17 NOTE — PROGRESS NOTE ADULT - SUBJECTIVE AND OBJECTIVE BOX
Follow Up:      Interval History/ROS:Patient is a 79y old  Female who presents with a chief complaint of fever, vision loss (17 Dec 2024 08:19)  Seen at bedside, afebrile.  CT RLE with large R knee effusions     Allergies  No Known Allergies        ANTIMICROBIALS:    cefTAZidime  2.25 mG/0.1 mL Ophthalmic Injectable 0.1 once  cefTRIAXone   IVPB    cefTRIAXone   IVPB 2000 every 12 hours  vancomycin 1 mG/0.1 mL Ophthalmic Injectable 0.1 once        OTHER MEDS: MEDICATIONS  (STANDING):  acetaminophen     Tablet .. 650 every 6 hours PRN  dextrose 50% Injectable 25 once  dextrose 50% Injectable 25 once  dextrose 50% Injectable 12.5 once  dextrose Oral Gel 15 once PRN  epoetin marla (EPOGEN) Injectable 3000 <User Schedule>  glucagon  Injectable 1 once  heparin   Injectable 5000 every 12 hours  hydrALAZINE 50 at bedtime  insulin lispro (ADMELOG) corrective regimen sliding scale  three times a day before meals  insulin lispro (ADMELOG) corrective regimen sliding scale  at bedtime  melatonin 3 at bedtime PRN  polyethylene glycol 3350 17 daily      Vital Signs Last 24 Hrs  T(F): 97.9 (12-17-24 @ 04:55), Max: 102.8 (12-12-24 @ 20:03)    Vital Signs Last 24 Hrs  HR: 78 (12-17-24 @ 04:55) (64 - 79)  BP: 139/67 (12-17-24 @ 04:55) (124/52 - 139/67)  RR: 17 (12-17-24 @ 04:55)  SpO2: 100% (12-17-24 @ 04:55) (95% - 100%)  Wt(kg): --    EXAM:    GA: NAD  CV: nl S1/S2, no RMG  Lungs: CTAB, no distress  Abd: soft, nontender, no rebounding pain  Ext: R hip, knee, ankle decrease ROM, R knee swelling and tenderness  Neuro: No focal deficits  Skin: Intact  IV: RUE AV fistula       Labs:                        10.2   9.20  )-----------( 164      ( 17 Dec 2024 06:57 )             28.8     12-17    135  |  96[L]  |  35[H]  ----------------------------<  151[H]  4.3   |  25  |  3.29[H]    Ca    8.4      17 Dec 2024 06:57  Phos  4.6     12-17  Mg     2.20     12-17        WBC Trend:  WBC Count: 9.20 (12-17-24 @ 06:57)  WBC Count: 10.56 (12-16-24 @ 23:32)  WBC Count: 6.93 (12-15-24 @ 04:42)  WBC Count: 7.63 (12-14-24 @ 07:55)      Creatine Trend:  Creatinine: 3.29 (12-17)  Creatinine: 3.72 (12-16)  Creatinine: 5.16 (12-15)  Creatinine: 3.72 (12-14)      Liver Biochemical Testing Trend:  Alanine Aminotransferase (ALT/SGPT): 14 (12-15)  Alanine Aminotransferase (ALT/SGPT): 18 (12-14)  Alanine Aminotransferase (ALT/SGPT): 17 (12-13)  Alanine Aminotransferase (ALT/SGPT): 14 (12-13)  Alanine Aminotransferase (ALT/SGPT): 21 (12-12)  Aspartate Aminotransferase (AST/SGOT): 27 (12-15-24 @ 04:42)  Aspartate Aminotransferase (AST/SGOT): 27 (12-14-24 @ 07:55)  Aspartate Aminotransferase (AST/SGOT): 24 (12-13-24 @ 17:56)  Aspartate Aminotransferase (AST/SGOT): 36 (12-12-24 @ 22:25)  Bilirubin Total: 0.8 (12-15)  Bilirubin Total: 1.1 (12-14)  Bilirubin Total: 1.2 (12-13)  Bilirubin Total: 1.7 (12-12)      Trend LDH      Urinalysis Basic - ( 17 Dec 2024 06:57 )    Color: x / Appearance: x / SG: x / pH: x  Gluc: 151 mg/dL / Ketone: x  / Bili: x / Urobili: x   Blood: x / Protein: x / Nitrite: x   Leuk Esterase: x / RBC: x / WBC x   Sq Epi: x / Non Sq Epi: x / Bacteria: x        MICROBIOLOGY:    MRSA PCR Result.: Miriam (12-13-24 @ 16:43)      Culture - Blood (collected 14 Dec 2024 18:56)  Source: .Blood BLOOD  Preliminary Report:    No growth at 48 Hours    Culture - Blood (collected 14 Dec 2024 14:15)  Source: .Blood BLOOD  Preliminary Report:    No growth at 48 Hours    Culture - Fungal, Other (collected 13 Dec 2024 21:20)  Source: .Other  Preliminary Report:    No growth    Culture - Eye (collected 13 Dec 2024 21:20)  Source: .Eye  Preliminary Report:    Numerous Streptococcus agalactiae (Group B)  Organism: Streptococcus agalactiae (Group B)  Organism: Streptococcus agalactiae (Group B)    Sensitivities:      Method Type: CANDE      -  Ceftriaxone: S <=0.25      -  Clindamycin: S <=0.06      -  Levofloxacin: S 1      -  Penicillin: S 0.06 Predicts results for ampicillin, amoxicillin, amoxicillin/clavulanate, ampicillin/sulbactam, 1st, 2nd and 3rd generation cephalosporins and carbapenems.      -  Tetracycline: R >4      -  Vancomycin: S 0.5    Culture - Blood (collected 13 Dec 2024 16:30)  Source: .Blood BLOOD  Preliminary Report:    No growth at 72 Hours    Culture - Eye (collected 13 Dec 2024 15:53)  Source: .Eye  Preliminary Report:    No growth to date.    Culture - Blood (collected 12 Dec 2024 21:58)  Source: .Blood BLOOD  Final Report:    Growth in aerobic and anaerobic bottles: Streptococcus agalactiae (Group    B)    Direct identification is available within approximately 3-5    hours either by Blood Panel Multiplexed PCR or Direct    MALDI-TOF. Details: [email or web address]  Organism: Blood Culture PCR  Streptococcus agalactiae (Group B)  Organism: Streptococcus agalactiae (Group B)    Sensitivities:      Method Type: CANDE      -  Ceftriaxone: S <=0.25      -  Clindamycin: S <=0.06      -  Levofloxacin: S 0.5      -  Penicillin: S 0.06 Predicts results for ampicillin, amoxicillin, amoxicillin/clavulanate, ampicillin/sulbactam, 1st, 2nd and 3rd generation cephalosporins and carbapenems.      -  Tetracycline: R >4      -  Vancomycin: S 0.5  Organism: Blood Culture PCR    Sensitivities:      Method Type: PCR      -  Streptococcus agalactiae (Group B): Detec    Culture - Blood (collected 12 Dec 2024 21:43)  Source: .Blood BLOOD  Final Report:    Growth in aerobic and anaerobic bottles: Streptococcus agalactiae (Group    B)    See previous culture [id number]    Culture - Blood (collected 12 Sep 2023 14:45)  Source: .Blood Blood-Peripheral  Final Report:    No growth at 5 days    Culture - Blood (collected 12 Sep 2023 13:42)  Source: .Blood Blood-Peripheral  Final Report:    No growth at 5 days                        Rapid RVP Result: NotDetec (12-12 @ 21:58)                    Troponin T, High Sensitivity Result: 160 (12-13)  Troponin T, High Sensitivity Result: 192 (12-13)  Troponin T, High Sensitivity Result: 209 (12-12)          RADIOLOGY:  imaging below personally reviewed                CT Head No Cont:   ACC: [id number] EXAM:  CT BRAIN   ORDERED BY: HERB JARQUIN     PROCEDURE DATE:  12/16/2024          INTERPRETATION:  .    CLINICAL INFORMATION: Right lower extremity weakness.    TECHNIQUE: Multiple axial CT images of the head were obtained without  contrast. Sagittal and coronal reconstructed images were acquired from   the source data.    COMPARISON: Prior brain MRI examination dated 12/20/2017. Prior CT   examination of the head performed on 12/20/1717.    FINDINGS: There is no acute intracranial hemorrhage, mass effect, shift   of the midline structures, herniation, extra-axial fluid collection, or   hydrocephalus.    There is diffuse cerebral volume loss with prominence of the sulci,   fissures, and cisternal spaces which is normal for the patient's age.   There is mild deep and periventricular white matter hypoattenuation   statistically compatible with microvascular changes given calcific   atherosclerotic disease of the intracranial arteries.    The paranasal sinuses and tympanomastoid cavities are clear. The   calvarium is intact. Bilateral lens dislocations are noted.    IMPRESSION: No acute intracranial hemorrhage, mass effect, or shift of   the midline structures.    --- End of Report ---    SEB DAVALOS MD; Attending Radiologist  This document has been electronically signed. Dec 16 2024  8:06PM (12-16-24 @ 19:35)        < from: TTE W or WO Ultrasound Enhancing Agent (12.14.24 @ 10:47) >  CONCLUSIONS:      1. Left ventricular cavity is normal in size. Left ventricular wall thickness is mildly increased. Left ventricular systolic function is moderately decreased with an ejection fraction visually estimated at 40 %. There are no regional wall motion abnormalities seen.   2. There is mild (grade 1) left ventricular diastolic dysfunction, with elevated left ventricular filling pressure.   3. There is hypokinesis of the inferolateral, basal inferior and mid inferior walls.   4. Enlarged right ventricular cavity size, with normal wall thickness, and normal right ventricular systolic function.   5. Left atrium is mildly dilated.   6. The inferior vena cava is dilated measuring 2.20 cm in diameter, (dilated >2.1cm) with normal inspiratory collapse (normal >50%) consistent with mildly elevated right atrial pressure (~8, range5-10mmHg).   7. No pericardial effusion seen.   8. Left pleural effusion noted.    < end of copied text >      < from: CT Lower Extremity w/ IV Cont, Right (12.16.24 @ 19:40) >  IMPRESSION:  1.  Largeright knee joint effusion with synovial enhancement. Consider   direct sampling of joint fluid there is concern for septic arthritis.  2.  No abscess or CT evidence of advanced osteomyelitis.    --- End of Report ---        < end of copied text >

## 2024-12-18 ENCOUNTER — TRANSCRIPTION ENCOUNTER (OUTPATIENT)
Age: 79
End: 2024-12-18

## 2024-12-18 ENCOUNTER — APPOINTMENT (OUTPATIENT)
Dept: ORTHOPEDIC SURGERY | Facility: HOSPITAL | Age: 79
End: 2024-12-18

## 2024-12-18 LAB
ANION GAP SERPL CALC-SCNC: 16 MMOL/L — HIGH (ref 7–14)
APTT BLD: 31.7 SEC — SIGNIFICANT CHANGE UP (ref 24.5–35.6)
BUN SERPL-MCNC: 53 MG/DL — HIGH (ref 7–23)
CALCIUM SERPL-MCNC: 8.3 MG/DL — LOW (ref 8.4–10.5)
CHLORIDE SERPL-SCNC: 94 MMOL/L — LOW (ref 98–107)
CO2 SERPL-SCNC: 24 MMOL/L — SIGNIFICANT CHANGE UP (ref 22–31)
CREAT SERPL-MCNC: 4.64 MG/DL — HIGH (ref 0.5–1.3)
CRP SERPL-MCNC: 169.4 MG/L — HIGH
CULTURE RESULTS: SIGNIFICANT CHANGE UP
EGFR: 9 ML/MIN/1.73M2 — LOW
ERYTHROCYTE [SEDIMENTATION RATE] IN BLOOD: 85 MM/HR — HIGH (ref 4–25)
GLUCOSE BLDC GLUCOMTR-MCNC: 118 MG/DL — HIGH (ref 70–99)
GLUCOSE BLDC GLUCOMTR-MCNC: 134 MG/DL — HIGH (ref 70–99)
GLUCOSE BLDC GLUCOMTR-MCNC: 154 MG/DL — HIGH (ref 70–99)
GLUCOSE BLDC GLUCOMTR-MCNC: 156 MG/DL — HIGH (ref 70–99)
GLUCOSE BLDC GLUCOMTR-MCNC: 185 MG/DL — HIGH (ref 70–99)
GLUCOSE BLDC GLUCOMTR-MCNC: 242 MG/DL — HIGH (ref 70–99)
GLUCOSE BLDC GLUCOMTR-MCNC: 246 MG/DL — HIGH (ref 70–99)
GLUCOSE SERPL-MCNC: 223 MG/DL — HIGH (ref 70–99)
HCT VFR BLD CALC: 28.2 % — LOW (ref 34.5–45)
HGB BLD-MCNC: 9.8 G/DL — LOW (ref 11.5–15.5)
INR BLD: 1.06 RATIO — SIGNIFICANT CHANGE UP (ref 0.85–1.16)
MAGNESIUM SERPL-MCNC: 2.3 MG/DL — SIGNIFICANT CHANGE UP (ref 1.6–2.6)
MCHC RBC-ENTMCNC: 31 PG — SIGNIFICANT CHANGE UP (ref 27–34)
MCHC RBC-ENTMCNC: 34.8 G/DL — SIGNIFICANT CHANGE UP (ref 32–36)
MCV RBC AUTO: 89.2 FL — SIGNIFICANT CHANGE UP (ref 80–100)
NRBC # BLD: 0 /100 WBCS — SIGNIFICANT CHANGE UP (ref 0–0)
NRBC # FLD: 0 K/UL — SIGNIFICANT CHANGE UP (ref 0–0)
PHOSPHATE SERPL-MCNC: 5.3 MG/DL — HIGH (ref 2.5–4.5)
PLATELET # BLD AUTO: 194 K/UL — SIGNIFICANT CHANGE UP (ref 150–400)
POTASSIUM SERPL-MCNC: 4.4 MMOL/L — SIGNIFICANT CHANGE UP (ref 3.5–5.3)
POTASSIUM SERPL-SCNC: 4.4 MMOL/L — SIGNIFICANT CHANGE UP (ref 3.5–5.3)
PROTHROM AB SERPL-ACNC: 12.6 SEC — SIGNIFICANT CHANGE UP (ref 9.9–13.4)
RBC # BLD: 3.16 M/UL — LOW (ref 3.8–5.2)
RBC # FLD: 13.7 % — SIGNIFICANT CHANGE UP (ref 10.3–14.5)
SODIUM SERPL-SCNC: 134 MMOL/L — LOW (ref 135–145)
SPECIMEN SOURCE: SIGNIFICANT CHANGE UP
WBC # BLD: 9.6 K/UL — SIGNIFICANT CHANGE UP (ref 3.8–10.5)
WBC # FLD AUTO: 9.6 K/UL — SIGNIFICANT CHANGE UP (ref 3.8–10.5)

## 2024-12-18 PROCEDURE — 27310 EXPLORATION OF KNEE JOINT: CPT | Mod: RT

## 2024-12-18 PROCEDURE — 88311 DECALCIFY TISSUE: CPT | Mod: 26

## 2024-12-18 PROCEDURE — 88305 TISSUE EXAM BY PATHOLOGIST: CPT | Mod: 26

## 2024-12-18 PROCEDURE — 99232 SBSQ HOSP IP/OBS MODERATE 35: CPT

## 2024-12-18 PROCEDURE — 73560 X-RAY EXAM OF KNEE 1 OR 2: CPT | Mod: 26,RT

## 2024-12-18 RX ORDER — INSULIN LISPRO 100/ML
VIAL (ML) SUBCUTANEOUS
Refills: 0 | Status: DISCONTINUED | OUTPATIENT
Start: 2024-12-18 | End: 2024-12-21

## 2024-12-18 RX ORDER — FENTANYL 75 UG/H
25 PATCH, EXTENDED RELEASE TRANSDERMAL
Refills: 0 | Status: DISCONTINUED | OUTPATIENT
Start: 2024-12-18 | End: 2024-12-18

## 2024-12-18 RX ORDER — SENNOSIDES 8.6 MG/1
2 TABLET, FILM COATED ORAL AT BEDTIME
Refills: 0 | Status: DISCONTINUED | OUTPATIENT
Start: 2024-12-18 | End: 2024-12-26

## 2024-12-18 RX ORDER — ACETAMINOPHEN 80 MG/.8ML
975 SOLUTION/ DROPS ORAL EVERY 8 HOURS
Refills: 0 | Status: DISCONTINUED | OUTPATIENT
Start: 2024-12-18 | End: 2024-12-26

## 2024-12-18 RX ORDER — POLYETHYLENE GLYCOL 3350 17 G/DOSE
17 POWDER (GRAM) ORAL AT BEDTIME
Refills: 0 | Status: DISCONTINUED | OUTPATIENT
Start: 2024-12-18 | End: 2024-12-26

## 2024-12-18 RX ORDER — INSULIN LISPRO 100/ML
VIAL (ML) SUBCUTANEOUS EVERY 6 HOURS
Refills: 0 | Status: DISCONTINUED | OUTPATIENT
Start: 2024-12-18 | End: 2024-12-18

## 2024-12-18 RX ORDER — TRAMADOL HYDROCHLORIDE 50 MG/1
50 TABLET ORAL EVERY 6 HOURS
Refills: 0 | Status: DISCONTINUED | OUTPATIENT
Start: 2024-12-18 | End: 2024-12-19

## 2024-12-18 RX ORDER — ONDANSETRON 4 MG/1
4 TABLET ORAL EVERY 6 HOURS
Refills: 0 | Status: DISCONTINUED | OUTPATIENT
Start: 2024-12-18 | End: 2024-12-26

## 2024-12-18 RX ORDER — OXYCODONE HCL 15 MG
5 TABLET ORAL ONCE
Refills: 0 | Status: DISCONTINUED | OUTPATIENT
Start: 2024-12-18 | End: 2024-12-18

## 2024-12-18 RX ORDER — OXYCODONE HCL 15 MG
5 TABLET ORAL EVERY 4 HOURS
Refills: 0 | Status: DISCONTINUED | OUTPATIENT
Start: 2024-12-18 | End: 2024-12-19

## 2024-12-18 RX ORDER — ONDANSETRON 4 MG/1
4 TABLET ORAL ONCE
Refills: 0 | Status: DISCONTINUED | OUTPATIENT
Start: 2024-12-18 | End: 2024-12-18

## 2024-12-18 RX ORDER — HYDROMORPHONE HCL 4 MG
0.25 TABLET ORAL
Refills: 0 | Status: DISCONTINUED | OUTPATIENT
Start: 2024-12-18 | End: 2024-12-18

## 2024-12-18 RX ORDER — OXYCODONE HCL 15 MG
10 TABLET ORAL EVERY 4 HOURS
Refills: 0 | Status: DISCONTINUED | OUTPATIENT
Start: 2024-12-18 | End: 2024-12-19

## 2024-12-18 RX ORDER — MAGNESIUM HYDROXIDE 400 MG/5ML
30 SUSPENSION, ORAL (FINAL DOSE FORM) ORAL DAILY
Refills: 0 | Status: DISCONTINUED | OUTPATIENT
Start: 2024-12-18 | End: 2024-12-26

## 2024-12-18 RX ORDER — SODIUM CHLORIDE 9 MG/ML
1000 INJECTION, SOLUTION INTRAVENOUS
Refills: 0 | Status: DISCONTINUED | OUTPATIENT
Start: 2024-12-18 | End: 2024-12-18

## 2024-12-18 RX ADMIN — Medication 3 MILLIGRAM(S): at 21:47

## 2024-12-18 RX ADMIN — Medication 1 DROP(S): at 21:51

## 2024-12-18 RX ADMIN — CHLORHEXIDINE GLUCONATE 1 APPLICATION(S): 1.2 RINSE ORAL at 05:13

## 2024-12-18 RX ADMIN — HYDRALAZINE HYDROCHLORIDE 50 MILLIGRAM(S): 10 TABLET ORAL at 21:47

## 2024-12-18 RX ADMIN — Medication 17 GRAM(S): at 21:47

## 2024-12-18 RX ADMIN — Medication 1 APPLICATION(S): at 05:13

## 2024-12-18 RX ADMIN — Medication 1 DROP(S): at 18:02

## 2024-12-18 RX ADMIN — Medication 2: at 17:58

## 2024-12-18 RX ADMIN — CEFTRIAXONE SODIUM 100 MILLIGRAM(S): 1 INJECTION, POWDER, FOR SOLUTION INTRAMUSCULAR; INTRAVENOUS at 18:24

## 2024-12-18 RX ADMIN — ERYTHROMYCIN 1 APPLICATION(S): 5 OINTMENT OPHTHALMIC at 21:51

## 2024-12-18 RX ADMIN — CEFTRIAXONE SODIUM 100 MILLIGRAM(S): 1 INJECTION, POWDER, FOR SOLUTION INTRAMUSCULAR; INTRAVENOUS at 05:13

## 2024-12-18 RX ADMIN — Medication 0.25 MILLIGRAM(S): at 13:50

## 2024-12-18 RX ADMIN — Medication 1 DROP(S): at 16:56

## 2024-12-18 RX ADMIN — Medication 1: at 05:23

## 2024-12-18 RX ADMIN — Medication 0.25 MILLIGRAM(S): at 13:36

## 2024-12-18 RX ADMIN — EPOETIN ALFA 3000 UNIT(S): 2000 SOLUTION INTRAVENOUS; SUBCUTANEOUS at 07:43

## 2024-12-18 RX ADMIN — Medication 1 DROP(S): at 05:14

## 2024-12-18 RX ADMIN — SENNOSIDES 2 TABLET(S): 8.6 TABLET, FILM COATED ORAL at 21:50

## 2024-12-18 RX ADMIN — Medication 1 APPLICATION(S): at 18:01

## 2024-12-18 RX ADMIN — ACETAMINOPHEN 975 MILLIGRAM(S): 80 SOLUTION/ DROPS ORAL at 21:51

## 2024-12-18 RX ADMIN — ERYTHROMYCIN 1 APPLICATION(S): 5 OINTMENT OPHTHALMIC at 16:56

## 2024-12-18 RX ADMIN — Medication 1: at 13:25

## 2024-12-18 RX ADMIN — LIDOCAINE 3 PATCH: 50 OINTMENT TOPICAL at 00:05

## 2024-12-18 NOTE — PROGRESS NOTE ADULT - SUBJECTIVE AND OBJECTIVE BOX
Patient is a 79y old  Female who presents with a chief complaint of fever, vision loss (18 Dec 2024 16:34)    Date of servie : 12-18-24 @ 17:09  INTERVAL HPI/OVERNIGHT EVENTS:  T(C): 37.1 (12-18-24 @ 14:00), Max: 37.7 (12-18-24 @ 10:37)  HR: 81 (12-18-24 @ 14:30) (75 - 86)  BP: 159/74 (12-18-24 @ 14:30) (132/68 - 195/71)  RR: 14 (12-18-24 @ 14:30) (13 - 20)  SpO2: 97% (12-18-24 @ 14:30) (96% - 100%)  Wt(kg): --  I&O's Summary    17 Dec 2024 07:01  -  18 Dec 2024 07:00  --------------------------------------------------------  IN: 25 mL / OUT: 55 mL / NET: -30 mL    18 Dec 2024 07:01  -  18 Dec 2024 17:09  --------------------------------------------------------  IN: 640 mL / OUT: 2400 mL / NET: -1760 mL        LABS:                        9.8    9.60  )-----------( 194      ( 18 Dec 2024 01:15 )             28.2     12-18    134[L]  |  94[L]  |  53[H]  ----------------------------<  223[H]  4.4   |  24  |  4.64[H]    Ca    8.3[L]      18 Dec 2024 01:15  Phos  5.3     12-18  Mg     2.30     12-18      PT/INR - ( 18 Dec 2024 03:45 )   PT: 12.6 sec;   INR: 1.06 ratio         PTT - ( 18 Dec 2024 03:45 )  PTT:31.7 sec  Urinalysis Basic - ( 18 Dec 2024 01:15 )    Color: x / Appearance: x / SG: x / pH: x  Gluc: 223 mg/dL / Ketone: x  / Bili: x / Urobili: x   Blood: x / Protein: x / Nitrite: x   Leuk Esterase: x / RBC: x / WBC x   Sq Epi: x / Non Sq Epi: x / Bacteria: x      CAPILLARY BLOOD GLUCOSE      POCT Blood Glucose.: 156 mg/dL (18 Dec 2024 14:33)  POCT Blood Glucose.: 154 mg/dL (18 Dec 2024 12:59)  POCT Blood Glucose.: 118 mg/dL (18 Dec 2024 09:06)  POCT Blood Glucose.: 134 mg/dL (18 Dec 2024 07:35)  POCT Blood Glucose.: 185 mg/dL (18 Dec 2024 05:22)  POCT Blood Glucose.: 274 mg/dL (17 Dec 2024 23:07)        Urinalysis Basic - ( 18 Dec 2024 01:15 )    Color: x / Appearance: x / SG: x / pH: x  Gluc: 223 mg/dL / Ketone: x  / Bili: x / Urobili: x   Blood: x / Protein: x / Nitrite: x   Leuk Esterase: x / RBC: x / WBC x   Sq Epi: x / Non Sq Epi: x / Bacteria: x        MEDICATIONS  (STANDING):  acetaminophen     Tablet .. 975 milliGRAM(s) Oral every 8 hours  atropine 1% Solution 1 Drop(s) Left EYE two times a day  cefTAZidime  2.25 mG/0.1 mL Ophthalmic Injectable 0.1 milliLiter(s) IntraVitreal once  cefTRIAXone   IVPB      cefTRIAXone   IVPB 2000 milliGRAM(s) IV Intermittent every 12 hours  chlorhexidine 2% Cloths 1 Application(s) Topical daily  dextrose 5%. 1000 milliLiter(s) (100 mL/Hr) IV Continuous <Continuous>  dextrose 5%. 1000 milliLiter(s) (50 mL/Hr) IV Continuous <Continuous>  dextrose 50% Injectable 25 Gram(s) IV Push once  dextrose 50% Injectable 12.5 Gram(s) IV Push once  dextrose 50% Injectable 25 Gram(s) IV Push once  epoetin marla (EPOGEN) Injectable 3000 Unit(s) IV Push <User Schedule>  erythromycin   Ointment 1 Application(s) Left EYE <User Schedule>  glucagon  Injectable 1 milliGRAM(s) IntraMuscular once  hydrALAZINE 50 milliGRAM(s) Oral at bedtime  insulin lispro (ADMELOG) corrective regimen sliding scale   SubCutaneous three times a day before meals  insulin lispro (ADMELOG) corrective regimen sliding scale   SubCutaneous at bedtime  lidocaine   4% Patch 3 Patch Transdermal daily  lidocaine 1%/epinephrine 1:200,000 Inj 3 milliLiter(s) Local Injection once  moxifloxacin 0.5% Solution 1 Drop(s) Left EYE <User Schedule>  mupirocin 2% Ointment 1 Application(s) Topical every 12 hours  polyethylene glycol 3350 17 Gram(s) Oral daily  polyethylene glycol 3350 17 Gram(s) Oral at bedtime  povidone iodine 10% Nasal Swab 1 Application(s) Both Nostrils once  povidone iodine 5% Ophthalmic Solution 1 Drop(s) Left EYE once  senna 2 Tablet(s) Oral at bedtime  vancomycin 1 mG/0.1 mL Ophthalmic Injectable 0.1 milliLiter(s) IntraVitreal once    MEDICATIONS  (PRN):  acetaminophen     Tablet .. 650 milliGRAM(s) Oral every 6 hours PRN Temp greater or equal to 38C (100.4F), Mild Pain (1 - 3), Moderate Pain (4 - 6)  dextrose Oral Gel 15 Gram(s) Oral once PRN Blood Glucose LESS THAN 70 milliGRAM(s)/deciliter  lidocaine/prilocaine Cream 1 Application(s) Topical daily PRN on HD days for pain  magnesium hydroxide Suspension 30 milliLiter(s) Oral daily PRN Constipation  melatonin 3 milliGRAM(s) Oral at bedtime PRN Insomnia  ondansetron Injectable 4 milliGRAM(s) IV Push every 6 hours PRN Nausea and/or Vomiting  oxyCODONE    IR 10 milliGRAM(s) Oral every 4 hours PRN Severe Pain (7 - 10)  oxyCODONE    IR 5 milliGRAM(s) Oral every 4 hours PRN Moderate Pain (4 - 6)  traMADol 50 milliGRAM(s) Oral every 6 hours PRN Mild Pain (1 - 3)          PHYSICAL EXAM:  GENERAL: frail  CHEST/LUNG: Clear to percussion bilaterally; No rales, rhonchi, wheezing, or rubs  HEART: Regular rate and rhythm; No murmurs, rubs, or gallops  ABDOMEN: Soft, Nontender, Nondistended; Bowel sounds present  EXTREMITIES:  edema +    Care Discussed with Consultants/Other Providers [x ] YES  [ ] NO

## 2024-12-18 NOTE — PROGRESS NOTE ADULT - SUBJECTIVE AND OBJECTIVE BOX
Cardiovascular Disease Progress Note  DATE OF SERVICE: 24 @ 08:06    Overnight events: No acute events overnight.  The patient is undergoing HD in no distress.     Otherwise review of systems negative    Objective Findings:  T(C): 37.1 (24 @ 06:25), Max: 37.1 (24 @ 06:25)  HR: 78 (24 @ 06:25) (78 - 84)  BP: 167/77 (24 @ 06:25) (132/68 - 167/77)  RR: 18 (24 @ 06:25) (17 - 18)  SpO2: 99% (24 @ 05:26) (99% - 100%)  Wt(kg): --  Daily     Daily Weight in k (18 Dec 2024 06:25)      Physical Exam:  Gen: NAD; Patient resting comfortably  HEENT: EOMI, Normocephalic/ atraumatic  CV: RRR, normal S1 + S2, no m/r/g  Lungs:  Normal respiratory effort; clear to auscultation bilaterally  Abd: soft, non-tender; bowel sounds present  Ext: No edema; warm and well perfused    Telemetry: n/a    Laboratory Data:                        9.8    9.60  )-----------( 194      ( 18 Dec 2024 01:15 )             28.2     12-18    134[L]  |  94[L]  |  53[H]  ----------------------------<  223[H]  4.4   |  24  |  4.64[H]    Ca    8.3[L]      18 Dec 2024 01:15  Phos  5.3     12-18  Mg     2.30     12-18      PT/INR - ( 18 Dec 2024 03:45 )   PT: 12.6 sec;   INR: 1.06 ratio         PTT - ( 18 Dec 2024 03:45 )  PTT:31.7 sec          Inpatient Medications:  MEDICATIONS  (STANDING):  atropine 1% Solution 1 Drop(s) Left EYE two times a day  cefTAZidime  2.25 mG/0.1 mL Ophthalmic Injectable 0.1 milliLiter(s) IntraVitreal once  cefTRIAXone   IVPB      cefTRIAXone   IVPB 2000 milliGRAM(s) IV Intermittent every 12 hours  chlorhexidine 2% Cloths 1 Application(s) Topical daily  dextrose 5%. 1000 milliLiter(s) (100 mL/Hr) IV Continuous <Continuous>  dextrose 5%. 1000 milliLiter(s) (50 mL/Hr) IV Continuous <Continuous>  dextrose 50% Injectable 25 Gram(s) IV Push once  dextrose 50% Injectable 12.5 Gram(s) IV Push once  dextrose 50% Injectable 25 Gram(s) IV Push once  epoetin marla (EPOGEN) Injectable 3000 Unit(s) IV Push <User Schedule>  erythromycin   Ointment 1 Application(s) Left EYE <User Schedule>  glucagon  Injectable 1 milliGRAM(s) IntraMuscular once  hydrALAZINE 50 milliGRAM(s) Oral at bedtime  insulin lispro (ADMELOG) corrective regimen sliding scale   SubCutaneous every 6 hours  insulin lispro (ADMELOG) corrective regimen sliding scale   SubCutaneous at bedtime  lidocaine   4% Patch 3 Patch Transdermal daily  lidocaine 1%/epinephrine 1:200,000 Inj 3 milliLiter(s) Local Injection once  moxifloxacin 0.5% Solution 1 Drop(s) Left EYE <User Schedule>  mupirocin 2% Ointment 1 Application(s) Topical every 12 hours  polyethylene glycol 3350 17 Gram(s) Oral daily  povidone iodine 10% Nasal Swab 1 Application(s) Both Nostrils once  povidone iodine 5% Ophthalmic Solution 1 Drop(s) Left EYE once  vancomycin 1 mG/0.1 mL Ophthalmic Injectable 0.1 milliLiter(s) IntraVitreal once      Assessment: 79 year old woman with ESRD on HD (MWF), HTN, DM (diet controlled), chronic type B aortic dissection, heart murmur, endometrial ca in remission s/p total hysterectomy in  presents with bacteremia.    Plan of Care:    #Cardiac risk evaluation-  Volume optimization this morning with HD.  The patient is optimized from a cardiac standpoint to proceed with R knee I&D.       #Bacteremia-  In the setting of ophthalmic infection.   TTE with no obvious evidence of vegetations.  ID input appreciated- plan for BLANE given + BCx and polyarthritis.           #Type B aortic dissection  - Chronic (Diagnosed >10 years ago)  - Optimal BP control.          #ESRD-  - HD this morning as per renal.           Over 55 minutes spent on total encounter; more than 50% of the visit was spent counseling and/or coordinating care by the attending physician.      Gabo Burris MD [facility]  Cardiovascular Disease  [phone]

## 2024-12-18 NOTE — PROGRESS NOTE ADULT - ASSESSMENT
79 yr old female with PMH of  ESRD on HD , HTN, DM, R cataract , glaucoma here for fever and rapid decline of R eye vision .       Bacterial Endophthalmitis OS  - cw  iv antibiotics as per ID   - cultures reviewed repeat neg   - sp Intravitreal Vancomycin   - cw eye drops  - ID fu   - will monitor     R knee septic arthritis  - cw abx  - so I and D     High grade bacteremia  - cw iv abx  - ID fu   - repeat cultures neg   - imaging reviewed   - check BLANE    ESRD  - HD as scheduled  - renal fu     HTN  - cw hydralazine  - DASH diet    DM  - monitor FS  - ISS    Heparin sc for DVT prophylaxis     case dw family at bedside   case dw ACP

## 2024-12-18 NOTE — BRIEF OPERATIVE NOTE - CONDITION POST OP
[Follow-Up Visit] : a follow-up pain management visit [FreeTextEntry2] : follow up for back pain  stable

## 2024-12-18 NOTE — PROGRESS NOTE ADULT - SUBJECTIVE AND OBJECTIVE BOX
[facility] DEPARTMENT OF OPHTHALMOLOGY  ------------------------------------------------------------------------------  Joel Louise MD PGY-3  Pager: [phone], also available on teams  ------------------------------------------------------------------------------    Interval History: No acute events overnight. Today patient denying blurred vision, eye pain, flashes, floaters, FBS, erythema, or discharge.     MEDICATIONS  (STANDING):  acetaminophen     Tablet .. 975 milliGRAM(s) Oral every 8 hours  atropine 1% Solution 1 Drop(s) Left EYE two times a day  cefTAZidime  2.25 mG/0.1 mL Ophthalmic Injectable 0.1 milliLiter(s) IntraVitreal once  cefTRIAXone   IVPB      cefTRIAXone   IVPB 2000 milliGRAM(s) IV Intermittent every 12 hours  chlorhexidine 2% Cloths 1 Application(s) Topical daily  dextrose 5%. 1000 milliLiter(s) (100 mL/Hr) IV Continuous <Continuous>  dextrose 5%. 1000 milliLiter(s) (50 mL/Hr) IV Continuous <Continuous>  dextrose 50% Injectable 25 Gram(s) IV Push once  dextrose 50% Injectable 12.5 Gram(s) IV Push once  dextrose 50% Injectable 25 Gram(s) IV Push once  epoetin marla (EPOGEN) Injectable 3000 Unit(s) IV Push <User Schedule>  erythromycin   Ointment 1 Application(s) Left EYE <User Schedule>  glucagon  Injectable 1 milliGRAM(s) IntraMuscular once  hydrALAZINE 50 milliGRAM(s) Oral at bedtime  insulin lispro (ADMELOG) corrective regimen sliding scale   SubCutaneous every 6 hours  insulin lispro (ADMELOG) corrective regimen sliding scale   SubCutaneous at bedtime  lactated ringers. 1000 milliLiter(s) (75 mL/Hr) IV Continuous <Continuous>  lidocaine   4% Patch 3 Patch Transdermal daily  lidocaine 1%/epinephrine 1:200,000 Inj 3 milliLiter(s) Local Injection once  moxifloxacin 0.5% Solution 1 Drop(s) Left EYE <User Schedule>  mupirocin 2% Ointment 1 Application(s) Topical every 12 hours  polyethylene glycol 3350 17 Gram(s) Oral daily  polyethylene glycol 3350 17 Gram(s) Oral at bedtime  povidone iodine 10% Nasal Swab 1 Application(s) Both Nostrils once  povidone iodine 5% Ophthalmic Solution 1 Drop(s) Left EYE once  senna 2 Tablet(s) Oral at bedtime  vancomycin 1 mG/0.1 mL Ophthalmic Injectable 0.1 milliLiter(s) IntraVitreal once    MEDICATIONS  (PRN):  acetaminophen     Tablet .. 650 milliGRAM(s) Oral every 6 hours PRN Temp greater or equal to 38C (100.4F), Mild Pain (1 - 3), Moderate Pain (4 - 6)  dextrose Oral Gel 15 Gram(s) Oral once PRN Blood Glucose LESS THAN 70 milliGRAM(s)/deciliter  fentaNYL    Injectable 25 MICROGram(s) IV Push every 5 minutes PRN Moderate Pain (4 - 6)  HYDROmorphone  Injectable 0.25 milliGRAM(s) IV Push every 10 minutes PRN Severe Pain (7 - 10)  lidocaine/prilocaine Cream 1 Application(s) Topical daily PRN on HD days for pain  magnesium hydroxide Suspension 30 milliLiter(s) Oral daily PRN Constipation  melatonin 3 milliGRAM(s) Oral at bedtime PRN Insomnia  ondansetron Injectable 4 milliGRAM(s) IV Push every 6 hours PRN Nausea and/or Vomiting  ondansetron Injectable 4 milliGRAM(s) IV Push once PRN Nausea and/or Vomiting  oxyCODONE    IR 5 milliGRAM(s) Oral once PRN Mild Pain (1 - 3)  oxyCODONE    IR 10 milliGRAM(s) Oral every 4 hours PRN Severe Pain (7 - 10)  oxyCODONE    IR 5 milliGRAM(s) Oral every 4 hours PRN Moderate Pain (4 - 6)  traMADol 50 milliGRAM(s) Oral every 6 hours PRN Mild Pain (1 - 3)      VITALS: T(C): 37.1 (12-18-24 @ 14:00)  T(F): 98.8 (12-18-24 @ 14:00), Max: 99.8 (12-18-24 @ 10:37)  HR: 81 (12-18-24 @ 14:30) (75 - 86)  BP: 159/74 (12-18-24 @ 14:30) (132/68 - 195/71)  RR:  (13 - 20)  SpO2:  (96% - 100%)  Wt(kg): --  General: AAO x 3, appropriate mood and affect    Ophthalmology Exam:  Visual acuity (sc): HM OD, NLP OS   Pupils: Minimally reactive OU, no RAPD  Ttono: 15 OS    Pen Light Exam (PLE)  External: Flat OU  Lids/Lashes/Lacrimal Ducts: Flat OU    Sclera/Conjunctiva: W+Q OD, 3+ injection OS   Cornea: Cl OD, OD: large near total epi defect with mildly heaped up edges, corneal haze. No infiltrate. D-folds. Adán neg.   Anterior Chamber: D+F OD; OS: no hypopyon, with flare and likely vitreous in AC  Iris: Flat OD, Posterior synechiae OS  Lens: Dislocated inferior nasally, pt aware, occurred after fall, no view of lens OS       B-scan OS: with dropped lens, vitritis   [facility] DEPARTMENT OF OPHTHALMOLOGY    ------------------------------------------------------------------------------    Interval History: No acute events overnight.     MEDICATIONS  (STANDING):  acetaminophen     Tablet .. 975 milliGRAM(s) Oral every 8 hours  atropine 1% Solution 1 Drop(s) Left EYE two times a day  cefTAZidime  2.25 mG/0.1 mL Ophthalmic Injectable 0.1 milliLiter(s) IntraVitreal once  cefTRIAXone   IVPB      cefTRIAXone   IVPB 2000 milliGRAM(s) IV Intermittent every 12 hours  chlorhexidine 2% Cloths 1 Application(s) Topical daily  dextrose 5%. 1000 milliLiter(s) (100 mL/Hr) IV Continuous <Continuous>  dextrose 5%. 1000 milliLiter(s) (50 mL/Hr) IV Continuous <Continuous>  dextrose 50% Injectable 25 Gram(s) IV Push once  dextrose 50% Injectable 12.5 Gram(s) IV Push once  dextrose 50% Injectable 25 Gram(s) IV Push once  epoetin marla (EPOGEN) Injectable 3000 Unit(s) IV Push <User Schedule>  erythromycin   Ointment 1 Application(s) Left EYE <User Schedule>  glucagon  Injectable 1 milliGRAM(s) IntraMuscular once  hydrALAZINE 50 milliGRAM(s) Oral at bedtime  insulin lispro (ADMELOG) corrective regimen sliding scale   SubCutaneous every 6 hours  insulin lispro (ADMELOG) corrective regimen sliding scale   SubCutaneous at bedtime  lactated ringers. 1000 milliLiter(s) (75 mL/Hr) IV Continuous <Continuous>  lidocaine   4% Patch 3 Patch Transdermal daily  lidocaine 1%/epinephrine 1:200,000 Inj 3 milliLiter(s) Local Injection once  moxifloxacin 0.5% Solution 1 Drop(s) Left EYE <User Schedule>  mupirocin 2% Ointment 1 Application(s) Topical every 12 hours  polyethylene glycol 3350 17 Gram(s) Oral daily  polyethylene glycol 3350 17 Gram(s) Oral at bedtime  povidone iodine 10% Nasal Swab 1 Application(s) Both Nostrils once  povidone iodine 5% Ophthalmic Solution 1 Drop(s) Left EYE once  senna 2 Tablet(s) Oral at bedtime  vancomycin 1 mG/0.1 mL Ophthalmic Injectable 0.1 milliLiter(s) IntraVitreal once    MEDICATIONS  (PRN):  acetaminophen     Tablet .. 650 milliGRAM(s) Oral every 6 hours PRN Temp greater or equal to 38C (100.4F), Mild Pain (1 - 3), Moderate Pain (4 - 6)  dextrose Oral Gel 15 Gram(s) Oral once PRN Blood Glucose LESS THAN 70 milliGRAM(s)/deciliter  fentaNYL    Injectable 25 MICROGram(s) IV Push every 5 minutes PRN Moderate Pain (4 - 6)  HYDROmorphone  Injectable 0.25 milliGRAM(s) IV Push every 10 minutes PRN Severe Pain (7 - 10)  lidocaine/prilocaine Cream 1 Application(s) Topical daily PRN on HD days for pain  magnesium hydroxide Suspension 30 milliLiter(s) Oral daily PRN Constipation  melatonin 3 milliGRAM(s) Oral at bedtime PRN Insomnia  ondansetron Injectable 4 milliGRAM(s) IV Push every 6 hours PRN Nausea and/or Vomiting  ondansetron Injectable 4 milliGRAM(s) IV Push once PRN Nausea and/or Vomiting  oxyCODONE    IR 5 milliGRAM(s) Oral once PRN Mild Pain (1 - 3)  oxyCODONE    IR 10 milliGRAM(s) Oral every 4 hours PRN Severe Pain (7 - 10)  oxyCODONE    IR 5 milliGRAM(s) Oral every 4 hours PRN Moderate Pain (4 - 6)  traMADol 50 milliGRAM(s) Oral every 6 hours PRN Mild Pain (1 - 3)      VITALS: T(C): 37.1 (12-18-24 @ 14:00)  T(F): 98.8 (12-18-24 @ 14:00), Max: 99.8 (12-18-24 @ 10:37)  HR: 81 (12-18-24 @ 14:30) (75 - 86)  BP: 159/74 (12-18-24 @ 14:30) (132/68 - 195/71)  RR:  (13 - 20)  SpO2:  (96% - 100%)  Wt(kg): --  General: AAO x 3, appropriate mood and affect    Ophthalmology Exam:  Visual acuity (sc): HM OD, NLP OS   Pupils: Minimally reactive OU, no RAPD  Ttono: 15 OS    Pen Light Exam (PLE)  External: Flat OU  Lids/Lashes/Lacrimal Ducts: Flat OU    Sclera/Conjunctiva: W+Q OD, 3+ injection OS   Cornea: Cl OD, OD: large near total epi defect with mildly heaped up edges, corneal haze. No infiltrate. D-folds. Adán neg.   Anterior Chamber: D+F OD; OS: no hypopyon, with flare and likely vitreous in AC  Iris: Flat OD, Posterior synechiae OS  Lens: Dislocated inferior nasally, pt aware, occurred after fall, no view of lens OS       B-scan OS: with dropped lens, vitritis

## 2024-12-18 NOTE — PROGRESS NOTE ADULT - ASSESSMENT
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 Tolerated well. See note.   - Va worsened to NLP OS 12/15  - AC tap cultures pending. Gram stain showing gram + cocci in pairs.   - Anterior exam with no view of Lens OS. B scan with dropped lens. Does not appear to be an RD.   - 12/18: VA OS NLP, no hypopyon, B-scan with vitritis and dropped lens  - continue broad spectrum abx per primary team and ID  - Appreciate ID recs   - continue with atropine drop BID to left eye  - continue with moxifloxacin q4 hours to left eye  - Increase erythromycin ointment q2hr to left eye  - Will hold off on topical steroids for now given epi defect        DW Dr. Kim, retina attending.     Outpatient Follow-up: Patient should follow-up with his/her ophthalmologist or with [facility] Department of Ophthalmology within 1 week of after discharge at:    [address]  [place] [address]  [phone]

## 2024-12-18 NOTE — PROGRESS NOTE ADULT - SUBJECTIVE AND OBJECTIVE BOX
[facility] Division of Kidney Diseases & Hypertension  FOLLOW UP NOTE  [phone]--------------------------------------------------------------------------------    Chief Complaint: ESRD/HD management    24 hour events/subjective: Pt. was jj nd evaluated at bedside earlier today. Pt. reports feeling, offers no complaints. Denies any headaches, fevers/chills, chest pain, SOB, and LE edema.    PAST HISTORY  --------------------------------------------------------------------------------  No significant changes to PMH, PSH, FHx, SHx, unless otherwise noted    ALLERGIES & MEDICATIONS  --------------------------------------------------------------------------------  Allergies    No Known Allergies    Intolerances    Standing Inpatient Medications  atropine 1% Solution 1 Drop(s) Left EYE two times a day  cefTAZidime  2.25 mG/0.1 mL Ophthalmic Injectable 0.1 milliLiter(s) IntraVitreal once  cefTRIAXone   IVPB      cefTRIAXone   IVPB 2000 milliGRAM(s) IV Intermittent every 12 hours  chlorhexidine 2% Cloths 1 Application(s) Topical daily  dextrose 5%. 1000 milliLiter(s) IV Continuous <Continuous>  dextrose 5%. 1000 milliLiter(s) IV Continuous <Continuous>  dextrose 50% Injectable 25 Gram(s) IV Push once  dextrose 50% Injectable 12.5 Gram(s) IV Push once  dextrose 50% Injectable 25 Gram(s) IV Push once  epoetin marla (EPOGEN) Injectable 3000 Unit(s) IV Push <User Schedule>  erythromycin   Ointment 1 Application(s) Left EYE <User Schedule>  glucagon  Injectable 1 milliGRAM(s) IntraMuscular once  hydrALAZINE 50 milliGRAM(s) Oral at bedtime  insulin lispro (ADMELOG) corrective regimen sliding scale   SubCutaneous at bedtime  insulin lispro (ADMELOG) corrective regimen sliding scale   SubCutaneous every 6 hours  lidocaine   4% Patch 3 Patch Transdermal daily  lidocaine 1%/epinephrine 1:200,000 Inj 3 milliLiter(s) Local Injection once  moxifloxacin 0.5% Solution 1 Drop(s) Left EYE <User Schedule>  mupirocin 2% Ointment 1 Application(s) Topical every 12 hours  polyethylene glycol 3350 17 Gram(s) Oral daily  povidone iodine 10% Nasal Swab 1 Application(s) Both Nostrils once  povidone iodine 5% Ophthalmic Solution 1 Drop(s) Left EYE once  vancomycin 1 mG/0.1 mL Ophthalmic Injectable 0.1 milliLiter(s) IntraVitreal once    PRN Inpatient Medications  acetaminophen     Tablet .. 650 milliGRAM(s) Oral every 6 hours PRN  dextrose Oral Gel 15 Gram(s) Oral once PRN  lidocaine/prilocaine Cream 1 Application(s) Topical daily PRN  melatonin 3 milliGRAM(s) Oral at bedtime PRN      REVIEW OF SYSTEMS  --------------------------------------------------------------------------------  Gen: No fevers/chills  Respiratory: No dyspnea  CV: No chest pain  GI: No abdominal pain  MSK: No edema  Neuro: No dizziness/lightheadedness    All other systems were reviewed and are negative, except as noted.     VITALS/PHYSICAL EXAM  --------------------------------------------------------------------------------  T(C): 37.7 (12-18-24 @ 10:37), Max: 37.7 (12-18-24 @ 10:37)  HR: 85 (12-18-24 @ 10:37) (78 - 86)  BP: 154/67 (12-18-24 @ 10:37) (132/68 - 167/77)  RR: 16 (12-18-24 @ 10:37) (16 - 18)  SpO2: 99% (12-18-24 @ 10:37) (99% - 99%)  Wt(kg): --    12-17-24 @ 07:01  -  12-18-24 @ 07:00  --------------------------------------------------------  IN: 25 mL / OUT: 55 mL / NET: -30 mL    12-18-24 @ 07:01  -  12-18-24 @ 12:43  --------------------------------------------------------  IN: 400 mL / OUT: 2400 mL / NET: -2000 mL    Physical Exam:  Gen: NAD, resting comfortably in bed  HEENT: on room air  Pulm: CTA B/L  CV: + murmur  Abd: soft  : No fede  LE: no edema  Skin: Warm, without rashes  Vascular access: RUE AVF with thrill and bruit    LABS/STUDIES  --------------------------------------------------------------------------------              9.8    9.60  >-----------<  194      [12-18-24 @ 01:15]              28.2     134  |  94  |  53  ----------------------------<  223      [12-18-24 @ 01:15]  4.4   |  24  |  4.64        Ca     8.3     [12-18-24 @ 01:15]      Mg     2.30     [12-18-24 @ 01:15]      Phos  5.3     [12-18-24 @ 01:15]    PT/INR: PT 12.6 , INR 1.06       [12-18-24 @ 03:45]  PTT: 31.7       [12-18-24 @ 03:45]    Creatinine Trend:  SCr 4.64 [12-18 @ 01:15]  SCr 3.29 [12-17 @ 06:57]  SCr 3.72 [12-16 @ 23:32]  SCr 5.16 [12-15 @ 04:42]  SCr 3.72 [12-14 @ 07:55]    TSH 1.73      [12-13-24 @ 17:56]  Lipid: chol 139, , HDL 45, LDL --      [12-13-24 @ 17:56]    HBsAb 62.2      [12-13-24 @ 16:38]  HBsAg Nonreact      [12-13-24 @ 16:38]  HBcAb Reactive      [12-13-24 @ 16:38]  HCV 0.20, Nonreact      [12-13-24 @ 16:38]

## 2024-12-18 NOTE — PROGRESS NOTE ADULT - SUBJECTIVE AND OBJECTIVE BOX
SURGERY DAILY PROGRESS NOTE    ***INCOMPLETE***     SUBJECTIVE: Patient was in dialysis when team came to evaluate at bedside. Will return after dialysis to assess.     ------------------------------------------------------------------------------------------------------------  OBJECTIVE:  Vital Signs Last 24 Hrs  T(C): 37 (18 Dec 2024 05:26), Max: 37 (18 Dec 2024 05:26)  T(F): 98.6 (18 Dec 2024 05:26), Max: 98.6 (18 Dec 2024 05:26)  HR: 84 (18 Dec 2024 05:26) (80 - 84)  BP: 149/68 (18 Dec 2024 05:26) (132/68 - 155/66)  BP(mean): --  RR: 17 (18 Dec 2024 05:26) (17 - 18)  SpO2: 99% (18 Dec 2024 05:26) (99% - 100%)    Parameters below as of 18 Dec 2024 05:26  Patient On (Oxygen Delivery Method): room air      I&O's Detail    16 Dec 2024 07:01  -  17 Dec 2024 07:00  --------------------------------------------------------  IN:    Other (mL): 400 mL  Total IN: 400 mL    OUT:    Other (mL): 2400 mL  Total OUT: 2400 mL    Total NET: -2000 mL      17 Dec 2024 07:01  -  18 Dec 2024 06:39  --------------------------------------------------------  IN:    Oral Fluid: 25 mL  Total IN: 25 mL    OUT:    Voided (mL): 55 mL  Total OUT: 55 mL    Total NET: -30 mL            PHYSICAL EXAM:      A/P  -I&D today   -pending medical clearance today after hemodialysis    SURGERY DAILY PROGRESS NOTE      SUBJECTIVE: Patient was evaluated in dialysis suite. Reports reduced R medial knee pain since joint aspiration yesterday and new R lateral ankle pain with ROM.     ------------------------------------------------------------------------------------------------------------  OBJECTIVE:  Vital Signs Last 24 Hrs  T(C): 37 (18 Dec 2024 05:26), Max: 37 (18 Dec 2024 05:26)  T(F): 98.6 (18 Dec 2024 05:26), Max: 98.6 (18 Dec 2024 05:26)  HR: 84 (18 Dec 2024 05:26) (80 - 84)  BP: 149/68 (18 Dec 2024 05:26) (132/68 - 155/66)  BP(mean): --  RR: 17 (18 Dec 2024 05:26) (17 - 18)  SpO2: 99% (18 Dec 2024 05:26) (99% - 100%)    Parameters below as of 18 Dec 2024 05:26  Patient On (Oxygen Delivery Method): room air      I&O's Detail    16 Dec 2024 07:01  -  17 Dec 2024 07:00  --------------------------------------------------------  IN:    Other (mL): 400 mL  Total IN: 400 mL    OUT:    Other (mL): 2400 mL  Total OUT: 2400 mL    Total NET: -2000 mL      17 Dec 2024 07:01  -  18 Dec 2024 06:39  --------------------------------------------------------  IN:    Oral Fluid: 25 mL  Total IN: 25 mL    OUT:    Voided (mL): 55 mL  Total OUT: 55 mL    Total NET: -30 mL      PHYSICAL EXAM:  Gen: NAD  RLE:     Knee: Skin intact, + soft tissue swelling, mild effusion around R knee. No erythema, No warmth of knee.   + TTP about knee  pROM 0-90 and aROM 0-110    Ankle: Moderate R lateral ankle swelling, no erythema, or warmth. Pain with dorsiflexion.     Compartments soft  Motor intact GS/TA/EHL/FHL  SILT S/S/SP/DP/T, 2+ DP      A/P  -I&D today   -pending medical clearance today after hemodialysis

## 2024-12-18 NOTE — PROGRESS NOTE ADULT - SUBJECTIVE AND OBJECTIVE BOX
ORTHO POC    Pt seen and examined at bedside, denies SOB, CP, Dizziness. N/V/D /HA.  No significant overnight events. Pain well controlled.   Vital Signs Last 24 Hrs  T(C): 37.1 (18 Dec 2024 13:00), Max: 37.7 (18 Dec 2024 10:37)  T(F): 98.8 (18 Dec 2024 13:00), Max: 99.8 (18 Dec 2024 10:37)  HR: 77 (18 Dec 2024 13:15) (75 - 86)  BP: 184/74 (18 Dec 2024 13:15) (132/68 - 195/71)  BP(mean): 100 (18 Dec 2024 13:15) (100 - 114)  RR: 20 (18 Dec 2024 13:15) (13 - 20)  SpO2: 100% (18 Dec 2024 13:15) (99% - 100%)    Parameters below as of 18 Dec 2024 13:00  Patient On (Oxygen Delivery Method): mask, simple face  O2 Flow (L/min): 3      Gen: No apparent distress    right LE:  Dressing C/D I    BLE: motor intact EHL/FHL/TA/GS .  Sensation is grossly intact to light touch in the distal extremities.  Compartments are soft bilaterally.  Extremities are warm .  DP 2+ BLE     Labs:  CBC Full  -  ( 18 Dec 2024 01:15 )  WBC Count : 9.60 K/uL  RBC Count : 3.16 M/uL  Hemoglobin : 9.8 g/dL  Hematocrit : 28.2 %  Platelet Count - Automated : 194 K/uL  Mean Cell Volume : 89.2 fL  Mean Cell Hemoglobin : 31.0 pg  Mean Cell Hemoglobin Concentration : 34.8 g/dL  Auto Neutrophil # : x  Auto Lymphocyte # : x  Auto Monocyte # : x  Auto Eosinophil # : x  Auto Basophil # : x  Auto Neutrophil % : x  Auto Lymphocyte % : x  Auto Monocyte % : x  Auto Eosinophil % : x  Auto Basophil % : x        12-18    134[L]  |  94[L]  |  53[H]  ----------------------------<  223[H]  4.4   |  24  |  4.64[H]    Ca    8.3[L]      18 Dec 2024 01:15  Phos  5.3     12-18  Mg     2.30     12-18           A/P  s/p I and D right septic knee    - Pain control/ Analgesia  - DVT ppx SQH foot pumps  - PT/OT - wbat/oob    - Incentive Spirometer  - IV ABX per ID  - notify Ortho for questions

## 2024-12-18 NOTE — PROGRESS NOTE ADULT - PROBLEM SELECTOR PLAN 2
Pt. with anemia in the setting of ESRD. Hgb (9.8) is slightly below goal for ESRD. Continue EPO with HD TIW, will consider increasing dose pending clinical course. Monitor Hgb, goal: 10-11.    If you have any questions, please feel free to contact me  Verónica Madrigal  Nephrology  [phone] / [employer] Teams(Preferred)  (After 4 pm or on weekends please page the on-call fellow)

## 2024-12-19 ENCOUNTER — RESULT REVIEW (OUTPATIENT)
Age: 79
End: 2024-12-19

## 2024-12-19 LAB
ANION GAP SERPL CALC-SCNC: 14 MMOL/L — SIGNIFICANT CHANGE UP (ref 7–14)
BASOPHILS # BLD AUTO: 0.02 K/UL — SIGNIFICANT CHANGE UP (ref 0–0.2)
BASOPHILS NFR BLD AUTO: 0.2 % — SIGNIFICANT CHANGE UP (ref 0–2)
BUN SERPL-MCNC: 36 MG/DL — HIGH (ref 7–23)
CALCIUM SERPL-MCNC: 8.5 MG/DL — SIGNIFICANT CHANGE UP (ref 8.4–10.5)
CHLORIDE SERPL-SCNC: 95 MMOL/L — LOW (ref 98–107)
CO2 SERPL-SCNC: 24 MMOL/L — SIGNIFICANT CHANGE UP (ref 22–31)
CREAT SERPL-MCNC: 3.74 MG/DL — HIGH (ref 0.5–1.3)
CULTURE RESULTS: ABNORMAL
CULTURE RESULTS: SIGNIFICANT CHANGE UP
EGFR: 12 ML/MIN/1.73M2 — LOW
EOSINOPHIL # BLD AUTO: 0.05 K/UL — SIGNIFICANT CHANGE UP (ref 0–0.5)
EOSINOPHIL NFR BLD AUTO: 0.6 % — SIGNIFICANT CHANGE UP (ref 0–6)
GLUCOSE BLDC GLUCOMTR-MCNC: 153 MG/DL — HIGH (ref 70–99)
GLUCOSE BLDC GLUCOMTR-MCNC: 177 MG/DL — HIGH (ref 70–99)
GLUCOSE BLDC GLUCOMTR-MCNC: 198 MG/DL — HIGH (ref 70–99)
GLUCOSE BLDC GLUCOMTR-MCNC: 210 MG/DL — HIGH (ref 70–99)
GLUCOSE BLDC GLUCOMTR-MCNC: 235 MG/DL — HIGH (ref 70–99)
GLUCOSE SERPL-MCNC: 198 MG/DL — HIGH (ref 70–99)
GRAM STN FLD: SIGNIFICANT CHANGE UP
HCT VFR BLD CALC: 26.8 % — LOW (ref 34.5–45)
HGB BLD-MCNC: 9.2 G/DL — LOW (ref 11.5–15.5)
IANC: 6.01 K/UL — SIGNIFICANT CHANGE UP (ref 1.8–7.4)
IMM GRANULOCYTES NFR BLD AUTO: 2.6 % — HIGH (ref 0–0.9)
LYMPHOCYTES # BLD AUTO: 1.11 K/UL — SIGNIFICANT CHANGE UP (ref 1–3.3)
LYMPHOCYTES # BLD AUTO: 13.2 % — SIGNIFICANT CHANGE UP (ref 13–44)
MAGNESIUM SERPL-MCNC: 2.2 MG/DL — SIGNIFICANT CHANGE UP (ref 1.6–2.6)
MCHC RBC-ENTMCNC: 30.9 PG — SIGNIFICANT CHANGE UP (ref 27–34)
MCHC RBC-ENTMCNC: 34.3 G/DL — SIGNIFICANT CHANGE UP (ref 32–36)
MCV RBC AUTO: 89.9 FL — SIGNIFICANT CHANGE UP (ref 80–100)
MONOCYTES # BLD AUTO: 0.97 K/UL — HIGH (ref 0–0.9)
MONOCYTES NFR BLD AUTO: 11.6 % — SIGNIFICANT CHANGE UP (ref 2–14)
NEUTROPHILS # BLD AUTO: 6.01 K/UL — SIGNIFICANT CHANGE UP (ref 1.8–7.4)
NEUTROPHILS NFR BLD AUTO: 71.8 % — SIGNIFICANT CHANGE UP (ref 43–77)
NRBC # BLD: 0 /100 WBCS — SIGNIFICANT CHANGE UP (ref 0–0)
NRBC # FLD: 0 K/UL — SIGNIFICANT CHANGE UP (ref 0–0)
ORGANISM # SPEC MICROSCOPIC CNT: ABNORMAL
ORGANISM # SPEC MICROSCOPIC CNT: ABNORMAL
PHOSPHATE SERPL-MCNC: 6.3 MG/DL — HIGH (ref 2.5–4.5)
PLATELET # BLD AUTO: 228 K/UL — SIGNIFICANT CHANGE UP (ref 150–400)
POTASSIUM SERPL-MCNC: 4.3 MMOL/L — SIGNIFICANT CHANGE UP (ref 3.5–5.3)
POTASSIUM SERPL-SCNC: 4.3 MMOL/L — SIGNIFICANT CHANGE UP (ref 3.5–5.3)
RBC # BLD: 2.98 M/UL — LOW (ref 3.8–5.2)
RBC # FLD: 13.9 % — SIGNIFICANT CHANGE UP (ref 10.3–14.5)
SODIUM SERPL-SCNC: 133 MMOL/L — LOW (ref 135–145)
SPECIMEN SOURCE: SIGNIFICANT CHANGE UP
WBC # BLD: 8.38 K/UL — SIGNIFICANT CHANGE UP (ref 3.8–10.5)
WBC # FLD AUTO: 8.38 K/UL — SIGNIFICANT CHANGE UP (ref 3.8–10.5)

## 2024-12-19 PROCEDURE — 76376 3D RENDER W/INTRP POSTPROCES: CPT | Mod: 26

## 2024-12-19 PROCEDURE — 93320 DOPPLER ECHO COMPLETE: CPT | Mod: 26,GC

## 2024-12-19 PROCEDURE — 93325 DOPPLER ECHO COLOR FLOW MAPG: CPT | Mod: 26,GC

## 2024-12-19 PROCEDURE — 99232 SBSQ HOSP IP/OBS MODERATE 35: CPT

## 2024-12-19 PROCEDURE — 93312 ECHO TRANSESOPHAGEAL: CPT | Mod: 26

## 2024-12-19 RX ORDER — OXYCODONE HCL 15 MG
10 TABLET ORAL EVERY 4 HOURS
Refills: 0 | Status: DISCONTINUED | OUTPATIENT
Start: 2024-12-19 | End: 2024-12-25

## 2024-12-19 RX ORDER — TRAMADOL HYDROCHLORIDE 50 MG/1
50 TABLET ORAL EVERY 6 HOURS
Refills: 0 | Status: DISCONTINUED | OUTPATIENT
Start: 2024-12-19 | End: 2024-12-19

## 2024-12-19 RX ORDER — INSULIN LISPRO 100/ML
VIAL (ML) SUBCUTANEOUS EVERY 6 HOURS
Refills: 0 | Status: DISCONTINUED | OUTPATIENT
Start: 2024-12-19 | End: 2024-12-19

## 2024-12-19 RX ORDER — OXYCODONE HCL 15 MG
5 TABLET ORAL EVERY 4 HOURS
Refills: 0 | Status: DISCONTINUED | OUTPATIENT
Start: 2024-12-19 | End: 2024-12-19

## 2024-12-19 RX ADMIN — ACETAMINOPHEN 975 MILLIGRAM(S): 80 SOLUTION/ DROPS ORAL at 05:08

## 2024-12-19 RX ADMIN — Medication 10 MILLIGRAM(S): at 05:08

## 2024-12-19 RX ADMIN — Medication 1 APPLICATION(S): at 18:53

## 2024-12-19 RX ADMIN — SENNOSIDES 2 TABLET(S): 8.6 TABLET, FILM COATED ORAL at 22:30

## 2024-12-19 RX ADMIN — ERYTHROMYCIN 1 APPLICATION(S): 5 OINTMENT OPHTHALMIC at 09:46

## 2024-12-19 RX ADMIN — LIDOCAINE 3 PATCH: 50 OINTMENT TOPICAL at 21:45

## 2024-12-19 RX ADMIN — LIDOCAINE 3 PATCH: 50 OINTMENT TOPICAL at 13:08

## 2024-12-19 RX ADMIN — CEFTRIAXONE SODIUM 100 MILLIGRAM(S): 1 INJECTION, POWDER, FOR SOLUTION INTRAMUSCULAR; INTRAVENOUS at 05:09

## 2024-12-19 RX ADMIN — Medication 1 DROP(S): at 18:52

## 2024-12-19 RX ADMIN — CEFTRIAXONE SODIUM 100 MILLIGRAM(S): 1 INJECTION, POWDER, FOR SOLUTION INTRAMUSCULAR; INTRAVENOUS at 18:52

## 2024-12-19 RX ADMIN — ERYTHROMYCIN 1 APPLICATION(S): 5 OINTMENT OPHTHALMIC at 13:06

## 2024-12-19 RX ADMIN — Medication 1 DROP(S): at 22:29

## 2024-12-19 RX ADMIN — CHLORHEXIDINE GLUCONATE 1 APPLICATION(S): 1.2 RINSE ORAL at 13:09

## 2024-12-19 RX ADMIN — Medication 1: at 13:05

## 2024-12-19 RX ADMIN — ERYTHROMYCIN 1 APPLICATION(S): 5 OINTMENT OPHTHALMIC at 05:25

## 2024-12-19 RX ADMIN — Medication 1 DROP(S): at 13:07

## 2024-12-19 RX ADMIN — ERYTHROMYCIN 1 APPLICATION(S): 5 OINTMENT OPHTHALMIC at 18:53

## 2024-12-19 RX ADMIN — Medication 1 DROP(S): at 09:46

## 2024-12-19 RX ADMIN — Medication 1 DROP(S): at 05:10

## 2024-12-19 RX ADMIN — Medication 10 MILLIGRAM(S): at 05:38

## 2024-12-19 RX ADMIN — HYDRALAZINE HYDROCHLORIDE 50 MILLIGRAM(S): 10 TABLET ORAL at 22:30

## 2024-12-19 RX ADMIN — ACETAMINOPHEN 975 MILLIGRAM(S): 80 SOLUTION/ DROPS ORAL at 22:30

## 2024-12-19 RX ADMIN — Medication 17 GRAM(S): at 22:31

## 2024-12-19 RX ADMIN — Medication 1 APPLICATION(S): at 05:09

## 2024-12-19 RX ADMIN — ERYTHROMYCIN 1 APPLICATION(S): 5 OINTMENT OPHTHALMIC at 22:29

## 2024-12-19 NOTE — OCCUPATIONAL THERAPY INITIAL EVALUATION ADULT - DIAGNOSIS, OT EVAL
s/p bacterial endophthalmitis, s/p right knee I&D for septic arthritis; decreased functional mobility, decreased ADL performance

## 2024-12-19 NOTE — PHYSICAL THERAPY INITIAL EVALUATION ADULT - NSPTDISCHREC_GEN_A_CORE
to optimize safety in the home environment and promote improvement of strength and functional deficits/Home PT
to improve strength, balance, and return to previous level of functional mobility./Sub-acute Rehab

## 2024-12-19 NOTE — DIETITIAN INITIAL EVALUATION ADULT - OTHER INFO
Pt seen at bedside. Pt is NPO at time of visits for possible BLANE. Pt reported good PO intake in house. Per RN flowsheet, Pt with % intake noted. No food preferences verbalized at this time. Pt amenable to Nepro once daily. Patient denies difficulty chewing or swallowing at present. Pt denies any nausea, vomiting, diarrhea, or constipation at this time. Per chart, last BM 12/17. Ordered for bowel regimen: Miralax, senna. Labs noted for hyperglycemia, hyponatremia, hyperphosphatemia, elevated BUN, Cr, and HgA1c. ESRD on HD. Pt educated on Consistent Carbohydrate, Renal diet and understanding. Nephrology following. RD remains available upon request and will follow up per protocol.

## 2024-12-19 NOTE — PROGRESS NOTE ADULT - ASSESSMENT
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 Tolerated well. See note.   - Va worsened to NLP OS 12/15  - AC tap cultures pending. Gram stain showing gram + cocci in pairs.   - Anterior exam with no view of Lens OS. B scan with dropped lens. Does not appear to be an RD.   - 12/19: VA OS NLP, no hypopyon, B-scan with vitritis and dropped lens  - Visual prognosis guarded given NLP vision  - continue broad spectrum abx per primary team and ID  - Appreciate ID recs   - continue with atropine drop BID to left eye  - continue with moxifloxacin q4 hours to left eye  - Increase erythromycin ointment q2hr to left eye  - Will hold off on topical steroids for now given epi defect        DW Dr. Kim, retina attending.     Outpatient Follow-up: Patient should follow-up with his/her ophthalmologist or with [facility] Department of Ophthalmology within 1 week of after discharge at:    [address] [facility] [address]  [place] [address]  [phone]

## 2024-12-19 NOTE — DIETITIAN INITIAL EVALUATION ADULT - ENTER TO (G/KG)
Fk 6.8 at  14 hr trough; dose increased from 3.5/3 to 3.5 mg BID.   Mag 1.3; supplement increased to BID.  Pt called with results and med changes. Stress the importance of ensuring a 12-hour trough. Reviewed timing for next week's draw.     Pt verbalized understanding. First cardiac rehab on Tuesday 4/23.   
1.4

## 2024-12-19 NOTE — DIETITIAN INITIAL EVALUATION ADULT - ORAL INTAKE PTA/DIET HISTORY
Patient reports good appetite/PO intake PTA, consumes a Renal diet at baseline. Pt confirms NKFA, food intolerance. Pt reported dry wt 51kg. Collagen, vitamin D, C calcium, magnesium and Nepro taken PTA.

## 2024-12-19 NOTE — OCCUPATIONAL THERAPY INITIAL EVALUATION ADULT - ASR WT BEARING STATUS EVAL
Writer left message for Dang to discuss if she'd like to proceed with repeating 30 day ABHIJIT vs Linq monitor placement.  Patient does have appointment with cardiology next month.  It may be that she will like to wait to discuss that further at that visit.  Will discuss upon her returned call.   
Right LE

## 2024-12-19 NOTE — PHYSICAL THERAPY INITIAL EVALUATION ADULT - DIAGNOSIS, PT EVAL
decreased functional mobility, generalized weakness, difficulty ambulating
decreased functional mobility, generalized weakness, difficulty ambulating

## 2024-12-19 NOTE — PROGRESS NOTE ADULT - SUBJECTIVE AND OBJECTIVE BOX
Patient is a 79y old  Female who presents with a chief complaint of Per chart, Pt is 79 yr old female with PMH of  ESRD on HD , HTN, DM, R cataract , glaucoma here for fever and rapid decline of R eye vision .      (19 Dec 2024 13:16)    Date of servie : 12-19-24 @ 13:40  INTERVAL HPI/OVERNIGHT EVENTS:  T(C): 36.8 (12-19-24 @ 10:25), Max: 37.1 (12-18-24 @ 14:00)  HR: 66 (12-19-24 @ 10:25) (66 - 83)  BP: 137/70 (12-19-24 @ 10:25) (133/65 - 166/70)  RR: 18 (12-19-24 @ 10:25) (13 - 18)  SpO2: 97% (12-19-24 @ 10:25) (96% - 100%)  Wt(kg): --  I&O's Summary    18 Dec 2024 07:01  -  19 Dec 2024 07:00  --------------------------------------------------------  IN: 640 mL / OUT: 2400 mL / NET: -1760 mL        LABS:                        9.2    8.38  )-----------( 228      ( 19 Dec 2024 04:48 )             26.8     12-19    133[L]  |  95[L]  |  36[H]  ----------------------------<  198[H]  4.3   |  24  |  3.74[H]    Ca    8.5      19 Dec 2024 04:48  Phos  6.3     12-19  Mg     2.20     12-19      PT/INR - ( 18 Dec 2024 03:45 )   PT: 12.6 sec;   INR: 1.06 ratio         PTT - ( 18 Dec 2024 03:45 )  PTT:31.7 sec  Urinalysis Basic - ( 19 Dec 2024 04:48 )    Color: x / Appearance: x / SG: x / pH: x  Gluc: 198 mg/dL / Ketone: x  / Bili: x / Urobili: x   Blood: x / Protein: x / Nitrite: x   Leuk Esterase: x / RBC: x / WBC x   Sq Epi: x / Non Sq Epi: x / Bacteria: x      CAPILLARY BLOOD GLUCOSE      POCT Blood Glucose.: 198 mg/dL (19 Dec 2024 12:12)  POCT Blood Glucose.: 210 mg/dL (19 Dec 2024 05:33)  POCT Blood Glucose.: 246 mg/dL (18 Dec 2024 22:18)  POCT Blood Glucose.: 242 mg/dL (18 Dec 2024 17:33)  POCT Blood Glucose.: 156 mg/dL (18 Dec 2024 14:33)        Urinalysis Basic - ( 19 Dec 2024 04:48 )    Color: x / Appearance: x / SG: x / pH: x  Gluc: 198 mg/dL / Ketone: x  / Bili: x / Urobili: x   Blood: x / Protein: x / Nitrite: x   Leuk Esterase: x / RBC: x / WBC x   Sq Epi: x / Non Sq Epi: x / Bacteria: x        MEDICATIONS  (STANDING):  acetaminophen     Tablet .. 975 milliGRAM(s) Oral every 8 hours  atropine 1% Solution 1 Drop(s) Left EYE two times a day  cefTAZidime  2.25 mG/0.1 mL Ophthalmic Injectable 0.1 milliLiter(s) IntraVitreal once  cefTRIAXone   IVPB 2000 milliGRAM(s) IV Intermittent every 12 hours  cefTRIAXone   IVPB      chlorhexidine 2% Cloths 1 Application(s) Topical daily  dextrose 5%. 1000 milliLiter(s) (50 mL/Hr) IV Continuous <Continuous>  dextrose 5%. 1000 milliLiter(s) (100 mL/Hr) IV Continuous <Continuous>  dextrose 50% Injectable 25 Gram(s) IV Push once  dextrose 50% Injectable 12.5 Gram(s) IV Push once  dextrose 50% Injectable 25 Gram(s) IV Push once  epoetin marla (EPOGEN) Injectable 3000 Unit(s) IV Push <User Schedule>  erythromycin   Ointment 1 Application(s) Left EYE <User Schedule>  glucagon  Injectable 1 milliGRAM(s) IntraMuscular once  hydrALAZINE 50 milliGRAM(s) Oral at bedtime  insulin lispro (ADMELOG) corrective regimen sliding scale   SubCutaneous at bedtime  insulin lispro (ADMELOG) corrective regimen sliding scale   SubCutaneous three times a day before meals  insulin lispro (ADMELOG) corrective regimen sliding scale   SubCutaneous every 6 hours  lidocaine   4% Patch 3 Patch Transdermal daily  lidocaine 1%/epinephrine 1:200,000 Inj 3 milliLiter(s) Local Injection once  moxifloxacin 0.5% Solution 1 Drop(s) Left EYE <User Schedule>  mupirocin 2% Ointment 1 Application(s) Topical every 12 hours  polyethylene glycol 3350 17 Gram(s) Oral daily  polyethylene glycol 3350 17 Gram(s) Oral at bedtime  povidone iodine 10% Nasal Swab 1 Application(s) Both Nostrils once  povidone iodine 5% Ophthalmic Solution 1 Drop(s) Left EYE once  senna 2 Tablet(s) Oral at bedtime  vancomycin 1 mG/0.1 mL Ophthalmic Injectable 0.1 milliLiter(s) IntraVitreal once    MEDICATIONS  (PRN):  acetaminophen     Tablet .. 650 milliGRAM(s) Oral every 6 hours PRN Temp greater or equal to 38C (100.4F), Mild Pain (1 - 3), Moderate Pain (4 - 6)  dextrose Oral Gel 15 Gram(s) Oral once PRN Blood Glucose LESS THAN 70 milliGRAM(s)/deciliter  lidocaine/prilocaine Cream 1 Application(s) Topical daily PRN on HD days for pain  magnesium hydroxide Suspension 30 milliLiter(s) Oral daily PRN Constipation  melatonin 3 milliGRAM(s) Oral at bedtime PRN Insomnia  ondansetron Injectable 4 milliGRAM(s) IV Push every 6 hours PRN Nausea and/or Vomiting  oxyCODONE    IR 5 milliGRAM(s) Oral every 4 hours PRN Moderate Pain (4 - 6)  oxyCODONE    IR 10 milliGRAM(s) Oral every 4 hours PRN Severe Pain (7 - 10)  traMADol 50 milliGRAM(s) Oral every 6 hours PRN Mild Pain (1 - 3)          PHYSICAL EXAM:  GENERAL: frail  CHEST/LUNG: Clear to percussion bilaterally; No rales, rhonchi, wheezing, or rubs  HEART: Regular rate and rhythm; No murmurs, rubs, or gallops  ABDOMEN: Soft, Nontender, Nondistended; Bowel sounds present  EXTREMITIES: edema +    Care Discussed with Consultants/Other Providers [ ] YES  [ ] NO

## 2024-12-19 NOTE — OCCUPATIONAL THERAPY INITIAL EVALUATION ADULT - PERTINENT HX OF CURRENT PROBLEM, REHAB EVAL
79 year old female with history of ESRD on HD, HTN, DM, right cataract, glaucoma presenting for fever and rapid decline of right eye vision. Found to have endogenous bacterial endophthalmitis. Found to have right knee septic arthritis now s/p right knee I&D on 12/18/24.

## 2024-12-19 NOTE — PHYSICAL THERAPY INITIAL EVALUATION ADULT - PHYSICAL ASSIST/NONPHYSICAL ASSIST: STAND/SIT, REHAB EVAL
nonverbal cues (demo/gestures)/2 person assist
verbal cues/nonverbal cues (demo/gestures)/1 person assist

## 2024-12-19 NOTE — DIETITIAN INITIAL EVALUATION ADULT - NSICDXPASTMEDICALHX_GEN_ALL_CORE_FT
PAST MEDICAL HISTORY:  Adult Onset Diabetes Mellitus,     Cancer of Endometrium s/p Hysterectomy , s/p Chemo.    Dissection of Aorta Type B    ESRD (end stage renal disease) on dialysis M,W,F   [facility]    ESRD on hemodialysis     Hypertension

## 2024-12-19 NOTE — OCCUPATIONAL THERAPY INITIAL EVALUATION ADULT - NSOTDISCHREC_GEN_A_CORE
to improve ability to perform ADLs. Patient has a 24/7  at home prior to admission to assist with ADLs. OT to continue to follow./Home OT

## 2024-12-19 NOTE — PHYSICAL THERAPY INITIAL EVALUATION ADULT - PERTINENT HX OF CURRENT PROBLEM, REHAB EVAL
Patient is a 79 year old Female, PMH stated below, presents with fever and right eye vision loss; s/p I&D of right septic knee 12/18.
Patient is a 79 year old Female, PMH stated below, presents with fever and rapid decline of right eye vision.

## 2024-12-19 NOTE — PHYSICAL THERAPY INITIAL EVALUATION ADULT - GENERAL OBSERVATIONS, REHAB EVAL
Pt encountered in semi-supine position in NAD, all lines intact, a&ox, SPO2 %, and RN aware.
Pt encountered in semi-supine position in NAD, all lines intact, a&ox4, SPO2 100%, and BONI Ball aware.

## 2024-12-19 NOTE — PROGRESS NOTE ADULT - ASSESSMENT
80 y/o F, PMH ESRD thru R AV fistula, HTN, DM, R cataract (surgery scheduled for Jan 2025), glaucoma and L hip replacement, presented on 12/13 with fever, pain, and rapid decline in L vision, with opthalmology exam suggestive of bacterial endophthalmitis now with blood cultures growing Group B Strep. Infectious Disease consulted for assessment of infectious source and antimicrobial management     Upon presentation, patient febrile to 102.8F  Labs showed no leukocytosis but with 14.8% bandemia     Workup:   -Blood Cx (12/12): Strep B (4/4 bottles)  -CT C/A/P: No infectious source in the chest, abdomen, or pelvis. Stable thoracic abdominal aortic dissection. Small left and trace right pleural effusions.  -Repeat blood Cx (12/14): NGTD  -Intravitreal Cx: + Strep B   -R hand Xray: Variable degree IP joint arthritic changes.  -TTE: no documented vegetations   -CT RLE: large R knee effusions     #Sepsis, fevers, bandemia   #High grade Group B Strep Bacteremia  #L vison loss, Endogenous Bacterial Endophthalmitis  #R knee septic arthritis s/p I&D on 12/18  #Presence of RUE AV fistula with no clinical evidence of infection     Recommendations:   -Continue Ceftriaxone 2 g IV q12hrs   -Opthalmology following - s/p Intravitreal Vancomycin   -TTE without vegetation, pending BLANE  -F/up OR Cx   -Monitor T curve and WBC trend      James Moe MD  ID physician  [employer] Teams Preferred  After 5pm/weekends call [phone]

## 2024-12-19 NOTE — PROGRESS NOTE ADULT - SUBJECTIVE AND OBJECTIVE BOX
[facility] DEPARTMENT OF OPHTHALMOLOGY    ------------------------------------------------------------------------------    Interval History: No acute events overnight    MEDICATIONS  (STANDING):  acetaminophen     Tablet .. 975 milliGRAM(s) Oral every 8 hours  atropine 1% Solution 1 Drop(s) Left EYE two times a day  cefTAZidime  2.25 mG/0.1 mL Ophthalmic Injectable 0.1 milliLiter(s) IntraVitreal once  cefTRIAXone   IVPB 2000 milliGRAM(s) IV Intermittent every 12 hours  cefTRIAXone   IVPB      chlorhexidine 2% Cloths 1 Application(s) Topical daily  dextrose 5%. 1000 milliLiter(s) (100 mL/Hr) IV Continuous <Continuous>  dextrose 5%. 1000 milliLiter(s) (50 mL/Hr) IV Continuous <Continuous>  dextrose 50% Injectable 25 Gram(s) IV Push once  dextrose 50% Injectable 12.5 Gram(s) IV Push once  dextrose 50% Injectable 25 Gram(s) IV Push once  epoetin marla (EPOGEN) Injectable 3000 Unit(s) IV Push <User Schedule>  erythromycin   Ointment 1 Application(s) Left EYE <User Schedule>  glucagon  Injectable 1 milliGRAM(s) IntraMuscular once  hydrALAZINE 50 milliGRAM(s) Oral at bedtime  insulin lispro (ADMELOG) corrective regimen sliding scale   SubCutaneous at bedtime  insulin lispro (ADMELOG) corrective regimen sliding scale   SubCutaneous three times a day before meals  insulin lispro (ADMELOG) corrective regimen sliding scale   SubCutaneous every 6 hours  lidocaine   4% Patch 3 Patch Transdermal daily  lidocaine 1%/epinephrine 1:200,000 Inj 3 milliLiter(s) Local Injection once  moxifloxacin 0.5% Solution 1 Drop(s) Left EYE <User Schedule>  mupirocin 2% Ointment 1 Application(s) Topical every 12 hours  polyethylene glycol 3350 17 Gram(s) Oral daily  polyethylene glycol 3350 17 Gram(s) Oral at bedtime  povidone iodine 10% Nasal Swab 1 Application(s) Both Nostrils once  povidone iodine 5% Ophthalmic Solution 1 Drop(s) Left EYE once  senna 2 Tablet(s) Oral at bedtime  vancomycin 1 mG/0.1 mL Ophthalmic Injectable 0.1 milliLiter(s) IntraVitreal once    MEDICATIONS  (PRN):  acetaminophen     Tablet .. 650 milliGRAM(s) Oral every 6 hours PRN Temp greater or equal to 38C (100.4F), Mild Pain (1 - 3), Moderate Pain (4 - 6)  dextrose Oral Gel 15 Gram(s) Oral once PRN Blood Glucose LESS THAN 70 milliGRAM(s)/deciliter  lidocaine/prilocaine Cream 1 Application(s) Topical daily PRN on HD days for pain  magnesium hydroxide Suspension 30 milliLiter(s) Oral daily PRN Constipation  melatonin 3 milliGRAM(s) Oral at bedtime PRN Insomnia  ondansetron Injectable 4 milliGRAM(s) IV Push every 6 hours PRN Nausea and/or Vomiting  oxyCODONE    IR 5 milliGRAM(s) Oral every 4 hours PRN Moderate Pain (4 - 6)  oxyCODONE    IR 10 milliGRAM(s) Oral every 4 hours PRN Severe Pain (7 - 10)  traMADol 50 milliGRAM(s) Oral every 6 hours PRN Mild Pain (1 - 3)      VITALS: T(C): 36.7 (12-19-24 @ 04:40)  T(F): 98 (12-19-24 @ 04:40), Max: 99.8 (12-18-24 @ 10:37)  HR: 79 (12-19-24 @ 04:40) (73 - 86)  BP: 143/63 (12-19-24 @ 04:40) (133/65 - 195/71)  RR:  (13 - 20)  SpO2:  (96% - 100%)  Wt(kg): --  General: AAO x 3, appropriate mood and affect    Ophthalmology Exam:  Visual acuity (sc): HM OD, NLP OS   Pupils: Minimally reactive OU, no RAPD  Ttono: 15 OS    Pen Light Exam (PLE)  External: Flat OU  Lids/Lashes/Lacrimal Ducts: Flat OU    Sclera/Conjunctiva: W+Q OD, 3+ injection OS   Cornea: Cl OD, OD: large near total epi defect with mildly heaped up edges, corneal haze. No infiltrate. D-folds. Adán neg.   Anterior Chamber: D+F OD; OS: no hypopyon, with flare and likely vitreous in AC  Iris: Flat OD, Posterior synechiae OS  Lens: Dislocated inferior nasally, pt aware, occurred after fall, no view of lens OS       B-scan OS: with dropped lens, vitritis

## 2024-12-19 NOTE — PHYSICAL THERAPY INITIAL EVALUATION ADULT - LEVEL OF INDEPENDENCE: GAIT, REHAB EVAL
minimum assist (75% patients effort)
unable secondary to pain; initiated weight shifting in standing

## 2024-12-19 NOTE — DIETITIAN INITIAL EVALUATION ADULT - OTHER CALCULATIONS
Defer fluid needs to MD/Team.   Ideal Body Weight: 98lbs / 44.4kg +/-10%.  Wt hx as per Brad HIE: 54.4kg (dosing wt 12/13), 51.3kg (4/17), 50.8kg (2/1). Wt fluctuation likely r/t HD

## 2024-12-19 NOTE — OCCUPATIONAL THERAPY INITIAL EVALUATION ADULT - GENERAL OBSERVATIONS, REHAB EVAL
Patient received semisupine in bed in NAD; agreeable to participate in OT evaluation. Family at bedside. SpO2 100%

## 2024-12-19 NOTE — PHYSICAL THERAPY INITIAL EVALUATION ADULT - ADDITIONAL COMMENTS
Pt left semisupine in bed in NAD, all lines intact, call bell in reach, SPO2 100%, bed alarm on, and RN Quinn aware.
Pt left semisupine in bed in NAD, all lines intact, call bell in reach, SPO2 100%, bed alarm on, and RN aware.

## 2024-12-19 NOTE — CHART NOTE - NSCHARTNOTEFT_GEN_A_CORE
Procedure: Transesophageal Echocardiogram (BLANE)    : Candice    Indication:  [ ] evaluation of Intracardiac thrombus (e.g. Stroke, pre-DCCV, CIED/LAAO implantation, or ablation)  [ ] evaluation of Valvulopathy  [x] evaluation of Infective endocarditis/cardiac mass  [ ] evaluation of cardiac structure and function in setting of inadequate TTE  [ ] others:    Pre-op evaluations:  Respiratory: no active exacerbation of Asthma, COPD, RENNY, or recent respiratory illness.  GI: No significant dysphagia and odynophagia. No recent Esophageal surgeries, strictures, diverticula, masses, varices, diaphragmatic hernia, stomach ulcers, stomach surgeries, bariatric surgery, GI bleed.  [ ] GI consulted: N/A  MSK: No Atlantoaxial disease and severe generalized cervical arthritis. No history of significant autoimmune disease related to atlantoaxial instability.  Oral/Dental: no loose, broken tooth/teeth, mouth sores.   NPO except medications  Vitals, Labs, and Medications reviewed  ASA/ Mallampati assessed  Consent: Obtained and located in physical chart    BLANE Probe used:  [x ] x8-2t Transesophageal Transducer   [ ] Pediatric Ultrasound Transducer  Estimated blood loss:   [x ] none   [ ] minimal (< 10 cc)  Complications: None    Anesthesia type: Conscious sedation w/ or w/o topical anesthesia      Procedural success:  [x] successfully completed without complication  [ ] procedure aborted due to:  [ ] procedure cancelled due to:      Findings: Normal EF, no evidence of endocarditis, mod-sev MAC, mild-mod AI         This is a preliminary report. Full BLANE report to follow.

## 2024-12-19 NOTE — PROGRESS NOTE ADULT - SUBJECTIVE AND OBJECTIVE BOX
Cardiovascular Disease Progress Note  DATE OF SERVICE: 24 @ 08:23    Overnight events: No acute events overnight.   The patient denies chest pain.    Otherwise review of systems negative    Objective Findings:  T(C): 36.7 (24 @ 04:40), Max: 37.7 (24 @ 10:37)  HR: 79 (24 @ 04:40) (73 - 86)  BP: 143/63 (24 @ 04:40) (133/65 - 195/71)  RR: 18 (24 @ 04:40) (13 - 20)  SpO2: 98% (24 @ 04:40) (96% - 100%)  Wt(kg): --  Daily     Daily Weight in k (18 Dec 2024 09:35)      Physical Exam:  Gen: NAD; Patient resting comfortably  HEENT:   Normocephalic/ atraumatic  CV: RRR, normal S1 + S2, no m/r/g  Lungs:  Normal respiratory effort; clear to auscultation bilaterally  Abd: soft, non-tender; bowel sounds present  Ext: No edema; warm and well perfused    Telemetry: n/a    Laboratory Data:                        9.2    8.38  )-----------( 228      ( 19 Dec 2024 04:48 )             26.8         133[L]  |  95[L]  |  36[H]  ----------------------------<  198[H]  4.3   |  24  |  3.74[H]    Ca    8.5      19 Dec 2024 04:48  Phos  6.3       Mg     2.20           PT/INR - ( 18 Dec 2024 03:45 )   PT: 12.6 sec;   INR: 1.06 ratio         PTT - ( 18 Dec 2024 03:45 )  PTT:31.7 sec          Inpatient Medications:  MEDICATIONS  (STANDING):  acetaminophen     Tablet .. 975 milliGRAM(s) Oral every 8 hours  atropine 1% Solution 1 Drop(s) Left EYE two times a day  cefTAZidime  2.25 mG/0.1 mL Ophthalmic Injectable 0.1 milliLiter(s) IntraVitreal once  cefTRIAXone   IVPB 2000 milliGRAM(s) IV Intermittent every 12 hours  cefTRIAXone   IVPB      chlorhexidine 2% Cloths 1 Application(s) Topical daily  dextrose 5%. 1000 milliLiter(s) (100 mL/Hr) IV Continuous <Continuous>  dextrose 5%. 1000 milliLiter(s) (50 mL/Hr) IV Continuous <Continuous>  dextrose 50% Injectable 25 Gram(s) IV Push once  dextrose 50% Injectable 12.5 Gram(s) IV Push once  dextrose 50% Injectable 25 Gram(s) IV Push once  epoetin marla (EPOGEN) Injectable 3000 Unit(s) IV Push <User Schedule>  erythromycin   Ointment 1 Application(s) Left EYE <User Schedule>  glucagon  Injectable 1 milliGRAM(s) IntraMuscular once  hydrALAZINE 50 milliGRAM(s) Oral at bedtime  insulin lispro (ADMELOG) corrective regimen sliding scale   SubCutaneous at bedtime  insulin lispro (ADMELOG) corrective regimen sliding scale   SubCutaneous three times a day before meals  insulin lispro (ADMELOG) corrective regimen sliding scale   SubCutaneous every 6 hours  lidocaine   4% Patch 3 Patch Transdermal daily  lidocaine 1%/epinephrine 1:200,000 Inj 3 milliLiter(s) Local Injection once  moxifloxacin 0.5% Solution 1 Drop(s) Left EYE <User Schedule>  mupirocin 2% Ointment 1 Application(s) Topical every 12 hours  polyethylene glycol 3350 17 Gram(s) Oral daily  polyethylene glycol 3350 17 Gram(s) Oral at bedtime  povidone iodine 10% Nasal Swab 1 Application(s) Both Nostrils once  povidone iodine 5% Ophthalmic Solution 1 Drop(s) Left EYE once  senna 2 Tablet(s) Oral at bedtime  vancomycin 1 mG/0.1 mL Ophthalmic Injectable 0.1 milliLiter(s) IntraVitreal once      Assessment: 79 year old woman with ESRD on HD (MWF), HTN, DM (diet controlled), chronic type B aortic dissection, heart murmur, endometrial ca in remission s/p total hysterectomy in  presents with bacteremia.    Plan of Care:        #Bacteremia-  In the setting of ophthalmic infection.   TTE with no obvious evidence of vegetations.  s/p I & D of the septic R knee.   ID input appreciated- plan for BLANE given + BCx and polyarthritis.           #Type B aortic dissection  - Chronic (Diagnosed >10 years ago)  - Optimal BP control.          #ESRD-  - HD this morning as per renal.          Plan of Care:          Over 55 minutes spent on total encounter; more than 50% of the visit was spent counseling and/or coordinating care by the attending physician.      Gabo Burris MD [facility]  Cardiovascular Disease  [phone] Cardiovascular Disease Progress Note  DATE OF SERVICE: 24 @ 08:23    Overnight events: No acute events overnight.   The patient denies chest pain.    Otherwise review of systems negative    Objective Findings:  T(C): 36.7 (24 @ 04:40), Max: 37.7 (24 @ 10:37)  HR: 79 (24 @ 04:40) (73 - 86)  BP: 143/63 (24 @ 04:40) (133/65 - 195/71)  RR: 18 (24 @ 04:40) (13 - 20)  SpO2: 98% (24 @ 04:40) (96% - 100%)  Wt(kg): --  Daily     Daily Weight in k (18 Dec 2024 09:35)      Physical Exam:  Gen: NAD; Patient resting comfortably  HEENT:   Normocephalic/ atraumatic  CV: RRR, normal S1 + S2, no m/r/g  Lungs:  Normal respiratory effort; clear to auscultation bilaterally  Abd: soft, non-tender; bowel sounds present  Ext: No edema; warm and well perfused    Telemetry: n/a    Laboratory Data:                        9.2    8.38  )-----------( 228      ( 19 Dec 2024 04:48 )             26.8         133[L]  |  95[L]  |  36[H]  ----------------------------<  198[H]  4.3   |  24  |  3.74[H]    Ca    8.5      19 Dec 2024 04:48  Phos  6.3       Mg     2.20           PT/INR - ( 18 Dec 2024 03:45 )   PT: 12.6 sec;   INR: 1.06 ratio         PTT - ( 18 Dec 2024 03:45 )  PTT:31.7 sec          Inpatient Medications:  MEDICATIONS  (STANDING):  acetaminophen     Tablet .. 975 milliGRAM(s) Oral every 8 hours  atropine 1% Solution 1 Drop(s) Left EYE two times a day  cefTAZidime  2.25 mG/0.1 mL Ophthalmic Injectable 0.1 milliLiter(s) IntraVitreal once  cefTRIAXone   IVPB 2000 milliGRAM(s) IV Intermittent every 12 hours  cefTRIAXone   IVPB      chlorhexidine 2% Cloths 1 Application(s) Topical daily  dextrose 5%. 1000 milliLiter(s) (100 mL/Hr) IV Continuous <Continuous>  dextrose 5%. 1000 milliLiter(s) (50 mL/Hr) IV Continuous <Continuous>  dextrose 50% Injectable 25 Gram(s) IV Push once  dextrose 50% Injectable 12.5 Gram(s) IV Push once  dextrose 50% Injectable 25 Gram(s) IV Push once  epoetin marla (EPOGEN) Injectable 3000 Unit(s) IV Push <User Schedule>  erythromycin   Ointment 1 Application(s) Left EYE <User Schedule>  glucagon  Injectable 1 milliGRAM(s) IntraMuscular once  hydrALAZINE 50 milliGRAM(s) Oral at bedtime  insulin lispro (ADMELOG) corrective regimen sliding scale   SubCutaneous at bedtime  insulin lispro (ADMELOG) corrective regimen sliding scale   SubCutaneous three times a day before meals  insulin lispro (ADMELOG) corrective regimen sliding scale   SubCutaneous every 6 hours  lidocaine   4% Patch 3 Patch Transdermal daily  lidocaine 1%/epinephrine 1:200,000 Inj 3 milliLiter(s) Local Injection once  moxifloxacin 0.5% Solution 1 Drop(s) Left EYE <User Schedule>  mupirocin 2% Ointment 1 Application(s) Topical every 12 hours  polyethylene glycol 3350 17 Gram(s) Oral daily  polyethylene glycol 3350 17 Gram(s) Oral at bedtime  povidone iodine 10% Nasal Swab 1 Application(s) Both Nostrils once  povidone iodine 5% Ophthalmic Solution 1 Drop(s) Left EYE once  senna 2 Tablet(s) Oral at bedtime  vancomycin 1 mG/0.1 mL Ophthalmic Injectable 0.1 milliLiter(s) IntraVitreal once      Assessment: 79 year old woman with ESRD on HD (MWF), HTN, DM (diet controlled), chronic type B aortic dissection, heart murmur, endometrial ca in remission s/p total hysterectomy in  presents with bacteremia.    Plan of Care:        #Bacteremia-  In the setting of ophthalmic infection.   TTE with no obvious evidence of vegetations.  s/p I & D of the septic R knee.   ID input appreciated- plan for BLANE given + BCx and polyarthritis.           #Type B aortic dissection  - Chronic (Diagnosed >10 years ago)  - Optimal BP control.          #ESRD-  - HD as per renal.            Over 55 minutes spent on total encounter; more than 50% of the visit was spent counseling and/or coordinating care by the attending physician.      Gabo Burris MD [facility]  Cardiovascular Disease  [phone]

## 2024-12-19 NOTE — PHYSICAL THERAPY INITIAL EVALUATION ADULT - PATIENT/FAMILY/SIGNIFICANT OTHER GOALS STATEMENT, PT EVAL
pt did not state goals at this time.
Pt desires to return home with previous level of functional mobility.

## 2024-12-19 NOTE — PHYSICAL THERAPY INITIAL EVALUATION ADULT - IMPAIRED TRANSFERS: SIT/STAND, REHAB EVAL
impaired balance/pain/impaired postural control/decreased strength
impaired balance/impaired postural control/decreased strength

## 2024-12-19 NOTE — OCCUPATIONAL THERAPY INITIAL EVALUATION ADULT - STANDING BALANCE: STATIC
Patient had INR drawn with other labs today at [facility] lab. Writer spoke to patient by phone. Reviewed past INR and dose with patient. Patient denies missed doses of Warfarin or medication changes. Vitamin K intake is consistent. INR therapeutic. Will continue same dose of Warfarin. Reminded patient to call office with any changes. INR and dose per Dr. TEODORA Alarcon's protocol.     fair minus

## 2024-12-19 NOTE — PHYSICAL THERAPY INITIAL EVALUATION ADULT - AMBULATION SKILLS, REHAB EVAL
rolling walker/cane, pt also owns wheelchair/needed assist
wheelchair/rolling walker/cane/needed assist/needs device

## 2024-12-19 NOTE — DIETITIAN INITIAL EVALUATION ADULT - PERTINENT LABORATORY DATA
12-19    133[L]  |  95[L]  |  36[H]  ----------------------------<  198[H]  4.3   |  24  |  3.74[H]    Ca    8.5      19 Dec 2024 04:48  Phos  6.3     12-19  Mg     2.20     12-19    POCT Blood Glucose.: 198 mg/dL (12-19-24 @ 12:12)  A1C with Estimated Average Glucose Result: 7.6 % (12-13-24 @ 17:56)

## 2024-12-19 NOTE — PROGRESS NOTE ADULT - PROBLEM SELECTOR PLAN 1
Gustavo Singh  [id number]  1962  male     12/19/2024      Chief Complaint  No chief complaint on file.    History of Present Illness            Review of Systems    Past Medical History:   Diagnosis Date    Allergies     eggs, corn, green beans- immunotherapy in childhood    Diabetes mellitus     Hyperlipidemia     Hypertension     CHASITY (obstructive sleep apnea)     Tobacco chew use     quit in 1985    Wears dentures     upper    Wears glasses        Past Surgical History:   Procedure Laterality Date    CARDIAC CATHETERIZATION  10/2018    CHD, 1 stent placement    CIRCUMCISION  08/1964    COLONOSCOPY  1985    HEEL SPUR SURGERY Left 2002    TONSILLECTOMY AND ADENOIDECTOMY  1981    WISDOM TOOTH EXTRACTION         Family History   Problem Relation Age of Onset    Hypertension Mother     Hyperlipidemia Mother     Liver cancer Mother     Depression Father     Alcohol abuse Father     Hypertension Sister     Diabetes Sister     Hypertension Sister     Diabetes Sister     Liver cancer Sister     No Known Problems Daughter     Heart attack Son     No Known Problems Maternal Grandmother     Stroke Maternal Grandfather     Stroke Paternal Grandmother     No Known Problems Paternal Grandfather        Social History     Socioeconomic History    Marital status:    Tobacco Use    Smoking status: Never     Passive exposure: Never    Smokeless tobacco: Former     Types: Chew     Quit date: 1990   Vaping Use    Vaping status: Never Used   Substance and Sexual Activity    Alcohol use: Not Currently    Drug use: Not Currently    Sexual activity: Defer        Allergies   Allergen Reactions    Tenoretic [Atenolol-Chlorthalidone] Other (See Comments)     ED    Egg-Derived Products Other (See Comments)     Positive allergy test    Lisinopril Other (See Comments)     drowsy    Penicillins Hives         Objective   Vital Signs:   There were no vitals taken for this visit.      Physical Exam            Assessment and Plan   There  are no diagnoses linked to this encounter.    Follow Up   No follow-ups on file.    There are no Patient Instructions on file for this visit.    Pt. with ESRD on HD TIW. Pt. received HD on 9/15/23. Pt. clinically stable. Labs reviewed. Plan for maintenance HD today. Pt. with anemia, on RICA treatment. Serum phosphorus in target range (3.8) on 9/17/23. Pt. with ESRD, avoid aluminum hydroxide/magnesium hydroxide/simethicone suspension. Monitor BP and labs.

## 2024-12-19 NOTE — DIETITIAN INITIAL EVALUATION ADULT - PERTINENT MEDS FT
MEDICATIONS  (STANDING):  acetaminophen     Tablet .. 975 milliGRAM(s) Oral every 8 hours  atropine 1% Solution 1 Drop(s) Left EYE two times a day  cefTAZidime  2.25 mG/0.1 mL Ophthalmic Injectable 0.1 milliLiter(s) IntraVitreal once  cefTRIAXone   IVPB 2000 milliGRAM(s) IV Intermittent every 12 hours  cefTRIAXone   IVPB      chlorhexidine 2% Cloths 1 Application(s) Topical daily  dextrose 5%. 1000 milliLiter(s) (50 mL/Hr) IV Continuous <Continuous>  dextrose 5%. 1000 milliLiter(s) (100 mL/Hr) IV Continuous <Continuous>  dextrose 50% Injectable 25 Gram(s) IV Push once  dextrose 50% Injectable 12.5 Gram(s) IV Push once  dextrose 50% Injectable 25 Gram(s) IV Push once  epoetin marla (EPOGEN) Injectable 3000 Unit(s) IV Push <User Schedule>  erythromycin   Ointment 1 Application(s) Left EYE <User Schedule>  glucagon  Injectable 1 milliGRAM(s) IntraMuscular once  hydrALAZINE 50 milliGRAM(s) Oral at bedtime  insulin lispro (ADMELOG) corrective regimen sliding scale   SubCutaneous at bedtime  insulin lispro (ADMELOG) corrective regimen sliding scale   SubCutaneous three times a day before meals  insulin lispro (ADMELOG) corrective regimen sliding scale   SubCutaneous every 6 hours  lidocaine   4% Patch 3 Patch Transdermal daily  lidocaine 1%/epinephrine 1:200,000 Inj 3 milliLiter(s) Local Injection once  moxifloxacin 0.5% Solution 1 Drop(s) Left EYE <User Schedule>  mupirocin 2% Ointment 1 Application(s) Topical every 12 hours  polyethylene glycol 3350 17 Gram(s) Oral daily  polyethylene glycol 3350 17 Gram(s) Oral at bedtime  povidone iodine 10% Nasal Swab 1 Application(s) Both Nostrils once  povidone iodine 5% Ophthalmic Solution 1 Drop(s) Left EYE once  senna 2 Tablet(s) Oral at bedtime  vancomycin 1 mG/0.1 mL Ophthalmic Injectable 0.1 milliLiter(s) IntraVitreal once    MEDICATIONS  (PRN):  acetaminophen     Tablet .. 650 milliGRAM(s) Oral every 6 hours PRN Temp greater or equal to 38C (100.4F), Mild Pain (1 - 3), Moderate Pain (4 - 6)  dextrose Oral Gel 15 Gram(s) Oral once PRN Blood Glucose LESS THAN 70 milliGRAM(s)/deciliter  lidocaine/prilocaine Cream 1 Application(s) Topical daily PRN on HD days for pain  magnesium hydroxide Suspension 30 milliLiter(s) Oral daily PRN Constipation  melatonin 3 milliGRAM(s) Oral at bedtime PRN Insomnia  ondansetron Injectable 4 milliGRAM(s) IV Push every 6 hours PRN Nausea and/or Vomiting  oxyCODONE    IR 5 milliGRAM(s) Oral every 4 hours PRN Moderate Pain (4 - 6)  oxyCODONE    IR 10 milliGRAM(s) Oral every 4 hours PRN Severe Pain (7 - 10)  traMADol 50 milliGRAM(s) Oral every 6 hours PRN Mild Pain (1 - 3)

## 2024-12-19 NOTE — PHYSICAL THERAPY INITIAL EVALUATION ADULT - ACTIVE RANGE OF MOTION EXAMINATION, REHAB EVAL
jose. upper extremity Active ROM was WNL (within normal limits)/bilateral lower extremity Active ROM was WNL (within normal limits)
right limited secondary to pain/jose. upper extremity Active ROM was WNL (within normal limits)/bilateral lower extremity Active ROM was WNL (within normal limits)

## 2024-12-19 NOTE — PROGRESS NOTE ADULT - SUBJECTIVE AND OBJECTIVE BOX
SUBJECTIVE  No acute events overnight. Pain controlled. Denies fevers/chills, chest pain, or dyspnea.    OBJECTIVE  Vital Signs Last 24 Hrs  T(C): 36.7 (19 Dec 2024 04:40), Max: 37.7 (18 Dec 2024 10:37)  T(F): 98 (19 Dec 2024 04:40), Max: 99.8 (18 Dec 2024 10:37)  HR: 79 (19 Dec 2024 04:40) (73 - 86)  BP: 143/63 (19 Dec 2024 04:40) (133/65 - 195/71)  BP(mean): 97 (18 Dec 2024 14:30) (95 - 114)  RR: 18 (19 Dec 2024 04:40) (13 - 20)  SpO2: 98% (19 Dec 2024 04:40) (96% - 100%)    Parameters below as of 19 Dec 2024 04:40  Patient On (Oxygen Delivery Method): room air        PHYSICAL EXAM  Gen: Lying in bed, NAD  Resp: No increased WOB  RLE:  Aquacel dressing c/d/i, compartments soft, no calf TTP b/l  Motor: TA/EHL/GS/FHL intact  Sensory: DP/SP/Tib/Varsha/Saph SILT  +DP pulse, WWP    LABS                        9.2    8.38  )-----------( 228      ( 19 Dec 2024 04:48 )             26.8     12-19    133[L]  |  95[L]  |  36[H]  ----------------------------<  198[H]  4.3   |  24  |  3.74[H]    Ca    8.5      19 Dec 2024 04:48  Phos  6.3     12-19  Mg     2.20     12-19      PT/INR - ( 18 Dec 2024 03:45 )   PT: 12.6 sec;   INR: 1.06 ratio         PTT - ( 18 Dec 2024 03:45 )  PTT:31.7 sec    ASSESSMENT & PLAN  79yFemale s/p R knee I&D POD 1    -WBAT  -PT/OT  -Abx per ID  -FU OR cx  -care per primary

## 2024-12-19 NOTE — PHYSICAL THERAPY INITIAL EVALUATION ADULT - CRITERIA FOR SKILLED THERAPEUTIC INTERVENTIONS
impairments found/functional limitations in following categories/risk reduction/prevention/rehab potential
impairments found/functional limitations in following categories/risk reduction/prevention

## 2024-12-19 NOTE — PHYSICAL THERAPY INITIAL EVALUATION ADULT - BED MOBILITY LIMITATIONS, REHAB EVAL
decreased ability to use legs for bridging/pushing/impaired ability to control trunk for mobility
decreased ability to use legs for bridging/pushing/impaired ability to control trunk for mobility

## 2024-12-19 NOTE — CHART NOTE - NSCHARTNOTEFT_GEN_A_CORE
Noted finding of Jun Type B Aortic dissection on BLANE. CT Surg @ [facility] called. Reviewed with AIDA Graham. Due to size, patient can have medical management (blood pressure control) and follow up with CT Surgery [phone] Dr. Luis Hernandez.     Shereen Gonzalez PA-C  Department of Internal Medicine  pager [phone], available on [employer] TEAMS

## 2024-12-19 NOTE — DIETITIAN INITIAL EVALUATION ADULT - PERSON TAUGHT/METHOD
Pt provided with written and verbal nutrition education on type 2 DM and renal diet. Topics discussed include: MyPlate healthy eating guidelines, avoidance of sugar sweetened beverages and recommended alternatives, label reading, sources of carbohydrates, carbohydrate counting, complex vs simple carbohydrates, inclusion of whole grains and fiber, mixed meals (pairing protein with carbohydrate for optimal blood glucose response), hypoglycemia prevention/management and timing of meals/snacks, increased protein needs, limiting sodium/phosphorus/potassium intake. Discussed importance of self monitoring blood glucose and encouraged outpatient endocrinology follow up. Encouraged pt to follow up with outpatient/dialysis center RDN. Pt verbalized understanding of nutrition education. Addressed all concerns as able./verbal instruction/written material/patient instructed

## 2024-12-19 NOTE — PROGRESS NOTE ADULT - SUBJECTIVE AND OBJECTIVE BOX
Follow Up:      Interval History/ROS:Patient is a 79y old  Female who presents with a chief complaint of fever, vision loss (19 Dec 2024 13:40)  Seen at bedside, afebrile.   Pending BLANE    Allergies  No Known Allergies        ANTIMICROBIALS:    cefTAZidime  2.25 mG/0.1 mL Ophthalmic Injectable 0.1 once  cefTRIAXone   IVPB 2000 every 12 hours  cefTRIAXone   IVPB    vancomycin 1 mG/0.1 mL Ophthalmic Injectable 0.1 once        OTHER MEDS: MEDICATIONS  (STANDING):  acetaminophen     Tablet .. 975 every 8 hours  acetaminophen     Tablet .. 650 every 6 hours PRN  dextrose 50% Injectable 25 once  dextrose 50% Injectable 12.5 once  dextrose 50% Injectable 25 once  dextrose Oral Gel 15 once PRN  epoetin marla (EPOGEN) Injectable 3000 <User Schedule>  glucagon  Injectable 1 once  hydrALAZINE 50 at bedtime  insulin lispro (ADMELOG) corrective regimen sliding scale  at bedtime  insulin lispro (ADMELOG) corrective regimen sliding scale  three times a day before meals  insulin lispro (ADMELOG) corrective regimen sliding scale  every 6 hours  magnesium hydroxide Suspension 30 daily PRN  melatonin 3 at bedtime PRN  ondansetron Injectable 4 every 6 hours PRN  oxyCODONE    IR 5 every 4 hours PRN  oxyCODONE    IR 10 every 4 hours PRN  polyethylene glycol 3350 17 at bedtime  polyethylene glycol 3350 17 daily  senna 2 at bedtime  traMADol 50 every 6 hours PRN      Vital Signs Last 24 Hrs  T(F): 98.2 (12-19-24 @ 10:25), Max: 102.8 (12-12-24 @ 20:03)    Vital Signs Last 24 Hrs  HR: 66 (12-19-24 @ 10:25) (66 - 81)  BP: 137/70 (12-19-24 @ 10:25) (133/65 - 143/63)  RR: 18 (12-19-24 @ 10:25)  SpO2: 97% (12-19-24 @ 10:25) (96% - 100%)  Wt(kg): --    EXAM:    GA: NAD  CV: nl S1/S2, no RMG  Lungs: CTAB, no distress  Abd: soft, nontender, no rebounding pain  Joints: R knee s/p I&D   Neuro: No focal deficits  Skin: Intact  IV: RUE AV fistula     Labs:                        9.2    8.38  )-----------( 228      ( 19 Dec 2024 04:48 )             26.8     12-19    133[L]  |  95[L]  |  36[H]  ----------------------------<  198[H]  4.3   |  24  |  3.74[H]    Ca    8.5      19 Dec 2024 04:48  Phos  6.3     12-19  Mg     2.20     12-19        WBC Trend:  WBC Count: 8.38 (12-19-24 @ 04:48)  WBC Count: 9.60 (12-18-24 @ 01:15)  WBC Count: 9.20 (12-17-24 @ 06:57)  WBC Count: 10.56 (12-16-24 @ 23:32)      Creatine Trend:  Creatinine: 3.74 (12-19)  Creatinine: 4.64 (12-18)  Creatinine: 3.29 (12-17)  Creatinine: 3.72 (12-16)      Liver Biochemical Testing Trend:  Alanine Aminotransferase (ALT/SGPT): 14 (12-15)  Alanine Aminotransferase (ALT/SGPT): 18 (12-14)  Alanine Aminotransferase (ALT/SGPT): 17 (12-13)  Alanine Aminotransferase (ALT/SGPT): 14 (12-13)  Alanine Aminotransferase (ALT/SGPT): 21 (12-12)  Aspartate Aminotransferase (AST/SGOT): 27 (12-15-24 @ 04:42)  Aspartate Aminotransferase (AST/SGOT): 27 (12-14-24 @ 07:55)  Aspartate Aminotransferase (AST/SGOT): 24 (12-13-24 @ 17:56)  Aspartate Aminotransferase (AST/SGOT): 36 (12-12-24 @ 22:25)  Bilirubin Total: 0.8 (12-15)  Bilirubin Total: 1.1 (12-14)  Bilirubin Total: 1.2 (12-13)  Bilirubin Total: 1.7 (12-12)      Trend LDH      Urinalysis Basic - ( 19 Dec 2024 04:48 )    Color: x / Appearance: x / SG: x / pH: x  Gluc: 198 mg/dL / Ketone: x  / Bili: x / Urobili: x   Blood: x / Protein: x / Nitrite: x   Leuk Esterase: x / RBC: x / WBC x   Sq Epi: x / Non Sq Epi: x / Bacteria: x        MICROBIOLOGY:    MRSA PCR Result.: Dot (12-13-24 @ 16:43)      Culture - Blood (collected 16 Dec 2024 23:00)  Source: .Blood BLOOD  Preliminary Report:    No growth at 48 Hours    Culture - Blood (collected 14 Dec 2024 18:56)  Source: .Blood BLOOD  Preliminary Report:    No growth at 4 days    Culture - Blood (collected 14 Dec 2024 14:15)  Source: .Blood BLOOD  Preliminary Report:    No growth at 4 days    Culture - Fungal, Other (collected 13 Dec 2024 21:20)  Source: .Other  Preliminary Report:    No growth    Culture - Eye (collected 13 Dec 2024 21:20)  Source: .Eye  Final Report:    Numerous Streptococcus agalactiae (Group B)  Organism: Streptococcus agalactiae (Group B)  Organism: Streptococcus agalactiae (Group B)    Sensitivities:      Method Type: CANDE      -  Ceftriaxone: S <=0.25      -  Clindamycin: S <=0.06      -  Levofloxacin: S 1      -  Penicillin: S 0.06 Predicts results for ampicillin, amoxicillin, amoxicillin/clavulanate, ampicillin/sulbactam, 1st, 2nd and 3rd generation cephalosporins and carbapenems.      -  Tetracycline: R >4      -  Vancomycin: S 0.5    Culture - Blood (collected 13 Dec 2024 16:30)  Source: .Blood BLOOD  Final Report:    No growth at 5 days    Culture - Eye (collected 13 Dec 2024 15:53)  Source: .Eye  Final Report:    No growth at 5 days    Culture - Blood (collected 12 Dec 2024 21:58)  Source: .Blood BLOOD  Final Report:    Growth in aerobic and anaerobic bottles: Streptococcus agalactiae (Group    B)    Direct identification is available within approximately 3-5    hours either by Blood Panel Multiplexed PCR or Direct    MALDI-TOF. Details: [email or web address]  Organism: Blood Culture PCR  Streptococcus agalactiae (Group B)  Organism: Streptococcus agalactiae (Group B)    Sensitivities:      Method Type: CANDE      -  Ceftriaxone: S <=0.25      -  Clindamycin: S <=0.06      -  Levofloxacin: S 0.5      -  Penicillin: S 0.06 Predicts results for ampicillin, amoxicillin, amoxicillin/clavulanate, ampicillin/sulbactam, 1st, 2nd and 3rd generation cephalosporins and carbapenems.      -  Tetracycline: R >4      -  Vancomycin: S 0.5  Organism: Blood Culture PCR    Sensitivities:      Method Type: PCR      -  Streptococcus agalactiae (Group B): Detec    Culture - Blood (collected 12 Dec 2024 21:43)  Source: .Blood BLOOD  Final Report:    Growth in aerobic and anaerobic bottles: Streptococcus agalactiae (Group    B)    See previous culture [id number]    Culture - Blood (collected 12 Sep 2023 14:45)  Source: .Blood Blood-Peripheral  Final Report:    No growth at 5 days                        Rapid RVP Result: NotDetec (12-12 @ 21:58)          C-Reactive Protein: 169.4 (12-18)  C-Reactive Protein: 174.0 (12-17)            Troponin T, High Sensitivity Result: 160 (12-13)  Troponin T, High Sensitivity Result: 192 (12-13)  Troponin T, High Sensitivity Result: 209 (12-12)      Sedimentation Rate, Erythrocyte: 85 mm/hr (12-18-24 @ 01:15)      RADIOLOGY:  imaging below personally reviewed                CT Head No Cont:   ACC: [id number] EXAM:  CT BRAIN   ORDERED BY: HERB JARQUIN     PROCEDURE DATE:  12/16/2024          INTERPRETATION:  .    CLINICAL INFORMATION: Right lower extremity weakness.    TECHNIQUE: Multiple axial CT images of the head were obtained without  contrast. Sagittal and coronal reconstructed images were acquired from   the source data.    COMPARISON: Prior brain MRI examination dated 12/20/2017. Prior CT   examination of the head performed on 12/20/1717.    FINDINGS: There is no acute intracranial hemorrhage, mass effect, shift   of the midline structures, herniation, extra-axial fluid collection, or   hydrocephalus.    There is diffuse cerebral volume loss with prominence of the sulci,   fissures, and cisternal spaces which is normal for the patient's age.   There is mild deep and periventricular white matter hypoattenuation   statistically compatible with microvascular changes given calcific   atherosclerotic disease of the intracranial arteries.    The paranasal sinuses and tympanomastoid cavities are clear. The   calvarium is intact. Bilateral lens dislocations are noted.    IMPRESSION: No acute intracranial hemorrhage, mass effect, or shift of   the midline structures.    --- End of Report ---            SEB DAVALOS MD; Attending Radiologist  This document has been electronically signed. Dec 16 2024  8:06PM (12-16-24 @ 19:35)

## 2024-12-19 NOTE — DIETITIAN INITIAL EVALUATION ADULT - REASON FOR ADMISSION
Per chart, Pt is 79 yr old female with PMH of  ESRD on HD , HTN, DM, R cataract , glaucoma here for fever and rapid decline of R eye vision .

## 2024-12-19 NOTE — PHYSICAL THERAPY INITIAL EVALUATION ADULT - IMPAIRMENTS FOUND, PT EVAL
gait, locomotion, and balance/gross motor/muscle strength/posture
gait, locomotion, and balance/gross motor/muscle strength/posture

## 2024-12-19 NOTE — OCCUPATIONAL THERAPY INITIAL EVALUATION ADULT - ADDITIONAL COMMENTS
Patient has a 24/7  to assist with ADLs and functional mobility. Patient owns a rolling walker, wheelchair, shower chair, commode.

## 2024-12-19 NOTE — DIETITIAN INITIAL EVALUATION ADULT - NS FNS DIET ORDER
Diet, NPO after Midnight:      NPO Start Date: 18-Dec-2024,   NPO Start Time: 23:59  Except Medications  With Ice Chips/Sips of Water (12-18-24 @ 17:40)  Diet, Regular (12-18-24 @ 13:33)

## 2024-12-19 NOTE — PHYSICAL THERAPY INITIAL EVALUATION ADULT - PLANNED THERAPY INTERVENTIONS, PT EVAL
balance training/bed mobility training/gait training/postural re-education/ROM/strengthening/transfer training
balance training/bed mobility training/gait training/postural re-education/ROM/strengthening/transfer training

## 2024-12-19 NOTE — DIETITIAN INITIAL EVALUATION ADULT - ADD RECOMMEND
- Advanced diet as medical feasible consistent carbohydrate (evening snack); renal replacement pts: no protein restr, no conc K & phos, low sodium. Defer food and fluid consistency to SLP/Team.   - Consider adding Nepro 1x daily (provides 425 kcal, 19 gm protein per 8oz serving) when medical feasible.   - Consider Nephro-nathanael once daily for micronutrient support.   - Monitor weights, diet tolerance, skin integrity, BMs, pertinent labs.   - RDN services remain available as needed.

## 2024-12-20 DIAGNOSIS — E83.39 OTHER DISORDERS OF PHOSPHORUS METABOLISM: ICD-10-CM

## 2024-12-20 LAB
ANION GAP SERPL CALC-SCNC: 17 MMOL/L — HIGH (ref 7–14)
BUN SERPL-MCNC: 55 MG/DL — HIGH (ref 7–23)
CALCIUM SERPL-MCNC: 8.7 MG/DL — SIGNIFICANT CHANGE UP (ref 8.4–10.5)
CHLORIDE SERPL-SCNC: 91 MMOL/L — LOW (ref 98–107)
CO2 SERPL-SCNC: 23 MMOL/L — SIGNIFICANT CHANGE UP (ref 22–31)
CREAT SERPL-MCNC: 5.5 MG/DL — HIGH (ref 0.5–1.3)
EGFR: 8 ML/MIN/1.73M2 — LOW
GLUCOSE BLDC GLUCOMTR-MCNC: 106 MG/DL — HIGH (ref 70–99)
GLUCOSE BLDC GLUCOMTR-MCNC: 142 MG/DL — HIGH (ref 70–99)
GLUCOSE BLDC GLUCOMTR-MCNC: 185 MG/DL — HIGH (ref 70–99)
GLUCOSE BLDC GLUCOMTR-MCNC: 191 MG/DL — HIGH (ref 70–99)
GLUCOSE BLDC GLUCOMTR-MCNC: 215 MG/DL — HIGH (ref 70–99)
GLUCOSE BLDC GLUCOMTR-MCNC: 235 MG/DL — HIGH (ref 70–99)
GLUCOSE SERPL-MCNC: 184 MG/DL — HIGH (ref 70–99)
HCT VFR BLD CALC: 23.8 % — LOW (ref 34.5–45)
HGB BLD-MCNC: 8.2 G/DL — LOW (ref 11.5–15.5)
MAGNESIUM SERPL-MCNC: 2.4 MG/DL — SIGNIFICANT CHANGE UP (ref 1.6–2.6)
MCHC RBC-ENTMCNC: 30.7 PG — SIGNIFICANT CHANGE UP (ref 27–34)
MCHC RBC-ENTMCNC: 34.5 G/DL — SIGNIFICANT CHANGE UP (ref 32–36)
MCV RBC AUTO: 89.1 FL — SIGNIFICANT CHANGE UP (ref 80–100)
NRBC # BLD: 0 /100 WBCS — SIGNIFICANT CHANGE UP (ref 0–0)
NRBC # FLD: 0 K/UL — SIGNIFICANT CHANGE UP (ref 0–0)
PHOSPHATE SERPL-MCNC: 8.7 MG/DL — HIGH (ref 2.5–4.5)
PLATELET # BLD AUTO: 218 K/UL — SIGNIFICANT CHANGE UP (ref 150–400)
POTASSIUM SERPL-MCNC: 4.6 MMOL/L — SIGNIFICANT CHANGE UP (ref 3.5–5.3)
POTASSIUM SERPL-SCNC: 4.6 MMOL/L — SIGNIFICANT CHANGE UP (ref 3.5–5.3)
RBC # BLD: 2.67 M/UL — LOW (ref 3.8–5.2)
RBC # FLD: 13.9 % — SIGNIFICANT CHANGE UP (ref 10.3–14.5)
SODIUM SERPL-SCNC: 131 MMOL/L — LOW (ref 135–145)
WBC # BLD: 9.66 K/UL — SIGNIFICANT CHANGE UP (ref 3.8–10.5)
WBC # FLD AUTO: 9.66 K/UL — SIGNIFICANT CHANGE UP (ref 3.8–10.5)

## 2024-12-20 PROCEDURE — 99233 SBSQ HOSP IP/OBS HIGH 50: CPT

## 2024-12-20 PROCEDURE — 99232 SBSQ HOSP IP/OBS MODERATE 35: CPT

## 2024-12-20 RX ORDER — SEVELAMER CARBONATE 800 MG/1
800 TABLET, FILM COATED ORAL
Refills: 0 | Status: DISCONTINUED | OUTPATIENT
Start: 2024-12-20 | End: 2024-12-26

## 2024-12-20 RX ORDER — VANCOMYCIN HYDROCHLORIDE 5 G/100ML
1 INJECTION, POWDER, LYOPHILIZED, FOR SOLUTION INTRAVENOUS
Refills: 0 | Status: DISCONTINUED | OUTPATIENT
Start: 2024-12-20 | End: 2024-12-21

## 2024-12-20 RX ORDER — DOXYCYCLINE MONOHYDRATE 100 MG
100 TABLET ORAL EVERY 12 HOURS
Refills: 0 | Status: DISCONTINUED | OUTPATIENT
Start: 2024-12-20 | End: 2024-12-21

## 2024-12-20 RX ORDER — ASCORBIC ACID 1000 MG
500 TABLET ORAL DAILY
Refills: 0 | Status: DISCONTINUED | OUTPATIENT
Start: 2024-12-20 | End: 2024-12-26

## 2024-12-20 RX ORDER — EPOETIN ALFA 2000 [IU]/ML
6000 SOLUTION INTRAVENOUS; SUBCUTANEOUS
Refills: 0 | Status: DISCONTINUED | OUTPATIENT
Start: 2024-12-20 | End: 2024-12-26

## 2024-12-20 RX ORDER — TOBRAMYCIN 0.3 %
1 DROPS OPHTHALMIC (EYE)
Refills: 0 | Status: DISCONTINUED | OUTPATIENT
Start: 2024-12-20 | End: 2024-12-21

## 2024-12-20 RX ORDER — TOBRAMYCIN 300 MG/5ML
1 SOLUTION RESPIRATORY (INHALATION)
Refills: 0 | Status: DISCONTINUED | OUTPATIENT
Start: 2024-12-20 | End: 2024-12-20

## 2024-12-20 RX ADMIN — Medication 1 APPLICATION(S): at 17:09

## 2024-12-20 RX ADMIN — LIDOCAINE 3 PATCH: 50 OINTMENT TOPICAL at 10:50

## 2024-12-20 RX ADMIN — ACETAMINOPHEN 975 MILLIGRAM(S): 80 SOLUTION/ DROPS ORAL at 14:39

## 2024-12-20 RX ADMIN — Medication 1 DROP(S): at 15:47

## 2024-12-20 RX ADMIN — Medication 1 DROP(S): at 12:17

## 2024-12-20 RX ADMIN — Medication 1 DROP(S): at 17:09

## 2024-12-20 RX ADMIN — LIDOCAINE 3 PATCH: 50 OINTMENT TOPICAL at 04:52

## 2024-12-20 RX ADMIN — ERYTHROMYCIN 1 APPLICATION(S): 5 OINTMENT OPHTHALMIC at 05:18

## 2024-12-20 RX ADMIN — Medication 100 MILLIGRAM(S): at 18:20

## 2024-12-20 RX ADMIN — Medication 1 DROP(S): at 10:46

## 2024-12-20 RX ADMIN — Medication 1 DROP(S): at 21:36

## 2024-12-20 RX ADMIN — Medication 17 GRAM(S): at 10:51

## 2024-12-20 RX ADMIN — ERYTHROMYCIN 1 APPLICATION(S): 5 OINTMENT OPHTHALMIC at 18:21

## 2024-12-20 RX ADMIN — Medication 1 DROP(S): at 10:30

## 2024-12-20 RX ADMIN — ERYTHROMYCIN 1 APPLICATION(S): 5 OINTMENT OPHTHALMIC at 10:56

## 2024-12-20 RX ADMIN — Medication 1 DROP(S): at 14:40

## 2024-12-20 RX ADMIN — ERYTHROMYCIN 1 APPLICATION(S): 5 OINTMENT OPHTHALMIC at 17:09

## 2024-12-20 RX ADMIN — VANCOMYCIN HYDROCHLORIDE 1 DROP(S): 5 INJECTION, POWDER, LYOPHILIZED, FOR SOLUTION INTRAVENOUS at 17:09

## 2024-12-20 RX ADMIN — CEFTRIAXONE SODIUM 100 MILLIGRAM(S): 1 INJECTION, POWDER, FOR SOLUTION INTRAMUSCULAR; INTRAVENOUS at 17:08

## 2024-12-20 RX ADMIN — ERYTHROMYCIN 1 APPLICATION(S): 5 OINTMENT OPHTHALMIC at 12:17

## 2024-12-20 RX ADMIN — ERYTHROMYCIN 1 APPLICATION(S): 5 OINTMENT OPHTHALMIC at 21:36

## 2024-12-20 RX ADMIN — Medication 1 APPLICATION(S): at 05:18

## 2024-12-20 RX ADMIN — EPOETIN ALFA 3000 UNIT(S): 2000 SOLUTION INTRAVENOUS; SUBCUTANEOUS at 08:57

## 2024-12-20 RX ADMIN — ERYTHROMYCIN 1 APPLICATION(S): 5 OINTMENT OPHTHALMIC at 10:46

## 2024-12-20 RX ADMIN — HYDRALAZINE HYDROCHLORIDE 50 MILLIGRAM(S): 10 TABLET ORAL at 21:36

## 2024-12-20 RX ADMIN — Medication 2: at 17:17

## 2024-12-20 RX ADMIN — CEFTRIAXONE SODIUM 100 MILLIGRAM(S): 1 INJECTION, POWDER, FOR SOLUTION INTRAMUSCULAR; INTRAVENOUS at 05:16

## 2024-12-20 RX ADMIN — ACETAMINOPHEN 975 MILLIGRAM(S): 80 SOLUTION/ DROPS ORAL at 21:35

## 2024-12-20 RX ADMIN — VANCOMYCIN HYDROCHLORIDE 1 DROP(S): 5 INJECTION, POWDER, LYOPHILIZED, FOR SOLUTION INTRAVENOUS at 21:37

## 2024-12-20 RX ADMIN — VANCOMYCIN HYDROCHLORIDE 1 DROP(S): 5 INJECTION, POWDER, LYOPHILIZED, FOR SOLUTION INTRAVENOUS at 10:31

## 2024-12-20 RX ADMIN — Medication 1 DROP(S): at 12:18

## 2024-12-20 RX ADMIN — Medication 1 DROP(S): at 05:18

## 2024-12-20 RX ADMIN — VANCOMYCIN HYDROCHLORIDE 1 DROP(S): 5 INJECTION, POWDER, LYOPHILIZED, FOR SOLUTION INTRAVENOUS at 12:18

## 2024-12-20 RX ADMIN — VANCOMYCIN HYDROCHLORIDE 1 DROP(S): 5 INJECTION, POWDER, LYOPHILIZED, FOR SOLUTION INTRAVENOUS at 15:48

## 2024-12-20 RX ADMIN — CHLORHEXIDINE GLUCONATE 1 APPLICATION(S): 1.2 RINSE ORAL at 10:57

## 2024-12-20 RX ADMIN — ACETAMINOPHEN 975 MILLIGRAM(S): 80 SOLUTION/ DROPS ORAL at 05:17

## 2024-12-20 RX ADMIN — VANCOMYCIN HYDROCHLORIDE 1 DROP(S): 5 INJECTION, POWDER, LYOPHILIZED, FOR SOLUTION INTRAVENOUS at 14:40

## 2024-12-20 RX ADMIN — Medication 1 DROP(S): at 17:10

## 2024-12-20 RX ADMIN — Medication 1 DROP(S): at 15:48

## 2024-12-20 RX ADMIN — Medication 1 APPLICATION(S): at 05:54

## 2024-12-20 RX ADMIN — ERYTHROMYCIN 1 APPLICATION(S): 5 OINTMENT OPHTHALMIC at 14:40

## 2024-12-20 RX ADMIN — SEVELAMER CARBONATE 800 MILLIGRAM(S): 800 TABLET, FILM COATED ORAL at 17:09

## 2024-12-20 NOTE — PROGRESS NOTE ADULT - SUBJECTIVE AND OBJECTIVE BOX
Cardiovascular Disease Progress Note  DATE OF SERVICE: 24 @ 08:03    Overnight events: No acute events overnight.    The patient is undergoing HD in no distress.   Otherwise review of systems negative    Objective Findings:  T(C): 36.5 (24 @ 06:45), Max: 37.2 (24 @ 15:50)  HR: 63 (24 @ 06:45) (63 - 81)  BP: 137/66 (24 @ 06:45) (127/62 - 149/71)  RR: 18 (24 @ 06:45) (15 - 18)  SpO2: 98% (24 @ 06:45) (97% - 100%)  Wt(kg): --  Daily Height in cm: 149.9 (19 Dec 2024 15:50)    Daily Weight in k.5 (20 Dec 2024 06:45)      Physical Exam:  Gen: NAD; Patient resting comfortably  HEENT:  Normocephalic/ atraumatic  CV: RRR, normal S1 + S2, no m/r/g  Lungs:  Normal respiratory effort; clear to auscultation bilaterally  Abd: soft, non-tender; bowel sounds present  Ext: No edema; warm and well perfused    Telemetry: n/a    Laboratory Data:                        8.2    9.66  )-----------( 218      ( 20 Dec 2024 06:45 )             23.8     12-    131[L]  |  91[L]  |  55[H]  ----------------------------<  184[H]  4.6   |  23  |  5.50[H]    Ca    8.7      20 Dec 2024 06:45  Phos  8.7     12-20  Mg     2.40     12-20                Inpatient Medications:  MEDICATIONS  (STANDING):  acetaminophen     Tablet .. 975 milliGRAM(s) Oral every 8 hours  atropine 1% Solution 1 Drop(s) Left EYE two times a day  cefTAZidime  2.25 mG/0.1 mL Ophthalmic Injectable 0.1 milliLiter(s) IntraVitreal once  cefTRIAXone   IVPB 2000 milliGRAM(s) IV Intermittent every 12 hours  cefTRIAXone   IVPB      chlorhexidine 2% Cloths 1 Application(s) Topical daily  dextrose 5%. 1000 milliLiter(s) (100 mL/Hr) IV Continuous <Continuous>  dextrose 5%. 1000 milliLiter(s) (50 mL/Hr) IV Continuous <Continuous>  dextrose 50% Injectable 25 Gram(s) IV Push once  dextrose 50% Injectable 12.5 Gram(s) IV Push once  dextrose 50% Injectable 25 Gram(s) IV Push once  epoetin marla (EPOGEN) Injectable 3000 Unit(s) IV Push <User Schedule>  erythromycin   Ointment 1 Application(s) Left EYE <User Schedule>  glucagon  Injectable 1 milliGRAM(s) IntraMuscular once  hydrALAZINE 50 milliGRAM(s) Oral at bedtime  insulin lispro (ADMELOG) corrective regimen sliding scale   SubCutaneous at bedtime  insulin lispro (ADMELOG) corrective regimen sliding scale   SubCutaneous three times a day before meals  lidocaine   4% Patch 3 Patch Transdermal daily  lidocaine 1%/epinephrine 1:200,000 Inj 3 milliLiter(s) Local Injection once  moxifloxacin 0.5% Solution 1 Drop(s) Left EYE <User Schedule>  mupirocin 2% Ointment 1 Application(s) Topical every 12 hours  polyethylene glycol 3350 17 Gram(s) Oral daily  polyethylene glycol 3350 17 Gram(s) Oral at bedtime  povidone iodine 10% Nasal Swab 1 Application(s) Both Nostrils once  povidone iodine 5% Ophthalmic Solution 1 Drop(s) Left EYE once  senna 2 Tablet(s) Oral at bedtime  vancomycin 1 mG/0.1 mL Ophthalmic Injectable 0.1 milliLiter(s) IntraVitreal once      Assessment: 79 year old woman with ESRD on HD (MWF), HTN, DM (diet controlled), chronic type B aortic dissection, heart murmur, endometrial ca in remission s/p total hysterectomy in  presents with bacteremia.    Plan of Care:        #Bacteremia-  In the setting of ophthalmic infection.   BLANE with no obvious evidence of vegetations.  ABx as per ID.         #Type B aortic dissection-  - Chronic (Diagnosed >10 years ago)  - Optimal BP control.          #ESRD-  - HD as per renal.     #ACP (advance care planning)-  CPT 19141  Advanced care planning was discussed with the patient.    BLANE findings were discussed in detail and all questions were answered.       Over 55 minutes spent on total encounter; more than 50% of the visit was spent counseling and/or coordinating care by the attending physician.      Gabo Burris MD [facility]  Cardiovascular Disease  [phone]

## 2024-12-20 NOTE — PROGRESS NOTE ADULT - SUBJECTIVE AND OBJECTIVE BOX
[facility] Division of Kidney Diseases & Hypertension  FOLLOW UP NOTE  [phone]--------------------------------------------------------------------------------    Chief Complaint: ESRD/HD management    24 hour events/subjective: Pt. was seen and evaluated with family present at bedside earlier today. Pt. reports feeling, offers no complaints. Denies any headaches, fevers/chills, chest pain, SOB, and LE edema.      PAST HISTORY  --------------------------------------------------------------------------------  No significant changes to PMH, PSH, FHx, SHx, unless otherwise noted    ALLERGIES & MEDICATIONS  --------------------------------------------------------------------------------  Allergies    No Known Allergies    Intolerances    Standing Inpatient Medications  acetaminophen     Tablet .. 975 milliGRAM(s) Oral every 8 hours  atropine 1% Solution 1 Drop(s) Left EYE two times a day  cefTAZidime  2.25 mG/0.1 mL Ophthalmic Injectable 0.1 milliLiter(s) IntraVitreal once  cefTRIAXone   IVPB 2000 milliGRAM(s) IV Intermittent every 12 hours  cefTRIAXone   IVPB      chlorhexidine 2% Cloths 1 Application(s) Topical daily  dextrose 5%. 1000 milliLiter(s) IV Continuous <Continuous>  dextrose 5%. 1000 milliLiter(s) IV Continuous <Continuous>  dextrose 50% Injectable 25 Gram(s) IV Push once  dextrose 50% Injectable 12.5 Gram(s) IV Push once  dextrose 50% Injectable 25 Gram(s) IV Push once  epoetin marla (EPOGEN) Injectable 3000 Unit(s) IV Push <User Schedule>  erythromycin   Ointment 1 Application(s) Left EYE <User Schedule>  glucagon  Injectable 1 milliGRAM(s) IntraMuscular once  hydrALAZINE 50 milliGRAM(s) Oral at bedtime  insulin lispro (ADMELOG) corrective regimen sliding scale   SubCutaneous at bedtime  insulin lispro (ADMELOG) corrective regimen sliding scale   SubCutaneous three times a day before meals  lidocaine   4% Patch 3 Patch Transdermal daily  lidocaine 1%/epinephrine 1:200,000 Inj 3 milliLiter(s) Local Injection once  moxifloxacin 0.5% Solution 1 Drop(s) Left EYE <User Schedule>  mupirocin 2% Ointment 1 Application(s) Topical every 12 hours  polyethylene glycol 3350 17 Gram(s) Oral daily  polyethylene glycol 3350 17 Gram(s) Oral at bedtime  povidone iodine 10% Nasal Swab 1 Application(s) Both Nostrils once  povidone iodine 5% Ophthalmic Solution 1 Drop(s) Left EYE once  senna 2 Tablet(s) Oral at bedtime  tobramycin Fortified 15 mG/mL Ophthalmic 1 Drop(s) Left EYE every 2 hours  vancomycin 1 mG/0.1 mL Ophthalmic Injectable 0.1 milliLiter(s) IntraVitreal once  vancomycin 25 mG/mL Fortified Ophthalmic 1 Drop(s) Left EYE every 2 hours    PRN Inpatient Medications  acetaminophen     Tablet .. 650 milliGRAM(s) Oral every 6 hours PRN  dextrose Oral Gel 15 Gram(s) Oral once PRN  lidocaine/prilocaine Cream 1 Application(s) Topical daily PRN  magnesium hydroxide Suspension 30 milliLiter(s) Oral daily PRN  melatonin 3 milliGRAM(s) Oral at bedtime PRN  ondansetron Injectable 4 milliGRAM(s) IV Push every 6 hours PRN  oxyCODONE    IR 5 milliGRAM(s) Oral every 4 hours PRN  oxyCODONE    IR 10 milliGRAM(s) Oral every 4 hours PRN  traMADol 50 milliGRAM(s) Oral every 6 hours PRN      REVIEW OF SYSTEMS  --------------------------------------------------------------------------------  Gen: No fevers/chills  Respiratory: No dyspnea  CV: No chest pain  GI: No abdominal pain  MSK: No edema  Neuro: No dizziness/lightheadedness    All other systems were reviewed and are negative, except as noted.     VITALS/PHYSICAL EXAM  --------------------------------------------------------------------------------  T(C): 36.5 (12-20-24 @ 10:47), Max: 37.2 (12-19-24 @ 15:50)  HR: 78 (12-20-24 @ 10:47) (63 - 81)  BP: 118/63 (12-20-24 @ 10:47) (118/63 - 149/71)  RR: 18 (12-20-24 @ 10:47) (15 - 18)  SpO2: 99% (12-20-24 @ 10:47) (97% - 100%)  Wt(kg): --  Height (cm): 149.9 (12-19-24 @ 15:50)  Weight (kg): 54.4 (12-19-24 @ 15:50)  BMI (kg/m2): 24.2 (12-19-24 @ 15:50)  BSA (m2): 1.48 (12-19-24 @ 15:50)      12-20-24 @ 07:01  -  12-20-24 @ 13:54  --------------------------------------------------------  IN: 400 mL / OUT: 2400 mL / NET: -2000 mL      Physical Exam:  Gen: NAD, resting comfortably in bed  HEENT: on room air  Pulm: CTA B/L  CV: + murmur  Abd: soft  : No mistry  LE: no edema  Skin: Warm, without rashes  Vascular access: RUE AVF with thrill and bruit    LABS/STUDIES  --------------------------------------------------------------------------------              8.2    9.66  >-----------<  218      [12-20-24 @ 06:45]              23.8     131  |  91  |  55  ----------------------------<  184      [12-20-24 @ 06:45]  4.6   |  23  |  5.50        Ca     8.7     [12-20-24 @ 06:45]      Mg     2.40     [12-20-24 @ 06:45]      Phos  8.7     [12-20-24 @ 06:45]    Creatinine Trend:  SCr 5.50 [12-20 @ 06:45]  SCr 3.74 [12-19 @ 04:48]  SCr 4.64 [12-18 @ 01:15]  SCr 3.29 [12-17 @ 06:57]  SCr 3.72 [12-16 @ 23:32]    TSH 1.73      [12-13-24 @ 17:56]  Lipid: chol 139, , HDL 45, LDL --      [12-13-24 @ 17:56]    HBsAb 62.2      [12-13-24 @ 16:38]  HBsAg Nonreact      [12-13-24 @ 16:38]  HBcAb Reactive      [12-13-24 @ 16:38]  HCV 0.20, Nonreact      [12-13-24 @ 16:38]

## 2024-12-20 NOTE — PROGRESS NOTE ADULT - PROBLEM SELECTOR PLAN 2
Pt. with anemia in the setting of ESRD. Hgb (8.2) is below below goal for ESRD. Continue EPO with HD TIW, will increase dose. Monitor Hgb, goal: 10-11.

## 2024-12-20 NOTE — PROGRESS NOTE ADULT - ASSESSMENT
79 yr old female with PMH of  ESRD on HD , HTN, DM, R cataract , glaucoma here for fever and rapid decline of R eye vision .     Bacterial Endophthalmitis OS  - cw  iv antibiotics as per ID   - cultures reviewed repeat neg   - sp Intravitreal Vancomycin   - cw eye drops  - ID fu   - will monitor     R knee septic arthritis  - cw abx  - so I and D     High grade bacteremia  - cw iv abx  - ID fu   - repeat cultures neg   - imaging reviewed   - echo reviewed    Aortic dissection  - chronic   - no intervention needed     ESRD  - HD as scheduled  - renal fu     HTN  - cw hydralazine  - DASH diet    DM  - monitor FS  - ISS    Heparin sc for DVT prophylaxis

## 2024-12-20 NOTE — PROGRESS NOTE ADULT - SUBJECTIVE AND OBJECTIVE BOX
[facility] DEPARTMENT OF OPHTHALMOLOGY    ------------------------------------------------------------------------------    Interval History: Overnight pt feels left eye is leaking fluid.     MEDICATIONS  (STANDING):  acetaminophen     Tablet .. 975 milliGRAM(s) Oral every 8 hours  atropine 1% Solution 1 Drop(s) Left EYE two times a day  cefTAZidime  2.25 mG/0.1 mL Ophthalmic Injectable 0.1 milliLiter(s) IntraVitreal once  cefTRIAXone   IVPB 2000 milliGRAM(s) IV Intermittent every 12 hours  cefTRIAXone   IVPB      chlorhexidine 2% Cloths 1 Application(s) Topical daily  dextrose 5%. 1000 milliLiter(s) (100 mL/Hr) IV Continuous <Continuous>  dextrose 5%. 1000 milliLiter(s) (50 mL/Hr) IV Continuous <Continuous>  dextrose 50% Injectable 25 Gram(s) IV Push once  dextrose 50% Injectable 12.5 Gram(s) IV Push once  dextrose 50% Injectable 25 Gram(s) IV Push once  epoetin marla (EPOGEN) Injectable 3000 Unit(s) IV Push <User Schedule>  erythromycin   Ointment 1 Application(s) Left EYE <User Schedule>  glucagon  Injectable 1 milliGRAM(s) IntraMuscular once  hydrALAZINE 50 milliGRAM(s) Oral at bedtime  insulin lispro (ADMELOG) corrective regimen sliding scale   SubCutaneous at bedtime  insulin lispro (ADMELOG) corrective regimen sliding scale   SubCutaneous three times a day before meals  lidocaine   4% Patch 3 Patch Transdermal daily  lidocaine 1%/epinephrine 1:200,000 Inj 3 milliLiter(s) Local Injection once  moxifloxacin 0.5% Solution 1 Drop(s) Left EYE <User Schedule>  mupirocin 2% Ointment 1 Application(s) Topical every 12 hours  polyethylene glycol 3350 17 Gram(s) Oral daily  polyethylene glycol 3350 17 Gram(s) Oral at bedtime  povidone iodine 10% Nasal Swab 1 Application(s) Both Nostrils once  povidone iodine 5% Ophthalmic Solution 1 Drop(s) Left EYE once  senna 2 Tablet(s) Oral at bedtime  vancomycin 1 mG/0.1 mL Ophthalmic Injectable 0.1 milliLiter(s) IntraVitreal once    MEDICATIONS  (PRN):  acetaminophen     Tablet .. 650 milliGRAM(s) Oral every 6 hours PRN Temp greater or equal to 38C (100.4F), Mild Pain (1 - 3), Moderate Pain (4 - 6)  dextrose Oral Gel 15 Gram(s) Oral once PRN Blood Glucose LESS THAN 70 milliGRAM(s)/deciliter  lidocaine/prilocaine Cream 1 Application(s) Topical daily PRN on HD days for pain  magnesium hydroxide Suspension 30 milliLiter(s) Oral daily PRN Constipation  melatonin 3 milliGRAM(s) Oral at bedtime PRN Insomnia  ondansetron Injectable 4 milliGRAM(s) IV Push every 6 hours PRN Nausea and/or Vomiting  oxyCODONE    IR 5 milliGRAM(s) Oral every 4 hours PRN Moderate Pain (4 - 6)  oxyCODONE    IR 10 milliGRAM(s) Oral every 4 hours PRN Severe Pain (7 - 10)  traMADol 50 milliGRAM(s) Oral every 6 hours PRN Mild Pain (1 - 3)      VITALS: T(C): 36.5 (12-20-24 @ 06:45)  T(F): 97.7 (12-20-24 @ 06:45), Max: 98.9 (12-19-24 @ 15:50)  HR: 63 (12-20-24 @ 06:45) (63 - 81)  BP: 137/66 (12-20-24 @ 06:45) (127/62 - 149/71)  RR:  (15 - 18)  SpO2:  (97% - 100%)  Wt(kg): --  General: AAO x 3, appropriate mood and affect    Ophthalmology Exam:  Visual acuity (sc): HM OD, NLP OS   Pupils: Minimally reactive OU, no RAPD  Ttono: Not assessed today    Pen Light Exam (PLE)  External: Flat OU  Lids/Lashes/Lacrimal Ducts: Flat OU    Sclera/Conjunctiva: W+Q OD, 3+ injection OS   Cornea: Cl OD, OD: large near total epi defect with mildly heaped up edges, corneal haze. No infiltrate. limbal corneal perforation with uveal protrusion from 8 to 10 oclock.   Anterior Chamber: D+F OD; OS: no hypopyon, with flare and likely vitreous in AC  Iris: Flat OD, Posterior synechiae OS  Lens: Dislocated inferior nasally, pt aware, occurred after fall, no view of lens OS    [facility] DEPARTMENT OF OPHTHALMOLOGY    ------------------------------------------------------------------------------    Interval History: Overnight pt feels left eye is leaking fluid.     MEDICATIONS  (STANDING):  acetaminophen     Tablet .. 975 milliGRAM(s) Oral every 8 hours  atropine 1% Solution 1 Drop(s) Left EYE two times a day  cefTAZidime  2.25 mG/0.1 mL Ophthalmic Injectable 0.1 milliLiter(s) IntraVitreal once  cefTRIAXone   IVPB 2000 milliGRAM(s) IV Intermittent every 12 hours  cefTRIAXone   IVPB      chlorhexidine 2% Cloths 1 Application(s) Topical daily  dextrose 5%. 1000 milliLiter(s) (100 mL/Hr) IV Continuous <Continuous>  dextrose 5%. 1000 milliLiter(s) (50 mL/Hr) IV Continuous <Continuous>  dextrose 50% Injectable 25 Gram(s) IV Push once  dextrose 50% Injectable 12.5 Gram(s) IV Push once  dextrose 50% Injectable 25 Gram(s) IV Push once  epoetin marla (EPOGEN) Injectable 3000 Unit(s) IV Push <User Schedule>  erythromycin   Ointment 1 Application(s) Left EYE <User Schedule>  glucagon  Injectable 1 milliGRAM(s) IntraMuscular once  hydrALAZINE 50 milliGRAM(s) Oral at bedtime  insulin lispro (ADMELOG) corrective regimen sliding scale   SubCutaneous at bedtime  insulin lispro (ADMELOG) corrective regimen sliding scale   SubCutaneous three times a day before meals  lidocaine   4% Patch 3 Patch Transdermal daily  lidocaine 1%/epinephrine 1:200,000 Inj 3 milliLiter(s) Local Injection once  moxifloxacin 0.5% Solution 1 Drop(s) Left EYE <User Schedule>  mupirocin 2% Ointment 1 Application(s) Topical every 12 hours  polyethylene glycol 3350 17 Gram(s) Oral daily  polyethylene glycol 3350 17 Gram(s) Oral at bedtime  povidone iodine 10% Nasal Swab 1 Application(s) Both Nostrils once  povidone iodine 5% Ophthalmic Solution 1 Drop(s) Left EYE once  senna 2 Tablet(s) Oral at bedtime  vancomycin 1 mG/0.1 mL Ophthalmic Injectable 0.1 milliLiter(s) IntraVitreal once    MEDICATIONS  (PRN):  acetaminophen     Tablet .. 650 milliGRAM(s) Oral every 6 hours PRN Temp greater or equal to 38C (100.4F), Mild Pain (1 - 3), Moderate Pain (4 - 6)  dextrose Oral Gel 15 Gram(s) Oral once PRN Blood Glucose LESS THAN 70 milliGRAM(s)/deciliter  lidocaine/prilocaine Cream 1 Application(s) Topical daily PRN on HD days for pain  magnesium hydroxide Suspension 30 milliLiter(s) Oral daily PRN Constipation  melatonin 3 milliGRAM(s) Oral at bedtime PRN Insomnia  ondansetron Injectable 4 milliGRAM(s) IV Push every 6 hours PRN Nausea and/or Vomiting  oxyCODONE    IR 5 milliGRAM(s) Oral every 4 hours PRN Moderate Pain (4 - 6)  oxyCODONE    IR 10 milliGRAM(s) Oral every 4 hours PRN Severe Pain (7 - 10)  traMADol 50 milliGRAM(s) Oral every 6 hours PRN Mild Pain (1 - 3)      VITALS: T(C): 36.5 (12-20-24 @ 06:45)  T(F): 97.7 (12-20-24 @ 06:45), Max: 98.9 (12-19-24 @ 15:50)  HR: 63 (12-20-24 @ 06:45) (63 - 81)  BP: 137/66 (12-20-24 @ 06:45) (127/62 - 149/71)  RR:  (15 - 18)  SpO2:  (97% - 100%)  Wt(kg): --  General: AAO x 3, appropriate mood and affect    Ophthalmology Exam:  Visual acuity (sc): HM OD, NLP OS   Pupils: Minimally reactive OU, no RAPD  Ttono: Not assessed today    Pen Light Exam (PLE)  External: Flat OU  Lids/Lashes/Lacrimal Ducts: Flat OU    Sclera/Conjunctiva: W+Q OD, 3-4+ injection OS   Cornea: Cl OD, OD: large near total epi defect with mildly heaped up edges, corneal haze. No infiltrate. Limbal corneal perforation with uveal protrusion from 8 to 10 o'clock. Pus coming through perforation.    Anterior Chamber: D+F OD; OS: No view to AC.  Iris: Flat OD, Posterior synechiae OS  Lens: Dislocated inferior nasally, pt aware, occurred after fall, no view of lens OS

## 2024-12-20 NOTE — PROGRESS NOTE ADULT - ASSESSMENT
78 y/o F, PMH ESRD thru R AV fistula, HTN, DM, R cataract (surgery scheduled for Jan 2025), glaucoma and L hip replacement, presented on 12/13 with fever, pain, and rapid decline in L vision, with opthalmology exam suggestive of bacterial endophthalmitis now with blood cultures growing Group B Strep. Infectious Disease consulted for assessment of infectious source and antimicrobial management     Upon presentation, patient febrile to 102.8F  Labs showed no leukocytosis but with 14.8% bandemia     Workup:   -Blood Cx (12/12): Strep B (4/4 bottles)  -CT C/A/P: No infectious source in the chest, abdomen, or pelvis. Stable thoracic abdominal aortic dissection. Small left and trace right pleural effusions.  -Repeat blood Cx (12/14): NGTD  -Intravitreal Cx: + Strep B   -R hand Xray: Variable degree IP joint arthritic changes.  -TTE: no documented vegetations   -CT RLE: large R knee effusions   -BLANE: mild to moderate aortic regurgitation, mild mitral regurgitation, type B aorta dissection, no vegetations     #Sepsis, fevers, bandemia - resolved   #High grade Group B Strep Bacteremia  #L vison loss, Endogenous Bacterial Endophthalmitis, now with corneal perforation   #R knee septic arthritis s/p I&D on 12/18  #Presence of RUE AV fistula with no clinical evidence of infection     Recommendations:   -Continue Ceftriaxone 2 g IV q12hrs   -Opthalmology following - s/p Intravitreal Vancomycin, now with new corneal perforation, with plan "to attempt glue of cornea. If unsuccessful, patient may require enucleation"  -Monitor T curve and WBC trend      James Moe MD  ID physician  [id number] Teams Preferred  After 5pm/weekends call [phone]

## 2024-12-20 NOTE — PROGRESS NOTE ADULT - SUBJECTIVE AND OBJECTIVE BOX
Patient is a 79y old  Female who presents with a chief complaint of fever, vision loss (20 Dec 2024 09:53)    Date of servie : 12-20-24 @ 12:14  INTERVAL HPI/OVERNIGHT EVENTS:  T(C): 36.5 (12-20-24 @ 10:47), Max: 37.2 (12-19-24 @ 15:50)  HR: 78 (12-20-24 @ 10:47) (63 - 81)  BP: 118/63 (12-20-24 @ 10:47) (118/63 - 149/71)  RR: 18 (12-20-24 @ 10:47) (15 - 18)  SpO2: 99% (12-20-24 @ 10:47) (97% - 100%)  Wt(kg): --  I&O's Summary    20 Dec 2024 07:01  -  20 Dec 2024 12:14  --------------------------------------------------------  IN: 400 mL / OUT: 2400 mL / NET: -2000 mL        LABS:                        8.2    9.66  )-----------( 218      ( 20 Dec 2024 06:45 )             23.8     12-20    131[L]  |  91[L]  |  55[H]  ----------------------------<  184[H]  4.6   |  23  |  5.50[H]    Ca    8.7      20 Dec 2024 06:45  Phos  8.7     12-20  Mg     2.40     12-20        Urinalysis Basic - ( 20 Dec 2024 06:45 )    Color: x / Appearance: x / SG: x / pH: x  Gluc: 184 mg/dL / Ketone: x  / Bili: x / Urobili: x   Blood: x / Protein: x / Nitrite: x   Leuk Esterase: x / RBC: x / WBC x   Sq Epi: x / Non Sq Epi: x / Bacteria: x      CAPILLARY BLOOD GLUCOSE      POCT Blood Glucose.: 185 mg/dL (20 Dec 2024 12:05)  POCT Blood Glucose.: 106 mg/dL (20 Dec 2024 10:00)  POCT Blood Glucose.: 142 mg/dL (20 Dec 2024 08:09)  POCT Blood Glucose.: 191 mg/dL (20 Dec 2024 05:53)  POCT Blood Glucose.: 235 mg/dL (19 Dec 2024 22:24)  POCT Blood Glucose.: 177 mg/dL (19 Dec 2024 18:50)  POCT Blood Glucose.: 153 mg/dL (19 Dec 2024 17:21)        Urinalysis Basic - ( 20 Dec 2024 06:45 )    Color: x / Appearance: x / SG: x / pH: x  Gluc: 184 mg/dL / Ketone: x  / Bili: x / Urobili: x   Blood: x / Protein: x / Nitrite: x   Leuk Esterase: x / RBC: x / WBC x   Sq Epi: x / Non Sq Epi: x / Bacteria: x        MEDICATIONS  (STANDING):  acetaminophen     Tablet .. 975 milliGRAM(s) Oral every 8 hours  atropine 1% Solution 1 Drop(s) Left EYE two times a day  cefTAZidime  2.25 mG/0.1 mL Ophthalmic Injectable 0.1 milliLiter(s) IntraVitreal once  cefTRIAXone   IVPB 2000 milliGRAM(s) IV Intermittent every 12 hours  cefTRIAXone   IVPB      chlorhexidine 2% Cloths 1 Application(s) Topical daily  dextrose 5%. 1000 milliLiter(s) (100 mL/Hr) IV Continuous <Continuous>  dextrose 5%. 1000 milliLiter(s) (50 mL/Hr) IV Continuous <Continuous>  dextrose 50% Injectable 25 Gram(s) IV Push once  dextrose 50% Injectable 12.5 Gram(s) IV Push once  dextrose 50% Injectable 25 Gram(s) IV Push once  epoetin marla (EPOGEN) Injectable 3000 Unit(s) IV Push <User Schedule>  erythromycin   Ointment 1 Application(s) Left EYE <User Schedule>  glucagon  Injectable 1 milliGRAM(s) IntraMuscular once  hydrALAZINE 50 milliGRAM(s) Oral at bedtime  insulin lispro (ADMELOG) corrective regimen sliding scale   SubCutaneous at bedtime  insulin lispro (ADMELOG) corrective regimen sliding scale   SubCutaneous three times a day before meals  lidocaine   4% Patch 3 Patch Transdermal daily  lidocaine 1%/epinephrine 1:200,000 Inj 3 milliLiter(s) Local Injection once  moxifloxacin 0.5% Solution 1 Drop(s) Left EYE <User Schedule>  mupirocin 2% Ointment 1 Application(s) Topical every 12 hours  polyethylene glycol 3350 17 Gram(s) Oral daily  polyethylene glycol 3350 17 Gram(s) Oral at bedtime  povidone iodine 10% Nasal Swab 1 Application(s) Both Nostrils once  povidone iodine 5% Ophthalmic Solution 1 Drop(s) Left EYE once  senna 2 Tablet(s) Oral at bedtime  tobramycin Fortified 15 mG/mL Ophthalmic 1 Drop(s) Left EYE every 2 hours  vancomycin 1 mG/0.1 mL Ophthalmic Injectable 0.1 milliLiter(s) IntraVitreal once  vancomycin 25 mG/mL Fortified Ophthalmic 1 Drop(s) Left EYE every 2 hours    MEDICATIONS  (PRN):  acetaminophen     Tablet .. 650 milliGRAM(s) Oral every 6 hours PRN Temp greater or equal to 38C (100.4F), Mild Pain (1 - 3), Moderate Pain (4 - 6)  dextrose Oral Gel 15 Gram(s) Oral once PRN Blood Glucose LESS THAN 70 milliGRAM(s)/deciliter  lidocaine/prilocaine Cream 1 Application(s) Topical daily PRN on HD days for pain  magnesium hydroxide Suspension 30 milliLiter(s) Oral daily PRN Constipation  melatonin 3 milliGRAM(s) Oral at bedtime PRN Insomnia  ondansetron Injectable 4 milliGRAM(s) IV Push every 6 hours PRN Nausea and/or Vomiting  oxyCODONE    IR 5 milliGRAM(s) Oral every 4 hours PRN Moderate Pain (4 - 6)  oxyCODONE    IR 10 milliGRAM(s) Oral every 4 hours PRN Severe Pain (7 - 10)  traMADol 50 milliGRAM(s) Oral every 6 hours PRN Mild Pain (1 - 3)          PHYSICAL EXAM:  GENERAL: Frail  HEAD:  Atraumatic, Normocephalic  CHEST/LUNG: Clear to percussion bilaterally; No rales, rhonchi, wheezing, or rubs  HEART: Regular rate and rhythm; No murmurs, rubs, or gallops  ABDOMEN: Soft, Nontender, Nondistended; Bowel sounds present  EXTREMITIES: edema +    Care Discussed with Consultants/Other Providers [ ] YES  [ ] NO

## 2024-12-20 NOTE — PROGRESS NOTE ADULT - ASSESSMENT
79yFemale s/p R knee I&D POD 2    -WBAT  -PT/OT  -Abx per ID  -FU OR cx  -care per primary    Amr MD Jef  Orthopaedic Surgery Resident    For all questions, please reach out via the following numbers for the on-call resident; do not reach out via Teams.  [facility] [phone]  [facility]        [phone]  [facility]  [phone]/ [phone]

## 2024-12-20 NOTE — PROGRESS NOTE ADULT - ASSESSMENT
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 Tolerated well. See note.   - Va worsened to NLP OS 12/15  - AC tap cultures pending. Gram stain showing gram + cocci in pairs.   - Anterior exam with no view of Lens OS. B scan with dropped lens. Does not appear to be an RD.   - 12/19: VA OS NLP, no hypopyon, B-scan with vitritis and dropped lens  - 12/20: Pt found with corneal perforation from 8 to 10 oclock in the left eye due to progressive corneal thinning.   - Please keep hard eye shield over left eye at all times   - Start fortified vancomycin gtt and fortified tobramycin gtt to left eye q2hrs alternating every hour   - Start PO doxycycline 100mg BID   - Start vitamin C PO   - Will attempt to glue cornea. If unsuccessful, patient may require enucleation given NLP vision, active endophthalmitis and corneal perforation  - Visual prognosis guarded given NLP vision  - continue broad spectrum abx per primary team and ID  - Appreciate ID recs   - continue with atropine drop BID to left eye  - Increase erythromycin ointment q2hr to left eye       JENNY Kim, retina attending. ERIN Valle. JENNY Dai    Outpatient Follow-up: Patient should follow-up with his/her ophthalmologist or with [facility] Department of Ophthalmology within 1 week of after discharge at:    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 Tolerated well. See note.   - Va worsened to NLP OS 12/15  - AC tap cultures pending. Gram stain showing gram + cocci in pairs.   - Anterior exam with no view of Lens OS. B scan with dropped lens. Does not appear to be an RD.   - 12/19: VA OS NLP, no hypopyon, B-scan with vitritis and dropped lens  - 12/20: Pt found with corneal perforation from 8 to 10 oclock in the left eye due to progressive corneal thinning.   - Please keep hard eye shield over left eye at all times   - Start fortified vancomycin gtt and fortified tobramycin gtt to left eye q2hrs alternating every hour - ordered  - Start PO doxycycline 100mg BID   - Start vitamin C PO   - Will attempt to glue cornea. If unsuccessful, patient may require enucleation given NLP vision, active endophthalmitis and corneal perforation  - Visual prognosis guarded given NLP vision  - continue broad spectrum abx per primary team and ID  - Appreciate ID recs   - continue with atropine drop BID to left eye  - Increase erythromycin ointment q2hr to left eye       DW Dr. Kim, retina attending. ERIN Valle. JENNY Dai    Outpatient Follow-up: Patient should follow-up with his/her ophthalmologist or with [facility] Department of Ophthalmology within 1 week of after discharge at:    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 Tolerated well. See note.   - Va worsened to NLP OS 12/15  - AC tap cultures pending. Gram stain showing gram + cocci in pairs.   - Anterior exam with no view of Lens OS. B scan with dropped lens. Does not appear to be an RD.   - 12/19: VA OS NLP, no hypopyon, B-scan with vitritis and dropped lens  - 12/20: Pt found with corneal perforation from 8 to 10 oclock in the left eye due to progressive corneal thinning. Has uveal protrusion and oozing purulent drainage out of left eye  - Lengthy discussion held with patient and daughter Veronika at bedside regarding plan of care. Based on extent and severity of intraocular infection, repairing corneal perforation would not be feasible at this time and risks would not outweigh the benefits of performing corneal repair. Patient will require left eye evisceration to prevent further harm of intraocular infection.   - Plan for left eye evisceration on 12/21 AM (booked with OR, case will happen if booked as first case on 12/21. If not able to happen as first case on 12/21, will happen as first case on 12/22 due to oculoplastics availability)   - Please keep hard eye shield over left eye at all times   - Start fortified vancomycin gtt and fortified tobramycin gtt to left eye q2hrs alternating every hour - ordered  - Start PO doxycycline 100mg BID   - Start vitamin C PO   - Visual prognosis guarded given NLP vision and now corneal perforation   - continue broad spectrum abx per primary team and ID  - Appreciate ID recs   - continue with atropine drop BID to left eye  - Increase erythromycin ointment q2hr to left eye       Seen and discussed with Dr. Valle. Discussed with Dr. Kim (retina), Dr. Rooney (cornea), Dr. Correia (cornea) and Dr. Dai (oculoplastics).     Outpatient Follow-up: Patient should follow-up with his/her ophthalmologist or with [facility] Department of Ophthalmology within 1 week of after discharge at:    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 Tolerated well. See note.   - Va worsened to NLP OS 12/15  - AC tap cultures pending. Gram stain showing gram + cocci in pairs.   - Anterior exam with no view of Lens OS. B scan with dropped lens. Does not appear to be an RD.   - 12/19: VA OS NLP, no hypopyon, B-scan with vitritis and dropped lens  - 12/20: Pt found with corneal perforation from 8 to 10 oclock in the left eye due to progressive corneal thinning. Has uveal protrusion and oozing purulent drainage out of left eye  - Lengthy discussion held with patient and daughter Veronika at bedside regarding plan of care. Based on extent and severity of intraocular infection, repairing corneal perforation would not be feasible at this time and risks would not outweigh the benefits of performing corneal repair. Patient will require left eye evisceration to prevent further harm of intraocular infection.   - Plan for left eye evisceration on 12/21 AM (booked with OR, case will happen if booked as first case on 12/21. If not able to happen as first case on 12/21 based on OR availability, the case will happen as first case on 12/22 due to oculoplastics availability)   - Please keep hard eye shield over left eye at all times   - Please draw preop labs  - Start fortified vancomycin gtt and fortified tobramycin gtt to left eye q2hrs alternating every hour - ordered  - Start PO doxycycline 100mg BID   - Start vitamin C PO   - Visual prognosis guarded given NLP vision and now corneal perforation   - continue broad spectrum abx per primary team and ID  - Appreciate ID recs   - continue with atropine drop BID to left eye  - Increase erythromycin ointment q2hr to left eye       Seen and discussed with Dr. Valle. Discussed with Dr. Kim (retina), Dr. Rooney (cornea), Dr. Correia (cornea) and Dr. Dai (oculoplastics).     Outpatient Follow-up: Patient should follow-up with his/her ophthalmologist or with [facility] Department of Ophthalmology within 1 week of after discharge at:    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 Tolerated well. See note.   - Va worsened to NLP OS 12/15  - AC tap cultures pending. Gram stain showing gram + cocci in pairs.   - Anterior exam with no view of Lens OS. B scan with dropped lens. Does not appear to be an RD.   - 12/19: VA OS NLP, no hypopyon, B-scan with vitritis and dropped lens  - 12/20: Pt found with corneal perforation from 8 to 10 oclock in the left eye due to progressive corneal thinning. Has uveal protrusion and oozing purulent drainage out of left eye  - Lengthy discussion held with patient and daughter Veronika at bedside regarding plan of care. Based on extent and severity of intraocular infection, repairing corneal perforation would not be feasible at this time and risks would not outweigh the benefits of performing corneal repair. Patient will require left eye evisceration to prevent further harm of intraocular infection and open globe  - Plan for left eye evisceration on 12/21 AM (booked with OR, case will happen if booked as first case on 12/21. If not able to happen as first case on 12/21 based on OR availability, the case will happen as first case on 12/22 due to oculoplastics availability)   - Please keep hard eye shield over left eye at all times   - Please draw preop labs  - Start fortified vancomycin gtt and fortified tobramycin gtt to left eye q2hrs alternating every hour - ordered  - Start PO doxycycline 100mg BID   - Start vitamin C PO   - Visual prognosis guarded given NLP vision and now corneal perforation   - continue broad spectrum abx per primary team and ID  - Appreciate ID recs   - continue with atropine drop BID to left eye  - Increase erythromycin ointment q2hr to left eye  - Consider psych eval given news of vision loss, per discussion at bedside, pt is having trouble coping with news that she will no longer see with her left eye.        Seen and discussed with Dr. Valle. Discussed with Dr. Kim (retina), Dr. Rooney (cornea), Dr. Correia (cornea) and Dr. Dai (oculoplastics).     Outpatient Follow-up: Patient should follow-up with his/her ophthalmologist or with [facility] Department of Ophthalmology within 1 week of after discharge at:    [address] [facility] [address]  [place], [place] [id number] [phone]

## 2024-12-20 NOTE — PROGRESS NOTE ADULT - SUBJECTIVE AND OBJECTIVE BOX
Orthopedic Surgery Progress Note     S: Patient seen and examined today. No acute events overnight. Pain is well controlled. Denies f/c, chest pain, shortness of breath, dizziness.    MEDICATIONS  (STANDING):  acetaminophen     Tablet .. 975 milliGRAM(s) Oral every 8 hours  atropine 1% Solution 1 Drop(s) Left EYE two times a day  cefTAZidime  2.25 mG/0.1 mL Ophthalmic Injectable 0.1 milliLiter(s) IntraVitreal once  cefTRIAXone   IVPB      cefTRIAXone   IVPB 2000 milliGRAM(s) IV Intermittent every 12 hours  chlorhexidine 2% Cloths 1 Application(s) Topical daily  dextrose 5%. 1000 milliLiter(s) (50 mL/Hr) IV Continuous <Continuous>  dextrose 5%. 1000 milliLiter(s) (100 mL/Hr) IV Continuous <Continuous>  dextrose 50% Injectable 25 Gram(s) IV Push once  dextrose 50% Injectable 12.5 Gram(s) IV Push once  dextrose 50% Injectable 25 Gram(s) IV Push once  epoetin marla (EPOGEN) Injectable 3000 Unit(s) IV Push <User Schedule>  erythromycin   Ointment 1 Application(s) Left EYE <User Schedule>  glucagon  Injectable 1 milliGRAM(s) IntraMuscular once  hydrALAZINE 50 milliGRAM(s) Oral at bedtime  insulin lispro (ADMELOG) corrective regimen sliding scale   SubCutaneous at bedtime  insulin lispro (ADMELOG) corrective regimen sliding scale   SubCutaneous three times a day before meals  lidocaine   4% Patch 3 Patch Transdermal daily  lidocaine 1%/epinephrine 1:200,000 Inj 3 milliLiter(s) Local Injection once  moxifloxacin 0.5% Solution 1 Drop(s) Left EYE <User Schedule>  mupirocin 2% Ointment 1 Application(s) Topical every 12 hours  polyethylene glycol 3350 17 Gram(s) Oral daily  polyethylene glycol 3350 17 Gram(s) Oral at bedtime  povidone iodine 10% Nasal Swab 1 Application(s) Both Nostrils once  povidone iodine 5% Ophthalmic Solution 1 Drop(s) Left EYE once  senna 2 Tablet(s) Oral at bedtime  vancomycin 1 mG/0.1 mL Ophthalmic Injectable 0.1 milliLiter(s) IntraVitreal once    MEDICATIONS  (PRN):  acetaminophen     Tablet .. 650 milliGRAM(s) Oral every 6 hours PRN Temp greater or equal to 38C (100.4F), Mild Pain (1 - 3), Moderate Pain (4 - 6)  dextrose Oral Gel 15 Gram(s) Oral once PRN Blood Glucose LESS THAN 70 milliGRAM(s)/deciliter  lidocaine/prilocaine Cream 1 Application(s) Topical daily PRN on HD days for pain  magnesium hydroxide Suspension 30 milliLiter(s) Oral daily PRN Constipation  melatonin 3 milliGRAM(s) Oral at bedtime PRN Insomnia  ondansetron Injectable 4 milliGRAM(s) IV Push every 6 hours PRN Nausea and/or Vomiting  oxyCODONE    IR 5 milliGRAM(s) Oral every 4 hours PRN Moderate Pain (4 - 6)  oxyCODONE    IR 10 milliGRAM(s) Oral every 4 hours PRN Severe Pain (7 - 10)  traMADol 50 milliGRAM(s) Oral every 6 hours PRN Mild Pain (1 - 3)      Physical Exam:  Gen: Lying in bed, NAD  Resp: No increased WOB  RLE:  Aquacel dressing c/d/i, compartments soft, no calf TTP b/l  Motor: TA/EHL/GS/FHL intact  Sensory: DP/SP/Tib/Varsha/Saph SILT  +DP pulse, WWP    Vital Signs Last 24 Hrs  T(C): 36.9 (19 Dec 2024 22:27), Max: 37.2 (19 Dec 2024 15:50)  T(F): 98.4 (19 Dec 2024 22:27), Max: 98.9 (19 Dec 2024 15:50)  HR: 69 (19 Dec 2024 22:27) (66 - 81)  BP: 141/77 (19 Dec 2024 22:27) (127/62 - 149/71)  BP(mean): --  RR: 17 (19 Dec 2024 22:27) (15 - 18)  SpO2: 97% (19 Dec 2024 22:27) (97% - 100%)    Parameters below as of 19 Dec 2024 22:27  Patient On (Oxygen Delivery Method): room air        12-18-24 @ 07:01  -  12-19-24 @ 07:00  --------------------------------------------------------  IN: 640 mL / OUT: 2400 mL / NET: -1760 mL                    LABS:                        9.2    8.38  )-----------( 228      ( 19 Dec 2024 04:48 )             26.8     12-19    133[L]  |  95[L]  |  36[H]  ----------------------------<  198[H]  4.3   |  24  |  3.74[H]    Ca    8.5      19 Dec 2024 04:48  Phos  6.3     12-19  Mg     2.20     12-19

## 2024-12-20 NOTE — PROGRESS NOTE ADULT - SUBJECTIVE AND OBJECTIVE BOX
Follow Up:      Interval History/ROS:Patient is a 79y old  Female who presents with a chief complaint of fever, vision loss (20 Dec 2024 12:13)  afebrile, still complaining of vision loss in L eye.  Now with new L corneal perforation.     Allergies  No Known Allergies    ANTIMICROBIALS:    cefTAZidime  2.25 mG/0.1 mL Ophthalmic Injectable 0.1 once  cefTRIAXone   IVPB 2000 every 12 hours  cefTRIAXone   IVPB    vancomycin 1 mG/0.1 mL Ophthalmic Injectable 0.1 once  vancomycin 25 mG/mL Fortified Ophthalmic 1 every 2 hours    MEDICATIONS  (STANDING):  acetaminophen     Tablet .. 975 every 8 hours  acetaminophen     Tablet .. 650 every 6 hours PRN  dextrose 50% Injectable 25 once  dextrose 50% Injectable 12.5 once  dextrose 50% Injectable 25 once  dextrose Oral Gel 15 once PRN  epoetin marla (EPOGEN) Injectable 3000 <User Schedule>  glucagon  Injectable 1 once  hydrALAZINE 50 at bedtime  insulin lispro (ADMELOG) corrective regimen sliding scale  three times a day before meals  insulin lispro (ADMELOG) corrective regimen sliding scale  at bedtime  magnesium hydroxide Suspension 30 daily PRN  melatonin 3 at bedtime PRN  ondansetron Injectable 4 every 6 hours PRN  oxyCODONE    IR 5 every 4 hours PRN  oxyCODONE    IR 10 every 4 hours PRN  polyethylene glycol 3350 17 at bedtime  polyethylene glycol 3350 17 daily  senna 2 at bedtime  traMADol 50 every 6 hours PRN    Vital Signs Last 24 Hrs  T(F): 97.7 (12-20-24 @ 10:47), Max: 99.8 (12-18-24 @ 10:37)    Vital Signs Last 24 Hrs  HR: 78 (12-20-24 @ 10:47) (63 - 81)  BP: 118/63 (12-20-24 @ 10:47) (118/63 - 149/71)  RR: 18 (12-20-24 @ 10:47)  SpO2: 99% (12-20-24 @ 10:47) (97% - 100%)  Wt(kg): --    EXAM:    GA: NAD  HEENT: oral cavity no lesion  CV: nl S1/S2, no RMG  Lungs: CTAB, no distress  Abd: BS+, soft, nontender, no rebounding pain  Ext: no edema  Neuro: No focal deficits  Skin: Intact  IV: no phlebitis    Labs:                        8.2    9.66  )-----------( 218      ( 20 Dec 2024 06:45 )             23.8     12-20    131[L]  |  91[L]  |  55[H]  ----------------------------<  184[H]  4.6   |  23  |  5.50[H]    Ca    8.7      20 Dec 2024 06:45  Phos  8.7     12-20  Mg     2.40     12-20        WBC Trend:  WBC Count: 9.66 (12-20-24 @ 06:45)  WBC Count: 8.38 (12-19-24 @ 04:48)  WBC Count: 9.60 (12-18-24 @ 01:15)  WBC Count: 9.20 (12-17-24 @ 06:57)      Creatine Trend:  Creatinine: 5.50 (12-20)  Creatinine: 3.74 (12-19)  Creatinine: 4.64 (12-18)  Creatinine: 3.29 (12-17)      Liver Biochemical Testing Trend:  Alanine Aminotransferase (ALT/SGPT): 14 (12-15)  Alanine Aminotransferase (ALT/SGPT): 18 (12-14)  Alanine Aminotransferase (ALT/SGPT): 17 (12-13)  Alanine Aminotransferase (ALT/SGPT): 14 (12-13)  Alanine Aminotransferase (ALT/SGPT): 21 (12-12)  Aspartate Aminotransferase (AST/SGOT): 27 (12-15-24 @ 04:42)  Aspartate Aminotransferase (AST/SGOT): 27 (12-14-24 @ 07:55)  Aspartate Aminotransferase (AST/SGOT): 24 (12-13-24 @ 17:56)  Aspartate Aminotransferase (AST/SGOT): 36 (12-12-24 @ 22:25)  Bilirubin Total: 0.8 (12-15)  Bilirubin Total: 1.1 (12-14)  Bilirubin Total: 1.2 (12-13)  Bilirubin Total: 1.7 (12-12)      Trend LDH      Urinalysis Basic - ( 20 Dec 2024 06:45 )    Color: x / Appearance: x / SG: x / pH: x  Gluc: 184 mg/dL / Ketone: x  / Bili: x / Urobili: x   Blood: x / Protein: x / Nitrite: x   Leuk Esterase: x / RBC: x / WBC x   Sq Epi: x / Non Sq Epi: x / Bacteria: x        MICROBIOLOGY:    MRSA PCR Result.: Miriam (12-13-24 @ 16:43)      Culture - Blood (collected 16 Dec 2024 23:00)  Source: .Blood BLOOD  Preliminary Report:    No growth at 72 Hours    Culture - Blood (collected 14 Dec 2024 18:56)  Source: .Blood BLOOD  Final Report:    No growth at 5 days    Culture - Blood (collected 14 Dec 2024 14:15)  Source: .Blood BLOOD  Final Report:    No growth at 5 days    Culture - Fungal, Other (collected 13 Dec 2024 21:20)  Source: .Other  Preliminary Report:    No growth    Culture - Eye (collected 13 Dec 2024 21:20)  Source: .Eye  Final Report:    Numerous Streptococcus agalactiae (Group B)  Organism: Streptococcus agalactiae (Group B)  Organism: Streptococcus agalactiae (Group B)    Sensitivities:      Method Type: CANDE      -  Ceftriaxone: S <=0.25      -  Clindamycin: S <=0.06      -  Levofloxacin: S 1      -  Penicillin: S 0.06 Predicts results for ampicillin, amoxicillin, amoxicillin/clavulanate, ampicillin/sulbactam, 1st, 2nd and 3rd generation cephalosporins and carbapenems.      -  Tetracycline: R >4      -  Vancomycin: S 0.5    Culture - Blood (collected 13 Dec 2024 16:30)  Source: .Blood BLOOD  Final Report:    No growth at 5 days    Culture - Eye (collected 13 Dec 2024 15:53)  Source: .Eye  Final Report:    No growth at 5 days    Culture - Blood (collected 12 Dec 2024 21:58)  Source: .Blood BLOOD  Final Report:    Growth in aerobic and anaerobic bottles: Streptococcus agalactiae (Group    B)    Direct identification is available within approximately 3-5    hours either by Blood Panel Multiplexed PCR or Direct    MALDI-TOF. Details: [email or web address]  Organism: Blood Culture PCR  Streptococcus agalactiae (Group B)  Organism: Streptococcus agalactiae (Group B)    Sensitivities:      Method Type: CANDE      -  Ceftriaxone: S <=0.25      -  Clindamycin: S <=0.06      -  Levofloxacin: S 0.5      -  Penicillin: S 0.06 Predicts results for ampicillin, amoxicillin, amoxicillin/clavulanate, ampicillin/sulbactam, 1st, 2nd and 3rd generation cephalosporins and carbapenems.      -  Tetracycline: R >4      -  Vancomycin: S 0.5  Organism: Blood Culture PCR    Sensitivities:      Method Type: PCR      -  Streptococcus agalactiae (Group B): Detec    Culture - Blood (collected 12 Dec 2024 21:43)  Source: .Blood BLOOD  Final Report:    Growth in aerobic and anaerobic bottles: Streptococcus agalactiae (Group    B)    See previous culture [id number]    Culture - Blood (collected 12 Sep 2023 14:45)  Source: .Blood Blood-Peripheral  Final Report:    No growth at 5 days    C-Reactive Protein: 169.4 (12-18)  C-Reactive Protein: 174.0 (12-17)    Troponin T, High Sensitivity Result: 160 (12-13)    Sedimentation Rate, Erythrocyte: 85 mm/hr (12-18-24 @ 01:15)    RADIOLOGY:  imaging below personally reviewed      < from: [facility] W or WO Ultrasound Enhancing Agent (12.19.24 @ 16:19) >   1. Left ventricular systolic function is normal. There are no regional wall motion abnormalities seen.   2. Normal right ventricular cavity size and normal right ventricular systolic function.   3. Structurally normal mitral valve with normal leaflet excursion. 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 No vegetations seen in association with the mitral valve. There is calcification of the mitral valve annulus. There is mild mitral regurgitation.   4. The aortic valve appears trileaflet with normal systolic excursion. There is calcification of the aortic valve leaflets. No vegetations seen in association with the aortic valve. There is mild to moderate aortic regurgitation. Vena contracta width ~ 0.35 cm.   5. 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 The aortic root at the sinuses of Valsalva is dilated, measuring 3.80 cm (indexed 2.57 cm/m²). The aortic diameter at the sinotubular junction is normal in size, measuring 2.7 cm. The ascending aorta is normal in size, measuring 3.70 cm (indexed 2.50 cm/m²). There is no atheroma in the visualized portions of the proximal ascending aorta. There is mild non-mobile atheroma in the visualized portions of the transverse aortic arch. There is mild non-mobile atheroma in the visualized portions of the descending aorta. There is an aortic dissection seen at the descending aorta. A linear echodensity is visualized in the aorta and extends from the distal aortic arch distally through the visible extent of the descending thoracic aorta and is consistent with a Jun type B aortic dissection. Colour Doppler interrogation demonstrates flow in the false lumen.   6. The left atrium is normal in size. There is no evidence of left atrial or left atrial appendage thrombus.   7. Agitated saline injection was negative for intracardiac shunt.   8. No echocardiographic evidence of vegetations.    < end of copied text >

## 2024-12-21 LAB
ANION GAP SERPL CALC-SCNC: 17 MMOL/L — HIGH (ref 7–14)
BASOPHILS # BLD AUTO: 0.03 K/UL — SIGNIFICANT CHANGE UP (ref 0–0.2)
BASOPHILS NFR BLD AUTO: 0.3 % — SIGNIFICANT CHANGE UP (ref 0–2)
BUN SERPL-MCNC: 30 MG/DL — HIGH (ref 7–23)
CALCIUM SERPL-MCNC: 8.9 MG/DL — SIGNIFICANT CHANGE UP (ref 8.4–10.5)
CHLORIDE SERPL-SCNC: 96 MMOL/L — LOW (ref 98–107)
CO2 SERPL-SCNC: 24 MMOL/L — SIGNIFICANT CHANGE UP (ref 22–31)
CREAT SERPL-MCNC: 3.72 MG/DL — HIGH (ref 0.5–1.3)
EGFR: 12 ML/MIN/1.73M2 — LOW
EOSINOPHIL # BLD AUTO: 0.05 K/UL — SIGNIFICANT CHANGE UP (ref 0–0.5)
EOSINOPHIL NFR BLD AUTO: 0.6 % — SIGNIFICANT CHANGE UP (ref 0–6)
GLUCOSE BLDC GLUCOMTR-MCNC: 161 MG/DL — HIGH (ref 70–99)
GLUCOSE BLDC GLUCOMTR-MCNC: 163 MG/DL — HIGH (ref 70–99)
GLUCOSE BLDC GLUCOMTR-MCNC: 175 MG/DL — HIGH (ref 70–99)
GLUCOSE BLDC GLUCOMTR-MCNC: 208 MG/DL — HIGH (ref 70–99)
GLUCOSE BLDC GLUCOMTR-MCNC: 262 MG/DL — HIGH (ref 70–99)
GLUCOSE SERPL-MCNC: 182 MG/DL — HIGH (ref 70–99)
HCT VFR BLD CALC: 27.7 % — LOW (ref 34.5–45)
HGB BLD-MCNC: 9.1 G/DL — LOW (ref 11.5–15.5)
IANC: 6.97 K/UL — SIGNIFICANT CHANGE UP (ref 1.8–7.4)
IMM GRANULOCYTES NFR BLD AUTO: 1.3 % — HIGH (ref 0–0.9)
LYMPHOCYTES # BLD AUTO: 0.95 K/UL — LOW (ref 1–3.3)
LYMPHOCYTES # BLD AUTO: 10.6 % — LOW (ref 13–44)
MAGNESIUM SERPL-MCNC: 2.3 MG/DL — SIGNIFICANT CHANGE UP (ref 1.6–2.6)
MCHC RBC-ENTMCNC: 30.6 PG — SIGNIFICANT CHANGE UP (ref 27–34)
MCHC RBC-ENTMCNC: 32.9 G/DL — SIGNIFICANT CHANGE UP (ref 32–36)
MCV RBC AUTO: 93.3 FL — SIGNIFICANT CHANGE UP (ref 80–100)
MONOCYTES # BLD AUTO: 0.82 K/UL — SIGNIFICANT CHANGE UP (ref 0–0.9)
MONOCYTES NFR BLD AUTO: 9.2 % — SIGNIFICANT CHANGE UP (ref 2–14)
NEUTROPHILS # BLD AUTO: 6.97 K/UL — SIGNIFICANT CHANGE UP (ref 1.8–7.4)
NEUTROPHILS NFR BLD AUTO: 78 % — HIGH (ref 43–77)
NRBC # BLD: 0 /100 WBCS — SIGNIFICANT CHANGE UP (ref 0–0)
NRBC # FLD: 0 K/UL — SIGNIFICANT CHANGE UP (ref 0–0)
PHOSPHATE SERPL-MCNC: 5.3 MG/DL — HIGH (ref 2.5–4.5)
PLATELET # BLD AUTO: 256 K/UL — SIGNIFICANT CHANGE UP (ref 150–400)
POTASSIUM SERPL-MCNC: 4.1 MMOL/L — SIGNIFICANT CHANGE UP (ref 3.5–5.3)
POTASSIUM SERPL-SCNC: 4.1 MMOL/L — SIGNIFICANT CHANGE UP (ref 3.5–5.3)
RBC # BLD: 2.97 M/UL — LOW (ref 3.8–5.2)
RBC # FLD: 14.4 % — SIGNIFICANT CHANGE UP (ref 10.3–14.5)
SODIUM SERPL-SCNC: 137 MMOL/L — SIGNIFICANT CHANGE UP (ref 135–145)
WBC # BLD: 8.94 K/UL — SIGNIFICANT CHANGE UP (ref 3.8–10.5)
WBC # FLD AUTO: 8.94 K/UL — SIGNIFICANT CHANGE UP (ref 3.8–10.5)

## 2024-12-21 PROCEDURE — 88304 TISSUE EXAM BY PATHOLOGIST: CPT | Mod: 26

## 2024-12-21 PROCEDURE — 88305 TISSUE EXAM BY PATHOLOGIST: CPT | Mod: 26

## 2024-12-21 PROCEDURE — 88341 IMHCHEM/IMCYTCHM EA ADD ANTB: CPT | Mod: 26

## 2024-12-21 PROCEDURE — 88342 IMHCHEM/IMCYTCHM 1ST ANTB: CPT | Mod: 26

## 2024-12-21 PROCEDURE — 88312 SPECIAL STAINS GROUP 1: CPT | Mod: 26

## 2024-12-21 PROCEDURE — 65093 REVISE EYE WITH IMPLANT: CPT | Mod: LT

## 2024-12-21 DEVICE — IMPLANTABLE DEVICE: Type: IMPLANTABLE DEVICE | Site: LEFT | Status: FUNCTIONAL

## 2024-12-21 DEVICE — TISSEEL 4ML: Type: IMPLANTABLE DEVICE | Site: LEFT | Status: FUNCTIONAL

## 2024-12-21 RX ORDER — FENTANYL 75 UG/H
50 PATCH, EXTENDED RELEASE TRANSDERMAL
Refills: 0 | Status: DISCONTINUED | OUTPATIENT
Start: 2024-12-21 | End: 2024-12-21

## 2024-12-21 RX ORDER — FENTANYL 75 UG/H
25 PATCH, EXTENDED RELEASE TRANSDERMAL
Refills: 0 | Status: DISCONTINUED | OUTPATIENT
Start: 2024-12-21 | End: 2024-12-21

## 2024-12-21 RX ORDER — INSULIN LISPRO 100/ML
VIAL (ML) SUBCUTANEOUS EVERY 6 HOURS
Refills: 0 | Status: DISCONTINUED | OUTPATIENT
Start: 2024-12-21 | End: 2024-12-22

## 2024-12-21 RX ORDER — ONDANSETRON 4 MG/1
4 TABLET ORAL ONCE
Refills: 0 | Status: DISCONTINUED | OUTPATIENT
Start: 2024-12-21 | End: 2024-12-21

## 2024-12-21 RX ADMIN — ACETAMINOPHEN 975 MILLIGRAM(S): 80 SOLUTION/ DROPS ORAL at 21:57

## 2024-12-21 RX ADMIN — ACETAMINOPHEN 975 MILLIGRAM(S): 80 SOLUTION/ DROPS ORAL at 13:01

## 2024-12-21 RX ADMIN — Medication 2: at 23:06

## 2024-12-21 RX ADMIN — ACETAMINOPHEN 975 MILLIGRAM(S): 80 SOLUTION/ DROPS ORAL at 06:27

## 2024-12-21 RX ADMIN — VANCOMYCIN HYDROCHLORIDE 1 DROP(S): 5 INJECTION, POWDER, LYOPHILIZED, FOR SOLUTION INTRAVENOUS at 02:34

## 2024-12-21 RX ADMIN — Medication 3: at 17:37

## 2024-12-21 RX ADMIN — SEVELAMER CARBONATE 800 MILLIGRAM(S): 800 TABLET, FILM COATED ORAL at 17:37

## 2024-12-21 RX ADMIN — VANCOMYCIN HYDROCHLORIDE 1 DROP(S): 5 INJECTION, POWDER, LYOPHILIZED, FOR SOLUTION INTRAVENOUS at 04:47

## 2024-12-21 RX ADMIN — Medication 1 DROP(S): at 00:59

## 2024-12-21 RX ADMIN — Medication 1 DROP(S): at 04:48

## 2024-12-21 RX ADMIN — Medication 100 MILLIGRAM(S): at 06:27

## 2024-12-21 RX ADMIN — ERYTHROMYCIN 1 APPLICATION(S): 5 OINTMENT OPHTHALMIC at 06:28

## 2024-12-21 RX ADMIN — Medication 1 DROP(S): at 06:26

## 2024-12-21 RX ADMIN — Medication 1: at 11:39

## 2024-12-21 RX ADMIN — Medication 1 DROP(S): at 02:33

## 2024-12-21 RX ADMIN — HYDRALAZINE HYDROCHLORIDE 50 MILLIGRAM(S): 10 TABLET ORAL at 21:57

## 2024-12-21 RX ADMIN — Medication 1 DROP(S): at 06:27

## 2024-12-21 RX ADMIN — CEFTRIAXONE SODIUM 100 MILLIGRAM(S): 1 INJECTION, POWDER, FOR SOLUTION INTRAMUSCULAR; INTRAVENOUS at 17:37

## 2024-12-21 RX ADMIN — VANCOMYCIN HYDROCHLORIDE 1 DROP(S): 5 INJECTION, POWDER, LYOPHILIZED, FOR SOLUTION INTRAVENOUS at 06:26

## 2024-12-21 RX ADMIN — CEFTRIAXONE SODIUM 100 MILLIGRAM(S): 1 INJECTION, POWDER, FOR SOLUTION INTRAMUSCULAR; INTRAVENOUS at 06:28

## 2024-12-21 RX ADMIN — VANCOMYCIN HYDROCHLORIDE 1 DROP(S): 5 INJECTION, POWDER, LYOPHILIZED, FOR SOLUTION INTRAVENOUS at 00:59

## 2024-12-21 NOTE — PROGRESS NOTE ADULT - ASSESSMENT
79yFemale s/p R knee I&D POD 3    -WBAT  -PT/OT  -Abx per ID  -FU OR cx  -care per primary    Orthopaedic Surgery Resident    For all questions, please reach out via the following numbers for the on-call resident; do not reach out via Teams.  [facility] [phone]  [facility]        [id number]  [facility]  [phone]/ [phone]

## 2024-12-21 NOTE — BRIEF OPERATIVE NOTE - NSICDXBRIEFPOSTOP_GEN_ALL_CORE_FT
POST-OP DIAGNOSIS:  Endophthalmitis, left 21-Dec-2024 11:03:12  Adelina Lowe  Ruptured globe, left eye 21-Dec-2024 11:03:20  Adelina Lowe  
POST-OP DIAGNOSIS:  Septic arthritis of knee, right 18-Dec-2024 13:47:02  Emma Posey

## 2024-12-21 NOTE — PACU DISCHARGE NOTE - COMMENTS
VSS & REVIEWED BY ANESTHESIA MD; NO ANESTHESIA COMPLICATIONS NOTED; TRANSFER TO FLOOR.
No apparent anesthesia complications
no apparent anesthesia complications

## 2024-12-21 NOTE — BRIEF OPERATIVE NOTE - NSICDXBRIEFPROCEDURE_GEN_ALL_CORE_FT
PROCEDURES:  Eye evisceration 21-Dec-2024 11:02:40  Adelina Lowe  
PROCEDURES:  Irrigation and debridement of knee joint 18-Dec-2024 13:46:30 right Emma Posey

## 2024-12-21 NOTE — PROGRESS NOTE ADULT - SUBJECTIVE AND OBJECTIVE BOX
Orthopedic Surgery Progress Note     S: Patient seen and examined today. No acute events overnight. Pain is well controlled. Denies f/c, chest pain, shortness of breath, dizziness. Standing and walking to commode this AM.     Vital Signs Last 24 Hrs  T(C): 36.4 (21 Dec 2024 07:17), Max: 36.7 (20 Dec 2024 14:20)  T(F): 97.6 (21 Dec 2024 07:17), Max: 98.1 (20 Dec 2024 14:20)  HR: 71 (21 Dec 2024 07:17) (70 - 78)  BP: 150/78 (21 Dec 2024 07:17) (118/63 - 150/78)  BP(mean): --  RR: 15 (21 Dec 2024 07:17) (15 - 18)  SpO2: 99% (21 Dec 2024 07:17) (97% - 99%)    Parameters below as of 21 Dec 2024 04:50  Patient On (Oxygen Delivery Method): room air        Physical Exam:  Gen: Lying in bed, NAD  Resp: No increased WOB  RLE:  Aquacel dressing c/d/i, compartments soft, no calf TTP b/l  Motor: TA/EHL/GS/FHL intact  Sensory: DP/SP/Tib/Varsha/Saph SILT  +DP pulse, WWP        LABS:  cret                        9.1    8.94  )-----------( 256      ( 21 Dec 2024 06:55 )             27.7     12-20    131[L]  |  91[L]  |  55[H]  ----------------------------<  184[H]  4.6   |  23  |  5.50[H]    Ca    8.7      20 Dec 2024 06:45  Phos  8.7     12-20  Mg     2.40     12-20

## 2024-12-21 NOTE — PRE-OP CHECKLIST - 2.
Pt presented with dentures upper and lower, nurse on floor to 
pt has left eye closed very swollen with yellow discharge and right eye swollen too. pt says she cannot see and followed by Opthomology

## 2024-12-21 NOTE — CHART NOTE - NSCHARTNOTEFT_GEN_A_CORE
The patient underwent left eye evisceration with Dr. Dai and Dr. Lowe on 12/21/24. Infranasal biopsy was also obtained due to lesion beneath nose with malignant appearance.    # Endogenous Bacterial Endophthalmitis OS  # Corneal perforation, OS   # s/p left eye evisceration with 18mm silicone implant and conformer and temporary tarsorrhaphy   - [facility] NLP OS since 12/15/24  - Continue broad spectrum abx per primary team and ID  - Please keep eye patch and tape on for at least 10 days   - Please followup eye and nose cultures and pathology   - Can discontinue all eye drops   - Okay to resume normal diet per ophtho standpoint  - Appreciate care per primary team and ID     Adelina Lowe MD  PGY4 Ophthalmology Resident  [facility] The patient underwent left eye evisceration with Dr. Dai and Dr. Lowe on 12/21/24. Infranasal biopsy was also obtained due to lesion beneath nose with malignant appearance.    # Endogenous Bacterial Endophthalmitis OS  # Corneal perforation, OS   # s/p left eye evisceration with 18mm silicone implant and conformer and temporary tarsorrhaphy   - VA NLP OS since 12/15/24  - Continue broad spectrum abx per primary team and ID  - Please keep eye patch and tape on for at least 10 days   - Please followup eye and nose cultures and pathology   - Can discontinue all eye drops and oral doxycyline (D/cd by ophtho)  - Okay to resume normal diet per ophtho standpoint  - Appreciate care per primary team and ID   - Will arrange outpatient ophtho followup.    Adelina Lowe MD  PGY4 Ophthalmology Resident  [facility]

## 2024-12-21 NOTE — PROGRESS NOTE ADULT - SUBJECTIVE AND OBJECTIVE BOX
Cardiovascular Disease Progress Note  DATE OF SERVICE: 24 @ 08:27    Overnight events: No acute events overnight.    The patient denies chest pain or SOB.   Otherwise review of systems negative    Objective Findings:  T(C): 36.4 (24 @ 07:17), Max: 36.7 (24 @ 14:20)  HR: 71 (24 @ 07:17) (70 - 78)  BP: 150/78 (24 @ 07:17) (118/63 - 150/78)  RR: 15 (24 @ 07:17) (15 - 18)  SpO2: 99% (24 @ 07:17) (97% - 99%)  Wt(kg): --  Daily Height in cm: 154.94 (21 Dec 2024 07:17)    Daily Weight in k.5 (20 Dec 2024 10:25)      Physical Exam:  Gen: NAD; Patient resting comfortably  HEENT: Normocephalic/ atraumatic  CV: RRR, normal S1 + S2, no m/r/g  Lungs:  Normal respiratory effort; clear to auscultation bilaterally  Abd: soft, non-tender; bowel sounds present  Ext: No edema; warm and well perfused    Telemetry: n/a    Laboratory Data:                        9.1    8.94  )-----------( 256      ( 21 Dec 2024 06:55 )             27.7         137  |  96[L]  |  x   ----------------------------<  x   4.1   |  x   |  x     Ca    8.7      20 Dec 2024 06:45  Phos  8.7       Mg     2.30                     Inpatient Medications:  MEDICATIONS  (STANDING):  acetaminophen     Tablet .. 975 milliGRAM(s) Oral every 8 hours  ascorbic acid 500 milliGRAM(s) Oral daily  atropine 1% Solution 1 Drop(s) Left EYE two times a day  cefTAZidime  2.25 mG/0.1 mL Ophthalmic Injectable 0.1 milliLiter(s) IntraVitreal once  cefTRIAXone   IVPB 2000 milliGRAM(s) IV Intermittent every 12 hours  cefTRIAXone   IVPB      chlorhexidine 2% Cloths 1 Application(s) Topical daily  dextrose 5%. 1000 milliLiter(s) (100 mL/Hr) IV Continuous <Continuous>  dextrose 5%. 1000 milliLiter(s) (50 mL/Hr) IV Continuous <Continuous>  dextrose 50% Injectable 25 Gram(s) IV Push once  dextrose 50% Injectable 12.5 Gram(s) IV Push once  dextrose 50% Injectable 25 Gram(s) IV Push once  doxycycline monohydrate Capsule 100 milliGRAM(s) Oral every 12 hours  epoetin marla (EPOGEN) Injectable 6000 Unit(s) IV Push <User Schedule>  erythromycin   Ointment 1 Application(s) Left EYE <User Schedule>  glucagon  Injectable 1 milliGRAM(s) IntraMuscular once  hydrALAZINE 50 milliGRAM(s) Oral at bedtime  insulin lispro (ADMELOG) corrective regimen sliding scale   SubCutaneous at bedtime  insulin lispro (ADMELOG) corrective regimen sliding scale   SubCutaneous three times a day before meals  lidocaine   4% Patch 3 Patch Transdermal daily  lidocaine 1%/epinephrine 1:200,000 Inj 3 milliLiter(s) Local Injection once  moxifloxacin 0.5% Solution 1 Drop(s) Left EYE <User Schedule>  polyethylene glycol 3350 17 Gram(s) Oral daily  polyethylene glycol 3350 17 Gram(s) Oral at bedtime  povidone iodine 10% Nasal Swab 1 Application(s) Both Nostrils once  povidone iodine 5% Ophthalmic Solution 1 Drop(s) Left EYE once  senna 2 Tablet(s) Oral at bedtime  sevelamer carbonate 800 milliGRAM(s) Oral three times a day with meals  tobramycin Fortified 15 mG/mL Ophthalmic 1 Drop(s) Left EYE every 2 hours  vancomycin 1 mG/0.1 mL Ophthalmic Injectable 0.1 milliLiter(s) IntraVitreal once  vancomycin 25 mG/mL Fortified Ophthalmic 1 Drop(s) Left EYE every 2 hours      Assessment: 79 year old woman with ESRD on HD (MWF), HTN, DM (diet controlled), chronic type B aortic dissection, heart murmur, endometrial ca in remission s/p total hysterectomy in  presents with bacteremia.    Plan of Care:        #Bacteremia-  In the setting of ophthalmic infection.   BLANE with no obvious evidence of vegetations.  ABx as per ID.         #Type B aortic dissection-  - Chronic (Diagnosed >10 years ago)  - Optimal BP control.          #ESRD-  - HD as per renal.           Over 55 minutes spent on total encounter; more than 50% of the visit was spent counseling and/or coordinating care by the attending physician.      Gabo Burris MD [facility]  Cardiovascular Disease  [phone]

## 2024-12-21 NOTE — PROGRESS NOTE ADULT - SUBJECTIVE AND OBJECTIVE BOX
Patient is a 79y old  Female who presents with a chief complaint of fever, vision loss (21 Dec 2024 07:56)      SUBJECTIVE / OVERNIGHT EVENTS: no new events, tolerating ABx    MEDICATIONS  (STANDING):  acetaminophen     Tablet .. 975 milliGRAM(s) Oral every 8 hours  ascorbic acid 500 milliGRAM(s) Oral daily  atropine 1% Solution 1 Drop(s) Left EYE two times a day  cefTAZidime  2.25 mG/0.1 mL Ophthalmic Injectable 0.1 milliLiter(s) IntraVitreal once  cefTRIAXone   IVPB 2000 milliGRAM(s) IV Intermittent every 12 hours  cefTRIAXone   IVPB      chlorhexidine 2% Cloths 1 Application(s) Topical daily  dextrose 5%. 1000 milliLiter(s) (100 mL/Hr) IV Continuous <Continuous>  dextrose 5%. 1000 milliLiter(s) (50 mL/Hr) IV Continuous <Continuous>  dextrose 50% Injectable 25 Gram(s) IV Push once  dextrose 50% Injectable 12.5 Gram(s) IV Push once  dextrose 50% Injectable 25 Gram(s) IV Push once  epoetin marla (EPOGEN) Injectable 6000 Unit(s) IV Push <User Schedule>  glucagon  Injectable 1 milliGRAM(s) IntraMuscular once  hydrALAZINE 50 milliGRAM(s) Oral at bedtime  insulin lispro (ADMELOG) corrective regimen sliding scale   SubCutaneous every 6 hours  lidocaine   4% Patch 3 Patch Transdermal daily  lidocaine 1%/epinephrine 1:200,000 Inj 3 milliLiter(s) Local Injection once  moxifloxacin 0.5% Solution 1 Drop(s) Left EYE <User Schedule>  polyethylene glycol 3350 17 Gram(s) Oral daily  polyethylene glycol 3350 17 Gram(s) Oral at bedtime  povidone iodine 10% Nasal Swab 1 Application(s) Both Nostrils once  povidone iodine 5% Ophthalmic Solution 1 Drop(s) Left EYE once  senna 2 Tablet(s) Oral at bedtime  sevelamer carbonate 800 milliGRAM(s) Oral three times a day with meals  vancomycin 1 mG/0.1 mL Ophthalmic Injectable 0.1 milliLiter(s) IntraVitreal once    MEDICATIONS  (PRN):  acetaminophen     Tablet .. 650 milliGRAM(s) Oral every 6 hours PRN Temp greater or equal to 38C (100.4F), Mild Pain (1 - 3), Moderate Pain (4 - 6)  dextrose Oral Gel 15 Gram(s) Oral once PRN Blood Glucose LESS THAN 70 milliGRAM(s)/deciliter  lidocaine/prilocaine Cream 1 Application(s) Topical daily PRN on HD days for pain  magnesium hydroxide Suspension 30 milliLiter(s) Oral daily PRN Constipation  melatonin 3 milliGRAM(s) Oral at bedtime PRN Insomnia  ondansetron Injectable 4 milliGRAM(s) IV Push every 6 hours PRN Nausea and/or Vomiting  oxyCODONE    IR 5 milliGRAM(s) Oral every 4 hours PRN Moderate Pain (4 - 6)  oxyCODONE    IR 10 milliGRAM(s) Oral every 4 hours PRN Severe Pain (7 - 10)  traMADol 50 milliGRAM(s) Oral every 6 hours PRN Mild Pain (1 - 3)      Vital Signs Last 24 Hrs  T(F): 98.7 (12-21-24 @ 14:43), Max: 98.7 (12-21-24 @ 14:43)  HR: 76 (12-21-24 @ 14:43) (70 - 80)  BP: 114/56 (12-21-24 @ 14:43) (100/59 - 150/78)  RR: 18 (12-21-24 @ 14:43) (14 - 23)  SpO2: 97% (12-21-24 @ 14:43) (95% - 99%)  Telemetry:   CAPILLARY BLOOD GLUCOSE      POCT Blood Glucose.: 161 mg/dL (21 Dec 2024 11:59)  POCT Blood Glucose.: 163 mg/dL (21 Dec 2024 11:20)  POCT Blood Glucose.: 175 mg/dL (21 Dec 2024 06:42)  POCT Blood Glucose.: 215 mg/dL (20 Dec 2024 21:39)  POCT Blood Glucose.: 235 mg/dL (20 Dec 2024 17:16)    I&O's Summary    20 Dec 2024 07:01  -  21 Dec 2024 07:00  --------------------------------------------------------  IN: 400 mL / OUT: 2400 mL / NET: -2000 mL        PHYSICAL EXAM:  GENERAL: NAD, well-developed  HEAD:  Atraumatic, Normocephalic  EYES: EOMI, PERRLA, conjunctiva and sclera clear  NECK: Supple, No JVD  CHEST/LUNG: Clear to auscultation bilaterally; No wheeze  HEART: Regular rate and rhythm; No murmurs, rubs, or gallops  ABDOMEN: Soft, Nontender, Nondistended; Bowel sounds present  EXTREMITIES:  2+ Peripheral Pulses, No clubbing, cyanosis, or edema  PSYCH: AAOx3  NEUROLOGY: non-focal  SKIN: No rashes or lesions    LABS:                        9.1    8.94  )-----------( 256      ( 21 Dec 2024 06:55 )             27.7     12-21    137  |  96[L]  |  30[H]  ----------------------------<  182[H]  4.1   |  24  |  3.72[H]    Ca    8.9      21 Dec 2024 06:55  Phos  5.3     12-21  Mg     2.30     12-21            Urinalysis Basic - ( 21 Dec 2024 06:55 )    Color: x / Appearance: x / SG: x / pH: x  Gluc: 182 mg/dL / Ketone: x  / Bili: x / Urobili: x   Blood: x / Protein: x / Nitrite: x   Leuk Esterase: x / RBC: x / WBC x   Sq Epi: x / Non Sq Epi: x / Bacteria: x        RADIOLOGY & ADDITIONAL TESTS:    Imaging Personally Reviewed:    Consultant(s) Notes Reviewed:      Care Discussed with Consultants/Other Providers:

## 2024-12-21 NOTE — PRE-OP CHECKLIST - 1.
see pacu flowsheet for vs upon arrival to [facility]. pt on fall precautions pt has gauze with tegaderm to right lat knee pt has old avf to left upper arm
FEELING THIRSTY
Warm blanket

## 2024-12-21 NOTE — PROGRESS NOTE ADULT - ASSESSMENT
79 yr old female with PMH of  ESRD on HD , HTN, DM, R cataract , glaucoma here for fever and rapid decline of R eye vision .     Bacterial Endophthalmitis OS  - cw  iv antibiotics as per ID   - cultures reviewed repeat neg   - sp Intravitreal Vancomycin   - cw eye drops  - ID fu   - will monitor     R knee septic arthritis  - cw abx  - s/p I and D , pod3    High grade bacteremia  - cw iv abx  - ID fu   - repeat cultures neg   - imaging reviewed   - echo reviewed    Aortic dissection  - chronic   - no intervention needed     ESRD  - HD as scheduled  - renal fu     HTN  - cw hydralazine  - DASH diet    DM  - monitor FS  - ISS    Heparin sc for DVT prophylaxis

## 2024-12-22 DIAGNOSIS — I71.00 DISSECTION OF UNSPECIFIED SITE OF AORTA: ICD-10-CM

## 2024-12-22 DIAGNOSIS — M25.469 EFFUSION, UNSPECIFIED KNEE: ICD-10-CM

## 2024-12-22 LAB
ANION GAP SERPL CALC-SCNC: 18 MMOL/L — HIGH (ref 7–14)
BASOPHILS # BLD AUTO: 0.02 K/UL — SIGNIFICANT CHANGE UP (ref 0–0.2)
BASOPHILS NFR BLD AUTO: 0.3 % — SIGNIFICANT CHANGE UP (ref 0–2)
BUN SERPL-MCNC: 41 MG/DL — HIGH (ref 7–23)
CALCIUM SERPL-MCNC: 8.5 MG/DL — SIGNIFICANT CHANGE UP (ref 8.4–10.5)
CHLORIDE SERPL-SCNC: 94 MMOL/L — LOW (ref 98–107)
CO2 SERPL-SCNC: 22 MMOL/L — SIGNIFICANT CHANGE UP (ref 22–31)
CREAT SERPL-MCNC: 4.81 MG/DL — HIGH (ref 0.5–1.3)
CULTURE RESULTS: SIGNIFICANT CHANGE UP
EGFR: 9 ML/MIN/1.73M2 — LOW
EOSINOPHIL # BLD AUTO: 0.12 K/UL — SIGNIFICANT CHANGE UP (ref 0–0.5)
EOSINOPHIL NFR BLD AUTO: 1.7 % — SIGNIFICANT CHANGE UP (ref 0–6)
GLUCOSE BLDC GLUCOMTR-MCNC: 103 MG/DL — HIGH (ref 70–99)
GLUCOSE BLDC GLUCOMTR-MCNC: 148 MG/DL — HIGH (ref 70–99)
GLUCOSE BLDC GLUCOMTR-MCNC: 167 MG/DL — HIGH (ref 70–99)
GLUCOSE BLDC GLUCOMTR-MCNC: 177 MG/DL — HIGH (ref 70–99)
GLUCOSE BLDC GLUCOMTR-MCNC: 195 MG/DL — HIGH (ref 70–99)
GLUCOSE SERPL-MCNC: 169 MG/DL — HIGH (ref 70–99)
GRAM STN FLD: SIGNIFICANT CHANGE UP
HCT VFR BLD CALC: 24.4 % — LOW (ref 34.5–45)
HGB BLD-MCNC: 8.2 G/DL — LOW (ref 11.5–15.5)
IANC: 5.28 K/UL — SIGNIFICANT CHANGE UP (ref 1.8–7.4)
IMM GRANULOCYTES NFR BLD AUTO: 1 % — HIGH (ref 0–0.9)
LYMPHOCYTES # BLD AUTO: 0.96 K/UL — LOW (ref 1–3.3)
LYMPHOCYTES # BLD AUTO: 13.2 % — SIGNIFICANT CHANGE UP (ref 13–44)
MAGNESIUM SERPL-MCNC: 2.1 MG/DL — SIGNIFICANT CHANGE UP (ref 1.6–2.6)
MCHC RBC-ENTMCNC: 30.9 PG — SIGNIFICANT CHANGE UP (ref 27–34)
MCHC RBC-ENTMCNC: 33.6 G/DL — SIGNIFICANT CHANGE UP (ref 32–36)
MCV RBC AUTO: 92.1 FL — SIGNIFICANT CHANGE UP (ref 80–100)
MONOCYTES # BLD AUTO: 0.8 K/UL — SIGNIFICANT CHANGE UP (ref 0–0.9)
MONOCYTES NFR BLD AUTO: 11 % — SIGNIFICANT CHANGE UP (ref 2–14)
NEUTROPHILS # BLD AUTO: 5.28 K/UL — SIGNIFICANT CHANGE UP (ref 1.8–7.4)
NEUTROPHILS NFR BLD AUTO: 72.8 % — SIGNIFICANT CHANGE UP (ref 43–77)
NIGHT BLUE STAIN TISS: SIGNIFICANT CHANGE UP
NIGHT BLUE STAIN TISS: SIGNIFICANT CHANGE UP
NRBC # BLD: 0 /100 WBCS — SIGNIFICANT CHANGE UP (ref 0–0)
NRBC # FLD: 0 K/UL — SIGNIFICANT CHANGE UP (ref 0–0)
PHOSPHATE SERPL-MCNC: 7.7 MG/DL — HIGH (ref 2.5–4.5)
PLATELET # BLD AUTO: 223 K/UL — SIGNIFICANT CHANGE UP (ref 150–400)
POTASSIUM SERPL-MCNC: 4 MMOL/L — SIGNIFICANT CHANGE UP (ref 3.5–5.3)
POTASSIUM SERPL-SCNC: 4 MMOL/L — SIGNIFICANT CHANGE UP (ref 3.5–5.3)
RBC # BLD: 2.65 M/UL — LOW (ref 3.8–5.2)
RBC # FLD: 14.5 % — SIGNIFICANT CHANGE UP (ref 10.3–14.5)
SODIUM SERPL-SCNC: 134 MMOL/L — LOW (ref 135–145)
SPECIMEN SOURCE: SIGNIFICANT CHANGE UP
WBC # BLD: 7.25 K/UL — SIGNIFICANT CHANGE UP (ref 3.8–10.5)
WBC # FLD AUTO: 7.25 K/UL — SIGNIFICANT CHANGE UP (ref 3.8–10.5)

## 2024-12-22 PROCEDURE — 90935 HEMODIALYSIS ONE EVALUATION: CPT | Mod: GC

## 2024-12-22 RX ORDER — INSULIN LISPRO 100/ML
VIAL (ML) SUBCUTANEOUS
Refills: 0 | Status: DISCONTINUED | OUTPATIENT
Start: 2024-12-22 | End: 2024-12-26

## 2024-12-22 RX ORDER — INSULIN LISPRO 100/ML
VIAL (ML) SUBCUTANEOUS AT BEDTIME
Refills: 0 | Status: DISCONTINUED | OUTPATIENT
Start: 2024-12-22 | End: 2024-12-26

## 2024-12-22 RX ADMIN — Medication 10 MILLIGRAM(S): at 20:05

## 2024-12-22 RX ADMIN — ACETAMINOPHEN 975 MILLIGRAM(S): 80 SOLUTION/ DROPS ORAL at 05:29

## 2024-12-22 RX ADMIN — LIDOCAINE 3 PATCH: 50 OINTMENT TOPICAL at 19:00

## 2024-12-22 RX ADMIN — Medication 1: at 18:03

## 2024-12-22 RX ADMIN — CEFTRIAXONE SODIUM 100 MILLIGRAM(S): 1 INJECTION, POWDER, FOR SOLUTION INTRAMUSCULAR; INTRAVENOUS at 05:38

## 2024-12-22 RX ADMIN — LIDOCAINE 3 PATCH: 50 OINTMENT TOPICAL at 12:54

## 2024-12-22 RX ADMIN — CHLORHEXIDINE GLUCONATE 1 APPLICATION(S): 1.2 RINSE ORAL at 12:54

## 2024-12-22 RX ADMIN — Medication 1: at 12:54

## 2024-12-22 RX ADMIN — SEVELAMER CARBONATE 800 MILLIGRAM(S): 800 TABLET, FILM COATED ORAL at 18:07

## 2024-12-22 RX ADMIN — HYDRALAZINE HYDROCHLORIDE 50 MILLIGRAM(S): 10 TABLET ORAL at 22:26

## 2024-12-22 RX ADMIN — Medication 500 MILLIGRAM(S): at 18:12

## 2024-12-22 RX ADMIN — EPOETIN ALFA 6000 UNIT(S): 2000 SOLUTION INTRAVENOUS; SUBCUTANEOUS at 09:29

## 2024-12-22 RX ADMIN — SEVELAMER CARBONATE 800 MILLIGRAM(S): 800 TABLET, FILM COATED ORAL at 12:53

## 2024-12-22 RX ADMIN — Medication 10 MILLIGRAM(S): at 20:35

## 2024-12-22 NOTE — PROGRESS NOTE ADULT - SUBJECTIVE AND OBJECTIVE BOX
Patient is a 79y old  Female who presents with a chief complaint of fever, vision loss (22 Dec 2024 17:27)      SUBJECTIVE / OVERNIGHT EVENTS: TOLERATING abX AND hd, gregoria NEG FOR ENDOCARDITIS     MEDICATIONS  (STANDING):  acetaminophen     Tablet .. 975 milliGRAM(s) Oral every 8 hours  ascorbic acid 500 milliGRAM(s) Oral daily  atropine 1% Solution 1 Drop(s) Left EYE two times a day  chlorhexidine 2% Cloths 1 Application(s) Topical daily  dextrose 5%. 1000 milliLiter(s) (100 mL/Hr) IV Continuous <Continuous>  dextrose 5%. 1000 milliLiter(s) (50 mL/Hr) IV Continuous <Continuous>  dextrose 50% Injectable 25 Gram(s) IV Push once  dextrose 50% Injectable 12.5 Gram(s) IV Push once  dextrose 50% Injectable 25 Gram(s) IV Push once  epoetin marla (EPOGEN) Injectable 6000 Unit(s) IV Push <User Schedule>  glucagon  Injectable 1 milliGRAM(s) IntraMuscular once  hydrALAZINE 50 milliGRAM(s) Oral at bedtime  insulin lispro (ADMELOG) corrective regimen sliding scale   SubCutaneous at bedtime  insulin lispro (ADMELOG) corrective regimen sliding scale   SubCutaneous three times a day before meals  lidocaine   4% Patch 3 Patch Transdermal daily  lidocaine 1%/epinephrine 1:200,000 Inj 3 milliLiter(s) Local Injection once  polyethylene glycol 3350 17 Gram(s) Oral daily  polyethylene glycol 3350 17 Gram(s) Oral at bedtime  povidone iodine 10% Nasal Swab 1 Application(s) Both Nostrils once  senna 2 Tablet(s) Oral at bedtime  sevelamer carbonate 800 milliGRAM(s) Oral three times a day with meals    MEDICATIONS  (PRN):  acetaminophen     Tablet .. 650 milliGRAM(s) Oral every 6 hours PRN Temp greater or equal to 38C (100.4F), Mild Pain (1 - 3), Moderate Pain (4 - 6)  dextrose Oral Gel 15 Gram(s) Oral once PRN Blood Glucose LESS THAN 70 milliGRAM(s)/deciliter  lidocaine/prilocaine Cream 1 Application(s) Topical daily PRN on HD days for pain  magnesium hydroxide Suspension 30 milliLiter(s) Oral daily PRN Constipation  melatonin 3 milliGRAM(s) Oral at bedtime PRN Insomnia  ondansetron Injectable 4 milliGRAM(s) IV Push every 6 hours PRN Nausea and/or Vomiting  oxyCODONE    IR 5 milliGRAM(s) Oral every 4 hours PRN Moderate Pain (4 - 6)  oxyCODONE    IR 10 milliGRAM(s) Oral every 4 hours PRN Severe Pain (7 - 10)  traMADol 50 milliGRAM(s) Oral every 6 hours PRN Mild Pain (1 - 3)      Vital Signs Last 24 Hrs  T(F): 97.1 (12-22-24 @ 18:56), Max: 97.9 (12-21-24 @ 21:52)  HR: 79 (12-22-24 @ 18:56) (63 - 79)  BP: 125/57 (12-22-24 @ 18:56) (125/57 - 158/73)  RR: 17 (12-22-24 @ 18:56) (17 - 18)  SpO2: 100% (12-22-24 @ 18:56) (98% - 100%)  Telemetry:   CAPILLARY BLOOD GLUCOSE      POCT Blood Glucose.: 167 mg/dL (22 Dec 2024 17:14)  POCT Blood Glucose.: 195 mg/dL (22 Dec 2024 12:24)  POCT Blood Glucose.: 103 mg/dL (22 Dec 2024 09:29)  POCT Blood Glucose.: 148 mg/dL (22 Dec 2024 06:01)  POCT Blood Glucose.: 208 mg/dL (21 Dec 2024 22:39)    I&O's Summary    22 Dec 2024 07:01  -  22 Dec 2024 20:23  --------------------------------------------------------  IN: 400 mL / OUT: 2400 mL / NET: -2000 mL        PHYSICAL EXAM:  GENERAL: NAD, well-developed  HEAD:  Atraumatic, Normocephalic  EYES: EOMI, PERRLA, conjunctiva and sclera clear  NECK: Supple, No JVD  CHEST/LUNG: Clear to auscultation bilaterally; No wheeze  HEART: Regular rate and rhythm; No murmurs, rubs, or gallops  ABDOMEN: Soft, Nontender, Nondistended; Bowel sounds present  EXTREMITIES:  2+ Peripheral Pulses, No clubbing, cyanosis, or edema  PSYCH: AAOx3  NEUROLOGY: non-focal  SKIN: No rashes or lesions    LABS:                        8.2    7.25  )-----------( 223      ( 22 Dec 2024 06:40 )             24.4     12-22    134[L]  |  94[L]  |  41[H]  ----------------------------<  169[H]  4.0   |  22  |  4.81[H]    Ca    8.5      22 Dec 2024 06:40  Phos  7.7     12-22  Mg     2.10     12-22            Urinalysis Basic - ( 22 Dec 2024 06:40 )    Color: x / Appearance: x / SG: x / pH: x  Gluc: 169 mg/dL / Ketone: x  / Bili: x / Urobili: x   Blood: x / Protein: x / Nitrite: x   Leuk Esterase: x / RBC: x / WBC x   Sq Epi: x / Non Sq Epi: x / Bacteria: x        RADIOLOGY & ADDITIONAL TESTS:    Imaging Personally Reviewed:    Consultant(s) Notes Reviewed:      Care Discussed with Consultants/Other Providers:

## 2024-12-22 NOTE — DISCHARGE NOTE PROVIDER - CARE PROVIDERS DIRECT ADDRESSES
,[email or web address],[email or web address] ,[email or web address],[email or web address],[email or web address]

## 2024-12-22 NOTE — PROGRESS NOTE ADULT - SUBJECTIVE AND OBJECTIVE BOX
Cardiovascular Disease Progress Note  DATE OF SERVICE: 24 @ 08:36    Overnight events: No acute events overnight.    The patient is undergoing HD in no distress.   Otherwise review of systems negative    Objective Findings:  T(C): 36.1 (24 @ 06:30), Max: 37.1 (24 @ 14:43)  HR: 63 (24 @ 06:30) (63 - 80)  BP: 158/73 (24 @ 06:30) (100/59 - 158/73)  RR: 17 (24 @ 06:30) (16 - 23)  SpO2: 100% (24 @ 06:30) (95% - 100%)  Wt(kg): --  Daily     Daily Weight in k.2 (22 Dec 2024 06:30)      Physical Exam:  Gen: NAD; Patient resting comfortably  HEENT:  Normocephalic/ atraumatic  CV: RRR, normal S1 + S2, no m/r/g  Lungs:  Normal respiratory effort; clear to auscultation bilaterally  Abd: soft, non-tender; bowel sounds present  Ext: No edema; warm and well perfused    Telemetry: n/a    Laboratory Data:                        8.2    7.25  )-----------( 223      ( 22 Dec 2024 06:40 )             24.4     12    134[L]  |  94[L]  |  41[H]  ----------------------------<  169[H]  4.0   |  22  |  4.81[H]    Ca    8.5      22 Dec 2024 06:40  Phos  5.3       Mg     2.10                     Inpatient Medications:  MEDICATIONS  (STANDING):  acetaminophen     Tablet .. 975 milliGRAM(s) Oral every 8 hours  ascorbic acid 500 milliGRAM(s) Oral daily  atropine 1% Solution 1 Drop(s) Left EYE two times a day  chlorhexidine 2% Cloths 1 Application(s) Topical daily  dextrose 5%. 1000 milliLiter(s) (100 mL/Hr) IV Continuous <Continuous>  dextrose 5%. 1000 milliLiter(s) (50 mL/Hr) IV Continuous <Continuous>  dextrose 50% Injectable 25 Gram(s) IV Push once  dextrose 50% Injectable 12.5 Gram(s) IV Push once  dextrose 50% Injectable 25 Gram(s) IV Push once  epoetin marla (EPOGEN) Injectable 6000 Unit(s) IV Push <User Schedule>  glucagon  Injectable 1 milliGRAM(s) IntraMuscular once  hydrALAZINE 50 milliGRAM(s) Oral at bedtime  insulin lispro (ADMELOG) corrective regimen sliding scale   SubCutaneous every 6 hours  lidocaine   4% Patch 3 Patch Transdermal daily  lidocaine 1%/epinephrine 1:200,000 Inj 3 milliLiter(s) Local Injection once  polyethylene glycol 3350 17 Gram(s) Oral daily  polyethylene glycol 3350 17 Gram(s) Oral at bedtime  povidone iodine 10% Nasal Swab 1 Application(s) Both Nostrils once  senna 2 Tablet(s) Oral at bedtime  sevelamer carbonate 800 milliGRAM(s) Oral three times a day with meals      Assessment: 79 year old woman with ESRD on HD (MWF), HTN, DM (diet controlled), chronic type B aortic dissection, heart murmur, endometrial ca in remission s/p total hysterectomy in  presents with bacteremia.    Plan of Care:        #Bacteremia-  In the setting of ophthalmic infection.   BLANE with no obvious evidence of vegetations.  ABx as per ID.         #Type B aortic dissection-  - Chronic (Diagnosed >10 years ago)  - Optimal BP control.          #ESRD-  - HD as per renal.           Over 55 minutes spent on total encounter; more than 50% of the visit was spent counseling and/or coordinating care by the attending physician.      Gabo Burris MD [facility]  Cardiovascular Disease  [phone]

## 2024-12-22 NOTE — DISCHARGE NOTE PROVIDER - NSFOLLOWUPCLINICS_GEN_ALL_ED_FT
[facility]hthal[facility]  Ophthalmology  [address] [place] [address], [facility] [address]  [place] [address]  Phone: [phone]  Fax:

## 2024-12-22 NOTE — DISCHARGE NOTE PROVIDER - NSDCCPCAREPLAN_GEN_ALL_CORE_FT
PRINCIPAL DISCHARGE DIAGNOSIS  Diagnosis: Corneal perforation  Assessment and Plan of Treatment: You were initially found to have a baterial infection involving your left eye and was treated with eye drops and antibiotics. You then developed perforation (damage) to the cornea surface of your eye. You underwent left eye evisceration with 18mm silicone implant and conformer and temporary tarsorrhaphy. An biopsy was also obtained of a lesion beneath your nose with irregular appearance. Pathology is pending. Keep eye patch and tape on for at least 10 days following the operation and follow up with Ophthalmology on discharge.      SECONDARY DISCHARGE DIAGNOSES  Diagnosis: Aortic dissection  Assessment and Plan of Treatment: Imaging showed you have a type B stable aortic dissection, which is a tear in the aortic artery. Cardiothoracic surgery reviewed the imaging and recommend you follow up with Dr. Luis Hernandez on discharge.    Diagnosis: ESRD on dialysis  Assessment and Plan of Treatment: Follow up with nephrology for continued dialysis as scheduled.    Diagnosis: Knee effusion  Assessment and Plan of Treatment: Your right knee joint had significant swelling and fluid. Orthopedics drained the fluid on 12/18/24. You may bear weight as tolerated with your right lower extremity.     PRINCIPAL DISCHARGE DIAGNOSIS  Diagnosis: Corneal perforation  Assessment and Plan of Treatment: You were initially found to have a baterial infection involving your left eye and was treated with eye drops and antibiotics. You then developed perforation (damage) to the cornea surface of your eye. You underwent left eye evisceration with 18mm silicone implant and conformer and temporary tarsorrhaphy. An biopsy was also obtained of a lesion beneath your nose with irregular appearance. Pathology is pending. Keep eye patch and tape on for at least 10 days following the operation and follow up with Ophthalmology on discharge.      SECONDARY DISCHARGE DIAGNOSES  Diagnosis: Bacterial endogenous endophthalmitis  Assessment and Plan of Treatment: Patient was seen by Optholmologist for - Corneal perforation, left eye followed by corneal dehiscence on 12/20 now s/p evisceration left eye  - 12/21 you went to OR for Left eye evisceration with silicone implant and infranasal biopsy (lesion beneath nose with malignant appearance)   - Keep eye patch and tape on for at least 10 days   - you were seen by Infection Disease doctor - continue with Ancef antibiotic weekly after dialyses as recommended. Follow up with Ophthalmology on discharge      Diagnosis: Knee effusion  Assessment and Plan of Treatment: Your right knee joint had significant swelling and fluid. Orthopedics drained the fluid on 12/18/24. You may bear weight as tolerated with your right lower extremity. Follow up with Dr Patel as outpatient 1-2 weeks after discharge    Diagnosis: Aortic dissection  Assessment and Plan of Treatment: Imaging showed you have a type B stable aortic dissection, which is a tear in the aortic artery. Cardiothoracic surgery reviewed the imaging and recommend you follow up with Dr. Luis Hernandez on discharge.    Diagnosis: Bacteremia  Assessment and Plan of Treatment: You were found to have infection in your blood. You were seen by you were seen by Infection Disease doctor -recommended to continue with  Cefazolin 2g after dialysis on Monday, Cefazolin 2 g after dialysis on Wednesday and Cefazolin 3g after dialysis on Friday - Plan to complete 6 weeks course (till 1/23/25)  -Weekly CBC/CMP to be followed at dialysis center  -Please call [phone] to schedule an appointment to see me in clinic at [address] [place] [address] [address] [place], [place] within 4 weeks of discharge    Diagnosis: Monocular diplopia, right eye  Assessment and Plan of Treatment: You reported episode of right eye vision feeling dark  - No acute changes in right eye exam at this time, 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

## 2024-12-22 NOTE — DISCHARGE NOTE PROVIDER - CARE PROVIDER_API CALL
Luis Hernandez  Thoracic and Cardiac Surgery  [address] [place] [address]  Phone: [phone]  Fax: [phone]  Follow Up Time:    Luis Hernandez  Thoracic and Cardiac Surgery  [address] [place] [address]  Phone: [phone]  Fax: [phone]  Follow Up Time:     James Jacobs  Infectious Disease  [address] [place] [address]  Phone: [phone]  Fax: [phone]  Follow Up Time:    Luis Hernandez  Thoracic and Cardiac Surgery  [address] [place] [address]  Phone: [phone]  Fax: [phone]  Follow Up Time:     James Jacobs  Infectious Disease  [address] [place] [address]  Phone: [phone]  Fax: [phone]  Follow Up Time:     Vasyl Patel  Musculoskeletal Oncology  [address] [facility] [address]  [place] [address]  Phone: [phone]  Fax: [phone]  Follow Up Time:

## 2024-12-22 NOTE — DISCHARGE NOTE PROVIDER - NSDCMRMEDTOKEN_GEN_ALL_CORE_FT
docusate sodium 100 mg oral tablet: 1 tab(s) orally once a day  glipiZIDE 2.5 mg oral tablet: 1 tab(s) orally once a day  hydrALAZINE 50 mg oral tablet: 1 tab(s) orally once a day (at bedtime)  Multiple Vitamins oral capsule: 1 cap(s) orally  vitamin D3:    ceFAZolin 2 g/20 mL intravenous solution: 2 gram(s) intravenous 3 times a week ** 2g on Monday and Wednesday after dialysis and 3g on Friday after dialysis until 1/23/25 **  docusate sodium 100 mg oral tablet: 1 tab(s) orally once a day  glipiZIDE 2.5 mg oral tablet: 1 tab(s) orally once a day  hydrALAZINE 50 mg oral tablet: 1 tab(s) orally once a day (at bedtime)  Multiple Vitamins oral capsule: 1 cap(s) orally  vitamin D3:   Weekly labs: CBC and CMP   ceFAZolin 2 g/20 mL intravenous solution: 2 gram(s) intravenous 2 times a week on Monday and Wednesday after dialysis until 1/23/25 **  ceFAZolin 3 g/100 mL-NaCl 0.9% intravenous solution: 3 gram(s) intravenous once a week on Friday after dialysis until 1/23/25 **  docusate sodium 100 mg oral tablet: 1 tab(s) orally once a day  glipiZIDE 2.5 mg oral tablet: 1 tab(s) orally once a day  hydrALAZINE 50 mg oral tablet: 1 tab(s) orally once a day (at bedtime)  Multiple Vitamins oral capsule: 1 cap(s) orally  vitamin D3:   Weekly labs: CBC and CMP   acetaminophen 325 mg oral tablet: 2 tab(s) orally every 6 hours As needed Temp greater or equal to 38C (100.4F), Mild Pain (1 - 3), Moderate Pain (4 - 6)  ascorbic acid 500 mg oral tablet: 1 tab(s) orally once a day  ceFAZolin 2 g/20 mL intravenous solution: 2 gram(s) intravenous 2 times a week on Monday and Wednesday after dialysis until 1/23/25 **  ceFAZolin 3 g/100 mL-NaCl 0.9% intravenous solution: 3 gram(s) intravenous once a week on Friday after dialysis until 1/23/25 **  docusate sodium 100 mg oral tablet: 1 tab(s) orally once a day  glipiZIDE 2.5 mg oral tablet: 1 tab(s) orally once a day  hydrALAZINE 50 mg oral tablet: 1 tab(s) orally once a day (at bedtime)  lidocaine 4% topical film: Apply topically to affected area once a day to affected area  lidocaine-prilocaine 2.5%-2.5% topical cream: 1 Apply topically to affected area once a day As needed on HD days for pain  Multiple Vitamins oral capsule: 1 cap(s) orally  oxyCODONE 5 mg oral tablet: 1 tab(s) orally every 4 hours As needed Moderate Pain (4 - 6)  senna leaf extract oral tablet: 2 tab(s) orally once a day (at bedtime)  sevelamer carbonate 800 mg oral tablet: 1 tab(s) orally 3 times a day (with meals)  traMADol 50 mg oral tablet: 1 tab(s) orally every 6 hours As needed Mild Pain (1 - 3)

## 2024-12-22 NOTE — DISCHARGE NOTE PROVIDER - HOSPITAL COURSE
78 y/o female, with a PmHx of ESRD on HD (MWF; makes a little bit of urine), HTN, DM, R cataract (R eye cataract surgery 1/25), Glaucoma, p/w L eye vision loss. Pt seen by ophthalmology and initially found to have Endogenous Bacterial Endophthalmitis and underwent anterior chamber tap and intravitreal injection of vancomycin and ceftazidime OS on 12/13/24. Gram stain showed gram positive cocci in pairs; culture positive for strep B. Pt initially treated with antibiotics and eye drops per ophtho. Pt was also found to have Strep B bacteremia with rpt cultures negative. Pt seen by ID and treated with IV CTX. TTE and BLANE without vegetattions. On 12/20/24, hospital course complicated by L eye Corneal perforation. On 12/21/24, pt underwent left eye evisceration with 18mm silicone implant and conformer and temporary tarsorrhaphy. An infranasal biopsy was also obtained due to lesion beneath nose with malignant appearance. Eye and nose cultures and pathology pending. Pt to keep eye patch and tape on for at least 10 days and follow up with Ophthalmology on discharge.     As part of workup, pt had CT chest, abdomen and pelvis which showed a stable thoracic abdominal aortic dissection. BLANE also showed type B aorta dissection. CTSx at [facility] was called and reviewed imaging. Due to size, patient can have medical management (blood pressure control) and follow up with CT Surgery [phone] Dr. Smith.     Hospital course also complicated by large R knee effusion seen on CT RLE. Orthopedics consulted and pt underwent I&D on 12/18/24.  Cx NGTD. Pt to remain WBAT.     Pt followed by nephrology and continued on hemodialysis schedule.     On ___ this case was reviewed with  ____, the patient is medically stable and optimized for discharge. All medications were reviewed and prescriptions were sent to mutually agreed upon pharmacy.           80 y/o female, with a PmHx of ESRD on HD (MWF; makes a little bit of urine), HTN, DM, R cataract (R eye cataract surgery 1/25), Glaucoma, p/w L eye vision loss. Pt seen by ophthalmology and initially found to have Endogenous Bacterial Endophthalmitis and underwent anterior chamber tap and intravitreal injection of vancomycin and ceftazidime OS on 12/13/24. Gram stain showed gram positive cocci in pairs; culture positive for strep B. Pt initially treated with antibiotics and eye drops per ophtho. Pt was also found to have Strep B bacteremia with rpt cultures negative. Pt seen by ID and treated with IV CTX. TTE and BLANE without vegetattions. On 12/20/24, hospital course complicated by L eye Corneal perforation. On 12/21/24, pt underwent left eye evisceration with 18mm silicone implant and conformer and temporary tarsorrhaphy. An infranasal biopsy was also obtained due to lesion beneath nose with malignant appearance. Eye and nose cultures and pathology pending. Pt to keep eye patch and tape on for at least 10 days and follow up with Ophthalmology on discharge.     As part of workup, pt had CT chest, abdomen and pelvis which showed a stable thoracic abdominal aortic dissection. BLANE also showed type B aorta dissection. CTSx at [facility] was called and reviewed imaging. Due to size, patient can have medical management (blood pressure control) and follow up with CT Surgery [phone] Dr. Smith.     Hospital course also complicated by large R knee effusion seen on CT RLE. Orthopedics consulted and pt underwent I&D on 12/18/24.  Cx NGTD. Pt to remain WBAT.     Pt followed by nephrology and continued on hemodialysis schedule.     On 12/26 this case was reviewed with Dr Dave, the patient is medically stable and optimized for discharge. All medications were reviewed and prescriptions were sent to mutually agreed upon pharmacy.

## 2024-12-22 NOTE — PROGRESS NOTE ADULT - ASSESSMENT
79yFemale s/p R knee I&D on 12/18  -WBAT  -PT/OT  -Abx per ID  -FU OR cx: NGTD  -care per primary    Orthopaedic Surgery Resident    For all questions, please reach out via the following numbers for the on-call resident; do not reach out via Teams.  [facility] [id number]  J        [id number]  [facility]  [phone]/ [phone]

## 2024-12-22 NOTE — PROGRESS NOTE ADULT - PROBLEM SELECTOR PLAN 2
Pt. with anemia in the setting of ESRD. Hgb (8.2) is below below goal for ESRD. Continue EPO with HD TIW, dose increased. Monitor Hgb, goal: 10-11.

## 2024-12-22 NOTE — DISCHARGE NOTE PROVIDER - NSDCFUADDAPPT_GEN_ALL_CORE_FT
Follow up with [facility] Department of Ophthalmology within 1 week of after discharge at: [address] [facility] [place] [address] [phone]    Follow up with CT Surgery Dr. Luis Hernandez. Call [phone] to schedule appt.  Follow up with [facility] Department of Ophthalmology within 1 week of after discharge at: [address] [facility] [place] [address] [phone]    Follow up with CT Surgery Dr. Luis Hernandez. Call [phone] to schedule appt.     Follow up with infectious disease:   -Please call [phone] to schedule an appointment to see me in clinic at [address] [place] [address] [place] within 4 weeks of discharge

## 2024-12-22 NOTE — PROGRESS NOTE ADULT - SUBJECTIVE AND OBJECTIVE BOX
[facility] Division of Kidney Diseases & Hypertension  FOLLOW UP NOTE  [phone]--------------------------------------------------------------------------------  Chief Complaint: Other sepsis    24 hour events/subjective: Pt. seen and examined earlier today on HD. Tolerating well. No fever, chills, CP, SOB.     PAST HISTORY  --------------------------------------------------------------------------------  No significant changes to PMH, PSH, FHx, SHx, unless otherwise noted    ALLERGIES & MEDICATIONS  --------------------------------------------------------------------------------  Allergies    No Known Allergies    Intolerances    Standing Inpatient Medications  acetaminophen     Tablet .. 975 milliGRAM(s) Oral every 8 hours  ascorbic acid 500 milliGRAM(s) Oral daily  atropine 1% Solution 1 Drop(s) Left EYE two times a day  chlorhexidine 2% Cloths 1 Application(s) Topical daily  dextrose 5%. 1000 milliLiter(s) IV Continuous <Continuous>  dextrose 5%. 1000 milliLiter(s) IV Continuous <Continuous>  dextrose 50% Injectable 25 Gram(s) IV Push once  dextrose 50% Injectable 12.5 Gram(s) IV Push once  dextrose 50% Injectable 25 Gram(s) IV Push once  epoetin marla (EPOGEN) Injectable 6000 Unit(s) IV Push <User Schedule>  glucagon  Injectable 1 milliGRAM(s) IntraMuscular once  hydrALAZINE 50 milliGRAM(s) Oral at bedtime  insulin lispro (ADMELOG) corrective regimen sliding scale   SubCutaneous at bedtime  insulin lispro (ADMELOG) corrective regimen sliding scale   SubCutaneous three times a day before meals  lidocaine   4% Patch 3 Patch Transdermal daily  lidocaine 1%/epinephrine 1:200,000 Inj 3 milliLiter(s) Local Injection once  polyethylene glycol 3350 17 Gram(s) Oral daily  polyethylene glycol 3350 17 Gram(s) Oral at bedtime  povidone iodine 10% Nasal Swab 1 Application(s) Both Nostrils once  senna 2 Tablet(s) Oral at bedtime  sevelamer carbonate 800 milliGRAM(s) Oral three times a day with meals    PRN Inpatient Medications  acetaminophen     Tablet .. 650 milliGRAM(s) Oral every 6 hours PRN  dextrose Oral Gel 15 Gram(s) Oral once PRN  lidocaine/prilocaine Cream 1 Application(s) Topical daily PRN  magnesium hydroxide Suspension 30 milliLiter(s) Oral daily PRN  melatonin 3 milliGRAM(s) Oral at bedtime PRN  ondansetron Injectable 4 milliGRAM(s) IV Push every 6 hours PRN  oxyCODONE    IR 5 milliGRAM(s) Oral every 4 hours PRN  oxyCODONE    IR 10 milliGRAM(s) Oral every 4 hours PRN  traMADol 50 milliGRAM(s) Oral every 6 hours PRN    REVIEW OF SYSTEMS  --------------------------------------------------------------------------------  Gen: No fevers/chills  Respiratory: No dyspnea  CV: No chest pain  GI: No abdominal pain  MSK: No edema  Neuro: No dizziness/lightheadedness  All other systems were reviewed and are negative, except as noted.    VITALS/PHYSICAL EXAM  --------------------------------------------------------------------------------  T(C): 36.4 (12-22-24 @ 10:15), Max: 37.1 (12-21-24 @ 14:43)  HR: 72 (12-22-24 @ 10:15) (63 - 76)  BP: 128/64 (12-22-24 @ 10:15) (114/56 - 158/73)  RR: 17 (12-22-24 @ 10:15) (17 - 18)  SpO2: 100% (12-22-24 @ 10:15) (97% - 100%)  Wt(kg): --  Height (cm): 154.9 (12-21-24 @ 07:46)  Weight (kg): 51.3 (12-21-24 @ 07:46)  BMI (kg/m2): 21.4 (12-21-24 @ 07:46)  BSA (m2): 1.48 (12-21-24 @ 07:46)    12-22-24 @ 07:01  -  12-22-24 @ 13:42  --------------------------------------------------------  IN: 400 mL / OUT: 2400 mL / NET: -2000 mL    Physical Exam:  Gen: NAD, resting comfortably in bed  HEENT: on room air  Pulm: CTA B/L  CV: + murmur  Abd: soft  : No mistry  LE: no edema  Skin: Warm, without rashes  Vascular access: RUE AVF with thrill and bruit    LABS/STUDIES  --------------------------------------------------------------------------------              8.2    7.25  >-----------<  223      [12-22-24 @ 06:40]              24.4     134  |  94  |  41  ----------------------------<  169      [12-22-24 @ 06:40]  4.0   |  22  |  4.81        Ca     8.5     [12-22-24 @ 06:40]      Mg     2.10     [12-22-24 @ 06:40]      Phos  7.7     [12-22-24 @ 06:40]    Creatinine Trend:  SCr 4.81 [12-22 @ 06:40]  SCr 3.72 [12-21 @ 06:55]  SCr 5.50 [12-20 @ 06:45]  SCr 3.74 [12-19 @ 04:48]  SCr 4.64 [12-18 @ 01:15]

## 2024-12-22 NOTE — PROGRESS NOTE ADULT - SUBJECTIVE AND OBJECTIVE BOX
ORTHOPEDIC PROGRESS NOTE    Overnight events: Went to OR with optho yesterday    SUBJECTIVE: Pt seen and examined at bedside. Patient is doing well, no acute complaints this AM. Pain is controlled with medication      OBJECTIVE:  Vital Signs Last 24 Hrs  T(C): 36.6 (21 Dec 2024 21:52), Max: 37.1 (21 Dec 2024 14:43)  T(F): 97.9 (21 Dec 2024 21:52), Max: 98.7 (21 Dec 2024 14:43)  HR: 71 (21 Dec 2024 21:52) (68 - 80)  BP: 133/72 (21 Dec 2024 21:52) (100/59 - 150/78)  BP(mean): 83 (21 Dec 2024 12:00) (71 - 83)  RR: 18 (21 Dec 2024 21:52) (14 - 23)  SpO2: 100% (21 Dec 2024 21:52) (95% - 100%)    Parameters below as of 21 Dec 2024 21:52  Patient On (Oxygen Delivery Method): room air          12-20-24 @ 07:01  -  12-21-24 @ 07:00  --------------------------------------------------------  IN: 400 mL / OUT: 2400 mL / NET: -2000 mL        Physical Examination:  GEN: NAD, resting quietly  PULM: symmetric chest rise bilaterally, no increased WOB  ABD: nondistended  EXTR:   RLE:  Aquacel dressing mild strikethrough, dry and intact, compartments soft, no calf TTP b/l  Motor: TA/EHL/GS/FHL intact  Sensory: DP/SP/Tib/Varsha/Saph SILT  +DP pulse, WWP    LABS:                        9.1    8.94  )-----------( 256      ( 21 Dec 2024 06:55 )             27.7       12-21    137  |  96[L]  |  30[H]  ----------------------------<  182[H]  4.1   |  24  |  3.72[H]    Ca    8.9      21 Dec 2024 06:55  Phos  5.3     12-21  Mg     2.30     12-21

## 2024-12-22 NOTE — DISCHARGE NOTE PROVIDER - PROVIDER TOKENS
PROVIDER:[TOKEN:[[id number]]] PROVIDER:[TOKEN:[[id number]]],PROVIDER:[TOKEN:[[id number]]] PROVIDER:[TOKEN:[[id number]]],PROVIDER:[TOKEN:[[id number]]],PROVIDER:[TOKEN:[[id number]]]

## 2024-12-22 NOTE — DISCHARGE NOTE PROVIDER - NSDCFUSCHEDAPPT_GEN_ALL_CORE_FT
Vasly Patel  [facility]  ORTHOSURG [address] [facility]  Scheduled Appointment: 01/21/2025

## 2024-12-22 NOTE — PROGRESS NOTE ADULT - ASSESSMENT
79 yr old female with PMH of  ESRD on HD , HTN, DM, R cataract , glaucoma here for fever and rapid decline of R eye vision .     Bacterial Endophthalmitis OS  - cw  iv antibiotics as per ID   - cultures reviewed repeat neg   - sp Intravitreal Vancomycin   - cw eye drops  - ID fu   - will monitor     R knee septic arthritis  - cw abx  - s/p I and D , pod4    High grade bacteremia  - cw iv abx  - ID fu   - repeat cultures neg   - imaging reviewed   - gregoria NEG FOR ENDOCARDITIS     Aortic dissection  - chronic   - no intervention needed     ESRD  - HD as scheduled  - renal fu     HTN  - cw hydralazine  - DASH diet    DM  - monitor FS  - ISS    Heparin sc for DVT prophylaxis

## 2024-12-23 LAB
ANION GAP SERPL CALC-SCNC: 12 MMOL/L — SIGNIFICANT CHANGE UP (ref 7–14)
BASOPHILS # BLD AUTO: 0.02 K/UL — SIGNIFICANT CHANGE UP (ref 0–0.2)
BASOPHILS NFR BLD AUTO: 0.3 % — SIGNIFICANT CHANGE UP (ref 0–2)
BUN SERPL-MCNC: 20 MG/DL — SIGNIFICANT CHANGE UP (ref 7–23)
CALCIUM SERPL-MCNC: 8.7 MG/DL — SIGNIFICANT CHANGE UP (ref 8.4–10.5)
CHLORIDE SERPL-SCNC: 96 MMOL/L — LOW (ref 98–107)
CO2 SERPL-SCNC: 26 MMOL/L — SIGNIFICANT CHANGE UP (ref 22–31)
CREAT SERPL-MCNC: 3.6 MG/DL — HIGH (ref 0.5–1.3)
EGFR: 12 ML/MIN/1.73M2 — LOW
EOSINOPHIL # BLD AUTO: 0.09 K/UL — SIGNIFICANT CHANGE UP (ref 0–0.5)
EOSINOPHIL NFR BLD AUTO: 1.2 % — SIGNIFICANT CHANGE UP (ref 0–6)
GLUCOSE BLDC GLUCOMTR-MCNC: 163 MG/DL — HIGH (ref 70–99)
GLUCOSE BLDC GLUCOMTR-MCNC: 165 MG/DL — HIGH (ref 70–99)
GLUCOSE BLDC GLUCOMTR-MCNC: 207 MG/DL — HIGH (ref 70–99)
GLUCOSE BLDC GLUCOMTR-MCNC: 208 MG/DL — HIGH (ref 70–99)
GLUCOSE SERPL-MCNC: 159 MG/DL — HIGH (ref 70–99)
HCT VFR BLD CALC: 25.3 % — LOW (ref 34.5–45)
HGB BLD-MCNC: 8.5 G/DL — LOW (ref 11.5–15.5)
IANC: 5.5 K/UL — SIGNIFICANT CHANGE UP (ref 1.8–7.4)
IMM GRANULOCYTES NFR BLD AUTO: 1.2 % — HIGH (ref 0–0.9)
LYMPHOCYTES # BLD AUTO: 0.78 K/UL — LOW (ref 1–3.3)
LYMPHOCYTES # BLD AUTO: 10.5 % — LOW (ref 13–44)
MAGNESIUM SERPL-MCNC: 2.1 MG/DL — SIGNIFICANT CHANGE UP (ref 1.6–2.6)
MCHC RBC-ENTMCNC: 31.1 PG — SIGNIFICANT CHANGE UP (ref 27–34)
MCHC RBC-ENTMCNC: 33.6 G/DL — SIGNIFICANT CHANGE UP (ref 32–36)
MCV RBC AUTO: 92.7 FL — SIGNIFICANT CHANGE UP (ref 80–100)
MONOCYTES # BLD AUTO: 0.93 K/UL — HIGH (ref 0–0.9)
MONOCYTES NFR BLD AUTO: 12.6 % — SIGNIFICANT CHANGE UP (ref 2–14)
NEUTROPHILS # BLD AUTO: 5.5 K/UL — SIGNIFICANT CHANGE UP (ref 1.8–7.4)
NEUTROPHILS NFR BLD AUTO: 74.2 % — SIGNIFICANT CHANGE UP (ref 43–77)
NRBC # BLD: 0 /100 WBCS — SIGNIFICANT CHANGE UP (ref 0–0)
NRBC # FLD: 0 K/UL — SIGNIFICANT CHANGE UP (ref 0–0)
PHOSPHATE SERPL-MCNC: 5.1 MG/DL — HIGH (ref 2.5–4.5)
PLATELET # BLD AUTO: 232 K/UL — SIGNIFICANT CHANGE UP (ref 150–400)
POTASSIUM SERPL-MCNC: 3.9 MMOL/L — SIGNIFICANT CHANGE UP (ref 3.5–5.3)
POTASSIUM SERPL-SCNC: 3.9 MMOL/L — SIGNIFICANT CHANGE UP (ref 3.5–5.3)
RBC # BLD: 2.73 M/UL — LOW (ref 3.8–5.2)
RBC # FLD: 14.9 % — HIGH (ref 10.3–14.5)
SODIUM SERPL-SCNC: 134 MMOL/L — LOW (ref 135–145)
WBC # BLD: 7.41 K/UL — SIGNIFICANT CHANGE UP (ref 3.8–10.5)
WBC # FLD AUTO: 7.41 K/UL — SIGNIFICANT CHANGE UP (ref 3.8–10.5)

## 2024-12-23 PROCEDURE — 73610 X-RAY EXAM OF ANKLE: CPT | Mod: 26,RT

## 2024-12-23 PROCEDURE — 99233 SBSQ HOSP IP/OBS HIGH 50: CPT

## 2024-12-23 RX ORDER — CEFAZOLIN SODIUM 1 G
2 VIAL (EA) INJECTION
Qty: 1 | Refills: 0
Start: 2024-12-23 | End: 2025-01-22

## 2024-12-23 RX ORDER — CEFAZOLIN SODIUM 1 G
3 VIAL (EA) INJECTION
Qty: 1 | Refills: 0
Start: 2024-12-23 | End: 2025-01-22

## 2024-12-23 RX ORDER — CEFTRIAXONE SODIUM 1 G/1
2000 INJECTION, POWDER, FOR SOLUTION INTRAMUSCULAR; INTRAVENOUS EVERY 24 HOURS
Refills: 0 | Status: DISCONTINUED | OUTPATIENT
Start: 2024-12-23 | End: 2024-12-23

## 2024-12-23 RX ORDER — CEFAZOLIN SODIUM 1 G
1000 VIAL (EA) INJECTION EVERY 24 HOURS
Refills: 0 | Status: DISCONTINUED | OUTPATIENT
Start: 2024-12-23 | End: 2024-12-26

## 2024-12-23 RX ORDER — CEFTRIAXONE SODIUM 1 G/1
2000 INJECTION, POWDER, FOR SOLUTION INTRAMUSCULAR; INTRAVENOUS EVERY 12 HOURS
Refills: 0 | Status: DISCONTINUED | OUTPATIENT
Start: 2024-12-23 | End: 2024-12-23

## 2024-12-23 RX ADMIN — SEVELAMER CARBONATE 800 MILLIGRAM(S): 800 TABLET, FILM COATED ORAL at 08:29

## 2024-12-23 RX ADMIN — ACETAMINOPHEN 975 MILLIGRAM(S): 80 SOLUTION/ DROPS ORAL at 23:05

## 2024-12-23 RX ADMIN — ACETAMINOPHEN 975 MILLIGRAM(S): 80 SOLUTION/ DROPS ORAL at 22:34

## 2024-12-23 RX ADMIN — Medication 100 MILLIGRAM(S): at 10:50

## 2024-12-23 RX ADMIN — SENNOSIDES 2 TABLET(S): 8.6 TABLET, FILM COATED ORAL at 22:34

## 2024-12-23 RX ADMIN — CHLORHEXIDINE GLUCONATE 1 APPLICATION(S): 1.2 RINSE ORAL at 12:36

## 2024-12-23 RX ADMIN — Medication 2: at 12:35

## 2024-12-23 RX ADMIN — Medication 1: at 17:50

## 2024-12-23 RX ADMIN — Medication 500 MILLIGRAM(S): at 12:36

## 2024-12-23 RX ADMIN — LIDOCAINE 3 PATCH: 50 OINTMENT TOPICAL at 01:43

## 2024-12-23 RX ADMIN — Medication 17 GRAM(S): at 22:34

## 2024-12-23 RX ADMIN — SEVELAMER CARBONATE 800 MILLIGRAM(S): 800 TABLET, FILM COATED ORAL at 17:49

## 2024-12-23 RX ADMIN — SEVELAMER CARBONATE 800 MILLIGRAM(S): 800 TABLET, FILM COATED ORAL at 12:35

## 2024-12-23 RX ADMIN — HYDRALAZINE HYDROCHLORIDE 50 MILLIGRAM(S): 10 TABLET ORAL at 22:34

## 2024-12-23 RX ADMIN — Medication 1: at 08:29

## 2024-12-23 NOTE — CHART NOTE - NSCHARTNOTEFT_GEN_A_CORE
Called by RN stating pt reporting darkening shadows in peripheral vision of R eye. Pt already noted to have decreased visual acuity defect of R eye. Assessed pt at bedside. When object in motion in peripheral field of R eye, pt is able to state when object is present upon motion. Called ophthalmology Dr. Amaury Byrd to discuss and stated pt will be seen tomorrow.     Adelina Townsend PA-C  Department of Medicine   In-house pager #[phone]

## 2024-12-23 NOTE — PROVIDER CONTACT NOTE (OTHER) - DATE AND TIME:
14-Dec-2024 22:55
15-Dec-2024 06:20
16-Dec-2024 22:00
23-Dec-2024 17:45
13-Dec-2024 22:53
20-Dec-2024 12:15

## 2024-12-23 NOTE — PROVIDER CONTACT NOTE (OTHER) - NAME OF MD/NP/PA/DO NOTIFIED:
Chente Banegas
Haritha Iniguez PA-C
Amaury Manrique, ACP
Lisy Mandujano, ACP
Haritha Iniguez PA-C
Adelina Townsend

## 2024-12-23 NOTE — PROGRESS NOTE ADULT - SUBJECTIVE AND OBJECTIVE BOX
Follow Up:      Interval History/ROS:Patient is a 79y old  Female who presents with a chief complaint of fever, vision loss (23 Dec 2024 11:28)  Seen at bedside, afebrile, s/p left eye evisceration on 12/21    Allergies  No Known Allergies        ANTIMICROBIALS:    ceFAZolin   IVPB 1000 every 24 hours        OTHER MEDS: MEDICATIONS  (STANDING):  acetaminophen     Tablet .. 975 every 8 hours  acetaminophen     Tablet .. 650 every 6 hours PRN  dextrose 50% Injectable 25 once  dextrose 50% Injectable 12.5 once  dextrose 50% Injectable 25 once  dextrose Oral Gel 15 once PRN  epoetin marla (EPOGEN) Injectable 6000 <User Schedule>  glucagon  Injectable 1 once  hydrALAZINE 50 at bedtime  insulin lispro (ADMELOG) corrective regimen sliding scale  at bedtime  insulin lispro (ADMELOG) corrective regimen sliding scale  three times a day before meals  magnesium hydroxide Suspension 30 daily PRN  melatonin 3 at bedtime PRN  ondansetron Injectable 4 every 6 hours PRN  oxyCODONE    IR 5 every 4 hours PRN  oxyCODONE    IR 10 every 4 hours PRN  polyethylene glycol 3350 17 daily  polyethylene glycol 3350 17 at bedtime  senna 2 at bedtime  traMADol 50 every 6 hours PRN      Vital Signs Last 24 Hrs  T(F): 98.2 (12-23-24 @ 14:19), Max: 99.8 (12-18-24 @ 10:37)    Vital Signs Last 24 Hrs  HR: 76 (12-23-24 @ 14:19) (76 - 85)  BP: 118/53 (12-23-24 @ 14:19) (118/51 - 137/57)  RR: 16 (12-23-24 @ 14:19)  SpO2: 99% (12-23-24 @ 14:19) (96% - 100%)  Wt(kg): --    EXAM:      GA: NAD  HEENT: s/p Left eye evisceration with eye covered  CV: nl S1/S2, no RMG  Lungs: CTAB, no distress  Abd: soft, nontender, no rebounding pain  Ext: R knee with decrease ROM  Neuro: No focal deficits  Skin: Intact  IV: no phlebitis          Labs:                        8.5    7.41  )-----------( 232      ( 23 Dec 2024 07:00 )             25.3     12-23    134[L]  |  96[L]  |  20  ----------------------------<  159[H]  3.9   |  26  |  3.60[H]    Ca    8.7      23 Dec 2024 07:00  Phos  5.1     12-23  Mg     2.10     12-23        WBC Trend:  WBC Count: 7.41 (12-23-24 @ 07:00)  WBC Count: 7.25 (12-22-24 @ 06:40)  WBC Count: 8.94 (12-21-24 @ 06:55)  WBC Count: 9.66 (12-20-24 @ 06:45)      Creatine Trend:  Creatinine: 3.60 (12-23)  Creatinine: 4.81 (12-22)  Creatinine: 3.72 (12-21)  Creatinine: 5.50 (12-20)      Liver Biochemical Testing Trend:  Alanine Aminotransferase (ALT/SGPT): 14 (12-15)  Alanine Aminotransferase (ALT/SGPT): 18 (12-14)  Alanine Aminotransferase (ALT/SGPT): 17 (12-13)  Alanine Aminotransferase (ALT/SGPT): 14 (12-13)  Alanine Aminotransferase (ALT/SGPT): 21 (12-12)  Aspartate Aminotransferase (AST/SGOT): 27 (12-15-24 @ 04:42)  Aspartate Aminotransferase (AST/SGOT): 27 (12-14-24 @ 07:55)  Aspartate Aminotransferase (AST/SGOT): 24 (12-13-24 @ 17:56)  Aspartate Aminotransferase (AST/SGOT): 36 (12-12-24 @ 22:25)  Bilirubin Total: 0.8 (12-15)  Bilirubin Total: 1.1 (12-14)  Bilirubin Total: 1.2 (12-13)  Bilirubin Total: 1.7 (12-12)      Trend LDH      Urinalysis Basic - ( 23 Dec 2024 07:00 )    Color: x / Appearance: x / SG: x / pH: x  Gluc: 159 mg/dL / Ketone: x  / Bili: x / Urobili: x   Blood: x / Protein: x / Nitrite: x   Leuk Esterase: x / RBC: x / WBC x   Sq Epi: x / Non Sq Epi: x / Bacteria: x        MICROBIOLOGY:    MRSA PCR Result.: Miriam (12-13-24 @ 16:43)      Culture - Acid Fast - Other w/Smear (collected 21 Dec 2024 18:14)  Source: .Other    Culture - Fungal, Other (collected 21 Dec 2024 18:14)  Source: .Other  Preliminary Report:    Testing in progress    Culture - Acid Fast - Other w/Smear (collected 21 Dec 2024 10:00)  Source: .Other    Culture - Fungal, Other (collected 21 Dec 2024 10:00)  Source: .Other  Preliminary Report:    Testing in progress    Culture - Blood (collected 16 Dec 2024 23:00)  Source: .Blood BLOOD  Final Report:    No growth at 5 days    Culture - Blood (collected 14 Dec 2024 18:56)  Source: .Blood BLOOD  Final Report:    No growth at 5 days    Culture - Blood (collected 14 Dec 2024 14:15)  Source: .Blood BLOOD  Final Report:    No growth at 5 days    Culture - Fungal, Other (collected 13 Dec 2024 21:20)  Source: .Other  Preliminary Report:    No fungus isolated at 1 week.    Culture - Eye (collected 13 Dec 2024 21:20)  Source: .Eye  Final Report:    Numerous Streptococcus agalactiae (Group B)  Organism: Streptococcus agalactiae (Group B)  Organism: Streptococcus agalactiae (Group B)    Sensitivities:      Method Type: CANDE      -  Ceftriaxone: S <=0.25      -  Clindamycin: S <=0.06      -  Levofloxacin: S 1      -  Penicillin: S 0.06 Predicts results for ampicillin, amoxicillin, amoxicillin/clavulanate, ampicillin/sulbactam, 1st, 2nd and 3rd generation cephalosporins and carbapenems.      -  Tetracycline: R >4      -  Vancomycin: S 0.5    Culture - Blood (collected 13 Dec 2024 16:30)  Source: .Blood BLOOD  Final Report:    No growth at 5 days                                C-Reactive Protein: 169.4 (12-18)  C-Reactive Protein: 174.0 (12-17)                Sedimentation Rate, Erythrocyte: 85 mm/hr (12-18-24 @ 01:15)      RADIOLOGY:  imaging below personally reviewed      < from: Xray Knee 1 or 2 Views, Right (12.18.24 @ 00:59) >  IMPRESSION:  Nonspecific moderate knee joint effusion.    No fractures or dislocations.    Narrowed medial tibiofemoral joint space with small peripheral joint   margin osteophytes and subarticular cystic changes. Preserved remaining   joint spaces and no joint margin erosions.    Small superior patellar enthesophyte.    Generalized osteopenia otherwise no discrete lytic or blastic lesions.    Vascular calcifications.    --- End of Report ---        < end of copied text >

## 2024-12-23 NOTE — PROVIDER CONTACT NOTE (OTHER) - SITUATION
Pt L arm swollen
Pt c/o 9/10 R shoulder pain
Pt had no BM for 2 days and asked for something to aide in BM
Pharmacy cannot fix suspended insulin corrective sliding scale, provider needs to reorder
Pt reports decline in R eye vision.
Pt c/o 7/10 Right lower extremity pain unrelieved by lidocaine patches

## 2024-12-23 NOTE — PROGRESS NOTE ADULT - ASSESSMENT
79yFemale s/p R knee I&D on 12/18  -WBAT  -PT/OT  -Abx per ID  -FU OR cx: NGTD  -care per primary    Orthopaedic Surgery Resident    For all questions, please reach out via the following numbers for the on-call resident; do not reach out via Teams.  [facility] [id number]  [phone]        [id number]  [facility]  [phone]/ [phone]

## 2024-12-23 NOTE — PROVIDER CONTACT NOTE (OTHER) - ACTION/TREATMENT ORDERED:
Provider aware, will give IV Tylenol
Provider aware, will give lidocaine patch and IV Tylenol
Will reorder insulin
Provider aware, will elevate L arm
Provider to come to bedside to assess patient and will coordinate with opthalmology.
Provider aware, will give miralax

## 2024-12-23 NOTE — PROGRESS NOTE ADULT - SUBJECTIVE AND OBJECTIVE BOX
ORTHOPEDIC PROGRESS NOTE    Overnight events: POD 2 L eye evisceration     SUBJECTIVE: Pt seen and examined at bedside. Patient is doing well from an orthopedic perspective, getting OOB, reports continued R knee and ankle pain. Pain is controlled with medication      OBJECTIVE:  Vital Signs Last 24 Hrs  T(C): 36.5 (23 Dec 2024 05:25), Max: 36.8 (22 Dec 2024 22:30)  T(F): 97.7 (23 Dec 2024 05:25), Max: 98.2 (22 Dec 2024 22:30)  HR: 78 (23 Dec 2024 05:25) (77 - 85)  BP: 137/57 (23 Dec 2024 05:25) (118/51 - 147/71)  BP(mean): --  RR: 17 (23 Dec 2024 05:25) (17 - 18)  SpO2: 99% (23 Dec 2024 05:25) (96% - 100%)    Physical Examination:  GEN: NAD, resting quietly  PULM: symmetric chest rise bilaterally, no increased WOB  ABD: nondistended  EXTR:   RLE:  Aquacel dressing mild strikethrough, dry and intact, compartments soft, no calf TTP b/l  Motor: TA/EHL/GS/FHL intact  Sensory: DP/SP/Tib/Varsha/Saph SILT  +DP pulse, WWP    LABS:              LABS  CBC 12-23-24 @ 07:00                        8.5    7.41  )-----------( 232                   25.3     Hgb trend: 8.5 <-- , 8.2 <-- , 9.1 <--   WBC trend: 7.41 <-- , 7.25 <-- , 8.94 <--         CMP 12-23-24 @ 07:00    134[L]  |  96[L]  |  20  ----------------------------<  159[H]  3.9   |  26  |  3.60[H]    Ca    8.7      12-23-24 @ 07:00  Phos  5.1     12-23  Mg     2.10     12-23      Serum Cr (eGFR) trend: 3.60 (12) <-- , 4.81 (9) <-- , 3.72 (12) <--         Urinalysis Basic - ( 23 Dec 2024 07:00 )    Color: x / Appearance: x / SG: x / pH: x  Gluc: 159 mg/dL / Ketone: x  / Bili: x / Urobili: x   Blood: x / Protein: x / Nitrite: x   Leuk Esterase: x / RBC: x / WBC x   Sq Epi: x / Non Sq Epi: x / Bacteria: x

## 2024-12-23 NOTE — PROGRESS NOTE ADULT - ASSESSMENT
78 y/o F, PMH ESRD thru R AV fistula, HTN, DM, R cataract (surgery scheduled for Jan 2025), glaucoma and L hip replacement, presented on 12/13 with fever, pain, and rapid decline in L vision, with opthalmology exam suggestive of bacterial endophthalmitis now with blood cultures growing Group B Strep. Infectious Disease consulted for assessment of infectious source and antimicrobial management     Upon presentation, patient febrile to 102.8F  Labs showed no leukocytosis but with 14.8% bandemia     Workup:   -Blood Cx (12/12): Strep B (4/4 bottles)  -CT C/A/P: No infectious source in the chest, abdomen, or pelvis. Stable thoracic abdominal aortic dissection. Small left and trace right pleural effusions.  -Repeat blood Cx (12/14): NGTD  -Intravitreal Cx: + Strep B   -R hand Xray: Variable degree IP joint arthritic changes.  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  #R knee septic arthritis s/p I&D on 12/18  #Presence of RUE AV fistula with no clinical evidence of infection     Recommendations:   -Discussed with Opthalmology s/p left eye evisceration, please discontinue Ceftriaxone and Start Cefazolin 1 g IV daily (AD on dialysis days)  -On discharge, can continue with Cefazolin 2g after dialysis on Monday, Cefazolin 2 g after dialysis on Wednesday and Cefazolin 3g after dialysis on Friday - Plan to complete 6 weeks course (till 1/23/25)  -Weekly CBC/CMP to be followed at dialysis center  -Please call [phone] to schedule an appointment to see me in clinic at [address] [place] [address] [place], [place] within 4 weeks of discharge    Plan discussed with primary team     James Moe MD  ID physician  [employer] Teams Preferred  After 5pm/weekends call [phone]                   80 y/o F, PMH ESRD thru R AV fistula, HTN, DM, R cataract (surgery scheduled for Jan 2025), glaucoma and L hip replacement, presented on 12/13 with fever, pain, and rapid decline in L vision, with opthalmology exam suggestive of bacterial endophthalmitis now with blood cultures growing Group B Strep. Infectious Disease consulted for assessment of infectious source and antimicrobial management     Upon presentation, patient febrile to 102.8F  Labs showed no leukocytosis but with 14.8% bandemia     Workup:   -Blood Cx (12/12): Strep B (4/4 bottles)  -CT C/A/P: No infectious source in the chest, abdomen, or pelvis. Stable thoracic abdominal aortic dissection. Small left and trace right pleural effusions.  -Repeat blood Cx (12/14): NGTD  -Intravitreal Cx: + Strep B   -R hand Xray: Variable degree IP joint arthritic changes.  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  #R knee septic arthritis s/p I&D on 12/18  #Presence of RUE AV fistula with no clinical evidence of infection     Recommendations:   -Discussed with Opthalmology s/p left eye evisceration, please discontinue Ceftriaxone and Start Cefazolin 1 g IV daily (AD on dialysis days)  -On discharge, can continue with Cefazolin 2g after dialysis on Monday, Cefazolin 2 g after dialysis on Wednesday and Cefazolin 3g after dialysis on Friday - Plan to complete 6 weeks course (till 1/23/25)  -Weekly CBC/CMP to be followed at dialysis center  -Please call [phone] to schedule an appointment to see me in clinic at [address] [place] [address] [place], [place] within 4 weeks of discharge    ID signing off, please call if any questions or change in status.    Plan discussed with primary team     James Moe MD  ID physician  [employer] Teams Preferred  After 5pm/weekends call [phone]

## 2024-12-23 NOTE — PROGRESS NOTE ADULT - ASSESSMENT
79 yr old female with PMH of  ESRD on HD , HTN, DM, R cataract , glaucoma here for fever and rapid decline of R eye vision .     Bacterial Endophthalmitis OS  - cw  iv antibiotics as per ID   - cultures reviewed repeat neg   - sp Intravitreal Vancomycin   - cw eye drops  - ID and optho fu   - 12/23:  ptn c/o worsening vision in R eye periphery, optho made aware, to be seen in am.     R knee septic arthritis  - cw abx  - s/p I and D , pod5  -  R ankle xray neg    High grade bacteremia  - cw iv abx  - ID fu   - repeat cultures neg   - imaging reviewed   - gregoria NEG FOR ENDOCARDITIS     Aortic dissection  - chronic   - no intervention needed     ESRD  - HD as scheduled  - renal fu     HTN  - cw hydralazine  - DASH diet    DM  - monitor FS  - ISS    Heparin sc for DVT prophylaxis

## 2024-12-23 NOTE — PROVIDER CONTACT NOTE (OTHER) - REASON
Pt reports decline in R eye vision.
Pt c/o 9/10 R shoulder pain
Pt had no BM for 2 days and asked for something to aide in BM
Pt L arm swollen
Pt c/o 7/10 Right lower extremity pain
Pharmacy cannot fix suspended insulin corrective sliding scale, provider needs to reorder

## 2024-12-23 NOTE — PROVIDER CONTACT NOTE (OTHER) - ASSESSMENT
Pt stated that yesterday, she was able to see that the ceiling tile was white and today, everything looks black.
Pt resting comfortably in bed. No c/o abdominal pain/discomfort. NAD noted at this time. Bowel sounds active in all 4 quadrants
Pt resting in bed, VSS, remains afebrile. Pt has no c/o chest pain, shortness of breath, nausea/vomiting, lightheadedness.
Glucose- 185
Pt resting in bed, VSS, remains afebrile. Pt has no c/o chest pain, shortness of breath, nausea/vomiting, lightheadedness.
Pt AxO4, has no c/o pain in l arm. L arm noted to be swollen, no erythema, not hot to the touch. No c/o chest pain, nausea/vomiting, lightheadedness, shortness of breath. NAD noted at this time

## 2024-12-23 NOTE — CHART NOTE - NSCHARTNOTESELECT_GEN_ALL_CORE
Event Note
Event Note
Ophthalmology
CT scan/Event Note
Event Note
Event Note
Ophthalmology/Event Note

## 2024-12-23 NOTE — PROGRESS NOTE ADULT - SUBJECTIVE AND OBJECTIVE BOX
Patient is a 79y old  Female who presents with a chief complaint of fever, vision loss (23 Dec 2024 16:23)      SUBJECTIVE / OVERNIGHT EVENTS: ptn c/o worsening vision in R eye periphery, optho made aware, to be seen in am. R ankle xray neg    MEDICATIONS  (STANDING):  acetaminophen     Tablet .. 975 milliGRAM(s) Oral every 8 hours  ascorbic acid 500 milliGRAM(s) Oral daily  ceFAZolin   IVPB 1000 milliGRAM(s) IV Intermittent every 24 hours  chlorhexidine 2% Cloths 1 Application(s) Topical daily  dextrose 5%. 1000 milliLiter(s) (50 mL/Hr) IV Continuous <Continuous>  dextrose 5%. 1000 milliLiter(s) (100 mL/Hr) IV Continuous <Continuous>  dextrose 50% Injectable 25 Gram(s) IV Push once  dextrose 50% Injectable 12.5 Gram(s) IV Push once  dextrose 50% Injectable 25 Gram(s) IV Push once  epoetin marla (EPOGEN) Injectable 6000 Unit(s) IV Push <User Schedule>  glucagon  Injectable 1 milliGRAM(s) IntraMuscular once  hydrALAZINE 50 milliGRAM(s) Oral at bedtime  insulin lispro (ADMELOG) corrective regimen sliding scale   SubCutaneous at bedtime  insulin lispro (ADMELOG) corrective regimen sliding scale   SubCutaneous three times a day before meals  lidocaine   4% Patch 3 Patch Transdermal daily  lidocaine 1%/epinephrine 1:200,000 Inj 3 milliLiter(s) Local Injection once  polyethylene glycol 3350 17 Gram(s) Oral daily  polyethylene glycol 3350 17 Gram(s) Oral at bedtime  povidone iodine 10% Nasal Swab 1 Application(s) Both Nostrils once  senna 2 Tablet(s) Oral at bedtime  sevelamer carbonate 800 milliGRAM(s) Oral three times a day with meals    MEDICATIONS  (PRN):  acetaminophen     Tablet .. 650 milliGRAM(s) Oral every 6 hours PRN Temp greater or equal to 38C (100.4F), Mild Pain (1 - 3), Moderate Pain (4 - 6)  dextrose Oral Gel 15 Gram(s) Oral once PRN Blood Glucose LESS THAN 70 milliGRAM(s)/deciliter  lidocaine/prilocaine Cream 1 Application(s) Topical daily PRN on HD days for pain  magnesium hydroxide Suspension 30 milliLiter(s) Oral daily PRN Constipation  melatonin 3 milliGRAM(s) Oral at bedtime PRN Insomnia  ondansetron Injectable 4 milliGRAM(s) IV Push every 6 hours PRN Nausea and/or Vomiting  oxyCODONE    IR 5 milliGRAM(s) Oral every 4 hours PRN Moderate Pain (4 - 6)  oxyCODONE    IR 10 milliGRAM(s) Oral every 4 hours PRN Severe Pain (7 - 10)  traMADol 50 milliGRAM(s) Oral every 6 hours PRN Mild Pain (1 - 3)      Vital Signs Last 24 Hrs  T(F): 98 (12-23-24 @ 18:52), Max: 98.2 (12-22-24 @ 22:30)  HR: 67 (12-23-24 @ 18:52) (67 - 85)  BP: 143/66 (12-23-24 @ 18:52) (118/51 - 143/66)  RR: 18 (12-23-24 @ 18:52) (16 - 18)  SpO2: 100% (12-23-24 @ 18:52) (96% - 100%)  Telemetry:   CAPILLARY BLOOD GLUCOSE      POCT Blood Glucose.: 163 mg/dL (23 Dec 2024 17:35)  POCT Blood Glucose.: 207 mg/dL (23 Dec 2024 12:23)  POCT Blood Glucose.: 165 mg/dL (23 Dec 2024 08:23)  POCT Blood Glucose.: 177 mg/dL (22 Dec 2024 22:43)    I&O's Summary    22 Dec 2024 07:01  -  23 Dec 2024 07:00  --------------------------------------------------------  IN: 400 mL / OUT: 2400 mL / NET: -2000 mL        PHYSICAL EXAM:  GENERAL: NAD, well-developed  HEAD:  Atraumatic, Normocephalic  EYES: EOMI, PERRLA, conjunctiva and sclera clear  NECK: Supple, No JVD  CHEST/LUNG: Clear to auscultation bilaterally; No wheeze  HEART: Regular rate and rhythm; No murmurs, rubs, or gallops  ABDOMEN: Soft, Nontender, Nondistended; Bowel sounds present  EXTREMITIES:  2+ Peripheral Pulses, No clubbing, cyanosis, or edema  PSYCH: AAOx3  NEUROLOGY: non-focal  SKIN: No rashes or lesions    LABS:                        8.5    7.41  )-----------( 232      ( 23 Dec 2024 07:00 )             25.3     12-23    134[L]  |  96[L]  |  20  ----------------------------<  159[H]  3.9   |  26  |  3.60[H]    Ca    8.7      23 Dec 2024 07:00  Phos  5.1     12-23  Mg     2.10     12-23            Urinalysis Basic - ( 23 Dec 2024 07:00 )    Color: x / Appearance: x / SG: x / pH: x  Gluc: 159 mg/dL / Ketone: x  / Bili: x / Urobili: x   Blood: x / Protein: x / Nitrite: x   Leuk Esterase: x / RBC: x / WBC x   Sq Epi: x / Non Sq Epi: x / Bacteria: x        RADIOLOGY & ADDITIONAL TESTS:    Imaging Personally Reviewed:    Consultant(s) Notes Reviewed:      Care Discussed with Consultants/Other Providers:

## 2024-12-23 NOTE — PROVIDER CONTACT NOTE (OTHER) - BACKGROUND
78 y/o female, with a PmHx of ESRD on HD (MWF; makes a little bit of urine), HTN, DM, R cataract (R eye cataract surgery 1/25), Glaucoma, p/w rapid decline in L eye vision.
80 y/o female, with a PmHx of ESRD on HD, HTN, DM, R cataract, Glaucoma, p/w rapid decline in L eye vision: usually reads w/ glasses. Decreased vision starting this AM. 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
Pt admitted for L eye infection
78 y/o female, with a PmHx of ESRD on HD, HTN, DM, R cataract, Glaucoma, p/w rapid decline in L eye vision: usually reads w/ glasses. Decreased vision starting this AM. 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
78 y/o female, with a PmHx of ESRD on HD, HTN, DM, R cataract, Glaucoma, p/w rapid decline in L eye vision: usually reads w/ glasses. Decreased vision starting this AM. 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
80 y/o female, with a PmHx of ESRD on HD, HTN, DM, R cataract, Glaucoma, p/w rapid decline in L eye vision: usually reads w/ glasses. Decreased vision starting this AM. 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

## 2024-12-23 NOTE — PROGRESS NOTE ADULT - TIME BILLING
- Chart review  - Independent review and interpretation of data  - Patient examination, evaluation, and assessment  - Discussion at length with patient and family at bedside relating to clinical management  - Discussion with primary team relating to long term management on multiple occasions throughout the day  - Care coordination.
- Chart review  - Independent review and interpretation of data  - Patient examination, evaluation, and assessment  - Discussion at length with patient and family at bedside relating to clinical management  - Discussion with primary team relating to long term management on multiple occasions throughout the day  - Care coordination.

## 2024-12-24 LAB
ANION GAP SERPL CALC-SCNC: 17 MMOL/L — HIGH (ref 7–14)
BASOPHILS # BLD AUTO: 0.01 K/UL — SIGNIFICANT CHANGE UP (ref 0–0.2)
BASOPHILS NFR BLD AUTO: 0.2 % — SIGNIFICANT CHANGE UP (ref 0–2)
BUN SERPL-MCNC: 31 MG/DL — HIGH (ref 7–23)
CALCIUM SERPL-MCNC: 8.6 MG/DL — SIGNIFICANT CHANGE UP (ref 8.4–10.5)
CHLORIDE SERPL-SCNC: 93 MMOL/L — LOW (ref 98–107)
CO2 SERPL-SCNC: 24 MMOL/L — SIGNIFICANT CHANGE UP (ref 22–31)
CREAT SERPL-MCNC: 5.46 MG/DL — HIGH (ref 0.5–1.3)
EGFR: 8 ML/MIN/1.73M2 — LOW
EOSINOPHIL # BLD AUTO: 0.07 K/UL — SIGNIFICANT CHANGE UP (ref 0–0.5)
EOSINOPHIL NFR BLD AUTO: 1.1 % — SIGNIFICANT CHANGE UP (ref 0–6)
GLUCOSE BLDC GLUCOMTR-MCNC: 106 MG/DL — HIGH (ref 70–99)
GLUCOSE BLDC GLUCOMTR-MCNC: 159 MG/DL — HIGH (ref 70–99)
GLUCOSE BLDC GLUCOMTR-MCNC: 171 MG/DL — HIGH (ref 70–99)
GLUCOSE BLDC GLUCOMTR-MCNC: 185 MG/DL — HIGH (ref 70–99)
GLUCOSE BLDC GLUCOMTR-MCNC: 220 MG/DL — HIGH (ref 70–99)
GLUCOSE SERPL-MCNC: 159 MG/DL — HIGH (ref 70–99)
HCT VFR BLD CALC: 24 % — LOW (ref 34.5–45)
HGB BLD-MCNC: 8.1 G/DL — LOW (ref 11.5–15.5)
IANC: 4.64 K/UL — SIGNIFICANT CHANGE UP (ref 1.8–7.4)
IMM GRANULOCYTES NFR BLD AUTO: 0.8 % — SIGNIFICANT CHANGE UP (ref 0–0.9)
LYMPHOCYTES # BLD AUTO: 0.88 K/UL — LOW (ref 1–3.3)
LYMPHOCYTES # BLD AUTO: 13.7 % — SIGNIFICANT CHANGE UP (ref 13–44)
MAGNESIUM SERPL-MCNC: 2.2 MG/DL — SIGNIFICANT CHANGE UP (ref 1.6–2.6)
MCHC RBC-ENTMCNC: 31.4 PG — SIGNIFICANT CHANGE UP (ref 27–34)
MCHC RBC-ENTMCNC: 33.8 G/DL — SIGNIFICANT CHANGE UP (ref 32–36)
MCV RBC AUTO: 93 FL — SIGNIFICANT CHANGE UP (ref 80–100)
MONOCYTES # BLD AUTO: 0.76 K/UL — SIGNIFICANT CHANGE UP (ref 0–0.9)
MONOCYTES NFR BLD AUTO: 11.9 % — SIGNIFICANT CHANGE UP (ref 2–14)
NEUTROPHILS # BLD AUTO: 4.64 K/UL — SIGNIFICANT CHANGE UP (ref 1.8–7.4)
NEUTROPHILS NFR BLD AUTO: 72.3 % — SIGNIFICANT CHANGE UP (ref 43–77)
NRBC # BLD: 0 /100 WBCS — SIGNIFICANT CHANGE UP (ref 0–0)
NRBC # FLD: 0 K/UL — SIGNIFICANT CHANGE UP (ref 0–0)
PHOSPHATE SERPL-MCNC: 5.9 MG/DL — HIGH (ref 2.5–4.5)
PLATELET # BLD AUTO: 221 K/UL — SIGNIFICANT CHANGE UP (ref 150–400)
POTASSIUM SERPL-MCNC: 4 MMOL/L — SIGNIFICANT CHANGE UP (ref 3.5–5.3)
POTASSIUM SERPL-SCNC: 4 MMOL/L — SIGNIFICANT CHANGE UP (ref 3.5–5.3)
RBC # BLD: 2.58 M/UL — LOW (ref 3.8–5.2)
RBC # FLD: 15.1 % — HIGH (ref 10.3–14.5)
SODIUM SERPL-SCNC: 134 MMOL/L — LOW (ref 135–145)
SURGICAL PATHOLOGY STUDY: SIGNIFICANT CHANGE UP
WBC # BLD: 6.41 K/UL — SIGNIFICANT CHANGE UP (ref 3.8–10.5)
WBC # FLD AUTO: 6.41 K/UL — SIGNIFICANT CHANGE UP (ref 3.8–10.5)

## 2024-12-24 PROCEDURE — 99221 1ST HOSP IP/OBS SF/LOW 40: CPT

## 2024-12-24 PROCEDURE — 99232 SBSQ HOSP IP/OBS MODERATE 35: CPT

## 2024-12-24 RX ORDER — EPOETIN ALFA 2000 [IU]/ML
6000 SOLUTION INTRAVENOUS; SUBCUTANEOUS ONCE
Refills: 0 | Status: COMPLETED | OUTPATIENT
Start: 2024-12-24 | End: 2024-12-24

## 2024-12-24 RX ADMIN — CHLORHEXIDINE GLUCONATE 1 APPLICATION(S): 1.2 RINSE ORAL at 12:30

## 2024-12-24 RX ADMIN — Medication 100 MILLIGRAM(S): at 12:31

## 2024-12-24 RX ADMIN — EPOETIN ALFA 6000 UNIT(S): 2000 SOLUTION INTRAVENOUS; SUBCUTANEOUS at 09:15

## 2024-12-24 RX ADMIN — Medication 1: at 17:58

## 2024-12-24 RX ADMIN — ACETAMINOPHEN 975 MILLIGRAM(S): 80 SOLUTION/ DROPS ORAL at 13:51

## 2024-12-24 RX ADMIN — SEVELAMER CARBONATE 800 MILLIGRAM(S): 800 TABLET, FILM COATED ORAL at 12:30

## 2024-12-24 RX ADMIN — Medication 2: at 12:28

## 2024-12-24 RX ADMIN — Medication 17 GRAM(S): at 12:30

## 2024-12-24 RX ADMIN — ACETAMINOPHEN 975 MILLIGRAM(S): 80 SOLUTION/ DROPS ORAL at 21:58

## 2024-12-24 RX ADMIN — Medication 500 MILLIGRAM(S): at 12:30

## 2024-12-24 RX ADMIN — HYDRALAZINE HYDROCHLORIDE 50 MILLIGRAM(S): 10 TABLET ORAL at 21:59

## 2024-12-24 RX ADMIN — SEVELAMER CARBONATE 800 MILLIGRAM(S): 800 TABLET, FILM COATED ORAL at 17:58

## 2024-12-24 NOTE — PROGRESS NOTE ADULT - PROBLEM SELECTOR PLAN 2
Pt. with anemia in the setting of ESRD. Hgb (8.1) is below below goal for ESRD. Continue EPO with HD TIW, dose increased on 12/20. Monitor Hgb, goal: 10-11. Pt. with anemia in the setting of ESRD. Hgb (8.1) is below goal for ESRD. Continue EPO with HD TIW, dose increased on 12/20. Monitor Hgb, goal: 10-11.

## 2024-12-24 NOTE — PROGRESS NOTE ADULT - SUBJECTIVE AND OBJECTIVE BOX
[facility] Division of Kidney Diseases & Hypertension  FOLLOW UP NOTE  [phone]--------------------------------------------------------------------------------    Chief Complaint: ESRD/HD management    24 hour events/subjective: Pt. was seen and evaluated at bedside earlier today. Pt. reports feeling, offers no complaints. Denies any headaches, fevers/chills, chest pain, SOB, and LE edema.    PAST HISTORY  --------------------------------------------------------------------------------  No significant changes to PMH, PSH, FHx, SHx, unless otherwise noted    ALLERGIES & MEDICATIONS  --------------------------------------------------------------------------------  Allergies    No Known Allergies    Intolerances      Standing Inpatient Medications  acetaminophen     Tablet .. 975 milliGRAM(s) Oral every 8 hours  ascorbic acid 500 milliGRAM(s) Oral daily  ceFAZolin   IVPB 1000 milliGRAM(s) IV Intermittent every 24 hours  chlorhexidine 2% Cloths 1 Application(s) Topical daily  dextrose 5%. 1000 milliLiter(s) IV Continuous <Continuous>  dextrose 5%. 1000 milliLiter(s) IV Continuous <Continuous>  dextrose 50% Injectable 25 Gram(s) IV Push once  dextrose 50% Injectable 12.5 Gram(s) IV Push once  dextrose 50% Injectable 25 Gram(s) IV Push once  epoetin amrla (EPOGEN) Injectable 6000 Unit(s) IV Push <User Schedule>  glucagon  Injectable 1 milliGRAM(s) IntraMuscular once  hydrALAZINE 50 milliGRAM(s) Oral at bedtime  insulin lispro (ADMELOG) corrective regimen sliding scale   SubCutaneous at bedtime  insulin lispro (ADMELOG) corrective regimen sliding scale   SubCutaneous three times a day before meals  lidocaine   4% Patch 3 Patch Transdermal daily  lidocaine 1%/epinephrine 1:200,000 Inj 3 milliLiter(s) Local Injection once  polyethylene glycol 3350 17 Gram(s) Oral daily  polyethylene glycol 3350 17 Gram(s) Oral at bedtime  povidone iodine 10% Nasal Swab 1 Application(s) Both Nostrils once  senna 2 Tablet(s) Oral at bedtime  sevelamer carbonate 800 milliGRAM(s) Oral three times a day with meals    PRN Inpatient Medications  acetaminophen     Tablet .. 650 milliGRAM(s) Oral every 6 hours PRN  dextrose Oral Gel 15 Gram(s) Oral once PRN  lidocaine/prilocaine Cream 1 Application(s) Topical daily PRN  magnesium hydroxide Suspension 30 milliLiter(s) Oral daily PRN  melatonin 3 milliGRAM(s) Oral at bedtime PRN  ondansetron Injectable 4 milliGRAM(s) IV Push every 6 hours PRN  oxyCODONE    IR 5 milliGRAM(s) Oral every 4 hours PRN  oxyCODONE    IR 10 milliGRAM(s) Oral every 4 hours PRN  traMADol 50 milliGRAM(s) Oral every 6 hours PRN      REVIEW OF SYSTEMS  --------------------------------------------------------------------------------  Gen: No fevers/chills  Respiratory: No dyspnea  CV: No chest pain  GI: No abdominal pain  MSK: No edema  Neuro: No dizziness/lightheadedness    All other systems were reviewed and are negative, except as noted.     VITALS/PHYSICAL EXAM  --------------------------------------------------------------------------------  T(C): 36.7 (12-24-24 @ 10:25), Max: 36.8 (12-23-24 @ 14:19)  HR: 86 (12-24-24 @ 10:25) (67 - 86)  BP: 141/62 (12-24-24 @ 10:25) (112/53 - 148/70)  RR: 17 (12-24-24 @ 10:25) (16 - 18)  SpO2: 100% (12-24-24 @ 10:25) (97% - 100%)  Wt(kg): --    12-24-24 @ 07:01  -  12-24-24 @ 12:16  --------------------------------------------------------  IN: 500 mL / OUT: 2400 mL / NET: -1900 mL    Physical Exam:  Gen: NAD, resting comfortably in bed  HEENT: on room air  Pulm: CTA B/L  CV: + murmur  Abd: soft  : No fede  LE: no edema  Skin: Warm, without rashes  Vascular access: RUE AVF with thrill and bruit    LABS/STUDIES  --------------------------------------------------------------------------------              8.1    6.41  >-----------<  221      [12-24-24 @ 06:15]              24.0     134  |  93  |  31  ----------------------------<  159      [12-24-24 @ 08:25]  4.0   |  24  |  5.46        Ca     8.6     [12-24-24 @ 08:25]      Mg     2.20     [12-24-24 @ 08:25]      Phos  5.9     [12-24-24 @ 08:25]    Creatinine Trend:  SCr 5.46 [12-24 @ 08:25]  SCr 3.60 [12-23 @ 07:00]  SCr 4.81 [12-22 @ 06:40]  SCr 3.72 [12-21 @ 06:55]  SCr 5.50 [12-20 @ 06:45]

## 2024-12-24 NOTE — PROGRESS NOTE ADULT - SUBJECTIVE AND OBJECTIVE BOX
Patient is a 79y old  Female who presents with a chief complaint of fever, vision loss (24 Dec 2024 18:09)      SUBJECTIVE / OVERNIGHT EVENTS: dc planning    MEDICATIONS  (STANDING):  acetaminophen     Tablet .. 975 milliGRAM(s) Oral every 8 hours  ascorbic acid 500 milliGRAM(s) Oral daily  ceFAZolin   IVPB 1000 milliGRAM(s) IV Intermittent every 24 hours  chlorhexidine 2% Cloths 1 Application(s) Topical daily  dextrose 5%. 1000 milliLiter(s) (100 mL/Hr) IV Continuous <Continuous>  dextrose 5%. 1000 milliLiter(s) (50 mL/Hr) IV Continuous <Continuous>  dextrose 50% Injectable 25 Gram(s) IV Push once  dextrose 50% Injectable 12.5 Gram(s) IV Push once  dextrose 50% Injectable 25 Gram(s) IV Push once  epoetin marla (EPOGEN) Injectable 6000 Unit(s) IV Push <User Schedule>  glucagon  Injectable 1 milliGRAM(s) IntraMuscular once  hydrALAZINE 50 milliGRAM(s) Oral at bedtime  insulin lispro (ADMELOG) corrective regimen sliding scale   SubCutaneous at bedtime  insulin lispro (ADMELOG) corrective regimen sliding scale   SubCutaneous three times a day before meals  lidocaine   4% Patch 3 Patch Transdermal daily  lidocaine 1%/epinephrine 1:200,000 Inj 3 milliLiter(s) Local Injection once  polyethylene glycol 3350 17 Gram(s) Oral daily  polyethylene glycol 3350 17 Gram(s) Oral at bedtime  povidone iodine 10% Nasal Swab 1 Application(s) Both Nostrils once  senna 2 Tablet(s) Oral at bedtime  sevelamer carbonate 800 milliGRAM(s) Oral three times a day with meals    MEDICATIONS  (PRN):  acetaminophen     Tablet .. 650 milliGRAM(s) Oral every 6 hours PRN Temp greater or equal to 38C (100.4F), Mild Pain (1 - 3), Moderate Pain (4 - 6)  dextrose Oral Gel 15 Gram(s) Oral once PRN Blood Glucose LESS THAN 70 milliGRAM(s)/deciliter  lidocaine/prilocaine Cream 1 Application(s) Topical daily PRN on HD days for pain  magnesium hydroxide Suspension 30 milliLiter(s) Oral daily PRN Constipation  melatonin 3 milliGRAM(s) Oral at bedtime PRN Insomnia  ondansetron Injectable 4 milliGRAM(s) IV Push every 6 hours PRN Nausea and/or Vomiting  oxyCODONE    IR 5 milliGRAM(s) Oral every 4 hours PRN Moderate Pain (4 - 6)  oxyCODONE    IR 10 milliGRAM(s) Oral every 4 hours PRN Severe Pain (7 - 10)  traMADol 50 milliGRAM(s) Oral every 6 hours PRN Mild Pain (1 - 3)      Vital Signs Last 24 Hrs  T(F): 98.7 (12-24-24 @ 18:25), Max: 98.7 (12-24-24 @ 18:25)  HR: 66 (12-24-24 @ 18:25) (66 - 86)  BP: 127/57 (12-24-24 @ 18:25) (112/53 - 148/70)  RR: 18 (12-24-24 @ 18:25) (16 - 18)  SpO2: 100% (12-24-24 @ 18:25) (97% - 100%)  Telemetry:   CAPILLARY BLOOD GLUCOSE      POCT Blood Glucose.: 171 mg/dL (24 Dec 2024 17:02)  POCT Blood Glucose.: 220 mg/dL (24 Dec 2024 12:13)  POCT Blood Glucose.: 106 mg/dL (24 Dec 2024 09:14)  POCT Blood Glucose.: 185 mg/dL (24 Dec 2024 05:16)  POCT Blood Glucose.: 208 mg/dL (23 Dec 2024 22:35)    I&O's Summary    24 Dec 2024 07:01  -  24 Dec 2024 18:40  --------------------------------------------------------  IN: 500 mL / OUT: 2400 mL / NET: -1900 mL        PHYSICAL EXAM:  GENERAL: NAD, well-developed  HEAD:  Atraumatic, Normocephalic  EYES: EOMI, PERRLA, conjunctiva and sclera clear  NECK: Supple, No JVD  CHEST/LUNG: Clear to auscultation bilaterally; No wheeze  HEART: Regular rate and rhythm; No murmurs, rubs, or gallops  ABDOMEN: Soft, Nontender, Nondistended; Bowel sounds present  EXTREMITIES:  2+ Peripheral Pulses, No clubbing, cyanosis, or edema  PSYCH: AAOx3  NEUROLOGY: non-focal  SKIN: No rashes or lesions    LABS:                        8.1    6.41  )-----------( 221      ( 24 Dec 2024 06:15 )             24.0     12-24    134[L]  |  93[L]  |  31[H]  ----------------------------<  159[H]  4.0   |  24  |  5.46[H]    Ca    8.6      24 Dec 2024 08:25  Phos  5.9     12-24  Mg     2.20     12-24            Urinalysis Basic - ( 24 Dec 2024 08:25 )    Color: x / Appearance: x / SG: x / pH: x  Gluc: 159 mg/dL / Ketone: x  / Bili: x / Urobili: x   Blood: x / Protein: x / Nitrite: x   Leuk Esterase: x / RBC: x / WBC x   Sq Epi: x / Non Sq Epi: x / Bacteria: x        RADIOLOGY & ADDITIONAL TESTS:    Imaging Personally Reviewed:    Consultant(s) Notes Reviewed:      Care Discussed with Consultants/Other Providers:

## 2024-12-24 NOTE — PROGRESS NOTE ADULT - SUBJECTIVE AND OBJECTIVE BOX
[facility] DEPARTMENT OF OPHTHALMOLOGY  ------------------------------------------------------------------------------    Interval History: Pt reports yesterday her right eye vision felt dark, now reports it has returned to baseline.     MEDICATIONS  (STANDING):  acetaminophen     Tablet .. 975 milliGRAM(s) Oral every 8 hours  ascorbic acid 500 milliGRAM(s) Oral daily  ceFAZolin   IVPB 1000 milliGRAM(s) IV Intermittent every 24 hours  chlorhexidine 2% Cloths 1 Application(s) Topical daily  dextrose 5%. 1000 milliLiter(s) (100 mL/Hr) IV Continuous <Continuous>  dextrose 5%. 1000 milliLiter(s) (50 mL/Hr) IV Continuous <Continuous>  dextrose 50% Injectable 25 Gram(s) IV Push once  dextrose 50% Injectable 12.5 Gram(s) IV Push once  dextrose 50% Injectable 25 Gram(s) IV Push once  epoetin marla (EPOGEN) Injectable 6000 Unit(s) IV Push <User Schedule>  glucagon  Injectable 1 milliGRAM(s) IntraMuscular once  hydrALAZINE 50 milliGRAM(s) Oral at bedtime  insulin lispro (ADMELOG) corrective regimen sliding scale   SubCutaneous at bedtime  insulin lispro (ADMELOG) corrective regimen sliding scale   SubCutaneous three times a day before meals  lidocaine   4% Patch 3 Patch Transdermal daily  lidocaine 1%/epinephrine 1:200,000 Inj 3 milliLiter(s) Local Injection once  polyethylene glycol 3350 17 Gram(s) Oral daily  polyethylene glycol 3350 17 Gram(s) Oral at bedtime  povidone iodine 10% Nasal Swab 1 Application(s) Both Nostrils once  senna 2 Tablet(s) Oral at bedtime  sevelamer carbonate 800 milliGRAM(s) Oral three times a day with meals    MEDICATIONS  (PRN):  acetaminophen     Tablet .. 650 milliGRAM(s) Oral every 6 hours PRN Temp greater or equal to 38C (100.4F), Mild Pain (1 - 3), Moderate Pain (4 - 6)  dextrose Oral Gel 15 Gram(s) Oral once PRN Blood Glucose LESS THAN 70 milliGRAM(s)/deciliter  lidocaine/prilocaine Cream 1 Application(s) Topical daily PRN on HD days for pain  magnesium hydroxide Suspension 30 milliLiter(s) Oral daily PRN Constipation  melatonin 3 milliGRAM(s) Oral at bedtime PRN Insomnia  ondansetron Injectable 4 milliGRAM(s) IV Push every 6 hours PRN Nausea and/or Vomiting  oxyCODONE    IR 5 milliGRAM(s) Oral every 4 hours PRN Moderate Pain (4 - 6)  oxyCODONE    IR 10 milliGRAM(s) Oral every 4 hours PRN Severe Pain (7 - 10)  traMADol 50 milliGRAM(s) Oral every 6 hours PRN Mild Pain (1 - 3)      VITALS: T(C): 36.7 (12-24-24 @ 10:25)  T(F): 98 (12-24-24 @ 10:25), Max: 98.2 (12-23-24 @ 22:53)  HR: 86 (12-24-24 @ 10:25) (67 - 86)  BP: 141/62 (12-24-24 @ 10:25) (112/53 - 148/70)  RR:  (16 - 18)  SpO2:  (97% - 100%)  Wt(kg): --  General: AAO x 3, appropriate mood and affect    Ophthalmology Exam:  Visual acuity (sc): HM/CF OD  Pupils: sluggishly reactive OD  Ttono: 13 OD    Pen Light Exam (PLE)  External: Flat OD; OS: has patch over left eye socket  Lids/Lashes/Lacrimal Ducts: Flat OD  Sclera/Conjunctiva: W+Q OD  Cornea: Cl OD  Anterior Chamber: D+F OD  Iris: Flat OD  Lens: Dislocated inferior nasally OD    Vitreous: wnl OD  Disc, cup/disc: sharp and pink, 0.4 OD  Macula:  wnl OD  Vessels:  wnl OD  Periphery: wnl OD

## 2024-12-24 NOTE — PROGRESS NOTE ADULT - SUBJECTIVE AND OBJECTIVE BOX
Cardiovascular Disease Progress Note  DATE OF SERVICE: 24 @ 09:49    Overnight events: No acute events overnight.   The patient is undergoing HD in no distress.    Otherwise review of systems negative    Objective Findings:  T(C): 36.3 (24 @ 06:15), Max: 36.8 (24 @ 14:19)  HR: 75 (24 @ 06:15) (67 - 80)  BP: 148/70 (24 @ 06:15) (112/53 - 148/70)  RR: 16 (24 @ 06:15) (16 - 18)  SpO2: 98% (24 @ 04:55) (97% - 100%)  Wt(kg): --  Daily     Daily Weight in k.6 (24 Dec 2024 06:15)      Physical Exam:  Gen: NAD; Patient resting comfortably  HEENT:  Normocephalic/ atraumatic  CV: RRR, normal S1 + S2, no m/r/g  Lungs:  Normal respiratory effort; clear to auscultation bilaterally  Abd: soft, non-tender; bowel sounds present  Ext: No edema; warm and well perfused    Telemetry: n/a    Laboratory Data:                        8.1    6.41  )-----------( 221      ( 24 Dec 2024 06:15 )             24.0     12-24    134[L]  |  93[L]  |  31[H]  ----------------------------<  159[H]  4.0   |  24  |  5.46[H]    Ca    8.6      24 Dec 2024 08:25  Phos  5.9     -  Mg     2.20     12-24                Inpatient Medications:  MEDICATIONS  (STANDING):  acetaminophen     Tablet .. 975 milliGRAM(s) Oral every 8 hours  ascorbic acid 500 milliGRAM(s) Oral daily  ceFAZolin   IVPB 1000 milliGRAM(s) IV Intermittent every 24 hours  chlorhexidine 2% Cloths 1 Application(s) Topical daily  dextrose 5%. 1000 milliLiter(s) (50 mL/Hr) IV Continuous <Continuous>  dextrose 5%. 1000 milliLiter(s) (100 mL/Hr) IV Continuous <Continuous>  dextrose 50% Injectable 25 Gram(s) IV Push once  dextrose 50% Injectable 12.5 Gram(s) IV Push once  dextrose 50% Injectable 25 Gram(s) IV Push once  epoetin marla (EPOGEN) Injectable 6000 Unit(s) IV Push <User Schedule>  glucagon  Injectable 1 milliGRAM(s) IntraMuscular once  hydrALAZINE 50 milliGRAM(s) Oral at bedtime  insulin lispro (ADMELOG) corrective regimen sliding scale   SubCutaneous at bedtime  insulin lispro (ADMELOG) corrective regimen sliding scale   SubCutaneous three times a day before meals  lidocaine   4% Patch 3 Patch Transdermal daily  lidocaine 1%/epinephrine 1:200,000 Inj 3 milliLiter(s) Local Injection once  polyethylene glycol 3350 17 Gram(s) Oral daily  polyethylene glycol 3350 17 Gram(s) Oral at bedtime  povidone iodine 10% Nasal Swab 1 Application(s) Both Nostrils once  senna 2 Tablet(s) Oral at bedtime  sevelamer carbonate 800 milliGRAM(s) Oral three times a day with meals      Assessment: 79 year old woman with ESRD on HD (MW), HTN, DM (diet controlled), chronic type B aortic dissection, heart murmur, endometrial ca in remission s/p total hysterectomy in  presents with bacteremia.    Plan of Care:        #Bacteremia-  In the setting of ophthalmic infection.   BLANE with no obvious evidence of vegetations.  Continue current cardiac management.          #Type B aortic dissection-  - Chronic (Diagnosed >10 years ago)  - Optimal BP control.          #ESRD-  - HD as per renal.           Over 55 minutes spent on total encounter; more than 50% of the visit was spent counseling and/or coordinating care by the attending physician.      Gabo Burris MD [facility]  Cardiovascular Disease  [phone]

## 2024-12-24 NOTE — PROGRESS NOTE ADULT - PROBLEM SELECTOR PROBLEM 2
Anemia of chronic disease
86

## 2024-12-24 NOTE — PROGRESS NOTE ADULT - SUBJECTIVE AND OBJECTIVE BOX
ORTHOPEDIC PROGRESS NOTE    Overnight events: decline in R eye vision, optho to see today     SUBJECTIVE: Pt seen and examined at bedside while in dialysis. Patient is doing well from an orthopedic perspective, getting OOB, R knee pain controlled with medication.       OBJECTIVE:  Vital Signs Last 24 Hrs  T(C): 36.3 (24 Dec 2024 06:15), Max: 36.8 (23 Dec 2024 14:19)  T(F): 97.3 (24 Dec 2024 06:15), Max: 98.2 (23 Dec 2024 14:19)  HR: 75 (24 Dec 2024 06:15) (67 - 80)  BP: 148/70 (24 Dec 2024 06:15) (112/53 - 148/70)  BP(mean): --  RR: 16 (24 Dec 2024 06:15) (16 - 18)  SpO2: 98% (24 Dec 2024 04:55) (97% - 100%)    Physical Examination:  GEN: NAD, resting quietly  PULM: symmetric chest rise bilaterally, no increased WOB  RLE:  Aquacel dressing replaced at bedside, incisions c/d/i with staples in place  compartments soft, no calf TTP b/l  Motor: TA/EHL/GS/FHL intact  Sensory: DP/SP/Tib/Varsha/Saph SILT  +DP pulse, WWP    LABS:    LABS  CBC 12-23-24 @ 07:00                        8.5    7.41  )-----------( 232                   25.3     Hgb trend: 8.5 <-- , 8.2 <--   WBC trend: 7.41 <-- , 7.25 <--         CMP 12-23-24 @ 07:00    134[L]  |  96[L]  |  20  ----------------------------<  159[H]  3.9   |  26  |  3.60[H]    Phos  5.1     12-23  Mg     2.10     12-23      Serum Cr (eGFR) trend: 3.60 (12) <-- , 4.81 (9) <--         Urinalysis Basic - ( 23 Dec 2024 07:00 )    Color: x / Appearance: x / SG: x / pH: x  Gluc: 159 mg/dL / Ketone: x  / Bili: x / Urobili: x   Blood: x / Protein: x / Nitrite: x   Leuk Esterase: x / RBC: x / WBC x   Sq Epi: x / Non Sq Epi: x / Bacteria: x      ASSESSMENT AND PLAN:   79yFemale s/p R knee I&D on 12/18    -WBAT  -PT/OT  -Abx per ID  -FU OR cx: NG (final result)  -care per primary  -no further recommends or treatment from orthopaedic surgery perspective, will sign off at this time  -follow up with Dr. Peters 1-2 weeks after discharge     Orthopaedic Surgery Resident    For all questions, please reach out via the following numbers for the on-call resident; do not reach out via Teams.  [facility] [id number]  [facility]        [id number]  [facility]  [phone]/ [phone]

## 2024-12-24 NOTE — PROGRESS NOTE ADULT - PROBLEM SELECTOR PLAN 1
Pt. with ESRD on HD (TIW) on MWF, had last outpatient maintenance HD on 12/11/24 that needed to be terminated prematurely due to hypotension and rigors. Pt. now admitted with bacteremia and endophthalmitis. Last HD treatment was on 12/16, was well tolerated. Labs reviewed. Pt. clinically stable. Plan for maintenance HD treatment today (12/18). Serum phosphorus (5.3) at goal, off phos binders. Monitor BMP and BP. Dose medications to ESRD/HD.
Pt. with ESRD on HD (TIW) on MWF, had last outpatient maintenance HD on 12/11/24 that needed to be terminated prematurely due to hypotension and rigors. Pt. now admitted with bacteremia and endophthalmitis. Last HD treatment was on 12/18, was well tolerated. Labs reviewed. Pt. clinically stable. Last maintenance HD treatment today (12/22). Serum phosphorus 7.7 today pre-HD. Check phosphorous tomorrow, currently off phos binders. Monitor BMP and BP. Dose medications to ESRD/HD.
Pt. with ESRD on HD (TIW) on MWF, had last outpatient maintenance HD on 12/11/24 that needed to be terminated prematurely due to hypotension and rigors. Pt. now admitted with bacteremia and endophthalmitis. Pt. had inpatient HD at [facility] on 12/13/24 that she tolerated well. Labs reviewed. Pt. clinically stable. Plan for next maintenance HD today. Serum phos level at goal, pt. not on any phos binders. Monitor BMP and BP.
Pt. with ESRD on HD (TIW) on MWF, had last outpatient maintenance HD on 12/11/24 that needed to be terminated prematurely due to hypotension and rigors. Pt. now admitted with bacteremia and endophthalmitis. Last HD treatment was on 12/22, was well tolerated. Labs reviewed. Pt. clinically stable. Plan for maintenance HD treatment today (12/24). Monitor BMP and BP. Dose medications to ESRD/HD.
Pt. with ESRD on HD (TIW) on MWF, had last outpatient maintenance HD on 12/11/24 that needed to be terminated prematurely due to hypotension and rigors. Pt. now admitted with bacteremia and endophthalmitis. Last HD treatment was on 12/18, was well tolerated. Labs reviewed. Pt. clinically stable. Plan for maintenance HD treatment today (12/20). Serum phosphorus (5.3) at goal, off phos binders. Monitor BMP and BP. Dose medications to ESRD/HD.

## 2024-12-24 NOTE — PROGRESS NOTE ADULT - PROBLEM SELECTOR PLAN 3
Pt. with hyperphosphatemia in the setting of ESRD. Serum phosphorus (7.7) is above target range. Continue sevelamer 800 mg tid w/ meals. Low phosphorus diet. Monitor serum phosphorus, goal: 3.5-5.5.     If you have any questions, please feel free to contact me  jAith Burden  Nephrology Fellow  Page [phone] / [employer] Teams (Preferred)  (After 4pm or on weekends please page the on-call fellow)
Pt. with hyperphosphatemia in the setting of ESRD. Serum phosphorus (8.7) is above target range. Recommend to start sevelamer 800 mg tid w/ meals. Low phosphorus diet. Monitor serum phosphorus, goal: 3.5-5.5.     If you have any questions, please feel free to contact me  Verónica Madrigal  Nephrology  [phone] / [employer] Teams(Preferred)  (After 4 pm or on weekends please page the on-call fellow)
Pt. with hyperphosphatemia in the setting of ESRD. Serum phosphorus (5.9) is slightly above target range. Recommend to continue sevelamer 800 mg tid w/ meals. Low phosphorus diet. Monitor serum phosphorus, goal: 3.5-5.5.     If you have any questions, please feel free to contact me  Verónica Madrigal  Nephrology  [phone] / [employer] Teams(Preferred)  (After 4 pm or on weekends please page the on-call fellow)

## 2024-12-24 NOTE — PROGRESS NOTE ADULT - ASSESSMENT
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 Was s/p tap and inject (vanc and ceftaz) on 12/13 for endogenous endophthalmitis, had initial worsening of vision to NLP on 12/15 followed by corneal dehiscence on 12/20 now s/p evisceration OS  - Continue broad spectrum abx per primary team and ID  - Please keep eye patch and tape on for at least 10 days   - Please followup eye and nose cultures and pathology   - Can discontinue all eye drops and oral doxycyline (D/cd by ophtho)  - Okay to resume normal diet per ophtho standpoint  - Appreciate care per primary team and ID   - Will arrange outpatient ophtho followup.        Seen and discussed with Dr. Brennan, ophthalmology attending.     Outpatient Follow-up: Patient should follow-up with his/her ophthalmologist or with [facility] Department of Ophthalmology within 1 week of after discharge at:    [address]  [place] [address]  [phone]

## 2024-12-24 NOTE — PROGRESS NOTE ADULT - ASSESSMENT
79 yr old female with PMH of  ESRD on HD , HTN, DM, R cataract , glaucoma here for fever and rapid decline of R eye vision .     Bacterial Endophthalmitis OS  - cw  iv antibiotics as per ID   - cultures reviewed repeat neg   - sp Intravitreal Vancomycin   - cw eye drops  - ID and optho fu       R knee septic arthritis  - cw abx  - s/p I and D , pod5  -  R ankle xray neg    High grade bacteremia  - cw iv abx  - ID fu   - repeat cultures neg   - imaging reviewed   - gregoria NEG FOR ENDOCARDITIS     Aortic dissection  - chronic   - no intervention needed     ESRD  - HD as scheduled  - renal fu     HTN  - cw hydralazine  - DASH diet    DM  - monitor FS  - ISS    Heparin sc for DVT prophylaxis

## 2024-12-25 LAB
ANION GAP SERPL CALC-SCNC: 13 MMOL/L — SIGNIFICANT CHANGE UP (ref 7–14)
BASOPHILS # BLD AUTO: 0.03 K/UL — SIGNIFICANT CHANGE UP (ref 0–0.2)
BASOPHILS NFR BLD AUTO: 0.5 % — SIGNIFICANT CHANGE UP (ref 0–2)
BUN SERPL-MCNC: 18 MG/DL — SIGNIFICANT CHANGE UP (ref 7–23)
CALCIUM SERPL-MCNC: 8.8 MG/DL — SIGNIFICANT CHANGE UP (ref 8.4–10.5)
CHLORIDE SERPL-SCNC: 96 MMOL/L — LOW (ref 98–107)
CO2 SERPL-SCNC: 25 MMOL/L — SIGNIFICANT CHANGE UP (ref 22–31)
CREAT SERPL-MCNC: 3.47 MG/DL — HIGH (ref 0.5–1.3)
EGFR: 13 ML/MIN/1.73M2 — LOW
EOSINOPHIL # BLD AUTO: 0.03 K/UL — SIGNIFICANT CHANGE UP (ref 0–0.5)
EOSINOPHIL NFR BLD AUTO: 0.5 % — SIGNIFICANT CHANGE UP (ref 0–6)
GLUCOSE BLDC GLUCOMTR-MCNC: 122 MG/DL — HIGH (ref 70–99)
GLUCOSE BLDC GLUCOMTR-MCNC: 170 MG/DL — HIGH (ref 70–99)
GLUCOSE BLDC GLUCOMTR-MCNC: 183 MG/DL — HIGH (ref 70–99)
GLUCOSE BLDC GLUCOMTR-MCNC: 188 MG/DL — HIGH (ref 70–99)
GLUCOSE SERPL-MCNC: 168 MG/DL — HIGH (ref 70–99)
HCT VFR BLD CALC: 27.7 % — LOW (ref 34.5–45)
HGB BLD-MCNC: 9 G/DL — LOW (ref 11.5–15.5)
IANC: 4.61 K/UL — SIGNIFICANT CHANGE UP (ref 1.8–7.4)
IMM GRANULOCYTES NFR BLD AUTO: 0.8 % — SIGNIFICANT CHANGE UP (ref 0–0.9)
LYMPHOCYTES # BLD AUTO: 0.76 K/UL — LOW (ref 1–3.3)
LYMPHOCYTES # BLD AUTO: 12.3 % — LOW (ref 13–44)
MAGNESIUM SERPL-MCNC: 2.2 MG/DL — SIGNIFICANT CHANGE UP (ref 1.6–2.6)
MCHC RBC-ENTMCNC: 30.9 PG — SIGNIFICANT CHANGE UP (ref 27–34)
MCHC RBC-ENTMCNC: 32.5 G/DL — SIGNIFICANT CHANGE UP (ref 32–36)
MCV RBC AUTO: 95.2 FL — SIGNIFICANT CHANGE UP (ref 80–100)
MONOCYTES # BLD AUTO: 0.7 K/UL — SIGNIFICANT CHANGE UP (ref 0–0.9)
MONOCYTES NFR BLD AUTO: 11.3 % — SIGNIFICANT CHANGE UP (ref 2–14)
NEUTROPHILS # BLD AUTO: 4.61 K/UL — SIGNIFICANT CHANGE UP (ref 1.8–7.4)
NEUTROPHILS NFR BLD AUTO: 74.6 % — SIGNIFICANT CHANGE UP (ref 43–77)
NRBC # BLD: 0 /100 WBCS — SIGNIFICANT CHANGE UP (ref 0–0)
NRBC # FLD: 0 K/UL — SIGNIFICANT CHANGE UP (ref 0–0)
PHOSPHATE SERPL-MCNC: 4.4 MG/DL — SIGNIFICANT CHANGE UP (ref 2.5–4.5)
PLATELET # BLD AUTO: 227 K/UL — SIGNIFICANT CHANGE UP (ref 150–400)
POTASSIUM SERPL-MCNC: 4.3 MMOL/L — SIGNIFICANT CHANGE UP (ref 3.5–5.3)
POTASSIUM SERPL-SCNC: 4.3 MMOL/L — SIGNIFICANT CHANGE UP (ref 3.5–5.3)
RBC # BLD: 2.91 M/UL — LOW (ref 3.8–5.2)
RBC # FLD: 15.5 % — HIGH (ref 10.3–14.5)
SODIUM SERPL-SCNC: 134 MMOL/L — LOW (ref 135–145)
WBC # BLD: 6.18 K/UL — SIGNIFICANT CHANGE UP (ref 3.8–10.5)
WBC # FLD AUTO: 6.18 K/UL — SIGNIFICANT CHANGE UP (ref 3.8–10.5)

## 2024-12-25 RX ADMIN — Medication 500 MILLIGRAM(S): at 08:34

## 2024-12-25 RX ADMIN — SEVELAMER CARBONATE 800 MILLIGRAM(S): 800 TABLET, FILM COATED ORAL at 08:34

## 2024-12-25 RX ADMIN — Medication 10 MILLIGRAM(S): at 16:58

## 2024-12-25 RX ADMIN — Medication 10 MILLIGRAM(S): at 15:58

## 2024-12-25 RX ADMIN — CHLORHEXIDINE GLUCONATE 1 APPLICATION(S): 1.2 RINSE ORAL at 08:46

## 2024-12-25 RX ADMIN — Medication 1: at 08:32

## 2024-12-25 RX ADMIN — ACETAMINOPHEN 975 MILLIGRAM(S): 80 SOLUTION/ DROPS ORAL at 22:50

## 2024-12-25 RX ADMIN — SEVELAMER CARBONATE 800 MILLIGRAM(S): 800 TABLET, FILM COATED ORAL at 12:38

## 2024-12-25 RX ADMIN — Medication 100 MILLIGRAM(S): at 10:28

## 2024-12-25 RX ADMIN — ACETAMINOPHEN 975 MILLIGRAM(S): 80 SOLUTION/ DROPS ORAL at 05:59

## 2024-12-25 RX ADMIN — SEVELAMER CARBONATE 800 MILLIGRAM(S): 800 TABLET, FILM COATED ORAL at 17:12

## 2024-12-25 RX ADMIN — LIDOCAINE 3 PATCH: 50 OINTMENT TOPICAL at 08:34

## 2024-12-25 RX ADMIN — HYDRALAZINE HYDROCHLORIDE 50 MILLIGRAM(S): 10 TABLET ORAL at 22:52

## 2024-12-25 RX ADMIN — Medication 1: at 12:35

## 2024-12-25 NOTE — PROGRESS NOTE ADULT - SUBJECTIVE AND OBJECTIVE BOX
Patient is a 79y old  Female who presents with a chief complaint of fever, vision loss (24 Dec 2024 18:40)      SUBJECTIVE / OVERNIGHT EVENTS: dc planning, thorough optho f/u recs appreciated    MEDICATIONS  (STANDING):  acetaminophen     Tablet .. 975 milliGRAM(s) Oral every 8 hours  ascorbic acid 500 milliGRAM(s) Oral daily  ceFAZolin   IVPB 1000 milliGRAM(s) IV Intermittent every 24 hours  chlorhexidine 2% Cloths 1 Application(s) Topical daily  dextrose 5%. 1000 milliLiter(s) (50 mL/Hr) IV Continuous <Continuous>  dextrose 5%. 1000 milliLiter(s) (100 mL/Hr) IV Continuous <Continuous>  dextrose 50% Injectable 25 Gram(s) IV Push once  dextrose 50% Injectable 12.5 Gram(s) IV Push once  dextrose 50% Injectable 25 Gram(s) IV Push once  epoetin marla (EPOGEN) Injectable 6000 Unit(s) IV Push <User Schedule>  glucagon  Injectable 1 milliGRAM(s) IntraMuscular once  hydrALAZINE 50 milliGRAM(s) Oral at bedtime  insulin lispro (ADMELOG) corrective regimen sliding scale   SubCutaneous at bedtime  insulin lispro (ADMELOG) corrective regimen sliding scale   SubCutaneous three times a day before meals  lidocaine   4% Patch 3 Patch Transdermal daily  lidocaine 1%/epinephrine 1:200,000 Inj 3 milliLiter(s) Local Injection once  polyethylene glycol 3350 17 Gram(s) Oral daily  polyethylene glycol 3350 17 Gram(s) Oral at bedtime  povidone iodine 10% Nasal Swab 1 Application(s) Both Nostrils once  senna 2 Tablet(s) Oral at bedtime  sevelamer carbonate 800 milliGRAM(s) Oral three times a day with meals    MEDICATIONS  (PRN):  acetaminophen     Tablet .. 650 milliGRAM(s) Oral every 6 hours PRN Temp greater or equal to 38C (100.4F), Mild Pain (1 - 3), Moderate Pain (4 - 6)  dextrose Oral Gel 15 Gram(s) Oral once PRN Blood Glucose LESS THAN 70 milliGRAM(s)/deciliter  lidocaine/prilocaine Cream 1 Application(s) Topical daily PRN on HD days for pain  magnesium hydroxide Suspension 30 milliLiter(s) Oral daily PRN Constipation  melatonin 3 milliGRAM(s) Oral at bedtime PRN Insomnia  ondansetron Injectable 4 milliGRAM(s) IV Push every 6 hours PRN Nausea and/or Vomiting  oxyCODONE    IR 5 milliGRAM(s) Oral every 4 hours PRN Moderate Pain (4 - 6)  oxyCODONE    IR 10 milliGRAM(s) Oral every 4 hours PRN Severe Pain (7 - 10)  traMADol 50 milliGRAM(s) Oral every 6 hours PRN Mild Pain (1 - 3)      Vital Signs Last 24 Hrs  T(F): 98.3 (12-25-24 @ 13:58), Max: 98.3 (12-25-24 @ 13:58)  HR: 66 (12-25-24 @ 13:58) (66 - 73)  BP: 136/67 (12-25-24 @ 13:58) (133/66 - 145/74)  RR: 18 (12-25-24 @ 13:58) (17 - 18)  SpO2: 98% (12-25-24 @ 13:58) (98% - 100%)  Telemetry:   CAPILLARY BLOOD GLUCOSE      POCT Blood Glucose.: 122 mg/dL (25 Dec 2024 17:04)  POCT Blood Glucose.: 183 mg/dL (25 Dec 2024 12:19)  POCT Blood Glucose.: 170 mg/dL (25 Dec 2024 08:09)  POCT Blood Glucose.: 159 mg/dL (24 Dec 2024 22:36)    I&O's Summary    24 Dec 2024 07:01  -  25 Dec 2024 07:00  --------------------------------------------------------  IN: 500 mL / OUT: 2400 mL / NET: -1900 mL        PHYSICAL EXAM:  GENERAL: NAD, well-developed  HEAD:  Atraumatic, Normocephalic  EYES: EOMI, PERRLA, conjunctiva and sclera clear  NECK: Supple, No JVD  CHEST/LUNG: Clear to auscultation bilaterally; No wheeze  HEART: Regular rate and rhythm; No murmurs, rubs, or gallops  ABDOMEN: Soft, Nontender, Nondistended; Bowel sounds present  EXTREMITIES:  2+ Peripheral Pulses, No clubbing, cyanosis, or edema  PSYCH: AAOx3  NEUROLOGY: non-focal  SKIN: No rashes or lesions    LABS:                        9.0    6.18  )-----------( 227      ( 25 Dec 2024 07:35 )             27.7     12-25    134[L]  |  96[L]  |  18  ----------------------------<  168[H]  4.3   |  25  |  3.47[H]    Ca    8.8      25 Dec 2024 07:35  Phos  4.4     12-25  Mg     2.20     12-25            Urinalysis Basic - ( 25 Dec 2024 07:35 )    Color: x / Appearance: x / SG: x / pH: x  Gluc: 168 mg/dL / Ketone: x  / Bili: x / Urobili: x   Blood: x / Protein: x / Nitrite: x   Leuk Esterase: x / RBC: x / WBC x   Sq Epi: x / Non Sq Epi: x / Bacteria: x        RADIOLOGY & ADDITIONAL TESTS:    Imaging Personally Reviewed:    Consultant(s) Notes Reviewed:      Care Discussed with Consultants/Other Providers:

## 2024-12-25 NOTE — PROGRESS NOTE ADULT - ASSESSMENT
79 yr old female with PMH of  ESRD on HD , HTN, DM, R cataract , glaucoma here for fever and rapid decline of R eye vision .     Bacterial Endophthalmitis OS  - cw  iv antibiotics as per ID   - cultures reviewed repeat neg   - sp Intravitreal Vancomycin   - cw eye drops  - ID and optho fu   - 12/23:  ptn c/o worsening vision in R eye periphery, optho made aware, to be seen in am.   - 12/24:  thorough optho f/u recs appreciated    R knee septic arthritis  - cw abx  - s/p I and D , pod5  -  R ankle xray neg    High grade bacteremia  - cw iv abx  - ID fu   - repeat cultures neg   - imaging reviewed   - gregoria NEG FOR ENDOCARDITIS     Aortic dissection  - chronic   - no intervention needed     ESRD  - HD as scheduled  - renal fu     HTN  - cw hydralazine  - DASH diet    DM  - monitor FS  - ISS    Heparin sc for DVT prophylaxis  DC planning

## 2024-12-26 ENCOUNTER — TRANSCRIPTION ENCOUNTER (OUTPATIENT)
Age: 79
End: 2024-12-26

## 2024-12-26 VITALS
HEART RATE: 84 BPM | TEMPERATURE: 98 F | SYSTOLIC BLOOD PRESSURE: 144 MMHG | DIASTOLIC BLOOD PRESSURE: 65 MMHG | OXYGEN SATURATION: 100 % | RESPIRATION RATE: 18 BRPM

## 2024-12-26 LAB
ANION GAP SERPL CALC-SCNC: 15 MMOL/L — HIGH (ref 7–14)
BUN SERPL-MCNC: 30 MG/DL — HIGH (ref 7–23)
CALCIUM SERPL-MCNC: 8.7 MG/DL — SIGNIFICANT CHANGE UP (ref 8.4–10.5)
CHLORIDE SERPL-SCNC: 96 MMOL/L — LOW (ref 98–107)
CO2 SERPL-SCNC: 24 MMOL/L — SIGNIFICANT CHANGE UP (ref 22–31)
CREAT SERPL-MCNC: 5.19 MG/DL — HIGH (ref 0.5–1.3)
CULTURE RESULTS: ABNORMAL
CULTURE RESULTS: NO GROWTH — SIGNIFICANT CHANGE UP
EGFR: 8 ML/MIN/1.73M2 — LOW
GLUCOSE BLDC GLUCOMTR-MCNC: 159 MG/DL — HIGH (ref 70–99)
GLUCOSE BLDC GLUCOMTR-MCNC: 182 MG/DL — HIGH (ref 70–99)
GLUCOSE SERPL-MCNC: 136 MG/DL — HIGH (ref 70–99)
MAGNESIUM SERPL-MCNC: 2.3 MG/DL — SIGNIFICANT CHANGE UP (ref 1.6–2.6)
PHOSPHATE SERPL-MCNC: 5.7 MG/DL — HIGH (ref 2.5–4.5)
POTASSIUM SERPL-MCNC: 4.6 MMOL/L — SIGNIFICANT CHANGE UP (ref 3.5–5.3)
POTASSIUM SERPL-SCNC: 4.6 MMOL/L — SIGNIFICANT CHANGE UP (ref 3.5–5.3)
SODIUM SERPL-SCNC: 135 MMOL/L — SIGNIFICANT CHANGE UP (ref 135–145)
SPECIMEN SOURCE: SIGNIFICANT CHANGE UP
SPECIMEN SOURCE: SIGNIFICANT CHANGE UP
SURGICAL PATHOLOGY STUDY: SIGNIFICANT CHANGE UP

## 2024-12-26 RX ORDER — ASCORBIC ACID 1000 MG
1 TABLET ORAL
Qty: 0 | Refills: 0 | DISCHARGE
Start: 2024-12-26

## 2024-12-26 RX ORDER — ACETAMINOPHEN 80 MG/.8ML
2 SOLUTION/ DROPS ORAL
Qty: 0 | Refills: 0 | DISCHARGE
Start: 2024-12-26

## 2024-12-26 RX ORDER — TRAMADOL HYDROCHLORIDE 50 MG/1
1 TABLET ORAL
Qty: 0 | Refills: 0 | DISCHARGE
Start: 2024-12-26

## 2024-12-26 RX ORDER — SEVELAMER CARBONATE 800 MG/1
1 TABLET, FILM COATED ORAL
Qty: 0 | Refills: 0 | DISCHARGE
Start: 2024-12-26

## 2024-12-26 RX ORDER — OXYCODONE HCL 15 MG
1 TABLET ORAL
Qty: 0 | Refills: 0 | DISCHARGE
Start: 2024-12-26

## 2024-12-26 RX ORDER — LIDOCAINE 50 MG/G
2 OINTMENT TOPICAL
Qty: 0 | Refills: 0 | DISCHARGE
Start: 2024-12-26

## 2024-12-26 RX ORDER — SODIUM CHLORIDE 9 MG/ML
100 INJECTION, SOLUTION INTRAMUSCULAR; INTRAVENOUS; SUBCUTANEOUS
Refills: 0 | Status: DISCONTINUED | OUTPATIENT
Start: 2024-12-26 | End: 2024-12-26

## 2024-12-26 RX ORDER — SENNOSIDES 8.6 MG/1
2 TABLET, FILM COATED ORAL
Qty: 0 | Refills: 0 | DISCHARGE
Start: 2024-12-26

## 2024-12-26 RX ORDER — LIDOCAINE/PRILOCAINE 2.5 %-2.5%
1 CREAM (GRAM) TOPICAL
Qty: 0 | Refills: 0 | DISCHARGE
Start: 2024-12-26

## 2024-12-26 RX ADMIN — Medication 500 MILLIGRAM(S): at 09:51

## 2024-12-26 RX ADMIN — ACETAMINOPHEN 975 MILLIGRAM(S): 80 SOLUTION/ DROPS ORAL at 13:02

## 2024-12-26 RX ADMIN — SEVELAMER CARBONATE 800 MILLIGRAM(S): 800 TABLET, FILM COATED ORAL at 09:05

## 2024-12-26 RX ADMIN — SEVELAMER CARBONATE 800 MILLIGRAM(S): 800 TABLET, FILM COATED ORAL at 12:20

## 2024-12-26 RX ADMIN — Medication 1: at 12:18

## 2024-12-26 RX ADMIN — Medication 1: at 09:04

## 2024-12-26 RX ADMIN — CHLORHEXIDINE GLUCONATE 1 APPLICATION(S): 1.2 RINSE ORAL at 10:01

## 2024-12-26 RX ADMIN — ACETAMINOPHEN 975 MILLIGRAM(S): 80 SOLUTION/ DROPS ORAL at 05:42

## 2024-12-26 RX ADMIN — Medication 100 MILLIGRAM(S): at 09:51

## 2024-12-26 RX ADMIN — LIDOCAINE 3 PATCH: 50 OINTMENT TOPICAL at 09:51

## 2024-12-26 NOTE — PROGRESS NOTE ADULT - REASON FOR ADMISSION
fever, vision loss

## 2024-12-26 NOTE — DISCHARGE NOTE NURSING/CASE MANAGEMENT/SOCIAL WORK - FINANCIAL ASSISTANCE
[facility] provides services at a reduced cost to those who are determined to be eligible through [facility]’s financial assistance program. Information regarding [facility]’s financial assistance program can be found by going to [email or web address] or by calling [phone].

## 2024-12-26 NOTE — DISCHARGE NOTE NURSING/CASE MANAGEMENT/SOCIAL WORK - NSDCPEFALRISK_GEN_ALL_CORE
For information on Fall & Injury Prevention, visit: [email or web address] OR  [email or web address] OR  https://www.cdc.gov/steadi/patient.html

## 2024-12-26 NOTE — PROGRESS NOTE ADULT - ASSESSMENT
79 yr old female with PMH of  ESRD on HD , HTN, DM, R cataract , glaucoma here for fever and rapid decline of R eye vision .     Bacterial Endophthalmitis OS  - cw  iv antibiotics as per ID   - cultures reviewed repeat neg   - sp Intravitreal Vancomycin   - cw eye drops  - ID and optho fu   - 12/23:  ptn c/o worsening vision in R eye periphery, optho made aware, to be seen in am.   - 12/24:  thorough optho f/u recs appreciated    R knee septic arthritis  - cw abx  - s/p I and D , pod5  -  R ankle xray neg    High grade bacteremia  - cw iv abx  - ID fu   - repeat cultures neg   - imaging reviewed   - gregoria NEG FOR ENDOCARDITIS     Aortic dissection  - chronic   - no intervention needed     ESRD  - HD as scheduled  - renal fu     HTN  - cw hydralazine  - DASH diet    DM  - monitor FS  - ISS    Heparin sc for DVT prophylaxis  DC planning to [facility] today

## 2024-12-26 NOTE — DISCHARGE NOTE NURSING/CASE MANAGEMENT/SOCIAL WORK - PATIENT PORTAL LINK FT
You can access the FollowMyHealth Patient Portal offered by [facility] by registering at the following website: [email or web address]. By joining [facility]’s FollowMyHealth portal, you will also be able to view your health information using other applications (apps) compatible with our system.

## 2024-12-26 NOTE — DISCHARGE NOTE NURSING/CASE MANAGEMENT/SOCIAL WORK - NSDCFUADDAPPT_GEN_ALL_CORE_FT
Follow up with [facility] Department of Ophthalmology within 1 week of after discharge at: [address] [place] [address] [phone]    Follow up with CT Surgery Dr. Luis Hernandez. Call [phone] to schedule appt.     Follow up with infectious disease:   -Please call [phone] to schedule an appointment to see me in clinic at [address] [place] [address] Fito Lorenz [place] within 4 weeks of discharge

## 2024-12-26 NOTE — PROGRESS NOTE ADULT - SUBJECTIVE AND OBJECTIVE BOX
Cardiovascular Disease Progress Note  DATE OF SERVICE: 12-26-24 @ 09:20    Overnight events: No acute events overnight.    The patient is laying flat and comfortably on room air.   Otherwise review of systems negative    Objective Findings:  T(C): 37.2 (12-26-24 @ 04:45), Max: 37.2 (12-26-24 @ 04:45)  HR: 80 (12-26-24 @ 04:45) (66 - 80)  BP: 132/67 (12-26-24 @ 04:45) (132/67 - 142/69)  RR: 18 (12-26-24 @ 04:45) (18 - 18)  SpO2: 100% (12-26-24 @ 04:45) (98% - 100%)  Wt(kg): --  Daily     Daily       Physical Exam:  Gen: NAD; Patient resting comfortably  HEENT:  Normocephalic/ atraumatic  CV: RRR, normal S1 + S2, no m/r/g  Lungs:  Normal respiratory effort; clear to auscultation bilaterally  Abd: soft, non-tender; bowel sounds present  Ext: No edema; warm and well perfused    Telemetry: n/a    Laboratory Data:                        9.0    6.18  )-----------( 227      ( 25 Dec 2024 07:35 )             27.7     12-26    135  |  96[L]  |  30[H]  ----------------------------<  136[H]  4.6   |  24  |  5.19[H]    Ca    8.7      26 Dec 2024 06:45  Phos  5.7     12-26  Mg     2.30     12-26                Inpatient Medications:  MEDICATIONS  (STANDING):  acetaminophen     Tablet .. 975 milliGRAM(s) Oral every 8 hours  ascorbic acid 500 milliGRAM(s) Oral daily  ceFAZolin   IVPB 1000 milliGRAM(s) IV Intermittent every 24 hours  chlorhexidine 2% Cloths 1 Application(s) Topical daily  dextrose 5%. 1000 milliLiter(s) (100 mL/Hr) IV Continuous <Continuous>  dextrose 5%. 1000 milliLiter(s) (50 mL/Hr) IV Continuous <Continuous>  dextrose 50% Injectable 25 Gram(s) IV Push once  dextrose 50% Injectable 12.5 Gram(s) IV Push once  dextrose 50% Injectable 25 Gram(s) IV Push once  epoetin marla (EPOGEN) Injectable 6000 Unit(s) IV Push <User Schedule>  glucagon  Injectable 1 milliGRAM(s) IntraMuscular once  hydrALAZINE 50 milliGRAM(s) Oral at bedtime  insulin lispro (ADMELOG) corrective regimen sliding scale   SubCutaneous at bedtime  insulin lispro (ADMELOG) corrective regimen sliding scale   SubCutaneous three times a day before meals  lidocaine   4% Patch 3 Patch Transdermal daily  lidocaine 1%/epinephrine 1:200,000 Inj 3 milliLiter(s) Local Injection once  polyethylene glycol 3350 17 Gram(s) Oral daily  polyethylene glycol 3350 17 Gram(s) Oral at bedtime  povidone iodine 10% Nasal Swab 1 Application(s) Both Nostrils once  senna 2 Tablet(s) Oral at bedtime  sevelamer carbonate 800 milliGRAM(s) Oral three times a day with meals      Assessment: 79 year old woman with ESRD on HD ([facility]), HTN, DM (diet controlled), chronic type B aortic dissection, heart murmur, endometrial ca in remission s/p total hysterectomy in 2007 presents with bacteremia.    Plan of Care:        #Bacteremia-  In the setting of ophthalmic infection.   BLANE with no obvious evidence of vegetations.  Continue current cardiac management.          #Type B aortic dissection-  - Chronic (Diagnosed >10 years ago)  - Optimal BP control.          #ESRD-  - HD as per renal.           Over 55 minutes spent on total encounter; more than 50% of the visit was spent counseling and/or coordinating care by the attending physician.      Gabo Burris MD [facility]  Cardiovascular Disease  [phone]

## 2024-12-26 NOTE — PROGRESS NOTE ADULT - SUBJECTIVE AND OBJECTIVE BOX
Patient is a 79y old  Female who presents with a chief complaint of fever, vision loss (26 Dec 2024 09:20)      SUBJECTIVE / OVERNIGHT EVENTS: dc to [facility]    MEDICATIONS  (STANDING):  acetaminophen     Tablet .. 975 milliGRAM(s) Oral every 8 hours  ascorbic acid 500 milliGRAM(s) Oral daily  ceFAZolin   IVPB 1000 milliGRAM(s) IV Intermittent every 24 hours  chlorhexidine 2% Cloths 1 Application(s) Topical daily  dextrose 5%. 1000 milliLiter(s) (100 mL/Hr) IV Continuous <Continuous>  dextrose 5%. 1000 milliLiter(s) (50 mL/Hr) IV Continuous <Continuous>  dextrose 50% Injectable 25 Gram(s) IV Push once  dextrose 50% Injectable 12.5 Gram(s) IV Push once  dextrose 50% Injectable 25 Gram(s) IV Push once  epoetin marla (EPOGEN) Injectable 6000 Unit(s) IV Push <User Schedule>  glucagon  Injectable 1 milliGRAM(s) IntraMuscular once  hydrALAZINE 50 milliGRAM(s) Oral at bedtime  insulin lispro (ADMELOG) corrective regimen sliding scale   SubCutaneous at bedtime  insulin lispro (ADMELOG) corrective regimen sliding scale   SubCutaneous three times a day before meals  lidocaine   4% Patch 3 Patch Transdermal daily  lidocaine 1%/epinephrine 1:200,000 Inj 3 milliLiter(s) Local Injection once  polyethylene glycol 3350 17 Gram(s) Oral daily  polyethylene glycol 3350 17 Gram(s) Oral at bedtime  povidone iodine 10% Nasal Swab 1 Application(s) Both Nostrils once  senna 2 Tablet(s) Oral at bedtime  sevelamer carbonate 800 milliGRAM(s) Oral three times a day with meals    MEDICATIONS  (PRN):  acetaminophen     Tablet .. 650 milliGRAM(s) Oral every 6 hours PRN Temp greater or equal to 38C (100.4F), Mild Pain (1 - 3), Moderate Pain (4 - 6)  dextrose Oral Gel 15 Gram(s) Oral once PRN Blood Glucose LESS THAN 70 milliGRAM(s)/deciliter  lidocaine/prilocaine Cream 1 Application(s) Topical daily PRN on HD days for pain  magnesium hydroxide Suspension 30 milliLiter(s) Oral daily PRN Constipation  melatonin 3 milliGRAM(s) Oral at bedtime PRN Insomnia  ondansetron Injectable 4 milliGRAM(s) IV Push every 6 hours PRN Nausea and/or Vomiting  oxyCODONE    IR 5 milliGRAM(s) Oral every 4 hours PRN Moderate Pain (4 - 6)  oxyCODONE    IR 10 milliGRAM(s) Oral every 4 hours PRN Severe Pain (7 - 10)  sodium chloride 0.9% Bolus. 100 milliLiter(s) IV Bolus every 5 minutes PRN SBP LESS THAN or EQUAL to 90 mmHg  traMADol 50 milliGRAM(s) Oral every 6 hours PRN Mild Pain (1 - 3)      Vital Signs Last 24 Hrs  T(F): 97.9 (12-26-24 @ 16:06), Max: 98.9 (12-26-24 @ 04:45)  HR: 84 (12-26-24 @ 16:06) (65 - 84)  BP: 144/65 (12-26-24 @ 16:06) (132/67 - 147/60)  RR: 18 (12-26-24 @ 16:06) (18 - 18)  SpO2: 100% (12-26-24 @ 16:06) (100% - 100%)  Telemetry:   CAPILLARY BLOOD GLUCOSE      POCT Blood Glucose.: 182 mg/dL (26 Dec 2024 12:00)  POCT Blood Glucose.: 159 mg/dL (26 Dec 2024 08:28)  POCT Blood Glucose.: 188 mg/dL (25 Dec 2024 22:22)    I&O's Summary      PHYSICAL EXAM:  GENERAL: NAD, well-developed  HEAD:  Atraumatic, Normocephalic  EYES: EOMI, PERRLA, conjunctiva and sclera clear  NECK: Supple, No JVD  CHEST/LUNG: Clear to auscultation bilaterally; No wheeze  HEART: Regular rate and rhythm; No murmurs, rubs, or gallops  ABDOMEN: Soft, Nontender, Nondistended; Bowel sounds present  EXTREMITIES:  2+ Peripheral Pulses, No clubbing, cyanosis, or edema  PSYCH: AAOx3  NEUROLOGY: non-focal  SKIN: No rashes or lesions    LABS:                        9.0    6.18  )-----------( 227      ( 25 Dec 2024 07:35 )             27.7     12-26    135  |  96[L]  |  30[H]  ----------------------------<  136[H]  4.6   |  24  |  5.19[H]    Ca    8.7      26 Dec 2024 06:45  Phos  5.7     12-26  Mg     2.30     12-26            Urinalysis Basic - ( 26 Dec 2024 06:45 )    Color: x / Appearance: x / SG: x / pH: x  Gluc: 136 mg/dL / Ketone: x  / Bili: x / Urobili: x   Blood: x / Protein: x / Nitrite: x   Leuk Esterase: x / RBC: x / WBC x   Sq Epi: x / Non Sq Epi: x / Bacteria: x        RADIOLOGY & ADDITIONAL TESTS:    Imaging Personally Reviewed:    Consultant(s) Notes Reviewed:      Care Discussed with Consultants/Other Providers:

## 2024-12-31 LAB
CULTURE RESULTS: SIGNIFICANT CHANGE UP
SPECIMEN SOURCE: SIGNIFICANT CHANGE UP

## 2025-01-01 ENCOUNTER — TRANSCRIPTION ENCOUNTER (OUTPATIENT)
Age: 80
End: 2025-01-01

## 2025-01-01 ENCOUNTER — NON-APPOINTMENT (OUTPATIENT)
Age: 80
End: 2025-01-01

## 2025-01-01 LAB
CULTURE RESULTS: SIGNIFICANT CHANGE UP
SPECIMEN SOURCE: SIGNIFICANT CHANGE UP

## 2025-01-02 ENCOUNTER — APPOINTMENT (OUTPATIENT)
Age: 80
End: 2025-01-02

## 2025-01-09 ENCOUNTER — NON-APPOINTMENT (OUTPATIENT)
Age: 80
End: 2025-01-09

## 2025-01-09 ENCOUNTER — APPOINTMENT (OUTPATIENT)
Age: 80
End: 2025-01-09
Payer: MEDICARE

## 2025-01-09 PROCEDURE — 92014 COMPRE OPH EXAM EST PT 1/>: CPT

## 2025-01-11 LAB
CULTURE RESULTS: SIGNIFICANT CHANGE UP
SPECIMEN SOURCE: SIGNIFICANT CHANGE UP

## 2025-01-13 ENCOUNTER — APPOINTMENT (OUTPATIENT)
Dept: CARDIOTHORACIC SURGERY | Facility: CLINIC | Age: 80
End: 2025-01-13

## 2025-01-14 ENCOUNTER — APPOINTMENT (OUTPATIENT)
Dept: ORTHOPEDIC SURGERY | Facility: CLINIC | Age: 80
End: 2025-01-14
Payer: MEDICARE

## 2025-01-14 VITALS — WEIGHT: 113 LBS | HEIGHT: 61 IN | BODY MASS INDEX: 21.34 KG/M2

## 2025-01-14 DIAGNOSIS — Z98.890 OTHER SPECIFIED POSTPROCEDURAL STATES: ICD-10-CM

## 2025-01-14 PROCEDURE — 73562 X-RAY EXAM OF KNEE 3: CPT | Mod: TC

## 2025-01-14 PROCEDURE — 99024 POSTOP FOLLOW-UP VISIT: CPT

## 2025-01-18 LAB
CULTURE RESULTS: SIGNIFICANT CHANGE UP
CULTURE RESULTS: SIGNIFICANT CHANGE UP
SPECIMEN SOURCE: SIGNIFICANT CHANGE UP
SPECIMEN SOURCE: SIGNIFICANT CHANGE UP

## 2025-01-21 ENCOUNTER — APPOINTMENT (OUTPATIENT)
Dept: ORTHOPEDIC SURGERY | Facility: CLINIC | Age: 80
End: 2025-01-21

## 2025-02-09 NOTE — PROGRESS NOTE ADULT - ATTENDING COMMENTS
Due to acute cystitis see plan for same  CT head negative  See goals of care discussion  Transitioned  to full comfort measures 2/6   serious RP bleed, with acute blood loss anemia/guarded

## 2025-04-24 ENCOUNTER — APPOINTMENT (OUTPATIENT)
Dept: ORTHOPEDIC SURGERY | Facility: CLINIC | Age: 80
End: 2025-04-24

## 2025-04-24 NOTE — ED PROVIDER NOTE - CARDIAC RATE
VIDEO VISIT PROGRESS NOTE  This visit was performed via live interactive two-way video.    During their visit, the patient was informed that their consent to treat includes permission to submit claims to their insurance on their behalf for the services received.  Clinician Location: [facility]    Patient Location: Home    Chief Complaint  Medical Weight Management (MW FUV/6 MONTHS) and Video Visit    HISTORY OF PRESENT ILLNESS  Social Drivers  Josue Membreno is a pleasant 62 year old female who is requesting a Video Visit who had concerns including Medical Weight Management (MW FUV/6 MONTHS) and Video Visit.    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 She has never been exposed to tobacco smoke. She has never used smokeless tobacco. She reports that she does not drink alcohol and does not use drugs.    REVIEW OF SYSTEMS  All other pertinent systems are reviewed and are negative except as documented above.     HISTORIES  I have personally reviewed and updated the following electronic medical record sections: Past Medical History, Past Surgical History, Social History, Family History, Current medications,Allergies      MEDICATIONS  The medication list in the medical record as well as updates to the medication list submitted by the patient with this V-Visit were reviewed and updated today.       PHYSICAL EXAM     Vital Signs Per Patient:         Constitutional: well-nourished, well-developed, well-appearing  Ears, nose, mouth, throat: normocephalic, atraumatic, external ears normal by inspection  Eyes: no proptosis, extra-ocular eye movement intact, normal sclerae, conjunctivae not injected  Neck: No visible goiter, range of motion of neck appears normal  Respiratory: No increased respiratory effort  Skin: no visible rash, no foot ulcers, no varicose veins  Psychiatric: non-anxious, normal affect      ASSESSMENT AND PLAN   Reshma Membreno is a pleasant 62 year old female we discussed the followin. Arthritis of both knees  2. History of morbid obesity  3. Overweight (BMI 25.0-29.9)  4. Preventative health care  -     Insulin Level, Fasting; Future  -     Comprehensive Metabolic Panel; Future  -     CBC with Automated Differential; Future    Muscle mass looks so good but always would want her to work on this  Likely needs more protein and fiber  Consider plastics to help with excess skin weight   F/u in 4 m     Follow Up: No follow-ups on file.    Upcoming Appointments Already Scheduled  Future Appointments   Date Time Provider Department Center   2025 10:45 AM [facility] ACL LAB ACL[id number]   2025 11:15 AM Teresa Lara NP [id number]   2025  3:45 PM Jordi Baker DO [id number] [facility] Soto NP [facility]       normal

## 2025-06-13 NOTE — PROGRESS NOTE ADULT - PROBLEM SELECTOR PLAN 5
39 year old male with PMH of morbid obesity, BEA on CPAP, pAFIB (not on AC prior to admission), HTN, and BL papilledema attributed to pseudotumor cerebri; who initially presented to  on 2/24 with SOB and BL LE edema. Found to have URI with progressive SOB and multifocal PNA on imaging. His hospital course was complicated by worsening respiratory distress and increased 02 demands secondary to secretions (requiring multiple intubation attempts) and eventual MICU admission. While in MICU, he 02 requirements continued despite optimal vent management. Concern noted for progressive ARDS, unable to prone given morbid obesity, and ultimately cannulated for vvECMO on 2/25 and transferred to [facility] for further management on 2/26.  His hospital course at [facility] was significant for the following: diuretic and ABX management, s/p trache and PEG (placed on 3/7- PEG since removed), RCU admission, high grade fevers (secondary to panniculitis), progressively worsening sacral ulcer (likely osteomyelitis- s/p surgical debridement), BL foot wound (secondary to pressor use), neuropathy (secondary to critical illness polyneuropathy), ELLA (secondary to vancomycin toxicity and overdiuresis- since DCd- with improvement). Patient now admitted for a multidisciplinary rehab program. 04-25-25 @ 15:19    * Continued and remarkable functional improvement, reports pain b/l feet and calves attributes it to effect of increased ambulatory distance - continue Gabapentin  * Persisting/significant DF weakness - await orthotic eval for BL AFOs    * SW following for DC planning and arranging wound care teaching with family   * CXR with noted RUL infiltrate - Ceftriaxone IV - transition to oral ABX on DC  * DC home 6/13     #Acute on Chronic Hypercapnic Respiratory Failure due to Morbid Obesity/OHS in setting of RTD  #Critical Illness Myopathy  #Morbid Obesity  #OHS/BEA on Bipap  - Gait Instability, ADL impairments and Functional impairments: start Comprehensive Rehab Program of PT/OT  - 3 hours a day, 5 days a week   - PRN: Nebulizer QID   - Acute on Chronic Hypercapnic Respiratory Failure due to patient morbid obesity (BMI of 56.6) which is causing restriction of the thoracic cage not allowing for proper gas exchange.  The patient is having restrictive disease secondary to OHS as indicated by PFTs. Patient is currently hospitalized with diagnosis of acute on chronic hypercapnic respiratory failure secondary to thoracic restrictive disease related/consequent to OHS and/or morbid obesity. Patient demonstrates shortness of breath and accessory muscle use at rest. Despite the nightly use of BIPAP16/5 the patient remained hypercapnic with a PCO2 of 67.  The patient requires volume augmented ventilation with targeted tidal volume not found on a standard BiPAP for home use. The patient would benefit from NIV, otherwise would be at risk for further deterioration, readmissions and possible patient harm. Patient will need NIV for home.   - CXR with noted RUL infiltrate - start Ceftriaxone IV - transition to oral ABX on DC    # Persisting/significant DF weakness AFOs  - Orthotic eval for BL solid AFOs   - This patient has a diagnosis of BL foot drop. Patient is ambulatory. Patient requires a custom molded hinged AFO to preserve ankle motion while stabilizing talar and subtalar joints. Device will give a more natural gait without destabilizing the knee and hip. Device requires a low density lining to protect prominent roderick areas of effected skin. Patient will need the device for a term of greater than 9 months. No reasonable prefabricated orthosis exists.       #Sacral Osteomyelitis  - Zosyn TID - completed on 5/12/25    #Paroxysmal AFIB  - Eliquis 5mg BID   --low dose metoprolol 12.5mg bid (dose reduced 6/10 after RRT for hypotension)     #Calf Tenderness/pain b/l feet  - BL LE doppler negative 5/21   --Apply offloading boots when OOB      #L hip pain - resolved  - no acute findings on XR or CT   - L hip MRI (5/7) no significant findings     #HTN  - Amlodipine 10mg dialy     #Sleep/Mood  - Melatonin 9mg at HS   - Quetiapine 25mg at HS     #Skin  Wound Recs per Plastic Surgery (5/16):  Sacral Pressure Injury: BID- Cleanse with NS, remove excess fluid, apply santyl ointment to sloughing tissues, pack wound with 1-inch dry plain packing, cover with 4 inch gauze and abd combine  - R buttock: BID- Cleanse with NS, overlay calcium alginate, ABD combine dressing  Additional wound care instructions:  -->Right anterior thigh to groin isolated wound: Clean wound and periwound skin with NS. Pat dry. Apply liquid barrier film to periwound skin, allow to dry. Cover with silicone foam with border. Change daily or PRN if soiled   --> Bilateral abdominal pannus, bilateral groin: Cleanse with luke-warm soap and water, dry well. Apply clotrimazole  --> Bilateral feet: Apply betadine  to bilateral foot wounds followed by 4x4 gauze, ABD pad, and matilde daily per podiatry.  --> Continue to offload pressure; bariatric low airloss support surface, turn and position per protocol with use of fluidized positioning devices, continue incontinence and moisture care per protocol and use of single breathable incontinence pads, continue to offload heels with fluidized positioning devices. Continue use of bariatric seat cushion, limit sit time- no more than 2 consecutive hours at any given time.  - Pressure injury/Skin: OOB to Chair, PT/OT    - Ammonium lactate to BL LEs     #Neuropathy/sacral ulcer  - Gabapentin 1200mg TID --aim to taper to lower dose    #Pain Mgmt   - Capsaicin cream to BL feet QID - Avoid use on damaged, broken, or irritated skin. Avoid occlusive dressing or heat   - Lidocaine patch to BL thighs   - PRN:; Oxycodone and Tylenol 650mg QID   -celebrex 100mg bid for sacral pain   - neuropathic pain b/l feet c/w   gabapentin    #Morbid obesity  - Most recent weight 394lbs (5/7)     #GI/Bowel Mgmt   - Pantoprazole  - Bisacodyl 10mg at HS   - Miralax   - Naloxegal 25mg daily     #/Bladder Mgmt   #ELLA  - USKB 5/20 negative   - Renvela 800mg TID    #FEN --Morbid obesity (BMI > 40).  - Diet - Regular + Thins  [CCHO]    - MVI     # Health Management:   Environmental challenge affecting dc plan--narrow door and need for ramp to house entrance  However, patient making goal directed effort towards therapy goals     #Precautions / PROPHYLAXIS:   - Falls  - ortho: Weight bearing status: WBAT in surgical shoe   - Lungs: Aspiration, Incentive Spirometer   - DVT: apixiban  ----------------------------------------------      Function as at 6/10  Ambulating 75 ft  with RW CS  Barrier--ulcers on feet, limiting wt bearing,  environmental barrier at home (stairs), severe obesity  Est dc 6/13,       ----------------------------------------------  Dr. CANTU's Liaison with Family/Providers:  6/9- Met Patient's father during his visit to the unit during, he expressed happiness with patient's functional progress  We discussed dc planning including plans to address environmental barriers at home  Reports that there is a back entrance through which he could enter the house with limited number of stairs    6/10--Girlfriend present during review, she contributed during review, including discussion on DC planning, plan for the structural barrier at home (few stairs to entrance)  Patient had agreed to get a ramp, working with his aunt, or alternative plan fo railing for the stairs  Agreed to dc plan for 6/13 to home     ----------------------------------------------  OUTPATIENT/FOLLOW UP:    Your Primary Care Provider,   Phone: (   )    -  Fax: (   )    -  Follow Up Time: 1 week    [facility] [place],   [address] [facility]  Phone: [phone]  Fax: (   )    -  Follow Up Time:    Dr. Waterhouse  Podiatry  [phone]  Within 1 week      -type b aortic dissection (chronic)   -f/u vasc surg recs re partial thrombus formation within false lumen.  -stable, will continue to monitor. BP control goal 120-140 systolic per vasc  39 year old male with PMH of morbid obesity, BEA on CPAP, pAFIB (not on AC prior to admission), HTN, and BL papilledema attributed to pseudotumor cerebri; who initially presented to  on 2/24 with SOB and BL LE edema. Found to have URI with progressive SOB and multifocal PNA on imaging. His hospital course was complicated by worsening respiratory distress and increased 02 demands secondary to secretions (requiring multiple intubation attempts) and eventual MICU admission. While in MICU, he 02 requirements continued despite optimal vent management. Concern noted for progressive ARDS, unable to prone given morbid obesity, and ultimately cannulated for vvECMO on 2/25 and transferred to [facility] for further management on 2/26.  His hospital course at [facility] was significant for the following: diuretic and ABX management, s/p trache and PEG (placed on 3/7- PEG since removed), RCU admission, high grade fevers (secondary to panniculitis), progressively worsening sacral ulcer (likely osteomyelitis- s/p surgical debridement), BL foot wound (secondary to pressor use), neuropathy (secondary to critical illness polyneuropathy), ELLA (secondary to vancomycin toxicity and overdiuresis- since DCd- with improvement). Patient now admitted for a multidisciplinary rehab program. 04-25-25 @ 15:19    * Neuropathy c/w gabapentin  * Persisting/significant DF weakness - s/p orthotic eval for BL AFOs  await delivery, has contact details of orthotist  * Girlfriend comfirmed competence with wound care and will get further teaching from home care team  * Rt sided pneumonia--d/c home on oral antibiotics  * DC home 6/13 today    #Acute on Chronic Hypercapnic Respiratory Failure due to Morbid Obesity/OHS in setting of RTD  #Critical Illness Myopathy  #Morbid Obesity  #OHS/BEA on Bipap  - Gait Instability, ADL impairments and Functional impairments: start Comprehensive Rehab Program of PT/OT  - 3 hours a day, 5 days a week   - PRN: Nebulizer QID   - Acute on Chronic Hypercapnic Respiratory Failure due to patient morbid obesity (BMI of 56.6) which is causing restriction of the thoracic cage not allowing for proper gas exchange.  The patient is having restrictive disease secondary to OHS as indicated by PFTs. Patient is currently hospitalized with diagnosis of acute on chronic hypercapnic respiratory failure secondary to thoracic restrictive disease related/consequent to OHS and/or morbid obesity. Patient demonstrates shortness of breath and accessory muscle use at rest. Despite the nightly use of BIPAP16/5 the patient remained hypercapnic with a PCO2 of 67.  The patient requires volume augmented ventilation with targeted tidal volume not found on a standard BiPAP for home use. The patient would benefit from NIV, otherwise would be at risk for further deterioration, readmissions and possible patient harm. Patient will need NIV for home.   * Rt sided pneumonia--d/c home on oral antibiotics    # Persisting/significant DF weakness AFOs  - Orthotic eval for BL solid AFOs   - This patient has a diagnosis of BL foot drop. Patient is ambulatory. Patient requires a custom molded hinged AFO to preserve ankle motion while stabilizing talar and subtalar joints. Device will give a more natural gait without destabilizing the knee and hip. Device requires a low density lining to protect prominent roderick areas of effected skin. Patient will need the device for a term of greater than 9 months. No reasonable prefabricated orthosis exists.       #Sacral Osteomyelitis  - Zosyn TID - completed on 5/12/25    #Paroxysmal AFIB  - Eliquis 5mg BID   --low dose metoprolol 12.5mg bid (dose reduced 6/10 after RRT for hypotension)     #Calf Tenderness/pain b/l feet  - BL LE doppler negative 5/21   --Apply offloading boots when OOB      #L hip pain - resolved  - no acute findings on XR or CT   - L hip MRI (5/7) no significant findings     #HTN  - Amlodipine 10mg dialy     #Sleep/Mood  - Melatonin 9mg at HS   - Quetiapine 25mg at HS     #Skin  Wound Recs per Plastic Surgery (5/16):  Sacral Pressure Injury: BID- Cleanse with NS, remove excess fluid, apply santyl ointment to sloughing tissues, pack wound with 1-inch dry plain packing, cover with 4 inch gauze and abd combine  - R buttock: BID- Cleanse with NS, overlay calcium alginate, ABD combine dressing  Additional wound care instructions:  -->Right anterior thigh to groin isolated wound: Clean wound and periwound skin with NS. Pat dry. Apply liquid barrier film to periwound skin, allow to dry. Cover with silicone foam with border. Change daily or PRN if soiled   --> Bilateral abdominal pannus, bilateral groin: Cleanse with luke-warm soap and water, dry well. Apply clotrimazole  --> Bilateral feet: Apply betadine  to bilateral foot wounds followed by 4x4 gauze, ABD pad, and matilde daily per podiatry.  --> Continue to offload pressure; bariatric low airloss support surface, turn and position per protocol with use of fluidized positioning devices, continue incontinence and moisture care per protocol and use of single breathable incontinence pads, continue to offload heels with fluidized positioning devices. Continue use of bariatric seat cushion, limit sit time- no more than 2 consecutive hours at any given time.  - Pressure injury/Skin: OOB to Chair, PT/OT    - Ammonium lactate to BL LEs     #Neuropathy/sacral ulcer  - Gabapentin 1200mg TID --aim to taper to lower dose    #Pain Mgmt   - Capsaicin cream to BL feet QID - Avoid use on damaged, broken, or irritated skin. Avoid occlusive dressing or heat   - Lidocaine patch to BL thighs   - PRN:; Oxycodone and Tylenol 650mg QID   -celebrex 100mg bid for sacral pain   - neuropathic pain b/l feet c/w   gabapentin    #Morbid obesity  - Most recent weight 394lbs (5/7)     #GI/Bowel Mgmt   - Pantoprazole  - Bisacodyl 10mg at HS   - Miralax   - Naloxegal 25mg daily     #/Bladder Mgmt   #ELLA  - USKB 5/20 negative   - Renvela 800mg TID    #FEN --Morbid obesity (BMI > 40).  - Diet - Regular + Thins  [CCHO]    - MVI     # Health Management:   Environmental challenge affecting dc plan--narrow door and need for ramp to house entrance  However, patient making goal directed effort towards therapy goals     #Precautions / PROPHYLAXIS:   - Falls  - ortho: Weight bearing status: WBAT in surgical shoe   - Lungs: Aspiration, Incentive Spirometer   - DVT: apixiban  ----------------------------------------------      Function as at 6/10  Ambulating 75 ft  with RW CS  Barrier--ulcers on feet, limiting wt bearing,  environmental barrier at home (stairs), severe obesity  Est dc 6/13,       ----------------------------------------------  Dr. CANTU's Liaison with Family/Providers:      6/13--Girlfriend present during review, she contributed during review, confirmed getting teaching on wound care  Agreed to dc plan for today to home     ----------------------------------------------  OUTPATIENT/FOLLOW UP:    Your Primary Care Provider,   Phone: (   )    -  Fax: (   )    -  Follow Up Time: 1 week    [facility] [place],   [address] [place] [address]  Phone: [phone]  Fax: (   )    -  Follow Up Time:    Dr. Waterhouse  Podiatry  [phone]  Within 1 week

## 2025-07-18 NOTE — ASU DISCHARGE PLAN (ADULT/PEDIATRIC) - CALL YOUR DOCTOR IF YOU HAVE ANY OF THE FOLLOWING:
Copied from CRM #[id number]. Topic: General Inquiry - Patient Advice  >> Jul 18, 2025  3:17 PM Usha wrote:  .[email or web address]     Patient is calling for Medical Advice regarding:NA    How long has patient had these symptoms:NA    Pharmacy name and phone#:NA    Patient wants a call back or thru [facility], provide patient's call back phone number call back mom Jennifer :    Comments:mom would like a call back with questions about having blood work done for Yudi     Please advise patient replies from provider may take up to 48 hours.    Spoke with mom, Yudi will need an appointment to discuss labs with provider and will determine if vu labs are required. Mom said ok, thank you.   Bleeding that does not stop/Swelling that gets worse/Wound/Surgical Site with redness, or foul smelling discharge or pus

## (undated) DEVICE — ZIMMER BACTISURE WOUND LAVAGE 1000ML

## (undated) DEVICE — PREP CHLORAPREP HI-LITE ORANGE 26ML

## (undated) DEVICE — GEL AQUSNC PACKET 20GR

## (undated) DEVICE — SYNOVIS VASCULAR PROBE 1.5MM 15CM

## (undated) DEVICE — SOL IRR BAG NS 0.9% 1000ML

## (undated) DEVICE — DRSG CURITY GAUZE SPONGE 4 X 4" 12-PLY

## (undated) DEVICE — LABELS BLANK W PEN

## (undated) DEVICE — DRAPE TOWEL BLUE 17" X 24"

## (undated) DEVICE — NDL HYPO SAFE 22G X 1.5" (BLACK)

## (undated) DEVICE — WARMING BLANKET LOWER ADULT

## (undated) DEVICE — DRSG PREVENA PEEL & PLACE KIT 20CM

## (undated) DEVICE — SUT MONOCRYL 4-0 27" PS-2 UNDYED

## (undated) DEVICE — LAP PAD W RING 18 X 18"

## (undated) DEVICE — TOURNIQUET CUFF 24" DUAL PORT SINGLE BLADDER W PLC (BLACK)

## (undated) DEVICE — VENODYNE/SCD SLEEVE CALF MEDIUM

## (undated) DEVICE — SUT ETHILON 3-0 18" FS-1

## (undated) DEVICE — DRSG ADAPTIC 3 X 8"

## (undated) DEVICE — SYR LUER LOK 10CC

## (undated) DEVICE — SUT VICRYL 2-0 27" PS-2 UNDYED

## (undated) DEVICE — SUT SILK 2-0 18" TIES

## (undated) DEVICE — Device

## (undated) DEVICE — SUT VICRYL 2-0 27" FS-1 UNDYED

## (undated) DEVICE — DRSG STOCKINETTE IMPERVIOUS XL

## (undated) DEVICE — VESSEL LOOP MINI-BLUE 0.075" X 16"

## (undated) DEVICE — DRSG COBAN 6"

## (undated) DEVICE — STRYKER PULSE LAVAGE WITH COAXIAL MULTI-ORIFICE TIP

## (undated) DEVICE — DRAPE HAND 77" X 146"

## (undated) DEVICE — SUT PROLENE 7-0 24" BV-1

## (undated) DEVICE — STRYKER PULSE LAVAGE WITH COAXIAL FAN SPRAY TIP

## (undated) DEVICE — DRAPE 3/4 SHEET 52X76"

## (undated) DEVICE — DRSG TEGADERM 2.5X3"

## (undated) DEVICE — DRAPE SPLIT SHEET 77" X 120"

## (undated) DEVICE — SUT VICRYL 1 36" CTX UNDYED

## (undated) DEVICE — POSITIONER STRAP ARMBOARD VELCRO TS-30

## (undated) DEVICE — ELCTR GROUNDING PAD ADULT COVIDIEN

## (undated) DEVICE — TAPE SILK 3"

## (undated) DEVICE — DRAPE LIGHT HANDLE COVER (GREEN)

## (undated) DEVICE — BAG DECANTER DISP

## (undated) DEVICE — SOL IRR POUR H2O 500ML

## (undated) DEVICE — PACK LIJ BASIC ORTHO

## (undated) DEVICE — NDL HYPO SAFE 25G X 5/8" (ORANGE)

## (undated) DEVICE — SOL BAG NS 0.9% 1000ML

## (undated) DEVICE — SUT SILK 3-0 18" TIES

## (undated) DEVICE — LIJ/LIA-ESU VALLEYLAB FORCE TRIAD T2D28932EX: Type: DURABLE MEDICAL EQUIPMENT

## (undated) DEVICE — PACK AV FISTULA

## (undated) DEVICE — SYNTHES REAMING ROD WITH BALL TIP 2.5MM 950MM

## (undated) DEVICE — CANISTER DISPOSABLE THIN WALL 3000CC

## (undated) DEVICE — SUCTION YANKAUER OPEN TIP NO VENT CURVE

## (undated) DEVICE — PACKING GAUZE PLAIN 2"

## (undated) DEVICE — PREP SCRUB BRUSH W CHG 4%

## (undated) DEVICE — STAPLER SKIN MULTI DIRECTION W35

## (undated) DEVICE — SUT VICRYL 3-0 27" SH UNDYED

## (undated) DEVICE — SUT PROLENE 6-0 24" BV-1

## (undated) DEVICE — DRSG STERISTRIPS 0.5 X 4"

## (undated) DEVICE — DRSG KLING 4"

## (undated) DEVICE — PREP SKIN IODOPHOR PVP 1OZ

## (undated) DEVICE — PROTECTOR HEEL / ELBOW

## (undated) DEVICE — SUT VICRYL 0 27" OS-6 UNDYED

## (undated) DEVICE — LIJ-CONSIGN SYNTHES TFN PERCUTANEOUS & LAG SCREW INST: Type: DURABLE MEDICAL EQUIPMENT

## (undated) DEVICE — DURABLE MEDICAL EQUIPMENT: Type: DURABLE MEDICAL EQUIPMENT

## (undated) DEVICE — STAPLER SKIN VISI-STAT 35 WIDE

## (undated) DEVICE — TOURNIQUET ESMARK 6"

## (undated) DEVICE — TUBING ATS SUCTION LINE

## (undated) DEVICE — DRSG COBAN 4" LF NONSTERILE

## (undated) DEVICE — DRSG ACE BANDAGE 4" NS

## (undated) DEVICE — DRAIN JACKSON PRATT 3 SPRING RESERVOIR W 15FR PVC DRAIN

## (undated) DEVICE — DRSG TEGADERM 2.5 X 3"

## (undated) DEVICE — LIJ-CONSIGN SYNTHES TITANIUM TFN LOCKING SET: Type: DURABLE MEDICAL EQUIPMENT

## (undated) DEVICE — DRAPE U POLY BLUE 60"X60"

## (undated) DEVICE — COVER PROBE W/GEL 18X120CM STRL 50/BX

## (undated) DEVICE — NDL HYPO REGULAR BEVEL 25G X 1.5" (BLUE)

## (undated) DEVICE — SUT SILK 4-0 17-18"

## (undated) DEVICE — SOL IRR POUR NS 0.9% 1500ML

## (undated) DEVICE — TOURNIQUET CUFF 18" DUAL PORT SINGLE BLADDER W PLC  (BLACK)

## (undated) DEVICE — DRAPE SHOWER CURTAIN ISOLATION

## (undated) DEVICE — SOL IRR POUR H2O 1500ML

## (undated) DEVICE — SUT VICRYL 0 27" CT-1 UNDYED

## (undated) DEVICE — DRAPE COVER SNAP 36X30"

## (undated) DEVICE — CULTURETTE DUAL SWAB ST

## (undated) DEVICE — DRAPE IOBAN 33" X 23"

## (undated) DEVICE — SUT VICRYL 2-0 27" CT UNDYED

## (undated) DEVICE — GLV 8 PROTEXIS (WHITE)

## (undated) DEVICE — DRILL BIT STRYKER ORTHO 4.2 X 340MM

## (undated) DEVICE — DRSG KNEE CKD W STRAIGHT STAYS 24"

## (undated) DEVICE — BIPOLAR FORCEP STRYKER STANDARD 9" X 0.5MM (YELLOW)

## (undated) DEVICE — SUT MONOCRYL 4-0 18" P-3 UNDYED

## (undated) DEVICE — CLAMP BULLDOG MIDI 45 DEGREE (GREEN) DISP

## (undated) DEVICE — GLV 8.5 PROTEXIS (WHITE)

## (undated) DEVICE — GLV 7.5 PROTEXIS (CREAM) MICRO

## (undated) DEVICE — ELCTR BOVIE TIP BLADE INSULATED 2.8" EDGE WITH SAFETY

## (undated) DEVICE — DRSG COBAN 4"

## (undated) DEVICE — DRAIN JACKSON PRATT 3 SPRING RESERVOIR W 10FR PVC DRAIN

## (undated) DEVICE — FRAZIER SUCTION TIP 8FR

## (undated) DEVICE — SUCTION YANKAUER NO CONTROL VENT

## (undated) DEVICE — DRSG STOCKINETTE IMPERVIOUS XL 12 X 48"

## (undated) DEVICE — ELCTR BOVIE PENCIL SMOKE EVACUATION

## (undated) DEVICE — ELCTR AQUAMANTYS BIPOLAR SEALER 6.0

## (undated) DEVICE — DRAPE SURGICAL #1010

## (undated) DEVICE — DRSG WEBRIL 4"

## (undated) DEVICE — DRSG XEROFORM 5 X 9"

## (undated) DEVICE — APPLICATOR COTTON TIP 3" STERILE

## (undated) DEVICE — DRSG MASTISOL

## (undated) DEVICE — DRAPE C ARM UNIVERSAL

## (undated) DEVICE — SAW BLADE STRYKER RECIPROCATING SHORT OFFSET 60.0MM X 0.64MM X 6.2MM

## (undated) DEVICE — LIJ-SYNTHES SCREW REMOVAL SET (IMPLANT): Type: DURABLE MEDICAL EQUIPMENT

## (undated) DEVICE — DRSG PREVENA PLUS SYSTEM

## (undated) DEVICE — DRAPE STERI-DRAPE INCISE 23X17"